# Patient Record
Sex: FEMALE | Race: WHITE | NOT HISPANIC OR LATINO | Employment: OTHER | URBAN - METROPOLITAN AREA
[De-identification: names, ages, dates, MRNs, and addresses within clinical notes are randomized per-mention and may not be internally consistent; named-entity substitution may affect disease eponyms.]

---

## 2019-03-15 ENCOUNTER — HISTORICAL (OUTPATIENT)
Dept: PHYSICAL THERAPY | Facility: HOSPITAL | Age: 74
End: 2019-03-15

## 2022-04-10 ENCOUNTER — HISTORICAL (OUTPATIENT)
Dept: ADMINISTRATIVE | Facility: HOSPITAL | Age: 77
End: 2022-04-10

## 2022-04-26 VITALS
SYSTOLIC BLOOD PRESSURE: 138 MMHG | WEIGHT: 231 LBS | DIASTOLIC BLOOD PRESSURE: 80 MMHG | OXYGEN SATURATION: 98 % | BODY MASS INDEX: 37.12 KG/M2 | HEIGHT: 66 IN

## 2022-11-09 ENCOUNTER — LAB VISIT (OUTPATIENT)
Dept: LAB | Facility: HOSPITAL | Age: 77
End: 2022-11-09
Attending: NURSE PRACTITIONER
Payer: MEDICARE

## 2022-11-09 DIAGNOSIS — E11.9 DM (DIABETES MELLITUS): ICD-10-CM

## 2022-11-09 DIAGNOSIS — I10 ESSENTIAL HYPERTENSION, MALIGNANT: Primary | ICD-10-CM

## 2022-11-09 LAB
CHOLEST SERPL-MCNC: 170 MG/DL
CHOLEST/HDLC SERPL: 3 {RATIO} (ref 0–5)
HDLC SERPL-MCNC: 52 MG/DL (ref 35–60)
LDLC SERPL CALC-MCNC: 92 MG/DL (ref 50–140)
TRIGL SERPL-MCNC: 130 MG/DL (ref 37–140)
VLDLC SERPL CALC-MCNC: 26 MG/DL

## 2022-11-09 PROCEDURE — 80061 LIPID PANEL: CPT

## 2022-11-09 PROCEDURE — 36415 COLL VENOUS BLD VENIPUNCTURE: CPT

## 2022-11-14 ENCOUNTER — LAB REQUISITION (OUTPATIENT)
Dept: LAB | Facility: HOSPITAL | Age: 77
End: 2022-11-14
Payer: MEDICARE

## 2022-11-14 DIAGNOSIS — R19.7 DIARRHEA, UNSPECIFIED: ICD-10-CM

## 2022-11-14 LAB
CRYPTOSP AG SPEC QL: NEGATIVE
G LAMBLIA AG STL QL IA: NEGATIVE
GIARDIA/CRYPTO NEG CONT (OHS): NEGATIVE
GIARDIA/CRYPTO POS CONT (OHS): POSITIVE

## 2022-11-14 PROCEDURE — 87329 GIARDIA AG IA: CPT | Performed by: INTERNAL MEDICINE

## 2022-11-14 PROCEDURE — 83993 ASSAY FOR CALPROTECTIN FECAL: CPT | Performed by: INTERNAL MEDICINE

## 2022-11-16 LAB — CALPROTECTIN STL-MCNT: 144 MCG/G

## 2022-12-21 ENCOUNTER — HOSPITAL ENCOUNTER (INPATIENT)
Facility: HOSPITAL | Age: 77
LOS: 2 days | Discharge: HOME | DRG: 313 | End: 2022-12-23
Attending: EMERGENCY MEDICINE | Admitting: INTERNAL MEDICINE
Payer: MEDICARE

## 2022-12-21 DIAGNOSIS — I25.10 CAD (CORONARY ARTERY DISEASE): ICD-10-CM

## 2022-12-21 DIAGNOSIS — R07.9 CHEST PAIN: Primary | ICD-10-CM

## 2022-12-21 DIAGNOSIS — R07.9 CHEST PAIN, UNSPECIFIED TYPE: ICD-10-CM

## 2022-12-21 LAB
ALBUMIN SERPL-MCNC: 4 G/DL (ref 3.4–4.8)
ALBUMIN/GLOB SERPL: 1.2 RATIO (ref 1.1–2)
ALP SERPL-CCNC: 42 UNIT/L (ref 40–150)
ALT SERPL-CCNC: 28 UNIT/L (ref 0–55)
ANION GAP SERPL CALC-SCNC: 17 MMOL/L (ref 8–16)
APTT PPP: 27.6 SECONDS (ref 23.2–33.7)
AST SERPL-CCNC: 30 UNIT/L (ref 5–34)
BASOPHILS # BLD AUTO: 0.01 X10(3)/MCL (ref 0–0.2)
BASOPHILS NFR BLD AUTO: 0.1 %
BILIRUBIN DIRECT+TOT PNL SERPL-MCNC: 0.7 MG/DL
BNP BLD-MCNC: 35 PG/ML
BUN SERPL-MCNC: 15 MG/DL (ref 9.8–20.1)
BUN SERPL-MCNC: 19 MG/DL (ref 6–30)
CALCIUM SERPL-MCNC: 9.6 MG/DL (ref 8.4–10.2)
CHLORIDE SERPL-SCNC: 103 MMOL/L (ref 95–110)
CHLORIDE SERPL-SCNC: 105 MMOL/L (ref 98–107)
CO2 SERPL-SCNC: 20 MMOL/L (ref 23–31)
CORRECTED TEMPERATURE (PCO2): 42 MMHG (ref 35–45)
CORRECTED TEMPERATURE (PH): 7.36 (ref 7.35–7.45)
CORRECTED TEMPERATURE (PO2): 104 MMHG (ref 80–100)
CREAT SERPL-MCNC: 0.4 MG/DL (ref 0.5–1.4)
CREAT SERPL-MCNC: 0.71 MG/DL (ref 0.55–1.02)
EOSINOPHIL # BLD AUTO: 0.04 X10(3)/MCL (ref 0–0.9)
EOSINOPHIL NFR BLD AUTO: 0.5 %
ERYTHROCYTE [DISTWIDTH] IN BLOOD BY AUTOMATED COUNT: 13.5 % (ref 11–14.5)
FLUAV AG UPPER RESP QL IA.RAPID: NOT DETECTED
FLUBV AG UPPER RESP QL IA.RAPID: NOT DETECTED
GFR SERPLBLD CREATININE-BSD FMLA CKD-EPI: >60 MLS/MIN/1.73/M2
GLOBULIN SER-MCNC: 3.3 GM/DL (ref 2.4–3.5)
GLUCOSE SERPL-MCNC: 219 MG/DL (ref 70–110)
GLUCOSE SERPL-MCNC: 223 MG/DL (ref 82–115)
HCO3 UR-SCNC: 23.7 MMOL/L (ref 22–26)
HCT VFR BLD AUTO: 48.8 % (ref 37–47)
HCT VFR BLD CALC: 50 %PCV (ref 36–54)
HGB BLD-MCNC: 15.5 G/DL (ref 12–16)
HGB BLD-MCNC: 16.1 GM/DL (ref 12–16)
HGB BLD-MCNC: 17 G/DL
IMM GRANULOCYTES # BLD AUTO: 0.01 X10(3)/MCL (ref 0–0.04)
IMM GRANULOCYTES NFR BLD AUTO: 0.1 %
INR BLD: 1.1 (ref 0–1.3)
LIPASE SERPL-CCNC: 14 U/L
LYMPHOCYTES # BLD AUTO: 2.12 X10(3)/MCL (ref 0.6–4.6)
LYMPHOCYTES NFR BLD AUTO: 27.8 %
MCH RBC QN AUTO: 32.5 PG
MCHC RBC AUTO-ENTMCNC: 33 MG/DL (ref 33–36)
MCV RBC AUTO: 98.4 FL (ref 80–94)
MONOCYTES # BLD AUTO: 0.63 X10(3)/MCL (ref 0.1–1.3)
MONOCYTES NFR BLD AUTO: 8.3 %
NEUTROPHILS # BLD AUTO: 4.82 X10(3)/MCL (ref 2.1–9.2)
NEUTROPHILS NFR BLD AUTO: 63.2 %
NRBC BLD AUTO-RTO: 0 % (ref 0–1)
PCO2 BLDA: 42 MMHG (ref 35–45)
PH SMN: 7.36 [PH] (ref 7.35–7.45)
PLATELET # BLD AUTO: 157 X10(3)/MCL (ref 140–371)
PMV BLD AUTO: 12.2 FL (ref 9.4–12.4)
PO2 BLDA: 104 MMHG (ref 80–100)
POC BASE DEFICIT: -1.8 MMOL/L (ref -2–2)
POC CARDIAC TROPONIN I: 0 NG/ML (ref 0–0.08)
POC COHB: 1.4 %
POC IONIZED CALCIUM: 1.11 MMOL/L (ref 1.12–1.23)
POC IONIZED CALCIUM: 1.17 MMOL/L (ref 1.06–1.42)
POC METHB: 0 % (ref 0.4–1.5)
POC O2HB: 96.5 % (ref 94–97)
POC SATURATED O2: 97.8 %
POC TCO2 (MEASURED): 25 MMOL/L (ref 23–29)
POC TEMPERATURE: 37 °C
POCT GLUCOSE: 179 MG/DL (ref 70–110)
POTASSIUM BLD-SCNC: 3.6 MMOL/L (ref 3.5–5)
POTASSIUM BLD-SCNC: 4.7 MMOL/L (ref 3.5–5.1)
POTASSIUM SERPL-SCNC: 4.6 MMOL/L (ref 3.5–5.1)
PROT SERPL-MCNC: 7.3 GM/DL (ref 5.8–7.6)
PROTHROMBIN TIME: 14.1 SECONDS (ref 12.5–14.5)
RBC # BLD AUTO: 4.96 X10(6)/MCL (ref 4.2–5.4)
SAMPLE: ABNORMAL
SAMPLE: NORMAL
SARS-COV-2 RNA RESP QL NAA+PROBE: NOT DETECTED
SODIUM BLD-SCNC: 135 MMOL/L (ref 137–145)
SODIUM BLD-SCNC: 140 MMOL/L (ref 136–145)
SODIUM SERPL-SCNC: 139 MMOL/L (ref 136–145)
SPECIMEN SOURCE: ABNORMAL
TROPONIN I SERPL-MCNC: <0.01 NG/ML (ref 0–0.04)
WBC # SPEC AUTO: 7.6 X10(3)/MCL (ref 4.5–11.5)

## 2022-12-21 PROCEDURE — 93010 ELECTROCARDIOGRAM REPORT: CPT | Mod: ,,, | Performed by: INTERNAL MEDICINE

## 2022-12-21 PROCEDURE — 36600 WITHDRAWAL OF ARTERIAL BLOOD: CPT

## 2022-12-21 PROCEDURE — 93010 EKG 12-LEAD: ICD-10-PCS | Mod: ,,, | Performed by: INTERNAL MEDICINE

## 2022-12-21 PROCEDURE — 99900035 HC TECH TIME PER 15 MIN (STAT)

## 2022-12-21 PROCEDURE — 82803 BLOOD GASES ANY COMBINATION: CPT

## 2022-12-21 PROCEDURE — 80047 BASIC METABLC PNL IONIZED CA: CPT

## 2022-12-21 PROCEDURE — 84484 ASSAY OF TROPONIN QUANT: CPT | Performed by: EMERGENCY MEDICINE

## 2022-12-21 PROCEDURE — 83690 ASSAY OF LIPASE: CPT | Performed by: EMERGENCY MEDICINE

## 2022-12-21 PROCEDURE — 85730 THROMBOPLASTIN TIME PARTIAL: CPT | Performed by: EMERGENCY MEDICINE

## 2022-12-21 PROCEDURE — 96375 TX/PRO/DX INJ NEW DRUG ADDON: CPT

## 2022-12-21 PROCEDURE — 80053 COMPREHEN METABOLIC PANEL: CPT | Performed by: EMERGENCY MEDICINE

## 2022-12-21 PROCEDURE — 25000242 PHARM REV CODE 250 ALT 637 W/ HCPCS

## 2022-12-21 PROCEDURE — 25000003 PHARM REV CODE 250: Performed by: EMERGENCY MEDICINE

## 2022-12-21 PROCEDURE — 84484 ASSAY OF TROPONIN QUANT: CPT

## 2022-12-21 PROCEDURE — 83880 ASSAY OF NATRIURETIC PEPTIDE: CPT | Performed by: NURSE PRACTITIONER

## 2022-12-21 PROCEDURE — 0240U COVID/FLU A&B PCR: CPT | Performed by: EMERGENCY MEDICINE

## 2022-12-21 PROCEDURE — 27000221 HC OXYGEN, UP TO 24 HOURS

## 2022-12-21 PROCEDURE — 25500020 PHARM REV CODE 255: Performed by: EMERGENCY MEDICINE

## 2022-12-21 PROCEDURE — 85025 COMPLETE CBC W/AUTO DIFF WBC: CPT | Performed by: EMERGENCY MEDICINE

## 2022-12-21 PROCEDURE — 63600175 PHARM REV CODE 636 W HCPCS: Performed by: EMERGENCY MEDICINE

## 2022-12-21 PROCEDURE — 11000001 HC ACUTE MED/SURG PRIVATE ROOM

## 2022-12-21 PROCEDURE — 99285 EMERGENCY DEPT VISIT HI MDM: CPT | Mod: 25

## 2022-12-21 PROCEDURE — 25000242 PHARM REV CODE 250 ALT 637 W/ HCPCS: Performed by: EMERGENCY MEDICINE

## 2022-12-21 PROCEDURE — 93005 ELECTROCARDIOGRAM TRACING: CPT

## 2022-12-21 PROCEDURE — 96374 THER/PROPH/DIAG INJ IV PUSH: CPT

## 2022-12-21 PROCEDURE — 85610 PROTHROMBIN TIME: CPT | Performed by: EMERGENCY MEDICINE

## 2022-12-21 RX ORDER — SIMETHICONE 80 MG
1 TABLET,CHEWABLE ORAL 4 TIMES DAILY PRN
Status: DISCONTINUED | OUTPATIENT
Start: 2022-12-21 | End: 2022-12-23 | Stop reason: HOSPADM

## 2022-12-21 RX ORDER — PANTOPRAZOLE SODIUM 40 MG/1
40 TABLET, DELAYED RELEASE ORAL DAILY
COMMUNITY
End: 2023-08-19

## 2022-12-21 RX ORDER — ONDANSETRON 2 MG/ML
4 INJECTION INTRAMUSCULAR; INTRAVENOUS EVERY 4 HOURS PRN
Status: DISCONTINUED | OUTPATIENT
Start: 2022-12-21 | End: 2022-12-23 | Stop reason: HOSPADM

## 2022-12-21 RX ORDER — TALC
6 POWDER (GRAM) TOPICAL NIGHTLY PRN
Status: DISCONTINUED | OUTPATIENT
Start: 2022-12-21 | End: 2022-12-23 | Stop reason: HOSPADM

## 2022-12-21 RX ORDER — AMLODIPINE AND BENAZEPRIL HYDROCHLORIDE 10; 20 MG/1; MG/1
1 CAPSULE ORAL DAILY
COMMUNITY
End: 2023-08-19

## 2022-12-21 RX ORDER — FAMOTIDINE 40 MG/1
40 TABLET, FILM COATED ORAL DAILY
Status: ON HOLD | COMMUNITY
End: 2022-12-23 | Stop reason: HOSPADM

## 2022-12-21 RX ORDER — PROCHLORPERAZINE EDISYLATE 5 MG/ML
5 INJECTION INTRAMUSCULAR; INTRAVENOUS EVERY 6 HOURS PRN
Status: DISCONTINUED | OUTPATIENT
Start: 2022-12-21 | End: 2022-12-23 | Stop reason: HOSPADM

## 2022-12-21 RX ORDER — NITROGLYCERIN 0.4 MG/1
0.4 TABLET SUBLINGUAL EVERY 5 MIN PRN
Status: DISCONTINUED | OUTPATIENT
Start: 2022-12-21 | End: 2022-12-23 | Stop reason: HOSPADM

## 2022-12-21 RX ORDER — MAG HYDROX/ALUMINUM HYD/SIMETH 200-200-20
30 SUSPENSION, ORAL (FINAL DOSE FORM) ORAL 4 TIMES DAILY PRN
Status: DISCONTINUED | OUTPATIENT
Start: 2022-12-21 | End: 2022-12-23 | Stop reason: HOSPADM

## 2022-12-21 RX ORDER — NITROGLYCERIN 0.4 MG/1
TABLET SUBLINGUAL
Status: COMPLETED
Start: 2022-12-21 | End: 2022-12-21

## 2022-12-21 RX ORDER — NALOXONE HCL 0.4 MG/ML
0.02 VIAL (ML) INJECTION
Status: DISCONTINUED | OUTPATIENT
Start: 2022-12-21 | End: 2022-12-23 | Stop reason: HOSPADM

## 2022-12-21 RX ORDER — LOSARTAN POTASSIUM 100 MG/1
100 TABLET ORAL DAILY
Status: ON HOLD | COMMUNITY
End: 2022-12-23 | Stop reason: HOSPADM

## 2022-12-21 RX ORDER — INSULIN ASPART 100 [IU]/ML
1-10 INJECTION, SOLUTION INTRAVENOUS; SUBCUTANEOUS
Status: DISCONTINUED | OUTPATIENT
Start: 2022-12-21 | End: 2022-12-23 | Stop reason: HOSPADM

## 2022-12-21 RX ORDER — DAPAGLIFLOZIN 10 MG/1
10 TABLET, FILM COATED ORAL DAILY
COMMUNITY

## 2022-12-21 RX ORDER — MORPHINE SULFATE 4 MG/ML
4 INJECTION, SOLUTION INTRAMUSCULAR; INTRAVENOUS
Status: COMPLETED | OUTPATIENT
Start: 2022-12-21 | End: 2022-12-21

## 2022-12-21 RX ORDER — FLUTICASONE FUROATE, UMECLIDINIUM BROMIDE AND VILANTEROL TRIFENATATE 200; 62.5; 25 UG/1; UG/1; UG/1
1 POWDER RESPIRATORY (INHALATION) DAILY
COMMUNITY

## 2022-12-21 RX ORDER — POLYETHYLENE GLYCOL 3350 17 G/17G
17 POWDER, FOR SOLUTION ORAL 2 TIMES DAILY PRN
Status: DISCONTINUED | OUTPATIENT
Start: 2022-12-21 | End: 2022-12-23 | Stop reason: HOSPADM

## 2022-12-21 RX ORDER — ONDANSETRON 2 MG/ML
4 INJECTION INTRAMUSCULAR; INTRAVENOUS
Status: COMPLETED | OUTPATIENT
Start: 2022-12-21 | End: 2022-12-21

## 2022-12-21 RX ORDER — GLIMEPIRIDE 2 MG/1
4 TABLET ORAL 2 TIMES DAILY
COMMUNITY

## 2022-12-21 RX ORDER — AZELASTINE 1 MG/ML
2 SPRAY, METERED NASAL 2 TIMES DAILY
COMMUNITY

## 2022-12-21 RX ORDER — ACETAMINOPHEN 325 MG/1
650 TABLET ORAL EVERY 6 HOURS PRN
Status: DISCONTINUED | OUTPATIENT
Start: 2022-12-21 | End: 2022-12-23 | Stop reason: HOSPADM

## 2022-12-21 RX ORDER — METFORMIN HYDROCHLORIDE 1000 MG/1
1000 TABLET ORAL 2 TIMES DAILY WITH MEALS
Status: ON HOLD | COMMUNITY
End: 2023-10-14 | Stop reason: HOSPADM

## 2022-12-21 RX ADMIN — NITROGLYCERIN 0.4 MG: 0.4 TABLET, ORALLY DISINTEGRATING SUBLINGUAL at 06:12

## 2022-12-21 RX ADMIN — IOPAMIDOL 100 ML: 755 INJECTION, SOLUTION INTRAVENOUS at 07:12

## 2022-12-21 RX ADMIN — ONDANSETRON 4 MG: 2 INJECTION INTRAMUSCULAR; INTRAVENOUS at 07:12

## 2022-12-21 RX ADMIN — MORPHINE SULFATE 4 MG: 4 INJECTION INTRAVENOUS at 07:12

## 2022-12-21 RX ADMIN — NITROGLYCERIN 0.4 MG: 0.4 TABLET, ORALLY DISINTEGRATING SUBLINGUAL at 07:12

## 2022-12-21 RX ADMIN — SODIUM CHLORIDE 1000 ML: 9 INJECTION, SOLUTION INTRAVENOUS at 07:12

## 2022-12-22 LAB
ALBUMIN SERPL-MCNC: 3.6 G/DL (ref 3.4–4.8)
ALBUMIN/GLOB SERPL: 1.2 RATIO (ref 1.1–2)
ALP SERPL-CCNC: 43 UNIT/L (ref 40–150)
ALT SERPL-CCNC: 21 UNIT/L (ref 0–55)
AST SERPL-CCNC: 22 UNIT/L (ref 5–34)
AV INDEX (PROSTH): 0.94
AV MEAN GRADIENT: 10 MMHG
AV PEAK GRADIENT: 19 MMHG
AV VALVE AREA: 2.66 CM2
AV VELOCITY RATIO: 0.7
BASOPHILS # BLD AUTO: 0.01 X10(3)/MCL (ref 0–0.2)
BASOPHILS NFR BLD AUTO: 0.1 %
BILIRUBIN DIRECT+TOT PNL SERPL-MCNC: 0.7 MG/DL
BSA FOR ECHO PROCEDURE: 2.04 M2
BUN SERPL-MCNC: 13.8 MG/DL (ref 9.8–20.1)
CALCIUM SERPL-MCNC: 8.7 MG/DL (ref 8.4–10.2)
CHLORIDE SERPL-SCNC: 108 MMOL/L (ref 98–107)
CO2 SERPL-SCNC: 19 MMOL/L (ref 23–31)
CREAT SERPL-MCNC: 0.68 MG/DL (ref 0.55–1.02)
CV ECHO LV RWT: 0.53 CM
DOP CALC AO PEAK VEL: 2.19 M/S
DOP CALC AO VTI: 33.5 CM
DOP CALC LVOT AREA: 2.8 CM2
DOP CALC LVOT DIAMETER: 1.9 CM
DOP CALC LVOT PEAK VEL: 1.54 M/S
DOP CALC LVOT STROKE VOLUME: 88.98 CM3
DOP CALC MV VTI: 42.1 CM
DOP CALCLVOT PEAK VEL VTI: 31.4 CM
E WAVE DECELERATION TIME: 193 MSEC
E/A RATIO: 0.67
E/E' RATIO: 18 M/S
ECHO LV POSTERIOR WALL: 1.12 CM (ref 0.6–1.1)
EJECTION FRACTION: 55 %
EOSINOPHIL # BLD AUTO: 0.02 X10(3)/MCL (ref 0–0.9)
EOSINOPHIL NFR BLD AUTO: 0.3 %
ERYTHROCYTE [DISTWIDTH] IN BLOOD BY AUTOMATED COUNT: 13.7 % (ref 11–14.5)
FRACTIONAL SHORTENING: 42 % (ref 28–44)
GFR SERPLBLD CREATININE-BSD FMLA CKD-EPI: >60 MLS/MIN/1.73/M2
GLOBULIN SER-MCNC: 3 GM/DL (ref 2.4–3.5)
GLUCOSE SERPL-MCNC: 171 MG/DL (ref 82–115)
HCT VFR BLD AUTO: 45.9 % (ref 37–47)
HGB BLD-MCNC: 15.3 GM/DL (ref 12–16)
IMM GRANULOCYTES # BLD AUTO: 0.02 X10(3)/MCL (ref 0–0.04)
IMM GRANULOCYTES NFR BLD AUTO: 0.3 %
INTERVENTRICULAR SEPTUM: 1.01 CM (ref 0.6–1.1)
LEFT ATRIUM SIZE: 3.9 CM
LEFT ATRIUM VOLUME INDEX MOD: 35.9 ML/M2
LEFT ATRIUM VOLUME MOD: 71.1 CM3
LEFT INTERNAL DIMENSION IN SYSTOLE: 2.48 CM (ref 2.1–4)
LEFT VENTRICLE DIASTOLIC VOLUME INDEX: 40.61 ML/M2
LEFT VENTRICLE DIASTOLIC VOLUME: 80.4 ML
LEFT VENTRICLE MASS INDEX: 77 G/M2
LEFT VENTRICLE SYSTOLIC VOLUME INDEX: 11.1 ML/M2
LEFT VENTRICLE SYSTOLIC VOLUME: 21.9 ML
LEFT VENTRICULAR INTERNAL DIMENSION IN DIASTOLE: 4.24 CM (ref 3.5–6)
LEFT VENTRICULAR MASS: 152.23 G
LV LATERAL E/E' RATIO: 15 M/S
LV SEPTAL E/E' RATIO: 22.5 M/S
LVOT MG: 6 MMHG
LVOT MV: 1.17 CM/S
LYMPHOCYTES # BLD AUTO: 1.51 X10(3)/MCL (ref 0.6–4.6)
LYMPHOCYTES NFR BLD AUTO: 19.2 %
MAGNESIUM SERPL-MCNC: 1.9 MG/DL (ref 1.6–2.6)
MCH RBC QN AUTO: 33.8 PG
MCHC RBC AUTO-ENTMCNC: 33.3 MG/DL (ref 33–36)
MCV RBC AUTO: 101.3 FL (ref 80–94)
MONOCYTES # BLD AUTO: 0.67 X10(3)/MCL (ref 0.1–1.3)
MONOCYTES NFR BLD AUTO: 8.5 %
MV MEAN GRADIENT: 8 MMHG
MV PEAK A VEL: 2.02 M/S
MV PEAK E VEL: 1.35 M/S
MV PEAK GRADIENT: 16 MMHG
MV VALVE AREA BY CONTINUITY EQUATION: 2.11 CM2
NEUTROPHILS # BLD AUTO: 5.63 X10(3)/MCL (ref 2.1–9.2)
NEUTROPHILS NFR BLD AUTO: 71.6 %
NRBC BLD AUTO-RTO: 0 % (ref 0–1)
PHOSPHATE SERPL-MCNC: 4.5 MG/DL (ref 2.3–4.7)
PLATELET # BLD AUTO: 215 X10(3)/MCL (ref 140–371)
PMV BLD AUTO: 11.3 FL (ref 9.4–12.4)
POCT GLUCOSE: 132 MG/DL (ref 70–110)
POTASSIUM SERPL-SCNC: 4.3 MMOL/L (ref 3.5–5.1)
PROT SERPL-MCNC: 6.6 GM/DL (ref 5.8–7.6)
RA PRESSURE: 3 MMHG
RA WIDTH: 3.74 CM
RBC # BLD AUTO: 4.53 X10(6)/MCL (ref 4.2–5.4)
RIGHT VENTRICULAR END-DIASTOLIC DIMENSION: 3.08 CM
SODIUM SERPL-SCNC: 139 MMOL/L (ref 136–145)
TDI LATERAL: 0.09 M/S
TDI SEPTAL: 0.06 M/S
TDI: 0.08 M/S
TRICUSPID ANNULAR PLANE SYSTOLIC EXCURSION: 2.04 CM
TROPONIN I SERPL-MCNC: 0.01 NG/ML (ref 0–0.04)
TROPONIN I SERPL-MCNC: 0.01 NG/ML (ref 0–0.04)
TROPONIN I SERPL-MCNC: <0.01 NG/ML (ref 0–0.04)
TROPONIN I SERPL-MCNC: <0.01 NG/ML (ref 0–0.04)
WBC # SPEC AUTO: 7.9 X10(3)/MCL (ref 4.5–11.5)

## 2022-12-22 PROCEDURE — 85025 COMPLETE CBC W/AUTO DIFF WBC: CPT | Performed by: NURSE PRACTITIONER

## 2022-12-22 PROCEDURE — 84100 ASSAY OF PHOSPHORUS: CPT | Performed by: NURSE PRACTITIONER

## 2022-12-22 PROCEDURE — 27000221 HC OXYGEN, UP TO 24 HOURS

## 2022-12-22 PROCEDURE — 25000003 PHARM REV CODE 250: Performed by: NURSE PRACTITIONER

## 2022-12-22 PROCEDURE — 36415 COLL VENOUS BLD VENIPUNCTURE: CPT | Performed by: NURSE PRACTITIONER

## 2022-12-22 PROCEDURE — 80053 COMPREHEN METABOLIC PANEL: CPT | Performed by: NURSE PRACTITIONER

## 2022-12-22 PROCEDURE — 83735 ASSAY OF MAGNESIUM: CPT | Performed by: NURSE PRACTITIONER

## 2022-12-22 PROCEDURE — 25000242 PHARM REV CODE 250 ALT 637 W/ HCPCS: Performed by: INTERNAL MEDICINE

## 2022-12-22 PROCEDURE — 25000003 PHARM REV CODE 250: Performed by: INTERNAL MEDICINE

## 2022-12-22 PROCEDURE — 21400001 HC TELEMETRY ROOM

## 2022-12-22 PROCEDURE — 84484 ASSAY OF TROPONIN QUANT: CPT | Performed by: NURSE PRACTITIONER

## 2022-12-22 RX ORDER — FLUTICASONE FUROATE AND VILANTEROL 200; 25 UG/1; UG/1
1 POWDER RESPIRATORY (INHALATION) DAILY
Status: DISCONTINUED | OUTPATIENT
Start: 2022-12-22 | End: 2022-12-23 | Stop reason: HOSPADM

## 2022-12-22 RX ORDER — LISINOPRIL 20 MG/1
20 TABLET ORAL DAILY
Status: DISCONTINUED | OUTPATIENT
Start: 2022-12-22 | End: 2022-12-23 | Stop reason: HOSPADM

## 2022-12-22 RX ORDER — PANTOPRAZOLE SODIUM 40 MG/1
40 TABLET, DELAYED RELEASE ORAL DAILY
Status: DISCONTINUED | OUTPATIENT
Start: 2022-12-22 | End: 2022-12-23 | Stop reason: HOSPADM

## 2022-12-22 RX ORDER — FAMOTIDINE 20 MG/1
40 TABLET, FILM COATED ORAL DAILY
Status: DISCONTINUED | OUTPATIENT
Start: 2022-12-22 | End: 2022-12-23 | Stop reason: HOSPADM

## 2022-12-22 RX ORDER — AMLODIPINE AND BENAZEPRIL HYDROCHLORIDE 10; 20 MG/1; MG/1
1 CAPSULE ORAL DAILY
Status: DISCONTINUED | OUTPATIENT
Start: 2022-12-22 | End: 2022-12-22

## 2022-12-22 RX ORDER — ASPIRIN 81 MG/1
81 TABLET ORAL DAILY
Status: DISCONTINUED | OUTPATIENT
Start: 2022-12-23 | End: 2022-12-23 | Stop reason: HOSPADM

## 2022-12-22 RX ORDER — AMLODIPINE BESYLATE 5 MG/1
10 TABLET ORAL DAILY
Status: DISCONTINUED | OUTPATIENT
Start: 2022-12-22 | End: 2022-12-23 | Stop reason: HOSPADM

## 2022-12-22 RX ORDER — LOSARTAN POTASSIUM 50 MG/1
100 TABLET ORAL DAILY
Status: DISCONTINUED | OUTPATIENT
Start: 2022-12-22 | End: 2022-12-23

## 2022-12-22 RX ORDER — IPRATROPIUM BROMIDE AND ALBUTEROL SULFATE 2.5; .5 MG/3ML; MG/3ML
3 SOLUTION RESPIRATORY (INHALATION) EVERY 4 HOURS PRN
Status: DISCONTINUED | OUTPATIENT
Start: 2022-12-22 | End: 2022-12-23 | Stop reason: HOSPADM

## 2022-12-22 RX ADMIN — PANTOPRAZOLE SODIUM 40 MG: 40 TABLET, DELAYED RELEASE ORAL at 09:12

## 2022-12-22 RX ADMIN — AMLODIPINE BESYLATE 10 MG: 5 TABLET ORAL at 09:12

## 2022-12-22 RX ADMIN — UMECLIDINIUM 62.5 MCG: 62.5 AEROSOL, POWDER ORAL at 09:12

## 2022-12-22 RX ADMIN — LOSARTAN POTASSIUM 100 MG: 50 TABLET, FILM COATED ORAL at 09:12

## 2022-12-22 RX ADMIN — FAMOTIDINE 40 MG: 20 TABLET, FILM COATED ORAL at 09:12

## 2022-12-22 RX ADMIN — ALUMINUM HYDROXIDE, MAGNESIUM HYDROXIDE, AND SIMETHICONE 30 ML: 200; 200; 20 SUSPENSION ORAL at 11:12

## 2022-12-22 RX ADMIN — LISINOPRIL 20 MG: 20 TABLET ORAL at 09:12

## 2022-12-22 RX ADMIN — RIVAROXABAN 20 MG: 10 TABLET, FILM COATED ORAL at 05:12

## 2022-12-22 RX ADMIN — FLUTICASONE FUROATE AND VILANTEROL TRIFENATATE 1 PUFF: 200; 25 POWDER RESPIRATORY (INHALATION) at 09:12

## 2022-12-22 NOTE — PROGRESS NOTES
[facility]  Hospital Medicine Progress Note        Chief Complaint: Inpatient Follow-up for CP    HPI:   Ms. Shore is a 77-year-old female with a history of HTN and multiple DVTs who presented to the ED with complaints of chest pain and shortness of breath starting a few hours prior to arrival.  She states it started suddenly and is new with associated nausea and mild cough.  She denies any recent long trips or leg swelling.  She states she is on Xarelto and takes it appropriately.  She states that she was given nitro by EMS and felt relief.  She also states that she vomited twice, and feels better now.     Today's ED lab work revealed glucose 223, otherwise unremarkable.  EKG showed no STEMI.  Sinus tachycardia at 108 without ectopy.  Globally flattened ST changes per ED MD read.  CTA chest was negative for pulmonary thromboembolic disease.  No acute findings in the chest.  Left thyroid nodule for which outpatient ultrasound is recommended. She was afebrile, mildly tachypneic, comfortable on 2L NC.  Cardiology was consulted in the ED. She was admitted to hospital medicine for management.    Interval Hx:   Patient today awake and comfortable. States she is still SOB and has mild chest discomfort. Has occ cough. No fever or chills,     Objective/physical exam:  General: In no acute distress, obese  Chest: Nelson diminished breath sounds   Heart: RRR, +S1, S2, no appreciable murmur  Abdomen: Soft, nontender, BS +  MSK: Warm, no lower extremity edema, no clubbing or cyanosis  Neurologic: Alert and oriented x4, Cranial nerve II-XII intact, Strength 5/5 in all 4 extremities    VITAL SIGNS: 24 HRS MIN & MAX LAST   Temp  Min: 97.5 °F (36.4 °C)  Max: 97.9 °F (36.6 °C) 97.9 °F (36.6 °C)   BP  Min: 100/70  Max: 180/83 124/69   Pulse  Min: 80  Max: 108  83   Resp  Min: 13  Max: 24 20   SpO2  Min: 89 %  Max: 97 % 96 %       Recent Labs   Lab 12/21/22  1836 12/21/22  1838 12/22/22  0447   WBC  --   7.6 7.9   RBC  --  4.96 4.53   HGB  --  16.1* 15.3   HCT 50 48.8* 45.9   MCV  --  98.4* 101.3*   MCH  --  32.5 33.8   MCHC  --  33.0 33.3   RDW  --  13.5 13.7   PLT  --  157 215   MPV  --  12.2 11.3       Recent Labs   Lab 12/21/22  1838 12/21/22  1947 12/22/22  0447     --  139   K 4.6  --  4.3   CO2 20*  --  19*   BUN 15.0  --  13.8   CREATININE 0.71  --  0.68   CALCIUM 9.6  --  8.7   PH  --  7.36  --    MG  --   --  1.90   ALBUMIN 4.0  --  3.6   ALKPHOS 42  --  43   ALT 28  --  21   AST 30  --  22   BILITOT 0.7  --  0.7          Microbiology Results (last 7 days)       ** No results found for the last 168 hours. **             See below for Radiology    Scheduled Med:   amLODIPine  10 mg Oral Daily    And    lisinopriL  20 mg Oral Daily    [START ON 12/23/2022] aspirin  81 mg Oral Daily    famotidine  40 mg Oral Daily    fluticasone furoate-vilanteroL  1 puff Inhalation Daily    losartan  100 mg Oral Daily    pantoprazole  40 mg Oral Daily    rivaroxaban  20 mg Oral Daily with dinner    umeclidinium  1 capsule Inhalation Daily        Continuous Infusions:       PRN Meds:  acetaminophen, albuterol-ipratropium, aluminum-magnesium hydroxide-simethicone, insulin aspart U-100, melatonin, naloxone, nitroGLYCERIN, ondansetron, polyethylene glycol, prochlorperazine, simethicone       Assessment/Plan:  1.  Chest pain, rule out ACS  2. Hypoxia, prob chronic, history of COPD  2.  Hyperglycemia  4.  Incidental 15 mm left thyroid nodule  5. Obesity     Plan:  Patient looks comfortable. BNP was <100 and troponin negative  Will cont bronchodilators   Added PO PPI   F/U by [facility] team   ECHO shows normal EF   Cont Xarelto -  H/O DVT  Use O2 supplementation   No signs of respiratory infection     PT in am     VTE prophylaxis: Xarelto     Patient condition:  Fair    Anticipated discharge and Disposition:   dc in 24 hrs if stable       All diagnosis and differential diagnosis have been reviewed; assessment and plan has been  documented; I have personally reviewed the labs and test results that are presently available; I have reviewed the patients medication list; I have reviewed the consulting providers response and recommendations. I have reviewed or attempted to review medical records based upon their availability    All of the patient's questions have been  addressed and answered. Patient's is agreeable to the above stated plan. I will continue to monitor closely and make adjustments to medical management as needed.  _____________________________________________________________________    Nutrition Status:    Radiology:  Echo  · The left ventricle is normal in size with concentric remodeling and   normal systolic function.  · The estimated ejection fraction is 55%.  · Indeterminate left ventricular diastolic function.  · Normal right ventricular size with normal right ventricular systolic   function.  · Mild aortic regurgitation.  · Mild mitral regurgitation.  · Mild tricuspid regurgitation.  · Normal central venous pressure (3 mmHg).  · The mean diastolic gradient across the mitral valve is 8 mmHg at a heart   rate of bpm.  · There is moderate mitral stenosis.         Geovanny Franklin MD   12/22/2022

## 2022-12-22 NOTE — ED NOTES
Pt arrives via [facility] c/o SOB and chest pain onset a few hours ago.  Pt states this pain is new, diffuse throughout chest, feels like pressure and is an 8 out of 10.  Pt also c/o nausea.  Pt arrives on 2L nc placed by EMS.  Pt in resp distress, cyanotic lips, GCS 15 and AAO x4.  MD at bedside.

## 2022-12-22 NOTE — CONSULTS
"Inpatient consult to Cardiology  Consult performed by: GOPI Zuleta  Consult ordered by: Sulma Rand AGACNP-BC [facility] - Emergency Dept  Cardiology  Consult Note    Patient Name: Kortney Leach  MRN: [id number]  Admission Date: 12/21/2022  Hospital Length of Stay: 1 days  Code Status: Full Code   Attending Provider: Kirk Petersen MD   Consulting Provider: GOPI Zultea  Primary Care Physician: Arnaldo Hernandez MD  Principal Problem:<principal problem not specified>    Patient information was obtained from patient, past medical records, and ER records.     Subjective:     Chief Complaint: Reason for Consult: CP    HPI: Ms. Leach is a 76 y/o female who is unknown to KAL, Dr. Valentino. The patient presented to [facility] on 12.21.22 with c/o CP. The patient reported that about 1 hour prior to arrival she developed some substernal CP that radiated to her Back between her Shoulder Blades. She described the pain as "heaviness" and rated it 7/10 on a verbal scale. The patient was given ASA 324mg PO x 1 and 2 SL Nitros and ultimately 1" Nitropaste with resolution of symptoms. She was evaluated in the ER and found to have an unremarkable initial workup including an EKG which Non-Ischemic Changes. She was admitted to Hospital Medicine for CP R/O ACS. [facility] has been consulted for CP R/O ACS.    PMH: HTN, Multiple DVTs   PSH: Denies Past Surgical History  Family History: Denies Family History of Heart Disease  Social History: Denies Illicit Drug, ETOH and Tobacco Use; Former Smoker, 1 ppd for 40+ Years; Quit Years Prior    Previous Cardiac Diagnostics:   ECHO 12.22.22:  The left ventricle is normal in size with concentric remodeling and normal systolic function,  The estimated ejection fraction is 55%.  Indeterminate left ventricular diastolic function.  Normal right ventricular size with normal right ventricular systolic function.  Mild aortic regurgitation.  Mild mitral " regurgitation.  Mild tricuspid regurgitation.  Normal central venous pressure (3 mmHg).  The mean diastolic gradient across the mitral valve is 8 mmHg at a heart rate of bpm.  There is moderate mitral stenosis.    Review of patient's allergies indicates:  No Known Allergies    No current facility-administered medications on file prior to encounter.     Current Outpatient Medications on File Prior to Encounter   Medication Sig    amlodipine-benazepril 10-20mg (LOTREL) 10-20 mg per capsule Take 1 capsule by mouth once daily.    azelastine (ASTELIN) 137 mcg (0.1 %) nasal spray 1 spray by Nasal route 2 (two) times daily.    dapagliflozin (FARXIGA) 10 mg tablet Take 10 mg by mouth once daily.    famotidine (PEPCID) 40 MG tablet Take 40 mg by mouth once daily.    fluticasone-umeclidin-vilanter (TRELEGY ELLIPTA) 200-62.5-25 mcg inhaler Inhale 1 puff into the lungs once daily.    glimepiride (AMARYL) 2 MG tablet Take 2 mg by mouth before breakfast.    immun glob G,IgG,/pro/IgA 0-50 (HIZENTRA SUBQ) Inject into the skin.    losartan (COZAAR) 100 MG tablet Take 100 mg by mouth once daily.    metFORMIN (GLUCOPHAGE) 1000 MG tablet Take 1,000 mg by mouth 2 (two) times daily with meals.    pantoprazole (PROTONIX) 40 MG tablet Take 40 mg by mouth once daily.    rivaroxaban (XARELTO) 20 mg Tab Take 20 mg by mouth daily with dinner or evening meal.     Review of Systems   Constitutional:  Positive for fatigue.   Respiratory:  Positive for chest tightness. Negative for shortness of breath.    Cardiovascular:  Positive for chest pain. Negative for palpitations and leg swelling.   All other systems reviewed and are negative.    Objective:     Vital Signs (Most Recent):  Temp: 97.5 °F (36.4 °C) (12/21/22 1832)  Pulse: 88 (12/22/22 1012)  Resp: 12 (12/22/22 1012)  BP: 128/68 (12/22/22 1012)  SpO2: 95 % (12/22/22 1012) Vital Signs (24h Range):  Temp:  [97.5 °F (36.4 °C)] 97.5 °F (36.4 °C)  Pulse:  [] 88  Resp:  [12-24] 12  SpO2:  [89  %-97 %] 95 %  BP: (126-180)/(59-99) 128/68     Weight: 91 kg (200 lb 9.9 oz)  Body mass index is 33.42 kg/m².    SpO2: 95 %       No intake or output data in the 24 hours ending 12/22/22 1322    Lines/Drains/Airways       Peripheral Intravenous Line  Duration                  Peripheral IV - Single Lumen 12/21/22 20 G Anterior;Left Forearm 1 day         Peripheral IV - Single Lumen 12/21/22 1845 18 G Right Antecubital <1 day                  Significant Labs:  Recent Results (from the past 72 hour(s))   [employer] CHEM8    Collection Time: 12/21/22  6:36 PM   Result Value Ref Range    POC Glucose 219 (H) 70 - 110 mg/dL    POC BUN 19 6 - 30 mg/dL    POC Creatinine 0.4 (L) 0.5 - 1.4 mg/dL    POC Sodium 140 136 - 145 mmol/L    POC Potassium 4.7 3.5 - 5.1 mmol/L    POC Chloride 103 95 - 110 mmol/L    POC TCO2 (MEASURED) 25 23 - 29 mmol/L    POC Anion Gap 17 (H) 8 - 16 mmol/L    POC Ionized Calcium 1.17 1.06 - 1.42 mmol/L    POC Hematocrit 50 36 - 54 %PCV    POC HEMOGLOBIN 17 g/dL    Sample VENOUS    Comprehensive metabolic panel    Collection Time: 12/21/22  6:38 PM   Result Value Ref Range    Sodium Level 139 136 - 145 mmol/L    Potassium Level 4.6 3.5 - 5.1 mmol/L    Chloride 105 98 - 107 mmol/L    Carbon Dioxide 20 (L) 23 - 31 mmol/L    Glucose Level 223 (H) 82 - 115 mg/dL    Blood Urea Nitrogen 15.0 9.8 - 20.1 mg/dL    Creatinine 0.71 0.55 - 1.02 mg/dL    Calcium Level Total 9.6 8.4 - 10.2 mg/dL    Protein Total 7.3 5.8 - 7.6 gm/dL    Albumin Level 4.0 3.4 - 4.8 g/dL    Globulin 3.3 2.4 - 3.5 gm/dL    Albumin/Globulin Ratio 1.2 1.1 - 2.0 ratio    Bilirubin Total 0.7 <=1.5 mg/dL    Alkaline Phosphatase 42 40 - 150 unit/L    Alanine Aminotransferase 28 0 - 55 unit/L    Aspartate Aminotransferase 30 5 - 34 unit/L    eGFR >60 mls/min/1.73/m2   Troponin I    Collection Time: 12/21/22  6:38 PM   Result Value Ref Range    Troponin-I <0.010 0.000 - 0.045 ng/mL   Protime-INR    Collection Time: 12/21/22  6:38 PM   Result Value  Ref Range    PT 14.1 12.5 - 14.5 seconds    INR 1.10 0.00 - 1.30   APTT    Collection Time: 12/21/22  6:38 PM   Result Value Ref Range    PTT 27.6 23.2 - 33.7 seconds   CBC with Differential    Collection Time: 12/21/22  6:38 PM   Result Value Ref Range    WBC 7.6 4.5 - 11.5 x10(3)/mcL    RBC 4.96 4.20 - 5.40 x10(6)/mcL    Hgb 16.1 (H) 12.0 - 16.0 gm/dL    Hct 48.8 (H) 37.0 - 47.0 %    MCV 98.4 (H) 80.0 - 94.0 fL    MCH 32.5 pg    MCHC 33.0 33.0 - 36.0 mg/dL    RDW 13.5 11.0 - 14.5 %    Platelet 157 140 - 371 x10(3)/mcL    MPV 12.2 9.4 - 12.4 fL    Neut % 63.2 %    Lymph % 27.8 %    Mono % 8.3 %    Eos % 0.5 %    Basophil % 0.1 %    Lymph # 2.12 0.6 - 4.6 x10(3)/mcL    Neut # 4.82 2.1 - 9.2 x10(3)/mcL    Mono # 0.63 0.1 - 1.3 x10(3)/mcL    Eos # 0.04 0 - 0.9 x10(3)/mcL    Baso # 0.01 0 - 0.2 x10(3)/mcL    IG# 0.01 0 - 0.04 x10(3)/mcL    IG% 0.1 %    NRBC% 0.0 0 - 1 %   Lipase    Collection Time: 12/21/22  6:38 PM   Result Value Ref Range    Lipase Level 14 <=60 U/L   BNP    Collection Time: 12/21/22  6:38 PM   Result Value Ref Range    Natriuretic Peptide 35.0 <=100.0 pg/mL   Troponin ISTAT    Collection Time: 12/21/22  7:36 PM   Result Value Ref Range    POC Cardiac Troponin I 0.00 0.00 - 0.08 ng/mL    Sample VENOUS    COVID/FLU A&B PCR    Collection Time: 12/21/22  7:40 PM   Result Value Ref Range    Influenza A PCR Not Detected Not Detected    Influenza B PCR Not Detected Not Detected    SARS-CoV-2 PCR Not Detected Not Detected   POCT ARTERIAL BLOOD GAS    Collection Time: 12/21/22  7:47 PM   Result Value Ref Range    POC PH 7.36 7.35 - 7.45    POC PCO2 42 35 - 45 mmHg    POC PO2 104 (A) 80.0 - 100 mmHg    POC HEMOGLOBIN 15.5 12.0 - 16.0 g/dL    POC SATURATED O2 97.8 %    POC O2Hb 96.5 94.0 - 97.0 %    POC COHb 1.4 %    POC MetHb 0.0 (A) 0.40 - 1.5 %    POC Potassium 3.6 3.5 - 5.0 mmol/l    POC Sodium 135 (A) 137 - 145 mmol/l    POC Ionized Calcium 1.11 (A) 1.12 - 1.23 mmol/l    Correct Temperature (PH) 7.36 7.35  - 7.45    Corrected Temperature (pCO2) 42 35 - 45 mmHg    Corrected Temperature (pO2) 104 (A) 80.0 - 100 mmHg    POC HCO3 23.7 22.0 - 26.0 mmol/l    Base Deficit -1.8 -2.0 - 2.0 mmol/l    POC Temp 37.0 °C    Specimen source Arterial sample    POCT glucose    Collection Time: 12/21/22 10:23 PM   Result Value Ref Range    POCT Glucose 179 (H) 70 - 110 mg/dL   Troponin I    Collection Time: 12/22/22 12:20 AM   Result Value Ref Range    Troponin-I <0.010 0.000 - 0.045 ng/mL   Comprehensive Metabolic Panel (CMP)    Collection Time: 12/22/22  4:47 AM   Result Value Ref Range    Sodium Level 139 136 - 145 mmol/L    Potassium Level 4.3 3.5 - 5.1 mmol/L    Chloride 108 (H) 98 - 107 mmol/L    Carbon Dioxide 19 (L) 23 - 31 mmol/L    Glucose Level 171 (H) 82 - 115 mg/dL    Blood Urea Nitrogen 13.8 9.8 - 20.1 mg/dL    Creatinine 0.68 0.55 - 1.02 mg/dL    Calcium Level Total 8.7 8.4 - 10.2 mg/dL    Protein Total 6.6 5.8 - 7.6 gm/dL    Albumin Level 3.6 3.4 - 4.8 g/dL    Globulin 3.0 2.4 - 3.5 gm/dL    Albumin/Globulin Ratio 1.2 1.1 - 2.0 ratio    Bilirubin Total 0.7 <=1.5 mg/dL    Alkaline Phosphatase 43 40 - 150 unit/L    Alanine Aminotransferase 21 0 - 55 unit/L    Aspartate Aminotransferase 22 5 - 34 unit/L    eGFR >60 mls/min/1.73/m2   Magnesium    Collection Time: 12/22/22  4:47 AM   Result Value Ref Range    Magnesium Level 1.90 1.60 - 2.60 mg/dL   Phosphorus    Collection Time: 12/22/22  4:47 AM   Result Value Ref Range    Phosphorus Level 4.5 2.3 - 4.7 mg/dL   Troponin I    Collection Time: 12/22/22  4:47 AM   Result Value Ref Range    Troponin-I 0.013 0.000 - 0.045 ng/mL   CBC with Differential    Collection Time: 12/22/22  4:47 AM   Result Value Ref Range    WBC 7.9 4.5 - 11.5 x10(3)/mcL    RBC 4.53 4.20 - 5.40 x10(6)/mcL    Hgb 15.3 12.0 - 16.0 gm/dL    Hct 45.9 37.0 - 47.0 %    .3 (H) 80.0 - 94.0 fL    MCH 33.8 pg    MCHC 33.3 33.0 - 36.0 mg/dL    RDW 13.7 11.0 - 14.5 %    Platelet 215 140 - 371 x10(3)/mcL     MPV 11.3 9.4 - 12.4 fL    Neut % 71.6 %    Lymph % 19.2 %    Mono % 8.5 %    Eos % 0.3 %    Basophil % 0.1 %    Lymph # 1.51 0.6 - 4.6 x10(3)/mcL    Neut # 5.63 2.1 - 9.2 x10(3)/mcL    Mono # 0.67 0.1 - 1.3 x10(3)/mcL    Eos # 0.02 0 - 0.9 x10(3)/mcL    Baso # 0.01 0 - 0.2 x10(3)/mcL    IG# 0.02 0 - 0.04 x10(3)/mcL    IG% 0.3 %    NRBC% 0.0 0 - 1 %   Echo    Collection Time: 12/22/22 10:11 AM   Result Value Ref Range    BSA 2.04 m2    TDI SEPTAL 0.06 m/s    LV LATERAL E/E' RATIO 15.00 m/s    LV SEPTAL E/E' RATIO 22.50 m/s    Right Atrial Pressure (from IVC) 3 mmHg    EF 55 %    Left Ventricular Outflow Tract Mean Velocity 1.17 cm/s    Left Ventricular Outflow Tract Mean Gradient 6.00 mmHg    TDI LATERAL 0.09 m/s    LVIDd 4.24 3.5 - 6.0 cm    IVS 1.01 0.6 - 1.1 cm    Posterior Wall 1.12 (A) 0.6 - 1.1 cm    LVIDs 2.48 2.1 - 4.0 cm    FS 42 28 - 44 %    LV mass 152.23 g    LA size 3.90 cm    RVDD 3.08 cm    TAPSE 2.04 cm    Left Ventricle Relative Wall Thickness 0.53 cm    AV mean gradient 10 mmHg    AV valve area 2.66 cm2    AV Velocity Ratio 0.70     AV index (prosthetic) 0.94     MV mean gradient 8 mmHg    MV valve area by continuity eq 2.11 cm2    E/A ratio 0.67     Mean e' 0.08 m/s    E wave deceleration time 193.00 msec    LVOT diameter 1.90 cm    LVOT area 2.8 cm2    LVOT peak denton 1.54 m/s    LVOT peak VTI 31.40 cm    Ao peak denton 2.19 m/s    Ao VTI 33.5 cm    LVOT stroke volume 88.98 cm3    AV peak gradient 19 mmHg    MV peak gradient 16 mmHg    E/E' ratio 18.00 m/s    MV Peak E [place] 1.35 m/s    MV VTI 42.1 cm    MV Peak A [place] 2.02 m/s    LV Systolic Volume 21.90 mL    LV Systolic Volume Index 11.1 mL/m2    LV Diastolic Volume 80.40 mL    LV Diastolic Volume Index 40.61 mL/m2    LV Mass Index 77 g/m2    LA Volume Index (Mod) 35.9 mL/m2    LA volume (mod) 71.10 cm3    RA Width 3.74 cm   POCT glucose    Collection Time: 12/22/22 10:59 AM   Result Value Ref Range    POCT Glucose 132 (H) 70 - 110 mg/dL   Troponin I     Collection Time: 12/22/22 12:34 PM   Result Value Ref Range    Troponin-I 0.012 0.000 - 0.045 ng/mL     Significant Imaging:  Imaging Results              CTA Chest Non-Coronary (PE Studies) (Final result)  Result time 12/21/22 19:14:01      Final result by Jason Andrews MD (12/21/22 19:14:01)                   Impression:      1. Negative for pulmonary thromboembolic disease.  No acute findings in the chest.  2. Left thyroid nodule for which outpatient ultrasound is recommended.      Electronically signed by: Jason Andrews  Date:    12/21/2022  Time:    19:14               Narrative:    EXAMINATION:  CTA CHEST NON CORONARY (PE STUDIES)    CLINICAL HISTORY:  Pulmonary embolism (PE) suspected, unknown D-dimer; chest pain.    TECHNIQUE:  Helical acquisition through the chest with IV contrast targeting the pulmonary arteries. Multiplanar and 3D MIP reconstructed images were provided for review.  mGycm. Automatic exposure control, adjustment of mA/kV or iterative reconstruction technique was used to reduce radiation.    COMPARISON:  None available.    FINDINGS:  There is good opacification of the pulmonary arterial tree. There is no evidence of pulmonary thromboembolism.  There is no aortic dissection.    Heterogeneous thyroid with a 15 mm left thyroid nodule.  No enlarged mediastinal, hilar or axillary lymph nodes.  Fluid-filled esophagus.    Heart is not significantly enlarged.  No pericardial effusion.  Mild coronary artery calcifications.    No pleural effusion.  Mild chronic changes of the lungs.  No opacities suspicious for pneumonia.    Liver is enlarged.  Small hiatal hernia.  Mild degenerative change of the spine.                                       X-Ray Chest 1 View (Final result)  Result time 12/21/22 18:44:42      Final result by Froy Chavarria MD (12/21/22 18:44:42)                   Impression:      No acute process      Electronically signed by: Froy  [facility]  Date:    12/21/2022  Time:    18:44               Narrative:    EXAMINATION:  XR CHEST 1 VIEW    CLINICAL HISTORY:  Chest pain, unspecified    TECHNIQUE:  Single frontal view of the chest was performed.    COMPARISON:  None    FINDINGS:  There are chronic appearing lung changes seen bilaterally. No evidence of acute infiltrate is seen. No mass is seen. No lesion is seen. No pleural effusion is seen. The heart appears normal. The aorta appears normal. The bones and joints show no acute abnormality.                                    EKG:    Results for orders placed or performed during the hospital encounter of 12/21/22   EKG 12-lead    Narrative    Test Reason : R07.9,    Vent. Rate : 100 BPM     Atrial Rate : 100 BPM     P-R Int : 180 ms          QRS Dur : 066 ms      QT Int : 362 ms       P-R-T Axes : 085 065 083 degrees     QTc Int : 466 ms    Sinus rhythm with frequent Premature ventricular complexes  RSR' pattern in V1  Prolonged QT interval  Abnormal ECG    No previous ECGs available  Confirmed by Theron Keller MD ([id number]) on 12/22/2022 12:30:03 PM    Referred By: [email or web address]   SELF           Confirmed By:Theron De Leon         Telemetry: SR 80s    Physical Exam  Constitutional:       Appearance: Normal appearance. She is obese.   HENT:      Head: Normocephalic.      Mouth/Throat:      Mouth: Mucous membranes are moist.   Eyes:      Extraocular Movements: Extraocular movements intact.   Cardiovascular:      Rate and Rhythm: Normal rate and regular rhythm.      Pulses: Normal pulses.   Pulmonary:      Effort: Pulmonary effort is normal.      Breath sounds: Normal breath sounds.   Abdominal:      Palpations: Abdomen is soft.   Musculoskeletal:         General: No swelling. Normal range of motion.   Skin:     General: Skin is warm and dry.   Neurological:      General: No focal deficit present.      Mental Status: She is alert and oriented to person, place, and time.   Psychiatric:          Mood and Affect: Mood normal.         Behavior: Behavior normal.     Home Medications:   No current facility-administered medications on file prior to encounter.     Current Outpatient Medications on File Prior to Encounter   Medication Sig Dispense Refill    amlodipine-benazepril 10-20mg (LOTREL) 10-20 mg per capsule Take 1 capsule by mouth once daily.      azelastine (ASTELIN) 137 mcg (0.1 %) nasal spray 1 spray by Nasal route 2 (two) times daily.      dapagliflozin (FARXIGA) 10 mg tablet Take 10 mg by mouth once daily.      famotidine (PEPCID) 40 MG tablet Take 40 mg by mouth once daily.      fluticasone-umeclidin-vilanter (TRELEGY ELLIPTA) 200-62.5-25 mcg inhaler Inhale 1 puff into the lungs once daily.      glimepiride (AMARYL) 2 MG tablet Take 2 mg by mouth before breakfast.      immun glob G,IgG,/pro/IgA 0-50 (HIZENTRA SUBQ) Inject into the skin.      losartan (COZAAR) 100 MG tablet Take 100 mg by mouth once daily.      metFORMIN (GLUCOPHAGE) 1000 MG tablet Take 1,000 mg by mouth 2 (two) times daily with meals.      pantoprazole (PROTONIX) 40 MG tablet Take 40 mg by mouth once daily.      rivaroxaban (XARELTO) 20 mg Tab Take 20 mg by mouth daily with dinner or evening meal.       Current Inpatient Medications:    Current Facility-Administered Medications:     acetaminophen tablet 650 mg, 650 mg, Oral, Q6H PRN, THONG Wesley    albuterol-ipratropium 2.5 mg-0.5 mg/3 mL nebulizer solution 3 mL, 3 mL, Nebulization, Q4H PRN, THONG Wesley    aluminum-magnesium hydroxide-simethicone 200-200-20 mg/5 mL suspension 30 mL, 30 mL, Oral, QID PRN, THONG Wesley, 30 mL at 12/22/22 1111    amLODIPine tablet 10 mg, 10 mg, Oral, Daily, 10 mg at 12/22/22 0900 **AND** lisinopriL tablet 20 mg, 20 mg, Oral, Daily, Kirk Petersen MD, 20 mg at 12/22/22 0900    famotidine tablet 40 mg, 40 mg, Oral, Daily, JONY Wesley-BC, 40 mg at 12/22/22 0900    [DISCONTINUED]  umeclidinium 62.5 mcg/actuation inhalation capsule 62.5 mcg, 1 capsule, Inhalation, Daily **AND** fluticasone furoate-vilanteroL 200-25 mcg/dose diskus inhaler 1 puff, 1 puff, Inhalation, Daily, Kirk Petersen MD, 1 puff at 12/22/22 0946    insulin aspart U-100 injection 1-10 Units, 1-10 Units, Subcutaneous, QID (AC + HS) PRN, Sulma Rand AGACNP-BC    losartan tablet 100 mg, 100 mg, Oral, Daily, Sulma BENEDICT Giorgi, AGACNP-BC, 100 mg at 12/22/22 0900    melatonin tablet 6 mg, 6 mg, Oral, Nightly PRN, Sulma Rand AGACNP-BC    naloxone 0.4 mg/mL injection 0.02 mg, 0.02 mg, Intravenous, PRN, Sulma Rand AGACNP-BC    nitroGLYCERIN SL tablet 0.4 mg, 0.4 mg, Sublingual, Q5 Min PRN, Gerardo Valdez III, MD, 0.4 mg at 12/21/22 1914    ondansetron injection 4 mg, 4 mg, Intravenous, Q4H PRN, Sulma Rand AGACNP-BC    pantoprazole EC tablet 40 mg, 40 mg, Oral, Daily, Sulma BENEDICT Giorgi, AGACNP-BC, 40 mg at 12/22/22 0900    polyethylene glycol packet 17 g, 17 g, Oral, BID PRN, Sulma Rand AGACNP-BC    prochlorperazine injection Soln 5 mg, 5 mg, Intravenous, Q6H PRN, Sulma Rand, AGACNP-BC    rivaroxaban tablet 20 mg, 20 mg, Oral, Daily with dinner, Sulma Rand AGACNP-BC    simethicone chewable tablet 80 mg, 1 tablet, Oral, QID PRN, Sulma Rand AGACNP-BC    umeclidinium 62.5 mcg/actuation inhalation capsule 62.5 mcg, 1 capsule, Inhalation, Daily, Kirk Petersen MD, 62.5 mcg at 12/22/22 0945    Current Outpatient Medications:     amlodipine-benazepril 10-20mg (LOTREL) 10-20 mg per capsule, Take 1 capsule by mouth once daily., Disp: , Rfl:     azelastine (ASTELIN) 137 mcg (0.1 %) nasal spray, 1 spray by Nasal route 2 (two) times daily., Disp: , Rfl:     dapagliflozin (FARXIGA) 10 mg tablet, Take 10 mg by mouth once daily., Disp: , Rfl:     famotidine (PEPCID) 40 MG tablet, Take 40 mg by mouth once daily., Disp: , Rfl:     fluticasone-umeclidin-vilanter (TRELEGY ELLIPTA) 200-62.5-25  mcg inhaler, Inhale 1 puff into the lungs once daily., Disp: , Rfl:     glimepiride (AMARYL) 2 MG tablet, Take 2 mg by mouth before breakfast., Disp: , Rfl:     immun glob G,IgG,/pro/IgA 0-50 (HIZENTRA SUBQ), Inject into the skin., Disp: , Rfl:     losartan (COZAAR) 100 MG tablet, Take 100 mg by mouth once daily., Disp: , Rfl:     metFORMIN (GLUCOPHAGE) 1000 MG tablet, Take 1,000 mg by mouth 2 (two) times daily with meals., Disp: , Rfl:     pantoprazole (PROTONIX) 40 MG tablet, Take 40 mg by mouth once daily., Disp: , Rfl:     rivaroxaban (XARELTO) 20 mg Tab, Take 20 mg by mouth daily with dinner or evening meal., Disp: , Rfl:     VTE Risk Mitigation (From admission, onward)           Ordered     rivaroxaban tablet 20 mg  With dinner         12/22/22 0313     Reason for No Pharmacological VTE Prophylaxis  Once        Question:  Reasons:  Answer:  Already adequately anticoagulated on oral Anticoagulants    12/21/22 2215     IP VTE HIGH RISK PATIENT  Once         12/21/22 2215     Place sequential compression device  Until discontinued         12/21/22 2215                  Assessment:   Chest Pain R/O ACS    - ECHO Reviewed with No RWMAs, EKG with Non-Specific ST-T Wave Changes  Acute Hypoxemic Respiratory Failure requiring O2 Supplementation  COPD  Hyperglycemia  Thyroid Nodule (Incidental Finding) - 15mm  No Hx of GIB    Plan:   Trend Cardiac Biomarkers  ECHO Reviewed   Start ASA 81mg PO Qday  Treatment of Hypoxemia/COPD per Primary Team  Labs and EKG in AM: CBC, CMP, Lipid Panel and Mg    Thank you for your consult.     Mitchell Petersen, GOPI  Cardiology  [facility] - Emergency Dept  12/22/2022 1:22 PM

## 2022-12-22 NOTE — H&P
[facility] Hospital Medicine History & Physical Examination       Patient Name: Kortney Leach  MRN: [id number]  Patient Class: IP- Inpatient   Admission Date: 12/21/2022  6:30 PM  Length of Stay: 0  Admitting Service: Hospital Medicine   Attending Physician: Kirk Petersen MD   Primary Care Provider: Arnaldo Hernandez MD  History source: EMR, patient and/or patient's family    CHIEF COMPLAINT   Chest Pain ([facility] reports pt. C/o Cp and SOB x 1 hour. Pt. given 324 ASA, 2SL Nitro, and 1 in Nitro paste in route. )      HISTORY OF PRESENT ILLNESS:   Ms. Shore is a 77-year-old female with a history of HTN and multiple DVTs who presented to the ED with complaints of chest pain and shortness of breath starting a few hours prior to arrival.  She states it started suddenly and is new with associated nausea and mild cough.  She denies any recent long trips or leg swelling.  She states she is on Xarelto and takes it appropriately.  She states that she was given nitro by EMS and felt relief.  She also states that she vomited twice, and feels better now.    Today's ED lab work revealed glucose 223, otherwise unremarkable.  EKG showed no STEMI.  Sinus tachycardia at 108 without ectopy.  Globally flattened ST changes per ED MD read.  CTA chest was negative for pulmonary thromboembolic disease.  No acute findings in the chest.  Left thyroid nodule for which outpatient ultrasound is recommended. She was afebrile, mildly tachypneic, comfortable on 2L NC.  Cardiology was consulted in the ED. She was admitted to hospital medicine for management.    PAST MEDICAL HISTORY:     Asthma   COPD   Deep venous thrombosis on Xarelto  Degeneration of lumbosacral intervertebral disc   Diabetes mellitus type 2   GERD   Hypertension   Obesity   Obstructive sleep apnea on CPAP   Pulmonary hypertension    PAST SURGICAL HISTORY:     Total knee replacement    ALLERGIES:   Patient has no allergy information on  record.    FAMILY HISTORY:   Reviewed and non-contributory     SOCIAL HISTORY:     Tobacco:  Former smoker  Etoh:  Denies  Drugs:  Denies       HOME MEDICATIONS:     Prior to Admission medications    Medication Sig Start Date End Date Taking? Authorizing Provider   amlodipine-benazepril 10-20mg (LOTREL) 10-20 mg per capsule Take 1 capsule by mouth once daily.   Yes Historical Provider   azelastine (ASTELIN) 137 mcg (0.1 %) nasal spray 1 spray by Nasal route 2 (two) times daily.   Yes Historical Provider   dapagliflozin (FARXIGA) 10 mg tablet Take 10 mg by mouth once daily.   Yes Historical Provider   famotidine (PEPCID) 40 MG tablet Take 40 mg by mouth once daily.   Yes Historical Provider   fluticasone-umeclidin-vilanter (TRELEGY ELLIPTA) 200-62.5-25 mcg inhaler Inhale 1 puff into the lungs once daily.   Yes Historical Provider   glimepiride (AMARYL) 2 MG tablet Take 2 mg by mouth before breakfast.   Yes Historical Provider   immun glob G,IgG,/pro/IgA 0-50 (HIZENTRA SUBQ) Inject into the skin.   Yes Historical Provider   losartan (COZAAR) 100 MG tablet Take 100 mg by mouth once daily.   Yes Historical Provider   metFORMIN (GLUCOPHAGE) 1000 MG tablet Take 1,000 mg by mouth 2 (two) times daily with meals.   Yes Historical Provider   pantoprazole (PROTONIX) 40 MG tablet Take 40 mg by mouth once daily.   Yes Historical Provider   rivaroxaban (XARELTO) 20 mg Tab Take 20 mg by mouth daily with dinner or evening meal.   Yes Historical Provider       REVIEW OF SYSTEMS:   Except as documented, all other systems reviewed and negative     PHYSICAL EXAM:   T 97.5 °F (36.4 °C)   BP (!) 149/81   P 91   RR 16   O2 (!) 92 %  GENERAL: awake, alert, oriented and in no acute distress, non-toxic appearing   HEENT: normocephalic atraumatic   NECK: supple   LUNGS: Bilateral wheezes present, no accessory muscle use. O2 Via NC in use  CVS: Regular rate and rhythm, normal peripheral perfusion  ABD: Soft, non-tender, non-distended, bowel  sounds present  EXTREMITIES: no clubbing or cyanosis  SKIN: Warm, dry.   NEURO: alert and oriented, grossly without focal deficits   PSYCHIATRIC: Cooperative    LABS AND IMAGING:     Recent Labs     12/21/22 1836 12/21/22 1838   WBC  --  7.6   RBC  --  4.96   HGB  --  16.1*   HCT 50 48.8*   MCV  --  98.4*   MCH  --  32.5   MCHC  --  33.0   RDW  --  13.5   PLT  --  157     No results for input(s): LACTIC in the last 72 hours.  Recent Labs     12/21/22 1838   INR 1.10     No results for input(s): HGBA1C, CHOL, TRIG, LDL, VLDL, HDL in the last 72 hours.   Recent Labs     12/21/22 1838      K 4.6   CHLORIDE 105   CO2 20*   BUN 15.0   CREATININE 0.71   GLUCOSE 223*   CALCIUM 9.6   ALBUMIN 4.0   GLOBULIN 3.3   ALKPHOS 42   ALT 28   AST 30   BILITOT 0.7   LIPASE 14     Recent Labs     12/21/22 1838   TROPONINI <0.010          CTA Chest Non-Coronary (PE Studies)  Narrative: EXAMINATION:  CTA CHEST NON CORONARY (PE STUDIES)    CLINICAL HISTORY:  Pulmonary embolism (PE) suspected, unknown D-dimer; chest pain.    TECHNIQUE:  Helical acquisition through the chest with IV contrast targeting the pulmonary arteries. Multiplanar and 3D MIP reconstructed images were provided for review.  mGycm. Automatic exposure control, adjustment of mA/kV or iterative reconstruction technique was used to reduce radiation.    COMPARISON:  None available.    FINDINGS:  There is good opacification of the pulmonary arterial tree. There is no evidence of pulmonary thromboembolism.  There is no aortic dissection.    Heterogeneous thyroid with a 15 mm left thyroid nodule.  No enlarged mediastinal, hilar or axillary lymph nodes.  Fluid-filled esophagus.    Heart is not significantly enlarged.  No pericardial effusion.  Mild coronary artery calcifications.    No pleural effusion.  Mild chronic changes of the lungs.  No opacities suspicious for pneumonia.    Liver is enlarged.  Small hiatal hernia.  Mild degenerative change of the  spine.  Impression: 1. Negative for pulmonary thromboembolic disease.  No acute findings in the chest.  2. Left thyroid nodule for which outpatient ultrasound is recommended.    Electronically signed by: Jason Andrews  Date:    12/21/2022  Time:    19:14  X-Ray Chest 1 View  Narrative: EXAMINATION:  XR CHEST 1 VIEW    CLINICAL HISTORY:  Chest pain, unspecified    TECHNIQUE:  Single frontal view of the chest was performed.    COMPARISON:  None    FINDINGS:  There are chronic appearing lung changes seen bilaterally. No evidence of acute infiltrate is seen. No mass is seen. No lesion is seen. No pleural effusion is seen. The heart appears normal. The aorta appears normal. The bones and joints show no acute abnormality.  Impression: No acute process    Electronically signed by: Froy Chavarria  Date:    12/21/2022  Time:    18:44      ASSESSMENT & PLAN:     1.  Chest pain, rule out ACS  2. Hypoxia, uncertain etiology likely baseline given history of COPD  2.  Hyperglycemia  4.  Incidental 15 mm left thyroid nodule      PLAN:  -O2 delivery as needed  -Duonebs PRN  -Troponin q6 hours  -Echo pending  -Cardiology consult  -Sliding scale insulin with accu-checks  -Antihypertensives as needed  -Resume home meds as appropriate  -Labs in AM    I, Sulma Rand NP have reviewed and discussed this case with Dr. Petersen.  Please see addendum for further assessment and plan from attending MD.    DVT prophylaxis: continue home Xarelto  Code status: Full    __________________________________________________________________________  I, Dr. Kirk Petersen assumed care of this patient.  For the patient encounter, I performed the substantive portion of the visit, I reviewed the NPPA documentation, treatment plan, and medical decision making.  I had face to face time with this patient.  I have personally reviewed the labs and test results that are presently available. I have reviewed or attempted to review medical records based upon  their availability. If patient was admitted under observational status it is with my approval.      77-year-old female presents to the ER with sudden onset significant chest pain and dyspnea 1 hour prior to arrival.  EMS was activated and she was given aspirin and 2 nitroglycerin and with improvement and her chest pain.  Immediately on arrival to the ER she became nauseated and had an episode of vomiting that was nonbloody.  Currently chest pain-free at bedside.  She is on exam cardiovascular regular rate and rhythm, lungs are clear bilaterally.  CTA chest unremarkable.  EKG reportedly nonischemic and cardiac enzymes are undetectable x2.  Telemetry monitoring, echo, trend cardiac enzymes, cardiology consulted.  She needs outpatient follow-up for her incidental 15 mm left thyroid nodule.    Patient is hypoxic in the upper 80s/low 90s but she has a history of COPD, obstructive sleep apnea, pulmonary hypertension and suspect she is chronically hypoxic.  Her CTA chest shows no parenchymal lung disease.  Her ABG showed evidence of more than adequate oxygenation so question the adequacy of her pulse ox as well.      Time seen: 11PM   Kirk Petersen MD

## 2022-12-22 NOTE — ED PROVIDER NOTES
Encounter Date: 12/21/2022    SCRIBE #1 NOTE: I, Mariela Douglas, am scribing for, and in the presence of,  Gerardo Valdez III, MD. I have scribed the following portions of the note - the EKG reading. Other sections scribed: HPI, ROS, PE.     History     Chief Complaint   Patient presents with    Chest Pain     [facility] reports pt. C/o Cp and SOB x 1 hour. Pt. given 324 ASA, 2SL Nitro, and 1 in Nitro paste in route.      77 year old female w/ hx of DVTs and HTN presents to ED via EMS for chest pain and SOB starting a few hours ago. Pt states the pain and SOB started suddenly and is new with associated nausea and mild cough, pain 8/10. She states she recently had her yearly heart check with her cardiologist and has not gotten the results back yet. EMS reports stable vitals en route. She is on Xarelto. She denies any recent long trips or leg swelling.     Her cardiologist is Dr. Valentino.     The history is provided by the patient and the EMS personnel. No  was used.   Chest Pain  The current episode started today. Chest pain occurs constantly. The chest pain is unchanged. At its most intense, the chest pain is at 8/10. Primary symptoms include syncope, shortness of breath, cough and nausea. Pertinent negatives for primary symptoms include no fever, no fatigue, no wheezing, no abdominal pain, no vomiting and no dizziness.   Before the onset of the syncopal episode there was nausea. There was no dizziness. The syncopal episode occurred with shortness of breath.   The shortness of breath began today. The shortness of breath developed suddenly.   Her past medical history is significant for DVT and hypertension.   Review of patient's allergies indicates:  Not on File  Past Medical History:   Diagnosis Date    Anticoagulant long-term use     Hypertension      No past surgical history on file.  No family history on file.  Social History     Substance Use Topics    Drug use: Not Currently     Review of  Systems   Constitutional:  Negative for fatigue, fever and unexpected weight change.   HENT:  Negative for congestion and rhinorrhea.    Eyes:  Negative for pain.   Respiratory:  Positive for cough and shortness of breath. Negative for chest tightness and wheezing.    Cardiovascular:  Positive for chest pain and syncope.   Gastrointestinal:  Positive for nausea. Negative for abdominal pain, constipation, diarrhea and vomiting.   Genitourinary:  Negative for dysuria.   Musculoskeletal:  Negative for back pain and neck pain.   Skin:  Negative for rash.   Allergic/Immunologic: Negative for environmental allergies, food allergies and immunocompromised state.   Neurological:  Negative for dizziness and speech difficulty.   Hematological:  Does not bruise/bleed easily.   Psychiatric/Behavioral:  Negative for behavioral problems, sleep disturbance and suicidal ideas.      Physical Exam     Initial Vitals   BP Pulse Resp Temp SpO2   12/21/22 1843 12/21/22 1832 12/21/22 1832 12/21/22 1832 12/21/22 1832   137/71 108 (!) 21 97.5 °F (36.4 °C) 95 %      MAP       --                Physical Exam    Nursing note and vitals reviewed.  Constitutional: She appears well-developed and well-nourished.   Obese.   HENT:   Head: Normocephalic and atraumatic.   Mouth/Throat: Oropharynx is clear and moist.   Eyes: Conjunctivae are normal. Pupils are equal, round, and reactive to light.   Neck: Neck supple.   Normal range of motion.  Cardiovascular:  Normal rate, regular rhythm and normal heart sounds.           Pulmonary/Chest: Breath sounds normal. She is in respiratory distress (moderate). She has no wheezes. She has no rhonchi. She has no rales.   Cyanotic.   Abdominal: Abdomen is soft. Bowel sounds are normal.   Musculoskeletal:         General: Normal range of motion.      Cervical back: Normal range of motion and neck supple.     Neurological: She is alert and oriented to person, place, and time. GCS score is 15. GCS eye subscore is 4.  GCS verbal subscore is 5. GCS motor subscore is 6.   Skin: Skin is warm and dry. Capillary refill takes less than 2 seconds.   Psychiatric: She has a normal mood and affect. Her behavior is normal. Judgment and thought content normal.       ED Course   Critical Care    Date/Time: 12/21/2022 8:39 PM  Performed by: Gerardo Valdez III, MD  Authorized by: Gerardo Valdez III, MD   Direct patient critical care time: 45 minutes  Documentation critical care time: 5 minutes  Consulting other physicians critical care time: 5 minutes  Total critical care time (exclusive of procedural time) : 55 minutes  Critical care was necessary to treat or prevent imminent or life-threatening deterioration of the following conditions: shock and metabolic crisis.  Critical care was time spent personally by me on the following activities: examination of patient, re-evaluation of patient's condition, review of old charts, ordering and review of laboratory studies and ordering and performing treatments and interventions.      Labs Reviewed   COMPREHENSIVE METABOLIC PANEL - Abnormal; Notable for the following components:       Result Value    Carbon Dioxide 20 (*)     Glucose Level 223 (*)     All other components within normal limits   CBC WITH DIFFERENTIAL - Abnormal; Notable for the following components:    Hgb 16.1 (*)     Hct 48.8 (*)     MCV 98.4 (*)     All other components within normal limits   ISTAT CHEM8 - Abnormal; Notable for the following components:    POC Glucose 219 (*)     POC Creatinine 0.4 (*)     POC Anion Gap 17 (*)     All other components within normal limits   COVID/FLU A&B PCR - Normal    Narrative:     The Xpert Xpress SARS-CoV-2/FLU/RSV plus is a rapid, multiplexed real-time PCR test intended for the simultaneous qualitative detection and differentiation of SARS-CoV-2, Influenza A, Influenza B, and respiratory syncytial virus (RSV) viral RNA in either nasopharyngeal swab or nasal swab specimens.         TROPONIN  I - Normal   PROTIME-INR - Normal   APTT - Normal   LIPASE - Normal   CBC W/ AUTO DIFFERENTIAL    Narrative:     The following orders were created for panel order CBC auto differential.  Procedure                               Abnormality         Status                     ---------                               -----------         ------                     CBC with Differential[[id number]]        Abnormal            Final result                 Please view results for these tests on the individual orders.   TROPONIN ISTAT   LIPASE   ISTAT CHEM8   POCT TROPONIN     EKG Readings: (Independently Interpreted)   Initial Reading: No STEMI. Rhythm: Sinus Tachycardia. Heart Rate: 108. Conduction: Normal. T Waves: Normal. Axis: Normal. Clinical Impression: Sinus Tachycardia   Globally flattened ST changes.     Imaging Results              CTA Chest Non-Coronary (PE Studies) (Final result)  Result time 12/21/22 19:14:01      Final result by Jason Andrews MD (12/21/22 19:14:01)                   Impression:      1. Negative for pulmonary thromboembolic disease.  No acute findings in the chest.  2. Left thyroid nodule for which outpatient ultrasound is recommended.      Electronically signed by: Jason Andrews  Date:    12/21/2022  Time:    19:14               Narrative:    EXAMINATION:  CTA CHEST NON CORONARY (PE STUDIES)    CLINICAL HISTORY:  Pulmonary embolism (PE) suspected, unknown D-dimer; chest pain.    TECHNIQUE:  Helical acquisition through the chest with IV contrast targeting the pulmonary arteries. Multiplanar and 3D MIP reconstructed images were provided for review.  mGycm. Automatic exposure control, adjustment of mA/kV or iterative reconstruction technique was used to reduce radiation.    COMPARISON:  None available.    FINDINGS:  There is good opacification of the pulmonary arterial tree. There is no evidence of pulmonary thromboembolism.  There is no aortic dissection.    Heterogeneous thyroid with a 15  mm left thyroid nodule.  No enlarged mediastinal, hilar or axillary lymph nodes.  Fluid-filled esophagus.    Heart is not significantly enlarged.  No pericardial effusion.  Mild coronary artery calcifications.    No pleural effusion.  Mild chronic changes of the lungs.  No opacities suspicious for pneumonia.    Liver is enlarged.  Small hiatal hernia.  Mild degenerative change of the spine.                                       X-Ray Chest 1 View (Final result)  Result time 12/21/22 18:44:42      Final result by Froy Chavarria MD (12/21/22 18:44:42)                   Impression:      No acute process      Electronically signed by: Froy Chavarria  Date:    12/21/2022  Time:    18:44               Narrative:    EXAMINATION:  XR CHEST 1 VIEW    CLINICAL HISTORY:  Chest pain, unspecified    TECHNIQUE:  Single frontal view of the chest was performed.    COMPARISON:  None    FINDINGS:  There are chronic appearing lung changes seen bilaterally. No evidence of acute infiltrate is seen. No mass is seen. No lesion is seen. No pleural effusion is seen. The heart appears normal. The aorta appears normal. The bones and joints show no acute abnormality.                                       Medications   nitroGLYCERIN SL tablet 0.4 mg (0.4 mg Sublingual Given 12/21/22 1914)   sodium chloride 0.9% bolus 1,000 mL 1,000 mL (0 mLs Intravenous Stopped 12/21/22 1914)   iopamidoL (ISOVUE-370) injection 100 mL (100 mLs Intravenous Given 12/21/22 1906)   morphine injection 4 mg (4 mg Intravenous Given 12/21/22 1924)   ondansetron injection 4 mg (4 mg Intravenous Given 12/21/22 1924)     Medical Decision Making:   Clinical Tests:   Lab Tests: Ordered and Reviewed  Radiological Study: Ordered and Reviewed  Medical Tests: Ordered and Reviewed  ED Management:  Patient presented has significant concern for patient's well-being appeared to be mildly cyanotic was in moderate to severe distress with chest pain minimal relief with  initial aspirin and nitro patient was given IV fluid bolus sat 93 some concern for pulmonary embolus versus dissection patient i-STAT chemistry in 2 CT CT does not show dissection or PE patient returned repeat EKG normal was given additional doses of nitro and then 1 single dose of morphine at the same time patient got that had a large volume nonbloody vomitus and then felt completely better and all symptoms relieved discussed case with Hospital Medicine and with Cardiology will admit        Scribe Attestation:   Scribe #1: I performed the above scribed service and the documentation accurately describes the services I performed. I attest to the accuracy of the note.    Attending Attestation:           Physician Attestation for Scribe:  Physician Attestation Statement for Scribe #1: I, Gerardo Valdez III, MD, reviewed documentation, as scribed by Mariela Douglas in my presence, and it is both accurate and complete.           ED Course as of 12/21/22 2041   Wed Dec 21, 2022   1901 Initial evaluation patient with pulse ox 90 but cyanotic extremity history of DVT left leg states she is currently on anticoagulants short of breath acute onset chest pain states her heart has been cleared EKG without abnormality significant concern for PE versus dissection versus acute coronary syndrome will CT chest [FK]      ED Course User Index  [FK] Gerardo Valdez III, MD                 Clinical Impression:   Final diagnoses:  [R07.9] Chest pain (Primary)  [R07.9] Chest pain, unspecified type        ED Disposition Condition    Admit Stable                Gerardo Valdez III, MD  12/21/22 2041

## 2022-12-23 VITALS
HEIGHT: 65 IN | HEART RATE: 91 BPM | BODY MASS INDEX: 33.43 KG/M2 | RESPIRATION RATE: 18 BRPM | WEIGHT: 200.63 LBS | TEMPERATURE: 98 F | SYSTOLIC BLOOD PRESSURE: 121 MMHG | DIASTOLIC BLOOD PRESSURE: 73 MMHG | OXYGEN SATURATION: 95 %

## 2022-12-23 PROBLEM — R07.9 CHEST PAIN: Status: ACTIVE | Noted: 2022-12-23

## 2022-12-23 LAB
ALBUMIN SERPL-MCNC: 3.4 G/DL (ref 3.4–4.8)
ALBUMIN/GLOB SERPL: 1.4 RATIO (ref 1.1–2)
ALP SERPL-CCNC: 37 UNIT/L (ref 40–150)
ALT SERPL-CCNC: 24 UNIT/L (ref 0–55)
AST SERPL-CCNC: 24 UNIT/L (ref 5–34)
BASOPHILS # BLD AUTO: 0.01 X10(3)/MCL (ref 0–0.2)
BASOPHILS NFR BLD AUTO: 0.2 %
BILIRUBIN DIRECT+TOT PNL SERPL-MCNC: 0.9 MG/DL
BUN SERPL-MCNC: 10.6 MG/DL (ref 9.8–20.1)
CALCIUM SERPL-MCNC: 8.6 MG/DL (ref 8.4–10.2)
CHLORIDE SERPL-SCNC: 107 MMOL/L (ref 98–107)
CO2 SERPL-SCNC: 23 MMOL/L (ref 23–31)
CREAT SERPL-MCNC: 0.66 MG/DL (ref 0.55–1.02)
EOSINOPHIL # BLD AUTO: 0.09 X10(3)/MCL (ref 0–0.9)
EOSINOPHIL NFR BLD AUTO: 2.1 %
ERYTHROCYTE [DISTWIDTH] IN BLOOD BY AUTOMATED COUNT: 13.6 % (ref 11–14.5)
GFR SERPLBLD CREATININE-BSD FMLA CKD-EPI: >60 MLS/MIN/1.73/M2
GLOBULIN SER-MCNC: 2.5 GM/DL (ref 2.4–3.5)
GLUCOSE SERPL-MCNC: 151 MG/DL (ref 70–110)
GLUCOSE SERPL-MCNC: 164 MG/DL (ref 82–115)
HCT VFR BLD AUTO: 44.3 % (ref 37–47)
HGB BLD-MCNC: 14.2 GM/DL (ref 12–16)
IMM GRANULOCYTES # BLD AUTO: 0.01 X10(3)/MCL (ref 0–0.04)
IMM GRANULOCYTES NFR BLD AUTO: 0.2 %
LYMPHOCYTES # BLD AUTO: 1.8 X10(3)/MCL (ref 0.6–4.6)
LYMPHOCYTES NFR BLD AUTO: 42.6 %
MAGNESIUM SERPL-MCNC: 2.1 MG/DL (ref 1.6–2.6)
MCH RBC QN AUTO: 32.3 PG
MCHC RBC AUTO-ENTMCNC: 32.1 MG/DL (ref 33–36)
MCV RBC AUTO: 100.9 FL (ref 80–94)
MONOCYTES # BLD AUTO: 0.44 X10(3)/MCL (ref 0.1–1.3)
MONOCYTES NFR BLD AUTO: 10.4 %
NEUTROPHILS # BLD AUTO: 1.88 X10(3)/MCL (ref 2.1–9.2)
NEUTROPHILS NFR BLD AUTO: 44.5 %
NRBC BLD AUTO-RTO: 0 % (ref 0–1)
PLATELET # BLD AUTO: 129 X10(3)/MCL (ref 140–371)
PMV BLD AUTO: 12 FL (ref 9.4–12.4)
POCT GLUCOSE: 126 MG/DL (ref 70–110)
POCT GLUCOSE: 151 MG/DL (ref 70–110)
POTASSIUM SERPL-SCNC: 4.1 MMOL/L (ref 3.5–5.1)
PROT SERPL-MCNC: 5.9 GM/DL (ref 5.8–7.6)
RBC # BLD AUTO: 4.39 X10(6)/MCL (ref 4.2–5.4)
SODIUM SERPL-SCNC: 140 MMOL/L (ref 136–145)
WBC # SPEC AUTO: 4.2 X10(3)/MCL (ref 4.5–11.5)

## 2022-12-23 PROCEDURE — 93010 EKG 12-LEAD: ICD-10-PCS | Mod: ,,, | Performed by: INTERNAL MEDICINE

## 2022-12-23 PROCEDURE — 83735 ASSAY OF MAGNESIUM: CPT | Performed by: NURSE PRACTITIONER

## 2022-12-23 PROCEDURE — 93010 ELECTROCARDIOGRAM REPORT: CPT | Mod: ,,, | Performed by: INTERNAL MEDICINE

## 2022-12-23 PROCEDURE — 27000221 HC OXYGEN, UP TO 24 HOURS

## 2022-12-23 PROCEDURE — 25000003 PHARM REV CODE 250: Performed by: NURSE PRACTITIONER

## 2022-12-23 PROCEDURE — 93005 ELECTROCARDIOGRAM TRACING: CPT

## 2022-12-23 PROCEDURE — 25000003 PHARM REV CODE 250: Performed by: INTERNAL MEDICINE

## 2022-12-23 PROCEDURE — 36415 COLL VENOUS BLD VENIPUNCTURE: CPT | Performed by: NURSE PRACTITIONER

## 2022-12-23 PROCEDURE — 63600175 PHARM REV CODE 636 W HCPCS: Performed by: NURSE PRACTITIONER

## 2022-12-23 PROCEDURE — 25000242 PHARM REV CODE 250 ALT 637 W/ HCPCS: Performed by: INTERNAL MEDICINE

## 2022-12-23 PROCEDURE — 80053 COMPREHEN METABOLIC PANEL: CPT | Performed by: NURSE PRACTITIONER

## 2022-12-23 PROCEDURE — 85025 COMPLETE CBC W/AUTO DIFF WBC: CPT | Performed by: NURSE PRACTITIONER

## 2022-12-23 RX ADMIN — PANTOPRAZOLE SODIUM 40 MG: 40 TABLET, DELAYED RELEASE ORAL at 09:12

## 2022-12-23 RX ADMIN — UMECLIDINIUM 62.5 MCG: 62.5 AEROSOL, POWDER ORAL at 09:12

## 2022-12-23 RX ADMIN — INSULIN ASPART 2 UNITS: 100 INJECTION, SOLUTION INTRAVENOUS; SUBCUTANEOUS at 05:12

## 2022-12-23 RX ADMIN — AMLODIPINE BESYLATE 10 MG: 5 TABLET ORAL at 09:12

## 2022-12-23 RX ADMIN — LISINOPRIL 20 MG: 20 TABLET ORAL at 09:12

## 2022-12-23 RX ADMIN — FAMOTIDINE 40 MG: 20 TABLET, FILM COATED ORAL at 09:12

## 2022-12-23 RX ADMIN — FLUTICASONE FUROATE AND VILANTEROL TRIFENATATE 1 PUFF: 200; 25 POWDER RESPIRATORY (INHALATION) at 09:12

## 2022-12-23 RX ADMIN — ASPIRIN 81 MG: 81 TABLET, COATED ORAL at 09:12

## 2022-12-23 NOTE — NURSING
Patient educated on all discharge information including follow up appointment with PCP and [facility] Dr. Uribe. Patient also stated she will be seeing Gi Dr. Vasques early next year. IV and tele discontinued. VSS. NAD. No complaints of pain. Patient instructed to stop taking losartan and pepcid. No new medications called in. Patient verbalized understanding of all discharge information and denies any further needs. Patient being transported home in private vehicle via wheelchair with sister.

## 2022-12-23 NOTE — DISCHARGE SUMMARY
[facility] Hospital Medicine Discharge Summary    Admit Date: 12/21/2022  Discharge Date and Time: 12/23/20229:35 AM  Admitting Physician:  Team  Discharging Physician: Geovanny Franklin MD.  Primary Care Physician: Arnaldo Hernandez MD  Consults: Cardiology    Discharge Diagnoses:  1.  Chest pain, non cardiac  2. Hypoxia, prob chronic, history of COPD  2.  DM2  4.  Incidental 15 mm left thyroid nodule  5. Obesity     Hospital Course:   Ms. Shore is a 77-year-old female with a history of HTN and multiple DVTs who presented to the ED with complaints of chest pain and shortness of breath starting a few hours prior to arrival.  She states it started suddenly and is new with associated nausea and mild cough.  She denies any recent long trips or leg swelling.  She states she is on Xarelto and takes it appropriately.  She states that she was given nitro by EMS and felt relief.  She also states that she vomited twice, and feels better now.     Today's ED lab work revealed glucose 223, otherwise unremarkable.  EKG showed no STEMI.  Sinus tachycardia at 108 without ectopy.  Globally flattened ST changes per ED MD read.  CTA chest was negative for pulmonary thromboembolic disease.  No acute findings in the chest.  Left thyroid nodule for which outpatient ultrasound is recommended. She was afebrile, mildly tachypneic, comfortable on 2L NC.  Cardiology was consulted in the ED. She was admitted to hospital medicine for management.  Patient was observed overnight. Troponin was trended and was unremarkable. She was started on PO PPI. She continued to be stable and asymptomatic. ECHO was also normal. She was ambulating well and had no new issues. She was discharged home and advised f/u with her PCP.   Pt was seen and examined on the day of discharge  Vitals:  VITAL SIGNS: 24 HRS MIN & MAX LAST   Temp  Min: 97.5 °F (36.4 °C)  Max: 98.9 °F (37.2 °C) 98.5 °F (36.9 °C)   BP  Min: 100/70  Max:  139/50 (!) 139/50     Pulse  Min: 73  Max: 90  87   Resp  Min: 15  Max: 22 18   SpO2  Min: 93 %  Max: 97 % (!) 93 %         Physical Exam:  Heart RRR  Lungs clear   Abdomen soft and non tender   Neuro: No FND      Procedures Performed: No admission procedures for hospital encounter.     Significant Diagnostic Studies: See Full reports for all details    Recent Labs   Lab 12/21/22 1838 12/22/22 0447 12/23/22  0431   WBC 7.6 7.9 4.2*   RBC 4.96 4.53 4.39   HGB 16.1* 15.3 14.2   HCT 48.8* 45.9 44.3   MCV 98.4* 101.3* 100.9*   MCH 32.5 33.8 32.3   MCHC 33.0 33.3 32.1*   RDW 13.5 13.7 13.6    215 129*   MPV 12.2 11.3 12.0       Recent Labs   Lab 12/21/22 1838 12/21/22 1947 12/22/22 0447 12/23/22  0431     --  139 140   K 4.6  --  4.3 4.1   CO2 20*  --  19* 23   BUN 15.0  --  13.8 10.6   CREATININE 0.71  --  0.68 0.66   CALCIUM 9.6  --  8.7 8.6   PH  --  7.36  --   --    MG  --   --  1.90 2.10   ALBUMIN 4.0  --  3.6 3.4   ALKPHOS 42  --  43 37*   ALT 28  --  21 24   AST 30  --  22 24   BILITOT 0.7  --  0.7 0.9        Microbiology Results (last 7 days)       ** No results found for the last 168 hours. **             Echo  · The left ventricle is normal in size with concentric remodeling and   normal systolic function.  · The estimated ejection fraction is 55%.  · Indeterminate left ventricular diastolic function.  · Normal right ventricular size with normal right ventricular systolic   function.  · Mild aortic regurgitation.  · Mild mitral regurgitation.  · Mild tricuspid regurgitation.  · Normal central venous pressure (3 mmHg).  · The mean diastolic gradient across the mitral valve is 8 mmHg at a heart   rate of bpm.  · There is moderate mitral stenosis.            Medication List        CONTINUE taking these medications      amlodipine-benazepril 10-20mg 10-20 mg per capsule  Commonly known as: LOTREL     azelastine 137 mcg (0.1 %) nasal spray  Commonly known as: ASTELIN     FARXIGA 10 mg  tablet  Generic drug: dapagliflozin     glimepiride 2 MG tablet  Commonly known as: AMARYL     HIZENTRA SUBQ     metFORMIN 1000 MG tablet  Commonly known as: GLUCOPHAGE     pantoprazole 40 MG tablet  Commonly known as: PROTONIX     TRELEGY ELLIPTA 200-62.5-25 mcg inhaler  Generic drug: fluticasone-umeclidin-vilanter     XARELTO 20 mg Tab  Generic drug: rivaroxaban            STOP taking these medications      famotidine 40 MG tablet  Commonly known as: PEPCID     losartan 100 MG tablet  Commonly known as: COZAAR               Explained in detail to the patient about the discharge plan, medications, and follow-up visits. Pt understands and agrees with the treatment plan  Discharge Disposition:     Discharged Condition: stable  Diet-   Dietary Orders (From admission, onward)       Start     Ordered    12/21/22 2211  Diet diabetic Cardiac  (Diet/Nutrition [facility])  Diet effective now        Question:  Diet Modifier:  Answer:  Cardiac    12/21/22 2215                   Medications Per DC med rec  Activities as tolerated   Follow-up Information       Arnaldo Heranndez MD Follow up in 2 week(s).    Specialty: Family Medicine  Why: Your appointment date and time will be Friday, January 6, 2023 @ 9 AM  Contact information:  [address]  [facility] [address]  [phone]               Vernon A Valentino, MD Follow up.    Specialty: Cardiology  Why: 1-2 weeks  Contact information:  [address]  [facility] [address]  [phone]                           For further questions contact hospitalist office    Discharge time 33 minutes    For worsening symptoms, chest pain, shortness of breath, increased abdominal pain, high grade fever, stroke or stroke like symptoms, immediately go to the nearest Emergency Room or call 911 as soon as possible.      Geovanny Song M.D on 12/23/2022. at 9:35 AM.

## 2022-12-23 NOTE — PT/OT/SLP PROGRESS
Physical Therapy      Patient Name:  Kortney Leach   MRN:  [id number]    Patient not seen today secondary to orders cancelled, pt pending discharge today. MD to re-consult if necessary

## 2022-12-23 NOTE — PLAN OF CARE
Problem: Adult Inpatient Plan of Care  Goal: Plan of Care Review  12/23/2022 0729 by Susana Kay RN  Outcome: Ongoing, Progressing  12/22/2022 1801 by Susana Kay RN  Outcome: Ongoing, Progressing  Goal: Patient-Specific Goal (Individualized)  12/23/2022 0729 by Susana Kay RN  Outcome: Ongoing, Progressing  12/22/2022 1801 by Susana Kay RN  Outcome: Ongoing, Progressing  Goal: Absence of Hospital-Acquired Illness or Injury  12/23/2022 0729 by Susana Kay RN  Outcome: Ongoing, Progressing  12/22/2022 1801 by Susana Kay RN  Outcome: Ongoing, Progressing  Goal: Optimal Comfort and Wellbeing  12/23/2022 0729 by Susana Kay RN  Outcome: Ongoing, Progressing  12/22/2022 1801 by Susana Kay RN  Outcome: Ongoing, Progressing  Goal: Readiness for Transition of Care  12/23/2022 0729 by Susana Kay RN  Outcome: Ongoing, Progressing  12/22/2022 1801 by Susana Kay RN  Outcome: Ongoing, Progressing

## 2022-12-23 NOTE — PROGRESS NOTES
"[facility] Dept  Cardiology  Progress Note    Patient Name: Kortney Leach  MRN: [id number]  Admission Date: 12/21/2022  Hospital Length of Stay: 2 days  Code Status: Full Code   Attending Provider: Geovanny Franklin MD   Consulting Provider: YANIRA Turner  Primary Care Physician: Arnaldo Hernandez MD  Principal Problem:<principal problem not specified>    Patient information was obtained from patient, past medical records, and ER records.     Subjective:     Chief Complaint: Reason for Consult: CP    HPI: Ms. Leach is a 76 y/o female who is unknown to [facility], Dr. Valentino. The patient presented to [facility] on 12.21.22 with c/o CP. The patient reported that about 1 hour prior to arrival she developed some substernal CP that radiated to her Back between her Shoulder Blades. She described the pain as "heaviness" and rated it 7/10 on a verbal scale. The patient was given ASA 324mg PO x 1 and 2 SL Nitros and ultimately 1" Nitropaste with resolution of symptoms. She was evaluated in the ER and found to have an unremarkable initial workup including an EKG which Non-Ischemic Changes. She was admitted to Hospital Medicine for CP R/O ACS. [facility] has been consulted for CP R/O ACS.    12.23.22: NAD. Denies current CP, SOB, or palps. She reports that her chest tightness seems to improve with vomiting. Requesting to f/u with [facility].     PMH: HTN, Multiple DVTs   PSH: Denies Past Surgical History  Family History: Denies Family History of Heart Disease  Social History: Denies Illicit Drug, ETOH and Tobacco Use; Former Smoker, 1 ppd for 40+ Years; Quit Years Prior    Previous Cardiac Diagnostics:   ECHO 12.22.22:  The left ventricle is normal in size with concentric remodeling and normal systolic function,  The estimated ejection fraction is 55%.  Indeterminate left ventricular diastolic function.  Normal right ventricular size with normal right ventricular systolic function.  Mild aortic " regurgitation.  Mild mitral regurgitation.  Mild tricuspid regurgitation.  Normal central venous pressure (3 mmHg).  The mean diastolic gradient across the mitral valve is 8 mmHg at a heart rate of bpm.  There is moderate mitral stenosis.    Review of patient's allergies indicates:  No Known Allergies    No current facility-administered medications on file prior to encounter.     Current Outpatient Medications on File Prior to Encounter   Medication Sig    amlodipine-benazepril 10-20mg (LOTREL) 10-20 mg per capsule Take 1 capsule by mouth once daily.    azelastine (ASTELIN) 137 mcg (0.1 %) nasal spray 1 spray by Nasal route 2 (two) times daily.    dapagliflozin (FARXIGA) 10 mg tablet Take 10 mg by mouth once daily.    famotidine (PEPCID) 40 MG tablet Take 40 mg by mouth once daily.    fluticasone-umeclidin-vilanter (TRELEGY ELLIPTA) 200-62.5-25 mcg inhaler Inhale 1 puff into the lungs once daily.    glimepiride (AMARYL) 2 MG tablet Take 2 mg by mouth before breakfast.    immun glob G,IgG,/pro/IgA 0-50 (HIZENTRA SUBQ) Inject into the skin.    losartan (COZAAR) 100 MG tablet Take 100 mg by mouth once daily.    metFORMIN (GLUCOPHAGE) 1000 MG tablet Take 1,000 mg by mouth 2 (two) times daily with meals.    pantoprazole (PROTONIX) 40 MG tablet Take 40 mg by mouth once daily.    rivaroxaban (XARELTO) 20 mg Tab Take 20 mg by mouth daily with dinner or evening meal.     Review of Systems   Constitutional:  Positive for fatigue.   Respiratory:  Positive for chest tightness. Negative for shortness of breath.    Cardiovascular:  Negative for chest pain, palpitations and leg swelling.   All other systems reviewed and are negative.    Objective:     Vital Signs (Most Recent):  Temp: 98.5 °F (36.9 °C) (12/23/22 0735)  Pulse: 87 (12/23/22 0735)  Resp: 18 (12/23/22 0735)  BP: (!) 139/50 (12/23/22 0735)  SpO2: (!) 93 % (12/23/22 0735) Vital Signs (24h Range):  Temp:  [97.5 °F (36.4 °C)-98.9 °F (37.2 °C)] 98.5 °F (36.9 °C)  Pulse:   [73-90] 87  Resp:  [15-22] 18  SpO2:  [93 %-97 %] 93 %  BP: (100-139)/(50-87) 139/50     Weight: 91 kg (200 lb 9.9 oz)  Body mass index is 33.38 kg/m².    SpO2: (!) 93 %       No intake or output data in the 24 hours ending 12/23/22 0826    Lines/Drains/Airways       Peripheral Intravenous Line  Duration                  Peripheral IV - Single Lumen 12/21/22 20 G Anterior;Left Forearm 2 days         Peripheral IV - Single Lumen 12/21/22 1845 18 G Right Antecubital 1 day                  Significant Labs:  Recent Results (from the past 72 hour(s))   Bravofly CHEM8    Collection Time: 12/21/22  6:36 PM   Result Value Ref Range    POC Glucose 219 (H) 70 - 110 mg/dL    POC BUN 19 6 - 30 mg/dL    POC Creatinine 0.4 (L) 0.5 - 1.4 mg/dL    POC Sodium 140 136 - 145 mmol/L    POC Potassium 4.7 3.5 - 5.1 mmol/L    POC Chloride 103 95 - 110 mmol/L    POC TCO2 (MEASURED) 25 23 - 29 mmol/L    POC Anion Gap 17 (H) 8 - 16 mmol/L    POC Ionized Calcium 1.17 1.06 - 1.42 mmol/L    POC Hematocrit 50 36 - 54 %PCV    POC HEMOGLOBIN 17 g/dL    Sample VENOUS    Comprehensive metabolic panel    Collection Time: 12/21/22  6:38 PM   Result Value Ref Range    Sodium Level 139 136 - 145 mmol/L    Potassium Level 4.6 3.5 - 5.1 mmol/L    Chloride 105 98 - 107 mmol/L    Carbon Dioxide 20 (L) 23 - 31 mmol/L    Glucose Level 223 (H) 82 - 115 mg/dL    Blood Urea Nitrogen 15.0 9.8 - 20.1 mg/dL    Creatinine 0.71 0.55 - 1.02 mg/dL    Calcium Level Total 9.6 8.4 - 10.2 mg/dL    Protein Total 7.3 5.8 - 7.6 gm/dL    Albumin Level 4.0 3.4 - 4.8 g/dL    Globulin 3.3 2.4 - 3.5 gm/dL    Albumin/Globulin Ratio 1.2 1.1 - 2.0 ratio    Bilirubin Total 0.7 <=1.5 mg/dL    Alkaline Phosphatase 42 40 - 150 unit/L    Alanine Aminotransferase 28 0 - 55 unit/L    Aspartate Aminotransferase 30 5 - 34 unit/L    eGFR >60 mls/min/1.73/m2   Troponin I    Collection Time: 12/21/22  6:38 PM   Result Value Ref Range    Troponin-I <0.010 0.000 - 0.045 ng/mL   Protime-INR     Collection Time: 12/21/22  6:38 PM   Result Value Ref Range    PT 14.1 12.5 - 14.5 seconds    INR 1.10 0.00 - 1.30   APTT    Collection Time: 12/21/22  6:38 PM   Result Value Ref Range    PTT 27.6 23.2 - 33.7 seconds   CBC with Differential    Collection Time: 12/21/22  6:38 PM   Result Value Ref Range    WBC 7.6 4.5 - 11.5 x10(3)/mcL    RBC 4.96 4.20 - 5.40 x10(6)/mcL    Hgb 16.1 (H) 12.0 - 16.0 gm/dL    Hct 48.8 (H) 37.0 - 47.0 %    MCV 98.4 (H) 80.0 - 94.0 fL    MCH 32.5 pg    MCHC 33.0 33.0 - 36.0 mg/dL    RDW 13.5 11.0 - 14.5 %    Platelet 157 140 - 371 x10(3)/mcL    MPV 12.2 9.4 - 12.4 fL    Neut % 63.2 %    Lymph % 27.8 %    Mono % 8.3 %    Eos % 0.5 %    Basophil % 0.1 %    Lymph # 2.12 0.6 - 4.6 x10(3)/mcL    Neut # 4.82 2.1 - 9.2 x10(3)/mcL    Mono # 0.63 0.1 - 1.3 x10(3)/mcL    Eos # 0.04 0 - 0.9 x10(3)/mcL    Baso # 0.01 0 - 0.2 x10(3)/mcL    IG# 0.01 0 - 0.04 x10(3)/mcL    IG% 0.1 %    NRBC% 0.0 0 - 1 %   Lipase    Collection Time: 12/21/22  6:38 PM   Result Value Ref Range    Lipase Level 14 <=60 U/L   BNP    Collection Time: 12/21/22  6:38 PM   Result Value Ref Range    Natriuretic Peptide 35.0 <=100.0 pg/mL   Troponin ISTAT    Collection Time: 12/21/22  7:36 PM   Result Value Ref Range    POC Cardiac Troponin I 0.00 0.00 - 0.08 ng/mL    Sample VENOUS    COVID/FLU A&B PCR    Collection Time: 12/21/22  7:40 PM   Result Value Ref Range    Influenza A PCR Not Detected Not Detected    Influenza B PCR Not Detected Not Detected    SARS-CoV-2 PCR Not Detected Not Detected   POCT ARTERIAL BLOOD GAS    Collection Time: 12/21/22  7:47 PM   Result Value Ref Range    POC PH 7.36 7.35 - 7.45    POC PCO2 42 35 - 45 mmHg    POC PO2 104 (A) 80.0 - 100 mmHg    POC HEMOGLOBIN 15.5 12.0 - 16.0 g/dL    POC SATURATED O2 97.8 %    POC O2Hb 96.5 94.0 - 97.0 %    POC COHb 1.4 %    POC MetHb 0.0 (A) 0.40 - 1.5 %    POC Potassium 3.6 3.5 - 5.0 mmol/l    POC Sodium 135 (A) 137 - 145 mmol/l    POC Ionized Calcium 1.11 (A) 1.12 -  1.23 mmol/l    Correct Temperature (PH) 7.36 7.35 - 7.45    Corrected Temperature (pCO2) 42 35 - 45 mmHg    Corrected Temperature (pO2) 104 (A) 80.0 - 100 mmHg    POC HCO3 23.7 22.0 - 26.0 mmol/l    Base Deficit -1.8 -2.0 - 2.0 mmol/l    POC Temp 37.0 °C    Specimen source Arterial sample    POCT glucose    Collection Time: 12/21/22 10:23 PM   Result Value Ref Range    POCT Glucose 179 (H) 70 - 110 mg/dL   Troponin I    Collection Time: 12/22/22 12:20 AM   Result Value Ref Range    Troponin-I <0.010 0.000 - 0.045 ng/mL   Comprehensive Metabolic Panel (CMP)    Collection Time: 12/22/22  4:47 AM   Result Value Ref Range    Sodium Level 139 136 - 145 mmol/L    Potassium Level 4.3 3.5 - 5.1 mmol/L    Chloride 108 (H) 98 - 107 mmol/L    Carbon Dioxide 19 (L) 23 - 31 mmol/L    Glucose Level 171 (H) 82 - 115 mg/dL    Blood Urea Nitrogen 13.8 9.8 - 20.1 mg/dL    Creatinine 0.68 0.55 - 1.02 mg/dL    Calcium Level Total 8.7 8.4 - 10.2 mg/dL    Protein Total 6.6 5.8 - 7.6 gm/dL    Albumin Level 3.6 3.4 - 4.8 g/dL    Globulin 3.0 2.4 - 3.5 gm/dL    Albumin/Globulin Ratio 1.2 1.1 - 2.0 ratio    Bilirubin Total 0.7 <=1.5 mg/dL    Alkaline Phosphatase 43 40 - 150 unit/L    Alanine Aminotransferase 21 0 - 55 unit/L    Aspartate Aminotransferase 22 5 - 34 unit/L    eGFR >60 mls/min/1.73/m2   Magnesium    Collection Time: 12/22/22  4:47 AM   Result Value Ref Range    Magnesium Level 1.90 1.60 - 2.60 mg/dL   Phosphorus    Collection Time: 12/22/22  4:47 AM   Result Value Ref Range    Phosphorus Level 4.5 2.3 - 4.7 mg/dL   Troponin I    Collection Time: 12/22/22  4:47 AM   Result Value Ref Range    Troponin-I 0.013 0.000 - 0.045 ng/mL   CBC with Differential    Collection Time: 12/22/22  4:47 AM   Result Value Ref Range    WBC 7.9 4.5 - 11.5 x10(3)/mcL    RBC 4.53 4.20 - 5.40 x10(6)/mcL    Hgb 15.3 12.0 - 16.0 gm/dL    Hct 45.9 37.0 - 47.0 %    .3 (H) 80.0 - 94.0 fL    MCH 33.8 pg    MCHC 33.3 33.0 - 36.0 mg/dL    RDW 13.7 11.0  - 14.5 %    Platelet 215 140 - 371 x10(3)/mcL    MPV 11.3 9.4 - 12.4 fL    Neut % 71.6 %    Lymph % 19.2 %    Mono % 8.5 %    Eos % 0.3 %    Basophil % 0.1 %    Lymph # 1.51 0.6 - 4.6 x10(3)/mcL    Neut # 5.63 2.1 - 9.2 x10(3)/mcL    Mono # 0.67 0.1 - 1.3 x10(3)/mcL    Eos # 0.02 0 - 0.9 x10(3)/mcL    Baso # 0.01 0 - 0.2 x10(3)/mcL    IG# 0.02 0 - 0.04 x10(3)/mcL    IG% 0.3 %    NRBC% 0.0 0 - 1 %   Echo    Collection Time: 12/22/22 10:11 AM   Result Value Ref Range    BSA 2.04 m2    TDI SEPTAL 0.06 m/s    LV LATERAL E/E' RATIO 15.00 m/s    LV SEPTAL E/E' RATIO 22.50 m/s    Right Atrial Pressure (from IVC) 3 mmHg    EF 55 %    Left Ventricular Outflow Tract Mean Velocity 1.17 cm/s    Left Ventricular Outflow Tract Mean Gradient 6.00 mmHg    TDI LATERAL 0.09 m/s    LVIDd 4.24 3.5 - 6.0 cm    IVS 1.01 0.6 - 1.1 cm    Posterior Wall 1.12 (A) 0.6 - 1.1 cm    LVIDs 2.48 2.1 - 4.0 cm    FS 42 28 - 44 %    LV mass 152.23 g    LA size 3.90 cm    RVDD 3.08 cm    TAPSE 2.04 cm    Left Ventricle Relative Wall Thickness 0.53 cm    AV mean gradient 10 mmHg    AV valve area 2.66 cm2    AV Velocity Ratio 0.70     AV index (prosthetic) 0.94     MV mean gradient 8 mmHg    MV valve area by continuity eq 2.11 cm2    E/A ratio 0.67     Mean e' 0.08 m/s    E wave deceleration time 193.00 msec    LVOT diameter 1.90 cm    LVOT area 2.8 cm2    LVOT peak denton 1.54 m/s    LVOT peak VTI 31.40 cm    Ao peak denton 2.19 m/s    Ao VTI 33.5 cm    LVOT stroke volume 88.98 cm3    AV peak gradient 19 mmHg    MV peak gradient 16 mmHg    E/E' ratio 18.00 m/s    MV Peak E [place] 1.35 m/s    MV VTI 42.1 cm    MV Peak A [place] 2.02 m/s    LV Systolic Volume 21.90 mL    LV Systolic Volume Index 11.1 mL/m2    LV Diastolic Volume 80.40 mL    LV Diastolic Volume Index 40.61 mL/m2    LV Mass Index 77 g/m2    LA Volume Index (Mod) 35.9 mL/m2    LA volume (mod) 71.10 cm3    RA Width 3.74 cm   POCT glucose    Collection Time: 12/22/22 10:59 AM   Result Value Ref Range     POCT Glucose 132 (H) 70 - 110 mg/dL   Troponin I    Collection Time: 12/22/22 12:34 PM   Result Value Ref Range    Troponin-I 0.012 0.000 - 0.045 ng/mL   Troponin I    Collection Time: 12/22/22  6:38 PM   Result Value Ref Range    Troponin-I <0.010 0.000 - 0.045 ng/mL   POCT glucose    Collection Time: 12/23/22  3:43 AM   Result Value Ref Range    POCT Glucose 151 (H) 70 - 110 mg/dL   POCT Glucose, Hand-Held Device    Collection Time: 12/23/22  3:46 AM   Result Value Ref Range    POC Glucose 151 (A) 70 - 110 MG/DL   Comprehensive Metabolic Panel    Collection Time: 12/23/22  4:31 AM   Result Value Ref Range    Sodium Level 140 136 - 145 mmol/L    Potassium Level 4.1 3.5 - 5.1 mmol/L    Chloride 107 98 - 107 mmol/L    Carbon Dioxide 23 23 - 31 mmol/L    Glucose Level 164 (H) 82 - 115 mg/dL    Blood Urea Nitrogen 10.6 9.8 - 20.1 mg/dL    Creatinine 0.66 0.55 - 1.02 mg/dL    Calcium Level Total 8.6 8.4 - 10.2 mg/dL    Protein Total 5.9 5.8 - 7.6 gm/dL    Albumin Level 3.4 3.4 - 4.8 g/dL    Globulin 2.5 2.4 - 3.5 gm/dL    Albumin/Globulin Ratio 1.4 1.1 - 2.0 ratio    Bilirubin Total 0.9 <=1.5 mg/dL    Alkaline Phosphatase 37 (L) 40 - 150 unit/L    Alanine Aminotransferase 24 0 - 55 unit/L    Aspartate Aminotransferase 24 5 - 34 unit/L    eGFR >60 mls/min/1.73/m2   Magnesium    Collection Time: 12/23/22  4:31 AM   Result Value Ref Range    Magnesium Level 2.10 1.60 - 2.60 mg/dL   CBC with Differential    Collection Time: 12/23/22  4:31 AM   Result Value Ref Range    WBC 4.2 (L) 4.5 - 11.5 x10(3)/mcL    RBC 4.39 4.20 - 5.40 x10(6)/mcL    Hgb 14.2 12.0 - 16.0 gm/dL    Hct 44.3 37.0 - 47.0 %    .9 (H) 80.0 - 94.0 fL    MCH 32.3 pg    MCHC 32.1 (L) 33.0 - 36.0 mg/dL    RDW 13.6 11.0 - 14.5 %    Platelet 129 (L) 140 - 371 x10(3)/mcL    MPV 12.0 9.4 - 12.4 fL    Neut % 44.5 %    Lymph % 42.6 %    Mono % 10.4 %    Eos % 2.1 %    Basophil % 0.2 %    Lymph # 1.80 0.6 - 4.6 x10(3)/mcL    Neut # 1.88 (L) 2.1 - 9.2 x10(3)/mcL     Mono # 0.44 0.1 - 1.3 x10(3)/mcL    Eos # 0.09 0 - 0.9 x10(3)/mcL    Baso # 0.01 0 - 0.2 x10(3)/mcL    IG# 0.01 0 - 0.04 x10(3)/mcL    IG% 0.2 %    NRBC% 0.0 0 - 1 %     Significant Imaging:  Imaging Results              CTA Chest Non-Coronary (PE Studies) (Final result)  Result time 12/21/22 19:14:01      Final result by Jason Andrews MD (12/21/22 19:14:01)                   Impression:      1. Negative for pulmonary thromboembolic disease.  No acute findings in the chest.  2. Left thyroid nodule for which outpatient ultrasound is recommended.      Electronically signed by: Jason Andrews  Date:    12/21/2022  Time:    19:14               Narrative:    EXAMINATION:  CTA CHEST NON CORONARY (PE STUDIES)    CLINICAL HISTORY:  Pulmonary embolism (PE) suspected, unknown D-dimer; chest pain.    TECHNIQUE:  Helical acquisition through the chest with IV contrast targeting the pulmonary arteries. Multiplanar and 3D MIP reconstructed images were provided for review.  mGycm. Automatic exposure control, adjustment of mA/kV or iterative reconstruction technique was used to reduce radiation.    COMPARISON:  None available.    FINDINGS:  There is good opacification of the pulmonary arterial tree. There is no evidence of pulmonary thromboembolism.  There is no aortic dissection.    Heterogeneous thyroid with a 15 mm left thyroid nodule.  No enlarged mediastinal, hilar or axillary lymph nodes.  Fluid-filled esophagus.    Heart is not significantly enlarged.  No pericardial effusion.  Mild coronary artery calcifications.    No pleural effusion.  Mild chronic changes of the lungs.  No opacities suspicious for pneumonia.    Liver is enlarged.  Small hiatal hernia.  Mild degenerative change of the spine.                                       X-Ray Chest 1 View (Final result)  Result time 12/21/22 18:44:42      Final result by Froy Chavarria MD (12/21/22 18:44:42)                   Impression:      No acute  process      Electronically signed by: Froy Chavarria  Date:    12/21/2022  Time:    18:44               Narrative:    EXAMINATION:  XR CHEST 1 VIEW    CLINICAL HISTORY:  Chest pain, unspecified    TECHNIQUE:  Single frontal view of the chest was performed.    COMPARISON:  None    FINDINGS:  There are chronic appearing lung changes seen bilaterally. No evidence of acute infiltrate is seen. No mass is seen. No lesion is seen. No pleural effusion is seen. The heart appears normal. The aorta appears normal. The bones and joints show no acute abnormality.                                    EKG:    Results for orders placed or performed during the hospital encounter of 12/21/22   EKG 12-lead    Narrative    Test Reason : R07.9,    Vent. Rate : 100 BPM     Atrial Rate : 100 BPM     P-R Int : 180 ms          QRS Dur : 066 ms      QT Int : 362 ms       P-R-T Axes : 085 065 083 degrees     QTc Int : 466 ms    Sinus rhythm with frequent Premature ventricular complexes  RSR' pattern in V1  Prolonged QT interval  Abnormal ECG    No previous ECGs available  Confirmed by Trino De Leon MD, Theron BALTAZAR ([id number]) on 12/22/2022 12:30:03 PM    Referred By: DEBBIE   SELF           Confirmed By:Theron De Leon         Telemetry: SR 80s    Physical Exam  Constitutional:       Appearance: Normal appearance. She is obese.   HENT:      Head: Normocephalic.      Mouth/Throat:      Mouth: Mucous membranes are moist.   Eyes:      Extraocular Movements: Extraocular movements intact.   Cardiovascular:      Rate and Rhythm: Normal rate and regular rhythm.      Pulses: Normal pulses.   Pulmonary:      Effort: Pulmonary effort is normal.      Breath sounds: Normal breath sounds.   Abdominal:      Palpations: Abdomen is soft.   Musculoskeletal:         General: No swelling. Normal range of motion.   Skin:     General: Skin is warm and dry.   Neurological:      General: No focal deficit present.      Mental Status: She is alert and  oriented to person, place, and time.   Psychiatric:         Mood and Affect: Mood normal.         Behavior: Behavior normal.     Home Medications:   No current facility-administered medications on file prior to encounter.     Current Outpatient Medications on File Prior to Encounter   Medication Sig Dispense Refill    amlodipine-benazepril 10-20mg (LOTREL) 10-20 mg per capsule Take 1 capsule by mouth once daily.      azelastine (ASTELIN) 137 mcg (0.1 %) nasal spray 1 spray by Nasal route 2 (two) times daily.      dapagliflozin (FARXIGA) 10 mg tablet Take 10 mg by mouth once daily.      famotidine (PEPCID) 40 MG tablet Take 40 mg by mouth once daily.      fluticasone-umeclidin-vilanter (TRELEGY ELLIPTA) 200-62.5-25 mcg inhaler Inhale 1 puff into the lungs once daily.      glimepiride (AMARYL) 2 MG tablet Take 2 mg by mouth before breakfast.      immun glob G,IgG,/pro/IgA 0-50 (HIZENTRA SUBQ) Inject into the skin.      losartan (COZAAR) 100 MG tablet Take 100 mg by mouth once daily.      metFORMIN (GLUCOPHAGE) 1000 MG tablet Take 1,000 mg by mouth 2 (two) times daily with meals.      pantoprazole (PROTONIX) 40 MG tablet Take 40 mg by mouth once daily.      rivaroxaban (XARELTO) 20 mg Tab Take 20 mg by mouth daily with dinner or evening meal.       Current Inpatient Medications:    Current Facility-Administered Medications:     acetaminophen tablet 650 mg, 650 mg, Oral, Q6H PRN, THONG Wesley    albuterol-ipratropium 2.5 mg-0.5 mg/3 mL nebulizer solution 3 mL, 3 mL, Nebulization, Q4H PRN, THONG Wesley    aluminum-magnesium hydroxide-simethicone 200-200-20 mg/5 mL suspension 30 mL, 30 mL, Oral, QID PRN, THONG Wesley, 30 mL at 12/22/22 1111    amLODIPine tablet 10 mg, 10 mg, Oral, Daily, 10 mg at 12/22/22 0900 **AND** lisinopriL tablet 20 mg, 20 mg, Oral, Daily, Kirk Petersen MD, 20 mg at 12/22/22 0900    aspirin EC tablet 81 mg, 81 mg, Oral, Daily, Mitchell Petersen, ANP     famotidine tablet 40 mg, 40 mg, Oral, Daily, Sulma MCMULLEN Giorgi, AGACNP-BC, 40 mg at 12/22/22 0900    [DISCONTINUED] umeclidinium 62.5 mcg/actuation inhalation capsule 62.5 mcg, 1 capsule, Inhalation, Daily **AND** fluticasone furoate-vilanteroL 200-25 mcg/dose diskus inhaler 1 puff, 1 puff, Inhalation, Daily, Kirk Petersen MD, 1 puff at 12/22/22 0946    insulin aspart U-100 injection 1-10 Units, 1-10 Units, Subcutaneous, QID (AC + HS) PRN, Sulma Rand, AGACNP-BC, 2 Units at 12/23/22 0547    losartan tablet 100 mg, 100 mg, Oral, Daily, Sulma BENEDICT Giorgi, AGACNP-BC, 100 mg at 12/22/22 0900    melatonin tablet 6 mg, 6 mg, Oral, Nightly PRN, Sulma Rand, AGACNP-BC    naloxone 0.4 mg/mL injection 0.02 mg, 0.02 mg, Intravenous, PRN, Sulma Rand, AGACNP-BC    nitroGLYCERIN SL tablet 0.4 mg, 0.4 mg, Sublingual, Q5 Min PRN, Gerardo Valdez III, MD, 0.4 mg at 12/21/22 1914    ondansetron injection 4 mg, 4 mg, Intravenous, Q4H PRN, Sulma Rand, AGACNP-BC    pantoprazole EC tablet 40 mg, 40 mg, Oral, Daily, Sulma BENEDICT Giorgi, AGACNP-BC, 40 mg at 12/22/22 0900    polyethylene glycol packet 17 g, 17 g, Oral, BID PRN, Sulma Wolfuis, AGACNP-BC    prochlorperazine injection Soln 5 mg, 5 mg, Intravenous, Q6H PRN, Sulma Rand, AGACNP-BC    rivaroxaban tablet 20 mg, 20 mg, Oral, Daily with dinner, Sulma Rand, AGACNP-BC, 20 mg at 12/22/22 1701    simethicone chewable tablet 80 mg, 1 tablet, Oral, QID PRN, Sulma Rand, AGACNP-BC    umeclidinium 62.5 mcg/actuation inhalation capsule 62.5 mcg, 1 capsule, Inhalation, Daily, Kirk Petersen MD, 62.5 mcg at 12/22/22 0948    VTE Risk Mitigation (From admission, onward)           Ordered     rivaroxaban tablet 20 mg  With dinner         12/22/22 0313     Reason for No Pharmacological VTE Prophylaxis  Once        Question:  Reasons:  Answer:  Already adequately anticoagulated on oral Anticoagulants    12/21/22 2215     IP VTE HIGH RISK PATIENT  Once          12/21/22 2215     Place sequential compression device  Until discontinued         12/21/22 2215                  Assessment:   Chest Pain R/O ACS    - ECHO Reviewed with No RWMAs, EKG with Non-Specific ST-T Wave Changes  Acute Hypoxemic Respiratory Failure requiring O2 Supplementation - now on RA  COPD  Hyperglycemia  Thyroid Nodule (Incidental Finding) - 15mm  No Hx of GIB    Plan:   Cardiac biomarkers negative. Echo reviewed with normal LVEF and no RWMAs.  Continue ASA 81 mg daily.   Treatment of Hypoxemia/COPD per Primary Team  Plans for discharge home today per primary team.   No further recommendations from cardiac standpoint.  Pt requesting to f/u with Dr. Uribe on discharge.     Rhoda Escoto, YANIRA  Cardiology  [facility] - Emergency Dept  12/23/2022 1:22 PM

## 2023-01-10 ENCOUNTER — LAB VISIT (OUTPATIENT)
Dept: LAB | Facility: HOSPITAL | Age: 78
End: 2023-01-10
Attending: ALLERGY & IMMUNOLOGY
Payer: MEDICARE

## 2023-01-10 DIAGNOSIS — D80.9 SELECTIVE IMMUNOGLOBULIN DEFICIENCY: Primary | ICD-10-CM

## 2023-01-10 LAB
BASOPHILS # BLD AUTO: 0.01 X10(3)/MCL (ref 0–0.2)
BASOPHILS NFR BLD AUTO: 0.2 %
EOSINOPHIL # BLD AUTO: 0.08 X10(3)/MCL (ref 0–0.9)
EOSINOPHIL NFR BLD AUTO: 1.6 %
ERYTHROCYTE [DISTWIDTH] IN BLOOD BY AUTOMATED COUNT: 13.7 % (ref 11.5–17)
HCT VFR BLD AUTO: 49.8 % (ref 37–47)
HGB BLD-MCNC: 15.9 GM/DL (ref 12–16)
IGG SERPL-MCNC: 1094 MG/DL (ref 522–1631)
IMM GRANULOCYTES # BLD AUTO: 0.01 X10(3)/MCL (ref 0–0.04)
IMM GRANULOCYTES NFR BLD AUTO: 0.2 %
LYMPHOCYTES # BLD AUTO: 2.15 X10(3)/MCL (ref 0.6–4.6)
LYMPHOCYTES NFR BLD AUTO: 41.9 %
MCH RBC QN AUTO: 32.5 PG
MCHC RBC AUTO-ENTMCNC: 31.9 MG/DL (ref 33–36)
MCV RBC AUTO: 101.8 FL (ref 80–94)
MONOCYTES # BLD AUTO: 0.43 X10(3)/MCL (ref 0.1–1.3)
MONOCYTES NFR BLD AUTO: 8.4 %
NEUTROPHILS # BLD AUTO: 2.45 X10(3)/MCL (ref 2.1–9.2)
NEUTROPHILS NFR BLD AUTO: 47.7 %
NRBC BLD AUTO-RTO: 0 %
PLATELET # BLD AUTO: 151 X10(3)/MCL (ref 130–400)
PMV BLD AUTO: 12 FL (ref 7.4–10.4)
RBC # BLD AUTO: 4.89 X10(6)/MCL (ref 4.2–5.4)
WBC # SPEC AUTO: 5.1 X10(3)/MCL (ref 4.5–11.5)

## 2023-01-10 PROCEDURE — 36415 COLL VENOUS BLD VENIPUNCTURE: CPT

## 2023-01-10 PROCEDURE — 82784 ASSAY IGA/IGD/IGG/IGM EACH: CPT

## 2023-01-10 PROCEDURE — 85025 COMPLETE CBC W/AUTO DIFF WBC: CPT

## 2023-01-11 NOTE — PHYSICIAN QUERY
PT Name: Kortney Leach  MR #: [id number]     DOCUMENTATION CLARIFICATION     CDS/: NICO Ocasio, RN, CCDS               Contact information: [email or web address]  This form is a permanent document in the medical record.     Query Date: January 11, 2023    By submitting this query, we are merely seeking further clarification of documentation.  Please utilize your independent clinical judgment when addressing the question(s) below.  The Medical Record contains the following   Indicators   Supporting Clinical Findings Location in Medical Record   X SOB, BACON, Wheezing, Productive Cough, Use of Accessory Muscles, etc. respiratory distress (moderate), Cyanotic  mildly cyanotic was in moderate to severe distress with chest pain     C/o Cp and SOB x 1 hour.    States she is still SOB and has mild chest discomfort. Has occ cough   ED note: Dr. Valdez 12/21      H & P: Dr. Petersen 12/21       PN: Dr. Franklin 12/22   X RR         ABGs         O2 sat RR: 21, 16, 18  SpO2: 95%, 92%, 93%    Initial evaluation patient with pulse ox 90 but cyanotic     Her ABG showed evidence of more than adequate oxygenation so question the adequacy of her pulse ox as well   Flowsheet      ED note: Dr. Valdez 12/21    H & P: Dr. Petersen 12/21     X Hypoxia/Hypercapnia Hypoxia, uncertain etiology likely baseline given history of COPD    hypoxic in the upper 80s/low 90s but she has a history of COPD, obstructive sleep apnea, pulmonary hypertension and suspect she is chronically hypoxic    Hypoxia, prob chronic, history of COPD    Hypoxia, prob chronic, history of COPD   H & P: Dr. Petersen 12/21             PN: Dr. Franklin 12/22    DCS: Dr. Franklin 12/23    BiPAP/Intubation/Mechanical Ventilation     X Supplemental O2 2L NC   flowsheet    Home O2, Oxygen Dependence     X Respiratory distress or failure Acute Hypoxemic Respiratory Failure requiring O2 Supplementation    Acute Hypoxemic Respiratory Failure requiring O2  Supplementation - now on RA   Cards Consult: Dr. Ocasio 12/22      Cards PN: KELSEA Escoto NP 12/23   X Radiology findings CTA chest shows no parenchymal lung disease   H & P: Dr. Petersen 12/21    Acute/Chronic Illness      Treatment      Other         The noted clinical guidelines are only system guidelines and do not replace the providers clinical judgment.    Provider, please clarify respiratory diagnosis associated with above clinical findings.     [  x  ] Acute Respiratory Failure with Hypoxia - ABG pO2 < 60 mmHg or O2 sat of <91% on room air and respiratory symptoms documented   [    ] Acute and (on) Chronic Respiratory Failure with Hypoxia - pO2 >10 mmHg below baseline or SpO2 < 91% on usual home O2 or O2 ? 2L/min over baseline home O2 and respiratory symptoms documented   [    ] Chronic Respiratory Failure with Hypoxia - Continuous home oxygen use   [    ] Hypoxia Only   [    ] Other Respiratory Diagnosis (please specify): _________________   [   ] Clinically Undetermined     Please document in your progress notes daily for the duration of treatment until resolved and include in your discharge summary.     Reference:    MAGO Torrez MD. (2020, March 13). Acute respiratory distress syndrome: Clinical features, diagnosis, and complications in adults ([id number] BRITTANY Fletcher MD & [id number] ELAINE Martins MD, Eds.). Retrieved November 13, 2020, from [email or web address]=ards&source=search_result&selectedTitle=1~150&usage_type=default&display_rank=1  Form No. [id number]

## 2023-02-07 ENCOUNTER — LAB VISIT (OUTPATIENT)
Dept: LAB | Facility: HOSPITAL | Age: 78
End: 2023-02-07
Attending: PHYSICIAN ASSISTANT
Payer: MEDICARE

## 2023-02-07 DIAGNOSIS — E04.1 NONTOXIC UNINODULAR GOITER: Primary | ICD-10-CM

## 2023-02-07 DIAGNOSIS — R10.13 ABDOMINAL PAIN, EPIGASTRIC: ICD-10-CM

## 2023-02-07 DIAGNOSIS — R07.9 CHEST PAIN, UNSPECIFIED TYPE: ICD-10-CM

## 2023-02-07 DIAGNOSIS — E11.9 DIABETES MELLITUS WITHOUT COMPLICATION: ICD-10-CM

## 2023-02-07 DIAGNOSIS — R16.0 HEPATOMEGALY: ICD-10-CM

## 2023-02-07 DIAGNOSIS — R11.0 NAUSEA: ICD-10-CM

## 2023-02-07 LAB
CRP SERPL-MCNC: 5.2 MG/L
EST. AVERAGE GLUCOSE BLD GHB EST-MCNC: 142.7 MG/DL
HBA1C MFR BLD: 6.6 %
T3FREE SERPL-MCNC: 2.75 PG/ML (ref 1.57–3.91)
T4 SERPL-MCNC: 7.75 UG/DL (ref 4.87–11.72)
TSH SERPL-ACNC: 1.67 UIU/ML (ref 0.35–4.94)

## 2023-02-07 PROCEDURE — 36415 COLL VENOUS BLD VENIPUNCTURE: CPT

## 2023-02-07 PROCEDURE — 84436 ASSAY OF TOTAL THYROXINE: CPT

## 2023-02-07 PROCEDURE — 86140 C-REACTIVE PROTEIN: CPT

## 2023-02-07 PROCEDURE — 83036 HEMOGLOBIN GLYCOSYLATED A1C: CPT

## 2023-02-07 PROCEDURE — 84443 ASSAY THYROID STIM HORMONE: CPT

## 2023-02-07 PROCEDURE — 84481 FREE ASSAY (FT-3): CPT

## 2023-02-08 ENCOUNTER — OFFICE VISIT (OUTPATIENT)
Dept: URGENT CARE | Facility: CLINIC | Age: 78
End: 2023-02-08
Payer: MEDICARE

## 2023-02-08 VITALS
DIASTOLIC BLOOD PRESSURE: 89 MMHG | OXYGEN SATURATION: 96 % | BODY MASS INDEX: 36.96 KG/M2 | SYSTOLIC BLOOD PRESSURE: 167 MMHG | HEART RATE: 81 BPM | WEIGHT: 230 LBS | RESPIRATION RATE: 18 BRPM | TEMPERATURE: 98 F | HEIGHT: 66 IN

## 2023-02-08 DIAGNOSIS — B96.89 ACUTE BACTERIAL SINUSITIS: Primary | ICD-10-CM

## 2023-02-08 DIAGNOSIS — J01.90 ACUTE BACTERIAL SINUSITIS: Primary | ICD-10-CM

## 2023-02-08 PROCEDURE — 99213 OFFICE O/P EST LOW 20 MIN: CPT | Mod: ,,, | Performed by: FAMILY MEDICINE

## 2023-02-08 PROCEDURE — 99213 PR OFFICE/OUTPT VISIT, EST, LEVL III, 20-29 MIN: ICD-10-PCS | Mod: ,,, | Performed by: FAMILY MEDICINE

## 2023-02-08 RX ORDER — AMOXICILLIN AND CLAVULANATE POTASSIUM 875; 125 MG/1; MG/1
1 TABLET, FILM COATED ORAL EVERY 12 HOURS
Qty: 14 TABLET | Refills: 0 | Status: SHIPPED | OUTPATIENT
Start: 2023-02-08 | End: 2023-02-15

## 2023-02-08 NOTE — PATIENT INSTRUCTIONS
Please take Augmentin twice daily for 7 days.  If this does not help your sinusitis and nasal congestion, the cause of it is likely viral or allergic.  If it is allergic, your nasal azelastine may help.  If it is viral, Flonase and Atrovent not recommended because you have glaucoma, so you may have to use nasal saline spray as your best choice.    Drink plenty of fluids.      Get plenty of rest.      Tylenol or Motrin as needed.      Go to the ER with any significant change or worsening of symptoms.     Follow up with your primary care doctor.

## 2023-02-08 NOTE — PROGRESS NOTES
Patient ID: [id number]     Chief Complaint: upper respiratory tract infection symptoms    History of Present Illness:     Kortney Leach is a 77 y.o. female  who presents today for symptoms of Nasal Congestion (Sinus pressure,nasal congestion, runny nose, mild cough x 3 weeks. Hx of chronic sinus Refused testing. 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  She reports that she has this year round but the symptoms have been worse for the past 3 weeks.  She requests an oral antibiotic and an injection to make her feel better    Pt denies experiencing any fevers, chills, nausea, vomiting, difficulty breathing, dysphagia, or neck stiffness.    Past Medical History:     ----------------------------  Anticoagulant long-term use  Chronic sinusitis, unspecified  CVID (common variable immunodeficiency)  Diabetes mellitus, type 2  Hypertension  Unspecified glaucoma     History reviewed. No pertinent surgical history.    Review of patient's allergies indicates:  No Known Allergies    Outpatient Medications Marked as Taking for the 2/8/23 encounter (Office Visit) with Stuart Gamboa MD   Medication Sig Dispense Refill    amlodipine-benazepril 10-20mg (LOTREL) 10-20 mg per capsule Take 1 capsule by mouth once daily.      azelastine (ASTELIN) 137 mcg (0.1 %) nasal spray 1 spray by Nasal route 2 (two) times daily.      dapagliflozin (FARXIGA) 10 mg tablet Take 10 mg by mouth once daily.      fluticasone-umeclidin-vilanter (TRELEGY ELLIPTA) 200-62.5-25 mcg inhaler Inhale 1 puff into the lungs once daily.      glimepiride (AMARYL) 2 MG tablet Take 2 mg by mouth before breakfast.      immun glob G,IgG,/pro/IgA 0-50 (HIZENTRA SUBQ) Inject into the skin.      metFORMIN (GLUCOPHAGE) 1000 MG tablet Take 1,000 mg by mouth 2 (two) times daily with meals.      pantoprazole  "(PROTONIX) 40 MG tablet Take 40 mg by mouth once daily.      rivaroxaban (XARELTO) 20 mg Tab Take 20 mg by mouth daily with dinner or evening meal.         Social History     Socioeconomic History    Marital status: Single   Tobacco Use    Smoking status: Never    Smokeless tobacco: Never   Substance and Sexual Activity    Drug use: Not Currently    Sexual activity: Not Currently        History reviewed. No pertinent family history.     Subjective:     Review of Systems   Constitutional:  Negative for chills, fever and malaise/fatigue.   HENT:  Positive for sinus pain. Negative for congestion, ear discharge, ear pain and sore throat.    Respiratory:  Negative for cough, sputum production, shortness of breath, wheezing and stridor.    Gastrointestinal:  Negative for abdominal pain, diarrhea, nausea and vomiting.   Genitourinary:  Negative for dysuria, frequency and urgency.   Musculoskeletal:  Negative for neck pain.   Skin:  Negative for rash.   Neurological:  Negative for headaches.     Objective:     BP (!) 167/89   Pulse 81   Temp 97.9 °F (36.6 °C)   Resp 18   Ht 5' 6" (1.676 m)   Wt 104.3 kg (230 lb)   SpO2 96%   BMI 37.12 kg/m²     Physical Exam  Constitutional:       General: She is not in acute distress.     Appearance: Normal appearance. She is not ill-appearing or toxic-appearing.   HENT:      Head: Normocephalic and atraumatic.      Right Ear: Tympanic membrane and ear canal normal.      Left Ear: Tympanic membrane and ear canal normal.      Nose: No congestion or rhinorrhea.      Mouth/Throat:      Pharynx: Oropharynx is clear. No oropharyngeal exudate or posterior oropharyngeal erythema.   Eyes:      General:         Right eye: No discharge.         Left eye: No discharge.      Extraocular Movements: Extraocular movements intact.      Conjunctiva/sclera: Conjunctivae normal.   Cardiovascular:      Rate and Rhythm: Normal rate and regular rhythm.      Heart sounds: Normal heart sounds. No murmur " heard.    No gallop.   Pulmonary:      Effort: Pulmonary effort is normal. No respiratory distress.      Breath sounds: Normal breath sounds. No stridor. No wheezing, rhonchi or rales.   Chest:      Chest wall: No tenderness.   Musculoskeletal:      Cervical back: No rigidity or tenderness.   Lymphadenopathy:      Cervical: No cervical adenopathy.   Neurological:      Mental Status: She is alert and oriented to person, place, and time. Mental status is at baseline.   Psychiatric:         Mood and Affect: Mood normal.         Behavior: Behavior normal.       Assessment & Plan:       ICD-10-CM ICD-9-CM   1. Acute bacterial sinusitis  J01.90 461.9    B96.89         1. Acute bacterial sinusitis  -     amoxicillin-clavulanate 875-125mg (AUGMENTIN) 875-125 mg per tablet; Take 1 tablet by mouth every 12 (twelve) hours. for 7 days  Dispense: 14 tablet; Refill: 0         She declined testing today.  She qualifies for antibiotics for sinusitis pursuant to Infectious Disease society of [place] guidelines.  Moreover her CVI also counseled towards antibiotic prescription.  Augmentin is first-line, and she reports no problem with this medication to the best of her knowledge.  Discussed that since she is not having fevers currently, IM injection of Rocephin is not indicated.  Because she has glaucoma, she can not receive oral or intramuscular steroids.  Atrovent is not an absolute contraindication, but caution is recommended with its use in people with glaucoma, so will not prescribe that today.

## 2023-03-28 ENCOUNTER — LAB VISIT (OUTPATIENT)
Dept: LAB | Facility: HOSPITAL | Age: 78
End: 2023-03-28
Attending: OTOLARYNGOLOGY
Payer: MEDICARE

## 2023-03-28 DIAGNOSIS — E04.1 NONTOXIC UNINODULAR GOITER: Primary | ICD-10-CM

## 2023-03-28 LAB
CALCIUM SERPL-MCNC: 9.9 MG/DL (ref 8.4–10.2)
PTH-INTACT SERPL-MCNC: 48.7 PG/ML (ref 8.7–77)

## 2023-03-28 PROCEDURE — 82310 ASSAY OF CALCIUM: CPT

## 2023-03-28 PROCEDURE — 83970 ASSAY OF PARATHORMONE: CPT

## 2023-03-28 PROCEDURE — 36415 COLL VENOUS BLD VENIPUNCTURE: CPT

## 2023-06-08 ENCOUNTER — LAB REQUISITION (OUTPATIENT)
Dept: LAB | Facility: HOSPITAL | Age: 78
End: 2023-06-08
Payer: MEDICARE

## 2023-06-08 DIAGNOSIS — R16.0 HEPATOMEGALY, NOT ELSEWHERE CLASSIFIED: ICD-10-CM

## 2023-06-08 DIAGNOSIS — E11.9 TYPE 2 DIABETES MELLITUS WITHOUT COMPLICATIONS: ICD-10-CM

## 2023-06-08 DIAGNOSIS — K21.9 GASTRO-ESOPHAGEAL REFLUX DISEASE WITHOUT ESOPHAGITIS: ICD-10-CM

## 2023-06-08 PROCEDURE — 87329 GIARDIA AG IA: CPT | Performed by: PHYSICIAN ASSISTANT

## 2023-06-08 PROCEDURE — 82653 EL-1 FECAL QUANTITATIVE: CPT | Performed by: PHYSICIAN ASSISTANT

## 2023-06-08 PROCEDURE — 83993 ASSAY FOR CALPROTECTIN FECAL: CPT | Performed by: PHYSICIAN ASSISTANT

## 2023-06-08 PROCEDURE — 87045 FECES CULTURE AEROBIC BACT: CPT | Performed by: PHYSICIAN ASSISTANT

## 2023-06-09 LAB
CRYPTOSP AG SPEC QL: NEGATIVE
G LAMBLIA AG STL QL IA: NEGATIVE

## 2023-06-11 LAB — BACTERIA STL CULT: NORMAL

## 2023-06-12 LAB
CALPROTECTIN STL-MCNT: <50 MCG/G
ELASTASE PANC STL-MCNT: 78 MCG/G

## 2023-08-10 ENCOUNTER — LAB VISIT (OUTPATIENT)
Dept: LAB | Facility: HOSPITAL | Age: 78
End: 2023-08-10
Attending: ALLERGY & IMMUNOLOGY
Payer: MEDICARE

## 2023-08-10 DIAGNOSIS — D80.9 SELECTIVE IMMUNOGLOBULIN DEFICIENCY: Primary | ICD-10-CM

## 2023-08-10 LAB
BASOPHILS # BLD AUTO: 0.01 X10(3)/MCL
BASOPHILS NFR BLD AUTO: 0.2 %
EOSINOPHIL # BLD AUTO: 0.06 X10(3)/MCL (ref 0–0.9)
EOSINOPHIL NFR BLD AUTO: 1 %
ERYTHROCYTE [DISTWIDTH] IN BLOOD BY AUTOMATED COUNT: 14.4 % (ref 11.5–17)
HCT VFR BLD AUTO: 48.4 % (ref 37–47)
HGB BLD-MCNC: 16 G/DL (ref 12–16)
IGG SERPL-MCNC: 1106 MG/DL (ref 522–1631)
IMM GRANULOCYTES # BLD AUTO: 0 X10(3)/MCL (ref 0–0.04)
IMM GRANULOCYTES NFR BLD AUTO: 0 %
LYMPHOCYTES # BLD AUTO: 2.15 X10(3)/MCL (ref 0.6–4.6)
LYMPHOCYTES NFR BLD AUTO: 37.6 %
MCH RBC QN AUTO: 32.4 PG (ref 27–31)
MCHC RBC AUTO-ENTMCNC: 33.1 G/DL (ref 33–36)
MCV RBC AUTO: 98 FL (ref 80–94)
MONOCYTES # BLD AUTO: 0.45 X10(3)/MCL (ref 0.1–1.3)
MONOCYTES NFR BLD AUTO: 7.9 %
NEUTROPHILS # BLD AUTO: 3.05 X10(3)/MCL (ref 2.1–9.2)
NEUTROPHILS NFR BLD AUTO: 53.3 %
NRBC BLD AUTO-RTO: 0 %
PLATELET # BLD AUTO: 150 X10(3)/MCL (ref 130–400)
PMV BLD AUTO: 11.8 FL (ref 7.4–10.4)
RBC # BLD AUTO: 4.94 X10(6)/MCL (ref 4.2–5.4)
WBC # SPEC AUTO: 5.72 X10(3)/MCL (ref 4.5–11.5)

## 2023-08-10 PROCEDURE — 36415 COLL VENOUS BLD VENIPUNCTURE: CPT

## 2023-08-10 PROCEDURE — 82784 ASSAY IGA/IGD/IGG/IGM EACH: CPT

## 2023-08-10 PROCEDURE — 85025 COMPLETE CBC W/AUTO DIFF WBC: CPT

## 2023-08-19 ENCOUNTER — OFFICE VISIT (OUTPATIENT)
Dept: URGENT CARE | Facility: CLINIC | Age: 78
End: 2023-08-19
Payer: MEDICARE

## 2023-08-19 VITALS
OXYGEN SATURATION: 97 % | RESPIRATION RATE: 20 BRPM | DIASTOLIC BLOOD PRESSURE: 57 MMHG | BODY MASS INDEX: 36.96 KG/M2 | TEMPERATURE: 98 F | SYSTOLIC BLOOD PRESSURE: 137 MMHG | WEIGHT: 230 LBS | HEIGHT: 66 IN | HEART RATE: 85 BPM

## 2023-08-19 DIAGNOSIS — N30.00 ACUTE CYSTITIS WITHOUT HEMATURIA: Primary | ICD-10-CM

## 2023-08-19 LAB
BILIRUB UR QL STRIP: NEGATIVE
GLUCOSE UR QL STRIP: POSITIVE
KETONES UR QL STRIP: NEGATIVE
LEUKOCYTE ESTERASE UR QL STRIP: POSITIVE
PH, POC UA: 6
POC BLOOD, URINE: NEGATIVE
POC NITRATES, URINE: POSITIVE
PROT UR QL STRIP: POSITIVE
SP GR UR STRIP: 1.01 (ref 1–1.03)
UROBILINOGEN UR STRIP-ACNC: ABNORMAL (ref 0.1–1.1)

## 2023-08-19 PROCEDURE — 87088 URINE BACTERIA CULTURE: CPT | Performed by: FAMILY MEDICINE

## 2023-08-19 PROCEDURE — 87186 SC STD MICRODIL/AGAR DIL: CPT | Performed by: FAMILY MEDICINE

## 2023-08-19 PROCEDURE — 99213 OFFICE O/P EST LOW 20 MIN: CPT | Mod: ,,, | Performed by: FAMILY MEDICINE

## 2023-08-19 PROCEDURE — 81003 POCT URINALYSIS, DIPSTICK, AUTOMATED, W/O SCOPE: ICD-10-PCS | Mod: QW,,, | Performed by: FAMILY MEDICINE

## 2023-08-19 PROCEDURE — 81003 URINALYSIS AUTO W/O SCOPE: CPT | Mod: QW,,, | Performed by: FAMILY MEDICINE

## 2023-08-19 PROCEDURE — 99213 PR OFFICE/OUTPT VISIT, EST, LEVL III, 20-29 MIN: ICD-10-PCS | Mod: ,,, | Performed by: FAMILY MEDICINE

## 2023-08-19 RX ORDER — NITROFURANTOIN 25; 75 MG/1; MG/1
100 CAPSULE ORAL 2 TIMES DAILY
Qty: 10 CAPSULE | Refills: 0 | Status: SHIPPED | OUTPATIENT
Start: 2023-08-19 | End: 2023-08-24

## 2023-08-19 RX ORDER — PHENAZOPYRIDINE HYDROCHLORIDE 200 MG/1
200 TABLET, FILM COATED ORAL 3 TIMES DAILY PRN
Qty: 6 TABLET | Refills: 0 | Status: SHIPPED | OUTPATIENT
Start: 2023-08-19 | End: 2023-08-21

## 2023-08-19 RX ORDER — DUPILUMAB 300 MG/2ML
300 INJECTION, SOLUTION SUBCUTANEOUS
COMMUNITY
Start: 2023-07-31

## 2023-08-19 RX ORDER — DEXLANSOPRAZOLE 60 MG/1
CAPSULE, DELAYED RELEASE ORAL DAILY
COMMUNITY
Start: 2023-08-01

## 2023-08-19 RX ORDER — BENAZEPRIL HYDROCHLORIDE 20 MG/1
20 TABLET ORAL
COMMUNITY
Start: 2023-08-10 | End: 2023-08-19

## 2023-08-19 RX ORDER — FAMOTIDINE 40 MG/1
40 TABLET, FILM COATED ORAL NIGHTLY
COMMUNITY
Start: 2023-06-30

## 2023-08-19 NOTE — PROGRESS NOTES
Patient ID: [id number]     Chief Complaint: Urinary Tract Infection (Pt presents to clinic with c/o frequent urination with pain starting last night. Pt has uti at the end of July. )      History of Present Illness:     Kortney Leach is a 77 y.o. female with a history of prior UTIs,  DVT (on Xarelto) COPD, diabetes, hypertension, obesity who presents today for symptoms of urinary frequency, and dysuria. She denies experiencing any fevers, chills, nausea, vomiting, back pain/ flank pain, or significant fatigue or malaise beyond baseline. Her oral intake has been normal.  Notably she went to the emergency room 1 month ago and was diagnosed with a UTI, given Omnicef.    Past Medical History:     ----------------------------  Anticoagulant long-term use  Chronic sinusitis, unspecified  CVID (common variable immunodeficiency)  Diabetes mellitus, type 2  Hypertension  Unspecified glaucoma     History reviewed. No pertinent surgical history.    Review of patient's allergies indicates:  No Known Allergies    Outpatient Medications Marked as Taking for the 8/19/23 encounter (Office Visit) with Stuart Gamboa MD   Medication Sig Dispense Refill    azelastine (ASTELIN) 137 mcg (0.1 %) nasal spray 1 spray by Nasal route 2 (two) times daily.      dapagliflozin (FARXIGA) 10 mg tablet Take 10 mg by mouth once daily.      dexlansoprazole (DEXILANT) 60 mg capsule Take 1 capsule by mouth.      DUPIXENT SYRINGE 300 mg/2 mL Syrg       famotidine (PEPCID) 40 MG tablet Take 40 mg by mouth every evening.      fluticasone-umeclidin-vilanter (TRELEGY ELLIPTA) 200-62.5-25 mcg inhaler Inhale 1 puff into the lungs once daily.      glimepiride (AMARYL) 2 MG tablet Take 2 mg by mouth before breakfast.      immun glob G,IgG,/pro/IgA 0-50 (HIZENTRA SUBQ) Inject into the skin.      metFORMIN (GLUCOPHAGE) 1000 MG tablet Take 1,000 mg by mouth 2 (two) times daily with meals.      rivaroxaban (XARELTO) 20 mg Tab Take 20 mg by mouth daily  "with dinner or evening meal.         Social History     Socioeconomic History    Marital status: Single   Tobacco Use    Smoking status: Never    Smokeless tobacco: Never   Substance and Sexual Activity    Alcohol use: Not Currently    Drug use: Not Currently    Sexual activity: Not Currently        History reviewed. No pertinent family history.     Subjective:     Review of Systems   Constitutional:  Negative for chills, fever and malaise/fatigue.   Gastrointestinal:  Negative for abdominal pain, diarrhea, nausea and vomiting.   Genitourinary:  Positive for dysuria and frequency. Negative for flank pain, hematuria and urgency.   Musculoskeletal:  Negative for back pain.       Objective:     BP (!) 137/57   Pulse 85   Temp 98 °F (36.7 °C)   Resp 20   Ht 5' 6" (1.676 m)   Wt 104.3 kg (230 lb)   SpO2 97%   BMI 37.12 kg/m²     Physical Exam  Constitutional:       General: She is not in acute distress.     Appearance: Normal appearance. She is normal weight. She is not ill-appearing or toxic-appearing.   Cardiovascular:      Rate and Rhythm: Normal rate.   Pulmonary:      Effort: Pulmonary effort is normal.   Abdominal:      Tenderness: There is no abdominal tenderness. There is no right CVA tenderness or left CVA tenderness.   Skin:     Coloration: Skin is not pale.      Findings: No rash.   Neurological:      Mental Status: She is alert.         Assessment & Plan:       ICD-10-CM ICD-9-CM   1. Acute cystitis without hematuria  N30.00 595.0        1. Acute cystitis without hematuria  -     POCT Urinalysis, Dipstick, Automated, W/O Scope  -     nitrofurantoin, macrocrystal-monohydrate, (MACROBID) 100 MG capsule; Take 1 capsule (100 mg total) by mouth 2 (two) times daily. for 5 days  Dispense: 10 capsule; Refill: 0  -     Urine culture  -     phenazopyridine (PYRIDIUM) 200 MG tablet; Take 1 tablet (200 mg total) by mouth 3 (three) times daily as needed for Pain.  Dispense: 6 tablet; Refill: 0         POC " urinalysis results were consistent with a UTI. Do not have access to any culture that may have been done a month ago it [facility].  We will start with Macrobid, sent for culture and sensitivity, and send in Pyridium for symptoms.  We will call her in 48 hours with the sensitivities and change antibiotics if we need to.  If she has a return of symptoms in the next 3 months, we discussed she may need a referral to Urology.

## 2023-08-21 ENCOUNTER — TELEPHONE (OUTPATIENT)
Dept: URGENT CARE | Facility: CLINIC | Age: 78
End: 2023-08-21
Payer: MEDICARE

## 2023-08-21 LAB — BACTERIA UR CULT: ABNORMAL

## 2023-08-21 RX ORDER — CIPROFLOXACIN 500 MG/1
500 TABLET ORAL EVERY 12 HOURS
Qty: 14 TABLET | Refills: 0 | Status: SHIPPED | OUTPATIENT
Start: 2023-08-21 | End: 2023-09-27

## 2023-08-21 NOTE — TELEPHONE ENCOUNTER
Spoke to patient via telephone in reference to urine culture susceptibility report results.  Resistant to nitrofurantoin, explained to patient will change to Cipro.  Verbalized understanding medication E prescribed

## 2023-08-24 DIAGNOSIS — I05.0 MITRAL VALVE STENOSIS, UNSPECIFIED ETIOLOGY: Primary | ICD-10-CM

## 2023-08-29 ENCOUNTER — OFFICE VISIT (OUTPATIENT)
Dept: CARDIAC SURGERY | Facility: CLINIC | Age: 78
End: 2023-08-29
Payer: MEDICARE

## 2023-08-29 VITALS
SYSTOLIC BLOOD PRESSURE: 191 MMHG | WEIGHT: 220.81 LBS | OXYGEN SATURATION: 96 % | HEART RATE: 72 BPM | BODY MASS INDEX: 35.49 KG/M2 | DIASTOLIC BLOOD PRESSURE: 75 MMHG | HEIGHT: 66 IN

## 2023-08-29 DIAGNOSIS — I05.0 MITRAL VALVE STENOSIS, UNSPECIFIED ETIOLOGY: ICD-10-CM

## 2023-08-29 DIAGNOSIS — E66.9 OBESITY, UNSPECIFIED CLASSIFICATION, UNSPECIFIED OBESITY TYPE, UNSPECIFIED WHETHER SERIOUS COMORBIDITY PRESENT: ICD-10-CM

## 2023-08-29 DIAGNOSIS — E11.9 TYPE 2 DIABETES MELLITUS WITHOUT COMPLICATION, WITHOUT LONG-TERM CURRENT USE OF INSULIN: ICD-10-CM

## 2023-08-29 DIAGNOSIS — I10 HYPERTENSION, UNSPECIFIED TYPE: Primary | ICD-10-CM

## 2023-08-29 PROCEDURE — 99204 PR OFFICE/OUTPT VISIT, NEW, LEVL IV, 45-59 MIN: ICD-10-PCS | Mod: ,,, | Performed by: SPECIALIST

## 2023-08-29 PROCEDURE — 99204 OFFICE O/P NEW MOD 45 MIN: CPT | Mod: ,,, | Performed by: SPECIALIST

## 2023-08-29 RX ORDER — PANCRELIPASE 24000; 76000; 120000 [USP'U]/1; [USP'U]/1; [USP'U]/1
1-2 CAPSULE, DELAYED RELEASE PELLETS ORAL
COMMUNITY

## 2023-08-29 RX ORDER — BENAZEPRIL HYDROCHLORIDE 20 MG/1
20 TABLET ORAL DAILY
COMMUNITY

## 2023-08-29 NOTE — PROGRESS NOTES
[facility]  History & Physical  Cardiothoracic Surgery    SUBJECTIVE:     Chief Complaint/Reason for Visit:   Chief Complaint   Patient presents with    Pre-op Exam      RE: Secondary to Mod Mitral stenosis with Inc SOB  C/o dizziness and weakness        History of Present Illness:  Patient is a 77 y.o. female presents referred by Dr. Uribe for evaluation for possible mitral valve replacement.  The patient is a 77-year-old obese female who over the last several months has had fairly profound fatigue and weakness.  He also gets short of breath with walking just across the house.  She has an echo that reveals good left ventricular function but moderate mitral stenosis with a about a 6 mm gradient and only mild mitral regurgitation.  She had a left heart catheterization 5 years ago that showed mild disease.  The patient has had a caval filter many years ago and as such is not a candidate for any minimally invasive procedures.  I have had a long discussion with the patient that it is difficult to say with 100% certainty that the mitral valve is this is a source of her problem but more likely than not it is was causing her issues.  At this point I would recommend a new left heart catheterization to see if she is had any progression of her coronary disease.  I will see her back after that and then decide on whether not we will do a mitral valve replacement.    Review of patient's allergies indicates:   Allergen Reactions    Adhesive tape-silicones        Past Medical History:   Diagnosis Date    Anticoagulant long-term use     Chronic sinusitis, unspecified     CVID (common variable immunodeficiency)     Diabetes mellitus, type 2     Hypertension     Unspecified glaucoma      History reviewed. No pertinent surgical history.  No family history on file.  Social History     Tobacco Use    Smoking status: Never    Smokeless tobacco: Never   Substance Use Topics    Alcohol use: Not Currently     Drug use: Not Currently        Review of Systems:  Review of Systems   Constitutional: Positive for malaise/fatigue.   HENT: Negative.     Eyes: Negative.    Cardiovascular:  Positive for dyspnea on exertion.   Respiratory:  Positive for shortness of breath.    Endocrine: Negative.    Hematologic/Lymphatic: Negative.    Skin: Negative.    Musculoskeletal: Negative.    Gastrointestinal: Negative.    Genitourinary: Negative.    Neurological: Negative.    Psychiatric/Behavioral: Negative.     Allergic/Immunologic: Negative.        OBJECTIVE:     Vital Signs (Most Recent):  Pulse: 72 (08/29/23 1325)  BP: (!) 191/75 (08/29/23 1325)  SpO2: 96 % (08/29/23 1325)    Admission: Weight: 100.2 kg (220 lb 12.8 oz) (08/29/23 1325)   Most Recent: Weight: 100.2 kg (220 lb 12.8 oz) (08/29/23 1325)    Physical Exam:  Physical Exam  Constitutional:       Appearance: Normal appearance. She is obese.   HENT:      Head: Normocephalic and atraumatic.   Eyes:      Pupils: Pupils are equal, round, and reactive to light.   Neck:      Vascular: No carotid bruit.   Cardiovascular:      Rate and Rhythm: Normal rate and regular rhythm.      Pulses: Normal pulses.   Pulmonary:      Breath sounds: Normal breath sounds.   Abdominal:      Palpations: Abdomen is soft.      Tenderness: There is no abdominal tenderness.   Musculoskeletal:         General: Normal range of motion.      Cervical back: Neck supple.   Neurological:      General: No focal deficit present.      Mental Status: She is alert.   Psychiatric:         Mood and Affect: Mood normal.         Diagnostic Results:  Echo: Reviewed      ASSESSMENT/PLAN:     1. Mitral valve stenosis, unspecified etiology    Moderate mitral valve stenosis   Good left ventricular function   Diabetes mellitus   Hypertension   Moderate pulmonary hypertension   Obesity   Enlarged liver  Previous caval filter secondary to DVTs  Obtain left heart catheterization  Return to clinic after above for surgical  planning for open mitral valve      LHC reveals normal coronaries

## 2023-08-29 NOTE — H&P (VIEW-ONLY)
Heart and Vascular Center [facility]  History & Physical  Cardiothoracic Surgery    SUBJECTIVE:     Chief Complaint/Reason for Visit:   Chief Complaint   Patient presents with    Pre-op Exam      RE: Secondary to Mod Mitral stenosis with Inc SOB  C/o dizziness and weakness        History of Present Illness:  Patient is a 77 y.o. female presents referred by Dr. Uribe for evaluation for possible mitral valve replacement.  The patient is a 77-year-old obese female who over the last several months has had fairly profound fatigue and weakness.  He also gets short of breath with walking just across the house.  She has an echo that reveals good left ventricular function but moderate mitral stenosis with a about a 6 mm gradient and only mild mitral regurgitation.  She had a left heart catheterization 5 years ago that showed mild disease.  The patient has had a caval filter many years ago and as such is not a candidate for any minimally invasive procedures.  I have had a long discussion with the patient that it is difficult to say with 100% certainty that the mitral valve is this is a source of her problem but more likely than not it is was causing her issues.  At this point I would recommend a new left heart catheterization to see if she is had any progression of her coronary disease.  I will see her back after that and then decide on whether not we will do a mitral valve replacement.    Review of patient's allergies indicates:   Allergen Reactions    Adhesive tape-silicones        Past Medical History:   Diagnosis Date    Anticoagulant long-term use     Chronic sinusitis, unspecified     CVID (common variable immunodeficiency)     Diabetes mellitus, type 2     Hypertension     Unspecified glaucoma      History reviewed. No pertinent surgical history.  No family history on file.  Social History     Tobacco Use    Smoking status: Never    Smokeless tobacco: Never   Substance Use Topics    Alcohol use: Not Currently     Drug use: Not Currently        Review of Systems:  Review of Systems   Constitutional: Positive for malaise/fatigue.   HENT: Negative.     Eyes: Negative.    Cardiovascular:  Positive for dyspnea on exertion.   Respiratory:  Positive for shortness of breath.    Endocrine: Negative.    Hematologic/Lymphatic: Negative.    Skin: Negative.    Musculoskeletal: Negative.    Gastrointestinal: Negative.    Genitourinary: Negative.    Neurological: Negative.    Psychiatric/Behavioral: Negative.     Allergic/Immunologic: Negative.        OBJECTIVE:     Vital Signs (Most Recent):  Pulse: 72 (08/29/23 1325)  BP: (!) 191/75 (08/29/23 1325)  SpO2: 96 % (08/29/23 1325)    Admission: Weight: 100.2 kg (220 lb 12.8 oz) (08/29/23 1325)   Most Recent: Weight: 100.2 kg (220 lb 12.8 oz) (08/29/23 1325)    Physical Exam:  Physical Exam  Constitutional:       Appearance: Normal appearance. She is obese.   HENT:      Head: Normocephalic and atraumatic.   Eyes:      Pupils: Pupils are equal, round, and reactive to light.   Neck:      Vascular: No carotid bruit.   Cardiovascular:      Rate and Rhythm: Normal rate and regular rhythm.      Pulses: Normal pulses.   Pulmonary:      Breath sounds: Normal breath sounds.   Abdominal:      Palpations: Abdomen is soft.      Tenderness: There is no abdominal tenderness.   Musculoskeletal:         General: Normal range of motion.      Cervical back: Neck supple.   Neurological:      General: No focal deficit present.      Mental Status: She is alert.   Psychiatric:         Mood and Affect: Mood normal.         Diagnostic Results:  Echo: Reviewed      ASSESSMENT/PLAN:     1. Mitral valve stenosis, unspecified etiology    Moderate mitral valve stenosis   Good left ventricular function   Diabetes mellitus   Hypertension   Moderate pulmonary hypertension   Obesity   Enlarged liver  Previous caval filter secondary to DVTs  Obtain left heart catheterization  Return to clinic after above for surgical  planning for open mitral valve      LHC reveals normal coronaries

## 2023-09-01 ENCOUNTER — LAB VISIT (OUTPATIENT)
Dept: LAB | Facility: HOSPITAL | Age: 78
End: 2023-09-01
Attending: NURSE PRACTITIONER
Payer: MEDICARE

## 2023-09-01 DIAGNOSIS — I10 HYPERTENSION, UNSPECIFIED TYPE: ICD-10-CM

## 2023-09-01 DIAGNOSIS — I05.0 MITRAL VALVE STENOSIS, UNSPECIFIED ETIOLOGY: ICD-10-CM

## 2023-09-01 DIAGNOSIS — Z86.718 PERSONAL HISTORY OF VENOUS THROMBOSIS AND EMBOLISM: ICD-10-CM

## 2023-09-01 DIAGNOSIS — E11.9 DIABETES: Primary | ICD-10-CM

## 2023-09-01 LAB
ANION GAP SERPL CALC-SCNC: 9 MEQ/L
BUN SERPL-MCNC: 11.7 MG/DL (ref 9.8–20.1)
CALCIUM SERPL-MCNC: 9.4 MG/DL (ref 8.4–10.2)
CHLORIDE SERPL-SCNC: 105 MMOL/L (ref 98–107)
CO2 SERPL-SCNC: 25 MMOL/L (ref 23–31)
CREAT SERPL-MCNC: 0.64 MG/DL (ref 0.55–1.02)
CREAT/UREA NIT SERPL: 18
ERYTHROCYTE [DISTWIDTH] IN BLOOD BY AUTOMATED COUNT: 14.6 % (ref 11.5–17)
GFR SERPLBLD CREATININE-BSD FMLA CKD-EPI: >60 MLS/MIN/1.73/M2
GLUCOSE SERPL-MCNC: 120 MG/DL (ref 82–115)
HCT VFR BLD AUTO: 47.3 % (ref 37–47)
HGB BLD-MCNC: 15.1 G/DL (ref 12–16)
MCH RBC QN AUTO: 32.2 PG (ref 27–31)
MCHC RBC AUTO-ENTMCNC: 31.9 G/DL (ref 33–36)
MCV RBC AUTO: 100.9 FL (ref 80–94)
NRBC BLD AUTO-RTO: 0 %
PLATELET # BLD AUTO: 146 X10(3)/MCL (ref 130–400)
PMV BLD AUTO: 11.9 FL (ref 7.4–10.4)
POTASSIUM SERPL-SCNC: 4.3 MMOL/L (ref 3.5–5.1)
RBC # BLD AUTO: 4.69 X10(6)/MCL (ref 4.2–5.4)
SODIUM SERPL-SCNC: 139 MMOL/L (ref 136–145)
WBC # SPEC AUTO: 5.86 X10(3)/MCL (ref 4.5–11.5)

## 2023-09-01 PROCEDURE — 36415 COLL VENOUS BLD VENIPUNCTURE: CPT

## 2023-09-01 PROCEDURE — 80048 BASIC METABOLIC PNL TOTAL CA: CPT

## 2023-09-01 PROCEDURE — 85027 COMPLETE CBC AUTOMATED: CPT

## 2023-09-06 ENCOUNTER — HOSPITAL ENCOUNTER (OUTPATIENT)
Facility: HOSPITAL | Age: 78
Discharge: HOME | End: 2023-09-06
Attending: INTERNAL MEDICINE | Admitting: INTERNAL MEDICINE
Payer: MEDICARE

## 2023-09-06 VITALS
HEART RATE: 65 BPM | WEIGHT: 222 LBS | SYSTOLIC BLOOD PRESSURE: 144 MMHG | OXYGEN SATURATION: 95 % | HEIGHT: 66 IN | RESPIRATION RATE: 22 BRPM | TEMPERATURE: 98 F | BODY MASS INDEX: 35.68 KG/M2 | DIASTOLIC BLOOD PRESSURE: 63 MMHG

## 2023-09-06 DIAGNOSIS — I05.0 MITRAL STENOSIS: ICD-10-CM

## 2023-09-06 DIAGNOSIS — Z01.818 PREOP TESTING: ICD-10-CM

## 2023-09-06 DIAGNOSIS — R06.02 SHORTNESS OF BREATH: ICD-10-CM

## 2023-09-06 LAB — POCT GLUCOSE: 161 MG/DL (ref 70–110)

## 2023-09-06 PROCEDURE — C1887 CATHETER, GUIDING: HCPCS | Performed by: INTERNAL MEDICINE

## 2023-09-06 PROCEDURE — 93010 ELECTROCARDIOGRAM REPORT: CPT | Mod: ,,, | Performed by: INTERNAL MEDICINE

## 2023-09-06 PROCEDURE — 25000003 PHARM REV CODE 250: Performed by: INTERNAL MEDICINE

## 2023-09-06 PROCEDURE — 63600175 PHARM REV CODE 636 W HCPCS: Performed by: INTERNAL MEDICINE

## 2023-09-06 PROCEDURE — 93005 ELECTROCARDIOGRAM TRACING: CPT

## 2023-09-06 PROCEDURE — 93010 EKG 12-LEAD: ICD-10-PCS | Mod: ,,, | Performed by: INTERNAL MEDICINE

## 2023-09-06 PROCEDURE — 25500020 PHARM REV CODE 255: Performed by: INTERNAL MEDICINE

## 2023-09-06 PROCEDURE — 27201423 OPTIME MED/SURG SUP & DEVICES STERILE SUPPLY: Performed by: INTERNAL MEDICINE

## 2023-09-06 PROCEDURE — C1894 INTRO/SHEATH, NON-LASER: HCPCS | Performed by: INTERNAL MEDICINE

## 2023-09-06 PROCEDURE — C1769 GUIDE WIRE: HCPCS | Performed by: INTERNAL MEDICINE

## 2023-09-06 PROCEDURE — 93454 CORONARY ARTERY ANGIO S&I: CPT | Performed by: INTERNAL MEDICINE

## 2023-09-06 RX ORDER — SODIUM CHLORIDE 9 MG/ML
INJECTION, SOLUTION INTRAVENOUS CONTINUOUS
Status: DISCONTINUED | OUTPATIENT
Start: 2023-09-06 | End: 2023-09-06 | Stop reason: HOSPADM

## 2023-09-06 RX ORDER — DIAZEPAM 5 MG/1
10 TABLET ORAL
Status: DISPENSED | OUTPATIENT
Start: 2023-09-06

## 2023-09-06 RX ORDER — ONDANSETRON 4 MG/1
8 TABLET, ORALLY DISINTEGRATING ORAL EVERY 8 HOURS PRN
Status: DISCONTINUED | OUTPATIENT
Start: 2023-09-06 | End: 2023-09-06 | Stop reason: HOSPADM

## 2023-09-06 RX ORDER — NITROGLYCERIN 20 MG/100ML
INJECTION INTRAVENOUS
Status: DISCONTINUED | OUTPATIENT
Start: 2023-09-06 | End: 2023-09-06 | Stop reason: HOSPADM

## 2023-09-06 RX ORDER — HEPARIN SODIUM 1000 [USP'U]/ML
INJECTION, SOLUTION INTRAVENOUS; SUBCUTANEOUS
Status: DISCONTINUED | OUTPATIENT
Start: 2023-09-06 | End: 2023-09-06 | Stop reason: HOSPADM

## 2023-09-06 RX ORDER — DIPHENHYDRAMINE HCL 25 MG
50 CAPSULE ORAL
Status: DISPENSED | OUTPATIENT
Start: 2023-09-06

## 2023-09-06 RX ORDER — FENTANYL CITRATE 50 UG/ML
INJECTION, SOLUTION INTRAMUSCULAR; INTRAVENOUS
Status: DISCONTINUED | OUTPATIENT
Start: 2023-09-06 | End: 2023-09-06 | Stop reason: HOSPADM

## 2023-09-06 RX ORDER — VERAPAMIL HYDROCHLORIDE 2.5 MG/ML
INJECTION, SOLUTION INTRAVENOUS
Status: DISCONTINUED | OUTPATIENT
Start: 2023-09-06 | End: 2023-09-06 | Stop reason: HOSPADM

## 2023-09-06 RX ORDER — MORPHINE SULFATE 4 MG/ML
2 INJECTION, SOLUTION INTRAMUSCULAR; INTRAVENOUS EVERY 4 HOURS PRN
Status: DISCONTINUED | OUTPATIENT
Start: 2023-09-06 | End: 2023-09-06 | Stop reason: HOSPADM

## 2023-09-06 RX ORDER — HYDROCODONE BITARTRATE AND ACETAMINOPHEN 5; 325 MG/1; MG/1
1 TABLET ORAL EVERY 4 HOURS PRN
Status: DISCONTINUED | OUTPATIENT
Start: 2023-09-06 | End: 2023-09-06 | Stop reason: HOSPADM

## 2023-09-06 RX ORDER — MIDAZOLAM HYDROCHLORIDE 1 MG/ML
INJECTION INTRAMUSCULAR; INTRAVENOUS
Status: DISCONTINUED | OUTPATIENT
Start: 2023-09-06 | End: 2023-09-06 | Stop reason: HOSPADM

## 2023-09-06 RX ORDER — LIDOCAINE HYDROCHLORIDE 10 MG/ML
INJECTION INFILTRATION; PERINEURAL
Status: DISCONTINUED | OUTPATIENT
Start: 2023-09-06 | End: 2023-09-06 | Stop reason: HOSPADM

## 2023-09-06 RX ORDER — HYDRALAZINE HYDROCHLORIDE 20 MG/ML
10 INJECTION INTRAMUSCULAR; INTRAVENOUS EVERY 4 HOURS PRN
Status: DISCONTINUED | OUTPATIENT
Start: 2023-09-06 | End: 2023-09-06 | Stop reason: HOSPADM

## 2023-09-06 RX ORDER — SODIUM CHLORIDE 9 MG/ML
INJECTION, SOLUTION INTRAVENOUS
Status: ACTIVE | OUTPATIENT
Start: 2023-09-06

## 2023-09-06 RX ORDER — ACETAMINOPHEN 325 MG/1
650 TABLET ORAL EVERY 4 HOURS PRN
Status: DISCONTINUED | OUTPATIENT
Start: 2023-09-06 | End: 2023-09-06 | Stop reason: HOSPADM

## 2023-09-06 RX ADMIN — DIPHENHYDRAMINE HYDROCHLORIDE 50 MG: 25 CAPSULE ORAL at 10:09

## 2023-09-06 RX ADMIN — DIAZEPAM 10 MG: 5 TABLET ORAL at 10:09

## 2023-09-06 NOTE — DISCHARGE INSTRUCTIONS
Remove dressing and armboard in 24hrs.  - Can shower in 24hrs, use soap and water only.   -No driving for two Days  -Do not lift anything heavier than a gallon of milk for 5 days.  -Do not submerge site under water for 5 days.   -No lotions, powders, creams around site for 5 days.  - Return to the nearest emergency room if you start running a fever; have any kind of discharge coming from the site, the site looks red or swollen.  - If site starts to bleed, lay flat on the ground, apply pressure to the site and call 911.     Resume Metformin in 2 days  Resume Xarelto tomorrow.

## 2023-09-06 NOTE — DISCHARGE SUMMARY
[facility] - Cath Lab Services  Discharge Note  Short Stay    Procedure(s) (LRB):  Left heart cath (Left)      OUTCOME: Patient tolerated treatment/procedure well without complication and is now ready for discharge.    DISPOSITION: Home or Self Care    FINAL DIAGNOSIS: CAD    FOLLOWUP: In clinic    DISCHARGE INSTRUCTIONS:  No discharge procedures on file.     TIME SPENT ON DISCHARGE: 60 minutes

## 2023-09-06 NOTE — DISCHARGE SUMMARY
[facility] - Cath Lab Services  Discharge Note  Short Stay    Procedure(s) (LRB):  Left heart cath (Left)      OUTCOME: Patient tolerated treatment/procedure well without complication and is now ready for discharge.    DISPOSITION: Home or Self Care    FINAL DIAGNOSIS:  Pre op for open heart surgery    FOLLOWUP: In clinic    DISCHARGE INSTRUCTIONS:  No discharge procedures on file.      Clinical Reference Documents Added to Patient Instructions         Document    CARDIAC CATHETERIZATION DISCHARGE INSTRUCTIONS (ENGLISH)            TIME SPENT ON DISCHARGE: 30 minutes

## 2023-09-06 NOTE — Clinical Note
The radial band was applied to the right radial artery. 10 cc's of air were inserted into the closure device. TR # 2

## 2023-09-13 DIAGNOSIS — I05.0 MITRAL VALVE STENOSIS, UNSPECIFIED ETIOLOGY: Primary | ICD-10-CM

## 2023-09-13 DIAGNOSIS — Z79.01 ANTICOAGULATED: ICD-10-CM

## 2023-09-13 RX ORDER — MUPIROCIN 20 MG/G
OINTMENT TOPICAL
Status: CANCELLED | OUTPATIENT
Start: 2023-09-13 | End: 2023-09-13

## 2023-09-13 RX ORDER — HYDROCODONE BITARTRATE AND ACETAMINOPHEN 500; 5 MG/1; MG/1
TABLET ORAL
Status: CANCELLED | OUTPATIENT
Start: 2023-09-13

## 2023-09-13 RX ORDER — METOPROLOL SUCCINATE 25 MG/1
25 TABLET, EXTENDED RELEASE ORAL DAILY
COMMUNITY

## 2023-09-15 ENCOUNTER — HOSPITAL ENCOUNTER (OUTPATIENT)
Dept: RADIOLOGY | Facility: HOSPITAL | Age: 78
Discharge: HOME | End: 2023-09-15
Attending: SPECIALIST
Payer: MEDICARE

## 2023-09-15 DIAGNOSIS — I05.0 MITRAL VALVE STENOSIS, UNSPECIFIED ETIOLOGY: ICD-10-CM

## 2023-09-15 PROCEDURE — 86923 COMPATIBILITY TEST ELECTRIC: CPT | Performed by: SPECIALIST

## 2023-09-15 PROCEDURE — 71046 X-RAY EXAM CHEST 2 VIEWS: CPT | Mod: TC

## 2023-09-18 NOTE — PROGRESS NOTES
Secure Message sent to Chen Villatoro RN at Dr Rosales's office in regards to recent testing: abnormal Pt/INR/PTT, Urine Culture, and liver testing ordered per GI.    0919 Chen Villatoro RN at Dr Rosales's office aware and will notify MD.

## 2023-09-19 ENCOUNTER — ANESTHESIA EVENT (OUTPATIENT)
Dept: SURGERY | Facility: HOSPITAL | Age: 78
DRG: 220 | End: 2023-09-19
Payer: MEDICARE

## 2023-09-19 DIAGNOSIS — I05.0 MITRAL VALVE STENOSIS, UNSPECIFIED ETIOLOGY: Primary | ICD-10-CM

## 2023-09-19 RX ORDER — CLINDAMYCIN HYDROCHLORIDE 300 MG/1
300 CAPSULE ORAL 3 TIMES DAILY
Qty: 3 CAPSULE | Refills: 0 | Status: ON HOLD | OUTPATIENT
Start: 2023-09-19 | End: 2023-09-22 | Stop reason: HOSPADM

## 2023-09-20 ENCOUNTER — ANESTHESIA (OUTPATIENT)
Dept: SURGERY | Facility: HOSPITAL | Age: 78
DRG: 220 | End: 2023-09-20
Payer: MEDICARE

## 2023-09-20 ENCOUNTER — HOSPITAL ENCOUNTER (INPATIENT)
Facility: HOSPITAL | Age: 78
LOS: 9 days | Discharge: NURSING, REHAB OR LONG-TERM CARE | DRG: 220 | End: 2023-09-29
Attending: SPECIALIST | Admitting: SPECIALIST
Payer: MEDICARE

## 2023-09-20 DIAGNOSIS — Z15.89: ICD-10-CM

## 2023-09-20 DIAGNOSIS — R00.0 TACHYCARDIA: ICD-10-CM

## 2023-09-20 DIAGNOSIS — I05.0 MITRAL VALVE STENOSIS, UNSPECIFIED ETIOLOGY: ICD-10-CM

## 2023-09-20 DIAGNOSIS — I34.0 SEVERE MITRAL VALVE REGURGITATION: ICD-10-CM

## 2023-09-20 DIAGNOSIS — I48.91 A-FIB: ICD-10-CM

## 2023-09-20 DIAGNOSIS — R00.1 BRADYCARDIA: ICD-10-CM

## 2023-09-20 DIAGNOSIS — I49.8 JUNCTIONAL RHYTHM: ICD-10-CM

## 2023-09-20 DIAGNOSIS — I49.9 ARRHYTHMIA: ICD-10-CM

## 2023-09-20 DIAGNOSIS — I25.10 CAD (CORONARY ARTERY DISEASE): ICD-10-CM

## 2023-09-20 DIAGNOSIS — Z79.01 ANTICOAGULATED: ICD-10-CM

## 2023-09-20 DIAGNOSIS — I49.5 SICK SINUS SYNDROME: Primary | ICD-10-CM

## 2023-09-20 LAB
ALBUMIN SERPL-MCNC: 3 G/DL (ref 3.4–4.8)
ALBUMIN/GLOB SERPL: 1.3 RATIO (ref 1.1–2)
ALLENS TEST BLOOD GAS (OHS): ABNORMAL
ALLENS TEST BLOOD GAS (OHS): ABNORMAL
ALLENS TEST BLOOD GAS (OHS): NORMAL
ALP SERPL-CCNC: 33 UNIT/L (ref 40–150)
ALT SERPL-CCNC: 21 UNIT/L (ref 0–55)
APTT PPP: 32.3 SECONDS (ref 23.2–33.7)
AST SERPL-CCNC: 60 UNIT/L (ref 5–34)
BASE EXCESS BLD CALC-SCNC: -2.4 MMOL/L
BASE EXCESS BLD CALC-SCNC: -2.9 MMOL/L
BASE EXCESS BLD CALC-SCNC: 2.4 MMOL/L (ref -2–2)
BASOPHILS # BLD AUTO: 0.03 X10(3)/MCL
BASOPHILS NFR BLD AUTO: 0.2 %
BILIRUB SERPL-MCNC: 1.1 MG/DL
BLOOD GAS SAMPLE TYPE (OHS): ABNORMAL
BLOOD GAS SAMPLE TYPE (OHS): ABNORMAL
BLOOD GAS SAMPLE TYPE (OHS): NORMAL
BUN SERPL-MCNC: 13.2 MG/DL (ref 9.8–20.1)
CA-I BLD-SCNC: 1.13 MMOL/L (ref 1.12–1.23)
CA-I BLD-SCNC: 1.18 MMOL/L (ref 1.12–1.23)
CA-I BLD-SCNC: 1.23 MMOL/L (ref 1.12–1.23)
CALCIUM SERPL-MCNC: 9.2 MG/DL (ref 8.4–10.2)
CHLORIDE SERPL-SCNC: 111 MMOL/L (ref 98–107)
CO2 BLDA-SCNC: 23.9 MMOL/L
CO2 BLDA-SCNC: 24.5 MMOL/L
CO2 BLDA-SCNC: 28.5 MMOL/L
CO2 SERPL-SCNC: 18 MMOL/L (ref 23–31)
COHGB MFR BLDA: 1.1 % (ref 0.5–1.5)
CREAT SERPL-MCNC: 0.69 MG/DL (ref 0.55–1.02)
DRAWN BY BLOOD GAS (OHS): ABNORMAL
DRAWN BY BLOOD GAS (OHS): ABNORMAL
DRAWN BY BLOOD GAS (OHS): NORMAL
EOSINOPHIL # BLD AUTO: 0.08 X10(3)/MCL (ref 0–0.9)
EOSINOPHIL NFR BLD AUTO: 0.5 %
ERYTHROCYTE [DISTWIDTH] IN BLOOD BY AUTOMATED COUNT: 14.2 % (ref 11.5–17)
FIO2 BLOOD GAS (OHS): 30 %
FIO2 BLOOD GAS (OHS): 60 %
FIO2: 95
GFR SERPLBLD CREATININE-BSD FMLA CKD-EPI: >60 MLS/MIN/1.73/M2
GLOBULIN SER-MCNC: 2.4 GM/DL (ref 2.4–3.5)
GLUCOSE SERPL-MCNC: 141 MG/DL (ref 70–110)
GLUCOSE SERPL-MCNC: 174 MG/DL (ref 70–110)
GLUCOSE SERPL-MCNC: 176 MG/DL (ref 70–110)
GLUCOSE SERPL-MCNC: 193 MG/DL (ref 70–110)
GLUCOSE SERPL-MCNC: 198 MG/DL (ref 70–110)
GLUCOSE SERPL-MCNC: 199 MG/DL (ref 82–115)
GLUCOSE SERPL-MCNC: 205 MG/DL (ref 70–110)
HCO3 BLDA-SCNC: 22.6 MMOL/L
HCO3 BLDA-SCNC: 23.2 MMOL/L
HCO3 BLDA-SCNC: 27.2 MMOL/L (ref 22–26)
HCO3 UR-SCNC: 21.7 MMOL/L
HCO3 UR-SCNC: 25.4 MMOL/L (ref 24–28)
HCO3 UR-SCNC: 25.7 MMOL/L (ref 24–28)
HCO3 UR-SCNC: 26 MMOL/L
HCO3 UR-SCNC: 27.4 MMOL/L (ref 24–28)
HCO3 UR-SCNC: 27.4 MMOL/L (ref 24–28)
HCT VFR BLD AUTO: 39.2 % (ref 37–47)
HCT VFR BLD AUTO: 40.5 % (ref 37–47)
HCT VFR BLD CALC: 32 %PCV (ref 36–54)
HCT VFR BLD CALC: 33 %PCV (ref 36–54)
HCT VFR BLD CALC: 40 %PCV (ref 36–54)
HCT VFR BLD CALC: 44 %PCV (ref 36–54)
HGB BLD-MCNC: 11 G/DL
HGB BLD-MCNC: 13.2 G/DL (ref 12–16)
HGB BLD-MCNC: 13.3 G/DL (ref 12–16)
HGB BLD-MCNC: 14 G/DL
HGB BLD-MCNC: 15 G/DL
IMM GRANULOCYTES # BLD AUTO: 0.09 X10(3)/MCL (ref 0–0.04)
IMM GRANULOCYTES NFR BLD AUTO: 0.6 %
INR PPP: 1.4
LPM (OHS): 2
LYMPHOCYTES # BLD AUTO: 4.41 X10(3)/MCL (ref 0.6–4.6)
LYMPHOCYTES NFR BLD AUTO: 28.5 %
MCH RBC QN AUTO: 32.5 PG (ref 27–31)
MCHC RBC AUTO-ENTMCNC: 32.8 G/DL (ref 33–36)
MCV RBC AUTO: 99 FL (ref 80–94)
MECH RR (OHS): 20 B/MIN
MECH VT (OHS): 500 ML
METHGB MFR BLDA: 0.6 % (ref 0.4–1.5)
MODE (OHS): ABNORMAL
MODE (OHS): NORMAL
MONOCYTES # BLD AUTO: 0.54 X10(3)/MCL (ref 0.1–1.3)
MONOCYTES NFR BLD AUTO: 3.5 %
NEUTROPHILS # BLD AUTO: 10.34 X10(3)/MCL (ref 2.1–9.2)
NEUTROPHILS NFR BLD AUTO: 66.7 %
NRBC BLD AUTO-RTO: 0 %
O2 HB BLOOD GAS (OHS): 95.2 % (ref 94–97)
OXYGEN DEVICE BLOOD GAS (OHS): ABNORMAL
OXYGEN DEVICE BLOOD GAS (OHS): ABNORMAL
OXYGEN DEVICE BLOOD GAS (OHS): NORMAL
OXYHGB MFR BLDA: 15.1 G/DL (ref 12–16)
PCO2 BLDA: 38.1 MMHG (ref 35–45)
PCO2 BLDA: 38.7 MMHG (ref 35–45)
PCO2 BLDA: 41 MMHG (ref 35–45)
PCO2 BLDA: 41.1 MMHG (ref 35–45)
PCO2 BLDA: 41.6 MMHG (ref 35–45)
PCO2 BLDA: 42 MMHG (ref 35–45)
PCO2 BLDA: 42 MMHG (ref 35–45)
PCO2 BLDA: 45.2 MMHG (ref 35–45)
PCO2 BLDA: 51.6 MMHG (ref 35–45)
PEEP (OHS): 5 CMH2O
PEEP (OHS): 5 CMH2O
PH BLDA: 7.35 [PH] (ref 7.35–7.45)
PH BLDA: 7.35 [PH] (ref 7.35–7.45)
PH BLDA: 7.42 [PH] (ref 7.35–7.45)
PH SMN: 7.33 [PH] (ref 7.35–7.45)
PH SMN: 7.36 [PH] (ref 7.35–7.45)
PH SMN: 7.36 [PH] (ref 7.35–7.45)
PH SMN: 7.4 [PH] (ref 7.35–7.45)
PH SMN: 7.41 [PH] (ref 7.35–7.45)
PH SMN: 7.46 [PH] (ref 7.35–7.45)
PLATELET # BLD AUTO: 126 X10(3)/MCL (ref 130–400)
PMV BLD AUTO: 11.9 FL (ref 7.4–10.4)
PO2 BLDA: 119 MMHG (ref 80–100)
PO2 BLDA: 248 MMHG (ref 80–100)
PO2 BLDA: 256 MMHG (ref 80–100)
PO2 BLDA: 266 MMHG (ref 80–100)
PO2 BLDA: 42 MMHG (ref 40–60)
PO2 BLDA: 52 MMHG (ref 40–60)
PO2 BLDA: 81 MMHG (ref 80–100)
PO2 BLDA: 82 MMHG (ref 80–100)
PO2 BLDA: 88 MMHG (ref 80–100)
POC BE: -4 MMOL/L
POC BE: 0 MMOL/L
POC BE: 1 MMOL/L
POC BE: 4 MMOL/L
POC IONIZED CALCIUM: 1 MMOL/L (ref 1.06–1.42)
POC IONIZED CALCIUM: 1.02 MMOL/L (ref 1.06–1.42)
POC IONIZED CALCIUM: 1.03 MMOL/L (ref 1.06–1.42)
POC IONIZED CALCIUM: 1.04 MMOL/L (ref 1.06–1.42)
POC IONIZED CALCIUM: 1.18 MMOL/L (ref 1.06–1.42)
POC IONIZED CALCIUM: 1.25 MMOL/L (ref 1.06–1.42)
POC PCO2 TEMP: 38.4 MMHG
POC PH TEMP: 7.36
POC PO2 TEMP: 118 MMHG
POC SATURATED O2: 100 % (ref 95–100)
POC SATURATED O2: 75 % (ref 95–100)
POC SATURATED O2: 83 % (ref 95–100)
POC SATURATED O2: 98 % (ref 95–100)
POC TCO2: 23 MMOL/L (ref 23–27)
POC TCO2: 27 MMOL/L (ref 23–27)
POC TCO2: 27 MMOL/L (ref 23–27)
POC TCO2: 27 MMOL/L (ref 24–29)
POC TCO2: 29 MMOL/L (ref 23–27)
POC TCO2: 29 MMOL/L (ref 24–29)
POC TEMPERATURE: ABNORMAL
POCT GLUCOSE: 116 MG/DL (ref 70–110)
POCT GLUCOSE: 128 MG/DL (ref 70–110)
POCT GLUCOSE: 134 MG/DL (ref 70–110)
POCT GLUCOSE: 136 MG/DL (ref 70–110)
POCT GLUCOSE: 139 MG/DL (ref 70–110)
POCT GLUCOSE: 147 MG/DL (ref 70–110)
POCT GLUCOSE: 148 MG/DL (ref 70–110)
POCT GLUCOSE: 151 MG/DL (ref 70–110)
POCT GLUCOSE: 153 MG/DL (ref 70–110)
POCT GLUCOSE: 171 MG/DL (ref 70–110)
POCT GLUCOSE: 176 MG/DL (ref 70–110)
POCT GLUCOSE: 179 MG/DL (ref 70–110)
POTASSIUM BLD-SCNC: 3.8 MMOL/L (ref 3.5–5.1)
POTASSIUM BLD-SCNC: 3.9 MMOL/L (ref 3.5–5.1)
POTASSIUM BLD-SCNC: 4.2 MMOL/L (ref 3.5–5.1)
POTASSIUM BLD-SCNC: 4.3 MMOL/L (ref 3.5–5.1)
POTASSIUM BLD-SCNC: 4.3 MMOL/L (ref 3.5–5.1)
POTASSIUM BLD-SCNC: 4.5 MMOL/L (ref 3.5–5.1)
POTASSIUM BLOOD GAS (OHS): 3.4 MMOL/L (ref 3.5–5)
POTASSIUM BLOOD GAS (OHS): 3.5 MMOL/L (ref 3.5–5)
POTASSIUM BLOOD GAS (OHS): 3.7 MMOL/L (ref 3.5–5)
POTASSIUM SERPL-SCNC: 3.6 MMOL/L (ref 3.5–5.1)
POTASSIUM SERPL-SCNC: 3.8 MMOL/L (ref 3.5–5.1)
POTASSIUM SERPL-SCNC: 4.1 MMOL/L (ref 3.5–5.1)
PROT SERPL-MCNC: 5.4 GM/DL (ref 5.8–7.6)
PROTHROMBIN TIME: 17 SECONDS (ref 12.5–14.5)
PS (OHS): 10 CMH2O
PS (OHS): 10 CMH2O
RBC # BLD AUTO: 4.09 X10(6)/MCL (ref 4.2–5.4)
SAMPLE SITE BLOOD GAS (OHS): ABNORMAL
SAMPLE SITE BLOOD GAS (OHS): ABNORMAL
SAMPLE SITE BLOOD GAS (OHS): NORMAL
SAMPLE: ABNORMAL
SAO2 % BLDA: 95 %
SAO2 % BLDA: 96 %
SAO2 % BLDA: 96.1 %
SODIUM BLD-SCNC: 139 MMOL/L (ref 136–145)
SODIUM BLD-SCNC: 140 MMOL/L (ref 136–145)
SODIUM BLD-SCNC: 140 MMOL/L (ref 136–145)
SODIUM BLD-SCNC: 141 MMOL/L (ref 136–145)
SODIUM BLOOD GAS (OHS): 136 MMOL/L (ref 137–145)
SODIUM BLOOD GAS (OHS): 137 MMOL/L (ref 137–145)
SODIUM BLOOD GAS (OHS): 138 MMOL/L (ref 137–145)
SODIUM SERPL-SCNC: 141 MMOL/L (ref 136–145)
WBC # SPEC AUTO: 15.49 X10(3)/MCL (ref 4.5–11.5)

## 2023-09-20 PROCEDURE — 36620 INSERTION CATHETER ARTERY: CPT | Performed by: NURSE ANESTHETIST, CERTIFIED REGISTERED

## 2023-09-20 PROCEDURE — C1751 CATH, INF, PER/CENT/MIDLINE: HCPCS

## 2023-09-20 PROCEDURE — 94002 VENT MGMT INPAT INIT DAY: CPT

## 2023-09-20 PROCEDURE — 85025 COMPLETE CBC W/AUTO DIFF WBC: CPT | Performed by: SPECIALIST

## 2023-09-20 PROCEDURE — D9220A PRA ANESTHESIA: ICD-10-PCS | Mod: CRNA,,, | Performed by: NURSE ANESTHETIST, CERTIFIED REGISTERED

## 2023-09-20 PROCEDURE — 93503 SWAN GANZ LINE: ICD-10-PCS | Mod: 59,,, | Performed by: ANESTHESIOLOGY

## 2023-09-20 PROCEDURE — 27000513 HC SENSOR FLOTRAC

## 2023-09-20 PROCEDURE — 27201423 OPTIME MED/SURG SUP & DEVICES STERILE SUPPLY: Performed by: SPECIALIST

## 2023-09-20 PROCEDURE — 85014 HEMATOCRIT: CPT | Performed by: PHYSICIAN ASSISTANT

## 2023-09-20 PROCEDURE — 25000003 PHARM REV CODE 250: Performed by: PHYSICIAN ASSISTANT

## 2023-09-20 PROCEDURE — 84132 ASSAY OF SERUM POTASSIUM: CPT | Performed by: PHYSICIAN ASSISTANT

## 2023-09-20 PROCEDURE — 99900026 HC AIRWAY MAINTENANCE (STAT)

## 2023-09-20 PROCEDURE — 93312 ECHO TRANSESOPHAGEAL: CPT | Mod: 26,59,, | Performed by: ANESTHESIOLOGY

## 2023-09-20 PROCEDURE — 63600175 PHARM REV CODE 636 W HCPCS: Performed by: PHYSICIAN ASSISTANT

## 2023-09-20 PROCEDURE — 93312 TEE: ICD-10-PCS | Mod: 26,59,, | Performed by: ANESTHESIOLOGY

## 2023-09-20 PROCEDURE — D9220A PRA ANESTHESIA: ICD-10-PCS | Mod: ANES,,, | Performed by: ANESTHESIOLOGY

## 2023-09-20 PROCEDURE — 88305 TISSUE EXAM BY PATHOLOGIST: CPT | Performed by: SPECIALIST

## 2023-09-20 PROCEDURE — 63600175 PHARM REV CODE 636 W HCPCS: Performed by: NURSE ANESTHETIST, CERTIFIED REGISTERED

## 2023-09-20 PROCEDURE — 20000000 HC ICU ROOM

## 2023-09-20 PROCEDURE — 33430 REPLACEMENT OF MITRAL VALVE: CPT | Mod: ,,, | Performed by: SPECIALIST

## 2023-09-20 PROCEDURE — 25000003 PHARM REV CODE 250: Performed by: SPECIALIST

## 2023-09-20 PROCEDURE — 94761 N-INVAS EAR/PLS OXIMETRY MLT: CPT

## 2023-09-20 PROCEDURE — C1768 GRAFT, VASCULAR: HCPCS | Performed by: SPECIALIST

## 2023-09-20 PROCEDURE — 27100171 HC OXYGEN HIGH FLOW UP TO 24 HOURS

## 2023-09-20 PROCEDURE — 84132 ASSAY OF SERUM POTASSIUM: CPT | Performed by: SPECIALIST

## 2023-09-20 PROCEDURE — C1894 INTRO/SHEATH, NON-LASER: HCPCS | Performed by: SPECIALIST

## 2023-09-20 PROCEDURE — C1751 CATH, INF, PER/CENT/MIDLINE: HCPCS | Performed by: SPECIALIST

## 2023-09-20 PROCEDURE — 99900035 HC TECH TIME PER 15 MIN (STAT)

## 2023-09-20 PROCEDURE — 36000713 HC OR TIME LEV V EA ADD 15 MIN: Performed by: SPECIALIST

## 2023-09-20 PROCEDURE — 82962 GLUCOSE BLOOD TEST: CPT | Performed by: SPECIALIST

## 2023-09-20 PROCEDURE — D9220A PRA ANESTHESIA: Mod: ANES,,, | Performed by: ANESTHESIOLOGY

## 2023-09-20 PROCEDURE — 33430 PR REPLACEMENT OF MITRAL VALVE: ICD-10-PCS | Mod: ,,, | Performed by: SPECIALIST

## 2023-09-20 PROCEDURE — P9045 ALBUMIN (HUMAN), 5%, 250 ML: HCPCS | Mod: JZ,JG | Performed by: PHYSICIAN ASSISTANT

## 2023-09-20 PROCEDURE — 33430 PR REPLACEMENT OF MITRAL VALVE: ICD-10-PCS | Mod: AS,,, | Performed by: PHYSICIAN ASSISTANT

## 2023-09-20 PROCEDURE — 37000008 HC ANESTHESIA 1ST 15 MINUTES: Performed by: SPECIALIST

## 2023-09-20 PROCEDURE — 93325: ICD-10-PCS | Mod: 26,,, | Performed by: ANESTHESIOLOGY

## 2023-09-20 PROCEDURE — 27200966 HC CLOSED SUCTION SYSTEM

## 2023-09-20 PROCEDURE — 63600175 PHARM REV CODE 636 W HCPCS: Performed by: SPECIALIST

## 2023-09-20 PROCEDURE — 85610 PROTHROMBIN TIME: CPT | Performed by: SPECIALIST

## 2023-09-20 PROCEDURE — 27800903 OPTIME MED/SURG SUP & DEVICES OTHER IMPLANTS: Performed by: SPECIALIST

## 2023-09-20 PROCEDURE — 82803 BLOOD GASES ANY COMBINATION: CPT

## 2023-09-20 PROCEDURE — 85730 THROMBOPLASTIN TIME PARTIAL: CPT | Performed by: SPECIALIST

## 2023-09-20 PROCEDURE — 93503 INSERT/PLACE HEART CATHETER: CPT | Mod: 59,,, | Performed by: ANESTHESIOLOGY

## 2023-09-20 PROCEDURE — S5010 5% DEXTROSE AND 0.45% SALINE: HCPCS | Performed by: PHYSICIAN ASSISTANT

## 2023-09-20 PROCEDURE — C1887 CATHETER, GUIDING: HCPCS | Performed by: SPECIALIST

## 2023-09-20 PROCEDURE — D9220A PRA ANESTHESIA: Mod: CRNA,,, | Performed by: NURSE ANESTHETIST, CERTIFIED REGISTERED

## 2023-09-20 PROCEDURE — 25000003 PHARM REV CODE 250: Performed by: NURSE ANESTHETIST, CERTIFIED REGISTERED

## 2023-09-20 PROCEDURE — A4216 STERILE WATER/SALINE, 10 ML: HCPCS | Performed by: NURSE ANESTHETIST, CERTIFIED REGISTERED

## 2023-09-20 PROCEDURE — 37799 UNLISTED PX VASCULAR SURGERY: CPT

## 2023-09-20 PROCEDURE — 36000712 HC OR TIME LEV V 1ST 15 MIN: Performed by: SPECIALIST

## 2023-09-20 PROCEDURE — 80053 COMPREHEN METABOLIC PANEL: CPT | Performed by: SPECIALIST

## 2023-09-20 PROCEDURE — 93325 DOPPLER ECHO COLOR FLOW MAPG: CPT | Mod: 26,,, | Performed by: ANESTHESIOLOGY

## 2023-09-20 PROCEDURE — 33430 REPLACEMENT OF MITRAL VALVE: CPT | Mod: AS,,, | Performed by: PHYSICIAN ASSISTANT

## 2023-09-20 PROCEDURE — 25000003 PHARM REV CODE 250

## 2023-09-20 PROCEDURE — 37000009 HC ANESTHESIA EA ADD 15 MINS: Performed by: SPECIALIST

## 2023-09-20 DEVICE — IMPLANTABLE DEVICE: Type: IMPLANTABLE DEVICE | Site: CHEST | Status: FUNCTIONAL

## 2023-09-20 RX ORDER — TRANEXAMIC ACID 100 MG/ML
INJECTION, SOLUTION INTRAVENOUS
Status: DISCONTINUED | OUTPATIENT
Start: 2023-09-20 | End: 2023-09-20

## 2023-09-20 RX ORDER — PANTOPRAZOLE SODIUM 40 MG/1
40 TABLET, DELAYED RELEASE ORAL DAILY
Status: DISCONTINUED | OUTPATIENT
Start: 2023-09-20 | End: 2023-09-23

## 2023-09-20 RX ORDER — PROPOFOL 10 MG/ML
VIAL (ML) INTRAVENOUS
Status: DISCONTINUED | OUTPATIENT
Start: 2023-09-20 | End: 2023-09-20

## 2023-09-20 RX ORDER — FAMOTIDINE 10 MG/ML
20 INJECTION INTRAVENOUS DAILY
Status: DISCONTINUED | OUTPATIENT
Start: 2023-09-20 | End: 2023-09-20 | Stop reason: SDUPTHER

## 2023-09-20 RX ORDER — POTASSIUM CHLORIDE 14.9 MG/ML
60 INJECTION INTRAVENOUS
Status: DISCONTINUED | OUTPATIENT
Start: 2023-09-20 | End: 2023-09-29 | Stop reason: HOSPADM

## 2023-09-20 RX ORDER — MIDAZOLAM HYDROCHLORIDE 1 MG/ML
INJECTION INTRAMUSCULAR; INTRAVENOUS
Status: DISCONTINUED | OUTPATIENT
Start: 2023-09-20 | End: 2023-09-20

## 2023-09-20 RX ORDER — MUPIROCIN 20 MG/G
OINTMENT TOPICAL
Status: COMPLETED
Start: 2023-09-20 | End: 2023-09-20

## 2023-09-20 RX ORDER — 3% SODIUM CHLORIDE 3 G/100ML
INJECTION, SOLUTION INTRAVENOUS
Status: COMPLETED | OUTPATIENT
Start: 2023-09-20 | End: 2023-09-20

## 2023-09-20 RX ORDER — CALCIUM GLUCONATE 20 MG/ML
2 INJECTION, SOLUTION INTRAVENOUS
Status: DISCONTINUED | OUTPATIENT
Start: 2023-09-20 | End: 2023-09-29 | Stop reason: HOSPADM

## 2023-09-20 RX ORDER — POTASSIUM CHLORIDE 14.9 MG/ML
40 INJECTION INTRAVENOUS
Status: DISCONTINUED | OUTPATIENT
Start: 2023-09-20 | End: 2023-09-29 | Stop reason: HOSPADM

## 2023-09-20 RX ORDER — MUPIROCIN 20 MG/G
OINTMENT TOPICAL 2 TIMES DAILY
Status: DISPENSED | OUTPATIENT
Start: 2023-09-20 | End: 2023-09-25

## 2023-09-20 RX ORDER — HYDROCODONE BITARTRATE AND ACETAMINOPHEN 5; 325 MG/1; MG/1
1 TABLET ORAL EVERY 4 HOURS PRN
Status: DISCONTINUED | OUTPATIENT
Start: 2023-09-20 | End: 2023-09-29 | Stop reason: HOSPADM

## 2023-09-20 RX ORDER — HEPARIN SODIUM 1000 [USP'U]/ML
INJECTION, SOLUTION INTRAVENOUS; SUBCUTANEOUS
Status: DISCONTINUED | OUTPATIENT
Start: 2023-09-20 | End: 2023-09-20

## 2023-09-20 RX ORDER — PROTAMINE SULFATE 10 MG/ML
INJECTION, SOLUTION INTRAVENOUS
Status: DISCONTINUED | OUTPATIENT
Start: 2023-09-20 | End: 2023-09-20

## 2023-09-20 RX ORDER — CEFAZOLIN SODIUM 2 G/50ML
2 SOLUTION INTRAVENOUS
Status: COMPLETED | OUTPATIENT
Start: 2023-09-20 | End: 2023-09-21

## 2023-09-20 RX ORDER — ONDANSETRON 2 MG/ML
4 INJECTION INTRAMUSCULAR; INTRAVENOUS EVERY 4 HOURS PRN
Status: DISCONTINUED | OUTPATIENT
Start: 2023-09-20 | End: 2023-09-29 | Stop reason: HOSPADM

## 2023-09-20 RX ORDER — ACETAMINOPHEN 650 MG/20.3ML
650 LIQUID ORAL EVERY 6 HOURS PRN
Status: DISCONTINUED | OUTPATIENT
Start: 2023-09-20 | End: 2023-09-29 | Stop reason: HOSPADM

## 2023-09-20 RX ORDER — DEXMEDETOMIDINE HYDROCHLORIDE 4 UG/ML
0-1.4 INJECTION, SOLUTION INTRAVENOUS CONTINUOUS
Status: DISCONTINUED | OUTPATIENT
Start: 2023-09-20 | End: 2023-09-22

## 2023-09-20 RX ORDER — MUPIROCIN 20 MG/G
OINTMENT TOPICAL
Status: COMPLETED | OUTPATIENT
Start: 2023-09-20 | End: 2023-09-20

## 2023-09-20 RX ORDER — ACETAMINOPHEN 10 MG/ML
1000 INJECTION, SOLUTION INTRAVENOUS EVERY 8 HOURS
Status: COMPLETED | OUTPATIENT
Start: 2023-09-20 | End: 2023-09-21

## 2023-09-20 RX ORDER — GLIMEPIRIDE 4 MG/1
4 TABLET ORAL 2 TIMES DAILY
Status: DISCONTINUED | OUTPATIENT
Start: 2023-09-20 | End: 2023-09-29 | Stop reason: HOSPADM

## 2023-09-20 RX ORDER — ENOXAPARIN SODIUM 100 MG/ML
40 INJECTION SUBCUTANEOUS EVERY 24 HOURS
Status: DISCONTINUED | OUTPATIENT
Start: 2023-09-21 | End: 2023-09-26

## 2023-09-20 RX ORDER — METOCLOPRAMIDE HYDROCHLORIDE 5 MG/ML
5 INJECTION INTRAMUSCULAR; INTRAVENOUS EVERY 6 HOURS PRN
Status: DISCONTINUED | OUTPATIENT
Start: 2023-09-20 | End: 2023-09-29 | Stop reason: HOSPADM

## 2023-09-20 RX ORDER — LACTULOSE 10 G/15ML
20 SOLUTION ORAL EVERY 6 HOURS PRN
Status: DISCONTINUED | OUTPATIENT
Start: 2023-09-20 | End: 2023-09-29 | Stop reason: HOSPADM

## 2023-09-20 RX ORDER — ROCURONIUM BROMIDE 10 MG/ML
INJECTION, SOLUTION INTRAVENOUS
Status: DISCONTINUED | OUTPATIENT
Start: 2023-09-20 | End: 2023-09-20

## 2023-09-20 RX ORDER — MAGNESIUM SULFATE HEPTAHYDRATE 40 MG/ML
2 INJECTION, SOLUTION INTRAVENOUS
Status: DISCONTINUED | OUTPATIENT
Start: 2023-09-20 | End: 2023-09-29 | Stop reason: HOSPADM

## 2023-09-20 RX ORDER — FAMOTIDINE 20 MG/1
40 TABLET, FILM COATED ORAL NIGHTLY
Status: DISCONTINUED | OUTPATIENT
Start: 2023-09-20 | End: 2023-09-23

## 2023-09-20 RX ORDER — DOCUSATE SODIUM 100 MG/1
100 CAPSULE, LIQUID FILLED ORAL 2 TIMES DAILY
Status: DISCONTINUED | OUTPATIENT
Start: 2023-09-21 | End: 2023-09-29 | Stop reason: HOSPADM

## 2023-09-20 RX ORDER — CALCIUM GLUCONATE 20 MG/ML
3 INJECTION, SOLUTION INTRAVENOUS
Status: DISCONTINUED | OUTPATIENT
Start: 2023-09-20 | End: 2023-09-29 | Stop reason: HOSPADM

## 2023-09-20 RX ORDER — DEXTROSE MONOHYDRATE AND SODIUM CHLORIDE 5; .45 G/100ML; G/100ML
INJECTION, SOLUTION INTRAVENOUS CONTINUOUS
Status: DISCONTINUED | OUTPATIENT
Start: 2023-09-20 | End: 2023-09-29 | Stop reason: HOSPADM

## 2023-09-20 RX ORDER — SUCRALFATE 1 G/1
1 TABLET ORAL
Status: DISCONTINUED | OUTPATIENT
Start: 2023-09-21 | End: 2023-09-29 | Stop reason: HOSPADM

## 2023-09-20 RX ORDER — PHENYLEPHRINE HYDROCHLORIDE 10 MG/ML
INJECTION INTRAVENOUS
Status: DISCONTINUED | OUTPATIENT
Start: 2023-09-20 | End: 2023-09-20

## 2023-09-20 RX ORDER — MAGNESIUM SULFATE HEPTAHYDRATE 40 MG/ML
4 INJECTION, SOLUTION INTRAVENOUS
Status: DISCONTINUED | OUTPATIENT
Start: 2023-09-20 | End: 2023-09-29 | Stop reason: HOSPADM

## 2023-09-20 RX ORDER — VASOPRESSIN 20 [USP'U]/ML
INJECTION, SOLUTION INTRAMUSCULAR; SUBCUTANEOUS
Status: DISCONTINUED | OUTPATIENT
Start: 2023-09-20 | End: 2023-09-20

## 2023-09-20 RX ORDER — LOPERAMIDE HYDROCHLORIDE 2 MG/1
2 CAPSULE ORAL CONTINUOUS PRN
Status: DISCONTINUED | OUTPATIENT
Start: 2023-09-20 | End: 2023-09-29 | Stop reason: HOSPADM

## 2023-09-20 RX ORDER — MORPHINE SULFATE 4 MG/ML
4 INJECTION, SOLUTION INTRAMUSCULAR; INTRAVENOUS EVERY 4 HOURS PRN
Status: DISCONTINUED | OUTPATIENT
Start: 2023-09-20 | End: 2023-09-29 | Stop reason: HOSPADM

## 2023-09-20 RX ORDER — ONDANSETRON 2 MG/ML
INJECTION INTRAMUSCULAR; INTRAVENOUS
Status: DISCONTINUED | OUTPATIENT
Start: 2023-09-20 | End: 2023-09-20

## 2023-09-20 RX ORDER — METOPROLOL SUCCINATE 25 MG/1
25 TABLET, EXTENDED RELEASE ORAL DAILY
Status: DISCONTINUED | OUTPATIENT
Start: 2023-09-20 | End: 2023-09-20 | Stop reason: SDUPTHER

## 2023-09-20 RX ORDER — FOLIC ACID 1 MG/1
1 TABLET ORAL DAILY
Status: DISCONTINUED | OUTPATIENT
Start: 2023-09-21 | End: 2023-09-29 | Stop reason: HOSPADM

## 2023-09-20 RX ORDER — OXYCODONE HYDROCHLORIDE 10 MG/1
10 TABLET ORAL EVERY 4 HOURS PRN
Status: DISCONTINUED | OUTPATIENT
Start: 2023-09-20 | End: 2023-09-29 | Stop reason: HOSPADM

## 2023-09-20 RX ORDER — LISINOPRIL 10 MG/1
20 TABLET ORAL DAILY
Status: DISCONTINUED | OUTPATIENT
Start: 2023-09-20 | End: 2023-09-29 | Stop reason: HOSPADM

## 2023-09-20 RX ORDER — AZELASTINE 1 MG/ML
2 SPRAY, METERED NASAL 2 TIMES DAILY
Status: DISCONTINUED | OUTPATIENT
Start: 2023-09-20 | End: 2023-09-29 | Stop reason: HOSPADM

## 2023-09-20 RX ORDER — METOPROLOL TARTRATE 25 MG/1
12.5 TABLET ORAL 2 TIMES DAILY
Status: DISCONTINUED | OUTPATIENT
Start: 2023-09-21 | End: 2023-09-23

## 2023-09-20 RX ORDER — VANCOMYCIN HYDROCHLORIDE 1 G/20ML
INJECTION, POWDER, LYOPHILIZED, FOR SOLUTION INTRAVENOUS
Status: DISCONTINUED | OUTPATIENT
Start: 2023-09-20 | End: 2023-09-20 | Stop reason: HOSPADM

## 2023-09-20 RX ORDER — FENTANYL CITRATE 50 UG/ML
INJECTION, SOLUTION INTRAMUSCULAR; INTRAVENOUS
Status: DISCONTINUED | OUTPATIENT
Start: 2023-09-20 | End: 2023-09-20

## 2023-09-20 RX ORDER — HYDROCODONE BITARTRATE AND ACETAMINOPHEN 500; 5 MG/1; MG/1
TABLET ORAL
Status: DISCONTINUED | OUTPATIENT
Start: 2023-09-20 | End: 2023-09-20 | Stop reason: HOSPADM

## 2023-09-20 RX ORDER — METFORMIN HYDROCHLORIDE 500 MG/1
1000 TABLET ORAL 2 TIMES DAILY WITH MEALS
Status: DISCONTINUED | OUTPATIENT
Start: 2023-09-20 | End: 2023-09-29 | Stop reason: HOSPADM

## 2023-09-20 RX ORDER — LIDOCAINE HYDROCHLORIDE 20 MG/ML
INJECTION, SOLUTION EPIDURAL; INFILTRATION; INTRACAUDAL; PERINEURAL
Status: DISCONTINUED | OUTPATIENT
Start: 2023-09-20 | End: 2023-09-20

## 2023-09-20 RX ORDER — ALBUMIN HUMAN 50 G/1000ML
12.5 SOLUTION INTRAVENOUS
Status: DISCONTINUED | OUTPATIENT
Start: 2023-09-20 | End: 2023-09-29 | Stop reason: HOSPADM

## 2023-09-20 RX ORDER — ASPIRIN 81 MG/1
81 TABLET ORAL DAILY
Status: DISCONTINUED | OUTPATIENT
Start: 2023-09-21 | End: 2023-09-29 | Stop reason: HOSPADM

## 2023-09-20 RX ORDER — ETOMIDATE 2 MG/ML
INJECTION INTRAVENOUS
Status: DISCONTINUED | OUTPATIENT
Start: 2023-09-20 | End: 2023-09-20

## 2023-09-20 RX ORDER — CALCIUM GLUCONATE 20 MG/ML
1 INJECTION, SOLUTION INTRAVENOUS
Status: DISCONTINUED | OUTPATIENT
Start: 2023-09-20 | End: 2023-09-29 | Stop reason: HOSPADM

## 2023-09-20 RX ORDER — POTASSIUM CHLORIDE 14.9 MG/ML
20 INJECTION INTRAVENOUS
Status: DISCONTINUED | OUTPATIENT
Start: 2023-09-20 | End: 2023-09-29 | Stop reason: HOSPADM

## 2023-09-20 RX ADMIN — FENTANYL CITRATE 100 MCG: 50 INJECTION, SOLUTION INTRAMUSCULAR; INTRAVENOUS at 07:09

## 2023-09-20 RX ADMIN — SODIUM CHLORIDE: 9 INJECTION, SOLUTION INTRAVENOUS at 06:09

## 2023-09-20 RX ADMIN — VASOPRESSIN 1 UNITS: 20 INJECTION INTRAVENOUS at 07:09

## 2023-09-20 RX ADMIN — ACETAMINOPHEN 1000 MG: 10 INJECTION, SOLUTION INTRAVENOUS at 09:09

## 2023-09-20 RX ADMIN — CEFAZOLIN SODIUM 2 G: 2 SOLUTION INTRAVENOUS at 05:09

## 2023-09-20 RX ADMIN — SODIUM CHLORIDE 2 UNITS/HR: 9 INJECTION, SOLUTION INTRAVENOUS at 09:09

## 2023-09-20 RX ADMIN — ROCURONIUM BROMIDE 70 MG: 10 SOLUTION INTRAVENOUS at 07:09

## 2023-09-20 RX ADMIN — ETOMIDATE 20 MG: 2 INJECTION INTRAVENOUS at 07:09

## 2023-09-20 RX ADMIN — PHENYLEPHRINE HYDROCHLORIDE 50 MCG: 10 INJECTION INTRAVENOUS at 07:09

## 2023-09-20 RX ADMIN — DEXTROSE MONOHYDRATE 1.5 G: 5 INJECTION INTRAVENOUS at 07:09

## 2023-09-20 RX ADMIN — FENTANYL CITRATE 150 MCG: 50 INJECTION, SOLUTION INTRAMUSCULAR; INTRAVENOUS at 07:09

## 2023-09-20 RX ADMIN — LIDOCAINE HYDROCHLORIDE 80 MG: 20 INJECTION, SOLUTION EPIDURAL; INFILTRATION; INTRACAUDAL; PERINEURAL at 07:09

## 2023-09-20 RX ADMIN — ROCURONIUM BROMIDE 30 MG: 10 SOLUTION INTRAVENOUS at 07:09

## 2023-09-20 RX ADMIN — VASOPRESSIN 1 UNITS: 20 INJECTION INTRAVENOUS at 10:09

## 2023-09-20 RX ADMIN — OXYCODONE HYDROCHLORIDE 10 MG: 10 TABLET ORAL at 08:09

## 2023-09-20 RX ADMIN — ONDANSETRON 500 MG: 2 INJECTION INTRAMUSCULAR; INTRAVENOUS at 10:09

## 2023-09-20 RX ADMIN — ALBUMIN (HUMAN) 12.5 G: 12.5 SOLUTION INTRAVENOUS at 03:09

## 2023-09-20 RX ADMIN — ALBUMIN (HUMAN) 12.5 G: 12.5 SOLUTION INTRAVENOUS at 10:09

## 2023-09-20 RX ADMIN — MUPIROCIN: 20 OINTMENT TOPICAL at 05:09

## 2023-09-20 RX ADMIN — MUPIROCIN: 20 OINTMENT TOPICAL at 08:09

## 2023-09-20 RX ADMIN — Medication 50 MG: at 07:09

## 2023-09-20 RX ADMIN — DEXTROSE AND SODIUM CHLORIDE: 5; 450 INJECTION, SOLUTION INTRAVENOUS at 11:09

## 2023-09-20 RX ADMIN — FENTANYL CITRATE 100 MCG: 50 INJECTION, SOLUTION INTRAMUSCULAR; INTRAVENOUS at 08:09

## 2023-09-20 RX ADMIN — MORPHINE SULFATE 4 MG: 4 INJECTION INTRAVENOUS at 03:09

## 2023-09-20 RX ADMIN — MORPHINE SULFATE 4 MG: 4 INJECTION INTRAVENOUS at 12:09

## 2023-09-20 RX ADMIN — MIDAZOLAM HYDROCHLORIDE 5 MG: 1 INJECTION, SOLUTION INTRAMUSCULAR; INTRAVENOUS at 06:09

## 2023-09-20 RX ADMIN — TRANEXAMIC ACID 1000 MG: 100 INJECTION, SOLUTION INTRAVENOUS at 07:09

## 2023-09-20 RX ADMIN — PROTAMINE SULFATE 400 MG: 10 INJECTION, SOLUTION INTRAVENOUS at 10:09

## 2023-09-20 RX ADMIN — ACETAMINOPHEN 1000 MG: 10 INJECTION, SOLUTION INTRAVENOUS at 01:09

## 2023-09-20 RX ADMIN — Medication 30 MG: at 07:09

## 2023-09-20 RX ADMIN — FENTANYL CITRATE 50 MCG: 50 INJECTION, SOLUTION INTRAMUSCULAR; INTRAVENOUS at 08:09

## 2023-09-20 RX ADMIN — TRANEXAMIC ACID 1000 MG: 100 INJECTION, SOLUTION INTRAVENOUS at 10:09

## 2023-09-20 RX ADMIN — HEPARIN SODIUM 30000 UNITS: 1000 INJECTION, SOLUTION INTRAVENOUS; SUBCUTANEOUS at 08:09

## 2023-09-20 RX ADMIN — FAMOTIDINE 40 MG: 20 TABLET, FILM COATED ORAL at 08:09

## 2023-09-20 RX ADMIN — SUGAMMADEX 200 MG: 100 INJECTION, SOLUTION INTRAVENOUS at 10:09

## 2023-09-20 RX ADMIN — CEFAZOLIN SODIUM 2 G: 2 SOLUTION INTRAVENOUS at 11:09

## 2023-09-20 RX ADMIN — ONDANSETRON 4 MG: 2 INJECTION INTRAMUSCULAR; INTRAVENOUS at 03:09

## 2023-09-20 RX ADMIN — DEXMEDETOMIDINE 0.8 MCG/KG/HR: 200 INJECTION, SOLUTION INTRAVENOUS at 09:09

## 2023-09-20 RX ADMIN — EPINEPHRINE 0.02 MCG/KG/MIN: 1 INJECTION INTRAMUSCULAR; INTRAVENOUS; SUBCUTANEOUS at 09:09

## 2023-09-20 NOTE — CONSULTS
[facility] - Periop Services  Pulmonary Critical Care Note    Patient Name: Kortney Leach  MRN: [id number]  Admission Date: 9/20/2023  Hospital Length of Stay: 0 days  Code Status: Full Code  Attending Provider: Steven Rosales IV, MD  Primary Care Provider: Arnaldo Hernandez MD     Subjective:     HPI:   This is a 77-year-old female who is a patient of [facility] referred to CV surgery after she had complaints initially of profound fatigue and weakness with shortness of breath; outpatient workup revealed moderate mitral stenosis with a 6 mm gradient and mild MR per echo. She then underwent left heart catheterization which revealed normal coronary arteries.  Admitted to [facility] on 09/20/2023 underwent Mitral valve replacement with a 25 mm mosaic porcine valve; ligation of left atrial appendage with Endo stapler.  Postprocedure patient returned to the ICU with right-sided [place].  She was transfused with Cell Saver proximally 1 L. Currently on epi and insulin at 6 units/hour    Hospital Course/Significant events:  As above     24 Hour Interval History:  As above     Past Medical History:   Diagnosis Date    Anticoagulant long-term use     Asthma     Chronic sinusitis, unspecified     COPD (chronic obstructive pulmonary disease)     CVID (common variable immunodeficiency)     Diabetes mellitus, type 2     GERD (gastroesophageal reflux disease)     Hypertension     Severe mitral valve regurgitation     Sleep apnea     uses CPAP    SOB (shortness of breath)     Unspecified glaucoma     Weakness        Past Surgical History:   Procedure Laterality Date    APPENDECTOMY      CATARACT EXTRACTION Bilateral     COLONOSCOPY      EGD, WITH BALLOON DILATION      ESOPHAGOGASTRODUODENOSCOPY      LEFT HEART CATHETERIZATION Left 09/06/2023    Procedure: Left heart cath;  Surgeon: Vijay Uribe MD;  Location: [facility] CATH LAB;  Service: Cardiology;  Laterality: Left;  C    TONSILLECTOMY      TOTAL KNEE  ARTHROPLASTY Bilateral        Social History     Socioeconomic History    Marital status: Single   Tobacco Use    Smoking status: Former     Current packs/day: 0.00     Types: Cigarettes     Quit date:      Years since quittin.7    Smokeless tobacco: Never   Substance and Sexual Activity    Alcohol use: Not Currently    Drug use: Not Currently    Sexual activity: Not Currently       Current Outpatient Medications   Medication Instructions    azelastine (ASTELIN) 137 mcg (0.1 %) nasal spray 2 sprays, Nasal, 2 times daily    benazepriL (LOTENSIN) 20 mg, Oral, Daily    clindamycin (CLEOCIN) 300 mg, Oral, 3 times daily    dexlansoprazole (DEXILANT) 60 mg capsule Oral, Daily    DUPIXENT SYRINGE 300 mg, Subcutaneous, Every 14 days    famotidine (PEPCID) 40 mg, Oral, Nightly    FARXIGA 10 mg, Oral, Daily    fluticasone-umeclidin-vilanter (TRELEGY ELLIPTA) 200-62.5-25 mcg inhaler 1 puff, Inhalation, Daily    glimepiride (AMARYL) 4 mg, Oral, 2 times daily    immun glob G,IgG,/pro/IgA 0-50 (HIZENTRA SUBQ) Subcutaneous, Every 14 days    lipase-protease-amylase 24,000-76,000-120,000 units (CREON) 24,000-76,000 -120,000 unit capsule 1-2 capsules, Oral, 3 times daily with meals, 2 caps with meals and 1 cap c snacks    metFORMIN (GLUCOPHAGE) 1,000 mg, Oral, 2 times daily with meals    metoprolol succinate (TOPROL-XL) 25 mg, Oral, Daily    rivaroxaban (XARELTO) 20 mg, Oral, With dinner    timoloL (BETIMOL) 0.5 % ophthalmic solution 1 drop, Both Eyes, 2 times daily       Current Inpatient Medications   [START ON 2023] aspirin  81 mg Oral Daily    azelastine  2 spray Nasal BID    ceFAZolin (ANCEF) IVPB  2 g Intravenous Q8H    [START ON 2023] docusate sodium  100 mg Oral BID    [START ON 2023] enoxparin  40 mg Subcutaneous Daily    famotidine (PF)  20 mg Intravenous Daily    famotidine  40 mg Oral QHS    [START ON 2023] folic acid  1 mg Oral Daily    glimepiride  4 mg Oral BID    lipase-protease-amylase  12,000-38,000-60,000 units  4 capsule Oral TID WM    lisinopriL  20 mg Oral Daily    metFORMIN  1,000 mg Oral BID WM    metoprolol succinate  25 mg Oral Daily    [START ON 9/21/2023] metoprolol tartrate  12.5 mg Oral BID    mupirocin        mupirocin   Nasal BID    pantoprazole  40 mg Oral Daily    [START ON 9/21/2023] sucralfate  1 g Oral QID (AC & HS)    timoloL  1 drop Both Eyes BID       Current Intravenous Infusions   dexmedeTOMIDine (Precedex) infusion (titrating)      dextrose 5 % and 0.45 % NaCl      EPINEPHrine      insulin regular 1 units/mL infusion orderable (CTS POST-OP)      loperamide       Review of Systems   Unable to perform ROS: Intubated      Objective:       Intake/Output Summary (Last 24 hours) at 9/20/2023 1136  Last data filed at 9/20/2023 1021  Gross per 24 hour   Intake 1098 ml   Output 575 ml   Net 523 ml       Vital Signs (Most Recent):  Temp: 97.9 °F (36.6 °C) (09/20/23 0611)  Pulse: 71 (09/20/23 0611)  Resp: 18 (09/20/23 0611)  BP: (!) 168/82 (09/20/23 0611)  SpO2: (!) 94 % (09/20/23 0611)  Body mass index is 34.59 kg/m².  Weight: 97.2 kg (214 lb 4.6 oz) Vital Signs (24h Range):  Temp:  [97.9 °F (36.6 °C)] 97.9 °F (36.6 °C)  Pulse:  [71] 71  Resp:  [18] 18  SpO2:  [94 %] 94 %  BP: (168)/(82) 168/82     Physical Exam  Constitutional:       Comments: Intubated and sedated on the vent    HENT:      Head: Normocephalic and atraumatic.   Cardiovascular:      Rate and Rhythm: Regular rhythm.      Heart sounds: Normal heart sounds.      Comments: Chest noted to suction; Paced; midline dressing c/d/i  Pulmonary:      Effort: Pulmonary effort is normal.      Breath sounds: Normal breath sounds.   Abdominal:      General: Bowel sounds are normal.      Palpations: Abdomen is soft.   Skin:     General: Skin is warm and dry.           Lines/Drains/Airways       Central Venous Catheter Line  Duration             Pulmonary Artery Catheter Assessment  09/20/23 0717 <1 day              Drain  Duration                   Chest Tube 09/20/23 1006 Tube - 1 Other (Comment) Mediastinal <1 day         Urethral Catheter 09/20/23 0725 Silicone <1 day              Airway  Duration                  Airway - Non-Surgical 09/20/23 0715 <1 day              Arterial Line  Duration             Arterial Line 09/20/23 0645 Left Radial <1 day              Peripheral Intravenous Line  Duration                  Peripheral IV - Single Lumen 09/20/23 0554 18 G Left;Posterior Wrist <1 day                    Significant Labs:    Lab Results   Component Value Date    WBC 15.49 (H) 09/20/2023    HGB 13.3 09/20/2023    HCT 40 09/20/2023    MCV 99.0 (H) 09/20/2023     (L) 09/20/2023       BMP  Lab Results   Component Value Date     09/15/2023    K 4.2 09/15/2023    CHLORIDE 108 (H) 09/15/2023    CO2 25 09/15/2023    BUN 11.1 09/15/2023    CREATININE 0.68 09/15/2023    CALCIUM 9.4 09/15/2023    AGAP 9.0 09/01/2023       ABG  Recent Labs   Lab 09/20/23  0655 09/20/23  0733 09/20/23  1041   PH 7.420   < > 7.356   PO2 81.0   < > 119*   PCO2 42.0   < > 38.7   HCO3 27.2*   < > 21.7   POCBASEDEF 2.40*  --   --     < > = values in this interval not displayed.       Mechanical Ventilation Support:   SIMV 20/tv 500/peep5/ps10/60% fi02    Significant Imaging:  X-Ray Chest PA And Lateral  Narrative: EXAMINATION:  XR CHEST PA AND LATERAL    CLINICAL HISTORY:  , Rheumatic mitral stenosis.    COMPARISON:  December 21, 2022    FINDINGS:  No alveolar consolidation, effusion, or pneumothorax is seen.   The thoracic aorta is normal  cardiac silhouette, central pulmonary vessels and mediastinum are normal in size and are grossly unremarkable.   visualized osseous structures are grossly unremarkable.  Impression: No acute chest disease is identified.    Electronically signed by: Reynaldo Garber  Date:    09/15/2023  Time:    12:05     Post operative CXR pending      Assessment/Plan:     Assessment   moderate mitral stenosis with a 6 mm gradient and  mild MR per echo s/p Mitral valve replacement with a 25 mm mosaic porcine valve; ligation of left atrial appendage with Endo stapler.   History of HTN, GERD, COPD?, obesity    Plan  Continue CABG protocol   Hopeful to wean from mechanical ventialtion with goal of extubation once recovered from anesthesia and with acceptable ABGs  Labs in the AM with CXR and ABGs.  Insulin gtts per protocol   Wean epi as tolerated.        Zoya Bowman, ANP  Pulmonary Critical Care Medicine  [facility] - Periop Services

## 2023-09-20 NOTE — ANESTHESIA PROCEDURE NOTES
MARYBETH    Diagnosis: Mitral stenosis  Patient location during procedure: OR  Exam type: Baseline    Staffing  Performed: anesthesiologist     Anesthesiologist: Elle Romero MD        Anesthesiologist Present  Yes      Setup & Induction  Patient preparation: bite block inserted  Probe Insertion: easy  Exam: completeDoppler Echo: 2D, 3D and color flow mapping.          After induction of general endotracheal anesthesia in the operating room, transesophageal echo probe was placed atraumatically the esophagus for evaluation of cardiovascular structures.    1. Ascending aorta, transverse arch and descending thoracic aorta to the level of diaphragm are well-visualized.  There is grade 2 atherosclerotic disease in the distal arch and descending thoracic aorta.    2. Right atrium, left atrium, left atrial appendage are well-visualized.  There was no thrombus, tumor evidence of an interatrial septal defect.  Left atrium measures 5.4 cm in diameter.    3. The tricuspid valve has trivial regurgitation only    4. Pulmonic valve is unremarkable trivial regurgitation is noted with a pulmonary artery catheter visualized exiting through the right ventricular outflow tract into the main pulmonary artery.      5. Right ventricular size and function appears normal.  RV systolic pressure is 38 mmHg.      6. Mitral valve annulus is heavily calcified the annulus is very small relatively the bike commissure of measurement measures 26 mm in the AP measurement measures 28 mm.  The posterior leaflet is completely calcified and immobile.  The anterior leaflet in the A2 and 3 segments are largely calcified and there is only a small area of the A1 segment that is mobile.  The maximum mean gradient are 18 and 7 mmHg respectively.  There is also mild to borderline moderate regurgitant jet at the A1 A2 area.    7. The aortic valve is a normal trileaflet valve without stenosis or insufficiency.    8. Left ventricular hypertrophy is seen, no focal  segmental wall motion abnormalities are identified, and the ejection fraction 60%.    9. No pericardial or pleural effusions are identified.    The findings and images were reviewed with the surgeon prior to surgical incision.

## 2023-09-20 NOTE — Clinical Note
A percutaneous stick to the left subclavian vein was performed. Ultrasound guidance was used to obtain access.

## 2023-09-20 NOTE — Clinical Note
The lead was inserted under fluorscopic guidance and thresholds were tested. A new lead was attached to the right ventricle.      The chronic RV lead           The lead locking device was attached to the existing lead.

## 2023-09-20 NOTE — OP NOTE
Date of service 09/20/2023   Preop diagnosis:  Mitral stenosis  Postop diagnosis:  Same  Procedure:  Mitral valve replacement with a 25 mm mosaic porcine valve; ligation of left atrial appendage with Endo stapler  Surgeon:  Connie  Assistant:  Lenin  Anesthesia:  General endotracheal with cardiopulmonary bypass   Drains: Chest tube times 1 and 1 right ventricular pacing wire     Procedure in detail:  The patient was taken to the operating room under informed consent placed on table in supine position.  General endotracheal anesthesia was induced by the anesthesia department.  She was prepped and draped in usual sterile fashion from the chin to the toes.  Incision made in the sternum from the sternal notch to the xiphoid and carried down sharply to the sternum.  The sternum transected and the sternal retractor was placed.  The pericardium incised and the patient fully heparinized.  Aortic and venous cannulae were placed and the patient placed on bypass and cooled to 32° systemic.  The aorta was crossclamped and cold blood [place] cardioplegia given in an antegrade fashion through the aortic root.  CO2 infused into the pericardial well.  The left atrial appendage ligated and excised with an Endo stapler.  The left atrium opened at the junction with the right superior pulmonary vein.  The mitral valve was examined.  It was heavily calcified circumferentially around the annulus.  2-0 pledgeted Ethibond sutures were placed circumferentially around the annulus in the anterior leaflet excised and the posterior leaflet preserved.  At this point it was still not able to pass a 25 mm Sizer.  Several sutures were removed and on the lateral portion of the annulus some calcium was debrided and then we are able to get the 25 mm Sizer and.  The remaining sutures were replaced and then the valve selected and washed.  The sutures attached to the sewing ring on the valve and the valve parachuted into position.  This sutures  tied with the cor knot system and was noted to seat nicely.  The atrium then closed with a running 4-0 Prolene suture in multiple layers with de-airing carried out prior to completion of the suture line.  Crossclamp removed and the patient rewarmed.  The patient was cardioverted back to sinus rhythm.  Right ventricular pacing wire placed brought out through separate stab wound secured to the skin.  The patient was then easily weaned from cardiopulmonary bypass.  Protamine was given to reverse the heparin and the arterial and venous cannulae removed and their pursestring sutures secured.  The wound copiously irrigated with a 3% saline antibiotic solution.  Platelet concentrated aggregated growth factor was infused throughout the wound and sternum.  The sternum closed with sternal wires.  The linea alba and sternal fascia with a running 1. Vicryl.  The subcutaneous tissue with 2-0 Vicryl the skin with a 3-0 subcuticular stitch.  The patient tolerated this procedure well.  Transesophageal ECHO during the case revealed excellent left ventricular function.  There was a very small paravalvular leak that appeared to be a trace tight jet.  We elected to follow this conservatively.  The patient left the operating room in stable condition.

## 2023-09-20 NOTE — Clinical Note
The site was marked. The left chest was prepped. The site was prepped with ChloraPrep. The site was clipped. The patient was draped.

## 2023-09-20 NOTE — Clinical Note
The lead was connected to generator, was sutured in place and was secured in place. A new lead was attached to the right atrium.      The chronic RA lead Olanzapine Pregnancy And Lactation Text: This medication is pregnancy category C.   There are no adequate and well controlled trials with olanzapine in pregnant females.  Olanzapine should be used during pregnancy only if the potential benefit justifies the potential risk to the fetus.   In a study in lactating healthy women, olanzapine was excreted in breast milk.  It is recommended that women taking olanzapine should not breast feed.

## 2023-09-20 NOTE — ANESTHESIA PROCEDURE NOTES
Zeeshan-post MVR    Diagnosis: Mitral stenosis  Patient location during procedure: OR  Surgery related to: Post-Operative Surgical Evaluation  Exam type: Final    Staffing  Performed: anesthesiologist     Anesthesiologist: Elle Romero MD        Anesthesiologist Present  Yes      Setup & InductionDoppler Echo: color flow mapping, 2D and 3D.        After a rewarming and adequate de-airing of the left ventricle, patient was weaned from cardiopulmonary bypass.  No inotropic support was required for separation.    1. A bioprosthetic valve is noted in the mitral position.  Prior to protamine, there appeared to be a very small paravalvular jet at the anteromedial aspect of the annulus.  The 10 o'clock position in the surgeon's view on three-dimensional echo.  The valve appears to be functioning normally and in good position.  No gradient in the left ventricular outflow tract is noted.      2. Left ventricular function remains normal EF of 60% without focal segmental wall motion abnormalities.      3. Right ventricular function is normal.    4. Left atrial appendage has been successfully surgically excluded.    No other changes from the preoperative evaluation are noted.  Heparin was reversed with protamine and a very very small \paravalvular jet is still visible as noted above.  The chest was then closed with sternal wires.  The exam was once again repeated with no other changes noted and at the conclusion of the operation the transesophageal echo probe was removed atraumatically.

## 2023-09-20 NOTE — ANESTHESIA PROCEDURE NOTES
Intubation    Date/Time: 9/20/2023 7:15 AM    Performed by: Lv Mcqueen  Authorized by: Elle Romero MD    Intubation:     Induction:  Intravenous    Intubated:  Postinduction    Mask Ventilation:  Easy mask    Attempts:  2    Attempted By:  Student    Method of Intubation:  Direct    Blade:  Chung 2    Laryngeal View Grade: Grade IIA - cords partially seen      Attempted By (2nd Attempt):  Student    Method of Intubation (2nd Attempt):  Video laryngoscopy    Blade (2nd Attempt):  Askew 3    Laryngeal View Grade (2nd Attempt): Grade I - full view of cords      Difficult Airway Encountered?: No      Complications:  None    Airway Device:  Oral endotracheal tube    Airway Device Size:  7.0    Style/Cuff Inflation:  Cuffed (inflated to minimal occlusive pressure)    Tube secured:  22    Secured at:  The lips    Placement Verified By:  Capnometry    Complicating Factors:  None    Findings Post-Intubation:  BS equal bilateral and atraumatic/condition of teeth unchanged

## 2023-09-20 NOTE — ANESTHESIA PROCEDURE NOTES
[place] Darrius Line    Diagnosis: Mitral Stenosis  Patient location during procedure: done in OR  Procedure start time: 9/20/2023 7:17 AM  Timeout: 9/20/2023 7:17 AM  Procedure end time: 9/20/2023 7:24 AM    Staffing  Authorizing Provider: Elle Romero MD  Performing Provider: Elle Romero MD    Staffing  Performed by: Elle Romero MD  Authorized by: Elle Romero MD    Anesthesiologist was present at the time of the procedure.  Preanesthetic Checklist  Completed: patient identified, IV checked, site marked, risks and benefits discussed, surgical consent, monitors and equipment checked, pre-op evaluation, timeout performed and anesthesia consent given  [place] Dar[place]us Line  Skin Prep: povidone-iodine 7.5% surgical scrub  Location: right,  internal jugular vein  Vessel Caliber: medium, patent, compressibility normal  Introducer: 9.5 Fr, manometry used.  Device: CCO/Oximetric Catheter   Catheter Size: 8 Fr  Catheter placement by yes. Heme positive aspiration all ports. PAC floated with balloon up not wedgedSterile sheath usedInsertion Attempts: 1  Indication: intravenous therapy, hemodynamic monitoring  Ultrasound Guidance  Needle advanced into vessel with real time Ultrasound guidance.  Image recorded and saved.  Guidewire confirmed in vessel.  Sterile sheath used.  Assessment  Central Line Bundle Protocol followed. Hand hygiene before procedure, surgical cap worn, surgical mask worn, sterile surgical gloves worn, large sterile drape used.  Verification: chest x-ray  Dressing: secured with tape and tegaderm  Patient: Tolerated Well

## 2023-09-20 NOTE — ANESTHESIA PROCEDURE NOTES
Arterial Line Insertion    Diagnosis: Mitral Stenosis    Patient location during procedure: holding area  Procedure start time: 9/20/2023 6:45 AM  Timeout: 9/20/2023 6:45 AM  Procedure end time: 9/20/2023 6:50 AM    Staffing  Authorizing Provider: Elle Romero MD  Performing Provider: Reena Newell CRNA    Staffing  Performed by: Reena Newell CRNA  Authorized by: Elle Romero MD      Preanesthetic Checklist  Completed: patient identified, IV checked, site marked, risks and benefits discussed, surgical consent, monitors and equipment checked, pre-op evaluation, timeout performed and anesthesia consent givenArterial Line Insertion  Skin Prep: chlorhexidine gluconate  Local Infiltration: lidocaine  Orientation: left  Location: radial    Catheter Size: 20 G  Catheter placement by Anatomical landmarks. Heme positive aspiration all ports. Insertion Attempts: 1  Assessment  Dressing: secured with tape and tegaderm  Patient: Tolerated well

## 2023-09-20 NOTE — Clinical Note
A venogram was performed in the left subclavian vein. The vessel was injected via power injection at 10 mL/sec  with 15 mL of contrast.

## 2023-09-20 NOTE — ANESTHESIA PREPROCEDURE EVALUATION
09/20/2023  Kortney Leach is a 77 y.o., female with obesity, h/o DVT, who has had fairly profound fatigue and weakness.  He also gets short of breath with walking just across the house.  She has an echo that reveals good left ventricular function but moderate mitral stenosis with a about a 6 mm gradient and only mild mitral regurgitation.  She had a left heart catheterization 5 years ago that showed mild disease.  The patient has had a caval filter many years ago and as such is not a candidate for any minimally invasive procedures.  HGB 15.9  [id number]  Cr 0.68   Echo: The left ventricle is normal in size with concentric remodeling and normal systolic function.   The estimated ejection fraction is 55%.   Indeterminate left ventricular diastolic function.   Normal right ventricular size with normal right ventricular systolic function.   Mild aortic regurgitation.   Mild mitral regurgitation.   Mild tricuspid regurgitation.   Normal central venous pressure (3 mmHg).   The mean diastolic gradient across the mitral valve is 8 mmHg at a heart rate of bpm.   There is moderate mitral stenosis.    Pre-op Assessment    I have reviewed the Patient Summary Reports.     I have reviewed the Nursing Notes. I have reviewed the NPO Status.   I have reviewed the Medications.     Review of Systems         Anesthesia Plan  Type of Anesthesia, risks & benefits discussed:    Anesthesia Type: Gen ETT  Intra-op Monitoring Plan: Standard ASA Monitors, Art Line, Central Line, MARYBETH, CO and PA  Post Op Pain Control Plan: multimodal analgesia and IV/PO Opioids PRN  Induction:  IV  Airway Plan: Direct, Post-Induction  Informed Consent: Informed consent signed with the Patient and all parties understand the risks and agree with anesthesia plan.  All questions answered. Patient consented to blood products? Yes  ASA Score:  3    Ready For Surgery From Anesthesia Perspective.     .

## 2023-09-20 NOTE — TRANSFER OF CARE
Anesthesia Transfer of Care Note    Patient: Kortney Leach    Procedure(s) Performed: Procedure(s) (LRB):  REPLACEMENT, MITRAL VALVE (N/A)    Patient location: ICU    Anesthesia Type: general    Transport from OR: Upon arrival to PACU/ICU, patient attached to ventilator and auscultated to confirm bilateral breath sounds and adequate TV. Transported from OR intubated on 100% O2 by AMBU with adequate controlled ventilation. Continuous ECG monitoring in transport. Continuous SpO2 monitoring in transport. Continuos invasive BP monitoring in transport    Post pain: adequate analgesia    Post assessment: no apparent anesthetic complications and tolerated procedure well    Post vital signs: stable    Level of consciousness: sedated    Nausea/Vomiting: no nausea/vomiting    Complications: none    Transfer of care protocol was followed

## 2023-09-20 NOTE — Clinical Note
The lead was inserted under fluorscopic guidance, thresholds were tested and was secured in place. A new lead was attached to the right atrium.      The chronic RA lead           The lead locking device was attached to the existing lead.

## 2023-09-20 NOTE — Clinical Note
The lead was connected to generator, was sutured in place and was secured in place. A new lead was attached to the right ventricle.      The chronic RV lead

## 2023-09-20 NOTE — Clinical Note
The generator was inserted into pocket in, was inserted into antibiotic pouch in the pocket and DFTs were performed to the left upper chest.

## 2023-09-21 LAB
ALBUMIN SERPL-MCNC: 3.6 G/DL (ref 3.4–4.8)
ALBUMIN/GLOB SERPL: 1.6 RATIO (ref 1.1–2)
ALP SERPL-CCNC: 34 UNIT/L (ref 40–150)
ALT SERPL-CCNC: 24 UNIT/L (ref 0–55)
AST SERPL-CCNC: 78 UNIT/L (ref 5–34)
BILIRUB SERPL-MCNC: 0.9 MG/DL
BUN SERPL-MCNC: 11.3 MG/DL (ref 9.8–20.1)
CALCIUM SERPL-MCNC: 8.1 MG/DL (ref 8.4–10.2)
CHLORIDE SERPL-SCNC: 109 MMOL/L (ref 98–107)
CO2 SERPL-SCNC: 19 MMOL/L (ref 23–31)
CREAT SERPL-MCNC: 0.75 MG/DL (ref 0.55–1.02)
ERYTHROCYTE [DISTWIDTH] IN BLOOD BY AUTOMATED COUNT: 14.6 % (ref 11.5–17)
GFR SERPLBLD CREATININE-BSD FMLA CKD-EPI: >60 MLS/MIN/1.73/M2
GLOBULIN SER-MCNC: 2.2 GM/DL (ref 2.4–3.5)
GLUCOSE SERPL-MCNC: 171 MG/DL (ref 82–115)
HCT VFR BLD AUTO: 40.1 % (ref 37–47)
HGB BLD-MCNC: 13 G/DL (ref 12–16)
MCH RBC QN AUTO: 32.6 PG (ref 27–31)
MCHC RBC AUTO-ENTMCNC: 32.4 G/DL (ref 33–36)
MCV RBC AUTO: 100.5 FL (ref 80–94)
NRBC BLD AUTO-RTO: 0 %
PLATELET # BLD AUTO: 115 X10(3)/MCL (ref 130–400)
PMV BLD AUTO: 12.4 FL (ref 7.4–10.4)
POCT GLUCOSE: 126 MG/DL (ref 70–110)
POCT GLUCOSE: 135 MG/DL (ref 70–110)
POCT GLUCOSE: 143 MG/DL (ref 70–110)
POCT GLUCOSE: 143 MG/DL (ref 70–110)
POCT GLUCOSE: 144 MG/DL (ref 70–110)
POCT GLUCOSE: 148 MG/DL (ref 70–110)
POCT GLUCOSE: 151 MG/DL (ref 70–110)
POCT GLUCOSE: 152 MG/DL (ref 70–110)
POCT GLUCOSE: 154 MG/DL (ref 70–110)
POCT GLUCOSE: 155 MG/DL (ref 70–110)
POCT GLUCOSE: 156 MG/DL (ref 70–110)
POCT GLUCOSE: 177 MG/DL (ref 70–110)
POCT GLUCOSE: 95 MG/DL (ref 70–110)
POTASSIUM SERPL-SCNC: 4.1 MMOL/L (ref 3.5–5.1)
PROT SERPL-MCNC: 5.8 GM/DL (ref 5.8–7.6)
PSYCHE PATHOLOGY RESULT: NORMAL
RBC # BLD AUTO: 3.99 X10(6)/MCL (ref 4.2–5.4)
SODIUM SERPL-SCNC: 139 MMOL/L (ref 136–145)
WBC # SPEC AUTO: 11.26 X10(3)/MCL (ref 4.5–11.5)

## 2023-09-21 PROCEDURE — 25000003 PHARM REV CODE 250: Performed by: PHYSICIAN ASSISTANT

## 2023-09-21 PROCEDURE — 63600175 PHARM REV CODE 636 W HCPCS: Performed by: PHYSICIAN ASSISTANT

## 2023-09-21 PROCEDURE — 94761 N-INVAS EAR/PLS OXIMETRY MLT: CPT

## 2023-09-21 PROCEDURE — 99024 POSTOP FOLLOW-UP VISIT: CPT | Mod: POP,,, | Performed by: PHYSICIAN ASSISTANT

## 2023-09-21 PROCEDURE — 20000000 HC ICU ROOM

## 2023-09-21 PROCEDURE — 94799 UNLISTED PULMONARY SVC/PX: CPT

## 2023-09-21 PROCEDURE — 85027 COMPLETE CBC AUTOMATED: CPT | Performed by: PHYSICIAN ASSISTANT

## 2023-09-21 PROCEDURE — 27000221 HC OXYGEN, UP TO 24 HOURS

## 2023-09-21 PROCEDURE — 99900035 HC TECH TIME PER 15 MIN (STAT)

## 2023-09-21 PROCEDURE — S5010 5% DEXTROSE AND 0.45% SALINE: HCPCS | Performed by: PHYSICIAN ASSISTANT

## 2023-09-21 PROCEDURE — 99024 PR POST-OP FOLLOW-UP VISIT: ICD-10-PCS | Mod: POP,,, | Performed by: PHYSICIAN ASSISTANT

## 2023-09-21 PROCEDURE — 80053 COMPREHEN METABOLIC PANEL: CPT | Performed by: PHYSICIAN ASSISTANT

## 2023-09-21 PROCEDURE — 63600175 PHARM REV CODE 636 W HCPCS: Performed by: SPECIALIST

## 2023-09-21 RX ORDER — IBUPROFEN 200 MG
16 TABLET ORAL
Status: DISCONTINUED | OUTPATIENT
Start: 2023-09-21 | End: 2023-09-29 | Stop reason: HOSPADM

## 2023-09-21 RX ORDER — INSULIN ASPART 100 [IU]/ML
0-10 INJECTION, SOLUTION INTRAVENOUS; SUBCUTANEOUS
Status: DISCONTINUED | OUTPATIENT
Start: 2023-09-21 | End: 2023-09-29 | Stop reason: HOSPADM

## 2023-09-21 RX ORDER — GLUCAGON 1 MG
1 KIT INJECTION
Status: DISCONTINUED | OUTPATIENT
Start: 2023-09-21 | End: 2023-09-29 | Stop reason: HOSPADM

## 2023-09-21 RX ORDER — KETOROLAC TROMETHAMINE 30 MG/ML
15 INJECTION, SOLUTION INTRAMUSCULAR; INTRAVENOUS EVERY 6 HOURS PRN
Status: DISPENSED | OUTPATIENT
Start: 2023-09-21 | End: 2023-09-24

## 2023-09-21 RX ORDER — IBUPROFEN 200 MG
24 TABLET ORAL
Status: DISCONTINUED | OUTPATIENT
Start: 2023-09-21 | End: 2023-09-29 | Stop reason: HOSPADM

## 2023-09-21 RX ADMIN — ENOXAPARIN SODIUM 40 MG: 40 INJECTION SUBCUTANEOUS at 05:09

## 2023-09-21 RX ADMIN — OXYCODONE HYDROCHLORIDE 10 MG: 10 TABLET ORAL at 05:09

## 2023-09-21 RX ADMIN — METOPROLOL TARTRATE 12.5 MG: 25 TABLET, FILM COATED ORAL at 08:09

## 2023-09-21 RX ADMIN — ACETAMINOPHEN 1000 MG: 10 INJECTION, SOLUTION INTRAVENOUS at 05:09

## 2023-09-21 RX ADMIN — HYDROCODONE BITARTRATE AND ACETAMINOPHEN 1 TABLET: 5; 325 TABLET ORAL at 03:09

## 2023-09-21 RX ADMIN — FOLIC ACID 1 MG: 1 TABLET ORAL at 08:09

## 2023-09-21 RX ADMIN — SUCRALFATE 1 G: 1 TABLET ORAL at 03:09

## 2023-09-21 RX ADMIN — MUPIROCIN: 20 OINTMENT TOPICAL at 09:09

## 2023-09-21 RX ADMIN — KETOROLAC TROMETHAMINE 15 MG: 30 INJECTION, SOLUTION INTRAMUSCULAR at 11:09

## 2023-09-21 RX ADMIN — MUPIROCIN: 20 OINTMENT TOPICAL at 07:09

## 2023-09-21 RX ADMIN — PANTOPRAZOLE SODIUM 40 MG: 40 TABLET, DELAYED RELEASE ORAL at 07:09

## 2023-09-21 RX ADMIN — GLIMEPIRIDE 4 MG: 1 TABLET ORAL at 10:09

## 2023-09-21 RX ADMIN — FAMOTIDINE 40 MG: 20 TABLET, FILM COATED ORAL at 09:09

## 2023-09-21 RX ADMIN — HYDROCODONE BITARTRATE AND ACETAMINOPHEN 1 TABLET: 5; 325 TABLET ORAL at 11:09

## 2023-09-21 RX ADMIN — INSULIN HUMAN 4.5 UNITS/HR: 1 INJECTION, SOLUTION INTRAVENOUS at 02:09

## 2023-09-21 RX ADMIN — GLIMEPIRIDE 4 MG: 1 TABLET ORAL at 09:09

## 2023-09-21 RX ADMIN — PANCRELIPASE 4 CAPSULE: 60000; 12000; 38000 CAPSULE, DELAYED RELEASE PELLETS ORAL at 07:09

## 2023-09-21 RX ADMIN — SUCRALFATE 1 G: 1 TABLET ORAL at 09:09

## 2023-09-21 RX ADMIN — OXYCODONE HYDROCHLORIDE 10 MG: 10 TABLET ORAL at 01:09

## 2023-09-21 RX ADMIN — METFORMIN HYDROCHLORIDE 1000 MG: 500 TABLET, FILM COATED ORAL at 04:09

## 2023-09-21 RX ADMIN — DEXTROSE AND SODIUM CHLORIDE: 5; 450 INJECTION, SOLUTION INTRAVENOUS at 01:09

## 2023-09-21 RX ADMIN — DOCUSATE SODIUM 100 MG: 100 CAPSULE, LIQUID FILLED ORAL at 08:09

## 2023-09-21 RX ADMIN — PANCRELIPASE 4 CAPSULE: 60000; 12000; 38000 CAPSULE, DELAYED RELEASE PELLETS ORAL at 04:09

## 2023-09-21 RX ADMIN — SUCRALFATE 1 G: 1 TABLET ORAL at 06:09

## 2023-09-21 RX ADMIN — ASPIRIN 81 MG: 81 TABLET, COATED ORAL at 08:09

## 2023-09-21 RX ADMIN — DOCUSATE SODIUM 100 MG: 100 CAPSULE, LIQUID FILLED ORAL at 09:09

## 2023-09-21 RX ADMIN — LISINOPRIL 20 MG: 20 TABLET ORAL at 07:09

## 2023-09-21 RX ADMIN — METFORMIN HYDROCHLORIDE 1000 MG: 500 TABLET, FILM COATED ORAL at 10:09

## 2023-09-21 NOTE — PROGRESS NOTES
[facility] - Periop Services  Pulmonary Critical Care Note    Patient Name: Kortney Leach  MRN: [id number]  Admission Date: 9/20/2023  Hospital Length of Stay: 1 days  Code Status: Full Code  Attending Provider: Steven Rosales IV, MD  Primary Care Provider: Arnaldo Hernandez MD     Subjective:     HPI:   This is a 77-year-old female who is a patient of [facility] referred to CV surgery after she had complaints initially of profound fatigue and weakness with shortness of breath; outpatient workup revealed moderate mitral stenosis with a 6 mm gradient and mild MR per echo. She then underwent left heart catheterization which revealed normal coronary arteries.  Admitted to [facility] on 09/20/2023 underwent Mitral valve replacement with a 25 mm mosaic porcine valve; ligation of left atrial appendage with Endo stapler.  Postprocedure patient returned to the ICU with right-sided [place].  She was transfused with Cell Saver proximally 1 L.     Hospital Course/Significant events:  Tolerated extubation still on insulin gtts per protocol     24 Hour Interval History:  Sitting up in the chair NAD noted. On 3 L NC     Past Medical History:   Diagnosis Date    Anticoagulant long-term use     Asthma     Chronic sinusitis, unspecified     COPD (chronic obstructive pulmonary disease)     CVID (common variable immunodeficiency)     Diabetes mellitus, type 2     GERD (gastroesophageal reflux disease)     Hypertension     Severe mitral valve regurgitation     Sleep apnea     uses CPAP    SOB (shortness of breath)     Unspecified glaucoma     Weakness        Past Surgical History:   Procedure Laterality Date    APPENDECTOMY      CATARACT EXTRACTION Bilateral     COLONOSCOPY      EGD, WITH BALLOON DILATION      ESOPHAGOGASTRODUODENOSCOPY      LEFT HEART CATHETERIZATION Left 09/06/2023    Procedure: Left heart cath;  Surgeon: Vijay Uribe MD;  Location: [facility] CATH LAB;  Service: Cardiology;  Laterality: Left;  [facility]     TONSILLECTOMY      TOTAL KNEE ARTHROPLASTY Bilateral        Social History     Socioeconomic History    Marital status: Single   Tobacco Use    Smoking status: Former     Current packs/day: 0.00     Types: Cigarettes     Quit date:      Years since quittin.7    Smokeless tobacco: Never   Substance and Sexual Activity    Alcohol use: Not Currently    Drug use: Not Currently    Sexual activity: Not Currently       Current Outpatient Medications   Medication Instructions    azelastine (ASTELIN) 137 mcg (0.1 %) nasal spray 2 sprays, Nasal, 2 times daily    benazepriL (LOTENSIN) 20 mg, Oral, Daily    dexlansoprazole (DEXILANT) 60 mg capsule Oral, Daily    DUPIXENT SYRINGE 300 mg, Subcutaneous, Every 14 days    famotidine (PEPCID) 40 mg, Oral, Nightly    FARXIGA 10 mg, Oral, Daily    fluticasone-umeclidin-vilanter (TRELEGY ELLIPTA) 200-62.5-25 mcg inhaler 1 puff, Inhalation, Daily    glimepiride (AMARYL) 4 mg, Oral, 2 times daily    immun glob G,IgG,/pro/IgA 0-50 (HIZENTRA SUBQ) Subcutaneous, Every 14 days    lipase-protease-amylase 24,000-76,000-120,000 units (CREON) 24,000-76,000 -120,000 unit capsule 1-2 capsules, Oral, 3 times daily with meals, 2 caps with meals and 1 cap c snacks    metFORMIN (GLUCOPHAGE) 1,000 mg, Oral, 2 times daily with meals    metoprolol succinate (TOPROL-XL) 25 mg, Oral, Daily    rivaroxaban (XARELTO) 20 mg, Oral, With dinner    timoloL (BETIMOL) 0.5 % ophthalmic solution 1 drop, Both Eyes, 2 times daily       Current Inpatient Medications   aspirin  81 mg Oral Daily    azelastine  2 spray Nasal BID    docusate sodium  100 mg Oral BID    enoxparin  40 mg Subcutaneous Daily    famotidine  40 mg Oral QHS    folic acid  1 mg Oral Daily    glimepiride  4 mg Oral BID    lipase-protease-amylase 12,000-38,000-60,000 units  4 capsule Oral TID WM    lisinopriL  20 mg Oral Daily    metFORMIN  1,000 mg Oral BID WM    metoprolol tartrate  12.5 mg Oral BID    mupirocin   Nasal BID    pantoprazole   40 mg Oral Daily    sucralfate  1 g Oral QID (AC & HS)    timoloL  1 drop Both Eyes BID       Current Intravenous Infusions   dexmedeTOMIDine (Precedex) infusion (titrating)      dextrose 5 % and 0.45 % NaCl 100 mL/hr at 09/21/23 0655    EPINEPHrine Stopped (09/20/23 2104)    insulin regular 1 units/mL infusion orderable (CTS POST-OP) 4.5 Units/hr (09/21/23 0655)    loperamide       Review of Systems   Unable to perform ROS: Intubated      Objective:       Intake/Output Summary (Last 24 hours) at 9/21/2023 0849  Last data filed at 9/21/2023 0655  Gross per 24 hour   Intake 5114.31 ml   Output 1715 ml   Net 3399.31 ml         Vital Signs (Most Recent):  Temp: 98.4 °F (36.9 °C) (09/21/23 0400)  Pulse: 82 (09/21/23 0700)  Resp: 15 (09/21/23 0700)  BP: (!) 118/57 (09/21/23 0700)  SpO2: (!) 92 % (09/21/23 0700)  Body mass index is 36.51 kg/m².  Weight: 102.6 kg (226 lb 3.1 oz) Vital Signs (24h Range):  Temp:  [98.2 °F (36.8 °C)-98.7 °F (37.1 °C)] 98.4 °F (36.9 °C)  Pulse:  [54-87] 82  Resp:  [12-31] 15  SpO2:  [88 %-100 %] 92 %  BP: ()/(49-89) 118/57  Arterial Line BP: ()/(38-71) 111/46     Physical Exam  Constitutional:       Appearance: She is obese.   HENT:      Head: Normocephalic and atraumatic.   Cardiovascular:      Rate and Rhythm: Regular rhythm.      Heart sounds: Normal heart sounds.      Comments: Chest noted to suction; midline dressing/wound vac c/d/i  Pulmonary:      Effort: Pulmonary effort is normal.      Breath sounds: Normal breath sounds.   Abdominal:      General: Bowel sounds are normal.      Palpations: Abdomen is soft.   Skin:     General: Skin is warm and dry.   Neurological:      General: No focal deficit present.      Mental Status: She is alert and oriented to person, place, and time.           Lines/Drains/Airways       Drain  Duration                  Urethral Catheter 09/20/23 0725 Silicone 1 day         Chest Tube 09/20/23 1006 Tube - 1 Other (Comment) Mediastinal <1 day               Line  Duration                  Pacer Wires 09/20/23 1930 <1 day              Peripheral Intravenous Line  Duration                  Peripheral IV - Single Lumen 09/20/23 0554 18 G Left;Posterior Wrist 1 day                    Significant Labs:    Lab Results   Component Value Date    WBC 11.26 09/21/2023    HGB 13.0 09/21/2023    HCT 40.1 09/21/2023    .5 (H) 09/21/2023     (L) 09/21/2023       BMP  Lab Results   Component Value Date     09/21/2023    K 4.1 09/21/2023    CHLORIDE 109 (H) 09/21/2023    CO2 19 (L) 09/21/2023    BUN 11.3 09/21/2023    CREATININE 0.75 09/21/2023    CALCIUM 8.1 (L) 09/21/2023    AGAP 9.0 09/01/2023       ABG  Recent Labs   Lab 09/20/23  1342   PH 7.350   PO2 88.0   PCO2 42.0   HCO3 23.2   POCBASEDEF -2.40         Significant Imaging:  X-Ray Chest AP Portable  Narrative: EXAMINATION:  XR CHEST AP PORTABLE    CLINICAL HISTORY:  Post-op;    COMPARISON:  Yesterday    FINDINGS:  Portable frontal view of the chest was obtained. Interval extubation.  Pulmonary artery catheter and mediastinal drain remain.  The heart is not significantly enlarged.  There is aortic atherosclerosis.  Improved aeration of the lungs with some mild bibasilar atelectasis.  No pneumothorax.  Impression: Interval extubation.  Mild bibasilar atelectasis.    Electronically signed by: Jasno Andrews  Date:    09/21/2023  Time:    08:06      Assessment/Plan:     Assessment   moderate mitral stenosis with a 6 mm gradient and mild MR per echo s/p Mitral valve replacement with a 25 mm mosaic porcine valve; ligation of left atrial appendage with Endo stapler.   History of HTN, GERD, COPD?, obesity    Plan  Continue CABG protocol   Insulin gtts per protocol.   Can likely downgrade to a floor bed, if CV clears.      Zoya Bowman, ANP  Pulmonary Critical Care Medicine  [facility] - Periop Services

## 2023-09-22 LAB
ALBUMIN SERPL-MCNC: 3.2 G/DL (ref 3.4–4.8)
ALBUMIN/GLOB SERPL: 1.5 RATIO (ref 1.1–2)
ALP SERPL-CCNC: 40 UNIT/L (ref 40–150)
ALT SERPL-CCNC: 23 UNIT/L (ref 0–55)
AST SERPL-CCNC: 52 UNIT/L (ref 5–34)
BILIRUB SERPL-MCNC: 1.3 MG/DL
BUN SERPL-MCNC: 16.3 MG/DL (ref 9.8–20.1)
CALCIUM SERPL-MCNC: 8 MG/DL (ref 8.4–10.2)
CHLORIDE SERPL-SCNC: 104 MMOL/L (ref 98–107)
CO2 SERPL-SCNC: 18 MMOL/L (ref 23–31)
CREAT SERPL-MCNC: 1.04 MG/DL (ref 0.55–1.02)
ERYTHROCYTE [DISTWIDTH] IN BLOOD BY AUTOMATED COUNT: 14.7 % (ref 11.5–17)
GFR SERPLBLD CREATININE-BSD FMLA CKD-EPI: 55 MLS/MIN/1.73/M2
GLOBULIN SER-MCNC: 2.1 GM/DL (ref 2.4–3.5)
GLUCOSE SERPL-MCNC: 175 MG/DL (ref 82–115)
HCT VFR BLD AUTO: 38.8 % (ref 37–47)
HEMATOLOGIST REVIEW: NORMAL
HGB BLD-MCNC: 12.5 G/DL (ref 12–16)
MCH RBC QN AUTO: 32.2 PG (ref 27–31)
MCHC RBC AUTO-ENTMCNC: 32.2 G/DL (ref 33–36)
MCV RBC AUTO: 100 FL (ref 80–94)
NRBC BLD AUTO-RTO: 0 %
PLATELET # BLD AUTO: 90 X10(3)/MCL (ref 130–400)
PMV BLD AUTO: 12.3 FL (ref 7.4–10.4)
POCT GLUCOSE: 147 MG/DL (ref 70–110)
POCT GLUCOSE: 149 MG/DL (ref 70–110)
POCT GLUCOSE: 160 MG/DL (ref 70–110)
POCT GLUCOSE: 161 MG/DL (ref 70–110)
POTASSIUM SERPL-SCNC: 4.4 MMOL/L (ref 3.5–5.1)
PROT SERPL-MCNC: 5.3 GM/DL (ref 5.8–7.6)
RBC # BLD AUTO: 3.88 X10(6)/MCL (ref 4.2–5.4)
SODIUM SERPL-SCNC: 134 MMOL/L (ref 136–145)
WBC # SPEC AUTO: 10.48 X10(3)/MCL (ref 4.5–11.5)

## 2023-09-22 PROCEDURE — 94761 N-INVAS EAR/PLS OXIMETRY MLT: CPT

## 2023-09-22 PROCEDURE — 63600175 PHARM REV CODE 636 W HCPCS: Performed by: PHYSICIAN ASSISTANT

## 2023-09-22 PROCEDURE — 85027 COMPLETE CBC AUTOMATED: CPT | Performed by: PHYSICIAN ASSISTANT

## 2023-09-22 PROCEDURE — 80053 COMPREHEN METABOLIC PANEL: CPT | Performed by: PHYSICIAN ASSISTANT

## 2023-09-22 PROCEDURE — 27000221 HC OXYGEN, UP TO 24 HOURS

## 2023-09-22 PROCEDURE — 99024 POSTOP FOLLOW-UP VISIT: CPT | Mod: POP,,, | Performed by: PHYSICIAN ASSISTANT

## 2023-09-22 PROCEDURE — 99024 PR POST-OP FOLLOW-UP VISIT: ICD-10-PCS | Mod: POP,,, | Performed by: PHYSICIAN ASSISTANT

## 2023-09-22 PROCEDURE — 63600175 PHARM REV CODE 636 W HCPCS: Performed by: SPECIALIST

## 2023-09-22 PROCEDURE — 21400001 HC TELEMETRY ROOM

## 2023-09-22 PROCEDURE — 25000003 PHARM REV CODE 250: Performed by: PHYSICIAN ASSISTANT

## 2023-09-22 PROCEDURE — 97166 OT EVAL MOD COMPLEX 45 MIN: CPT

## 2023-09-22 RX ORDER — ASPIRIN 81 MG/1
81 TABLET ORAL DAILY
Status: CANCELLED | OUTPATIENT
Start: 2023-09-22

## 2023-09-22 RX ORDER — POLYETHYLENE GLYCOL 3350 17 G/17G
17 POWDER, FOR SOLUTION ORAL DAILY PRN
Status: CANCELLED | OUTPATIENT
Start: 2023-09-22

## 2023-09-22 RX ORDER — ASPIRIN 81 MG/1
81 TABLET ORAL DAILY
Qty: 30 TABLET | Refills: 0 | Status: SHIPPED | OUTPATIENT
Start: 2023-09-23 | End: 2024-09-22

## 2023-09-22 RX ORDER — TIMOLOL MALEATE 5 MG/ML
1 SOLUTION/ DROPS OPHTHALMIC 2 TIMES DAILY
Status: DISCONTINUED | OUTPATIENT
Start: 2023-09-22 | End: 2023-09-29 | Stop reason: HOSPADM

## 2023-09-22 RX ORDER — METOPROLOL TARTRATE 25 MG/1
12.5 TABLET, FILM COATED ORAL 2 TIMES DAILY
Qty: 30 TABLET | Refills: 0 | Status: SHIPPED | OUTPATIENT
Start: 2023-09-22 | End: 2023-09-29 | Stop reason: HOSPADM

## 2023-09-22 RX ORDER — DOCUSATE SODIUM 100 MG/1
200 CAPSULE, LIQUID FILLED ORAL 2 TIMES DAILY
Status: CANCELLED | OUTPATIENT
Start: 2023-09-22

## 2023-09-22 RX ORDER — HYDROCODONE BITARTRATE AND ACETAMINOPHEN 5; 325 MG/1; MG/1
1 TABLET ORAL EVERY 6 HOURS PRN
Qty: 28 TABLET | Refills: 0 | Status: SHIPPED | OUTPATIENT
Start: 2023-09-22

## 2023-09-22 RX ORDER — MUPIROCIN 20 MG/G
OINTMENT TOPICAL 2 TIMES DAILY
Status: CANCELLED | OUTPATIENT
Start: 2023-09-22 | End: 2023-09-27

## 2023-09-22 RX ORDER — BISACODYL 10 MG
10 SUPPOSITORY, RECTAL RECTAL DAILY PRN
Status: CANCELLED | OUTPATIENT
Start: 2023-09-22

## 2023-09-22 RX ADMIN — SUCRALFATE 1 G: 1 TABLET ORAL at 06:09

## 2023-09-22 RX ADMIN — MUPIROCIN: 20 OINTMENT TOPICAL at 08:09

## 2023-09-22 RX ADMIN — METFORMIN HYDROCHLORIDE 1000 MG: 500 TABLET, FILM COATED ORAL at 08:09

## 2023-09-22 RX ADMIN — METOPROLOL TARTRATE 12.5 MG: 25 TABLET, FILM COATED ORAL at 08:09

## 2023-09-22 RX ADMIN — SUCRALFATE 1 G: 1 TABLET ORAL at 09:09

## 2023-09-22 RX ADMIN — LISINOPRIL 20 MG: 20 TABLET ORAL at 08:09

## 2023-09-22 RX ADMIN — MUPIROCIN: 20 OINTMENT TOPICAL at 09:09

## 2023-09-22 RX ADMIN — PANCRELIPASE 4 CAPSULE: 60000; 12000; 38000 CAPSULE, DELAYED RELEASE PELLETS ORAL at 05:09

## 2023-09-22 RX ADMIN — FOLIC ACID 1 MG: 1 TABLET ORAL at 08:09

## 2023-09-22 RX ADMIN — PANCRELIPASE 4 CAPSULE: 60000; 12000; 38000 CAPSULE, DELAYED RELEASE PELLETS ORAL at 08:09

## 2023-09-22 RX ADMIN — METFORMIN HYDROCHLORIDE 1000 MG: 500 TABLET, FILM COATED ORAL at 05:09

## 2023-09-22 RX ADMIN — INSULIN ASPART 2 UNITS: 100 INJECTION, SOLUTION INTRAVENOUS; SUBCUTANEOUS at 11:09

## 2023-09-22 RX ADMIN — METOPROLOL TARTRATE 12.5 MG: 25 TABLET, FILM COATED ORAL at 09:09

## 2023-09-22 RX ADMIN — MORPHINE SULFATE 4 MG: 4 INJECTION INTRAVENOUS at 12:09

## 2023-09-22 RX ADMIN — DOCUSATE SODIUM 100 MG: 100 CAPSULE, LIQUID FILLED ORAL at 08:09

## 2023-09-22 RX ADMIN — GLIMEPIRIDE 4 MG: 1 TABLET ORAL at 08:09

## 2023-09-22 RX ADMIN — ONDANSETRON 4 MG: 2 INJECTION INTRAMUSCULAR; INTRAVENOUS at 12:09

## 2023-09-22 RX ADMIN — GLIMEPIRIDE 4 MG: 1 TABLET ORAL at 09:09

## 2023-09-22 RX ADMIN — ONDANSETRON 4 MG: 2 INJECTION INTRAMUSCULAR; INTRAVENOUS at 06:09

## 2023-09-22 RX ADMIN — ASPIRIN 81 MG: 81 TABLET, COATED ORAL at 08:09

## 2023-09-22 RX ADMIN — SUCRALFATE 1 G: 1 TABLET ORAL at 05:09

## 2023-09-22 RX ADMIN — PANTOPRAZOLE SODIUM 40 MG: 40 TABLET, DELAYED RELEASE ORAL at 08:09

## 2023-09-22 RX ADMIN — PANCRELIPASE 4 CAPSULE: 60000; 12000; 38000 CAPSULE, DELAYED RELEASE PELLETS ORAL at 11:09

## 2023-09-22 RX ADMIN — FAMOTIDINE 40 MG: 20 TABLET, FILM COATED ORAL at 09:09

## 2023-09-22 RX ADMIN — SUCRALFATE 1 G: 1 TABLET ORAL at 11:09

## 2023-09-22 RX ADMIN — ENOXAPARIN SODIUM 40 MG: 40 INJECTION SUBCUTANEOUS at 05:09

## 2023-09-22 RX ADMIN — DOCUSATE SODIUM 100 MG: 100 CAPSULE, LIQUID FILLED ORAL at 09:09

## 2023-09-22 NOTE — PT/OT/SLP PROGRESS
Attempted to see her for eval but she was in too much sternal pain and appeared pretty anxious. Will try back tomorrow in am

## 2023-09-22 NOTE — PROGRESS NOTES
[facility] - Periop Services  Pulmonary Critical Care Note    Patient Name: Kortney Leach  MRN: [id number]  Admission Date: 9/20/2023  Hospital Length of Stay: 2 days  Code Status: Full Code  Attending Provider: Steven Rosales IV, MD  Primary Care Provider: Arnaldo Hernandez MD     Subjective:     HPI:   This is a 77-year-old female who is a patient of [facility] referred to CV surgery after she had complaints initially of profound fatigue and weakness with shortness of breath; outpatient workup revealed moderate mitral stenosis with a 6 mm gradient and mild MR per echo. She then underwent left heart catheterization which revealed normal coronary arteries.  Admitted to [facility] on 09/20/2023 underwent Mitral valve replacement with a 25 mm mosaic porcine valve; ligation of left atrial appendage with Endo stapler.  Postprocedure patient returned to the ICU with right-sided [place].  She was transfused with Cell Saver proximally 1 L.     Hospital Course/Significant events:      24 Hour Interval History:  POD 2 of MVR. No acute events overnight. Vitals stable, satting 93-95% on 3L NC. Paced to HR 60. Resting comfortably in bed. No complaints.     Past Medical History:   Diagnosis Date    Anticoagulant long-term use     Asthma     Chronic sinusitis, unspecified     COPD (chronic obstructive pulmonary disease)     CVID (common variable immunodeficiency)     Diabetes mellitus, type 2     GERD (gastroesophageal reflux disease)     Hypertension     Severe mitral valve regurgitation     Sleep apnea     uses CPAP    SOB (shortness of breath)     Unspecified glaucoma     Weakness        Past Surgical History:   Procedure Laterality Date    APPENDECTOMY      CATARACT EXTRACTION Bilateral     COLONOSCOPY      EGD, WITH BALLOON DILATION      ESOPHAGOGASTRODUODENOSCOPY      LEFT HEART CATHETERIZATION Left 09/06/2023    Procedure: Left heart cath;  Surgeon: Vijay Uribe MD;  Location: [facility] CATH LAB;  Service:  Cardiology;  Laterality: Left;  [facility]    MITRAL VALVE REPLACEMENT N/A 2023    Procedure: REPLACEMENT, MITRAL VALVE;  Surgeon: Steven Rosales IV, MD;  Location: [facility];  Service: Cardiovascular;  Laterality: N/A;    TONSILLECTOMY      TOTAL KNEE ARTHROPLASTY Bilateral        Social History     Socioeconomic History    Marital status: Single   Tobacco Use    Smoking status: Former     Current packs/day: 0.00     Types: Cigarettes     Quit date:      Years since quittin.7    Smokeless tobacco: Never   Substance and Sexual Activity    Alcohol use: Not Currently    Drug use: Not Currently    Sexual activity: Not Currently       Current Outpatient Medications   Medication Instructions    azelastine (ASTELIN) 137 mcg (0.1 %) nasal spray 2 sprays, Nasal, 2 times daily    benazepriL (LOTENSIN) 20 mg, Oral, Daily    dexlansoprazole (DEXILANT) 60 mg capsule Oral, Daily    DUPIXENT SYRINGE 300 mg, Subcutaneous, Every 14 days    famotidine (PEPCID) 40 mg, Oral, Nightly    FARXIGA 10 mg, Oral, Daily    fluticasone-umeclidin-vilanter (TRELEGY ELLIPTA) 200-62.5-25 mcg inhaler 1 puff, Inhalation, Daily    glimepiride (AMARYL) 4 mg, Oral, 2 times daily    immun glob G,IgG,/pro/IgA 0-50 (HIZENTRA SUBQ) Subcutaneous, Every 14 days    lipase-protease-amylase 24,000-76,000-120,000 units (CREON) 24,000-76,000 -120,000 unit capsule 1-2 capsules, Oral, 3 times daily with meals, 2 caps with meals and 1 cap c snacks    metFORMIN (GLUCOPHAGE) 1,000 mg, Oral, 2 times daily with meals    metoprolol succinate (TOPROL-XL) 25 mg, Oral, Daily    rivaroxaban (XARELTO) 20 mg, Oral, With dinner    timoloL (BETIMOL) 0.5 % ophthalmic solution 1 drop, Both Eyes, 2 times daily       Current Inpatient Medications   aspirin  81 mg Oral Daily    azelastine  2 spray Nasal BID    docusate sodium  100 mg Oral BID    enoxparin  40 mg Subcutaneous Daily    famotidine  40 mg Oral QHS    fluticasone-umeclidin-vilanter  1 puff Inhalation Daily     folic acid  1 mg Oral Daily    glimepiride  4 mg Oral BID    lipase-protease-amylase 12,000-38,000-60,000 units  4 capsule Oral TID WM    lisinopriL  20 mg Oral Daily    metFORMIN  1,000 mg Oral BID WM    metoprolol tartrate  12.5 mg Oral BID    mupirocin   Nasal BID    pantoprazole  40 mg Oral Daily    sucralfate  1 g Oral QID (AC & HS)    timoloL  1 drop Both Eyes BID       Current Intravenous Infusions   dexmedeTOMIDine (Precedex) infusion (titrating)      dextrose 5 % and 0.45 % NaCl Stopped (09/21/23 1029)    EPINEPHrine Stopped (09/20/23 2104)    insulin regular 1 units/mL infusion orderable (CTS POST-OP) Stopped (09/21/23 1028)    loperamide       Review of Systems   Constitutional:  Negative for chills and fever.   Eyes:  Negative for blurred vision.   Respiratory:  Negative for shortness of breath.    Cardiovascular:  Negative for chest pain, palpitations and leg swelling.   Gastrointestinal:  Negative for nausea and vomiting.   Neurological:  Negative for tingling, weakness and headaches.      Objective:       Intake/Output Summary (Last 24 hours) at 9/22/2023 0420  Last data filed at 9/22/2023 0000  Gross per 24 hour   Intake 746.1 ml   Output 1030 ml   Net -283.9 ml         Vital Signs (Most Recent):  Temp: 98.7 °F (37.1 °C) (09/22/23 0000)  Pulse: 60 (09/22/23 0100)  Resp: 16 (09/22/23 0100)  BP: (!) 116/53 (09/22/23 0100)  SpO2: (!) 93 % (09/22/23 0100)  Body mass index is 36.51 kg/m².  Weight: 102.6 kg (226 lb 3.1 oz) Vital Signs (24h Range):  Temp:  [98.5 °F (36.9 °C)-98.7 °F (37.1 °C)] 98.7 °F (37.1 °C)  Pulse:  [60-83] 60  Resp:  [12-28] 16  SpO2:  [92 %-96 %] 93 %  BP: ()/(47-74) 116/53  Arterial Line BP: (104-114)/(46-49) 111/46     Physical Exam  Constitutional:       Appearance: She is obese.   HENT:      Head: Normocephalic and atraumatic.   Cardiovascular:      Rate and Rhythm: Regular rhythm.      Heart sounds: Normal heart sounds.      Comments: Chest noted to suction; midline  dressing/wound vac c/d/i  Pulmonary:      Effort: Pulmonary effort is normal.      Breath sounds: Normal breath sounds.   Abdominal:      General: Bowel sounds are normal.      Palpations: Abdomen is soft.   Skin:     General: Skin is warm and dry.   Neurological:      General: No focal deficit present.      Mental Status: She is alert and oriented to person, place, and time.           Lines/Drains/Airways       Drain  Duration                  Chest Tube 09/20/23 1006 Tube - 1 Other (Comment) Mediastinal 1 day         Urethral Catheter 09/20/23 0725 Silicone 1 day              Line  Duration                  Pacer Wires 09/20/23 1930 1 day              Peripheral Intravenous Line  Duration                  Peripheral IV - Single Lumen 09/21/23 1930 20 G Posterior;Right Forearm <1 day                    Significant Labs:    Lab Results   Component Value Date    WBC 10.48 09/22/2023    HGB 12.5 09/22/2023    HCT 38.8 09/22/2023    .0 (H) 09/22/2023    PLT 90 (L) 09/22/2023       BMP  Lab Results   Component Value Date     (L) 09/22/2023    K 4.4 09/22/2023    CHLORIDE 104 09/22/2023    CO2 18 (L) 09/22/2023    BUN 16.3 09/22/2023    CREATININE 1.04 (H) 09/22/2023    CALCIUM 8.0 (L) 09/22/2023    AGAP 9.0 09/01/2023       ABG  Recent Labs   Lab 09/20/23  1342   PH 7.350   PO2 88.0   PCO2 42.0   HCO3 23.2   POCBASEDEF -2.40         Significant Imaging:  X-Ray Chest AP Portable  Narrative: EXAMINATION:  XR CHEST AP PORTABLE    CLINICAL HISTORY:  Post-op;    COMPARISON:  Yesterday    FINDINGS:  Portable frontal view of the chest was obtained. Interval extubation.  Pulmonary artery catheter and mediastinal drain remain.  The heart is not significantly enlarged.  There is aortic atherosclerosis.  Improved aeration of the lungs with some mild bibasilar atelectasis.  No pneumothorax.  Impression: Interval extubation.  Mild bibasilar atelectasis.    Electronically signed by: Jason  Darryl  Date:    09/21/2023  Time:    08:06      Assessment/Plan:     Assessment   moderate mitral stenosis with a 6 mm gradient and mild MR per echo s/p Mitral valve replacement with a 25 mm mosaic porcine valve; ligation of left atrial appendage with Endo stapler.   Mild thrombocytopenia   History of HTN, GERD, COPD?, obesity    Plan  Continue CABG protocol   Insulin gtts per protocol   Can likely downgrade to a floor bed, if CV clears.      Anya Fleming MD  Pulmonary Critical Care Medicine  [facility]

## 2023-09-22 NOTE — ANESTHESIA POSTPROCEDURE EVALUATION
Anesthesia Post Evaluation    Patient: Kortney Leach    Procedure(s) Performed: Procedure(s) (LRB):  REPLACEMENT, MITRAL VALVE (N/A)    Final Anesthesia Type: general      Patient location during evaluation: PACU  Patient participation: Yes- Able to Participate  Level of consciousness: awake and alert  Post-procedure vital signs: reviewed and stable  Pain management: adequate  Airway patency: patent      Anesthetic complications: no      Cardiovascular status: hemodynamically stable  Respiratory status: unassisted  Hydration status: euvolemic  Follow-up not needed.          Vitals Value Taken Time   /55 09/22/23 0715   Temp 36.7 °C (98.1 °F) 09/22/23 0730   Pulse 62 09/22/23 0756   Resp 20 09/22/23 0756   SpO2 97 % 09/22/23 0756   Vitals shown include unvalidated device data.      No case tracking events are documented in the log.      Pain/Gaudencio Score: Pain Rating Prior to Med Admin: 7 (9/21/2023 11:45 PM)  Pain Rating Post Med Admin: 0 (9/22/2023 12:15 AM)

## 2023-09-22 NOTE — PROGRESS NOTES
CT SURGERY PROGRESS NOTE  Kortney Leach  77 y.o.  1945    Patients Procedure: Procedure(s) (LRB):  REPLACEMENT, MITRAL VALVE (N/A)    Subjective  Interval History: POD 2    ROS    Medication List  Infusions   dexmedeTOMIDine (Precedex) infusion (titrating)      dextrose 5 % and 0.45 % NaCl Stopped (09/21/23 1029)    EPINEPHrine Stopped (09/20/23 2104)    insulin regular 1 units/mL infusion orderable (CTS POST-OP) Stopped (09/21/23 1028)    loperamide       Scheduled   aspirin  81 mg Oral Daily    azelastine  2 spray Nasal BID    docusate sodium  100 mg Oral BID    enoxparin  40 mg Subcutaneous Daily    famotidine  40 mg Oral QHS    fluticasone-umeclidin-vilanter  1 puff Inhalation Daily    folic acid  1 mg Oral Daily    glimepiride  4 mg Oral BID    lipase-protease-amylase 12,000-38,000-60,000 units  4 capsule Oral TID WM    lisinopriL  20 mg Oral Daily    metFORMIN  1,000 mg Oral BID WM    metoprolol tartrate  12.5 mg Oral BID    mupirocin   Nasal BID    pantoprazole  40 mg Oral Daily    sucralfate  1 g Oral QID (AC & HS)    timoloL  1 drop Both Eyes BID       Objective:  Recent Vitals:  Temp:  [97.8 °F (36.6 °C)-98.7 °F (37.1 °C)] 98.1 °F (36.7 °C)  Pulse:  [60-80] 61  Resp:  [12-29] 29  SpO2:  [93 %-98 %] 96 %  BP: ()/(47-74) 146/55    Physical Exam     I/O last 24 hrs:  Intake/Output - Last 3 Shifts         09/20 0700  09/21 0659 09/21 0700  09/22 0659 09/22 0700  09/23 0659    P.O.  420     I.V. (mL/kg) 3723.4 (36.3) 372.7 (3.6)     Blood 998      IV Piggyback 492.9      Total Intake(mL/kg) 5214.3 (50.8) 792.7 (7.7)     Urine (mL/kg/hr) 1840 (0.7) 1075 (0.4)     Chest Tube 350 120     Total Output 2190 1195     Net +3024.3 -402.4                    Labs  ABGs:   Recent Labs   Lab 09/20/23  1041 09/20/23  1153 09/20/23  1342   PH 7.356   < > 7.350   PCO2 38.7   < > 42.0   PO2 119*   < > 88.0   HCO3 21.7   < > 23.2   POCSATURATED 98  --   --    BE -4  --   --     < > = values in this interval  not displayed.     BMP:   Recent Labs   Lab 09/22/23 0153   *   K 4.4   CO2 18*   BUN 16.3   CREATININE 1.04*   CALCIUM 8.0*     CBC:   Recent Labs   Lab 09/22/23 0153   WBC 10.48   RBC 3.88*   HGB 12.5   HCT 38.8   PLT 90*   .0*   MCH 32.2*   MCHC 32.2*     CMP:   Recent Labs   Lab 09/22/23 0153   CALCIUM 8.0*   ALBUMIN 3.2*   *   K 4.4   CO2 18*   BUN 16.3   CREATININE 1.04*   ALKPHOS 40   ALT 23   AST 52*   BILITOT 1.3     Coagulation:   Recent Labs   Lab 09/20/23  1030   INR 1.4*         Imaging:   CXR: X-Ray Chest AP Portable    Result Date: 9/22/2023  No acute pulmonary process identified. Electronically signed by: Albino Cadet Date:    09/22/2023 Time:    06:16         ASSESSMENT/PLAN:    Transfer to telem   Mobilize   DC drains   DC planning     Case and plan of care discussed with MD Sebastian Jones PA-C

## 2023-09-22 NOTE — PT/OT/SLP EVAL
"Occupational Therapy  Evaluation    Name: Kortney Leach  MRN: [id number]    Recommendations:     Discharge Recommendations: rehabilitation facility  Discharge Equipment Recommendations:  to be determined by next level of care  Barriers to discharge:   (severity of deficits; medical complexity)    Assessment:     Kortney Leach is a 77 y.o. female with a medical diagnosis of mitral stenosis s/p mitral valve replacement, mild thrombocytopenia.  She presents with the following performance deficits affecting function: weakness, impaired endurance, impaired balance, gait instability, sternal precautions, impaired self care skills. Pt currently requires Min-Mod A for functional mobility, with Max A for LB self care tasks. Upon discharge, this patient would benefit from rehab to increase her IND and decrease caregiver burden.    Rehab Prognosis: Good; patient would benefit from acute skilled OT services to address these deficits and reach maximum level of function.       Plan:     Patient to be seen 5 x/week to address the above listed problems via self-care/home management, therapeutic activities, therapeutic exercises  Plan of Care Expires: 10/22/23  Plan of Care Reviewed with: patient    Subjective     Chief Complaint: "I'm pretty tired"  Patient/Family Comments/goals: to go to rehab    Occupational Profile:  Living Environment: lives with dtr in [facility], no LIU; tub/shower combo  Previous level of function: IND in all ADLs/IADLs  Roles and Routines: mother  Equipment Used at Home: cane, straight, bath bench, walker, rolling, bedside commode (has all but does not use)  Assistance upon Discharge: daughter    Pain/Comfort:  Pain Rating 1: 0/10    Patients cultural, spiritual, Rastafarian conflicts given the current situation: no    Objective:     OT communicated with NSG prior to session.      Patient was found up in chair with blood pressure cuff, oxygen, peripheral IV, pulse ox (continuous), telemetry, wound vac " upon OT entry to room.    General Precautions: Standard, sternal, fall  Orthopedic Precautions:    Braces:    Nasal Cannula 2L O2  Vital Signs:     Prior to ax: 90/73 58 bpm 97%  Endin/59 59 bpm 97%    Functional Mobility/Transfers:  Patient completed Sit <> Stand Transfer with moderate assistance  with  hand-held assist   Functional Mobility: Pt taking 4 FWD, BWD, lateral steps with Min-Mod A for balance using HHA    Activities of Daily Living:  Lower Body Dressing: total assistance to doff/don socks    Functional Cognition:  Intact  Affect: Lethargic    Visual Perceptual Skills:  Intact    Upper Extremity Function:  Bilateral Upper Extremities:   WFL; within sternal precautions    Therapeutic Positioning  Risk for acquired pressure injuries is increased due to relative decrease in mobility d/t hospitalization , impaired mobility, and h/o diabetes.    OT interventions performed during the course of today's session:   Education was provided on benefits of and recommendations for therapeutic positioning  Therapeutic positioning was provided at the conclusion of session to offload all bony prominences for the prevention and/or reduction of pressure injuries    Skin assessment: full body skin assessment was performed    Findings: known area of altered skin integrity at surgical site    OT recommendations for therapeutic positioning throughout hospitalization:   Follow [facility] Pressure Injury Prevention Protocol  Geomat recommended for sacral protection while [facility]      Patient Education:  Patient provided with verbal education education regarding OT role/goals/POC, post op precautions, fall prevention, safety awareness, Discharge/DME recommendations, and pressure ulcer prevention.  Understanding was verbalized.     Patient left up in chair with all lines intact, call button in reach, and NSG notified    GOALS:   Multidisciplinary Problems       Occupational Therapy Goals          Problem: Occupational Therapy    Goal  Priority Disciplines Outcome Interventions   Occupational Therapy Goal     OT, PT/OT Ongoing, Progressing    Description: LTG: Pt will perform basic ADLs and ADL transfers with Modified independence and good compliance to sternal precautions using LRAD by discharge.    STG: to be met in 2 weeks    Pt will complete grooming standing at sink with LRAD with SBA.  Pt will complete UB dressing with SBA.  Pt will complete LB dressing with SBA using LRAD and AE prn.  Pt will complete toileting with SBA using LRAD.  Pt will complete functional mobility to/from toilet and toilet transfer with SBA using LRAD.   Pt will independently verbalize sternal precautions with >90% accuracy.                         History:     Past Medical History:   Diagnosis Date    Anticoagulant long-term use     Asthma     Chronic sinusitis, unspecified     COPD (chronic obstructive pulmonary disease)     CVID (common variable immunodeficiency)     Diabetes mellitus, type 2     GERD (gastroesophageal reflux disease)     Hypertension     Severe mitral valve regurgitation     Sleep apnea     uses CPAP    SOB (shortness of breath)     Unspecified glaucoma     Weakness          Past Surgical History:   Procedure Laterality Date    APPENDECTOMY      CATARACT EXTRACTION Bilateral     COLONOSCOPY      EGD, WITH BALLOON DILATION      ESOPHAGOGASTRODUODENOSCOPY      LEFT HEART CATHETERIZATION Left 09/06/2023    Procedure: Left heart cath;  Surgeon: Vijay Uribe MD;  Location: [facility] CATH LAB;  Service: Cardiology;  Laterality: Left;  [facility]    MITRAL VALVE REPLACEMENT N/A 9/20/2023    Procedure: REPLACEMENT, MITRAL VALVE;  Surgeon: Steven Rosales IV, MD;  Location: [facility] OR;  Service: Cardiovascular;  Laterality: N/A;    TONSILLECTOMY      TOTAL KNEE ARTHROPLASTY Bilateral        Time Tracking:     OT Date of Treatment:    OT Start Time: 0952  OT Stop Time: 1009  OT Total Time (min): 17 min    Billable Minutes:Evaluation mod    9/22/2023

## 2023-09-22 NOTE — NURSING TRANSFER
Nursing Transfer Note      9/22/2023   10:49 AM    Nurse giving handoff:Quinten Moreau RN    Nurse receiving handoff:    Reason patient is being transferred: stepdown    Transfer To: room 636    Transfer via wheelchair    Transfer with 2L to O2, cardiac monitoring    Transported by RN x2    Transfer Vital Signs:  Blood Pressure:125/69  Heart Rate:59  O2:97  Temperature:98.1  Respirations:20    Telemetry: Rate 59 and Rhythm SB  Order for Tele Monitor? Yes    Additional Lines: Oxygen    4eyes on Skin: yes    Medicines sent: yes    Any special needs or follow-up needed: no    Patient belongings transferred with patient: Yes    Chart send with patient: Yes    Notified: daughter    Patient reassessed at: 09/22 @1042 (date, time)    Upon arrival to floor: cardiac monitor applied, patient oriented to room, call bell in reach, and bed in lowest position. NAD noted. Receiving nurse at bedside to resume care.

## 2023-09-22 NOTE — PLAN OF CARE
Problem: Occupational Therapy  Goal: Occupational Therapy Goal  Description: LTG: Pt will perform basic ADLs and ADL transfers with Modified independence and good compliance to sternal precautions using LRAD by discharge.    STG: to be met in 2 weeks    Pt will complete grooming standing at sink with LRAD with SBA.  Pt will complete UB dressing with SBA.  Pt will complete LB dressing with SBA using LRAD and AE prn.  Pt will complete toileting with SBA using LRAD.  Pt will complete functional mobility to/from toilet and toilet transfer with SBA using LRAD.   Pt will independently verbalize sternal precautions with >90% accuracy.    Outcome: Ongoing, Progressing

## 2023-09-22 NOTE — PLAN OF CARE
Patient will be set up with [employer].     09/22/23 1002   Discharge Assessment   Assessment Type Discharge Planning Assessment   Confirmed/corrected address, phone number and insurance Yes   Confirmed Demographics Correct on Facesheet   Source of Information patient   Does patient/caregiver understand observation status Yes   Communicated SD with patient/caregiver Yes;Date not available/Unable to determine   People in Home child(min), adult   Do you expect to return to your current living situation? Yes   Prior to hospitilization cognitive status: Alert/Oriented   Current cognitive status: Alert/Oriented   Equipment Currently Used at Home CPAP   Readmission within 30 days? No   Do you currently have service(s) that help you manage your care at home? No   Do you take prescription medications? Yes   Do you have prescription coverage? Yes   Coverage Medicare Part A & B and [facility]   Do you have any problems affording any of your prescribed medications? No   Who is going to help you get home at discharge? Family can take home.   How do you get to doctors appointments? car, drives self;family or friend will provide   Are you on dialysis? No   Discharge Plan discussed with: Patient   Discharge Plan A Home Health   Discharge Plan B   (To be determined)        Pt called to follow up about her veinous doppler @ [facility] appointment tomorrow 08/18/21 at 1 to verify the appointment tile and was told they don't have that order. I see the order in there and need to find a away to get it to them if they can't see it in epic. Please advise pt that they have it before her appointment.   Jan- [phone]

## 2023-09-23 LAB
ABO + RH BLD: NORMAL
ALBUMIN SERPL-MCNC: 3.2 G/DL (ref 3.4–4.8)
ALBUMIN/GLOB SERPL: 1.1 RATIO (ref 1.1–2)
ALP SERPL-CCNC: 48 UNIT/L (ref 40–150)
ALT SERPL-CCNC: 21 UNIT/L (ref 0–55)
AST SERPL-CCNC: 36 UNIT/L (ref 5–34)
BILIRUB SERPL-MCNC: 0.9 MG/DL
BLD PROD TYP BPU: NORMAL
BLOOD UNIT EXPIRATION DATE: NORMAL
BLOOD UNIT TYPE CODE: 600
BSA FOR ECHO PROCEDURE: 2.2 M2
BUN SERPL-MCNC: 33.4 MG/DL (ref 9.8–20.1)
CALCIUM SERPL-MCNC: 7.9 MG/DL (ref 8.4–10.2)
CHLORIDE SERPL-SCNC: 103 MMOL/L (ref 98–107)
CO2 SERPL-SCNC: 15 MMOL/L (ref 23–31)
CREAT SERPL-MCNC: 1.45 MG/DL (ref 0.55–1.02)
CROSSMATCH INTERPRETATION: NORMAL
CV ECHO LV RWT: 0.69 CM
DISPENSE STATUS: NORMAL
DOP CALC MV VTI: 90.1 CM
ECHO LV POSTERIOR WALL: 1.43 CM (ref 0.6–1.1)
ERYTHROCYTE [DISTWIDTH] IN BLOOD BY AUTOMATED COUNT: 14.7 % (ref 11.5–17)
FRACTIONAL SHORTENING: 44 % (ref 28–44)
GFR SERPLBLD CREATININE-BSD FMLA CKD-EPI: 37 MLS/MIN/1.73/M2
GLOBULIN SER-MCNC: 3 GM/DL (ref 2.4–3.5)
GLUCOSE SERPL-MCNC: 133 MG/DL (ref 82–115)
HCT VFR BLD AUTO: 39.1 % (ref 37–47)
HGB BLD-MCNC: 13 G/DL (ref 12–16)
INTERVENTRICULAR SEPTUM: 1.42 CM (ref 0.6–1.1)
LEFT INTERNAL DIMENSION IN SYSTOLE: 2.3 CM (ref 2.1–4)
LEFT VENTRICLE DIASTOLIC VOLUME INDEX: 35.42 ML/M2
LEFT VENTRICLE DIASTOLIC VOLUME: 75.1 ML
LEFT VENTRICLE MASS INDEX: 106 G/M2
LEFT VENTRICLE SYSTOLIC VOLUME INDEX: 8.5 ML/M2
LEFT VENTRICLE SYSTOLIC VOLUME: 18.1 ML
LEFT VENTRICULAR INTERNAL DIMENSION IN DIASTOLE: 4.12 CM (ref 3.5–6)
LEFT VENTRICULAR MASS: 224.14 G
MCH RBC QN AUTO: 32.5 PG (ref 27–31)
MCHC RBC AUTO-ENTMCNC: 33.2 G/DL (ref 33–36)
MCV RBC AUTO: 97.8 FL (ref 80–94)
MV MEAN GRADIENT: 6 MMHG
MV PEAK GRADIENT: 20 MMHG
NRBC BLD AUTO-RTO: 0 %
OHS LV EJECTION FRACTION SIMPSONS BIPLANE MOD: 56 %
PLATELET # BLD AUTO: 132 X10(3)/MCL (ref 130–400)
PMV BLD AUTO: 11.6 FL (ref 7.4–10.4)
POTASSIUM SERPL-SCNC: 5.6 MMOL/L (ref 3.5–5.1)
PROT SERPL-MCNC: 6.2 GM/DL (ref 5.8–7.6)
RBC # BLD AUTO: 4 X10(6)/MCL (ref 4.2–5.4)
SODIUM SERPL-SCNC: 131 MMOL/L (ref 136–145)
UNIT NUMBER: NORMAL
WBC # SPEC AUTO: 10.27 X10(3)/MCL (ref 4.5–11.5)
Z-SCORE OF LEFT VENTRICULAR DIMENSION IN END DIASTOLE: -4.86
Z-SCORE OF LEFT VENTRICULAR DIMENSION IN END SYSTOLE: -4.55

## 2023-09-23 PROCEDURE — 99024 POSTOP FOLLOW-UP VISIT: CPT | Mod: POP,,, | Performed by: PHYSICIAN ASSISTANT

## 2023-09-23 PROCEDURE — 93010 EKG 12-LEAD: ICD-10-PCS | Mod: 76,,, | Performed by: INTERNAL MEDICINE

## 2023-09-23 PROCEDURE — 21400001 HC TELEMETRY ROOM

## 2023-09-23 PROCEDURE — 99024 PR POST-OP FOLLOW-UP VISIT: ICD-10-PCS | Mod: POP,,, | Performed by: PHYSICIAN ASSISTANT

## 2023-09-23 PROCEDURE — 25000003 PHARM REV CODE 250: Performed by: SPECIALIST

## 2023-09-23 PROCEDURE — 63600150 PHARM REV CODE 636: Performed by: NURSE PRACTITIONER

## 2023-09-23 PROCEDURE — 25000003 PHARM REV CODE 250: Performed by: PHYSICIAN ASSISTANT

## 2023-09-23 PROCEDURE — 85027 COMPLETE CBC AUTOMATED: CPT | Performed by: PHYSICIAN ASSISTANT

## 2023-09-23 PROCEDURE — 63600175 PHARM REV CODE 636 W HCPCS: Performed by: PHYSICIAN ASSISTANT

## 2023-09-23 PROCEDURE — 93010 ELECTROCARDIOGRAM REPORT: CPT | Mod: 76,,, | Performed by: INTERNAL MEDICINE

## 2023-09-23 PROCEDURE — 93005 ELECTROCARDIOGRAM TRACING: CPT

## 2023-09-23 PROCEDURE — 25000242 PHARM REV CODE 250 ALT 637 W/ HCPCS: Performed by: NURSE PRACTITIONER

## 2023-09-23 PROCEDURE — 80053 COMPREHEN METABOLIC PANEL: CPT | Performed by: PHYSICIAN ASSISTANT

## 2023-09-23 RX ORDER — SODIUM CHLORIDE 9 MG/ML
INJECTION, SOLUTION INTRAVENOUS ONCE
Status: COMPLETED | OUTPATIENT
Start: 2023-09-23 | End: 2023-09-23

## 2023-09-23 RX ORDER — FLUTICASONE FUROATE AND VILANTEROL 200; 25 UG/1; UG/1
1 POWDER RESPIRATORY (INHALATION) DAILY
Status: DISCONTINUED | OUTPATIENT
Start: 2023-09-23 | End: 2023-09-29 | Stop reason: HOSPADM

## 2023-09-23 RX ORDER — SULFAMETHOXAZOLE AND TRIMETHOPRIM 800; 160 MG/1; MG/1
1 TABLET ORAL 2 TIMES DAILY
Status: DISCONTINUED | OUTPATIENT
Start: 2023-09-23 | End: 2023-09-28

## 2023-09-23 RX ORDER — DOPAMINE HYDROCHLORIDE 320 MG/100ML
5 INJECTION, SOLUTION INTRAVENOUS CONTINUOUS
Status: DISCONTINUED | OUTPATIENT
Start: 2023-09-23 | End: 2023-09-29 | Stop reason: HOSPADM

## 2023-09-23 RX ORDER — ATORVASTATIN CALCIUM 40 MG/1
40 TABLET, FILM COATED ORAL DAILY
Status: DISCONTINUED | OUTPATIENT
Start: 2023-09-23 | End: 2023-09-29 | Stop reason: HOSPADM

## 2023-09-23 RX ORDER — FAMOTIDINE 20 MG/1
20 TABLET, FILM COATED ORAL NIGHTLY
Status: DISCONTINUED | OUTPATIENT
Start: 2023-09-23 | End: 2023-09-29 | Stop reason: HOSPADM

## 2023-09-23 RX ADMIN — DOPAMINE HYDROCHLORIDE IN DEXTROSE 5 MCG/KG/MIN: 3.2 INJECTION, SOLUTION INTRAVENOUS at 08:09

## 2023-09-23 RX ADMIN — FAMOTIDINE 20 MG: 20 TABLET, FILM COATED ORAL at 08:09

## 2023-09-23 RX ADMIN — FLUTICASONE FUROATE AND VILANTEROL TRIFENATATE 1 PUFF: 200; 25 POWDER RESPIRATORY (INHALATION) at 06:09

## 2023-09-23 RX ADMIN — SODIUM CHLORIDE: 9 INJECTION, SOLUTION INTRAVENOUS at 05:09

## 2023-09-23 RX ADMIN — TIMOLOL MALEATE 1 DROP: 5 SOLUTION/ DROPS OPHTHALMIC at 08:09

## 2023-09-23 RX ADMIN — UMECLIDINIUM 62.5 MCG: 62.5 AEROSOL, POWDER ORAL at 06:09

## 2023-09-23 RX ADMIN — SULFAMETHOXAZOLE AND TRIMETHOPRIM 1 TABLET: 800; 160 TABLET ORAL at 08:09

## 2023-09-23 RX ADMIN — SUCRALFATE 1 G: 1 TABLET ORAL at 08:09

## 2023-09-23 RX ADMIN — DOCUSATE SODIUM 100 MG: 100 CAPSULE, LIQUID FILLED ORAL at 08:09

## 2023-09-23 RX ADMIN — MUPIROCIN: 20 OINTMENT TOPICAL at 08:09

## 2023-09-23 RX ADMIN — ENOXAPARIN SODIUM 40 MG: 40 INJECTION SUBCUTANEOUS at 05:09

## 2023-09-23 RX ADMIN — ONDANSETRON 4 MG: 2 INJECTION INTRAMUSCULAR; INTRAVENOUS at 08:09

## 2023-09-23 NOTE — PT/OT/SLP PROGRESS
Talked to pt's nurse and she is currently too bradycardic to work with PT. Will check back later or will attempt to see her again in the am tomorrow

## 2023-09-23 NOTE — PROGRESS NOTES
Pharmacist Renal Dose Adjustment Note    Kortney Leach is a 77 y.o. female being treated with the medication famotidine.    Patient Data:    Vital Signs (Most Recent):  Temp: 98.1 °F (36.7 °C) (09/23/23 1149)  Pulse: (!) 48 (09/23/23 1149)  Resp: (!) 22 (09/23/23 1149)  BP: 123/67 (09/23/23 1149)  SpO2: (!) 92 % (09/23/23 1149) Vital Signs (72h Range):  Temp:  [97.5 °F (36.4 °C)-98.7 °F (37.1 °C)]   Pulse:  [45-87]   Resp:  [12-31]   BP: ()/(47-82)   SpO2:  [88 %-100 %]   Arterial Line BP: ()/(38-52)      Recent Labs   Lab 09/21/23  0119 09/22/23  0153 09/23/23  1046   CREATININE 0.75 1.04* 1.45*     Serum creatinine: 1.45 mg/dL (H) 09/23/23 1046  Estimated creatinine clearance: 38.9 mL/min (A)    Medication:Famotidine 40 mg QD will be changed to famotidine 20 mg per [facility] Renal Dosing protocol.     Pharmacist's Name: Darren Barrios, PharmD

## 2023-09-23 NOTE — PROGRESS NOTES
CT SURGERY PROGRESS NOTE  Kortney Leach  77 y.o.  1945    Patients Procedure: Procedure(s) (LRB):  REPLACEMENT, MITRAL VALVE (N/A)    Subjective  Interval History: POD 3    ROS    Medication List  Infusions   dextrose 5 % and 0.45 % NaCl Stopped (09/21/23 1029)    DOPamine 5 mcg/kg/min (09/23/23 0854)    insulin regular 1 units/mL infusion orderable (CTS POST-OP) Stopped (09/21/23 1028)    loperamide       Scheduled   aspirin  81 mg Oral Daily    azelastine  2 spray Nasal BID    docusate sodium  100 mg Oral BID    enoxparin  40 mg Subcutaneous Daily    famotidine  40 mg Oral QHS    fluticasone-umeclidin-vilanter  1 puff Inhalation Daily    folic acid  1 mg Oral Daily    glimepiride  4 mg Oral BID    lipase-protease-amylase 12,000-38,000-60,000 units  4 capsule Oral TID WM    lisinopriL  20 mg Oral Daily    metFORMIN  1,000 mg Oral BID WM    metoprolol tartrate  12.5 mg Oral BID    mupirocin   Nasal BID    pantoprazole  40 mg Oral Daily    sucralfate  1 g Oral QID (AC & HS)    timolol maleate 0.5%  1 drop Both Eyes BID       Objective:  Recent Vitals:  Temp:  [97.5 °F (36.4 °C)-98.3 °F (36.8 °C)] 97.5 °F (36.4 °C)  Pulse:  [45-71] 68  Resp:  [20-25] 22  SpO2:  [94 %-98 %] 97 %  BP: ()/(47-82) 124/49    Physical Exam     I/O last 24 hrs:  Intake/Output - Last 3 Shifts         09/21 0700  09/22 0659 09/22 0700 09/23 0659 09/23 0700  09/24 0659    P.O. 420 360     I.V. (mL/kg) 372.7 (3.6)      Blood       IV Piggyback       Total Intake(mL/kg) 792.7 (7.7) 360 (3.5)     Urine (mL/kg/hr) 1075 (0.4) 300 (0.1)     Chest Tube 120      Total Output 1195 300     Net -402.4 +60                    Labs  BMP:   Recent Labs   Lab 09/22/23 0153   *   K 4.4   CO2 18*   BUN 16.3   CREATININE 1.04*   CALCIUM 8.0*     CBC:   Recent Labs   Lab 09/22/23 0153   WBC 10.48   RBC 3.88*   HGB 12.5   HCT 38.8   PLT 90*   .0*   MCH 32.2*   MCHC 32.2*     CMP:   Recent Labs   Lab 09/22/23 0153   CALCIUM 8.0*    ALBUMIN 3.2*   *   K 4.4   CO2 18*   BUN 16.3   CREATININE 1.04*   ALKPHOS 40   ALT 23   AST 52*   BILITOT 1.3         Imaging:   CXR: No results found in the last 24 hours.        ASSESSMENT/PLAN:    Paige yesterday - [facility] consulted- ordered Dopamine but stopped 2nd to HTN   Today she's off the Dopamine, was still paige earlier this am but 120 [place]row Afib - ordering EKG now   BP is stable, labs OK   C/o nausea, decreased exercised tolerance   [facility] is manging her meds - may ultimately require PPM     Case and plan of care discussed with MD Sebastian Jones PA-C

## 2023-09-23 NOTE — CONSULTS
Inpatient consult to Cardiology  Consult performed by: Mitchell Petersen ANP  Consult ordered by: Steven Rosales IV, MD  Reason for consult: Junctional Bradycardia        [facility]     Cardiology  Consult Note    Patient Name: Kortney Leach  MRN: [id number]  Admission Date: 9/20/2023  Hospital Length of Stay: 3 days  Code Status: Full Code   Attending Provider: Steven Rosales IV, MD   Consulting Provider: GOPI Zuleta  Primary Care Physician: Arnaldo Hernandez MD  Principal Problem:<principal problem not specified>    Patient information was obtained from patient, past medical records, and ER records.     Subjective:     Chief Complaint: Reason for Consult: Symptomatic Junctional Bradycardia    HPI: Ms. Leach is a 76 y/o female who is known to [facility], Dr. Uribe. The patient was recently worked up for CP/SOB and a Murmur (MS - Mild to Moderate). She was found to have Normal Coronaries and was referred to Dr. Rosales for Surgical Evaluation of Mitral Stenosis. She was deemed a candidate and on 9.20.23 she underwent a Sternotomy with a 25mm Mosaic Porcine Valve Placement as well as a Ligation of her SUKHI. She tolerated the procedure well and was monitored in ICU post operatively. She did have some Junctional Bradycardia and her Epicardial Wires were utilized for Pacing. The Bradycardia resolved and the Epicardial Wires were pulled. She was transferred to the Telemetry Unit for further monitoring. She developed a Bradycardic Rhythm on Telemetry and an EKG confirmed a Junctional Bradycardic Rhythm with Borderline BP. She was symptomatic with Nausea and Dizziness. [facility] was consulted for Bradycardia. She was started on IV Dopamine for HR/BP Support, however, shortly after she was placed on this Non-Titrating Dose she became more Nauseated and Hypertensive (SBP 190s). The Dopamine Infusion was Discontinued and her SBP Improved. She became Tachycardic and an EKG revealed PAF/RVR.     PMH: VALERIO  (MS) s/p Bio MVR, Hx of DVT x2 (Xarelto), HTN, DM II, Obesity, Common Variable Immunodeficiency, GERD, COPD, ABEL/CPAP, Glaucoma  PSH: Appendectomy, Cataract Extractions, Colonoscopy, EGD, Angiogram, Tonsillectomy, TKR/Bilaterally, Sternotomy/Bio MVR (9.20.23) 25mm Mosaic Porcine Valve and Ligation of SUKHI  Family History: Father, D, Lung Cancer; Mother, D, CVA, Alzheimer's Disease; Sister, L, HTN, Multiple Sclerosis, DM II  Social History: Denies Illicit Drug, ETOH and Tobacco Use; Former Smoker, Quit Years Prior     Previous Cardiac Diagnostics:   LHC 9.6.23:  Normal Coronaries     ECHO 8.2.23:  The study quality is average.   The left ventricle is decreased in size. Global left ventricular systolic function is normal. The left ventricular ejection fraction is 60%. Mild to moderate concentric left ventricular hypertrophy is present.   Mitral stenosis is mild to moderate . The area by pressure half time is 2.0 cm². The mean trans mitral gradient is 6.1 mmHg. Mild (1+) mitral regurgitation.  Mild calcification of the aortic valve is noted with adequate cuspal excursion.   Mild (1+) tricuspid regurgitation.   The pulmonary artery systolic pressure is 23 mmHg. The pulmonary artery appears to be normal.    MPI 1.24.23:  This is a normal perfusion study, no perfusion defects noted. There is no evidence of ischemia.   This scan is suggestive of low risk for future cardiovascular events.   The left ventricular cavity is noted to be normal on the stress studies. The stress left ventricular ejection fraction was calculated to be 68% and left ventricular global function is normal. The rest left ventricular cavity is noted to be normal. The rest left ventricular ejection fraction was calculated to be 63% and rest left ventricular global function is normal.   When compared to the resting ejection fraction (63%), the stress ejection fraction (68%) has increased.   Transient ischemic dilatation is present and has been described as  a marker for high risk coronary artery disease. It has also been described in microvascular disease, hypertensive heart disease as well as cardiac deconditioning.   The study quality is good.   There was a rise in myocardial blood flow between rest and stress.  Global myocardial blood flow reserve was 2.72.  Normal global coronary flow reserve is suggestive of low coronary event risk.    Carotid US 1.24.23:  The study quality is good.   There is no plaque noted in the proximal right internal carotid artery.   1-39% stenosis in the proximal left internal carotid artery based on Bluth Criteria.   Antegrade right vertebral artery flow.   Antegrade left vertebral artery flow.     Review of patient's allergies indicates:   Allergen Reactions    Adhesive tape-silicones      Current Facility-Administered Medications on File Prior to Encounter   Medication    0.9%  NaCl infusion    diazePAM tablet 10 mg    diphenhydrAMINE capsule 50 mg     Current Outpatient Medications on File Prior to Encounter   Medication Sig    benazepriL (LOTENSIN) 20 MG tablet Take 20 mg by mouth once daily.    dapagliflozin (FARXIGA) 10 mg tablet Take 10 mg by mouth once daily.    dexlansoprazole (DEXILANT) 60 mg capsule Take by mouth once daily.    DUPIXENT SYRINGE 300 mg/2 mL Syrg Inject 300 mg into the skin every 14 (fourteen) days.    famotidine (PEPCID) 40 MG tablet Take 40 mg by mouth every evening.    fluticasone-umeclidin-vilanter (TRELEGY ELLIPTA) 200-62.5-25 mcg inhaler Inhale 1 puff into the lungs once daily.    glimepiride (AMARYL) 2 MG tablet Take 4 mg by mouth 2 (two) times a day.    immun glob G,IgG,/pro/IgA 0-50 (HIZENTRA SUBQ) Inject into the skin every 14 (fourteen) days.    lipase-protease-amylase 24,000-76,000-120,000 units (CREON) 24,000-76,000 -120,000 unit capsule Take 1-2 capsules by mouth 3 (three) times daily with meals. 2 caps with meals and 1 cap c snacks    metFORMIN (GLUCOPHAGE) 1000 MG tablet Take 1,000 mg by mouth 2  (two) times daily with meals.    metoprolol succinate (TOPROL-XL) 25 MG 24 hr tablet Take 25 mg by mouth once daily.    rivaroxaban (XARELTO) 20 mg Tab Take 20 mg by mouth daily with dinner or evening meal.    timoloL (BETIMOL) 0.5 % ophthalmic solution Place 1 drop into both eyes 2 (two) times daily.    azelastine (ASTELIN) 137 mcg (0.1 %) nasal spray 2 sprays by Nasal route 2 (two) times daily.     Review of Systems   Constitutional:  Positive for fatigue. Negative for fever.   Respiratory:  Negative for shortness of breath.    Cardiovascular:  Positive for chest pain. Negative for palpitations and leg swelling.        + Incisional CP; Bradycardia/Tachycardia   All other systems reviewed and are negative.    Objective:     Vital Signs (Most Recent):  Temp: 97.5 °F (36.4 °C) (09/23/23 0749)  Pulse: 68 (09/23/23 0749)  Resp: (!) 22 (09/23/23 0749)  BP: (!) 124/49 (09/23/23 0749)  SpO2: 97 % (09/23/23 0749) Vital Signs (24h Range):  Temp:  [97.5 °F (36.4 °C)-98.3 °F (36.8 °C)] 97.5 °F (36.4 °C)  Pulse:  [45-71] 68  Resp:  [18-25] 22  SpO2:  [94 %-98 %] 97 %  BP: ()/(47-82) 124/49     Weight: 103.6 kg (228 lb 6.3 oz)  Body mass index is 36.88 kg/m².    SpO2: 97 %         Intake/Output Summary (Last 24 hours) at 9/23/2023 0838  Last data filed at 9/22/2023 1905  Gross per 24 hour   Intake 360 ml   Output 300 ml   Net 60 ml     Lines/Drains/Airways       Peripheral Intravenous Line  Duration                  Peripheral IV - Single Lumen 09/21/23 1930 20 G Posterior;Right Forearm 1 day                  Significant Labs:  Recent Results (from the past 72 hour(s))   ISTAT PROCEDURE    Collection Time: 09/20/23  9:02 AM   Result Value Ref Range    POC PH 7.465 (H) 7.35 - 7.45    POC PCO2 38.1 35 - 45 mmHg    POC PO2 256 (H) 80 - 100 mmHg    POC HCO3 27.4 24 - 28 mmol/L    POC BE 4 -2 to 2 mmol/L    POC SATURATED O2 100 95 - 100 %    POC Glucose 193 (H) 70 - 110 mg/dL    POC Sodium 139 136 - 145 mmol/L    POC Potassium  4.5 3.5 - 5.1 mmol/L    POC TCO2 29 (H) 23 - 27 mmol/L    POC Ionized Calcium 1.03 (L) 1.06 - 1.42 mmol/L    POC Hematocrit 33 (L) 36 - 54 %PCV    POC HEMOGLOBIN 11 g/dL    Sample ARTERIAL    ISTAT PROCEDURE    Collection Time: 09/20/23  9:30 AM   Result Value Ref Range    POC PH 7.405 7.35 - 7.45    POC PCO2 41.1 35 - 45 mmHg    POC PO2 248 (H) 80 - 100 mmHg    POC HCO3 25.7 24 - 28 mmol/L    POC BE 1 -2 to 2 mmol/L    POC SATURATED O2 100 95 - 100 %    POC Glucose 205 (H) 70 - 110 mg/dL    POC Sodium 139 136 - 145 mmol/L    POC Potassium 4.3 3.5 - 5.1 mmol/L    POC TCO2 27 23 - 27 mmol/L    POC Ionized Calcium 1.04 (L) 1.06 - 1.42 mmol/L    POC Hematocrit 33 (L) 36 - 54 %PCV    POC HEMOGLOBIN 11 g/dL    Sample ARTERIAL    Comprehensive Metabolic Panel    Collection Time: 09/20/23 10:30 AM   Result Value Ref Range    Sodium Level 141 136 - 145 mmol/L    Potassium Level 3.8 3.5 - 5.1 mmol/L    Chloride 111 (H) 98 - 107 mmol/L    Carbon Dioxide 18 (L) 23 - 31 mmol/L    Glucose Level 199 (H) 82 - 115 mg/dL    Blood Urea Nitrogen 13.2 9.8 - 20.1 mg/dL    Creatinine 0.69 0.55 - 1.02 mg/dL    Calcium Level Total 9.2 8.4 - 10.2 mg/dL    Protein Total 5.4 (L) 5.8 - 7.6 gm/dL    Albumin Level 3.0 (L) 3.4 - 4.8 g/dL    Globulin 2.4 2.4 - 3.5 gm/dL    Albumin/Globulin Ratio 1.3 1.1 - 2.0 ratio    Bilirubin Total 1.1 <=1.5 mg/dL    Alkaline Phosphatase 33 (L) 40 - 150 unit/L    Alanine Aminotransferase 21 0 - 55 unit/L    Aspartate Aminotransferase 60 (H) 5 - 34 unit/L    eGFR >60 mls/min/1.73/m2   Protime-INR    Collection Time: 09/20/23 10:30 AM   Result Value Ref Range    PT 17.0 (H) 12.5 - 14.5 seconds    INR 1.4 (H) <=1.3   APTT    Collection Time: 09/20/23 10:30 AM   Result Value Ref Range    PTT 32.3 23.2 - 33.7 seconds   CBC with Differential    Collection Time: 09/20/23 10:30 AM   Result Value Ref Range    WBC 15.49 (H) 4.50 - 11.50 x10(3)/mcL    RBC 4.09 (L) 4.20 - 5.40 x10(6)/mcL    Hgb 13.3 12.0 - 16.0 g/dL    Hct  40.5 37.0 - 47.0 %    MCV 99.0 (H) 80.0 - 94.0 fL    MCH 32.5 (H) 27.0 - 31.0 pg    MCHC 32.8 (L) 33.0 - 36.0 g/dL    RDW 14.2 11.5 - 17.0 %    Platelet 126 (L) 130 - 400 x10(3)/mcL    MPV 11.9 (H) 7.4 - 10.4 fL    Neut % 66.7 %    Lymph % 28.5 %    Mono % 3.5 %    Eos % 0.5 %    Basophil % 0.2 %    Lymph # 4.41 0.6 - 4.6 x10(3)/mcL    Neut # 10.34 (H) 2.1 - 9.2 x10(3)/mcL    Mono # 0.54 0.1 - 1.3 x10(3)/mcL    Eos # 0.08 0 - 0.9 x10(3)/mcL    Baso # 0.03 <=0.2 x10(3)/mcL    IG# 0.09 (H) 0 - 0.04 x10(3)/mcL    IG% 0.6 %    NRBC% 0.0 %   ISTAT PROCEDURE    Collection Time: 09/20/23 10:41 AM   Result Value Ref Range    POC PH 7.356 7.35 - 7.45    POC PCO2 38.7 35 - 45 mmHg    POC PO2 119 (H) 80 - 100 mmHg    POC HCO3 21.7 mmol/L    POC BE -4 mmol/L    POC SATURATED O2 98 95 - 100 %    POC pH Temp 7.359     POC pCO2 Temp 38.4 mmHg    POC pO2 Temp 118 mmHg    POC Glucose 198 (H) 70 - 110 mg/dL    POC Sodium 140 136 - 145 mmol/L    POC Potassium 3.9 3.5 - 5.1 mmol/L    POC TCO2 23 23 - 27 mmol/L    POC Ionized Calcium 1.25 1.06 - 1.42 mmol/L    POC Hematocrit 40 36 - 54 %PCV    POC HEMOGLOBIN 14 g/dL    Sample ARTERIAL     FiO2 95     POC Temp 36.8 C    POCT glucose    Collection Time: 09/20/23 11:25 AM   Result Value Ref Range    POCT Glucose 151 (H) 70 - 110 mg/dL   RT Blood Gas    Collection Time: 09/20/23 11:53 AM   Result Value Ref Range    Sample Type Arterial Blood     Sample site Arterial Line     Drawn by SD RRT     pH, Blood gas 7.350 7.350 - 7.450    pCO2, Blood gas 41.0 35.0 - 45.0 mmHg    pO2, Blood gas 82.0 80.0 - 100.0 mmHg    Sodium, Blood Gas 138 137 - 145 mmol/L    Potassium, Blood Gas 3.4 (L) 3.5 - 5.0 mmol/L    Calcium Level Ionized 1.23 1.12 - 1.23 mmol/L    TOC2, Blood gas 23.9 mmol/L    Base Excess, Blood gas -2.90 mmol/L    sO2, Blood gas 95.0 %    HCO3, Blood gas 22.6 >=15.0 mmol/L    Allens Test N/A     MODE SIMV     Oxygen Device, Blood gas Ventilator     FIO2, Blood gas 60 %    Mech Vt 500 ml     Mech RR 20 b/min    PEEP 5.0 cmH2O    PS 10.0 cmH2O   POCT glucose    Collection Time: 09/20/23 12:11 PM   Result Value Ref Range    POCT Glucose 147 (H) 70 - 110 mg/dL   RT Blood Gas    Collection Time: 09/20/23  1:42 PM   Result Value Ref Range    Sample Type Arterial Blood     Sample site Arterial Line     Drawn by KRISHAN RRT     pH, Blood gas 7.350 7.350 - 7.450    pCO2, Blood gas 42.0 35.0 - 45.0 mmHg    pO2, Blood gas 88.0 80.0 - 100.0 mmHg    Sodium, Blood Gas 137 137 - 145 mmol/L    Potassium, Blood Gas 3.5 3.5 - 5.0 mmol/L    Calcium Level Ionized 1.18 1.12 - 1.23 mmol/L    TOC2, Blood gas 24.5 mmol/L    Base Excess, Blood gas -2.40 mmol/L    sO2, Blood gas 96.0 %    HCO3, Blood gas 23.2 >=15.0 mmol/L    Allens Test N/A     MODE CPAP     Oxygen Device, Blood gas Ventilator     FIO2, Blood gas 30 %    PEEP 5.0 cmH2O    PS 10.0 cmH2O   POCT glucose    Collection Time: 09/20/23  2:04 PM   Result Value Ref Range    POCT Glucose 116 (H) 70 - 110 mg/dL   Potassium    Collection Time: 09/20/23  2:06 PM   Result Value Ref Range    Potassium Level 3.6 3.5 - 5.1 mmol/L   Hemoglobin and Hematocrit    Collection Time: 09/20/23  2:06 PM   Result Value Ref Range    Hgb 13.2 12.0 - 16.0 g/dL    Hct 39.2 37.0 - 47.0 %   POCT glucose    Collection Time: 09/20/23  3:45 PM   Result Value Ref Range    POCT Glucose 136 (H) 70 - 110 mg/dL   POCT glucose    Collection Time: 09/20/23  5:06 PM   Result Value Ref Range    POCT Glucose 128 (H) 70 - 110 mg/dL   POCT glucose    Collection Time: 09/20/23  6:06 PM   Result Value Ref Range    POCT Glucose 139 (H) 70 - 110 mg/dL   POCT glucose    Collection Time: 09/20/23  6:55 PM   Result Value Ref Range    POCT Glucose 153 (H) 70 - 110 mg/dL   POCT glucose    Collection Time: 09/20/23  7:41 PM   Result Value Ref Range    POCT Glucose 171 (H) 70 - 110 mg/dL   POCT glucose    Collection Time: 09/20/23  9:05 PM   Result Value Ref Range    POCT Glucose 176 (H) 70 - 110 mg/dL   Potassium     Collection Time: 09/20/23  9:28 PM   Result Value Ref Range    Potassium Level 4.1 3.5 - 5.1 mmol/L   POCT glucose    Collection Time: 09/20/23  9:56 PM   Result Value Ref Range    POCT Glucose 179 (H) 70 - 110 mg/dL   POCT glucose    Collection Time: 09/20/23 11:04 PM   Result Value Ref Range    POCT Glucose 148 (H) 70 - 110 mg/dL   POCT glucose    Collection Time: 09/20/23 11:53 PM   Result Value Ref Range    POCT Glucose 144 (H) 70 - 110 mg/dL   POCT glucose    Collection Time: 09/21/23  1:04 AM   Result Value Ref Range    POCT Glucose 155 (H) 70 - 110 mg/dL   Comprehensive Metabolic Panel    Collection Time: 09/21/23  1:19 AM   Result Value Ref Range    Sodium Level 139 136 - 145 mmol/L    Potassium Level 4.1 3.5 - 5.1 mmol/L    Chloride 109 (H) 98 - 107 mmol/L    Carbon Dioxide 19 (L) 23 - 31 mmol/L    Glucose Level 171 (H) 82 - 115 mg/dL    Blood Urea Nitrogen 11.3 9.8 - 20.1 mg/dL    Creatinine 0.75 0.55 - 1.02 mg/dL    Calcium Level Total 8.1 (L) 8.4 - 10.2 mg/dL    Protein Total 5.8 5.8 - 7.6 gm/dL    Albumin Level 3.6 3.4 - 4.8 g/dL    Globulin 2.2 (L) 2.4 - 3.5 gm/dL    Albumin/Globulin Ratio 1.6 1.1 - 2.0 ratio    Bilirubin Total 0.9 <=1.5 mg/dL    Alkaline Phosphatase 34 (L) 40 - 150 unit/L    Alanine Aminotransferase 24 0 - 55 unit/L    Aspartate Aminotransferase 78 (H) 5 - 34 unit/L    eGFR >60 mls/min/1.73/m2   CBC Without Differential    Collection Time: 09/21/23  1:19 AM   Result Value Ref Range    WBC 11.26 4.50 - 11.50 x10(3)/mcL    RBC 3.99 (L) 4.20 - 5.40 x10(6)/mcL    Hgb 13.0 12.0 - 16.0 g/dL    Hct 40.1 37.0 - 47.0 %    .5 (H) 80.0 - 94.0 fL    MCH 32.6 (H) 27.0 - 31.0 pg    MCHC 32.4 (L) 33.0 - 36.0 g/dL    RDW 14.6 11.5 - 17.0 %    Platelet 115 (L) 130 - 400 x10(3)/mcL    MPV 12.4 (H) 7.4 - 10.4 fL    NRBC% 0.0 %   POCT glucose    Collection Time: 09/21/23  2:06 AM   Result Value Ref Range    POCT Glucose 148 (H) 70 - 110 mg/dL   POCT glucose    Collection Time: 09/21/23  3:14 AM    Result Value Ref Range    POCT Glucose 154 (H) 70 - 110 mg/dL   POCT glucose    Collection Time: 09/21/23  4:05 AM   Result Value Ref Range    POCT Glucose 152 (H) 70 - 110 mg/dL   POCT glucose    Collection Time: 09/21/23  5:03 AM   Result Value Ref Range    POCT Glucose 143 (H) 70 - 110 mg/dL   POCT glucose    Collection Time: 09/21/23  5:51 AM   Result Value Ref Range    POCT Glucose 156 (H) 70 - 110 mg/dL   POCT glucose    Collection Time: 09/21/23  6:42 AM   Result Value Ref Range    POCT Glucose 177 (H) 70 - 110 mg/dL   POCT glucose    Collection Time: 09/21/23  8:07 AM   Result Value Ref Range    POCT Glucose 135 (H) 70 - 110 mg/dL   POCT glucose    Collection Time: 09/21/23  9:12 AM   Result Value Ref Range    POCT Glucose 126 (H) 70 - 110 mg/dL   POCT glucose    Collection Time: 09/21/23 10:19 AM   Result Value Ref Range    POCT Glucose 143 (H) 70 - 110 mg/dL   POCT glucose    Collection Time: 09/21/23  4:05 PM   Result Value Ref Range    POCT Glucose 95 70 - 110 mg/dL   POCT glucose    Collection Time: 09/21/23  9:24 PM   Result Value Ref Range    POCT Glucose 151 (H) 70 - 110 mg/dL   CBC Without Differential    Collection Time: 09/22/23  1:53 AM   Result Value Ref Range    WBC 10.48 4.50 - 11.50 x10(3)/mcL    RBC 3.88 (L) 4.20 - 5.40 x10(6)/mcL    Hgb 12.5 12.0 - 16.0 g/dL    Hct 38.8 37.0 - 47.0 %    .0 (H) 80.0 - 94.0 fL    MCH 32.2 (H) 27.0 - 31.0 pg    MCHC 32.2 (L) 33.0 - 36.0 g/dL    RDW 14.7 11.5 - 17.0 %    Platelet 90 (L) 130 - 400 x10(3)/mcL    MPV 12.3 (H) 7.4 - 10.4 fL    NRBC% 0.0 %   Comprehensive Metabolic Panel    Collection Time: 09/22/23  1:53 AM   Result Value Ref Range    Sodium Level 134 (L) 136 - 145 mmol/L    Potassium Level 4.4 3.5 - 5.1 mmol/L    Chloride 104 98 - 107 mmol/L    Carbon Dioxide 18 (L) 23 - 31 mmol/L    Glucose Level 175 (H) 82 - 115 mg/dL    Blood Urea Nitrogen 16.3 9.8 - 20.1 mg/dL    Creatinine 1.04 (H) 0.55 - 1.02 mg/dL    Calcium Level Total 8.0 (L) 8.4  - 10.2 mg/dL    Protein Total 5.3 (L) 5.8 - 7.6 gm/dL    Albumin Level 3.2 (L) 3.4 - 4.8 g/dL    Globulin 2.1 (L) 2.4 - 3.5 gm/dL    Albumin/Globulin Ratio 1.5 1.1 - 2.0 ratio    Bilirubin Total 1.3 <=1.5 mg/dL    Alkaline Phosphatase 40 40 - 150 unit/L    Alanine Aminotransferase 23 0 - 55 unit/L    Aspartate Aminotransferase 52 (H) 5 - 34 unit/L    eGFR 55 mls/min/1.73/m2   Path Review, Peripheral Smear    Collection Time: 09/22/23  1:53 AM   Result Value Ref Range    Peripheral Smear Evaluation       - Thrombocytopenia.    Impression: Thrombocytopenia can be due to decreased production, increased destruction (immune and non-immune mediated) or due to splenic sequestration.    Yuriy Lucia M.D.    POCT glucose    Collection Time: 09/22/23  5:58 AM   Result Value Ref Range    POCT Glucose 147 (H) 70 - 110 mg/dL   POCT glucose    Collection Time: 09/22/23  7:31 AM   Result Value Ref Range    POCT Glucose 149 (H) 70 - 110 mg/dL   POCT glucose    Collection Time: 09/22/23 11:25 AM   Result Value Ref Range    POCT Glucose 161 (H) 70 - 110 mg/dL   POCT glucose    Collection Time: 09/22/23  5:21 PM   Result Value Ref Range    POCT Glucose 160 (H) 70 - 110 mg/dL     EKG:           Telemetry: PAF/RVR    Physical Exam  Constitutional:       Appearance: Normal appearance.   HENT:      Head: Normocephalic.      Mouth/Throat:      Mouth: Mucous membranes are moist.   Eyes:      Extraocular Movements: Extraocular movements intact.      Pupils: Pupils are equal, round, and reactive to light.   Cardiovascular:      Rate and Rhythm: Normal rate and regular rhythm.      Pulses: Normal pulses.   Pulmonary:      Effort: Pulmonary effort is normal.      Breath sounds: Normal breath sounds.   Abdominal:      Palpations: Abdomen is soft.   Musculoskeletal:         General: No swelling. Normal range of motion.   Skin:     General: Skin is warm and dry.      Comments: Midline Sternotomy JACOB with No Signs of Bleed/Infection     Neurological:      General: No focal deficit present.      Mental Status: She is alert and oriented to person, place, and time.   Psychiatric:         Mood and Affect: Mood normal.         Behavior: Behavior normal.       Home Medications:   Current Facility-Administered Medications on File Prior to Encounter   Medication Dose Route Frequency Provider Last Rate Last Admin    0.9%  NaCl infusion   Intravenous On Call Procedure Vijay Uribe MD        diazePAM tablet 10 mg  10 mg Oral On Call Procedure Vijay Uribe MD   10 mg at 09/06/23 1021    diphenhydrAMINE capsule 50 mg  50 mg Oral On Call Procedure Vijay Uribe MD   50 mg at 09/06/23 1021     Current Outpatient Medications on File Prior to Encounter   Medication Sig Dispense Refill    benazepriL (LOTENSIN) 20 MG tablet Take 20 mg by mouth once daily.      dapagliflozin (FARXIGA) 10 mg tablet Take 10 mg by mouth once daily.      dexlansoprazole (DEXILANT) 60 mg capsule Take by mouth once daily.      DUPIXENT SYRINGE 300 mg/2 mL Syrg Inject 300 mg into the skin every 14 (fourteen) days.      famotidine (PEPCID) 40 MG tablet Take 40 mg by mouth every evening.      fluticasone-umeclidin-vilanter (TRELEGY ELLIPTA) 200-62.5-25 mcg inhaler Inhale 1 puff into the lungs once daily.      glimepiride (AMARYL) 2 MG tablet Take 4 mg by mouth 2 (two) times a day.      immun glob G,IgG,/pro/IgA 0-50 (HIZENTRA SUBQ) Inject into the skin every 14 (fourteen) days.      lipase-protease-amylase 24,000-76,000-120,000 units (CREON) 24,000-76,000 -120,000 unit capsule Take 1-2 capsules by mouth 3 (three) times daily with meals. 2 caps with meals and 1 cap c snacks      metFORMIN (GLUCOPHAGE) 1000 MG tablet Take 1,000 mg by mouth 2 (two) times daily with meals.      metoprolol succinate (TOPROL-XL) 25 MG 24 hr tablet Take 25 mg by mouth once daily.      rivaroxaban (XARELTO) 20 mg Tab Take 20 mg by mouth daily with dinner or evening meal.      timoloL (BETIMOL) 0.5 % ophthalmic  solution Place 1 drop into both eyes 2 (two) times daily.      azelastine (ASTELIN) 137 mcg (0.1 %) nasal spray 2 sprays by Nasal route 2 (two) times daily.       Current Inpatient Medications:    Current Facility-Administered Medications:     acetaminophen oral solution 650 mg, 650 mg, Per OG tube, Q6H PRN, Sebastian Jones PA-C    albumin human 5% bottle 12.5 g, 12.5 g, Intravenous, PRN, Sebastian Jones PA-C, Stopped at 09/20/23 2347    aspirin EC tablet 81 mg, 81 mg, Oral, Daily, Sebastian Jones PA-MADELYN, 81 mg at 09/22/23 0849    azelastine 137 mcg (0.1 %) nasal spray 274 mcg, 2 spray, Nasal, BID, Sebastian Jones PA-C    calcium gluconate 1 g in NS IVPB (premixed), 1 g, Intravenous, PRN, Sebastian Jones PA-MADELYN    calcium gluconate 1 g in NS IVPB (premixed), 2 g, Intravenous, PRN, Sebastian Jones PA-MADELYN    calcium gluconate 1 g in NS IVPB (premixed), 3 g, Intravenous, PRN, Sebastian Jones PA-C    dextrose 10% bolus 125 mL 125 mL, 12.5 g, Intravenous, PRN, Steven Rosales IV, MD    dextrose 10% bolus 250 mL 250 mL, 25 g, Intravenous, PRNConnie Victor E IV, MD    dextrose 5 % and 0.45 % NaCl infusion, , Intravenous, Continuous, Sebastian Jones PA-C, Stopped at 09/21/23 1029    docusate sodium capsule 100 mg, 100 mg, Oral, BID, Sebastian Jones PA-MADELYN, 100 mg at 09/22/23 2123    DOPamine 800 mg in dextrose 5 % 250 mL infusion (premix) (NON-TITRATING), 5 mcg/kg/min, Intravenous, Continuous, Mitchell Petersen, GOPI    enoxaparin injection 40 mg, 40 mg, Subcutaneous, Daily, Sebastian Jones PA-MADELYN, 40 mg at 09/22/23 1719    famotidine tablet 40 mg, 40 mg, Oral, QHS, Sebastian Jones PA-C, 40 mg at 09/22/23 2123    fluticasone-umeclidin-vilanter 200-62.5-25 mcg inhaler 1 puff, 1 puff, Inhalation, Daily, Zoya Bowman, ANP    folic acid tablet 1 mg, 1 mg, Oral, Daily, Sebastian Jones PA-C, 1 mg at 09/22/23 0849    glimepiride tablet 4 mg, 4 mg, Oral, BID, Sebastian Jones PA-C, 4 mg at 09/22/23 2123     glucagon (human recombinant) injection 1 mg, 1 mg, Intramuscular, PRN, Steven Rosales IV, MD    glucose chewable tablet 16 g, 16 g, Oral, PRN, Steven Rosales IV, MD    glucose chewable tablet 24 g, 24 g, Oral, PRN, Steven Rosales IV, MD    HYDROcodone-acetaminophen 5-325 mg per tablet 1 tablet, 1 tablet, Oral, Q4H PRN, Sebastian Jones PA-C, 1 tablet at 09/21/23 1542    insulin aspart U-100 injection 0-10 Units, 0-10 Units, Subcutaneous, QID (AC + HS) PRN, Steven Rosales IV, MD, 2 Units at 09/22/23 1146    insulin regular in 0.9 % NaCl 100 unit/100 mL (1 unit/mL) infusion, 0-52 Units/hr, Intravenous, Continuous, Sebastian Jones PA-C, Stopped at 09/21/23 1028    ketorolac injection 15 mg, 15 mg, Intravenous, Q6H PRN, Petey Phelps PA, 15 mg at 09/21/23 2345    lactulose 10 gram/15 ml solution 20 g, 20 g, Oral, Q6H PRN, Sebastian Jones PA-C    lipase-protease-amylase 12,000-38,000-60,000 units per capsule 4 capsule, 4 capsule, Oral, TID WM, Sebastian Jones PA-C, 4 capsule at 09/22/23 1715    lisinopriL tablet 20 mg, 20 mg, Oral, Daily, Sebastian Jones PA-C, 20 mg at 09/22/23 0850    loperamide capsule 2 mg, 2 mg, Oral, Continuous PRN, Sebastian Jones PA-C    magnesium sulfate 2g in water 50mL IVPB (premix), 2 g, Intravenous, PRN, Sebastian Jones PA-C    magnesium sulfate 2g in water 50mL IVPB (premix), 4 g, Intravenous, PRN, Sebastian Jones PA-C    metFORMIN tablet 1,000 mg, 1,000 mg, Oral, BID WM, Sebastian Jones PA-C, 1,000 mg at 09/22/23 1719    metoclopramide HCl injection 5 mg, 5 mg, Intravenous, Q6H PRN, Sebastian Jones PA-C    metoprolol tartrate (LOPRESSOR) split tablet 12.5 mg, 12.5 mg, Oral, BID, Sebastian Jones PA-C, 12.5 mg at 09/22/23 2123    morphine injection 4 mg, 4 mg, Intravenous, Q4H PRN, Sebastian Jones PA-C, 4 mg at 09/22/23 1253    mupirocin 2 % ointment, , Nasal, BID, Sebastian Jones PA-C, Given at 09/22/23 2100    ondansetron injection 4 mg, 4 mg,  Intravenous, Q4H PRN, Sebastian Jones PA-C, 4 mg at 09/23/23 0827    oxyCODONE immediate release tablet Tab 10 mg, 10 mg, Oral, Q4H PRN, Sebastian Jones PA-C, 10 mg at 09/21/23 0507    pantoprazole EC tablet 40 mg, 40 mg, Oral, Daily, Sebastian Jones, PA-C, 40 mg at 09/22/23 0850    potassium chloride 20 mEq in 100 mL IVPB (FOR CENTRAL LINE ADMINISTRATION ONLY), 20 mEq, Intravenous, PRN, Sebastian Jones, PA-C    potassium chloride 20 mEq in 100 mL IVPB (FOR CENTRAL LINE ADMINISTRATION ONLY), 40 mEq, Intravenous, PRN, Sebastian Jones, PA-C    potassium chloride 20 mEq in 100 mL IVPB (FOR CENTRAL LINE ADMINISTRATION ONLY), 60 mEq, Intravenous, PRN, Sebastian Jones, PA-C    sodium phosphate 15 mmol in dextrose 5 % (D5W) 250 mL IVPB, 15 mmol, Intravenous, PRN, Sebastian Jones, PA-C    sodium phosphate 20.01 mmol in dextrose 5 % (D5W) 250 mL IVPB, 20.01 mmol, Intravenous, PRN, Sebastian Jones, PA-C    sodium phosphate 30 mmol in dextrose 5 % (D5W) 250 mL IVPB, 30 mmol, Intravenous, PRN, Sebastian Jones, PA-C    sucralfate tablet 1 g, 1 g, Oral, QID (AC & HS), Sebastian Jones, PA-C, 1 g at 09/22/23 2123    timolol maleate 0.5% ophthalmic solution 1 drop, 1 drop, Both Eyes, BID, Steven Rosales IV, MD    Facility-Administered Medications Ordered in Other Encounters:     0.9%  NaCl infusion, , Intravenous, On Call Procedure, Vijay Uribe MD    diazePAM tablet 10 mg, 10 mg, Oral, On Call Procedure, Vijay Uribe MD, 10 mg at 09/06/23 1021    diphenhydrAMINE capsule 50 mg, 50 mg, Oral, On Call Procedure, Vijay Uribe MD, 50 mg at 09/06/23 1021    VTE Risk Mitigation (From admission, onward)           Ordered     enoxaparin injection 40 mg  Daily         09/20/23 1058     Place sequential compression device  Until discontinued         09/20/23 1058     IP VTE HIGH RISK PATIENT  Once         09/20/23 1034                  Assessment:   SSS    - Initial Rhythm: Junctional Bradycardia (30s-40s) - Symptomatic:  Dizziness/Nausea; Dopamine with Rapid Increase in SBP (190s); Dopamine Discontinued    - After Bradycardic Rhythm PT because Tachycardiac - PAF/RVR    - Short Time Period of PAF/RVR with Spontaneous Conversion to Junctional Bradycardia  Post Operative PAF with RVR - Spontaneous Conversion to Junctional Bradycardia      - s/p Ligation of SUKHI (9.20.23) - Endostapler      - CHADsVASc - 5 Points - 7.2% Stroke Risk per Year   VHD - MS    - s/p (9.20.23) - Bio Mosaic 25mm MVR  Hx of DVT x 2 (OP Xarelto)  DM II  Obesity  Common Variable Immunodeficiency  GERD  COPD without Exacerbation  ABEL/CPAP  Glaucoma  No Hx of GIB     Plan:   Dopamine/Discontinued secondary to Rapid SBP Increase (190s)  Hold BB Secondary to Junctional Bradycardia   Start Amiodarone Bolus and Drip per Protocol (This was Not Started as the PT Spontaneously Converted to Junctional Bradycardia)  Limited ECHO to Evaluate EF when HR is Controlled  Continue ASA and ACEi  Start Atorvastatin 40mg PO Qday  Encourage Q1HR IS; Limit Mobilization at this Time secondary to SSS  PT may Need Device Implant given SSS-Tachy/Mani (PT does not need BB as she has Normal Coronaries)  Have Family bring Home CPAP to Wear QHS/Sleep Time  Labs and EKG in AM: CBC, CMP and Mg    Thank you for your consult.     Mitchell Petersen, GOPI  Cardiology  [facility]  09/23/2023    Physician addendum:  I have seen and examined this patient as a split-shared visit with the JACQUELYN d/t complicated medical management of above problems written in assessment and high acuity requiring physician expertise in medical decision-making. I performed the substantive portion of the history and exam. Above medical decision-making is also formulated by me.    Cardiovascular exam:  S1, S2  Lungs:  Fine crackles at bases.  Extremities:  1+ Edema bilaterally    Plan:  Patient was seen in the setting of nausea and junctional bradycardia.  Dopamine given which led to tachycardia as well increased blood  pressure.  We stopped dopamine.  Patient appears to have sick sinus syndrome.  We will follow up on telemetry.  May need device placement.  If we continue to see these episodes we will do EP consult on Monday.      Calvin Ocasio MD  Cardiologist

## 2023-09-24 LAB
ALBUMIN SERPL-MCNC: 2.9 G/DL (ref 3.4–4.8)
ALBUMIN/GLOB SERPL: 1.1 RATIO (ref 1.1–2)
ALP SERPL-CCNC: 48 UNIT/L (ref 40–150)
ALT SERPL-CCNC: 17 UNIT/L (ref 0–55)
AST SERPL-CCNC: 19 UNIT/L (ref 5–34)
BASOPHILS # BLD AUTO: 0.01 X10(3)/MCL
BASOPHILS NFR BLD AUTO: 0.1 %
BILIRUB SERPL-MCNC: 1 MG/DL
BUN SERPL-MCNC: 38.6 MG/DL (ref 9.8–20.1)
CALCIUM SERPL-MCNC: 8.2 MG/DL (ref 8.4–10.2)
CHLORIDE SERPL-SCNC: 108 MMOL/L (ref 98–107)
CO2 SERPL-SCNC: 16 MMOL/L (ref 23–31)
CREAT SERPL-MCNC: 1.15 MG/DL (ref 0.55–1.02)
EOSINOPHIL # BLD AUTO: 0.17 X10(3)/MCL (ref 0–0.9)
EOSINOPHIL NFR BLD AUTO: 2.4 %
ERYTHROCYTE [DISTWIDTH] IN BLOOD BY AUTOMATED COUNT: 15.1 % (ref 11.5–17)
GFR SERPLBLD CREATININE-BSD FMLA CKD-EPI: 49 MLS/MIN/1.73/M2
GLOBULIN SER-MCNC: 2.7 GM/DL (ref 2.4–3.5)
GLUCOSE SERPL-MCNC: 129 MG/DL (ref 82–115)
HCT VFR BLD AUTO: 36.1 % (ref 37–47)
HGB BLD-MCNC: 12.1 G/DL (ref 12–16)
IMM GRANULOCYTES # BLD AUTO: 0.03 X10(3)/MCL (ref 0–0.04)
IMM GRANULOCYTES NFR BLD AUTO: 0.4 %
LYMPHOCYTES # BLD AUTO: 1.69 X10(3)/MCL (ref 0.6–4.6)
LYMPHOCYTES NFR BLD AUTO: 24 %
MAGNESIUM SERPL-MCNC: 2.5 MG/DL (ref 1.6–2.6)
MCH RBC QN AUTO: 33 PG (ref 27–31)
MCHC RBC AUTO-ENTMCNC: 33.5 G/DL (ref 33–36)
MCV RBC AUTO: 98.4 FL (ref 80–94)
MONOCYTES # BLD AUTO: 0.88 X10(3)/MCL (ref 0.1–1.3)
MONOCYTES NFR BLD AUTO: 12.5 %
NEUTROPHILS # BLD AUTO: 4.27 X10(3)/MCL (ref 2.1–9.2)
NEUTROPHILS NFR BLD AUTO: 60.6 %
NRBC BLD AUTO-RTO: 0 %
PLATELET # BLD AUTO: 179 X10(3)/MCL (ref 130–400)
PMV BLD AUTO: 12.3 FL (ref 7.4–10.4)
POCT GLUCOSE: 136 MG/DL (ref 70–110)
POCT GLUCOSE: 137 MG/DL (ref 70–110)
POCT GLUCOSE: 179 MG/DL (ref 70–110)
POTASSIUM SERPL-SCNC: 4.4 MMOL/L (ref 3.5–5.1)
PROT SERPL-MCNC: 5.6 GM/DL (ref 5.8–7.6)
RBC # BLD AUTO: 3.67 X10(6)/MCL (ref 4.2–5.4)
SODIUM SERPL-SCNC: 138 MMOL/L (ref 136–145)
WBC # SPEC AUTO: 7.05 X10(3)/MCL (ref 4.5–11.5)

## 2023-09-24 PROCEDURE — 25000003 PHARM REV CODE 250: Performed by: PHYSICIAN ASSISTANT

## 2023-09-24 PROCEDURE — 93010 ELECTROCARDIOGRAM REPORT: CPT | Mod: ,,, | Performed by: INTERNAL MEDICINE

## 2023-09-24 PROCEDURE — 83735 ASSAY OF MAGNESIUM: CPT | Performed by: NURSE PRACTITIONER

## 2023-09-24 PROCEDURE — 25000242 PHARM REV CODE 250 ALT 637 W/ HCPCS: Performed by: NURSE PRACTITIONER

## 2023-09-24 PROCEDURE — 93005 ELECTROCARDIOGRAM TRACING: CPT

## 2023-09-24 PROCEDURE — 63600175 PHARM REV CODE 636 W HCPCS: Performed by: SPECIALIST

## 2023-09-24 PROCEDURE — 25000003 PHARM REV CODE 250: Performed by: SPECIALIST

## 2023-09-24 PROCEDURE — 97162 PT EVAL MOD COMPLEX 30 MIN: CPT

## 2023-09-24 PROCEDURE — 93010 EKG 12-LEAD: ICD-10-PCS | Mod: ,,, | Performed by: INTERNAL MEDICINE

## 2023-09-24 PROCEDURE — 25000003 PHARM REV CODE 250: Performed by: NURSE PRACTITIONER

## 2023-09-24 PROCEDURE — 80053 COMPREHEN METABOLIC PANEL: CPT | Performed by: NURSE PRACTITIONER

## 2023-09-24 PROCEDURE — 21400001 HC TELEMETRY ROOM

## 2023-09-24 PROCEDURE — 85025 COMPLETE CBC W/AUTO DIFF WBC: CPT | Performed by: NURSE PRACTITIONER

## 2023-09-24 PROCEDURE — 63600175 PHARM REV CODE 636 W HCPCS: Performed by: PHYSICIAN ASSISTANT

## 2023-09-24 RX ORDER — DEXTROSE MONOHYDRATE, SODIUM CHLORIDE, AND POTASSIUM CHLORIDE 50; 1.49; 4.5 G/1000ML; G/1000ML; G/1000ML
INJECTION, SOLUTION INTRAVENOUS CONTINUOUS
Status: DISCONTINUED | OUTPATIENT
Start: 2023-09-24 | End: 2023-09-29 | Stop reason: HOSPADM

## 2023-09-24 RX ORDER — DEXTROSE MONOHYDRATE, SODIUM CHLORIDE, AND POTASSIUM CHLORIDE 50; 1.49; 4.5 G/1000ML; G/1000ML; G/1000ML
INJECTION, SOLUTION INTRAVENOUS CONTINUOUS
Status: DISCONTINUED | OUTPATIENT
Start: 2023-09-24 | End: 2023-09-24

## 2023-09-24 RX ADMIN — ATORVASTATIN CALCIUM 40 MG: 40 TABLET, FILM COATED ORAL at 09:09

## 2023-09-24 RX ADMIN — POTASSIUM CHLORIDE, DEXTROSE MONOHYDRATE AND SODIUM CHLORIDE: 150; 5; 450 INJECTION, SOLUTION INTRAVENOUS at 12:09

## 2023-09-24 RX ADMIN — LISINOPRIL 20 MG: 20 TABLET ORAL at 09:09

## 2023-09-24 RX ADMIN — UMECLIDINIUM 62.5 MCG: 62.5 AEROSOL, POWDER ORAL at 09:09

## 2023-09-24 RX ADMIN — GLIMEPIRIDE 4 MG: 1 TABLET ORAL at 08:09

## 2023-09-24 RX ADMIN — DOCUSATE SODIUM 100 MG: 100 CAPSULE, LIQUID FILLED ORAL at 08:09

## 2023-09-24 RX ADMIN — METFORMIN HYDROCHLORIDE 1000 MG: 500 TABLET, FILM COATED ORAL at 05:09

## 2023-09-24 RX ADMIN — MUPIROCIN: 20 OINTMENT TOPICAL at 09:09

## 2023-09-24 RX ADMIN — FOLIC ACID 1 MG: 1 TABLET ORAL at 09:09

## 2023-09-24 RX ADMIN — FAMOTIDINE 20 MG: 20 TABLET, FILM COATED ORAL at 08:09

## 2023-09-24 RX ADMIN — SULFAMETHOXAZOLE AND TRIMETHOPRIM 1 TABLET: 800; 160 TABLET ORAL at 09:09

## 2023-09-24 RX ADMIN — SULFAMETHOXAZOLE AND TRIMETHOPRIM 1 TABLET: 800; 160 TABLET ORAL at 08:09

## 2023-09-24 RX ADMIN — ENOXAPARIN SODIUM 40 MG: 40 INJECTION SUBCUTANEOUS at 05:09

## 2023-09-24 RX ADMIN — FLUTICASONE FUROATE AND VILANTEROL TRIFENATATE 1 PUFF: 200; 25 POWDER RESPIRATORY (INHALATION) at 09:09

## 2023-09-24 RX ADMIN — SUCRALFATE 1 G: 1 TABLET ORAL at 08:09

## 2023-09-24 RX ADMIN — TIMOLOL MALEATE 1 DROP: 5 SOLUTION/ DROPS OPHTHALMIC at 12:09

## 2023-09-24 RX ADMIN — HYDROCODONE BITARTRATE AND ACETAMINOPHEN 1 TABLET: 5; 325 TABLET ORAL at 09:09

## 2023-09-24 RX ADMIN — SUCRALFATE 1 G: 1 TABLET ORAL at 05:09

## 2023-09-24 RX ADMIN — ASPIRIN 81 MG: 81 TABLET, COATED ORAL at 09:09

## 2023-09-24 RX ADMIN — PANCRELIPASE 4 CAPSULE: 60000; 12000; 38000 CAPSULE, DELAYED RELEASE PELLETS ORAL at 05:09

## 2023-09-24 RX ADMIN — TIMOLOL MALEATE 1 DROP: 5 SOLUTION/ DROPS OPHTHALMIC at 08:09

## 2023-09-24 NOTE — PT/OT/SLP PROGRESS
Talked with her nurse and she is still bradycardic and may go for a pacemaker today. Therefore, we will hold therapy today, and check on her tomorrow

## 2023-09-24 NOTE — PLAN OF CARE
Problem: Physical Therapy  Goal: Physical Therapy Goal  Description: Pt will be seen for the following goals  1. Pt will be sba with a ll bed mobility  2. Pt will be sba with transfers with a rw  3. Pt will ambulate with a rw  100ft sba  Outcome: Ongoing, Progressing

## 2023-09-24 NOTE — PT/OT/SLP EVAL
Physical Therapy Evaluation    Patient Name:  Kortney Leach   MRN:  [id number]    Recommendations:     Discharge Recommendations: home health PT   Discharge Equipment Recommendations: none   Barriers to discharge: Ongoing medical needs    Assessment:     Kortney Leach is a 77 y.o. female admitted with a medical diagnosis of <principal problem not specified>.  She presents with the following impairments/functional limitations: weakness, impaired endurance, impaired functional mobility, gait instability, decreased upper extremity function, decreased lower extremity function, impaired self care skills . Pt did not do much activity today because of her low heart rate, but she was able to get OOB and into a chair.    Rehab Prognosis: Good; patient would benefit from acute skilled PT services to address these deficits and reach maximum level of function.    Recent Surgery: Procedure(s) (LRB):  REPLACEMENT, MITRAL VALVE (N/A) 4 Days Post-Op    Plan:     During this hospitalization, patient to be seen 5 x/week to address the identified rehab impairments via gait training, therapeutic activities, therapeutic exercises and progress toward the following goals:    Plan of Care Expires:       Subjective     Chief Complaint:   Patient/Family Comments/goals:   Pain/Comfort:       Patients cultural, spiritual, Muslim conflicts given the current situation:      Living Environment:  Pt lives with adult daughter  Prior to admission, patients level of function was ind.  Equipment used at home: cane, straight, bath bench, bedside commode, walker, rolling.  DME owned (not currently used): Upon discharge, patient will have assistance from family.    Objective:     Communicated with nurse prior to session.  Patient found supine with blood pressure cuff, PureWick, oxygen, pulse ox (continuous), telemetry  upon PT entry to room.    General Precautions: Standard, fall, sternal  Orthopedic Precautions:    Braces:     Respiratory Status: Nasal cannula, flow 2 L/min  Blood Pressure:      Exams:  RLE ROM: WFL  RLE Strength: WFL  LLE ROM: WFL  LLE Strength: WFL  Skin integrity: Visible skin intact      Functional Mobility:  Bed Mobility:     Supine to Sit: moderate assistance  Sit to Supine: moderate assistance  Transfers:     Sit to Stand:  minimum assistance with no AD  Bed to Chair: minimum assistance with  no AD  using  Step Transfer      AM-PAC 6 CLICK MOBILITY  Total Score:        Treatment & Education:      Patient and daughter/s were provided with verbal education education regarding POC.  Understanding was verbalized.     Patient left up in chair with all lines intact and call button in reach.    GOALS:   Multidisciplinary Problems       Physical Therapy Goals          Problem: Physical Therapy    Goal Priority Disciplines Outcome Goal Variances Interventions   Physical Therapy Goal     PT, PT/OT Ongoing, Progressing     Description: Pt will be seen for the following goals  1. Pt will be sba with a ll bed mobility  2. Pt will be sba with transfers with a rw  3. Pt will ambulate with a rw  100ft sba                       History:     Past Medical History:   Diagnosis Date    Anticoagulant long-term use     Asthma     Chronic sinusitis, unspecified     COPD (chronic obstructive pulmonary disease)     CVID (common variable immunodeficiency)     Diabetes mellitus, type 2     GERD (gastroesophageal reflux disease)     Hypertension     Severe mitral valve regurgitation     Sleep apnea     uses CPAP    SOB (shortness of breath)     Unspecified glaucoma     Weakness        Past Surgical History:   Procedure Laterality Date    APPENDECTOMY      CATARACT EXTRACTION Bilateral     COLONOSCOPY      EGD, WITH BALLOON DILATION      ESOPHAGOGASTRODUODENOSCOPY      LEFT HEART CATHETERIZATION Left 09/06/2023    Procedure: Left heart cath;  Surgeon: Vijay Uribe MD;  Location: [facility] CATH LAB;  Service: Cardiology;  Laterality: Left;  [facility]     MITRAL VALVE REPLACEMENT N/A 9/20/2023    Procedure: REPLACEMENT, MITRAL VALVE;  Surgeon: Steven Rosales IV, MD;  Location: [facility];  Service: Cardiovascular;  Laterality: N/A;    TONSILLECTOMY      TOTAL KNEE ARTHROPLASTY Bilateral        Time Tracking:     PT Received On:    PT Start Time: 1020     PT Stop Time: 1044  PT Total Time (min): 24 min     Billable Minutes: Evaluation 24 09/24/2023

## 2023-09-24 NOTE — PROGRESS NOTES
The patient remains stable with heart rate in the 50s.  Evaluation for potential pacemaker implant is in progress.  Laboratory data shows hematocrit is stable.  Renal indices are 38/1.1    Heart-regular rate and rhythm without murmur rub or gallop  Lungs-clear with fair effort  Sternum-stable  Extremities-no edema    Plan-ongoing evaluation of rhythm.  IV fluid for hydration while NPO.  Increase mobility.

## 2023-09-24 NOTE — NURSING
Artificial Intelligence Notification  [facility]  [address] [place] [address]  Phone: [phone]     This documentation was triggered by an Artificial Intelligence Notification.     Admit Date: 2023   LOS: 4  Code Status: Full Code  : 1945  Age: 77 y.o.  Weight:   Wt Readings from Last 1 Encounters:   23 103.6 kg (228 lb 6.3 oz)        Sex: female  Bed: [address] [place] [address] A  MRN: [id number]  Attending Physician: Steven Rosales IV, MD     Date of Alert: 2023  Time AI Alert Received:              Vitals:    23   BP: (!) 132/46   Pulse: (!) 43   Resp: 20   Temp: 98.4 °F (36.9 °C)     Patient Condition: Pt bradycardic, junctional rhythm. Was previously on dopamine but caused hypertension and episode of Afib RVR. Morning labs showed K 5.6, Bun/Cr 33/1.45. Continuous IV fluids started today. Repeat labs ordered for morning. Discussed with nurse Leroy. Please call with any deterioration or questions.

## 2023-09-24 NOTE — PROGRESS NOTES
"  [facility]     Cardiology  Progress Note    Patient Name: Kortney Leach  MRN: [id number]  Admission Date: 9/20/2023  Hospital Length of Stay: 4 days  Code Status: Full Code   Attending Provider: Steven Rosales IV, MD   Consulting Provider: GOPI Zuleta  Primary Care Physician: Arnaldo Hernandez MD  Principal Problem:<principal problem not specified>    Patient information was obtained from patient, past medical records, and ER records.     Subjective:     Chief Complaint: Reason for Consult: Symptomatic Junctional Bradycardia    HPI: Ms. Leach is a 76 y/o female who is known to [facility], Dr. Uribe. The patient was recently worked up for CP/SOB and a Murmur (MS - Mild to Moderate). She was found to have Normal Coronaries and was referred to Dr. Rosales for Surgical Evaluation of Mitral Stenosis. She was deemed a candidate and on 9.20.23 she underwent a Sternotomy with a 25mm Mosaic Porcine Valve Placement as well as a Ligation of her SUKHI. She tolerated the procedure well and was monitored in ICU post operatively. She did have some Junctional Bradycardia and her Epicardial Wires were utilized for Pacing. The Bradycardia resolved and the Epicardial Wires were pulled. She was transferred to the Telemetry Unit for further monitoring. She developed a Bradycardic Rhythm on Telemetry and an EKG confirmed a Junctional Bradycardic Rhythm with Borderline BP. She was symptomatic with Nausea and Dizziness. [facility] was consulted for Bradycardia. She was started on IV Dopamine for HR/BP Support, however, shortly after she was placed on this Non-Titrating Dose she became more Nauseated and Hypertensive (SBP 190s). The Dopamine Infusion was Discontinued and her SBP Improved. She became Tachycardic and an EKG revealed PAF/RVR.     9.24.23: NAD. "I am feeling so much better." No Further Tachy/Mani Events. Now in SB/Clear P Waves. Denies SOB and Palps. + CP/Incisional.     PMH: VHD (MS) s/p Bio MVR, Hx of " DVT x2 (Xarelto), HTN, DM II, Obesity, Common Variable Immunodeficiency, GERD, COPD, ABEL/CPAP, Glaucoma  PSH: Appendectomy, Cataract Extractions, Colonoscopy, EGD, Angiogram, Tonsillectomy, TKR/Bilaterally, Sternotomy/Bio MVR (9.20.23) 25mm Mosaic Porcine Valve and Ligation of SUKHI  Family History: Father, D, Lung Cancer; Mother, D, CVA, Alzheimer's Disease; Sister, L, HTN, Multiple Sclerosis, DM II  Social History: Denies Illicit Drug, ETOH and Tobacco Use; Former Smoker, Quit Years Prior     Previous Cardiac Diagnostics:   Limited ECHO 9.23.23:   LV Function is Approximately 55%    LHC 9.6.23:  Normal Coronaries     ECHO 8.2.23:  The study quality is average.   The left ventricle is decreased in size. Global left ventricular systolic function is normal. The left ventricular ejection fraction is 60%. Mild to moderate concentric left ventricular hypertrophy is present.   Mitral stenosis is mild to moderate . The area by pressure half time is 2.0 cm². The mean trans mitral gradient is 6.1 mmHg. Mild (1+) mitral regurgitation.  Mild calcification of the aortic valve is noted with adequate cuspal excursion.   Mild (1+) tricuspid regurgitation.   The pulmonary artery systolic pressure is 23 mmHg. The pulmonary artery appears to be normal.    MPI 1.24.23:  This is a normal perfusion study, no perfusion defects noted. There is no evidence of ischemia.   This scan is suggestive of low risk for future cardiovascular events.   The left ventricular cavity is noted to be normal on the stress studies. The stress left ventricular ejection fraction was calculated to be 68% and left ventricular global function is normal. The rest left ventricular cavity is noted to be normal. The rest left ventricular ejection fraction was calculated to be 63% and rest left ventricular global function is normal.   When compared to the resting ejection fraction (63%), the stress ejection fraction (68%) has increased.   Transient ischemic dilatation  is present and has been described as a marker for high risk coronary artery disease. It has also been described in microvascular disease, hypertensive heart disease as well as cardiac deconditioning.   The study quality is good.   There was a rise in myocardial blood flow between rest and stress.  Global myocardial blood flow reserve was 2.72.  Normal global coronary flow reserve is suggestive of low coronary event risk.    Carotid US 1.24.23:  The study quality is good.   There is no plaque noted in the proximal right internal carotid artery.   1-39% stenosis in the proximal left internal carotid artery based on Bluth Criteria.   Antegrade right vertebral artery flow.   Antegrade left vertebral artery flow.     Review of patient's allergies indicates:   Allergen Reactions    Adhesive tape-silicones      Current Facility-Administered Medications on File Prior to Encounter   Medication    0.9%  NaCl infusion    diazePAM tablet 10 mg    diphenhydrAMINE capsule 50 mg     Current Outpatient Medications on File Prior to Encounter   Medication Sig    benazepriL (LOTENSIN) 20 MG tablet Take 20 mg by mouth once daily.    dapagliflozin (FARXIGA) 10 mg tablet Take 10 mg by mouth once daily.    dexlansoprazole (DEXILANT) 60 mg capsule Take by mouth once daily.    DUPIXENT SYRINGE 300 mg/2 mL Syrg Inject 300 mg into the skin every 14 (fourteen) days.    famotidine (PEPCID) 40 MG tablet Take 40 mg by mouth every evening.    fluticasone-umeclidin-vilanter (TRELEGY ELLIPTA) 200-62.5-25 mcg inhaler Inhale 1 puff into the lungs once daily.    glimepiride (AMARYL) 2 MG tablet Take 4 mg by mouth 2 (two) times a day.    immun glob G,IgG,/pro/IgA 0-50 (HIZENTRA SUBQ) Inject into the skin every 14 (fourteen) days.    lipase-protease-amylase 24,000-76,000-120,000 units (CREON) 24,000-76,000 -120,000 unit capsule Take 1-2 capsules by mouth 3 (three) times daily with meals. 2 caps with meals and 1 cap c snacks    metFORMIN (GLUCOPHAGE) 1000  MG tablet Take 1,000 mg by mouth 2 (two) times daily with meals.    metoprolol succinate (TOPROL-XL) 25 MG 24 hr tablet Take 25 mg by mouth once daily.    rivaroxaban (XARELTO) 20 mg Tab Take 20 mg by mouth daily with dinner or evening meal.    timoloL (BETIMOL) 0.5 % ophthalmic solution Place 1 drop into both eyes 2 (two) times daily.    azelastine (ASTELIN) 137 mcg (0.1 %) nasal spray 2 sprays by Nasal route 2 (two) times daily.     Review of Systems   Constitutional:  Positive for fatigue. Negative for fever.   Respiratory:  Negative for chest tightness and shortness of breath.    Cardiovascular:  Positive for chest pain. Negative for palpitations and leg swelling.        + Incisional CP   All other systems reviewed and are negative.    Objective:     Vital Signs (Most Recent):  Temp: 97.6 °F (36.4 °C) (09/24/23 0809)  Pulse: (!) 50 (09/24/23 0809)  Resp: 18 (09/24/23 0405)  BP: (!) 129/48 (09/24/23 0809)  SpO2: 100 % (09/24/23 0809) Vital Signs (24h Range):  Temp:  [97.5 °F (36.4 °C)-98.4 °F (36.9 °C)] 97.6 °F (36.4 °C)  Pulse:  [43-81] 50  Resp:  [18-22] 18  SpO2:  [92 %-100 %] 100 %  BP: (118-137)/(46-73) 129/48     Weight: 103.6 kg (228 lb 6.3 oz)  Body mass index is 36.88 kg/m².    SpO2: 100 %         Intake/Output Summary (Last 24 hours) at 9/24/2023 0901  Last data filed at 9/24/2023 0500  Gross per 24 hour   Intake 0 ml   Output 1000 ml   Net -1000 ml       Lines/Drains/Airways       Peripheral Intravenous Line  Duration                  Peripheral IV - Single Lumen 09/21/23 1930 20 G Posterior;Right Forearm 2 days                  Significant Labs:  Recent Results (from the past 72 hour(s))   POCT glucose    Collection Time: 09/21/23  9:12 AM   Result Value Ref Range    POCT Glucose 126 (H) 70 - 110 mg/dL   POCT glucose    Collection Time: 09/21/23 10:19 AM   Result Value Ref Range    POCT Glucose 143 (H) 70 - 110 mg/dL   POCT glucose    Collection Time: 09/21/23  4:05 PM   Result Value Ref Range    POCT  Glucose 95 70 - 110 mg/dL   POCT glucose    Collection Time: 09/21/23  9:24 PM   Result Value Ref Range    POCT Glucose 151 (H) 70 - 110 mg/dL   CBC Without Differential    Collection Time: 09/22/23  1:53 AM   Result Value Ref Range    WBC 10.48 4.50 - 11.50 x10(3)/mcL    RBC 3.88 (L) 4.20 - 5.40 x10(6)/mcL    Hgb 12.5 12.0 - 16.0 g/dL    Hct 38.8 37.0 - 47.0 %    .0 (H) 80.0 - 94.0 fL    MCH 32.2 (H) 27.0 - 31.0 pg    MCHC 32.2 (L) 33.0 - 36.0 g/dL    RDW 14.7 11.5 - 17.0 %    Platelet 90 (L) 130 - 400 x10(3)/mcL    MPV 12.3 (H) 7.4 - 10.4 fL    NRBC% 0.0 %   Comprehensive Metabolic Panel    Collection Time: 09/22/23  1:53 AM   Result Value Ref Range    Sodium Level 134 (L) 136 - 145 mmol/L    Potassium Level 4.4 3.5 - 5.1 mmol/L    Chloride 104 98 - 107 mmol/L    Carbon Dioxide 18 (L) 23 - 31 mmol/L    Glucose Level 175 (H) 82 - 115 mg/dL    Blood Urea Nitrogen 16.3 9.8 - 20.1 mg/dL    Creatinine 1.04 (H) 0.55 - 1.02 mg/dL    Calcium Level Total 8.0 (L) 8.4 - 10.2 mg/dL    Protein Total 5.3 (L) 5.8 - 7.6 gm/dL    Albumin Level 3.2 (L) 3.4 - 4.8 g/dL    Globulin 2.1 (L) 2.4 - 3.5 gm/dL    Albumin/Globulin Ratio 1.5 1.1 - 2.0 ratio    Bilirubin Total 1.3 <=1.5 mg/dL    Alkaline Phosphatase 40 40 - 150 unit/L    Alanine Aminotransferase 23 0 - 55 unit/L    Aspartate Aminotransferase 52 (H) 5 - 34 unit/L    eGFR 55 mls/min/1.73/m2   Path Review, Peripheral Smear    Collection Time: 09/22/23  1:53 AM   Result Value Ref Range    Peripheral Smear Evaluation       - Thrombocytopenia.    Impression: Thrombocytopenia can be due to decreased production, increased destruction (immune and non-immune mediated) or due to splenic sequestration.    Yuriy Lucia M.D.    POCT glucose    Collection Time: 09/22/23  5:58 AM   Result Value Ref Range    POCT Glucose 147 (H) 70 - 110 mg/dL   POCT glucose    Collection Time: 09/22/23  7:31 AM   Result Value Ref Range    POCT Glucose 149 (H) 70 - 110 mg/dL   POCT glucose     Collection Time: 09/22/23 11:25 AM   Result Value Ref Range    POCT Glucose 161 (H) 70 - 110 mg/dL   POCT glucose    Collection Time: 09/22/23  5:21 PM   Result Value Ref Range    POCT Glucose 160 (H) 70 - 110 mg/dL   CBC Without Differential    Collection Time: 09/23/23  9:26 AM   Result Value Ref Range    WBC 10.27 4.50 - 11.50 x10(3)/mcL    RBC 4.00 (L) 4.20 - 5.40 x10(6)/mcL    Hgb 13.0 12.0 - 16.0 g/dL    Hct 39.1 37.0 - 47.0 %    MCV 97.8 (H) 80.0 - 94.0 fL    MCH 32.5 (H) 27.0 - 31.0 pg    MCHC 33.2 33.0 - 36.0 g/dL    RDW 14.7 11.5 - 17.0 %    Platelet 132 130 - 400 x10(3)/mcL    MPV 11.6 (H) 7.4 - 10.4 fL    NRBC% 0.0 %   Echo Saline Bubble? No    Collection Time: 09/23/23 10:36 AM   Result Value Ref Range    BSA 2.2 m2    Quevedo's Biplane MOD Ejection Fraction 56 %    LVIDd 4.12 3.5 - 6.0 cm    LV Systolic Volume 18.10 mL    LV Systolic Volume Index 8.5 mL/m2    LVIDs 2.30 2.1 - 4.0 cm    LV Diastolic Volume 75.10 mL    LV Diastolic Volume Index 35.42 mL/m2    IVS 1.42 (A) 0.6 - 1.1 cm    FS 44 28 - 44 %    Left Ventricle Relative Wall Thickness 0.69 cm    Posterior Wall 1.43 (A) 0.6 - 1.1 cm    LV mass 224.14 g    LV Mass Index 106 g/m2    MV mean gradient 6 mmHg    MV peak gradient 20 mmHg    MV VTI 90.1 cm    ZLVIDS -4.55     ZLVIDD -4.86    Comprehensive Metabolic Panel    Collection Time: 09/23/23 10:46 AM   Result Value Ref Range    Sodium Level 131 (L) 136 - 145 mmol/L    Potassium Level 5.6 (H) 3.5 - 5.1 mmol/L    Chloride 103 98 - 107 mmol/L    Carbon Dioxide 15 (L) 23 - 31 mmol/L    Glucose Level 133 (H) 82 - 115 mg/dL    Blood Urea Nitrogen 33.4 (H) 9.8 - 20.1 mg/dL    Creatinine 1.45 (H) 0.55 - 1.02 mg/dL    Calcium Level Total 7.9 (L) 8.4 - 10.2 mg/dL    Protein Total 6.2 5.8 - 7.6 gm/dL    Albumin Level 3.2 (L) 3.4 - 4.8 g/dL    Globulin 3.0 2.4 - 3.5 gm/dL    Albumin/Globulin Ratio 1.1 1.1 - 2.0 ratio    Bilirubin Total 0.9 <=1.5 mg/dL    Alkaline Phosphatase 48 40 - 150 unit/L    Alanine  Aminotransferase 21 0 - 55 unit/L    Aspartate Aminotransferase 36 (H) 5 - 34 unit/L    eGFR 37 mls/min/1.73/m2   Comprehensive Metabolic Panel    Collection Time: 09/24/23  4:32 AM   Result Value Ref Range    Sodium Level 138 136 - 145 mmol/L    Potassium Level 4.4 3.5 - 5.1 mmol/L    Chloride 108 (H) 98 - 107 mmol/L    Carbon Dioxide 16 (L) 23 - 31 mmol/L    Glucose Level 129 (H) 82 - 115 mg/dL    Blood Urea Nitrogen 38.6 (H) 9.8 - 20.1 mg/dL    Creatinine 1.15 (H) 0.55 - 1.02 mg/dL    Calcium Level Total 8.2 (L) 8.4 - 10.2 mg/dL    Protein Total 5.6 (L) 5.8 - 7.6 gm/dL    Albumin Level 2.9 (L) 3.4 - 4.8 g/dL    Globulin 2.7 2.4 - 3.5 gm/dL    Albumin/Globulin Ratio 1.1 1.1 - 2.0 ratio    Bilirubin Total 1.0 <=1.5 mg/dL    Alkaline Phosphatase 48 40 - 150 unit/L    Alanine Aminotransferase 17 0 - 55 unit/L    Aspartate Aminotransferase 19 5 - 34 unit/L    eGFR 49 mls/min/1.73/m2   Magnesium    Collection Time: 09/24/23  4:32 AM   Result Value Ref Range    Magnesium Level 2.50 1.60 - 2.60 mg/dL   CBC with Differential    Collection Time: 09/24/23  4:32 AM   Result Value Ref Range    WBC 7.05 4.50 - 11.50 x10(3)/mcL    RBC 3.67 (L) 4.20 - 5.40 x10(6)/mcL    Hgb 12.1 12.0 - 16.0 g/dL    Hct 36.1 (L) 37.0 - 47.0 %    MCV 98.4 (H) 80.0 - 94.0 fL    MCH 33.0 (H) 27.0 - 31.0 pg    MCHC 33.5 33.0 - 36.0 g/dL    RDW 15.1 11.5 - 17.0 %    Platelet 179 130 - 400 x10(3)/mcL    MPV 12.3 (H) 7.4 - 10.4 fL    Neut % 60.6 %    Lymph % 24.0 %    Mono % 12.5 %    Eos % 2.4 %    Basophil % 0.1 %    Lymph # 1.69 0.6 - 4.6 x10(3)/mcL    Neut # 4.27 2.1 - 9.2 x10(3)/mcL    Mono # 0.88 0.1 - 1.3 x10(3)/mcL    Eos # 0.17 0 - 0.9 x10(3)/mcL    Baso # 0.01 <=0.2 x10(3)/mcL    IG# 0.03 0 - 0.04 x10(3)/mcL    IG% 0.4 %    NRBC% 0.0 %     EKG:       Telemetry: PAF/RVR    Physical Exam  Constitutional:       Appearance: Normal appearance.   HENT:      Head: Normocephalic.      Mouth/Throat:      Mouth: Mucous membranes are moist.   Eyes:       Extraocular Movements: Extraocular movements intact.      Pupils: Pupils are equal, round, and reactive to light.   Cardiovascular:      Rate and Rhythm: Regular rhythm. Bradycardia present.      Pulses: Normal pulses.   Pulmonary:      Effort: Pulmonary effort is normal.      Breath sounds: Normal breath sounds.   Abdominal:      Palpations: Abdomen is soft.   Musculoskeletal:         General: No swelling. Normal range of motion.   Skin:     General: Skin is warm and dry.      Comments: Midline Sternotomy JACOB with No Signs of Bleed/Infection    Neurological:      General: No focal deficit present.      Mental Status: She is alert and oriented to person, place, and time.   Psychiatric:         Mood and Affect: Mood normal.         Behavior: Behavior normal.       Home Medications:   Current Facility-Administered Medications on File Prior to Encounter   Medication Dose Route Frequency Provider Last Rate Last Admin    0.9%  NaCl infusion   Intravenous On Call Procedure Vijay Uribe MD        diazePAM tablet 10 mg  10 mg Oral On Call Procedure Vijay Uribe MD   10 mg at 09/06/23 1021    diphenhydrAMINE capsule 50 mg  50 mg Oral On Call Procedure Vijay Uribe MD   50 mg at 09/06/23 1021     Current Outpatient Medications on File Prior to Encounter   Medication Sig Dispense Refill    benazepriL (LOTENSIN) 20 MG tablet Take 20 mg by mouth once daily.      dapagliflozin (FARXIGA) 10 mg tablet Take 10 mg by mouth once daily.      dexlansoprazole (DEXILANT) 60 mg capsule Take by mouth once daily.      DUPIXENT SYRINGE 300 mg/2 mL Syrg Inject 300 mg into the skin every 14 (fourteen) days.      famotidine (PEPCID) 40 MG tablet Take 40 mg by mouth every evening.      fluticasone-umeclidin-vilanter (TRELEGY ELLIPTA) 200-62.5-25 mcg inhaler Inhale 1 puff into the lungs once daily.      glimepiride (AMARYL) 2 MG tablet Take 4 mg by mouth 2 (two) times a day.      immun glob G,IgG,/pro/IgA 0-50 (HIZENTRA SUBQ) Inject into the  skin every 14 (fourteen) days.      lipase-protease-amylase 24,000-76,000-120,000 units (CREON) 24,000-76,000 -120,000 unit capsule Take 1-2 capsules by mouth 3 (three) times daily with meals. 2 caps with meals and 1 cap c snacks      metFORMIN (GLUCOPHAGE) 1000 MG tablet Take 1,000 mg by mouth 2 (two) times daily with meals.      metoprolol succinate (TOPROL-XL) 25 MG 24 hr tablet Take 25 mg by mouth once daily.      rivaroxaban (XARELTO) 20 mg Tab Take 20 mg by mouth daily with dinner or evening meal.      timoloL (BETIMOL) 0.5 % ophthalmic solution Place 1 drop into both eyes 2 (two) times daily.      azelastine (ASTELIN) 137 mcg (0.1 %) nasal spray 2 sprays by Nasal route 2 (two) times daily.       Current Inpatient Medications:    Current Facility-Administered Medications:     acetaminophen oral solution 650 mg, 650 mg, Per OG tube, Q6H PRN, Sebastian Jones PA-C    albumin human 5% bottle 12.5 g, 12.5 g, Intravenous, PRN, Sebastian Jones PA-C, Stopped at 09/20/23 2347    amiodarone 360 mg/200 mL (1.8 mg/mL) infusion, 0.5 mg/min, Intravenous, Continuous, Mitchell Petersen ANP    amiodarone in dextrose 150 mg/100 mL (1.5 mg/mL) loading dose 150 mg, 150 mg, Intravenous, Once, Mitchell Petersen ANP    aspirin EC tablet 81 mg, 81 mg, Oral, Daily, Sebastian Jones PA-C, 81 mg at 09/22/23 0849    atorvastatin tablet 40 mg, 40 mg, Oral, Daily, Mitchell Petersen ANP    azelastine 137 mcg (0.1 %) nasal spray 274 mcg, 2 spray, Nasal, BID, Sebastian Jones PA-C    calcium gluconate 1 g in NS IVPB (premixed), 1 g, Intravenous, PRN, Sebastian Jones PA-C    calcium gluconate 1 g in NS IVPB (premixed), 2 g, Intravenous, PRN, Sebastian Jonse PA-C    calcium gluconate 1 g in NS IVPB (premixed), 3 g, Intravenous, PRN, Sebastian Jones PA-C    dextrose 10% bolus 125 mL 125 mL, 12.5 g, Intravenous, PRN, Steven Rosales IV, MD    dextrose 10% bolus 250 mL 250 mL, 25 g, Intravenous, PRN, Steven Rosales IV, MD    dextrose 5  % and 0.45 % NaCl infusion, , Intravenous, Continuous, Sebastian Jones PA-C, Stopped at 09/21/23 1029    docusate sodium capsule 100 mg, 100 mg, Oral, BID, Sebastian Jones PA-C, 100 mg at 09/23/23 2022    DOPamine 800 mg in dextrose 5 % 250 mL infusion (premix) (NON-TITRATING), 5 mcg/kg/min, Intravenous, Continuous, Mitchell Petersen, ANP, Last Rate: 9.7 mL/hr at 09/23/23 0854, 5 mcg/kg/min at 09/23/23 0854    enoxaparin injection 40 mg, 40 mg, Subcutaneous, Daily, Sebastian Jones PA-C, 40 mg at 09/23/23 1700    famotidine tablet 20 mg, 20 mg, Oral, QHS, Steven Rosales IV, MD, 20 mg at 09/23/23 2022    fluticasone furoate-vilanteroL 200-25 mcg/dose diskus inhaler 1 puff, 1 puff, Inhalation, Daily, 1 puff at 09/23/23 1818 **AND** umeclidinium 62.5 mcg/actuation inhalation capsule 62.5 mcg, 1 capsule, Inhalation, Daily, Zoya Bowman, GOPI, 62.5 mcg at 09/23/23 1818    folic acid tablet 1 mg, 1 mg, Oral, Daily, Sebastian Jones PA-C, 1 mg at 09/22/23 0849    glimepiride tablet 4 mg, 4 mg, Oral, BID, Sebastian Jones PA-C, 4 mg at 09/22/23 2123    glucagon (human recombinant) injection 1 mg, 1 mg, Intramuscular, PRN, Steven Rosales IV, MD    glucose chewable tablet 16 g, 16 g, Oral, PRN, Steven Rosales IV, MD    glucose chewable tablet 24 g, 24 g, Oral, PRN, Steven Rosales IV, MD    HYDROcodone-acetaminophen 5-325 mg per tablet 1 tablet, 1 tablet, Oral, Q4H PRN, Sebastian Jones PA-C, 1 tablet at 09/21/23 1542    insulin aspart U-100 injection 0-10 Units, 0-10 Units, Subcutaneous, QID (AC + HS) PRN, Steven Rosales IV, MD, 2 Units at 09/22/23 1146    insulin regular in 0.9 % NaCl 100 unit/100 mL (1 unit/mL) infusion, 0-52 Units/hr, Intravenous, Continuous, Sebastian Jones PA-C, Stopped at 09/21/23 1028    ketorolac injection 15 mg, 15 mg, Intravenous, Q6H PRN, Petey Phelps PA, 15 mg at 09/21/23 2345    lactulose 10 gram/15 ml solution 20 g, 20 g, Oral, Q6H PRN, Sebastian Jones PA-C     lipase-protease-amylase 12,000-38,000-60,000 units per capsule 4 capsule, 4 capsule, Oral, TID WM, Sebastian Jones PA-C, 4 capsule at 09/22/23 1715    lisinopriL tablet 20 mg, 20 mg, Oral, Daily, Sebastian Jones PA-MADELYN, 20 mg at 09/22/23 0850    loperamide capsule 2 mg, 2 mg, Oral, Continuous PRN, Sebastian Jones PA-C    magnesium sulfate 2g in water 50mL IVPB (premix), 2 g, Intravenous, PRN, Sebastian Jones PA-C    magnesium sulfate 2g in water 50mL IVPB (premix), 4 g, Intravenous, PRN, Sebastian Jones PA-C    metFORMIN tablet 1,000 mg, 1,000 mg, Oral, BID WM, Sebastian Jones PA-C, 1,000 mg at 09/22/23 1719    metoclopramide HCl injection 5 mg, 5 mg, Intravenous, Q6H PRN, Sebastian Jones PA-C    morphine injection 4 mg, 4 mg, Intravenous, Q4H PRN, Sebastian Jones PA-C, 4 mg at 09/22/23 1253    mupirocin 2 % ointment, , Nasal, BID, Sebastian Jones PA-C, Given at 09/23/23 2032    ondansetron injection 4 mg, 4 mg, Intravenous, Q4H PRN, Sebastian Jones PA-C, 4 mg at 09/23/23 0827    oxyCODONE immediate release tablet Tab 10 mg, 10 mg, Oral, Q4H PRN, Sebastian Jones PA-C, 10 mg at 09/21/23 0507    potassium chloride 20 mEq in 100 mL IVPB (FOR CENTRAL LINE ADMINISTRATION ONLY), 20 mEq, Intravenous, PRN, Sebastian Jones PA-C    potassium chloride 20 mEq in 100 mL IVPB (FOR CENTRAL LINE ADMINISTRATION ONLY), 40 mEq, Intravenous, PRN, Sebastian Jones PA-C    potassium chloride 20 mEq in 100 mL IVPB (FOR CENTRAL LINE ADMINISTRATION ONLY), 60 mEq, Intravenous, PRN, Sebastian Jones PA-C    sodium phosphate 15 mmol in dextrose 5 % (D5W) 250 mL IVPB, 15 mmol, Intravenous, PRN, Sebastian Jones PA-C    sodium phosphate 20.01 mmol in dextrose 5 % (D5W) 250 mL IVPB, 20.01 mmol, Intravenous, PRN, Sebastian Jones PA-C    sodium phosphate 30 mmol in dextrose 5 % (D5W) 250 mL IVPB, 30 mmol, Intravenous, PRN, Sebastian Jones PA-C    sucralfate tablet 1 g, 1 g, Oral, QID (AC & HS), Sebastian Jones PA-C, 1  g at 09/23/23 2022    sulfamethoxazole-trimethoprim 800-160mg per tablet 1 tablet, 1 tablet, Oral, BID, Sebastian Jones PA-C, 1 tablet at 09/23/23 2022    timolol maleate 0.5% ophthalmic solution 1 drop, 1 drop, Both Eyes, BID, Steven Rosales IV, MD, 1 drop at 09/23/23 2022    Facility-Administered Medications Ordered in Other Encounters:     0.9%  NaCl infusion, , Intravenous, On Call Procedure, Vijay Uribe MD    diazePAM tablet 10 mg, 10 mg, Oral, On Call Procedure, Vijay Uribe MD, 10 mg at 09/06/23 1021    diphenhydrAMINE capsule 50 mg, 50 mg, Oral, On Call Procedure, Vijay Uribe MD, 50 mg at 09/06/23 1021    VTE Risk Mitigation (From admission, onward)           Ordered     enoxaparin injection 40 mg  Daily         09/20/23 1058     Place sequential compression device  Until discontinued         09/20/23 1058     IP VTE HIGH RISK PATIENT  Once         09/20/23 1034                  Assessment:   SSS - Now SR    - Initial Rhythm: Junctional Bradycardia (30s-40s) - Symptomatic: Dizziness/Nausea; Dopamine with Rapid Increase in SBP (190s); Dopamine Discontinued    - After Bradycardic Rhythm PT because Tachycardiac - PAF/RVR    - Short Time Period of PAF/RVR with Spontaneous Conversion to Junctional Bradycardia  Post Operative PAF with RVR - Spontaneous Conversion to Junctional Bradycardia      - s/p Ligation of SUKHI (9.20.23) - Endostapler      - CHADsVASc - 5 Points - 7.2% Stroke Risk per Year       - [facility] (9.6.23) - Normal Coronaries   VHD - MS    - s/p (9.20.23) - Bio Mosaic 25mm MVR  Hx of DVT x 2 (OP Xarelto)  DM II  Obesity  Common Variable Immunodeficiency  GERD  COPD without Exacerbation  ABEL/CPAP  Glaucoma  No Hx of GIB     Plan:   No BBs Secondary to Bradycardia   Continue ASA, Statin and ACEi  Encourage Q1HR IS and Aggressive Mobilization (Consult PT/OT)  Observe for Chronotropic Response when Ambulating  Hold off on PPM Implant at this time (PT does not need BB as she has Normal  Coronaries)  CPAP QHS/Nap Time  Will Continue to Follow  EP Consult in AM for PPM Implant Recommendations   Labs and EKG in AM: CBC, CMP and Mg    GOPI Zuleta  Cardiology  [facility]  09/24/2023    Physician addendum:  I have seen and examined this patient as a split-shared visit with the JACQUELYN d/t complicated medical management of above problems written in assessment and high acuity requiring physician expertise in medical decision-making. I performed the substantive portion of the history and exam. Above medical decision-making is also formulated by me.    Cardiovascular exam:  S1, S2  Lungs:  Fine crackles at bases.  Extremities:  1+ Edema bilaterally    Plan:  Patient has tachy-paige/sick sinus syndrome.  EP consult in a.m. for pacemaker implantation      Calvin Ocasio MD  Cardiologist

## 2023-09-25 PROBLEM — I49.5 SSS (SICK SINUS SYNDROME): Status: ACTIVE | Noted: 2023-09-25

## 2023-09-25 LAB
ALBUMIN SERPL-MCNC: 2.9 G/DL (ref 3.4–4.8)
ALBUMIN/GLOB SERPL: 1 RATIO (ref 1.1–2)
ALP SERPL-CCNC: 49 UNIT/L (ref 40–150)
ALT SERPL-CCNC: 16 UNIT/L (ref 0–55)
AST SERPL-CCNC: 19 UNIT/L (ref 5–34)
BASOPHILS # BLD AUTO: 0.03 X10(3)/MCL
BASOPHILS NFR BLD AUTO: 0.5 %
BILIRUB SERPL-MCNC: 0.8 MG/DL
BUN SERPL-MCNC: 29.1 MG/DL (ref 9.8–20.1)
CALCIUM SERPL-MCNC: 8.4 MG/DL (ref 8.4–10.2)
CHLORIDE SERPL-SCNC: 111 MMOL/L (ref 98–107)
CO2 SERPL-SCNC: 18 MMOL/L (ref 23–31)
CREAT SERPL-MCNC: 0.81 MG/DL (ref 0.55–1.02)
EOSINOPHIL # BLD AUTO: 0.2 X10(3)/MCL (ref 0–0.9)
EOSINOPHIL NFR BLD AUTO: 3.1 %
ERYTHROCYTE [DISTWIDTH] IN BLOOD BY AUTOMATED COUNT: 15.1 % (ref 11.5–17)
GFR SERPLBLD CREATININE-BSD FMLA CKD-EPI: >60 MLS/MIN/1.73/M2
GLOBULIN SER-MCNC: 2.8 GM/DL (ref 2.4–3.5)
GLUCOSE SERPL-MCNC: 179 MG/DL (ref 82–115)
HCT VFR BLD AUTO: 36.8 % (ref 37–47)
HGB BLD-MCNC: 12 G/DL (ref 12–16)
IMM GRANULOCYTES # BLD AUTO: 0.01 X10(3)/MCL (ref 0–0.04)
IMM GRANULOCYTES NFR BLD AUTO: 0.2 %
LYMPHOCYTES # BLD AUTO: 1.56 X10(3)/MCL (ref 0.6–4.6)
LYMPHOCYTES NFR BLD AUTO: 24.3 %
MAGNESIUM SERPL-MCNC: 2.4 MG/DL (ref 1.6–2.6)
MCH RBC QN AUTO: 32.6 PG (ref 27–31)
MCHC RBC AUTO-ENTMCNC: 32.6 G/DL (ref 33–36)
MCV RBC AUTO: 100 FL (ref 80–94)
MONOCYTES # BLD AUTO: 0.85 X10(3)/MCL (ref 0.1–1.3)
MONOCYTES NFR BLD AUTO: 13.3 %
NEUTROPHILS # BLD AUTO: 3.76 X10(3)/MCL (ref 2.1–9.2)
NEUTROPHILS NFR BLD AUTO: 58.6 %
NRBC BLD AUTO-RTO: 0 %
PLATELET # BLD AUTO: 172 X10(3)/MCL (ref 130–400)
PMV BLD AUTO: 11.1 FL (ref 7.4–10.4)
POCT GLUCOSE: 150 MG/DL (ref 70–110)
POTASSIUM SERPL-SCNC: 5 MMOL/L (ref 3.5–5.1)
PROT SERPL-MCNC: 5.7 GM/DL (ref 5.8–7.6)
RBC # BLD AUTO: 3.68 X10(6)/MCL (ref 4.2–5.4)
SODIUM SERPL-SCNC: 138 MMOL/L (ref 136–145)
WBC # SPEC AUTO: 6.41 X10(3)/MCL (ref 4.5–11.5)

## 2023-09-25 PROCEDURE — C1785 PMKR, DUAL, RATE-RESP: HCPCS | Performed by: INTERNAL MEDICINE

## 2023-09-25 PROCEDURE — 33208 INSRT HEART PM ATRIAL & VENT: CPT | Mod: KX | Performed by: INTERNAL MEDICINE

## 2023-09-25 PROCEDURE — 25500020 PHARM REV CODE 255: Performed by: INTERNAL MEDICINE

## 2023-09-25 PROCEDURE — 63600175 PHARM REV CODE 636 W HCPCS: Performed by: NURSE PRACTITIONER

## 2023-09-25 PROCEDURE — 93010 EKG 12-LEAD: ICD-10-PCS | Mod: ,,, | Performed by: STUDENT IN AN ORGANIZED HEALTH CARE EDUCATION/TRAINING PROGRAM

## 2023-09-25 PROCEDURE — 63600175 PHARM REV CODE 636 W HCPCS: Performed by: INTERNAL MEDICINE

## 2023-09-25 PROCEDURE — 93010 EKG 12-LEAD: ICD-10-PCS | Mod: ,,, | Performed by: INTERNAL MEDICINE

## 2023-09-25 PROCEDURE — 25000003 PHARM REV CODE 250: Performed by: PHYSICIAN ASSISTANT

## 2023-09-25 PROCEDURE — 83735 ASSAY OF MAGNESIUM: CPT | Performed by: NURSE PRACTITIONER

## 2023-09-25 PROCEDURE — 25000242 PHARM REV CODE 250 ALT 637 W/ HCPCS: Performed by: NURSE PRACTITIONER

## 2023-09-25 PROCEDURE — 27201423 OPTIME MED/SURG SUP & DEVICES STERILE SUPPLY: Performed by: INTERNAL MEDICINE

## 2023-09-25 PROCEDURE — 63600175 PHARM REV CODE 636 W HCPCS: Performed by: SPECIALIST

## 2023-09-25 PROCEDURE — 93005 ELECTROCARDIOGRAM TRACING: CPT

## 2023-09-25 PROCEDURE — 85025 COMPLETE CBC W/AUTO DIFF WBC: CPT | Performed by: NURSE PRACTITIONER

## 2023-09-25 PROCEDURE — 25000003 PHARM REV CODE 250: Performed by: INTERNAL MEDICINE

## 2023-09-25 PROCEDURE — 25000003 PHARM REV CODE 250: Performed by: NURSE PRACTITIONER

## 2023-09-25 PROCEDURE — 80053 COMPREHEN METABOLIC PANEL: CPT | Performed by: NURSE PRACTITIONER

## 2023-09-25 PROCEDURE — 25000003 PHARM REV CODE 250: Performed by: SPECIALIST

## 2023-09-25 PROCEDURE — 99153 MOD SED SAME PHYS/QHP EA: CPT | Performed by: INTERNAL MEDICINE

## 2023-09-25 PROCEDURE — 21400001 HC TELEMETRY ROOM

## 2023-09-25 PROCEDURE — C1898 LEAD, PMKR, OTHER THAN TRANS: HCPCS | Performed by: INTERNAL MEDICINE

## 2023-09-25 PROCEDURE — 99152 MOD SED SAME PHYS/QHP 5/>YRS: CPT | Performed by: INTERNAL MEDICINE

## 2023-09-25 PROCEDURE — 93010 ELECTROCARDIOGRAM REPORT: CPT | Mod: ,,, | Performed by: STUDENT IN AN ORGANIZED HEALTH CARE EDUCATION/TRAINING PROGRAM

## 2023-09-25 PROCEDURE — 93010 ELECTROCARDIOGRAM REPORT: CPT | Mod: ,,, | Performed by: INTERNAL MEDICINE

## 2023-09-25 DEVICE — PACING LEAD
Type: IMPLANTABLE DEVICE | Site: HEART | Status: FUNCTIONAL
Brand: TENDRIL™

## 2023-09-25 DEVICE — PULSE GENERATOR
Type: IMPLANTABLE DEVICE | Site: HEART | Status: FUNCTIONAL
Brand: ASSURITY MRI™

## 2023-09-25 RX ORDER — VANCOMYCIN HYDROCHLORIDE 1 G/20ML
INJECTION, POWDER, LYOPHILIZED, FOR SOLUTION INTRAVENOUS
Status: DISCONTINUED | OUTPATIENT
Start: 2023-09-25 | End: 2023-09-25 | Stop reason: HOSPADM

## 2023-09-25 RX ORDER — FENTANYL CITRATE 50 UG/ML
INJECTION, SOLUTION INTRAMUSCULAR; INTRAVENOUS
Status: DISCONTINUED | OUTPATIENT
Start: 2023-09-25 | End: 2023-09-25 | Stop reason: HOSPADM

## 2023-09-25 RX ORDER — LIDOCAINE HYDROCHLORIDE 10 MG/ML
INJECTION INFILTRATION; PERINEURAL
Status: DISCONTINUED | OUTPATIENT
Start: 2023-09-25 | End: 2023-09-25 | Stop reason: HOSPADM

## 2023-09-25 RX ORDER — MIDAZOLAM HYDROCHLORIDE 1 MG/ML
INJECTION INTRAMUSCULAR; INTRAVENOUS
Status: DISCONTINUED | OUTPATIENT
Start: 2023-09-25 | End: 2023-09-25 | Stop reason: HOSPADM

## 2023-09-25 RX ADMIN — FLUTICASONE FUROATE AND VILANTEROL TRIFENATATE 1 PUFF: 200; 25 POWDER RESPIRATORY (INHALATION) at 08:09

## 2023-09-25 RX ADMIN — TIMOLOL MALEATE 1 DROP: 5 SOLUTION/ DROPS OPHTHALMIC at 07:09

## 2023-09-25 RX ADMIN — GLIMEPIRIDE 4 MG: 1 TABLET ORAL at 08:09

## 2023-09-25 RX ADMIN — ATORVASTATIN CALCIUM 40 MG: 40 TABLET, FILM COATED ORAL at 08:09

## 2023-09-25 RX ADMIN — PANCRELIPASE 4 CAPSULE: 60000; 12000; 38000 CAPSULE, DELAYED RELEASE PELLETS ORAL at 08:09

## 2023-09-25 RX ADMIN — GLIMEPIRIDE 4 MG: 1 TABLET ORAL at 07:09

## 2023-09-25 RX ADMIN — FAMOTIDINE 20 MG: 20 TABLET, FILM COATED ORAL at 07:09

## 2023-09-25 RX ADMIN — FOLIC ACID 1 MG: 1 TABLET ORAL at 08:09

## 2023-09-25 RX ADMIN — SUCRALFATE 1 G: 1 TABLET ORAL at 07:09

## 2023-09-25 RX ADMIN — HYDROCODONE BITARTRATE AND ACETAMINOPHEN 1 TABLET: 5; 325 TABLET ORAL at 08:09

## 2023-09-25 RX ADMIN — DOCUSATE SODIUM 100 MG: 100 CAPSULE, LIQUID FILLED ORAL at 08:09

## 2023-09-25 RX ADMIN — DOCUSATE SODIUM 100 MG: 100 CAPSULE, LIQUID FILLED ORAL at 07:09

## 2023-09-25 RX ADMIN — AMIODARONE HYDROCHLORIDE 150 MG: 1.5 INJECTION, SOLUTION INTRAVENOUS at 11:09

## 2023-09-25 RX ADMIN — ASPIRIN 81 MG: 81 TABLET, COATED ORAL at 08:09

## 2023-09-25 RX ADMIN — LISINOPRIL 20 MG: 20 TABLET ORAL at 08:09

## 2023-09-25 RX ADMIN — SULFAMETHOXAZOLE AND TRIMETHOPRIM 1 TABLET: 800; 160 TABLET ORAL at 08:09

## 2023-09-25 RX ADMIN — AMIODARONE HYDROCHLORIDE 1 MG/MIN: 1.8 INJECTION, SOLUTION INTRAVENOUS at 11:09

## 2023-09-25 RX ADMIN — TIMOLOL MALEATE 1 DROP: 5 SOLUTION/ DROPS OPHTHALMIC at 08:09

## 2023-09-25 RX ADMIN — SULFAMETHOXAZOLE AND TRIMETHOPRIM 1 TABLET: 800; 160 TABLET ORAL at 07:09

## 2023-09-25 RX ADMIN — POTASSIUM CHLORIDE, DEXTROSE MONOHYDRATE AND SODIUM CHLORIDE: 150; 5; 450 INJECTION, SOLUTION INTRAVENOUS at 01:09

## 2023-09-25 RX ADMIN — AMIODARONE HYDROCHLORIDE 0.5 MG/MIN: 1.8 INJECTION, SOLUTION INTRAVENOUS at 08:09

## 2023-09-25 RX ADMIN — UMECLIDINIUM 62.5 MCG: 62.5 AEROSOL, POWDER ORAL at 08:09

## 2023-09-25 NOTE — PROGRESS NOTES
Kortney Leach is a 77 y.o. female patient.   1. Severe mitral valve regurgitation    2. Mitral valve stenosis, unspecified etiology    3. Anticoagulated    4. Monoallelic mutation of OR2J3 gene    5. CAD (coronary artery disease)    6. Junctional rhythm    7. A-fib    8. Arrhythmia    9. Tachycardia      Past Medical History:   Diagnosis Date    Anticoagulant long-term use     Asthma     Chronic sinusitis, unspecified     COPD (chronic obstructive pulmonary disease)     CVID (common variable immunodeficiency)     Diabetes mellitus, type 2     GERD (gastroesophageal reflux disease)     Hypertension     Severe mitral valve regurgitation     Sleep apnea     uses CPAP    SOB (shortness of breath)     Unspecified glaucoma     Weakness      Past Surgical History Pertinent Negatives:   Procedure Date Noted    CARDIAC SURGERY 12/21/2022     Scheduled Meds:   aspirin  81 mg Oral Daily    atorvastatin  40 mg Oral Daily    azelastine  2 spray Nasal BID    docusate sodium  100 mg Oral BID    enoxparin  40 mg Subcutaneous Daily    famotidine  20 mg Oral QHS    fluticasone furoate-vilanteroL  1 puff Inhalation Daily    And    umeclidinium  1 capsule Inhalation Daily    folic acid  1 mg Oral Daily    glimepiride  4 mg Oral BID    lipase-protease-amylase 12,000-38,000-60,000 units  4 capsule Oral TID WM    lisinopriL  20 mg Oral Daily    metFORMIN  1,000 mg Oral BID WM    sucralfate  1 g Oral QID (AC & HS)    sulfamethoxazole-trimethoprim 800-160mg  1 tablet Oral BID    timolol maleate 0.5%  1 drop Both Eyes BID     Continuous Infusions:   dextrose 5 % and 0.45 % NaCl Stopped (09/21/23 1029)    dextrose 5 % and 0.45 % NaCl with KCl 20 mEq 75 mL/hr at 09/25/23 0144    DOPamine 5 mcg/kg/min (09/23/23 0854)    loperamide       PRN Meds:acetaminophen, albumin human 5%, calcium gluconate IVPB, calcium gluconate IVPB, calcium gluconate IVPB, dextrose 10%, dextrose 10%, glucagon (human recombinant), glucose, glucose,  "HYDROcodone-acetaminophen, insulin aspart U-100, lactulose 10 gram/15 ml, loperamide, magnesium sulfate IVPB, magnesium sulfate IVPB, metoclopramide HCl, morphine, ondansetron, oxyCODONE, potassium chloride in water, potassium chloride in water, potassium chloride in water, sodium phosphate 15 mmol in dextrose 5 % (D5W) 250 mL IVPB, sodium phosphate 20.01 mmol in dextrose 5 % (D5W) 250 mL IVPB, sodium phosphate 30 mmol in dextrose 5 % (D5W) 250 mL IVPB    Review of patient's allergies indicates:   Allergen Reactions    Adhesive tape-silicones      There are no hospital problems to display for this patient.    Blood pressure (!) 154/77, pulse 61, temperature 98.4 °F (36.9 °C), temperature source Axillary, resp. rate 16, height 5' 5.98" (1.676 m), weight 103.6 kg (228 lb 6.3 oz), SpO2 98 %, not currently breastfeeding.    Subjective:    POD #5  NPO for possible PPM  Patient recently OOB, HR 120s/130s    Objective:   HR: 128; BP: 154/77; RR: 16; 98% on 3L NC  Heart: sinus tach  Lungs: respirations nonlabored, clear  Incision: c/d/i  H/h: 12/36  Cr: 0.81    Assesment/Plan:    S/p MVR 9/20  Sick Sinus Syndrome per cardiology  D/w RN, cardiology aware  Possible PPM  Continue care            MARTHA Fernandez  9/25/2023    "

## 2023-09-25 NOTE — PROGRESS NOTES
"Inpatient Nutrition Evaluation    Admit Date: 9/20/2023   Total duration of encounter: 5 days    Nutrition Recommendation/Prescription     Advance diet as tolerated to diabetic, heart healthy diet  If appetite poor, consider appetite stimulant  RD to monitor po intake and weight    Nutrition Assessment     Chart Review    Reason Seen: length of stay    Malnutrition Screening Tool Results   Have you recently lost weight without trying?: No  Have you been eating poorly because of a decreased appetite?: No   MST Score: 0     Diagnosis:  SSS - Now A.Fib with RVR   Post Operative PAF with RVR   s/p Ligation of SUKHI and MVR (9.20.23)    Relevant Medical History: VHD s/p Bio MVR, DVT x2 (Xarelto), HTN, DM II, Obesity, Common Variable Immunodeficiency, GERD, COPD, ABEL/CPAP, Glaucoma    Nutrition-Related Medications: D5W and NaCl with KCl, colace, Pepcid, folic acid, lipase-protease-amylase, Carafate, insulin aspart prn, zofran prn     Nutrition-Related Labs: 9/25: RBC-3.68, Hct-36.8, Cl-111, BUN-29.1,glu-179      Diet Order: Diet NPO  Oral Supplement Order: none  Appetite/Oral Intake: poor/0-25% of meals  Factors Affecting Nutritional Intake: decreased appetite and NPO  Food/Mandaen/Cultural Preferences: none reported  Food Allergies: none reported    Skin Integrity: incision  Wound(s):       Comments    9/25: pt reports poor appetite since admission, good prior. Pt NPO today for possible PPM. Pt denied ONS upon diet advancement, stating thinks appetite is improving. Informed pt ONS is available upon request if needed. No GI issues reported, stating nausea improved. UBW reported 230 lb with few lb weight loss prior admission. Bed weight of 2258 lb taken today. Weight fluctuations noted per EMR weights, however weight stable.    Anthropometrics    Height: 5' 5.98" (167.6 cm) Height Method: Stated  Last Weight: 102.5 kg (225 lb 15.5 oz) (09/25/23 1204) Weight Method: Bed Scale  BMI (Calculated): 36.5  BMI Classification: " [] : No obese grade II (BMI 35-39.9)     Ideal Body Weight (IBW), Female: 129.9 lb     % Ideal Body Weight, Female (lb): 173.96 %                    Usual Body Weight (UBW), k.5 kg  % Usual Body Weight: 98.29  % Weight Change From Usual Weight: -1.91 %  Usual Weight Provided By: patient    Wt Readings from Last 5 Encounters:   23 102.5 kg (225 lb 15.5 oz)   23 100.7 kg (222 lb 0.1 oz)   23 100.2 kg (220 lb 12.8 oz)   23 104.3 kg (230 lb)   23 104.3 kg (230 lb)     Weight Change(s) Since Admission:  Admit Weight: 102.1 kg (225 lb) (23 1423)      Patient Education    Not applicable.    Monitoring & Evaluation     Dietitian will monitor food and beverage intake and weight.  Nutrition Risk/Follow-Up: low (follow-up in 5-7 days)  Patients assigned 'low nutrition risk' status do not qualify for a full nutritional assessment but will be monitored and re-evaluated in a 5-7 day time period. Please consult if re-evaluation needed sooner.

## 2023-09-25 NOTE — NURSING
Upon entering room, Pt HR in 50's. Instructed patient we needed to get up and move around. Upon getting patient out of bed to ambulate, Hr went into the 130's and 140's. Patient became dizzy, weak, and SOB with an O2 sustaining @ 98% on 2L NC. Patient was laid back down in bed with call bell in reach and felt better. HR sustained in the 130's and MD was notified.

## 2023-09-25 NOTE — CONSULTS
Inpatient consult to Cardiology  Consult performed by: Mitchell Petersen ANP  Consult ordered by: Mitchell Petersen ANP  Reason for consult: SSS/Recommendations for PPM Implant        [facility]    Cardiology  EP Consult Note    Patient Name: Kortney Leach  MRN: [id number]  Admission Date: 9/20/2023  Hospital Length of Stay: 5 days  Code Status: Full Code   Attending Provider: Steven Rosales IV, MD   Consulting Provider: GOPI Zuleta  Primary Care Physician: Arnaldo Hernandez MD  Principal Problem:<principal problem not specified>    Patient information was obtained from patient, past medical records, and ER records.     Subjective:     Chief Complaint: Reason for Consult: SSS/Recommendations for PPM Implant    Ms. Leach is a 78 y/o female who is known to [facility], Dr. Uribe. The patient was recently worked up for CP/SOB and a Murmur (MS - Mild to Moderate). She was found to have Normal Coronaries and was referred to Dr. Rosales for Surgical Evaluation of Mitral Stenosis. She was deemed a candidate and on 9.20.23 she underwent a Sternotomy with a 25mm Mosaic Porcine Valve Placement as well as a Ligation of her SUKHI. She tolerated the procedure well and was monitored in ICU post operatively. She did have some Junctional Bradycardia and her Epicardial Wires were utilized for Pacing. The Bradycardia resolved and the Epicardial Wires were pulled. She was transferred to the Telemetry Unit for further monitoring. She developed a Bradycardic Rhythm on Telemetry and an EKG confirmed a Junctional Bradycardic Rhythm with Borderline BP. She was symptomatic with Nausea and Dizziness. [facility] was consulted for Bradycardia. She was started on IV Dopamine for HR/BP Support, however, shortly after she was placed on this Non-Titrating Dose she became more Nauseated and Hypertensive (SBP 190s). The Dopamine Infusion was Discontinued and her SBP Improved. She became Tachycardic and an EKG revealed PAF/RVR. She  spontaneously converted to Junctional Bradycardia without intervention. She has has several episodes of PAF/RVR with Spontaneous Conversion to Junctional Bradycardia. EP has been consulted for Recommendations on PPM Implant secondary to SSS. At the time of the Consultation she is noted to be in A.Fib with RVR.      PMH: VHD (MS) s/p Bio MVR, Hx of DVT x2 (Xarelto), HTN, DM II, Obesity, Common Variable Immunodeficiency, GERD, COPD, ABEL/CPAP, Glaucoma  PSH: Appendectomy, Cataract Extractions, Colonoscopy, EGD, Angiogram, Tonsillectomy, TKR/Bilaterally, Sternotomy/Bio MVR (9.20.23) 25mm Mosaic Porcine Valve and Ligation of SUKHI  Family History: Father, D, Lung Cancer; Mother, D, CVA, Alzheimer's Disease; Sister, L, HTN, Multiple Sclerosis, DM II  Social History: Denies Illicit Drug, ETOH and Tobacco Use; Former Smoker, Quit Years Prior      Previous Cardiac Diagnostics:   Limited ECHO 9.23.23:   LV Function is Approximately 55%     [facility] 9.6.23:  Normal Coronaries      ECHO 8.2.23:  The study quality is average.   The left ventricle is decreased in size. Global left ventricular systolic function is normal. The left ventricular ejection fraction is 60%. Mild to moderate concentric left ventricular hypertrophy is present.   Mitral stenosis is mild to moderate . The area by pressure half time is 2.0 cm². The mean trans mitral gradient is 6.1 mmHg. Mild (1+) mitral regurgitation.  Mild calcification of the aortic valve is noted with adequate cuspal excursion.   Mild (1+) tricuspid regurgitation.   The pulmonary artery systolic pressure is 23 mmHg. The pulmonary artery appears to be normal.     MPI 1.24.23:  This is a normal perfusion study, no perfusion defects noted. There is no evidence of ischemia.   This scan is suggestive of low risk for future cardiovascular events.   The left ventricular cavity is noted to be normal on the stress studies. The stress left ventricular ejection fraction was calculated to be 68% and left  ventricular global function is normal. The rest left ventricular cavity is noted to be normal. The rest left ventricular ejection fraction was calculated to be 63% and rest left ventricular global function is normal.   When compared to the resting ejection fraction (63%), the stress ejection fraction (68%) has increased.   Transient ischemic dilatation is present and has been described as a marker for high risk coronary artery disease. It has also been described in microvascular disease, hypertensive heart disease as well as cardiac deconditioning.   The study quality is good.   There was a rise in myocardial blood flow between rest and stress.  Global myocardial blood flow reserve was 2.72.  Normal global coronary flow reserve is suggestive of low coronary event risk.     Carotid US 1.24.23:  The study quality is good.   There is no plaque noted in the proximal right internal carotid artery.   1-39% stenosis in the proximal left internal carotid artery based on Bluth Criteria.   Antegrade right vertebral artery flow.   Antegrade left vertebral artery flow.    Review of patient's allergies indicates:   Allergen Reactions    Adhesive tape-silicones      Current Facility-Administered Medications on File Prior to Encounter   Medication    0.9%  NaCl infusion    diazePAM tablet 10 mg    diphenhydrAMINE capsule 50 mg     Current Outpatient Medications on File Prior to Encounter   Medication Sig    benazepriL (LOTENSIN) 20 MG tablet Take 20 mg by mouth once daily.    dapagliflozin (FARXIGA) 10 mg tablet Take 10 mg by mouth once daily.    dexlansoprazole (DEXILANT) 60 mg capsule Take by mouth once daily.    DUPIXENT SYRINGE 300 mg/2 mL Syrg Inject 300 mg into the skin every 14 (fourteen) days.    famotidine (PEPCID) 40 MG tablet Take 40 mg by mouth every evening.    fluticasone-umeclidin-vilanter (TRELEGY ELLIPTA) 200-62.5-25 mcg inhaler Inhale 1 puff into the lungs once daily.    glimepiride (AMARYL) 2 MG tablet Take 4 mg  by mouth 2 (two) times a day.    immun glob G,IgG,/pro/IgA 0-50 (HIZENTRA SUBQ) Inject into the skin every 14 (fourteen) days.    lipase-protease-amylase 24,000-76,000-120,000 units (CREON) 24,000-76,000 -120,000 unit capsule Take 1-2 capsules by mouth 3 (three) times daily with meals. 2 caps with meals and 1 cap c snacks    metFORMIN (GLUCOPHAGE) 1000 MG tablet Take 1,000 mg by mouth 2 (two) times daily with meals.    metoprolol succinate (TOPROL-XL) 25 MG 24 hr tablet Take 25 mg by mouth once daily.    rivaroxaban (XARELTO) 20 mg Tab Take 20 mg by mouth daily with dinner or evening meal.    timoloL (BETIMOL) 0.5 % ophthalmic solution Place 1 drop into both eyes 2 (two) times daily.    azelastine (ASTELIN) 137 mcg (0.1 %) nasal spray 2 sprays by Nasal route 2 (two) times daily.     Review of Systems   Constitutional:  Positive for fatigue. Negative for fever.   Respiratory:  Negative for shortness of breath.    Cardiovascular:  Positive for chest pain and palpitations. Negative for leg swelling.        Incisional CP   All other systems reviewed and are negative.    Objective:     Vital Signs (Most Recent):  Temp: 98.4 °F (36.9 °C) (09/25/23 0729)  Pulse: 61 (09/25/23 0859)  Resp: 16 (09/25/23 0859)  BP: (!) 154/77 (09/25/23 0729)  SpO2: 98 % (09/25/23 0729) Vital Signs (24h Range):  Temp:  [97.7 °F (36.5 °C)-98.5 °F (36.9 °C)] 98.4 °F (36.9 °C)  Pulse:  [] 61  Resp:  [16-18] 16  SpO2:  [92 %-99 %] 98 %  BP: (106-154)/(51-96) 154/77     Weight: 103.6 kg (228 lb 6.3 oz)  Body mass index is 36.88 kg/m².    SpO2: 98 %         Intake/Output Summary (Last 24 hours) at 9/25/2023 1050  Last data filed at 9/25/2023 0500  Gross per 24 hour   Intake 480 ml   Output 1250 ml   Net -770 ml     Lines/Drains/Airways       Peripheral Intravenous Line  Duration                  Peripheral IV - Single Lumen 09/24/23 1300 18 G Anterior;Distal;Left Upper Arm <1 day                  Significant Labs:  Recent Results (from the past  72 hour(s))   POCT glucose    Collection Time: 09/22/23 11:25 AM   Result Value Ref Range    POCT Glucose 161 (H) 70 - 110 mg/dL   POCT glucose    Collection Time: 09/22/23  5:21 PM   Result Value Ref Range    POCT Glucose 160 (H) 70 - 110 mg/dL   CBC Without Differential    Collection Time: 09/23/23  9:26 AM   Result Value Ref Range    WBC 10.27 4.50 - 11.50 x10(3)/mcL    RBC 4.00 (L) 4.20 - 5.40 x10(6)/mcL    Hgb 13.0 12.0 - 16.0 g/dL    Hct 39.1 37.0 - 47.0 %    MCV 97.8 (H) 80.0 - 94.0 fL    MCH 32.5 (H) 27.0 - 31.0 pg    MCHC 33.2 33.0 - 36.0 g/dL    RDW 14.7 11.5 - 17.0 %    Platelet 132 130 - 400 x10(3)/mcL    MPV 11.6 (H) 7.4 - 10.4 fL    NRBC% 0.0 %   Echo Saline Bubble? No    Collection Time: 09/23/23 10:36 AM   Result Value Ref Range    BSA 2.2 m2    Quevedo's Biplane MOD Ejection Fraction 56 %    LVIDd 4.12 3.5 - 6.0 cm    LV Systolic Volume 18.10 mL    LV Systolic Volume Index 8.5 mL/m2    LVIDs 2.30 2.1 - 4.0 cm    LV Diastolic Volume 75.10 mL    LV Diastolic Volume Index 35.42 mL/m2    IVS 1.42 (A) 0.6 - 1.1 cm    FS 44 28 - 44 %    Left Ventricle Relative Wall Thickness 0.69 cm    Posterior Wall 1.43 (A) 0.6 - 1.1 cm    LV mass 224.14 g    LV Mass Index 106 g/m2    MV mean gradient 6 mmHg    MV peak gradient 20 mmHg    MV VTI 90.1 cm    ZLVIDS -4.55     ZLVIDD -4.86    Comprehensive Metabolic Panel    Collection Time: 09/23/23 10:46 AM   Result Value Ref Range    Sodium Level 131 (L) 136 - 145 mmol/L    Potassium Level 5.6 (H) 3.5 - 5.1 mmol/L    Chloride 103 98 - 107 mmol/L    Carbon Dioxide 15 (L) 23 - 31 mmol/L    Glucose Level 133 (H) 82 - 115 mg/dL    Blood Urea Nitrogen 33.4 (H) 9.8 - 20.1 mg/dL    Creatinine 1.45 (H) 0.55 - 1.02 mg/dL    Calcium Level Total 7.9 (L) 8.4 - 10.2 mg/dL    Protein Total 6.2 5.8 - 7.6 gm/dL    Albumin Level 3.2 (L) 3.4 - 4.8 g/dL    Globulin 3.0 2.4 - 3.5 gm/dL    Albumin/Globulin Ratio 1.1 1.1 - 2.0 ratio    Bilirubin Total 0.9 <=1.5 mg/dL    Alkaline Phosphatase  48 40 - 150 unit/L    Alanine Aminotransferase 21 0 - 55 unit/L    Aspartate Aminotransferase 36 (H) 5 - 34 unit/L    eGFR 37 mls/min/1.73/m2   Comprehensive Metabolic Panel    Collection Time: 09/24/23  4:32 AM   Result Value Ref Range    Sodium Level 138 136 - 145 mmol/L    Potassium Level 4.4 3.5 - 5.1 mmol/L    Chloride 108 (H) 98 - 107 mmol/L    Carbon Dioxide 16 (L) 23 - 31 mmol/L    Glucose Level 129 (H) 82 - 115 mg/dL    Blood Urea Nitrogen 38.6 (H) 9.8 - 20.1 mg/dL    Creatinine 1.15 (H) 0.55 - 1.02 mg/dL    Calcium Level Total 8.2 (L) 8.4 - 10.2 mg/dL    Protein Total 5.6 (L) 5.8 - 7.6 gm/dL    Albumin Level 2.9 (L) 3.4 - 4.8 g/dL    Globulin 2.7 2.4 - 3.5 gm/dL    Albumin/Globulin Ratio 1.1 1.1 - 2.0 ratio    Bilirubin Total 1.0 <=1.5 mg/dL    Alkaline Phosphatase 48 40 - 150 unit/L    Alanine Aminotransferase 17 0 - 55 unit/L    Aspartate Aminotransferase 19 5 - 34 unit/L    eGFR 49 mls/min/1.73/m2   Magnesium    Collection Time: 09/24/23  4:32 AM   Result Value Ref Range    Magnesium Level 2.50 1.60 - 2.60 mg/dL   CBC with Differential    Collection Time: 09/24/23  4:32 AM   Result Value Ref Range    WBC 7.05 4.50 - 11.50 x10(3)/mcL    RBC 3.67 (L) 4.20 - 5.40 x10(6)/mcL    Hgb 12.1 12.0 - 16.0 g/dL    Hct 36.1 (L) 37.0 - 47.0 %    MCV 98.4 (H) 80.0 - 94.0 fL    MCH 33.0 (H) 27.0 - 31.0 pg    MCHC 33.5 33.0 - 36.0 g/dL    RDW 15.1 11.5 - 17.0 %    Platelet 179 130 - 400 x10(3)/mcL    MPV 12.3 (H) 7.4 - 10.4 fL    Neut % 60.6 %    Lymph % 24.0 %    Mono % 12.5 %    Eos % 2.4 %    Basophil % 0.1 %    Lymph # 1.69 0.6 - 4.6 x10(3)/mcL    Neut # 4.27 2.1 - 9.2 x10(3)/mcL    Mono # 0.88 0.1 - 1.3 x10(3)/mcL    Eos # 0.17 0 - 0.9 x10(3)/mcL    Baso # 0.01 <=0.2 x10(3)/mcL    IG# 0.03 0 - 0.04 x10(3)/mcL    IG% 0.4 %    NRBC% 0.0 %   POCT glucose    Collection Time: 09/24/23 11:24 AM   Result Value Ref Range    POCT Glucose 136 (H) 70 - 110 mg/dL   POCT glucose    Collection Time: 09/24/23  5:19 PM   Result  Value Ref Range    POCT Glucose 179 (H) 70 - 110 mg/dL   POCT glucose    Collection Time: 09/24/23  7:58 PM   Result Value Ref Range    POCT Glucose 137 (H) 70 - 110 mg/dL   Comprehensive Metabolic Panel    Collection Time: 09/25/23  5:07 AM   Result Value Ref Range    Sodium Level 138 136 - 145 mmol/L    Potassium Level 5.0 3.5 - 5.1 mmol/L    Chloride 111 (H) 98 - 107 mmol/L    Carbon Dioxide 18 (L) 23 - 31 mmol/L    Glucose Level 179 (H) 82 - 115 mg/dL    Blood Urea Nitrogen 29.1 (H) 9.8 - 20.1 mg/dL    Creatinine 0.81 0.55 - 1.02 mg/dL    Calcium Level Total 8.4 8.4 - 10.2 mg/dL    Protein Total 5.7 (L) 5.8 - 7.6 gm/dL    Albumin Level 2.9 (L) 3.4 - 4.8 g/dL    Globulin 2.8 2.4 - 3.5 gm/dL    Albumin/Globulin Ratio 1.0 (L) 1.1 - 2.0 ratio    Bilirubin Total 0.8 <=1.5 mg/dL    Alkaline Phosphatase 49 40 - 150 unit/L    Alanine Aminotransferase 16 0 - 55 unit/L    Aspartate Aminotransferase 19 5 - 34 unit/L    eGFR >60 mls/min/1.73/m2   Magnesium    Collection Time: 09/25/23  5:07 AM   Result Value Ref Range    Magnesium Level 2.40 1.60 - 2.60 mg/dL   CBC with Differential    Collection Time: 09/25/23  5:07 AM   Result Value Ref Range    WBC 6.41 4.50 - 11.50 x10(3)/mcL    RBC 3.68 (L) 4.20 - 5.40 x10(6)/mcL    Hgb 12.0 12.0 - 16.0 g/dL    Hct 36.8 (L) 37.0 - 47.0 %    .0 (H) 80.0 - 94.0 fL    MCH 32.6 (H) 27.0 - 31.0 pg    MCHC 32.6 (L) 33.0 - 36.0 g/dL    RDW 15.1 11.5 - 17.0 %    Platelet 172 130 - 400 x10(3)/mcL    MPV 11.1 (H) 7.4 - 10.4 fL    Neut % 58.6 %    Lymph % 24.3 %    Mono % 13.3 %    Eos % 3.1 %    Basophil % 0.5 %    Lymph # 1.56 0.6 - 4.6 x10(3)/mcL    Neut # 3.76 2.1 - 9.2 x10(3)/mcL    Mono # 0.85 0.1 - 1.3 x10(3)/mcL    Eos # 0.20 0 - 0.9 x10(3)/mcL    Baso # 0.03 <=0.2 x10(3)/mcL    IG# 0.01 0 - 0.04 x10(3)/mcL    IG% 0.2 %    NRBC% 0.0 %     EKG:    Results for orders placed or performed during the hospital encounter of 09/20/23   EKG 12-lead    Narrative    Test Reason :  I25.10,    Vent. Rate : 054 BPM     Atrial Rate : 054 BPM     P-R Int : 132 ms          QRS Dur : 078 ms      QT Int : 450 ms       P-R-T Axes : 048 040 037 degrees     QTc Int : 426 ms    Sinus bradycardia  Otherwise normal ECG  Confirmed by Sebastian Watters MD ([id number]) on 9/25/2023 9:05:17 AM    Referred By: EDVIN SALTER           Confirmed By:Sebastian Watters MD         Telemetry: Now PAF/RVR    Physical Exam  Constitutional:       Appearance: Normal appearance.   HENT:      Head: Normocephalic.      Mouth/Throat:      Mouth: Mucous membranes are moist.   Eyes:      Extraocular Movements: Extraocular movements intact.      Pupils: Pupils are equal, round, and reactive to light.   Cardiovascular:      Rate and Rhythm: Tachycardia present. Rhythm irregular.      Pulses: Normal pulses.   Pulmonary:      Effort: Pulmonary effort is normal.      Breath sounds: Normal breath sounds.   Abdominal:      Palpations: Abdomen is soft.   Musculoskeletal:         General: No swelling. Normal range of motion.   Skin:     General: Skin is warm and dry.      Comments: Midline Sternotomy JACOB with No Signs of Bleed/Infection    Neurological:      General: No focal deficit present.      Mental Status: She is alert and oriented to person, place, and time.   Psychiatric:         Mood and Affect: Mood normal.         Behavior: Behavior normal.       Home Medications:   Current Facility-Administered Medications on File Prior to Encounter   Medication Dose Route Frequency Provider Last Rate Last Admin    0.9%  NaCl infusion   Intravenous On Call Procedure Vijay Uribe MD        diazePAM tablet 10 mg  10 mg Oral On Call Procedure Vijay Uribe MD   10 mg at 09/06/23 1021    diphenhydrAMINE capsule 50 mg  50 mg Oral On Call Procedure Vijay Uribe MD   50 mg at 09/06/23 1021     Current Outpatient Medications on File Prior to Encounter   Medication Sig Dispense Refill    benazepriL (LOTENSIN) 20 MG tablet Take 20 mg by mouth once daily.       dapagliflozin (FARXIGA) 10 mg tablet Take 10 mg by mouth once daily.      dexlansoprazole (DEXILANT) 60 mg capsule Take by mouth once daily.      DUPIXENT SYRINGE 300 mg/2 mL Syrg Inject 300 mg into the skin every 14 (fourteen) days.      famotidine (PEPCID) 40 MG tablet Take 40 mg by mouth every evening.      fluticasone-umeclidin-vilanter (TRELEGY ELLIPTA) 200-62.5-25 mcg inhaler Inhale 1 puff into the lungs once daily.      glimepiride (AMARYL) 2 MG tablet Take 4 mg by mouth 2 (two) times a day.      immun glob G,IgG,/pro/IgA 0-50 (HIZENTRA SUBQ) Inject into the skin every 14 (fourteen) days.      lipase-protease-amylase 24,000-76,000-120,000 units (CREON) 24,000-76,000 -120,000 unit capsule Take 1-2 capsules by mouth 3 (three) times daily with meals. 2 caps with meals and 1 cap c snacks      metFORMIN (GLUCOPHAGE) 1000 MG tablet Take 1,000 mg by mouth 2 (two) times daily with meals.      metoprolol succinate (TOPROL-XL) 25 MG 24 hr tablet Take 25 mg by mouth once daily.      rivaroxaban (XARELTO) 20 mg Tab Take 20 mg by mouth daily with dinner or evening meal.      timoloL (BETIMOL) 0.5 % ophthalmic solution Place 1 drop into both eyes 2 (two) times daily.      azelastine (ASTELIN) 137 mcg (0.1 %) nasal spray 2 sprays by Nasal route 2 (two) times daily.       Current Inpatient Medications:    Current Facility-Administered Medications:     acetaminophen oral solution 650 mg, 650 mg, Per OG tube, Q6H PRN, Sebastian Jones PA-C    albumin human 5% bottle 12.5 g, 12.5 g, Intravenous, PRN, Sebastian Jones PA-C, Stopped at 09/20/23 3038    aspirin EC tablet 81 mg, 81 mg, Oral, Daily, Sebastian Jones PA-C, 81 mg at 09/25/23 0857    atorvastatin tablet 40 mg, 40 mg, Oral, Daily, Mitchell Petersen, ANP, 40 mg at 09/25/23 0857    azelastine 137 mcg (0.1 %) nasal spray 274 mcg, 2 spray, Nasal, BID, Sebastian Jones PA-C    calcium gluconate 1 g in NS IVPB (premixed), 1 g, Intravenous, PRN, Sebastian Jones, BEATRICE     calcium gluconate 1 g in NS IVPB (premixed), 2 g, Intravenous, PRN, Sebastian Jones PA-C    calcium gluconate 1 g in NS IVPB (premixed), 3 g, Intravenous, PRN, Sebastian Jones PA-C    dextrose 10% bolus 125 mL 125 mL, 12.5 g, Intravenous, PRN, Steven Rosales IV, MD    dextrose 10% bolus 250 mL 250 mL, 25 g, Intravenous, Connie PRYOR Victor E IV, MD    dextrose 5 % and 0.45 % NaCl infusion, , Intravenous, Continuous, Sebastian Jones PA-C, Stopped at 09/21/23 1029    dextrose 5 % and 0.45 % NaCl with KCl 20 mEq infusion, , Intravenous, Continuous, Steven Rosales IV, MD, Last Rate: 75 mL/hr at 09/25/23 0144, New Bag at 09/25/23 0144    docusate sodium capsule 100 mg, 100 mg, Oral, BID, Sebastian Jones PA-C, 100 mg at 09/25/23 0857    DOPamine 800 mg in dextrose 5 % 250 mL infusion (premix) (NON-TITRATING), 5 mcg/kg/min, Intravenous, Continuous, Mitchell Petersen ANP, Last Rate: 9.7 mL/hr at 09/23/23 0854, 5 mcg/kg/min at 09/23/23 0854    enoxaparin injection 40 mg, 40 mg, Subcutaneous, Daily, Sebastian Joens PA-C, 40 mg at 09/24/23 1720    famotidine tablet 20 mg, 20 mg, Oral, QHS, Steven Rosales IV, MD, 20 mg at 09/24/23 2000    fluticasone furoate-vilanteroL 200-25 mcg/dose diskus inhaler 1 puff, 1 puff, Inhalation, Daily, 1 puff at 09/25/23 0858 **AND** umeclidinium 62.5 mcg/actuation inhalation capsule 62.5 mcg, 1 capsule, Inhalation, Daily, Zoya Bowman ANP, 62.5 mcg at 09/25/23 0859    folic acid tablet 1 mg, 1 mg, Oral, Daily, Sebastian Jones PA-C, 1 mg at 09/25/23 0857    glimepiride tablet 4 mg, 4 mg, Oral, BID, Sebastian Jones PA-C, 4 mg at 09/25/23 0857    glucagon (human recombinant) injection 1 mg, 1 mg, Intramuscular, PRN, Steven Rosales IV, MD    glucose chewable tablet 16 g, 16 g, Oral, PRN, Steven Rosales IV, MD    glucose chewable tablet 24 g, 24 g, Oral, PRN, Steven Rosales IV, MD    HYDROcodone-acetaminophen 5-325 mg per tablet 1 tablet, 1 tablet, Oral, Q4H PRN,  Sebastian Jones PA-C, 1 tablet at 09/24/23 0922    insulin aspart U-100 injection 0-10 Units, 0-10 Units, Subcutaneous, QID (AC + HS) PRN, Steven Rosales IV, MD, 2 Units at 09/22/23 1146    lactulose 10 gram/15 ml solution 20 g, 20 g, Oral, Q6H PRN, Sebastian Jones PA-C    lipase-protease-amylase 12,000-38,000-60,000 units per capsule 4 capsule, 4 capsule, Oral, TID WM, Sebastian Jones PA-C, 4 capsule at 09/25/23 0857    lisinopriL tablet 20 mg, 20 mg, Oral, Daily, Sebastian Jones PA-C, 20 mg at 09/25/23 0857    loperamide capsule 2 mg, 2 mg, Oral, Continuous PRN, Sebastian Jones PA-C    magnesium sulfate 2g in water 50mL IVPB (premix), 2 g, Intravenous, PRN, Sebastian Jones PA-C    magnesium sulfate 2g in water 50mL IVPB (premix), 4 g, Intravenous, PRN, Sebastian Jones PA-C    metFORMIN tablet 1,000 mg, 1,000 mg, Oral, BID WM, Sebastian oJnes PA-C, 1,000 mg at 09/24/23 1720    metoclopramide HCl injection 5 mg, 5 mg, Intravenous, Q6H PRN, Sebastian Jones PA-C    morphine injection 4 mg, 4 mg, Intravenous, Q4H PRN, Sebastian Jones PA-C, 4 mg at 09/22/23 1253    ondansetron injection 4 mg, 4 mg, Intravenous, Q4H PRN, Sebastian Jones PA-C, 4 mg at 09/23/23 0827    oxyCODONE immediate release tablet Tab 10 mg, 10 mg, Oral, Q4H PRN, Sebastian Jones PA-C, 10 mg at 09/21/23 0507    potassium chloride 20 mEq in 100 mL IVPB (FOR CENTRAL LINE ADMINISTRATION ONLY), 20 mEq, Intravenous, PRN, Sebastian Jones PA-C    potassium chloride 20 mEq in 100 mL IVPB (FOR CENTRAL LINE ADMINISTRATION ONLY), 40 mEq, Intravenous, PRN, Sebastian Jones PA-C    potassium chloride 20 mEq in 100 mL IVPB (FOR CENTRAL LINE ADMINISTRATION ONLY), 60 mEq, Intravenous, PRN, Sebastian Jones PA-C    sodium phosphate 15 mmol in dextrose 5 % (D5W) 250 mL IVPB, 15 mmol, Intravenous, Robert PRYOR John C, PA-C    sodium phosphate 20.01 mmol in dextrose 5 % (D5W) 250 mL IVPB, 20.01 mmol, Intravenous, PRN, Robert, Sebastian C,  BEATRICE    sodium phosphate 30 mmol in dextrose 5 % (D5W) 250 mL IVPB, 30 mmol, Intravenous, PRN, Sebastian Jones PA-C    sucralfate tablet 1 g, 1 g, Oral, QID (AC & HS), Sebastian Jones PA-C, 1 g at 09/24/23 2000    sulfamethoxazole-trimethoprim 800-160mg per tablet 1 tablet, 1 tablet, Oral, BID, Sebastian Jones PA-C, 1 tablet at 09/25/23 0857    timolol maleate 0.5% ophthalmic solution 1 drop, 1 drop, Both Eyes, BID, Steven Rosales IV, MD, 1 drop at 09/25/23 0858    Facility-Administered Medications Ordered in Other Encounters:     0.9%  NaCl infusion, , Intravenous, On Call Procedure, Vijay Uribe MD    diazePAM tablet 10 mg, 10 mg, Oral, On Call Procedure, Vijay Uribe MD, 10 mg at 09/06/23 1021    diphenhydrAMINE capsule 50 mg, 50 mg, Oral, On Call Procedure, Vijay Uribe MD, 50 mg at 09/06/23 1021    VTE Risk Mitigation (From admission, onward)           Ordered     enoxaparin injection 40 mg  Daily         09/20/23 1058     Place sequential compression device  Until discontinued         09/20/23 1058     IP VTE HIGH RISK PATIENT  Once         09/20/23 1034                  I,Arnaud Moon MD,performed the substantive portion of this visit. I had a face-to-face time with the patient on 9/25/23. I reviewed and agree with the nurse practitioner's history, physical exam.     Medical decision making:       Assessment/Plan:   SSS - Now A.Fib with RVR (Back and Forth between PAF with RVR/SB)    - ECHO (Limited) 9.23.23 - LVEF 55%    - Initial Rhythm: Junctional Bradycardia (30s-40s) - Symptomatic: Dizziness/Nausea; Dopamine with Rapid Increase in SBP (190s); Dopamine Discontinued    - After Bradycardic Rhythm PT because Tachycardiac - PAF/RVR    - Short Time Period of PAF/RVR with Spontaneous Conversion to Junctional Bradycardia  Post Operative PAF with RVR (Back and Forth between Junctional Bradycardia/PAF RVR)      - s/p Ligation of SUKHI (9.20.23) - Endostapler      - CHADsVASc - 5 Points - 7.2% Stroke Risk  per Year       - [facility] (9.6.23) - Normal Coronaries   VHD - MS    - s/p (9.20.23) - Bio Mosaic 25mm MVR  Hx of DVT x 2 (OP Xarelto)  DM II  Obesity  Common Variable Immunodeficiency  GERD  COPD without Exacerbation  ABEL/CPAP  Glaucoma  No Hx of GIB      Continue ASA and Statin  Start Amiodarone Bolus and Drip per Protocol (Monitor Rate/Rhythm as PT is High Risk for Bradyarrhythmias)  Keep NPO  Schedule/Consent for Dual Chamber PPM Implant ([facility] [facility])  Risk, Benefits and Alternatives Reviewed and Discussed with the PT and their Family and they wish to proceed with above Procedure.   Labs and EKG in AM: CBC, CMP and Mg    Thank you for your consult.     Mitchell Petersen, GOPI  Cardiology  [facility]  09/25/2023

## 2023-09-26 LAB
ALBUMIN SERPL-MCNC: 2.8 G/DL (ref 3.4–4.8)
ALBUMIN/GLOB SERPL: 1 RATIO (ref 1.1–2)
ALP SERPL-CCNC: 54 UNIT/L (ref 40–150)
ALT SERPL-CCNC: 13 UNIT/L (ref 0–55)
AST SERPL-CCNC: 16 UNIT/L (ref 5–34)
BASOPHILS # BLD AUTO: 0.02 X10(3)/MCL
BASOPHILS NFR BLD AUTO: 0.2 %
BILIRUB SERPL-MCNC: 0.7 MG/DL
BUN SERPL-MCNC: 15.5 MG/DL (ref 9.8–20.1)
CALCIUM SERPL-MCNC: 8.5 MG/DL (ref 8.4–10.2)
CHLORIDE SERPL-SCNC: 111 MMOL/L (ref 98–107)
CO2 SERPL-SCNC: 18 MMOL/L (ref 23–31)
CREAT SERPL-MCNC: 0.74 MG/DL (ref 0.55–1.02)
EOSINOPHIL # BLD AUTO: 0.2 X10(3)/MCL (ref 0–0.9)
EOSINOPHIL NFR BLD AUTO: 2.4 %
ERYTHROCYTE [DISTWIDTH] IN BLOOD BY AUTOMATED COUNT: 15.4 % (ref 11.5–17)
GFR SERPLBLD CREATININE-BSD FMLA CKD-EPI: >60 MLS/MIN/1.73/M2
GLOBULIN SER-MCNC: 2.7 GM/DL (ref 2.4–3.5)
GLUCOSE SERPL-MCNC: 164 MG/DL (ref 82–115)
HCT VFR BLD AUTO: 37.7 % (ref 37–47)
HGB BLD-MCNC: 12.1 G/DL (ref 12–16)
IMM GRANULOCYTES # BLD AUTO: 0.04 X10(3)/MCL (ref 0–0.04)
IMM GRANULOCYTES NFR BLD AUTO: 0.5 %
LYMPHOCYTES # BLD AUTO: 1.87 X10(3)/MCL (ref 0.6–4.6)
LYMPHOCYTES NFR BLD AUTO: 22.9 %
MAGNESIUM SERPL-MCNC: 2 MG/DL (ref 1.6–2.6)
MCH RBC QN AUTO: 32.2 PG (ref 27–31)
MCHC RBC AUTO-ENTMCNC: 32.1 G/DL (ref 33–36)
MCV RBC AUTO: 100.3 FL (ref 80–94)
MONOCYTES # BLD AUTO: 1.47 X10(3)/MCL (ref 0.1–1.3)
MONOCYTES NFR BLD AUTO: 18 %
NEUTROPHILS # BLD AUTO: 4.58 X10(3)/MCL (ref 2.1–9.2)
NEUTROPHILS NFR BLD AUTO: 56 %
NRBC BLD AUTO-RTO: 0 %
PLATELET # BLD AUTO: 204 X10(3)/MCL (ref 130–400)
PMV BLD AUTO: 11.1 FL (ref 7.4–10.4)
POCT GLUCOSE: 154 MG/DL (ref 70–110)
POCT GLUCOSE: 156 MG/DL (ref 70–110)
POCT GLUCOSE: 178 MG/DL (ref 70–110)
POTASSIUM SERPL-SCNC: 4.8 MMOL/L (ref 3.5–5.1)
PROT SERPL-MCNC: 5.5 GM/DL (ref 5.8–7.6)
RBC # BLD AUTO: 3.76 X10(6)/MCL (ref 4.2–5.4)
SODIUM SERPL-SCNC: 142 MMOL/L (ref 136–145)
WBC # SPEC AUTO: 8.18 X10(3)/MCL (ref 4.5–11.5)

## 2023-09-26 PROCEDURE — 25000003 PHARM REV CODE 250: Performed by: NURSE PRACTITIONER

## 2023-09-26 PROCEDURE — 21400001 HC TELEMETRY ROOM

## 2023-09-26 PROCEDURE — 99024 PR POST-OP FOLLOW-UP VISIT: ICD-10-PCS | Mod: POP,,,

## 2023-09-26 PROCEDURE — 93005 ELECTROCARDIOGRAM TRACING: CPT

## 2023-09-26 PROCEDURE — 80053 COMPREHEN METABOLIC PANEL: CPT | Performed by: NURSE PRACTITIONER

## 2023-09-26 PROCEDURE — 25000242 PHARM REV CODE 250 ALT 637 W/ HCPCS: Performed by: NURSE PRACTITIONER

## 2023-09-26 PROCEDURE — 63600175 PHARM REV CODE 636 W HCPCS: Performed by: NURSE PRACTITIONER

## 2023-09-26 PROCEDURE — 25000003 PHARM REV CODE 250: Performed by: SPECIALIST

## 2023-09-26 PROCEDURE — 99024 POSTOP FOLLOW-UP VISIT: CPT | Mod: POP,,,

## 2023-09-26 PROCEDURE — 83735 ASSAY OF MAGNESIUM: CPT | Performed by: NURSE PRACTITIONER

## 2023-09-26 PROCEDURE — 25000003 PHARM REV CODE 250: Performed by: PHYSICIAN ASSISTANT

## 2023-09-26 PROCEDURE — 97530 THERAPEUTIC ACTIVITIES: CPT | Mod: CQ

## 2023-09-26 PROCEDURE — 27000221 HC OXYGEN, UP TO 24 HOURS

## 2023-09-26 PROCEDURE — 93010 EKG 12-LEAD: ICD-10-PCS | Mod: ,,, | Performed by: INTERNAL MEDICINE

## 2023-09-26 PROCEDURE — 85025 COMPLETE CBC W/AUTO DIFF WBC: CPT | Performed by: NURSE PRACTITIONER

## 2023-09-26 PROCEDURE — 97116 GAIT TRAINING THERAPY: CPT | Mod: CQ

## 2023-09-26 PROCEDURE — 93010 ELECTROCARDIOGRAM REPORT: CPT | Mod: ,,, | Performed by: INTERNAL MEDICINE

## 2023-09-26 RX ORDER — ENOXAPARIN SODIUM 100 MG/ML
40 INJECTION SUBCUTANEOUS EVERY 24 HOURS
Status: COMPLETED | OUTPATIENT
Start: 2023-09-26 | End: 2023-09-27

## 2023-09-26 RX ORDER — AMIODARONE HYDROCHLORIDE 200 MG/1
200 TABLET ORAL DAILY
Status: DISCONTINUED | OUTPATIENT
Start: 2023-10-02 | End: 2023-09-29 | Stop reason: HOSPADM

## 2023-09-26 RX ORDER — AMIODARONE HYDROCHLORIDE 200 MG/1
TABLET ORAL
Qty: 48 TABLET | Refills: 11 | Status: SHIPPED | OUTPATIENT
Start: 2023-09-26 | End: 2023-11-01

## 2023-09-26 RX ORDER — AMIODARONE HYDROCHLORIDE 200 MG/1
200 TABLET ORAL 2 TIMES DAILY
Status: DISCONTINUED | OUTPATIENT
Start: 2023-09-29 | End: 2023-09-29 | Stop reason: HOSPADM

## 2023-09-26 RX ORDER — AMIODARONE HYDROCHLORIDE 200 MG/1
400 TABLET ORAL 2 TIMES DAILY
Status: COMPLETED | OUTPATIENT
Start: 2023-09-26 | End: 2023-09-28

## 2023-09-26 RX ADMIN — TIMOLOL MALEATE 1 DROP: 5 SOLUTION/ DROPS OPHTHALMIC at 08:09

## 2023-09-26 RX ADMIN — SULFAMETHOXAZOLE AND TRIMETHOPRIM 1 TABLET: 800; 160 TABLET ORAL at 08:09

## 2023-09-26 RX ADMIN — SUCRALFATE 1 G: 1 TABLET ORAL at 08:09

## 2023-09-26 RX ADMIN — PANCRELIPASE 4 CAPSULE: 60000; 12000; 38000 CAPSULE, DELAYED RELEASE PELLETS ORAL at 12:09

## 2023-09-26 RX ADMIN — DOCUSATE SODIUM 100 MG: 100 CAPSULE, LIQUID FILLED ORAL at 08:09

## 2023-09-26 RX ADMIN — SUCRALFATE 1 G: 1 TABLET ORAL at 05:09

## 2023-09-26 RX ADMIN — ASPIRIN 81 MG: 81 TABLET, COATED ORAL at 08:09

## 2023-09-26 RX ADMIN — FOLIC ACID 1 MG: 1 TABLET ORAL at 08:09

## 2023-09-26 RX ADMIN — GLIMEPIRIDE 4 MG: 1 TABLET ORAL at 08:09

## 2023-09-26 RX ADMIN — ENOXAPARIN SODIUM 40 MG: 40 INJECTION SUBCUTANEOUS at 05:09

## 2023-09-26 RX ADMIN — FAMOTIDINE 20 MG: 20 TABLET, FILM COATED ORAL at 08:09

## 2023-09-26 RX ADMIN — FLUTICASONE FUROATE AND VILANTEROL TRIFENATATE 1 PUFF: 200; 25 POWDER RESPIRATORY (INHALATION) at 09:09

## 2023-09-26 RX ADMIN — HYDROCODONE BITARTRATE AND ACETAMINOPHEN 1 TABLET: 5; 325 TABLET ORAL at 06:09

## 2023-09-26 RX ADMIN — AMIODARONE HYDROCHLORIDE 400 MG: 200 TABLET ORAL at 08:09

## 2023-09-26 RX ADMIN — LISINOPRIL 20 MG: 20 TABLET ORAL at 08:09

## 2023-09-26 RX ADMIN — METFORMIN HYDROCHLORIDE 1000 MG: 500 TABLET, FILM COATED ORAL at 08:09

## 2023-09-26 RX ADMIN — AMIODARONE HYDROCHLORIDE 400 MG: 200 TABLET ORAL at 10:09

## 2023-09-26 RX ADMIN — HYDROCODONE BITARTRATE AND ACETAMINOPHEN 1 TABLET: 5; 325 TABLET ORAL at 08:09

## 2023-09-26 RX ADMIN — ATORVASTATIN CALCIUM 40 MG: 40 TABLET, FILM COATED ORAL at 08:09

## 2023-09-26 RX ADMIN — PANCRELIPASE 4 CAPSULE: 60000; 12000; 38000 CAPSULE, DELAYED RELEASE PELLETS ORAL at 08:09

## 2023-09-26 RX ADMIN — ACETAMINOPHEN 650 MG: 650 SOLUTION ORAL at 05:09

## 2023-09-26 RX ADMIN — SUCRALFATE 1 G: 1 TABLET ORAL at 10:09

## 2023-09-26 RX ADMIN — UMECLIDINIUM 62.5 MCG: 62.5 AEROSOL, POWDER ORAL at 08:09

## 2023-09-26 RX ADMIN — AMIODARONE HYDROCHLORIDE 0.5 MG/MIN: 1.8 INJECTION, SOLUTION INTRAVENOUS at 06:09

## 2023-09-26 NOTE — PT/OT/SLP PROGRESS
Physical Therapy Treatment    Patient Name:  Kortney Leach   MRN:  [id number]    Recommendations:     Discharge Recommendations: SNF versus Swing Bed  Discharge Equipment Recommendations: none  Barriers to discharge:    Assessment:     Kortney Leach is a 77 y.o. female admitted with a medical diagnosis of SSS (sick sinus syndrome), s/p MVR.  She presents with the following impairments/functional limitations: weakness, gait instability, impaired endurance, impaired balance, impaired self care skills, impaired functional mobility, decreased safety awareness .    Rehab Prognosis: Good; patient would benefit from acute skilled PT services to address these deficits and reach maximum level of function.    Recent Surgery: Procedure(s) (LRB):  INSERTION, CARDIAC PACEMAKER, DUAL CHAMBER (N/A) 1 Day Post-Op    Plan:     During this hospitalization, patient to be seen 5 x/week to address the identified rehab impairments via gait training, therapeutic activities, therapeutic exercises and progress toward the following goals:    Plan of Care Expires:       Subjective     Chief Complaint: Pt states that she needs to try to have a BM. Pt spent some time on the toilet during session, but was unable to have a BM.   Patient/Family Comments/goals: Pt friend present.   Pain/Comfort:   Pt reported some discomfort in chest when transferring, but was able to maintain sternal and pacemaker pxn.      Objective:     Communicated with NSG prior to session.  Patient found HOB elevated with telemetry, peripheral IV, PureWick, pulse ox (continuous) upon PTA entry to room.     General Precautions: Standard, fall, sternal  Orthopedic Precautions: N/A  Braces: N/A  Respiratory Status: Room air, SPO2 94% - 91%       Functional Mobility:  Bed: ModA log roll from supine to sit EOB  T/F with RW: ModA sit <-> stand  VC for technique to maintain pxns.  MaxA for toilet transfer.  Gait with RW: ModA for balance with chair in tow as pt  ambulated for 30 ft. Pt easily fatigued. Pt required encouragement to ambulate further. HR between 68 - 72 bpm with activity.     Education:  Patient provided with verbal education education regarding safety, fall prevention, sternal and pacemaker pxn, PT/PTA's role in care.  Understanding was verbalized, however additional teaching warranted.     Patient left up in chair with all lines intact, call button in reach, and NSG notified of pt performance ..    GOALS:   Multidisciplinary Problems       Physical Therapy Goals          Problem: Physical Therapy    Goal Priority Disciplines Outcome Goal Variances Interventions   Physical Therapy Goal     PT, PT/OT Ongoing, Progressing     Description: Pt will be seen for the following goals  1. Pt will be sba with a ll bed mobility  2. Pt will be sba with transfers with a rw  3. Pt will ambulate with a rw  100ft sba                       Time Tracking:     PT Received On:    PT Start Time: 0930     PT Stop Time: 1015  PT Total Time (min): 45 min     Billable Minutes: Gait Training 15 and Therapeutic Activity 40    Treatment Type: Treatment  PT/PTA: PTA     Number of PTA visits since last PT visit: 1 09/26/2023

## 2023-09-26 NOTE — PROGRESS NOTES
"  [facility]     Cardiology  Progress Note    Patient Name: Kortney Leach  MRN: [id number]  Admission Date: 9/20/2023  Hospital Length of Stay: 6 days  Code Status: Full Code   Attending Provider: Steven Rosales IV, MD   Consulting Provider: YANIRA Kumari  Primary Care Physician: Arnaldo Hrenandez MD  Principal Problem:SSS (sick sinus syndrome)    Patient information was obtained from patient, past medical records, and ER records.     Subjective:     Chief Complaint: Reason for Consult: Symptomatic Junctional Bradycardia    HPI: Ms. Leach is a 76 y/o female who is known to [facility], Dr. Uribe. The patient was recently worked up for CP/SOB and a Murmur (MS - Mild to Moderate). She was found to have Normal Coronaries and was referred to Dr. Rosales for Surgical Evaluation of Mitral Stenosis. She was deemed a candidate and on 9.20.23 she underwent a Sternotomy with a 25mm Mosaic Porcine Valve Placement as well as a Ligation of her SUKHI. She tolerated the procedure well and was monitored in ICU post operatively. She did have some Junctional Bradycardia and her Epicardial Wires were utilized for Pacing. The Bradycardia resolved and the Epicardial Wires were pulled. She was transferred to the Telemetry Unit for further monitoring. She developed a Bradycardic Rhythm on Telemetry and an EKG confirmed a Junctional Bradycardic Rhythm with Borderline BP. She was symptomatic with Nausea and Dizziness. [facility] was consulted for Bradycardia. She was started on IV Dopamine for HR/BP Support, however, shortly after she was placed on this Non-Titrating Dose she became more Nauseated and Hypertensive (SBP 190s). The Dopamine Infusion was Discontinued and her SBP Improved. She became Tachycardic and an EKG revealed PAF/RVR.     9.24.23: NAD. "I am feeling so much better." No Further Tachy/Mani Events. Now in SB/Clear P Waves. Denies SOB and Palps. + CP/Incisional.   9.26.23: Patient awake in bed. S/p PPM " placement yesterday. Left CW incision JACOB with steristrips. Site benign. She has converted to SR with amiodarone drip.       PMH: VHD (MS) s/p Bio MVR, Hx of DVT x2 (Xarelto), HTN, DM II, Obesity, Common Variable Immunodeficiency, GERD, COPD, ABEL/CPAP, Glaucoma  PSH: Appendectomy, Cataract Extractions, Colonoscopy, EGD, Angiogram, Tonsillectomy, TKR/Bilaterally, Sternotomy/Bio MVR (9.20.23) 25mm Mosaic Porcine Valve and Ligation of SUKHI  Family History: Father, D, Lung Cancer; Mother, D, CVA, Alzheimer's Disease; Sister, L, HTN, Multiple Sclerosis, DM II  Social History: Denies Illicit Drug, ETOH and Tobacco Use; Former Smoker, Quit Years Prior     Previous Cardiac Diagnostics:   Limited ECHO 9.23.23:   LV Function is Approximately 55%    [facility] 9.6.23:  Normal Coronaries     ECHO 8.2.23:  The study quality is average.   The left ventricle is decreased in size. Global left ventricular systolic function is normal. The left ventricular ejection fraction is 60%. Mild to moderate concentric left ventricular hypertrophy is present.   Mitral stenosis is mild to moderate . The area by pressure half time is 2.0 cm². The mean trans mitral gradient is 6.1 mmHg. Mild (1+) mitral regurgitation.  Mild calcification of the aortic valve is noted with adequate cuspal excursion.   Mild (1+) tricuspid regurgitation.   The pulmonary artery systolic pressure is 23 mmHg. The pulmonary artery appears to be normal.    MPI 1.24.23:  This is a normal perfusion study, no perfusion defects noted. There is no evidence of ischemia.   This scan is suggestive of low risk for future cardiovascular events.   The left ventricular cavity is noted to be normal on the stress studies. The stress left ventricular ejection fraction was calculated to be 68% and left ventricular global function is normal. The rest left ventricular cavity is noted to be normal. The rest left ventricular ejection fraction was calculated to be 63% and rest left ventricular global  function is normal.   When compared to the resting ejection fraction (63%), the stress ejection fraction (68%) has increased.   Transient ischemic dilatation is present and has been described as a marker for high risk coronary artery disease. It has also been described in microvascular disease, hypertensive heart disease as well as cardiac deconditioning.   The study quality is good.   There was a rise in myocardial blood flow between rest and stress.  Global myocardial blood flow reserve was 2.72.  Normal global coronary flow reserve is suggestive of low coronary event risk.    Carotid US 1.24.23:  The study quality is good.   There is no plaque noted in the proximal right internal carotid artery.   1-39% stenosis in the proximal left internal carotid artery based on Bluth Criteria.   Antegrade right vertebral artery flow.   Antegrade left vertebral artery flow.     Review of patient's allergies indicates:   Allergen Reactions    Adhesive tape-silicones      Current Facility-Administered Medications on File Prior to Encounter   Medication    0.9%  NaCl infusion    diazePAM tablet 10 mg    diphenhydrAMINE capsule 50 mg     Current Outpatient Medications on File Prior to Encounter   Medication Sig    benazepriL (LOTENSIN) 20 MG tablet Take 20 mg by mouth once daily.    dapagliflozin (FARXIGA) 10 mg tablet Take 10 mg by mouth once daily.    dexlansoprazole (DEXILANT) 60 mg capsule Take by mouth once daily.    DUPIXENT SYRINGE 300 mg/2 mL Syrg Inject 300 mg into the skin every 14 (fourteen) days.    famotidine (PEPCID) 40 MG tablet Take 40 mg by mouth every evening.    fluticasone-umeclidin-vilanter (TRELEGY ELLIPTA) 200-62.5-25 mcg inhaler Inhale 1 puff into the lungs once daily.    glimepiride (AMARYL) 2 MG tablet Take 4 mg by mouth 2 (two) times a day.    immun glob G,IgG,/pro/IgA 0-50 (HIZENTRA SUBQ) Inject into the skin every 14 (fourteen) days.    lipase-protease-amylase 24,000-76,000-120,000 units (CREON)  24,000-76,000 -120,000 unit capsule Take 1-2 capsules by mouth 3 (three) times daily with meals. 2 caps with meals and 1 cap c snacks    metFORMIN (GLUCOPHAGE) 1000 MG tablet Take 1,000 mg by mouth 2 (two) times daily with meals.    metoprolol succinate (TOPROL-XL) 25 MG 24 hr tablet Take 25 mg by mouth once daily.    rivaroxaban (XARELTO) 20 mg Tab Take 20 mg by mouth daily with dinner or evening meal.    timoloL (BETIMOL) 0.5 % ophthalmic solution Place 1 drop into both eyes 2 (two) times daily.    azelastine (ASTELIN) 137 mcg (0.1 %) nasal spray 2 sprays by Nasal route 2 (two) times daily.     Review of Systems   Constitutional:  Positive for fatigue. Negative for fever.   Respiratory:  Negative for chest tightness and shortness of breath.    Cardiovascular:  Positive for chest pain. Negative for palpitations and leg swelling.        + Incisional CP   All other systems reviewed and are negative.    Objective:     Vital Signs (Most Recent):  Temp: 98.2 °F (36.8 °C) (09/26/23 0724)  Pulse: 65 (09/26/23 0821)  Resp: 16 (09/26/23 0821)  BP: 134/71 (09/26/23 0724)  SpO2: (!) 93 % (09/26/23 0724) Vital Signs (24h Range):  Temp:  [97.4 °F (36.3 °C)-98.2 °F (36.8 °C)] 98.2 °F (36.8 °C)  Pulse:  [] 65  Resp:  [16-20] 16  SpO2:  [93 %-97 %] 93 %  BP: (126-165)/(62-85) 134/71     Weight: 102.5 kg (225 lb 15.5 oz)  Body mass index is 36.49 kg/m².    SpO2: (!) 93 %         Intake/Output Summary (Last 24 hours) at 9/26/2023 1003  Last data filed at 9/26/2023 0610  Gross per 24 hour   Intake 2193.16 ml   Output 2050 ml   Net 143.16 ml       Lines/Drains/Airways       Peripheral Intravenous Line  Duration                  Peripheral IV - Single Lumen 09/24/23 1300 18 G Anterior;Distal;Left Upper Arm 1 day                  Significant Labs:  Recent Results (from the past 72 hour(s))   Echo Saline Bubble? No    Collection Time: 09/23/23 10:36 AM   Result Value Ref Range    BSA 2.2 m2    Quevedo's Biplane MOD Ejection  Fraction 56 %    LVIDd 4.12 3.5 - 6.0 cm    LV Systolic Volume 18.10 mL    LV Systolic Volume Index 8.5 mL/m2    LVIDs 2.30 2.1 - 4.0 cm    LV Diastolic Volume 75.10 mL    LV Diastolic Volume Index 35.42 mL/m2    IVS 1.42 (A) 0.6 - 1.1 cm    FS 44 28 - 44 %    Left Ventricle Relative Wall Thickness 0.69 cm    Posterior Wall 1.43 (A) 0.6 - 1.1 cm    LV mass 224.14 g    LV Mass Index 106 g/m2    MV mean gradient 6 mmHg    MV peak gradient 20 mmHg    MV VTI 90.1 cm    ZLVIDS -4.55     ZLVIDD -4.86    Comprehensive Metabolic Panel    Collection Time: 09/23/23 10:46 AM   Result Value Ref Range    Sodium Level 131 (L) 136 - 145 mmol/L    Potassium Level 5.6 (H) 3.5 - 5.1 mmol/L    Chloride 103 98 - 107 mmol/L    Carbon Dioxide 15 (L) 23 - 31 mmol/L    Glucose Level 133 (H) 82 - 115 mg/dL    Blood Urea Nitrogen 33.4 (H) 9.8 - 20.1 mg/dL    Creatinine 1.45 (H) 0.55 - 1.02 mg/dL    Calcium Level Total 7.9 (L) 8.4 - 10.2 mg/dL    Protein Total 6.2 5.8 - 7.6 gm/dL    Albumin Level 3.2 (L) 3.4 - 4.8 g/dL    Globulin 3.0 2.4 - 3.5 gm/dL    Albumin/Globulin Ratio 1.1 1.1 - 2.0 ratio    Bilirubin Total 0.9 <=1.5 mg/dL    Alkaline Phosphatase 48 40 - 150 unit/L    Alanine Aminotransferase 21 0 - 55 unit/L    Aspartate Aminotransferase 36 (H) 5 - 34 unit/L    eGFR 37 mls/min/1.73/m2   Comprehensive Metabolic Panel    Collection Time: 09/24/23  4:32 AM   Result Value Ref Range    Sodium Level 138 136 - 145 mmol/L    Potassium Level 4.4 3.5 - 5.1 mmol/L    Chloride 108 (H) 98 - 107 mmol/L    Carbon Dioxide 16 (L) 23 - 31 mmol/L    Glucose Level 129 (H) 82 - 115 mg/dL    Blood Urea Nitrogen 38.6 (H) 9.8 - 20.1 mg/dL    Creatinine 1.15 (H) 0.55 - 1.02 mg/dL    Calcium Level Total 8.2 (L) 8.4 - 10.2 mg/dL    Protein Total 5.6 (L) 5.8 - 7.6 gm/dL    Albumin Level 2.9 (L) 3.4 - 4.8 g/dL    Globulin 2.7 2.4 - 3.5 gm/dL    Albumin/Globulin Ratio 1.1 1.1 - 2.0 ratio    Bilirubin Total 1.0 <=1.5 mg/dL    Alkaline Phosphatase 48 40 - 150 unit/L     Alanine Aminotransferase 17 0 - 55 unit/L    Aspartate Aminotransferase 19 5 - 34 unit/L    eGFR 49 mls/min/1.73/m2   Magnesium    Collection Time: 09/24/23  4:32 AM   Result Value Ref Range    Magnesium Level 2.50 1.60 - 2.60 mg/dL   CBC with Differential    Collection Time: 09/24/23  4:32 AM   Result Value Ref Range    WBC 7.05 4.50 - 11.50 x10(3)/mcL    RBC 3.67 (L) 4.20 - 5.40 x10(6)/mcL    Hgb 12.1 12.0 - 16.0 g/dL    Hct 36.1 (L) 37.0 - 47.0 %    MCV 98.4 (H) 80.0 - 94.0 fL    MCH 33.0 (H) 27.0 - 31.0 pg    MCHC 33.5 33.0 - 36.0 g/dL    RDW 15.1 11.5 - 17.0 %    Platelet 179 130 - 400 x10(3)/mcL    MPV 12.3 (H) 7.4 - 10.4 fL    Neut % 60.6 %    Lymph % 24.0 %    Mono % 12.5 %    Eos % 2.4 %    Basophil % 0.1 %    Lymph # 1.69 0.6 - 4.6 x10(3)/mcL    Neut # 4.27 2.1 - 9.2 x10(3)/mcL    Mono # 0.88 0.1 - 1.3 x10(3)/mcL    Eos # 0.17 0 - 0.9 x10(3)/mcL    Baso # 0.01 <=0.2 x10(3)/mcL    IG# 0.03 0 - 0.04 x10(3)/mcL    IG% 0.4 %    NRBC% 0.0 %   POCT glucose    Collection Time: 09/24/23 11:24 AM   Result Value Ref Range    POCT Glucose 136 (H) 70 - 110 mg/dL   POCT glucose    Collection Time: 09/24/23  5:19 PM   Result Value Ref Range    POCT Glucose 179 (H) 70 - 110 mg/dL   POCT glucose    Collection Time: 09/24/23  7:58 PM   Result Value Ref Range    POCT Glucose 137 (H) 70 - 110 mg/dL   Comprehensive Metabolic Panel    Collection Time: 09/25/23  5:07 AM   Result Value Ref Range    Sodium Level 138 136 - 145 mmol/L    Potassium Level 5.0 3.5 - 5.1 mmol/L    Chloride 111 (H) 98 - 107 mmol/L    Carbon Dioxide 18 (L) 23 - 31 mmol/L    Glucose Level 179 (H) 82 - 115 mg/dL    Blood Urea Nitrogen 29.1 (H) 9.8 - 20.1 mg/dL    Creatinine 0.81 0.55 - 1.02 mg/dL    Calcium Level Total 8.4 8.4 - 10.2 mg/dL    Protein Total 5.7 (L) 5.8 - 7.6 gm/dL    Albumin Level 2.9 (L) 3.4 - 4.8 g/dL    Globulin 2.8 2.4 - 3.5 gm/dL    Albumin/Globulin Ratio 1.0 (L) 1.1 - 2.0 ratio    Bilirubin Total 0.8 <=1.5 mg/dL    Alkaline  Phosphatase 49 40 - 150 unit/L    Alanine Aminotransferase 16 0 - 55 unit/L    Aspartate Aminotransferase 19 5 - 34 unit/L    eGFR >60 mls/min/1.73/m2   Magnesium    Collection Time: 09/25/23  5:07 AM   Result Value Ref Range    Magnesium Level 2.40 1.60 - 2.60 mg/dL   CBC with Differential    Collection Time: 09/25/23  5:07 AM   Result Value Ref Range    WBC 6.41 4.50 - 11.50 x10(3)/mcL    RBC 3.68 (L) 4.20 - 5.40 x10(6)/mcL    Hgb 12.0 12.0 - 16.0 g/dL    Hct 36.8 (L) 37.0 - 47.0 %    .0 (H) 80.0 - 94.0 fL    MCH 32.6 (H) 27.0 - 31.0 pg    MCHC 32.6 (L) 33.0 - 36.0 g/dL    RDW 15.1 11.5 - 17.0 %    Platelet 172 130 - 400 x10(3)/mcL    MPV 11.1 (H) 7.4 - 10.4 fL    Neut % 58.6 %    Lymph % 24.3 %    Mono % 13.3 %    Eos % 3.1 %    Basophil % 0.5 %    Lymph # 1.56 0.6 - 4.6 x10(3)/mcL    Neut # 3.76 2.1 - 9.2 x10(3)/mcL    Mono # 0.85 0.1 - 1.3 x10(3)/mcL    Eos # 0.20 0 - 0.9 x10(3)/mcL    Baso # 0.03 <=0.2 x10(3)/mcL    IG# 0.01 0 - 0.04 x10(3)/mcL    IG% 0.2 %    NRBC% 0.0 %   POCT glucose    Collection Time: 09/25/23  7:55 PM   Result Value Ref Range    POCT Glucose 150 (H) 70 - 110 mg/dL   Comprehensive Metabolic Panel    Collection Time: 09/26/23  3:53 AM   Result Value Ref Range    Sodium Level 142 136 - 145 mmol/L    Potassium Level 4.8 3.5 - 5.1 mmol/L    Chloride 111 (H) 98 - 107 mmol/L    Carbon Dioxide 18 (L) 23 - 31 mmol/L    Glucose Level 164 (H) 82 - 115 mg/dL    Blood Urea Nitrogen 15.5 9.8 - 20.1 mg/dL    Creatinine 0.74 0.55 - 1.02 mg/dL    Calcium Level Total 8.5 8.4 - 10.2 mg/dL    Protein Total 5.5 (L) 5.8 - 7.6 gm/dL    Albumin Level 2.8 (L) 3.4 - 4.8 g/dL    Globulin 2.7 2.4 - 3.5 gm/dL    Albumin/Globulin Ratio 1.0 (L) 1.1 - 2.0 ratio    Bilirubin Total 0.7 <=1.5 mg/dL    Alkaline Phosphatase 54 40 - 150 unit/L    Alanine Aminotransferase 13 0 - 55 unit/L    Aspartate Aminotransferase 16 5 - 34 unit/L    eGFR >60 mls/min/1.73/m2   Magnesium    Collection Time: 09/26/23  3:53 AM    Result Value Ref Range    Magnesium Level 2.00 1.60 - 2.60 mg/dL   CBC with Differential    Collection Time: 09/26/23  3:53 AM   Result Value Ref Range    WBC 8.18 4.50 - 11.50 x10(3)/mcL    RBC 3.76 (L) 4.20 - 5.40 x10(6)/mcL    Hgb 12.1 12.0 - 16.0 g/dL    Hct 37.7 37.0 - 47.0 %    .3 (H) 80.0 - 94.0 fL    MCH 32.2 (H) 27.0 - 31.0 pg    MCHC 32.1 (L) 33.0 - 36.0 g/dL    RDW 15.4 11.5 - 17.0 %    Platelet 204 130 - 400 x10(3)/mcL    MPV 11.1 (H) 7.4 - 10.4 fL    Neut % 56.0 %    Lymph % 22.9 %    Mono % 18.0 %    Eos % 2.4 %    Basophil % 0.2 %    Lymph # 1.87 0.6 - 4.6 x10(3)/mcL    Neut # 4.58 2.1 - 9.2 x10(3)/mcL    Mono # 1.47 (H) 0.1 - 1.3 x10(3)/mcL    Eos # 0.20 0 - 0.9 x10(3)/mcL    Baso # 0.02 <=0.2 x10(3)/mcL    IG# 0.04 0 - 0.04 x10(3)/mcL    IG% 0.5 %    NRBC% 0.0 %   POCT glucose    Collection Time: 09/26/23  8:50 AM   Result Value Ref Range    POCT Glucose 178 (H) 70 - 110 mg/dL     Telemetry:   SR    Physical Exam  Constitutional:       Appearance: Normal appearance.   HENT:      Head: Normocephalic.      Mouth/Throat:      Mouth: Mucous membranes are moist.   Eyes:      Extraocular Movements: Extraocular movements intact.      Pupils: Pupils are equal, round, and reactive to light.   Cardiovascular:      Rate and Rhythm: Normal rate and regular rhythm.      Pulses: Normal pulses.   Pulmonary:      Effort: Pulmonary effort is normal.      Breath sounds: Normal breath sounds.   Abdominal:      Palpations: Abdomen is soft.   Musculoskeletal:         General: No swelling. Normal range of motion.   Skin:     General: Skin is warm and dry.      Comments: Midline Sternotomy JACOB with No Signs of Bleed/Infection   Left CW pacer site with steristrips JACOB   Neurological:      General: No focal deficit present.      Mental Status: She is alert and oriented to person, place, and time.   Psychiatric:         Mood and Affect: Mood normal.         Behavior: Behavior normal.       Home Medications:    Current Facility-Administered Medications on File Prior to Encounter   Medication Dose Route Frequency Provider Last Rate Last Admin    0.9%  NaCl infusion   Intravenous On Call Procedure Vijay Uribe MD        diazePAM tablet 10 mg  10 mg Oral On Call Procedure Vijay Uribe MD   10 mg at 09/06/23 1021    diphenhydrAMINE capsule 50 mg  50 mg Oral On Call Procedure Vijay Uribe MD   50 mg at 09/06/23 1021     Current Outpatient Medications on File Prior to Encounter   Medication Sig Dispense Refill    benazepriL (LOTENSIN) 20 MG tablet Take 20 mg by mouth once daily.      dapagliflozin (FARXIGA) 10 mg tablet Take 10 mg by mouth once daily.      dexlansoprazole (DEXILANT) 60 mg capsule Take by mouth once daily.      DUPIXENT SYRINGE 300 mg/2 mL Syrg Inject 300 mg into the skin every 14 (fourteen) days.      famotidine (PEPCID) 40 MG tablet Take 40 mg by mouth every evening.      fluticasone-umeclidin-vilanter (TRELEGY ELLIPTA) 200-62.5-25 mcg inhaler Inhale 1 puff into the lungs once daily.      glimepiride (AMARYL) 2 MG tablet Take 4 mg by mouth 2 (two) times a day.      immun glob G,IgG,/pro/IgA 0-50 (HIZENTRA SUBQ) Inject into the skin every 14 (fourteen) days.      lipase-protease-amylase 24,000-76,000-120,000 units (CREON) 24,000-76,000 -120,000 unit capsule Take 1-2 capsules by mouth 3 (three) times daily with meals. 2 caps with meals and 1 cap c snacks      metFORMIN (GLUCOPHAGE) 1000 MG tablet Take 1,000 mg by mouth 2 (two) times daily with meals.      metoprolol succinate (TOPROL-XL) 25 MG 24 hr tablet Take 25 mg by mouth once daily.      rivaroxaban (XARELTO) 20 mg Tab Take 20 mg by mouth daily with dinner or evening meal.      timoloL (BETIMOL) 0.5 % ophthalmic solution Place 1 drop into both eyes 2 (two) times daily.      azelastine (ASTELIN) 137 mcg (0.1 %) nasal spray 2 sprays by Nasal route 2 (two) times daily.       Current Inpatient Medications:    Current Facility-Administered Medications:      acetaminophen oral solution 650 mg, 650 mg, Per OG tube, Q6H PRN, Sebastian Jones PA-C    albumin human 5% bottle 12.5 g, 12.5 g, Intravenous, PRN, Sebastian Jones PA-C, Stopped at 09/20/23 2347    amiodarone 360 mg/200 mL (1.8 mg/mL) infusion, 0.5 mg/min, Intravenous, Continuous, Mitchell Petersen ANP, Last Rate: 16.7 mL/hr at 09/26/23 0631, 0.5 mg/min at 09/26/23 0631    aspirin EC tablet 81 mg, 81 mg, Oral, Daily, Sebastian Jones PA-C, 81 mg at 09/26/23 0809    atorvastatin tablet 40 mg, 40 mg, Oral, Daily, Mitchell Petersen ANP, 40 mg at 09/26/23 0809    azelastine 137 mcg (0.1 %) nasal spray 274 mcg, 2 spray, Nasal, BID, Sebastian Jones PA-C    calcium gluconate 1 g in NS IVPB (premixed), 1 g, Intravenous, PRN, Sebastian Jones PA-C    calcium gluconate 1 g in NS IVPB (premixed), 2 g, Intravenous, PRN, Sebastian Jones PA-C    calcium gluconate 1 g in NS IVPB (premixed), 3 g, Intravenous, PRN, Sebastian Jones PA-C    dextrose 10% bolus 125 mL 125 mL, 12.5 g, Intravenous, PRConnie CALL Victor E IV, MD    dextrose 10% bolus 250 mL 250 mL, 25 g, Intravenous, PRNConnie Victor E IV, MD    dextrose 5 % and 0.45 % NaCl infusion, , Intravenous, Continuous, Sebastian Jones PA-C, Stopped at 09/21/23 1029    dextrose 5 % and 0.45 % NaCl with KCl 20 mEq infusion, , Intravenous, Continuous, Steven Rosales IV, MD, Stopped at 09/25/23 1121    docusate sodium capsule 100 mg, 100 mg, Oral, BID, Sebastian Jones PA-C, 100 mg at 09/26/23 0809    DOPamine 800 mg in dextrose 5 % 250 mL infusion (premix) (NON-TITRATING), 5 mcg/kg/min, Intravenous, Continuous, Mitchell Petersen, ANP, Last Rate: 9.7 mL/hr at 09/23/23 0854, 5 mcg/kg/min at 09/23/23 0854    enoxaparin injection 40 mg, 40 mg, Subcutaneous, Daily, Sebastian Jones PA-C, 40 mg at 09/24/23 1720    famotidine tablet 20 mg, 20 mg, Oral, QHS, Steven Rosales IV, MD, 20 mg at 09/25/23 1956    fluticasone furoate-vilanteroL 200-25 mcg/dose diskus inhaler 1  puff, 1 puff, Inhalation, Daily, 1 puff at 09/26/23 0900 **AND** umeclidinium 62.5 mcg/actuation inhalation capsule 62.5 mcg, 1 capsule, Inhalation, Daily, Zoya Bowman ANP, 62.5 mcg at 09/26/23 0820    folic acid tablet 1 mg, 1 mg, Oral, Daily, Sebastian Jones PA-C, 1 mg at 09/26/23 0809    glimepiride tablet 4 mg, 4 mg, Oral, BID, Sebastian Jones PA-C, 4 mg at 09/26/23 0809    glucagon (human recombinant) injection 1 mg, 1 mg, Intramuscular, PRN, Steven Rosales IV, MD    glucose chewable tablet 16 g, 16 g, Oral, PRN, Steven Rosales IV, MD    glucose chewable tablet 24 g, 24 g, Oral, PRN, Steven Rosales IV, MD    HYDROcodone-acetaminophen 5-325 mg per tablet 1 tablet, 1 tablet, Oral, Q4H PRN, Sebastian Jones PA-C, 1 tablet at 09/26/23 0626    insulin aspart U-100 injection 0-10 Units, 0-10 Units, Subcutaneous, QID (AC + HS) PRN, Steven Rosales IV, MD, 2 Units at 09/22/23 1146    lactulose 10 gram/15 ml solution 20 g, 20 g, Oral, Q6H PRN, Sebastian Jones PA-C    lipase-protease-amylase 12,000-38,000-60,000 units per capsule 4 capsule, 4 capsule, Oral, TID WM, Sebastian Jones PA-C, 4 capsule at 09/26/23 0809    lisinopriL tablet 20 mg, 20 mg, Oral, Daily, Sebastian Joens PA-C, 20 mg at 09/26/23 0809    loperamide capsule 2 mg, 2 mg, Oral, Continuous PRN, Sebastian Jones PA-C    magnesium sulfate 2g in water 50mL IVPB (premix), 2 g, Intravenous, PRN, Sebastian Jones PA-C    magnesium sulfate 2g in water 50mL IVPB (premix), 4 g, Intravenous, PRN, Sebastian Jones PA-C    metFORMIN tablet 1,000 mg, 1,000 mg, Oral, BID WM, Sebastian Jones PA-C, 1,000 mg at 09/26/23 0809    metoclopramide HCl injection 5 mg, 5 mg, Intravenous, Q6H PRN, Sebastian Jones PA-C    morphine injection 4 mg, 4 mg, Intravenous, Q4H PRN, Sebastian Jones PA-C, 4 mg at 09/22/23 1253    ondansetron injection 4 mg, 4 mg, Intravenous, Q4H PRN, Sebastian Jones PA-C, 4 mg at 09/23/23 0827    oxyCODONE immediate  release tablet Tab 10 mg, 10 mg, Oral, Q4H PRN, Sebastian Jones, PA-C, 10 mg at 09/21/23 0507    potassium chloride 20 mEq in 100 mL IVPB (FOR CENTRAL LINE ADMINISTRATION ONLY), 20 mEq, Intravenous, PRN, Sebastian Jones, PA-C    potassium chloride 20 mEq in 100 mL IVPB (FOR CENTRAL LINE ADMINISTRATION ONLY), 40 mEq, Intravenous, PRN, Sebastian Jones, PA-C    potassium chloride 20 mEq in 100 mL IVPB (FOR CENTRAL LINE ADMINISTRATION ONLY), 60 mEq, Intravenous, PRN, Sebastian Jones, PA-C    sodium phosphate 15 mmol in dextrose 5 % (D5W) 250 mL IVPB, 15 mmol, Intravenous, PRN, Sebastian Jones, PA-C    sodium phosphate 20.01 mmol in dextrose 5 % (D5W) 250 mL IVPB, 20.01 mmol, Intravenous, PRN, Sebastian Jones, PA-C    sodium phosphate 30 mmol in dextrose 5 % (D5W) 250 mL IVPB, 30 mmol, Intravenous, PRN, Sebastian Jones, PA-C    sucralfate tablet 1 g, 1 g, Oral, QID (AC & HS), Sebastian Jones, PA-C, 1 g at 09/25/23 1956    sulfamethoxazole-trimethoprim 800-160mg per tablet 1 tablet, 1 tablet, Oral, BID, Sebastian Jones, PA-C, 1 tablet at 09/26/23 0809    timolol maleate 0.5% ophthalmic solution 1 drop, 1 drop, Both Eyes, BID, Steven Rosales IV, MD, 1 drop at 09/26/23 0817    Facility-Administered Medications Ordered in Other Encounters:     0.9%  NaCl infusion, , Intravenous, On Call Procedure, Vijay Uribe MD    diazePAM tablet 10 mg, 10 mg, Oral, On Call Procedure, Vijay Uribe MD, 10 mg at 09/06/23 1021    diphenhydrAMINE capsule 50 mg, 50 mg, Oral, On Call Procedure, Vijay Uribe MD, 50 mg at 09/06/23 1021    VTE Risk Mitigation (From admission, onward)           Ordered     enoxaparin injection 40 mg  Daily         09/20/23 1058     Place sequential compression device  Until discontinued         09/20/23 1058     IP VTE HIGH RISK PATIENT  Once         09/20/23 1034                  Assessment:   SSS - Now SR    - Initial Rhythm: Junctional Bradycardia (30s-40s) - Symptomatic: Dizziness/Nausea; Dopamine  with Rapid Increase in SBP (190s); Dopamine Discontinued    - After Bradycardic Rhythm PT because Tachycardiac - PAF/RVR    - Short Time Period of PAF/RVR with Spontaneous Conversion to Junctional Bradycardia  Post Operative PAF with RVR - Spontaneous Conversion to Junctional Bradycardia      - s/p Ligation of SUKHI (9.20.23) - Endostapler      - CHADsVASc - 5 Points - 7.2% Stroke Risk per Year       - LHC (9.6.23) - Normal Coronaries   VHD - MS    - s/p (9.20.23) - Bio Mosaic 25mm MVR  Hx of LLE DVT x 2 (OP Xarelto)  DM II  Obesity  Common Variable Immunodeficiency  GERD  COPD without Exacerbation  ABEL/CPAP  Glaucoma  No Hx of GIB     Plan:   DC amiodarone drip. Start Amiodarone 400 mg bid x 3 days then 200 mg bid x 3 days then 200 mg daily thereafter  Will resume xarelto 20 mg daily on 9/28/23 due to hx of multiple DVTs  Continue ASA, Statin and ACEi  Encourage Q1HR IS and Aggressive Mobilization (Consult PT/OT)  CPAP QHS/Nap Time  Keep incision site clean and dry until clinic follow up then may wash site daily with soap and water  Do not attempt to remove steri-strips or replace them if they fall off. Steri-strips will be removed at follow-up.   Sling placed to Left arm at bedtime and for naps x 1 week after implant of a new lead.   Do not raise elbow above the shoulder height x 4 weeks post implant.   Do not lift > 10-15# for 4 weeks.   No driving x 1 week post new lead placement.   Doxycycline 100mg bid x 5 days.    Waiting on placement       CIS signing off. Reconsult if needed.     Madeline Sher, P  Cardiology  [facility]  09/26/2023

## 2023-09-26 NOTE — PLAN OF CARE
Spoke to Shari from [facility]. Patient accepted, a bed should be available tomorrow.

## 2023-09-26 NOTE — PLAN OF CARE
09/26/23 1052   Discharge Reassessment   Assessment Type Discharge Planning Reassessment   Discharge Plan discussed with: Patient   Discharge Plan A Skilled Nursing Facility   Discharge Plan B Skilled Nursing Facility   Post-Acute Status   Post-Acute Authorization Placement     Discussed rehab/swingbed/SNF with patient. List of providers given. FOC obtained for [facility]. Referral sent via [facility].

## 2023-09-26 NOTE — PROGRESS NOTES
CT SURGERY PROGRESS NOTE  Kortney Leach  77 y.o.  1945    Patients Procedure: Procedure(s) (LRB):  INSERTION, CARDIAC PACEMAKER, DUAL CHAMBER (N/A)    Subjective  Interval History: Patient is postoperative day 6 from Mitral valve replacement and postoperative day 1 from PPM insertion.  Currently in bed, working with PT.  No acute events overnight.     Review of Systems   Constitutional:  Positive for malaise/fatigue. Negative for chills and fever.   Respiratory:  Negative for cough, shortness of breath and wheezing.    Cardiovascular:  Negative for chest pain and palpitations.   Neurological:  Positive for weakness.       Medication List  Infusions   dextrose 5 % and 0.45 % NaCl Stopped (09/21/23 1029)    dextrose 5 % and 0.45 % NaCl with KCl 20 mEq Stopped (09/25/23 1121)    DOPamine 5 mcg/kg/min (09/23/23 5030)    loperamide       Scheduled   [START ON 10/2/2023] amiodarone  200 mg Oral Daily    [START ON 9/29/2023] amiodarone  200 mg Oral BID    amiodarone  400 mg Oral BID    aspirin  81 mg Oral Daily    atorvastatin  40 mg Oral Daily    azelastine  2 spray Nasal BID    docusate sodium  100 mg Oral BID    enoxparin  40 mg Subcutaneous Daily    famotidine  20 mg Oral QHS    fluticasone furoate-vilanteroL  1 puff Inhalation Daily    And    umeclidinium  1 capsule Inhalation Daily    folic acid  1 mg Oral Daily    glimepiride  4 mg Oral BID    lipase-protease-amylase 12,000-38,000-60,000 units  4 capsule Oral TID WM    lisinopriL  20 mg Oral Daily    metFORMIN  1,000 mg Oral BID WM    sucralfate  1 g Oral QID (AC & HS)    sulfamethoxazole-trimethoprim 800-160mg  1 tablet Oral BID    timolol maleate 0.5%  1 drop Both Eyes BID       Objective:  Recent Vitals:  Temp:  [97.4 °F (36.3 °C)-98.2 °F (36.8 °C)] 97.8 °F (36.6 °C)  Pulse:  [] 60  Resp:  [15-20] 15  SpO2:  [93 %-97 %] 93 %  BP: (126-165)/(62-85) 145/78    Physical Exam  Constitutional:       General: She is not in acute distress.      Appearance: She is obese.   Cardiovascular:      Rate and Rhythm: Normal rate and regular rhythm.      Pulses: Normal pulses.      Heart sounds: Normal heart sounds. No murmur heard.     No friction rub. No gallop.      Comments: paced  Pulmonary:      Effort: Pulmonary effort is normal. No respiratory distress.      Breath sounds: Normal breath sounds. No wheezing, rhonchi or rales.   Abdominal:      General: Bowel sounds are normal. There is no distension.      Palpations: Abdomen is soft. There is no mass.      Tenderness: There is no abdominal tenderness.   Musculoskeletal:         General: No swelling.      Right lower leg: No edema.      Left lower leg: No edema.   Skin:     General: Skin is warm and dry.      Capillary Refill: Capillary refill takes less than 2 seconds.      Comments: Median sternotomy incision c/d/i   Neurological:      General: No focal deficit present.      Mental Status: She is alert and oriented to person, place, and time. Mental status is at baseline.   Psychiatric:         Mood and Affect: Mood normal.         Behavior: Behavior normal.          I/O last 24 hrs:  Intake/Output - Last 3 Shifts         09/24 0700  09/25 0659 09/25 0700 09/26 0659 09/26 0700  09/27 0659    P.O. 480 240     I.V. (mL/kg)  1953.2 (19.1)     Total Intake(mL/kg) 480 (4.6) 2193.2 (21.4)     Urine (mL/kg/hr) 1250 (0.5) 2050 (0.8)     Stool 0      Total Output 1250 2050     Net -770 +143.2            Urine Occurrence  2 x     Stool Occurrence 0 x              Labs  CBC:   Recent Labs   Lab 09/26/23  0353   WBC 8.18   RBC 3.76*   HGB 12.1   HCT 37.7      .3*   MCH 32.2*   MCHC 32.1*     CMP:   Recent Labs   Lab 09/26/23  0353   CALCIUM 8.5   ALBUMIN 2.8*      K 4.8   CO2 18*   BUN 15.5   CREATININE 0.74   ALKPHOS 54   ALT 13   AST 16   BILITOT 0.7     All pertinent labs from the last 24 hours have been reviewed.      Imaging:   CXR: X-Ray Chest AP Portable    Result Date: 9/25/2023  No acute  abnormality of the chest. Electronically signed by: Jaylene Mirza Date:    09/25/2023 Time:    19:08   I have reviewed all pertinent imaging results/findings within the past 24 hours.        ASSESSMENT/PLAN:    -AFVSS  -Continue care  -Progressive ambulation and regular IS use  -Will likely need rehab placement. Discharge planning per case management.        Case and plan of care discussed with YANIRA Ferreira

## 2023-09-26 NOTE — PLAN OF CARE
09/26/23 1054   Medicare Message   Important Message from Medicare regarding Discharge Appeal Rights Given to patient/caregiver;Explained to patient/caregiver

## 2023-09-27 LAB
POCT GLUCOSE: 105 MG/DL (ref 70–110)
POCT GLUCOSE: 138 MG/DL (ref 70–110)
POCT GLUCOSE: 140 MG/DL (ref 70–110)
POCT GLUCOSE: 174 MG/DL (ref 70–110)

## 2023-09-27 PROCEDURE — 27000221 HC OXYGEN, UP TO 24 HOURS

## 2023-09-27 PROCEDURE — 25000003 PHARM REV CODE 250: Performed by: NURSE PRACTITIONER

## 2023-09-27 PROCEDURE — 25000003 PHARM REV CODE 250: Performed by: PHYSICIAN ASSISTANT

## 2023-09-27 PROCEDURE — 97535 SELF CARE MNGMENT TRAINING: CPT | Mod: CO

## 2023-09-27 PROCEDURE — 63600175 PHARM REV CODE 636 W HCPCS: Performed by: NURSE PRACTITIONER

## 2023-09-27 PROCEDURE — 25000242 PHARM REV CODE 250 ALT 637 W/ HCPCS: Performed by: NURSE PRACTITIONER

## 2023-09-27 PROCEDURE — 99024 PR POST-OP FOLLOW-UP VISIT: ICD-10-PCS | Mod: POP,,, | Performed by: PHYSICIAN ASSISTANT

## 2023-09-27 PROCEDURE — 21400001 HC TELEMETRY ROOM

## 2023-09-27 PROCEDURE — 99024 POSTOP FOLLOW-UP VISIT: CPT | Mod: POP,,, | Performed by: PHYSICIAN ASSISTANT

## 2023-09-27 PROCEDURE — 97116 GAIT TRAINING THERAPY: CPT | Mod: CQ

## 2023-09-27 PROCEDURE — 97530 THERAPEUTIC ACTIVITIES: CPT | Mod: CQ

## 2023-09-27 PROCEDURE — 25000003 PHARM REV CODE 250: Performed by: SPECIALIST

## 2023-09-27 RX ADMIN — SUCRALFATE 1 G: 1 TABLET ORAL at 08:09

## 2023-09-27 RX ADMIN — HYDROCODONE BITARTRATE AND ACETAMINOPHEN 1 TABLET: 5; 325 TABLET ORAL at 01:09

## 2023-09-27 RX ADMIN — UMECLIDINIUM 62.5 MCG: 62.5 AEROSOL, POWDER ORAL at 08:09

## 2023-09-27 RX ADMIN — AMIODARONE HYDROCHLORIDE 400 MG: 200 TABLET ORAL at 08:09

## 2023-09-27 RX ADMIN — PANCRELIPASE 4 CAPSULE: 60000; 12000; 38000 CAPSULE, DELAYED RELEASE PELLETS ORAL at 11:09

## 2023-09-27 RX ADMIN — FLUTICASONE FUROATE AND VILANTEROL TRIFENATATE 1 PUFF: 200; 25 POWDER RESPIRATORY (INHALATION) at 08:09

## 2023-09-27 RX ADMIN — FOLIC ACID 1 MG: 1 TABLET ORAL at 08:09

## 2023-09-27 RX ADMIN — ATORVASTATIN CALCIUM 40 MG: 40 TABLET, FILM COATED ORAL at 08:09

## 2023-09-27 RX ADMIN — METFORMIN HYDROCHLORIDE 1000 MG: 500 TABLET, FILM COATED ORAL at 05:09

## 2023-09-27 RX ADMIN — TIMOLOL MALEATE 1 DROP: 5 SOLUTION/ DROPS OPHTHALMIC at 08:09

## 2023-09-27 RX ADMIN — PANCRELIPASE 4 CAPSULE: 60000; 12000; 38000 CAPSULE, DELAYED RELEASE PELLETS ORAL at 05:09

## 2023-09-27 RX ADMIN — GLIMEPIRIDE 4 MG: 1 TABLET ORAL at 08:09

## 2023-09-27 RX ADMIN — HYDROCODONE BITARTRATE AND ACETAMINOPHEN 1 TABLET: 5; 325 TABLET ORAL at 08:09

## 2023-09-27 RX ADMIN — ASPIRIN 81 MG: 81 TABLET, COATED ORAL at 08:09

## 2023-09-27 RX ADMIN — LISINOPRIL 20 MG: 20 TABLET ORAL at 08:09

## 2023-09-27 RX ADMIN — PANCRELIPASE 4 CAPSULE: 60000; 12000; 38000 CAPSULE, DELAYED RELEASE PELLETS ORAL at 08:09

## 2023-09-27 RX ADMIN — FAMOTIDINE 20 MG: 20 TABLET, FILM COATED ORAL at 08:09

## 2023-09-27 RX ADMIN — SUCRALFATE 1 G: 1 TABLET ORAL at 10:09

## 2023-09-27 RX ADMIN — DOCUSATE SODIUM 100 MG: 100 CAPSULE, LIQUID FILLED ORAL at 08:09

## 2023-09-27 RX ADMIN — SULFAMETHOXAZOLE AND TRIMETHOPRIM 1 TABLET: 800; 160 TABLET ORAL at 08:09

## 2023-09-27 RX ADMIN — METFORMIN HYDROCHLORIDE 1000 MG: 500 TABLET, FILM COATED ORAL at 08:09

## 2023-09-27 RX ADMIN — SUCRALFATE 1 G: 1 TABLET ORAL at 05:09

## 2023-09-27 RX ADMIN — ENOXAPARIN SODIUM 40 MG: 40 INJECTION SUBCUTANEOUS at 05:09

## 2023-09-27 NOTE — PT/OT/SLP PROGRESS
Occupational Therapy   Treatment    Name: Kortney Leach  MRN: [id number]  Admitting Diagnosis:  SSS (sick sinus syndrome)  2 Days Post-Op    Recommendations:     Discharge Recommendations: nursing facility, skilled  Discharge Equipment Recommendations:  to be determined by next level of care  Barriers to discharge:       Assessment:     Kortney Leach is a 77 y.o. female with a medical diagnosis of SSS (sick sinus syndrome).      Rehab Prognosis:  Fair; patient would benefit from acute skilled OT services to address these deficits and reach maximum level of function.       Plan:     Patient to be seen 5 x/week to address the above listed problems via self-care/home management, therapeutic activities, therapeutic exercises  Plan of Care Expires: 10/22/23  Plan of Care Reviewed with: patient    Subjective     Pain/Comfort:       Objective:     Communicated with: RN prior to session.  Patient found HOB elevated with   upon OT entry to room.    General Precautions: Standard, sternal, fall    Orthopedic Precautions:   Braces:       Occupational Performance:     Bed Mobility:    Patient completed Supine to Sit with moderate assistance     Functional Mobility/Transfers:  Patient completed Sit <> Stand Transfer with minimum assistance  with  hand-held assist   Patient completed Toilet Transfer Step Transfer technique with minimum assistance with  rolling walker  Functional Mobility: patient mobilizing from EOB to BR commode using RW no LOB noted    Activities of Daily Living:  Grooming: stand by assistance standing at sink washing hands  Toileting: independence sitting on commode performing pericare following urination.    Patient Education:  Patient provided with verbal education education regarding OT role/goals/POC.  Understanding was verbalized.      Patient left HOB elevated with all lines intact and call button in reach    GOALS:   Multidisciplinary Problems       Occupational Therapy Goals           Problem: Occupational Therapy    Goal Priority Disciplines Outcome Interventions   Occupational Therapy Goal     OT, PT/OT Ongoing, Progressing    Description: LTG: Pt will perform basic ADLs and ADL transfers with Modified independence and good compliance to sternal precautions using LRAD by discharge.    STG: to be met in 2 weeks    Pt will complete grooming standing at sink with LRAD with SBA.  Pt will complete UB dressing with SBA.  Pt will complete LB dressing with SBA using LRAD and AE prn.  Pt will complete toileting with SBA using LRAD.  Pt will complete functional mobility to/from toilet and toilet transfer with SBA using LRAD.   Pt will independently verbalize sternal precautions with >90% accuracy.                         Time Tracking:     OT Date of Treatment: 09/27/23  OT Start Time: 1331  OT Stop Time: 1407  OT Total Time (min): 36 min    Billable Minutes:Self Care/Home Management 2    OT/JACOB: JACOB     Number of JACOB visits since last OT visit: 2    9/27/2023

## 2023-09-27 NOTE — DISCHARGE INSTRUCTIONS
Keep incision site clean and dry until clinic follow up then may wash site daily with soap and water  Do noKeep incision site clean and dry until clinic follow up then may wash site daily with soap and water  Do not attempt to remove steri-strips or replace them if they fall off. Steri-strips will be removed at follow-up.   Sling placed to Left arm at bedtime and for naps x 1 week after implant of a new lead.   Do not raise elbow above the shoulder height x 4 weeks post implant.   Do not lift > 10-15# for 4 weeks.   No driving x 1 week post new lead placement.

## 2023-09-27 NOTE — PT/OT/SLP PROGRESS
Physical Therapy Treatment    Patient Name:  Kortney Leach   MRN:  [id number]    Recommendations:     Discharge Recommendations: SNF versus Swing Bed  Discharge Equipment Recommendations: none  Barriers to discharge:     Assessment:     Kortney Leach is a 77 y.o. female admitted with a medical diagnosis of SSS (sick sinus syndrome), s/p MVR.  She presents with the following impairments/functional limitations: weakness, gait instability, impaired endurance, impaired balance, impaired self care skills, impaired functional mobility .    Rehab Prognosis: Good; patient would benefit from acute skilled PT services to address these deficits and reach maximum level of function.    Recent Surgery: Procedure(s) (LRB):  INSERTION, CARDIAC PACEMAKER, DUAL CHAMBER (N/A) 2 Days Post-Op    Plan:     During this hospitalization, patient to be seen 5 x/week to address the identified rehab impairments via gait training, therapeutic activities, therapeutic exercises and progress toward the following goals:    Plan of Care Expires:       Subjective     Chief Complaint: Pt states she is having some pain  Patient/Family Comments/goals: Pt friend present.   Pain/Comfort:  Pain Rating 1: other (see comments) (shoulder and sternal pain; did not rate)Pt reported some discomfort in chest when transferring, but was able to maintain sternal and pacemaker pxn.      Objective:     Communicated with NSG prior to session.  Patient found HOB elevated with pulse ox (continuous), telemetry, PureWick upon PTA entry to room.     General Precautions: Standard, fall, sternal  Orthopedic Precautions: N/A  Braces: N/A  Respiratory Status: Room air, SPO2 94% - 91%   Heart rate: 80 with activity  Skin integrity: Pressure wound noted to sacrum; RN aware      Functional Mobility:  Bed: ModA log roll from supine to sit EOB with increased time to complete  Transfers: Sit<->stand x 3 trials with cues for technique to maintain sternal pxns. Progressed  from max to mod assist  Gait with RW: Min assist x 120' with slow es, no major LOB, shuffling steps. Chair following.     Education:  Patient provided with verbal education education regarding POC and sternal pxns. Understanding was verbalized, however additional teaching warranted.     Patient left up in chair with all lines intact, call button in reach, and NSG notified of pt performance .    GOALS:   Multidisciplinary Problems       Physical Therapy Goals          Problem: Physical Therapy    Goal Priority Disciplines Outcome Goal Variances Interventions   Physical Therapy Goal     PT, PT/OT Ongoing, Progressing     Description: Pt will be seen for the following goals  1. Pt will be sba with a ll bed mobility  2. Pt will be sba with transfers with a rw  3. Pt will ambulate with a rw  100ft sba                       Time Tracking:     PT Received On: 09/27/23  PT Start Time: 1331     PT Stop Time: 1407  PT Total Time (min): 36 min     Billable Minutes: Gait Training 1 unit and Therapeutic Activity 1 unit    Treatment Type: Treatment  PT/PTA: PTA     Number of PTA visits since last PT visit: 2     09/27/2023

## 2023-09-28 LAB — POCT GLUCOSE: 88 MG/DL (ref 70–110)

## 2023-09-28 PROCEDURE — 25000003 PHARM REV CODE 250: Performed by: NURSE PRACTITIONER

## 2023-09-28 PROCEDURE — 97110 THERAPEUTIC EXERCISES: CPT

## 2023-09-28 PROCEDURE — 94760 N-INVAS EAR/PLS OXIMETRY 1: CPT

## 2023-09-28 PROCEDURE — 25000003 PHARM REV CODE 250: Performed by: SPECIALIST

## 2023-09-28 PROCEDURE — 25000003 PHARM REV CODE 250: Performed by: PHYSICIAN ASSISTANT

## 2023-09-28 PROCEDURE — 21400001 HC TELEMETRY ROOM

## 2023-09-28 PROCEDURE — 25000242 PHARM REV CODE 250 ALT 637 W/ HCPCS: Performed by: NURSE PRACTITIONER

## 2023-09-28 RX ORDER — CIPROFLOXACIN 500 MG/1
500 TABLET ORAL EVERY 12 HOURS
Status: DISCONTINUED | OUTPATIENT
Start: 2023-09-28 | End: 2023-09-29 | Stop reason: HOSPADM

## 2023-09-28 RX ADMIN — SULFAMETHOXAZOLE AND TRIMETHOPRIM 1 TABLET: 800; 160 TABLET ORAL at 09:09

## 2023-09-28 RX ADMIN — GLIMEPIRIDE 4 MG: 1 TABLET ORAL at 09:09

## 2023-09-28 RX ADMIN — PANCRELIPASE 4 CAPSULE: 60000; 12000; 38000 CAPSULE, DELAYED RELEASE PELLETS ORAL at 12:09

## 2023-09-28 RX ADMIN — SUCRALFATE 1 G: 1 TABLET ORAL at 08:09

## 2023-09-28 RX ADMIN — GLIMEPIRIDE 4 MG: 1 TABLET ORAL at 08:09

## 2023-09-28 RX ADMIN — FLUTICASONE FUROATE AND VILANTEROL TRIFENATATE 1 PUFF: 200; 25 POWDER RESPIRATORY (INHALATION) at 09:09

## 2023-09-28 RX ADMIN — CIPROFLOXACIN 500 MG: 500 TABLET, FILM COATED ORAL at 08:09

## 2023-09-28 RX ADMIN — RIVAROXABAN 20 MG: 10 TABLET, FILM COATED ORAL at 05:09

## 2023-09-28 RX ADMIN — DOCUSATE SODIUM 100 MG: 100 CAPSULE, LIQUID FILLED ORAL at 09:09

## 2023-09-28 RX ADMIN — METFORMIN HYDROCHLORIDE 1000 MG: 500 TABLET, FILM COATED ORAL at 09:09

## 2023-09-28 RX ADMIN — AMIODARONE HYDROCHLORIDE 400 MG: 200 TABLET ORAL at 09:09

## 2023-09-28 RX ADMIN — DOCUSATE SODIUM 100 MG: 100 CAPSULE, LIQUID FILLED ORAL at 08:09

## 2023-09-28 RX ADMIN — FOLIC ACID 1 MG: 1 TABLET ORAL at 09:09

## 2023-09-28 RX ADMIN — FAMOTIDINE 20 MG: 20 TABLET, FILM COATED ORAL at 08:09

## 2023-09-28 RX ADMIN — HYDROCODONE BITARTRATE AND ACETAMINOPHEN 1 TABLET: 5; 325 TABLET ORAL at 06:09

## 2023-09-28 RX ADMIN — ASPIRIN 81 MG: 81 TABLET, COATED ORAL at 09:09

## 2023-09-28 RX ADMIN — ATORVASTATIN CALCIUM 40 MG: 40 TABLET, FILM COATED ORAL at 09:09

## 2023-09-28 RX ADMIN — PANCRELIPASE 4 CAPSULE: 60000; 12000; 38000 CAPSULE, DELAYED RELEASE PELLETS ORAL at 05:09

## 2023-09-28 RX ADMIN — SUCRALFATE 1 G: 1 TABLET ORAL at 11:09

## 2023-09-28 RX ADMIN — HYDROCODONE BITARTRATE AND ACETAMINOPHEN 1 TABLET: 5; 325 TABLET ORAL at 11:09

## 2023-09-28 RX ADMIN — HYDROCODONE BITARTRATE AND ACETAMINOPHEN 1 TABLET: 5; 325 TABLET ORAL at 07:09

## 2023-09-28 RX ADMIN — UMECLIDINIUM 62.5 MCG: 62.5 AEROSOL, POWDER ORAL at 09:09

## 2023-09-28 RX ADMIN — LISINOPRIL 20 MG: 20 TABLET ORAL at 09:09

## 2023-09-28 RX ADMIN — TIMOLOL MALEATE 1 DROP: 5 SOLUTION/ DROPS OPHTHALMIC at 08:09

## 2023-09-28 RX ADMIN — AMIODARONE HYDROCHLORIDE 400 MG: 200 TABLET ORAL at 08:09

## 2023-09-28 RX ADMIN — TIMOLOL MALEATE 1 DROP: 5 SOLUTION/ DROPS OPHTHALMIC at 09:09

## 2023-09-28 RX ADMIN — PANCRELIPASE 4 CAPSULE: 60000; 12000; 38000 CAPSULE, DELAYED RELEASE PELLETS ORAL at 09:09

## 2023-09-28 NOTE — PROGRESS NOTES
09/28/23 0930   Pre Exercise Vitals   /84   Pulse 79  (NSR)   Supplemental O2? No   SpO2 95 %   During Exercise Vitals   Pulse 95  (NSR)   Supplemental O2? No   SpO2 94 %   Distance Walked 100 ft   Post Exercise Vitals   /78   Pulse 78  (NSR)   Supplemental O2? No   SpO2 96 %   Modality   Modality   (rollator)     Communicated with nurse prior and post activity. Min assist sit to stand, pt reluctant at first to ambulate, needed encouragement, maintains sternal precautions. Gait slow and steady. Encouraged increased activity and use of Incentive spirometry. Sitting up in chair after walk.

## 2023-09-28 NOTE — PT/OT/SLP PROGRESS
Physical Therapy      Patient Name:  Kortney Leach   MRN:  [id number]    Patient ambulating in hallway with CR upon arrival. Will follow up later if schedule permits.

## 2023-09-29 ENCOUNTER — HOSPITAL ENCOUNTER (INPATIENT)
Facility: HOSPITAL | Age: 78
LOS: 15 days | Discharge: HOME HEALTH | DRG: 949 | End: 2023-10-14
Attending: STUDENT IN AN ORGANIZED HEALTH CARE EDUCATION/TRAINING PROGRAM | Admitting: STUDENT IN AN ORGANIZED HEALTH CARE EDUCATION/TRAINING PROGRAM
Payer: MEDICARE

## 2023-09-29 VITALS
DIASTOLIC BLOOD PRESSURE: 64 MMHG | HEIGHT: 66 IN | HEART RATE: 80 BPM | SYSTOLIC BLOOD PRESSURE: 149 MMHG | BODY MASS INDEX: 36.32 KG/M2 | WEIGHT: 226 LBS | OXYGEN SATURATION: 95 % | TEMPERATURE: 98 F | RESPIRATION RATE: 18 BRPM

## 2023-09-29 DIAGNOSIS — I25.10 CAD (CORONARY ARTERY DISEASE): ICD-10-CM

## 2023-09-29 DIAGNOSIS — I49.5 SSS (SICK SINUS SYNDROME): ICD-10-CM

## 2023-09-29 DIAGNOSIS — R53.81 PHYSICAL DECONDITIONING: ICD-10-CM

## 2023-09-29 DIAGNOSIS — R07.9 CHEST PAIN: ICD-10-CM

## 2023-09-29 DIAGNOSIS — D83.9 COMMON VARIABLE IMMUNODEFICIENCY: Primary | ICD-10-CM

## 2023-09-29 PROBLEM — I34.0 SEVERE MITRAL VALVE REGURGITATION: Status: ACTIVE | Noted: 2023-09-29

## 2023-09-29 LAB
APPEARANCE UR: CLEAR
BACTERIA #/AREA URNS AUTO: ABNORMAL /HPF
BILIRUB UR QL STRIP.AUTO: NEGATIVE
COLOR UR AUTO: YELLOW
GLUCOSE UR QL STRIP.AUTO: NEGATIVE
KETONES UR QL STRIP.AUTO: NEGATIVE
LEUKOCYTE ESTERASE UR QL STRIP.AUTO: NEGATIVE
NITRITE UR QL STRIP.AUTO: NEGATIVE
PH UR STRIP.AUTO: 6 [PH]
POCT GLUCOSE: 101 MG/DL (ref 70–110)
POCT GLUCOSE: 102 MG/DL (ref 70–110)
POCT GLUCOSE: 117 MG/DL (ref 70–110)
POCT GLUCOSE: 52 MG/DL (ref 70–110)
POCT GLUCOSE: 58 MG/DL (ref 70–110)
POCT GLUCOSE: 61 MG/DL (ref 70–110)
POCT GLUCOSE: 65 MG/DL (ref 70–110)
PROT UR QL STRIP.AUTO: NEGATIVE
RBC #/AREA URNS AUTO: ABNORMAL /HPF
RBC UR QL AUTO: ABNORMAL
SP GR UR STRIP.AUTO: 1.02 (ref 1–1.03)
SQUAMOUS #/AREA URNS AUTO: ABNORMAL /HPF
UROBILINOGEN UR STRIP-ACNC: 0.2
WBC #/AREA URNS AUTO: ABNORMAL /HPF

## 2023-09-29 PROCEDURE — 97161 PT EVAL LOW COMPLEX 20 MIN: CPT

## 2023-09-29 PROCEDURE — 11000004 HC SNF PRIVATE

## 2023-09-29 PROCEDURE — 25000242 PHARM REV CODE 250 ALT 637 W/ HCPCS: Performed by: NURSE PRACTITIONER

## 2023-09-29 PROCEDURE — 94760 N-INVAS EAR/PLS OXIMETRY 1: CPT

## 2023-09-29 PROCEDURE — 25000003 PHARM REV CODE 250: Performed by: NURSE PRACTITIONER

## 2023-09-29 PROCEDURE — 81001 URINALYSIS AUTO W/SCOPE: CPT | Performed by: STUDENT IN AN ORGANIZED HEALTH CARE EDUCATION/TRAINING PROGRAM

## 2023-09-29 PROCEDURE — 25000003 PHARM REV CODE 250: Performed by: STUDENT IN AN ORGANIZED HEALTH CARE EDUCATION/TRAINING PROGRAM

## 2023-09-29 PROCEDURE — 25000003 PHARM REV CODE 250: Performed by: SPECIALIST

## 2023-09-29 PROCEDURE — 97165 OT EVAL LOW COMPLEX 30 MIN: CPT

## 2023-09-29 PROCEDURE — 25000003 PHARM REV CODE 250: Performed by: PHYSICIAN ASSISTANT

## 2023-09-29 PROCEDURE — 94799 UNLISTED PULMONARY SVC/PX: CPT

## 2023-09-29 RX ORDER — TALC
9 POWDER (GRAM) TOPICAL NIGHTLY PRN
Status: DISCONTINUED | OUTPATIENT
Start: 2023-09-29 | End: 2023-10-14 | Stop reason: HOSPADM

## 2023-09-29 RX ORDER — MUPIROCIN 20 MG/G
OINTMENT TOPICAL 2 TIMES DAILY
Status: DISCONTINUED | OUTPATIENT
Start: 2023-09-29 | End: 2023-09-29

## 2023-09-29 RX ORDER — ASPIRIN 81 MG/1
81 TABLET ORAL DAILY
Status: DISCONTINUED | OUTPATIENT
Start: 2023-09-30 | End: 2023-10-14 | Stop reason: HOSPADM

## 2023-09-29 RX ORDER — PANTOPRAZOLE SODIUM 40 MG/1
40 TABLET, DELAYED RELEASE ORAL DAILY
Status: DISCONTINUED | OUTPATIENT
Start: 2023-09-29 | End: 2023-10-14 | Stop reason: HOSPADM

## 2023-09-29 RX ORDER — SUCRALFATE 1 G/1
1 TABLET ORAL
Status: DISCONTINUED | OUTPATIENT
Start: 2023-09-29 | End: 2023-09-29

## 2023-09-29 RX ORDER — LOPERAMIDE HYDROCHLORIDE 2 MG/1
2 CAPSULE ORAL 3 TIMES DAILY PRN
Status: DISCONTINUED | OUTPATIENT
Start: 2023-09-29 | End: 2023-10-14 | Stop reason: HOSPADM

## 2023-09-29 RX ORDER — IPRATROPIUM BROMIDE AND ALBUTEROL SULFATE 2.5; .5 MG/3ML; MG/3ML
3 SOLUTION RESPIRATORY (INHALATION) EVERY 6 HOURS PRN
Status: DISCONTINUED | OUTPATIENT
Start: 2023-09-29 | End: 2023-10-14 | Stop reason: HOSPADM

## 2023-09-29 RX ORDER — DOXYCYCLINE HYCLATE 100 MG
100 TABLET ORAL EVERY 12 HOURS
Status: COMPLETED | OUTPATIENT
Start: 2023-09-29 | End: 2023-10-01

## 2023-09-29 RX ORDER — MAG HYDROX/ALUMINUM HYD/SIMETH 200-200-20
30 SUSPENSION, ORAL (FINAL DOSE FORM) ORAL 4 TIMES DAILY PRN
Status: DISCONTINUED | OUTPATIENT
Start: 2023-09-29 | End: 2023-10-14 | Stop reason: HOSPADM

## 2023-09-29 RX ORDER — DAPAGLIFLOZIN 10 MG/1
10 TABLET, FILM COATED ORAL DAILY
Status: DISCONTINUED | OUTPATIENT
Start: 2023-09-29 | End: 2023-10-01

## 2023-09-29 RX ORDER — TIMOLOL MALEATE 5 MG/ML
1 SOLUTION/ DROPS OPHTHALMIC 2 TIMES DAILY
Status: DISCONTINUED | OUTPATIENT
Start: 2023-09-29 | End: 2023-10-14 | Stop reason: HOSPADM

## 2023-09-29 RX ORDER — POLYETHYLENE GLYCOL 3350 17 G/17G
17 POWDER, FOR SOLUTION ORAL 3 TIMES DAILY PRN
Status: DISCONTINUED | OUTPATIENT
Start: 2023-09-29 | End: 2023-10-14 | Stop reason: HOSPADM

## 2023-09-29 RX ORDER — CIPROFLOXACIN 500 MG/1
500 TABLET ORAL EVERY 12 HOURS
Status: DISCONTINUED | OUTPATIENT
Start: 2023-09-29 | End: 2023-09-29

## 2023-09-29 RX ORDER — BISACODYL 10 MG
10 SUPPOSITORY, RECTAL RECTAL DAILY PRN
Status: DISCONTINUED | OUTPATIENT
Start: 2023-09-29 | End: 2023-10-14 | Stop reason: HOSPADM

## 2023-09-29 RX ORDER — DOCUSATE SODIUM 100 MG/1
100 CAPSULE, LIQUID FILLED ORAL 2 TIMES DAILY
Status: DISCONTINUED | OUTPATIENT
Start: 2023-09-29 | End: 2023-10-14 | Stop reason: HOSPADM

## 2023-09-29 RX ORDER — ACETAMINOPHEN 325 MG/1
650 TABLET ORAL EVERY 4 HOURS PRN
Status: DISCONTINUED | OUTPATIENT
Start: 2023-09-29 | End: 2023-10-14 | Stop reason: HOSPADM

## 2023-09-29 RX ORDER — DOXYCYCLINE HYCLATE 100 MG
100 TABLET ORAL EVERY 12 HOURS
Qty: 6 TABLET | Refills: 0 | Status: ON HOLD | OUTPATIENT
Start: 2023-09-29 | End: 2023-10-14 | Stop reason: HOSPADM

## 2023-09-29 RX ORDER — GLIMEPIRIDE 2 MG/1
4 TABLET ORAL 2 TIMES DAILY
Status: DISCONTINUED | OUTPATIENT
Start: 2023-09-29 | End: 2023-09-30

## 2023-09-29 RX ORDER — LISINOPRIL 10 MG/1
20 TABLET ORAL DAILY
Status: DISCONTINUED | OUTPATIENT
Start: 2023-09-30 | End: 2023-10-14 | Stop reason: HOSPADM

## 2023-09-29 RX ORDER — SIMETHICONE 80 MG
1 TABLET,CHEWABLE ORAL 4 TIMES DAILY PRN
Status: DISCONTINUED | OUTPATIENT
Start: 2023-09-29 | End: 2023-10-14 | Stop reason: HOSPADM

## 2023-09-29 RX ORDER — NALOXONE HCL 0.4 MG/ML
0.02 VIAL (ML) INJECTION
Status: DISCONTINUED | OUTPATIENT
Start: 2023-09-29 | End: 2023-10-14 | Stop reason: HOSPADM

## 2023-09-29 RX ORDER — FLUTICASONE FUROATE AND VILANTEROL 200; 25 UG/1; UG/1
1 POWDER RESPIRATORY (INHALATION) DAILY
Status: DISCONTINUED | OUTPATIENT
Start: 2023-09-30 | End: 2023-10-03

## 2023-09-29 RX ORDER — METFORMIN HYDROCHLORIDE 500 MG/1
1000 TABLET ORAL 2 TIMES DAILY WITH MEALS
Status: DISCONTINUED | OUTPATIENT
Start: 2023-09-29 | End: 2023-09-30

## 2023-09-29 RX ORDER — GLUCAGON 1 MG
1 KIT INJECTION
Status: DISCONTINUED | OUTPATIENT
Start: 2023-09-29 | End: 2023-10-01

## 2023-09-29 RX ORDER — DOXYCYCLINE HYCLATE 100 MG
100 TABLET ORAL EVERY 12 HOURS
Status: DISCONTINUED | OUTPATIENT
Start: 2023-09-29 | End: 2023-09-29 | Stop reason: HOSPADM

## 2023-09-29 RX ORDER — ONDANSETRON 2 MG/ML
4 INJECTION INTRAMUSCULAR; INTRAVENOUS EVERY 8 HOURS PRN
Status: DISCONTINUED | OUTPATIENT
Start: 2023-09-29 | End: 2023-10-14 | Stop reason: HOSPADM

## 2023-09-29 RX ORDER — IBUPROFEN 200 MG
24 TABLET ORAL
Status: DISCONTINUED | OUTPATIENT
Start: 2023-09-29 | End: 2023-10-01

## 2023-09-29 RX ORDER — HYDROCODONE BITARTRATE AND ACETAMINOPHEN 5; 325 MG/1; MG/1
1 TABLET ORAL EVERY 6 HOURS PRN
Status: DISCONTINUED | OUTPATIENT
Start: 2023-09-29 | End: 2023-09-30

## 2023-09-29 RX ORDER — INSULIN ASPART 100 [IU]/ML
0-10 INJECTION, SOLUTION INTRAVENOUS; SUBCUTANEOUS
Status: DISCONTINUED | OUTPATIENT
Start: 2023-09-29 | End: 2023-10-01

## 2023-09-29 RX ORDER — MORPHINE SULFATE 4 MG/ML
2 INJECTION, SOLUTION INTRAMUSCULAR; INTRAVENOUS EVERY 6 HOURS PRN
Status: DISCONTINUED | OUTPATIENT
Start: 2023-09-29 | End: 2023-10-14 | Stop reason: HOSPADM

## 2023-09-29 RX ORDER — FOLIC ACID 1 MG/1
1 TABLET ORAL DAILY
Status: DISCONTINUED | OUTPATIENT
Start: 2023-09-30 | End: 2023-10-14 | Stop reason: HOSPADM

## 2023-09-29 RX ORDER — ATORVASTATIN CALCIUM 40 MG/1
40 TABLET, FILM COATED ORAL DAILY
Status: DISCONTINUED | OUTPATIENT
Start: 2023-09-30 | End: 2023-10-14 | Stop reason: HOSPADM

## 2023-09-29 RX ORDER — AZELASTINE 1 MG/ML
2 SPRAY, METERED NASAL 2 TIMES DAILY
Status: DISCONTINUED | OUTPATIENT
Start: 2023-09-29 | End: 2023-10-14 | Stop reason: HOSPADM

## 2023-09-29 RX ORDER — ACETAMINOPHEN 650 MG/20.3ML
650 LIQUID ORAL EVERY 6 HOURS PRN
Status: DISCONTINUED | OUTPATIENT
Start: 2023-09-29 | End: 2023-09-29

## 2023-09-29 RX ORDER — ONDANSETRON 4 MG/1
8 TABLET, ORALLY DISINTEGRATING ORAL EVERY 8 HOURS PRN
Status: DISCONTINUED | OUTPATIENT
Start: 2023-09-29 | End: 2023-10-14 | Stop reason: HOSPADM

## 2023-09-29 RX ORDER — FAMOTIDINE 20 MG/1
20 TABLET, FILM COATED ORAL NIGHTLY
Status: DISCONTINUED | OUTPATIENT
Start: 2023-09-29 | End: 2023-10-14 | Stop reason: HOSPADM

## 2023-09-29 RX ORDER — AMIODARONE HYDROCHLORIDE 200 MG/1
200 TABLET ORAL 2 TIMES DAILY
Status: COMPLETED | OUTPATIENT
Start: 2023-09-29 | End: 2023-10-01

## 2023-09-29 RX ORDER — HYDROCODONE BITARTRATE AND ACETAMINOPHEN 5; 325 MG/1; MG/1
1 TABLET ORAL EVERY 4 HOURS PRN
Status: DISCONTINUED | OUTPATIENT
Start: 2023-09-29 | End: 2023-09-29

## 2023-09-29 RX ORDER — SODIUM CHLORIDE 0.9 % (FLUSH) 0.9 %
10 SYRINGE (ML) INJECTION
Status: DISCONTINUED | OUTPATIENT
Start: 2023-09-29 | End: 2023-10-14 | Stop reason: HOSPADM

## 2023-09-29 RX ORDER — AMIODARONE HYDROCHLORIDE 200 MG/1
200 TABLET ORAL DAILY
Status: DISCONTINUED | OUTPATIENT
Start: 2023-10-02 | End: 2023-10-14 | Stop reason: HOSPADM

## 2023-09-29 RX ORDER — IBUPROFEN 200 MG
16 TABLET ORAL
Status: DISCONTINUED | OUTPATIENT
Start: 2023-09-29 | End: 2023-10-01

## 2023-09-29 RX ADMIN — DOXYCYCLINE HYCLATE 100 MG: 100 TABLET, COATED ORAL at 12:09

## 2023-09-29 RX ADMIN — UMECLIDINIUM 62.5 MCG: 62.5 AEROSOL, POWDER ORAL at 09:09

## 2023-09-29 RX ADMIN — HYDROCODONE BITARTRATE AND ACETAMINOPHEN 1 TABLET: 5; 325 TABLET ORAL at 11:09

## 2023-09-29 RX ADMIN — DAPAGLIFLOZIN 10 MG: 10 TABLET, FILM COATED ORAL at 01:09

## 2023-09-29 RX ADMIN — AMIODARONE HYDROCHLORIDE 200 MG: 200 TABLET ORAL at 08:09

## 2023-09-29 RX ADMIN — ASPIRIN 81 MG: 81 TABLET, COATED ORAL at 09:09

## 2023-09-29 RX ADMIN — AMIODARONE HYDROCHLORIDE 200 MG: 200 TABLET ORAL at 09:09

## 2023-09-29 RX ADMIN — TIMOLOL MALEATE 1 DROP: 5 SOLUTION OPHTHALMIC at 08:09

## 2023-09-29 RX ADMIN — PANCRELIPASE 4 CAPSULE: 60000; 12000; 38000 CAPSULE, DELAYED RELEASE PELLETS ORAL at 04:09

## 2023-09-29 RX ADMIN — TIMOLOL MALEATE 1 DROP: 5 SOLUTION/ DROPS OPHTHALMIC at 09:09

## 2023-09-29 RX ADMIN — PANTOPRAZOLE SODIUM 40 MG: 40 TABLET, DELAYED RELEASE ORAL at 12:09

## 2023-09-29 RX ADMIN — HYDROCODONE BITARTRATE AND ACETAMINOPHEN 1 TABLET: 5; 325 TABLET ORAL at 02:09

## 2023-09-29 RX ADMIN — LISINOPRIL 20 MG: 20 TABLET ORAL at 09:09

## 2023-09-29 RX ADMIN — DOCUSATE SODIUM 100 MG: 100 CAPSULE, LIQUID FILLED ORAL at 09:09

## 2023-09-29 RX ADMIN — SUCRALFATE 1 G: 1 TABLET ORAL at 06:09

## 2023-09-29 RX ADMIN — FAMOTIDINE 20 MG: 20 TABLET ORAL at 08:09

## 2023-09-29 RX ADMIN — METFORMIN HYDROCHLORIDE 1000 MG: 500 TABLET, FILM COATED ORAL at 09:09

## 2023-09-29 RX ADMIN — CIPROFLOXACIN 500 MG: 500 TABLET, FILM COATED ORAL at 09:09

## 2023-09-29 RX ADMIN — HYDROCODONE BITARTRATE AND ACETAMINOPHEN 1 TABLET: 5; 325 TABLET ORAL at 05:09

## 2023-09-29 RX ADMIN — ONDANSETRON 8 MG: 4 TABLET, ORALLY DISINTEGRATING ORAL at 01:09

## 2023-09-29 RX ADMIN — ATORVASTATIN CALCIUM 40 MG: 40 TABLET, FILM COATED ORAL at 09:09

## 2023-09-29 RX ADMIN — HYDROCODONE BITARTRATE AND ACETAMINOPHEN 1 TABLET: 5; 325 TABLET ORAL at 07:09

## 2023-09-29 RX ADMIN — GLIMEPIRIDE 4 MG: 1 TABLET ORAL at 09:09

## 2023-09-29 RX ADMIN — RIVAROXABAN 20 MG: 20 TABLET, FILM COATED ORAL at 04:09

## 2023-09-29 RX ADMIN — FOLIC ACID 1 MG: 1 TABLET ORAL at 09:09

## 2023-09-29 RX ADMIN — PANCRELIPASE 4 CAPSULE: 60000; 12000; 38000 CAPSULE, DELAYED RELEASE PELLETS ORAL at 09:09

## 2023-09-29 RX ADMIN — FLUTICASONE FUROATE AND VILANTEROL TRIFENATATE 1 PUFF: 200; 25 POWDER RESPIRATORY (INHALATION) at 09:09

## 2023-09-29 RX ADMIN — SUCRALFATE 1 G: 1 TABLET ORAL at 04:09

## 2023-09-29 RX ADMIN — SUCRALFATE 1 G: 1 TABLET ORAL at 12:09

## 2023-09-29 RX ADMIN — METFORMIN HYDROCHLORIDE 1000 MG: 500 TABLET ORAL at 04:09

## 2023-09-29 RX ADMIN — DOXYCYCLINE HYCLATE 100 MG: 100 TABLET, COATED ORAL at 08:09

## 2023-09-29 RX ADMIN — LOPERAMIDE HYDROCHLORIDE 2 MG: 2 CAPSULE ORAL at 06:09

## 2023-09-29 NOTE — PT/OT/SLP EVAL
Physical Therapy Swing-bed Evaluation       Patient Name: Kortney Leach   MRN: [id number]  Recent Surgery: * No surgery found *      Recommendations:     Discharge Recommendations:     Discharge Equipment Recommendations: none   Barriers to discharge: None    Assessment:     Kortney Leach is a 77 y.o. female admitted with a medical diagnosis of Severe mitral valve regurgitation. Patient is s/p sternotomy for mitral valve replacement which was complicated by by paroxysmal A-fib/rapid ventricular response/SSS and thus underwent Dual Chamber PPM implant. She has sternal and pacemaker precautions. She presents with the following impairments/functional limitations: weakness, gait instability, impaired cardiopulmonary response to activity, impaired endurance, impaired balance, decreased lower extremity function, decreased safety awareness, pain, impaired functional mobility.    Rehab Prognosis: Good; patient would benefit from acute PT services to address these deficits and reach maximum level of function.    Plan:     During this hospitalization, patient to be seen 5 x/week (5-6x/week) to address the above listed problems via gait training, therapeutic activities, therapeutic exercises    Plan of Care Expires: 10/13/23      Subjective     Chief Complaint: Nausea - notified Nurse Guerda  Patient Comments/Goals: To regain functional independence to safely return home   Pain/Comfort:  Pain Rating 1: 0/10    Social History:  Living Environment: Patient lives with their daughter in a single story home with tub-shower combo  Prior Level of Function: Prior to admission, patient was independent and ambulated household and community distances using no AD; patient reports functional decline and having to use walker recently 2/2 cardiac issues  Equipment Used at Home: bedside commode, walker, rolling, shower chair   Assistance Upon Discharge:  her daughter; but her daughter works during the day, thus patient is alone  at times    Objective:     Communicated with nursing prior to session. Patient found supine with bed alarm upon PT entry to room.    General Precautions: Standard, fall, sternal, pacemaker   Orthopedic Precautions: N/A   Braces: UE Sling (UE sling in bed and for napping x1 week post-op)    Respiratory Status: Room air    Exams:  Cognition: Patient is oriented to Person, Place, Time, Situation  Patient able to recall 2/3 sternal precautions  RLE ROM: WFL  RLE Strength: WFL  LLE ROM: WFL  LLE Strength: WFL      Functional Mobility:  Gait belt applied - Yes  Bed Mobility  Supine to Sit: moderate assistance for LE management, trunk management, log rolling technique, and maintenance of precautions  Transfers  Sit to Stand: minimum assistance with no AD and with cues for hand placement  Gait  Patient ambulated 175ft with rolling walker and contact guard assistance. Patient demonstrates steady gait. chair follow for patient safety.  Balance  Sitting: stand by assistance  Standing: contact guard assistance    Therapeutic Activities and Exercises:   Patient educated on role of acute care PT and PT POC, safety while in hospital including calling nurse for mobility, and call light usage  Patient educated on post-op sternal precautions, including no lifting > 5 lbs, pulling or pushing with BUEs  Patient educated about importance of OOB mobility and remaining up in chair most of day    AM-PAC 6 CLICK MOBILITY  Total Score:     Patient left up in chair with all lines intact, call button in reach, RN notified, and chair alarm on.    GOALS:   Multidisciplinary Problems       Physical Therapy Goals          Problem: Physical Therapy    Goal Priority Disciplines Outcome Goal Variances Interventions   Physical Therapy Goal     PT, PT/OT Ongoing, Progressing     Description: Goals to be met by: Discharge  POC: Patient to be seen 5-6x/week QD/BID As tolerable     Patient will increase functional independence with mobility by  performin. Supine to sit with Modified [place]  2. Sit to supine with Modified [place]  3. Sit to stand transfer with Modified [place]  4. Gait  x 350 feet with Modified [place] using Rolling Walker vs No AD vs LRAD.   5. Low Fall Risk on 5x sit to stand test                         History:     Past Medical History:   Diagnosis Date    Anticoagulant long-term use     Asthma     Chronic sinusitis, unspecified     COPD (chronic obstructive pulmonary disease)     CVID (common variable immunodeficiency)     Diabetes mellitus, type 2     GERD (gastroesophageal reflux disease)     Hypertension     Severe mitral valve regurgitation     Sleep apnea     uses CPAP    SOB (shortness of breath)     Unspecified glaucoma     Weakness        Past Surgical History:   Procedure Laterality Date    A-V CARDIAC PACEMAKER INSERTION N/A 2023    Procedure: INSERTION, CARDIAC PACEMAKER, DUAL CHAMBER;  Surgeon: Arnaud Moon MD;  Location: [facility] CATH LAB;  Service: Cardiology;  Laterality: N/A;  S[facility]    APPENDECTOMY      CATARACT EXTRACTION Bilateral     COLONOSCOPY      EGD, WITH BALLOON DILATION      ESOPHAGOGASTRODUODENOSCOPY      LEFT HEART CATHETERIZATION Left 2023    Procedure: Left heart cath;  Surgeon: Vijay Uribe MD;  Location: [facility] CATH LAB;  Service: Cardiology;  Laterality: Left;  [facility]    MITRAL VALVE REPLACEMENT N/A 2023    Procedure: REPLACEMENT, MITRAL VALVE;  Surgeon: Steven Rosales IV, MD;  Location: [facility] OR;  Service: Cardiovascular;  Laterality: N/A;    TONSILLECTOMY      TOTAL KNEE ARTHROPLASTY Bilateral        Time Tracking:     PT Received On:    PT Start Time: 1300  PT Stop Time: 1330  PT Total Time (min): 30 min     Billable Minutes: Evaluation MIN Complexity    2023

## 2023-09-29 NOTE — NURSING
Nurses Note -- 4 Eyes      9/29/2023   1:19 PM      Skin assessed during: Transfer      [] No Altered Skin Integrity Present    []Prevention Measures Documented      [x] Yes- Altered Skin Integrity Present or Discovered   [x] LDA Added if Not in Epic (Describe Wound)   [x] New Altered Skin Integrity was Present on Admit and Documented in LDA   [] Wound Image Taken    Wound Care Consulted? No    Attending Nurse:  Guerda Mo LPN     Second RN/Staff Member:   Lucy Cadet RN

## 2023-09-29 NOTE — PLAN OF CARE
Problem: Adult Inpatient Plan of Care  Goal: Plan of Care Review  9/29/2023 1700 by Lucy Cadet RN  Outcome: Ongoing, Progressing  Flowsheets (Taken 9/29/2023 1700)  Plan of Care Reviewed With: patient  9/29/2023 1659 by Lucy Cadet RN  Outcome: Ongoing, Progressing  Flowsheets (Taken 9/29/2023 1659)  Plan of Care Reviewed With: patient  Goal: Patient-Specific Goal (Individualized)  9/29/2023 1700 by Lucy Cadet RN  Outcome: Ongoing, Progressing  9/29/2023 1659 by Lucy Cadet RN  Outcome: Ongoing, Progressing  Flowsheets (Taken 9/29/2023 1659)  Anxieties, Fears or Concerns: LIMITED LEFT ARM MOVEMENT  Individualized Care Needs: CONTINUE PT/OT, PAIN MANAGEMENT  Goal: Absence of Hospital-Acquired Illness or Injury  9/29/2023 1700 by Lucy Cadet RN  Outcome: Ongoing, Progressing  9/29/2023 1659 by Lucy Cadet RN  Outcome: Ongoing, Progressing  Intervention: Identify and Manage Fall Risk  Flowsheets (Taken 9/29/2023 1700)  Safety Promotion/Fall Prevention:   assistive device/personal item within reach   nonskid shoes/socks when out of bed   instructed to call staff for mobility  Intervention: Prevent Skin Injury  Flowsheets (Taken 9/29/2023 1700)  Body Position: position changed independently  Skin Protection: adhesive use limited  Intervention: Prevent and Manage VTE (Venous Thromboembolism) Risk  Flowsheets (Taken 9/29/2023 1700)  Activity Management: Sitting at edge of bed - L2  VTE Prevention/Management:   dorsiflexion/plantar flexion performed   ambulation promoted  Range of Motion: active ROM (range of motion) encouraged  Intervention: Prevent Infection  Flowsheets (Taken 9/29/2023 1700)  Infection Prevention:   cohorting utilized   personal protective equipment utilized   environmental surveillance performed   rest/sleep promoted   equipment surfaces disinfected   single patient room provided   hand hygiene promoted  Goal: Optimal Comfort and Wellbeing  9/29/2023 1700 by Helio  GABRIEL Ayala  Outcome: Ongoing, Progressing  9/29/2023 1659 by Lucy Cadet RN  Outcome: Ongoing, Progressing  Intervention: Monitor Pain and Promote Comfort  Flowsheets (Taken 9/29/2023 1700)  Pain Management Interventions: care clustered  Intervention: Provide Person-Centered Care  Flowsheets (Taken 9/29/2023 1700)  Trust Relationship/Rapport:   care explained   questions encouraged   choices provided   reassurance provided   emotional support provided   thoughts/feelings acknowledged   empathic listening provided   questions answered  Goal: Readiness for Transition of Care  9/29/2023 1700 by Lucy Cadet RN  Outcome: Ongoing, Progressing  9/29/2023 1659 by Lucy Cadet RN  Outcome: Ongoing, Progressing     Problem: Fall Injury Risk  Goal: Absence of Fall and Fall-Related Injury  9/29/2023 1700 by Lucy Cadet RN  Outcome: Ongoing, Progressing  9/29/2023 1659 by Lucy Cadet RN  Outcome: Ongoing, Progressing  Intervention: Identify and Manage Contributors  Flowsheets (Taken 9/29/2023 1700)  Self-Care Promotion: independence encouraged  Medication Review/Management:   medications reviewed   provider consulted  Intervention: Promote Injury-Free Environment  Flowsheets (Taken 9/29/2023 1700)  Safety Promotion/Fall Prevention:   assistive device/personal item within reach   nonskid shoes/socks when out of bed   instructed to call staff for mobility     Problem: Infection  Goal: Absence of Infection Signs and Symptoms  9/29/2023 1700 by Lucy Cadet RN  Outcome: Ongoing, Progressing  9/29/2023 1659 by Lucy Cadet RN  Outcome: Ongoing, Progressing  Intervention: Prevent or Manage Infection  Flowsheets (Taken 9/29/2023 1700)  Fever Reduction/Comfort Measures: fluid intake increased  Infection Management: aseptic technique maintained  Isolation Precautions:   protective   precautions maintained     Problem: Skin Injury Risk Increased  Goal: Skin Health and Integrity  9/29/2023 1700 by Helio  Lucy RN  Outcome: Ongoing, Progressing  9/29/2023 1659 by Lucy Cadet, RN  Outcome: Ongoing, Progressing  Intervention: Optimize Skin Protection  Flowsheets (Taken 9/29/2023 1700)  Pressure Reduction Techniques: frequent weight shift encouraged  Pressure Reduction Devices: positioning supports utilized  Skin Protection: adhesive use limited  Head of Bed (HOB) Positioning: HOB elevated  Intervention: Promote and Optimize Oral Intake  Flowsheets (Taken 9/29/2023 1700)  Oral Nutrition Promotion: physical activity promoted

## 2023-09-29 NOTE — H&P
[facility]  [facility] MEDICINE - H&P ADMISSION NOTE    Patient Name: Kortney Leach  MRN: [id number]  Patient Class: IP- Swing   Admission Date: 9/29/2023   Admitting Physician: SUZANNE Service   Attending Physician: Thairy G Reyes, DO  Primary Care Provider: Arnaldo Hernandez MD  Face-to-Face encounter date: 09/29/2023      CHIEF COMPLAINT   No chief complaint on file.    HISTORY OF PRESENTING ILLNESS   77-year-old female with a past medical history of hypertension, diabetes mellitus, COPD, ABEL (has home CPAP) who presents from [facility] after undergoing  sternotomy with a 25mm Mosaic Porcine Mitral Valve Placement as well as a Ligation of her SUKHI 9/20 with Dr. Rosales.  Postoperative clinical course was complicated by paroxysmal atrial fibrillation/rapid ventricular response/sick sinus syndrome and she underwent placement of for Dual Chamber PPM Implant ([employer] Pet[employer]/Servin) 9/25 with Dr. Moon.  She was started on amiodarone as well as Xarelto.  Patient is to remain taking aspirin.  She presents to our facility for rehabilitation after this critical clinical course.  PAST MEDICAL HISTORY     Past Medical History:   Diagnosis Date    Anticoagulant long-term use     Asthma     Chronic sinusitis, unspecified     COPD (chronic obstructive pulmonary disease)     CVID (common variable immunodeficiency)     Diabetes mellitus, type 2     GERD (gastroesophageal reflux disease)     Hypertension     Severe mitral valve regurgitation     Sleep apnea     uses CPAP    SOB (shortness of breath)     Unspecified glaucoma     Weakness        PAST SURGICAL HISTORY     Past Surgical History:   Procedure Laterality Date    A-V CARDIAC PACEMAKER INSERTION N/A 9/25/2023    Procedure: INSERTION, CARDIAC PACEMAKER, DUAL CHAMBER;  Surgeon: Arnaud Moon MD;  Location: [facility] CATH LAB;  Service: Cardiology;  Laterality: N/A;  [facility]    APPENDECTOMY      CATARACT EXTRACTION Bilateral     COLONOSCOPY      EGD,  WITH BALLOON DILATION      ESOPHAGOGASTRODUODENOSCOPY      LEFT HEART CATHETERIZATION Left 2023    Procedure: Left heart cath;  Surgeon: Vijay Uribe MD;  Location: [facility] CATH LAB;  Service: Cardiology;  Laterality: Left;  [facility]    MITRAL VALVE REPLACEMENT N/A 2023    Procedure: REPLACEMENT, MITRAL VALVE;  Surgeon: Steven Rosales IV, MD;  Location: [facility] OR;  Service: Cardiovascular;  Laterality: N/A;    TONSILLECTOMY      TOTAL KNEE ARTHROPLASTY Bilateral        FAMILY HISTORY   Reviewed and noncontributory to this case    SOCIAL HISTORY     Social History     Socioeconomic History    Marital status: Single   Tobacco Use    Smoking status: Former     Current packs/day: 0.00     Types: Cigarettes     Quit date:      Years since quittin.7    Smokeless tobacco: Never   Substance and Sexual Activity    Alcohol use: Not Currently    Drug use: Not Currently    Sexual activity: Not Currently       HOME MEDICATIONS     Prior to Admission medications    Medication Sig Start Date End Date Taking? Authorizing Provider   amiodarone (PACERONE) 200 MG Tab Take 2 tablets (400 mg total) by mouth 2 (two) times daily for 3 days, THEN 1 tablet (200 mg total) 2 (two) times daily for 3 days, THEN 1 tablet (200 mg total) 2 (two) times daily. 23  Madeline Sher, ELIZABETHP   aspirin (ECOTRIN) 81 MG EC tablet Take 1 tablet (81 mg total) by mouth once daily. 23  Sebastian Jones, PADonnaC   azelastine (ASTELIN) 137 mcg (0.1 %) nasal spray 2 sprays by Nasal route 2 (two) times daily.    Provider, Historical   benazepriL (LOTENSIN) 20 MG tablet Take 20 mg by mouth once daily.    Provider, Historical   dapagliflozin (FARXIGA) 10 mg tablet Take 10 mg by mouth once daily.    Provider, Historical   dexlansoprazole (DEXILANT) 60 mg capsule Take by mouth once daily. 23   Provider, Historical   doxycycline (VIBRA-TABS) 100 MG tablet Take 1 tablet (100 mg total) by mouth every 12 (twelve) hours. for 3 days  9/29/23 10/2/23  Madeline Sher FNP   DUPIXENT SYRINGE 300 mg/2 mL Syrg Inject 300 mg into the skin every 14 (fourteen) days. 7/31/23   Provider, Historical   famotidine (PEPCID) 40 MG tablet Take 40 mg by mouth every evening. 6/30/23   Provider, Historical   fluticasone-umeclidin-vilanter (TRELEGY ELLIPTA) 200-62.5-25 mcg inhaler Inhale 1 puff into the lungs once daily.    Provider, Historical   glimepiride (AMARYL) 2 MG tablet Take 4 mg by mouth 2 (two) times a day.    Provider, Historical   HYDROcodone-acetaminophen (NORCO) 5-325 mg per tablet Take 1 tablet by mouth every 6 (six) hours as needed for Pain. 9/22/23   Sebastian Jones PA-C   immun glob G,IgG,/pro/IgA 0-50 (HIZENTRA SUBQ) Inject into the skin every 14 (fourteen) days.    Provider, Historical   lipase-protease-amylase 24,000-76,000-120,000 units (CREON) 24,000-76,000 -120,000 unit capsule Take 1-2 capsules by mouth 3 (three) times daily with meals. 2 caps with meals and 1 cap c snacks    Provider, Historical   metFORMIN (GLUCOPHAGE) 1000 MG tablet Take 1,000 mg by mouth 2 (two) times daily with meals.    Provider, Historical   metoprolol succinate (TOPROL-XL) 25 MG 24 hr tablet Take 25 mg by mouth once daily.    Provider, Historical   rivaroxaban (XARELTO) 20 mg Tab Take 1 tablet (20 mg total) by mouth daily with dinner or evening meal. 9/26/23   Madeline Sher FNP   timoloL (BETIMOL) 0.5 % ophthalmic solution Place 1 drop into both eyes 2 (two) times daily.    Provider, Historical   ciprofloxacin HCl (CIPRO) 500 MG tablet TAKE ONE TABLET BY MOUTH EVERY TWELVE HOURS FOR SEVEN DAYS 9/27/23 9/29/23  Enzo Cadet, UMM   metoprolol tartrate (LOPRESSOR) 25 MG tablet Take 0.5 tablets (12.5 mg total) by mouth 2 (two) times daily. 9/22/23 9/29/23  Robert, Sebastian C, PA-C       ALLERGIES   Adhesive tape-silicones    REVIEW OF SYSTEMS   Except as documented above, all other systems reviewed and negative    PHYSICAL EXAM     Vitals:    09/29/23 1132  "  BP: (!) 150/80   Pulse: 82   Resp: 18   Temp: 98.5 °F (36.9 °C)      General:  In no acute distress, resting comfortably, obese  Head and neck:  Atraumatic, normocephalic, moist mucous membranes, supple neck  Chest:  Clear to auscultation bilaterally  Heart:  S1, S2, no appreciable murmur  Abdomen:  Soft, nontender, BS +  MSK:  Warm, no lower extremity edema, no clubbing or cyanosis  Neuro:  Alert and oriented x4, moving all extremities with good strength  Integumentary:  Left thorax pacemaker surgical incision well healing, Sternotomy scar also well   Psychiatry:  Appropriate mood and affect  ASSESSMENT AND PLAN   Mitral regurgitation and stenosis  -status post sternotomy with a 25mm Mosaic Porcine Mitral Valve Placement as well as a Ligation of her SUKHI 9/20 with Dr. Rosales  -has follow-up with Dr. Rosales 3 weeks from discharge date  -she was discharged with 3 days of doxycycline  -asa    SSS  PAF/RVR  Junctional Rhythm  -s/p Dual Chamber PPM Implant ([employer] Petey/Servin) 9/25 with Dr. Moon  -loading with amiodarone, then continue daily dose   -Xarelto  -TSH 1.67 seven months  -has follow-up with Dr. Moon on 10/02/2023 at 11:00 a.m. for wound recheck  -has follow-up with Dr. Uribe on 11/16 at 11:00 a.m.    COPD   ABEL   -continue to utilize home CPAP   No evidence of COPD exacerbation at this time  -continue with umeclidinium and fluticasone    Diabetes mellitus   -continue with metformin, Farxiga, glimepiride  -pending A1c    Essential hypertension   -continue with lisinopril    DVT prophylaxis:  Xarelto for atrial fibrillation  __________________________________________________________________  LABS/MICRO/MEDS/DIAGNOSTICS       LABS  No results for input(s): "NA", "K", "CHLORIDE", "CO2", "BUN", "CREATININE", "GLUCOSE", "CALCIUM", "ALKPHOS", "AST", "ALT", "ALBUMIN" in the last 72 hours.  No results for input(s): "WBC", "RBC", "HCT", "MCV", "PLT" in the last 72 hours.    Invalid input(s): " ""HG"    MICROBIOLOGY  Microbiology Results (last 7 days)       ** No results found for the last 168 hours. **            MEDICATIONS   [START ON 10/2/2023] amiodarone  200 mg Oral Daily    amiodarone  200 mg Oral BID    [START ON 9/30/2023] aspirin  81 mg Oral Daily    [START ON 9/30/2023] atorvastatin  40 mg Oral Daily    azelastine  2 spray Nasal BID    dapagliflozin propanediol  10 mg Oral Daily    docusate sodium  100 mg Oral BID    doxycycline  100 mg Oral Q12H    famotidine  20 mg Oral QHS    [START ON 9/30/2023] fluticasone furoate-vilanteroL  1 puff Inhalation Daily    And    [START ON 9/30/2023] umeclidinium  1 capsule Inhalation Daily    [START ON 9/30/2023] folic acid  1 mg Oral Daily    glimepiride  4 mg Oral BID    lipase-protease-amylase 12,000-38,000-60,000 units  4 capsule Oral TID WM    [START ON 9/30/2023] lisinopriL  20 mg Oral Daily    metFORMIN  1,000 mg Oral BID WM    pantoprazole  40 mg Oral Daily    rivaroxaban  20 mg Oral Daily with dinner    sucralfate  1 g Oral QID (AC & HS)    timolol maleate 0.5%  1 drop Both Eyes BID      INFUSIONS      DIAGNOSTIC TESTS  No orders to display          Patient information was obtained from patient, patient's family, past medical records and ER records.   All diagnosis and differential diagnosis have been reviewed; assessment and plan has been documented. I have personally reviewed the labs and test results that are presently available; I have reviewed the patients medication list. I have reviewed the consulting providers response and recommendations. I have reviewed or attempted to review medical records based upon their availability.  All of the patient's questions have been addressed and answered. Patient's is agreeable to the above stated plan. I will continue to monitor closely and make adjustments to medical management as needed.  This note was created using XMOS voice recognition software that occasionally misinterpreted phrases or words.  Please " contact me if any questions may rise regarding documentation to clarify verbiage.        Thairy G Reyes, DO   Internal Medicine  Department of Hospital Medicine [facility]

## 2023-09-29 NOTE — PLAN OF CARE
09/29/23 1016   Final Note   Assessment Type Final Discharge Note   Anticipated Discharge Disposition Swing Bed   Post-Acute Status   Post-Acute Authorization Placement     Transfer to [facility] today. Nurse to call report to Cecily [phone]. [facility] will transport.

## 2023-09-29 NOTE — PT/OT/SLP EVAL
Occupational Therapy Evaluation  (Swing)    Patient Name: Kortney Leach   MRN: [id number]  Admitting Diagnosis: Severe mitral valve regurgitation   Recent Surgery: * No surgery found *      Recommendations:     Discharge Recommendations: home with home health, home health PT, home health OT  Level of Assistance Recommended:  TBD based on progress  Discharge Equipment Recommendations: none  Barriers to discharge: Decreased caregiver support, Other (Comment) (Daughter works during the day)    Assessment:     Kortney Leach is a 77 y.o. female with a medical diagnosis of Severe mitral valve regurgitation. She presents with performance deficits affecting function including weakness, impaired endurance, impaired self care skills, impaired functional mobility, impaired cardiopulmonary response to activity, orthopedic precautions.     Rehab Prognosis: Good; patient would benefit from acute skilled OT services to address these deficits and reach maximum level of function.    Plan:     Patient to be seen 6 x/week to address the above listed problems via self-care/home management, therapeutic activities, therapeutic exercises  Plan of Care Expires: 10/20/23  Plan of Care Reviewed with: patient    Subjective     Chief Complaint: No chief complaint on file.    Patient Comments/Goals: Return home at [facility]  Pain/Comfort:  Pain Rating 1: 0/10    Patients cultural, spiritual, Caodaism conflicts given the current situation: no    Social History:  Living Environment: Patient lives with their daughter in a single story home, number of outside stairs: 0, tub-shower combo.  Prior Level of Function: Prior to admission, patient  was modified-independent with self-care and using a RW at times for functional mobility around the house. Pt cooks meals and her and her daughter both help with cleaning .  Roles and Routines: Cooks, cleans, mother, retired  Equipment Used at Home: walker, rolling, shower chair, bedside commode  DME  owned (not currently used): rolling walker, bedside commode, and shower chair  Upon discharge, patient will have assistance from  Daughter, but daughter works during the day .    Objective:     Communicated with PT prior to session. Patient found HOB elevated with bed alarm, blood pressure cuff upon OT entry to room.    General Precautions: Standard, fall, sternal   Orthopedic Precautions:    Braces: UE Sling   Respiratory Status: Room air    Cognitive and Psychosocial Function:   Oriented to: Person, Place, Time, and Situation   Follows Commands/Attention: follows multi-step commands  Communication: clear/fluent  Memory: No Deficits noted  Safety Awareness/Insight to Disability: intact   Mood/Affect/Coping skills/Emotional Control: Appropriate to situation    Hearing: Intact    Vision: Intact visual fields    Physical Exam:  Upper Extremity Range of Motion:     -       Right Upper Extremity: WNL within pxns  -       Left Upper Extremity: WNL within pxns  Upper Extremity Strength:    -       Right Upper Extremity: WNL  -       Left Upper Extremity: Not tested d/t pain in UE   Strength:    -       Right Upper Extremity: WNL  -       Left Upper Extremity: WNL  Fine Motor Coordination:    Gross motor coordination:   WFL    Occupational Performance    Bed Mobility:   Rolling/Turning to Right with stand by assistance  Scooting anteriorly to EOB to have both feet planted on floor: stand by assistance  Supine to sit from right side of bed with moderate assistance    Functional Mobility/Transfers:  Sit <> Stand Transfer with minimum assistance with rolling walker  Bed <> Chair Transfer using Step Transfer technique with contact guard assistance with rolling walker  Functional Mobility: 175ft using a RW and gait belt; following with the recliner     Activities of Daily Living:  Upper Body Dressing: stand by assistance    Lower Body Dressing: total assistance don socks sitting EOB d/t difficulty bending over to reach her  feet     AMPAC 6 Click ADL:  AMPAC Total Score: 17    Treatment & Education:  Sternal precautions taught & reviewed via teach back method - Patient able to recall 2/3 correctly      Patient clear to ambulate to/from bathroom with RN/PCT, assist xmin A using a gait belt and RW .    Patient left up in chair with all lines intact, call button in reach, RN notified, and chair alarm on.    GOALS:   Multidisciplinary Problems       Occupational Therapy Goals          Problem: Occupational Therapy    Goal Priority Disciplines Outcome Interventions   Occupational Therapy Goal     OT, PT/OT Ongoing, Progressing    Description: Goals to be met by: Discharge     Patient will increase functional independence with ADLs by performing:    UE Dressing with Set-up Assistance.  LE Dressing with Moderate Assistance.  Grooming while standing at sink with Stand-by Assistance.  Toileting from bedside commode with Stand-by Assistance for hygiene and clothing management.   Bathing from  shower chair/bench with Stand-by Assistance.  Toilet transfer to bedside commode with Contact Guard Assistance.                         History:     Past Medical History:   Diagnosis Date    Anticoagulant long-term use     Asthma     Chronic sinusitis, unspecified     COPD (chronic obstructive pulmonary disease)     CVID (common variable immunodeficiency)     Diabetes mellitus, type 2     GERD (gastroesophageal reflux disease)     Hypertension     Severe mitral valve regurgitation     Sleep apnea     uses CPAP    SOB (shortness of breath)     Unspecified glaucoma     Weakness          Past Surgical History:   Procedure Laterality Date    A-V CARDIAC PACEMAKER INSERTION N/A 9/25/2023    Procedure: INSERTION, CARDIAC PACEMAKER, DUAL CHAMBER;  Surgeon: Arnaud Moon MD;  Location: [facility] CATH LAB;  Service: Cardiology;  Laterality: N/A;  [facility]    APPENDECTOMY      CATARACT EXTRACTION Bilateral     COLONOSCOPY      EGD, WITH BALLOON DILATION       ESOPHAGOGASTRODUODENOSCOPY      LEFT HEART CATHETERIZATION Left 09/06/2023    Procedure: Left heart cath;  Surgeon: Vijay Uribe MD;  Location: [facility] CATH LAB;  Service: Cardiology;  Laterality: Left;  [facility]    MITRAL VALVE REPLACEMENT N/A 9/20/2023    Procedure: REPLACEMENT, MITRAL VALVE;  Surgeon: Steven Rosales IV, MD;  Location: [facility] OR;  Service: Cardiovascular;  Laterality: N/A;    TONSILLECTOMY      TOTAL KNEE ARTHROPLASTY Bilateral        Time Tracking:     OT Date of Treatment: 09/29/23  OT Start Time: 1300  OT Stop Time: 1330  OT Total Time (min): 30 min    Billable Minutes: Evaluation 30    9/29/2023

## 2023-09-29 NOTE — PLAN OF CARE
Problem: Occupational Therapy  Goal: Occupational Therapy Goal  Description: Goals to be met by: Discharge     Patient will increase functional independence with ADLs by performing:    UE Dressing with Set-up Assistance.  LE Dressing with Moderate Assistance.  Grooming while standing at sink with Stand-by Assistance.  Toileting from bedside commode with Stand-by Assistance for hygiene and clothing management.   Bathing from  shower chair/bench with Stand-by Assistance.  Toilet transfer to bedside commode with Contact Guard Assistance.    Outcome: Ongoing, Progressing

## 2023-09-29 NOTE — DISCHARGE SUMMARY
[facility] 6th Floor Medical Telemetry  Cardiothoracic Surgery  Discharge Summary      Patient Name: Kortney Leach  MRN: [id number]  Admission Date: 9/20/2023  Hospital Length of Stay: 9 days  Discharge Date and Time: No discharge date for patient encounter.  Attending Physician: Steven Carrera IV, MD   Discharging Provider: MARTHA Fernandez  Primary Care Provider: Arnaldo Hernandez MD    HPI:   No notes on file    Procedure(s) (LRB):  INSERTION, CARDIAC PACEMAKER, DUAL CHAMBER (N/A)      Indwelling Lines/Drains at time of discharge:   Lines/Drains/Airways     None               Hospital Course: No notes on file    Goals of Care Treatment Preferences:  Code Status: Full Code      Consults (From admission, onward)        Status Ordering Provider     Inpatient consult to Social Work/Case Management  Once        Provider:  (Not yet assigned)    NAY Means     Inpatient consult to Cardiology  Once        Provider:  Arnaud Moon MD    Completed NAWAF DELGADILLO     Inpatient consult to Cardiology  Once        Provider:  Stuart Reilly MD    Completed STEVEN CARRERA IV          Significant Diagnostic Studies: Labs: All labs within the past 24 hours have been reviewed    Pending Diagnostic Studies:     None          No new Assessment & Plan notes have been filed under this hospital service since the last note was generated.  Service: Cardiothoracic Surgery    Final Active Diagnoses:    Diagnosis Date Noted POA    PRINCIPAL PROBLEM:  Severe mitral valve regurgitation [I34.0] 09/29/2023 Yes    SSS (sick sinus syndrome) [I49.5] 09/25/2023 Yes      Problems Resolved During this Admission:      Discharged Condition: good    Disposition: Rehab Facility    Follow Up:   Follow-up [facility] Follow up.    Specialty: Home Health Services  Contact information:  [address] [facility] [address]  [phone]             Steven Carrera IV, MD  Follow up.    Specialties: Cardiothoracic Surgery, Cardiology  Why: schedule follow-up appointment when dc'ed from swing bed  Contact information:  [address]  [address]  [place]  [phone]             Vijay Uribe MD Follow up on 11/16/2023.    Specialty: Cardiology  Why: 11:00  Contact information:  [place] [address]  [phone]             Arnaud Moon MD Follow up on 10/2/2023.    Specialties: Cardiology, Pathology  Why: @ 11:00AM, For wound re-check  Contact information:  [place]  [place]  [phone]                       Patient Instructions:      SUBSEQUENT HOME HEALTH ORDERS   Order Comments: Skilled Nursing     Order Specific Question Answer Comments   What Home Health Agency is the patient currently using? Other/External      Medications:  Reconciled Home Medications:      Medication List      START taking these medications    amiodarone 200 MG Tab  Commonly known as: PACERONE  Take 2 tablets (400 mg total) by mouth 2 (two) times daily for 3 days, THEN 1 tablet (200 mg total) 2 (two) times daily for 3 days, THEN 1 tablet (200 mg total) 2 (two) times daily.  Start taking on: September 26, 2023     aspirin 81 MG EC tablet  Commonly known as: ECOTRIN  Take 1 tablet (81 mg total) by mouth once daily.     HYDROcodone-acetaminophen 5-325 mg per tablet  Commonly known as: NORCO  Take 1 tablet by mouth every 6 (six) hours as needed for Pain.     metoprolol tartrate 25 MG tablet  Commonly known as: LOPRESSOR  Take 0.5 tablets (12.5 mg total) by mouth 2 (two) times daily.        CONTINUE taking these medications    azelastine 137 mcg (0.1 %) nasal spray  Commonly known as: ASTELIN  2 sprays by Nasal route 2 (two) times daily.     benazepriL 20 MG tablet  Commonly known as: LOTENSIN  Take 20 mg by mouth once daily.     CREON 24,000-76,000 -120,000 unit capsule  Generic drug: lipase-protease-amylase 24,000-76,000-120,000 units  Take 1-2 capsules by  mouth 3 (three) times daily with meals. 2 caps with meals and 1 cap c snacks     dexlansoprazole 60 mg capsule  Commonly known as: DEXILANT  Take by mouth once daily.     DUPIXENT SYRINGE 300 mg/2 mL Syrg  Generic drug: dupilumab  Inject 300 mg into the skin every 14 (fourteen) days.     famotidine 40 MG tablet  Commonly known as: PEPCID  Take 40 mg by mouth every evening.     FARXIGA 10 mg tablet  Generic drug: dapagliflozin propanediol  Take 10 mg by mouth once daily.     glimepiride 2 MG tablet  Commonly known as: AMARYL  Take 4 mg by mouth 2 (two) times a day.     HIZENTRA SUBQ  Inject into the skin every 14 (fourteen) days.     metFORMIN 1000 MG tablet  Commonly known as: GLUCOPHAGE  Take 1,000 mg by mouth 2 (two) times daily with meals.     metoprolol succinate 25 MG 24 hr tablet  Commonly known as: TOPROL-XL  Take 25 mg by mouth once daily.     rivaroxaban 20 mg Tab  Commonly known as: XARELTO  Take 1 tablet (20 mg total) by mouth daily with dinner or evening meal.     timoloL 0.5 % ophthalmic solution  Commonly known as: BETIMOL  Place 1 drop into both eyes 2 (two) times daily.     TRELEGY ELLIPTA 200-62.5-25 mcg inhaler  Generic drug: fluticasone-umeclidin-vilanter  Inhale 1 puff into the lungs once daily.        STOP taking these medications    clindamycin 300 MG capsule  Commonly known as: CLEOCIN        POD #8  Awake. Alert.   Working with PT/OT  AFVSS. 95% on RA  Heart: RRR  Lungs: respirations nonlabored, clear  Incision: c/d/i    S/p MVR, porcine valve  D/c to rehab facility  F/u with Dr. Rosales in 3 weeks  F/u with cardiologist as scheduled      Time spent on the discharge of patient: 20 minutes    MARTHA Fernandez  Cardiothoracic Surgery  [facility] - 6th Floor Medical Telemetry

## 2023-09-29 NOTE — PLAN OF CARE
Problem: Physical Therapy  Goal: Physical Therapy Goal  Description: Goals to be met by: Discharge  POC: Patient to be seen 5-6x/week QD/BID As tolerable     Patient will increase functional independence with mobility by performin. Supine to sit with Modified [place]  2. Sit to supine with Modified [place]  3. Sit to stand transfer with Modified [place]  4. Gait  x 350 feet with Modified [place] using Rolling Walker vs No AD vs LRAD.   5. Low Fall Risk on 5x sit to stand test    Outcome: Ongoing, Progressing

## 2023-09-30 LAB
ANION GAP SERPL CALC-SCNC: 10 MEQ/L
BASOPHILS # BLD AUTO: 0.03 X10(3)/MCL
BASOPHILS NFR BLD AUTO: 0.4 %
BUN SERPL-MCNC: 8.6 MG/DL (ref 9.8–20.1)
CALCIUM SERPL-MCNC: 8.5 MG/DL (ref 8.4–10.2)
CHLORIDE SERPL-SCNC: 104 MMOL/L (ref 98–107)
CO2 SERPL-SCNC: 26 MMOL/L (ref 23–31)
CREAT SERPL-MCNC: 0.61 MG/DL (ref 0.55–1.02)
CREAT/UREA NIT SERPL: 14
EOSINOPHIL # BLD AUTO: 0.26 X10(3)/MCL (ref 0–0.9)
EOSINOPHIL NFR BLD AUTO: 3.7 %
ERYTHROCYTE [DISTWIDTH] IN BLOOD BY AUTOMATED COUNT: 14.7 % (ref 11.5–17)
EST. AVERAGE GLUCOSE BLD GHB EST-MCNC: 125.5 MG/DL
GFR SERPLBLD CREATININE-BSD FMLA CKD-EPI: >60 MLS/MIN/1.73/M2
GLUCOSE SERPL-MCNC: 103 MG/DL (ref 82–115)
HBA1C MFR BLD: 6 %
HCT VFR BLD AUTO: 38.1 % (ref 37–47)
HGB BLD-MCNC: 11.8 G/DL (ref 12–16)
IMM GRANULOCYTES # BLD AUTO: 0.01 X10(3)/MCL (ref 0–0.04)
IMM GRANULOCYTES NFR BLD AUTO: 0.1 %
LYMPHOCYTES # BLD AUTO: 2.23 X10(3)/MCL (ref 0.6–4.6)
LYMPHOCYTES NFR BLD AUTO: 31.8 %
MAGNESIUM SERPL-MCNC: 1.4 MG/DL (ref 1.6–2.6)
MCH RBC QN AUTO: 31.3 PG (ref 27–31)
MCHC RBC AUTO-ENTMCNC: 31 G/DL (ref 33–36)
MCV RBC AUTO: 101.1 FL (ref 80–94)
MONOCYTES # BLD AUTO: 0.72 X10(3)/MCL (ref 0.1–1.3)
MONOCYTES NFR BLD AUTO: 10.3 %
NEUTROPHILS # BLD AUTO: 3.76 X10(3)/MCL (ref 2.1–9.2)
NEUTROPHILS NFR BLD AUTO: 53.7 %
PLATELET # BLD AUTO: 244 X10(3)/MCL (ref 130–400)
PMV BLD AUTO: 10 FL (ref 7.4–10.4)
POCT GLUCOSE: 101 MG/DL (ref 70–110)
POCT GLUCOSE: 102 MG/DL (ref 70–110)
POCT GLUCOSE: 104 MG/DL (ref 70–110)
POCT GLUCOSE: 72 MG/DL (ref 70–110)
POCT GLUCOSE: 82 MG/DL (ref 70–110)
POCT GLUCOSE: 89 MG/DL (ref 70–110)
POCT GLUCOSE: 90 MG/DL (ref 70–110)
POTASSIUM SERPL-SCNC: 4.7 MMOL/L (ref 3.5–5.1)
RBC # BLD AUTO: 3.77 X10(6)/MCL (ref 4.2–5.4)
SODIUM SERPL-SCNC: 140 MMOL/L (ref 136–145)
WBC # SPEC AUTO: 7.01 X10(3)/MCL (ref 4.5–11.5)

## 2023-09-30 PROCEDURE — 99900035 HC TECH TIME PER 15 MIN (STAT)

## 2023-09-30 PROCEDURE — 63600175 PHARM REV CODE 636 W HCPCS: Performed by: STUDENT IN AN ORGANIZED HEALTH CARE EDUCATION/TRAINING PROGRAM

## 2023-09-30 PROCEDURE — 85025 COMPLETE CBC W/AUTO DIFF WBC: CPT | Performed by: STUDENT IN AN ORGANIZED HEALTH CARE EDUCATION/TRAINING PROGRAM

## 2023-09-30 PROCEDURE — 83735 ASSAY OF MAGNESIUM: CPT | Performed by: STUDENT IN AN ORGANIZED HEALTH CARE EDUCATION/TRAINING PROGRAM

## 2023-09-30 PROCEDURE — 25000242 PHARM REV CODE 250 ALT 637 W/ HCPCS: Performed by: STUDENT IN AN ORGANIZED HEALTH CARE EDUCATION/TRAINING PROGRAM

## 2023-09-30 PROCEDURE — 97110 THERAPEUTIC EXERCISES: CPT

## 2023-09-30 PROCEDURE — 25000003 PHARM REV CODE 250: Performed by: STUDENT IN AN ORGANIZED HEALTH CARE EDUCATION/TRAINING PROGRAM

## 2023-09-30 PROCEDURE — 97116 GAIT TRAINING THERAPY: CPT

## 2023-09-30 PROCEDURE — 11000004 HC SNF PRIVATE

## 2023-09-30 PROCEDURE — 94760 N-INVAS EAR/PLS OXIMETRY 1: CPT

## 2023-09-30 PROCEDURE — 83036 HEMOGLOBIN GLYCOSYLATED A1C: CPT | Performed by: STUDENT IN AN ORGANIZED HEALTH CARE EDUCATION/TRAINING PROGRAM

## 2023-09-30 PROCEDURE — 80048 BASIC METABOLIC PNL TOTAL CA: CPT | Performed by: STUDENT IN AN ORGANIZED HEALTH CARE EDUCATION/TRAINING PROGRAM

## 2023-09-30 RX ORDER — HYDROCODONE BITARTRATE AND ACETAMINOPHEN 7.5; 325 MG/1; MG/1
1 TABLET ORAL EVERY 6 HOURS PRN
Status: DISCONTINUED | OUTPATIENT
Start: 2023-09-30 | End: 2023-10-14 | Stop reason: HOSPADM

## 2023-09-30 RX ORDER — METFORMIN HYDROCHLORIDE 500 MG/1
500 TABLET ORAL 2 TIMES DAILY WITH MEALS
Status: DISCONTINUED | OUTPATIENT
Start: 2023-09-30 | End: 2023-10-14 | Stop reason: HOSPADM

## 2023-09-30 RX ORDER — MAGNESIUM SULFATE HEPTAHYDRATE 40 MG/ML
2 INJECTION, SOLUTION INTRAVENOUS
Status: COMPLETED | OUTPATIENT
Start: 2023-09-30 | End: 2023-09-30

## 2023-09-30 RX ORDER — LANOLIN ALCOHOL/MO/W.PET/CERES
400 CREAM (GRAM) TOPICAL DAILY
Status: DISCONTINUED | OUTPATIENT
Start: 2023-09-30 | End: 2023-10-14 | Stop reason: HOSPADM

## 2023-09-30 RX ADMIN — PANTOPRAZOLE SODIUM 40 MG: 40 TABLET, DELAYED RELEASE ORAL at 09:09

## 2023-09-30 RX ADMIN — PANCRELIPASE 4 CAPSULE: 60000; 12000; 38000 CAPSULE, DELAYED RELEASE PELLETS ORAL at 11:09

## 2023-09-30 RX ADMIN — HYDROCODONE BITARTRATE AND ACETAMINOPHEN 1 TABLET: 5; 325 TABLET ORAL at 06:09

## 2023-09-30 RX ADMIN — TIMOLOL MALEATE 1 DROP: 5 SOLUTION OPHTHALMIC at 09:09

## 2023-09-30 RX ADMIN — METFORMIN HYDROCHLORIDE 500 MG: 500 TABLET ORAL at 04:09

## 2023-09-30 RX ADMIN — PANCRELIPASE 4 CAPSULE: 60000; 12000; 38000 CAPSULE, DELAYED RELEASE PELLETS ORAL at 04:09

## 2023-09-30 RX ADMIN — GLIMEPIRIDE 4 MG: 2 TABLET ORAL at 09:09

## 2023-09-30 RX ADMIN — RIVAROXABAN 20 MG: 20 TABLET, FILM COATED ORAL at 04:09

## 2023-09-30 RX ADMIN — FOLIC ACID 1 MG: 1 TABLET ORAL at 09:09

## 2023-09-30 RX ADMIN — METFORMIN HYDROCHLORIDE 1000 MG: 500 TABLET ORAL at 09:09

## 2023-09-30 RX ADMIN — DAPAGLIFLOZIN 10 MG: 10 TABLET, FILM COATED ORAL at 09:09

## 2023-09-30 RX ADMIN — AMIODARONE HYDROCHLORIDE 200 MG: 200 TABLET ORAL at 08:09

## 2023-09-30 RX ADMIN — DOXYCYCLINE HYCLATE 100 MG: 100 TABLET, COATED ORAL at 09:09

## 2023-09-30 RX ADMIN — FAMOTIDINE 20 MG: 20 TABLET ORAL at 08:09

## 2023-09-30 RX ADMIN — ATORVASTATIN CALCIUM 40 MG: 40 TABLET, FILM COATED ORAL at 09:09

## 2023-09-30 RX ADMIN — Medication 400 MG: at 09:09

## 2023-09-30 RX ADMIN — LISINOPRIL 20 MG: 10 TABLET ORAL at 09:09

## 2023-09-30 RX ADMIN — DOXYCYCLINE HYCLATE 100 MG: 100 TABLET, COATED ORAL at 08:09

## 2023-09-30 RX ADMIN — FLUTICASONE FUROATE AND VILANTEROL TRIFENATATE 1 PUFF: 200; 25 POWDER RESPIRATORY (INHALATION) at 09:09

## 2023-09-30 RX ADMIN — HYDROCODONE BITARTRATE AND ACETAMINOPHEN 1 TABLET: 7.5; 325 TABLET ORAL at 02:09

## 2023-09-30 RX ADMIN — PANCRELIPASE 4 CAPSULE: 60000; 12000; 38000 CAPSULE, DELAYED RELEASE PELLETS ORAL at 09:09

## 2023-09-30 RX ADMIN — ASPIRIN 81 MG: 81 TABLET, COATED ORAL at 09:09

## 2023-09-30 RX ADMIN — MORPHINE SULFATE 2 MG: 4 INJECTION, SOLUTION INTRAMUSCULAR; INTRAVENOUS at 10:09

## 2023-09-30 RX ADMIN — AMIODARONE HYDROCHLORIDE 200 MG: 200 TABLET ORAL at 09:09

## 2023-09-30 RX ADMIN — MAGNESIUM SULFATE IN WATER 2 G: 40 INJECTION, SOLUTION INTRAVENOUS at 11:09

## 2023-09-30 RX ADMIN — MAGNESIUM SULFATE IN WATER 2 G: 40 INJECTION, SOLUTION INTRAVENOUS at 09:09

## 2023-09-30 RX ADMIN — TIMOLOL MALEATE 1 DROP: 5 SOLUTION OPHTHALMIC at 08:09

## 2023-09-30 NOTE — PLAN OF CARE
Problem: Adult Inpatient Plan of Care  Goal: Plan of Care Review  Outcome: Ongoing, Progressing  Flowsheets (Taken 9/30/2023 1224)  Plan of Care Reviewed With: patient  Goal: Patient-Specific Goal (Individualized)  Outcome: Ongoing, Progressing  Flowsheets (Taken 9/30/2023 1224)  Anxieties, Fears or Concerns: pain management,  Individualized Care Needs: monitor incision sites for s/s of infection, pain management, encourage PT/OT  Goal: Absence of Hospital-Acquired Illness or Injury  Outcome: Ongoing, Progressing  Intervention: Identify and Manage Fall Risk  Flowsheets (Taken 9/30/2023 1224)  Safety Promotion/Fall Prevention:   assistive device/personal item within reach   bed alarm set   family to remain at bedside   room near unit station   side rails raised x 3   instructed to call staff for mobility  Intervention: Prevent Skin Injury  Flowsheets (Taken 9/30/2023 1224)  Body Position: sitting up in bed  Skin Protection:   adhesive use limited   incontinence pads utilized   tubing/devices free from skin contact   transparent dressing maintained  Intervention: Prevent and Manage VTE (Venous Thromboembolism) Risk  Flowsheets (Taken 9/30/2023 1224)  Activity Management: Walk with assistive devise and /or staff member - L3  VTE Prevention/Management:   ambulation promoted   bleeding precations maintained   fluids promoted  Range of Motion: active ROM (range of motion) encouraged  Intervention: Prevent Infection  Flowsheets (Taken 9/30/2023 1224)  Infection Prevention:   equipment surfaces disinfected   hand hygiene promoted  Goal: Optimal Comfort and Wellbeing  Outcome: Ongoing, Progressing  Intervention: Monitor Pain and Promote Comfort  Flowsheets (Taken 9/30/2023 1224)  Pain Management Interventions:   care clustered   pain management plan reviewed with patient/caregiver   quiet environment facilitated   relaxation techniques promoted   position adjusted   cold applied  Intervention: Provide Person-Centered  Care  Flowsheets (Taken 9/30/2023 1224)  Trust Relationship/Rapport:   care explained   choices provided   questions encouraged   questions answered  Goal: Readiness for Transition of Care  Outcome: Ongoing, Progressing  Intervention: Mutually Develop Transition Plan  Flowsheets (Taken 9/30/2023 1224)  Communicated SD with patient/caregiver: Date not available/Unable to determine     Problem: Fall Injury Risk  Goal: Absence of Fall and Fall-Related Injury  Outcome: Ongoing, Progressing  Intervention: Identify and Manage Contributors  Flowsheets (Taken 9/30/2023 1224)  Self-Care Promotion:   BADL personal objects within reach   independence encouraged   safe use of adaptive equipment encouraged  Medication Review/Management: medications reviewed  Intervention: Promote Injury-Free Environment  Flowsheets (Taken 9/30/2023 1224)  Safety Promotion/Fall Prevention:   assistive device/personal item within reach   bed alarm set   family to remain at bedside   room near unit station   side rails raised x 3   instructed to call staff for mobility     Problem: Infection  Goal: Absence of Infection Signs and Symptoms  Outcome: Ongoing, Progressing  Intervention: Prevent or Manage Infection  Flowsheets (Taken 9/30/2023 1224)  Fever Reduction/Comfort Measures:   lightweight bedding   lightweight clothing  Infection Management: aseptic technique maintained     Problem: Skin Injury Risk Increased  Goal: Skin Health and Integrity  Outcome: Ongoing, Progressing  Intervention: Optimize Skin Protection  Flowsheets (Taken 9/30/2023 1224)  Pressure Reduction Techniques:   frequent weight shift encouraged   pressure points protected  Pressure Reduction Devices: positioning supports utilized  Skin Protection:   adhesive use limited   incontinence pads utilized   tubing/devices free from skin contact   transparent dressing maintained  Head of Bed (HOB) Positioning: HOB at 20-30 degrees  Intervention: Promote and Optimize Oral  Intake  Flowsheets (Taken 9/30/2023 1224)  Oral Nutrition Promotion:   calorie-dense foods provided   calorie-dense liquids provided   physical activity promoted   rest periods promoted   safe use of adaptive equipment encouraged

## 2023-09-30 NOTE — PLAN OF CARE
Problem: Adult Inpatient Plan of Care  Goal: Patient-Specific Goal (Individualized)  9/29/2023 2222 by Berna Chávez, RN  Outcome: Ongoing, Progressing  Flowsheets (Taken 9/29/2023 2222)  Anxieties, Fears or Concerns: None expressed  Individualized Care Needs: Monitor incision sites, Pain management, PT  9/29/2023 1956 by Berna Chávez, RN  Outcome: Ongoing, Progressing  Goal: Optimal Comfort and Wellbeing  Outcome: Ongoing, Progressing  Intervention: Monitor Pain and Promote Comfort  Flowsheets (Taken 9/29/2023 2222)  Pain Management Interventions:   care clustered   pain management plan reviewed with patient/caregiver   pillow support provided   quiet environment facilitated  Intervention: Provide Person-Centered Care  Flowsheets (Taken 9/29/2023 2222)  Trust Relationship/Rapport:   care explained   choices provided     Problem: Fall Injury Risk  Goal: Absence of Fall and Fall-Related Injury  Outcome: Ongoing, Progressing  Intervention: Identify and Manage Contributors  Flowsheets (Taken 9/29/2023 2222)  Self-Care Promotion:   BADL personal objects within reach   safe use of adaptive equipment encouraged  Medication Review/Management:   medications reviewed   high-risk medications identified  Intervention: Promote Injury-Free Environment  Flowsheets (Taken 9/29/2023 2222)  Safety Promotion/Fall Prevention:   assistive device/personal item within reach   bed alarm set   high risk medications identified   nonskid shoes/socks when out of bed   side rails raised x 2   instructed to call staff for mobility     Problem: Infection  Goal: Absence of Infection Signs and Symptoms  Outcome: Ongoing, Progressing  Intervention: Prevent or Manage Infection  Flowsheets (Taken 9/29/2023 2222)  Fever Reduction/Comfort Measures:   lightweight bedding   lightweight clothing  Infection Management: aseptic technique maintained

## 2023-09-30 NOTE — PROGRESS NOTES
[facility]  [facility] MEDICINE ~ PROGRESS NOTE    CHIEF COMPLAINT   Hospital follow up    HOSPITAL COURSE   77-year-old female with a past medical history of hypertension, diabetes mellitus, COPD, ABEL (has home CPAP) who presents from [facility] after undergoing  sternotomy with a 25mm Mosaic Porcine Mitral Valve Placement as well as a Ligation of her SUKHI 9/20 with Dr. Rosales.  Postoperative clinical course was complicated by paroxysmal atrial fibrillation/rapid ventricular response/sick sinus syndrome and she underwent placement of for Dual Chamber PPM Implant ([employer] Petey/Servin) 9/25 with Dr. Moon.  She was started on amiodarone as well as Xarelto.  Patient is to remain taking aspirin.  She presents to our facility for rehabilitation after this critical clinical course.    Today  Patient complaining of increased pain at sternotomy scar, pending chest x-ray.  She is ambulating 175 ft contact guard assistance with a rolling walker with physical therapy.  Hyperglycemic yesterday after home medications were resumed, discontinue glimepiride today  OBJECTIVE/PHYSICAL EXAM     VITAL SIGNS (MOST RECENT):  Temp: 98.4 °F (36.9 °C) (09/30/23 0718)  Pulse: 79 (09/30/23 0911)  Resp: 20 (09/30/23 0911)  BP: (!) 143/81 (09/30/23 0718)  SpO2: (!) 93 % (09/30/23 0718) VITAL SIGNS (24 HOUR RANGE):  Temp:  [97.4 °F (36.3 °C)-99 °F (37.2 °C)] 98.4 °F (36.9 °C)  Pulse:  [71-87] 79  Resp:  [18-20] 20  SpO2:  [93 %] 93 %  BP: (101-150)/(65-81) 143/81   GENERAL: In no acute distress, afebrile  HEENT:  PERRLA  CHEST: Clear to auscultation bilaterally  HEART: S1, S2, no appreciable murmur  ABDOMEN: Soft, nontender, BS +  MSK: Warm, no lower extremity edema, no clubbing or cyanosis  NEUROLOGIC: Alert and oriented x4, moving all extremities with good strength   INTEGUMENTARY:  Well-healing sternotomy scar and pacemaker scar  PSYCHIATRY:  Appropriate affect  ASSESSMENT/PLAN   Mitral regurgitation and  stenosis  -status post sternotomy with a 25mm Mosaic Porcine Mitral Valve Placement as well as a Ligation of her SUKHI 9/20 with Dr. Rosales  -has follow-up with Dr. Rosales 3 weeks from discharge date  -she was discharged with 3 days of doxycycline  -asa  -pending cxr, pt complaining increased pain   -increase Gould to 7.5     SSS  PAF/RVR  Junctional Rhythm  -s/p Dual Chamber PPM Implant (St. Petey/Servin) 9/25 with Dr. Moon  -loading with amiodarone, then continue daily dose   -Xarelto  -TSH 1.67 seven months  -has follow-up with Dr. Moon on 10/02/2023 at 11:00 a.m. for wound recheck  -has follow-up with Dr. Uribe on 11/16 at 11:00 a.m.     COPD   ABEL   -continue to utilize home CPAP   No evidence of COPD exacerbation at this time  -continue with umeclidinium and fluticasone     Diabetes mellitus   -continue with metformin, Farxiga, glimepiride  -hypoglycemic on 09/29, discontinue glimepiride and decrease metformin to 500 b.i.d.  -A1c 6     Essential hypertension   -continue with lisinopril     DVT prophylaxis:  Xarelto for atrial fibrillation  Anticipated discharge and disposition:  Home with home health care  __________________________________________________________________________    LABS/MICRO/MEDS/DIAGNOSTICS       LABS  Recent Labs     09/30/23  0402      K 4.7   CHLORIDE 104   CO2 26   BUN 8.6*   CREATININE 0.61   GLUCOSE 103   CALCIUM 8.5     Recent Labs     09/30/23  0402   WBC 7.01   RBC 3.77*   HCT 38.1   .1*          MICROBIOLOGY  Microbiology Results (last 7 days)       ** No results found for the last 168 hours. **               MEDICATIONS   [START ON 10/2/2023] amiodarone  200 mg Oral Daily    amiodarone  200 mg Oral BID    aspirin  81 mg Oral Daily    atorvastatin  40 mg Oral Daily    azelastine  2 spray Nasal BID    dapagliflozin propanediol  10 mg Oral Daily    docusate sodium  100 mg Oral BID    doxycycline  100 mg Oral Q12H    famotidine  20 mg Oral QHS    fluticasone  furoate-vilanteroL  1 puff Inhalation Daily    And    umeclidinium  1 capsule Inhalation Daily    folic acid  1 mg Oral Daily    lipase-protease-amylase 12,000-38,000-60,000 units  4 capsule Oral TID WM    lisinopriL  20 mg Oral Daily    magnesium oxide  400 mg Oral Daily    magnesium sulfate IVPB  2 g Intravenous Q2H    metFORMIN  500 mg Oral BID WM    pantoprazole  40 mg Oral Daily    rivaroxaban  20 mg Oral Daily with dinner    timolol maleate 0.5%  1 drop Both Eyes BID         INFUSIONS         DIAGNOSTIC TESTS  X-Ray Chest 1 View    (Results Pending)        EF   Date Value Ref Range Status   12/22/2022 55 % Final          NUTRITION STATUS  Patient meets ASPEN criteria for   malnutrition of   per RD assessment as evidenced by:                       A minimum of two characteristics is recommended for diagnosis of either severe or non-severe malnutrition.       Case related differential diagnoses have been reviewed; assessment and plan has been documented. I have personally reviewed the labs and test results that are currently available; I have reviewed the patients medication list. I have reviewed the consulting providers recommendations. I have reviewed or attempted to review medical records based upon their availability.  All of the patient's and/or family's questions have been addressed and answered to the best of my ability.  I will continue to monitor closely and make adjustments to medical management as needed.  This document was created using M*Modal Fluency Direct.  Transcription errors may have been made.  Please contact me if any questions may rise regarding documentation to clarify transcription.        Thairy G Reyes,    Internal Medicine  Department of Hospital Medicine [facility] - Medical Surgical Unit

## 2023-09-30 NOTE — PT/OT/SLP PROGRESS
Physical Therapy Treatment Note           Name: Kortney Leach    : 1945 (77 y.o.)  MRN: [id number]           TREATMENT SUMMARY AND RECOMMENDATIONS:    PT Received On: 23  PT Start Time: 931     PT Stop Time: 958  PT Total Time (min): 27 min     Subjective Assessment:   No complaints  Lethargic   x Awake, alert, cooperative  Uncooperative    Agitated x c/o pain : 4/10     Appropriate X  c/o fatigue    Confused X  Treated at bedside     Emotionally labile  Treated in gym/dept.    Impulsive  Other:    Flat affect       Therapy Precautions:    Cognitive deficits  Spinal precautions    Collar - hard X  Sternal precautions    Collar - soft   TLSO    Fall risk  LSO    Hip precautions - posterior  Knee immobilizer    Hip precautions - anterior  WBAT    Impaired communication  Partial weightbearing    Oxygen  TTWB    PEG tube  NWB    Visual deficits  Other:    Hearing deficits          Treatment Objectives:     Mobility Training:   Assist level Comments    Bed mobility Minimal A  Supine <> sit    Transfer     Gait SBA RW x 250 ft on level surface. No LOB. Moderate fatigue at end of walk   Sit to stand transitions CGA From bedside  with RW. Worked on positioning , trunk lean , and weight shift. Performed 5 reps    Sitting balance     Standing balance      Wheelchair mobility     Car transfer     Other:          Therapeutic Exercise:   Exercise Sets Reps Comments   Bilateral lower extremity seated EX 1 10 DF/PF/LAQ/Marching                          Additional Comments:    - patient compliant with sternal precautions. Required minimal VC .     Assessment: Sera tolerated treatment well today completing all assigned exercises with good effort and reporting no additional discomfort during treatment session. Patient required minimal  verbal cues and minimal  tactile cues for proper exercise execution. Mild-moderate fatigue at end of walk. Vitals WNL      PT Plan: Progress current treatment plan to  patients tolerance.  Revisions made to plan of care: No    GOALS:   Multidisciplinary Problems       Physical Therapy Goals          Problem: Physical Therapy    Goal Priority Disciplines Outcome Goal Variances Interventions   Physical Therapy Goal     PT, PT/OT Ongoing, Progressing     Description: Goals to be met by: Discharge  POC: Patient to be seen 5-6x/week QD/BID As tolerable     Patient will increase functional independence with mobility by performin. Supine to sit with Modified [place]  2. Sit to supine with Modified [place]  3. Sit to stand transfer with Modified [place]  4. Gait  x 350 feet with Modified [place] using Rolling Walker vs No AD vs LRAD.   5. Low Fall Risk on 5x sit to stand test                         Skilled PT Minutes Provided: 27   Communication with Treatment Team: nursing    Equipment recommendations:       At end of treatment, patient remained:   Up in chair     Up in wheelchair in room   x In bed   x With alarm activated   x Bed rails up   x Call bell in reach    x Family/friends present    Restraints secured properly    In bathroom with CNA/RN notified    Nurse aware    In gym with therapist/tech    Other:

## 2023-10-01 LAB
MAGNESIUM SERPL-MCNC: 2.1 MG/DL (ref 1.6–2.6)
POCT GLUCOSE: 106 MG/DL (ref 70–110)
POCT GLUCOSE: 116 MG/DL (ref 70–110)
POCT GLUCOSE: 139 MG/DL (ref 70–110)
POCT GLUCOSE: 69 MG/DL (ref 70–110)
POCT GLUCOSE: 78 MG/DL (ref 70–110)

## 2023-10-01 PROCEDURE — 83735 ASSAY OF MAGNESIUM: CPT | Performed by: STUDENT IN AN ORGANIZED HEALTH CARE EDUCATION/TRAINING PROGRAM

## 2023-10-01 PROCEDURE — 25000003 PHARM REV CODE 250: Performed by: STUDENT IN AN ORGANIZED HEALTH CARE EDUCATION/TRAINING PROGRAM

## 2023-10-01 PROCEDURE — 94760 N-INVAS EAR/PLS OXIMETRY 1: CPT

## 2023-10-01 PROCEDURE — 27000221 HC OXYGEN, UP TO 24 HOURS

## 2023-10-01 PROCEDURE — 99900035 HC TECH TIME PER 15 MIN (STAT)

## 2023-10-01 PROCEDURE — A4216 STERILE WATER/SALINE, 10 ML: HCPCS | Performed by: STUDENT IN AN ORGANIZED HEALTH CARE EDUCATION/TRAINING PROGRAM

## 2023-10-01 PROCEDURE — 11000004 HC SNF PRIVATE

## 2023-10-01 PROCEDURE — 63600175 PHARM REV CODE 636 W HCPCS: Performed by: STUDENT IN AN ORGANIZED HEALTH CARE EDUCATION/TRAINING PROGRAM

## 2023-10-01 PROCEDURE — 25000242 PHARM REV CODE 250 ALT 637 W/ HCPCS: Performed by: STUDENT IN AN ORGANIZED HEALTH CARE EDUCATION/TRAINING PROGRAM

## 2023-10-01 RX ORDER — IBUPROFEN 200 MG
24 TABLET ORAL
Status: DISCONTINUED | OUTPATIENT
Start: 2023-10-01 | End: 2023-10-14 | Stop reason: HOSPADM

## 2023-10-01 RX ORDER — GLUCAGON 1 MG
1 KIT INJECTION
Status: DISCONTINUED | OUTPATIENT
Start: 2023-10-01 | End: 2023-10-14 | Stop reason: HOSPADM

## 2023-10-01 RX ORDER — IBUPROFEN 200 MG
16 TABLET ORAL
Status: DISCONTINUED | OUTPATIENT
Start: 2023-10-01 | End: 2023-10-14 | Stop reason: HOSPADM

## 2023-10-01 RX ADMIN — METFORMIN HYDROCHLORIDE 500 MG: 500 TABLET ORAL at 08:10

## 2023-10-01 RX ADMIN — DAPAGLIFLOZIN 10 MG: 10 TABLET, FILM COATED ORAL at 09:10

## 2023-10-01 RX ADMIN — FLUTICASONE FUROATE AND VILANTEROL TRIFENATATE 1 PUFF: 200; 25 POWDER RESPIRATORY (INHALATION) at 09:10

## 2023-10-01 RX ADMIN — PANTOPRAZOLE SODIUM 40 MG: 40 TABLET, DELAYED RELEASE ORAL at 09:10

## 2023-10-01 RX ADMIN — PANCRELIPASE 4 CAPSULE: 60000; 12000; 38000 CAPSULE, DELAYED RELEASE PELLETS ORAL at 12:10

## 2023-10-01 RX ADMIN — RIVAROXABAN 20 MG: 20 TABLET, FILM COATED ORAL at 05:10

## 2023-10-01 RX ADMIN — LISINOPRIL 20 MG: 10 TABLET ORAL at 09:10

## 2023-10-01 RX ADMIN — DOCUSATE SODIUM 100 MG: 100 CAPSULE, LIQUID FILLED ORAL at 08:10

## 2023-10-01 RX ADMIN — FOLIC ACID 1 MG: 1 TABLET ORAL at 09:10

## 2023-10-01 RX ADMIN — FAMOTIDINE 20 MG: 20 TABLET ORAL at 08:10

## 2023-10-01 RX ADMIN — AMIODARONE HYDROCHLORIDE 200 MG: 200 TABLET ORAL at 08:10

## 2023-10-01 RX ADMIN — DOXYCYCLINE HYCLATE 100 MG: 100 TABLET, COATED ORAL at 08:10

## 2023-10-01 RX ADMIN — MORPHINE SULFATE 2 MG: 4 INJECTION, SOLUTION INTRAMUSCULAR; INTRAVENOUS at 11:10

## 2023-10-01 RX ADMIN — HYDROCODONE BITARTRATE AND ACETAMINOPHEN 1 TABLET: 7.5; 325 TABLET ORAL at 02:10

## 2023-10-01 RX ADMIN — PANCRELIPASE 4 CAPSULE: 60000; 12000; 38000 CAPSULE, DELAYED RELEASE PELLETS ORAL at 08:10

## 2023-10-01 RX ADMIN — AMIODARONE HYDROCHLORIDE 200 MG: 200 TABLET ORAL at 09:10

## 2023-10-01 RX ADMIN — UMECLIDINIUM 62.5 MCG: 62.5 AEROSOL, POWDER ORAL at 09:10

## 2023-10-01 RX ADMIN — Medication 10 ML: at 08:10

## 2023-10-01 RX ADMIN — TIMOLOL MALEATE 1 DROP: 5 SOLUTION OPHTHALMIC at 09:10

## 2023-10-01 RX ADMIN — MORPHINE SULFATE 2 MG: 4 INJECTION, SOLUTION INTRAMUSCULAR; INTRAVENOUS at 06:10

## 2023-10-01 RX ADMIN — HYDROCODONE BITARTRATE AND ACETAMINOPHEN 1 TABLET: 7.5; 325 TABLET ORAL at 08:10

## 2023-10-01 RX ADMIN — ATORVASTATIN CALCIUM 40 MG: 40 TABLET, FILM COATED ORAL at 09:10

## 2023-10-01 RX ADMIN — DOXYCYCLINE HYCLATE 100 MG: 100 TABLET, COATED ORAL at 09:10

## 2023-10-01 RX ADMIN — TIMOLOL MALEATE 1 DROP: 5 SOLUTION OPHTHALMIC at 08:10

## 2023-10-01 RX ADMIN — METFORMIN HYDROCHLORIDE 500 MG: 500 TABLET ORAL at 05:10

## 2023-10-01 RX ADMIN — Medication 400 MG: at 09:10

## 2023-10-01 RX ADMIN — PANCRELIPASE 4 CAPSULE: 60000; 12000; 38000 CAPSULE, DELAYED RELEASE PELLETS ORAL at 05:10

## 2023-10-01 RX ADMIN — ASPIRIN 81 MG: 81 TABLET, COATED ORAL at 09:10

## 2023-10-01 NOTE — PLAN OF CARE
[facility] - Medical Surgical Unit  Discharge Reassessment    Primary Care Provider: Arnaldo Hernandez MD    Expected Discharge Date:     Reassessment (most recent)       Discharge Reassessment - 10/01/23 1700          Discharge Reassessment    Assessment Type Discharge Planning Reassessment     Did the patient's condition or plan change since previous assessment? No     Discharge Plan discussed with: Adult children     Communicated SD with patient/caregiver Date not available/Unable to determine     Discharge Plan A Home with family;Home Health     DME Needed Upon Discharge  none     Transition of Care Barriers None     Why the patient remains in the hospital Requires continued medical care        Post-Acute Status    Post-Acute Authorization Home Health     Home Health Status Pending medical clearance/testing     Hospital Resources/Appts/Education Provided Provided patient/caregiver with written discharge plan information     Discharge Delays None known at this time

## 2023-10-01 NOTE — NURSING
Patient CBG 69, patient asymptomatic and alert/awake, denies dizziness or lightheadedness. Ate 95% of her lunch. Patient drank 120 ml apple juice with one packet sugar, will re-check patient CBG.    Notified patient that per pharmacist at [facility],  nasal spray BID for allergies is non-formulary; pt states her daughter can bring for medication from home to be labeled for administration.

## 2023-10-01 NOTE — PLAN OF CARE
Problem: Adult Inpatient Plan of Care  Goal: Plan of Care Review  Outcome: Ongoing, Progressing  Flowsheets (Taken 10/1/2023 1402)  Plan of Care Reviewed With: patient  Goal: Patient-Specific Goal (Individualized)  Outcome: Ongoing, Progressing  Flowsheets (Taken 10/1/2023 1402)  Anxieties, Fears or Concerns: pain control  Individualized Care Needs: monitor incision sites, pain management, promote ambulation, monitor CBG  Goal: Absence of Hospital-Acquired Illness or Injury  Outcome: Ongoing, Progressing  Intervention: Identify and Manage Fall Risk  Flowsheets (Taken 10/1/2023 1402)  Safety Promotion/Fall Prevention:   assistive device/personal item within reach   bed alarm set   Fall Risk reviewed with patient/family   Fall Risk signage in place   high risk medications identified   gait belt with ambulation   medications reviewed   side rails raised x 3   supervised activity   Supervised toileting - stay within arms reach   toileting scheduled   instructed to call staff for mobility  Intervention: Prevent Skin Injury  Flowsheets (Taken 10/1/2023 1402)  Body Position:   turned   right   30 degrees  Skin Protection:   adhesive use limited   incontinence pads utilized  Intervention: Prevent and Manage VTE (Venous Thromboembolism) Risk  Flowsheets (Taken 10/1/2023 1402)  Activity Management: Ambulated to bathroom - L4  VTE Prevention/Management: remove, assess skin, and reapply compression stockings  Range of Motion:   active ROM (range of motion) encouraged   ROM (range of motion) performed  Intervention: Prevent Infection  Flowsheets (Taken 10/1/2023 1402)  Infection Prevention:   environmental surveillance performed   hand hygiene promoted   single patient room provided   equipment surfaces disinfected   rest/sleep promoted  Goal: Optimal Comfort and Wellbeing  Outcome: Ongoing, Progressing  Intervention: Monitor Pain and Promote Comfort  Flowsheets (Taken 10/1/2023 1402)  Pain Management Interventions:  (encouraged splinting with coughing/sternal precautions)   medication offered   pillow support provided   position adjusted  Intervention: Provide Person-Centered Care  Flowsheets (Taken 10/1/2023 1402)  Trust Relationship/Rapport:   care explained   choices provided   emotional support provided   empathic listening provided   questions answered   questions encouraged   reassurance provided   thoughts/feelings acknowledged  Goal: Readiness for Transition of Care  Outcome: Ongoing, Progressing  Intervention: Mutually Develop Transition Plan  Flowsheets (Taken 10/1/2023 1402)  Communicated SD with patient/caregiver: Yes  Do you expect to return to your current living situation?: Yes  Do you have help at home or someone to help you manage your care at home?: Yes  Readmission within 30 days?: No  Do you currently have service(s) that help you manage your care at home?: No     Problem: Fall Injury Risk  Goal: Absence of Fall and Fall-Related Injury  Outcome: Ongoing, Progressing  Intervention: Identify and Manage Contributors  Flowsheets (Taken 10/1/2023 1402)  Self-Care Promotion:   independence encouraged   BADL personal objects within reach   BADL personal routines maintained   meal set-up provided   safe use of adaptive equipment encouraged  Medication Review/Management:   medications reviewed   high-risk medications identified  Intervention: Promote Injury-Free Environment  Flowsheets (Taken 10/1/2023 1402)  Safety Promotion/Fall Prevention:   assistive device/personal item within reach   bed alarm set   Fall Risk reviewed with patient/family   Fall Risk signage in place   high risk medications identified   gait belt with ambulation   medications reviewed   side rails raised x 3   supervised activity   Supervised toileting - stay within arms reach   toileting scheduled   instructed to call staff for mobility     Problem: Infection  Goal: Absence of Infection Signs and Symptoms  Outcome: Ongoing,  Progressing  Intervention: Prevent or Manage Infection  Flowsheets (Taken 10/1/2023 1402)  Infection Management: aseptic technique maintained  Isolation Precautions: precautions maintained     Problem: Skin Injury Risk Increased  Goal: Skin Health and Integrity  Outcome: Ongoing, Progressing  Intervention: Optimize Skin Protection  Flowsheets (Taken 10/1/2023 1402)  Pressure Reduction Techniques:   rest period provided between sit times   weight shift assistance provided  Skin Protection:   adhesive use limited   incontinence pads utilized  Head of Bed (HOB) Positioning: HOB at 20-30 degrees  Intervention: Promote and Optimize Oral Intake  Flowsheets (Taken 10/1/2023 1402)  Oral Nutrition Promotion: calorie-dense foods provided

## 2023-10-01 NOTE — NURSING
Contacted Radiology dept d/t chest xray from 9/30/23 at 10:16 am not resulted. She states she will check with radiologist to see if it can be read.

## 2023-10-01 NOTE — PLAN OF CARE
Problem: Adult Inpatient Plan of Care  Goal: Patient-Specific Goal (Individualized)  Outcome: Ongoing, Progressing  Flowsheets (Taken 9/30/2023 2030)  Anxieties, Fears or Concerns: Pain control  Individualized Care Needs:   monitor incision sites   pain management   monitor blood glucose  Goal: Optimal Comfort and Wellbeing  Outcome: Ongoing, Progressing  Intervention: Monitor Pain and Promote Comfort  Flowsheets (Taken 9/30/2023 2030)  Pain Management Interventions:   care clustered   pain management plan reviewed with patient/caregiver   pillow support provided   quiet environment facilitated  Intervention: Provide Person-Centered Care  Flowsheets (Taken 9/30/2023 2030)  Trust Relationship/Rapport:   choices provided   care explained     Problem: Fall Injury Risk  Goal: Absence of Fall and Fall-Related Injury  Outcome: Ongoing, Progressing  Intervention: Identify and Manage Contributors  Flowsheets (Taken 9/30/2023 2030)  Self-Care Promotion:   BADL personal objects within reach   safe use of adaptive equipment encouraged  Medication Review/Management:   medications reviewed   high-risk medications identified  Intervention: Promote Injury-Free Environment  Flowsheets (Taken 9/30/2023 2030)  Safety Promotion/Fall Prevention:   assistive device/personal item within reach   bed alarm set   high risk medications identified   medications reviewed   side rails raised x 2   instructed to call staff for mobility   nonskid shoes/socks when out of bed

## 2023-10-01 NOTE — PROGRESS NOTES
[facility]  [facility] MEDICINE ~ PROGRESS NOTE    CHIEF COMPLAINT   Hospital follow up    HOSPITAL COURSE   77-year-old female with a past medical history of hypertension, diabetes mellitus, COPD, ABEL (has home CPAP) who presents from [facility] after undergoing  sternotomy with a 25mm Mosaic Porcine Mitral Valve Placement as well as a Ligation of her SUKHI 9/20 with Dr. Rosales.  Postoperative clinical course was complicated by paroxysmal atrial fibrillation/rapid ventricular response/sick sinus syndrome and she underwent placement of for Dual Chamber PPM Implant ([employer] Petey/Servin) 9/25 with Dr. Moon.  She was started on amiodarone as well as Xarelto.  Patient is to remain taking aspirin.  She presents to our facility for rehabilitation after this critical clinical course.    Today  Reports increased relief of pain with Norco 7.5.  Chest x-ray results remain pending.  Blood glucose has improved  OBJECTIVE/PHYSICAL EXAM     VITAL SIGNS (MOST RECENT):  Temp: 97.6 °F (36.4 °C) (10/01/23 0731)  Pulse: 78 (10/01/23 0949)  Resp: 20 (10/01/23 0949)  BP: 136/79 (10/01/23 0731)  SpO2: 97 % (10/01/23 0731) VITAL SIGNS (24 HOUR RANGE):  Temp:  [97.6 °F (36.4 °C)-98.9 °F (37.2 °C)] 97.6 °F (36.4 °C)  Pulse:  [76-86] 78  Resp:  [18-20] 20  SpO2:  [93 %-97 %] 97 %  BP: (112-138)/(63-79) 136/79   GENERAL: In no acute distress, afebrile  HEENT:  PERRLA  CHEST: Clear to auscultation bilaterally  HEART: S1, S2, no appreciable murmur  ABDOMEN: Soft, nontender, BS +  MSK: Warm, no lower extremity edema, no clubbing or cyanosis  NEUROLOGIC: Alert and oriented x4, moving all extremities with good strength   INTEGUMENTARY:  Well-healing sternotomy scar and pacemaker scar  PSYCHIATRY:  Appropriate affect  ASSESSMENT/PLAN   Mitral regurgitation and stenosis  -status post sternotomy with a 25mm Mosaic Porcine Mitral Valve Placement as well as a Ligation of her SUKHI 9/20 with Dr. Rosales  -has follow-up with  Dr. Rosales 3 weeks from discharge date  -she was discharged with 3 days of doxycycline  -asa  -pending cxr, pt complaining increased pain   -increase [place] to 7.5     SSS  PAF/RVR  Junctional Rhythm  -s/p Dual Chamber PPM Implant ([employer] Petey/Servin) 9/25 with Dr. Moon  -loading with amiodarone, then continue daily dose   -Xarelto  -TSH 1.67 seven months  -has follow-up with Dr. Moon on 10/02/2023 at 11:00 a.m. for wound recheck  -has follow-up with Dr. Uribe on 11/16 at 11:00 a.m.     COPD   ABEL   -continue to utilize home CPAP   No evidence of COPD exacerbation at this time  -continue with umeclidinium and fluticasone     Diabetes mellitus   -continue with metformin, Farxiga, glimepiride  -hypoglycemic on 09/29, discontinue glimepiride and decrease metformin to 500 b.i.d.  -A1c 6     Essential hypertension   -continue with lisinopril     DVT prophylaxis:  Xarelto for atrial fibrillation  Anticipated discharge and disposition:  Home with home health care  __________________________________________________________________________    LABS/MICRO/MEDS/DIAGNOSTICS       LABS  Recent Labs     09/30/23  0402      K 4.7   CHLORIDE 104   CO2 26   BUN 8.6*   CREATININE 0.61   GLUCOSE 103   CALCIUM 8.5     Recent Labs     09/30/23  0402   WBC 7.01   RBC 3.77*   HCT 38.1   .1*          MICROBIOLOGY  Microbiology Results (last 7 days)       ** No results found for the last 168 hours. **               MEDICATIONS   [START ON 10/2/2023] amiodarone  200 mg Oral Daily    amiodarone  200 mg Oral BID    aspirin  81 mg Oral Daily    atorvastatin  40 mg Oral Daily    azelastine  2 spray Nasal BID    dapagliflozin propanediol  10 mg Oral Daily    docusate sodium  100 mg Oral BID    doxycycline  100 mg Oral Q12H    famotidine  20 mg Oral QHS    fluticasone furoate-vilanteroL  1 puff Inhalation Daily    And    umeclidinium  1 capsule Inhalation Daily    folic acid  1 mg Oral Daily    lipase-protease-amylase  12,000-38,000-60,000 units  4 capsule Oral TID WM    lisinopriL  20 mg Oral Daily    magnesium oxide  400 mg Oral Daily    metFORMIN  500 mg Oral BID WM    pantoprazole  40 mg Oral Daily    rivaroxaban  20 mg Oral Daily with dinner    timolol maleate 0.5%  1 drop Both Eyes BID         INFUSIONS         DIAGNOSTIC TESTS  X-Ray Chest 1 View    (Results Pending)        EF   Date Value Ref Range Status   12/22/2022 55 % Final          NUTRITION STATUS  Patient meets ASPEN criteria for   malnutrition of   per RD assessment as evidenced by:                       A minimum of two characteristics is recommended for diagnosis of either severe or non-severe malnutrition.       Case related differential diagnoses have been reviewed; assessment and plan has been documented. I have personally reviewed the labs and test results that are currently available; I have reviewed the patients medication list. I have reviewed the consulting providers recommendations. I have reviewed or attempted to review medical records based upon their availability.  All of the patient's and/or family's questions have been addressed and answered to the best of my ability.  I will continue to monitor closely and make adjustments to medical management as needed.  This document was created using M*Modal Fluency Direct.  Transcription errors may have been made.  Please contact me if any questions may rise regarding documentation to clarify transcription.        Thairy G Reyes, DO   Internal Medicine  Department of Hospital Medicine [facility] - [facility]

## 2023-10-02 LAB
POCT GLUCOSE: 107 MG/DL (ref 70–110)
POCT GLUCOSE: 108 MG/DL (ref 70–110)
POCT GLUCOSE: 114 MG/DL (ref 70–110)
POCT GLUCOSE: 122 MG/DL (ref 70–110)

## 2023-10-02 PROCEDURE — 11000004 HC SNF PRIVATE

## 2023-10-02 PROCEDURE — 97530 THERAPEUTIC ACTIVITIES: CPT

## 2023-10-02 PROCEDURE — 97110 THERAPEUTIC EXERCISES: CPT

## 2023-10-02 PROCEDURE — 97535 SELF CARE MNGMENT TRAINING: CPT

## 2023-10-02 PROCEDURE — 99900035 HC TECH TIME PER 15 MIN (STAT)

## 2023-10-02 PROCEDURE — 94760 N-INVAS EAR/PLS OXIMETRY 1: CPT

## 2023-10-02 PROCEDURE — 97116 GAIT TRAINING THERAPY: CPT

## 2023-10-02 PROCEDURE — 25000003 PHARM REV CODE 250: Performed by: STUDENT IN AN ORGANIZED HEALTH CARE EDUCATION/TRAINING PROGRAM

## 2023-10-02 RX ADMIN — AZELASTINE HYDROCHLORIDE 274 MCG: 137 SPRAY, METERED NASAL at 08:10

## 2023-10-02 RX ADMIN — TIMOLOL MALEATE 1 DROP: 5 SOLUTION OPHTHALMIC at 08:10

## 2023-10-02 RX ADMIN — RIVAROXABAN 20 MG: 20 TABLET, FILM COATED ORAL at 04:10

## 2023-10-02 RX ADMIN — HYDROCODONE BITARTRATE AND ACETAMINOPHEN 1 TABLET: 7.5; 325 TABLET ORAL at 04:10

## 2023-10-02 RX ADMIN — FLUTICASONE FUROATE AND VILANTEROL TRIFENATATE 1 PUFF: 200; 25 POWDER RESPIRATORY (INHALATION) at 08:10

## 2023-10-02 RX ADMIN — LISINOPRIL 20 MG: 10 TABLET ORAL at 08:10

## 2023-10-02 RX ADMIN — DOCUSATE SODIUM 100 MG: 100 CAPSULE, LIQUID FILLED ORAL at 08:10

## 2023-10-02 RX ADMIN — Medication 400 MG: at 08:10

## 2023-10-02 RX ADMIN — ASPIRIN 81 MG: 81 TABLET, COATED ORAL at 08:10

## 2023-10-02 RX ADMIN — ATORVASTATIN CALCIUM 40 MG: 40 TABLET, FILM COATED ORAL at 08:10

## 2023-10-02 RX ADMIN — METFORMIN HYDROCHLORIDE 500 MG: 500 TABLET ORAL at 08:10

## 2023-10-02 RX ADMIN — ACETAMINOPHEN 650 MG: 325 TABLET ORAL at 03:10

## 2023-10-02 RX ADMIN — PANCRELIPASE 4 CAPSULE: 60000; 12000; 38000 CAPSULE, DELAYED RELEASE PELLETS ORAL at 04:10

## 2023-10-02 RX ADMIN — FOLIC ACID 1 MG: 1 TABLET ORAL at 08:10

## 2023-10-02 RX ADMIN — FAMOTIDINE 20 MG: 20 TABLET ORAL at 08:10

## 2023-10-02 RX ADMIN — PANTOPRAZOLE SODIUM 40 MG: 40 TABLET, DELAYED RELEASE ORAL at 08:10

## 2023-10-02 RX ADMIN — HYDROCODONE BITARTRATE AND ACETAMINOPHEN 1 TABLET: 7.5; 325 TABLET ORAL at 10:10

## 2023-10-02 RX ADMIN — AMIODARONE HYDROCHLORIDE 200 MG: 200 TABLET ORAL at 08:10

## 2023-10-02 RX ADMIN — METFORMIN HYDROCHLORIDE 500 MG: 500 TABLET ORAL at 04:10

## 2023-10-02 RX ADMIN — PANCRELIPASE 4 CAPSULE: 60000; 12000; 38000 CAPSULE, DELAYED RELEASE PELLETS ORAL at 11:10

## 2023-10-02 RX ADMIN — PANCRELIPASE 4 CAPSULE: 60000; 12000; 38000 CAPSULE, DELAYED RELEASE PELLETS ORAL at 08:10

## 2023-10-02 NOTE — PT/OT/SLP PROGRESS
Occupational Therapy  Treatment    Name: Kortney Leach    : 1945 (77 y.o.)  MRN: [id number]           TREATMENT SUMMARY AND RECOMMENDATIONS:      OT Date of Treatment: 10/02/23  OT Start Time: 1300  OT Stop Time: 1330  OT Total Time (min): 30 min      Subjective Assessment:   No complaints  Lethargic   x Awake, alert, cooperative  Impulsive    Uncooperative   Flat affect    Agitated  c/o pain   x Appropriate  c/o fatigue    Confused x Treated at bedside     Emotionally labile x Treated in gym/dept.      Other:        Therapy Precautions:    Cognitive deficits  Spinal precautions    Collar - hard x Sternal precautions    Collar - soft   TLSO   x Fall risk  LSO    Hip precautions - posterior  Knee immobilizer    Hip precautions - anterior  WBAT    Impaired communication  Partial weightbearing    Oxygen  TTWB    PEG tube  NWB    Visual deficits      Hearing deficits   Other:        Treatment Objectives:     Mobility Training:    Mobility task Assist level Comments    Bed mobility Min A Supine<sitting EOB; assist to lift trunk into upright position    Transfer     Sit to stands transitions Min A     Min-mod A EOB<standing using a RW, gait belt, and bear to maintain sternal pxns   X3reps from WC<>standing; increased assist d/t fatigue   Functional mobility CGA FM in room using a RW and gait belt   Sitting balance     Standing balance      Other:        ADL Training:    ADL Assist level Comments    Feeding     Grooming/hygiene SBA Hand hygiene standing at the sink    Bathing     Upper body dressing     Lower body dressing     Toileting SBA +void; able to manage clothing and perform hygiene   Toilet transfer SBA BSC<standing using RW and gait belt   Adaptive equipment training     Other:           Therapeutic Exercise:   Exercise Sets Reps Comments                               Additional Comments:      Assessment: Patient tolerated session well. Pt continues to demonstrate a decrease in activity tolerance.  Pt shows an increase in independence with toileting as evident of going from min A to SBA. Pt would benefit from continued OT services to increase independence with occupational performance, decrease risk for falls, and decrease caregiver burden.     GOALS:   Multidisciplinary Problems       Occupational Therapy Goals          Problem: Occupational Therapy    Goal Priority Disciplines Outcome Interventions   Occupational Therapy Goal     OT, PT/OT Ongoing, Progressing    Description: Goals to be met by: Discharge     Patient will increase functional independence with ADLs by performing:    UE Dressing with Set-up Assistance.  LE Dressing with Moderate Assistance.  Grooming while standing at sink with Stand-by Assistance.  Toileting from bedside commode with Stand-by Assistance for hygiene and clothing management.   Bathing from  shower chair/bench with Stand-by Assistance.  Toilet transfer to bedside commode with Contact Guard Assistance.                         Recommendations:     Discharge Recommendations: home with home health, home health PT, home health OT  Discharge Equipment Recommendations:  none  Barriers to discharge:  Decreased caregiver support, Other (Comment) (Daughter works during the day)     Plan:     Patient to be seen 6 x/week to address the above listed problems via self-care/home management, therapeutic activities, therapeutic exercises  Plan of Care Expires: 10/20/23  Plan of Care Reviewed with: patient  Revisions made to plan of care: No      Skilled OT Minutes Provided: 30  Communication with Treatment Team:     Equipment recommendations:       At end of treatment, patient remained:   Up in chair     Up in wheelchair in room    In bed    With alarm activated    Bed rails up    Call bell in reach     Family/friends present    Restraints secured properly    In bathroom with CNA/RN notified   x In gym with PT/PTA/tech    Nurse aware    Other:

## 2023-10-02 NOTE — PT/OT/SLP PROGRESS
Physical Therapy Treatment Note           Name: Kortney Leach    : 1945 (77 y.o.)  MRN: [id number]           TREATMENT SUMMARY AND RECOMMENDATIONS:    PT Received On: 10/02/23  PT Start Time: 1330     PT Stop Time: 1355  PT Total Time (min): 25 min     Subjective Assessment:   No complaints  Lethargic   x Awake, alert, cooperative  Uncooperative    Agitated  c/o pain    Appropriate x c/o fatigue    Confused  Treated at bedside     Emotionally labile x Treated in gym/dept.    Impulsive  Other:    Flat affect       Therapy Precautions:    Cognitive deficits  Spinal precautions    Collar - hard x Sternal precautions    Collar - soft   TLSO    Fall risk  LSO    Hip precautions - posterior  Knee immobilizer    Hip precautions - anterior  WBAT    Impaired communication  Partial weightbearing    Oxygen  TTWB    PEG tube  NWB    Visual deficits  Other:    Hearing deficits          Treatment Objectives:     Mobility Training:   Assist level Comments    Bed mobility SBA Sit > supine  Log roll technique to maintain sternal pxns   Transfer     Gait SBA X350ft using RW   Sit to stand transitions MIN A X3 sit to stands from wc level; vc to maintain sternal pxns and emphasis on increasing anterior weight shift    Sitting balance     Standing balance      Wheelchair mobility     Car transfer     Other:          Therapeutic Exercise:   Exercise Sets Reps Comments   Hip flexion, hip ABD, LAQ, hamstring curls, ankle PF/DF 1 20 Performed short sitting  Red TB                         Additional Comments:      Assessment: Patient tolerated session well. Patient reporting fatigue from a full day of therapy, but otherwise did fine.     Revisions made to plan of care: No    GOALS:   Multidisciplinary Problems       Physical Therapy Goals          Problem: Physical Therapy    Goal Priority Disciplines Outcome Goal Variances Interventions   Physical Therapy Goal     PT, PT/OT Ongoing, Progressing     Description: Goals  to be met by: Discharge  POC: Patient to be seen 5-6x/week QD/BID As tolerable     Patient will increase functional independence with mobility by performin. Supine to sit with Modified [place]  2. Sit to supine with Modified [place]  3. Sit to stand transfer with Modified [place]  4. Gait  x 350 feet with Modified [place] using Rolling Walker vs No AD vs LRAD.   5. Low Fall Risk on 5x sit to stand test                         Skilled PT Minutes Provided: 25 minutes   Communication with Treatment Team:     Equipment recommendations:       At end of treatment, patient remained:   Up in chair     Up in wheelchair in room   x In bed   x With alarm activated   x Bed rails up   x Call bell in reach     Family/friends present    Restraints secured properly    In bathroom with CNA/RN notified    Nurse aware    In gym with therapist/tech    Other:

## 2023-10-02 NOTE — PLAN OF CARE
Problem: Adult Inpatient Plan of Care  Goal: Plan of Care Review  10/1/2023 2259 by Amy Martinez RN  Outcome: Ongoing, Progressing  10/1/2023 2228 by Amy Martinez RN  Outcome: Ongoing, Progressing  Flowsheets (Taken 10/1/2023 1915)  Plan of Care Reviewed With:   patient   daughter  Goal: Patient-Specific Goal (Individualized)  10/1/2023 2259 by Amy Martinez RN  Outcome: Ongoing, Progressing  Flowsheets (Taken 10/1/2023 2015)  Anxieties, Fears or Concerns: Taking stool softner to prevent constipation  Individualized Care Needs:   Safety with mobility to prevent injury   Pacemaker precautions,limiled limb activity   Sternal precautions,splinting   Resp exercises to prevent atelectasis   Monitor for s/s of infection   Monitor blood glucose levels.  10/1/2023 2228 by Amy Martinez RN  Outcome: Ongoing, Progressing  Goal: Absence of Hospital-Acquired Illness or Injury  10/1/2023 2259 by Amy Martinez RN  Outcome: Ongoing, Progressing  10/1/2023 2228 by Amy Martinez RN  Outcome: Ongoing, Progressing  Intervention: Identify and Manage Fall Risk  Flowsheets (Taken 10/1/2023 2015)  Safety Promotion/Fall Prevention:   bed alarm set   assistive device/personal item within reach   Fall Risk reviewed with patient/family   Fall Risk signage in place   gait belt with ambulation   high risk medications identified   medications reviewed   lighting adjusted   nonskid shoes/socks when out of bed   room near unit station   instructed to call staff for mobility  Intervention: Prevent Skin Injury  Flowsheets (Taken 10/1/2023 2015)  Body Position:   weight shifting   position changed independently   neutral body alignment   position maintained  Skin Protection:   incontinence pads utilized   tubing/devices free from skin contact  Intervention: Prevent and Manage VTE (Venous Thromboembolism) Risk  Flowsheets (Taken 10/1/2023 2015)  Activity Management: Walk with assistive devise and /or staff member - L3  VTE  Prevention/Management:   bleeding risk assessed   bleeding precations maintained   ambulation promoted   dorsiflexion/plantar flexion performed   remove, assess skin, and reapply compression stockings   fluids promoted   ROM (active) performed  Range of Motion:   active ROM (range of motion) encouraged   ROM (range of motion) performed  Intervention: Prevent Infection  Flowsheets (Taken 10/1/2023 2015)  Infection Prevention:   environmental surveillance performed   equipment surfaces disinfected   hand hygiene promoted   personal protective equipment utilized   rest/sleep promoted  Goal: Optimal Comfort and Wellbeing  10/1/2023 2259 by Amy Martinez RN  Outcome: Ongoing, Progressing  10/1/2023 2228 by Amy Martinez RN  Outcome: Ongoing, Progressing  Intervention: Monitor Pain and Promote Comfort  Flowsheets (Taken 10/1/2023 2015)  Pain Management Interventions:   care clustered   pain management plan reviewed with patient/caregiver   medication offered but refused   quiet environment facilitated   relaxation techniques promoted   cold applied  Intervention: Provide Person-Centered Care  Flowsheets (Taken 10/1/2023 2015)  Trust Relationship/Rapport:   care explained   choices provided   emotional support provided   empathic listening provided   questions answered   questions encouraged   reassurance provided   thoughts/feelings acknowledged  Goal: Readiness for Transition of Care  Outcome: Ongoing, Progressing     Problem: Fall Injury Risk  Goal: Absence of Fall and Fall-Related Injury  10/1/2023 2259 by Amy Martinez RN  Outcome: Ongoing, Progressing  10/1/2023 2228 by Amy Martinez RN  Outcome: Ongoing, Progressing  Intervention: Identify and Manage Contributors  Flowsheets (Taken 10/1/2023 2015)  Self-Care Promotion:   independence encouraged   BADL personal objects within reach   BADL personal routines maintained  Medication Review/Management:   medications reviewed   high-risk medications  identified  Intervention: Promote Injury-Free Environment  Flowsheets (Taken 10/1/2023 2015)  Safety Promotion/Fall Prevention:   bed alarm set   assistive device/personal item within reach   Fall Risk reviewed with patient/family   Fall Risk signage in place   gait belt with ambulation   high risk medications identified   medications reviewed   lighting adjusted   nonskid shoes/socks when out of bed   room near unit station   instructed to call staff for mobility     Problem: Infection  Goal: Absence of Infection Signs and Symptoms  10/1/2023 2259 by Amy Martinez RN  Outcome: Ongoing, Progressing  10/1/2023 2228 by Amy Martinez RN  Outcome: Ongoing, Progressing  Intervention: Prevent or Manage Infection  Flowsheets (Taken 10/1/2023 2015)  Fever Reduction/Comfort Measures:   lightweight clothing   lightweight bedding  Infection Management: aseptic technique maintained     Problem: Skin Injury Risk Increased  Goal: Skin Health and Integrity  10/1/2023 2259 by Amy Martinez RN  Outcome: Ongoing, Progressing  10/1/2023 2228 by Amy Martinez RN  Outcome: Ongoing, Progressing  Intervention: Optimize Skin Protection  Flowsheets (Taken 10/1/2023 2015)  Pressure Reduction Techniques: frequent weight shift encouraged  Skin Protection:   incontinence pads utilized   tubing/devices free from skin contact  Head of Bed (HOB) Positioning: HOB at 20-30 degrees  Intervention: Promote and Optimize Oral Intake  Flowsheets (Taken 10/1/2023 2015)  Oral Nutrition Promotion: calorie-dense foods provided

## 2023-10-02 NOTE — PROGRESS NOTES
[facility]  [facility] MEDICINE ~ PROGRESS NOTE    CHIEF COMPLAINT   Hospital follow up    HOSPITAL COURSE   77-year-old female with a past medical history of hypertension, diabetes mellitus, COPD, ABEL (has home CPAP) who presents from [facility] after undergoing  sternotomy with a 25mm Mosaic Porcine Mitral Valve Placement as well as a Ligation of her SUKHI 9/20 with Dr. Rosales.  Postoperative clinical course was complicated by paroxysmal atrial fibrillation/rapid ventricular response/sick sinus syndrome and she underwent placement of for Dual Chamber PPM Implant ([employer] Petey/Servin) 9/25 with Dr. Moon.  She was started on amiodarone as well as Xarelto.  Patient is to remain taking aspirin.  She presents to our facility for rehabilitation after this critical clinical course.    Today  Doing well, no complaints.  Contact guard assistance with a rolling walker 200 ft  OBJECTIVE/PHYSICAL EXAM     VITAL SIGNS (MOST RECENT):  Temp: 98.1 °F (36.7 °C) (10/02/23 0740)  Pulse: 80 (10/02/23 0855)  Resp: 18 (10/02/23 0855)  BP: (!) 147/79 (10/02/23 0740)  SpO2: 97 % (10/02/23 0740) VITAL SIGNS (24 HOUR RANGE):  Temp:  [98.1 °F (36.7 °C)-98.5 °F (36.9 °C)] 98.1 °F (36.7 °C)  Pulse:  [74-83] 80  Resp:  [16-22] 18  SpO2:  [95 %-97 %] 97 %  BP: (107-147)/(65-82) 147/79   GENERAL: In no acute distress, afebrile  HEENT:  PERRLA  CHEST: Clear to auscultation bilaterally  HEART: S1, S2, no appreciable murmur  ABDOMEN: Soft, nontender, BS +  MSK: Warm, no lower extremity edema, no clubbing or cyanosis  NEUROLOGIC: Alert and oriented x4, moving all extremities with good strength   INTEGUMENTARY:  Well-healing sternotomy scar and pacemaker scar  PSYCHIATRY:  Appropriate affect  ASSESSMENT/PLAN   Mitral regurgitation and stenosis  -status post sternotomy with a 25mm Mosaic Porcine Mitral Valve Placement as well as a Ligation of her SUKHI 9/20 with Dr. Rosales  -has follow-up with Dr. Rosales 3 weeks from  discharge date  -she was discharged with 3 days of doxycycline, completed  -asa  -chest x-ray from 09/30 with no acute findings, pulmonary edema has resolved  -increase [place] to 7.5     SSS  PAF/RVR  Junctional Rhythm  -s/p Dual Chamber PPM Implant ([employer] Petey/Servin) 9/25 with Dr. Moon  -loading with amiodarone, then continue daily dose   -Xarelto  -TSH 1.67 seven months  -has follow-up with Dr. Moon on 10/02/2023 at 11:00 a.m. for wound recheck, we will try to reschedule for a later date  -has follow-up with Dr. Uribe on 11/16 at 11:00 a.m.     COPD   ABEL   -continue to utilize home CPAP   No evidence of COPD exacerbation at this time  -continue with umeclidinium and fluticasone     Diabetes mellitus   -at home patient takes metformin, Farxiga, glimepiride  -she has been hypoglycemic at our facility therefore discontinued glimepiride and Farxiga and reduced her dose of metformin to 500 b.i.d.  -A1c 6     Essential hypertension   -continue with lisinopril     DVT prophylaxis:  Xarelto for atrial fibrillation  Anticipated discharge and disposition:  Home with home health care  __________________________________________________________________________    LABS/MICRO/MEDS/DIAGNOSTICS       LABS  Recent Labs     09/30/23  0402      K 4.7   CHLORIDE 104   CO2 26   BUN 8.6*   CREATININE 0.61   GLUCOSE 103   CALCIUM 8.5     Recent Labs     09/30/23  0402   WBC 7.01   RBC 3.77*   HCT 38.1   .1*          MICROBIOLOGY  Microbiology Results (last 7 days)       ** No results found for the last 168 hours. **               MEDICATIONS   amiodarone  200 mg Oral Daily    aspirin  81 mg Oral Daily    atorvastatin  40 mg Oral Daily    azelastine  2 spray Nasal BID    docusate sodium  100 mg Oral BID    famotidine  20 mg Oral QHS    fluticasone furoate-vilanteroL  1 puff Inhalation Daily    And    umeclidinium  1 capsule Inhalation Daily    folic acid  1 mg Oral Daily    lipase-protease-amylase 12,000-38,000-60,000  units  4 capsule Oral TID WM    lisinopriL  20 mg Oral Daily    magnesium oxide  400 mg Oral Daily    metFORMIN  500 mg Oral BID WM    pantoprazole  40 mg Oral Daily    rivaroxaban  20 mg Oral Daily with dinner    timolol maleate 0.5%  1 drop Both Eyes BID         INFUSIONS         DIAGNOSTIC TESTS  X-Ray Chest 1 View   Final Result      Resolved pulmonary edema seen on the prior exam with minimal residual bibasilar subsegmental atelectasis.         Electronically signed by: Sebastian Simons MD   Date:    10/01/2023   Time:    14:09           EF   Date Value Ref Range Status   12/22/2022 55 % Final          NUTRITION STATUS  Patient meets ASPEN criteria for   malnutrition of   per RD assessment as evidenced by:                       A minimum of two characteristics is recommended for diagnosis of either severe or non-severe malnutrition.       Case related differential diagnoses have been reviewed; assessment and plan has been documented. I have personally reviewed the labs and test results that are currently available; I have reviewed the patients medication list. I have reviewed the consulting providers recommendations. I have reviewed or attempted to review medical records based upon their availability.  All of the patient's and/or family's questions have been addressed and answered to the best of my ability.  I will continue to monitor closely and make adjustments to medical management as needed.  This document was created using M*Modal Fluency Direct.  Transcription errors may have been made.  Please contact me if any questions may rise regarding documentation to clarify transcription.        Thairy G Reyes,    Internal Medicine  Department of Hospital Medicine [facility]

## 2023-10-02 NOTE — PLAN OF CARE
Problem: Adult Inpatient Plan of Care  Goal: Plan of Care Review  Outcome: Ongoing, Progressing  Flowsheets (Taken 10/2/2023 1047)  Plan of Care Reviewed With: patient  Goal: Patient-Specific Goal (Individualized)  Outcome: Ongoing, Progressing  Flowsheets (Taken 10/2/2023 1047)  Anxieties, Fears or Concerns: maintain sternal precautions when moving, BM  Individualized Care Needs: safety with mobility, sternal precautions, monitor for s/s of infection, monitor CBG levels  Goal: Absence of Hospital-Acquired Illness or Injury  Outcome: Ongoing, Progressing  Intervention: Identify and Manage Fall Risk  Flowsheets (Taken 10/2/2023 1047)  Safety Promotion/Fall Prevention:   assistive device/personal item within reach   bed alarm set   family to remain at bedside   nonskid shoes/socks when out of bed   instructed to call staff for mobility   side rails raised x 3  Intervention: Prevent Skin Injury  Flowsheets (Taken 10/2/2023 1047)  Body Position: sitting up in bed  Skin Protection:   adhesive use limited   incontinence pads utilized   transparent dressing maintained  Intervention: Prevent and Manage VTE (Venous Thromboembolism) Risk  Flowsheets (Taken 10/2/2023 1047)  Activity Management: Walk with assistive devise and /or staff member - L3  VTE Prevention/Management:   ambulation promoted   bleeding precations maintained   fluids promoted  Range of Motion: active ROM (range of motion) encouraged  Intervention: Prevent Infection  Flowsheets (Taken 10/2/2023 1047)  Infection Prevention:   equipment surfaces disinfected   hand hygiene promoted   cohorting utilized  Goal: Optimal Comfort and Wellbeing  Outcome: Ongoing, Progressing  Intervention: Monitor Pain and Promote Comfort  Flowsheets (Taken 10/2/2023 1047)  Pain Management Interventions:   care clustered   pain management plan reviewed with patient/caregiver   relaxation techniques promoted   quiet environment facilitated   position adjusted   cold  applied  Intervention: Provide Person-Centered Care  Flowsheets (Taken 10/2/2023 1047)  Trust Relationship/Rapport:   care explained   choices provided   questions encouraged  Goal: Readiness for Transition of Care  Outcome: Ongoing, Progressing  Intervention: Mutually Develop Transition Plan  Flowsheets (Taken 10/2/2023 1047)  Communicated SD with patient/caregiver: Date not available/Unable to determine     Problem: Fall Injury Risk  Goal: Absence of Fall and Fall-Related Injury  Outcome: Ongoing, Progressing  Intervention: Identify and Manage Contributors  Flowsheets (Taken 10/2/2023 1047)  Self-Care Promotion:   independence encouraged   BADL personal objects within reach   safe use of adaptive equipment encouraged  Medication Review/Management: medications reviewed  Intervention: Promote Injury-Free Environment  Flowsheets (Taken 10/2/2023 1047)  Safety Promotion/Fall Prevention:   assistive device/personal item within reach   bed alarm set   family to remain at bedside   nonskid shoes/socks when out of bed   instructed to call staff for mobility   side rails raised x 3     Problem: Infection  Goal: Absence of Infection Signs and Symptoms  Outcome: Ongoing, Progressing  Intervention: Prevent or Manage Infection  Flowsheets (Taken 10/2/2023 1047)  Fever Reduction/Comfort Measures:   lightweight bedding   lightweight clothing  Infection Management: aseptic technique maintained     Problem: Skin Injury Risk Increased  Goal: Skin Health and Integrity  Outcome: Ongoing, Progressing  Intervention: Optimize Skin Protection  Flowsheets (Taken 10/2/2023 1047)  Pressure Reduction Techniques:   frequent weight shift encouraged   pressure points protected  Pressure Reduction Devices: positioning supports utilized  Skin Protection:   adhesive use limited   incontinence pads utilized   transparent dressing maintained  Head of Bed (HOB) Positioning: HOB at 20-30 degrees  Intervention: Promote and Optimize Oral  Intake  Flowsheets (Taken 10/2/2023 1047)  Oral Nutrition Promotion:   calorie-dense foods provided   calorie-dense liquids provided   physical activity promoted

## 2023-10-02 NOTE — PLAN OF CARE
Problem: Occupational Therapy  Goal: Occupational Therapy Goal  Description: Goals to be met by: Discharge     Patient will increase functional independence with ADLs by performing:    UE Dressing with Set-up Assistance.  LE Dressing with Moderate Assistance.  Grooming while standing at sink with Stand-by Assistance. Goal met 10/2/23  Toileting from bedside commode with Stand-by Assistance for hygiene and clothing management. Goal met 10/2/23  Bathing from  shower chair/bench with Stand-by Assistance.  Toilet transfer to bedside commode with Contact Guard Assistance.    Outcome: Ongoing, Progressing

## 2023-10-02 NOTE — PLAN OF CARE
Problem: Physical Therapy  Goal: Physical Therapy Goal  Description: Goals to be met by: Discharge  POC: Patient to be seen 5-6x/week QD/BID As tolerable     Patient will increase functional independence with mobility by performin. Supine to sit with Modified [place]  2. Sit to supine with Modified [place]  3. Sit to stand transfer with Modified [place]  4. Gait  x 350 feet with Modified [place] using Rolling Walker vs No AD vs LRAD.   5. Low Fall Risk on 5x sit to stand test    Outcome: Ongoing, Progressing

## 2023-10-02 NOTE — PT/OT/SLP PROGRESS
Occupational Therapy  Treatment    Name: Kortney Leach    : 1945 (77 y.o.)  MRN: [id number]           TREATMENT SUMMARY AND RECOMMENDATIONS:      OT Date of Treatment: 10/02/23  OT Start Time: 845  OT Stop Time: 915  OT Total Time (min): 30 min      Subjective Assessment:   No complaints  Lethargic   x Awake, alert, cooperative  Impulsive    Uncooperative   Flat affect    Agitated  c/o pain   x Appropriate  c/o fatigue    Confused x Treated at bedside     Emotionally labile x Treated in gym/dept.      Other:        Therapy Precautions:    Cognitive deficits  Spinal precautions    Collar - hard x Sternal precautions    Collar - soft   TLSO   x Fall risk  LSO    Hip precautions - posterior  Knee immobilizer    Hip precautions - anterior  WBAT    Impaired communication  Partial weightbearing    Oxygen  TTWB    PEG tube  NWB    Visual deficits      Hearing deficits   Other:        Treatment Objectives:     Mobility Training:    Mobility task Assist level Comments    Bed mobility SBA Supine<sitting EOB using bed rail    Transfer     Sit to stands transitions Mod A EOB<standing using a RW and gait belt; using bear against chest   Functional mobility CGA 350ft using a RW and gait belt    Sitting balance     Standing balance      Other:        ADL Training:    ADL Assist level Comments    Feeding     Grooming/hygiene     Bathing     Upper body dressing     Lower body dressing Mod A Don slip on shoes sitting EOB using a short shoe horn; noted SOB when bending over; assist to place toes into shoes   Toileting     Toilet transfer     Adaptive equipment training     Other:           Therapeutic Exercise:   Exercise Sets Reps Comments   UBE strengthening exercises 2 10 1# weighted dowel completing bicep curls and chest press                         Additional Comments:  Pt noted with dizziness with transitioning from supine to sitting EOB, but dizziness dissipated and BP within normal range, 147/80.       Assessment: Patient tolerated session well. Pt demonstrates an increase in endurance as evident of increasing functional mobility from 175 to 350ft. Pt continues to have increase difficulty with LB dressing d/t difficulty bending over. Pt would benefit from continued OT services to address performance deficits and increase independence with self-care, decrease risk for falls, and decrease caregiver burden.     GOALS:   Multidisciplinary Problems       Occupational Therapy Goals          Problem: Occupational Therapy    Goal Priority Disciplines Outcome Interventions   Occupational Therapy Goal     OT, PT/OT Ongoing, Progressing    Description: Goals to be met by: Discharge     Patient will increase functional independence with ADLs by performing:    UE Dressing with Set-up Assistance.  LE Dressing with Moderate Assistance.  Grooming while standing at sink with Stand-by Assistance.  Toileting from bedside commode with Stand-by Assistance for hygiene and clothing management.   Bathing from  shower chair/bench with Stand-by Assistance.  Toilet transfer to bedside commode with Contact Guard Assistance.                         Recommendations:     Discharge Recommendations: home with home health, home health PT, home health OT  Discharge Equipment Recommendations:  none  Barriers to discharge:  Decreased caregiver support, Other (Comment) (Daughter works during the day)     Plan:     Patient to be seen 6 x/week to address the above listed problems via self-care/home management, therapeutic activities, therapeutic exercises  Plan of Care Expires: 10/20/23  Plan of Care Reviewed with: patient  Revisions made to plan of care: No      Skilled OT Minutes Provided: 30  Communication with Treatment Team:     Equipment recommendations:       At end of treatment, patient remained:   Up in chair     Up in wheelchair in room    In bed    With alarm activated    Bed rails up    Call bell in reach     Family/friends present     Restraints secured properly    In bathroom with CNA/RN notified   x In gym with PT/PTA/tech    Nurse aware    Other:

## 2023-10-02 NOTE — PT/OT/SLP PROGRESS
"         Physical Therapy Treatment Note           Name: Kortney Leach    : 1945 (77 y.o.)  MRN: [id number]           TREATMENT SUMMARY AND RECOMMENDATIONS:    PT Received On: 10/02/23  PT Start Time: 915     PT Stop Time: 944  PT Total Time (min): 29 min     Subjective Assessment:   No complaints  Lethargic   x Awake, alert, cooperative  Uncooperative    Agitated  c/o pain    Appropriate x c/o fatigue    Confused  Treated at bedside     Emotionally labile x Treated in gym/dept.    Impulsive  Other:    Flat affect       Therapy Precautions:    Cognitive deficits  Spinal precautions    Collar - hard x Sternal precautions    Collar - soft   TLSO    Fall risk  LSO    Hip precautions - posterior  Knee immobilizer    Hip precautions - anterior  WBAT    Impaired communication  Partial weightbearing    Oxygen  TTWB    PEG tube  NWB    Visual deficits  Other:    Hearing deficits          Treatment Objectives:     Mobility Training:   Assist level Comments    Bed mobility SBA Sit > supine  Log roll technique   Transfer SBA Assisted pt to bathroom using RW; pt able to manage clothing and hygiene without assist   Gait SBA X350ft using RW including ascending ramp; normal es, stride length and step-through gait   Sit to stand transitions CGA From bedside chair level; using bear to maintain sternal pxns   Sitting balance     Standing balance      Wheelchair mobility     Car transfer     Other:          Therapeutic Exercise:   Exercise Sets Reps Comments   LE cycling   5'F/ 5'B                         Additional Comments:  Good maintenance of sternal precautions    Assessment: Patient tolerated session well. Patient feeling fatigued following PT session reporting "My insides feel shaky". Pre PT session, BP WNL per OT. Following PT session BP = 137/81mmHG HR = 85bpm.     PT Plan: continue POC  Revisions made to plan of care: No    GOALS:   Multidisciplinary Problems       Physical Therapy Goals          " Problem: Physical Therapy    Goal Priority Disciplines Outcome Goal Variances Interventions   Physical Therapy Goal     PT, PT/OT Ongoing, Progressing     Description: Goals to be met by: Discharge  POC: Patient to be seen 5-6x/week QD/BID As tolerable     Patient will increase functional independence with mobility by performin. Supine to sit with Modified [place]  2. Sit to supine with Modified [place]  3. Sit to stand transfer with Modified [place]  4. Gait  x 350 feet with Modified [place] using Rolling Walker vs No AD vs LRAD.   5. Low Fall Risk on 5x sit to stand test                         Skilled PT Minutes Provided: 29 minutes   Communication with Treatment Team:     Equipment recommendations:       At end of treatment, patient remained:   Up in chair     Up in wheelchair in room   x In bed   x With alarm activated   x Bed rails up   x Call bell in reach     Family/friends present    Restraints secured properly    In bathroom with CNA/RN notified    Nurse aware    In gym with therapist/tech    Other:

## 2023-10-03 LAB
POCT GLUCOSE: 112 MG/DL (ref 70–110)
POCT GLUCOSE: 113 MG/DL (ref 70–110)
POCT GLUCOSE: 122 MG/DL (ref 70–110)
POCT GLUCOSE: 95 MG/DL (ref 70–110)

## 2023-10-03 PROCEDURE — 97535 SELF CARE MNGMENT TRAINING: CPT

## 2023-10-03 PROCEDURE — 97110 THERAPEUTIC EXERCISES: CPT

## 2023-10-03 PROCEDURE — 97116 GAIT TRAINING THERAPY: CPT

## 2023-10-03 PROCEDURE — 99900035 HC TECH TIME PER 15 MIN (STAT)

## 2023-10-03 PROCEDURE — 11000004 HC SNF PRIVATE

## 2023-10-03 PROCEDURE — 51798 US URINE CAPACITY MEASURE: CPT

## 2023-10-03 PROCEDURE — 25000003 PHARM REV CODE 250: Performed by: STUDENT IN AN ORGANIZED HEALTH CARE EDUCATION/TRAINING PROGRAM

## 2023-10-03 PROCEDURE — 94760 N-INVAS EAR/PLS OXIMETRY 1: CPT

## 2023-10-03 RX ORDER — FLUTICASONE FUROATE AND VILANTEROL 200; 25 UG/1; UG/1
1 POWDER RESPIRATORY (INHALATION) DAILY
Status: DISCONTINUED | OUTPATIENT
Start: 2023-10-04 | End: 2023-10-14 | Stop reason: HOSPADM

## 2023-10-03 RX ADMIN — AMIODARONE HYDROCHLORIDE 200 MG: 200 TABLET ORAL at 08:10

## 2023-10-03 RX ADMIN — DOCUSATE SODIUM 100 MG: 100 CAPSULE, LIQUID FILLED ORAL at 09:10

## 2023-10-03 RX ADMIN — AZELASTINE HYDROCHLORIDE 274 MCG: 137 SPRAY, METERED NASAL at 08:10

## 2023-10-03 RX ADMIN — TIMOLOL MALEATE 1 DROP: 5 SOLUTION OPHTHALMIC at 09:10

## 2023-10-03 RX ADMIN — UMECLIDINIUM 62.5 MCG: 62.5 AEROSOL, POWDER ORAL at 09:10

## 2023-10-03 RX ADMIN — DOCUSATE SODIUM 100 MG: 100 CAPSULE, LIQUID FILLED ORAL at 08:10

## 2023-10-03 RX ADMIN — UMECLIDINIUM 62.5 MCG: 62.5 AEROSOL, POWDER ORAL at 11:10

## 2023-10-03 RX ADMIN — FAMOTIDINE 20 MG: 20 TABLET ORAL at 09:10

## 2023-10-03 RX ADMIN — PANCRELIPASE 4 CAPSULE: 60000; 12000; 38000 CAPSULE, DELAYED RELEASE PELLETS ORAL at 11:10

## 2023-10-03 RX ADMIN — PANCRELIPASE 2 CAPSULE: 24000; 76000; 120000 CAPSULE, DELAYED RELEASE PELLETS ORAL at 05:10

## 2023-10-03 RX ADMIN — PANTOPRAZOLE SODIUM 40 MG: 40 TABLET, DELAYED RELEASE ORAL at 08:10

## 2023-10-03 RX ADMIN — POLYETHYLENE GLYCOL 3350 17 G: 17 POWDER, FOR SOLUTION ORAL at 06:10

## 2023-10-03 RX ADMIN — FLUTICASONE FUROATE AND VILANTEROL TRIFENATATE 1 PUFF: 200; 25 POWDER RESPIRATORY (INHALATION) at 09:10

## 2023-10-03 RX ADMIN — LISINOPRIL 20 MG: 10 TABLET ORAL at 08:10

## 2023-10-03 RX ADMIN — METFORMIN HYDROCHLORIDE 500 MG: 500 TABLET ORAL at 08:10

## 2023-10-03 RX ADMIN — ATORVASTATIN CALCIUM 40 MG: 40 TABLET, FILM COATED ORAL at 08:10

## 2023-10-03 RX ADMIN — RIVAROXABAN 20 MG: 20 TABLET, FILM COATED ORAL at 04:10

## 2023-10-03 RX ADMIN — PANCRELIPASE 4 CAPSULE: 60000; 12000; 38000 CAPSULE, DELAYED RELEASE PELLETS ORAL at 08:10

## 2023-10-03 RX ADMIN — Medication 400 MG: at 08:10

## 2023-10-03 RX ADMIN — ASPIRIN 81 MG: 81 TABLET, COATED ORAL at 08:10

## 2023-10-03 RX ADMIN — METFORMIN HYDROCHLORIDE 500 MG: 500 TABLET ORAL at 05:10

## 2023-10-03 RX ADMIN — HYDROCODONE BITARTRATE AND ACETAMINOPHEN 1 TABLET: 7.5; 325 TABLET ORAL at 08:10

## 2023-10-03 RX ADMIN — FOLIC ACID 1 MG: 1 TABLET ORAL at 08:10

## 2023-10-03 NOTE — PROGRESS NOTES
[facility]  hospitals MEDICINE ~ PROGRESS NOTE    CHIEF COMPLAINT   Hospital follow up    HOSPITAL COURSE   77-year-old female with a past medical history of hypertension, diabetes mellitus, COPD, ABEL (has home CPAP) who presents from [facility] after undergoing  sternotomy with a 25mm Mosaic Porcine Mitral Valve Placement as well as a Ligation of her SUKHI 9/20 with Dr. Rosales.  Postoperative clinical course was complicated by paroxysmal atrial fibrillation/rapid ventricular response/sick sinus syndrome and she underwent placement of for Dual Chamber PPM Implant ([employer] Petey/Servin) 9/25 with Dr. Moon.  She was started on amiodarone as well as Xarelto.  Patient is to remain taking aspirin.  She presents to our facility for rehabilitation after this critical clinical course.    Today  Overall chest wall pain at sternotomy scar is improving.  OBJECTIVE/PHYSICAL EXAM     VITAL SIGNS (MOST RECENT):  Temp: 98.6 °F (37 °C) (10/03/23 0700)  Pulse: 85 (10/03/23 0901)  Resp: 18 (10/03/23 0901)  BP: 111/78 (10/03/23 0700)  SpO2: 98 % (10/03/23 0901) VITAL SIGNS (24 HOUR RANGE):  Temp:  [98.2 °F (36.8 °C)-99 °F (37.2 °C)] 98.6 °F (37 °C)  Pulse:  [73-86] 85  Resp:  [16-18] 18  SpO2:  [93 %-98 %] 98 %  BP: (111-137)/(57-78) 111/78   GENERAL: In no acute distress, afebrile  HEENT:  PERRLA  CHEST: Clear to auscultation bilaterally  HEART: S1, S2, no appreciable murmur  ABDOMEN: Soft, nontender, BS +  MSK: Warm, no lower extremity edema, no clubbing or cyanosis  NEUROLOGIC: Alert and oriented x4, moving all extremities with good strength   INTEGUMENTARY:  Well-healing sternotomy scar and pacemaker scar  PSYCHIATRY:  Appropriate affect  ASSESSMENT/PLAN   Mitral regurgitation and stenosis  -status post sternotomy with a 25mm Mosaic Porcine Mitral Valve Placement as well as a Ligation of her SUKHI 9/20 with Dr. Rosales  -has follow-up with Dr. Rosales 3 weeks from discharge date  -she was discharged  "with 3 days of doxycycline, completed  -asa  -chest x-ray from 09/30 with no acute findings, pulmonary edema has resolved  -increase Norco to 7.5, provided relief      SSS  PAF/RVR  Junctional Rhythm  -s/p Dual Chamber PPM Implant ([employer] Petey[employer]Servin) 9/25 with Dr. Moon  -loading with amiodarone, then continue daily dose   -Xarelto  -TSH 1.67 seven months  -has follow-up with Dr. Moon on 10/02/2023 at 11:00 a.m. for wound recheck, we will try to reschedule for a later date  -has follow-up with Dr. Uribe on 11/16 at 11:00 a.m.     COPD   ABEL   -continue to utilize home CPAP   No evidence of COPD exacerbation at this time  -continue with umeclidinium and fluticasone     Diabetes mellitus   -at home patient takes metformin, Farxiga, glimepiride  -she has been hypoglycemic at our facility therefore discontinued glimepiride and Farxiga and reduced her dose of metformin to 500 b.i.d.  -controlled with just metformin thus far  -A1c 6     Essential hypertension   -continue with lisinopril     DVT prophylaxis:  Xarelto for atrial fibrillation  Anticipated discharge and disposition:  Home with home health care  __________________________________________________________________________    LABS/MICRO/MEDS/DIAGNOSTICS       LABS  No results for input(s): "NA", "K", "CHLORIDE", "CO2", "BUN", "CREATININE", "GLUCOSE", "CALCIUM", "ALKPHOS", "AST", "ALT", "ALBUMIN" in the last 72 hours.    No results for input(s): "WBC", "RBC", "HCT", "MCV", "PLT" in the last 72 hours.    Invalid input(s): "HG"      MICROBIOLOGY  Microbiology Results (last 7 days)       ** No results found for the last 168 hours. **               MEDICATIONS   amiodarone  200 mg Oral Daily    aspirin  81 mg Oral Daily    atorvastatin  40 mg Oral Daily    azelastine  2 spray Nasal BID    docusate sodium  100 mg Oral BID    famotidine  20 mg Oral QHS    [START ON 10/4/2023] fluticasone furoate-vilanteroL  1 puff Inhalation Daily    And    [START ON 10/4/2023] umeclidinium "  1 capsule Inhalation Daily    folic acid  1 mg Oral Daily    lipase-protease-amylase 12,000-38,000-60,000 units  4 capsule Oral TID WM    lisinopriL  20 mg Oral Daily    magnesium oxide  400 mg Oral Daily    metFORMIN  500 mg Oral BID WM    pantoprazole  40 mg Oral Daily    rivaroxaban  20 mg Oral Daily with dinner    timolol maleate 0.5%  1 drop Both Eyes BID         INFUSIONS         DIAGNOSTIC TESTS  X-Ray Chest 1 View   Final Result      Resolved pulmonary edema seen on the prior exam with minimal residual bibasilar subsegmental atelectasis.         Electronically signed by: Sebastian Simons MD   Date:    10/01/2023   Time:    14:09           EF   Date Value Ref Range Status   12/22/2022 55 % Final          NUTRITION STATUS  Patient meets ASPEN criteria for   malnutrition of   per RD assessment as evidenced by:                       A minimum of two characteristics is recommended for diagnosis of either severe or non-severe malnutrition.       Case related differential diagnoses have been reviewed; assessment and plan has been documented. I have personally reviewed the labs and test results that are currently available; I have reviewed the patients medication list. I have reviewed the consulting providers recommendations. I have reviewed or attempted to review medical records based upon their availability.  All of the patient's and/or family's questions have been addressed and answered to the best of my ability.  I will continue to monitor closely and make adjustments to medical management as needed.  This document was created using M*Modal Fluency Direct.  Transcription errors may have been made.  Please contact me if any questions may rise regarding documentation to clarify transcription.        Thairy G Reyes,    Internal Medicine  Department of Hospital Medicine [facility]

## 2023-10-03 NOTE — PLAN OF CARE
Problem: Occupational Therapy  Goal: Occupational Therapy Goal  Description: Goals to be met by: Discharge     Patient will increase functional independence with ADLs by performing:    UE Dressing with Set-up Assistance. GOAL MET 10/3/23  LE Dressing with Moderate Assistance. GOAL MET 10/3/23  Grooming while standing at sink with Stand-by Assistance. Goal met 10/2/23  Toileting from bedside commode with Stand-by Assistance for hygiene and clothing management. Goal met 10/2/23  Bathing from  shower chair/bench with Stand-by Assistance.  Toilet transfer to bedside commode with Contact Guard Assistance.     New Goals:     Pt will perform UB dressing requiring MI.   Pt will perform LB dressing requiring MI.   Pt will perform toileting requiring MI.   Pt will perform grooming while standing at the sink requiring MI.     Outcome: Ongoing, Progressing

## 2023-10-03 NOTE — PT/OT/SLP PROGRESS
Occupational Therapy  Treatment    Name: Kortney Leach    : 1945 (77 y.o.)  MRN: [id number]           TREATMENT SUMMARY AND RECOMMENDATIONS:      OT Date of Treatment: 10/03/23  OT Start Time: 1005  OT Stop Time: 1035  OT Total Time (min): 30 min      Subjective Assessment:   No complaints  Lethargic   x Awake, alert, cooperative  Impulsive    Uncooperative   Flat affect    Agitated  c/o pain   x Appropriate  c/o fatigue    Confused x Treated at bedside     Emotionally labile x Treated in gym/dept.      Other:        Therapy Precautions:    Cognitive deficits  Spinal precautions    Collar - hard x Sternal precautions    Collar - soft   TLSO   x Fall risk  LSO    Hip precautions - posterior  Knee immobilizer    Hip precautions - anterior  WBAT    Impaired communication  Partial weightbearing    Oxygen  TTWB    PEG tube  NWB    Visual deficits      Hearing deficits   Other:        Treatment Objectives:     Mobility Training:    Mobility task Assist level Comments    Bed mobility     Transfer     Sit to stands transitions Min-Mod A Recliner<standing; W/C<standing using a RW and gait belt; impacted by fatigue    Functional mobility     Sitting balance     Standing balance      Other:        ADL Training:    ADL Assist level Comments    Feeding     Grooming/hygiene SBA Hand hygiene standing at the sink    Bathing     Upper body dressing Set-up [facility] gown and don pull-over shirt    Lower body dressing CGA/SBA Don pants while sitting in recliner   Toileting SBA +void; able to manage clothing and perform anterior hygiene    Toilet transfer     Adaptive equipment training     Other:           Therapeutic Exercise:   Exercise Sets Reps Comments   UBE strengthening exercises 2 10 1# weighted dowel completing bicep curls, straight arm raises to shoulder height, and chest press                         Additional Comments:      Assessment: Patient tolerated session well. Pt demonstrates an increase in  independence with dressing and has met her goals. Pt is progressing, but would benefit from continued OT services to reach her full rehab potential.     GOALS:   Multidisciplinary Problems       Occupational Therapy Goals          Problem: Occupational Therapy    Goal Priority Disciplines Outcome Interventions   Occupational Therapy Goal     OT, PT/OT Ongoing, Progressing    Description: Goals to be met by: Discharge     Patient will increase functional independence with ADLs by performing:    UE Dressing with Set-up Assistance.  LE Dressing with Moderate Assistance.  Grooming while standing at sink with Stand-by Assistance. Goal met 10/2/23  Toileting from bedside commode with Stand-by Assistance for hygiene and clothing management. Goal met 10/2/23  Bathing from  shower chair/bench with Stand-by Assistance.  Toilet transfer to bedside commode with Contact Guard Assistance.                         Recommendations:     Discharge Recommendations: home with home health, home health PT, home health OT  Discharge Equipment Recommendations:  none  Barriers to discharge:  Decreased caregiver support, Other (Comment) (Daughter works during the day)     Plan:     Patient to be seen 6 x/week to address the above listed problems via self-care/home management, therapeutic activities, therapeutic exercises  Plan of Care Expires: 10/20/23  Plan of Care Reviewed with: patient  Revisions made to plan of care: Yes      Skilled OT Minutes Provided: 35  Communication with Treatment Team:     Equipment recommendations:       At end of treatment, patient remained:   Up in chair     Up in wheelchair in room    In bed    With alarm activated    Bed rails up    Call bell in reach     Family/friends present    Restraints secured properly    In bathroom with CNA/RN notified   x In gym with PT/PTA/tech    Nurse aware    Other:

## 2023-10-03 NOTE — PT/OT/SLP PROGRESS
Physical Therapy Treatment Note           Name: Kortney Leach    : 1945 (77 y.o.)  MRN: [id number]           TREATMENT SUMMARY AND RECOMMENDATIONS:    PT Received On: 10/03/23  PT Start Time: 1030     PT Stop Time: 1045  PT Total Time (min): 15 min     Subjective Assessment:   No complaints  Lethargic   x Awake, alert, cooperative  Uncooperative    Agitated  c/o pain    Appropriate  c/o fatigue    Confused  Treated at bedside     Emotionally labile x Treated in gym/dept.    Impulsive x Other: c/o nausea    Flat affect       Therapy Precautions:    Cognitive deficits  Spinal precautions    Collar - hard x Sternal precautions    Collar - soft   TLSO    Fall risk  LSO    Hip precautions - posterior  Knee immobilizer    Hip precautions - anterior  WBAT    Impaired communication  Partial weightbearing    Oxygen  TTWB    PEG tube  NWB    Visual deficits  Other:    Hearing deficits          Treatment Objectives:     Mobility Training:   Assist level Comments    Bed mobility     Transfer     Gait     Sit to stand transitions MIN A X5 sit to stands from standard chair level; vc to increase anterior weight shfit   Sitting balance     Standing balance      Wheelchair mobility     Car transfer     Other:          Therapeutic Exercise:   Exercise Sets Reps Comments   Hip flexion,hip ABD, LAQ, ankle PF/DF 1 20 Performed short sitting                          Additional Comments:      Assessment: Patient tolerated session fair. Patient reporting nausea and requesting to return to room via wc and hold off on walking right now.     PT Plan: continue POC  Revisions made to plan of care: No    GOALS:   Multidisciplinary Problems       Physical Therapy Goals          Problem: Physical Therapy    Goal Priority Disciplines Outcome Goal Variances Interventions   Physical Therapy Goal     PT, PT/OT Ongoing, Progressing     Description: Goals to be met by: Discharge  POC: Patient to be seen 5-6x/week QD/BID As  tolerable     Patient will increase functional independence with mobility by performin. Supine to sit with Modified [place]  2. Sit to supine with Modified [place]  3. Sit to stand transfer with Modified [place]  4. Gait  x 350 feet with Modified [place] using Rolling Walker vs No AD vs LRAD.   5. Low Fall Risk on 5x sit to stand test                         Skilled PT Minutes Provided: 15 minutes   Communication with Treatment Team:     Equipment recommendations:       At end of treatment, patient remained:  x Up in chair     Up in wheelchair in room    In bed   x With alarm activated    Bed rails up   x Call bell in reach     Family/friends present    Restraints secured properly    In bathroom with CNA/RN notified    Nurse aware    In gym with therapist/tech    Other:

## 2023-10-03 NOTE — PLAN OF CARE
Problem: Fall Injury Risk  Goal: Absence of Fall and Fall-Related Injury  Outcome: Ongoing, Progressing  Intervention: Identify and Manage Contributors  Flowsheets (Taken 10/2/2023 2010)  Self-Care Promotion:   BADL personal objects within reach   BADL personal routines maintained   independence encouraged  Medication Review/Management: medications reviewed  Intervention: Promote Injury-Free Environment  Flowsheets (Taken 10/2/2023 2010)  Safety Promotion/Fall Prevention:   assistive device/personal item within reach   bed alarm set   lighting adjusted   nonskid shoes/socks when out of bed     Problem: Skin Injury Risk Increased  Goal: Skin Health and Integrity  Outcome: Ongoing, Progressing  Intervention: Optimize Skin Protection  Flowsheets (Taken 10/2/2023 2010)  Pressure Reduction Techniques:   frequent weight shift encouraged   heels elevated off bed  Skin Protection: incontinence pads utilized  Head of Bed (HOB) Positioning: HOB at 30-45 degrees  Intervention: Promote and Optimize Oral Intake  Flowsheets (Taken 10/2/2023 2010)  Oral Nutrition Promotion: rest periods promoted

## 2023-10-03 NOTE — PROGRESS NOTES
Unable to treat pt at this time d/t pt stating she is tired from waking up at 3AM. Pt requests to rest. Will f/u later in PM if schedule allows.

## 2023-10-03 NOTE — PT/OT/SLP PROGRESS
Occupational Therapy  Treatment    Name: Kortney Leach    : 1945 (77 y.o.)  MRN: [id number]           TREATMENT SUMMARY AND RECOMMENDATIONS:      OT Date of Treatment: 10/03/23  OT Start Time:   OT Stop Time:   OT Total Time (min): 30 min      Subjective Assessment:   No complaints  Lethargic   x Awake, alert, cooperative  Impulsive    Uncooperative   Flat affect    Agitated  c/o pain   x Appropriate  c/o fatigue    Confused  Treated at bedside     Emotionally labile x Treated in gym/dept.      Other:        Therapy Precautions:    Cognitive deficits  Spinal precautions    Collar - hard x Sternal precautions    Collar - soft   TLSO   x Fall risk  LSO    Hip precautions - posterior  Knee immobilizer    Hip precautions - anterior  WBAT    Impaired communication  Partial weightbearing    Oxygen  TTWB    PEG tube  NWB    Visual deficits      Hearing deficits   Other:        Treatment Objectives:     Mobility Training:    Mobility task Assist level Comments    Bed mobility SVP EOB<supine   Transfer CGA Stand pivot t/f from WC to EOB using a gait belt and RW   Sit to stands transitions CGA WC<standing using a RW and gait belt   Functional mobility     Sitting balance     Standing balance      Other:        ADL Training:    ADL Assist level Comments    Feeding     Grooming/hygiene     Bathing     Upper body dressing     Lower body dressing     Toileting     Toilet transfer     Adaptive equipment training     Other:           Therapeutic Exercise:   Exercise Sets Reps Comments   UBE strengthening exercises 3 10 2# weighted dowel performing bicep curls, chest press, and straight arm raises to shoulder height   UBE endurance training  2 5 minutes Ergometer level 1; forwards and backwards                   Additional Comments:      Assessment: Patient tolerated session well. OTR upgraded pt to 1# heavier dowel and increased repetitions to [id number]. Pt performed her UB exercises well and demonstrated an  increase in strength and endurance. Pt would benefit from continued OT services to increase her independence with occupational performance, improve efficiency when performing tasks, decrease her fall risk, and decrease caregiver burden.     GOALS:   Multidisciplinary Problems       Occupational Therapy Goals          Problem: Occupational Therapy    Goal Priority Disciplines Outcome Interventions   Occupational Therapy Goal     OT, PT/OT Ongoing, Progressing    Description: Goals to be met by: Discharge     Patient will increase functional independence with ADLs by performing:    UE Dressing with Set-up Assistance. GOAL MET 10/3/23  LE Dressing with Moderate Assistance. GOAL MET 10/3/23  Grooming while standing at sink with Stand-by Assistance. Goal met 10/2/23  Toileting from bedside commode with Stand-by Assistance for hygiene and clothing management. Goal met 10/2/23  Bathing from  shower chair/bench with Stand-by Assistance.  Toilet transfer to bedside commode with Contact Guard Assistance.     New Goals:     Pt will perform UB dressing requiring MI.   Pt will perform LB dressing requiring MI.   Pt will perform toileting requiring MI.   Pt will perform grooming while standing at the sink requiring MI.                          Recommendations:     Discharge Recommendations: home with home health, home health PT, home health OT  Discharge Equipment Recommendations:  none  Barriers to discharge:  Decreased caregiver support, Other (Comment) (Daughter works during the day)     Plan:     Patient to be seen 6 x/week to address the above listed problems via self-care/home management, therapeutic activities, therapeutic exercises  Plan of Care Expires: 10/20/23  Plan of Care Reviewed with: patient  Revisions made to plan of care: No      Skilled OT Minutes Provided: 30  Communication with Treatment Team:     Equipment recommendations:       At end of treatment, patient remained:   Up in chair     Up in wheelchair in  room   x In bed   x With alarm activated   x Bed rails up   x Call bell in reach     Family/friends present    Restraints secured properly    In bathroom with CNA/RN notified    In gym with PT/PTA/tech    Nurse aware    Other:

## 2023-10-03 NOTE — PLAN OF CARE
Problem: Fall Injury Risk  Goal: Absence of Fall and Fall-Related Injury  Outcome: Ongoing, Progressing  Intervention: Identify and Manage Contributors  Flowsheets (Taken 10/3/2023 1104)  Self-Care Promotion: independence encouraged  Medication Review/Management: medications reviewed  Intervention: Promote Injury-Free Environment  Flowsheets (Taken 10/3/2023 1104)  Safety Promotion/Fall Prevention:   assistive device/personal item within reach   bed alarm set     Problem: Infection  Goal: Absence of Infection Signs and Symptoms  Outcome: Ongoing, Progressing  Intervention: Prevent or Manage Infection  Flowsheets (Taken 10/3/2023 1104)  Fever Reduction/Comfort Measures: lightweight bedding  Infection Management: aseptic technique maintained  Isolation Precautions:   protective   precautions maintained     Problem: Skin Injury Risk Increased  Goal: Skin Health and Integrity  Outcome: Ongoing, Progressing  Intervention: Optimize Skin Protection  Flowsheets (Taken 10/3/2023 1104)  Pressure Reduction Techniques: frequent weight shift encouraged  Pressure Reduction Devices: positioning supports utilized  Skin Protection: adhesive use limited  Head of Bed (HOB) Positioning: HOB at 30-45 degrees  Intervention: Promote and Optimize Oral Intake  Flowsheets (Taken 10/3/2023 1104)  Oral Nutrition Promotion: physical activity promoted

## 2023-10-03 NOTE — PT/OT/SLP PROGRESS
Physical Therapy Treatment Note           Name: Kortney Leach    : 1945 (77 y.o.)  MRN: [id number]           TREATMENT SUMMARY AND RECOMMENDATIONS:    PT Received On: 10/03/23  PT Start Time: 1445     PT Stop Time: 1508  PT Total Time (min): 23 min     Subjective Assessment:   No complaints  Lethargic   x Awake, alert, cooperative  Uncooperative    Agitated  c/o pain    Appropriate  c/o fatigue    Confused  Treated at bedside     Emotionally labile x Treated in gym/dept.    Impulsive  Other:    Flat affect       Therapy Precautions:    Cognitive deficits  Spinal precautions    Collar - hard x Sternal precautions    Collar - soft   TLSO    Fall risk  LSO    Hip precautions - posterior  Knee immobilizer    Hip precautions - anterior  WBAT    Impaired communication  Partial weightbearing    Oxygen  TTWB    PEG tube  NWB    Visual deficits  Other:    Hearing deficits          Treatment Objectives:     Mobility Training:   Assist level Comments    Bed mobility SBA Supine > sit   Transfer     Gait SBA X350ft using RW; pt reports feeling more stable with walker than without and prefers to continue to use RW at this time   Sit to stand transitions MIN A From wc level; pr required vc to maintain sternal precautions    Sitting balance     Standing balance      Wheelchair mobility     Car transfer     Other:          Therapeutic Exercise:   Exercise Sets Reps Comments   LE cycling   5'F/ 5'B                         Additional Comments:      Assessment: Patient tolerated session well.    PT Plan: continue POC  Revisions made to plan of care: No    GOALS:   Multidisciplinary Problems       Physical Therapy Goals          Problem: Physical Therapy    Goal Priority Disciplines Outcome Goal Variances Interventions   Physical Therapy Goal     PT, PT/OT Ongoing, Progressing     Description: Goals to be met by: Discharge  POC: Patient to be seen 5-6x/week QD/BID As tolerable     Patient will increase functional  independence with mobility by performin. Supine to sit with Modified [place]  2. Sit to supine with Modified [place]  3. Sit to stand transfer with Modified [place]  4. Gait  x 350 feet with Modified [place] using Rolling Walker vs No AD vs LRAD.   5. Low Fall Risk on 5x sit to stand test                         Skilled PT Minutes Provided: 23 minutes   Communication with Treatment Team:     Equipment recommendations:       At end of treatment, patient remained:   Up in chair     Up in wheelchair in room    In bed    With alarm activated    Bed rails up    Call bell in reach     Family/friends present    Restraints secured properly    In bathroom with CNA/RN notified    Nurse aware   x In gym with therapist/tech    Other:

## 2023-10-04 LAB
POCT GLUCOSE: 112 MG/DL (ref 70–110)
POCT GLUCOSE: 117 MG/DL (ref 70–110)
POCT GLUCOSE: 141 MG/DL (ref 70–110)
POCT GLUCOSE: 99 MG/DL (ref 70–110)

## 2023-10-04 PROCEDURE — 63600175 PHARM REV CODE 636 W HCPCS: Performed by: STUDENT IN AN ORGANIZED HEALTH CARE EDUCATION/TRAINING PROGRAM

## 2023-10-04 PROCEDURE — 25000003 PHARM REV CODE 250: Performed by: STUDENT IN AN ORGANIZED HEALTH CARE EDUCATION/TRAINING PROGRAM

## 2023-10-04 PROCEDURE — 99900035 HC TECH TIME PER 15 MIN (STAT)

## 2023-10-04 PROCEDURE — 97535 SELF CARE MNGMENT TRAINING: CPT

## 2023-10-04 PROCEDURE — 97530 THERAPEUTIC ACTIVITIES: CPT

## 2023-10-04 PROCEDURE — 97535 SELF CARE MNGMENT TRAINING: CPT | Mod: CQ

## 2023-10-04 PROCEDURE — 97116 GAIT TRAINING THERAPY: CPT

## 2023-10-04 PROCEDURE — 97110 THERAPEUTIC EXERCISES: CPT

## 2023-10-04 PROCEDURE — 11000004 HC SNF PRIVATE

## 2023-10-04 PROCEDURE — 94760 N-INVAS EAR/PLS OXIMETRY 1: CPT

## 2023-10-04 PROCEDURE — 25000242 PHARM REV CODE 250 ALT 637 W/ HCPCS: Performed by: STUDENT IN AN ORGANIZED HEALTH CARE EDUCATION/TRAINING PROGRAM

## 2023-10-04 RX ADMIN — DOCUSATE SODIUM 100 MG: 100 CAPSULE, LIQUID FILLED ORAL at 08:10

## 2023-10-04 RX ADMIN — AZELASTINE HYDROCHLORIDE 274 MCG: 137 SPRAY, METERED NASAL at 08:10

## 2023-10-04 RX ADMIN — HYDROCODONE BITARTRATE AND ACETAMINOPHEN 1 TABLET: 7.5; 325 TABLET ORAL at 02:10

## 2023-10-04 RX ADMIN — FOLIC ACID 1 MG: 1 TABLET ORAL at 08:10

## 2023-10-04 RX ADMIN — RIVAROXABAN 20 MG: 20 TABLET, FILM COATED ORAL at 04:10

## 2023-10-04 RX ADMIN — ATORVASTATIN CALCIUM 40 MG: 40 TABLET, FILM COATED ORAL at 08:10

## 2023-10-04 RX ADMIN — METFORMIN HYDROCHLORIDE 500 MG: 500 TABLET ORAL at 08:10

## 2023-10-04 RX ADMIN — TIMOLOL MALEATE 1 DROP: 5 SOLUTION OPHTHALMIC at 08:10

## 2023-10-04 RX ADMIN — FLUTICASONE FUROATE AND VILANTEROL TRIFENATATE 1 PUFF: 200; 25 POWDER RESPIRATORY (INHALATION) at 08:10

## 2023-10-04 RX ADMIN — PANCRELIPASE 2 CAPSULE: 24000; 76000; 120000 CAPSULE, DELAYED RELEASE PELLETS ORAL at 08:10

## 2023-10-04 RX ADMIN — UMECLIDINIUM 62.5 MCG: 62.5 AEROSOL, POWDER ORAL at 08:10

## 2023-10-04 RX ADMIN — ACETAMINOPHEN 650 MG: 325 TABLET ORAL at 02:10

## 2023-10-04 RX ADMIN — HYDROCODONE BITARTRATE AND ACETAMINOPHEN 1 TABLET: 7.5; 325 TABLET ORAL at 12:10

## 2023-10-04 RX ADMIN — PANTOPRAZOLE SODIUM 40 MG: 40 TABLET, DELAYED RELEASE ORAL at 08:10

## 2023-10-04 RX ADMIN — FAMOTIDINE 20 MG: 20 TABLET ORAL at 08:10

## 2023-10-04 RX ADMIN — PANCRELIPASE 2 CAPSULE: 24000; 76000; 120000 CAPSULE, DELAYED RELEASE PELLETS ORAL at 11:10

## 2023-10-04 RX ADMIN — ONDANSETRON HYDROCHLORIDE 4 MG: 2 SOLUTION INTRAMUSCULAR; INTRAVENOUS at 02:10

## 2023-10-04 RX ADMIN — PANCRELIPASE 2 CAPSULE: 24000; 76000; 120000 CAPSULE, DELAYED RELEASE PELLETS ORAL at 04:10

## 2023-10-04 RX ADMIN — Medication 400 MG: at 08:10

## 2023-10-04 RX ADMIN — ASPIRIN 81 MG: 81 TABLET, COATED ORAL at 08:10

## 2023-10-04 RX ADMIN — AMIODARONE HYDROCHLORIDE 200 MG: 200 TABLET ORAL at 08:10

## 2023-10-04 RX ADMIN — LISINOPRIL 20 MG: 10 TABLET ORAL at 08:10

## 2023-10-04 RX ADMIN — METFORMIN HYDROCHLORIDE 500 MG: 500 TABLET ORAL at 04:10

## 2023-10-04 NOTE — PT/OT/SLP PROGRESS
Physical Therapy Treatment Note           Name: Kortney Leach    : 1945 (77 y.o.)  MRN: [id number]           TREATMENT SUMMARY AND RECOMMENDATIONS:    PT Received On: 10/04/23  PT Start Time: 1430     PT Stop Time: 1500  PT Total Time (min): 30 min     Subjective Assessment:   No complaints  Lethargic   x Awake, alert, cooperative  Uncooperative    Agitated  c/o pain    Appropriate  c/o fatigue    Confused  Treated at bedside     Emotionally labile x Treated in gym/dept.    Impulsive x Other: nausea    Flat affect       Therapy Precautions:    Cognitive deficits  Spinal precautions    Collar - hard x Sternal precautions    Collar - soft   TLSO    Fall risk  LSO    Hip precautions - posterior  Knee immobilizer    Hip precautions - anterior  WBAT    Impaired communication  Partial weightbearing    Oxygen  TTWB    PEG tube  NWB    Visual deficits  Other:    Hearing deficits          Treatment Objectives:     Mobility Training:   Assist level Comments    Bed mobility     Transfer     Gait SBA X350ft using RW   Sit to stand transitions MIN A From wc level    Sitting balance     Standing balance      Wheelchair mobility     Car transfer     Other:          Therapeutic Exercise:   Exercise Sets Reps Comments   LE cycling   5'F/ 5'B   Hip flexion, hip ABD, LAQ, ankle PF/DF 1 20 Performed short sitting                    Additional Comments:      Assessment: Patient tolerated session well. Patient with continued complaints of nausea and new complaints of pressure in her chest and burping. Her nurse is aware. Educated pt about sitting up minimum 30 minutes following a meal vs laying down.    PT Plan: continue POC  Revisions made to plan of care: No    GOALS:   Multidisciplinary Problems       Physical Therapy Goals          Problem: Physical Therapy    Goal Priority Disciplines Outcome Goal Variances Interventions   Physical Therapy Goal     PT, PT/OT Ongoing, Progressing     Description: Goals to be  met by: Discharge  POC: Patient to be seen 5-6x/week QD/BID As tolerable     Patient will increase functional independence with mobility by performin. Supine to sit with Modified [place]  2. Sit to supine with Modified [place]  3. Sit to stand transfer with Modified [place]  4. Gait  x 350 feet with Modified [place] using Rolling Walker vs No AD vs LRAD.   5. Low Fall Risk on 5x sit to stand test                         Skilled PT Minutes Provided: 30 minutes   Communication with Treatment Team:     Equipment recommendations:       At end of treatment, patient remained:  x Up in chair     Up in wheelchair in room    In bed   x With alarm activated    Bed rails up   x Call bell in reach     Family/friends present    Restraints secured properly    In bathroom with CNA/RN notified    Nurse aware    In gym with therapist/tech    Other:

## 2023-10-04 NOTE — PT/OT/SLP PROGRESS
Occupational Therapy  Treatment    Name: Kortney Leach    : 1945 (77 y.o.)  MRN: [id number]           TREATMENT SUMMARY AND RECOMMENDATIONS:      OT Date of Treatment: 10/04/23  OT Start Time: 956  OT Stop Time:   OT Total Time (min): 29 min      Subjective Assessment:   No complaints  Lethargic   x Awake, alert, cooperative  Impulsive    Uncooperative   Flat affect    Agitated  c/o pain   x Appropriate  c/o fatigue    Confused  Treated at bedside     Emotionally labile x Treated in gym/dept.      Other:        Therapy Precautions:    Cognitive deficits  Spinal precautions    Collar - hard x Sternal precautions    Collar - soft   TLSO   x Fall risk  LSO    Hip precautions - posterior  Knee immobilizer    Hip precautions - anterior  WBAT    Impaired communication  Partial weightbearing    Oxygen  TTWB    PEG tube  NWB    Visual deficits      Hearing deficits   Other:        Treatment Objectives:     Mobility Training:    Mobility task Assist level Comments    Bed mobility     Transfer     Sit to stands transitions SBA WC<>standing using a RW and gait belt; min vc required to facilitate maintaining sternal pxns   Functional mobility SBA/CGA 200ft using a RW and gait belt; following with a WC   Sitting balance     Standing balance  SBA Static standing balance activity designed to address standing tolerance and activity tolerance to increase independence with self-care. Pt engaged in locating small beads placed in theraputty while standing using RW as needed. Pt is able to stand for 5 minutes.    Dynamic standing balance activity focusing on reaching outside of KEM to challenge her balance to increase independence with dressing and bathing. Pt noted with no LOB.    Other:        ADL Training:    ADL Assist level Comments    Feeding     Grooming/hygiene     Bathing     Upper body dressing     Lower body dressing     Toileting     Toilet transfer     Adaptive equipment training     Other:            Therapeutic Exercise:   Exercise Sets Reps Comments   UBE strengthening exercises 3 10 2# weighted dowel completing bicep curls, chest press, and straight arm raises                         Additional Comments:      Assessment: Patient tolerated session well. Pt is progressing well towards her goals. Pt continues to demonstrate an increase in her dynamic standing balance positively impacting her occupational performance. Pt would benefit from continued OT services to return to her baseline function, decrease risk for falls, and decrease caregiver burden.     GOALS:   Multidisciplinary Problems       Occupational Therapy Goals          Problem: Occupational Therapy    Goal Priority Disciplines Outcome Interventions   Occupational Therapy Goal     OT, PT/OT Ongoing, Progressing    Description: Goals to be met by: Discharge     Patient will increase functional independence with ADLs by performing:    UE Dressing with Set-up Assistance. GOAL MET 10/3/23  LE Dressing with Moderate Assistance. GOAL MET 10/3/23  Grooming while standing at sink with Stand-by Assistance. Goal met 10/2/23  Toileting from bedside commode with Stand-by Assistance for hygiene and clothing management. Goal met 10/2/23  Bathing from  shower chair/bench with Stand-by Assistance.  Toilet transfer to bedside commode with Contact Guard Assistance.     New Goals:     Pt will perform UB dressing requiring MI.   Pt will perform LB dressing requiring MI.   Pt will perform toileting requiring MI.   Pt will perform grooming while standing at the sink requiring MI.                          Recommendations:     Discharge Recommendations: home with home health, home health PT, home health OT  Discharge Equipment Recommendations:  none  Barriers to discharge:  Decreased caregiver support, Other (Comment) (Daughter works during the day)     Plan:     Patient to be seen 6 x/week to address the above listed problems via self-care/home management,  therapeutic activities, therapeutic exercises  Plan of Care Expires: 10/20/23  Plan of Care Reviewed with: patient  Revisions made to plan of care: No      Skilled OT Minutes Provided: 30  Communication with Treatment Team:     Equipment recommendations:       At end of treatment, patient remained:  x Up in chair     Up in wheelchair in room    In bed   x With alarm activated    Bed rails up   x Call bell in reach     Family/friends present    Restraints secured properly    In bathroom with CNA/RN notified    In gym with PT/PTA/tech    Nurse aware    Other:

## 2023-10-04 NOTE — PT/OT/SLP PROGRESS
Occupational Therapy  Treatment    Name: Kortney Leach    : 1945 (77 y.o.)  MRN: [id number]           TREATMENT SUMMARY AND RECOMMENDATIONS:      OT Date of Treatment: 10/04/23  OT Start Time: 1400  OT Stop Time: 1430  OT Total Time (min): 30 min      Subjective Assessment:   No complaints  Lethargic   x Awake, alert, cooperative  Impulsive    Uncooperative   Flat affect    Agitated  c/o pain   x Appropriate  c/o fatigue    Confused x Treated at bedside     Emotionally labile x Treated in gym/dept.     x Other: c/o of nausea and pressure at incision site       Therapy Precautions:    Cognitive deficits  Spinal precautions    Collar - hard x Sternal precautions    Collar - soft   TLSO   x Fall risk  LSO    Hip precautions - posterior  Knee immobilizer    Hip precautions - anterior  WBAT    Impaired communication  Partial weightbearing    Oxygen  TTWB    PEG tube  NWB    Visual deficits      Hearing deficits   Other:        Treatment Objectives:     Mobility Training:    Mobility task Assist level Comments    Bed mobility Min A Supine<sitting EOB; assist to lift trunk into upright position to maintain sternal pxns    Transfer SBA Stand pivot t/f from EOB to WC using a RW and gait belt    Sit to stands transitions Mod A EOB<standing using a RW and gait belt x2 attempts with min vc to rock and lean forward more to increase momentum    Functional mobility     Sitting balance     Standing balance      Other:        ADL Training:    ADL Assist level Comments    Feeding     Grooming/hygiene     Bathing     Upper body dressing     Lower body dressing     Toileting     Toilet transfer     Adaptive equipment training     Other:           Therapeutic Exercise:   Exercise Sets Reps Comments   UBE endurance training  2 5 minutes Ergometer level 1; forwards and backwards                         Additional Comments:  Pt c/o of nausea and pressure at incision site once sitting EOB. Nursing notified and administered  nausea medicine.     Assessment: Patient tolerated session fair. Pt's session limited d/t pt not feeling well and waiting on nursing to administer nausea medicine. Pt would benefit from continued OT services to regain PLOF, decrease risk for falls, and decrease caregiver burden.      GOALS:   Multidisciplinary Problems       Occupational Therapy Goals          Problem: Occupational Therapy    Goal Priority Disciplines Outcome Interventions   Occupational Therapy Goal     OT, PT/OT Ongoing, Progressing    Description: Goals to be met by: Discharge     Patient will increase functional independence with ADLs by performing:    UE Dressing with Set-up Assistance. GOAL MET 10/3/23  LE Dressing with Moderate Assistance. GOAL MET 10/3/23  Grooming while standing at sink with Stand-by Assistance. Goal met 10/2/23  Toileting from bedside commode with Stand-by Assistance for hygiene and clothing management. Goal met 10/2/23  Bathing from  shower chair/bench with Stand-by Assistance.  Toilet transfer to bedside commode with Contact Guard Assistance.     New Goals:     Pt will perform UB dressing requiring MI.   Pt will perform LB dressing requiring MI.   Pt will perform toileting requiring MI.   Pt will perform grooming while standing at the sink requiring MI.                          Recommendations:     Discharge Recommendations: home with home health, home health PT, home health OT  Discharge Equipment Recommendations:  none  Barriers to discharge:  Decreased caregiver support, Other (Comment) (Daughter works during the day)     Plan:     Patient to be seen 6 x/week to address the above listed problems via self-care/home management, therapeutic activities, therapeutic exercises  Plan of Care Expires: 10/20/23  Plan of Care Reviewed with: patient  Revisions made to plan of care: No      Skilled OT Minutes Provided: 30  Communication with Treatment Team:     Equipment recommendations:       At end of treatment, patient  remained:   Up in chair     Up in wheelchair in room    In bed    With alarm activated    Bed rails up    Call bell in reach     Family/friends present    Restraints secured properly    In bathroom with CNA/RN notified   x In gym with PT/PTA/tech    Nurse aware    Other:

## 2023-10-04 NOTE — PT/OT/SLP PROGRESS
Name: Kortney Leach    : 1945 (77 y.o.)  MRN: [id number]            Interdisciplinary Team Conference     Case conference held with patient/family and care team to discuss progress, plan of care, barriers to be addressed for safe return home, equipment recommendations, and discharge planning. Communicated therapy progress with MD, RN, therapy clinicians and case management. All questions/concerns answered.

## 2023-10-04 NOTE — PROGRESS NOTES
[facility]  [facility] MEDICINE ~ PROGRESS NOTE    CHIEF COMPLAINT   Hospital follow up    HOSPITAL COURSE   77-year-old female with a past medical history of hypertension, diabetes mellitus, COPD, ABEL (has home CPAP) who presents from [facility] after undergoing  sternotomy with a 25mm Mosaic Porcine Mitral Valve Placement as well as a Ligation of her SUKHI 9/20 with Dr. Rosales.  Postoperative clinical course was complicated by paroxysmal atrial fibrillation/rapid ventricular response/sick sinus syndrome and she underwent placement of for Dual Chamber PPM Implant ([employer]ey[employer]Servin) 9/25 with Dr. Moon.  She was started on amiodarone as well as Xarelto.  Patient is to remain taking aspirin.  She presents to our facility for rehabilitation after this critical clinical course.    Today  Therapy reports patient is ambulating standby assistance with a rolling.  She is contact guard/Min assistance for ADLs.  Patient lives at home alone as her daughter works during the daytime before she needs to reach modified independence level.  OBJECTIVE/PHYSICAL EXAM     VITAL SIGNS (MOST RECENT):  Temp: 98.5 °F (36.9 °C) (10/04/23 0719)  Pulse: 78 (10/04/23 0809)  Resp: 18 (10/04/23 0809)  BP: 119/71 (10/04/23 0719)  SpO2: 96 % (10/04/23 0809) VITAL SIGNS (24 HOUR RANGE):  Temp:  [98.4 °F (36.9 °C)-98.8 °F (37.1 °C)] 98.5 °F (36.9 °C)  Pulse:  [78-86] 78  Resp:  [18-20] 18  SpO2:  [96 %-98 %] 96 %  BP: (110-136)/(63-79) 119/71   GENERAL: In no acute distress, afebrile  HEENT:  PERRLA  CHEST: Clear to auscultation bilaterally  HEART: S1, S2, no appreciable murmur  ABDOMEN: Soft, nontender, BS +  MSK: Warm, no lower extremity edema, no clubbing or cyanosis  NEUROLOGIC: Alert and oriented x4, moving all extremities with good strength   INTEGUMENTARY:  Well-healing sternotomy scar and pacemaker scar  PSYCHIATRY:  Appropriate affect  ASSESSMENT/PLAN   Mitral regurgitation and stenosis  -status post  "sternotomy with a 25mm Mosaic Porcine Mitral Valve Placement as well as a Ligation of her SUKHI 9/20 with Dr. Rosales  -has follow-up with Dr. Rosales 3 weeks from discharge date  -she was discharged with 3 days of doxycycline, completed  -asa  -chest x-ray from 09/30 with no acute findings, pulmonary edema has resolved  -increase Norco to 7.5, provided relief      SSS  PAF/RVR  Junctional Rhythm  -s/p Dual Chamber PPM Implant ([employer] Petey/Servin) 9/25 with Dr. Moon  -loading with amiodarone, then continue daily dose   -Xarelto  -TSH 1.67  -we will need outpatient follow-up with Dr. Moon   -has follow-up with Dr. Uribe on 11/16 at 11:00 a.m.     COPD   ABEL   -continue to utilize home CPAP   No evidence of COPD exacerbation at this time  -continue with umeclidinium and fluticasone     Diabetes mellitus   -at home patient takes metformin, Farxiga, glimepiride  -she has been hypoglycemic at our facility therefore discontinued glimepiride and Farxiga and reduced her dose of metformin to 500 b.i.d.  -controlled with just metformin thus far  -A1c 6     Essential hypertension   -continue with lisinopril     DVT prophylaxis:  Xarelto for atrial fibrillation  Anticipated discharge and disposition:  Home with home health care  __________________________________________________________________________    LABS/MICRO/MEDS/DIAGNOSTICS       LABS  No results for input(s): "NA", "K", "CHLORIDE", "CO2", "BUN", "CREATININE", "GLUCOSE", "CALCIUM", "ALKPHOS", "AST", "ALT", "ALBUMIN" in the last 72 hours.    No results for input(s): "WBC", "RBC", "HCT", "MCV", "PLT" in the last 72 hours.    Invalid input(s): "HG"      MICROBIOLOGY  Microbiology Results (last 7 days)       ** No results found for the last 168 hours. **               MEDICATIONS   amiodarone  200 mg Oral Daily    aspirin  81 mg Oral Daily    atorvastatin  40 mg Oral Daily    azelastine  2 spray Nasal BID    docusate sodium  100 mg Oral BID    famotidine  20 mg Oral QHS    " fluticasone furoate-vilanteroL  1 puff Inhalation Daily    And    umeclidinium  1 capsule Inhalation Daily    folic acid  1 mg Oral Daily    lipase-protease-amylase 24,000-76,000-120,000 units  2 capsule Oral TID WM    lisinopriL  20 mg Oral Daily    magnesium oxide  400 mg Oral Daily    metFORMIN  500 mg Oral BID WM    pantoprazole  40 mg Oral Daily    rivaroxaban  20 mg Oral Daily with dinner    timolol maleate 0.5%  1 drop Both Eyes BID         INFUSIONS         DIAGNOSTIC TESTS  X-Ray Chest 1 View   Final Result      Resolved pulmonary edema seen on the prior exam with minimal residual bibasilar subsegmental atelectasis.         Electronically signed by: Sebastian Simons MD   Date:    10/01/2023   Time:    14:09           EF   Date Value Ref Range Status   12/22/2022 55 % Final          NUTRITION STATUS  Patient meets ASPEN criteria for   malnutrition of   per RD assessment as evidenced by:                       A minimum of two characteristics is recommended for diagnosis of either severe or non-severe malnutrition.       Case related differential diagnoses have been reviewed; assessment and plan has been documented. I have personally reviewed the labs and test results that are currently available; I have reviewed the patients medication list. I have reviewed the consulting providers recommendations. I have reviewed or attempted to review medical records based upon their availability.  All of the patient's and/or family's questions have been addressed and answered to the best of my ability.  I will continue to monitor closely and make adjustments to medical management as needed.  This document was created using M*Modal Fluency Direct.  Transcription errors may have been made.  Please contact me if any questions may rise regarding documentation to clarify transcription.        Thairy G Reyes, DO   Internal Medicine  Department of Hospital Medicine  [facility]

## 2023-10-04 NOTE — PT/OT/SLP PROGRESS
Name: Kortney Leach    : 1945 (77 y.o.)  MRN: [id number]            Interdisciplinary Team Conference     Case conference held with patient/family and care team to discuss progress, plan of care, barriers to be addressed for safe return home, equipment recommendations, and discharge planning. Communicated therapy progress with MD, RN, therapy clinicians and case management. All questions/concerns answered.

## 2023-10-04 NOTE — PT/OT/SLP PROGRESS
Physical Therapy Treatment Note           Name: Kortney Leach    : 1945 (77 y.o.)  MRN: [id number]           TREATMENT SUMMARY AND RECOMMENDATIONS:    PT Received On: 10/04/23  PT Start Time: 931     PT Stop Time: 955  PT Total Time (min): 24 min     Subjective Assessment:   No complaints  Lethargic   x Awake, alert, cooperative  Uncooperative    Agitated  c/o pain    Appropriate  c/o fatigue    Confused  Treated at bedside     Emotionally labile x Treated in gym/dept.    Impulsive  Other:    Flat affect       Therapy Precautions:    Cognitive deficits  Spinal precautions    Collar - hard x Sternal precautions    Collar - soft   TLSO    Fall risk  LSO    Hip precautions - posterior  Knee immobilizer    Hip precautions - anterior  WBAT    Impaired communication  Partial weightbearing    Oxygen  TTWB    PEG tube  NWB    Visual deficits  Other:    Hearing deficits          Treatment Objectives:     Mobility Training:   Assist level Comments    Bed mobility     Transfer     Gait SBA X350ft with RW   Sit to stand transitions SBA 2x 5 sit to stands while maintaining sternal pxns; emphasis on weight shifting anterior    Sitting balance     Standing balance      Wheelchair mobility     Car transfer     Other:          Therapeutic Exercise:   Exercise Sets Reps Comments   Hip flexion, hip ABD, LAQ, ankle PF/DF 1 20 Performed short sitting   2# ankle weights                         Additional Comments:      Assessment: Patient tolerated session well.    PT Plan: continue POC  Revisions made to plan of care: No    GOALS:   Multidisciplinary Problems       Physical Therapy Goals          Problem: Physical Therapy    Goal Priority Disciplines Outcome Goal Variances Interventions   Physical Therapy Goal     PT, PT/OT Ongoing, Progressing     Description: Goals to be met by: Discharge  POC: Patient to be seen 5-6x/week QD/BID As tolerable     Patient will increase functional independence with mobility by  performin. Supine to sit with Modified Charles  2. Sit to supine with Modified Charles  3. Sit to stand transfer with Modified Charles  4. Gait  x 350 feet with Modified Charles using Rolling Walker vs No AD vs LRAD.   5. Low Fall Risk on 5x sit to stand test                         Skilled PT Minutes Provided: 24 minutes   Communication with Treatment Team:     Equipment recommendations:       At end of treatment, patient remained:   Up in chair     Up in wheelchair in room    In bed    With alarm activated    Bed rails up    Call bell in reach     Family/friends present    Restraints secured properly    In bathroom with CNA/RN notified    Nurse aware   x In gym with therapist/tech    Other:

## 2023-10-04 NOTE — PLAN OF CARE
Weekly Staffing Report      Date Admitted: 9/29/2023 :   Staffing Date: 10/4/2023     Patient Active Problem List   Diagnosis    Chest pain    SSS (sick sinus syndrome)    Severe mitral valve regurgitation          Team Members Present:       Nursing Present     Yes [x]    No []    Physical Therapy Present    Yes [x]    No []    Occupational Therapy Present     Yes [x]    No []    Speech Therapy Present    Yes []    No []    NA [x]    Dietary Present    Yes [x]    No []        Physician Present   [] Leroy Miller    [x] Thairy Reyes    [] JAUNITA Petit    [] KRISTYN Salazar     [] MAGO Monroe      Family Present    [x] Adult Children    [] Spouse    [] POA    [] Friend/ Caregiver    [] Other       Interdisciplinary Meeting Notes:  PT- sternal precautions walking 350ft with RW SBA. Bed mobility/ standing Min A. Ot- CGA to min A for all self care tasks. Will work on strengthening. Appetite good. Medically- discussed that she does need assistance at this time and needs continued therapy until reaches Mod I goal as she is home alone during the day. All questions answered at this time                Please see Documented PT/OT/ST, Dietary, Nursing, and Physician notes for detailed treatment information.

## 2023-10-04 NOTE — PLAN OF CARE
Problem: Adult Inpatient Plan of Care  Goal: Patient-Specific Goal (Individualized)  Flowsheets (Taken 10/4/2023 1202)  Anxieties, Fears or Concerns: Maintain sternal precautions  Individualized Care Needs: Maintain sternal precautions, safe mobility, monitor blood glucose, monitor for s/s of infection     Problem: Fall Injury Risk  Goal: Absence of Fall and Fall-Related Injury  Outcome: Ongoing, Progressing  Intervention: Identify and Manage Contributors  Flowsheets (Taken 10/4/2023 1202)  Self-Care Promotion: independence encouraged  Medication Review/Management: medications reviewed  Intervention: Promote Injury-Free Environment  Flowsheets (Taken 10/4/2023 1202)  Safety Promotion/Fall Prevention:   bed alarm set   medications reviewed   nonskid shoes/socks when out of bed     Problem: Infection  Goal: Absence of Infection Signs and Symptoms  Intervention: Prevent or Manage Infection  Flowsheets (Taken 10/4/2023 1202)  Fever Reduction/Comfort Measures: lightweight bedding  Infection Management: aseptic technique maintained

## 2023-10-05 LAB
ANION GAP SERPL CALC-SCNC: 11 MEQ/L
BASOPHILS # BLD AUTO: 0.02 X10(3)/MCL
BASOPHILS NFR BLD AUTO: 0.3 %
BUN SERPL-MCNC: 14.6 MG/DL (ref 9.8–20.1)
CALCIUM SERPL-MCNC: 8.4 MG/DL (ref 8.4–10.2)
CHLORIDE SERPL-SCNC: 104 MMOL/L (ref 98–107)
CO2 SERPL-SCNC: 25 MMOL/L (ref 23–31)
CREAT SERPL-MCNC: 0.73 MG/DL (ref 0.55–1.02)
CREAT/UREA NIT SERPL: 20
EOSINOPHIL # BLD AUTO: 0.18 X10(3)/MCL (ref 0–0.9)
EOSINOPHIL NFR BLD AUTO: 2.5 %
ERYTHROCYTE [DISTWIDTH] IN BLOOD BY AUTOMATED COUNT: 14.7 % (ref 11.5–17)
GFR SERPLBLD CREATININE-BSD FMLA CKD-EPI: >60 MLS/MIN/1.73/M2
GLUCOSE SERPL-MCNC: 130 MG/DL (ref 82–115)
HCT VFR BLD AUTO: 37.2 % (ref 37–47)
HGB BLD-MCNC: 11.1 G/DL (ref 12–16)
IMM GRANULOCYTES # BLD AUTO: 0.02 X10(3)/MCL (ref 0–0.04)
IMM GRANULOCYTES NFR BLD AUTO: 0.3 %
LYMPHOCYTES # BLD AUTO: 2.03 X10(3)/MCL (ref 0.6–4.6)
LYMPHOCYTES NFR BLD AUTO: 28.5 %
MAGNESIUM SERPL-MCNC: 1.8 MG/DL (ref 1.6–2.6)
MCH RBC QN AUTO: 31.1 PG (ref 27–31)
MCHC RBC AUTO-ENTMCNC: 29.8 G/DL (ref 33–36)
MCV RBC AUTO: 104.2 FL (ref 80–94)
MONOCYTES # BLD AUTO: 0.91 X10(3)/MCL (ref 0.1–1.3)
MONOCYTES NFR BLD AUTO: 12.8 %
NEUTROPHILS # BLD AUTO: 3.96 X10(3)/MCL (ref 2.1–9.2)
NEUTROPHILS NFR BLD AUTO: 55.6 %
PLATELET # BLD AUTO: 246 X10(3)/MCL (ref 130–400)
PMV BLD AUTO: 10 FL (ref 7.4–10.4)
POTASSIUM SERPL-SCNC: 4.1 MMOL/L (ref 3.5–5.1)
RBC # BLD AUTO: 3.57 X10(6)/MCL (ref 4.2–5.4)
SODIUM SERPL-SCNC: 140 MMOL/L (ref 136–145)
WBC # SPEC AUTO: 7.12 X10(3)/MCL (ref 4.5–11.5)

## 2023-10-05 PROCEDURE — 63600175 PHARM REV CODE 636 W HCPCS: Performed by: STUDENT IN AN ORGANIZED HEALTH CARE EDUCATION/TRAINING PROGRAM

## 2023-10-05 PROCEDURE — 85025 COMPLETE CBC W/AUTO DIFF WBC: CPT | Performed by: STUDENT IN AN ORGANIZED HEALTH CARE EDUCATION/TRAINING PROGRAM

## 2023-10-05 PROCEDURE — 97530 THERAPEUTIC ACTIVITIES: CPT

## 2023-10-05 PROCEDURE — 97110 THERAPEUTIC EXERCISES: CPT

## 2023-10-05 PROCEDURE — 83735 ASSAY OF MAGNESIUM: CPT | Performed by: STUDENT IN AN ORGANIZED HEALTH CARE EDUCATION/TRAINING PROGRAM

## 2023-10-05 PROCEDURE — 25000003 PHARM REV CODE 250: Performed by: STUDENT IN AN ORGANIZED HEALTH CARE EDUCATION/TRAINING PROGRAM

## 2023-10-05 PROCEDURE — 80048 BASIC METABOLIC PNL TOTAL CA: CPT | Performed by: STUDENT IN AN ORGANIZED HEALTH CARE EDUCATION/TRAINING PROGRAM

## 2023-10-05 PROCEDURE — 99900035 HC TECH TIME PER 15 MIN (STAT)

## 2023-10-05 PROCEDURE — 94760 N-INVAS EAR/PLS OXIMETRY 1: CPT

## 2023-10-05 PROCEDURE — 11000004 HC SNF PRIVATE

## 2023-10-05 PROCEDURE — 97535 SELF CARE MNGMENT TRAINING: CPT

## 2023-10-05 PROCEDURE — 97116 GAIT TRAINING THERAPY: CPT

## 2023-10-05 RX ORDER — MAGNESIUM SULFATE HEPTAHYDRATE 40 MG/ML
2 INJECTION, SOLUTION INTRAVENOUS ONCE
Status: COMPLETED | OUTPATIENT
Start: 2023-10-05 | End: 2023-10-05

## 2023-10-05 RX ADMIN — AMIODARONE HYDROCHLORIDE 200 MG: 200 TABLET ORAL at 08:10

## 2023-10-05 RX ADMIN — PANCRELIPASE 2 CAPSULE: 24000; 76000; 120000 CAPSULE, DELAYED RELEASE PELLETS ORAL at 11:10

## 2023-10-05 RX ADMIN — AZELASTINE HYDROCHLORIDE 274 MCG: 137 SPRAY, METERED NASAL at 08:10

## 2023-10-05 RX ADMIN — FLUTICASONE FUROATE AND VILANTEROL TRIFENATATE 1 PUFF: 200; 25 POWDER RESPIRATORY (INHALATION) at 08:10

## 2023-10-05 RX ADMIN — UMECLIDINIUM 62.5 MCG: 62.5 AEROSOL, POWDER ORAL at 08:10

## 2023-10-05 RX ADMIN — LISINOPRIL 20 MG: 10 TABLET ORAL at 08:10

## 2023-10-05 RX ADMIN — PANCRELIPASE 2 CAPSULE: 24000; 76000; 120000 CAPSULE, DELAYED RELEASE PELLETS ORAL at 07:10

## 2023-10-05 RX ADMIN — ACETAMINOPHEN 650 MG: 325 TABLET ORAL at 08:10

## 2023-10-05 RX ADMIN — RIVAROXABAN 20 MG: 20 TABLET, FILM COATED ORAL at 04:10

## 2023-10-05 RX ADMIN — TIMOLOL MALEATE 1 DROP: 5 SOLUTION OPHTHALMIC at 08:10

## 2023-10-05 RX ADMIN — Medication 400 MG: at 08:10

## 2023-10-05 RX ADMIN — ACETAMINOPHEN 650 MG: 325 TABLET ORAL at 03:10

## 2023-10-05 RX ADMIN — PANCRELIPASE 2 CAPSULE: 24000; 76000; 120000 CAPSULE, DELAYED RELEASE PELLETS ORAL at 04:10

## 2023-10-05 RX ADMIN — METFORMIN HYDROCHLORIDE 500 MG: 500 TABLET ORAL at 07:10

## 2023-10-05 RX ADMIN — MAGNESIUM SULFATE IN WATER 2 G: 40 INJECTION, SOLUTION INTRAVENOUS at 08:10

## 2023-10-05 RX ADMIN — ATORVASTATIN CALCIUM 40 MG: 40 TABLET, FILM COATED ORAL at 08:10

## 2023-10-05 RX ADMIN — FAMOTIDINE 20 MG: 20 TABLET ORAL at 08:10

## 2023-10-05 RX ADMIN — DOCUSATE SODIUM 100 MG: 100 CAPSULE, LIQUID FILLED ORAL at 08:10

## 2023-10-05 RX ADMIN — FOLIC ACID 1 MG: 1 TABLET ORAL at 08:10

## 2023-10-05 RX ADMIN — ASPIRIN 81 MG: 81 TABLET, COATED ORAL at 08:10

## 2023-10-05 RX ADMIN — PANTOPRAZOLE SODIUM 40 MG: 40 TABLET, DELAYED RELEASE ORAL at 08:10

## 2023-10-05 RX ADMIN — HYDROCODONE BITARTRATE AND ACETAMINOPHEN 1 TABLET: 7.5; 325 TABLET ORAL at 08:10

## 2023-10-05 RX ADMIN — METFORMIN HYDROCHLORIDE 500 MG: 500 TABLET ORAL at 04:10

## 2023-10-05 NOTE — PLAN OF CARE
[facility]  Discharge Reassessment    Primary Care Provider: Arnaldo Hernandez MD    Expected Discharge Date:     Reassessment (most recent)       Discharge Reassessment - 10/05/23 0743          Discharge Reassessment    Assessment Type Discharge Planning Reassessment     Did the patient's condition or plan change since previous assessment? No     Discharge Plan discussed with: Adult children     Communicated SD with patient/caregiver Date not available/Unable to determine     Discharge Plan A Home with family;Home Health     DME Needed Upon Discharge  none     Transition of Care Barriers None     Why the patient remains in the hospital Requires continued medical care        Post-Acute Status    Post-Acute Authorization Home Health     Home Health Status Pending medical clearance/testing     Hospital Resources/Appts/Education Provided Provided patient/caregiver with written discharge plan information     Discharge Delays None known at this time

## 2023-10-05 NOTE — PROGRESS NOTES
Sternal Precautions    About this topic   Sternal precautions are steps to take to help you heal after open heart surgery. During this surgery, your breastbone is cut in half. Once surgery is over, the breastbone is wired back in place. As you heal, you must protect the wound and breastbone. This will help to avoid movement that could cause tension on the wires or let the breastbone reopen. You should follow this care for 8 to 12 weeks after your surgery.    What care is needed at home?   Ask your doctor what you need to do when you go home. Make sure you ask questions if you do not understand what the doctor says. This way you will know what you need to do.  Take care of the wound.  Be sure to wash your hands before touching your wound or dressing.  Avoid using lotions or creams on your cut site for a couple of weeks after surgery.  Hug a pillow to your chest when you cough or sneeze to help with pain.  Stop what you are doing if you feel any pulling on your breastbone.  Move with care.  To stand from a sitting position, scoot forward using your shoulders to push against the back of the chair. Use your leg muscles to stand. Only use your arms for balance.  To move from a lying down position, first roll onto your side. Then, let your legs hang over the side of the bed. Use your elbows and stomach muscles to push yourself up.  Avoid actions that make you do any deep bending.  Avoid actions that put pressure on your breastbone.  Be careful using your arms.  Limit your pushing and pulling. Do not let anyone pull on your arms when they are helping you.  Avoid raising your arms or reaching for items above your head.  Try not to twist your body or reach your arms around your back.  Limit your activity.  Talk to your doctor about when you may go back to normal activity, sports, driving, and sex.  Avoid lifting items that weigh more than 10 pounds (4.5 kg). A gallon of milk weighs a little over 8 pounds (4 kg).  Limit  your movement during the day.  Do not bowl, swim, golf, or play tennis for at least 12 weeks after your surgery.  Do not use workout machines that need arm use unless your doctor has said it is OK.  Avoid sitting in a seat that is behind an airbag for at least 4 weeks after your surgery.  Avoid holding your breath during any activity.  You may have been enrolled in a postoperative physical therapy class or session. Your doctor and physical therapist will tell you what you are allowed to do while you are healing.  Ask for help when you need it!    What follow-up care is needed?   Your doctor will ask you to make visits to the office to check on your progress. Be sure to keep these visits.    When do I need to call the doctor?   Signs of infection. These include a fever of 100.4°F (38°C) or higher, chills, wound that will not heal, pain.  Signs of wound infection. These include swelling, redness, warmth around the wound; too much pain when touched; yellowish, greenish, or bloody discharge; foul smell coming from the cut site; cut site opens up.  You hear a clicking or cracking noise along your breastbone  Any fall or chest injury shortly after surgery  Pain in your legs or chest with trouble breathing  Feelings of irregular heartbeat, or if your heartbeat is very fast or very slow  Cough that gets worse          Copyright   Copyright © 2021 [employer] and its affiliates and/or licensors. All rights reserved.

## 2023-10-05 NOTE — PROGRESS NOTES
[facility]  [facility] MEDICINE ~ PROGRESS NOTE    CHIEF COMPLAINT   Hospital follow up    HOSPITAL COURSE   77-year-old female with a past medical history of hypertension, diabetes mellitus, COPD, ABEL (has home CPAP) who presents from [facility] after undergoing  sternotomy with a 25mm Mosaic Porcine Mitral Valve Placement as well as a Ligation of her SUKHI 9/20 with Dr. Rosales.  Postoperative clinical course was complicated by paroxysmal atrial fibrillation/rapid ventricular response/sick sinus syndrome and she underwent placement of for Dual Chamber PPM Implant ([employer]Servin) 9/25 with Dr. Moon.  She was started on amiodarone as well as Xarelto.  Patient is to remain taking aspirin.  She presents to our facility for rehabilitation after this critical clinical course.    Today  Ambulating 300 ft with a rolling walker and standby assistance.  DC planning for next week.  OBJECTIVE/PHYSICAL EXAM     VITAL SIGNS (MOST RECENT):  Temp: 97.9 °F (36.6 °C) (10/05/23 0712)  Pulse: 75 (10/05/23 0845)  Resp: 18 (10/05/23 0845)  BP: 134/75 (10/05/23 0712)  SpO2: 96 % (10/05/23 0712) VITAL SIGNS (24 HOUR RANGE):  Temp:  [97.9 °F (36.6 °C)-98.5 °F (36.9 °C)] 97.9 °F (36.6 °C)  Pulse:  [75-77] 75  Resp:  [18] 18  SpO2:  [94 %-96 %] 96 %  BP: (134)/(75-76) 134/75   GENERAL: In no acute distress, afebrile  HEENT:  PERRLA  CHEST: Clear to auscultation bilaterally  HEART: S1, S2, no appreciable murmur  ABDOMEN: Soft, nontender, BS +  MSK: Warm, no lower extremity edema, no clubbing or cyanosis  NEUROLOGIC: Alert and oriented x4, moving all extremities with good strength   INTEGUMENTARY:  Well-healing sternotomy scar and pacemaker scar  PSYCHIATRY:  Appropriate affect  ASSESSMENT/PLAN   Mitral regurgitation and stenosis  -status post sternotomy with a 25mm Mosaic Porcine Mitral Valve Placement as well as a Ligation of her SUKHI 9/20 with Dr. Rosales  -has follow-up with Dr. Rosales 3 weeks from  discharge date  -she was discharged with 3 days of doxycycline, completed  -asa  -chest x-ray from 09/30 with no acute findings, pulmonary edema has resolved  -increase Norco to 7.5, provided relief      SSS  PAF/RVR  Junctional Rhythm  -s/p Dual Chamber PPM Implant ([employer] Petey/Servin) 9/25 with Dr. Moon  -loading with amiodarone, then continue daily dose   -Xarelto  -TSH 1.67  -we will need outpatient follow-up with Dr. Moon   -has follow-up with Dr. Uribe on 11/16 at 11:00 a.m.     COPD   ABEL   -continue to utilize home CPAP   No evidence of COPD exacerbation at this time  -continue with umeclidinium and fluticasone     Diabetes mellitus   -at home patient takes metformin, Farxiga, glimepiride  -she has been hypoglycemic at our facility therefore discontinued glimepiride and Farxiga and reduced her dose of metformin to 500 b.i.d.  -controlled with just metformin thus far  -A1c 6     Essential hypertension   -continue with lisinopril     DVT prophylaxis:  Xarelto for atrial fibrillation  Anticipated discharge and disposition:  Home with home health care  __________________________________________________________________________    LABS/MICRO/MEDS/DIAGNOSTICS       LABS  Recent Labs     10/05/23  0326      K 4.1   CHLORIDE 104   CO2 25   BUN 14.6   CREATININE 0.73   GLUCOSE 130*   CALCIUM 8.4       Recent Labs     10/05/23  0326   WBC 7.12   RBC 3.57*   HCT 37.2   .2*            MICROBIOLOGY  Microbiology Results (last 7 days)       ** No results found for the last 168 hours. **               MEDICATIONS   amiodarone  200 mg Oral Daily    aspirin  81 mg Oral Daily    atorvastatin  40 mg Oral Daily    azelastine  2 spray Nasal BID    docusate sodium  100 mg Oral BID    famotidine  20 mg Oral QHS    fluticasone furoate-vilanteroL  1 puff Inhalation Daily    And    umeclidinium  1 capsule Inhalation Daily    folic acid  1 mg Oral Daily    lipase-protease-amylase 24,000-76,000-120,000 units  2 capsule  Oral TID WM    lisinopriL  20 mg Oral Daily    magnesium oxide  400 mg Oral Daily    magnesium sulfate IVPB  2 g Intravenous Once    metFORMIN  500 mg Oral BID WM    pantoprazole  40 mg Oral Daily    rivaroxaban  20 mg Oral Daily with dinner    timolol maleate 0.5%  1 drop Both Eyes BID         INFUSIONS         DIAGNOSTIC TESTS  X-Ray Chest 1 View   Final Result      Resolved pulmonary edema seen on the prior exam with minimal residual bibasilar subsegmental atelectasis.         Electronically signed by: Sebastian Simons MD   Date:    10/01/2023   Time:    14:09           EF   Date Value Ref Range Status   12/22/2022 55 % Final          NUTRITION STATUS  Patient meets ASPEN criteria for   malnutrition of   per RD assessment as evidenced by:                       A minimum of two characteristics is recommended for diagnosis of either severe or non-severe malnutrition.       Case related differential diagnoses have been reviewed; assessment and plan has been documented. I have personally reviewed the labs and test results that are currently available; I have reviewed the patients medication list. I have reviewed the consulting providers recommendations. I have reviewed or attempted to review medical records based upon their availability.  All of the patient's and/or family's questions have been addressed and answered to the best of my ability.  I will continue to monitor closely and make adjustments to medical management as needed.  This document was created using M*Modal Fluency Direct.  Transcription errors may have been made.  Please contact me if any questions may rise regarding documentation to clarify transcription.        Thairy G Reyes,    Internal Medicine  Department of Hospital Medicine  [facility] - [facility]

## 2023-10-05 NOTE — PT/OT/SLP PROGRESS
Occupational Therapy  Treatment    Name: Kortney Leach    : 1945 (77 y.o.)  MRN: [id number]           TREATMENT SUMMARY AND RECOMMENDATIONS:      OT Date of Treatment: 10/05/23  OT Start Time: 1335  OT Stop Time: 1400  OT Total Time (min): 25 min      Subjective Assessment:   No complaints  Lethargic   x Awake, alert, cooperative  Impulsive    Uncooperative   Flat affect    Agitated  c/o pain   x Appropriate  c/o fatigue    Confused x Treated at bedside     Emotionally labile x Treated in gym/dept.      Other:        Therapy Precautions:    Cognitive deficits  Spinal precautions    Collar - hard x Sternal precautions    Collar - soft   TLSO   x Fall risk  LSO    Hip precautions - posterior  Knee immobilizer    Hip precautions - anterior  WBAT    Impaired communication  Partial weightbearing    Oxygen  TTWB    PEG tube  NWB    Visual deficits      Hearing deficits   Other:        Treatment Objectives:     Mobility Training:    Mobility task Assist level Comments    Bed mobility CGA-MIN A Supine<sitting EOB; min vc to not push her trunk up d/t to precautions   Transfer     Sit to stands transitions CGA EOB<standing using a RW and gait belt   Functional mobility CGA 400ft using a RW and gait belt; following with a WC   Sitting balance     Standing balance      Other:        ADL Training:    ADL Assist level Comments    Feeding     Grooming/hygiene     Bathing     Upper body dressing Set-up Don robe sitting EOB   Lower body dressing     Toileting     Toilet transfer     Adaptive equipment training     Other:           Therapeutic Exercise:   Exercise Sets Reps Comments   UBE endurance training 2 5 minutes Ergometer level 1; forwards and backwards                         Additional Comments:      Assessment: Patient tolerated session well.    GOALS:   Multidisciplinary Problems       Occupational Therapy Goals          Problem: Occupational Therapy    Goal Priority Disciplines Outcome Interventions    Occupational Therapy Goal     OT, PT/OT Ongoing, Progressing    Description: Goals to be met by: Discharge     Patient will increase functional independence with ADLs by performing:    UE Dressing with Set-up Assistance. GOAL MET 10/3/23  LE Dressing with Moderate Assistance. GOAL MET 10/3/23  Grooming while standing at sink with Stand-by Assistance. Goal met 10/2/23  Toileting from bedside commode with Stand-by Assistance for hygiene and clothing management. Goal met 10/2/23  Bathing from  shower chair/bench with Stand-by Assistance.  Toilet transfer to bedside commode with Contact Guard Assistance.     New Goals:     Pt will perform UB dressing requiring MI.   Pt will perform LB dressing requiring MI.   Pt will perform toileting requiring MI.   Pt will perform grooming while standing at the sink requiring MI.                          Recommendations:     Discharge Recommendations: home with home health, home health PT, home health OT  Discharge Equipment Recommendations:  none  Barriers to discharge:  Decreased caregiver support, Other (Comment) (Daughter works during the day)     Plan:     Patient to be seen 6 x/week to address the above listed problems via self-care/home management, therapeutic activities, therapeutic exercises  Plan of Care Expires: 10/20/23  Plan of Care Reviewed with: patient  Revisions made to plan of care: No      Skilled OT Minutes Provided: 30  Communication with Treatment Team:     Equipment recommendations:       At end of treatment, patient remained:   Up in chair     Up in wheelchair in room    In bed    With alarm activated    Bed rails up    Call bell in reach     Family/friends present    Restraints secured properly    In bathroom with CNA/RN notified   x In gym with PT/PTA/tech    Nurse aware    Other:

## 2023-10-05 NOTE — PT/OT/SLP PROGRESS
Occupational Therapy  Treatment    Name: Kortney Leach    : 1945 (77 y.o.)  MRN: [id number]           TREATMENT SUMMARY AND RECOMMENDATIONS:      OT Date of Treatment: 10/05/23  OT Start Time: 1000  OT Stop Time: 1030  OT Total Time (min): 30 min      Subjective Assessment:   No complaints  Lethargic   x Awake, alert, cooperative  Impulsive    Uncooperative   Flat affect    Agitated  c/o pain   x Appropriate  c/o fatigue    Confused  Treated at bedside     Emotionally labile x Treated in gym/dept.      Other:        Therapy Precautions:    Cognitive deficits  Spinal precautions    Collar - hard x Sternal precautions    Collar - soft   TLSO   x Fall risk  LSO    Hip precautions - posterior  Knee immobilizer    Hip precautions - anterior  WBAT    Impaired communication  Partial weightbearing    Oxygen  TTWB    PEG tube  NWB    Visual deficits      Hearing deficits   Other:        Treatment Objectives:     Mobility Training:    Mobility task Assist level Comments    Bed mobility     Transfer     Sit to stands transitions Min A Recliner<standing using a RW and gait belt   [id number] from WC<>standing; min vc to maintain sternal pxns    Functional mobility     Sitting balance     Standing balance      Other:        ADL Training:    ADL Assist level Comments    Feeding     Grooming/hygiene     Bathing     Upper body dressing     Lower body dressing     Toileting     Toilet transfer     Adaptive equipment training     Other:           Therapeutic Exercise:   Exercise Sets Reps Comments   UBE strengthening exercises 3 10 2# strengthening exercises performing bicep curls, chest press, and straight arm raises                         Additional Comments:      Assessment: Patient tolerated session well. Pt demonstrate an increase in activity tolerance as compared to evaluation. Pt would benefit from continued OT services to increase independence with occupational performance, decrease risk for falls, and decrease  caregiver burden.     GOALS:   Multidisciplinary Problems       Occupational Therapy Goals          Problem: Occupational Therapy    Goal Priority Disciplines Outcome Interventions   Occupational Therapy Goal     OT, PT/OT Ongoing, Progressing    Description: Goals to be met by: Discharge     Patient will increase functional independence with ADLs by performing:    UE Dressing with Set-up Assistance. GOAL MET 10/3/23  LE Dressing with Moderate Assistance. GOAL MET 10/3/23  Grooming while standing at sink with Stand-by Assistance. Goal met 10/2/23  Toileting from bedside commode with Stand-by Assistance for hygiene and clothing management. Goal met 10/2/23  Bathing from  shower chair/bench with Stand-by Assistance.  Toilet transfer to bedside commode with Contact Guard Assistance.     New Goals:     Pt will perform UB dressing requiring MI.   Pt will perform LB dressing requiring MI.   Pt will perform toileting requiring MI.   Pt will perform grooming while standing at the sink requiring MI.                          Recommendations:     Discharge Recommendations: home with home health, home health PT, home health OT  Discharge Equipment Recommendations:  none  Barriers to discharge:  Decreased caregiver support, Other (Comment) (Daughter works during the day)     Plan:     Patient to be seen 6 x/week to address the above listed problems via self-care/home management, therapeutic activities, therapeutic exercises  Plan of Care Expires: 10/20/23  Plan of Care Reviewed with: patient  Revisions made to plan of care: No      Skilled OT Minutes Provided: 30  Communication with Treatment Team:     Equipment recommendations:       At end of treatment, patient remained:   Up in chair     Up in wheelchair in room    In bed    With alarm activated    Bed rails up    Call bell in reach     Family/friends present    Restraints secured properly    In bathroom with CNA/RN notified   x In gym with PT/PTA/tech    Nurse aware     Other:

## 2023-10-05 NOTE — PLAN OF CARE
Problem: Adult Inpatient Plan of Care  Goal: Plan of Care Review  Outcome: Ongoing, Progressing  Flowsheets (Taken 10/4/2023 1919)  Plan of Care Reviewed With: patient  Goal: Patient-Specific Goal (Individualized)  Outcome: Ongoing, Progressing  Flowsheets (Taken 10/4/2023 1919)  Anxieties, Fears or Concerns: None currently verbalized. Pt denies pain at this time. Has CPAP on  Individualized Care Needs: Maintain sternal precuations, ACHS CBG, Monitor pacemaker site for redness and inflammation  Goal: Absence of Hospital-Acquired Illness or Injury  Outcome: Ongoing, Progressing  Intervention: Identify and Manage Fall Risk  Flowsheets (Taken 10/4/2023 1919)  Safety Promotion/Fall Prevention:   assistive device/personal item within reach   bed alarm set   lighting adjusted   medications reviewed   muscle strengthening facilitated   nonskid shoes/socks when out of bed   room near unit station   instructed to call staff for mobility  Intervention: Prevent Skin Injury  Flowsheets (Taken 10/4/2023 1919)  Body Position: sitting up in bed  Skin Protection:   adhesive use limited   incontinence pads utilized   skin-to-device areas padded   tubing/devices free from skin contact   transparent dressing maintained  Intervention: Prevent and Manage VTE (Venous Thromboembolism) Risk  Flowsheets (Taken 10/4/2023 1919)  Activity Management:   Arm raise - L1   Rolling - L1  VTE Prevention/Management:   bleeding risk assessed   bleeding precations maintained   ambulation promoted   ROM (active) performed   fluids promoted  Range of Motion: ROM (range of motion) performed  Intervention: Prevent Infection  Flowsheets (Taken 10/4/2023 1919)  Infection Prevention:   cohorting utilized   hand hygiene promoted   single patient room provided  Goal: Optimal Comfort and Wellbeing  Outcome: Ongoing, Progressing  Intervention: Monitor Pain and Promote Comfort  Flowsheets (Taken 10/4/2023 1919)  Pain Management Interventions:   diversional  activity provided   pillow support provided   position adjusted   relaxation techniques promoted   prescribed exercises encouraged  Intervention: Provide Person-Centered Care  Flowsheets (Taken 10/4/2023 1919)  Trust Relationship/Rapport:   care explained   choices provided   empathic listening provided   thoughts/feelings acknowledged     Problem: Fall Injury Risk  Goal: Absence of Fall and Fall-Related Injury  Outcome: Ongoing, Progressing  Intervention: Identify and Manage Contributors  Flowsheets (Taken 10/4/2023 1919)  Self-Care Promotion:   independence encouraged   BADL personal objects within reach   safe use of adaptive equipment encouraged  Medication Review/Management: medications reviewed  Intervention: Promote Injury-Free Environment  Flowsheets (Taken 10/4/2023 1919)  Safety Promotion/Fall Prevention:   assistive device/personal item within reach   bed alarm set   lighting adjusted   medications reviewed   muscle strengthening facilitated   nonskid shoes/socks when out of bed   room near unit station   instructed to call staff for mobility     Problem: Infection  Goal: Absence of Infection Signs and Symptoms  Outcome: Ongoing, Progressing  Intervention: Prevent or Manage Infection  Flowsheets (Taken 10/4/2023 1919)  Fever Reduction/Comfort Measures:   lightweight bedding   lightweight clothing  Infection Management: aseptic technique maintained  Isolation Precautions:   protective   precautions maintained     Problem: Skin Injury Risk Increased  Goal: Skin Health and Integrity  Outcome: Ongoing, Progressing  Intervention: Optimize Skin Protection  Flowsheets (Taken 10/4/2023 1919)  Pressure Reduction Techniques:   frequent weight shift encouraged   pressure points protected   weight shift assistance provided  Pressure Reduction Devices: positioning supports utilized  Skin Protection:   adhesive use limited   incontinence pads utilized   skin-to-device areas padded   tubing/devices free from skin  contact   transparent dressing maintained  Head of Bed (HOB) Positioning: HOB at 30 degrees  Intervention: Promote and Optimize Oral Intake  Flowsheets (Taken 10/4/2023 1919)  Oral Nutrition Promotion:   calorie-dense liquids provided   physical activity promoted   calorie-dense foods provided   social interaction promoted     Problem: Gas Exchange Impaired  Goal: Optimal Gas Exchange  Outcome: Ongoing, Progressing  Intervention: Optimize Oxygenation and Ventilation  Flowsheets (Taken 10/4/2023 1919)  Airway/Ventilation Management:   airway patency maintained   pulmonary hygiene promoted  Head of Bed (HOB) Positioning: HOB at 30 degrees     Problem: Pain Acute  Goal: Acceptable Pain Control and Functional Ability  Outcome: Ongoing, Progressing  Intervention: Develop Pain Management Plan  Flowsheets (Taken 10/4/2023 1919)  Pain Management Interventions:   diversional activity provided   pillow support provided   position adjusted   relaxation techniques promoted   prescribed exercises encouraged  Intervention: Prevent or Manage Pain  Flowsheets (Taken 10/4/2023 1919)  Sleep/Rest Enhancement:   consistent schedule promoted   relaxation techniques promoted  Sensory Stimulation Regulation: quiet environment promoted  Bowel Elimination Promotion: adequate fluid intake promoted  Medication Review/Management: medications reviewed  Intervention: Optimize Psychosocial Wellbeing  Flowsheets (Taken 10/4/2023 1919)  Spiritual Activities Assistance: affirmation provided  Supportive Measures:   active listening utilized   goal-setting facilitated   verbalization of feelings encouraged  Diversional Activities: television     Problem: Device-Related Complication Risk (Cardiac Rhythm Management Device)  Goal: Effective Device Function  Outcome: Ongoing, Progressing     Problem: Infection (Cardiac Rhythm Management Device)  Goal: Absence of Infection Signs and Symptoms  Outcome: Ongoing, Progressing  Intervention: Prevent or Manage  Infection  Flowsheets (Taken 10/4/2023 1919)  Fever Reduction/Comfort Measures:   lightweight bedding   lightweight clothing  Infection Management: aseptic technique maintained     Problem: Pain (Cardiac Rhythm Management Device)  Goal: Acceptable Pain Level  Outcome: Ongoing, Progressing  Intervention: Prevent or Manage Pain  Flowsheets (Taken 10/4/2023 1919)  Pain Management Interventions:   diversional activity provided   pillow support provided   position adjusted   relaxation techniques promoted   prescribed exercises encouraged

## 2023-10-05 NOTE — PT/OT/SLP PROGRESS
Physical Therapy Treatment Note           Name: Kortney Leach    : 1945 (77 y.o.)  MRN: [id number]           TREATMENT SUMMARY AND RECOMMENDATIONS:    PT Received On: 10/05/23  PT Start Time: 1033     PT Stop Time: 1108  PT Total Time (min): 35 min     Subjective Assessment:   No complaints  Lethargic   x Awake, alert, cooperative  Uncooperative    Agitated  c/o pain    Appropriate  c/o fatigue    Confused  Treated at bedside     Emotionally labile x Treated in gym/dept.    Impulsive  Other:    Flat affect       Therapy Precautions:    Cognitive deficits  Spinal precautions    Collar - hard x Sternal precautions    Collar - soft   TLSO    Fall risk  LSO    Hip precautions - posterior  Knee immobilizer    Hip precautions - anterior  WBAT    Impaired communication  Partial weightbearing    Oxygen  TTWB    PEG tube  NWB    Visual deficits  Other:    Hearing deficits          Treatment Objectives:     Mobility Training:   Assist level Comments    Bed mobility     Transfer     Gait SBA X375ft using RW including ascending ramp   Sit to stand transitions CGA From wc level x2 attempts to stand   Sitting balance     Standing balance      Wheelchair mobility     Car transfer     Other:          Therapeutic Exercise:   Exercise Sets Reps Comments   Hip flexion, hip ABD, LAQ, ankle PF/DF 1 20 Performed short sitting  2# ankle weights   LE cycling   5'F/ 5'B                   Additional Comments:      Assessment: Patient tolerated session well. Printed out sternal precautions education for patient to look over. There are times when patient demonstrates poor compliance to her sternal precautions and requires verbal cueing. She will benefit from continued education on sternal precautions and skilled PT services to increase functional strength and independence.    PT Plan: continue POC  Revisions made to plan of care: No    GOALS:   Multidisciplinary Problems       Physical Therapy Goals          Problem:  Physical Therapy    Goal Priority Disciplines Outcome Goal Variances Interventions   Physical Therapy Goal     PT, PT/OT Ongoing, Progressing     Description: Goals to be met by: Discharge  POC: Patient to be seen 5-6x/week QD/BID As tolerable     Patient will increase functional independence with mobility by performin. Supine to sit with Modified [place]  2. Sit to supine with Modified [place]  3. Sit to stand transfer with Modified [place]  4. Gait  x 350 feet with Modified [place] using Rolling Walker vs No AD vs LRAD.   5. Low Fall Risk on 5x sit to stand test                         Skilled PT Minutes Provided: 35 minutes   Communication with Treatment Team:     Equipment recommendations:       At end of treatment, patient remained:  x Up in chair     Up in wheelchair in room    In bed   x With alarm activated    Bed rails up   x Call bell in reach     Family/friends present    Restraints secured properly    In bathroom with CNA/RN notified   x Nurse aware    In gym with therapist/tech    Other:

## 2023-10-05 NOTE — PT/OT/SLP PROGRESS
Physical Therapy Treatment Note           Name: Kortney Leach    : 1945 (77 y.o.)  MRN: [id number]           TREATMENT SUMMARY AND RECOMMENDATIONS:    PT Received On: 10/05/23  PT Start Time: 1400     PT Stop Time: 1440  PT Total Time (min): 40 min     Subjective Assessment:   No complaints  Lethargic   x Awake, alert, cooperative  Uncooperative    Agitated  c/o pain    Appropriate  c/o fatigue    Confused  Treated at bedside     Emotionally labile x Treated in gym/dept.    Impulsive  Other:    Flat affect       Therapy Precautions:    Cognitive deficits  Spinal precautions    Collar - hard x Sternal precautions    Collar - soft   TLSO    Fall risk  LSO    Hip precautions - posterior  Knee immobilizer    Hip precautions - anterior  WBAT    Impaired communication  Partial weightbearing    Oxygen  TTWB    PEG tube  NWB    Visual deficits  Other:    Hearing deficits          Treatment Objectives:     Mobility Training:   Assist level Comments    Bed mobility     Transfer     Gait MOD I X375ft using RW   Sit to stand transitions SBA From standard chair level    Sitting balance     Standing balance      Wheelchair mobility     Car transfer     Other:          Therapeutic Exercise:   Exercise Sets Reps Comments   LE cycling   6'F/ 6'B   Hip flexion, hip ABD, LAQ, ankle PF/DF 1 30 Performed short sitting                    Additional Comments:      Assessment: Patient tolerated session well. Patient reports feeling more confident with mobility, however is not ready to transition to ambulating with a cane or without AD.     PT Plan: continue POC  Revisions made to plan of care: No    GOALS:   Multidisciplinary Problems       Physical Therapy Goals          Problem: Physical Therapy    Goal Priority Disciplines Outcome Goal Variances Interventions   Physical Therapy Goal     PT, PT/OT Ongoing, Progressing     Description: Goals to be met by: Discharge  POC: Patient to be seen 5-6x/week QD/BID As  tolerable     Patient will increase functional independence with mobility by performin. Supine to sit with Modified [place]  2. Sit to supine with Modified [place]  3. Sit to stand transfer with Modified [place]  4. Gait  x 350 feet with Modified [place] using Rolling Walker vs No AD vs LRAD.   5. Low Fall Risk on 5x sit to stand test                         Skilled PT Minutes Provided: 40 minutes   Communication with Treatment Team:     Equipment recommendations:       At end of treatment, patient remained:  x Up in chair     Up in wheelchair in room    In bed   x With alarm activated    Bed rails up   x Call bell in reach     Family/friends present    Restraints secured properly    In bathroom with CNA/RN notified    Nurse aware    In gym with therapist/tech    Other:

## 2023-10-06 PROBLEM — J44.9 COPD (CHRONIC OBSTRUCTIVE PULMONARY DISEASE): Status: ACTIVE | Noted: 2023-10-06

## 2023-10-06 PROBLEM — Z02.9 DISCHARGE PLANNING ISSUES: Status: ACTIVE | Noted: 2023-10-06

## 2023-10-06 PROBLEM — E83.42 HYPOMAGNESEMIA: Status: ACTIVE | Noted: 2023-10-06

## 2023-10-06 PROBLEM — G47.33 OBSTRUCTIVE SLEEP APNEA: Status: ACTIVE | Noted: 2023-10-06

## 2023-10-06 PROBLEM — Z95.2 STATUS POST MITRAL VALVE REPLACEMENT: Status: ACTIVE | Noted: 2023-10-06

## 2023-10-06 PROBLEM — R53.81 PHYSICAL DECONDITIONING: Status: ACTIVE | Noted: 2023-10-06

## 2023-10-06 PROBLEM — I48.91 ATRIAL FIBRILLATION WITH RVR: Status: ACTIVE | Noted: 2023-10-06

## 2023-10-06 PROBLEM — I10 HYPERTENSION: Status: ACTIVE | Noted: 2023-10-06

## 2023-10-06 PROBLEM — E11.9 TYPE II DIABETES MELLITUS: Status: ACTIVE | Noted: 2023-10-06

## 2023-10-06 PROBLEM — Z86.718 HISTORY OF DVT (DEEP VEIN THROMBOSIS): Status: ACTIVE | Noted: 2023-10-06

## 2023-10-06 PROBLEM — H40.9 GLAUCOMA: Status: ACTIVE | Noted: 2023-10-06

## 2023-10-06 PROBLEM — J45.909 ASTHMA: Status: ACTIVE | Noted: 2023-10-06

## 2023-10-06 PROBLEM — E78.5 HYPERLIPIDEMIA: Status: ACTIVE | Noted: 2023-10-06

## 2023-10-06 PROBLEM — K21.9 GERD (GASTROESOPHAGEAL REFLUX DISEASE): Status: ACTIVE | Noted: 2023-10-06

## 2023-10-06 PROBLEM — K59.09 CHRONIC CONSTIPATION: Status: ACTIVE | Noted: 2023-10-06

## 2023-10-06 PROBLEM — Z75.8 DISCHARGE PLANNING ISSUES: Status: ACTIVE | Noted: 2023-10-06

## 2023-10-06 LAB
POCT GLUCOSE: 106 MG/DL (ref 70–110)
POCT GLUCOSE: 115 MG/DL (ref 70–110)

## 2023-10-06 PROCEDURE — 25000003 PHARM REV CODE 250: Performed by: STUDENT IN AN ORGANIZED HEALTH CARE EDUCATION/TRAINING PROGRAM

## 2023-10-06 PROCEDURE — 99900035 HC TECH TIME PER 15 MIN (STAT)

## 2023-10-06 PROCEDURE — 97110 THERAPEUTIC EXERCISES: CPT

## 2023-10-06 PROCEDURE — 97535 SELF CARE MNGMENT TRAINING: CPT

## 2023-10-06 PROCEDURE — 94760 N-INVAS EAR/PLS OXIMETRY 1: CPT

## 2023-10-06 PROCEDURE — 97116 GAIT TRAINING THERAPY: CPT

## 2023-10-06 PROCEDURE — 97530 THERAPEUTIC ACTIVITIES: CPT

## 2023-10-06 PROCEDURE — 11000004 HC SNF PRIVATE

## 2023-10-06 RX ORDER — CETIRIZINE HYDROCHLORIDE 10 MG/1
10 TABLET ORAL DAILY PRN
Status: DISCONTINUED | OUTPATIENT
Start: 2023-10-06 | End: 2023-10-08

## 2023-10-06 RX ADMIN — AZELASTINE HYDROCHLORIDE 274 MCG: 137 SPRAY, METERED NASAL at 08:10

## 2023-10-06 RX ADMIN — PANCRELIPASE 2 CAPSULE: 24000; 76000; 120000 CAPSULE, DELAYED RELEASE PELLETS ORAL at 04:10

## 2023-10-06 RX ADMIN — Medication 400 MG: at 09:10

## 2023-10-06 RX ADMIN — PANCRELIPASE 2 CAPSULE: 24000; 76000; 120000 CAPSULE, DELAYED RELEASE PELLETS ORAL at 12:10

## 2023-10-06 RX ADMIN — METFORMIN HYDROCHLORIDE 500 MG: 500 TABLET ORAL at 09:10

## 2023-10-06 RX ADMIN — FAMOTIDINE 20 MG: 20 TABLET ORAL at 08:10

## 2023-10-06 RX ADMIN — PANTOPRAZOLE SODIUM 40 MG: 40 TABLET, DELAYED RELEASE ORAL at 09:10

## 2023-10-06 RX ADMIN — FOLIC ACID 1 MG: 1 TABLET ORAL at 09:10

## 2023-10-06 RX ADMIN — DOCUSATE SODIUM 100 MG: 100 CAPSULE, LIQUID FILLED ORAL at 09:10

## 2023-10-06 RX ADMIN — ATORVASTATIN CALCIUM 40 MG: 40 TABLET, FILM COATED ORAL at 09:10

## 2023-10-06 RX ADMIN — RIVAROXABAN 20 MG: 20 TABLET, FILM COATED ORAL at 04:10

## 2023-10-06 RX ADMIN — HYDROCODONE BITARTRATE AND ACETAMINOPHEN 1 TABLET: 7.5; 325 TABLET ORAL at 02:10

## 2023-10-06 RX ADMIN — AMIODARONE HYDROCHLORIDE 200 MG: 200 TABLET ORAL at 09:10

## 2023-10-06 RX ADMIN — CETIRIZINE HYDROCHLORIDE 10 MG: 10 TABLET, FILM COATED ORAL at 01:10

## 2023-10-06 RX ADMIN — LISINOPRIL 20 MG: 10 TABLET ORAL at 09:10

## 2023-10-06 RX ADMIN — FLUTICASONE FUROATE AND VILANTEROL TRIFENATATE 1 PUFF: 200; 25 POWDER RESPIRATORY (INHALATION) at 09:10

## 2023-10-06 RX ADMIN — ASPIRIN 81 MG: 81 TABLET, COATED ORAL at 09:10

## 2023-10-06 RX ADMIN — PANCRELIPASE 2 CAPSULE: 24000; 76000; 120000 CAPSULE, DELAYED RELEASE PELLETS ORAL at 09:10

## 2023-10-06 RX ADMIN — TIMOLOL MALEATE 1 DROP: 5 SOLUTION OPHTHALMIC at 09:10

## 2023-10-06 RX ADMIN — DOCUSATE SODIUM 100 MG: 100 CAPSULE, LIQUID FILLED ORAL at 08:10

## 2023-10-06 RX ADMIN — HYDROCODONE BITARTRATE AND ACETAMINOPHEN 1 TABLET: 7.5; 325 TABLET ORAL at 04:10

## 2023-10-06 RX ADMIN — METFORMIN HYDROCHLORIDE 500 MG: 500 TABLET ORAL at 04:10

## 2023-10-06 RX ADMIN — AZELASTINE HYDROCHLORIDE 274 MCG: 137 SPRAY, METERED NASAL at 09:10

## 2023-10-06 RX ADMIN — TIMOLOL MALEATE 1 DROP: 5 SOLUTION OPHTHALMIC at 08:10

## 2023-10-06 RX ADMIN — UMECLIDINIUM 62.5 MCG: 62.5 AEROSOL, POWDER ORAL at 09:10

## 2023-10-06 NOTE — ASSESSMENT & PLAN NOTE
Continue metformin which was decreased on 9/30/2023 due to hypoglycemia.  She was previously on glimepiride and farxiga as an outpatient which were stopped due to hypoglycemia.  Most recent hemoglobin A1c from 9/30/2023 was 6.0.

## 2023-10-06 NOTE — HPI
Ms. Leach is a 77 year old  female with a history of type II diabetes mellitus, ABEL, hypertension, hyperlipidemia, and GERD who was originally admitted to [facility] on 9/20/2023 for elective mitral valve replacement as well as ligation of left atrial appendage by Dr. Steven Rosales due to mitral stenosis.  Postoperatively she developed junctional bradycardia with several episodes of atrial fibrillation with RVR and she underwent dual-chamber permanent pacemaker implantation on 9/25/2023 by Dr. Arnaud Moon secondary to sick sinus syndrome.  She was started on a baby aspirin, beta-blocker, and amiodarone taper with 400 mg by mouth twice daily for 3 days, then 200 mg by mouth daily for 3 days, then 200 mg by mouth twice daily, then 200 mg by mouth daily.  She had episodes of hypoglycemia and her farxiga and glimepiride were held.  She had no other complications throughout her hospital course and she was transferred to [facility] swing bed on 9/29/2023 for PT/OT and management of her multiple medical comorbidities.

## 2023-10-06 NOTE — SUBJECTIVE & OBJECTIVE
Interval History: She reports rare chest pain s/p MVR on 9/20/2023 but she had a sneezing episode this morning. She ambulated 375 feet with her rolling walker and standby assistance yesterday and she required contact guard assistance with sit to stand transitions. She remains on docusate for chronic constipation and she had a bowel movement yesterday.      Review of Systems   All other systems reviewed and are negative.    Objective:     Vital Signs (Most Recent):  Temp: 97.9 °F (36.6 °C) (10/06/23 0601)  Pulse: 74 (10/06/23 0601)  Resp: 20 (10/06/23 0448)  BP: (!) 118/56 (10/06/23 0601)  SpO2: (!) 94 % (10/06/23 0800) Vital Signs (24h Range):  Temp:  [97.9 °F (36.6 °C)-98.9 °F (37.2 °C)] 97.9 °F (36.6 °C)  Pulse:  [74-80] 74  Resp:  [20] 20  SpO2:  [92 %-94 %] 94 %  BP: (118-128)/(56-67) 118/56     Weight: 103.2 kg (227 lb 8.2 oz)  Body mass index is 37.86 kg/m².    Intake/Output Summary (Last 24 hours) at 10/6/2023 0901  Last data filed at 10/5/2023 1848  Gross per 24 hour   Intake 508.39 ml   Output 700 ml   Net -191.61 ml         Physical Exam  General - Obese elderly  female in no acute distress.  HEENT - NCAT. No scleral icterus. Oropharynx clear. Mucous membranes moist.  Neck - No lymphadenopathy, thyromegaly, or JVD.  CVS - Regular rate and rhythm. No murmurs, rubs, or gallops.  Resp - Lungs are clear to auscultation bilaterally. No rales, wheeze, or rhonchi.   GI - Soft, nontender, nondistended, normoactive bowel sounds present.   Extremities - Trace edema in the left lower extremity which is chronic.   Neuro - CN II through XII are grossly intact. No focal neurological deficits. Alert and oriented times four.   Psych - Normal affect.  Skin - Anterior chest surgical site c/d/i.        Significant Labs: All pertinent labs within the past 24 hours have been reviewed.  CBC:   Recent Labs   Lab 10/05/23  0326   WBC 7.12   HGB 11.1*   HCT 37.2        CMP:   Recent Labs   Lab 10/05/23  0326   NA  140   K 4.1   CO2 25   BUN 14.6   CREATININE 0.73   CALCIUM 8.4       Significant Imaging: I have reviewed all pertinent imaging results/findings within the past 24 hours.    CXR 9/30/2023: Resolved pulmonary edema seen on the prior exam with minimal residual bibasilar subsegmental atelectasis.    CXR 9/25/2023: No acute abnormality of the chest.    CXR 9/23/2023: Bilateral small pleural effusions.    CXR 9/22/2023: No acute pulmonary process identified.    CXR 9/21/2023: Interval extubation.  Mild bibasilar atelectasis.    CXR 9/20/2023: No acute pulmonary process appreciated.    CXR 9/15/2023: No acute chest disease is identified.

## 2023-10-06 NOTE — PT/OT/SLP PROGRESS
Physical Therapy Treatment Note           Name: Kortney Leach    : 1945 (77 y.o.)  MRN: [id number]           TREATMENT SUMMARY AND RECOMMENDATIONS:    PT Received On: 10/06/23  PT Start Time: 937     PT Stop Time: 1005  PT Total Time (min): 28 min     Subjective Assessment:   No complaints  Lethargic   x Awake, alert, cooperative  Uncooperative    Agitated  c/o pain    Appropriate  c/o fatigue    Confused  Treated at bedside     Emotionally labile x Treated in gym/dept.    Impulsive  Other:    Flat affect       Therapy Precautions:    Cognitive deficits  Spinal precautions    Collar - hard x Sternal precautions    Collar - soft   TLSO    Fall risk  LSO    Hip precautions - posterior  Knee immobilizer    Hip precautions - anterior  WBAT    Impaired communication  Partial weightbearing    Oxygen  TTWB    PEG tube  NWB    Visual deficits  Other:    Hearing deficits          Treatment Objectives:     Mobility Training:   Assist level Comments    Bed mobility     Transfer     Gait MOD I X350ft using RW   Sit to stand transitions SBA 2x 5 sit to stands from standard chair level; good maintenance of sternal precautions   Sitting balance     Standing balance      Wheelchair mobility     Car transfer     Other:          Therapeutic Exercise:   Exercise Sets Reps Comments   Hip flexion, hip ABD, LAQ, ankle PF/DF 1 20 Performed short sitting  2# ankle weights                         Additional Comments:      Assessment: Patient tolerated session well. Patient reports that she can tell she is getting stronger and more confident with mobility.    PT Plan: continue POC  Revisions made to plan of care: No    GOALS:   Multidisciplinary Problems       Physical Therapy Goals          Problem: Physical Therapy    Goal Priority Disciplines Outcome Goal Variances Interventions   Physical Therapy Goal     PT, PT/OT Ongoing, Progressing     Description: Goals to be met by: Discharge  POC: Patient to be seen  5-6x/week QD/BID As tolerable     Patient will increase functional independence with mobility by performin. Supine to sit with Modified [place]  2. Sit to supine with Modified [place]  3. Sit to stand transfer with Modified [place]  4. Gait  x 350 feet with Modified [place] using Rolling Walker vs No AD vs LRAD.   5. Low Fall Risk on 5x sit to stand test                         Skilled PT Minutes Provided: 28 minutes   Communication with Treatment Team:     Equipment recommendations:       At end of treatment, patient remained:  x Up in chair     Up in wheelchair in room    In bed   x With alarm activated    Bed rails up   x Call bell in reach     Family/friends present    Restraints secured properly    In bathroom with CNA/RN notified    Nurse aware    In gym with therapist/tech    Other:

## 2023-10-06 NOTE — PLAN OF CARE
Problem: Adult Inpatient Plan of Care  Goal: Plan of Care Review  Outcome: Ongoing, Progressing  Flowsheets (Taken 10/5/2023 1918)  Plan of Care Reviewed With:   patient   daughter  Goal: Patient-Specific Goal (Individualized)  Outcome: Ongoing, Progressing  Flowsheets (Taken 10/5/2023 1918)  Anxieties, Fears or Concerns: Pt would like tylenol with night meds. Daughter asked about wound on bottom. I took pic and changed dressing last night  Individualized Care Needs: Maintain sternal precautions, CPAP 2-3L NC PRN, Wound care, PT/OT, Monitor sternum and PM site for s/s of infection  Goal: Absence of Hospital-Acquired Illness or Injury  Outcome: Ongoing, Progressing  Intervention: Identify and Manage Fall Risk  Flowsheets (Taken 10/5/2023 1918)  Safety Promotion/Fall Prevention:   assistive device/personal item within reach   bed alarm set   lighting adjusted   medications reviewed   muscle strengthening facilitated   gait belt with ambulation   nonskid shoes/socks when out of bed   room near unit station   instructed to call staff for mobility  Intervention: Prevent Skin Injury  Flowsheets (Taken 10/5/2023 1918)  Body Position: position changed independently  Skin Protection:   adhesive use limited   incontinence pads utilized   skin-to-device areas padded   skin-to-skin areas padded   tubing/devices free from skin contact   transparent dressing maintained  Intervention: Prevent and Manage VTE (Venous Thromboembolism) Risk  Flowsheets (Taken 10/5/2023 1918)  Activity Management: Up in chair - L3  VTE Prevention/Management:   bleeding risk assessed   bleeding precations maintained   ambulation promoted   fluids promoted   ROM (active) performed  Range of Motion: ROM (range of motion) performed  Intervention: Prevent Infection  Flowsheets (Taken 10/5/2023 1918)  Infection Prevention:   cohorting utilized   hand hygiene promoted   single patient room provided   equipment surfaces disinfected  Goal: Optimal Comfort and  Wellbeing  Outcome: Ongoing, Progressing  Intervention: Monitor Pain and Promote Comfort  Flowsheets (Taken 10/5/2023 1918)  Pain Management Interventions:   care clustered   diversional activity provided   pillow support provided   position adjusted   relaxation techniques promoted   prescribed exercises encouraged  Intervention: Provide Person-Centered Care  Flowsheets (Taken 10/5/2023 1918)  Trust Relationship/Rapport:   care explained   choices provided   empathic listening provided   thoughts/feelings acknowledged     Problem: Fall Injury Risk  Goal: Absence of Fall and Fall-Related Injury  Outcome: Ongoing, Progressing  Intervention: Identify and Manage Contributors  Flowsheets (Taken 10/5/2023 1918)  Self-Care Promotion:   BADL personal objects within reach   BADL personal routines maintained   safe use of adaptive equipment encouraged  Medication Review/Management: medications reviewed  Intervention: Promote Injury-Free Environment  Flowsheets (Taken 10/5/2023 1918)  Safety Promotion/Fall Prevention:   assistive device/personal item within reach   bed alarm set   lighting adjusted   medications reviewed   muscle strengthening facilitated   gait belt with ambulation   nonskid shoes/socks when out of bed   room near unit station   instructed to call staff for mobility     Problem: Infection  Goal: Absence of Infection Signs and Symptoms  Outcome: Ongoing, Progressing  Intervention: Prevent or Manage Infection  Flowsheets (Taken 10/5/2023 1918)  Fever Reduction/Comfort Measures:   lightweight bedding   lightweight clothing  Infection Management: aseptic technique maintained  Isolation Precautions:   protective   precautions maintained     Problem: Skin Injury Risk Increased  Goal: Skin Health and Integrity  Outcome: Ongoing, Progressing  Intervention: Optimize Skin Protection  Flowsheets (Taken 10/5/2023 1918)  Pressure Reduction Techniques:   frequent weight shift encouraged   pressure points protected    weight shift assistance provided  Pressure Reduction Devices: positioning supports utilized  Skin Protection:   adhesive use limited   incontinence pads utilized   skin-to-device areas padded   skin-to-skin areas padded   tubing/devices free from skin contact   transparent dressing maintained  Head of Bed (HOB) Positioning: HOB at 30-45 degrees  Intervention: Promote and Optimize Oral Intake  Flowsheets (Taken 10/5/2023 1918)  Oral Nutrition Promotion:   calorie-dense foods provided   calorie-dense liquids provided   physical activity promoted   social interaction promoted     Problem: Gas Exchange Impaired  Goal: Optimal Gas Exchange  Outcome: Ongoing, Progressing  Intervention: Optimize Oxygenation and Ventilation  Flowsheets (Taken 10/5/2023 1918)  Airway/Ventilation Management: pulmonary hygiene promoted  Head of Bed (HOB) Positioning: HOB at 30-45 degrees     Problem: Pain Acute  Goal: Acceptable Pain Control and Functional Ability  Outcome: Ongoing, Progressing  Intervention: Develop Pain Management Plan  Flowsheets (Taken 10/5/2023 1918)  Pain Management Interventions:   care clustered   diversional activity provided   pillow support provided   position adjusted   relaxation techniques promoted   prescribed exercises encouraged  Intervention: Prevent or Manage Pain  Flowsheets (Taken 10/5/2023 1918)  Sleep/Rest Enhancement:   consistent schedule promoted   relaxation techniques promoted  Sensory Stimulation Regulation: quiet environment promoted  Bowel Elimination Promotion: adequate fluid intake promoted  Medication Review/Management: medications reviewed  Intervention: Optimize Psychosocial Wellbeing  Flowsheets (Taken 10/5/2023 1918)  Supportive Measures:   active listening utilized   goal-setting facilitated   verbalization of feelings encouraged  Diversional Activities: television     Problem: Device-Related Complication Risk (Cardiac Rhythm Management Device)  Goal: Effective Device Function  Outcome:  Ongoing, Progressing     Problem: Infection (Cardiac Rhythm Management Device)  Goal: Absence of Infection Signs and Symptoms  Outcome: Ongoing, Progressing  Intervention: Prevent or Manage Infection  Flowsheets (Taken 10/5/2023 1918)  Fever Reduction/Comfort Measures:   lightweight bedding   lightweight clothing  Infection Management: aseptic technique maintained     Problem: Pain (Cardiac Rhythm Management Device)  Goal: Acceptable Pain Level  Outcome: Ongoing, Progressing  Intervention: Prevent or Manage Pain  Flowsheets (Taken 10/5/2023 1918)  Pain Management Interventions:   care clustered   diversional activity provided   pillow support provided   position adjusted   relaxation techniques promoted   prescribed exercises encouraged     Problem: Impaired Wound Healing  Goal: Optimal Wound Healing  Outcome: Ongoing, Progressing  Intervention: Promote Wound Healing  Flowsheets (Taken 10/5/2023 1918)  Oral Nutrition Promotion:   calorie-dense foods provided   calorie-dense liquids provided   physical activity promoted   social interaction promoted  Sleep/Rest Enhancement:   consistent schedule promoted   relaxation techniques promoted  Activity Management: Up in chair - L3  Pain Management Interventions:   care clustered   diversional activity provided   pillow support provided   position adjusted   relaxation techniques promoted   prescribed exercises encouraged

## 2023-10-06 NOTE — ASSESSMENT & PLAN NOTE
Continue routine postoperative care with PT/OT, sternal precautions, and pain control with norco as needed.  She is s/p mitral valve replacement as well as ligation of left atrial appendage on 9/20/2023 by Dr. Steven Rosales due to mitral stenosis.  Limited echocardiogram from 9/23/2023 showed normal left ventricular systolic function with an EF of 55%.

## 2023-10-06 NOTE — ASSESSMENT & PLAN NOTE
Continue baby aspirin. She is s/p dual-chamber permanent pacemaker implantation on 9/25/2023 by Dr. Arnaud Moon.

## 2023-10-06 NOTE — PROGRESS NOTES
[facility]  [facility] Medicine  Telehospitalist Progress Note    Patient Name: Kortney Leach  MRN: [id number]  Patient Class: - Swing   Admission Date: 9/29/2023  Length of Stay: 7 days  Attending Physician: Prosper Salazar MD  Primary Care Provider: Arnaldo Hernandez MD      Subjective:     Principal Problem:Status post mitral valve replacement      HPI:  Ms. Leach is a 77 year old  female with a history of type II diabetes mellitus, ABEL, hypertension, hyperlipidemia, and GERD who was originally admitted to [facility] on 9/20/2023 for elective mitral valve replacement as well as ligation of left atrial appendage by Dr. Steven Rosales due to mitral stenosis.  Postoperatively she developed junctional bradycardia with several episodes of atrial fibrillation with RVR and she underwent dual-chamber permanent pacemaker implantation on 9/25/2023 by Dr. Arnaud Moon secondary to sick sinus syndrome.  She was started on a baby aspirin, beta-blocker, and amiodarone taper with 400 mg by mouth twice daily for 3 days, then 200 mg by mouth daily for 3 days, then 200 mg by mouth twice daily, then 200 mg by mouth daily.  She had episodes of hypoglycemia and her farxiga and glimepiride were held.  She had no other complications throughout her hospital course and she was transferred to [facility] bed on 9/29/2023 for PT/OT and management of her multiple medical comorbidities.      Overview/Hospital Course:  She was admitted to [facility] bed on 9/29/2023 for rehab following hospitalization at [facility] s/p mitral valve replacement due to mitral stenosis and sick sinus syndrome s/p dual-chamber permanent pacemaker implantation.  Her metformin was decreased from 1000 mg to 500 mg by mouth twice daily on 9/30/2023 due to hypoglycemia.  She completed an amiodarone taper on 10/2/2023 and she was transitioned to 200 mg by  mouth daily.      Interval History: She reports rare chest pain s/p MVR on 9/20/2023 but she had a sneezing episode this morning. She ambulated 375 feet with her rolling walker and standby assistance yesterday and she required contact guard assistance with sit to stand transitions. She remains on docusate for chronic constipation and she had a bowel movement yesterday.      Review of Systems   All other systems reviewed and are negative.    Objective:     Vital Signs (Most Recent):  Temp: 97.9 °F (36.6 °C) (10/06/23 0601)  Pulse: 74 (10/06/23 0601)  Resp: 20 (10/06/23 0448)  BP: (!) 118/56 (10/06/23 0601)  SpO2: (!) 94 % (10/06/23 0800) Vital Signs (24h Range):  Temp:  [97.9 °F (36.6 °C)-98.9 °F (37.2 °C)] 97.9 °F (36.6 °C)  Pulse:  [74-80] 74  Resp:  [20] 20  SpO2:  [92 %-94 %] 94 %  BP: (118-128)/(56-67) 118/56     Weight: 103.2 kg (227 lb 8.2 oz)  Body mass index is 37.86 kg/m².    Intake/Output Summary (Last 24 hours) at 10/6/2023 0901  Last data filed at 10/5/2023 1848  Gross per 24 hour   Intake 508.39 ml   Output 700 ml   Net -191.61 ml         Physical Exam  General - Obese elderly  female in no acute distress.  HEENT - NCAT. No scleral icterus. Oropharynx clear. Mucous membranes moist.  Neck - No lymphadenopathy, thyromegaly, or JVD.  CVS - Regular rate and rhythm. No murmurs, rubs, or gallops.  Resp - Lungs are clear to auscultation bilaterally. No rales, wheeze, or rhonchi.   GI - Soft, nontender, nondistended, normoactive bowel sounds present.   Extremities - Trace edema in the left lower extremity which is chronic.   Neuro - CN II through XII are grossly intact. No focal neurological deficits. Alert and oriented times four.   Psych - Normal affect.  Skin - Anterior chest surgical site c/d/i.        Significant Labs: All pertinent labs within the past 24 hours have been reviewed.  CBC:   Recent Labs   Lab 10/05/23  0326   WBC 7.12   HGB 11.1*   HCT 37.2        CMP:   Recent Labs   Lab  10/05/23  0326      K 4.1   CO2 25   BUN 14.6   CREATININE 0.73   CALCIUM 8.4       Significant Imaging: I have reviewed all pertinent imaging results/findings within the past 24 hours.    CXR 9/30/2023: Resolved pulmonary edema seen on the prior exam with minimal residual bibasilar subsegmental atelectasis.    CXR 9/25/2023: No acute abnormality of the chest.    CXR 9/23/2023: Bilateral small pleural effusions.    CXR 9/22/2023: No acute pulmonary process identified.    CXR 9/21/2023: Interval extubation.  Mild bibasilar atelectasis.    CXR 9/20/2023: No acute pulmonary process appreciated.    CXR 9/15/2023: No acute chest disease is identified.      Assessment/Plan:      * Status post mitral valve replacement  Continue routine postoperative care with PT/OT, sternal precautions, and pain control with norco as needed.  She is s/p mitral valve replacement as well as ligation of left atrial appendage on 9/20/2023 by Dr. Steven Rosales due to mitral stenosis.  Limited echocardiogram from 9/23/2023 showed normal left ventricular systolic function with an EF of 55%.    Sick sinus syndrome  Continue baby aspirin. She is s/p dual-chamber permanent pacemaker implantation on 9/25/2023 by Dr. Arnaud Moon.    Atrial fibrillation with RVR  Resolved. Continue amiodarone, beta-blocker, and rivarobaxan.    Physical deconditioning  Continue PT/OT.    Type II diabetes mellitus  Continue metformin which was decreased on 9/30/2023 due to hypoglycemia.  She was previously on glimepiride and farxiga as an outpatient which were stopped due to hypoglycemia.  Most recent hemoglobin A1c from 9/30/2023 was 6.0.    Hypertension  Continue lisinopril (substitute for benazepril) and metoprolol tartrate.    Hyperlipidemia  Continue atorvastatin.    History of left lower extremity DVT   Continue rivaroxaban.    Obstructive sleep apnea  Continue CPAP at night.    GERD (gastroesophageal reflux disease)  Continue pantoprazole (substitute for  dexilant) and famotidine.    COPD (chronic obstructive pulmonary disease)  Continue trelegy ellipta.    Asthma  Continue trelegy ellipta.    Chronic constipation  Continue docusate.    Glaucoma  Continue timolol ophthalmic solution.    Hypomagnesemia  Continue magnesium oxide.    Disposition  Anticipate discharge home with home health next week.        VTE Risk Mitigation (From admission, onward)           Ordered     rivaroxaban tablet 20 mg  With dinner         09/29/23 1153     Place sequential compression device  Until discontinued         09/29/23 1153                    Discharge Planning   SD:      Code Status: Full Code   Is the patient medically ready for discharge?:     Reason for patient still in hospital (select all that apply): PT / OT recommendations and Pending disposition  Discharge Plan A: Home with family, Home Health   Discharge Delays: None known at this time    This service was provided via telemedicine.  Type of Software: Audio/Visual.  Originating Site: [facility].  Distant Site: [place], [place].  Her exam was performed with the assistance of Batsheva Doherty RN.    Prosper Salazar MD  Department of Hospital Medicine   [facility]

## 2023-10-06 NOTE — PT/OT/SLP PROGRESS
Occupational Therapy  Treatment    Name: Kortney Leach    : 1945 (77 y.o.)  MRN: [id number]           TREATMENT SUMMARY AND RECOMMENDATIONS:      OT Date of Treatment: 10/06/23  OT Start Time: 910  OT Stop Time: 935  OT Total Time (min): 25 min      Subjective Assessment:   No complaints  Lethargic   x Awake, alert, cooperative  Impulsive    Uncooperative   Flat affect    Agitated  c/o pain   x Appropriate  c/o fatigue    Confused x Treated at bedside     Emotionally labile x Treated in gym/dept.      Other:        Therapy Precautions:    Cognitive deficits  Spinal precautions    Collar - hard x Sternal precautions    Collar - soft   TLSO   x Fall risk  LSO    Hip precautions - posterior  Knee immobilizer    Hip precautions - anterior  WBAT    Impaired communication  Partial weightbearing    Oxygen  TTWB    PEG tube  NWB    Visual deficits      Hearing deficits   Other:        Treatment Objectives:     Mobility Training:    Mobility task Assist level Comments    Bed mobility     Transfer     Sit to stands transitions SVP Recliner<standing using a RW and gait belt    Functional mobility SBA FM in the room using a RW and gait belt   Sitting balance     Standing balance      Other:        ADL Training:    ADL Assist level Comments    Feeding     Grooming/hygiene     Bathing     Upper body dressing     Lower body dressing     Toileting SVP +Void; able to manage clothing and perform anterior hygiene   Toilet transfer SVP BSC over toilet<standing using a RW and gait belt   Adaptive equipment training     Other:           Therapeutic Exercise:   Exercise Sets Reps Comments   UBE strengthening exercises 2 15 2# weighted dowel completing bicep curls, chest press, and shoulder press                         Additional Comments:      Assessment: Patient tolerated session well.    GOALS:   Multidisciplinary Problems       Occupational Therapy Goals          Problem: Occupational Therapy    Goal Priority  Disciplines Outcome Interventions   Occupational Therapy Goal     OT, PT/OT Ongoing, Progressing    Description: Goals to be met by: Discharge     Patient will increase functional independence with ADLs by performing:    UE Dressing with Set-up Assistance. GOAL MET 10/3/23  LE Dressing with Moderate Assistance. GOAL MET 10/3/23  Grooming while standing at sink with Stand-by Assistance. Goal met 10/2/23  Toileting from bedside commode with Stand-by Assistance for hygiene and clothing management. Goal met 10/2/23  Bathing from  shower chair/bench with Stand-by Assistance.  Toilet transfer to bedside commode with Contact Guard Assistance.     New Goals:     Pt will perform UB dressing requiring MI.   Pt will perform LB dressing requiring MI.   Pt will perform toileting requiring MI.   Pt will perform grooming while standing at the sink requiring MI.                          Recommendations:     Discharge Recommendations: home with home health, home health PT, home health OT  Discharge Equipment Recommendations:  none  Barriers to discharge:  Decreased caregiver support, Other (Comment) (Daughter works during the day)     Plan:     Patient to be seen 6 x/week to address the above listed problems via self-care/home management, therapeutic activities, therapeutic exercises  Plan of Care Expires: 10/20/23  Plan of Care Reviewed with: patient  Revisions made to plan of care: No      Skilled OT Minutes Provided: 30  Communication with Treatment Team:     Equipment recommendations:       At end of treatment, patient remained:   Up in chair     Up in wheelchair in room    In bed    With alarm activated    Bed rails up    Call bell in reach     Family/friends present    Restraints secured properly    In bathroom with CNA/RN notified   x In gym with PT/PTA/tech    Nurse aware    Other:

## 2023-10-06 NOTE — HOSPITAL COURSE
She was admitted to [facility] swing bed on 9/29/2023 for rehab following hospitalization at [facility] s/p mitral valve replacement due to mitral stenosis and sick sinus syndrome s/p dual-chamber permanent pacemaker implantation.  Her metformin was decreased from 1000 mg to 500 mg by mouth twice daily on 9/30/2023 due to hypoglycemia.  She completed an amiodarone taper on 10/2/2023 and she was transitioned to 200 mg by mouth daily.  She was started on cetirizine 10 mg PO daily as needed on 10/06/2023 due to an episode of sneezing, watery eyes, and runny nose and she began nasal irrigations twice daily on 10/9/2023 for chronic sinusitis. 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

## 2023-10-06 NOTE — PT/OT/SLP PROGRESS
Physical Therapy Treatment Note           Name: Kortney Leach    : 1945 (77 y.o.)  MRN: [id number]           TREATMENT SUMMARY AND RECOMMENDATIONS:    PT Received On: 10/06/23  PT Start Time: 1335     PT Stop Time: 1405  PT Total Time (min): 30 min     Subjective Assessment:   No complaints  Lethargic   x Awake, alert, cooperative  Uncooperative    Agitated  c/o pain    Appropriate  c/o fatigue    Confused  Treated at bedside     Emotionally labile x Treated in gym/dept.    Impulsive  Other:    Flat affect       Therapy Precautions:    Cognitive deficits  Spinal precautions    Collar - hard x Sternal precautions    Collar - soft   TLSO    Fall risk  LSO    Hip precautions - posterior  Knee immobilizer    Hip precautions - anterior  WBAT    Impaired communication  Partial weightbearing    Oxygen  TTWB    PEG tube  NWB    Visual deficits  Other:    Hearing deficits          Treatment Objectives:     Mobility Training:   Assist level Comments    Bed mobility     Transfer     Gait MOD I X350ft using RW   Sit to stand transitions SBA From wc level    Sitting balance     Standing balance      Wheelchair mobility     Car transfer     Other:          Therapeutic Exercise:   Exercise Sets Reps Comments   Hip flexion, hip ABD, LAQ, ankle PF/DF 1 20 Performed short sitting  2# ankle weights   LE cycling   5'F/5'B                   Additional Comments:      Assessment: Patient tolerated session well.    PT Plan: continue POC  Revisions made to plan of care: No    GOALS:   Multidisciplinary Problems       Physical Therapy Goals          Problem: Physical Therapy    Goal Priority Disciplines Outcome Goal Variances Interventions   Physical Therapy Goal     PT, PT/OT Ongoing, Progressing     Description: Goals to be met by: Discharge  POC: Patient to be seen 5-6x/week QD/BID As tolerable     Patient will increase functional independence with mobility by performin. Supine to sit with Modified  [place]  2. Sit to supine with Modified [place]  3. Sit to stand transfer with Modified [place]  4. Gait  x 350 feet with Modified [place] using Rolling Walker vs No AD vs LRAD.   5. Low Fall Risk on 5x sit to stand test                         Skilled PT Minutes Provided: 30 minutes   Communication with Treatment Team:     Equipment recommendations:       At end of treatment, patient remained:  x Up in chair     Up in wheelchair in room    In bed   x With alarm activated    Bed rails up   x Call bell in reach     Family/friends present    Restraints secured properly    In bathroom with CNA/RN notified    Nurse aware    In gym with therapist/tech    Other:

## 2023-10-06 NOTE — PT/OT/SLP PROGRESS
Occupational Therapy  Treatment    Name: Kortney Leach    : 1945 (77 y.o.)  MRN: [id number]           TREATMENT SUMMARY AND RECOMMENDATIONS:      OT Date of Treatment: 10/06/23  OT Start Time: 1300  OT Stop Time: 1330  OT Total Time (min): 30 min      Subjective Assessment:   No complaints  Lethargic   x Awake, alert, cooperative  Impulsive    Uncooperative   Flat affect    Agitated  c/o pain   x Appropriate  c/o fatigue    Confused x Treated at bedside     Emotionally labile x Treated in gym/dept.      Other:        Therapy Precautions:    Cognitive deficits  Spinal precautions    Collar - hard x Sternal precautions    Collar - soft   TLSO   x Fall risk  LSO    Hip precautions - posterior  Knee immobilizer    Hip precautions - anterior  WBAT    Impaired communication  Partial weightbearing    Oxygen  TTWB    PEG tube  NWB    Visual deficits      Hearing deficits   Other:        Treatment Objectives:     Mobility Training:    Mobility task Assist level Comments    Bed mobility Min A Supine<sitting EOB; assist to lift trunk upright    Transfer     Sit to stands transitions SVP EOB<standing using a RW and gait belt    Functional mobility SBA FM in the room using a RW and gait belt   Sitting balance     Standing balance  SBA Dynamic standing balance activity focusing on activity tolerance and reaching outside of KEM to increase independence with grooming, bathing, and dressing. Pt uses RW for support in standing and had no LOB.    Other:        ADL Training:    ADL Assist level Comments    Feeding     Grooming/hygiene     Bathing     Upper body dressing     Lower body dressing     Toileting SVP +Void; able to manage clothing and perform anterior hygiene   Toilet transfer SVP BSC over toilet<standing using a RW and gait belt   Adaptive equipment training     Other:           Therapeutic Exercise:   Exercise Sets Reps Comments   UBE endurance training  2 5 minutes Ergometer level 2; forwards and backwards                          Additional Comments:      Assessment: Patient tolerated session well.    GOALS:   Multidisciplinary Problems       Occupational Therapy Goals          Problem: Occupational Therapy    Goal Priority Disciplines Outcome Interventions   Occupational Therapy Goal     OT, PT/OT Ongoing, Progressing    Description: Goals to be met by: Discharge     Patient will increase functional independence with ADLs by performing:    UE Dressing with Set-up Assistance. GOAL MET 10/3/23  LE Dressing with Moderate Assistance. GOAL MET 10/3/23  Grooming while standing at sink with Stand-by Assistance. Goal met 10/2/23  Toileting from bedside commode with Stand-by Assistance for hygiene and clothing management. Goal met 10/2/23  Bathing from  shower chair/bench with Stand-by Assistance.  Toilet transfer to bedside commode with Contact Guard Assistance.     New Goals:     Pt will perform UB dressing requiring MI.   Pt will perform LB dressing requiring MI.   Pt will perform toileting requiring MI.   Pt will perform grooming while standing at the sink requiring MI.                          Recommendations:     Discharge Recommendations: home with home health, home health PT, home health OT  Discharge Equipment Recommendations:  none  Barriers to discharge:  Decreased caregiver support, Other (Comment) (Daughter works during the day)     Plan:     Patient to be seen 6 x/week to address the above listed problems via self-care/home management, therapeutic activities, therapeutic exercises  Plan of Care Expires: 10/20/23  Plan of Care Reviewed with: patient  Revisions made to plan of care: No      Skilled OT Minutes Provided: 30  Communication with Treatment Team:     Equipment recommendations:       At end of treatment, patient remained:   Up in chair     Up in wheelchair in room    In bed    With alarm activated    Bed rails up    Call bell in reach     Family/friends present    Restraints secured properly    In  bathroom with CNA/RN notified   x In gym with PT/PTA/tech    Nurse aware    Other:

## 2023-10-07 LAB — POCT GLUCOSE: 113 MG/DL (ref 70–110)

## 2023-10-07 PROCEDURE — 97530 THERAPEUTIC ACTIVITIES: CPT

## 2023-10-07 PROCEDURE — 94760 N-INVAS EAR/PLS OXIMETRY 1: CPT

## 2023-10-07 PROCEDURE — 11000004 HC SNF PRIVATE

## 2023-10-07 PROCEDURE — 99900035 HC TECH TIME PER 15 MIN (STAT)

## 2023-10-07 PROCEDURE — 25000003 PHARM REV CODE 250: Performed by: STUDENT IN AN ORGANIZED HEALTH CARE EDUCATION/TRAINING PROGRAM

## 2023-10-07 RX ADMIN — AZELASTINE HYDROCHLORIDE 274 MCG: 137 SPRAY, METERED NASAL at 09:10

## 2023-10-07 RX ADMIN — DOCUSATE SODIUM 100 MG: 100 CAPSULE, LIQUID FILLED ORAL at 08:10

## 2023-10-07 RX ADMIN — TIMOLOL MALEATE 1 DROP: 5 SOLUTION OPHTHALMIC at 08:10

## 2023-10-07 RX ADMIN — RIVAROXABAN 20 MG: 20 TABLET, FILM COATED ORAL at 04:10

## 2023-10-07 RX ADMIN — ASPIRIN 81 MG: 81 TABLET, COATED ORAL at 09:10

## 2023-10-07 RX ADMIN — DOCUSATE SODIUM 100 MG: 100 CAPSULE, LIQUID FILLED ORAL at 09:10

## 2023-10-07 RX ADMIN — HYDROCODONE BITARTRATE AND ACETAMINOPHEN 1 TABLET: 7.5; 325 TABLET ORAL at 09:10

## 2023-10-07 RX ADMIN — FAMOTIDINE 20 MG: 20 TABLET ORAL at 08:10

## 2023-10-07 RX ADMIN — Medication 400 MG: at 09:10

## 2023-10-07 RX ADMIN — FLUTICASONE FUROATE AND VILANTEROL TRIFENATATE 1 PUFF: 200; 25 POWDER RESPIRATORY (INHALATION) at 09:10

## 2023-10-07 RX ADMIN — CETIRIZINE HYDROCHLORIDE 10 MG: 10 TABLET, FILM COATED ORAL at 09:10

## 2023-10-07 RX ADMIN — UMECLIDINIUM 62.5 MCG: 62.5 AEROSOL, POWDER ORAL at 09:10

## 2023-10-07 RX ADMIN — LISINOPRIL 20 MG: 10 TABLET ORAL at 09:10

## 2023-10-07 RX ADMIN — AZELASTINE HYDROCHLORIDE 274 MCG: 137 SPRAY, METERED NASAL at 08:10

## 2023-10-07 RX ADMIN — ACETAMINOPHEN 650 MG: 325 TABLET ORAL at 08:10

## 2023-10-07 RX ADMIN — TIMOLOL MALEATE 1 DROP: 5 SOLUTION OPHTHALMIC at 09:10

## 2023-10-07 RX ADMIN — PANCRELIPASE 2 CAPSULE: 24000; 76000; 120000 CAPSULE, DELAYED RELEASE PELLETS ORAL at 06:10

## 2023-10-07 RX ADMIN — PANCRELIPASE 2 CAPSULE: 24000; 76000; 120000 CAPSULE, DELAYED RELEASE PELLETS ORAL at 11:10

## 2023-10-07 RX ADMIN — PANCRELIPASE 2 CAPSULE: 24000; 76000; 120000 CAPSULE, DELAYED RELEASE PELLETS ORAL at 04:10

## 2023-10-07 RX ADMIN — PANTOPRAZOLE SODIUM 40 MG: 40 TABLET, DELAYED RELEASE ORAL at 09:10

## 2023-10-07 RX ADMIN — METFORMIN HYDROCHLORIDE 500 MG: 500 TABLET ORAL at 06:10

## 2023-10-07 RX ADMIN — AMIODARONE HYDROCHLORIDE 200 MG: 200 TABLET ORAL at 09:10

## 2023-10-07 RX ADMIN — FOLIC ACID 1 MG: 1 TABLET ORAL at 09:10

## 2023-10-07 RX ADMIN — ATORVASTATIN CALCIUM 40 MG: 40 TABLET, FILM COATED ORAL at 09:10

## 2023-10-07 RX ADMIN — METFORMIN HYDROCHLORIDE 500 MG: 500 TABLET ORAL at 04:10

## 2023-10-07 NOTE — PT/OT/SLP PROGRESS
Occupational Therapy  Treatment    Name: Kortney Leach    : 1945 (77 y.o.)  MRN: [id number]           TREATMENT SUMMARY AND RECOMMENDATIONS:      OT Date of Treatment: 10/07/23  OT Start Time: 1010  OT Stop Time: 1023  OT Total Time (min): 13 min      Subjective Assessment:   No complaints  Lethargic   x Awake, alert, cooperative  Impulsive    Uncooperative   Flat affect    Agitated  c/o pain    Appropriate  c/o fatigue    Confused x Treated at bedside     Emotionally labile  Treated in gym/dept.     x Other:        Therapy Precautions:    Cognitive deficits  Spinal precautions    Collar - hard x Sternal precautions    Collar - soft   TLSO   x Fall risk  LSO    Hip precautions - posterior  Knee immobilizer    Hip precautions - anterior  WBAT    Impaired communication  Partial weightbearing    Oxygen  TTWB    PEG tube  NWB    Visual deficits      Hearing deficits   Other:        Treatment Objectives:     Mobility Training:    Mobility task Assist level Comments    Bed mobility     Transfer Min A From recliner using RW and gait belt   Sit to stands transitions Min A To push through into standing   Functional mobility CGA Ambulating through yeung ~200 ft with RW   Sitting balance     Standing balance      Other:          Additional Comments:  OT educated Pt on safety with ambulation and potential fall hazards. OT educated Pt on importance of frequent safe mobility to maintain health. Pt educated on scope and role of OT and rationale for OT services at this time.  Pt reported appreciation for OT intervention. Questions and concerns were invited and addressed, no additional questions or concerns at this time.      Assessment: Patient tolerated session well. Pt continues to benefit from skilled inpatient OT to address areas of deficit and increase safety with ADLs and mobility.      GOALS:   Multidisciplinary Problems       Occupational Therapy Goals          Problem: Occupational Therapy    Goal Priority  Disciplines Outcome Interventions   Occupational Therapy Goal     OT, PT/OT Ongoing, Progressing    Description: Goals to be met by: Discharge     Patient will increase functional independence with ADLs by performing:    UE Dressing with Set-up Assistance. GOAL MET 10/3/23  LE Dressing with Moderate Assistance. GOAL MET 10/3/23  Grooming while standing at sink with Stand-by Assistance. Goal met 10/2/23  Toileting from bedside commode with Stand-by Assistance for hygiene and clothing management. Goal met 10/2/23  Bathing from  shower chair/bench with Stand-by Assistance.  Toilet transfer to bedside commode with Contact Guard Assistance.     New Goals:     Pt will perform UB dressing requiring MI.   Pt will perform LB dressing requiring MI.   Pt will perform toileting requiring MI.   Pt will perform grooming while standing at the sink requiring MI.                          Recommendations:     Discharge Recommendations: home with home health, home health PT, home health OT  Discharge Equipment Recommendations:  none  Barriers to discharge:        Plan:     Patient to be seen 6 x/week to address the above listed problems via self-care/home management, therapeutic activities, therapeutic exercises  Plan of Care Expires: 10/20/23  Plan of Care Reviewed with: patient  Revisions made to plan of care: No      Skilled OT Minutes Provided: 13  Communication with Treatment Team: Discussed care with GABRIEL Herman.    At end of treatment, patient remained:  x Up in chair     Up in wheelchair in room    In bed   x With alarm activated    Bed rails up   x Call bell in reach     Family/friends present    Restraints secured properly    In bathroom with CNA/RN notified    In gym with PT/PTA/tech   x Nurse aware    Other:

## 2023-10-07 NOTE — PLAN OF CARE
Problem: Adult Inpatient Plan of Care  Goal: Plan of Care Review  Outcome: Ongoing, Progressing  Flowsheets (Taken 10/6/2023 2020)  Plan of Care Reviewed With: patient  Goal: Patient-Specific Goal (Individualized)  Outcome: Ongoing, Progressing  Flowsheets (Taken 10/6/2023 2020)  Anxieties, Fears or Concerns: None offered at this time.  Individualized Care Needs:   Safety with mobility to prevent injury   Pain management   Sternal precautions   Encourage to perform IS in prevention of atelectasis/pneumonia   Monitor for s/s of infection   Monitor blood glucose levels.  Goal: Absence of Hospital-Acquired Illness or Injury  Outcome: Ongoing, Progressing  Intervention: Identify and Manage Fall Risk  Flowsheets (Taken 10/6/2023 2020)  Safety Promotion/Fall Prevention:   bed alarm set   assistive device/personal item within reach   Fall Risk reviewed with patient/family   Fall Risk signage in place   gait belt with ambulation   high risk medications identified   medications reviewed   lighting adjusted   nonskid shoes/socks when out of bed   room near unit station   instructed to call staff for mobility  Intervention: Prevent Skin Injury  Flowsheets (Taken 10/6/2023 2020)  Body Position:   weight shifting   position changed independently   foot of bed elevated   supine  Skin Protection:   incontinence pads utilized   tubing/devices free from skin contact  Intervention: Prevent and Manage VTE (Venous Thromboembolism) Risk  Flowsheets (Taken 10/6/2023 2020)  Activity Management: Walk with assistive devise and /or staff member - L3  VTE Prevention/Management: (On Xarelto)   bleeding risk assessed   bleeding precations maintained   remove, assess skin, and reapply compression stockings   dorsiflexion/plantar flexion performed   fluids promoted   ROM (active) performed  Range of Motion: active ROM (range of motion) encouraged  Intervention: Prevent Infection  Flowsheets (Taken 10/6/2023 2020)  Infection Prevention:    environmental surveillance performed   equipment surfaces disinfected   hand hygiene promoted   personal protective equipment utilized   rest/sleep promoted  Goal: Optimal Comfort and Wellbeing  Outcome: Ongoing, Progressing  Intervention: Monitor Pain and Promote Comfort  Flowsheets (Taken 10/6/2023 2020)  Pain Management Interventions:   care clustered   pain management plan reviewed with patient/caregiver   medication offered but refused   quiet environment facilitated  Intervention: Provide Person-Centered Care  Flowsheets (Taken 10/6/2023 2020)  Trust Relationship/Rapport:   care explained   choices provided   emotional support provided   empathic listening provided   questions answered   questions encouraged   reassurance provided   thoughts/feelings acknowledged     Problem: Fall Injury Risk  Goal: Absence of Fall and Fall-Related Injury  Outcome: Ongoing, Progressing  Intervention: Identify and Manage Contributors  Flowsheets (Taken 10/6/2023 2020)  Self-Care Promotion:   independence encouraged   BADL personal objects within reach  Medication Review/Management:   medications reviewed   high-risk medications identified  Intervention: Promote Injury-Free Environment  Flowsheets (Taken 10/6/2023 2020)  Safety Promotion/Fall Prevention:   bed alarm set   assistive device/personal item within reach   Fall Risk reviewed with patient/family   Fall Risk signage in place   gait belt with ambulation   high risk medications identified   medications reviewed   lighting adjusted   nonskid shoes/socks when out of bed   room near unit station   instructed to call staff for mobility     Problem: Infection  Goal: Absence of Infection Signs and Symptoms  Outcome: Ongoing, Progressing  Intervention: Prevent or Manage Infection  Flowsheets (Taken 10/6/2023 2020)  Fever Reduction/Comfort Measures:   lightweight clothing   lightweight bedding   fluid intake increased  Infection Management: aseptic technique maintained      Problem: Skin Injury Risk Increased  Goal: Skin Health and Integrity  Outcome: Ongoing, Progressing  Intervention: Optimize Skin Protection  Flowsheets (Taken 10/6/2023 2020)  Pressure Reduction Techniques: frequent weight shift encouraged  Skin Protection:   incontinence pads utilized   tubing/devices free from skin contact  Head of Bed (HOB) Positioning: HOB at 20-30 degrees  Intervention: Promote and Optimize Oral Intake  Flowsheets (Taken 10/6/2023 2020)  Oral Nutrition Promotion: calorie-dense foods provided     Problem: Gas Exchange Impaired  Goal: Optimal Gas Exchange  Outcome: Ongoing, Progressing  Intervention: Optimize Oxygenation and Ventilation  Flowsheets (Taken 10/6/2023 2020)  Airway/Ventilation Management: (Perform Coughing and deep breathing exercises/IS; Sternal splinting) other (see comments)  Head of Bed (HOB) Positioning: HOB at 20-30 degrees     Problem: Pain Acute  Goal: Acceptable Pain Control and Functional Ability  Outcome: Ongoing, Progressing  Intervention: Develop Pain Management Plan  Flowsheets (Taken 10/6/2023 2020)  Pain Management Interventions:   care clustered   pain management plan reviewed with patient/caregiver   medication offered but refused   quiet environment facilitated  Intervention: Prevent or Manage Pain  Flowsheets (Taken 10/6/2023 2122)  Sleep/Rest Enhancement:   regular sleep/rest pattern promoted   room darkened   noise level reduced   awakenings minimized  Medication Review/Management:   medications reviewed   high-risk medications identified  Intervention: Optimize Psychosocial Wellbeing  Flowsheets (Taken 10/6/2023 2020)  Supportive Measures:   active listening utilized   positive reinforcement provided  Diversional Activities:   smartphone   television     Problem: Infection (Cardiac Rhythm Management Device)  Goal: Absence of Infection Signs and Symptoms  Outcome: Ongoing, Progressing  Intervention: Prevent or Manage Infection  Flowsheets (Taken 10/6/2023  2020)  Fever Reduction/Comfort Measures:   lightweight clothing   lightweight bedding   fluid intake increased  Infection Management: aseptic technique maintained     Problem: Pain (Cardiac Rhythm Management Device)  Goal: Acceptable Pain Level  Outcome: Ongoing, Progressing  Intervention: Prevent or Manage Pain  Flowsheets (Taken 10/6/2023 2020)  Pain Management Interventions:   care clustered   pain management plan reviewed with patient/caregiver   medication offered but refused   quiet environment facilitated     Problem: Diabetes Comorbidity  Goal: Blood Glucose Level Within Targeted Range  Outcome: Ongoing, Progressing  Intervention: Monitor and Manage Glycemia  Flowsheets (Taken 10/6/2023 2020)  Glycemic Management: blood glucose monitored

## 2023-10-07 NOTE — PROGRESS NOTES
[facility]  [facility]  Telehospitalist Progress Note    Patient Name: Kortney Leach  MRN: [id number]  Patient Class: - Swing   Admission Date: 9/29/2023  Length of Stay: 8 days  Attending Physician: Prosper Salazar MD  Primary Care Provider: Arnaldo Hernandez MD      Subjective:     Principal Problem:Status post mitral valve replacement      HPI:  Ms. Leach is a 77 year old  female with a history of type II diabetes mellitus, ABEL, hypertension, hyperlipidemia, and GERD who was originally admitted to [facility] on 9/20/2023 for elective mitral valve replacement as well as ligation of left atrial appendage by Dr. Steven Rosales due to mitral stenosis.  Postoperatively she developed junctional bradycardia with several episodes of atrial fibrillation with RVR and she underwent dual-chamber permanent pacemaker implantation on 9/25/2023 by Dr. Arnaud Moon secondary to sick sinus syndrome.  She was started on a baby aspirin, beta-blocker, and amiodarone taper with 400 mg by mouth twice daily for 3 days, then 200 mg by mouth daily for 3 days, then 200 mg by mouth twice daily, then 200 mg by mouth daily.  She had episodes of hypoglycemia and her farxiga and glimepiride were held.  She had no other complications throughout her hospital course and she was transferred to [facility] bed on 9/29/2023 for PT/OT and management of her multiple medical comorbidities.      Overview/Hospital Course:  She was admitted to [facility] bed on 9/29/2023 for rehab following hospitalization at [facility] s/p mitral valve replacement due to mitral stenosis and sick sinus syndrome s/p dual-chamber permanent pacemaker implantation.  Her metformin was decreased from 1000 mg to 500 mg by mouth twice daily on 9/30/2023 due to hypoglycemia.  She completed an amiodarone taper on 10/2/2023 and she was transitioned to 200 mg by  mouth daily.      Interval History: She was started on cetirizine 10 mg PO daily as needed yesterday for sneezing which has improved. She ambulated 350 feet with her rolling walker and modified independence twice yesterday and she required standby assistance with sit to stand transitions. She reports chest soreness s/p MVR but she denies any chest pain or shortness of breath.      Review of Systems   All other systems reviewed and are negative.    Objective:     Vital Signs (Most Recent):  Temp: 97.6 °F (36.4 °C) (10/07/23 0657)  Pulse: 74 (10/07/23 0657)  Resp: 18 (10/06/23 1903)  BP: (!) 144/83 (10/07/23 0657)  SpO2: 97 % (10/07/23 0657) Vital Signs (24h Range):  Temp:  [97.6 °F (36.4 °C)-98.3 °F (36.8 °C)] 97.6 °F (36.4 °C)  Pulse:  [74-83] 74  Resp:  [18] 18  SpO2:  [94 %-97 %] 97 %  BP: (137-144)/(82-83) 144/83     Weight: 103.2 kg (227 lb 8.2 oz)  Body mass index is 37.86 kg/m².    Intake/Output Summary (Last 24 hours) at 10/7/2023 0817  Last data filed at 10/6/2023 2025  Gross per 24 hour   Intake 870 ml   Output 301 ml   Net 569 ml      Physical Exam  General - Obese elderly  female in no acute distress.  HEENT - NCAT. No scleral icterus. Oropharynx clear. Mucous membranes moist.  Neck - No lymphadenopathy, thyromegaly, or JVD.  CVS - Regular rate and rhythm. No murmurs, rubs, or gallops.  Resp - Lungs are clear to auscultation bilaterally. No rales, wheeze, or rhonchi.   GI - Soft, nontender, nondistended, normoactive bowel sounds present.   Extremities - Trace edema in the left lower extremity which is chronic.   Neuro - CN II through XII are grossly intact. No focal neurological deficits. Alert and oriented times four.   Psych - Normal affect.  Skin - Anterior chest surgical site c/d/i.         Significant Labs: All pertinent labs within the past 24 hours have been reviewed.    10/05/2023:   CBC: WBC count 7.12, hemoglobin 11.1, hematocrit 372, platelet count 236.    BMP:  Sodium 140, potassium  4.1, chloride 104, CO2 25 14.6, creatinine 0.73, glucose 130, calcium 8.4, magnesium 1.8.    10/01/2023:   Magnesium 2.1.      9/30/2023:   CBC: WBC count 7.1, hemoglobin 11.8, hematocrit 38.1, platelet count 244.  BMP:  Sodium 140, potassium 4.7, chloride 104, CO2 26, BUN 8.6, creatinine 0.61, glucose 130, calcium 8.5, magnesium 1.4.          Significant Imaging: I have reviewed all pertinent imaging results/findings within the past 24 hours.     CXR 9/30/2023: Resolved pulmonary edema seen on the prior exam with minimal residual bibasilar subsegmental atelectasis.     CXR 9/25/2023: No acute abnormality of the chest.     CXR 9/23/2023: Bilateral small pleural effusions.     CXR 9/22/2023: No acute pulmonary process identified.     CXR 9/21/2023: Interval extubation.  Mild bibasilar atelectasis.     CXR 9/20/2023: No acute pulmonary process appreciated.     CXR 9/15/2023: No acute chest disease is identified.      Assessment/Plan:      * Status post mitral valve replacement  Continue routine postoperative care with PT/OT, sternal precautions, and pain control with norco as needed.  She is s/p mitral valve replacement as well as ligation of left atrial appendage on 9/20/2023 by Dr. Steven Rosales due to mitral stenosis.  Limited echocardiogram from 9/23/2023 showed normal left ventricular systolic function with an EF of 55%.    Sick sinus syndrome  Continue baby aspirin. She is s/p dual-chamber permanent pacemaker implantation on 9/25/2023 by Dr. Arnaud Moon.    Atrial fibrillation with RVR  Resolved. Continue amiodarone, beta-blocker, and rivarobaxan.    Physical deconditioning  Continue PT/OT.    Type II diabetes mellitus  Continue metformin which was decreased on 9/30/2023 due to hypoglycemia.  She was previously on glimepiride and farxiga as an outpatient which were stopped due to hypoglycemia.  Most recent hemoglobin A1c from 9/30/2023 was 6.0.    Hypertension  Continue lisinopril (substitute for benazepril) and  metoprolol tartrate.    Hyperlipidemia  Continue atorvastatin.    History of left lower extremity DVT   Continue rivaroxaban.    Obstructive sleep apnea  Continue CPAP at night.    GERD (gastroesophageal reflux disease)  Continue pantoprazole (substitute for dexilant) and famotidine.    COPD (chronic obstructive pulmonary disease)  Continue trelegy ellipta.    Asthma  Continue trelegy ellipta.    Chronic constipation  Continue docusate.    Glaucoma  Continue timolol ophthalmic solution.    Hypomagnesemia  Improved.  Continue magnesium oxide.    Disposition  Anticipate discharge home with home health next week.        VTE Risk Mitigation (From admission, onward)         Ordered     rivaroxaban tablet 20 mg  With dinner         09/29/23 1153     Place sequential compression device  Until discontinued         09/29/23 1153                Discharge Planning   SD:      Code Status: Full Code   Is the patient medically ready for discharge?:     Reason for patient still in hospital (select all that apply): PT / OT recommendations and Pending disposition  Discharge Plan A: Home with family, Home Health   Discharge Delays: None known at this time      This service was provided via telemedicine.  Type of Software: Audio/Visual.  Originating Site: [facility].  Distant Site: [facility].  Her exam was performed with the assistance of Amy Martinez RN.      Prosper Salazar MD  Department of Hospital Medicine   [facility]

## 2023-10-07 NOTE — SUBJECTIVE & OBJECTIVE
Interval History: She was started on cetirizine 10 mg PO daily as needed yesterday for sneezing which has improved. She ambulated 350 feet with her rolling walker and modified independence twice yesterday and she required standby assistance with sit to stand transitions. She reports chest soreness s/p MVR but she denies any chest pain or shortness of breath.      Review of Systems   All other systems reviewed and are negative.    Objective:     Vital Signs (Most Recent):  Temp: 97.6 °F (36.4 °C) (10/07/23 0657)  Pulse: 74 (10/07/23 0657)  Resp: 18 (10/06/23 1903)  BP: (!) 144/83 (10/07/23 0657)  SpO2: 97 % (10/07/23 0657) Vital Signs (24h Range):  Temp:  [97.6 °F (36.4 °C)-98.3 °F (36.8 °C)] 97.6 °F (36.4 °C)  Pulse:  [74-83] 74  Resp:  [18] 18  SpO2:  [94 %-97 %] 97 %  BP: (137-144)/(82-83) 144/83     Weight: 103.2 kg (227 lb 8.2 oz)  Body mass index is 37.86 kg/m².    Intake/Output Summary (Last 24 hours) at 10/7/2023 0817  Last data filed at 10/6/2023 2025  Gross per 24 hour   Intake 870 ml   Output 301 ml   Net 569 ml      Physical Exam  General - Obese elderly  female in no acute distress.  HEENT - NCAT. No scleral icterus. Oropharynx clear. Mucous membranes moist.  Neck - No lymphadenopathy, thyromegaly, or JVD.  CVS - Regular rate and rhythm. No murmurs, rubs, or gallops.  Resp - Lungs are clear to auscultation bilaterally. No rales, wheeze, or rhonchi.   GI - Soft, nontender, nondistended, normoactive bowel sounds present.   Extremities - Trace edema in the left lower extremity which is chronic.   Neuro - CN II through XII are grossly intact. No focal neurological deficits. Alert and oriented times four.   Psych - Normal affect.  Skin - Anterior chest surgical site c/d/i.         Significant Labs: All pertinent labs within the past 24 hours have been reviewed.    10/05/2023:   CBC: WBC count 7.12, hemoglobin 11.1, hematocrit 372, platelet count 236.    BMP:  Sodium 140, potassium 4.1, chloride 104,  CO2 25 14.6, creatinine 0.73, glucose 130, calcium 8.4, magnesium 1.8.    10/01/2023:   Magnesium 2.1.      9/30/2023:   CBC: WBC count 7.1, hemoglobin 11.8, hematocrit 38.1, platelet count 244.  BMP:  Sodium 140, potassium 4.7, chloride 104, CO2 26, BUN 8.6, creatinine 0.61, glucose 130, calcium 8.5, magnesium 1.4.          Significant Imaging: I have reviewed all pertinent imaging results/findings within the past 24 hours.     CXR 9/30/2023: Resolved pulmonary edema seen on the prior exam with minimal residual bibasilar subsegmental atelectasis.     CXR 9/25/2023: No acute abnormality of the chest.     CXR 9/23/2023: Bilateral small pleural effusions.     CXR 9/22/2023: No acute pulmonary process identified.     CXR 9/21/2023: Interval extubation.  Mild bibasilar atelectasis.     CXR 9/20/2023: No acute pulmonary process appreciated.     CXR 9/15/2023: No acute chest disease is identified.

## 2023-10-07 NOTE — PLAN OF CARE
Problem: Adult Inpatient Plan of Care  Goal: Plan of Care Review  10/5/2023 1509 by Batsheva Doherty RN  Outcome: Ongoing, Progressing  Flowsheets (Taken 10/5/2023 1509)  Plan of Care Reviewed With: patient  10/5/2023 1300 by Batsheva Doherty RN  Outcome: Ongoing, Progressing  Flowsheets (Taken 10/5/2023 1300)  Plan of Care Reviewed With: patient  Goal: Patient-Specific Goal (Individualized)  10/5/2023 1509 by Batsheva Doherty RN  Outcome: Ongoing, Progressing  Flowsheets (Taken 10/5/2023 1509)  Anxieties, Fears or Concerns: None  Individualized Care Needs: Maintian sternal precautions  10/5/2023 1300 by Batsheva Doherty RN  Outcome: Ongoing, Progressing  Flowsheets (Taken 10/5/2023 1300)  Anxieties, Fears or Concerns: None  Individualized Care Needs: Maintain sternal precautions  Goal: Absence of Hospital-Acquired Illness or Injury  10/5/2023 1509 by Batsheva Doherty RN  Outcome: Ongoing, Progressing  10/5/2023 1300 by Batsheva Doherty RN  Outcome: Ongoing, Progressing  Intervention: Identify and Manage Fall Risk  10/5/2023 1509 by Batsheva Doherty RN  Flowsheets (Taken 10/5/2023 1509)  Safety Promotion/Fall Prevention:   assistive device/personal item within reach   bed alarm set   in recliner, wheels locked   muscle strengthening facilitated   nonskid shoes/socks when out of bed   side rails raised x 3   instructed to call staff for mobility  10/5/2023 1300 by Batsheva Doherty RN  Flowsheets (Taken 10/5/2023 1300)  Safety Promotion/Fall Prevention:   assistive device/personal item within reach   bed alarm set   chair alarm set   Fall Risk reviewed with patient/family   in recliner, wheels locked   lighting adjusted   medications reviewed   muscle strengthening facilitated   nonskid shoes/socks when out of bed   instructed to call staff for mobility   side rails raised x 3  Intervention: Prevent Skin Injury  10/5/2023 1509 by Batsheva Doherty RN  Flowsheets (Taken 10/5/2023 1509)  Body Position:   legs  elevated   position changed independently   weight shifting   supine   sitting up in bed  Skin Protection:   adhesive use limited   incontinence pads utilized  10/5/2023 1300 by Batsheva Doherty RN  Flowsheets (Taken 10/5/2023 1300)  Skin Protection:   adhesive use limited   incontinence pads utilized  Intervention: Prevent and Manage VTE (Venous Thromboembolism) Risk  10/5/2023 1509 by Batsheva Doherty RN  Flowsheets (Taken 10/5/2023 1509)  Activity Management:   Rolling - L1   Sitting at edge of bed - L2   Standing - L3  VTE Prevention/Management:   ambulation promoted   fluids promoted   bleeding precations maintained  10/5/2023 1300 by Batsheva Doherty RN  Flowsheets (Taken 10/5/2023 1300)  Activity Management:   Ambulated -L4   Standing - L3   Rolling - L1   Up in chair - L3  Range of Motion: active ROM (range of motion) encouraged  Intervention: Prevent Infection  10/5/2023 1509 by Batsheva Doherty RN  Flowsheets (Taken 10/5/2023 1509)  Infection Prevention:   cohorting utilized   environmental surveillance performed   equipment surfaces disinfected   hand hygiene promoted   rest/sleep promoted   single patient room provided  10/5/2023 1300 by Batsheva Doherty RN  Flowsheets (Taken 10/5/2023 1300)  Infection Prevention:   cohorting utilized   environmental surveillance performed   equipment surfaces disinfected   hand hygiene promoted   rest/sleep promoted   single patient room provided  Goal: Optimal Comfort and Wellbeing  10/5/2023 1509 by Batsheva Doherty RN  Outcome: Ongoing, Progressing  10/5/2023 1300 by Batsheva Doherty RN  Outcome: Ongoing, Progressing  Intervention: Monitor Pain and Promote Comfort  10/5/2023 1509 by Batsheva Doherty RN  Flowsheets (Taken 10/5/2023 1509)  Pain Management Interventions:   care clustered   pillow support provided   medication offered   position adjusted  10/5/2023 1300 by Batsheva Doherty RN  Flowsheets (Taken 10/5/2023 1300)  Pain Management Interventions:    care clustered   medication offered   medication offered but refused   pillow support provided  Intervention: Provide Person-Centered Care  10/5/2023 1509 by Batsheva Doherty RN  Flowsheets (Taken 10/5/2023 1509)  Trust Relationship/Rapport:   care explained   questions answered   questions encouraged  10/5/2023 1300 by Batsheva Doherty RN  Flowsheets (Taken 10/5/2023 1300)  Trust Relationship/Rapport:   care explained   questions encouraged   questions answered  Goal: Readiness for Transition of Care  10/5/2023 1509 by Batsheva Doherty RN  Outcome: Ongoing, Progressing  10/5/2023 1300 by Batsheva Doherty RN  Outcome: Ongoing, Progressing     Problem: Fall Injury Risk  Goal: Absence of Fall and Fall-Related Injury  10/5/2023 1509 by Batsheva Doherty RN  Outcome: Ongoing, Progressing  10/5/2023 1300 by Batsheva Doherty RN  Outcome: Ongoing, Progressing  Intervention: Identify and Manage Contributors  Flowsheets (Taken 10/5/2023 1509)  Self-Care Promotion:   independence encouraged   BADL personal objects within reach   meal set-up provided   safe use of adaptive equipment encouraged  Intervention: Promote Injury-Free Environment  Flowsheets (Taken 10/5/2023 1509)  Safety Promotion/Fall Prevention:   assistive device/personal item within reach   bed alarm set   in recliner, wheels locked   muscle strengthening facilitated   nonskid shoes/socks when out of bed   side rails raised x 3   instructed to call staff for mobility     Problem: Infection  Goal: Absence of Infection Signs and Symptoms  10/5/2023 1509 by Batsheva Doherty RN  Outcome: Ongoing, Progressing  10/5/2023 1300 by Batsheva Doherty RN  Outcome: Ongoing, Progressing  Intervention: Prevent or Manage Infection  Flowsheets (Taken 10/5/2023 1509)  Infection Management: aseptic technique maintained     Problem: Skin Injury Risk Increased  Goal: Skin Health and Integrity  10/5/2023 1509 by Batsheva Doherty RN  Outcome: Ongoing, Progressing  10/5/2023  1300 by Batsheva Doherty RN  Outcome: Ongoing, Progressing  Intervention: Optimize Skin Protection  Flowsheets (Taken 10/5/2023 1509)  Skin Protection:   adhesive use limited   incontinence pads utilized  Head of Bed (HOB) Positioning: HOB at 30-45 degrees  Intervention: Promote and Optimize Oral Intake  Flowsheets (Taken 10/5/2023 1509)  Oral Nutrition Promotion:   calorie-dense foods provided   calorie-dense liquids provided   physical activity promoted   rest periods promoted   safe use of adaptive equipment encouraged     Problem: Gas Exchange Impaired  Goal: Optimal Gas Exchange  10/5/2023 1509 by Batsheva Doherty RN  Outcome: Ongoing, Progressing  10/5/2023 1300 by Batsheva Doherty RN  Outcome: Ongoing, Progressing  Intervention: Optimize Oxygenation and Ventilation  Flowsheets (Taken 10/5/2023 1509)  Airway/Ventilation Management: airway patency maintained  Head of Bed (HOB) Positioning: HOB at 30-45 degrees     Problem: Pain Acute  Goal: Acceptable Pain Control and Functional Ability  10/5/2023 1509 by Batsheva Doherty RN  Outcome: Ongoing, Progressing  10/5/2023 1300 by Batsheva Doherty RN  Outcome: Ongoing, Progressing  Intervention: Develop Pain Management Plan  Flowsheets (Taken 10/5/2023 1509)  Pain Management Interventions:   care clustered   pillow support provided   medication offered   position adjusted  Intervention: Prevent or Manage Pain  Flowsheets (Taken 10/5/2023 1509)  Bowel Elimination Promotion:   adequate fluid intake promoted   ambulation promoted     Problem: Device-Related Complication Risk (Cardiac Rhythm Management Device)  Goal: Effective Device Function  10/5/2023 1509 by Batsheva Doherty RN  Outcome: Ongoing, Progressing  10/5/2023 1300 by Batsheva Doherty RN  Outcome: Ongoing, Progressing     Problem: Infection (Cardiac Rhythm Management Device)  Goal: Absence of Infection Signs and Symptoms  10/5/2023 1509 by Batsheva Doherty RN  Outcome: Ongoing, Progressing  10/5/2023  1300 by Batsheva Doherty RN  Outcome: Ongoing, Progressing  Intervention: Prevent or Manage Infection  Flowsheets (Taken 10/5/2023 1509)  Infection Management: aseptic technique maintained     Problem: Pain (Cardiac Rhythm Management Device)  Goal: Acceptable Pain Level  10/5/2023 1509 by Batsheva Doherty RN  Outcome: Ongoing, Progressing  10/5/2023 1300 by Batsheva Doherty RN  Outcome: Ongoing, Progressing  Intervention: Prevent or Manage Pain  Flowsheets (Taken 10/5/2023 1509)  Pain Management Interventions:   care clustered   pillow support provided   medication offered   position adjusted     Problem: Impaired Wound Healing  Goal: Optimal Wound Healing  10/5/2023 1509 by Batsheva Doherty RN  Outcome: Ongoing, Progressing  10/5/2023 1300 by Batsheva Doherty RN  Outcome: Ongoing, Progressing  Intervention: Promote Wound Healing  Flowsheets (Taken 10/5/2023 1509)  Oral Nutrition Promotion:   calorie-dense foods provided   calorie-dense liquids provided   physical activity promoted   rest periods promoted   safe use of adaptive equipment encouraged  Activity Management:   Rolling - L1   Sitting at edge of bed - L2   Standing - L3  Pain Management Interventions:   care clustered   pillow support provided   medication offered   position adjusted

## 2023-10-08 PROBLEM — J32.9 CHRONIC SINUSITIS: Status: ACTIVE | Noted: 2023-10-08

## 2023-10-08 PROBLEM — D83.9 COMMON VARIABLE IMMUNODEFICIENCY: Status: ACTIVE | Noted: 2023-10-08

## 2023-10-08 LAB — POCT GLUCOSE: 104 MG/DL (ref 70–110)

## 2023-10-08 PROCEDURE — 25000003 PHARM REV CODE 250: Performed by: STUDENT IN AN ORGANIZED HEALTH CARE EDUCATION/TRAINING PROGRAM

## 2023-10-08 PROCEDURE — 94760 N-INVAS EAR/PLS OXIMETRY 1: CPT

## 2023-10-08 PROCEDURE — 99900035 HC TECH TIME PER 15 MIN (STAT)

## 2023-10-08 PROCEDURE — 11000004 HC SNF PRIVATE

## 2023-10-08 RX ORDER — CETIRIZINE HYDROCHLORIDE 10 MG/1
10 TABLET ORAL DAILY
Status: DISCONTINUED | OUTPATIENT
Start: 2023-10-09 | End: 2023-10-14 | Stop reason: HOSPADM

## 2023-10-08 RX ADMIN — TIMOLOL MALEATE 1 DROP: 5 SOLUTION OPHTHALMIC at 08:10

## 2023-10-08 RX ADMIN — PANCRELIPASE 2 CAPSULE: 24000; 76000; 120000 CAPSULE, DELAYED RELEASE PELLETS ORAL at 08:10

## 2023-10-08 RX ADMIN — AZELASTINE HYDROCHLORIDE 274 MCG: 137 SPRAY, METERED NASAL at 08:10

## 2023-10-08 RX ADMIN — AMIODARONE HYDROCHLORIDE 200 MG: 200 TABLET ORAL at 08:10

## 2023-10-08 RX ADMIN — DOCUSATE SODIUM 100 MG: 100 CAPSULE, LIQUID FILLED ORAL at 08:10

## 2023-10-08 RX ADMIN — CETIRIZINE HYDROCHLORIDE 10 MG: 10 TABLET, FILM COATED ORAL at 06:10

## 2023-10-08 RX ADMIN — PANTOPRAZOLE SODIUM 40 MG: 40 TABLET, DELAYED RELEASE ORAL at 08:10

## 2023-10-08 RX ADMIN — LISINOPRIL 20 MG: 10 TABLET ORAL at 08:10

## 2023-10-08 RX ADMIN — METFORMIN HYDROCHLORIDE 500 MG: 500 TABLET ORAL at 05:10

## 2023-10-08 RX ADMIN — FOLIC ACID 1 MG: 1 TABLET ORAL at 08:10

## 2023-10-08 RX ADMIN — ACETAMINOPHEN 650 MG: 325 TABLET ORAL at 08:10

## 2023-10-08 RX ADMIN — ASPIRIN 81 MG: 81 TABLET, COATED ORAL at 08:10

## 2023-10-08 RX ADMIN — Medication 400 MG: at 08:10

## 2023-10-08 RX ADMIN — PANCRELIPASE 2 CAPSULE: 24000; 76000; 120000 CAPSULE, DELAYED RELEASE PELLETS ORAL at 11:10

## 2023-10-08 RX ADMIN — FAMOTIDINE 20 MG: 20 TABLET ORAL at 08:10

## 2023-10-08 RX ADMIN — ATORVASTATIN CALCIUM 40 MG: 40 TABLET, FILM COATED ORAL at 08:10

## 2023-10-08 RX ADMIN — UMECLIDINIUM 62.5 MCG: 62.5 AEROSOL, POWDER ORAL at 08:10

## 2023-10-08 RX ADMIN — RIVAROXABAN 20 MG: 20 TABLET, FILM COATED ORAL at 05:10

## 2023-10-08 RX ADMIN — PANCRELIPASE 2 CAPSULE: 24000; 76000; 120000 CAPSULE, DELAYED RELEASE PELLETS ORAL at 05:10

## 2023-10-08 RX ADMIN — METFORMIN HYDROCHLORIDE 500 MG: 500 TABLET ORAL at 08:10

## 2023-10-08 RX ADMIN — FLUTICASONE FUROATE AND VILANTEROL TRIFENATATE 1 PUFF: 200; 25 POWDER RESPIRATORY (INHALATION) at 08:10

## 2023-10-08 NOTE — PLAN OF CARE
Problem: Adult Inpatient Plan of Care  Goal: Plan of Care Review  Outcome: Ongoing, Progressing  Flowsheets (Taken 10/7/2023 2053)  Plan of Care Reviewed With: patient  Goal: Patient-Specific Goal (Individualized)  Outcome: Ongoing, Progressing  Flowsheets (Taken 10/7/2023 2212)  Anxieties, Fears or Concerns: None offered at this time.  Individualized Care Needs:   Safety with mobility to prevent injury   Pain management   Sternal precautions   Encourage to perform IS in prevention of atelectasis/pneumonia   Monitor for s/s of infection   Monitor blood glucose levels.  Goal: Absence of Hospital-Acquired Illness or Injury  Outcome: Ongoing, Progressing  Intervention: Identify and Manage Fall Risk  Flowsheets (Taken 10/7/2023 2053)  Safety Promotion/Fall Prevention:   bed alarm set   assistive device/personal item within reach   Fall Risk reviewed with patient/family   Fall Risk signage in place   gait belt with ambulation   high risk medications identified   medications reviewed   lighting adjusted   nonskid shoes/socks when out of bed   room near unit station   side rails raised x 3   instructed to call staff for mobility  Intervention: Prevent Skin Injury  Flowsheets (Taken 10/7/2023 2053)  Body Position:   position changed independently   weight shifting   supine  Skin Protection:   incontinence pads utilized   silicone foam dressing in place   tubing/devices free from skin contact  Intervention: Prevent and Manage VTE (Venous Thromboembolism) Risk  Flowsheets (Taken 10/7/2023 2053)  Activity Management: Walk with assistive devise and /or staff member - L3  VTE Prevention/Management:   ambulation promoted   bleeding risk assessed   bleeding precations maintained   remove, assess skin, and reapply compression stockings   dorsiflexion/plantar flexion performed   fluids promoted   ROM (active) performed  Range of Motion: active ROM (range of motion) encouraged  Intervention: Prevent Infection  Flowsheets (Taken  10/7/2023 2053)  Infection Prevention:   environmental surveillance performed   equipment surfaces disinfected   hand hygiene promoted   personal protective equipment utilized   rest/sleep promoted  Goal: Optimal Comfort and Wellbeing  Outcome: Ongoing, Progressing  Intervention: Monitor Pain and Promote Comfort  Flowsheets (Taken 10/7/2023 2053)  Pain Management Interventions:   care clustered   pain management plan reviewed with patient/caregiver   medication offered   quiet environment facilitated  Intervention: Provide Person-Centered Care  Flowsheets (Taken 10/7/2023 2053)  Trust Relationship/Rapport:   care explained   choices provided   emotional support provided   empathic listening provided   questions answered   questions encouraged   reassurance provided   thoughts/feelings acknowledged     Problem: Fall Injury Risk  Goal: Absence of Fall and Fall-Related Injury  Outcome: Ongoing, Progressing  Intervention: Identify and Manage Contributors  Flowsheets (Taken 10/7/2023 2053)  Self-Care Promotion:   independence encouraged   BADL personal objects within reach   BADL personal routines maintained  Medication Review/Management:   medications reviewed   high-risk medications identified  Intervention: Promote Injury-Free Environment  Flowsheets (Taken 10/7/2023 2053)  Safety Promotion/Fall Prevention:   bed alarm set   assistive device/personal item within reach   Fall Risk reviewed with patient/family   Fall Risk signage in place   gait belt with ambulation   high risk medications identified   medications reviewed   lighting adjusted   nonskid shoes/socks when out of bed   room near unit station   side rails raised x 3   instructed to call staff for mobility     Problem: Infection  Goal: Absence of Infection Signs and Symptoms  Outcome: Ongoing, Progressing  Intervention: Prevent or Manage Infection  Flowsheets (Taken 10/7/2023 2053)  Fever Reduction/Comfort Measures:   lightweight clothing   lightweight  bedding   fluid intake increased  Infection Management: aseptic technique maintained     Problem: Skin Injury Risk Increased  Goal: Skin Health and Integrity  Outcome: Ongoing, Progressing  Intervention: Optimize Skin Protection  Flowsheets (Taken 10/7/2023 2053)  Pressure Reduction Techniques: frequent weight shift encouraged  Skin Protection:   incontinence pads utilized   silicone foam dressing in place   tubing/devices free from skin contact  Head of Bed (HOB) Positioning: HOB at 20-30 degrees  Intervention: Promote and Optimize Oral Intake  Flowsheets (Taken 10/7/2023 2053)  Oral Nutrition Promotion: calorie-dense foods provided     Problem: Gas Exchange Impaired  Goal: Optimal Gas Exchange  Outcome: Ongoing, Progressing  Intervention: Optimize Oxygenation and Ventilation  Flowsheets (Taken 10/7/2023 2053)  Airway/Ventilation Management: airway patency maintained  Head of Bed (HOB) Positioning: HOB at 20-30 degrees     Problem: Pain Acute  Goal: Acceptable Pain Control and Functional Ability  Outcome: Ongoing, Progressing  Intervention: Develop Pain Management Plan  Flowsheets (Taken 10/7/2023 2053)  Pain Management Interventions:   care clustered   pain management plan reviewed with patient/caregiver   medication offered   quiet environment facilitated  Intervention: Prevent or Manage Pain  Flowsheets (Taken 10/7/2023 2053)  Sleep/Rest Enhancement:   regular sleep/rest pattern promoted   room darkened   noise level reduced   awakenings minimized  Bowel Elimination Promotion: adequate fluid intake promoted  Medication Review/Management:   medications reviewed   high-risk medications identified  Intervention: Optimize Psychosocial Wellbeing  Flowsheets (Taken 10/7/2023 2053)  Supportive Measures:   active listening utilized   positive reinforcement provided   self-care encouraged  Diversional Activities:   smartphone   television     Problem: Infection (Cardiac Rhythm Management Device)  Goal: Absence of  Infection Signs and Symptoms  Outcome: Ongoing, Progressing  Intervention: Prevent or Manage Infection  Flowsheets (Taken 10/7/2023 2053)  Fever Reduction/Comfort Measures:   lightweight clothing   lightweight bedding   fluid intake increased  Infection Management: aseptic technique maintained     Problem: Impaired Wound Healing  Goal: Optimal Wound Healing  Outcome: Ongoing, Progressing  Intervention: Promote Wound Healing  Flowsheets (Taken 10/7/2023 2053)  Oral Nutrition Promotion: calorie-dense foods provided  Sleep/Rest Enhancement:   regular sleep/rest pattern promoted   room darkened   noise level reduced   awakenings minimized  Activity Management: Walk with assistive devise and /or staff member - L3  Pain Management Interventions:   care clustered   pain management plan reviewed with patient/caregiver   medication offered   quiet environment facilitated     Problem: Diabetes Comorbidity  Goal: Blood Glucose Level Within Targeted Range  Outcome: Ongoing, Progressing  Intervention: Monitor and Manage Glycemia  Flowsheets (Taken 10/7/2023 2053)  Glycemic Management: blood glucose monitored

## 2023-10-08 NOTE — ASSESSMENT & PLAN NOTE
Change cetirizine to 10 mg PO daily and start nasal irrigation twice daily. Hold antibiotics for now.

## 2023-10-08 NOTE — SUBJECTIVE & OBJECTIVE
Interval History: She reports a runny nose, sneezing, and watery eyes this morning after wearing her CPAP all night. She reports sinus pain and blood when she blows her nose but she denies any cough or headache. She had sinus surgery approximately 2 months ago and she was told to start irrigation twice daily if her symptoms flare up. She ambulated 200 feet with her rolling walker and contact guard assistance yesterday and she required minimal assistance with transfers and sit to stand transitions.      Review of Systems   All other systems reviewed and are negative.    Objective:     Vital Signs (Most Recent):  Temp: 98.2 °F (36.8 °C) (10/08/23 0714)  Pulse: 73 (10/08/23 0714)  Resp: 18 (10/07/23 1907)  BP: (!) 154/80 (10/08/23 0714)  SpO2: 96 % (10/08/23 0714) Vital Signs (24h Range):  Temp:  [98.2 °F (36.8 °C)-98.7 °F (37.1 °C)] 98.2 °F (36.8 °C)  Pulse:  [73-86] 73  Resp:  [18] 18  SpO2:  [95 %-99 %] 96 %  BP: (136-154)/(80) 154/80     Weight: 103.2 kg (227 lb 8.2 oz)  Body mass index is 37.86 kg/m².    Intake/Output Summary (Last 24 hours) at 10/8/2023 0807  Last data filed at 10/7/2023 2101  Gross per 24 hour   Intake 990 ml   Output --   Net 990 ml      Physical Exam  General - Obese elderly  female in no acute distress.  HEENT - NCAT. No scleral icterus. Oropharynx clear. Mucous membranes moist.  Neck - No lymphadenopathy, thyromegaly, or JVD.  CVS - Regular rate and rhythm. No murmurs, rubs, or gallops.  Resp - Lungs are clear to auscultation bilaterally. No rales, wheeze, or rhonchi.   GI - Soft, nontender, nondistended, normoactive bowel sounds present.   Extremities - Trace edema in the left lower extremity which is chronic.   Neuro - CN II through XII are grossly intact. No focal neurological deficits. Alert and oriented times four.   Psych - Normal affect.  Skin - Anterior chest surgical site c/d/i.         Significant Labs: All pertinent labs within the past 24 hours have been reviewed.      10/05/2023:   CBC: WBC count 7.12, hemoglobin 11.1, hematocrit 372, platelet count 236.    BMP:  Sodium 140, potassium 4.1, chloride 104, CO2 25 14.6, creatinine 0.73, glucose 130, calcium 8.4, magnesium 1.8.     10/01/2023:   Magnesium 2.1.       9/30/2023:   CBC: WBC count 7.1, hemoglobin 11.8, hematocrit 38.1, platelet count 244.  BMP:  Sodium 140, potassium 4.7, chloride 104, CO2 26, BUN 8.6, creatinine 0.61, glucose 130, calcium 8.5, magnesium 1.4.           Significant Imaging: I have reviewed all pertinent imaging results/findings within the past 24 hours.     CXR 9/30/2023: Resolved pulmonary edema seen on the prior exam with minimal residual bibasilar subsegmental atelectasis.     CXR 9/25/2023: No acute abnormality of the chest.     CXR 9/23/2023: Bilateral small pleural effusions.     CXR 9/22/2023: No acute pulmonary process identified.     CXR 9/21/2023: Interval extubation.  Mild bibasilar atelectasis.     CXR 9/20/2023: No acute pulmonary process appreciated.     CXR 9/15/2023: No acute chest disease is identified.

## 2023-10-08 NOTE — PROGRESS NOTES
[facility]  [facility] Medicine  Telehospitalist Progress Note    Patient Name: Kortney Leach  MRN: [id number]  Patient Class: - Swing   Admission Date: 9/29/2023  Length of Stay: 9 days  Attending Physician: Prosper Salazar MD  Primary Care Provider: Arnaldo Hernandez MD      Subjective:     Principal Problem:Status post mitral valve replacement       HPI:  Ms. Leach is a 77 year old  female with a history of type II diabetes mellitus, ABEL, hypertension, hyperlipidemia, and GERD who was originally admitted to [facility] on 9/20/2023 for elective mitral valve replacement as well as ligation of left atrial appendage by Dr. Steven Rosales due to mitral stenosis.  Postoperatively she developed junctional bradycardia with several episodes of atrial fibrillation with RVR and she underwent dual-chamber permanent pacemaker implantation on 9/25/2023 by Dr. Arnaud Moon secondary to sick sinus syndrome.  She was started on a baby aspirin, beta-blocker, and amiodarone taper with 400 mg by mouth twice daily for 3 days, then 200 mg by mouth daily for 3 days, then 200 mg by mouth twice daily, then 200 mg by mouth daily.  She had episodes of hypoglycemia and her farxiga and glimepiride were held.  She had no other complications throughout her hospital course and she was transferred to [facility] bed on 9/29/2023 for PT/OT and management of her multiple medical comorbidities.      Overview/Hospital Course:  She was admitted to [facility] bed on 9/29/2023 for rehab following hospitalization at [facility] s/p mitral valve replacement due to mitral stenosis and sick sinus syndrome s/p dual-chamber permanent pacemaker implantation.  Her metformin was decreased from 1000 mg to 500 mg by mouth twice daily on 9/30/2023 due to hypoglycemia.  She completed an amiodarone taper on 10/2/2023 and she was transitioned to 200 mg by  mouth daily.  She was started on cetirizine 10 mg PO daily as needed on 10/06/2023 due to an episode of sneezing, watery eyes, and runny nose.      Interval History: She reports a runny nose, sneezing, and watery eyes this morning after wearing her CPAP all night. She reports sinus pain and blood when she blows her nose but she denies any cough or headache. She had sinus surgery approximately 2 months ago and she was told to start nasal irrigation twice daily if her symptoms flare up. She ambulated 200 feet with her rolling walker and contact guard assistance yesterday and she required minimal assistance with transfers and sit to stand transitions.      Review of Systems   All other systems reviewed and are negative.    Objective:     Vital Signs (Most Recent):  Temp: 98.2 °F (36.8 °C) (10/08/23 0714)  Pulse: 73 (10/08/23 0714)  Resp: 18 (10/07/23 1907)  BP: (!) 154/80 (10/08/23 0714)  SpO2: 96 % (10/08/23 0714) Vital Signs (24h Range):  Temp:  [98.2 °F (36.8 °C)-98.7 °F (37.1 °C)] 98.2 °F (36.8 °C)  Pulse:  [73-86] 73  Resp:  [18] 18  SpO2:  [95 %-99 %] 96 %  BP: (136-154)/(80) 154/80     Weight: 103.2 kg (227 lb 8.2 oz)  Body mass index is 37.86 kg/m².    Intake/Output Summary (Last 24 hours) at 10/8/2023 0807  Last data filed at 10/7/2023 2101  Gross per 24 hour   Intake 990 ml   Output --   Net 990 ml      Physical Exam  General - Obese elderly  female in no acute distress.  HEENT - NCAT. No scleral icterus. Oropharynx clear. Mucous membranes moist.  Neck - No lymphadenopathy, thyromegaly, or JVD.  CVS - Regular rate and rhythm. No murmurs, rubs, or gallops.  Resp - Lungs are clear to auscultation bilaterally. No rales, wheeze, or rhonchi.   GI - Soft, nontender, nondistended, normoactive bowel sounds present.   Extremities - Trace edema in the left lower extremity which is chronic.   Neuro - CN II through XII are grossly intact. No focal neurological deficits. Alert and oriented times four.   Psych -  Normal affect.  Skin - Anterior chest surgical site c/d/i.         Significant Labs: All pertinent labs within the past 24 hours have been reviewed.     10/05/2023:   CBC: WBC count 7.12, hemoglobin 11.1, hematocrit 372, platelet count 236.    BMP:  Sodium 140, potassium 4.1, chloride 104, CO2 25 14.6, creatinine 0.73, glucose 130, calcium 8.4, magnesium 1.8.     10/01/2023:   Magnesium 2.1.       9/30/2023:   CBC: WBC count 7.1, hemoglobin 11.8, hematocrit 38.1, platelet count 244.  BMP:  Sodium 140, potassium 4.7, chloride 104, CO2 26, BUN 8.6, creatinine 0.61, glucose 130, calcium 8.5, magnesium 1.4.           Significant Imaging: I have reviewed all pertinent imaging results/findings within the past 24 hours.     CXR 9/30/2023: Resolved pulmonary edema seen on the prior exam with minimal residual bibasilar subsegmental atelectasis.     CXR 9/25/2023: No acute abnormality of the chest.     CXR 9/23/2023: Bilateral small pleural effusions.     CXR 9/22/2023: No acute pulmonary process identified.     CXR 9/21/2023: Interval extubation.  Mild bibasilar atelectasis.     CXR 9/20/2023: No acute pulmonary process appreciated.     CXR 9/15/2023: No acute chest disease is identified.         Assessment/Plan:      * Status post mitral valve replacement  Continue routine postoperative care with PT/OT, sternal precautions, and pain control with norco as needed.  She is s/p mitral valve replacement as well as ligation of left atrial appendage on 9/20/2023 by Dr. Steven Rosales due to mitral stenosis.  Limited echocardiogram from 9/23/2023 showed normal left ventricular systolic function with an EF of 55%.    Sick sinus syndrome  Continue baby aspirin. She is s/p dual-chamber permanent pacemaker implantation on 9/25/2023 by Dr. Arnaud Moon.    Atrial fibrillation with RVR  Resolved. Continue amiodarone, beta-blocker, and rivarobaxan.    Physical deconditioning  Continue PT/OT.    Type II diabetes mellitus  Continue metformin  which was decreased on 9/30/2023 due to hypoglycemia.  She was previously on glimepiride and farxiga as an outpatient which were stopped due to hypoglycemia.  Most recent hemoglobin A1c from 9/30/2023 was 6.0.    Chronic sinusitis  Change cetirizine to 10 mg PO daily and start nasal irrigation twice daily. Hold antibiotics for now.    Hypertension  Continue lisinopril (substitute for benazepril) and metoprolol tartrate.    Hyperlipidemia  Continue atorvastatin.    History of left lower extremity DVT   Continue rivaroxaban.    Obstructive sleep apnea  Continue CPAP at night.    GERD (gastroesophageal reflux disease)  Continue pantoprazole (substitute for dexilant) and famotidine.    COPD (chronic obstructive pulmonary disease)  Continue trelegy ellipta.    Common variable immunodeficiency  She was receiving IVIG once every 2 weeks at home. She missed her last infusion on 9/27/2023.    Asthma  Continue trelegy ellipta.    Chronic constipation  Continue docusate.    Glaucoma  Continue timolol ophthalmic solution.    Hypomagnesemia  Improved.  Continue magnesium oxide.    Disposition  Anticipate discharge home with home health next week.        VTE Risk Mitigation (From admission, onward)         Ordered     rivaroxaban tablet 20 mg  With dinner         09/29/23 1153     Place sequential compression device  Until discontinued         09/29/23 1153                Discharge Planning   SD:      Code Status: Full Code   Is the patient medically ready for discharge?:     Reason for patient still in hospital (select all that apply): PT / OT recommendations and Pending disposition  Discharge Plan A: Home with family, Home Health   Discharge Delays: None known at this time      This service was provided via telemedicine.  Type of Software: Audio/Visual.  Originating Site: [facility].  Distant Site: [place], [place].  Her exam was performed with the assistance of Cira Carroll RN.      Prosper Salazar MD  Department of  [facility] Medicine   [facility]

## 2023-10-09 LAB
ALBUMIN SERPL-MCNC: 3.2 G/DL (ref 3.4–4.8)
ALBUMIN/GLOB SERPL: 1 RATIO (ref 1.1–2)
ALP SERPL-CCNC: 81 UNIT/L (ref 40–150)
ALT SERPL-CCNC: 14 UNIT/L (ref 0–55)
AST SERPL-CCNC: 21 UNIT/L (ref 5–34)
BASOPHILS # BLD AUTO: 0.01 X10(3)/MCL
BASOPHILS NFR BLD AUTO: 0.2 %
BILIRUB SERPL-MCNC: 0.7 MG/DL
BUN SERPL-MCNC: 11.2 MG/DL (ref 9.8–20.1)
CALCIUM SERPL-MCNC: 9 MG/DL (ref 8.4–10.2)
CHLORIDE SERPL-SCNC: 106 MMOL/L (ref 98–107)
CO2 SERPL-SCNC: 26 MMOL/L (ref 23–31)
CREAT SERPL-MCNC: 0.75 MG/DL (ref 0.55–1.02)
EOSINOPHIL # BLD AUTO: 0.16 X10(3)/MCL (ref 0–0.9)
EOSINOPHIL NFR BLD AUTO: 2.4 %
ERYTHROCYTE [DISTWIDTH] IN BLOOD BY AUTOMATED COUNT: 14.3 % (ref 11.5–17)
GFR SERPLBLD CREATININE-BSD FMLA CKD-EPI: >60 MLS/MIN/1.73/M2
GLOBULIN SER-MCNC: 3.1 GM/DL (ref 2.4–3.5)
GLUCOSE SERPL-MCNC: 131 MG/DL (ref 82–115)
HCT VFR BLD AUTO: 41.4 % (ref 37–47)
HGB BLD-MCNC: 12.7 G/DL (ref 12–16)
IMM GRANULOCYTES # BLD AUTO: 0 X10(3)/MCL (ref 0–0.04)
IMM GRANULOCYTES NFR BLD AUTO: 0 %
LYMPHOCYTES # BLD AUTO: 1.91 X10(3)/MCL (ref 0.6–4.6)
LYMPHOCYTES NFR BLD AUTO: 29 %
MAGNESIUM SERPL-MCNC: 1.8 MG/DL (ref 1.6–2.6)
MCH RBC QN AUTO: 31.3 PG (ref 27–31)
MCHC RBC AUTO-ENTMCNC: 30.7 G/DL (ref 33–36)
MCV RBC AUTO: 102 FL (ref 80–94)
MONOCYTES # BLD AUTO: 0.58 X10(3)/MCL (ref 0.1–1.3)
MONOCYTES NFR BLD AUTO: 8.8 %
NEUTROPHILS # BLD AUTO: 3.93 X10(3)/MCL (ref 2.1–9.2)
NEUTROPHILS NFR BLD AUTO: 59.6 %
PLATELET # BLD AUTO: 268 X10(3)/MCL (ref 130–400)
PMV BLD AUTO: 10.5 FL (ref 7.4–10.4)
POTASSIUM SERPL-SCNC: 4.6 MMOL/L (ref 3.5–5.1)
PROT SERPL-MCNC: 6.3 GM/DL (ref 5.8–7.6)
RBC # BLD AUTO: 4.06 X10(6)/MCL (ref 4.2–5.4)
SODIUM SERPL-SCNC: 143 MMOL/L (ref 136–145)
WBC # SPEC AUTO: 6.59 X10(3)/MCL (ref 4.5–11.5)

## 2023-10-09 PROCEDURE — 25000003 PHARM REV CODE 250: Performed by: STUDENT IN AN ORGANIZED HEALTH CARE EDUCATION/TRAINING PROGRAM

## 2023-10-09 PROCEDURE — 99900035 HC TECH TIME PER 15 MIN (STAT)

## 2023-10-09 PROCEDURE — 85025 COMPLETE CBC W/AUTO DIFF WBC: CPT | Performed by: INTERNAL MEDICINE

## 2023-10-09 PROCEDURE — 11000004 HC SNF PRIVATE

## 2023-10-09 PROCEDURE — 83735 ASSAY OF MAGNESIUM: CPT | Performed by: INTERNAL MEDICINE

## 2023-10-09 PROCEDURE — 97110 THERAPEUTIC EXERCISES: CPT

## 2023-10-09 PROCEDURE — 97535 SELF CARE MNGMENT TRAINING: CPT

## 2023-10-09 PROCEDURE — 25000003 PHARM REV CODE 250: Performed by: INTERNAL MEDICINE

## 2023-10-09 PROCEDURE — 94760 N-INVAS EAR/PLS OXIMETRY 1: CPT

## 2023-10-09 PROCEDURE — 80053 COMPREHEN METABOLIC PANEL: CPT | Performed by: INTERNAL MEDICINE

## 2023-10-09 PROCEDURE — 97530 THERAPEUTIC ACTIVITIES: CPT

## 2023-10-09 RX ADMIN — FOLIC ACID 1 MG: 1 TABLET ORAL at 08:10

## 2023-10-09 RX ADMIN — ASPIRIN 81 MG: 81 TABLET, COATED ORAL at 08:10

## 2023-10-09 RX ADMIN — DOCUSATE SODIUM 100 MG: 100 CAPSULE, LIQUID FILLED ORAL at 08:10

## 2023-10-09 RX ADMIN — TIMOLOL MALEATE 1 DROP: 5 SOLUTION OPHTHALMIC at 08:10

## 2023-10-09 RX ADMIN — PANCRELIPASE 2 CAPSULE: 24000; 76000; 120000 CAPSULE, DELAYED RELEASE PELLETS ORAL at 12:10

## 2023-10-09 RX ADMIN — AMIODARONE HYDROCHLORIDE 200 MG: 200 TABLET ORAL at 08:10

## 2023-10-09 RX ADMIN — ACETAMINOPHEN 650 MG: 325 TABLET ORAL at 08:10

## 2023-10-09 RX ADMIN — CETIRIZINE HYDROCHLORIDE 10 MG: 10 TABLET, FILM COATED ORAL at 10:10

## 2023-10-09 RX ADMIN — ATORVASTATIN CALCIUM 40 MG: 40 TABLET, FILM COATED ORAL at 08:10

## 2023-10-09 RX ADMIN — METFORMIN HYDROCHLORIDE 500 MG: 500 TABLET ORAL at 08:10

## 2023-10-09 RX ADMIN — ACETAMINOPHEN 650 MG: 325 TABLET ORAL at 03:10

## 2023-10-09 RX ADMIN — FLUTICASONE FUROATE AND VILANTEROL TRIFENATATE 1 PUFF: 200; 25 POWDER RESPIRATORY (INHALATION) at 08:10

## 2023-10-09 RX ADMIN — UMECLIDINIUM 62.5 MCG: 62.5 AEROSOL, POWDER ORAL at 08:10

## 2023-10-09 RX ADMIN — METFORMIN HYDROCHLORIDE 500 MG: 500 TABLET ORAL at 04:10

## 2023-10-09 RX ADMIN — RIVAROXABAN 20 MG: 20 TABLET, FILM COATED ORAL at 04:10

## 2023-10-09 RX ADMIN — AZELASTINE HYDROCHLORIDE 274 MCG: 137 SPRAY, METERED NASAL at 08:10

## 2023-10-09 RX ADMIN — PANTOPRAZOLE SODIUM 40 MG: 40 TABLET, DELAYED RELEASE ORAL at 08:10

## 2023-10-09 RX ADMIN — PANCRELIPASE 2 CAPSULE: 24000; 76000; 120000 CAPSULE, DELAYED RELEASE PELLETS ORAL at 08:10

## 2023-10-09 RX ADMIN — Medication 400 MG: at 08:10

## 2023-10-09 RX ADMIN — PANCRELIPASE 2 CAPSULE: 24000; 76000; 120000 CAPSULE, DELAYED RELEASE PELLETS ORAL at 04:10

## 2023-10-09 RX ADMIN — LISINOPRIL 20 MG: 10 TABLET ORAL at 08:10

## 2023-10-09 RX ADMIN — FAMOTIDINE 20 MG: 20 TABLET ORAL at 08:10

## 2023-10-09 NOTE — PLAN OF CARE
Problem: Adult Inpatient Plan of Care  Goal: Plan of Care Review  Outcome: Ongoing, Progressing  Flowsheets (Taken 10/8/2023 2015)  Plan of Care Reviewed With: patient  Goal: Patient-Specific Goal (Individualized)  Outcome: Ongoing, Progressing  Flowsheets (Taken 10/8/2023 2015)  Anxieties, Fears or Concerns: Sinus irritation. Use of home med prescribed by ENT for irrigation and prevention of infection.  Individualized Care Needs:   Pharmacist will review home medication tomorrow morning to approve use as prescribed. Safety with mobility to prevent injury   Sternal and pacemaker precautions   Monitor for s/s of infection   Encourage IS use to prevent atelectasis/pneumonia.  Goal: Absence of Hospital-Acquired Illness or Injury  Outcome: Ongoing, Progressing  Intervention: Identify and Manage Fall Risk  Flowsheets (Taken 10/8/2023 2015)  Safety Promotion/Fall Prevention:   bed alarm set   assistive device/personal item within reach   Fall Risk reviewed with patient/family   Fall Risk signage in place   gait belt with ambulation   medications reviewed   lighting adjusted   nonskid shoes/socks when out of bed   room near unit station   instructed to call staff for mobility  Intervention: Prevent Skin Injury  Flowsheets (Taken 10/8/2023 2015)  Body Position:   position changed independently   supine   weight shifting  Skin Protection:   incontinence pads utilized   silicone foam dressing in place   tubing/devices free from skin contact  Intervention: Prevent and Manage VTE (Venous Thromboembolism) Risk  Flowsheets (Taken 10/8/2023 2015)  Activity Management: Walk with assistive devise and /or staff member - L3  VTE Prevention/Management: (On Xarelto)   bleeding risk assessed   bleeding precations maintained   remove, assess skin, and reapply compression stockings   dorsiflexion/plantar flexion performed   fluids promoted   ROM (active) performed  Range of Motion: active ROM (range of motion) encouraged  Intervention:  Prevent Infection  Flowsheets (Taken 10/8/2023 2015)  Infection Prevention:   environmental surveillance performed   equipment surfaces disinfected   hand hygiene promoted   personal protective equipment utilized   rest/sleep promoted  Goal: Optimal Comfort and Wellbeing  Outcome: Ongoing, Progressing  Intervention: Monitor Pain and Promote Comfort  Flowsheets (Taken 10/8/2023 2015)  Pain Management Interventions:   care clustered   pain management plan reviewed with patient/caregiver   medication offered   pillow support provided   quiet environment facilitated  Intervention: Provide Person-Centered Care  Flowsheets (Taken 10/8/2023 2015)  Trust Relationship/Rapport:   care explained   choices provided   emotional support provided   empathic listening provided   questions answered   questions encouraged   reassurance provided   thoughts/feelings acknowledged     Problem: Fall Injury Risk  Goal: Absence of Fall and Fall-Related Injury  Outcome: Ongoing, Progressing  Intervention: Identify and Manage Contributors  Flowsheets (Taken 10/8/2023 2015)  Self-Care Promotion:   independence encouraged   BADL personal objects within reach   BADL personal routines maintained  Medication Review/Management: medications reviewed  Intervention: Promote Injury-Free Environment  Flowsheets (Taken 10/8/2023 2015)  Safety Promotion/Fall Prevention:   bed alarm set   assistive device/personal item within reach   Fall Risk reviewed with patient/family   Fall Risk signage in place   gait belt with ambulation   medications reviewed   lighting adjusted   nonskid shoes/socks when out of bed   room near unit station   instructed to call staff for mobility     Problem: Infection  Goal: Absence of Infection Signs and Symptoms  Outcome: Ongoing, Progressing  Intervention: Prevent or Manage Infection  Flowsheets (Taken 10/8/2023 2015)  Fever Reduction/Comfort Measures:   lightweight clothing   lightweight bedding  Infection Management: aseptic  technique maintained     Problem: Skin Injury Risk Increased  Goal: Skin Health and Integrity  Outcome: Ongoing, Progressing  Intervention: Optimize Skin Protection  Flowsheets (Taken 10/8/2023 2015)  Pressure Reduction Techniques: frequent weight shift encouraged  Skin Protection:   incontinence pads utilized   silicone foam dressing in place   tubing/devices free from skin contact  Head of Bed (HOB) Positioning: HOB at 20-30 degrees  Intervention: Promote and Optimize Oral Intake  Flowsheets (Taken 10/8/2023 2015)  Oral Nutrition Promotion: calorie-dense foods provided     Problem: Gas Exchange Impaired  Goal: Optimal Gas Exchange  Outcome: Ongoing, Progressing  Intervention: Optimize Oxygenation and Ventilation  Flowsheets (Taken 10/8/2023 2015)  Airway/Ventilation Management: airway patency maintained  Head of Bed (HOB) Positioning: HOB at 20-30 degrees     Problem: Pain Acute  Goal: Acceptable Pain Control and Functional Ability  Outcome: Ongoing, Progressing  Intervention: Develop Pain Management Plan  Flowsheets (Taken 10/8/2023 2015)  Pain Management Interventions:   care clustered   pain management plan reviewed with patient/caregiver   medication offered   pillow support provided   quiet environment facilitated  Intervention: Prevent or Manage Pain  Flowsheets (Taken 10/8/2023 2015)  Sleep/Rest Enhancement:   regular sleep/rest pattern promoted   awakenings minimized   noise level reduced   room darkened  Sensory Stimulation Regulation:   care clustered   quiet environment promoted  Bowel Elimination Promotion: adequate fluid intake promoted  Medication Review/Management: medications reviewed     Problem: Infection (Cardiac Rhythm Management Device)  Goal: Absence of Infection Signs and Symptoms  Outcome: Ongoing, Progressing  Intervention: Prevent or Manage Infection  Flowsheets (Taken 10/8/2023 2015)  Fever Reduction/Comfort Measures:   lightweight clothing   lightweight bedding  Infection Management:  aseptic technique maintained     Problem: Impaired Wound Healing  Goal: Optimal Wound Healing  Outcome: Ongoing, Progressing  Intervention: Promote Wound Healing  Flowsheets (Taken 10/8/2023 2015)  Oral Nutrition Promotion: calorie-dense foods provided  Sleep/Rest Enhancement:   regular sleep/rest pattern promoted   awakenings minimized   noise level reduced   room darkened  Activity Management: Walk with assistive devise and /or staff member - L3  Pain Management Interventions:   care clustered   pain management plan reviewed with patient/caregiver   medication offered   pillow support provided   quiet environment facilitated     Problem: Diabetes Comorbidity  Goal: Blood Glucose Level Within Targeted Range  Outcome: Ongoing, Progressing  Intervention: Monitor and Manage Glycemia  Flowsheets (Taken 10/8/2023 2015)  Glycemic Management: blood glucose monitored

## 2023-10-09 NOTE — PROGRESS NOTES
Inpatient Nutrition Evaluation    Admit Date: 9/29/2023   Total duration of encounter: 10 days    Nutrition Recommendation/Prescription     Continue heart healthy diabetic diet.     Nutrition Assessment     Chart Review    Reason Seen: continuous nutrition monitoring, length of stay, and follow-up    Malnutrition Screening Tool Results   Have you recently lost weight without trying?: No  Have you been eating poorly because of a decreased appetite?: No   MST Score: 0     Diagnosis:  S/p mitral valve replacement, sick sinus syndrome, Physical deconditioning, DM type II, Chronic sinusitis, HTN, hyperlipidemia, history of left lower extremity DVT, Obstructive sleep apnea, GERD, COPD, common variable immunodeficiency, asthma, chronic constipation, glaucoma, hypomagnesemia, disposition    Relevant Medical History: Anticoagulant long-term use  Date Unknown  Asthma  Date Unknown  Chronic sinusitis, unspecified  Date Unknown  COPD (chronic obstructive pulmonary disease)  Date Unknown  CVID (common variable immunodeficiency)  Date Unknown  Diabetes mellitus, type 2  Date Unknown  GERD (gastroesophageal reflux disease)  Date Unknown  Hypertension  Date Unknown  Severe mitral valve regurgitation  Date Unknown  Sleep apnea   Date Unknown  SOB (shortness of breath)  Date Unknown  Unspecified glaucoma  Date Unknown  Weakness  Patrice as Reviewed    Nutrition-Related Medications: atorvastatin, famotidine, folic acid, lipase-protease amylase, magnesium oxide, metformin, pantoprazole,     Nutrition-Related Labs:   Latest Reference Range & Units 10/05/23 03:26 10/06/23 04:50 10/06/23 20:21 10/07/23 06:06 10/08/23 06:01 10/09/23 03:34   WBC 4.50 - 11.50 x10(3)/mcL 7.12     6.59   RBC 4.20 - 5.40 x10(6)/mcL 3.57 (L)     4.06 (L)   Hemoglobin 12.0 - 16.0 g/dL 11.1 (L)     12.7   Hematocrit 37.0 - 47.0 % 37.2     41.4   MCV 80.0 - 94.0 fL 104.2 (H)     102.0 (H)   MCH 27.0 - 31.0 pg 31.1 (H)     31.3 (H)   MCHC 33.0 - 36.0 g/dL 29.8 (L)      30.7 (L)   RDW 11.5 - 17.0 % 14.7     14.3   Platelet Count 130 - 400 x10(3)/mcL 246     268   MPV 7.4 - 10.4 fL 10.0     10.5 (H)   Neut % % 55.6     59.6   LYMPH % % 28.5     29.0   Mono % % 12.8     8.8   Eosinophil % % 2.5     2.4   Basophil % % 0.3     0.2   Immature Granulocytes % 0.3     0.0   Neut # 2.1 - 9.2 x10(3)/mcL 3.96     3.93   Lymph # 0.6 - 4.6 x10(3)/mcL 2.03     1.91   Mono # 0.1 - 1.3 x10(3)/mcL 0.91     0.58   Eos # 0 - 0.9 x10(3)/mcL 0.18     0.16   Baso # <=0.2 x10(3)/mcL 0.02     0.01   Immature Grans (Abs) 0 - 0.04 x10(3)/mcL 0.02     0.00   Sodium 136 - 145 mmol/L 140     143   Potassium 3.5 - 5.1 mmol/L 4.1     4.6   Chloride 98 - 107 mmol/L 104     106   CO2 23 - 31 mmol/L 25     26   Anion Gap mEq/L 11.0        BUN 9.8 - 20.1 mg/dL 14.6     11.2   Creatinine 0.55 - 1.02 mg/dL 0.73     0.75   BUN/CREAT RATIO  20        eGFR mls/min/1.73/m2 >60     >60   Glucose 82 - 115 mg/dL 130 (H)     131 (H)   Calcium 8.4 - 10.2 mg/dL 8.4     9.0   Magnesium  1.60 - 2.60 mg/dL 1.80     1.80   ALP 40 - 150 unit/L      81   PROTEIN TOTAL 5.8 - 7.6 gm/dL      6.3   Albumin 3.4 - 4.8 g/dL      3.2 (L)   Albumin/Globulin Ratio 1.1 - 2.0 ratio      1.0 (L)   BILIRUBIN TOTAL <=1.5 mg/dL      0.7   AST 5 - 34 unit/L      21   ALT 0 - 55 unit/L      14   Globulin, Total 2.4 - 3.5 gm/dL      3.1   POCT Glucose 70 - 110 mg/dL  115 (H) 106 113 (H) 104    (L): Data is abnormally low  (H): Data is abnormally high    Diet Order: Diet heart healthy Diabetic  Oral Supplement Order: none  Appetite/Oral Intake: good/% of meals  Factors Affecting Nutritional Intake: none identified  Food/Yazdanism/Cultural Preferences: none reported  Food Allergies: none reported    Skin Integrity: bruised (ecchymotic)  Wound(s):      Altered Skin Integrity 10/02/23 2054 Right medial Buttocks Incontinence associated dermatitis-Tissue loss description: Not applicable     Comments    Pt reports intake is good. No c/o of at this time.  "+BM per patient. LBM on 10/9/23. PO Intake % of meals.     Anthropometrics    Height: 5' 5" (165.1 cm)    Last Weight: 97.1 kg (214 lb 1.1 oz) (10/09/23 0500) Weight Method: Bed Scale  BMI (Calculated): 35.6  BMI Classification: obese grade II (BMI 35-39.9)     Ideal Body Weight (IBW), Female: 125 lb     % Ideal Body Weight, Female (lb): 182.02 %                             Usual Weight Provided By: not applicable    Wt Readings from Last 5 Encounters:   10/09/23 97.1 kg (214 lb 1.1 oz)   09/25/23 102.5 kg (225 lb 15.5 oz)   09/06/23 100.7 kg (222 lb 0.1 oz)   08/29/23 100.2 kg (220 lb 12.8 oz)   08/19/23 104.3 kg (230 lb)     Weight Change(s) Since Admission:  Admit Weight: 103.2 kg (227 lb 8.2 oz) (09/29/23 1132)  -6.1 kg wt loss. Questionable. Intake has been good. Will monitor intake.   Patient Education    Not applicable.    Monitoring & Evaluation     Dietitian will monitor food and beverage intake, weight, weight change, electrolyte/renal panel, glucose/endocrine profile, and gastrointestinal profile.  Nutrition Risk/Follow-Up: low (follow-up in 5-7 days)  Patients assigned 'low nutrition risk' status do not qualify for a full nutritional assessment but will be monitored and re-evaluated in a 5-7 day time period. Please consult if re-evaluation needed sooner.   "

## 2023-10-09 NOTE — PT/OT/SLP PROGRESS
Occupational Therapy  Treatment    Name: Kortney Leach    : 1945 (77 y.o.)  MRN: [id number]           TREATMENT SUMMARY AND RECOMMENDATIONS:      OT Date of Treatment: 10/09/23  OT Start Time: 1005  OT Stop Time: 1035  OT Total Time (min): 30 min      Subjective Assessment:   No complaints  Lethargic   x Awake, alert, cooperative  Impulsive    Uncooperative   Flat affect    Agitated  c/o pain   x Appropriate  c/o fatigue    Confused  Treated at bedside     Emotionally labile x Treated in gym/dept.      Other:        Therapy Precautions:    Cognitive deficits  Spinal precautions    Collar - hard x Sternal precautions    Collar - soft   TLSO   x Fall risk  LSO    Hip precautions - posterior  Knee immobilizer    Hip precautions - anterior  WBAT    Impaired communication  Partial weightbearing    Oxygen  TTWB    PEG tube  NWB    Visual deficits      Hearing deficits   Other:        Treatment Objectives:     Mobility Training:    Mobility task Assist level Comments    Bed mobility     Transfer SBA Stand step t/f from WC to recliner using a RW and gait belt    Sit to stands transitions SBA WC<standing using a RW and gait belt    Functional mobility SBA 300ft using a RW and gait belt; following with a WC   Sitting balance     Standing balance      Other:        ADL Training:    ADL Assist level Comments    Feeding     Grooming/hygiene     Bathing     Upper body dressing     Lower body dressing     Toileting     Toilet transfer     Adaptive equipment training     Other:           Therapeutic Exercise:   Exercise Sets Reps Comments   UBE strengthening exercises 3 10 2# weighted dowel completing bicep curls, chest press, and shoulder press   [id number] of forward and backward rows   UBE endurance training  2 5 minutes Ergometer level 2; forwards and backwards                   Additional Comments:      Assessment: Patient tolerated session well. Pt continues to show progress towards her goals. Pt states she feels  as if she is progressing and self-care tasks are getting easier to perform. She has a TTB at home and would like to practice a shower.     GOALS:   Multidisciplinary Problems       Occupational Therapy Goals          Problem: Occupational Therapy    Goal Priority Disciplines Outcome Interventions   Occupational Therapy Goal     OT, PT/OT Ongoing, Progressing    Description: Goals to be met by: Discharge     Patient will increase functional independence with ADLs by performing:    UE Dressing with Set-up Assistance. GOAL MET 10/3/23  LE Dressing with Moderate Assistance. GOAL MET 10/3/23  Grooming while standing at sink with Stand-by Assistance. Goal met 10/2/23  Toileting from bedside commode with Stand-by Assistance for hygiene and clothing management. Goal met 10/2/23  Bathing from  shower chair/bench with Stand-by Assistance.  Toilet transfer to bedside commode with Contact Guard Assistance.     New Goals:     Pt will perform UB dressing requiring MI.   Pt will perform LB dressing requiring MI.   Pt will perform toileting requiring MI.   Pt will perform grooming while standing at the sink requiring MI.                          Recommendations:     Discharge Recommendations: home with home health, home health PT, home health OT  Discharge Equipment Recommendations:  none  Barriers to discharge:  Decreased caregiver support, Other (Comment) (Daughter works during the day)     Plan:     Patient to be seen 6 x/week to address the above listed problems via self-care/home management, therapeutic activities, therapeutic exercises  Plan of Care Expires: 10/20/23  Plan of Care Reviewed with: patient  Revisions made to plan of care: No      Skilled OT Minutes Provided: 30  Communication with Treatment Team:     Equipment recommendations:       At end of treatment, patient remained:  x Up in chair     Up in wheelchair in room    In bed   x With alarm activated    Bed rails up   x Call bell in reach     Family/friends  present    Restraints secured properly    In bathroom with CNA/RN notified    In gym with PT/PTA/tech    Nurse aware    Other:

## 2023-10-09 NOTE — PLAN OF CARE
Problem: Adult Inpatient Plan of Care  Goal: Plan of Care Review  10/5/2023 1509 by Batsheva Doherty RN  Outcome: Ongoing, Progressing  Flowsheets (Taken 10/5/2023 1509)  Plan of Care Reviewed With: patient  10/5/2023 1300 by Batsheva Doherty RN  Outcome: Ongoing, Progressing  Flowsheets (Taken 10/5/2023 1300)  Plan of Care Reviewed With: patient  Goal: Patient-Specific Goal (Individualized)  10/5/2023 1509 by Batsheva Doherty RN  Outcome: Ongoing, Progressing  Flowsheets (Taken 10/5/2023 1509)  Anxieties, Fears or Concerns: None  Individualized Care Needs: M[place] sternal precautions  10/5/2023 1300 by Batsheva Doherty RN  Outcome: Ongoing, Progressing  Flowsheets (Taken 10/5/2023 1300)  Anxieties, Fears or Concerns: None  Individualized Care Needs: Maintain sternal precautions  Goal: Absence of Hospital-Acquired Illness or Injury  10/5/2023 1509 by Batsheva Doherty RN  Outcome: Ongoing, Progressing  10/5/2023 1300 by Batsheva Doherty RN  Outcome: Ongoing, Progressing  Intervention: Identify and Manage Fall Risk  10/5/2023 1509 by Batsheva Doherty RN  Flowsheets (Taken 10/5/2023 1509)  Safety Promotion/Fall Prevention:   assistive device/personal item within reach   bed alarm set   in recliner, wheels locked   muscle strengthening facilitated   nonskid shoes/socks when out of bed   side rails raised x 3   instructed to call staff for mobility  10/5/2023 1300 by Batsheva Doherty RN  Flowsheets (Taken 10/5/2023 1300)  Safety Promotion/Fall Prevention:   assistive device/personal item within reach   bed alarm set   chair alarm set   Fall Risk reviewed with patient/family   in recliner, wheels locked   lighting adjusted   medications reviewed   muscle strengthening facilitated   nonskid shoes/socks when out of bed   instructed to call staff for mobility   side rails raised x 3  Intervention: Prevent Skin Injury  10/5/2023 1509 by Batsheva Doherty RN  Flowsheets (Taken 10/5/2023 1509)  Body Position:   legs  elevated   position changed independently   weight shifting   supine   sitting up in bed  Skin Protection:   adhesive use limited   incontinence pads utilized  10/5/2023 1300 by Batsheva Doherty RN  Flowsheets (Taken 10/5/2023 1300)  Skin Protection:   adhesive use limited   incontinence pads utilized  Intervention: Prevent and Manage VTE (Venous Thromboembolism) Risk  10/5/2023 1509 by Batsheva Doherty RN  Flowsheets (Taken 10/5/2023 1509)  Activity Management:   Rolling - L1   Sitting at edge of bed - L2   Standing - L3  VTE Prevention/Management:   ambulation promoted   fluids promoted   bleeding precations maintained  10/5/2023 1300 by Batsheva Doherty RN  Flowsheets (Taken 10/5/2023 1300)  Activity Management:   Ambulated -L4   Standing - L3   Rolling - L1   Up in chair - L3  Range of Motion: active ROM (range of motion) encouraged  Intervention: Prevent Infection  10/5/2023 1509 by Batsheva Doherty RN  Flowsheets (Taken 10/5/2023 1509)  Infection Prevention:   cohorting utilized   environmental surveillance performed   equipment surfaces disinfected   hand hygiene promoted   rest/sleep promoted   single patient room provided  10/5/2023 1300 by Batsheva Doherty RN  Flowsheets (Taken 10/5/2023 1300)  Infection Prevention:   cohorting utilized   environmental surveillance performed   equipment surfaces disinfected   hand hygiene promoted   rest/sleep promoted   single patient room provided  Goal: Optimal Comfort and Wellbeing  10/5/2023 1509 by Batsheva Doherty RN  Outcome: Ongoing, Progressing  10/5/2023 1300 by Batsheva Doherty RN  Outcome: Ongoing, Progressing  Intervention: Monitor Pain and Promote Comfort  10/5/2023 1509 by Batsheva Doherty RN  Flowsheets (Taken 10/5/2023 1509)  Pain Management Interventions:   care clustered   pillow support provided   medication offered   position adjusted  10/5/2023 1300 by Batsheva Doherty RN  Flowsheets (Taken 10/5/2023 1300)  Pain Management Interventions:    care clustered   medication offered   medication offered but refused   pillow support provided  Intervention: Provide Person-Centered Care  10/5/2023 1509 by Batsheva Doherty RN  Flowsheets (Taken 10/5/2023 1509)  Trust Relationship/Rapport:   care explained   questions answered   questions encouraged  10/5/2023 1300 by Batsheva Doherty RN  Flowsheets (Taken 10/5/2023 1300)  Trust Relationship/Rapport:   care explained   questions encouraged   questions answered  Goal: Readiness for Transition of Care  10/5/2023 1509 by Batsheva Doherty RN  Outcome: Ongoing, Progressing  10/5/2023 1300 by Batsheva Doherty RN  Outcome: Ongoing, Progressing     Problem: Fall Injury Risk  Goal: Absence of Fall and Fall-Related Injury  10/5/2023 1509 by Batsheva Doherty RN  Outcome: Ongoing, Progressing  10/5/2023 1300 by Batsheva Doherty RN  Outcome: Ongoing, Progressing  Intervention: Identify and Manage Contributors  Flowsheets (Taken 10/5/2023 1509)  Self-Care Promotion:   independence encouraged   BADL personal objects within reach   meal set-up provided   safe use of adaptive equipment encouraged  Intervention: Promote Injury-Free Environment  Flowsheets (Taken 10/5/2023 1509)  Safety Promotion/Fall Prevention:   assistive device/personal item within reach   bed alarm set   in recliner, wheels locked   muscle strengthening facilitated   nonskid shoes/socks when out of bed   side rails raised x 3   instructed to call staff for mobility     Problem: Infection  Goal: Absence of Infection Signs and Symptoms  10/5/2023 1509 by Batsheva Doherty RN  Outcome: Ongoing, Progressing  10/5/2023 1300 by Batsheva Doherty RN  Outcome: Ongoing, Progressing  Intervention: Prevent or Manage Infection  Flowsheets (Taken 10/5/2023 1509)  Infection Management: aseptic technique maintained     Problem: Skin Injury Risk Increased  Goal: Skin Health and Integrity  10/5/2023 1509 by Batsheva Doherty RN  Outcome: Ongoing, Progressing  10/5/2023  1300 by Batsheva Doherty RN  Outcome: Ongoing, Progressing  Intervention: Optimize Skin Protection  Flowsheets (Taken 10/5/2023 1509)  Skin Protection:   adhesive use limited   incontinence pads utilized  Head of Bed (HOB) Positioning: HOB at 30-45 degrees  Intervention: Promote and Optimize Oral Intake  Flowsheets (Taken 10/5/2023 1509)  Oral Nutrition Promotion:   calorie-dense foods provided   calorie-dense liquids provided   physical activity promoted   rest periods promoted   safe use of adaptive equipment encouraged     Problem: Gas Exchange Impaired  Goal: Optimal Gas Exchange  10/5/2023 1509 by Batsheva Doherty RN  Outcome: Ongoing, Progressing  10/5/2023 1300 by Batsheva Doherty RN  Outcome: Ongoing, Progressing  Intervention: Optimize Oxygenation and Ventilation  Flowsheets (Taken 10/5/2023 1509)  Airway/Ventilation Management: airway patency maintained  Head of Bed (HOB) Positioning: HOB at 30-45 degrees     Problem: Pain Acute  Goal: Acceptable Pain Control and Functional Ability  10/5/2023 1509 by Batsheva Doherty RN  Outcome: Ongoing, Progressing  10/5/2023 1300 by Batsheva Doherty RN  Outcome: Ongoing, Progressing  Intervention: Develop Pain Management Plan  Flowsheets (Taken 10/5/2023 1509)  Pain Management Interventions:   care clustered   pillow support provided   medication offered   position adjusted  Intervention: Prevent or Manage Pain  Flowsheets (Taken 10/5/2023 1509)  Bowel Elimination Promotion:   adequate fluid intake promoted   ambulation promoted     Problem: Device-Related Complication Risk (Cardiac Rhythm Management Device)  Goal: Effective Device Function  10/5/2023 1509 by Batsheva Dohrety RN  Outcome: Ongoing, Progressing  10/5/2023 1300 by Batsheva Doherty RN  Outcome: Ongoing, Progressing     Problem: Infection (Cardiac Rhythm Management Device)  Goal: Absence of Infection Signs and Symptoms  10/5/2023 1509 by Batsheva Doherty RN  Outcome: Ongoing, Progressing  10/5/2023  1300 by Batsheva Doherty RN  Outcome: Ongoing, Progressing  Intervention: Prevent or Manage Infection  Flowsheets (Taken 10/5/2023 1509)  Infection Management: aseptic technique maintained     Problem: Pain (Cardiac Rhythm Management Device)  Goal: Acceptable Pain Level  10/5/2023 1509 by Batsheva Doherty RN  Outcome: Ongoing, Progressing  10/5/2023 1300 by Batsheva Doherty RN  Outcome: Ongoing, Progressing  Intervention: Prevent or Manage Pain  Flowsheets (Taken 10/5/2023 1509)  Pain Management Interventions:   care clustered   pillow support provided   medication offered   position adjusted     Problem: Impaired Wound Healing  Goal: Optimal Wound Healing  10/5/2023 1509 by Batsheva Doherty RN  Outcome: Ongoing, Progressing  10/5/2023 1300 by Batsheva Doherty RN  Outcome: Ongoing, Progressing  Intervention: Promote Wound Healing  Flowsheets (Taken 10/5/2023 1509)  Oral Nutrition Promotion:   calorie-dense foods provided   calorie-dense liquids provided   physical activity promoted   rest periods promoted   safe use of adaptive equipment encouraged  Activity Management:   Rolling - L1   Sitting at edge of bed - L2   Standing - L3  Pain Management Interventions:   care clustered   pillow support provided   medication offered   position adjusted

## 2023-10-09 NOTE — PLAN OF CARE
Problem: Occupational Therapy  Goal: Occupational Therapy Goal  Description: Goals to be met by: Discharge     Patient will increase functional independence with ADLs by performing:    UE Dressing with Set-up Assistance. GOAL MET 10/3/23  LE Dressing with Moderate Assistance. GOAL MET 10/3/23  Grooming while standing at sink with Stand-by Assistance. Goal met 10/2/23  Toileting from bedside commode with Stand-by Assistance for hygiene and clothing management. Goal met 10/2/23  Bathing from shower chair/bench with Stand-by Assistance.  Toilet transfer to bedside commode with Contact Guard Assistance. Goal met 10/9/23    New Goals:     Pt will perform UB dressing requiring MI.   Pt will perform LB dressing requiring MI.   Pt will perform toileting requiring MI.   Pt will perform grooming while standing at the sink requiring MI.     Outcome: Ongoing, Progressing      Laceration of right hand without foreign body, initial encounter

## 2023-10-09 NOTE — PT/OT/SLP PROGRESS
Physical Therapy Treatment Note           Name: Kortney Leach    : 1945 (77 y.o.)  MRN: [id number]           TREATMENT SUMMARY AND RECOMMENDATIONS:    PT Received On: 10/09/23  PT Start Time: 930     PT Stop Time: 1005  PT Total Time (min): 35 min     Subjective Assessment:   No complaints  Lethargic   x Awake, alert, cooperative  Uncooperative    Agitated  c/o pain    Appropriate  c/o fatigue    Confused  Treated at bedside     Emotionally labile x Treated in gym/dept.    Impulsive  Other:    Flat affect       Therapy Precautions:    Cognitive deficits  Spinal precautions    Collar - hard x Sternal precautions    Collar - soft   TLSO    Fall risk  LSO    Hip precautions - posterior  Knee immobilizer    Hip precautions - anterior  WBAT    Impaired communication  Partial weightbearing    Oxygen  TTWB    PEG tube  NWB    Visual deficits  Other:    Hearing deficits          Treatment Objectives:     Mobility Training:   Assist level Comments    Bed mobility     Transfer     Gait MOD I X350ft using RW   Sit to stand transitions SBA X10 sit to stands from wc level while maintaining sternal pxns   Sitting balance     Standing balance      Wheelchair mobility     Car transfer     Other:          Therapeutic Exercise:   Exercise Sets Reps Comments   Hip flexion, hip ABD, LAQ, hamstring curls, ankle PF/DF 1 20 Performed short sitting  2# ankle weights  Red TB                         Additional Comments:  Discussed with patient and OT different methods to carry out BADLs at home with use of RW - including walker tray or walker bag, etc.    Assessment: Patient tolerated session well. She is currently functioning at a SBA-MOD I level with functional mobility and is improving in strength, endurance, and exercise tolerance. She still requires minimal cues for maintenance of sternal precautions. She is progressing well towards set goals and will continue to benefit from skilled PT services to increase  functional independence for safe return home.     PT Plan: continue POC  Revisions made to plan of care: No    GOALS:   Multidisciplinary Problems       Physical Therapy Goals          Problem: Physical Therapy    Goal Priority Disciplines Outcome Goal Variances Interventions   Physical Therapy Goal     PT, PT/OT Ongoing, Progressing     Description: Goals to be met by: Discharge  POC: Patient to be seen 5-6x/week QD/BID As tolerable     Patient will increase functional independence with mobility by performin. Supine to sit with Modified [place]  2. Sit to supine with Modified [place]  3. Sit to stand transfer with Modified [place]  4. Gait  x 350 feet with Modified [place] using Rolling Walker vs No AD vs LRAD. -- goal met 10/9 using RW  5. Low Fall Risk on 5x sit to stand test                         Skilled PT Minutes Provided: 35 minutes   Communication with Treatment Team:     Equipment recommendations:       At end of treatment, patient remained:   Up in chair     Up in wheelchair in room    In bed    With alarm activated    Bed rails up    Call bell in reach     Family/friends present    Restraints secured properly    In bathroom with CNA/RN notified    Nurse aware   x In gym with therapist/tech    Other:

## 2023-10-09 NOTE — PT/OT/SLP PROGRESS
Physical Therapy Treatment Note           Name: Kortney Leach    : 1945 (77 y.o.)  MRN: [id number]           TREATMENT SUMMARY AND RECOMMENDATIONS:    PT Received On: 10/09/23  PT Start Time: 1333     PT Stop Time: 1400  PT Total Time (min): 27 min     Subjective Assessment:   No complaints  Lethargic   x Awake, alert, cooperative  Uncooperative    Agitated  c/o pain    Appropriate  c/o fatigue    Confused  Treated at bedside     Emotionally labile x Treated in gym/dept.    Impulsive  Other:    Flat affect       Therapy Precautions:    Cognitive deficits  Spinal precautions    Collar - hard x Sternal precautions    Collar - soft   TLSO    Fall risk  LSO    Hip precautions - posterior  Knee immobilizer    Hip precautions - anterior  WBAT    Impaired communication  Partial weightbearing    Oxygen  TTWB    PEG tube  NWB    Visual deficits  Other:    Hearing deficits          Treatment Objectives:     Mobility Training:   Assist level Comments    Bed mobility     Transfer     Gait MOD I X350ft using RW   Sit to stand transitions SBA-MOD I X5 sit to stands from wc level; good maintenance of sternal pxns   Sitting balance     Standing balance      Wheelchair mobility     Car transfer     Other:          Therapeutic Exercise:   Exercise Sets Reps Comments   LE cycling   5'F/ 5'B   Hip flexion, hip ABD, LAQ, ankle PF/DF 1 20 Performed short sitting  2# ankle weights                   Additional Comments:      Assessment: Patient tolerated session well. Discussed dc planning with patient this PM as she is reaching a level appropriate for dc. She reports she is getting stronger and gaining more confidence, however feels she is not at her previous level of function and feels she needs to be stronger to be home. Reports her daughter works during the day and she will be alone at home while she is at work, therefore needs to be as strong as possible. Encouraged patient to come up with her biggest challenges  at home so we can work on them in therapy. One thing patient reports which may be difficult is getting in/out of her bed at home -  she reports its a high bed. She will get with her daughter to have her daughter measure the height so therapy can work on techniques for bed mobility.    PT Plan: continue POC  Revisions made to plan of care: No    GOALS:   Multidisciplinary Problems       Physical Therapy Goals          Problem: Physical Therapy    Goal Priority Disciplines Outcome Goal Variances Interventions   Physical Therapy Goal     PT, PT/OT Ongoing, Progressing     Description: Goals to be met by: Discharge  POC: Patient to be seen 5-6x/week QD/BID As tolerable     Patient will increase functional independence with mobility by performin. Supine to sit with Modified [place]  2. Sit to supine with Modified [place]  3. Sit to stand transfer with Modified [place]  4. Gait  x 350 feet with Modified [place] using Rolling Walker vs No AD vs LRAD. -- goal met 10/9 using RW  5. Low Fall Risk on 5x sit to stand test                         Skilled PT Minutes Provided: 27 minutes   Communication with Treatment Team:     Equipment recommendations:       At end of treatment, patient remained:  x Up in chair     Up in wheelchair in room    In bed   x With alarm activated    Bed rails up   x Call bell in reach     Family/friends present    Restraints secured properly    In bathroom with CNA/RN notified    Nurse aware    In gym with therapist/tech    Other:

## 2023-10-09 NOTE — PLAN OF CARE
Problem: Physical Therapy  Goal: Physical Therapy Goal  Description: Goals to be met by: Discharge  POC: Patient to be seen 5-6x/week QD/BID As tolerable     Patient will increase functional independence with mobility by performin. Supine to sit with Modified [place]  2. Sit to supine with Modified [place]  3. Sit to stand transfer with Modified [place]  4. Gait  x 350 feet with Modified [place] using Rolling Walker vs No AD vs LRAD. -- goal met 10/9 using RW  5. Low Fall Risk on 5x sit to stand test    Outcome: Ongoing, Progressing

## 2023-10-09 NOTE — PT/OT/SLP PROGRESS
Occupational Therapy  Treatment    Name: Kortney Leach    : 1945 (77 y.o.)  MRN: [id number]           TREATMENT SUMMARY AND RECOMMENDATIONS:      OT Date of Treatment: 10/09/23  OT Start Time: 1250  OT Stop Time: 1330  OT Total Time (min): 40 min      Subjective Assessment:   No complaints  Lethargic   x Awake, alert, cooperative  Impulsive    Uncooperative   Flat affect    Agitated  c/o pain   x Appropriate  c/o fatigue    Confused x Treated at bedside     Emotionally labile  Treated in gym/dept.      Other:        Therapy Precautions:    Cognitive deficits  Spinal precautions    Collar - hard x Sternal precautions    Collar - soft   TLSO   x Fall risk  LSO    Hip precautions - posterior  Knee immobilizer    Hip precautions - anterior  WBAT    Impaired communication  Partial weightbearing    Oxygen  TTWB    PEG tube  NWB    Visual deficits      Hearing deficits   Other:        Treatment Objectives:     Mobility Training:    Mobility task Assist level Comments    Bed mobility     Transfer     Sit to stands transitions     Functional mobility     Sitting balance     Standing balance      Other:        ADL Training:    ADL Assist level Comments    Feeding     Grooming/hygiene     Bathing CGA/SBA Use of shower bench and HHS; able to wash 10/10 body parts; CGA when standing up to wash her backside   Upper body dressing Set-up Doff buttoned down shirt and don pull over shirt    Lower body dressing SBA Doff pants and brief and don new brief and pants   Toileting SVP +void; able to manage clothing and perform anterior hygiene   Toilet transfer SBA BSC<standing using a RW and gait belt    Adaptive equipment training     Other:           Therapeutic Exercise:   Exercise Sets Reps Comments                               Additional Comments:      Assessment: Patient tolerated session well. Pt on the toilet upon arrival to her room. Pt performs bathing well and is close to meeting her goal. Pt would benefit from  continued OT services to increase independence with occupational performance, decrease risk for falls, and decrease caregiver burden.     GOALS:   Multidisciplinary Problems       Occupational Therapy Goals          Problem: Occupational Therapy    Goal Priority Disciplines Outcome Interventions   Occupational Therapy Goal     OT, PT/OT Ongoing, Progressing    Description: Goals to be met by: Discharge     Patient will increase functional independence with ADLs by performing:    UE Dressing with Set-up Assistance. GOAL MET 10/3/23  LE Dressing with Moderate Assistance. GOAL MET 10/3/23  Grooming while standing at sink with Stand-by Assistance. Goal met 10/2/23  Toileting from bedside commode with Stand-by Assistance for hygiene and clothing management. Goal met 10/2/23  Bathing from  shower chair/bench with Stand-by Assistance.  Toilet transfer to bedside commode with Contact Guard Assistance.     New Goals:     Pt will perform UB dressing requiring MI.   Pt will perform LB dressing requiring MI.   Pt will perform toileting requiring MI.   Pt will perform grooming while standing at the sink requiring MI.                          Recommendations:     Discharge Recommendations: home with home health, home health PT, home health OT  Discharge Equipment Recommendations:  none  Barriers to discharge:  Decreased caregiver support, Other (Comment) (Daughter works during the day)     Plan:     Patient to be seen 6 x/week to address the above listed problems via self-care/home management, therapeutic activities, therapeutic exercises  Plan of Care Expires: 10/20/23  Plan of Care Reviewed with: patient  Revisions made to plan of care: Yes      Skilled OT Minutes Provided: 40  Communication with Treatment Team:     Equipment recommendations:       At end of treatment, patient remained:   Up in chair     Up in wheelchair in room    In bed    With alarm activated    Bed rails up    Call bell in reach     Family/friends present     Restraints secured properly    In bathroom with CNA/RN notified   x In gym with PT/PTA/tech    Nurse aware    Other:

## 2023-10-09 NOTE — PROGRESS NOTES
[facility] Surgical Unit  [facility] Medicine  Telehospitalist Progress Note    Patient Name: Kortney Leach  MRN: [id number]  Patient Class: - Swing   Admission Date: 9/29/2023  Length of Stay: 10 days  Attending Physician: Prosper Salazar MD  Primary Care Provider: Arnaldo Hernandez MD      Subjective:     Principal Problem:Status post mitral valve replacement      HPI:  Ms. Leach is a 77 year old  female with a history of type II diabetes mellitus, ABEL, hypertension, hyperlipidemia, and GERD who was originally admitted to [facility] on 9/20/2023 for elective mitral valve replacement as well as ligation of left atrial appendage by Dr. Steven Rosales due to mitral stenosis.  Postoperatively she developed junctional bradycardia with several episodes of atrial fibrillation with RVR and she underwent dual-chamber permanent pacemaker implantation on 9/25/2023 by Dr. Arnaud Moon secondary to sick sinus syndrome.  She was started on a baby aspirin, beta-blocker, and amiodarone taper with 400 mg by mouth twice daily for 3 days, then 200 mg by mouth daily for 3 days, then 200 mg by mouth twice daily, then 200 mg by mouth daily.  She had episodes of hypoglycemia and her farxiga and glimepiride were held.  She had no other complications throughout her hospital course and she was transferred to [facility] bed on 9/29/2023 for PT/OT and management of her multiple medical comorbidities.      Overview/Hospital Course:  She was admitted to [facility] bed on 9/29/2023 for rehab following hospitalization at [facility] s/p mitral valve replacement due to mitral stenosis and sick sinus syndrome s/p dual-chamber permanent pacemaker implantation.  Her metformin was decreased from 1000 mg to 500 mg by mouth twice daily on 9/30/2023 due to hypoglycemia.  She completed an amiodarone taper on 10/2/2023 and she was transitioned to 200 mg by  mouth daily.  She was started on cetirizine 10 mg PO daily as needed on 10/06/2023 due to an episode of sneezing, watery eyes, and runny nose.      Interval History: She had a sinus headache this morning at 3 am and she continues to have bleeding when she blows her nose. She states her runny nose and sneezing have improved. She has not yet started her nasal irrigations.      Review of Systems   All other systems reviewed and are negative.    Objective:     Vital Signs (Most Recent):  Temp: 98.8 °F (37.1 °C) (10/08/23 1925)  Pulse: 85 (10/08/23 1925)  Resp: 18 (10/08/23 1925)  BP: (!) 141/71 (10/08/23 1925)  SpO2: 96 % (10/08/23 1925) Vital Signs (24h Range):  Temp:  [98.8 °F (37.1 °C)] 98.8 °F (37.1 °C)  Pulse:  [80-85] 85  Resp:  [18] 18  SpO2:  [94 %-96 %] 96 %  BP: (141)/(71) 141/71     Weight: 97.1 kg (214 lb 1.1 oz)  Body mass index is 35.62 kg/m².    Intake/Output Summary (Last 24 hours) at 10/9/2023 0806  Last data filed at 10/8/2023 2022  Gross per 24 hour   Intake 1060 ml   Output --   Net 1060 ml      Physical Exam  General - Obese elderly  female in no acute distress.  HEENT - NCAT. No scleral icterus. Oropharynx clear. Mucous membranes moist.  Neck - No lymphadenopathy, thyromegaly, or JVD.  CVS - Regular rate and rhythm. No murmurs, rubs, or gallops.  Resp - Lungs are clear to auscultation bilaterally. No rales, wheeze, or rhonchi.   GI - Soft, nontender, nondistended, normoactive bowel sounds present.   Extremities - Trace edema in the left lower extremity which is chronic.   Neuro - CN II through XII are grossly intact. No focal neurological deficits. Alert and oriented times four.   Psych - Normal affect.  Skin - Anterior chest surgical site c/d/i.         Significant Labs: All pertinent labs within the past 24 hours have been reviewed.     10/9/2023:  CBC: WBC count 6.59, hemoglobin 12.7, hematocrit 41.4, platelet count 268.  CMP: Sodium 143, potassium 4.6, chloride 106, CO2 26, BUN 11.2,  creatinine 0.75, glucose 131, calcium 9.0, magnesium 1.8, alkaline phosphatase 81, total protein 6.3, albumin 3.2, total bilirubin 0.7, AST 21, ALT 14.    10/05/2023:   CBC: WBC count 7.12, hemoglobin 11.1, hematocrit 372, platelet count 236.    BMP:  Sodium 140, potassium 4.1, chloride 104, CO2 25 14.6, creatinine 0.73, glucose 130, calcium 8.4, magnesium 1.8.     10/01/2023:   Magnesium 2.1.       9/30/2023:   CBC: WBC count 7.1, hemoglobin 11.8, hematocrit 38.1, platelet count 244.  BMP:  Sodium 140, potassium 4.7, chloride 104, CO2 26, BUN 8.6, creatinine 0.61, glucose 130, calcium 8.5, magnesium 1.4.           Significant Imaging: I have reviewed all pertinent imaging results/findings within the past 24 hours.     CXR 9/30/2023: Resolved pulmonary edema seen on the prior exam with minimal residual bibasilar subsegmental atelectasis.     CXR 9/25/2023: No acute abnormality of the chest.     CXR 9/23/2023: Bilateral small pleural effusions.     CXR 9/22/2023: No acute pulmonary process identified.     CXR 9/21/2023: Interval extubation.  Mild bibasilar atelectasis.     CXR 9/20/2023: No acute pulmonary process appreciated.     CXR 9/15/2023: No acute chest disease is identified.         Assessment/Plan:      * Status post mitral valve replacement  Continue routine postoperative care with PT/OT, sternal precautions, and pain control with norco as needed.  She is s/p mitral valve replacement as well as ligation of left atrial appendage on 9/20/2023 by Dr. Steven Rosales due to mitral stenosis.  Limited echocardiogram from 9/23/2023 showed normal left ventricular systolic function with an EF of 55%.    Sick sinus syndrome  Continue baby aspirin. She is s/p dual-chamber permanent pacemaker implantation on 9/25/2023 by Dr. Arnaud Moon.    Atrial fibrillation with RVR  Resolved. Continue amiodarone, beta-blocker, and rivarobaxan.    Physical deconditioning  Continue PT/OT.    Type II diabetes mellitus  Continue metformin  which was decreased on 9/30/2023 due to hypoglycemia.  She was previously on glimepiride and farxiga as an outpatient which were stopped due to hypoglycemia.  Most recent hemoglobin A1c from 9/30/2023 was 6.0.    Chronic sinusitis  Continue nasal irrigations twice daily with vancomycin and cetirizine.    Hypertension  Continue lisinopril (substitute for benazepril) and metoprolol tartrate.    Hyperlipidemia  Continue atorvastatin.    History of left lower extremity DVT   Continue rivaroxaban.    Obstructive sleep apnea  Continue CPAP at night.    GERD (gastroesophageal reflux disease)  Continue pantoprazole (substitute for dexilant) and famotidine.    COPD (chronic obstructive pulmonary disease)  Continue trelegy ellipta.    Common variable immunodeficiency  She was receiving IVIG once every 2 weeks at home. She missed her last infusion on 9/27/2023.    Asthma  Continue trelegy ellipta.    Chronic constipation  Continue docusate.    Glaucoma  Continue timolol ophthalmic solution.    Hypomagnesemia  Improved.  Continue magnesium oxide.    Disposition  Anticipate discharge home with home health on 10/10/2023.    VTE Risk Mitigation (From admission, onward)         Ordered     rivaroxaban tablet 20 mg  With dinner         09/29/23 1153     Place sequential compression device  Until discontinued         09/29/23 1153                Discharge Planning   SD:      Code Status: Full Code   Is the patient medically ready for discharge?:     Reason for patient still in hospital (select all that apply): PT / OT recommendations and Pending disposition  Discharge Plan A: Home with family, Home Health   Discharge Delays: None known at this time      This service was provided via telemedicine.  Type of Software: Audio/Visual.  Originating Site: [facility].  Distant Site: [place], [place].  Her exam was performed with the assistance of Batsheva Doherty RN.      Prosper Salazar MD  Department of Hospital Medicine   [facility] [facility]

## 2023-10-09 NOTE — SUBJECTIVE & OBJECTIVE
Interval History: She had a sinus headache this morning at 3 am and she continues to have bleeding when she blows her nose. She states her runny nose and sneezing have improved. She has not yet started her nasal irrigations.      Review of Systems   All other systems reviewed and are negative.    Objective:     Vital Signs (Most Recent):  Temp: 98.8 °F (37.1 °C) (10/08/23 1925)  Pulse: 85 (10/08/23 1925)  Resp: 18 (10/08/23 1925)  BP: (!) 141/71 (10/08/23 1925)  SpO2: 96 % (10/08/23 1925) Vital Signs (24h Range):  Temp:  [98.8 °F (37.1 °C)] 98.8 °F (37.1 °C)  Pulse:  [80-85] 85  Resp:  [18] 18  SpO2:  [94 %-96 %] 96 %  BP: (141)/(71) 141/71     Weight: 97.1 kg (214 lb 1.1 oz)  Body mass index is 35.62 kg/m².    Intake/Output Summary (Last 24 hours) at 10/9/2023 0806  Last data filed at 10/8/2023 2022  Gross per 24 hour   Intake 1060 ml   Output --   Net 1060 ml      Physical Exam  General - Obese elderly  female in no acute distress.  HEENT - NCAT. No scleral icterus. Oropharynx clear. Mucous membranes moist.  Neck - No lymphadenopathy, thyromegaly, or JVD.  CVS - Regular rate and rhythm. No murmurs, rubs, or gallops.  Resp - Lungs are clear to auscultation bilaterally. No rales, wheeze, or rhonchi.   GI - Soft, nontender, nondistended, normoactive bowel sounds present.   Extremities - Trace edema in the left lower extremity which is chronic.   Neuro - CN II through XII are grossly intact. No focal neurological deficits. Alert and oriented times four.   Psych - Normal affect.  Skin - Anterior chest surgical site c/d/i.         Significant Labs: All pertinent labs within the past 24 hours have been reviewed.     10/9/2023:  CBC: WBC count 6.59, hemoglobin 12.7, hematocrit 41.4, platelet count 268.  CMP: Sodium 143, potassium 4.6, chloride 106, CO2 26, BUN 11.2, creatinine 0.75, glucose 131, calcium 9.0, magnesium 1.8, alkaline phosphatase 81, total protein 6.3, albumin 3.2, total bilirubin 0.7, AST 21, ALT  14.    10/05/2023:   CBC: WBC count 7.12, hemoglobin 11.1, hematocrit 372, platelet count 236.    BMP:  Sodium 140, potassium 4.1, chloride 104, CO2 25 14.6, creatinine 0.73, glucose 130, calcium 8.4, magnesium 1.8.     10/01/2023:   Magnesium 2.1.       9/30/2023:   CBC: WBC count 7.1, hemoglobin 11.8, hematocrit 38.1, platelet count 244.  BMP:  Sodium 140, potassium 4.7, chloride 104, CO2 26, BUN 8.6, creatinine 0.61, glucose 130, calcium 8.5, magnesium 1.4.           Significant Imaging: I have reviewed all pertinent imaging results/findings within the past 24 hours.     CXR 9/30/2023: Resolved pulmonary edema seen on the prior exam with minimal residual bibasilar subsegmental atelectasis.     CXR 9/25/2023: No acute abnormality of the chest.     CXR 9/23/2023: Bilateral small pleural effusions.     CXR 9/22/2023: No acute pulmonary process identified.     CXR 9/21/2023: Interval extubation.  Mild bibasilar atelectasis.     CXR 9/20/2023: No acute pulmonary process appreciated.     CXR 9/15/2023: No acute chest disease is identified.

## 2023-10-09 NOTE — PLAN OF CARE
[facility]  Discharge Reassessment    Primary Care Provider: Arnaldo Hernandez MD    Expected Discharge Date:     Reassessment (most recent)       Discharge Reassessment - 10/09/23 0733          Discharge Reassessment    Assessment Type Discharge Planning Reassessment     Did the patient's condition or plan change since previous assessment? No     Discharge Plan discussed with: Adult children     Communicated SD with patient/caregiver Date not available/Unable to determine     Discharge Plan A Home with family;Home Health     DME Needed Upon Discharge  none     Transition of Care Barriers None     Why the patient remains in the hospital Requires continued medical care        Post-Acute Status    Post-Acute Authorization Home Health     Home Health Status Pending medical clearance/testing     Hospital Resources/Appts/Education Provided Provided patient/caregiver with written discharge plan information     Discharge Delays None known at this time

## 2023-10-09 NOTE — NURSING
At 1300 nurse spoke to MARTHA Iverson for Dr Mixon ENT. Zayra clarified mediation orders for nasal rinse. Orders are to mix 1 saline packet, 1 capsule 0.75 mg betamethasone, and 1 vial of 1 gram vancomycin with distilled water in nasal flush bottle.  Patient may use 1-2 times per day. Instructions were faxed to hospital unit by Zayra and are in patient chart.

## 2023-10-10 LAB
POCT GLUCOSE: 113 MG/DL (ref 70–110)
POCT GLUCOSE: 94 MG/DL (ref 70–110)

## 2023-10-10 PROCEDURE — 97530 THERAPEUTIC ACTIVITIES: CPT | Mod: CQ

## 2023-10-10 PROCEDURE — 11000004 HC SNF PRIVATE

## 2023-10-10 PROCEDURE — 99900035 HC TECH TIME PER 15 MIN (STAT)

## 2023-10-10 PROCEDURE — 25000003 PHARM REV CODE 250: Performed by: STUDENT IN AN ORGANIZED HEALTH CARE EDUCATION/TRAINING PROGRAM

## 2023-10-10 PROCEDURE — 97110 THERAPEUTIC EXERCISES: CPT

## 2023-10-10 PROCEDURE — 94760 N-INVAS EAR/PLS OXIMETRY 1: CPT

## 2023-10-10 PROCEDURE — 97110 THERAPEUTIC EXERCISES: CPT | Mod: CQ

## 2023-10-10 PROCEDURE — 97530 THERAPEUTIC ACTIVITIES: CPT

## 2023-10-10 PROCEDURE — 25000003 PHARM REV CODE 250: Performed by: INTERNAL MEDICINE

## 2023-10-10 PROCEDURE — 97116 GAIT TRAINING THERAPY: CPT | Mod: CQ

## 2023-10-10 RX ADMIN — FLUTICASONE FUROATE AND VILANTEROL TRIFENATATE 1 PUFF: 200; 25 POWDER RESPIRATORY (INHALATION) at 11:10

## 2023-10-10 RX ADMIN — ATORVASTATIN CALCIUM 40 MG: 40 TABLET, FILM COATED ORAL at 08:10

## 2023-10-10 RX ADMIN — AZELASTINE HYDROCHLORIDE 274 MCG: 137 SPRAY, METERED NASAL at 11:10

## 2023-10-10 RX ADMIN — DOCUSATE SODIUM 100 MG: 100 CAPSULE, LIQUID FILLED ORAL at 08:10

## 2023-10-10 RX ADMIN — ASPIRIN 81 MG: 81 TABLET, COATED ORAL at 08:10

## 2023-10-10 RX ADMIN — FOLIC ACID 1 MG: 1 TABLET ORAL at 08:10

## 2023-10-10 RX ADMIN — TIMOLOL MALEATE 1 DROP: 5 SOLUTION OPHTHALMIC at 08:10

## 2023-10-10 RX ADMIN — METFORMIN HYDROCHLORIDE 500 MG: 500 TABLET ORAL at 04:10

## 2023-10-10 RX ADMIN — ACETAMINOPHEN 650 MG: 325 TABLET ORAL at 08:10

## 2023-10-10 RX ADMIN — PANCRELIPASE 2 CAPSULE: 24000; 76000; 120000 CAPSULE, DELAYED RELEASE PELLETS ORAL at 04:10

## 2023-10-10 RX ADMIN — CETIRIZINE HYDROCHLORIDE 10 MG: 10 TABLET, FILM COATED ORAL at 08:10

## 2023-10-10 RX ADMIN — AZELASTINE HYDROCHLORIDE 274 MCG: 137 SPRAY, METERED NASAL at 08:10

## 2023-10-10 RX ADMIN — RIVAROXABAN 20 MG: 20 TABLET, FILM COATED ORAL at 04:10

## 2023-10-10 RX ADMIN — AMIODARONE HYDROCHLORIDE 200 MG: 200 TABLET ORAL at 08:10

## 2023-10-10 RX ADMIN — PANCRELIPASE 2 CAPSULE: 24000; 76000; 120000 CAPSULE, DELAYED RELEASE PELLETS ORAL at 08:10

## 2023-10-10 RX ADMIN — METFORMIN HYDROCHLORIDE 500 MG: 500 TABLET ORAL at 08:10

## 2023-10-10 RX ADMIN — TIMOLOL MALEATE 1 DROP: 5 SOLUTION OPHTHALMIC at 11:10

## 2023-10-10 RX ADMIN — LISINOPRIL 20 MG: 10 TABLET ORAL at 08:10

## 2023-10-10 RX ADMIN — PANTOPRAZOLE SODIUM 40 MG: 40 TABLET, DELAYED RELEASE ORAL at 08:10

## 2023-10-10 RX ADMIN — FAMOTIDINE 20 MG: 20 TABLET ORAL at 08:10

## 2023-10-10 RX ADMIN — Medication 400 MG: at 08:10

## 2023-10-10 RX ADMIN — PANCRELIPASE 2 CAPSULE: 24000; 76000; 120000 CAPSULE, DELAYED RELEASE PELLETS ORAL at 11:10

## 2023-10-10 RX ADMIN — UMECLIDINIUM 62.5 MCG: 62.5 AEROSOL, POWDER ORAL at 11:10

## 2023-10-10 NOTE — ASSESSMENT & PLAN NOTE
She is scheduled to receive hizentra 30 gm SC x 1 on 10/11/2023. She was receiving hizentra injections once every 2 weeks at home. She missed her last injection on 9/27/2023.

## 2023-10-10 NOTE — PT/OT/SLP PROGRESS
Physical Therapy Treatment Note           Name: Kortney Leach    : 1945 (77 y.o.)  MRN: [id number]           TREATMENT SUMMARY AND RECOMMENDATIONS:    PT Received On: 10/10/23  PT Start Time: 1330     PT Stop Time: 1355  PT Total Time (min): 25 min     Subjective Assessment:  x No complaints  Lethargic   x Awake, alert, cooperative  Uncooperative    Agitated  c/o pain    Appropriate  c/o fatigue    Confused  Treated at bedside     Emotionally labile  Treated in gym/dept.    Impulsive  Other:    Flat affect       Therapy Precautions:    Cognitive deficits  Spinal precautions    Collar - hard x Sternal precautions    Collar - soft   TLSO    Fall risk  LSO    Hip precautions - posterior  Knee immobilizer    Hip precautions - anterior  WBAT    Impaired communication  Partial weightbearing    Oxygen  TTWB    PEG tube  NWB    Visual deficits  Other:    Hearing deficits          Treatment Objectives:     Mobility Training:   Assist level Comments    Bed mobility     Transfer     Gait SBA Amb on firm surface with  ft    Sit to stand transitions SBA Sit-stand 10 reps with no UE use   Sitting balance     Standing balance      Wheelchair mobility     Car transfer     Other:  Step ups  B LE step ups on 4 inch box with B UE supported        Therapeutic Exercise:   Exercise Sets Reps Comments   B LE exer standing  20 reps   Abd/add, knee lifts, calf raises                         Additional Comments:  No issues noted     Assessment: Patient tolerated session well.    PT Plan: continue  Revisions made to plan of care: No    GOALS:   Multidisciplinary Problems       Physical Therapy Goals          Problem: Physical Therapy    Goal Priority Disciplines Outcome Goal Variances Interventions   Physical Therapy Goal     PT, PT/OT Ongoing, Progressing     Description: Goals to be met by: Discharge  POC: Patient to be seen 5-6x/week QD/BID As tolerable     Patient will increase functional independence with  mobility by performin. Supine to sit with Modified [place]  2. Sit to supine with Modified [place]  3. Sit to stand transfer with Modified [place]  4. Gait  x 350 feet with Modified [place] using Rolling Walker vs No AD vs LRAD. -- goal met 10/9 using RW  5. Low Fall Risk on 5x sit to stand test                         Skilled PT Minutes Provided: 25   Communication with Treatment Team:     Equipment recommendations:       At end of treatment, patient remained:  x Up in chair     Up in wheelchair in room    In bed   x With alarm activated    Bed rails up   x Call bell in reach     Family/friends present    Restraints secured properly    In bathroom with CNA/RN notified    Nurse aware    In gym with therapist/tech    Other:

## 2023-10-10 NOTE — PT/OT/SLP PROGRESS
Occupational Therapy  Treatment    Name: Kortney Leach    : 1945 (77 y.o.)  MRN: [id number]           TREATMENT SUMMARY AND RECOMMENDATIONS:      OT Date of Treatment: 10/10/23  OT Start Time: 1300  OT Stop Time: 1330  OT Total Time (min): 30 min      Subjective Assessment:   No complaints  Lethargic   x Awake, alert, cooperative  Impulsive    Uncooperative   Flat affect    Agitated  c/o pain   x Appropriate  c/o fatigue    Confused  Treated at bedside     Emotionally labile x Treated in gym/dept.      Other:        Therapy Precautions:    Cognitive deficits  Spinal precautions    Collar - hard x Sternal precautions    Collar - soft   TLSO   x Fall risk  LSO    Hip precautions - posterior  Knee immobilizer    Hip precautions - anterior  WBAT    Impaired communication  Partial weightbearing    Oxygen  TTWB    PEG tube  NWB    Visual deficits      Hearing deficits   Other:        Treatment Objectives:     Mobility Training:    Mobility task Assist level Comments    Bed mobility SBA Supine<sitting EOB   Transfer     Sit to stands transitions SBA   SBA EOB<standing using RW and gait belt    [id number] t/fs on and off compliant surface initially using RW for support with BUEs, then one UE only, and finally no AD   Functional mobility SBA 400ft using a RW and gait belt; following with a WC   Sitting balance     Standing balance  SBA Dynamic standing balance activity on compliant surface having pt reach for resistive clips in various planes to increase independence with bathing. Pt uses RW for support with one UE. No LOB.    Other:        ADL Training:    ADL Assist level Comments    Feeding     Grooming/hygiene     Bathing     Upper body dressing     Lower body dressing     Toileting     Toilet transfer     Adaptive equipment training     Other:           Therapeutic Exercise:   Exercise Sets Reps Comments   UBE strengthening exercises  3 10 3# weighted dowel performing bicep curls, chest press, and straight  arm raises to shoulder height                         Additional Comments:      Assessment: Patient tolerated session well.     GOALS:   Multidisciplinary Problems       Occupational Therapy Goals          Problem: Occupational Therapy    Goal Priority Disciplines Outcome Interventions   Occupational Therapy Goal     OT, PT/OT Ongoing, Progressing    Description: Goals to be met by: Discharge     Patient will increase functional independence with ADLs by performing:    UE Dressing with Set-up Assistance. GOAL MET 10/3/23  LE Dressing with Moderate Assistance. GOAL MET 10/3/23  Grooming while standing at sink with Stand-by Assistance. Goal met 10/2/23  Toileting from bedside commode with Stand-by Assistance for hygiene and clothing management. Goal met 10/2/23  Bathing from shower chair/bench with Stand-by Assistance.  Toilet transfer to bedside commode with Contact Guard Assistance. Goal met 10/9/23    New Goals:     Pt will perform UB dressing requiring MI.   Pt will perform LB dressing requiring MI.   Pt will perform toileting requiring MI.   Pt will perform grooming while standing at the sink requiring MI.                          Recommendations:     Discharge Recommendations: home with home health, home health PT, home health OT  Discharge Equipment Recommendations:  none  Barriers to discharge:  Decreased caregiver support, Other (Comment) (Daughter works during the day)     Plan:     Patient to be seen 6 x/week to address the above listed problems via self-care/home management, therapeutic activities, therapeutic exercises  Plan of Care Expires: 10/20/23  Plan of Care Reviewed with: patient  Revisions made to plan of care: Yes      Skilled OT Minutes Provided: 30  Communication with Treatment Team:     Equipment recommendations:       At end of treatment, patient remained:   Up in chair     Up in wheelchair in room    In bed    With alarm activated    Bed rails up    Call bell in reach     Family/friends  present    Restraints secured properly    In bathroom with CNA/RN notified   x In gym with PT/PTA/tech    Nurse aware    Other:

## 2023-10-10 NOTE — PLAN OF CARE
Problem: Occupational Therapy  Goal: Occupational Therapy Goal  Description: Goals to be met by: Discharge     Patient will increase functional independence with ADLs by performing:    UE Dressing with Set-up Assistance. GOAL MET 10/3/23  LE Dressing with Moderate Assistance. GOAL MET 10/3/23  Grooming while standing at sink with Stand-by Assistance. Goal met 10/2/23  Toileting from bedside commode with Stand-by Assistance for hygiene and clothing management. Goal met 10/2/23  Bathing from shower chair/bench with Stand-by Assistance. GOAL MET 10/10/23  Toilet transfer to bedside commode with Contact Guard Assistance. Goal met 10/9/23    New Goals:     Pt will perform UB dressing requiring MI.   Pt will perform LB dressing requiring MI.   Pt will perform toileting requiring MI.   Pt will perform grooming while standing at the sink requiring MI.     Outcome: Ongoing, Progressing

## 2023-10-10 NOTE — PT/OT/SLP PROGRESS
Physical Therapy Treatment Note           Name: Kortney Leach    : 1945 (77 y.o.)  MRN: [id number]           TREATMENT SUMMARY AND RECOMMENDATIONS:    PT Received On: 10/10/23  PT Start Time: 1030     PT Stop Time: 1055  PT Total Time (min): 25 min     Subjective Assessment:  x No complaints  Lethargic   x Awake, alert, cooperative  Uncooperative    Agitated  c/o pain    Appropriate  c/o fatigue    Confused  Treated at bedside     Emotionally labile  Treated in gym/dept.    Impulsive  Other:    Flat affect       Therapy Precautions:    Cognitive deficits  Spinal precautions    Collar - hard x Sternal precautions    Collar - soft   TLSO    Fall risk  LSO    Hip precautions - posterior  Knee immobilizer    Hip precautions - anterior  WBAT    Impaired communication  Partial weightbearing    Oxygen  TTWB    PEG tube  NWB    Visual deficits  Other:    Hearing deficits          Treatment Objectives:     Mobility Training:   Assist level Comments    Bed mobility     Transfer     Gait SBA Amb on firm surface with  ft    Sit to stand transitions CGA Sit-stand 5 reps x 2 with no UE use   Sitting balance     Standing balance      Wheelchair mobility     Car transfer     Other:          Therapeutic Exercise:   Exercise Sets Reps Comments   UBE 10 min  UBE with B LE use   B LE exer sitting  20 reps   Ankle pumps, TKE, abd/add, knee lifts                    Additional Comments:  Sternal precautions     Assessment: Patient tolerated session well.    PT Plan: continue  Revisions made to plan of care: No    GOALS:   Multidisciplinary Problems       Physical Therapy Goals          Problem: Physical Therapy    Goal Priority Disciplines Outcome Goal Variances Interventions   Physical Therapy Goal     PT, PT/OT Ongoing, Progressing     Description: Goals to be met by: Discharge  POC: Patient to be seen 5-6x/week QD/BID As tolerable     Patient will increase functional independence with mobility by  performin. Supine to sit with Modified [place]  2. Sit to supine with Modified [place]  3. Sit to stand transfer with Modified [place]  4. Gait  x 350 feet with Modified [place] using Rolling Walker vs No AD vs LRAD. -- goal met 10/9 using RW  5. Low Fall Risk on 5x sit to stand test                         Skilled PT Minutes Provided: 25   Communication with Treatment Team:     Equipment recommendations:       At end of treatment, patient remained:  x Up in chair     Up in wheelchair in room    In bed   x With alarm activated    Bed rails up   x Call bell in reach     Family/friends present    Restraints secured properly    In bathroom with CNA/RN notified    Nurse aware    In gym with therapist/tech    Other:

## 2023-10-10 NOTE — PLAN OF CARE
Problem: Adult Inpatient Plan of Care  Goal: Plan of Care Review  Outcome: Ongoing, Progressing  Flowsheets (Taken 10/9/2023 2000)  Plan of Care Reviewed With: patient  Goal: Patient-Specific Goal (Individualized)  Outcome: Ongoing, Progressing  Flowsheets (Taken 10/9/2023 2000)  Anxieties, Fears or Concerns: None offered at this time.  Individualized Care Needs:   Safety with mobility to prevent injury   Pain management   Sternal and pacemaker precautions   Monitor for s/s of infection   Monitor blood glucose levels.  Goal: Absence of Hospital-Acquired Illness or Injury  Outcome: Ongoing, Progressing  Intervention: Identify and Manage Fall Risk  Flowsheets (Taken 10/9/2023 2000)  Safety Promotion/Fall Prevention:   bed alarm set   assistive device/personal item within reach   Fall Risk reviewed with patient/family   Fall Risk signage in place   gait belt with ambulation   high risk medications identified   medications reviewed   lighting adjusted   nonskid shoes/socks when out of bed   room near unit station   instructed to call staff for mobility  Intervention: Prevent Skin Injury  Flowsheets (Taken 10/9/2023 2000)  Body Position:   position changed independently   weight shifting   supine  Skin Protection:   incontinence pads utilized   tubing/devices free from skin contact   silicone foam dressing in place  Intervention: Prevent and Manage VTE (Venous Thromboembolism) Risk  Flowsheets (Taken 10/9/2023 2000)  Activity Management: Walk with assistive devise and /or staff member - L3  Range of Motion: active ROM (range of motion) encouraged  Intervention: Prevent Infection  Flowsheets (Taken 10/9/2023 2000)  Infection Prevention:   environmental surveillance performed   equipment surfaces disinfected   hand hygiene promoted   personal protective equipment utilized   rest/sleep promoted  Goal: Optimal Comfort and Wellbeing  Outcome: Ongoing, Progressing  Intervention: Monitor Pain and Promote Comfort  Flowsheets  (Taken 10/9/2023 2000)  Pain Management Interventions:   care clustered   pain management plan reviewed with patient/caregiver   medication offered   pillow support provided   quiet environment facilitated  Intervention: Provide Person-Centered Care  Flowsheets (Taken 10/9/2023 2000)  Trust Relationship/Rapport:   care explained   choices provided   emotional support provided   empathic listening provided   questions answered   questions encouraged   reassurance provided   thoughts/feelings acknowledged     Problem: Fall Injury Risk  Goal: Absence of Fall and Fall-Related Injury  Outcome: Ongoing, Progressing  Intervention: Identify and Manage Contributors  Flowsheets (Taken 10/9/2023 2000)  Self-Care Promotion:   independence encouraged   BADL personal objects within reach   BADL personal routines maintained  Medication Review/Management:   medications reviewed   high-risk medications identified  Intervention: Promote Injury-Free Environment  Flowsheets (Taken 10/9/2023 2000)  Safety Promotion/Fall Prevention:   bed alarm set   assistive device/personal item within reach   Fall Risk reviewed with patient/family   Fall Risk signage in place   gait belt with ambulation   high risk medications identified   medications reviewed   lighting adjusted   nonskid shoes/socks when out of bed   room near unit station   instructed to call staff for mobility     Problem: Infection  Goal: Absence of Infection Signs and Symptoms  Outcome: Ongoing, Progressing  Intervention: Prevent or Manage Infection  Flowsheets (Taken 10/9/2023 2000)  Fever Reduction/Comfort Measures:   lightweight clothing   lightweight bedding   fluid intake increased  Infection Management: aseptic technique maintained     Problem: Skin Injury Risk Increased  Goal: Skin Health and Integrity  Outcome: Ongoing, Progressing  Intervention: Optimize Skin Protection  Flowsheets (Taken 10/9/2023 2000)  Skin Protection:   incontinence pads utilized   tubing/devices  free from skin contact   silicone foam dressing in place  Head of Bed (HOB) Positioning: HOB at 20-30 degrees     Problem: Gas Exchange Impaired  Goal: Optimal Gas Exchange  Outcome: Ongoing, Progressing  Intervention: Optimize Oxygenation and Ventilation  Flowsheets (Taken 10/9/2023 2000)  Airway/Ventilation Management: airway patency maintained  Head of Bed (HOB) Positioning: HOB at 20-30 degrees     Problem: Pain Acute  Goal: Acceptable Pain Control and Functional Ability  Outcome: Ongoing, Progressing  Intervention: Develop Pain Management Plan  Flowsheets (Taken 10/9/2023 2000)  Pain Management Interventions:   care clustered   pain management plan reviewed with patient/caregiver   medication offered   pillow support provided   quiet environment facilitated     Problem: Infection (Cardiac Rhythm Management Device)  Goal: Absence of Infection Signs and Symptoms  Outcome: Ongoing, Progressing     Problem: Impaired Wound Healing  Goal: Optimal Wound Healing  Outcome: Ongoing, Progressing     Problem: Diabetes Comorbidity  Goal: Blood Glucose Level Within Targeted Range  Outcome: Ongoing, Progressing  Intervention: Monitor and Manage Glycemia  Flowsheets (Taken 10/9/2023 2000)  Glycemic Management: blood glucose monitored

## 2023-10-10 NOTE — PROGRESS NOTES
[facility] Surgical Unit  [facility] Medicine  Telehospitalist Progress Note    Patient Name: Kortney Leach  MRN: [id number]  Patient Class: - Swing   Admission Date: 9/29/2023  Length of Stay: 11 days  Attending Physician: Prosper Salazar MD  Primary Care Provider: Arnaldo Hernandez MD      Subjective:     Principal Problem:Status post mitral valve replacement      HPI:  Ms. Leach is a 77 year old  female with a history of type II diabetes mellitus, ABEL, hypertension, hyperlipidemia, and GERD who was originally admitted to [facility] on 9/20/2023 for elective mitral valve replacement as well as ligation of left atrial appendage by Dr. Steven Rosales due to mitral stenosis.  Postoperatively she developed junctional bradycardia with several episodes of atrial fibrillation with RVR and she underwent dual-chamber permanent pacemaker implantation on 9/25/2023 by Dr. Arnaud Moon secondary to sick sinus syndrome.  She was started on a baby aspirin, beta-blocker, and amiodarone taper with 400 mg by mouth twice daily for 3 days, then 200 mg by mouth daily for 3 days, then 200 mg by mouth twice daily, then 200 mg by mouth daily.  She had episodes of hypoglycemia and her farxiga and glimepiride were held.  She had no other complications throughout her hospital course and she was transferred to [facility] bed on 9/29/2023 for PT/OT and management of her multiple medical comorbidities.      Overview/Hospital Course:  She was admitted to [facility] bed on 9/29/2023 for rehab following hospitalization at [facility] s/p mitral valve replacement due to mitral stenosis and sick sinus syndrome s/p dual-chamber permanent pacemaker implantation.  Her metformin was decreased from 1000 mg to 500 mg by mouth twice daily on 9/30/2023 due to hypoglycemia.  She completed an amiodarone taper on 10/2/2023 and she was transitioned to 200 mg by  mouth daily.  She was started on cetirizine 10 mg PO daily as needed on 10/06/2023 due to an episode of sneezing, watery eyes, and runny nose and she began nasal irrigations on 10/9/2023 for chronic sinusitis.      Interval History: She started nasal irrigations yesterday and her sinus pressure and headaches have improved. She states that she is too weak to go home today and she is concerned because she is scheduled to receive hizentra 30 gm SC x 1 tomorrow for her common variable immunodeficiency. She ambulated 350 feet with her rolling walker and modified independence yesterday and she required standby assistance with sit to stand transitions.      Review of Systems   All other systems reviewed and are negative.    Objective:     Vital Signs (Most Recent):  Temp: 98.3 °F (36.8 °C) (10/10/23 0719)  Pulse: 79 (10/10/23 0719)  Resp: 16 (10/09/23 2000)  BP: (!) 141/81 (10/10/23 0719)  SpO2: 95 % (10/10/23 0719) Vital Signs (24h Range):  Temp:  [98.1 °F (36.7 °C)-98.6 °F (37 °C)] 98.3 °F (36.8 °C)  Pulse:  [79-87] 79  Resp:  [16-18] 16  SpO2:  [95 %-96 %] 95 %  BP: (141-159)/(71-81) 141/81     Weight: 97.1 kg (214 lb 1.1 oz)  Body mass index is 35.62 kg/m².    Intake/Output Summary (Last 24 hours) at 10/10/2023 0830  Last data filed at 10/9/2023 1600  Gross per 24 hour   Intake 480 ml   Output --   Net 480 ml      Physical Exam  General - Obese elderly  female in no acute distress.  HEENT - NCAT. No scleral icterus. Oropharynx clear. Mucous membranes moist.  Neck - No lymphadenopathy, thyromegaly, or JVD.  CVS - Regular rate and rhythm. No murmurs, rubs, or gallops.  Resp - Lungs are clear to auscultation bilaterally. No rales, wheeze, or rhonchi.   GI - Soft, nontender, nondistended, normoactive bowel sounds present.   Extremities - Trace edema in the left lower extremity which is chronic.   Neuro - CN II through XII are grossly intact. No focal neurological deficits. Alert and oriented times four.   Psych -  Normal affect.  Skin - Anterior chest surgical site c/d/i.         Significant Labs: All pertinent labs within the past 24 hours have been reviewed.     10/9/2023:  CBC: WBC count 6.59, hemoglobin 12.7, hematocrit 41.4, platelet count 268.  CMP: Sodium 143, potassium 4.6, chloride 106, CO2 26, BUN 11.2, creatinine 0.75, glucose 131, calcium 9.0, magnesium 1.8, alkaline phosphatase 81, total protein 6.3, albumin 3.2, total bilirubin 0.7, AST 21, ALT 14.     10/05/2023:   CBC: WBC count 7.12, hemoglobin 11.1, hematocrit 372, platelet count 236.    BMP:  Sodium 140, potassium 4.1, chloride 104, CO2 25 14.6, creatinine 0.73, glucose 130, calcium 8.4, magnesium 1.8.     10/01/2023:   Magnesium 2.1.       9/30/2023:   CBC: WBC count 7.1, hemoglobin 11.8, hematocrit 38.1, platelet count 244.  BMP:  Sodium 140, potassium 4.7, chloride 104, CO2 26, BUN 8.6, creatinine 0.61, glucose 130, calcium 8.5, magnesium 1.4.           Significant Imaging: I have reviewed all pertinent imaging results/findings within the past 24 hours.     CXR 9/30/2023: Resolved pulmonary edema seen on the prior exam with minimal residual bibasilar subsegmental atelectasis.     CXR 9/25/2023: No acute abnormality of the chest.     CXR 9/23/2023: Bilateral small pleural effusions.     CXR 9/22/2023: No acute pulmonary process identified.     CXR 9/21/2023: Interval extubation.  Mild bibasilar atelectasis.     CXR 9/20/2023: No acute pulmonary process appreciated.     CXR 9/15/2023: No acute chest disease is identified.      Assessment/Plan:      * Status post mitral valve replacement  Continue routine postoperative care with PT/OT, sternal precautions, and pain control with norco as needed.  She is s/p mitral valve replacement as well as ligation of left atrial appendage on 9/20/2023 by Dr. Steven Rosales due to mitral stenosis.  Limited echocardiogram from 9/23/2023 showed normal left ventricular systolic function with an EF of 55%.    Sick sinus  syndrome  Continue baby aspirin. She is s/p dual-chamber permanent pacemaker implantation on 9/25/2023 by Dr. Arnaud Moon.    Atrial fibrillation with RVR  Resolved. Continue amiodarone, beta-blocker, and rivarobaxan.    Physical deconditioning  Continue PT/OT.    Type II diabetes mellitus  Continue metformin which was decreased on 9/30/2023 due to hypoglycemia.  She was previously on glimepiride and farxiga as an outpatient which were stopped due to hypoglycemia.  Most recent hemoglobin A1c from 9/30/2023 was 6.0.    Chronic sinusitis  Continue nasal irrigations twice daily and cetirizine.    Hypertension  Continue lisinopril (substitute for benazepril) and metoprolol tartrate.    Hyperlipidemia  Continue atorvastatin.    History of left lower extremity DVT   Continue rivaroxaban.    Obstructive sleep apnea  Continue CPAP at night.    GERD (gastroesophageal reflux disease)  Continue pantoprazole (substitute for dexilant) and famotidine.    COPD (chronic obstructive pulmonary disease)  Continue trelegy ellipta.    Common variable immunodeficiency  She is scheduled to receive hizentra 30 gm SC x 1 on 10/11/2023. She was receiving hizentra injections once every 2 weeks at home. She missed her last injection on 9/27/2023.    Asthma  Continue trelegy ellipta.    Chronic constipation  Continue docusate.    Glaucoma  Continue timolol ophthalmic solution.    Hypomagnesemia  Improved.  Continue magnesium oxide.    Disposition  Anticipate discharge home with home health later this week.      VTE Risk Mitigation (From admission, onward)         Ordered     rivaroxaban tablet 20 mg  With dinner         09/29/23 1153     Place sequential compression device  Until discontinued         09/29/23 1153                Discharge Planning   SD:      Code Status: Full Code   Is the patient medically ready for discharge?:     Reason for patient still in hospital (select all that apply): PT / OT recommendations and Pending  disposition  Discharge Plan A: Home with family, Home Health   Discharge Delays: None known at this time      This service was provided via telemedicine.  Type of Software: Audio/Visual.  Originating Site: [facility].  Distant Site: [facility].  Her exam was performed with the assistance of Batsheva Doherty RN.      Prosper Salazar MD  Department of Hospital Medicine   [facility]

## 2023-10-10 NOTE — PT/OT/SLP PROGRESS
"Occupational Therapy  Treatment    Name: Kortney Leach    : 1945 (77 y.o.)  MRN: [id number]           TREATMENT SUMMARY AND RECOMMENDATIONS:      OT Date of Treatment: 10/10/23  OT Start Time: 1045  OT Stop Time: 1105  OT Total Time (min): 20 min      Subjective Assessment:   No complaints  Lethargic   x Awake, alert, cooperative  Impulsive    Uncooperative   Flat affect    Agitated  c/o pain   x Appropriate  c/o fatigue    Confused  Treated at bedside     Emotionally labile x Treated in gym/dept.      Other:        Therapy Precautions:    Cognitive deficits  Spinal precautions    Collar - hard x Sternal precautions    Collar - soft   TLSO   x Fall risk  LSO    Hip precautions - posterior  Knee immobilizer    Hip precautions - anterior  WBAT    Impaired communication  Partial weightbearing    Oxygen  TTWB    PEG tube  NWB    Visual deficits      Hearing deficits   Other:        Treatment Objectives:     Mobility Training:    Mobility task Assist level Comments    Bed mobility     Transfer SBA Stand pivot t/f from chair to WC using a RW   Sit to stands transitions SBA X2reps from chair<>standing using RW    Functional mobility     Sitting balance     Standing balance      Other:        ADL Training:    ADL Assist level Comments    Feeding     Grooming/hygiene     Bathing     Upper body dressing     Lower body dressing     Toileting     Toilet transfer     Adaptive equipment training     Other:           Therapeutic Exercise:   Exercise Sets Reps Comments   UBE strengthening exercises 3 10 3# weighted dowel performing bicep curls, chest press, shoulder press, backward/forward rows                         Additional Comments:      Assessment: Patient tolerated session fair. Pt demonstrates an increase in UB strength and endurance as compared to initial evaluation. Pt c/o of feeling hot and sweaty during session and stated " I feel like my sugar is dropping." Nursing notified and pt brought back to room " for nursing to assess.     GOALS:   Multidisciplinary Problems       Occupational Therapy Goals          Problem: Occupational Therapy    Goal Priority Disciplines Outcome Interventions   Occupational Therapy Goal     OT, PT/OT Ongoing, Progressing    Description: Goals to be met by: Discharge     Patient will increase functional independence with ADLs by performing:    UE Dressing with Set-up Assistance. GOAL MET 10/3/23  LE Dressing with Moderate Assistance. GOAL MET 10/3/23  Grooming while standing at sink with Stand-by Assistance. Goal met 10/2/23  Toileting from bedside commode with Stand-by Assistance for hygiene and clothing management. Goal met 10/2/23  Bathing from shower chair/bench with Stand-by Assistance.  Toilet transfer to bedside commode with Contact Guard Assistance. Goal met 10/9/23    New Goals:     Pt will perform UB dressing requiring MI.   Pt will perform LB dressing requiring MI.   Pt will perform toileting requiring MI.   Pt will perform grooming while standing at the sink requiring MI.                          Recommendations:     Discharge Recommendations: home with home health, home health PT, home health OT  Discharge Equipment Recommendations:  none  Barriers to discharge:  Decreased caregiver support, Other (Comment) (Daughter works during the day)     Plan:     Patient to be seen 6 x/week to address the above listed problems via self-care/home management, therapeutic activities, therapeutic exercises  Plan of Care Expires: 10/20/23  Plan of Care Reviewed with: patient  Revisions made to plan of care: No      Skilled OT Minutes Provided: 20  Communication with Treatment Team:     Equipment recommendations:       At end of treatment, patient remained:   Up in chair    x Up in wheelchair in room    In bed    With alarm activated    Bed rails up    Call bell in reach     Family/friends present    Restraints secured properly    In bathroom with CNA/RN notified    In gym with PT/PTA/tech   x  Nurse present    Other:

## 2023-10-10 NOTE — SUBJECTIVE & OBJECTIVE
Interval History: She started nasal irrigations yesterday and her sinus pressure and headaches have improved. She states that she is too weak to go home today and she is concerned because she is scheduled to receive hizentra 30 gm SC x 1 tomorrow for her common variable immunodeficiency. She ambulated 350 feet with her rolling walker and modified independence yesterday and she required standby assistance with sit to stand transitions.      Review of Systems   All other systems reviewed and are negative.    Objective:     Vital Signs (Most Recent):  Temp: 98.3 °F (36.8 °C) (10/10/23 0719)  Pulse: 79 (10/10/23 0719)  Resp: 16 (10/09/23 2000)  BP: (!) 141/81 (10/10/23 0719)  SpO2: 95 % (10/10/23 0719) Vital Signs (24h Range):  Temp:  [98.1 °F (36.7 °C)-98.6 °F (37 °C)] 98.3 °F (36.8 °C)  Pulse:  [79-87] 79  Resp:  [16-18] 16  SpO2:  [95 %-96 %] 95 %  BP: (141-159)/(71-81) 141/81     Weight: 97.1 kg (214 lb 1.1 oz)  Body mass index is 35.62 kg/m².    Intake/Output Summary (Last 24 hours) at 10/10/2023 0830  Last data filed at 10/9/2023 1600  Gross per 24 hour   Intake 480 ml   Output --   Net 480 ml      Physical Exam  General - Obese elderly  female in no acute distress.  HEENT - NCAT. No scleral icterus. Oropharynx clear. Mucous membranes moist.  Neck - No lymphadenopathy, thyromegaly, or JVD.  CVS - Regular rate and rhythm. No murmurs, rubs, or gallops.  Resp - Lungs are clear to auscultation bilaterally. No rales, wheeze, or rhonchi.   GI - Soft, nontender, nondistended, normoactive bowel sounds present.   Extremities - Trace edema in the left lower extremity which is chronic.   Neuro - CN II through XII are grossly intact. No focal neurological deficits. Alert and oriented times four.   Psych - Normal affect.  Skin - Anterior chest surgical site c/d/i.         Significant Labs: All pertinent labs within the past 24 hours have been reviewed.     10/9/2023:  CBC: WBC count 6.59, hemoglobin 12.7, hematocrit  41.4, platelet count 268.  CMP: Sodium 143, potassium 4.6, chloride 106, CO2 26, BUN 11.2, creatinine 0.75, glucose 131, calcium 9.0, magnesium 1.8, alkaline phosphatase 81, total protein 6.3, albumin 3.2, total bilirubin 0.7, AST 21, ALT 14.     10/05/2023:   CBC: WBC count 7.12, hemoglobin 11.1, hematocrit 372, platelet count 236.    BMP:  Sodium 140, potassium 4.1, chloride 104, CO2 25 14.6, creatinine 0.73, glucose 130, calcium 8.4, magnesium 1.8.     10/01/2023:   Magnesium 2.1.       9/30/2023:   CBC: WBC count 7.1, hemoglobin 11.8, hematocrit 38.1, platelet count 244.  BMP:  Sodium 140, potassium 4.7, chloride 104, CO2 26, BUN 8.6, creatinine 0.61, glucose 130, calcium 8.5, magnesium 1.4.           Significant Imaging: I have reviewed all pertinent imaging results/findings within the past 24 hours.     CXR 9/30/2023: Resolved pulmonary edema seen on the prior exam with minimal residual bibasilar subsegmental atelectasis.     CXR 9/25/2023: No acute abnormality of the chest.     CXR 9/23/2023: Bilateral small pleural effusions.     CXR 9/22/2023: No acute pulmonary process identified.     CXR 9/21/2023: Interval extubation.  Mild bibasilar atelectasis.     CXR 9/20/2023: No acute pulmonary process appreciated.     CXR 9/15/2023: No acute chest disease is identified.

## 2023-10-11 LAB — POCT GLUCOSE: 113 MG/DL (ref 70–110)

## 2023-10-11 PROCEDURE — 97110 THERAPEUTIC EXERCISES: CPT

## 2023-10-11 PROCEDURE — 11000004 HC SNF PRIVATE

## 2023-10-11 PROCEDURE — 97535 SELF CARE MNGMENT TRAINING: CPT

## 2023-10-11 PROCEDURE — 99900035 HC TECH TIME PER 15 MIN (STAT)

## 2023-10-11 PROCEDURE — 25000003 PHARM REV CODE 250: Performed by: INTERNAL MEDICINE

## 2023-10-11 PROCEDURE — 97116 GAIT TRAINING THERAPY: CPT | Mod: CQ

## 2023-10-11 PROCEDURE — 97110 THERAPEUTIC EXERCISES: CPT | Mod: CQ

## 2023-10-11 PROCEDURE — 25000003 PHARM REV CODE 250: Performed by: STUDENT IN AN ORGANIZED HEALTH CARE EDUCATION/TRAINING PROGRAM

## 2023-10-11 PROCEDURE — 94760 N-INVAS EAR/PLS OXIMETRY 1: CPT

## 2023-10-11 PROCEDURE — 97530 THERAPEUTIC ACTIVITIES: CPT

## 2023-10-11 PROCEDURE — 63600175 PHARM REV CODE 636 W HCPCS: Mod: JZ,TB | Performed by: FAMILY MEDICINE

## 2023-10-11 RX ADMIN — FAMOTIDINE 20 MG: 20 TABLET ORAL at 08:10

## 2023-10-11 RX ADMIN — DOCUSATE SODIUM 100 MG: 100 CAPSULE, LIQUID FILLED ORAL at 07:10

## 2023-10-11 RX ADMIN — METFORMIN HYDROCHLORIDE 500 MG: 500 TABLET ORAL at 04:10

## 2023-10-11 RX ADMIN — FLUTICASONE FUROATE AND VILANTEROL TRIFENATATE 1 PUFF: 200; 25 POWDER RESPIRATORY (INHALATION) at 07:10

## 2023-10-11 RX ADMIN — FOLIC ACID 1 MG: 1 TABLET ORAL at 07:10

## 2023-10-11 RX ADMIN — AZELASTINE HYDROCHLORIDE 274 MCG: 137 SPRAY, METERED NASAL at 08:10

## 2023-10-11 RX ADMIN — CETIRIZINE HYDROCHLORIDE 10 MG: 10 TABLET, FILM COATED ORAL at 08:10

## 2023-10-11 RX ADMIN — PANTOPRAZOLE SODIUM 40 MG: 40 TABLET, DELAYED RELEASE ORAL at 07:10

## 2023-10-11 RX ADMIN — PANCRELIPASE 2 CAPSULE: 24000; 76000; 120000 CAPSULE, DELAYED RELEASE PELLETS ORAL at 07:10

## 2023-10-11 RX ADMIN — PANCRELIPASE 2 CAPSULE: 24000; 76000; 120000 CAPSULE, DELAYED RELEASE PELLETS ORAL at 04:10

## 2023-10-11 RX ADMIN — LISINOPRIL 20 MG: 10 TABLET ORAL at 07:10

## 2023-10-11 RX ADMIN — Medication 400 MG: at 07:10

## 2023-10-11 RX ADMIN — ATORVASTATIN CALCIUM 40 MG: 40 TABLET, FILM COATED ORAL at 07:10

## 2023-10-11 RX ADMIN — TIMOLOL MALEATE 1 DROP: 5 SOLUTION OPHTHALMIC at 08:10

## 2023-10-11 RX ADMIN — RIVAROXABAN 20 MG: 20 TABLET, FILM COATED ORAL at 04:10

## 2023-10-11 RX ADMIN — METFORMIN HYDROCHLORIDE 500 MG: 500 TABLET ORAL at 07:10

## 2023-10-11 RX ADMIN — AMIODARONE HYDROCHLORIDE 200 MG: 200 TABLET ORAL at 07:10

## 2023-10-11 RX ADMIN — ASPIRIN 81 MG: 81 TABLET, COATED ORAL at 07:10

## 2023-10-11 RX ADMIN — HUMAN IMMUNOGLOBULIN G 30 G: 0.2 LIQUID SUBCUTANEOUS at 03:10

## 2023-10-11 RX ADMIN — DOCUSATE SODIUM 100 MG: 100 CAPSULE, LIQUID FILLED ORAL at 08:10

## 2023-10-11 RX ADMIN — PANCRELIPASE 2 CAPSULE: 24000; 76000; 120000 CAPSULE, DELAYED RELEASE PELLETS ORAL at 11:10

## 2023-10-11 RX ADMIN — UMECLIDINIUM 62.5 MCG: 62.5 AEROSOL, POWDER ORAL at 07:10

## 2023-10-11 NOTE — PLAN OF CARE
Weekly Staffing Report      Date Admitted: 9/29/2023 :   Staffing Date: 10/11/2023     Patient Active Problem List   Diagnosis    Chest pain    Sick sinus syndrome    Severe mitral valve regurgitation    Status post mitral valve replacement    Type II diabetes mellitus    Atrial fibrillation with RVR    Physical deconditioning    Hypertension    History of left lower extremity DVT     Hyperlipidemia    Glaucoma    GERD (gastroesophageal reflux disease)    Hypomagnesemia    Chronic constipation    COPD (chronic obstructive pulmonary disease)    Asthma    Obstructive sleep apnea    Disposition    Common variable immunodeficiency    Chronic sinusitis          Team Members Present:       Nursing Present     Yes [x]    No []    Physical Therapy Present    Yes [x]    No []    Occupational Therapy Present     Yes [x]    No []    Speech Therapy Present    Yes []    No []    NA [x]    Dietary Present    Yes [x]    No []        Physician Present   [] Leroy Miller    [] Thairy Reyes    [] JUANITA Petit    [x] KRISTYN Salazar     [] MAGO Monroe      Family Present    [x] Adult Children daughter at bedside     [] Spouse    [] POA    [] Friend/ Caregiver    [] Other       Interdisciplinary Meeting Notes:  PT- doing well MOD I with walking with walking 350ft at a time. Bed mobility / transfers Mdo I/SBA. Ready for discharge. OT- self care tasks Mod I. Will work on cooking tasks / standing for longer periods. Appetite good. Medically-issues with sinuses nasal irrigation helping. Here until next week plan to discharge home with home health when ready. All questions answered at this time.                 Please see Documented PT/OT/ST, Dietary, Nursing, and Physician notes for detailed treatment information.

## 2023-10-11 NOTE — SUBJECTIVE & OBJECTIVE
Interval History: She still reports weakness and she is scheduled to receive her hizentra injection today for common variable immunodeficiency. She ambulated 400 feet followed by 300 feet with her rolling walker and standby assistance yesterday and she required contact guard assistance with sit to stand transitions. She remains on nasal irrigations twice daily and her sinus pressure is improving.      Review of Systems   All other systems reviewed and are negative.    Objective:     Vital Signs (Most Recent):  Temp: 99.7 °F (37.6 °C) (10/11/23 0736)  Pulse: 82 (10/11/23 0736)  Resp: 18 (10/10/23 1931)  BP: (!) 143/82 (10/11/23 0736)  SpO2: 96 % (10/11/23 0736) Vital Signs (24h Range):  Temp:  [98.3 °F (36.8 °C)-99.7 °F (37.6 °C)] 99.7 °F (37.6 °C)  Pulse:  [82-96] 82  Resp:  [18] 18  SpO2:  [94 %-97 %] 96 %  BP: (143-151)/(62-82) 143/82     Weight: 97.1 kg (214 lb 1.1 oz)  Body mass index is 35.62 kg/m².    Intake/Output Summary (Last 24 hours) at 10/11/2023 0740  Last data filed at 10/10/2023 1719  Gross per 24 hour   Intake 960 ml   Output --   Net 960 ml      Physical Exam  General - Obese elderly  female in no acute distress.  HEENT - NCAT. No scleral icterus. Oropharynx clear. Mucous membranes moist.  Neck - No lymphadenopathy, thyromegaly, or JVD.  CVS - Regular rate and rhythm. No murmurs, rubs, or gallops.  Resp - Lungs are clear to auscultation bilaterally. No rales, wheeze, or rhonchi.   GI - Soft, nontender, nondistended, normoactive bowel sounds present.   Extremities - Trace edema in the left lower extremity which is chronic.   Neuro - CN II through XII are grossly intact. No focal neurological deficits. Alert and oriented times four.   Psych - Normal affect.  Skin - Anterior chest surgical site c/d/i.         Significant Labs: All pertinent labs within the past 24 hours have been reviewed.     10/9/2023:  CBC: WBC count 6.59, hemoglobin 12.7, hematocrit 41.4, platelet count 268.  CMP: Sodium  143, potassium 4.6, chloride 106, CO2 26, BUN 11.2, creatinine 0.75, glucose 131, calcium 9.0, magnesium 1.8, alkaline phosphatase 81, total protein 6.3, albumin 3.2, total bilirubin 0.7, AST 21, ALT 14.     10/05/2023:   CBC: WBC count 7.12, hemoglobin 11.1, hematocrit 372, platelet count 236.    BMP:  Sodium 140, potassium 4.1, chloride 104, CO2 25 14.6, creatinine 0.73, glucose 130, calcium 8.4, magnesium 1.8.     10/01/2023:   Magnesium 2.1.       9/30/2023:   CBC: WBC count 7.1, hemoglobin 11.8, hematocrit 38.1, platelet count 244.  BMP:  Sodium 140, potassium 4.7, chloride 104, CO2 26, BUN 8.6, creatinine 0.61, glucose 130, calcium 8.5, magnesium 1.4.           Significant Imaging: I have reviewed all pertinent imaging results/findings within the past 24 hours.     CXR 9/30/2023: Resolved pulmonary edema seen on the prior exam with minimal residual bibasilar subsegmental atelectasis.     CXR 9/25/2023: No acute abnormality of the chest.     CXR 9/23/2023: Bilateral small pleural effusions.     CXR 9/22/2023: No acute pulmonary process identified.     CXR 9/21/2023: Interval extubation.  Mild bibasilar atelectasis.     CXR 9/20/2023: No acute pulmonary process appreciated.     CXR 9/15/2023: No acute chest disease is identified.

## 2023-10-11 NOTE — PT/OT/SLP PROGRESS
Occupational Therapy  Treatment    Name: Kortney Leach    : 1945 (77 y.o.)  MRN: [id number]           TREATMENT SUMMARY AND RECOMMENDATIONS:      OT Date of Treatment: 10/11/23  OT Start Time: 1000  OT Stop Time: 1030  OT Total Time (min): 30 min      Subjective Assessment:   No complaints  Lethargic   x Awake, alert, cooperative  Impulsive    Uncooperative   Flat affect    Agitated  c/o pain   x Appropriate  c/o fatigue    Confused  Treated at bedside     Emotionally labile x Treated in gym/dept.      Other:        Therapy Precautions:    Cognitive deficits  Spinal precautions    Collar - hard x Sternal precautions    Collar - soft   TLSO   x Fall risk  LSO    Hip precautions - posterior  Knee immobilizer    Hip precautions - anterior  WBAT    Impaired communication  Partial weightbearing    Oxygen  TTWB    PEG tube  NWB    Visual deficits      Hearing deficits   Other:        Treatment Objectives:     Mobility Training:    Mobility task Assist level Comments    Bed mobility     Transfer     Sit to stands transitions SPV    Functional mobility SBA 400ft using a RW and gait belt; following with a WC   Sitting balance     Standing balance  SBA Aerobic activity focusing on increasing her stamina; pt instructed to stand, catch a ball, throw it back, and sit down; pt participated in x2 trials for 2.5 minutes and 1.5 minute respectively; no LOB noted   Other:        ADL Training:    ADL Assist level Comments    Feeding     Grooming/hygiene     Bathing     Upper body dressing     Lower body dressing     Toileting     Toilet transfer     Adaptive equipment training     Other:           Therapeutic Exercise:   Exercise Sets Reps Comments   UBE strengthening exercises 3 10 3# weighted dowel completing bicep curls, chest press, straight arm raises to shoulder height, and forward/backward rows                         Additional Comments:      Assessment: Patient tolerated session well. Pt stated she enjoyed the  aerobic activity. Pt demonstrates continued progress towards her goals.     GOALS:   Multidisciplinary Problems       Occupational Therapy Goals          Problem: Occupational Therapy    Goal Priority Disciplines Outcome Interventions   Occupational Therapy Goal     OT, PT/OT Ongoing, Progressing    Description: Goals to be met by: Discharge     Patient will increase functional independence with ADLs by performing:    UE Dressing with Set-up Assistance. GOAL MET 10/3/23  LE Dressing with Moderate Assistance. GOAL MET 10/3/23  Grooming while standing at sink with Stand-by Assistance. Goal met 10/2/23  Toileting from bedside commode with Stand-by Assistance for hygiene and clothing management. Goal met 10/2/23  Bathing from shower chair/bench with Stand-by Assistance. GOAL MET 10/10/23  Toilet transfer to bedside commode with Contact Guard Assistance. Goal met 10/9/23    New Goals:     Pt will perform UB dressing requiring MI.   Pt will perform LB dressing requiring MI.   Pt will perform toileting requiring MI.   Pt will perform grooming while standing at the sink requiring MI.                          Recommendations:     Discharge Recommendations: home with home health, home health PT, home health OT  Discharge Equipment Recommendations:  none  Barriers to discharge:  Decreased caregiver support, Other (Comment) (Daughter works during the day)     Plan:     Patient to be seen 6 x/week to address the above listed problems via self-care/home management, therapeutic activities, therapeutic exercises  Plan of Care Expires: 10/20/23  Plan of Care Reviewed with: patient  Revisions made to plan of care: No      Skilled OT Minutes Provided: 30  Communication with Treatment Team:     Equipment recommendations:       At end of treatment, patient remained:   Up in chair     Up in wheelchair in room    In bed    With alarm activated    Bed rails up    Call bell in reach     Family/friends present    Restraints secured properly     In bathroom with CNA/RN notified   x In gym with PT/PTA/tech    Nurse aware    Other:

## 2023-10-11 NOTE — PT/OT/SLP PROGRESS
Name: Kortney Abdullahi Hany    : 1945 (77 y.o.)  MRN: [id number]           Patient Unavailable      Patient unable to be seen at this time secondary to: pt receiving infusion this PM - will resume tomorrow

## 2023-10-11 NOTE — PATIENT CARE CONFERENCE
Name: Kortney Leach    : 1945 (77 y.o.)  MRN: [id number]            Interdisciplinary Team Conference     Case conference held with patient/family and care team to discuss progress, plan of care, barriers to be addressed for safe return home, equipment recommendations, and discharge planning. Communicated therapy progress with MD, RN, therapy clinicians and case management. All questions/concerns answered.

## 2023-10-11 NOTE — PROGRESS NOTES
[facility]  [place] [facility] Medicine  Telehospitalist Progress Note    Patient Name: Kortney Leach  MRN: [id number]  Patient Class: - Swing   Admission Date: 9/29/2023  Length of Stay: 12 days  Attending Physician: Prosper Salazar MD  Primary Care Provider: Arnaldo Hernandez MD      Subjective:     Principal Problem:Status post mitral valve replacement      HPI:  Ms. Leach is a 77 year old  female with a history of type II diabetes mellitus, ABEL, hypertension, hyperlipidemia, and GERD who was originally admitted to [facility] on 9/20/2023 for elective mitral valve replacement as well as ligation of left atrial appendage by Dr. Steven Rosales due to mitral stenosis.  Postoperatively she developed junctional bradycardia with several episodes of atrial fibrillation with RVR and she underwent dual-chamber permanent pacemaker implantation on 9/25/2023 by Dr. Arnaud Moon secondary to sick sinus syndrome.  She was started on a baby aspirin, beta-blocker, and amiodarone taper with 400 mg by mouth twice daily for 3 days, then 200 mg by mouth daily for 3 days, then 200 mg by mouth twice daily, then 200 mg by mouth daily.  She had episodes of hypoglycemia and her farxiga and glimepiride were held.  She had no other complications throughout her hospital course and she was transferred to [facility] bed on 9/29/2023 for PT/OT and management of her multiple medical comorbidities.      Overview/Hospital Course:  She was admitted to [facility] bed on 9/29/2023 for rehab following hospitalization at [facility] s/p mitral valve replacement due to mitral stenosis and sick sinus syndrome s/p dual-chamber permanent pacemaker implantation.  Her metformin was decreased from 1000 mg to 500 mg by mouth twice daily on 9/30/2023 due to hypoglycemia.  She completed an amiodarone taper on 10/2/2023 and she was transitioned to 200 mg by  mouth daily.  She was started on cetirizine 10 mg PO daily as needed on 10/06/2023 due to an episode of sneezing, watery eyes, and runny nose and she began nasal irrigations twice daily on 10/9/2023 for chronic sinusitis.      Interval History: She still reports weakness and she is scheduled to receive her hizentra injection today for common variable immunodeficiency. She ambulated 400 feet followed by 300 feet with her rolling walker and standby assistance yesterday and she required contact guard assistance with sit to stand transitions. She remains on nasal irrigations twice daily and her sinus pressure is improving.      Review of Systems   All other systems reviewed and are negative.    Objective:     Vital Signs (Most Recent):  Temp: 99.7 °F (37.6 °C) (10/11/23 0736)  Pulse: 82 (10/11/23 0736)  Resp: 18 (10/10/23 1931)  BP: (!) 143/82 (10/11/23 0736)  SpO2: 96 % (10/11/23 0736) Vital Signs (24h Range):  Temp:  [98.3 °F (36.8 °C)-99.7 °F (37.6 °C)] 99.7 °F (37.6 °C)  Pulse:  [82-96] 82  Resp:  [18] 18  SpO2:  [94 %-97 %] 96 %  BP: (143-151)/(62-82) 143/82     Weight: 97.1 kg (214 lb 1.1 oz)  Body mass index is 35.62 kg/m².    Intake/Output Summary (Last 24 hours) at 10/11/2023 0740  Last data filed at 10/10/2023 1719  Gross per 24 hour   Intake 960 ml   Output --   Net 960 ml      Physical Exam  General - Obese elderly  female in no acute distress.  HEENT - NCAT. No scleral icterus. Oropharynx clear. Mucous membranes moist.  Neck - No lymphadenopathy, thyromegaly, or JVD.  CVS - Regular rate and rhythm. No murmurs, rubs, or gallops.  Resp - Lungs are clear to auscultation bilaterally. No rales, wheeze, or rhonchi.   GI - Soft, nontender, nondistended, normoactive bowel sounds present.   Extremities - Trace edema in the left lower extremity which is chronic.   Neuro - CN II through XII are grossly intact. No focal neurological deficits. Alert and oriented times four.   Psych - Normal affect.  Skin -  Anterior chest surgical site c/d/i.         Significant Labs: All pertinent labs within the past 24 hours have been reviewed.     10/9/2023:  CBC: WBC count 6.59, hemoglobin 12.7, hematocrit 41.4, platelet count 268.  CMP: Sodium 143, potassium 4.6, chloride 106, CO2 26, BUN 11.2, creatinine 0.75, glucose 131, calcium 9.0, magnesium 1.8, alkaline phosphatase 81, total protein 6.3, albumin 3.2, total bilirubin 0.7, AST 21, ALT 14.     10/05/2023:   CBC: WBC count 7.12, hemoglobin 11.1, hematocrit 372, platelet count 236.    BMP:  Sodium 140, potassium 4.1, chloride 104, CO2 25 14.6, creatinine 0.73, glucose 130, calcium 8.4, magnesium 1.8.     10/01/2023:   Magnesium 2.1.       9/30/2023:   CBC: WBC count 7.1, hemoglobin 11.8, hematocrit 38.1, platelet count 244.  BMP:  Sodium 140, potassium 4.7, chloride 104, CO2 26, BUN 8.6, creatinine 0.61, glucose 130, calcium 8.5, magnesium 1.4.           Significant Imaging: I have reviewed all pertinent imaging results/findings within the past 24 hours.     CXR 9/30/2023: Resolved pulmonary edema seen on the prior exam with minimal residual bibasilar subsegmental atelectasis.     CXR 9/25/2023: No acute abnormality of the chest.     CXR 9/23/2023: Bilateral small pleural effusions.     CXR 9/22/2023: No acute pulmonary process identified.     CXR 9/21/2023: Interval extubation.  Mild bibasilar atelectasis.     CXR 9/20/2023: No acute pulmonary process appreciated.     CXR 9/15/2023: No acute chest disease is identified.      Assessment/Plan:      * Status post mitral valve replacement  Continue routine postoperative care with PT/OT, sternal precautions, and pain control with norco as needed.  She is s/p mitral valve replacement as well as ligation of left atrial appendage on 9/20/2023 by Dr. Steven Rosales due to mitral stenosis.  Limited echocardiogram from 9/23/2023 showed normal left ventricular systolic function with an EF of 55%.    Sick sinus syndrome  Continue baby  aspirin. She is s/p dual-chamber permanent pacemaker implantation on 9/25/2023 by Dr. Arnaud Moon.    Atrial fibrillation with RVR  Resolved. Continue amiodarone, beta-blocker, and rivarobaxan.    Physical deconditioning  Continue PT/OT.    Type II diabetes mellitus  Continue metformin which was decreased on 9/30/2023 due to hypoglycemia.  She was previously on glimepiride and farxiga as an outpatient which were stopped due to hypoglycemia.  Most recent hemoglobin A1c from 9/30/2023 was 6.0.    Chronic sinusitis  Continue nasal irrigations twice daily and cetirizine.    Hypertension  Continue lisinopril (substitute for benazepril) and metoprolol tartrate.    Hyperlipidemia  Continue atorvastatin.    History of left lower extremity DVT   Continue rivaroxaban.    Obstructive sleep apnea  Continue CPAP at night.    GERD (gastroesophageal reflux disease)  Continue pantoprazole (substitute for dexilant) and famotidine.    COPD (chronic obstructive pulmonary disease)  Continue trelegy ellipta.    Common variable immunodeficiency  She is scheduled to receive hizentra 30 gm SC x 1 on 10/11/2023. She was receiving hizentra injections once every 2 weeks at home. She missed her last injection on 9/27/2023.    Asthma  Continue trelegy ellipta.    Chronic constipation  Continue docusate.    Glaucoma  Continue timolol ophthalmic solution.    Hypomagnesemia  Improved.  Continue magnesium oxide.    Disposition  Anticipate discharge home with home health early next week.      VTE Risk Mitigation (From admission, onward)         Ordered     rivaroxaban tablet 20 mg  With dinner         09/29/23 1153     Place sequential compression device  Until discontinued         09/29/23 1153                Discharge Planning   SD:      Code Status: Full Code   Is the patient medically ready for discharge?:     Reason for patient still in hospital (select all that apply): PT / OT recommendations and Pending disposition  Discharge Plan A: Home with  family, Home Health   Discharge Delays: None known at this time      This service was provided via telemedicine.  Type of Software: Audio/Visual.  Originating Site: [facility].  Distant Site: [facility].  Her exam was performed with the assistance of Domitila Cruz RN.      Prosper Salazar MD  Department of Hospital Medicine   [facility]

## 2023-10-11 NOTE — ASSESSMENT & PLAN NOTE
She received hizentra 30 gm SC x 1 on 10/11/2023. She was previously receiving hizentra injections once every 2 weeks at home but she missed her injection on 9/27/2023 while she was in the hospital.

## 2023-10-11 NOTE — NURSING
Patient home medication dapagliflozin propanediol was returned to her daughter this morning at 0806 due to medication not having an active order.  Patient is aware and instructed her daughter not to throw the medication away in case the doctor would tell her to start taking it again.

## 2023-10-11 NOTE — PLAN OF CARE
Problem: Adult Inpatient Plan of Care  Goal: Plan of Care Review  Outcome: Ongoing, Progressing  Goal: Patient-Specific Goal (Individualized)  Outcome: Ongoing, Progressing  Flowsheets (Taken 10/11/2023 0800)  Anxieties, Fears or Concerns: None expressed  Individualized Care Needs: Safety, sternal precautions, pacemaker precautions, hizentra injection due today 10/11/23     Problem: Fall Injury Risk  Goal: Absence of Fall and Fall-Related Injury  Outcome: Ongoing, Progressing     Problem: Device-Related Complication Risk (Cardiac Rhythm Management Device)  Goal: Effective Device Function  Outcome: Ongoing, Progressing     Problem: Diabetes Comorbidity  Goal: Blood Glucose Level Within Targeted Range  Outcome: Ongoing, Progressing

## 2023-10-11 NOTE — PLAN OF CARE
Problem: Adult Inpatient Plan of Care  Goal: Plan of Care Review  Outcome: Ongoing, Progressing  Flowsheets (Taken 10/10/2023 2345)  Plan of Care Reviewed With:   patient   daughter  Goal: Patient-Specific Goal (Individualized)  Outcome: Ongoing, Progressing  Flowsheets (Taken 10/10/2023 2345)  Anxieties, Fears or Concerns: none at this time  Individualized Care Needs: Pain management, Safety with mobility to prevent injury, Sternal and pacemaker precautions, Monitor for s/s of infection, Monitor blood glucose, Home CPAP nightly, hizentra injections every 2 weeks next dose tomorrow 10/11 at 1pm  Goal: Absence of Hospital-Acquired Illness or Injury  Outcome: Ongoing, Progressing  Goal: Optimal Comfort and Wellbeing  Outcome: Ongoing, Progressing     Problem: Infection  Goal: Absence of Infection Signs and Symptoms  Outcome: Ongoing, Progressing  Intervention: Prevent or Manage Infection  Flowsheets (Taken 10/10/2023 2000)  Infection Management: aseptic technique maintained

## 2023-10-11 NOTE — PT/OT/SLP PROGRESS
Physical Therapy Treatment Note           Name: Kortney Leach    : 1945 (77 y.o.)  MRN: [id number]           TREATMENT SUMMARY AND RECOMMENDATIONS:    PT Received On: 10/11/23  PT Start Time: 1030     PT Stop Time: 1055  PT Total Time (min): 25 min     Subjective Assessment:  x No complaints  Lethargic    Awake, alert, cooperative  Uncooperative    Agitated  c/o pain    Appropriate  c/o fatigue    Confused  Treated at bedside     Emotionally labile  Treated in gym/dept.    Impulsive  Other:    Flat affect       Therapy Precautions:    Cognitive deficits  Spinal precautions    Collar - hard x Sternal precautions    Collar - soft   TLSO    Fall risk  LSO    Hip precautions - posterior  Knee immobilizer    Hip precautions - anterior  WBAT    Impaired communication  Partial weightbearing    Oxygen  TTWB    PEG tube  NWB    Visual deficits  Other:    Hearing deficits          Treatment Objectives:     Mobility Training:   Assist level Comments    Bed mobility     Transfer     Gait [place] Amb on firm surface with  ft    Sit to stand transitions SBA Sit-stand 5 reps x 2 with no UE use   Sitting balance     Standing balance      Wheelchair mobility     Car transfer     Other:          Therapeutic Exercise:   Exercise Sets Reps Comments   B LE exer sitting  1 20  Ankle pumps, TKE, abd/add, knee lifts    B LE exer standing 1 10 B UE supported- abd/add, knee lifts, mini squats, calf raises                   Additional Comments:  Sternal precautions followed    Assessment: Patient tolerated session well.    PT Plan: continue  Revisions made to plan of care: No    GOALS:   Multidisciplinary Problems       Physical Therapy Goals          Problem: Physical Therapy    Goal Priority Disciplines Outcome Goal Variances Interventions   Physical Therapy Goal     PT, PT/OT Ongoing, Progressing     Description: Goals to be met by: Discharge  POC: Patient to be seen 5-6x/week QD/BID As tolerable     Patient will  increase functional independence with mobility by performin. Supine to sit with Modified [place]  2. Sit to supine with Modified [place]  3. Sit to stand transfer with Modified [place]  4. Gait  x 350 feet with Modified [place] using Rolling Walker vs No AD vs LRAD. -- goal met 10/9 using RW  5. Low Fall Risk on 5x sit to stand test                         Skilled PT Minutes Provided: 25   Communication with Treatment Team:     Equipment recommendations:       At end of treatment, patient remained:  x Up in chair     Up in wheelchair in room    In bed   x With alarm activated    Bed rails up   x Call bell in reach     Family/friends present    Restraints secured properly    In bathroom with CNA/RN notified    Nurse aware    In gym with therapist/tech    Other:

## 2023-10-11 NOTE — PROGRESS NOTES
Name: Kortney Leach    : 1945 (77 y.o.)  MRN: [id number]            Interdisciplinary Team Conference     Case conference held with patient/family and care team to discuss progress, plan of care, barriers to be addressed for safe return home, equipment recommendations, and discharge planning. Communicated therapy progress with MD, RN, therapy clinicians and case management. All questions/concerns answered.

## 2023-10-12 LAB — POCT GLUCOSE: 124 MG/DL (ref 70–110)

## 2023-10-12 PROCEDURE — 25000003 PHARM REV CODE 250: Performed by: INTERNAL MEDICINE

## 2023-10-12 PROCEDURE — 11000004 HC SNF PRIVATE

## 2023-10-12 PROCEDURE — 25000003 PHARM REV CODE 250: Performed by: STUDENT IN AN ORGANIZED HEALTH CARE EDUCATION/TRAINING PROGRAM

## 2023-10-12 PROCEDURE — 97530 THERAPEUTIC ACTIVITIES: CPT

## 2023-10-12 PROCEDURE — 97116 GAIT TRAINING THERAPY: CPT | Mod: CQ

## 2023-10-12 PROCEDURE — 99900035 HC TECH TIME PER 15 MIN (STAT)

## 2023-10-12 PROCEDURE — 25000242 PHARM REV CODE 250 ALT 637 W/ HCPCS: Performed by: STUDENT IN AN ORGANIZED HEALTH CARE EDUCATION/TRAINING PROGRAM

## 2023-10-12 PROCEDURE — 94760 N-INVAS EAR/PLS OXIMETRY 1: CPT

## 2023-10-12 PROCEDURE — 97110 THERAPEUTIC EXERCISES: CPT

## 2023-10-12 PROCEDURE — 97110 THERAPEUTIC EXERCISES: CPT | Mod: CQ

## 2023-10-12 RX ADMIN — DOCUSATE SODIUM 100 MG: 100 CAPSULE, LIQUID FILLED ORAL at 08:10

## 2023-10-12 RX ADMIN — ATORVASTATIN CALCIUM 40 MG: 40 TABLET, FILM COATED ORAL at 10:10

## 2023-10-12 RX ADMIN — PANCRELIPASE 2 CAPSULE: 24000; 76000; 120000 CAPSULE, DELAYED RELEASE PELLETS ORAL at 05:10

## 2023-10-12 RX ADMIN — AZELASTINE HYDROCHLORIDE 274 MCG: 137 SPRAY, METERED NASAL at 09:10

## 2023-10-12 RX ADMIN — PANCRELIPASE 2 CAPSULE: 24000; 76000; 120000 CAPSULE, DELAYED RELEASE PELLETS ORAL at 11:10

## 2023-10-12 RX ADMIN — FAMOTIDINE 20 MG: 20 TABLET ORAL at 08:10

## 2023-10-12 RX ADMIN — ASPIRIN 81 MG: 81 TABLET, COATED ORAL at 10:10

## 2023-10-12 RX ADMIN — LISINOPRIL 20 MG: 10 TABLET ORAL at 10:10

## 2023-10-12 RX ADMIN — METFORMIN HYDROCHLORIDE 500 MG: 500 TABLET ORAL at 10:10

## 2023-10-12 RX ADMIN — AMIODARONE HYDROCHLORIDE 200 MG: 200 TABLET ORAL at 10:10

## 2023-10-12 RX ADMIN — FLUTICASONE FUROATE AND VILANTEROL TRIFENATATE 1 PUFF: 200; 25 POWDER RESPIRATORY (INHALATION) at 10:10

## 2023-10-12 RX ADMIN — ACETAMINOPHEN 650 MG: 325 TABLET ORAL at 10:10

## 2023-10-12 RX ADMIN — PANCRELIPASE 2 CAPSULE: 24000; 76000; 120000 CAPSULE, DELAYED RELEASE PELLETS ORAL at 10:10

## 2023-10-12 RX ADMIN — RIVAROXABAN 20 MG: 20 TABLET, FILM COATED ORAL at 05:10

## 2023-10-12 RX ADMIN — UMECLIDINIUM 62.5 MCG: 62.5 AEROSOL, POWDER ORAL at 11:10

## 2023-10-12 RX ADMIN — DOCUSATE SODIUM 100 MG: 100 CAPSULE, LIQUID FILLED ORAL at 10:10

## 2023-10-12 RX ADMIN — ACETAMINOPHEN 650 MG: 325 TABLET ORAL at 08:10

## 2023-10-12 RX ADMIN — CETIRIZINE HYDROCHLORIDE 10 MG: 10 TABLET, FILM COATED ORAL at 10:10

## 2023-10-12 RX ADMIN — PANTOPRAZOLE SODIUM 40 MG: 40 TABLET, DELAYED RELEASE ORAL at 10:10

## 2023-10-12 RX ADMIN — FOLIC ACID 1 MG: 1 TABLET ORAL at 10:10

## 2023-10-12 RX ADMIN — METFORMIN HYDROCHLORIDE 500 MG: 500 TABLET ORAL at 05:10

## 2023-10-12 RX ADMIN — AZELASTINE HYDROCHLORIDE 274 MCG: 137 SPRAY, METERED NASAL at 08:10

## 2023-10-12 RX ADMIN — TIMOLOL MALEATE 1 DROP: 5 SOLUTION OPHTHALMIC at 10:10

## 2023-10-12 RX ADMIN — TIMOLOL MALEATE 1 DROP: 5 SOLUTION OPHTHALMIC at 08:10

## 2023-10-12 RX ADMIN — Medication 400 MG: at 10:10

## 2023-10-12 NOTE — PLAN OF CARE
Problem: Physical Therapy  Goal: Physical Therapy Goal  Description: Goals to be met by: Discharge  POC: Patient to be seen 5-6x/week QD As tolerable     Patient will increase functional independence with mobility by performin. Supine to sit with Modified [place]  2. Sit to supine with Modified [place]  3. Sit to stand transfer with Modified [place] --goal met  4. Gait  x 350 feet with Modified [place] using Rolling Walker vs No AD vs LRAD. -- goal met 10/9 using RW  5. Low Fall Risk on 5x sit to stand test --goal met

## 2023-10-12 NOTE — PLAN OF CARE
[facility]  Discharge Reassessment    Primary Care Provider: Arnaldo Hernandez MD    Expected Discharge Date:     Reassessment (most recent)       Discharge Reassessment - 10/12/23 1110          Discharge Reassessment    Assessment Type Discharge Planning Reassessment     Did the patient's condition or plan change since previous assessment? No     Discharge Plan discussed with: Adult children     Communicated SD with patient/caregiver Date not available/Unable to determine     Discharge Plan A Home with family;Home Health     DME Needed Upon Discharge  none     Transition of Care Barriers None     Why the patient remains in the hospital Requires continued medical care        Post-Acute Status    Post-Acute Authorization Home Health     Home Health Status Pending medical clearance/testing     Hospital Resources/Appts/Education Provided Provided patient/caregiver with written discharge plan information     Discharge Delays None known at this time

## 2023-10-12 NOTE — PLAN OF CARE
Spoke to patient who stated she spoke with her daughter. They would both like patient to be discharged Saturday. Patient needs no DME and is already set up with home health. Will notify care team

## 2023-10-12 NOTE — PT/OT/SLP PROGRESS
Physical Therapy Treatment Note           Name: Kortney Leach    : 1945 (77 y.o.)  MRN: [id number]           TREATMENT SUMMARY AND RECOMMENDATIONS:    PT Received On: 10/12/23  PT Start Time: 1300     PT Stop Time: 1315  PT Total Time (min): 15 min     Subjective Assessment:  x No complaints  Lethargic    Awake, alert, cooperative  Uncooperative    Agitated  c/o pain    Appropriate  c/o fatigue    Confused  Treated at bedside     Emotionally labile  Treated in gym/dept.    Impulsive  Other:    Flat affect       Therapy Precautions:    Cognitive deficits  Spinal precautions    Collar - hard x Sternal precautions    Collar - soft   TLSO    Fall risk  LSO    Hip precautions - posterior  Knee immobilizer    Hip precautions - anterior  WBAT    Impaired communication  Partial weightbearing    Oxygen  TTWB    PEG tube  NWB    Visual deficits  Other:    Hearing deficits          Treatment Objectives:     Mobility Training:   Assist level Comments    Bed mobility     Transfer     Gait [place] Amb on firm surface with  ft    Sit to stand transitions     Sitting balance     Standing balance      Wheelchair mobility     Car transfer     Other:          Therapeutic Exercise:   Exercise Sets Reps Comments                               Additional Comments:  No issues noted    Assessment: Patient tolerated session well.    PT Plan: continue  Revisions made to plan of care: No    GOALS:   Multidisciplinary Problems       Physical Therapy Goals          Problem: Physical Therapy    Goal Priority Disciplines Outcome Goal Variances Interventions   Physical Therapy Goal     PT, PT/OT Ongoing, Progressing     Description: Goals to be met by: Discharge  POC: Patient to be seen 5-6x/week QD/BID As tolerable     Patient will increase functional independence with mobility by performin. Supine to sit with Modified [place]  2. Sit to supine with Modified [place]  3. Sit to stand transfer with Modified  [place]  4. Gait  x 350 feet with Modified [place] using Rolling Walker vs No AD vs LRAD. -- goal met 10/9 using RW  5. Low Fall Risk on 5x sit to stand test                         Skilled PT Minutes Provided: 15   Communication with Treatment Team:     Equipment recommendations:       At end of treatment, patient remained:  x Up in chair     Up in wheelchair in room    In bed    With alarm activated    Bed rails up   x Call bell in reach     Family/friends present    Restraints secured properly    In bathroom with CNA/RN notified    Nurse aware    In gym with therapist/tech    Other:

## 2023-10-12 NOTE — PT/OT/SLP PROGRESS
Physical Therapy Treatment Note           Name: Kortney Leach    : 1945 (77 y.o.)  MRN: [id number]           TREATMENT SUMMARY AND RECOMMENDATIONS:    PT Received On: 10/12/23  PT Start Time: 1000     PT Stop Time: 1025  PT Total Time (min): 25 min     Subjective Assessment:  x No complaints  Lethargic    Awake, alert, cooperative  Uncooperative    Agitated  c/o pain    Appropriate  c/o fatigue    Confused  Treated at bedside     Emotionally labile  Treated in gym/dept.    Impulsive  Other:    Flat affect       Therapy Precautions:    Cognitive deficits  Spinal precautions    Collar - hard x Sternal precautions    Collar - soft   TLSO    Fall risk  LSO    Hip precautions - posterior  Knee immobilizer    Hip precautions - anterior  WBAT    Impaired communication  Partial weightbearing    Oxygen  TTWB    PEG tube  NWB    Visual deficits  Other:    Hearing deficits          Treatment Objectives:     Mobility Training:   Assist level Comments    Bed mobility     Transfer     Gait [place] Amb on firm surface with  ft    Sit to stand transitions [place] Sit-stand 5 reps x 2 with no UE use   Sitting balance     Standing balance      Wheelchair mobility     Car transfer     Other:          Therapeutic Exercise:   Exercise Sets Reps Comments   B LE exer sitting  Yellow theraband 20 reps  Ankle pumps, TKE, abd/add, knee lifts    B LE exer standing  1 20 B UE supported- abd/add, knee lifts, mini squats , calf raises                   Additional Comments:  Sternal precautions followed    Assessment: Patient tolerated session well.    PT Plan: continue  Revisions made to plan of care: No    GOALS:   Multidisciplinary Problems       Physical Therapy Goals          Problem: Physical Therapy    Goal Priority Disciplines Outcome Goal Variances Interventions   Physical Therapy Goal     PT, PT/OT Ongoing, Progressing     Description: Goals to be met by: Discharge  POC: Patient to be seen 5-6x/week QD/BID As  tolerable     Patient will increase functional independence with mobility by performin. Supine to sit with Modified [place]  2. Sit to supine with Modified [place]  3. Sit to stand transfer with Modified [place]  4. Gait  x 350 feet with Modified [place] using Rolling Walker vs No AD vs LRAD. -- goal met 10/9 using RW  5. Low Fall Risk on 5x sit to stand test                         Skilled PT Minutes Provided: 25   Communication with Treatment Team:     Equipment recommendations:       At end of treatment, patient remained:  x Up in chair     Up in wheelchair in room    In bed   x With alarm activated    Bed rails up   x Call bell in reach     Family/friends present    Restraints secured properly    In bathroom with CNA/RN notified    Nurse aware    In gym with therapist/tech    Other:

## 2023-10-12 NOTE — SUBJECTIVE & OBJECTIVE
Interval History: She received hizentra 30 gm SC x 1 yesterday for common variable immunodeficiency. She remains on nasal irrigations twice daily for chronic sinusitis which is improving. She ambulated 400 feet with her rolling walker and modified independence yesterday morning and she required standby assistance with sit to stand transitions.      Review of Systems   All other systems reviewed and are negative.    Objective:     Vital Signs (Most Recent):  Temp: 98.6 °F (37 °C) (10/12/23 0726)  Pulse: 93 (10/12/23 0821)  Resp: 18 (10/12/23 0821)  BP: 134/65 (10/12/23 0726)  SpO2: 95 % (10/12/23 0821) Vital Signs (24h Range):  Temp:  [98.6 °F (37 °C)-99.2 °F (37.3 °C)] 98.6 °F (37 °C)  Pulse:  [85-93] 93  Resp:  [18] 18  SpO2:  [95 %] 95 %  BP: (128-145)/(65-82) 134/65     Weight: 97.1 kg (214 lb 1.1 oz)  Body mass index is 35.62 kg/m².    Intake/Output Summary (Last 24 hours) at 10/12/2023 0828  Last data filed at 10/11/2023 1731  Gross per 24 hour   Intake 480 ml   Output --   Net 480 ml      Physical Exam  General - Obese elderly  female in no acute distress.  HEENT - NCAT. No scleral icterus. Oropharynx clear. Mucous membranes moist.  Neck - No lymphadenopathy, thyromegaly, or JVD.  CVS - Regular rate and rhythm. No murmurs, rubs, or gallops.  Resp - Lungs are clear to auscultation bilaterally. No rales, wheeze, or rhonchi.   GI - Soft, nontender, nondistended, normoactive bowel sounds present.   Extremities - Trace edema in the left lower extremity which is chronic.   Neuro - CN II through XII are grossly intact. No focal neurological deficits. Alert and oriented times four.   Psych - Normal affect.  Skin - Anterior chest surgical site c/d/i.         Significant Labs: All pertinent labs within the past 24 hours have been reviewed.     10/9/2023:  CBC: WBC count 6.59, hemoglobin 12.7, hematocrit 41.4, platelet count 268.  CMP: Sodium 143, potassium 4.6, chloride 106, CO2 26, BUN 11.2, creatinine 0.75,  glucose 131, calcium 9.0, magnesium 1.8, alkaline phosphatase 81, total protein 6.3, albumin 3.2, total bilirubin 0.7, AST 21, ALT 14.     10/05/2023:   CBC: WBC count 7.12, hemoglobin 11.1, hematocrit 372, platelet count 236.    BMP:  Sodium 140, potassium 4.1, chloride 104, CO2 25 14.6, creatinine 0.73, glucose 130, calcium 8.4, magnesium 1.8.     10/01/2023:   Magnesium 2.1.       9/30/2023:   CBC: WBC count 7.1, hemoglobin 11.8, hematocrit 38.1, platelet count 244.  BMP:  Sodium 140, potassium 4.7, chloride 104, CO2 26, BUN 8.6, creatinine 0.61, glucose 130, calcium 8.5, magnesium 1.4.           Significant Imaging: I have reviewed all pertinent imaging results/findings within the past 24 hours.     CXR 9/30/2023: Resolved pulmonary edema seen on the prior exam with minimal residual bibasilar subsegmental atelectasis.     CXR 9/25/2023: No acute abnormality of the chest.     CXR 9/23/2023: Bilateral small pleural effusions.     CXR 9/22/2023: No acute pulmonary process identified.     CXR 9/21/2023: Interval extubation.  Mild bibasilar atelectasis.     CXR 9/20/2023: No acute pulmonary process appreciated.     CXR 9/15/2023: No acute chest disease is identified.

## 2023-10-12 NOTE — PLAN OF CARE
Problem: Adult Inpatient Plan of Care  Goal: Plan of Care Review  Outcome: Ongoing, Progressing  Flowsheets (Taken 10/11/2023 2000)  Plan of Care Reviewed With: patient  Goal: Patient-Specific Goal (Individualized)  Outcome: Ongoing, Progressing  Flowsheets (Taken 10/11/2023 2000)  Anxieties, Fears or Concerns: none  Individualized Care Needs: VSS     Problem: Fall Injury Risk  Goal: Absence of Fall and Fall-Related Injury  Outcome: Ongoing, Progressing  Intervention: Identify and Manage Contributors  Flowsheets (Taken 10/11/2023 2000)  Self-Care Promotion:   independence encouraged   BADL personal objects within reach   safe use of adaptive equipment encouraged  Medication Review/Management: medications reviewed  Intervention: Promote Injury-Free Environment  Flowsheets (Taken 10/11/2023 2000)  Safety Promotion/Fall Prevention:   assistive device/personal item within reach   bed alarm set   Fall Risk reviewed with patient/family   nonskid shoes/socks when out of bed   lighting adjusted   medications reviewed   instructed to call staff for mobility     Problem: Infection  Goal: Absence of Infection Signs and Symptoms  Outcome: Ongoing, Progressing  Intervention: Prevent or Manage Infection  Flowsheets (Taken 10/11/2023 2000)  Fever Reduction/Comfort Measures:   lightweight clothing   lightweight bedding  Infection Management: aseptic technique maintained  Isolation Precautions:   protective   precautions maintained

## 2023-10-12 NOTE — PROGRESS NOTES
[facility] Surgical Unit  [facility] Medicine  Telehospitalist Progress Note    Patient Name: Kortney Leach  MRN: [id number]  Patient Class: - Swing   Admission Date: 9/29/2023  Length of Stay: 13 days  Attending Physician: Prosper Salazar MD  Primary Care Provider: Arnaldo Hernandez MD      Subjective:     Principal Problem:Status post mitral valve replacement      HPI:  Ms. Leach is a 77 year old  female with a history of type II diabetes mellitus, ABEL, hypertension, hyperlipidemia, and GERD who was originally admitted to [facility] on 9/20/2023 for elective mitral valve replacement as well as ligation of left atrial appendage by Dr. Steven Rosales due to mitral stenosis.  Postoperatively she developed junctional bradycardia with several episodes of atrial fibrillation with RVR and she underwent dual-chamber permanent pacemaker implantation on 9/25/2023 by Dr. Arnaud Moon secondary to sick sinus syndrome.  She was started on a baby aspirin, beta-blocker, and amiodarone taper with 400 mg by mouth twice daily for 3 days, then 200 mg by mouth daily for 3 days, then 200 mg by mouth twice daily, then 200 mg by mouth daily.  She had episodes of hypoglycemia and her farxiga and glimepiride were held.  She had no other complications throughout her hospital course and she was transferred to [facility] bed on 9/29/2023 for PT/OT and management of her multiple medical comorbidities.      Overview/Hospital Course:  She was admitted to [facility] bed on 9/29/2023 for rehab following hospitalization at [facility] s/p mitral valve replacement due to mitral stenosis and sick sinus syndrome s/p dual-chamber permanent pacemaker implantation.  Her metformin was decreased from 1000 mg to 500 mg by mouth twice daily on 9/30/2023 due to hypoglycemia.  She completed an amiodarone taper on 10/2/2023 and she was transitioned to 200 mg by  mouth daily.  She was started on cetirizine 10 mg PO daily as needed on 10/06/2023 due to an episode of sneezing, watery eyes, and runny nose and she began nasal irrigations twice daily on 10/9/2023 for chronic sinusitis. 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      Interval History: She received hizentra 30 gm SC x 1 yesterday for common variable immunodeficiency. She remains on nasal irrigations twice daily for chronic sinusitis which is improving. She ambulated 400 feet with her rolling walker and modified independence yesterday morning and she required standby assistance with sit to stand transitions.      Review of Systems   All other systems reviewed and are negative.    Objective:     Vital Signs (Most Recent):  Temp: 98.6 °F (37 °C) (10/12/23 0726)  Pulse: 93 (10/12/23 0821)  Resp: 18 (10/12/23 0821)  BP: 134/65 (10/12/23 0726)  SpO2: 95 % (10/12/23 0821) Vital Signs (24h Range):  Temp:  [98.6 °F (37 °C)-99.2 °F (37.3 °C)] 98.6 °F (37 °C)  Pulse:  [85-93] 93  Resp:  [18] 18  SpO2:  [95 %] 95 %  BP: (128-145)/(65-82) 134/65     Weight: 97.1 kg (214 lb 1.1 oz)  Body mass index is 35.62 kg/m².    Intake/Output Summary (Last 24 hours) at 10/12/2023 0828  Last data filed at 10/11/2023 1731  Gross per 24 hour   Intake 480 ml   Output --   Net 480 ml      Physical Exam  General - Obese elderly  female in no acute distress.  HEENT - NCAT. No scleral icterus. Oropharynx clear. Mucous membranes moist.  Neck - No lymphadenopathy, thyromegaly, or JVD.  CVS - Regular rate and rhythm. No murmurs, rubs, or gallops.  Resp - Lungs are clear to auscultation bilaterally. No rales, wheeze, or rhonchi.   GI - Soft, nontender, nondistended, normoactive bowel sounds present.   Extremities - Trace edema in the left lower extremity which is chronic.   Neuro - CN II through XII are grossly intact. No focal neurological deficits. Alert and oriented times four.   Psych - Normal  affect.  Skin - Anterior chest surgical site c/d/i.         Significant Labs: All pertinent labs within the past 24 hours have been reviewed.     10/9/2023:  CBC: WBC count 6.59, hemoglobin 12.7, hematocrit 41.4, platelet count 268.  CMP: Sodium 143, potassium 4.6, chloride 106, CO2 26, BUN 11.2, creatinine 0.75, glucose 131, calcium 9.0, magnesium 1.8, alkaline phosphatase 81, total protein 6.3, albumin 3.2, total bilirubin 0.7, AST 21, ALT 14.     10/05/2023:   CBC: WBC count 7.12, hemoglobin 11.1, hematocrit 372, platelet count 236.    BMP:  Sodium 140, potassium 4.1, chloride 104, CO2 25 14.6, creatinine 0.73, glucose 130, calcium 8.4, magnesium 1.8.     10/01/2023:   Magnesium 2.1.       9/30/2023:   CBC: WBC count 7.1, hemoglobin 11.8, hematocrit 38.1, platelet count 244.  BMP:  Sodium 140, potassium 4.7, chloride 104, CO2 26, BUN 8.6, creatinine 0.61, glucose 130, calcium 8.5, magnesium 1.4.           Significant Imaging: I have reviewed all pertinent imaging results/findings within the past 24 hours.     CXR 9/30/2023: Resolved pulmonary edema seen on the prior exam with minimal residual bibasilar subsegmental atelectasis.     CXR 9/25/2023: No acute abnormality of the chest.     CXR 9/23/2023: Bilateral small pleural effusions.     CXR 9/22/2023: No acute pulmonary process identified.     CXR 9/21/2023: Interval extubation.  Mild bibasilar atelectasis.     CXR 9/20/2023: No acute pulmonary process appreciated.     CXR 9/15/2023: No acute chest disease is identified.      Assessment/Plan:      * Status post mitral valve replacement  Continue routine postoperative care with PT/OT, sternal precautions, and pain control with norco as needed.  She is s/p mitral valve replacement as well as ligation of left atrial appendage on 9/20/2023 by Dr. Steven Rosales due to mitral stenosis.  Limited echocardiogram from 9/23/2023 showed normal left ventricular systolic function with an EF of 55%.    Sick sinus  syndrome  Continue baby aspirin. She is s/p dual-chamber permanent pacemaker implantation on 9/25/2023 by Dr. Arnaud Moon.    Atrial fibrillation with RVR  Resolved. Continue amiodarone, beta-blocker, and rivarobaxan.    Physical deconditioning  Continue PT/OT.    Type II diabetes mellitus  Continue metformin which was decreased on 9/30/2023 due to hypoglycemia.  She was previously on glimepiride and farxiga as an outpatient which were stopped due to hypoglycemia.  Most recent hemoglobin A1c from 9/30/2023 was 6.0.    Chronic sinusitis  Continue nasal irrigations twice daily and cetirizine.    Hypertension  Continue lisinopril (substitute for benazepril) and metoprolol tartrate.    Hyperlipidemia  Continue atorvastatin.    History of left lower extremity DVT   Continue rivaroxaban.    Obstructive sleep apnea  Continue CPAP at night.    GERD (gastroesophageal reflux disease)  Continue pantoprazole (substitute for dexilant) and famotidine.    COPD (chronic obstructive pulmonary disease)  Continue trelegy ellipta.    Common variable immunodeficiency  She received hizentra 30 gm SC x 1 on 10/11/2023. She was previously receiving hizentra injections once every 2 weeks at home but she missed her injection on 9/27/2023 while she was in the hospital.    Asthma  Continue trelegy ellipta.    Chronic constipation  Continue docusate.    Glaucoma  Continue timolol ophthalmic solution.    Hypomagnesemia  Improved.  Continue magnesium oxide.    Disposition  Anticipate discharge home with home health early next week.      VTE Risk Mitigation (From admission, onward)         Ordered     rivaroxaban tablet 20 mg  With dinner         09/29/23 1153     Place sequential compression device  Until discontinued         09/29/23 1153                Discharge Planning   SD:      Code Status: Full Code   Is the patient medically ready for discharge?:     Reason for patient still in hospital (select all that apply): PT / OT recommendations and  Pending disposition  Discharge Plan A: Home with family, Home Health   Discharge Delays: None known at this time      This service was provided via telemedicine.  Type of Software: Audio/Visual.  Originating Site: [facility].  Distant Site: [facility].  Her exam was performed with the assistance of Domitila Cruz RN.      Prosper Salazar MD  Department of Hospital Medicine   [facility]

## 2023-10-12 NOTE — PROGRESS NOTES
Name: Kortney Leach    : 1945 (77 y.o.)  MRN: [id number]           Patient POC Update:      Following conference with PTA on patient's performance today, POC and PT goals have been updated. Patient is currently functioning at a Modified Independent level with all functional mobility using RW and with good adherence to sternal precautions. She has been updated to QD PT sessions daily until dc.

## 2023-10-12 NOTE — PT/OT/SLP PROGRESS
Occupational Therapy  Treatment    Name: Kortney Leach    : 1945 (77 y.o.)  MRN: [id number]           TREATMENT SUMMARY AND RECOMMENDATIONS:      OT Date of Treatment: 10/12/23  OT Start Time: 930  OT Stop Time: 1000  OT Total Time (min): 30 min      Subjective Assessment:   No complaints  Lethargic   x Awake, alert, cooperative  Impulsive    Uncooperative   Flat affect    Agitated  c/o pain   x Appropriate  c/o fatigue    Confused  Treated at bedside     Emotionally labile x Treated in gym/dept.      Other:        Therapy Precautions:    Cognitive deficits  Spinal precautions    Collar - hard x Sternal precautions    Collar - soft   TLSO    Fall risk  LSO    Hip precautions - posterior  Knee immobilizer    Hip precautions - anterior  WBAT    Impaired communication  Partial weightbearing    Oxygen  TTWB    PEG tube  NWB    Visual deficits      Hearing deficits   Other:        Treatment Objectives:     Mobility Training:    Mobility task Assist level Comments    Bed mobility     Transfer     Sit to stands transitions SPV [id number] from chair<>standing    Functional mobility SBA 400ft using a RW and gait belt, following with a WC   Sitting balance     Standing balance  SBA Dynamic standing balance activity targeting challenging her balance and reaching outside of KEM on compliant surface to increase independence with bathing. Pt noted with stable balance throughout activity using RW for support as needed.    Other:        ADL Training:    ADL Assist level Comments    Feeding     Grooming/hygiene     Bathing     Upper body dressing     Lower body dressing     Toileting     Toilet transfer     Adaptive equipment training     Other:           Therapeutic Exercise:   Exercise Sets Reps Comments   UBE strengthening exercises 4 10 3# weighted dowel performing bicep curls, chest press, shoulder press, forward/backward rows                          Additional Comments:      Assessment: Patient tolerated session  well. Pt states she performed her own sponge bath and dressing this morning with MI. Pt feels she is ready to discharge home.     GOALS:   Multidisciplinary Problems       Occupational Therapy Goals          Problem: Occupational Therapy    Goal Priority Disciplines Outcome Interventions   Occupational Therapy Goal     OT, PT/OT Ongoing, Progressing    Description: Goals to be met by: Discharge     Patient will increase functional independence with ADLs by performing:    UE Dressing with Set-up Assistance. GOAL MET 10/3/23  LE Dressing with Moderate Assistance. GOAL MET 10/3/23  Grooming while standing at sink with Stand-by Assistance. Goal met 10/2/23  Toileting from bedside commode with Stand-by Assistance for hygiene and clothing management. Goal met 10/2/23  Bathing from shower chair/bench with Stand-by Assistance. GOAL MET 10/10/23  Toilet transfer to bedside commode with Contact Guard Assistance. Goal met 10/9/23    New Goals:     Pt will perform UB dressing requiring MI.   Pt will perform LB dressing requiring MI.   Pt will perform toileting requiring MI.   Pt will perform grooming while standing at the sink requiring MI.                          Recommendations:     Discharge Recommendations: home with home health, home health PT, home health OT  Discharge Equipment Recommendations:  none  Barriers to discharge:  Decreased caregiver support, Other (Comment) (Daughter works during the day)     Plan:     Patient to be seen 6 x/week to address the above listed problems via self-care/home management, therapeutic activities, therapeutic exercises  Plan of Care Expires: 10/20/23  Plan of Care Reviewed with: patient  Revisions made to plan of care: No      Skilled OT Minutes Provided: 30  Communication with Treatment Team:     Equipment recommendations:       At end of treatment, patient remained:   Up in chair     Up in wheelchair in room    In bed    With alarm activated    Bed rails up    Call bell in reach      Family/friends present    Restraints secured properly    In bathroom with CNA/RN notified   x In gym with PT/PTA/tech    Nurse aware    Other:

## 2023-10-13 LAB
ANION GAP SERPL CALC-SCNC: 11 MEQ/L
BASOPHILS # BLD AUTO: 0.01 X10(3)/MCL
BASOPHILS NFR BLD AUTO: 0.2 %
BUN SERPL-MCNC: 13.6 MG/DL (ref 9.8–20.1)
CALCIUM SERPL-MCNC: 8.7 MG/DL (ref 8.4–10.2)
CHLORIDE SERPL-SCNC: 106 MMOL/L (ref 98–107)
CO2 SERPL-SCNC: 24 MMOL/L (ref 23–31)
CREAT SERPL-MCNC: 0.75 MG/DL (ref 0.55–1.02)
CREAT/UREA NIT SERPL: 18
EOSINOPHIL # BLD AUTO: 0.14 X10(3)/MCL (ref 0–0.9)
EOSINOPHIL NFR BLD AUTO: 2.7 %
ERYTHROCYTE [DISTWIDTH] IN BLOOD BY AUTOMATED COUNT: 14.5 % (ref 11.5–17)
GFR SERPLBLD CREATININE-BSD FMLA CKD-EPI: >60 MLS/MIN/1.73/M2
GLUCOSE SERPL-MCNC: 117 MG/DL (ref 82–115)
HCT VFR BLD AUTO: 39.6 % (ref 37–47)
HGB BLD-MCNC: 12.2 G/DL (ref 12–16)
IMM GRANULOCYTES # BLD AUTO: 0 X10(3)/MCL (ref 0–0.04)
IMM GRANULOCYTES NFR BLD AUTO: 0 %
LYMPHOCYTES # BLD AUTO: 2.39 X10(3)/MCL (ref 0.6–4.6)
LYMPHOCYTES NFR BLD AUTO: 45.9 %
MAGNESIUM SERPL-MCNC: 1.9 MG/DL (ref 1.6–2.6)
MCH RBC QN AUTO: 31 PG (ref 27–31)
MCHC RBC AUTO-ENTMCNC: 30.8 G/DL (ref 33–36)
MCV RBC AUTO: 100.5 FL (ref 80–94)
MONOCYTES # BLD AUTO: 0.57 X10(3)/MCL (ref 0.1–1.3)
MONOCYTES NFR BLD AUTO: 10.9 %
NEUTROPHILS # BLD AUTO: 2.1 X10(3)/MCL (ref 2.1–9.2)
NEUTROPHILS NFR BLD AUTO: 40.3 %
PLATELET # BLD AUTO: 202 X10(3)/MCL (ref 130–400)
PMV BLD AUTO: 11 FL (ref 7.4–10.4)
POTASSIUM SERPL-SCNC: 4.2 MMOL/L (ref 3.5–5.1)
RBC # BLD AUTO: 3.94 X10(6)/MCL (ref 4.2–5.4)
SODIUM SERPL-SCNC: 141 MMOL/L (ref 136–145)
WBC # SPEC AUTO: 5.21 X10(3)/MCL (ref 4.5–11.5)

## 2023-10-13 PROCEDURE — 83735 ASSAY OF MAGNESIUM: CPT | Performed by: INTERNAL MEDICINE

## 2023-10-13 PROCEDURE — 27000221 HC OXYGEN, UP TO 24 HOURS

## 2023-10-13 PROCEDURE — 99900035 HC TECH TIME PER 15 MIN (STAT)

## 2023-10-13 PROCEDURE — 11000004 HC SNF PRIVATE

## 2023-10-13 PROCEDURE — 25000003 PHARM REV CODE 250: Performed by: STUDENT IN AN ORGANIZED HEALTH CARE EDUCATION/TRAINING PROGRAM

## 2023-10-13 PROCEDURE — 80048 BASIC METABOLIC PNL TOTAL CA: CPT | Performed by: INTERNAL MEDICINE

## 2023-10-13 PROCEDURE — 85025 COMPLETE CBC W/AUTO DIFF WBC: CPT | Performed by: INTERNAL MEDICINE

## 2023-10-13 PROCEDURE — 25000003 PHARM REV CODE 250: Performed by: INTERNAL MEDICINE

## 2023-10-13 PROCEDURE — 94760 N-INVAS EAR/PLS OXIMETRY 1: CPT

## 2023-10-13 PROCEDURE — 97116 GAIT TRAINING THERAPY: CPT | Mod: CQ

## 2023-10-13 RX ADMIN — FOLIC ACID 1 MG: 1 TABLET ORAL at 08:10

## 2023-10-13 RX ADMIN — CETIRIZINE HYDROCHLORIDE 10 MG: 10 TABLET, FILM COATED ORAL at 08:10

## 2023-10-13 RX ADMIN — ACETAMINOPHEN 650 MG: 325 TABLET ORAL at 08:10

## 2023-10-13 RX ADMIN — RIVAROXABAN 20 MG: 20 TABLET, FILM COATED ORAL at 04:10

## 2023-10-13 RX ADMIN — METFORMIN HYDROCHLORIDE 500 MG: 500 TABLET ORAL at 08:10

## 2023-10-13 RX ADMIN — LISINOPRIL 20 MG: 10 TABLET ORAL at 08:10

## 2023-10-13 RX ADMIN — PANCRELIPASE 2 CAPSULE: 24000; 76000; 120000 CAPSULE, DELAYED RELEASE PELLETS ORAL at 12:10

## 2023-10-13 RX ADMIN — ATORVASTATIN CALCIUM 40 MG: 40 TABLET, FILM COATED ORAL at 08:10

## 2023-10-13 RX ADMIN — PANCRELIPASE 2 CAPSULE: 24000; 76000; 120000 CAPSULE, DELAYED RELEASE PELLETS ORAL at 08:10

## 2023-10-13 RX ADMIN — AZELASTINE HYDROCHLORIDE 274 MCG: 137 SPRAY, METERED NASAL at 08:10

## 2023-10-13 RX ADMIN — ASPIRIN 81 MG: 81 TABLET, COATED ORAL at 08:10

## 2023-10-13 RX ADMIN — UMECLIDINIUM 62.5 MCG: 62.5 AEROSOL, POWDER ORAL at 08:10

## 2023-10-13 RX ADMIN — PANCRELIPASE 2 CAPSULE: 24000; 76000; 120000 CAPSULE, DELAYED RELEASE PELLETS ORAL at 04:10

## 2023-10-13 RX ADMIN — DOCUSATE SODIUM 100 MG: 100 CAPSULE, LIQUID FILLED ORAL at 08:10

## 2023-10-13 RX ADMIN — FLUTICASONE FUROATE AND VILANTEROL TRIFENATATE 1 PUFF: 200; 25 POWDER RESPIRATORY (INHALATION) at 08:10

## 2023-10-13 RX ADMIN — DOCUSATE SODIUM 100 MG: 100 CAPSULE, LIQUID FILLED ORAL at 09:10

## 2023-10-13 RX ADMIN — Medication 400 MG: at 08:10

## 2023-10-13 RX ADMIN — AMIODARONE HYDROCHLORIDE 200 MG: 200 TABLET ORAL at 08:10

## 2023-10-13 RX ADMIN — TIMOLOL MALEATE 1 DROP: 5 SOLUTION OPHTHALMIC at 09:10

## 2023-10-13 RX ADMIN — METFORMIN HYDROCHLORIDE 500 MG: 500 TABLET ORAL at 04:10

## 2023-10-13 RX ADMIN — PANTOPRAZOLE SODIUM 40 MG: 40 TABLET, DELAYED RELEASE ORAL at 08:10

## 2023-10-13 RX ADMIN — FAMOTIDINE 20 MG: 20 TABLET ORAL at 09:10

## 2023-10-13 RX ADMIN — TIMOLOL MALEATE 1 DROP: 5 SOLUTION OPHTHALMIC at 08:10

## 2023-10-13 RX ADMIN — AZELASTINE HYDROCHLORIDE 274 MCG: 137 SPRAY, METERED NASAL at 09:10

## 2023-10-13 RX ADMIN — ACETAMINOPHEN 650 MG: 325 TABLET ORAL at 04:10

## 2023-10-13 NOTE — ASSESSMENT & PLAN NOTE
Continue trelegy ellipta.   [facility]) Vascular Surgery--[place]  Dina ALLEN Prasanth Figures, [address] [facility], [facility],  [address]    Follow-up    Referring Provider:  January Lomeli MD    Patient Name: Mukesh Ibarra  YOB: 1935  Date: 05/14/20    History:  Mukesh Ibarra is a 79 y.o. female being followed for AAA, which is tentatively scheduled for repair in the near future. In the interim, she underwent a right lower extremity arterial duplex in follow-up of her prior bypass graft. This was suggestive of a patent bypass graft with velocities below the threshold to support graft flow. Denies any rest pain but is having some claudication. Her walking is, however, somewhat limited. I personally reviewed the following study and its images:  VL DUP LOWER EXTREMITY ARTERIES BILATERAL  5/5/2020   1. Patent right femoral-popliteal artery bypass though decreased velocities    are consistent with impending graft failure.    2. Findings suggest right aortoiliac inflow disease and right popliteal    artery stenosis.    3. There is diffuse atherosclerotic multi-level disease of the left lower    extremity.    4. Bilaterally, the tibioperoneal arteries are difficult to assess secondary    to calcification. Vital Signs  BP (!) 140/69   Pulse 69   Temp 98.2 °F (36.8 °C)   Ht 5' 2\" (1.575 m)   Wt 126 lb (57.2 kg)   BMI 23.05 kg/m²     Physical Examination:  General:  alert and oriented to person, place and time, well-developed and well-nourished, in no acute distress and frail-appearing  Heart: RRR no m/r/g  Lungs:  CTA B no w/r/r  Abdomen: soft, NT/ND, no masses. Extremities: No cyanosis or discoloration. Normal capillary refill bilaterally. Decreased hair along the hairbearing areas bilaterally. No ulcerations noted. Vascular exam:   Pulses:    carotid brachial radial femoral popliteal DP PT   RIGHT 2 2 2 2 dopp dopp dopp   LEFT              Impression/Plan:  1.   AAA 5.3cm, asymptomatic   --this is an increase in size from 4.1

## 2023-10-13 NOTE — PT/OT/SLP PROGRESS
Physical Therapy Treatment Note           Name: Kortney Leach    : 1945 (77 y.o.)  MRN: [id number]           TREATMENT SUMMARY AND RECOMMENDATIONS:    PT Received On: 10/13/23  PT Start Time: 1000     PT Stop Time: 1015  PT Total Time (min): 15 min     Subjective Assessment:   No complaints  Lethargic   x Awake, alert, cooperative  Uncooperative    Agitated  c/o pain    Appropriate  c/o fatigue    Confused  Treated at bedside     Emotionally labile  Treated in gym/dept.    Impulsive  Other:    Flat affect       Therapy Precautions:    Cognitive deficits  Spinal precautions    Collar - hard x Sternal precautions    Collar - soft   TLSO    Fall risk  LSO    Hip precautions - posterior  Knee immobilizer    Hip precautions - anterior  WBAT    Impaired communication  Partial weightbearing    Oxygen  TTWB    PEG tube  NWB    Visual deficits  Other:    Hearing deficits          Treatment Objectives:     Mobility Training:   Assist level Comments    Bed mobility     Transfer     Gait [place] on firm surface with  ft    Sit to stand transitions     Sitting balance     Standing balance      Wheelchair mobility     Car transfer     Other:          Therapeutic Exercise:   Exercise Sets Reps Comments                               Additional Comments:  Sternal precautions followed    Assessment: Patient tolerated session well.    PT Plan: continue  Revisions made to plan of care: No    GOALS:   Multidisciplinary Problems       Physical Therapy Goals          Problem: Physical Therapy    Goal Priority Disciplines Outcome Goal Variances Interventions   Physical Therapy Goal     PT, PT/OT Ongoing, Progressing     Description: Goals to be met by: Discharge  POC: Patient to be seen 5-6x/week QD As tolerable     Patient will increase functional independence with mobility by performin. Supine to sit with Modified [place]  2. Sit to supine with Modified [place]  3. Sit to stand transfer with  Modified [place] --goal met  4. Gait  x 350 feet with Modified [place] using Rolling Walker vs No AD vs LRAD. -- goal met 10/9 using RW  5. Low Fall Risk on 5x sit to stand test --goal met                         Skilled PT Minutes Provided: 15   Communication with Treatment Team:     Equipment recommendations:       At end of treatment, patient remained:  x Up in chair     Up in wheelchair in room    In bed    With alarm activated    Bed rails up   x Call bell in reach     Family/friends present    Restraints secured properly    In bathroom with CNA/RN notified    Nurse aware    In gym with therapist/tech    Other:

## 2023-10-13 NOTE — PROGRESS NOTES
[facility] Medical Surgical Unit  [facility] Medicine  Progress Note    Patient Name: Kortney Leach  MRN: [id number]  Patient Class: - Swing   Admission Date: 9/29/2023  Length of Stay: 14 days  Attending Physician: Prosper Salazar MD  Primary Care Provider: Arnaldo Hernandez MD        Subjective:     Principal Problem:Status post mitral valve replacement        HPI:  Ms. Leach is a 77 year old  female with a history of type II diabetes mellitus, ABEL, hypertension, hyperlipidemia, and GERD who was originally admitted to [facility] on 9/20/2023 for elective mitral valve replacement as well as ligation of left atrial appendage by Dr. Steven Rosales due to mitral stenosis.  Postoperatively she developed junctional bradycardia with several episodes of atrial fibrillation with RVR and she underwent dual-chamber permanent pacemaker implantation on 9/25/2023 by Dr. Arnaud Moon secondary to sick sinus syndrome.  She was started on a baby aspirin, beta-blocker, and amiodarone taper with 400 mg by mouth twice daily for 3 days, then 200 mg by mouth daily for 3 days, then 200 mg by mouth twice daily, then 200 mg by mouth daily.  She had episodes of hypoglycemia and her farxiga and glimepiride were held.  She had no other complications throughout her hospital course and she was transferred to [facility] bed on 9/29/2023 for PT/OT and management of her multiple medical comorbidities.      Overview/Hospital Course:  She was admitted to [facility] bed on 9/29/2023 for rehab following hospitalization at [facility] s/p mitral valve replacement due to mitral stenosis and sick sinus syndrome s/p dual-chamber permanent pacemaker implantation.  Her metformin was decreased from 1000 mg to 500 mg by mouth twice daily on 9/30/2023 due to hypoglycemia.  She completed an amiodarone taper on 10/2/2023 and she was transitioned to 200 mg by mouth daily.   She was started on cetirizine 10 mg PO daily as needed on 10/06/2023 due to an episode of sneezing, watery eyes, and runny nose and she began nasal irrigations twice daily on 10/9/2023 for chronic sinusitis. 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      Interval History: Pt feels well and anxious for DC tomorrow. Pt has met all PT goals and family training has been completed.     Review of Systems   Constitutional: Negative.    HENT: Negative.     Respiratory: Negative.     Cardiovascular: Negative.    Gastrointestinal: Negative.    Genitourinary: Negative.    Neurological: Negative.      Objective:     Vital Signs (Most Recent):  Temp: 98.1 °F (36.7 °C) (10/13/23 0851)  Pulse: 85 (10/13/23 0856)  Resp: 18 (10/13/23 0856)  BP: (!) 143/81 (10/12/23 1843)  SpO2: (!) 94 % (10/12/23 1945) Vital Signs (24h Range):  Temp:  [98.1 °F (36.7 °C)] 98.1 °F (36.7 °C)  Pulse:  [85-86] 85  Resp:  [17-18] 18  SpO2:  [91 %-94 %] 94 %  BP: (143)/(81) 143/81     Weight: 97.1 kg (214 lb 1.1 oz)  Body mass index is 35.62 kg/m².    Intake/Output Summary (Last 24 hours) at 10/13/2023 1153  Last data filed at 10/13/2023 0903  Gross per 24 hour   Intake 480 ml   Output --   Net 480 ml         Physical Exam  HENT:      Head: Normocephalic and atraumatic.      Nose: Nose normal.   Eyes:      Conjunctiva/sclera: Conjunctivae normal.   Cardiovascular:      Rate and Rhythm: Normal rate and regular rhythm.   Pulmonary:      Effort: Pulmonary effort is normal.      Breath sounds: Normal breath sounds.   Abdominal:      Palpations: Abdomen is soft.   Skin:     General: Skin is warm and dry.   Neurological:      Mental Status: She is alert and oriented to person, place, and time.   Psychiatric:         Mood and Affect: Mood normal.             Significant Labs: All pertinent labs within the past 24 hours have been reviewed.    Significant Imaging: I have reviewed all pertinent imaging results/findings  within the past 24 hours.      Assessment/Plan:      * Status post mitral valve replacement  Continue routine postoperative care with PT/OT, sternal precautions, and pain control with norco as needed.  She is s/p mitral valve replacement as well as ligation of left atrial appendage on 9/20/2023 by Dr. Steven Roasles due to mitral stenosis.  Limited echocardiogram from 9/23/2023 showed normal left ventricular systolic function with an EF of 55%.    Chronic sinusitis  Continue nasal irrigations twice daily and cetirizine.    Common variable immunodeficiency  She received hizentra 30 gm SC x 1 on 10/11/2023. She was previously receiving hizentra injections once every 2 weeks at home but she missed her injection on 9/27/2023 while she was in the hospital.    Disposition  Anticipate discharge home tomorrow.    Obstructive sleep apnea  Continue CPAP at night.    Asthma  Continue trelegy ellipta.    COPD (chronic obstructive pulmonary disease)  Continue trelegy ellipta.    Chronic constipation  Continue docusate.    Hypomagnesemia  Improved.  Continue magnesium oxide.    GERD (gastroesophageal reflux disease)  Continue pantoprazole (substitute for dexilant) and famotidine.    Glaucoma  Continue timolol ophthalmic solution.    Hyperlipidemia  Continue atorvastatin.    History of left lower extremity DVT   Continue rivaroxaban.    Hypertension  Continue lisinopril (substitute for benazepril) and metoprolol tartrate.    Physical deconditioning  Continue PT/OT.    Atrial fibrillation with RVR  Resolved. Continue amiodarone, beta-blocker, and rivarobaxan.    Type II diabetes mellitus  Continue metformin which was decreased on 9/30/2023 due to hypoglycemia.  She was previously on glimepiride and farxiga as an outpatient which were stopped due to hypoglycemia.  Most recent hemoglobin A1c from 9/30/2023 was 6.0.    Severe mitral valve regurgitation        Sick sinus syndrome  Continue baby aspirin. She is s/p dual-chamber  permanent pacemaker implantation on 9/25/2023 by Dr. Arnaud Moon.      VTE Risk Mitigation (From admission, onward)         Ordered     rivaroxaban tablet 20 mg  With dinner         09/29/23 1153     Place sequential compression device  Until discontinued         09/29/23 1153                Discharge Planning   SD:      Code Status: Full Code   Is the patient medically ready for discharge?:     Reason for patient still in hospital (select all that apply): Treatment  Discharge Plan A: Home with family, Home Health   Discharge Delays: None known at this time    Service was provided using HIPPA compliant web platform using SOC for audio/visual equipment  Patient Location: [place]  Provider Location:Home  Participants on call: bedside RN, patient  Physician on call : Dr. Marisabel Petit DO  Department of Hospital Medicine   [facility] - Medical Surgical Unit

## 2023-10-13 NOTE — PLAN OF CARE
Problem: Occupational Therapy  Goal: Occupational Therapy Goal  Description: Goals to be met by: Discharge     Patient will increase functional independence with ADLs by performing:    UE Dressing with Set-up Assistance. GOAL MET 10/3/23  LE Dressing with Moderate Assistance. GOAL MET 10/3/23  Grooming while standing at sink with Stand-by Assistance. Goal met 10/2/23  Toileting from bedside commode with Stand-by Assistance for hygiene and clothing management. Goal met 10/2/23  Bathing from shower chair/bench with Stand-by Assistance. GOAL MET 10/10/23  Toilet transfer to bedside commode with Contact Guard Assistance. Goal met 10/9/23    New Goals:     Pt will perform UB dressing requiring MI. GOAL MET 10/12/23  Pt will perform LB dressing requiring MI. GOAL MET 10/12/23  Pt will perform toileting requiring MI. GOAL MET 10/12/23  Pt will perform grooming while standing at the sink requiring MI. GOAL MET 10/12/23    Outcome: Met

## 2023-10-13 NOTE — PT/OT/SLP DISCHARGE
Occupational Therapy Discharge Summary    Kortney Leach  MRN: [id number]   Principal Problem: Status post mitral valve replacement      Patient Discharged from acute Occupational Therapy on 10/13/23.    Assessment:      Patient has met all goals and is not appropriate for therapy.    Objective:     GOALS:   Multidisciplinary Problems       Occupational Therapy Goals       Not on file              Multidisciplinary Problems (Resolved)          Problem: Occupational Therapy    Goal Priority Disciplines Outcome Interventions   Occupational Therapy Goal   (Resolved)     OT, PT/OT Met    Description: Goals to be met by: Discharge     Patient will increase functional independence with ADLs by performing:    UE Dressing with Set-up Assistance. GOAL MET 10/3/23  LE Dressing with Moderate Assistance. GOAL MET 10/3/23  Grooming while standing at sink with Stand-by Assistance. Goal met 10/2/23  Toileting from bedside commode with Stand-by Assistance for hygiene and clothing management. Goal met 10/2/23  Bathing from shower chair/bench with Stand-by Assistance. GOAL MET 10/10/23  Toilet transfer to bedside commode with Contact Guard Assistance. Goal met 10/9/23    New Goals:     Pt will perform UB dressing requiring MI. GOAL MET 10/12/23  Pt will perform LB dressing requiring MI. GOAL MET 10/12/23  Pt will perform toileting requiring MI. GOAL MET 10/12/23  Pt will perform grooming while standing at the sink requiring MI. GOAL MET 10/12/23                         Reasons for Discontinuation of Therapy Services  Satisfactory goal achievement.      Plan:     Patient Discharged to: Home with Home Health Service    10/13/2023

## 2023-10-13 NOTE — PLAN OF CARE
Problem: Impaired Wound Healing  Goal: Optimal Wound Healing  Outcome: Ongoing, Progressing  Intervention: Promote Wound Healing  Flowsheets (Taken 10/13/2023 1218)  Oral Nutrition Promotion: calorie-dense foods provided  Sleep/Rest Enhancement:   regular sleep/rest pattern promoted   consistent schedule promoted  Activity Management:   Rolling - L1   Ambulated to bathroom - L4  Pain Management Interventions:   care clustered   medication offered     Problem: Diabetes Comorbidity  Goal: Blood Glucose Level Within Targeted Range  Outcome: Ongoing, Progressing  Intervention: Monitor and Manage Glycemia  Flowsheets (Taken 10/13/2023 1218)  Glycemic Management: blood glucose monitored

## 2023-10-13 NOTE — SUBJECTIVE & OBJECTIVE
Interval History: Pt feels well and anxious for DC tomorrow. Pt has met all PT goals and family training has been completed.     Review of Systems   Constitutional: Negative.    HENT: Negative.     Respiratory: Negative.     Cardiovascular: Negative.    Gastrointestinal: Negative.    Genitourinary: Negative.    Neurological: Negative.      Objective:     Vital Signs (Most Recent):  Temp: 98.1 °F (36.7 °C) (10/13/23 0851)  Pulse: 85 (10/13/23 0856)  Resp: 18 (10/13/23 0856)  BP: (!) 143/81 (10/12/23 1843)  SpO2: (!) 94 % (10/12/23 1945) Vital Signs (24h Range):  Temp:  [98.1 °F (36.7 °C)] 98.1 °F (36.7 °C)  Pulse:  [85-86] 85  Resp:  [17-18] 18  SpO2:  [91 %-94 %] 94 %  BP: (143)/(81) 143/81     Weight: 97.1 kg (214 lb 1.1 oz)  Body mass index is 35.62 kg/m².    Intake/Output Summary (Last 24 hours) at 10/13/2023 1153  Last data filed at 10/13/2023 0903  Gross per 24 hour   Intake 480 ml   Output --   Net 480 ml         Physical Exam  HENT:      Head: Normocephalic and atraumatic.      Nose: Nose normal.   Eyes:      Conjunctiva/sclera: Conjunctivae normal.   Cardiovascular:      Rate and Rhythm: Normal rate and regular rhythm.   Pulmonary:      Effort: Pulmonary effort is normal.      Breath sounds: Normal breath sounds.   Abdominal:      Palpations: Abdomen is soft.   Skin:     General: Skin is warm and dry.   Neurological:      Mental Status: She is alert and oriented to person, place, and time.   Psychiatric:         Mood and Affect: Mood normal.             Significant Labs: All pertinent labs within the past 24 hours have been reviewed.    Significant Imaging: I have reviewed all pertinent imaging results/findings within the past 24 hours.

## 2023-10-14 VITALS
DIASTOLIC BLOOD PRESSURE: 80 MMHG | SYSTOLIC BLOOD PRESSURE: 138 MMHG | HEIGHT: 65 IN | RESPIRATION RATE: 18 BRPM | TEMPERATURE: 99 F | HEART RATE: 84 BPM | OXYGEN SATURATION: 97 % | WEIGHT: 214.06 LBS | BODY MASS INDEX: 35.67 KG/M2

## 2023-10-14 LAB — POCT GLUCOSE: 111 MG/DL (ref 70–110)

## 2023-10-14 PROCEDURE — 94760 N-INVAS EAR/PLS OXIMETRY 1: CPT

## 2023-10-14 PROCEDURE — 25000003 PHARM REV CODE 250: Performed by: STUDENT IN AN ORGANIZED HEALTH CARE EDUCATION/TRAINING PROGRAM

## 2023-10-14 PROCEDURE — 99900035 HC TECH TIME PER 15 MIN (STAT)

## 2023-10-14 RX ORDER — ATORVASTATIN CALCIUM 40 MG/1
40 TABLET, FILM COATED ORAL DAILY
Qty: 30 TABLET | Refills: 0 | Status: SHIPPED | OUTPATIENT
Start: 2023-10-15 | End: 2024-10-14

## 2023-10-14 RX ORDER — METFORMIN HYDROCHLORIDE 500 MG/1
500 TABLET ORAL 2 TIMES DAILY WITH MEALS
Qty: 180 TABLET | Refills: 3 | Status: SHIPPED | OUTPATIENT
Start: 2023-10-14 | End: 2024-10-13

## 2023-10-14 RX ADMIN — METFORMIN HYDROCHLORIDE 500 MG: 500 TABLET ORAL at 07:10

## 2023-10-14 RX ADMIN — PANCRELIPASE 2 CAPSULE: 24000; 76000; 120000 CAPSULE, DELAYED RELEASE PELLETS ORAL at 07:10

## 2023-10-14 RX ADMIN — ACETAMINOPHEN 650 MG: 325 TABLET ORAL at 07:10

## 2023-10-14 NOTE — PLAN OF CARE
Problem: Adult Inpatient Plan of Care  Goal: Plan of Care Review  Outcome: Ongoing, Progressing  Goal: Patient-Specific Goal (Individualized)  Outcome: Ongoing, Progressing  Flowsheets (Taken 10/13/2023 2145)  Anxieties, Fears or Concerns: No concerns noted at this time.  Individualized Care Needs: Monitor pt for s/s infection and answer any questions regarding her Discharge.  Goal: Absence of Hospital-Acquired Illness or Injury  Outcome: Ongoing, Progressing  Goal: Optimal Comfort and Wellbeing  Outcome: Ongoing, Progressing  Goal: Readiness for Transition of Care  Outcome: Ongoing, Progressing     Problem: Infection  Goal: Absence of Infection Signs and Symptoms  Outcome: Ongoing, Progressing  Intervention: Prevent or Manage Infection  Flowsheets (Taken 10/13/2023 2145)  Fever Reduction/Comfort Measures:   fluid intake increased   humidification temperature decreased  Infection Management: aseptic technique maintained  Isolation Precautions:   protective   precautions maintained     Problem: Skin Injury Risk Increased  Goal: Skin Health and Integrity  Outcome: Ongoing, Progressing  Intervention: Promote and Optimize Oral Intake  Flowsheets (Taken 10/13/2023 2145)  Oral Nutrition Promotion:   physical activity promoted   rest periods promoted     Problem: Device-Related Complication Risk (Cardiac Rhythm Management Device)  Goal: Effective Device Function  Outcome: Ongoing, Progressing  Intervention: Optimize Device Care and Function  Flowsheets (Taken 10/13/2023 2145)  Pacer Function: standby     Problem: Diabetes Comorbidity  Goal: Blood Glucose Level Within Targeted Range  Outcome: Ongoing, Progressing  Intervention: Monitor and Manage Glycemia  Flowsheets (Taken 10/13/2023 2145)  Glycemic Management:   blood glucose monitored   oral hydration promoted

## 2023-10-14 NOTE — DISCHARGE SUMMARY
[facility]  Hospital Medicine  Discharge Summary      Patient Name: Kortney Leach  MRN: [id number]  Admission Date: 9/29/2023  Hospital Length of Stay: 15 days  Discharge Date and Time:  10/14/2023 10:07 AM  Attending Physician: Prosper Salazar MD   Discharging Provider: Richard Ruiz MD  Discharge Provider Team: Networked reference to record PCT   Primary Care Provider: Arnaldo Hernandez MD    HPI:  Ms. Leach is a 77 year old  female with a history of type II diabetes mellitus, ABEL, hypertension, hyperlipidemia, and GERD who was originally admitted to [facility] on 9/20/2023 for elective mitral valve replacement as well as ligation of left atrial appendage by Dr. Steven Rosales due to mitral stenosis.  Postoperatively she developed junctional bradycardia with several episodes of atrial fibrillation with RVR and she underwent dual-chamber permanent pacemaker implantation on 9/25/2023 by Dr. Arnaud Moon secondary to sick sinus syndrome.  She was started on a baby aspirin, beta-blocker, and amiodarone taper with 400 mg by mouth twice daily for 3 days, then 200 mg by mouth daily for 3 days, then 200 mg by mouth twice daily, then 200 mg by mouth daily.  She had episodes of hypoglycemia and her farxiga and glimepiride were held.  She had no other complications throughout her hospital course and she was transferred to [facility] on 9/29/2023 for PT/OT and management of her multiple medical comorbidities.      * No surgery found *     Overview/Hospital Course:  She was admitted to [facility] bed on 9/29/2023 for rehab following hospitalization at [facility] s/p mitral valve replacement due to mitral stenosis and sick sinus syndrome s/p dual-chamber permanent pacemaker implantation.  Her metformin was decreased from 1000 mg to 500 mg by mouth twice daily on 9/30/2023 due to hypoglycemia.  She completed an  amiodarone taper on 10/2/2023 and she was transitioned to 200 mg by mouth daily.  She was started on cetirizine 10 mg PO daily as needed on 10/06/2023 due to an episode of sneezing, watery eyes, and runny nose and she began nasal irrigations twice daily on 10/9/2023 for chronic sinusitis. 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    Consults:     Final Active Diagnoses:    Diagnosis Date Noted POA    PRINCIPAL PROBLEM:  Status post mitral valve replacement [Z95.2] 10/06/2023 Not Applicable    Common variable immunodeficiency [D83.9] 10/08/2023 Unknown    Chronic sinusitis [J32.9] 10/08/2023 Unknown    Type II diabetes mellitus [E11.9] 10/06/2023 Unknown    Atrial fibrillation with RVR [I48.91] 10/06/2023 Unknown    Physical deconditioning [R53.81] 10/06/2023 Unknown    Hypertension [I10] 10/06/2023 Unknown    History of left lower extremity DVT  [Z86.718] 10/06/2023 Not Applicable    Hyperlipidemia [E78.5] 10/06/2023 Unknown    Glaucoma [H40.9] 10/06/2023 Unknown    GERD (gastroesophageal reflux disease) [K21.9] 10/06/2023 Unknown    Hypomagnesemia [E83.42] 10/06/2023 Unknown    Chronic constipation [K59.09] 10/06/2023 Unknown    COPD (chronic obstructive pulmonary disease) [J44.9] 10/06/2023 Unknown    Asthma [J45.909] 10/06/2023 Unknown    Obstructive sleep apnea [G47.33] 10/06/2023 Unknown    Disposition [Z02.9] 10/06/2023 Not Applicable    Severe mitral valve regurgitation [I34.0] 09/29/2023 Yes    Sick sinus syndrome [I49.5] 09/25/2023 Unknown      Problems Resolved During this Admission:      Discharged Condition: stable    Disposition: Home or Self Care    Follow Up:   Follow-up Information       Arnaud Moon MD Follow up.    Specialties: Cardiology, Pathology  Why: For wound check, go on 10/16 @ 11 am. Spoke to Rose  Contact information:  [address] [place] [address] [phone]               [facility] Follow up.    Specialty: Home Health  Services  Why: DISCHARGING NURSE: Please call home health to notify when patient is discharged.Also please fax AVS and Discharge Summary to [phone]  Contact information:  [address] [place] [address]  [phone]               Arnaldo Hernandez MD. Go on 10/20/2023.    Specialty: Family Medicine  Why: 10/20 @ 9:00 a.m.  Caitlyn to [place].  Contact information:  [place] [address]  [phone]                           Patient Instructions:      Ambulatory referral/consult to Home Health   Standing Status: Future   Referral Priority: Routine Referral Type: Home Health   Referral Reason: Specialty Services Required   Requested Specialty: Home Health Services   Number of Visits Requested: 1     Medications:  Reconciled Home Medications:      Medication List        START taking these medications      atorvastatin 40 MG tablet  Commonly known as: LIPITOR  Take 1 tablet (40 mg total) by mouth once daily.  Start taking on: October 15, 2023            CHANGE how you take these medications      metFORMIN 500 MG tablet  Commonly known as: GLUCOPHAGE  Take 1 tablet (500 mg total) by mouth 2 (two) times daily with meals.  What changed:   medication strength  how much to take            CONTINUE taking these medications      amiodarone 200 MG Tab  Commonly known as: PACERONE  Take 2 tablets (400 mg total) by mouth 2 (two) times daily for 3 days, THEN 1 tablet (200 mg total) 2 (two) times daily for 3 days, THEN 1 tablet (200 mg total) 2 (two) times daily.  Start taking on: September 26, 2023     aspirin 81 MG EC tablet  Commonly known as: ECOTRIN  Take 1 tablet (81 mg total) by mouth once daily.     azelastine 137 mcg (0.1 %) nasal spray  Commonly known as: ASTELIN  2 sprays by Nasal route 2 (two) times daily.     benazepriL 20 MG tablet  Commonly known as: LOTENSIN  Take 20 mg by mouth once daily.     CREON 24,000-76,000 -120,000 unit capsule  Generic drug: lipase-protease-amylase  24,000-76,000-120,000 units  Take 1-2 capsules by mouth 3 (three) times daily with meals. 2 caps with meals and 1 cap c snacks     dexlansoprazole 60 mg capsule  Commonly known as: DEXILANT  Take by mouth once daily.     DUPIXENT SYRINGE 300 mg/2 mL Syrg  Generic drug: dupilumab  Inject 300 mg into the skin every 14 (fourteen) days.     famotidine 40 MG tablet  Commonly known as: PEPCID  Take 40 mg by mouth every evening.     FARXIGA 10 mg tablet  Generic drug: dapagliflozin propanediol  Take 10 mg by mouth once daily.     glimepiride 2 MG tablet  Commonly known as: AMARYL  Take 4 mg by mouth 2 (two) times a day.     HIZENTRA SUBQ  Inject into the skin every 14 (fourteen) days.     HYDROcodone-acetaminophen 5-325 mg per tablet  Commonly known as: NORCO  Take 1 tablet by mouth every 6 (six) hours as needed for Pain.     rivaroxaban 20 mg Tab  Commonly known as: XARELTO  Take 1 tablet (20 mg total) by mouth daily with dinner or evening meal.     timoloL 0.5 % ophthalmic solution  Commonly known as: BETIMOL  Place 1 drop into both eyes 2 (two) times daily.     TRELEGY ELLIPTA 200-62.5-25 mcg inhaler  Generic drug: fluticasone-umeclidin-vilanter  Inhale 1 puff into the lungs once daily.            STOP taking these medications      doxycycline 100 MG tablet  Commonly known as: VIBRA-TABS            ASK your doctor about these medications      metoprolol succinate 25 MG 24 hr tablet  Commonly known as: TOPROL-XL  Take 25 mg by mouth once daily.              Significant Diagnostic Studies: Labs: All labs within the past 24 hours have been reviewed      Pending Diagnostic Studies:       None          Indwelling Lines/Drains at time of discharge:   Lines/Drains/Airways       None                   Time spent on the discharge of patient: <30 minutes         Richard Ruiz MD  Department of Hospital Medicine  [facility]

## 2023-10-14 NOTE — PLAN OF CARE
Problem: Adult Inpatient Plan of Care  Goal: Plan of Care Review  Outcome: Met  Goal: Patient-Specific Goal (Individualized)  Outcome: Met  Goal: Absence of Hospital-Acquired Illness or Injury  Outcome: Met  Goal: Optimal Comfort and Wellbeing  Outcome: Met  Goal: Readiness for Transition of Care  Outcome: Met     Problem: Fall Injury Risk  Goal: Absence of Fall and Fall-Related Injury  Outcome: Met     Problem: Infection  Goal: Absence of Infection Signs and Symptoms  Outcome: Met     Problem: Skin Injury Risk Increased  Goal: Skin Health and Integrity  Outcome: Met     Problem: Gas Exchange Impaired  Goal: Optimal Gas Exchange  Outcome: Met     Problem: Pain Acute  Goal: Acceptable Pain Control and Functional Ability  Outcome: Met     Problem: Device-Related Complication Risk (Cardiac Rhythm Management Device)  Goal: Effective Device Function  Outcome: Met     Problem: Pain (Cardiac Rhythm Management Device)  Goal: Acceptable Pain Level  Outcome: Met     Problem: Impaired Wound Healing  Goal: Optimal Wound Healing  Outcome: Met     Problem: Diabetes Comorbidity  Goal: Blood Glucose Level Within Targeted Range  Outcome: Met

## 2023-10-14 NOTE — NURSING
Discharge instructions given to patient and patient's daughter. Personal belongings returned to patient. Call placed to [facility] [phone] to notify of patient discharge, spoke with nurse TASHI Regalado and DC summary faxed to [facility]. Pt departed facility in stable condition via wheelchair per POV.

## 2023-10-15 PROCEDURE — G0180 MD CERTIFICATION HHA PATIENT: HCPCS | Mod: ,,, | Performed by: SPECIALIST

## 2023-10-15 PROCEDURE — G0180 PR HOME HEALTH MD CERTIFICATION: ICD-10-PCS | Mod: ,,, | Performed by: SPECIALIST

## 2023-10-16 ENCOUNTER — PATIENT OUTREACH (OUTPATIENT)
Dept: ADMINISTRATIVE | Facility: CLINIC | Age: 78
End: 2023-10-16
Payer: MEDICARE

## 2023-10-16 ENCOUNTER — TELEPHONE (OUTPATIENT)
Dept: ADMINISTRATIVE | Facility: CLINIC | Age: 78
End: 2023-10-16
Payer: MEDICARE

## 2023-10-16 NOTE — PROGRESS NOTES
C3 nurse spoke with Kortney Leach  for a [facility] post hospital discharge follow up call. Pt is currently with  nurse clarifying med list and requesting a call back later to complete med rec portion of call.    The patient has a scheduled HOSFU appointment with Arnaldo Hernandez MD (Family Medicine) on 10/20/2023 @ 9:00 a.m.

## 2023-10-16 NOTE — PROGRESS NOTES
Referred by C3 nurse. Ms. Leach needed clarification on which medications she should be taking and then requested new prescriptions for medications of which she was out. The medications we reviewed were as follows: atorvastatin, metformin, amiodarone, glimepiride and Toprol XL. Ms. Leach recalled being told to stop taking medication(s), but could not recall which one(s). Upon review of discharge note and AVS, patient was only instructed to stop taking the antibiotic doxycycline. She recalled being told to stop Farxiga and something else. Upon further review, Ms. Elliss Farxiga and glimepiride had been held at one point due to hypoglycemia, but both were listed to at discharge to continue at home. She verbalized understanding. Ms. Elliss atorvastatin and metformin were filled at [facility] pharmacy and were ready for pickup (which she has picked up). Determined that new RX's were not needed at this time. The amiodarone was on hold at [facility] and the glimepiride and Toprol XL 25 mg each had refills at her local pharmacy, [facility]. [employer] will transfer the amiodarone RX at [facility] to their pharmacy and fill it along with the glimepiride and Toprol XL. Phoned Ms. Leach to let her know but got her vm. Called back and spoke to her to make sure she understood which medications she would be picking up today and how to take them. Patient verbalized understanding.

## 2023-10-16 NOTE — PROGRESS NOTES
C3 nurse attempted to contact Kortney Leach to complete med rec portion of [facility] post discharge follow up call. The patient is still unable to complete med rec at this time. C3 nurse messaged [facility] pharmacist for assistance with pt med rec concerns.

## 2023-10-18 NOTE — PROGRESS NOTES
C3 nurse spoke to Kortney Leach to confirm with patient that all medication concerns were addressed. Pt confirmed that all medication questions answered and med rec was completed with [facility] pharmacist. Denies any further needs at this time.

## 2023-10-18 NOTE — PLAN OF CARE
[facility]  Discharge Final Note    Primary Care Provider: Arnaldo Hernandez MD    Expected Discharge Date: 10/14/2023    Final Discharge Note (most recent)       Final Note - 10/18/23 1311          Final Note    Assessment Type Final Discharge Note     Anticipated Discharge Disposition Home-Health Care [facility]     What phone number can be called within the next 1-3 days to see how you are doing after discharge? [phone]     Hospital Resources/Appts/Education Provided Provided patient/caregiver with written discharge plan information        Post-Acute Status    Post-Acute Authorization Home Health     Home Health Status Set-up Complete/Auth obtained     Discharge Delays None known at this time                     Important Message from Medicare             Contact Info       Arnaud Moon MD   Specialty: Cardiology, Pathology    [address]  [facility] [address]   Phone: [phone]       Next Steps: Follow up    Instructions: For wound check, go on 10/16 @ 11 am. Spoke to Rose    [employer]   Specialty: Home Health Services    [address] [facility] [address]   Phone: [phone]       Next Steps: Follow up    Instructions: DISCHARGING NURSE: Please call home health to notify when patient is discharged.Also please fax AVS and Discharge Summary to [phone]    Arnaldo Hernandez MD   Specialty: Family Medicine   Relationship: PCP - General    [place] [address]   Phone: [phone]       Next Steps: Go on 10/20/2023    Instructions: 10/20 @ 9:00 a.mDerick Wolfe

## 2023-10-19 NOTE — PLAN OF CARE
Problem: Physical Therapy  Goal: Physical Therapy Goal  Description: Goals to be met by: Discharge  POC: Patient to be seen 5-6x/week QD As tolerable     Patient will increase functional independence with mobility by performin. Supine to sit with Modified [place]  2. Sit to supine with Modified [place]  3. Sit to stand transfer with Modified [place] --goal met  4. Gait  x 350 feet with Modified [place] using Rolling Walker vs No AD vs LRAD. -- goal met 10/9 using RW  5. Low Fall Risk on 5x sit to stand test --goal met    Outcome: Met

## 2023-10-19 NOTE — PT/OT/SLP DISCHARGE
Physical Therapy Discharge Summary    Name: Kortney Leach  MRN: [id number]   Principal Problem: Status post mitral valve replacement     Patient Discharged from acute Physical Therapy on 10/14/2023.  Please refer to prior PT noted date on 10/13/2023 for functional status.     Assessment:     Patient has met all goals and is not appropriate for therapy.    Objective:     GOALS:   Multidisciplinary Problems       Physical Therapy Goals       Not on file              Multidisciplinary Problems (Resolved)          Problem: Physical Therapy    Goal Priority Disciplines Outcome Goal Variances Interventions   Physical Therapy Goal   (Resolved)     PT, PT/OT Met     Description: Goals to be met by: Discharge  POC: Patient to be seen 5-6x/week QD As tolerable     Patient will increase functional independence with mobility by performin. Supine to sit with Modified [place]  2. Sit to supine with Modified [place]  3. Sit to stand transfer with Modified [place] --goal met  4. Gait  x 350 feet with Modified [place] using Rolling Walker vs No AD vs LRAD. -- goal met 10/9 using RW  5. Low Fall Risk on 5x sit to stand test --goal met                         Reasons for Discontinuation of Therapy Services  Satisfactory goal achievement.      Plan:     Patient Discharged to: Home with Home Health Service.      10/19/2023

## 2023-10-24 ENCOUNTER — OFFICE VISIT (OUTPATIENT)
Dept: CARDIAC SURGERY | Facility: CLINIC | Age: 78
End: 2023-10-24
Payer: MEDICARE

## 2023-10-24 VITALS
WEIGHT: 206 LBS | SYSTOLIC BLOOD PRESSURE: 148 MMHG | DIASTOLIC BLOOD PRESSURE: 71 MMHG | BODY MASS INDEX: 33.11 KG/M2 | HEART RATE: 83 BPM | OXYGEN SATURATION: 96 % | HEIGHT: 66 IN | RESPIRATION RATE: 18 BRPM

## 2023-10-24 DIAGNOSIS — Z95.3 STATUS POST MITRAL VALVE REPLACEMENT WITH BIOPROSTHETIC VALVE: Primary | ICD-10-CM

## 2023-10-24 PROCEDURE — 99024 PR POST-OP FOLLOW-UP VISIT: ICD-10-PCS | Mod: ,,, | Performed by: SPECIALIST

## 2023-10-24 PROCEDURE — 99024 POSTOP FOLLOW-UP VISIT: CPT | Mod: ,,, | Performed by: SPECIALIST

## 2023-10-24 RX ORDER — BENAZEPRIL HYDROCHLORIDE 40 MG/1
40 TABLET ORAL
COMMUNITY
Start: 2023-10-21

## 2023-10-24 RX ORDER — NIFEDIPINE 30 MG/1
30 TABLET, EXTENDED RELEASE ORAL
COMMUNITY
Start: 2023-10-22

## 2023-10-24 RX ORDER — FLUTICASONE FUROATE, UMECLIDINIUM BROMIDE AND VILANTEROL TRIFENATATE 100; 62.5; 25 UG/1; UG/1; UG/1
1 POWDER RESPIRATORY (INHALATION)
COMMUNITY
Start: 2023-09-05

## 2023-10-24 NOTE — PROGRESS NOTES
Patient returns about a month out from an open mitral valve replacement with a 25 mm mosaic porcine valve.  The patient is much better than preoperatively.  Her breathing is markedly improved.  She is still fairly weak in his walking with a walker.  She has no significant pain.  Heart has a regular rate and rhythm   The lungs are clear to auscultation bilaterally  Abdomen is soft   Extremities have no swelling   The sternum is stable   Her incision has healed nicely with no sign of infection  Overall the patient is doing very well  We will refer her to cardiac rehab  Follow up long-term with cardiology  Return to clinic p.r.n.

## 2023-11-27 ENCOUNTER — EXTERNAL HOME HEALTH (OUTPATIENT)
Dept: HOME HEALTH SERVICES | Facility: HOSPITAL | Age: 78
End: 2023-11-27
Payer: MEDICARE

## 2023-11-30 ENCOUNTER — LAB VISIT (OUTPATIENT)
Dept: LAB | Facility: HOSPITAL | Age: 78
End: 2023-11-30
Attending: PHYSICIAN ASSISTANT
Payer: MEDICARE

## 2023-11-30 DIAGNOSIS — R19.7 DIARRHEA OF PRESUMED INFECTIOUS ORIGIN: Primary | ICD-10-CM

## 2023-11-30 DIAGNOSIS — R53.83 FATIGUE, UNSPECIFIED TYPE: ICD-10-CM

## 2023-11-30 DIAGNOSIS — R06.2 WHEEZING: ICD-10-CM

## 2023-11-30 DIAGNOSIS — R06.09 DYSPNEA ON EXERTION: ICD-10-CM

## 2023-11-30 LAB
ALBUMIN SERPL-MCNC: 3.8 G/DL (ref 3.4–4.8)
ALBUMIN/GLOB SERPL: 1.1 RATIO (ref 1.1–2)
ALP SERPL-CCNC: 58 UNIT/L (ref 40–150)
ALT SERPL-CCNC: 18 UNIT/L (ref 0–55)
AMYLASE SERPL-CCNC: 30 UNIT/L (ref 20–160)
AST SERPL-CCNC: 20 UNIT/L (ref 5–34)
BASOPHILS # BLD AUTO: 0.01 X10(3)/MCL
BASOPHILS NFR BLD AUTO: 0.2 %
BILIRUB SERPL-MCNC: 0.6 MG/DL
BUN SERPL-MCNC: 11.3 MG/DL (ref 9.8–20.1)
CALCIUM SERPL-MCNC: 9.3 MG/DL (ref 8.4–10.2)
CHLORIDE SERPL-SCNC: 107 MMOL/L (ref 98–107)
CO2 SERPL-SCNC: 25 MMOL/L (ref 23–31)
CREAT SERPL-MCNC: 0.71 MG/DL (ref 0.55–1.02)
EOSINOPHIL # BLD AUTO: 0.05 X10(3)/MCL (ref 0–0.9)
EOSINOPHIL NFR BLD AUTO: 1 %
ERYTHROCYTE [DISTWIDTH] IN BLOOD BY AUTOMATED COUNT: 14.6 % (ref 11.5–17)
GFR SERPLBLD CREATININE-BSD FMLA CKD-EPI: >60 MLS/MIN/1.73/M2
GLOBULIN SER-MCNC: 3.4 GM/DL (ref 2.4–3.5)
GLUCOSE SERPL-MCNC: 109 MG/DL (ref 82–115)
HCT VFR BLD AUTO: 40.7 % (ref 37–47)
HGB BLD-MCNC: 12.8 G/DL (ref 12–16)
IMM GRANULOCYTES # BLD AUTO: 0.01 X10(3)/MCL (ref 0–0.04)
IMM GRANULOCYTES NFR BLD AUTO: 0.2 %
LIPASE SERPL-CCNC: 14 U/L
LYMPHOCYTES # BLD AUTO: 2.06 X10(3)/MCL (ref 0.6–4.6)
LYMPHOCYTES NFR BLD AUTO: 42.6 %
MCH RBC QN AUTO: 31.3 PG (ref 27–31)
MCHC RBC AUTO-ENTMCNC: 31.4 G/DL (ref 33–36)
MCV RBC AUTO: 99.5 FL (ref 80–94)
MONOCYTES # BLD AUTO: 0.44 X10(3)/MCL (ref 0.1–1.3)
MONOCYTES NFR BLD AUTO: 9.1 %
NEUTROPHILS # BLD AUTO: 2.27 X10(3)/MCL (ref 2.1–9.2)
NEUTROPHILS NFR BLD AUTO: 46.9 %
NRBC BLD AUTO-RTO: 0 %
PLATELET # BLD AUTO: 172 X10(3)/MCL (ref 130–400)
PMV BLD AUTO: 11.6 FL (ref 7.4–10.4)
POTASSIUM SERPL-SCNC: 4.7 MMOL/L (ref 3.5–5.1)
PROT SERPL-MCNC: 7.2 GM/DL (ref 5.8–7.6)
RBC # BLD AUTO: 4.09 X10(6)/MCL (ref 4.2–5.4)
SODIUM SERPL-SCNC: 143 MMOL/L (ref 136–145)
T3FREE SERPL-MCNC: 2.66 PG/ML (ref 1.58–3.91)
T4 SERPL-MCNC: 8.13 UG/DL (ref 4.87–11.72)
TSH SERPL-ACNC: 2.08 UIU/ML (ref 0.35–4.94)
WBC # SPEC AUTO: 4.84 X10(3)/MCL (ref 4.5–11.5)

## 2023-11-30 PROCEDURE — 85025 COMPLETE CBC W/AUTO DIFF WBC: CPT

## 2023-11-30 PROCEDURE — 84443 ASSAY THYROID STIM HORMONE: CPT

## 2023-11-30 PROCEDURE — 80053 COMPREHEN METABOLIC PANEL: CPT

## 2023-11-30 PROCEDURE — 83690 ASSAY OF LIPASE: CPT

## 2023-11-30 PROCEDURE — 84436 ASSAY OF TOTAL THYROXINE: CPT

## 2023-11-30 PROCEDURE — 36415 COLL VENOUS BLD VENIPUNCTURE: CPT

## 2023-11-30 PROCEDURE — 82150 ASSAY OF AMYLASE: CPT

## 2023-11-30 PROCEDURE — 84481 FREE ASSAY (FT-3): CPT

## 2023-11-30 PROCEDURE — 0003M LIVER DIS 10 ASSAYS W/NASH: CPT

## 2023-12-01 ENCOUNTER — DOCUMENT SCAN (OUTPATIENT)
Dept: HOME HEALTH SERVICES | Facility: HOSPITAL | Age: 78
End: 2023-12-01
Payer: MEDICARE

## 2023-12-04 LAB
A2 MACROGLOB SERPL-MCNC: 336 MG/DL (ref 100–280)
ALT SERPL W P-5'-P-CCNC: 21 U/L (ref 7–45)
ANNOTATION COMMENT IMP: ABNORMAL
APO A-I SERPL-MCNC: 152 MG/DL
AST SERPL W P-5'-P-CCNC: 22 U/L (ref 8–43)
BILIRUB SERPL-MCNC: 0.4 MG/DL (ref 0–1.2)
CHOLEST SERPL-MCNC: 179 MG/DL
FIBROSIS STAGE SERPL QL: ABNORMAL
GGT SERPL-CCNC: 53 U/L (ref 5–36)
GLUCOSE P FAST SERPL-MCNC: 105 MG/DL (ref 70–100)
HAPTOGLOB SERPL NEPH-MCNC: 213 MG/DL (ref 30–200)
LIVER FIBR SCORE SERPL CALC.FIBROSURE: 0.5
LIVER FIBROSIS INTERPRETATION SER-IMP: ABNORMAL
LIVER STEATOSIS GRADE SERPL QL: ABNORMAL
LIVER STEATOSIS INTERP SERPL-IMP: ABNORMAL
LIVER STEATOSIS SCORE SERPL: 0.66
NASH GRADE SERPL QL: ABNORMAL
NASH INTERPRETATION SERPL-IMP: ABNORMAL
NASH SCORE SERPL: 0.7
SERIAL #: ABNORMAL
TRIGL SERPL-MCNC: 214 MG/DL

## 2023-12-26 ENCOUNTER — DOCUMENT SCAN (OUTPATIENT)
Dept: HOME HEALTH SERVICES | Facility: HOSPITAL | Age: 78
End: 2023-12-26
Payer: MEDICARE

## 2024-01-04 ENCOUNTER — HOSPITAL ENCOUNTER (EMERGENCY)
Facility: HOSPITAL | Age: 79
Discharge: HOME | End: 2024-01-04
Attending: EMERGENCY MEDICINE
Payer: MEDICARE

## 2024-01-04 VITALS
SYSTOLIC BLOOD PRESSURE: 183 MMHG | BODY MASS INDEX: 33.11 KG/M2 | WEIGHT: 206 LBS | OXYGEN SATURATION: 98 % | TEMPERATURE: 98 F | DIASTOLIC BLOOD PRESSURE: 80 MMHG | HEIGHT: 66 IN | HEART RATE: 71 BPM | RESPIRATION RATE: 20 BRPM

## 2024-01-04 DIAGNOSIS — R11.0 NAUSEA: ICD-10-CM

## 2024-01-04 DIAGNOSIS — N39.0 ACUTE UTI (URINARY TRACT INFECTION): Primary | ICD-10-CM

## 2024-01-04 LAB
ALBUMIN SERPL-MCNC: 4 G/DL (ref 3.4–4.8)
ALBUMIN/GLOB SERPL: 1.3 RATIO (ref 1.1–2)
ALP SERPL-CCNC: 59 UNIT/L (ref 40–150)
ALT SERPL-CCNC: 27 UNIT/L (ref 0–55)
APPEARANCE UR: CLEAR
AST SERPL-CCNC: 24 UNIT/L (ref 5–34)
BACTERIA #/AREA URNS AUTO: ABNORMAL /HPF
BASOPHILS # BLD AUTO: 0.03 X10(3)/MCL
BASOPHILS NFR BLD AUTO: 0.5 %
BILIRUB SERPL-MCNC: 0.8 MG/DL
BILIRUB UR QL STRIP.AUTO: NEGATIVE
BUN SERPL-MCNC: 18 MG/DL (ref 9.8–20.1)
CALCIUM SERPL-MCNC: 9 MG/DL (ref 8.4–10.2)
CHLORIDE SERPL-SCNC: 107 MMOL/L (ref 98–107)
CO2 SERPL-SCNC: 25 MMOL/L (ref 23–31)
COLOR UR AUTO: YELLOW
CREAT SERPL-MCNC: 0.83 MG/DL (ref 0.55–1.02)
EOSINOPHIL # BLD AUTO: 0.07 X10(3)/MCL (ref 0–0.9)
EOSINOPHIL NFR BLD AUTO: 1.2 %
ERYTHROCYTE [DISTWIDTH] IN BLOOD BY AUTOMATED COUNT: 14.6 % (ref 11.5–17)
GFR SERPLBLD CREATININE-BSD FMLA CKD-EPI: >60 MLS/MIN/1.73/M2
GLOBULIN SER-MCNC: 3.2 GM/DL (ref 2.4–3.5)
GLUCOSE SERPL-MCNC: 130 MG/DL (ref 82–115)
GLUCOSE UR QL STRIP.AUTO: NORMAL
HCT VFR BLD AUTO: 40.8 % (ref 37–47)
HGB BLD-MCNC: 13.3 G/DL (ref 12–16)
HYALINE CASTS #/AREA URNS LPF: ABNORMAL /LPF
IMM GRANULOCYTES # BLD AUTO: 0.01 X10(3)/MCL (ref 0–0.04)
IMM GRANULOCYTES NFR BLD AUTO: 0.2 %
KETONES UR QL STRIP.AUTO: NEGATIVE
LEUKOCYTE ESTERASE UR QL STRIP.AUTO: 250
LYMPHOCYTES # BLD AUTO: 1.99 X10(3)/MCL (ref 0.6–4.6)
LYMPHOCYTES NFR BLD AUTO: 34.7 %
MAGNESIUM SERPL-MCNC: 2 MG/DL (ref 1.6–2.6)
MCH RBC QN AUTO: 30.8 PG (ref 27–31)
MCHC RBC AUTO-ENTMCNC: 32.6 G/DL (ref 33–36)
MCV RBC AUTO: 94.4 FL (ref 80–94)
MONOCYTES # BLD AUTO: 0.5 X10(3)/MCL (ref 0.1–1.3)
MONOCYTES NFR BLD AUTO: 8.7 %
MUCOUS THREADS URNS QL MICRO: ABNORMAL /LPF
NEUTROPHILS # BLD AUTO: 3.13 X10(3)/MCL (ref 2.1–9.2)
NEUTROPHILS NFR BLD AUTO: 54.7 %
NITRITE UR QL STRIP.AUTO: NEGATIVE
NRBC BLD AUTO-RTO: 0 %
PH UR STRIP.AUTO: 5 [PH]
PLATELET # BLD AUTO: 198 X10(3)/MCL (ref 130–400)
PMV BLD AUTO: 11.7 FL (ref 7.4–10.4)
POTASSIUM SERPL-SCNC: 4.4 MMOL/L (ref 3.5–5.1)
PROT SERPL-MCNC: 7.2 GM/DL (ref 5.8–7.6)
PROT UR QL STRIP.AUTO: NEGATIVE
RBC # BLD AUTO: 4.32 X10(6)/MCL (ref 4.2–5.4)
RBC #/AREA URNS AUTO: ABNORMAL /HPF
RBC UR QL AUTO: NEGATIVE
SODIUM SERPL-SCNC: 139 MMOL/L (ref 136–145)
SP GR UR STRIP.AUTO: 1.02 (ref 1–1.03)
SQUAMOUS #/AREA URNS LPF: ABNORMAL /HPF
TROPONIN I SERPL-MCNC: <0.01 NG/ML (ref 0–0.04)
UROBILINOGEN UR STRIP-ACNC: NORMAL
WBC # SPEC AUTO: 5.73 X10(3)/MCL (ref 4.5–11.5)
WBC #/AREA URNS AUTO: ABNORMAL /HPF

## 2024-01-04 PROCEDURE — 87077 CULTURE AEROBIC IDENTIFY: CPT | Performed by: PHYSICIAN ASSISTANT

## 2024-01-04 PROCEDURE — 93005 ELECTROCARDIOGRAM TRACING: CPT

## 2024-01-04 PROCEDURE — 99285 EMERGENCY DEPT VISIT HI MDM: CPT | Mod: 25

## 2024-01-04 PROCEDURE — 80053 COMPREHEN METABOLIC PANEL: CPT | Performed by: PHYSICIAN ASSISTANT

## 2024-01-04 PROCEDURE — 85025 COMPLETE CBC W/AUTO DIFF WBC: CPT | Performed by: PHYSICIAN ASSISTANT

## 2024-01-04 PROCEDURE — 84484 ASSAY OF TROPONIN QUANT: CPT | Performed by: PHYSICIAN ASSISTANT

## 2024-01-04 PROCEDURE — 87086 URINE CULTURE/COLONY COUNT: CPT | Performed by: PHYSICIAN ASSISTANT

## 2024-01-04 PROCEDURE — 93010 ELECTROCARDIOGRAM REPORT: CPT | Mod: ,,, | Performed by: INTERNAL MEDICINE

## 2024-01-04 PROCEDURE — 81001 URINALYSIS AUTO W/SCOPE: CPT | Performed by: PHYSICIAN ASSISTANT

## 2024-01-04 PROCEDURE — 83735 ASSAY OF MAGNESIUM: CPT | Performed by: PHYSICIAN ASSISTANT

## 2024-01-04 RX ORDER — SULFAMETHOXAZOLE AND TRIMETHOPRIM 800; 160 MG/1; MG/1
1 TABLET ORAL 2 TIMES DAILY
Qty: 14 TABLET | Refills: 0 | Status: SHIPPED | OUTPATIENT
Start: 2024-01-04 | End: 2024-01-11

## 2024-01-04 RX ORDER — ONDANSETRON 4 MG/1
4 TABLET, FILM COATED ORAL EVERY 6 HOURS
Qty: 12 TABLET | Refills: 0 | Status: SHIPPED | OUTPATIENT
Start: 2024-01-04

## 2024-01-04 NOTE — FIRST PROVIDER EVALUATION
"Medical screening examination initiated.  I have conducted a focused provider triage encounter, findings are as follows:    Chief Complaint   Patient presents with    Diarrhea     Presents via [facility] with c/o nausea, vomiting, and diarrhea onset today. Was at [facility] at onset of symptoms due to recent UTI;s. Given 4mg zofran en route.      Brief history of present illness:  78 y.o. female presents to the ED via EMS with n/v/d onset today. Was at [facility] for an appt when the symptoms started. Given zofran with EMS    Vitals:    01/04/24 1159   BP: 115/89   Pulse: 67   Resp: 18   Temp: 98.9 °F (37.2 °C)   TempSrc: Oral   SpO2: 99%   Weight: 93.4 kg (206 lb)   Height: 5' 6" (1.676 m)     Pertinent physical exam:  Awake, alert, non-labored respirations    Brief workup plan:  labs, UA    Preliminary workup initiated; this workup will be continued and followed by the physician or advanced practice provider that is assigned to the patient when roomed.  "

## 2024-01-04 NOTE — ED PROVIDER NOTES
Encounter Date: 1/4/2024       History     Chief Complaint   Patient presents with    Diarrhea     Presents via [facility] with c/o nausea, vomiting, and diarrhea onset today. Was at [facility]rology at onset of symptoms due to recent UTI;s. Given 4mg zofran en route.      78 y.o. female presents to the ED via EMS with episode of nausea, weakness, and 1 episode of diarrhea while at the urology clinic this morning. Patient states she was going to establish with new doctor, Dr Kunz, for recurrent UTIs. States she has a chronic abdominal pressure as well as pressure with urination.  Notes that she has been having a little bit of dysuria over the last several days.  Denies vomiting, fever, chills, chest pain or shortness of breath. Patient has history of MV replacement in September 2023 with Dr Rosales, on blood thinners    The history is provided by the patient. No  was used.     Review of patient's allergies indicates:   Allergen Reactions    Adhesive tape-silicones      Past Medical History:   Diagnosis Date    Anticoagulant long-term use     Asthma     Chronic sinusitis, unspecified     COPD (chronic obstructive pulmonary disease)     CVID (common variable immunodeficiency)     Diabetes mellitus, type 2     GERD (gastroesophageal reflux disease)     Hypertension     Severe mitral valve regurgitation     Sleep apnea     uses CPAP    SOB (shortness of breath)     Unspecified glaucoma     Weakness      Past Surgical History:   Procedure Laterality Date    A-V CARDIAC PACEMAKER INSERTION N/A 9/25/2023    Procedure: INSERTION, CARDIAC PACEMAKER, DUAL CHAMBER;  Surgeon: Arnaud Moon MD;  Location: [facility] CATH LAB;  Service: Cardiology;  Laterality: N/A;  [facility]    APPENDECTOMY      CATARACT EXTRACTION Bilateral     COLONOSCOPY      EGD, WITH BALLOON DILATION      ESOPHAGOGASTRODUODENOSCOPY      LEFT HEART CATHETERIZATION Left 09/06/2023    Procedure: Left heart cath;  Surgeon: Vijay Uribe MD;  Location:  [facility] CATH LAB;  Service: Cardiology;  Laterality: Left;  [facility]    MITRAL VALVE REPLACEMENT N/A 2023    Procedure: REPLACEMENT, MITRAL VALVE;  Surgeon: Steven Rosales IV, MD;  Location: [facility];  Service: Cardiovascular;  Laterality: N/A;    TONSILLECTOMY      TOTAL KNEE ARTHROPLASTY Bilateral      No family history on file.  Social History     Tobacco Use    Smoking status: Former     Current packs/day: 0.00     Types: Cigarettes     Quit date:      Years since quittin.0    Smokeless tobacco: Never   Substance Use Topics    Alcohol use: Not Currently    Drug use: Not Currently     Review of Systems   Constitutional:  Negative for chills and fever.   Eyes:  Negative for visual disturbance.   Respiratory:  Negative for cough and shortness of breath.    Cardiovascular:  Negative for chest pain.   Gastrointestinal:  Positive for abdominal pain, diarrhea and nausea. Negative for vomiting.   Genitourinary:  Positive for dysuria. Negative for flank pain.   Musculoskeletal:  Negative for arthralgias.   Skin:  Negative for color change and rash.   Neurological:  Positive for weakness. Negative for dizziness and headaches.   Psychiatric/Behavioral:  Negative for behavioral problems.    All other systems reviewed and are negative.      Physical Exam     Initial Vitals [24 1159]   BP Pulse Resp Temp SpO2   115/89 67 18 98.9 °F (37.2 °C) 99 %      MAP       --         Physical Exam    Nursing note and vitals reviewed.  Constitutional: She appears well-developed and well-nourished.   HENT:   Head: Normocephalic and atraumatic.   Eyes: EOM are normal. Pupils are equal, round, and reactive to light.   Neck: Neck supple.   Cardiovascular:  Normal rate, regular rhythm and normal heart sounds.           Pulmonary/Chest: Breath sounds normal.   Abdominal: Abdomen is soft. Bowel sounds are normal. She exhibits no distension. There is abdominal tenderness in the suprapubic area. There is no rebound and no guarding.    Musculoskeletal:         General: Normal range of motion.      Cervical back: Neck supple.     Neurological: She is alert and oriented to person, place, and time. She has normal strength. GCS score is 15. GCS eye subscore is 4. GCS verbal subscore is 5. GCS motor subscore is 6.   Skin: Skin is warm and dry.   Psychiatric: She has a normal mood and affect.         ED Course   Procedures  Labs Reviewed   COMPREHENSIVE METABOLIC PANEL - Abnormal; Notable for the following components:       Result Value    Glucose Level 130 (*)     All other components within normal limits   URINALYSIS, REFLEX TO URINE CULTURE - Abnormal; Notable for the following components:    Leukocyte Esterase,  (*)     WBC, UA 11-20 (*)     Bacteria, UA Occasional (*)     Mucous, UA Trace (*)     Hyaline Casts, UA 0-2 (*)     All other components within normal limits   CBC WITH DIFFERENTIAL - Abnormal; Notable for the following components:    MCV 94.4 (*)     MCHC 32.6 (*)     MPV 11.7 (*)     All other components within normal limits   MAGNESIUM - Normal   TROPONIN I - Normal   CULTURE, URINE   CBC W/ AUTO DIFFERENTIAL    Narrative:     The following orders were created for panel order CBC auto differential.  Procedure                               Abnormality         Status                     ---------                               -----------         ------                     CBC with Differential[[id number]]       Abnormal            Final result                 Please view results for these tests on the individual orders.   COVID/FLU A&B PCR        ECG Results              EKG 12-lead (Final result)  Result time 01/04/24 18:29:56      Final result by Interface, Lab In [facility] (01/04/24 18:29:56)                   Narrative:    Test Reason : R53.1,    Vent. Rate : 069 BPM     Atrial Rate : 069 BPM     P-R Int : 180 ms          QRS Dur : 086 ms      QT Int : 442 ms       P-R-T Axes : 070 012 044 degrees     QTc Int : 473 ms    Normal  sinus rhythm  Normal EKG    When compared with ECG of 26-SEP-2023 04:32,  No significant change was found  Confirmed by Tony Villegas MD ([id number]) on 1/4/2024 6:29:49 PM    Referred By: EDVIN SALTER           Confirmed By:Tony Villegas MD                                  Imaging Results              X-Ray Chest 1 View (Final result)  Result time 01/04/24 16:52:52      Final result by Jaylene Mirza MD (01/04/24 16:52:52)                   Impression:      No acute abnormality of the chest.      Electronically signed by: Jaylene Mirza  Date:    01/04/2024  Time:    16:52               Narrative:    EXAMINATION:  XR CHEST 1 VIEW    CLINICAL HISTORY:  weakness;    TECHNIQUE:  AP chest    COMPARISON:  Chest x-ray dated 10/01/2023    FINDINGS:  Left chest wall pacemaker is in place.  The heart is normal in size.  The lungs are clear.  There is no pleural effusion or visible pneumothorax.                                       Medications - No data to display  Medical Decision Making  Differential diagnosis:  COVID, flu, urinary tract infection, gastroenteritis, ACS, dehydration, GINGER    78 y.o. female presents to the ED via EMS with episode of nausea, weakness, and 1 episode of diarrhea while at the urology clinic this morning. Patient states she was going to establish with new doctor, Dr Kunz, for recurrent UTIs. States she has a chronic abdominal pressure as well as pressure with urination.  Notes that she has been having a little bit of dysuria over the last several days.  Denies vomiting, fever, chills, chest pain or shortness of breath. Patient has history of MV replacement in September 2023 with Dr Rosales, on blood thinners      Amount and/or Complexity of Data Reviewed  Labs: ordered.  Radiology: ordered.  ECG/medicine tests: ordered.    Risk  Prescription drug management.               ED Course as of 01/04/24 1922   Thu Jan 04, 2024   1624 Patient refusing COVID/flu swab  [MA]   1805 Patient states  she feels okay at this time.  No new complaints.  Awaiting UA results [MA]   1810 Patient eating crackers, drinking juice, in NAD. States she feels fine at this time. Continues to deny abdominal pain, chest pain, or SOB. UA with signs of UTI. Will d/c home on abx with instructions to f/u with urology; culture pending  [MA]      ED Course User Index  [MA] Barry Quiles PA-C                           Clinical Impression:  Final diagnoses:  [N39.0] Acute UTI (urinary tract infection) (Primary)  [R11.0] Nausea          ED Disposition Condition    Discharge Stable          ED Prescriptions       Medication Sig Dispense Start Date End Date Auth. Provider    sulfamethoxazole-trimethoprim 800-160mg (BACTRIM DS) 800-160 mg Tab Take 1 tablet by mouth 2 (two) times daily. for 7 days 14 tablet 1/4/2024 1/11/2024 Barry Quiles PA-C    ondansetron (ZOFRAN) 4 MG tablet Take 1 tablet (4 mg total) by mouth every 6 (six) hours. 12 tablet 1/4/2024 -- Barry Quiles PA-C          Follow-up Information       Follow up With Specialties Details Why Contact Info    [facility] - Emergency Dept Emergency Medicine In 1 week If symptoms worsen [address] [facility] [id number] [phone]    Urology-Referrals, [address] [address]  [phone]               Barry Quiles PA-C  01/04/24 1922

## 2024-01-07 LAB
BACTERIA UR CULT: ABNORMAL
BACTERIA UR CULT: ABNORMAL

## 2024-01-08 NOTE — ED NOTES
Called bactrim to [facility] pharmacy per daughter request.  States will  for pt

## 2024-02-28 ENCOUNTER — LAB VISIT (OUTPATIENT)
Dept: LAB | Facility: HOSPITAL | Age: 79
End: 2024-02-28
Attending: ALLERGY & IMMUNOLOGY
Payer: MEDICARE

## 2024-02-28 DIAGNOSIS — J01.91 RECURRENT ACUTE SINUSITIS: ICD-10-CM

## 2024-02-28 DIAGNOSIS — D83.9 COMMON VARIABLE IMMUNODEFICIENCY: Primary | ICD-10-CM

## 2024-02-28 DIAGNOSIS — J45.40 MODERATE PERSISTENT ASTHMA: ICD-10-CM

## 2024-02-28 LAB
ALBUMIN SERPL-MCNC: 3.8 G/DL (ref 3.4–4.8)
ALBUMIN/GLOB SERPL: 1.3 RATIO (ref 1.1–2)
ALP SERPL-CCNC: 56 UNIT/L (ref 40–150)
ALT SERPL-CCNC: 22 UNIT/L (ref 0–55)
AST SERPL-CCNC: 23 UNIT/L (ref 5–34)
BASOPHILS # BLD AUTO: 0.02 X10(3)/MCL
BASOPHILS NFR BLD AUTO: 0.4 %
BILIRUB SERPL-MCNC: 0.7 MG/DL
BUN SERPL-MCNC: 18.6 MG/DL (ref 9.8–20.1)
CALCIUM SERPL-MCNC: 9.1 MG/DL (ref 8.4–10.2)
CHLORIDE SERPL-SCNC: 107 MMOL/L (ref 98–107)
CO2 SERPL-SCNC: 26 MMOL/L (ref 23–31)
CREAT SERPL-MCNC: 0.84 MG/DL (ref 0.55–1.02)
EOSINOPHIL # BLD AUTO: 0.04 X10(3)/MCL (ref 0–0.9)
EOSINOPHIL NFR BLD AUTO: 0.8 %
ERYTHROCYTE [DISTWIDTH] IN BLOOD BY AUTOMATED COUNT: 14.8 % (ref 11.5–17)
GFR SERPLBLD CREATININE-BSD FMLA CKD-EPI: >60 MLS/MIN/1.73/M2
GLOBULIN SER-MCNC: 3 GM/DL (ref 2.4–3.5)
GLUCOSE SERPL-MCNC: 154 MG/DL (ref 82–115)
HCT VFR BLD AUTO: 41.4 % (ref 37–47)
HGB BLD-MCNC: 12.5 G/DL (ref 12–16)
IGG SERPL-MCNC: 980 MG/DL (ref 522–1631)
IMM GRANULOCYTES # BLD AUTO: 0.01 X10(3)/MCL (ref 0–0.04)
IMM GRANULOCYTES NFR BLD AUTO: 0.2 %
LYMPHOCYTES # BLD AUTO: 2.05 X10(3)/MCL (ref 0.6–4.6)
LYMPHOCYTES NFR BLD AUTO: 39.7 %
MCH RBC QN AUTO: 29.3 PG (ref 27–31)
MCHC RBC AUTO-ENTMCNC: 30.2 G/DL (ref 33–36)
MCV RBC AUTO: 97.2 FL (ref 80–94)
MONOCYTES # BLD AUTO: 0.42 X10(3)/MCL (ref 0.1–1.3)
MONOCYTES NFR BLD AUTO: 8.1 %
NEUTROPHILS # BLD AUTO: 2.63 X10(3)/MCL (ref 2.1–9.2)
NEUTROPHILS NFR BLD AUTO: 50.8 %
NRBC BLD AUTO-RTO: 0 %
PLATELET # BLD AUTO: 159 X10(3)/MCL (ref 130–400)
PMV BLD AUTO: 11.5 FL (ref 7.4–10.4)
POTASSIUM SERPL-SCNC: 4.5 MMOL/L (ref 3.5–5.1)
PROT SERPL-MCNC: 6.8 GM/DL (ref 5.8–7.6)
RBC # BLD AUTO: 4.26 X10(6)/MCL (ref 4.2–5.4)
SODIUM SERPL-SCNC: 142 MMOL/L (ref 136–145)
WBC # SPEC AUTO: 5.17 X10(3)/MCL (ref 4.5–11.5)

## 2024-02-28 PROCEDURE — 82784 ASSAY IGA/IGD/IGG/IGM EACH: CPT

## 2024-02-28 PROCEDURE — 36415 COLL VENOUS BLD VENIPUNCTURE: CPT

## 2024-02-28 PROCEDURE — 80053 COMPREHEN METABOLIC PANEL: CPT

## 2024-02-28 PROCEDURE — 85025 COMPLETE CBC W/AUTO DIFF WBC: CPT

## 2024-03-12 NOTE — PLAN OF CARE
Problem: Adult Inpatient Plan of Care  Goal: Plan of Care Review  10/5/2023 1509 by Batsheva Doherty RN  Outcome: Ongoing, Progressing  Flowsheets (Taken 10/5/2023 1509)  Plan of Care Reviewed With: patient  10/5/2023 1300 by Batsheva Doherty RN  Outcome: Ongoing, Progressing  Flowsheets (Taken 10/5/2023 1300)  Plan of Care Reviewed With: patient  Goal: Patient-Specific Goal (Individualized)  10/5/2023 1509 by Batsheva Doherty RN  Outcome: Ongoing, Progressing  Flowsheets (Taken 10/5/2023 1509)  Anxieties, Fears or Concerns: None  Individualized Care Needs: Maintian sternal precautions  10/5/2023 1300 by Batsheva Doherty RN  Outcome: Ongoing, Progressing  Flowsheets (Taken 10/5/2023 1300)  Anxieties, Fears or Concerns: None  Individualized Care Needs: Maintain sternal precautions  Goal: Absence of Hospital-Acquired Illness or Injury  10/5/2023 1509 by Batsheva Doherty RN  Outcome: Ongoing, Progressing  10/5/2023 1300 by Batsheva Doherty RN  Outcome: Ongoing, Progressing  Intervention: Identify and Manage Fall Risk  10/5/2023 1509 by Batsheva Doherty RN  Flowsheets (Taken 10/5/2023 1509)  Safety Promotion/Fall Prevention:   assistive device/personal item within reach   bed alarm set   in recliner, wheels locked   muscle strengthening facilitated   nonskid shoes/socks when out of bed   side rails raised x 3   instructed to call staff for mobility  10/5/2023 1300 by Batsheva Doherty RN  Flowsheets (Taken 10/5/2023 1300)  Safety Promotion/Fall Prevention:   assistive device/personal item within reach   bed alarm set   chair alarm set   Fall Risk reviewed with patient/family   in recliner, wheels locked   lighting adjusted   medications reviewed   muscle strengthening facilitated   nonskid shoes/socks when out of bed   instructed to call staff for mobility   side rails raised x 3  Intervention: Prevent Skin Injury  10/5/2023 1509 by Batsheva Doherty RN  Flowsheets (Taken 10/5/2023 1509)  Body Position:   legs  elevated   position changed independently   weight shifting   supine   sitting up in bed  Skin Protection:   adhesive use limited   incontinence pads utilized  10/5/2023 1300 by Batsheva Doherty RN  Flowsheets (Taken 10/5/2023 1300)  Skin Protection:   adhesive use limited   incontinence pads utilized  Intervention: Prevent and Manage VTE (Venous Thromboembolism) Risk  10/5/2023 1509 by Batsheva Doherty RN  Flowsheets (Taken 10/5/2023 1509)  Activity Management:   Rolling - L1   Sitting at edge of bed - L2   Standing - L3  VTE Prevention/Management:   ambulation promoted   fluids promoted   bleeding precations maintained  10/5/2023 1300 by Batsheva Doherty RN  Flowsheets (Taken 10/5/2023 1300)  Activity Management:   Ambulated -L4   Standing - L3   Rolling - L1   Up in chair - L3  Range of Motion: active ROM (range of motion) encouraged  Intervention: Prevent Infection  10/5/2023 1509 by Batsheva Doherty RN  Flowsheets (Taken 10/5/2023 1509)  Infection Prevention:   cohorting utilized   environmental surveillance performed   equipment surfaces disinfected   hand hygiene promoted   rest/sleep promoted   single patient room provided  10/5/2023 1300 by Batsheva Doherty RN  Flowsheets (Taken 10/5/2023 1300)  Infection Prevention:   cohorting utilized   environmental surveillance performed   equipment surfaces disinfected   hand hygiene promoted   rest/sleep promoted   single patient room provided  Goal: Optimal Comfort and Wellbeing  10/5/2023 1509 by Batsheva Doherty RN  Outcome: Ongoing, Progressing  10/5/2023 1300 by Batsheva Doherty RN  Outcome: Ongoing, Progressing  Intervention: Monitor Pain and Promote Comfort  10/5/2023 1509 by Batsheva Doherty RN  Flowsheets (Taken 10/5/2023 1509)  Pain Management Interventions:   care clustered   pillow support provided   medication offered   position adjusted  10/5/2023 1300 by Batsheva Doherty RN  Flowsheets (Taken 10/5/2023 1300)  Pain Management Interventions:    care clustered   medication offered   medication offered but refused   pillow support provided  Intervention: Provide Person-Centered Care  10/5/2023 1509 by Batsheva Doherty RN  Flowsheets (Taken 10/5/2023 1509)  Trust Relationship/Rapport:   care explained   questions answered   questions encouraged  10/5/2023 1300 by Batsheva Doherty RN  Flowsheets (Taken 10/5/2023 1300)  Trust Relationship/Rapport:   care explained   questions encouraged   questions answered  Goal: Readiness for Transition of Care  10/5/2023 1509 by Batsheva Doherty RN  Outcome: Ongoing, Progressing  10/5/2023 1300 by Batsheva Doherty RN  Outcome: Ongoing, Progressing     Problem: Fall Injury Risk  Goal: Absence of Fall and Fall-Related Injury  10/5/2023 1509 by Batsheva Doherty RN  Outcome: Ongoing, Progressing  10/5/2023 1300 by Batsheva Doherty RN  Outcome: Ongoing, Progressing  Intervention: Identify and Manage Contributors  Flowsheets (Taken 10/5/2023 1509)  Self-Care Promotion:   independence encouraged   BADL personal objects within reach   meal set-up provided   safe use of adaptive equipment encouraged  Intervention: Promote Injury-Free Environment  Flowsheets (Taken 10/5/2023 1509)  Safety Promotion/Fall Prevention:   assistive device/personal item within reach   bed alarm set   in recliner, wheels locked   muscle strengthening facilitated   nonskid shoes/socks when out of bed   side rails raised x 3   instructed to call staff for mobility     Problem: Infection  Goal: Absence of Infection Signs and Symptoms  10/5/2023 1509 by Batsheva Doherty RN  Outcome: Ongoing, Progressing  10/5/2023 1300 by Batsheva Doherty RN  Outcome: Ongoing, Progressing  Intervention: Prevent or Manage Infection  Flowsheets (Taken 10/5/2023 1509)  Infection Management: aseptic technique maintained     Problem: Skin Injury Risk Increased  Goal: Skin Health and Integrity  10/5/2023 1509 by Batsheva Doherty RN  Outcome: Ongoing, Progressing  10/5/2023  1300 by Batsheva Doherty RN  Outcome: Ongoing, Progressing  Intervention: Optimize Skin Protection  Flowsheets (Taken 10/5/2023 1509)  Skin Protection:   adhesive use limited   incontinence pads utilized  Head of Bed (HOB) Positioning: HOB at 30-45 degrees  Intervention: Promote and Optimize Oral Intake  Flowsheets (Taken 10/5/2023 1509)  Oral Nutrition Promotion:   calorie-dense foods provided   calorie-dense liquids provided   physical activity promoted   rest periods promoted   safe use of adaptive equipment encouraged     Problem: Gas Exchange Impaired  Goal: Optimal Gas Exchange  10/5/2023 1509 by Batsheva Doherty RN  Outcome: Ongoing, Progressing  10/5/2023 1300 by Batsheva Doherty RN  Outcome: Ongoing, Progressing  Intervention: Optimize Oxygenation and Ventilation  Flowsheets (Taken 10/5/2023 1509)  Airway/Ventilation Management: airway patency maintained  Head of Bed (HOB) Positioning: HOB at 30-45 degrees     Problem: Pain Acute  Goal: Acceptable Pain Control and Functional Ability  10/5/2023 1509 by Batsheva Doherty RN  Outcome: Ongoing, Progressing  10/5/2023 1300 by Batsheva Doherty RN  Outcome: Ongoing, Progressing  Intervention: Develop Pain Management Plan  Flowsheets (Taken 10/5/2023 1509)  Pain Management Interventions:   care clustered   pillow support provided   medication offered   position adjusted  Intervention: Prevent or Manage Pain  Flowsheets (Taken 10/5/2023 1509)  Bowel Elimination Promotion:   adequate fluid intake promoted   ambulation promoted     Problem: Device-Related Complication Risk (Cardiac Rhythm Management Device)  Goal: Effective Device Function  10/5/2023 1509 by Batsheva Doherty RN  Outcome: Ongoing, Progressing  10/5/2023 1300 by Batsheva Doherty RN  Outcome: Ongoing, Progressing     Problem: Infection (Cardiac Rhythm Management Device)  Goal: Absence of Infection Signs and Symptoms  10/5/2023 1509 by Batsheva Doherty RN  Outcome: Ongoing, Progressing  10/5/2023  1300 by Batsheva Doherty RN  Outcome: Ongoing, Progressing  Intervention: Prevent or Manage Infection  Flowsheets (Taken 10/5/2023 1509)  Infection Management: aseptic technique maintained     Problem: Pain (Cardiac Rhythm Management Device)  Goal: Acceptable Pain Level  10/5/2023 1509 by Batsheva Doherty RN  Outcome: Ongoing, Progressing  10/5/2023 1300 by Batsheva Doherty RN  Outcome: Ongoing, Progressing  Intervention: Prevent or Manage Pain  Flowsheets (Taken 10/5/2023 1509)  Pain Management Interventions:   care clustered   pillow support provided   medication offered   position adjusted     Problem: Impaired Wound Healing  Goal: Optimal Wound Healing  10/5/2023 1509 by Batsheva Doherty RN  Outcome: Ongoing, Progressing  10/5/2023 1300 by Batsheva Doherty RN  Outcome: Ongoing, Progressing  Intervention: Promote Wound Healing  Flowsheets (Taken 10/5/2023 1509)  Oral Nutrition Promotion:   calorie-dense foods provided   calorie-dense liquids provided   physical activity promoted   rest periods promoted   safe use of adaptive equipment encouraged  Activity Management:   Rolling - L1   Sitting at edge of bed - L2   Standing - L3  Pain Management Interventions:   care clustered   pillow support provided   medication offered   position adjusted      Same as Pre-Op Diagnosis

## 2024-06-25 NOTE — PROGRESS NOTES
"Inpatient Nutrition Evaluation    Admit Date: 9/29/2023   Total duration of encounter: 3 days    Nutrition Recommendation/Prescription     Continue heart healthy diabetic diet.     Nutrition Assessment     Chart Review    Reason Seen: continuous nutrition monitoring, length of stay, and follow-up    Malnutrition Screening Tool Results   Have you recently lost weight without trying?: No  Have you been eating poorly because of a decreased appetite?: No   MST Score: 0     Diagnosis:  Severe mitral valve regurgitation 9/29/2023      SSS (sick sinus syndrome)        Relevant Medical History: hypertension, diabetes mellitus, COPD, ABEL (has home CPAP    Nutrition-Related Medications:   Atorvastatin, folic acid, lipase-protease-amylase, magnesium oxide, metformin, pantoprazole, rivaroxaban,   Nutrition-Related Labs:   Latest Reference Range & Units 10/02/23 05:24   POCT Glucose 70 - 110 mg/dL 122 (H)   (H): Data is abnormally high    Diet Order: Diet heart healthy Diabetic  Oral Supplement Order: none  Appetite/Oral Intake: good/% of meals  Factors Affecting Nutritional Intake: none identified  Food/Anglican/Cultural Preferences:     Food Allergies: none reported    Skin Integrity: bruised (ecchymotic), incision  Wound(s):       Comments    Pt reports intake is good. +BM per patient. Discussed food preferences and marked menus. Will monitor.     Anthropometrics    Height: 5' 5" (165.1 cm)    Last Weight: 103.2 kg (227 lb 8.2 oz) (09/29/23 1132) Weight Method: Bed Scale  BMI (Calculated): 37.9  BMI Classification: obese grade II (BMI 35-39.9)     Ideal Body Weight (IBW), Female: 125 lb     % Ideal Body Weight, Female (lb): 182.02 %                             Usual Weight Provided By: not applicable    Wt Readings from Last 5 Encounters:   09/29/23 103.2 kg (227 lb 8.2 oz)   09/25/23 102.5 kg (225 lb 15.5 oz)   09/06/23 100.7 kg (222 lb 0.1 oz)   08/29/23 100.2 kg (220 lb 12.8 oz)   08/19/23 104.3 kg (230 lb) "     Weight Change(s) Since Admission:  Admit Weight: 103.2 kg (227 lb 8.2 oz) (09/29/23 1132)   Wt Stable     Patient Education    Not applicable.    Monitoring & Evaluation     Dietitian will monitor food and beverage intake, weight, weight change, electrolyte/renal panel, glucose/endocrine profile, and gastrointestinal profile.  Nutrition Risk/Follow-Up: low (follow-up in 5-7 days)  Patients assigned 'low nutrition risk' status do not qualify for a full nutritional assessment but will be monitored and re-evaluated in a 5-7 day time period. Please consult if re-evaluation needed sooner.    Condition:: FBSE Please Describe Your Condition:: No spots of concern today

## 2024-08-27 ENCOUNTER — LAB VISIT (OUTPATIENT)
Dept: LAB | Facility: HOSPITAL | Age: 79
End: 2024-08-27
Attending: ALLERGY & IMMUNOLOGY
Payer: MEDICARE

## 2024-08-27 DIAGNOSIS — J45.40 ASTHMA, MODERATE PERSISTENT: ICD-10-CM

## 2024-08-27 DIAGNOSIS — J01.91 RECURRENT ACUTE SINUSITIS: ICD-10-CM

## 2024-08-27 DIAGNOSIS — D83.9 COMMON VARIABLE IMMUNODEFICIENCY: Primary | ICD-10-CM

## 2024-08-27 LAB
ALBUMIN SERPL-MCNC: 3.9 G/DL (ref 3.4–4.8)
ALBUMIN/GLOB SERPL: 1.1 RATIO (ref 1.1–2)
ALP SERPL-CCNC: 65 UNIT/L (ref 40–150)
ALT SERPL-CCNC: 27 UNIT/L (ref 0–55)
ANION GAP SERPL CALC-SCNC: 13 MEQ/L
AST SERPL-CCNC: 33 UNIT/L (ref 5–34)
BASOPHILS # BLD AUTO: 0.02 X10(3)/MCL
BASOPHILS NFR BLD AUTO: 0.4 %
BILIRUB SERPL-MCNC: 0.6 MG/DL
BUN SERPL-MCNC: 16 MG/DL (ref 9.8–20.1)
CALCIUM SERPL-MCNC: 9.6 MG/DL (ref 8.4–10.2)
CHLORIDE SERPL-SCNC: 109 MMOL/L (ref 98–107)
CO2 SERPL-SCNC: 18 MMOL/L (ref 23–31)
CREAT SERPL-MCNC: 0.89 MG/DL (ref 0.55–1.02)
CREAT/UREA NIT SERPL: 18
EOSINOPHIL # BLD AUTO: 0.06 X10(3)/MCL (ref 0–0.9)
EOSINOPHIL NFR BLD AUTO: 1.1 %
ERYTHROCYTE [DISTWIDTH] IN BLOOD BY AUTOMATED COUNT: 18.2 % (ref 11.5–17)
GFR SERPLBLD CREATININE-BSD FMLA CKD-EPI: >60 ML/MIN/1.73/M2
GLOBULIN SER-MCNC: 3.4 GM/DL (ref 2.4–3.5)
GLUCOSE SERPL-MCNC: 221 MG/DL (ref 82–115)
HCT VFR BLD AUTO: 41 % (ref 37–47)
HGB BLD-MCNC: 13 G/DL (ref 12–16)
IGG SERPL-MCNC: 933 MG/DL (ref 522–1631)
IMM GRANULOCYTES # BLD AUTO: 0.02 X10(3)/MCL (ref 0–0.04)
IMM GRANULOCYTES NFR BLD AUTO: 0.4 %
LYMPHOCYTES # BLD AUTO: 1.9 X10(3)/MCL (ref 0.6–4.6)
LYMPHOCYTES NFR BLD AUTO: 33.5 %
MCH RBC QN AUTO: 29.3 PG (ref 27–31)
MCHC RBC AUTO-ENTMCNC: 31.7 G/DL (ref 33–36)
MCV RBC AUTO: 92.6 FL (ref 80–94)
MONOCYTES # BLD AUTO: 0.43 X10(3)/MCL (ref 0.1–1.3)
MONOCYTES NFR BLD AUTO: 7.6 %
NEUTROPHILS # BLD AUTO: 3.24 X10(3)/MCL (ref 2.1–9.2)
NEUTROPHILS NFR BLD AUTO: 57 %
NRBC BLD AUTO-RTO: 0 %
PLATELET # BLD AUTO: 170 X10(3)/MCL (ref 130–400)
PMV BLD AUTO: 11.9 FL (ref 7.4–10.4)
POTASSIUM SERPL-SCNC: 5 MMOL/L (ref 3.5–5.1)
PROT SERPL-MCNC: 7.3 GM/DL (ref 5.8–7.6)
RBC # BLD AUTO: 4.43 X10(6)/MCL (ref 4.2–5.4)
SODIUM SERPL-SCNC: 140 MMOL/L (ref 136–145)
WBC # BLD AUTO: 5.67 X10(3)/MCL (ref 4.5–11.5)

## 2024-08-27 PROCEDURE — 36415 COLL VENOUS BLD VENIPUNCTURE: CPT

## 2024-08-27 PROCEDURE — 82784 ASSAY IGA/IGD/IGG/IGM EACH: CPT

## 2024-08-27 PROCEDURE — 85025 COMPLETE CBC W/AUTO DIFF WBC: CPT

## 2024-08-27 PROCEDURE — 80053 COMPREHEN METABOLIC PANEL: CPT

## 2024-09-06 ENCOUNTER — HOSPITAL ENCOUNTER (EMERGENCY)
Facility: HOSPITAL | Age: 79
Discharge: HOME | End: 2024-09-06
Attending: EMERGENCY MEDICINE
Payer: MEDICARE

## 2024-09-06 VITALS
SYSTOLIC BLOOD PRESSURE: 155 MMHG | BODY MASS INDEX: 35.36 KG/M2 | RESPIRATION RATE: 18 BRPM | WEIGHT: 220 LBS | OXYGEN SATURATION: 96 % | HEART RATE: 68 BPM | HEIGHT: 66 IN | DIASTOLIC BLOOD PRESSURE: 75 MMHG | TEMPERATURE: 98 F

## 2024-09-06 DIAGNOSIS — M16.11 OSTEOARTHRITIS OF RIGHT HIP, UNSPECIFIED OSTEOARTHRITIS TYPE: ICD-10-CM

## 2024-09-06 DIAGNOSIS — M25.559 HIP PAIN: Primary | ICD-10-CM

## 2024-09-06 PROCEDURE — 99284 EMERGENCY DEPT VISIT MOD MDM: CPT | Mod: 25

## 2024-09-06 PROCEDURE — 25000003 PHARM REV CODE 250: Performed by: EMERGENCY MEDICINE

## 2024-09-06 RX ORDER — HYDROCODONE BITARTRATE AND ACETAMINOPHEN 10; 325 MG/1; MG/1
1 TABLET ORAL
Status: COMPLETED | OUTPATIENT
Start: 2024-09-06 | End: 2024-09-06

## 2024-09-06 RX ORDER — HYDROCODONE BITARTRATE AND ACETAMINOPHEN 10; 325 MG/1; MG/1
1 TABLET ORAL EVERY 6 HOURS PRN
Qty: 20 TABLET | Refills: 0 | Status: SHIPPED | OUTPATIENT
Start: 2024-09-06

## 2024-09-06 RX ORDER — PREDNISONE 20 MG/1
20 TABLET ORAL DAILY
Qty: 5 TABLET | Refills: 0 | Status: SHIPPED | OUTPATIENT
Start: 2024-09-06 | End: 2024-09-11

## 2024-09-06 RX ADMIN — HYDROCODONE BITARTRATE AND ACETAMINOPHEN 1 TABLET: 10; 325 TABLET ORAL at 09:09

## 2024-09-06 NOTE — ED PROVIDER NOTES
Encounter Date: 9/6/2024       History     Chief Complaint   Patient presents with    Abdominal Pain     Pt complaint left lower abd pain that radiates to the back lastngiht, and this moning. Pt states that she cannot apply pressure to right leg without causing  abd pain radiating to the back. Pts orthopedist Dr Mares with PCP is Dr DALIA Hernandez with Dr Vasques as GI physician.     78-year-old female history of mitral valve replacement on Xarelto, COPD, diabetes was in her usual state of health until yesterday when she started having right groin pain radiating to her right buttock.  Pain now is mostly in her right hip.  No trauma or injuries.  Pain is described as sharp and really only with movement of hip, walking and standing.  No numbness, weakness, urinary symptoms or GI symptoms.  Patient has not taken anything for her symptoms today.    The history is provided by the patient.     Review of patient's allergies indicates:   Allergen Reactions    Adhesive tape-silicones      Past Medical History:   Diagnosis Date    Anticoagulant long-term use     Asthma     Chronic sinusitis, unspecified     COPD (chronic obstructive pulmonary disease)     CVID (common variable immunodeficiency)     Diabetes mellitus, type 2     GERD (gastroesophageal reflux disease)     Hypertension     Severe mitral valve regurgitation     Sleep apnea     uses CPAP    SOB (shortness of breath)     Unspecified glaucoma     Weakness      Past Surgical History:   Procedure Laterality Date    A-V CARDIAC PACEMAKER INSERTION N/A 9/25/2023    Procedure: INSERTION, CARDIAC PACEMAKER, DUAL CHAMBER;  Surgeon: Arnaud Moon MD;  Location: [facility] CATH LAB;  Service: Cardiology;  Laterality: N/A;  [facility]    APPENDECTOMY      CATARACT EXTRACTION Bilateral     COLONOSCOPY      EGD, WITH BALLOON DILATION      ESOPHAGOGASTRODUODENOSCOPY      LEFT HEART CATHETERIZATION Left 09/06/2023    Procedure: Left heart cath;  Surgeon: Vijay Uribe MD;  Location: [facility] CATH LAB;   Service: Cardiology;  Laterality: Left;  [facility]    MITRAL VALVE REPLACEMENT N/A 2023    Procedure: REPLACEMENT, MITRAL VALVE;  Surgeon: Steven Rosales IV, MD;  Location: [facility] OR;  Service: Cardiovascular;  Laterality: N/A;    TONSILLECTOMY      TOTAL KNEE ARTHROPLASTY Bilateral      No family history on file.  Social History     Tobacco Use    Smoking status: Former     Current packs/day: 0.00     Types: Cigarettes     Quit date:      Years since quittin.6    Smokeless tobacco: Never   Substance Use Topics    Alcohol use: Not Currently    Drug use: Not Currently     Review of Systems   Constitutional:  Negative for chills, diaphoresis, fatigue and fever.   HENT:  Negative for congestion, drooling, ear discharge, ear pain, postnasal drip, rhinorrhea, sinus pressure, sinus pain, sore throat and tinnitus.    Eyes:  Negative for discharge.   Respiratory:  Negative for cough, chest tightness, shortness of breath and wheezing.    Cardiovascular:  Negative for chest pain and palpitations.   Gastrointestinal:  Negative for abdominal pain, diarrhea, nausea and vomiting.   Genitourinary:  Negative for frequency, hematuria and urgency.   Musculoskeletal:  Negative for back pain, neck pain and neck stiffness.   Skin:  Negative for rash.   Neurological:  Negative for syncope, weakness, light-headedness, numbness and headaches.   Psychiatric/Behavioral:  Negative for suicidal ideas.        Physical Exam     Initial Vitals [24 0901]   BP Pulse Resp Temp SpO2   (!) 159/76 69 18 97.9 °F (36.6 °C) 96 %      MAP       --         Physical Exam    Nursing note and vitals reviewed.  Constitutional: No distress.   Cardiovascular:  Normal rate.           Pulses:       Dorsalis pedis pulses are 2+ on the right side and 2+ on the left side.   Pulmonary/Chest: No respiratory distress.   Abdominal: Abdomen is soft. She exhibits no distension. There is no abdominal tenderness. There is no guarding.   Musculoskeletal:          General: No tenderness or edema.      Right hip: No tenderness or bony tenderness. Decreased range of motion.      Comments: Increased pain in right hip with passive and active range of motion     Neurological: She is alert and oriented to person, place, and time. She has normal strength. No sensory deficit.   Skin: Skin is warm and dry. No rash noted. No erythema.         ED Course   Procedures  Labs Reviewed - No data to display       Imaging Results              X-Ray Hip 2 or 3 views Right with Pelvis when performed (Final result)  Result time 09/06/24 11:04:42      Final result by Albino Cadet MD (09/06/24 11:04:42)                   Impression:      No acute osseous process appreciated.      Electronically signed by: Albino Cadet  Date:    09/06/2024  Time:    11:04               Narrative:    EXAMINATION:  XR HIP WITH PELVIS WHEN PERFORMED 2 OR 3 VIEWS RIGHT    CLINICAL HISTORY:  Pain in unspecified hip    TECHNIQUE:  AP view of the pelvis with frontal and frog leg lateral views of the right hip    COMPARISON:  None    FINDINGS:  No acute fracture identified.  Right hip aligned with moderate underlying degenerative changes.  4 cm central pelvic calcification likely fibroid.                                       Medications   HYDROcodone-acetaminophen  mg per tablet 1 tablet (1 tablet Oral Given 9/6/24 0947)     Medical Decision Making  Medical Decision Making  Problem list/ differential diagnosis including but not limited to:  Inguinal hernia, appendicitis, kidney stone, ovarian pathology, osteoarthritis, pathologic fracture, bursitis      Patient's chronic illnesses impacting care:  Diabetes    Diagnostic test considered but not ordered:    My interpretations:      Radiology reports  Moderate Osteoarthritis right hip    Discussion of case with external qualified healthcare professionals:  Not applicable    Review of external notes( inpt, ems, NH, clinic):      Decision  rules/scores:    Medications reviewed:  Xarelto  Medications ordered in the ER:  [place]  Discharge prescriptions:  Norco, prednisone    Social variables possible impacting patient's healthcare:    Code status/discussion    Shared decision making:  Patient says steroids do not usually increase her glucose    Consideration for admission versus discharge: stable for discharge     Amount and/or Complexity of Data Reviewed  Radiology: ordered.    Risk  Prescription drug management.               ED Course as of 09/06/24 1118   Fri Sep 06, 2024   1111 Patient's pain better. [WC]      ED Course User Index  [WC] Sanket Oliva MD                           Clinical Impression:  Final diagnoses:  [M25.559] Hip pain (Primary)  [M16.11] Osteoarthritis of right hip, unspecified osteoarthritis type          ED Disposition Condition    Discharge Stable          ED Prescriptions       Medication Sig Dispense Start Date End Date Auth. Provider    HYDROcodone-acetaminophen (NORCO)  mg per tablet Take 1 tablet by mouth every 6 (six) hours as needed for Pain. 20 tablet 9/6/2024 -- Sanket Oliva MD    predniSONE (DELTASONE) 20 MG tablet Take 1 tablet (20 mg total) by mouth once daily. for 5 days 5 tablet 9/6/2024 9/11/2024 Sanket Oliva MD          Follow-up Information       Follow up With Specialties Details Why Contact Info    Arnaldo Hernandez MD Family Medicine In 1 week  [address] [place] [address]  [phone]               Sanket Oliva MD  09/06/24 1111

## 2024-09-06 NOTE — ED TRIAGE NOTES
Pt complaint left lower abd pain that radiates to the back lastngiht, and this moning. Pt states that she cannot apply pressure to right leg without causing  abd pain radiating to the back. Pts orthopedist Dr Mares with PCP is Dr DALIA Hernandez with Dr Vasques as GI physician.

## 2024-09-06 NOTE — DISCHARGE INSTRUCTIONS
If prednisone raises your sugar above 250 stopped the prednisone   Phillip Marte  1966  [id number]    HISTORY OF PRESENT ILLNESS:  Mr. Josefina Banda is a 47 y.o. male returns for a follow up visit for multiple medical problems. His current presenting problems are   1. Chronic pain syndrome    2. DDD (degenerative disc disease), cervical    3. Failed neck syndrome    4. DDD (degenerative disc disease), lumbar    5. Spondylolisthesis of lumbosacral region    6. Fibromyalgia    7. Primary insomnia    . As per information/history obtained from the PADT(patient assessment and documentation tool) - He complains of pain in the neck, upper back, mid back and lower back with radiation to the shoulders Bilateral, arms Bilateral, elbows Bilateral, hands Bilateral, buttocks, hips Bilateral, upper leg Bilateral, knees Bilateral, lower leg Bilateral, ankles Bilateral and feet Bilateral He rates the pain 8/10 and describes it as sharp, aching, burning, numbness, pins and needles. Pain is made worse by: movement, walking, standing, sitting, bending, lifting. Current treatment regimen has helped relieve about 30% of the pain. He denies side effects from the current pain regimen. Patient reports that since the last follow up visit the physical functioning is worse, family/social relationships are unchanged, mood is worse and sleep patterns are worse, and that the overall functioning is worse. Patient denies neurological bowel or bladder. Patient denies misusing/abusing his narcotic pain medications or using any illegal drugs. There are No indicators for possible drug abuse, addiction or diversion problems. Upon obtaining the medical history from Mr. Josefina Banda regarding today's office visit for his presenting problems,  Patient states he is doing fair. He complains his back hurts more than his neck. He says he Is working full time, and managing somewhat with his medications. He denies any constipation symptoms. he mentions his weight has been stable.  He reports he is managing his ADLs and house chores. ALLERGIES: Patients list of allergies were reviewed     MEDICATIONS: Mr. Parminder Garcia list of medications were reviewed. His current medications are   Outpatient Medications Prior to Visit   Medication Sig Dispense Refill    oxyCODONE-acetaminophen (PERCOCET) 7.5-325 MG per tablet Take 1 tablet by mouth every 6 hours as needed for Pain for up to 28 days. Max 2 per day 56 tablet 0    oxyCODONE ER (XTAMPZA ER) 13.5 MG C12A Take 13.5 mg by mouth 2 times daily for 28 days. 64 each 0     No facility-administered medications prior to visit. SOCIAL/FAMILY/PAST MEDICAL HISTORY: Mr. Franco Welch, family and past medical history was reviewed. REVIEW OF SYSTEMS:    Respiratory: Negative for apnea, chest tightness and shortness of breath or change in baseline breathing. Gastrointestinal: Negative for nausea, vomiting, abdominal pain, diarrhea, constipation, blood in stool and abdominal distention. PHYSICAL EXAM:   Nursing note and vitals reviewed. /81 (Site: Right Upper Arm, Position: Sitting, Cuff Size: Large Adult)   Pulse 88   Ht 6' 1\" (1.854 m)   Wt (!) 325 lb (147.4 kg)   BMI 42.88 kg/m²   Constitutional: He appears well-developed and well-nourished. No acute distress. Skin: Skin is warm and dry, good turgor. No rash noted. He is not diaphoretic. Cardiovascular: Normal rate, regular rhythm, normal heart sounds, and does not have murmur. Pulmonary/Chest: Effort normal. No respiratory distress. He does not have wheezes in the lung fields. He has no rales. Neurological/Psychiatric:He is alert and oriented to person, place, and time. Coordination is  normal.  His mood isAppropriate and affect is Flat/blunted and Anxious . IMPRESSION:   1. Chronic pain syndrome    2. DDD (degenerative disc disease), cervical    3. Failed neck syndrome    4. DDD (degenerative disc disease), lumbar    5. Spondylolisthesis of lumbosacral region    6.  Fibromyalgia        PLAN:  Informed verbal consent was obtained  -Continue with current opioid regimen   -Adv Biofeedback, relaxation and meditation techniques. Referral to psychologist for CBT was also discussed with patient   -He was advised to increase fluids ( 5-7  glasses of fluid daily), limit caffeine, avoid cheese products, increase dietary fiber, increase activity and exercise as tolerated and relax regularly and enjoy meals   -Back exercises as advised   -Urine drug screen with GC/MS for opiates and drugs of abuse was ordered and will follow up on results. Current Outpatient Medications   Medication Sig Dispense Refill    oxyCODONE-acetaminophen (PERCOCET) 7.5-325 MG per tablet Take 1 tablet by mouth every 6 hours as needed for Pain for up to 28 days. Max 2 per day 56 tablet 0    oxyCODONE ER (XTAMPZA ER) 13.5 MG C12A Take 13.5 mg by mouth 2 times daily for 28 days. 64 each 0     No current facility-administered medications for this visit. I will continue his current medication regimen  which is part of the above treatment schedule. It has been helping with Mr. Ryan Spencer chronic  medical problems which for this visit include:   Diagnoses of Chronic pain syndrome, DDD (degenerative disc disease), cervical, Failed neck syndrome, DDD (degenerative disc disease), lumbar, Spondylolisthesis of lumbosacral region, Fibromyalgia, and Primary insomnia were pertinent to this visit. Risks and benefits of the medications and other alternative treatments  including no treatment were discussed with the patient. The common side effects of these medications were also explained to the patient. Informed verbal consent was obtained. Goals of current treatment regimen include improvement in pain, restoration of functioning- with focus on improvement in physical performance, general activity, work or disability,emotional distress, health care utilization and  decreased medication consumption.  Will continue to monitor progress towards achieving/maintaining therapeutic goals with special emphasis on  1. Improvement in perceived interfernce  of pain with ADL's. Ability to do home exercises independently. Ability to do household chores indoor and/or outdoor work and social and leisure activities. Improve psychosocial and physical functioning. - he is showing progression towards this treatment goal with the current regimen. He was advised against drinking alcohol with the narcotic pain medicines, advised against driving or handling machinery while adjusting the dose of medicines or if having cognitive  issues related to the current medications. Risk of overdose and death, if medicines not taken as prescribed, were also discussed. If the patient develops new symptoms or if the symptoms worsen, the patient should call the office. While transcribing every attempt was made to maintain the accuracy of the note in terms of it's contents,there may have been some errors made inadvertently. Thank you for allowing me to participate in the care of this patient.     Jessica Warner MD.    Cc: MD GEETHA Sarmiento, Alvin Marinelli am scribing for and in the presence of Dr. Jessica Warner.   05/14/19  5:23 PM  ALVERTO Mason, Dr. Jessica Warner, personally performed the services described in this documentation as scribed by   Alvin Marinelli MA in my presence and it is both accurate and complete

## 2024-09-10 NOTE — Clinical Note
Patient notified and appt scheduled   The procedural consent was signed. A history and physical note was completed in the chart.

## 2024-10-28 DIAGNOSIS — E04.1 LEFT THYROID NODULE: Primary | ICD-10-CM

## 2024-11-11 ENCOUNTER — HOSPITAL ENCOUNTER (OUTPATIENT)
Dept: RADIOLOGY | Facility: HOSPITAL | Age: 79
Discharge: HOME | End: 2024-11-11
Attending: NURSE PRACTITIONER
Payer: MEDICARE

## 2024-11-11 DIAGNOSIS — E04.1 LEFT THYROID NODULE: ICD-10-CM

## 2024-11-11 PROCEDURE — 76536 US EXAM OF HEAD AND NECK: CPT | Mod: TC

## 2024-11-22 ENCOUNTER — OFFICE VISIT (OUTPATIENT)
Dept: URGENT CARE | Facility: CLINIC | Age: 79
End: 2024-11-22
Payer: MEDICARE

## 2024-11-22 ENCOUNTER — HOSPITAL ENCOUNTER (INPATIENT)
Facility: HOSPITAL | Age: 79
LOS: 11 days | Discharge: NURSING, REHAB OR LONG-TERM CARE | DRG: 321 | End: 2024-12-03
Attending: STUDENT IN AN ORGANIZED HEALTH CARE EDUCATION/TRAINING PROGRAM | Admitting: STUDENT IN AN ORGANIZED HEALTH CARE EDUCATION/TRAINING PROGRAM
Payer: MEDICARE

## 2024-11-22 VITALS
RESPIRATION RATE: 20 BRPM | DIASTOLIC BLOOD PRESSURE: 78 MMHG | TEMPERATURE: 97 F | WEIGHT: 220 LBS | SYSTOLIC BLOOD PRESSURE: 136 MMHG | OXYGEN SATURATION: 95 % | BODY MASS INDEX: 35.36 KG/M2 | HEART RATE: 98 BPM | HEIGHT: 66 IN

## 2024-11-22 DIAGNOSIS — R00.0 TACHYCARDIA: ICD-10-CM

## 2024-11-22 DIAGNOSIS — I05.0 MITRAL VALVE STENOSIS, UNSPECIFIED ETIOLOGY: ICD-10-CM

## 2024-11-22 DIAGNOSIS — R09.02 HYPOXIA: ICD-10-CM

## 2024-11-22 DIAGNOSIS — R07.9 CHEST PAIN: ICD-10-CM

## 2024-11-22 DIAGNOSIS — I50.9 CHF (CONGESTIVE HEART FAILURE), NYHA CLASS II: ICD-10-CM

## 2024-11-22 DIAGNOSIS — I25.10 CAD (CORONARY ARTERY DISEASE): ICD-10-CM

## 2024-11-22 DIAGNOSIS — R06.02 SHORTNESS OF BREATH: Primary | ICD-10-CM

## 2024-11-22 DIAGNOSIS — J96.01 ACUTE HYPOXEMIC RESPIRATORY FAILURE: ICD-10-CM

## 2024-11-22 DIAGNOSIS — I50.9 CONGESTIVE HEART FAILURE, UNSPECIFIED HF CHRONICITY, UNSPECIFIED HEART FAILURE TYPE: ICD-10-CM

## 2024-11-22 DIAGNOSIS — J44.1 COPD EXACERBATION: Primary | ICD-10-CM

## 2024-11-22 DIAGNOSIS — I34.0 SEVERE MITRAL VALVE REGURGITATION: ICD-10-CM

## 2024-11-22 DIAGNOSIS — I48.91 ATRIAL FIBRILLATION WITH RVR: ICD-10-CM

## 2024-11-22 DIAGNOSIS — R06.02 SOB (SHORTNESS OF BREATH): ICD-10-CM

## 2024-11-22 LAB
ALBUMIN SERPL-MCNC: 3.4 G/DL (ref 3.4–4.8)
ALBUMIN/GLOB SERPL: 0.9 RATIO (ref 1.1–2)
ALP SERPL-CCNC: 70 UNIT/L (ref 40–150)
ALT SERPL-CCNC: 13 UNIT/L (ref 0–55)
ANION GAP SERPL CALC-SCNC: 12 MEQ/L
APTT PPP: 33.9 SECONDS (ref 23.2–33.7)
AST SERPL-CCNC: 22 UNIT/L (ref 5–34)
BASOPHILS # BLD AUTO: 0.04 X10(3)/MCL
BASOPHILS NFR BLD AUTO: 0.3 %
BILIRUB SERPL-MCNC: 0.8 MG/DL
BNP BLD-MCNC: 161.1 PG/ML
BUN SERPL-MCNC: 13 MG/DL (ref 9.8–20.1)
CALCIUM SERPL-MCNC: 9.3 MG/DL (ref 8.4–10.2)
CHLORIDE SERPL-SCNC: 110 MMOL/L (ref 98–107)
CO2 SERPL-SCNC: 17 MMOL/L (ref 23–31)
CREAT SERPL-MCNC: 0.84 MG/DL (ref 0.55–1.02)
CREAT/UREA NIT SERPL: 15
EOSINOPHIL # BLD AUTO: 0.09 X10(3)/MCL (ref 0–0.9)
EOSINOPHIL NFR BLD AUTO: 0.7 %
ERYTHROCYTE [DISTWIDTH] IN BLOOD BY AUTOMATED COUNT: 17.4 % (ref 11.5–17)
EST. AVERAGE GLUCOSE BLD GHB EST-MCNC: 148.5 MG/DL
FLUAV AG UPPER RESP QL IA.RAPID: NOT DETECTED
FLUBV AG UPPER RESP QL IA.RAPID: NOT DETECTED
GFR SERPLBLD CREATININE-BSD FMLA CKD-EPI: >60 ML/MIN/1.73/M2
GLOBULIN SER-MCNC: 3.7 GM/DL (ref 2.4–3.5)
GLUCOSE SERPL-MCNC: 166 MG/DL (ref 82–115)
HBA1C MFR BLD: 6.8 %
HCT VFR BLD AUTO: 40.7 % (ref 37–47)
HGB BLD-MCNC: 13.1 G/DL (ref 12–16)
IMM GRANULOCYTES # BLD AUTO: 0.06 X10(3)/MCL (ref 0–0.04)
IMM GRANULOCYTES NFR BLD AUTO: 0.5 %
INR PPP: 2.2
LYMPHOCYTES # BLD AUTO: 3.39 X10(3)/MCL (ref 0.6–4.6)
LYMPHOCYTES NFR BLD AUTO: 26 %
MCH RBC QN AUTO: 29.5 PG (ref 27–31)
MCHC RBC AUTO-ENTMCNC: 32.2 G/DL (ref 33–36)
MCV RBC AUTO: 91.7 FL (ref 80–94)
MONOCYTES # BLD AUTO: 1.33 X10(3)/MCL (ref 0.1–1.3)
MONOCYTES NFR BLD AUTO: 10.2 %
NEUTROPHILS # BLD AUTO: 8.12 X10(3)/MCL (ref 2.1–9.2)
NEUTROPHILS NFR BLD AUTO: 62.3 %
NRBC BLD AUTO-RTO: 0 %
OHS QRS DURATION: 84 MS
OHS QTC CALCULATION: 454 MS
PLATELET # BLD AUTO: 249 X10(3)/MCL (ref 130–400)
PMV BLD AUTO: 11 FL (ref 7.4–10.4)
POTASSIUM SERPL-SCNC: 4.4 MMOL/L (ref 3.5–5.1)
PROT SERPL-MCNC: 7.1 GM/DL (ref 5.8–7.6)
PROTHROMBIN TIME: 24.1 SECONDS (ref 12.5–14.5)
RBC # BLD AUTO: 4.44 X10(6)/MCL (ref 4.2–5.4)
SARS-COV-2 RNA RESP QL NAA+PROBE: NOT DETECTED
SODIUM SERPL-SCNC: 139 MMOL/L (ref 136–145)
TROPONIN I SERPL-MCNC: 0.03 NG/ML (ref 0–0.04)
WBC # BLD AUTO: 13.03 X10(3)/MCL (ref 4.5–11.5)

## 2024-11-22 PROCEDURE — 0240U COVID/FLU A&B PCR: CPT | Performed by: STUDENT IN AN ORGANIZED HEALTH CARE EDUCATION/TRAINING PROGRAM

## 2024-11-22 PROCEDURE — 93005 ELECTROCARDIOGRAM TRACING: CPT

## 2024-11-22 PROCEDURE — 25000003 PHARM REV CODE 250: Performed by: INTERNAL MEDICINE

## 2024-11-22 PROCEDURE — 27100171 HC OXYGEN HIGH FLOW UP TO 24 HOURS

## 2024-11-22 PROCEDURE — 5A0935A ASSISTANCE WITH RESPIRATORY VENTILATION, LESS THAN 24 CONSECUTIVE HOURS, HIGH NASAL FLOW/VELOCITY: ICD-10-PCS | Performed by: STUDENT IN AN ORGANIZED HEALTH CARE EDUCATION/TRAINING PROGRAM

## 2024-11-22 PROCEDURE — 96374 THER/PROPH/DIAG INJ IV PUSH: CPT

## 2024-11-22 PROCEDURE — 63600175 PHARM REV CODE 636 W HCPCS: Performed by: STUDENT IN AN ORGANIZED HEALTH CARE EDUCATION/TRAINING PROGRAM

## 2024-11-22 PROCEDURE — 94760 N-INVAS EAR/PLS OXIMETRY 1: CPT

## 2024-11-22 PROCEDURE — 99900035 HC TECH TIME PER 15 MIN (STAT)

## 2024-11-22 PROCEDURE — 27000221 HC OXYGEN, UP TO 24 HOURS

## 2024-11-22 PROCEDURE — 99285 EMERGENCY DEPT VISIT HI MDM: CPT | Mod: 25

## 2024-11-22 PROCEDURE — 94799 UNLISTED PULMONARY SVC/PX: CPT

## 2024-11-22 PROCEDURE — 21400001 HC TELEMETRY ROOM

## 2024-11-22 PROCEDURE — 27000190 HC CPAP FULL FACE MASK W/VALVE

## 2024-11-22 PROCEDURE — 25000242 PHARM REV CODE 250 ALT 637 W/ HCPCS: Performed by: STUDENT IN AN ORGANIZED HEALTH CARE EDUCATION/TRAINING PROGRAM

## 2024-11-22 PROCEDURE — 36415 COLL VENOUS BLD VENIPUNCTURE: CPT | Performed by: INTERNAL MEDICINE

## 2024-11-22 PROCEDURE — 94761 N-INVAS EAR/PLS OXIMETRY MLT: CPT

## 2024-11-22 PROCEDURE — 93010 ELECTROCARDIOGRAM REPORT: CPT | Mod: ,,, | Performed by: INTERNAL MEDICINE

## 2024-11-22 PROCEDURE — 94644 CONT INHLJ TX 1ST HOUR: CPT

## 2024-11-22 PROCEDURE — 85730 THROMBOPLASTIN TIME PARTIAL: CPT | Performed by: STUDENT IN AN ORGANIZED HEALTH CARE EDUCATION/TRAINING PROGRAM

## 2024-11-22 PROCEDURE — 83036 HEMOGLOBIN GLYCOSYLATED A1C: CPT | Performed by: INTERNAL MEDICINE

## 2024-11-22 PROCEDURE — 96375 TX/PRO/DX INJ NEW DRUG ADDON: CPT

## 2024-11-22 PROCEDURE — 99900031 HC PATIENT EDUCATION (STAT)

## 2024-11-22 PROCEDURE — 83880 ASSAY OF NATRIURETIC PEPTIDE: CPT | Performed by: STUDENT IN AN ORGANIZED HEALTH CARE EDUCATION/TRAINING PROGRAM

## 2024-11-22 PROCEDURE — 85025 COMPLETE CBC W/AUTO DIFF WBC: CPT | Performed by: STUDENT IN AN ORGANIZED HEALTH CARE EDUCATION/TRAINING PROGRAM

## 2024-11-22 PROCEDURE — 63600175 PHARM REV CODE 636 W HCPCS: Performed by: INTERNAL MEDICINE

## 2024-11-22 PROCEDURE — 11000001 HC ACUTE MED/SURG PRIVATE ROOM

## 2024-11-22 PROCEDURE — 94660 CPAP INITIATION&MGMT: CPT

## 2024-11-22 PROCEDURE — 85610 PROTHROMBIN TIME: CPT | Performed by: STUDENT IN AN ORGANIZED HEALTH CARE EDUCATION/TRAINING PROGRAM

## 2024-11-22 PROCEDURE — 84484 ASSAY OF TROPONIN QUANT: CPT | Performed by: STUDENT IN AN ORGANIZED HEALTH CARE EDUCATION/TRAINING PROGRAM

## 2024-11-22 PROCEDURE — 25500020 PHARM REV CODE 255: Performed by: STUDENT IN AN ORGANIZED HEALTH CARE EDUCATION/TRAINING PROGRAM

## 2024-11-22 PROCEDURE — 80053 COMPREHEN METABOLIC PANEL: CPT | Performed by: STUDENT IN AN ORGANIZED HEALTH CARE EDUCATION/TRAINING PROGRAM

## 2024-11-22 RX ORDER — DUPILUMAB 300 MG/2ML
300 INJECTION, SOLUTION SUBCUTANEOUS
Status: ON HOLD | COMMUNITY
End: 2024-12-03 | Stop reason: HOSPADM

## 2024-11-22 RX ORDER — FAMOTIDINE 20 MG/1
40 TABLET, FILM COATED ORAL NIGHTLY
Status: DISCONTINUED | OUTPATIENT
Start: 2024-11-22 | End: 2024-12-03 | Stop reason: HOSPADM

## 2024-11-22 RX ORDER — IPRATROPIUM BROMIDE AND ALBUTEROL SULFATE 2.5; .5 MG/3ML; MG/3ML
3 SOLUTION RESPIRATORY (INHALATION) EVERY 4 HOURS PRN
Status: DISCONTINUED | OUTPATIENT
Start: 2024-11-22 | End: 2024-12-03 | Stop reason: HOSPADM

## 2024-11-22 RX ORDER — IBUPROFEN 200 MG
16 TABLET ORAL
Status: DISCONTINUED | OUTPATIENT
Start: 2024-11-22 | End: 2024-12-03 | Stop reason: HOSPADM

## 2024-11-22 RX ORDER — PANTOPRAZOLE SODIUM 40 MG/1
1 TABLET, DELAYED RELEASE ORAL EVERY MORNING
Status: ON HOLD | COMMUNITY

## 2024-11-22 RX ORDER — AMLODIPINE AND BENAZEPRIL HYDROCHLORIDE 10; 20 MG/1; MG/1
1 CAPSULE ORAL DAILY
Status: ON HOLD | COMMUNITY
End: 2024-12-03 | Stop reason: HOSPADM

## 2024-11-22 RX ORDER — ACETAMINOPHEN 325 MG/1
650 TABLET ORAL EVERY 8 HOURS PRN
Status: DISCONTINUED | OUTPATIENT
Start: 2024-11-22 | End: 2024-12-03 | Stop reason: HOSPADM

## 2024-11-22 RX ORDER — TALC
6 POWDER (GRAM) TOPICAL NIGHTLY PRN
Status: DISCONTINUED | OUTPATIENT
Start: 2024-11-22 | End: 2024-12-03 | Stop reason: HOSPADM

## 2024-11-22 RX ORDER — METOPROLOL SUCCINATE 25 MG/1
25 TABLET, EXTENDED RELEASE ORAL DAILY
Status: DISCONTINUED | OUTPATIENT
Start: 2024-11-23 | End: 2024-11-26

## 2024-11-22 RX ORDER — ESTRADIOL 0.1 MG/G
0.5 CREAM VAGINAL
Status: ON HOLD | COMMUNITY
Start: 2023-10-30 | End: 2027-02-01

## 2024-11-22 RX ORDER — IPRATROPIUM BROMIDE AND ALBUTEROL SULFATE 2.5; .5 MG/3ML; MG/3ML
9 SOLUTION RESPIRATORY (INHALATION)
Status: COMPLETED | OUTPATIENT
Start: 2024-11-22 | End: 2024-11-22

## 2024-11-22 RX ORDER — AMIODARONE HYDROCHLORIDE 200 MG/1
200 TABLET ORAL DAILY
Status: ON HOLD | COMMUNITY

## 2024-11-22 RX ORDER — AMIODARONE HYDROCHLORIDE 200 MG/1
200 TABLET ORAL DAILY
Status: DISCONTINUED | OUTPATIENT
Start: 2024-11-23 | End: 2024-12-03 | Stop reason: HOSPADM

## 2024-11-22 RX ORDER — ATORVASTATIN CALCIUM 40 MG/1
40 TABLET, FILM COATED ORAL DAILY
Status: DISCONTINUED | OUTPATIENT
Start: 2024-11-23 | End: 2024-12-03 | Stop reason: HOSPADM

## 2024-11-22 RX ORDER — GLUCAGON 1 MG
1 KIT INJECTION
Status: DISCONTINUED | OUTPATIENT
Start: 2024-11-22 | End: 2024-12-03 | Stop reason: HOSPADM

## 2024-11-22 RX ORDER — INSULIN ASPART 100 [IU]/ML
0-10 INJECTION, SOLUTION INTRAVENOUS; SUBCUTANEOUS
Status: DISCONTINUED | OUTPATIENT
Start: 2024-11-22 | End: 2024-12-03 | Stop reason: HOSPADM

## 2024-11-22 RX ORDER — ONDANSETRON HYDROCHLORIDE 2 MG/ML
4 INJECTION, SOLUTION INTRAVENOUS EVERY 8 HOURS PRN
Status: DISCONTINUED | OUTPATIENT
Start: 2024-11-22 | End: 2024-12-03 | Stop reason: HOSPADM

## 2024-11-22 RX ORDER — AMLODIPINE BESYLATE 10 MG/1
10 TABLET ORAL
Status: ON HOLD | COMMUNITY
Start: 2024-11-19

## 2024-11-22 RX ORDER — METHYLPREDNISOLONE SOD SUCC 125 MG
125 VIAL (EA) INJECTION
Status: COMPLETED | OUTPATIENT
Start: 2024-11-22 | End: 2024-11-22

## 2024-11-22 RX ORDER — IBUPROFEN 200 MG
24 TABLET ORAL
Status: DISCONTINUED | OUTPATIENT
Start: 2024-11-22 | End: 2024-12-03 | Stop reason: HOSPADM

## 2024-11-22 RX ORDER — ATORVASTATIN CALCIUM 40 MG/1
40 TABLET, FILM COATED ORAL DAILY
Status: ON HOLD | COMMUNITY

## 2024-11-22 RX ORDER — FUROSEMIDE 40 MG/1
40 TABLET ORAL
Status: ON HOLD | COMMUNITY
Start: 2024-11-19

## 2024-11-22 RX ORDER — PANTOPRAZOLE SODIUM 40 MG/1
40 TABLET, DELAYED RELEASE ORAL EVERY MORNING
Status: DISCONTINUED | OUTPATIENT
Start: 2024-11-23 | End: 2024-12-03 | Stop reason: HOSPADM

## 2024-11-22 RX ORDER — FUROSEMIDE 10 MG/ML
80 INJECTION INTRAMUSCULAR; INTRAVENOUS
Status: COMPLETED | OUTPATIENT
Start: 2024-11-22 | End: 2024-11-22

## 2024-11-22 RX ORDER — AMLODIPINE AND BENAZEPRIL HYDROCHLORIDE 10; 20 MG/1; MG/1
1 CAPSULE ORAL DAILY
Status: DISCONTINUED | OUTPATIENT
Start: 2024-11-23 | End: 2024-11-22

## 2024-11-22 RX ORDER — NIFEDIPINE 30 MG/1
30 TABLET, EXTENDED RELEASE ORAL DAILY
Status: DISCONTINUED | OUTPATIENT
Start: 2024-11-23 | End: 2024-12-03 | Stop reason: HOSPADM

## 2024-11-22 RX ORDER — SODIUM CHLORIDE 0.9 % (FLUSH) 0.9 %
10 SYRINGE (ML) INJECTION
Status: DISCONTINUED | OUTPATIENT
Start: 2024-11-22 | End: 2024-12-03 | Stop reason: HOSPADM

## 2024-11-22 RX ADMIN — IOHEXOL 55 ML: 350 INJECTION, SOLUTION INTRAVENOUS at 02:11

## 2024-11-22 RX ADMIN — METHYLPREDNISOLONE SODIUM SUCCINATE 60 MG: 40 INJECTION, POWDER, FOR SOLUTION INTRAMUSCULAR; INTRAVENOUS at 09:11

## 2024-11-22 RX ADMIN — FAMOTIDINE 40 MG: 20 TABLET, FILM COATED ORAL at 09:11

## 2024-11-22 RX ADMIN — FUROSEMIDE 80 MG: 10 INJECTION, SOLUTION INTRAMUSCULAR; INTRAVENOUS at 11:11

## 2024-11-22 RX ADMIN — METHYLPREDNISOLONE SODIUM SUCCINATE 125 MG: 125 INJECTION, POWDER, FOR SOLUTION INTRAMUSCULAR; INTRAVENOUS at 10:11

## 2024-11-22 RX ADMIN — IPRATROPIUM BROMIDE AND ALBUTEROL SULFATE 9 ML: .5; 3 SOLUTION RESPIRATORY (INHALATION) at 10:11

## 2024-11-22 NOTE — ED PROVIDER NOTES
Encounter Date: 11/22/2024    SCRIBE #1 NOTE: I, Zelalem Wild, am scribing for, and in the presence of,  Ricky Long MD. I have scribed the following portions of the note - the EKG reading. Other sections scribed: HPI, ROS, PE.       History     Chief Complaint   Patient presents with    Shortness of Breath     Increasing SOB over the last month, sent from , was 88% on RA, arrives on 3L N/C sees khalif Rand of COPD     Pt is an 79 y.o. female with a past medical history of asthma, COPD, DM, GERD, HTN, sleep apnea, presenting to the ED complaining of shortness of breath. Pt seems to be chronically short of breath but she has been struggling with her breathing specifically over the last month. She went to an urgent care this morning and her O2 sat was at 88% on room air, was put on 3 LPM via nasal canula and sent here. Pt's cardiologist is Dr. Uribe and her pulmonologist is Dr. Mendez.     The history is provided by the patient. No  was used.     Review of patient's allergies indicates:   Allergen Reactions    Adhesive tape-silicones      Past Medical History:   Diagnosis Date    Anticoagulant long-term use     Asthma     Chronic sinusitis, unspecified     COPD (chronic obstructive pulmonary disease)     CVID (common variable immunodeficiency)     Diabetes mellitus, type 2     GERD (gastroesophageal reflux disease)     Hypertension     Severe mitral valve regurgitation     Sleep apnea     uses CPAP    SOB (shortness of breath)     Unspecified glaucoma     Weakness      Past Surgical History:   Procedure Laterality Date    A-V CARDIAC PACEMAKER INSERTION N/A 9/25/2023    Procedure: INSERTION, CARDIAC PACEMAKER, DUAL CHAMBER;  Surgeon: Arnaud Moon MD;  Location: [facility] CATH LAB;  Service: Cardiology;  Laterality: N/A;  [facility]    APPENDECTOMY      CATARACT EXTRACTION Bilateral     COLONOSCOPY      EGD, WITH BALLOON DILATION      ESOPHAGOGASTRODUODENOSCOPY      LEFT HEART CATHETERIZATION  Left 2023    Procedure: Left heart cath;  Surgeon: Vijay Uribe MD;  Location: [facility] CATH LAB;  Service: Cardiology;  Laterality: Left;  [facility]    MITRAL VALVE REPLACEMENT N/A 2023    Procedure: REPLACEMENT, MITRAL VALVE;  Surgeon: Steven Rosales IV, MD;  Location: [facility] OR;  Service: Cardiovascular;  Laterality: N/A;    TONSILLECTOMY      TOTAL KNEE ARTHROPLASTY Bilateral      Family History   Problem Relation Name Age of Onset    Stroke Mother      Alzheimer's disease Mother      Lung cancer Father      Diabetes Father      Multiple sclerosis Sister      Diabetes Brother       Social History     Tobacco Use    Smoking status: Former     Current packs/day: 0.00     Types: Cigarettes     Quit date:      Years since quittin.9    Smokeless tobacco: Never   Substance Use Topics    Alcohol use: Not Currently    Drug use: Not Currently     Review of Systems   Constitutional:  Negative for fever.   HENT:  Negative for sore throat.    Eyes:  Negative for visual disturbance.   Respiratory:  Positive for cough (mild). Negative for shortness of breath.    Cardiovascular:  Negative for chest pain.   Gastrointestinal:  Positive for nausea. Negative for abdominal pain and vomiting.   Genitourinary:  Negative for dysuria.   Musculoskeletal:  Negative for joint swelling.   Skin:  Negative for rash.   Neurological:  Negative for weakness.   Psychiatric/Behavioral:  Negative for confusion.        Physical Exam     Initial Vitals [24 0959]   BP Pulse Resp Temp SpO2   (!) 168/88 90 (!) 26 97.9 °F (36.6 °C) 96 %      MAP       --         Physical Exam    Nursing note and vitals reviewed.  Constitutional: She appears well-developed and well-nourished. She appears distressed.   HENT:   Head: Normocephalic and atraumatic.   Eyes: EOM are normal. Pupils are equal, round, and reactive to light.   Neck:   Normal range of motion.  Cardiovascular:  Normal rate, regular rhythm, normal heart sounds and intact distal  pulses.           No murmur heard.  Pulmonary/Chest: Tachypnea noted. She is in respiratory distress. She has wheezes. She has rales.   Diminished breath sounds across all lung fields.  Diminished air movement.   Abdominal: Abdomen is soft. She exhibits no distension. There is no abdominal tenderness. There is no rebound.   Musculoskeletal:         General: No tenderness or edema. Normal range of motion.      Cervical back: Normal range of motion.     Neurological: She is alert. She has normal strength. No cranial nerve deficit. GCS score is 15. GCS eye subscore is 4. GCS verbal subscore is 5. GCS motor subscore is 6.   Skin: Skin is warm and dry. Capillary refill takes less than 2 seconds. No rash noted. No erythema.   Psychiatric: She has a normal mood and affect.         ED Course   Procedures  Labs Reviewed   COMPREHENSIVE METABOLIC PANEL - Abnormal       Result Value    Sodium 139      Potassium 4.4      Chloride 110 (*)     CO2 17 (*)     Glucose 166 (*)     Blood Urea Nitrogen 13.0      Creatinine 0.84      Calcium 9.3      Protein Total 7.1      Albumin 3.4      Globulin 3.7 (*)     Albumin/Globulin Ratio 0.9 (*)     Bilirubin Total 0.8      ALP 70      ALT 13      AST 22      eGFR >60      Anion Gap 12.0      BUN/Creatinine Ratio 15     B-TYPE NATRIURETIC PEPTIDE - Abnormal    Natriuretic Peptide 161.1 (*)    APTT - Abnormal    PTT 33.9 (*)    PROTIME-INR - Abnormal    PT 24.1 (*)     INR 2.2 (*)    CBC WITH DIFFERENTIAL - Abnormal    WBC 13.03 (*)     RBC 4.44      Hgb 13.1      Hct 40.7      MCV 91.7      MCH 29.5      MCHC 32.2 (*)     RDW 17.4 (*)     Platelet 249      MPV 11.0 (*)     Neut % 62.3      Lymph % 26.0      Mono % 10.2      Eos % 0.7      Basophil % 0.3      Lymph # 3.39      Neut # 8.12      Mono # 1.33 (*)     Eos # 0.09      Baso # 0.04      IG# 0.06 (*)     IG% 0.5      NRBC% 0.0     COVID/FLU A&B PCR - Normal    Influenza A PCR Not Detected      Influenza B PCR Not Detected       SARS-CoV-2 PCR Not Detected      Narrative:     The Xpert Xpress SARS-CoV-2/FLU/RSV plus is a rapid, multiplexed real-time PCR test intended for the simultaneous qualitative detection and differentiation of SARS-CoV-2, Influenza A, Influenza B, and respiratory syncytial virus (RSV) viral RNA in either nasopharyngeal swab or nasal swab specimens.         TROPONIN I - Normal    Troponin-I 0.031     CBC W/ AUTO DIFFERENTIAL    Narrative:     The following orders were created for panel order CBC auto differential.  Procedure                               Abnormality         Status                     ---------                               -----------         ------                     CBC with Differential[[id number]]       Abnormal            Final result                 Please view results for these tests on the individual orders.   URINALYSIS, REFLEX TO URINE CULTURE     EKG Readings: (Independently Interpreted)   Normal sinus rhythm.  Rate of 97.  Normal intervals.  No STEMI.     ECG Results              EKG 12-lead (Final result)        Collection Time Result Time QRS Duration OHS QTC Calculation    11/22/24 10:06:48 11/22/24 10:28:21 84 454                     Final result by Interface, Lab In [facility] (11/22/24 10:28:24)                   Narrative:    Test Reason : R06.02,    Vent. Rate :  97 BPM     Atrial Rate :  97 BPM     P-R Int : 200 ms          QRS Dur :  84 ms      QT Int : 358 ms       P-R-T Axes :  52  72  62 degrees    QTcB Int : 454 ms    Normal sinus rhythm  Septal infarct (cited on or before 26-Sep-2023)  Abnormal ECG  When compared with ECG of 04-Jan-2024 16:12,  Questionable change in The axis  Confirmed by Sanket Lombardo ([id number]) on 11/22/2024 10:28:20 AM    Referred By:            Confirmed By: Sanket Lombardo                                  Imaging Results              CTA Chest Non-Coronary (PE Studies) (Final result)  Result time 11/22/24 14:45:39      Final result by Sohail Longoria MD  (11/22/24 14:45:39)                   Impression:      1.  No pulmonary embolism identified.    2.  Cardiomegaly and mild congestive changes.    3.  Bilateral prominent pleural effusions and lower lung lobes compressive consolidations.      Electronically signed by: Sohail Longoria  Date:    11/22/2024  Time:    14:45               Narrative:    EXAMINATION:  CTA CHEST NON CORONARY (PE STUDIES)    CLINICAL HISTORY:  Pulmonary embolism (PE) suspected, high prob;    TECHNIQUE:  Sequential axial images performed of the chest using pulmonary embolism protocol following intravenous contrast bolus. Sagittal and contrast reformations performed.    Dose product length of 294 mGycm. Automated exposure control was utilized to minimize radiation dose.    COMPARISON:  CT chest December 21, 2022.  Chest radiograph November 22, 2024.    FINDINGS:  Optimal contrast bolus timing with adequately opacified pulmonary artery system is without evidence of filling defects from pulmonary thromboembolism within the main pulmonary artery and the major branches.  Thoracic aorta is without aneurysmal dilatation or dissection.  Cardiac chambers are enlarged in size.  There is no pericardial effusion.  There is precarinal 1.6 cm enlarged lymph node.    There is large right and moderate volume left pleural effusion.  There are bilateral lower lung zones compressive consolidations.  Aerated portion of the lungs are remarkable for ground opacities reflective of mild congestive process.  There is no dense consolidation.    Sternotomy changes.  Thoracic spinal epidural neurostimulator electrodes.                                       X-Ray Chest AP Portable (Final result)  Result time 11/22/24 11:15:45      Final result by Jason Andrews MD (11/22/24 11:15:45)                   Impression:      Prominence of the right hilum may relate to pulmonary vascular congestion the suggest follow-up PA and lateral radiographs to exclude underlying lesion.   Possible small pleural effusions.      Electronically signed by: Jason Andrews  Date:    11/22/2024  Time:    11:15               Narrative:    EXAMINATION:  XR CHEST AP PORTABLE    CLINICAL HISTORY:  Shortness of breath    COMPARISON:  4 January 2024    FINDINGS:  Frontal view of the chest was obtained. Heart and mediastinum unchanged.  There is prominence of the right hilum.  Increased interstitial prominence particularly left lung.  Possible small pleural effusions.  No pneumothorax seen.                                       Medications   albuterol-ipratropium 2.5 mg-0.5 mg/3 mL nebulizer solution 9 mL (9 mLs Nebulization Given 11/22/24 1043)   methylPREDNISolone sodium succinate injection 125 mg (125 mg Intravenous Given 11/22/24 1029)   furosemide injection 80 mg (80 mg Intravenous Given 11/22/24 1155)   iohexoL (OMNIPAQUE 350) injection 55 mL (55 mLs Intravenous Given 11/22/24 1415)     Medical Decision Making  Judging by the patient's chief complaint and pertinent history, the patient has the following possible differential diagnoses, including but not limited to the following.  Some of these are deemed to be lower likelihood and some more likely based on my physical exam and history combined with possible lab work and/or imaging studies.   Please see the pertinent studies, and refer to the HPI.  Some of these diagnoses will take further evaluation to fully rule out, perhaps as an outpatient and the patient was encouraged to follow up when discharged for more comprehensive evaluation.    ACS, pneumonia, COVID/Flu, congestive heart failure, asthma, COPD, pleural effusion, pulmonary edema, acute bronchitis, PE, pneumothorax, hemothorax, aortic dissection, electrolyte abnormalities, anemia, anxiety     Patient was a 79-year-old female presents to the emergency department for shortness of breath, hypoxia.  She was hypoxic on 3 L of oxygen.  Satting 88% in respiratory distress, with increased work of breathing.   Crackles bilaterally.  Occasional wheeze.  Chest x-ray with concern for possible pulmonary vascular congestion, bilateral pleural effusion.  Given dose of Lasix, given DuoNeb, Solu-Medrol.  Placed on placed on BiPAP with improvement.  All results discussed with the patient.  Given her respiratory distress requiring BiPAP will admit.  Will likely be able to wean BiPAP with continued diuresis.  All results have been discussed with the patient and family.  Answered all questions at this time.  Verbalized understanding agreed to plan.    Problems Addressed:  Acute hypoxemic respiratory failure: acute illness or injury that poses a threat to life or bodily functions  Congestive heart failure, unspecified HF chronicity, unspecified heart failure type: acute illness or injury that poses a threat to life or bodily functions  COPD exacerbation: acute illness or injury that poses a threat to life or bodily functions  Hypoxia: acute illness or injury that poses a threat to life or bodily functions  SOB (shortness of breath): acute illness or injury that poses a threat to life or bodily functions    Amount and/or Complexity of Data Reviewed  External Data Reviewed: ECG and notes.  Labs: ordered.  Radiology: ordered and independent interpretation performed.  ECG/medicine tests: ordered and independent interpretation performed.  Discussion of management or test interpretation with external provider(s): Discussed with hospital medicine who will admit the patient.    Risk  Prescription drug management.  Parenteral controlled substances.  Decision regarding hospitalization.  Diagnosis or treatment significantly limited by social determinants of health.            Scribe Attestation:   Scribe #1: I performed the above scribed service and the documentation accurately describes the services I performed. I attest to the accuracy of the note.    Attending Attestation:           Physician Attestation for Scribe:  Physician Attestation  Statement for Scribe #1: I, Ricky Long MD, reviewed documentation, as scribed by Zelalem Wild in my presence, and it is both accurate and complete.                                    Clinical Impression:  Final diagnoses:  [R06.02] SOB (shortness of breath)  [J96.01] Acute hypoxemic respiratory failure (Primary)  [R09.02] Hypoxia  [I50.9] Congestive heart failure, unspecified HF chronicity, unspecified heart failure type  [J44.1] COPD exacerbation          ED Disposition Condition    Admit Stable                Ricky Long MD  11/22/24 [id number]

## 2024-11-22 NOTE — Clinical Note
The catheter was inserted into the, was removed from the and was inserted over the wire into the mid   left anterior descending. An angiography was performed of the left coronary arteries. Multiple views were taken.

## 2024-11-22 NOTE — ED NOTES
Pt restless in bed, feeling flushed. O2 sat dropped to 84 @ 3L NC. Placed on 15 oxymask, O2 stats increased to 94. Pt placed on BIPAP.

## 2024-11-22 NOTE — Clinical Note
The catheter was inserted into the and was inserted over the wire into the pulmonary artery. Hemodynamics were performed.

## 2024-11-22 NOTE — PROGRESS NOTES
"Subjective:      Patient ID: Kortney Leach is a 79 y.o. female.    Vitals:  height is 5' 6" (1.676 m) and weight is 99.8 kg (220 lb). Her oral temperature is 97.4 °F (36.3 °C). Her blood pressure is 136/78 and her pulse is 98. Her respiration is 20 and oxygen saturation is 95%.     Chief Complaint: Shortness of Breath     Patient is a 79 y.o. female who presents to urgent care with complaints of shortness of breath for the past several days, nausea since yesterday. Patient had an appt with pulmonologist this morning but canceled due to nausea.  Nausea subsided on arrival here, increasing weakness and fatigue without chest pain.  Alleviating factors include none. Patient denies vomiting, abdominal, diarrhea, chest pain, dizziness.       Constitution: Positive for fatigue and generalized weakness.   Cardiovascular:  Negative for chest pain.   Respiratory:  Positive for shortness of breath.       Objective:     Physical Exam   Constitutional: She is oriented to person, place, and time. She appears well-developed. She is cooperative.  Non-toxic appearance. She does not appear ill. No distress.   HENT:   Head: Normocephalic and atraumatic.   Ears:   Right Ear: Hearing, tympanic membrane, external ear and ear canal normal.   Left Ear: Hearing, tympanic membrane, external ear and ear canal normal.   Nose: Nose normal. No mucosal edema, rhinorrhea or nasal deformity. No epistaxis. Right sinus exhibits no maxillary sinus tenderness and no frontal sinus tenderness. Left sinus exhibits no maxillary sinus tenderness and no frontal sinus tenderness.   Mouth/Throat: Uvula is midline, oropharynx is clear and moist and mucous membranes are normal. No trismus in the jaw. Normal dentition. No uvula swelling. No posterior oropharyngeal erythema.   Eyes: Conjunctivae and lids are normal. Right eye exhibits no discharge. Left eye exhibits no discharge. No scleral icterus.   Neck: Trachea normal and phonation normal. Neck supple. "   Cardiovascular: Normal rate, regular rhythm, normal heart sounds and normal pulses.   Pulmonary/Chest: Effort normal and breath sounds normal. No respiratory distress.   Abdominal: Normal appearance and bowel sounds are normal. She exhibits no distension and no mass. Soft. There is no abdominal tenderness.   Musculoskeletal: Normal range of motion.         General: No deformity. Normal range of motion.   Neurological: She is alert and oriented to person, place, and time. She exhibits normal muscle tone. Coordination normal.   Skin: Skin is warm, dry, intact, not diaphoretic and not pale.   Psychiatric: Her speech is normal and behavior is normal. Judgment and thought content normal.   Nursing note and vitals reviewed.    Previous History:     Review of patient's allergies indicates:   Allergen Reactions    Adhesive tape-silicones        Past Medical History:   Diagnosis Date    Anticoagulant long-term use     Asthma     Chronic sinusitis, unspecified     COPD (chronic obstructive pulmonary disease)     CVID (common variable immunodeficiency)     Diabetes mellitus, type 2     GERD (gastroesophageal reflux disease)     Hypertension     Severe mitral valve regurgitation     Sleep apnea     uses CPAP    SOB (shortness of breath)     Unspecified glaucoma     Weakness      Current Outpatient Medications   Medication Instructions    amiodarone (PACERONE) 200 MG Tab Take 2 tablets (400 mg total) by mouth 2 (two) times daily for 3 days, THEN 1 tablet (200 mg total) 2 (two) times daily for 3 days, THEN 1 tablet (200 mg total) 2 (two) times daily.    amLODIPine (NORVASC) 10 mg    aspirin (ECOTRIN) 81 mg, Oral, Daily    atorvastatin (LIPITOR) 40 mg, Oral, Daily    azelastine (ASTELIN) 137 mcg (0.1 %) nasal spray 2 sprays, 2 times daily    benazepriL (LOTENSIN) 20 mg, Daily    benazepriL (LOTENSIN) 40 mg    dexlansoprazole (DEXILANT) 60 mg capsule Daily    DUPIXENT SYRINGE 300 mg, Every 14 days    famotidine (PEPCID) 40 mg,  Nightly    FARXIGA 10 mg, Oral, Daily    fluticasone-umeclidin-vilanter (TRELEGY ELLIPTA) 200-62.5-25 mcg inhaler 1 puff, Daily    furosemide (LASIX) 40 mg    glimepiride (AMARYL) 4 mg, 2 times daily    HYDROcodone-acetaminophen (NORCO)  mg per tablet 1 tablet, Oral, Every 6 hours PRN    immun glob G,IgG,/pro/IgA 0-50 (HIZENTRA SUBQ) Every 14 days    lipase-protease-amylase 24,000-76,000-120,000 units (CREON) 24,000-76,000 -120,000 unit capsule 1-2 capsules, Oral, 3 times daily with meals, 2 caps with meals and 1 cap c snacks    metFORMIN (GLUCOPHAGE) 500 mg, Oral, 2 times daily with meals    metoprolol succinate (TOPROL-XL) 25 mg, Daily    NIFEdipine (PROCARDIA-XL) 30 mg    ondansetron (ZOFRAN) 4 mg, Oral, Every 6 hours    pantoprazole (PROTONIX) 40 MG tablet 1 tablet, Every morning    rivaroxaban (XARELTO) 20 mg, Oral, With dinner    timoloL (BETIMOL) 0.5 % ophthalmic solution 1 drop, Both Eyes, 2 times daily    TRELEGY ELLIPTA 100-62.5-25 mcg DsDv 1 puff     Past Surgical History:   Procedure Laterality Date    A-V CARDIAC PACEMAKER INSERTION N/A 9/25/2023    Procedure: INSERTION, CARDIAC PACEMAKER, DUAL CHAMBER;  Surgeon: Arnaud Moon MD;  Location: [facility] CATH LAB;  Service: Cardiology;  Laterality: N/A;  [facility]    APPENDECTOMY      CATARACT EXTRACTION Bilateral     COLONOSCOPY      EGD, WITH BALLOON DILATION      ESOPHAGOGASTRODUODENOSCOPY      LEFT HEART CATHETERIZATION Left 09/06/2023    Procedure: Left heart cath;  Surgeon: Vijay Uribe MD;  Location: [facility] CATH LAB;  Service: Cardiology;  Laterality: Left;  [facility]    MITRAL VALVE REPLACEMENT N/A 9/20/2023    Procedure: REPLACEMENT, MITRAL VALVE;  Surgeon: Steven Rosales IV, MD;  Location: [facility] OR;  Service: Cardiovascular;  Laterality: N/A;    TONSILLECTOMY      TOTAL KNEE ARTHROPLASTY Bilateral      Family History   Problem Relation Name Age of Onset    Stroke Mother      Alzheimer's disease Mother      Lung cancer Father      Diabetes Father       Multiple sclerosis Sister      Diabetes Brother         Social History     Tobacco Use    Smoking status: Former     Current packs/day: 0.00     Types: Cigarettes     Quit date:      Years since quittin.9    Smokeless tobacco: Never   Substance Use Topics    Alcohol use: Not Currently    Drug use: Not Currently     Assessment:     1. Shortness of breath        Plan:   Rapid assessment done in triage  patient  c/o shortness for breath and increasing weakness.  Patient declined COVID and flu testing.  Nausea subsided, oxygen applied initial sat 88% improved to 95%.  EMS called for transport.  EKG showing artifact, EMS EKG also showing artifact irregular rhythm.    Shortness of breath  -     POCT EKG 12-LEAD (Manually Resulted by Ordering Provider)

## 2024-11-23 LAB
ALBUMIN SERPL-MCNC: 3.3 G/DL (ref 3.4–4.8)
ALBUMIN/GLOB SERPL: 1.1 RATIO (ref 1.1–2)
ALP SERPL-CCNC: 65 UNIT/L (ref 40–150)
ALT SERPL-CCNC: 12 UNIT/L (ref 0–55)
ANION GAP SERPL CALC-SCNC: 11 MEQ/L
APICAL FOUR CHAMBER EJECTION FRACTION: 70 %
APICAL TWO CHAMBER EJECTION FRACTION: 63 %
AST SERPL-CCNC: 20 UNIT/L (ref 5–34)
AV INDEX (PROSTH): 0.75
AV MEAN GRADIENT: 6 MMHG
AV PEAK GRADIENT: 11.6 MMHG
AV VALVE AREA BY VELOCITY RATIO: 2.4 CM²
AV VALVE AREA: 2.4 CM²
AV VELOCITY RATIO: 0.76
BASOPHILS # BLD AUTO: 0.02 X10(3)/MCL
BASOPHILS NFR BLD AUTO: 0.1 %
BILIRUB SERPL-MCNC: 0.5 MG/DL
BSA FOR ECHO PROCEDURE: 2.16 M2
BUN SERPL-MCNC: 25.6 MG/DL (ref 9.8–20.1)
CALCIUM SERPL-MCNC: 8.7 MG/DL (ref 8.4–10.2)
CHLORIDE SERPL-SCNC: 108 MMOL/L (ref 98–107)
CO2 SERPL-SCNC: 21 MMOL/L (ref 23–31)
CREAT SERPL-MCNC: 0.94 MG/DL (ref 0.55–1.02)
CREAT/UREA NIT SERPL: 27
CV ECHO LV RWT: 0.54 CM
DOP CALC AO PEAK VEL: 1.7 M/S
DOP CALC AO VTI: 32.2 CM
DOP CALC LVOT AREA: 3.1 CM2
DOP CALC LVOT DIAMETER: 2 CM
DOP CALC LVOT PEAK VEL: 1.3 M/S
DOP CALC LVOT STROKE VOLUME: 75.7 CM3
DOP CALC MV VTI: 110 CM
DOP CALCLVOT PEAK VEL VTI: 24.1 CM
E WAVE DECELERATION TIME: 222 MSEC
E/A RATIO: 1.11
E/E' RATIO: 28.24 M/S
ECHO LV POSTERIOR WALL: 1.4 CM (ref 0.6–1.1)
EOSINOPHIL # BLD AUTO: 0 X10(3)/MCL (ref 0–0.9)
EOSINOPHIL NFR BLD AUTO: 0 %
ERYTHROCYTE [DISTWIDTH] IN BLOOD BY AUTOMATED COUNT: 17.5 % (ref 11.5–17)
FRACTIONAL SHORTENING: 34.6 % (ref 28–44)
GFR SERPLBLD CREATININE-BSD FMLA CKD-EPI: >60 ML/MIN/1.73/M2
GLOBULIN SER-MCNC: 3 GM/DL (ref 2.4–3.5)
GLUCOSE SERPL-MCNC: 232 MG/DL (ref 82–115)
HCT VFR BLD AUTO: 36.2 % (ref 37–47)
HGB BLD-MCNC: 11.9 G/DL (ref 12–16)
IMM GRANULOCYTES # BLD AUTO: 0.06 X10(3)/MCL (ref 0–0.04)
IMM GRANULOCYTES NFR BLD AUTO: 0.4 %
INTERVENTRICULAR SEPTUM: 1.4 CM (ref 0.6–1.1)
LEFT ATRIUM AREA SYSTOLIC (APICAL 2 CHAMBER): 22.5 CM2
LEFT ATRIUM AREA SYSTOLIC (APICAL 4 CHAMBER): 23 CM2
LEFT ATRIUM SIZE: 4.2 CM
LEFT ATRIUM VOLUME INDEX MOD: 35.7 ML/M2
LEFT ATRIUM VOLUME MOD: 74.2 ML
LEFT INTERNAL DIMENSION IN SYSTOLE: 3.4 CM (ref 2.1–4)
LEFT VENTRICLE DIASTOLIC VOLUME INDEX: 62.26 ML/M2
LEFT VENTRICLE DIASTOLIC VOLUME: 129.51 ML
LEFT VENTRICLE END DIASTOLIC VOLUME APICAL 2 CHAMBER: 25.9 ML
LEFT VENTRICLE END DIASTOLIC VOLUME APICAL 4 CHAMBER: 44.2 ML
LEFT VENTRICLE END SYSTOLIC VOLUME APICAL 2 CHAMBER: 65 ML
LEFT VENTRICLE END SYSTOLIC VOLUME APICAL 4 CHAMBER: 76.4 ML
LEFT VENTRICLE MASS INDEX: 148.8 G/M2
LEFT VENTRICLE SYSTOLIC VOLUME INDEX: 22.8 ML/M2
LEFT VENTRICLE SYSTOLIC VOLUME: 47.44 ML
LEFT VENTRICULAR INTERNAL DIMENSION IN DIASTOLE: 5.2 CM (ref 3.5–6)
LEFT VENTRICULAR MASS: 309.6 G
LV LATERAL E/E' RATIO: 24 M/S
LV SEPTAL E/E' RATIO: 34.29 M/S
LVED V (TEICH): 129.51 ML
LVES V (TEICH): 47.44 ML
LVOT MG: 3 MMHG
LVOT MV: 0.81 CM/S
LYMPHOCYTES # BLD AUTO: 1.63 X10(3)/MCL (ref 0.6–4.6)
LYMPHOCYTES NFR BLD AUTO: 12 %
MCH RBC QN AUTO: 29.8 PG (ref 27–31)
MCHC RBC AUTO-ENTMCNC: 32.9 G/DL (ref 33–36)
MCV RBC AUTO: 90.7 FL (ref 80–94)
MONOCYTES # BLD AUTO: 0.56 X10(3)/MCL (ref 0.1–1.3)
MONOCYTES NFR BLD AUTO: 4.1 %
MV MEAN GRADIENT: 19 MMHG
MV PEAK A VEL: 2.17 M/S
MV PEAK E VEL: 2.4 M/S
MV PEAK GRADIENT: 37 MMHG
MV STENOSIS PRESSURE HALF TIME: 162 MS
MV VALVE AREA BY CONTINUITY EQUATION: 0.69 CM2
MV VALVE AREA P 1/2 METHOD: 1.36 CM2
NEUTROPHILS # BLD AUTO: 11.26 X10(3)/MCL (ref 2.1–9.2)
NEUTROPHILS NFR BLD AUTO: 83.4 %
NRBC BLD AUTO-RTO: 0 %
PISA TR MAX VEL: 3.5 M/S
PLATELET # BLD AUTO: 224 X10(3)/MCL (ref 130–400)
PMV BLD AUTO: 10.3 FL (ref 7.4–10.4)
POCT GLUCOSE: 209 MG/DL (ref 70–110)
POTASSIUM SERPL-SCNC: 5.3 MMOL/L (ref 3.5–5.1)
PROT SERPL-MCNC: 6.3 GM/DL (ref 5.8–7.6)
PV PEAK GRADIENT: 5 MMHG
PV PEAK VELOCITY: 1.13 M/S
RA PRESSURE ESTIMATED: 8 MMHG
RBC # BLD AUTO: 3.99 X10(6)/MCL (ref 4.2–5.4)
RV TB RVSP: 12 MMHG
SODIUM SERPL-SCNC: 140 MMOL/L (ref 136–145)
TDI LATERAL: 0.1 M/S
TDI SEPTAL: 0.07 M/S
TDI: 0.09 M/S
TR MAX PG: 49 MMHG
TRICUSPID ANNULAR PLANE SYSTOLIC EXCURSION: 1.56 CM
TV REST PULMONARY ARTERY PRESSURE: 57 MMHG
WBC # BLD AUTO: 13.53 X10(3)/MCL (ref 4.5–11.5)
Z-SCORE OF LEFT VENTRICULAR DIMENSION IN END DIASTOLE: -2.03
Z-SCORE OF LEFT VENTRICULAR DIMENSION IN END SYSTOLE: -1.08

## 2024-11-23 PROCEDURE — 27100171 HC OXYGEN HIGH FLOW UP TO 24 HOURS

## 2024-11-23 PROCEDURE — 63600175 PHARM REV CODE 636 W HCPCS: Performed by: INTERNAL MEDICINE

## 2024-11-23 PROCEDURE — 94760 N-INVAS EAR/PLS OXIMETRY 1: CPT

## 2024-11-23 PROCEDURE — 99900035 HC TECH TIME PER 15 MIN (STAT)

## 2024-11-23 PROCEDURE — 25000003 PHARM REV CODE 250: Performed by: INTERNAL MEDICINE

## 2024-11-23 PROCEDURE — 85025 COMPLETE CBC W/AUTO DIFF WBC: CPT | Performed by: INTERNAL MEDICINE

## 2024-11-23 PROCEDURE — 94660 CPAP INITIATION&MGMT: CPT

## 2024-11-23 PROCEDURE — 36415 COLL VENOUS BLD VENIPUNCTURE: CPT | Performed by: INTERNAL MEDICINE

## 2024-11-23 PROCEDURE — 80053 COMPREHEN METABOLIC PANEL: CPT | Performed by: INTERNAL MEDICINE

## 2024-11-23 PROCEDURE — 11000001 HC ACUTE MED/SURG PRIVATE ROOM

## 2024-11-23 PROCEDURE — 99900031 HC PATIENT EDUCATION (STAT)

## 2024-11-23 PROCEDURE — 21400001 HC TELEMETRY ROOM

## 2024-11-23 RX ORDER — FUROSEMIDE 10 MG/ML
40 INJECTION INTRAMUSCULAR; INTRAVENOUS EVERY 12 HOURS
Status: DISCONTINUED | OUTPATIENT
Start: 2024-11-23 | End: 2024-11-27

## 2024-11-23 RX ADMIN — INSULIN ASPART 4 UNITS: 100 INJECTION, SOLUTION INTRAVENOUS; SUBCUTANEOUS at 05:11

## 2024-11-23 RX ADMIN — AMIODARONE HYDROCHLORIDE 200 MG: 200 TABLET ORAL at 08:11

## 2024-11-23 RX ADMIN — FAMOTIDINE 40 MG: 20 TABLET, FILM COATED ORAL at 08:11

## 2024-11-23 RX ADMIN — Medication 6 MG: at 01:11

## 2024-11-23 RX ADMIN — RIVAROXABAN 20 MG: 10 TABLET, FILM COATED ORAL at 04:11

## 2024-11-23 RX ADMIN — SODIUM ZIRCONIUM CYCLOSILICATE 10 G: 10 POWDER, FOR SUSPENSION ORAL at 11:11

## 2024-11-23 RX ADMIN — PANTOPRAZOLE SODIUM 40 MG: 40 TABLET, DELAYED RELEASE ORAL at 05:11

## 2024-11-23 RX ADMIN — FUROSEMIDE 40 MG: 10 INJECTION, SOLUTION INTRAMUSCULAR; INTRAVENOUS at 08:11

## 2024-11-23 RX ADMIN — PANCRELIPASE 2 CAPSULE: 120000; 24000; 76000 CAPSULE, DELAYED RELEASE PELLETS ORAL at 08:11

## 2024-11-23 RX ADMIN — METOPROLOL SUCCINATE 25 MG: 25 TABLET, EXTENDED RELEASE ORAL at 08:11

## 2024-11-23 RX ADMIN — NIFEDIPINE 30 MG: 30 TABLET, FILM COATED, EXTENDED RELEASE ORAL at 08:11

## 2024-11-23 RX ADMIN — FUROSEMIDE 40 MG: 10 INJECTION, SOLUTION INTRAMUSCULAR; INTRAVENOUS at 09:11

## 2024-11-23 NOTE — CONSULTS
Inpatient consult to Cardiology  Consult performed by: Caitlin Antonio FNP  Consult ordered by: Kirk Petersen MD  Reason for consult: CHF        [facility] *    Cardiology  Consult Note    Patient Name: Kortney Leach  MRN: [id number]  Admission Date: 11/22/2024  Hospital Length of Stay: 1 days  Code Status: Full Code   Attending Provider: Geovanny Franklin MD   Consulting Provider: YANIRA Watkins  Primary Care Physician: Arnaldo Hernandez MD  Principal Problem:COPD exacerbation    Patient information was obtained from patient, past medical records, ER records, and primary team.     Subjective:     Chief Complaint/Reason for Consult: CHF    HPI: Ms. Leach is a 78 y/o female with a history of VHD, GEMINI, DM II, PAF, ABEL, COPD, HTN, DVT SSS, HLD, who is known to [facility], Dr. Uribe/Red. She presented to the ER on 11.22.24 with complaints of shortness of breath. She reports worsening shortness of breath over the last month. She was sent from urgent care for being 88% on room air. She was started on oxygen therapy an sent to the ER. In the ER, she had some respiratory distress and was started on BiPAP. Significant labs WBC 13.53, H&H 11.9/36.2, K 5.3, Chloride 108, CO2 21, Glucose 232, .1. Flu, COVID and COVID negative. CTA Chest negative for PE, but shows Cardiomegaly and mild congestive changes and Bilateral prominent pleural effusions and lower lung lobes compressive consolidations. Cxr shows prominence of the right hilum may relate to pulmonary vascular congestion and possible small pleural effusions. [facility] has been consulted for CHF.        PMH: VHD, GEMINI, DM II, PAF, ABEL, COPD, HTN, DVT SSS, HLD, Asthma, Left Thyroid Nodule, GERD, Chronic Sinusitis, Common Variable Immunodeficiency, Glaucoma  PSH: PPM, LHC, MVR, Bilateral Cataract Extraction, Total Knee Replacement, Bilateral mastectomy, Appendectomy, Colonoscopy, EGD, Tonsillectomy,    Family History: Father - Lung Cancer;  Mother - Alzheimer, CVA; Sister - HTN, Multiple Sclerosis, DM II  Social History: Former Smoker (Quit in 1998). Denies illicit drug use and alcohol use.     Previous Cardiac Diagnostics:   ECHO (11.14.24):  The study quality is average. The left ventricle is normal in size. Global left ventricular systolic function is normal. The left ventricular ejection fraction is 65%. Left ventricular diastolic function is normal. Mild concentric left ventricular hypertrophy is present. The left atrium is moderately enlarged. (4.6 cms). A normal functioning prosthetic mitral valve is noted. MG = 4.3 mmHg. The pulmonary artery systolic pressure is 37 mmHg.     PPM Insertion (9.25.23):  Successful implantation of PPM Dual. [facility]     PET (1.24.23):  This is a normal perfusion study, no perfusion defects noted. There is no evidence of ischemia.This scan is suggestive of low risk for future cardiovascular events. The left ventricular cavity is noted to be normal on the stress studies. The stress left ventricular ejection fraction was calculated to be 68% and left ventricular global function is normal. The rest left ventricular cavity is noted to be normal. The rest left ventricular ejection fraction was calculated to be 63% and rest left ventricular global function is normal. When compared to the resting ejection fraction (63%), the stress ejection fraction (68%) has increased. Transient ischemic dilatation is present and has been described as a marker for high risk coronary artery disease. It has also been described in microvascular disease, hypertensive heart disease as well as cardiac deconditioning. The study quality is good.   There was a rise in myocardial blood flow between rest and stress.  Global myocardial blood flow reserve was 2.72.  Normal global coronary flow reserve is suggestive of low coronary event risk.    Carotid US (1.24.23):  The study quality is good. There is no plaque noted in the proximal right internal  carotid artery. 1-39% stenosis in the proximal left internal carotid artery based on Bluth Criteria. Antegrade right vertebral artery flow. Antegrade left vertebral artery flow.    LHC (9.6.23):  Normal Coronaries     Review of patient's allergies indicates:   Allergen Reactions    Adhesive tape-silicones      Current Facility-Administered Medications on File Prior to Encounter   Medication    0.9%  NaCl infusion    diazePAM tablet 10 mg    diphenhydrAMINE capsule 50 mg     Current Outpatient Medications on File Prior to Encounter   Medication Sig    amiodarone (PACERONE) 200 MG Tab Take 200 mg by mouth once daily.    atorvastatin (LIPITOR) 40 MG tablet Take 40 mg by mouth once daily.    azelastine (ASTELIN) 137 mcg (0.1 %) nasal spray 2 sprays by Nasal route 2 (two) times daily.    dexlansoprazole (DEXILANT) 60 mg capsule Take by mouth once daily.    dupilumab (DUPIXENT PEN) 300 mg/2 mL PnIj Inject 300 mg into the skin every 14 (fourteen) days.    estradioL (ESTRACE) 0.01 % (0.1 mg/gram) vaginal cream Place 0.5 g vaginally twice a week.    famotidine (PEPCID) 40 MG tablet Take 40 mg by mouth every evening.    glimepiride (AMARYL) 2 MG tablet Take 4 mg by mouth 2 (two) times a day.    lipase-protease-amylase 24,000-76,000-120,000 units (CREON) 24,000-76,000 -120,000 unit capsule Take 1-2 capsules by mouth 3 (three) times daily with meals. 2 caps with meals and 1 cap c snacks    metFORMIN (GLUCOPHAGE) 500 MG tablet Take 1 tablet (500 mg total) by mouth 2 (two) times daily with meals.    metoprolol succinate (TOPROL-XL) 25 MG 24 hr tablet Take 25 mg by mouth once daily.    NIFEdipine (PROCARDIA-XL) 30 MG (OSM) 24 hr tablet Take 30 mg by mouth.    pantoprazole (PROTONIX) 40 MG tablet Take 1 tablet by mouth every morning.    TRELEGY ELLIPTA 100-62.5-25 mcg DsDv Take 1 puff by mouth.    amLODIPine (NORVASC) 10 MG tablet Take 10 mg by mouth.    amlodipine-benazepril 10-20mg (LOTREL) 10-20 mg per capsule Take 1 capsule by  mouth once daily.    aspirin (ECOTRIN) 81 MG EC tablet Take 1 tablet (81 mg total) by mouth once daily.    benazepriL (LOTENSIN) 20 MG tablet Take 20 mg by mouth once daily.    benazepriL (LOTENSIN) 40 MG tablet Take 40 mg by mouth.    dapagliflozin (FARXIGA) 10 mg tablet Take 10 mg by mouth once daily.    DUPIXENT SYRINGE 300 mg/2 mL Syrg Inject 300 mg into the skin every 14 (fourteen) days.    fluticasone-umeclidin-vilanter (TRELEGY ELLIPTA) 200-62.5-25 mcg inhaler Inhale 1 puff into the lungs once daily.    furosemide (LASIX) 40 MG tablet Take 40 mg by mouth.    HYDROcodone-acetaminophen (NORCO)  mg per tablet Take 1 tablet by mouth every 6 (six) hours as needed for Pain.    immun glob G,IgG,/pro/IgA 0-50 (HIZENTRA SUBQ) Inject into the skin every 14 (fourteen) days.    ondansetron (ZOFRAN) 4 MG tablet Take 1 tablet (4 mg total) by mouth every 6 (six) hours. (Patient not taking: Reported on 11/22/2024)    rivaroxaban (XARELTO) 20 mg Tab Take 1 tablet (20 mg total) by mouth daily with dinner or evening meal.    timoloL (BETIMOL) 0.5 % ophthalmic solution Place 1 drop into both eyes 2 (two) times daily.           Review of Systems   Respiratory:  Positive for shortness of breath. Negative for chest tightness.    Cardiovascular:  Negative for chest pain, palpitations and leg swelling.   All other systems reviewed and are negative.    Objective:     Vital Signs (Most Recent):  Temp: (!) 95.9 °F (35.5 °C) (11/23/24 0737)  Pulse: 78 (11/23/24 0910)  Resp: (!) 27 (11/23/24 0910)  BP: 115/65 (11/23/24 0737)  SpO2: 97 % (11/23/24 0910) Vital Signs (24h Range):  Temp:  [95.9 °F (35.5 °C)-98.1 °F (36.7 °C)] 95.9 °F (35.5 °C)  Pulse:  [] 78  Resp:  [16-30] 27  SpO2:  [94 %-98 %] 97 %  BP: ()/(47-88) 115/65   Weight: 99.8 kg (220 lb)  Body mass index is 35.51 kg/m².  SpO2: 97 %       Intake/Output Summary (Last 24 hours) at 11/23/2024 0953  Last data filed at 11/22/2024 2230  Gross per 24 hour   Intake 290  ml   Output 400 ml   Net -110 ml     Lines/Drains/Airways       Peripheral Intravenous Line  Duration                  Peripheral IV - Single Lumen 11/22/24 1309 20 G Anterior;Right Forearm <1 day                  Significant Labs:   Chemistries:   Recent Labs   Lab 11/22/24  1037 11/23/24  0532    140   K 4.4 5.3*   * 108*   CO2 17* 21*   BUN 13.0 25.6*   CREATININE 0.84 0.94   CALCIUM 9.3 8.7   BILITOT 0.8 0.5   ALKPHOS 70 65   ALT 13 12   AST 22 20   GLUCOSE 166* 232*   TROPONINI 0.031  --         CBC/Anemia Labs: Coags:    Recent Labs   Lab 11/22/24  1037 11/23/24  0532   WBC 13.03* 13.53*   HGB 13.1 11.9*   HCT 40.7 36.2*    224   MCV 91.7 90.7   RDW 17.4* 17.5*    Recent Labs   Lab 11/22/24  1037   INR 2.2*   APTT 33.9*        Significant Imaging:  Imaging Results              CTA Chest Non-Coronary (PE Studies) (Final result)  Result time 11/22/24 14:45:39      Final result by Sohail Longoria MD (11/22/24 14:45:39)                   Impression:      1.  No pulmonary embolism identified.    2.  Cardiomegaly and mild congestive changes.    3.  Bilateral prominent pleural effusions and lower lung lobes compressive consolidations.      Electronically signed by: Sohail Longoria  Date:    11/22/2024  Time:    14:45               Narrative:    EXAMINATION:  CTA CHEST NON CORONARY (PE STUDIES)    CLINICAL HISTORY:  Pulmonary embolism (PE) suspected, high prob;    TECHNIQUE:  Sequential axial images performed of the chest using pulmonary embolism protocol following intravenous contrast bolus. Sagittal and contrast reformations performed.    Dose product length of 294 mGycm. Automated exposure control was utilized to minimize radiation dose.    COMPARISON:  CT chest December 21, 2022.  Chest radiograph November 22, 2024.    FINDINGS:  Optimal contrast bolus timing with adequately opacified pulmonary artery system is without evidence of filling defects from pulmonary thromboembolism within the main  pulmonary artery and the major branches.  Thoracic aorta is without aneurysmal dilatation or dissection.  Cardiac chambers are enlarged in size.  There is no pericardial effusion.  There is precarinal 1.6 cm enlarged lymph node.    There is large right and moderate volume left pleural effusion.  There are bilateral lower lung zones compressive consolidations.  Aerated portion of the lungs are remarkable for ground opacities reflective of mild congestive process.  There is no dense consolidation.    Sternotomy changes.  Thoracic spinal epidural neurostimulator electrodes.                                       X-Ray Chest AP Portable (Final result)  Result time 11/22/24 11:15:45      Final result by Jason Andrews MD (11/22/24 11:15:45)                   Impression:      Prominence of the right hilum may relate to pulmonary vascular congestion the suggest follow-up PA and lateral radiographs to exclude underlying lesion.  Possible small pleural effusions.      Electronically signed by: Jason Andrews  Date:    11/22/2024  Time:    11:15               Narrative:    EXAMINATION:  XR CHEST AP PORTABLE    CLINICAL HISTORY:  Shortness of breath    COMPARISON:  4 January 2024    FINDINGS:  Frontal view of the chest was obtained. Heart and mediastinum unchanged.  There is prominence of the right hilum.  Increased interstitial prominence particularly left lung.  Possible small pleural effusions.  No pneumothorax seen.                                    EKG:       Telemetry:  SR    Physical Exam  Vitals and nursing note reviewed.   HENT:      Head: Normocephalic.      Mouth/Throat:      Mouth: Mucous membranes are dry.   Eyes:      Extraocular Movements: Extraocular movements intact.   Cardiovascular:      Rate and Rhythm: Normal rate and regular rhythm.      Pulses: Normal pulses.   Pulmonary:      Effort: Pulmonary effort is normal.      Comments: Diminished Lung Sounds   Abdominal:      Palpations: Abdomen is soft.    Musculoskeletal:         General: Normal range of motion.      Cervical back: Normal range of motion.   Skin:     General: Skin is warm.   Neurological:      Mental Status: She is alert and oriented to person, place, and time.   Psychiatric:         Mood and Affect: Mood normal.         Behavior: Behavior normal.       Home Medications:   Current Facility-Administered Medications on File Prior to Encounter   Medication Dose Route Frequency Provider Last Rate Last Admin    0.9%  NaCl infusion   Intravenous On Call Procedure Vijay Uribe MD        diazePAM tablet 10 mg  10 mg Oral On Call Procedure Vijay Uribe MD   10 mg at 09/06/23 1021    diphenhydrAMINE capsule 50 mg  50 mg Oral On Call Procedure Vijay Uribe MD   50 mg at 09/06/23 1021     Current Outpatient Medications on File Prior to Encounter   Medication Sig Dispense Refill    amiodarone (PACERONE) 200 MG Tab Take 200 mg by mouth once daily.      atorvastatin (LIPITOR) 40 MG tablet Take 40 mg by mouth once daily.      azelastine (ASTELIN) 137 mcg (0.1 %) nasal spray 2 sprays by Nasal route 2 (two) times daily.      dexlansoprazole (DEXILANT) 60 mg capsule Take by mouth once daily.      dupilumab (DUPIXENT PEN) 300 mg/2 mL PnIj Inject 300 mg into the skin every 14 (fourteen) days.      estradioL (ESTRACE) 0.01 % (0.1 mg/gram) vaginal cream Place 0.5 g vaginally twice a week.      famotidine (PEPCID) 40 MG tablet Take 40 mg by mouth every evening.      glimepiride (AMARYL) 2 MG tablet Take 4 mg by mouth 2 (two) times a day.      lipase-protease-amylase 24,000-76,000-120,000 units (CREON) 24,000-76,000 -120,000 unit capsule Take 1-2 capsules by mouth 3 (three) times daily with meals. 2 caps with meals and 1 cap c snacks      metFORMIN (GLUCOPHAGE) 500 MG tablet Take 1 tablet (500 mg total) by mouth 2 (two) times daily with meals. 180 tablet 3    metoprolol succinate (TOPROL-XL) 25 MG 24 hr tablet Take 25 mg by mouth once daily.      NIFEdipine  (PROCARDIA-XL) 30 MG (OSM) 24 hr tablet Take 30 mg by mouth.      pantoprazole (PROTONIX) 40 MG tablet Take 1 tablet by mouth every morning.      TRELEGY ELLIPTA 100-62.5-25 mcg DsDv Take 1 puff by mouth.      amLODIPine (NORVASC) 10 MG tablet Take 10 mg by mouth.      amlodipine-benazepril 10-20mg (LOTREL) 10-20 mg per capsule Take 1 capsule by mouth once daily.      aspirin (ECOTRIN) 81 MG EC tablet Take 1 tablet (81 mg total) by mouth once daily. 30 tablet 0    benazepriL (LOTENSIN) 20 MG tablet Take 20 mg by mouth once daily.      benazepriL (LOTENSIN) 40 MG tablet Take 40 mg by mouth.      dapagliflozin (FARXIGA) 10 mg tablet Take 10 mg by mouth once daily.      DUPIXENT SYRINGE 300 mg/2 mL Syrg Inject 300 mg into the skin every 14 (fourteen) days.      fluticasone-umeclidin-vilanter (TRELEGY ELLIPTA) 200-62.5-25 mcg inhaler Inhale 1 puff into the lungs once daily.      furosemide (LASIX) 40 MG tablet Take 40 mg by mouth.      HYDROcodone-acetaminophen (NORCO)  mg per tablet Take 1 tablet by mouth every 6 (six) hours as needed for Pain. 20 tablet 0    immun glob G,IgG,/pro/IgA 0-50 (HIZENTRA SUBQ) Inject into the skin every 14 (fourteen) days.      ondansetron (ZOFRAN) 4 MG tablet Take 1 tablet (4 mg total) by mouth every 6 (six) hours. (Patient not taking: Reported on 11/22/2024) 12 tablet 0    rivaroxaban (XARELTO) 20 mg Tab Take 1 tablet (20 mg total) by mouth daily with dinner or evening meal.      timoloL (BETIMOL) 0.5 % ophthalmic solution Place 1 drop into both eyes 2 (two) times daily.       Current Schedule Inpatient Medications:   amiodarone  200 mg Oral Daily    atorvastatin  40 mg Oral Daily    famotidine  40 mg Oral QHS    furosemide (LASIX) injection  40 mg Intravenous Q12H    lipase-protease-amylase 24,000-76,000-120,000 units  2 capsule Oral TID WM    metoprolol succinate  25 mg Oral Daily    NIFEdipine  30 mg Oral Daily    pantoprazole  40 mg Oral QAM    rivaroxaban  20 mg Oral Daily with  dinner     Continuous Infusions:    Assessment:   Heart Failure, Unspecified ?    - EF 65% with no Diastolic Dysfunction (11.14.24)   Acute Hypoxemic Respiratory Failure - requiring oxygenation   PNA ?    - CTA Chest (11.22.24): Llower lung lobes compressive consolidations.   Bilateral Pleural Effusions  Anemia (Mild)  Hyperkalemia  Leukocytosis   COPD Exacerbation ?  VHD    - s/p MVR (9.20.23)  GEMINI  DM II  PAF - Currently SR    - [id number] Score 6 (9.7% Stroke Risk Per Year)    - s/p Ligation of SUKHI (9.20.23) - Endostapler   ABEL  HTN  DVT    - on Xarelto Outpatient   SSS    - s/p PPM (9.25.23): [facility]   HLD  Asthma  Left Thyroid Nodule  GERD  Chronic Sinusitis  Common Variable Immunodeficiency  Glaucoma  No Known History of GI Bleed       Plan:   ECHO Pending   Diurese if clinically indicated   Resume Home Medications   Accurate I&O/Daily Weight   Hyperkalemia per Primary Team   Labs in AM: CBC, BMP, and Mag     Thank you for your consult.     Caitlin Antonio, ELIZABETHP  Cardiology  [facility]    I personally reviewed the NP history and physical above.  See below MDM component performed by me (Yvon Skelton MD):    Acute on chronic diastolic CHF  VHD    Bio MVR stenosis, MG of 19 (but MVR is only 1 year old), will review the echo    Might need GORDO TMVR if this is real  PHTN (57)  PPM  PAF    Was on Amio, had SUKHI-L  DVT    Was on Xarelto    Recs:  Diuresis, she is responding, but not at baseline  Will follow along

## 2024-11-23 NOTE — H&P
[facility] Hospital Medicine History & Physical Examination       Patient Name: Kortney Leach  MRN: [id number]  Patient Class: IP- Inpatient   Admission Date: 11/22/2024 10:02 AM  Length of Stay: 0  Admitting Service: Hospital Medicine   Attending Physician: Kirk Petersen MD   Primary Care Provider: Arnaldo Hernandez MD  History source: EMR, patient and/or patient's family    CHIEF COMPLAINT   Shortness of Breath (Increasing SOB over the last month, sent from , was 88% on RA, arrives on 3L N/C sees [place], hx of COPD)    HISTORY OF PRESENT ILLNESS:   Patient is an 79-year-old female with a past medical history A-fib, HTN, valvular heart disease s/p MVR, nonobstructive CAD, DVT , IVC filter, DM, GERD, chronic sinusitis status post sinus surgery, ABEL, obesity and asthma/COPD not on home oxygen who presented to the ER with progressive shortness of breath over the last month.  She initially was seen in urgent care but due to sats being 88% on room air she was deferred to the ER.    She arrived afebrile hemodynamically stable maintaining adequate sats on 3 L nasal cannula but abruptly desatted in the ER requiring initiation of BiPAP.  Laboratory work showed a BNP of 160 (last EF 55% 09/2023), CTA showed no pulmonary embolus, noted cardiomegaly and mild congestive changes and pleural effusions and lower lung compressive consolidations.  EKG noted normal sinus rhythm.    She was given 80 mg of Lasix, albuterol nebulizer treatment, IV Solu-Medrol and admitted to the hospitalist service for further management.    PAST MEDICAL HISTORY:     Past Medical History:   Diagnosis Date    Anticoagulant long-term use     Asthma     Chronic sinusitis, unspecified     COPD (chronic obstructive pulmonary disease)     CVID (common variable immunodeficiency)     Diabetes mellitus, type 2     GERD (gastroesophageal reflux disease)     Hypertension     Severe mitral valve  regurgitation     Sleep apnea     uses CPAP    SOB (shortness of breath)     Unspecified glaucoma     Weakness        PAST SURGICAL HISTORY:     Past Surgical History:   Procedure Laterality Date    A-V CARDIAC PACEMAKER INSERTION N/A 2023    Procedure: INSERTION, CARDIAC PACEMAKER, DUAL CHAMBER;  Surgeon: Arnaud Moon MD;  Location: [facility] CATH LAB;  Service: Cardiology;  Laterality: N/A;  [facility]    APPENDECTOMY      CATARACT EXTRACTION Bilateral     COLONOSCOPY      EGD, WITH BALLOON DILATION      ESOPHAGOGASTRODUODENOSCOPY      LEFT HEART CATHETERIZATION Left 2023    Procedure: Left heart cath;  Surgeon: Vijay Uribe MD;  Location: [facility] CATH LAB;  Service: Cardiology;  Laterality: Left;  [facility]    MITRAL VALVE REPLACEMENT N/A 2023    Procedure: REPLACEMENT, MITRAL VALVE;  Surgeon: Steven Rosales IV, MD;  Location: [facility];  Service: Cardiovascular;  Laterality: N/A;    TONSILLECTOMY      TOTAL KNEE ARTHROPLASTY Bilateral        ALLERGIES:   Adhesive tape-silicones    FAMILY HISTORY:   Reviewed and non-contributory     SOCIAL HISTORY:   Screening for Social Drivers for health: Patient screened for food insecurity, housing instability, transportation needs, utility   difficulties, and interpersonal safety (select all that apply as identified as concern)  []Housing or Food  []Transportation Needs  []Utility Difficulties  []Interpersonal safety  [x]None  Social History     Tobacco Use    Smoking status: Former     Current packs/day: 0.00     Types: Cigarettes     Quit date:      Years since quittin.9    Smokeless tobacco: Never   Substance Use Topics    Alcohol use: Not Currently        HOME MEDICATIONS:     Prior to Admission medications    Medication Sig Start Date End Date Taking? Authorizing Provider   amiodarone (PACERONE) 200 MG Tab Take 2 tablets (400 mg total) by mouth 2 (two) times daily for 3 days, THEN 1 tablet (200 mg total) 2 (two) times daily for 3 days, THEN 1 tablet (200 mg  total) 2 (two) times daily. 9/26/23 11/1/23  Madeline Sher, YANIRA   amLODIPine (NORVASC) 10 MG tablet Take 10 mg by mouth. 11/19/24   Provider, Historical   aspirin (ECOTRIN) 81 MG EC tablet Take 1 tablet (81 mg total) by mouth once daily. 9/23/23 9/22/24  Sebastian Jones, PADonnaC   atorvastatin (LIPITOR) 40 MG tablet Take 1 tablet (40 mg total) by mouth once daily. 10/15/23 10/14/24  Richard Ruiz MD   azelastine (ASTELIN) 137 mcg (0.1 %) nasal spray 2 sprays by Nasal route 2 (two) times daily.    Provider, Historical   benazepriL (LOTENSIN) 20 MG tablet Take 20 mg by mouth once daily.    Provider, Historical   benazepriL (LOTENSIN) 40 MG tablet Take 40 mg by mouth. 10/21/23   Provider, Historical   dapagliflozin (FARXIGA) 10 mg tablet Take 10 mg by mouth once daily.    Provider, Historical   dexlansoprazole (DEXILANT) 60 mg capsule Take by mouth once daily. 8/1/23   Provider, Historical   DUPIXENT SYRINGE 300 mg/2 mL Syrg Inject 300 mg into the skin every 14 (fourteen) days. 7/31/23   Provider, Historical   famotidine (PEPCID) 40 MG tablet Take 40 mg by mouth every evening. 6/30/23   Provider, Historical   fluticasone-umeclidin-vilanter (TRELEGY ELLIPTA) 200-62.5-25 mcg inhaler Inhale 1 puff into the lungs once daily.    Provider, Historical   furosemide (LASIX) 40 MG tablet Take 40 mg by mouth. 11/19/24   Provider, Historical   glimepiride (AMARYL) 2 MG tablet Take 4 mg by mouth 2 (two) times a day.    Provider, Historical   HYDROcodone-acetaminophen (NORCO)  mg per tablet Take 1 tablet by mouth every 6 (six) hours as needed for Pain. 9/6/24   Sanket Oliva MD   immun glob G,IgG,/pro/IgA 0-50 (HIZENTRA SUBQ) Inject into the skin every 14 (fourteen) days.    Provider, Historical   lipase-protease-amylase 24,000-76,000-120,000 units (CREON) 24,000-76,000 -120,000 unit capsule Take 1-2 capsules by mouth 3 (three) times daily with meals. 2 caps with meals and 1 cap c snacks    Provider, Historical  "  metFORMIN (GLUCOPHAGE) 500 MG tablet Take 1 tablet (500 mg total) by mouth 2 (two) times daily with meals. 10/14/23 11/22/24  Richard Ruiz MD   metoprolol succinate (TOPROL-XL) 25 MG 24 hr tablet Take 25 mg by mouth once daily.    Provider, Historical   NIFEdipine (PROCARDIA-XL) 30 MG (OSM) 24 hr tablet Take 30 mg by mouth. 10/22/23   Provider, Historical   ondansetron (ZOFRAN) 4 MG tablet Take 1 tablet (4 mg total) by mouth every 6 (six) hours.  Patient not taking: Reported on 11/22/2024 1/4/24   Barry Lopez PA-C   pantoprazole (PROTONIX) 40 MG tablet Take 1 tablet by mouth every morning.    Provider, Historical   rivaroxaban (XARELTO) 20 mg Tab Take 1 tablet (20 mg total) by mouth daily with dinner or evening meal. 9/26/23   Madeline Sher FNP   timoloL (BETIMOL) 0.5 % ophthalmic solution Place 1 drop into both eyes 2 (two) times daily.    Provider, Historical   TRELEGY ELLIPTA 100-62.5-25 mcg DsDv Take 1 puff by mouth.  Patient not taking: Reported on 11/22/2024 9/5/23   Provider, Historical       REVIEW OF SYSTEMS:   Except as documented, all other systems reviewed and negative     PHYSICAL EXAM:   T 97.7 °F (36.5 °C)   /62   P 92   RR (!) 22   O2 97 %  GENERAL: awake, alert, oriented and in no acute distress, non-toxic appearing   HEENT: normocephalic atraumatic   NECK: supple   LUNGS: Clear bilaterally, no wheezing or rales, no accessory muscle use   CVS: Regular rate and rhythm, normal peripheral perfusion  ABD: Soft, non-tender, non-distended, bowel sounds present  EXTREMITIES: no clubbing or cyanosis  SKIN: Warm, dry.   NEURO: alert and oriented, grossly without focal deficits   PSYCHIATRIC: Cooperative    LABS AND IMAGING:     Recent Labs     11/22/24  1037   WBC 13.03*   RBC 4.44   HGB 13.1   HCT 40.7   MCV 91.7   MCH 29.5   MCHC 32.2*   RDW 17.4*        No results for input(s): "LACTIC" in the last 72 hours.  Recent Labs     11/22/24  1037   INR 2.2*   APTT 33.9*     No " "results for input(s): "HGBA1C", "CHOL", "TRIG", "LDL", "VLDL", "HDL" in the last 72 hours.   Recent Labs     11/22/24  1037      K 4.4   CO2 17*   BUN 13.0   CREATININE 0.84   GLUCOSE 166*   CALCIUM 9.3   ALBUMIN 3.4   GLOBULIN 3.7*   ALKPHOS 70   ALT 13   AST 22   BILITOT 0.8     Recent Labs     11/22/24  1037   .1*   TROPONINI 0.031          CTA Chest Non-Coronary (PE Studies)  Narrative: EXAMINATION:  CTA CHEST NON CORONARY (PE STUDIES)    CLINICAL HISTORY:  Pulmonary embolism (PE) suspected, high prob;    TECHNIQUE:  Sequential axial images performed of the chest using pulmonary embolism protocol following intravenous contrast bolus. Sagittal and contrast reformations performed.    Dose product length of 294 mGycm. Automated exposure control was utilized to minimize radiation dose.    COMPARISON:  CT chest December 21, 2022.  Chest radiograph November 22, 2024.    FINDINGS:  Optimal contrast bolus timing with adequately opacified pulmonary artery system is without evidence of filling defects from pulmonary thromboembolism within the main pulmonary artery and the major branches.  Thoracic aorta is without aneurysmal dilatation or dissection.  Cardiac chambers are enlarged in size.  There is no pericardial effusion.  There is precarinal 1.6 cm enlarged lymph node.    There is large right and moderate volume left pleural effusion.  There are bilateral lower lung zones compressive consolidations.  Aerated portion of the lungs are remarkable for ground opacities reflective of mild congestive process.  There is no dense consolidation.    Sternotomy changes.  Thoracic spinal epidural neurostimulator electrodes.  Impression: 1.  No pulmonary embolism identified.    2.  Cardiomegaly and mild congestive changes.    3.  Bilateral prominent pleural effusions and lower lung lobes compressive consolidations.    Electronically signed by: Sohail Longoria  Date:    11/22/2024  Time:    14:45  X-Ray Chest AP " Portable  Narrative: EXAMINATION:  XR CHEST AP PORTABLE    CLINICAL HISTORY:  Shortness of breath    COMPARISON:  4 January 2024    FINDINGS:  Frontal view of the chest was obtained. Heart and mediastinum unchanged.  There is prominence of the right hilum.  Increased interstitial prominence particularly left lung.  Possible small pleural effusions.  No pneumothorax seen.  Impression: Prominence of the right hilum may relate to pulmonary vascular congestion the suggest follow-up PA and lateral radiographs to exclude underlying lesion.  Possible small pleural effusions.    Electronically signed by: Jason Andrews  Date:    11/22/2024  Time:    11:15      ASSESSMENT & PLAN:     Decompensated diastolic CHF   Acute hypoxemic respiratory failure 2/2 above    Hx:  A-fib, HTN, valvular heart disease s/p MVR, nonobstructive CAD, DVT , IVC filter, DM, GERD, chronic sinusitis status post sinus surgery, ABEL, obesity and asthma/COPD     -IV Lasix x1, obtain echo, tele monitoring, strict I's and O's  -obtain cho  -duo nebs prn  -resume home meds as appropriate pendisng med rec    DVT prophylaxis: cont Xarelto   Code status: full     If patient was admitted under observational status it is with my approval/permission.     At least 55 min was spent on this history and physical.  Time seen: 1AM 11/23  Critical care time = 35 min; Critical care diagnosis = resp failure  Kirk Petersen MD

## 2024-11-23 NOTE — PROGRESS NOTES
[facility] Hospital Medicine Progress Note        Chief Complaint: Inpatient Follow-up for HF    HPI:   Patient is an 79-year-old female with a past medical history A-fib, HTN, valvular heart disease s/p MVR, nonobstructive CAD, DVT , IVC filter, DM, GERD, chronic sinusitis status post sinus surgery, ABEL, obesity and asthma/COPD not on home oxygen who presented to the ER with progressive shortness of breath over the last month.  She initially was seen in urgent care but due to sats being 88% on room air she was deferred to the ER.     She arrived afebrile hemodynamically stable maintaining adequate sats on 3 L nasal cannula but abruptly desatted in the ER requiring initiation of BiPAP.  Laboratory work showed a BNP of 160 (last EF 55% 09/2023), CTA showed no pulmonary embolus, noted cardiomegaly and mild congestive changes and pleural effusions and lower lung compressive consolidations.  EKG noted normal sinus rhythm.     She was given 80 mg of Lasix, albuterol nebulizer treatment, IV Solu-Medrol and admitted to the hospitalist service for further management.      Interval Hx:   Patient today awake and comfortable. Still SOB and on NIPPV. Denies any chest pain, cough, fever or chills.     Family at bedside, explained in detail about the patients condition, diagnosis, vitals, labs and treatment plan. They understand and agree with the plan. All their questions were answered.      Case was discussed with patient's nurse and  on the floor.    Objective/physical exam:  General: In no acute distress, Obese   Chest: Nelson diminished breath sounds   Heart: RRR, +S1, S2, no appreciable murmur  Abdomen: Soft, nontender, BS +  MSK: Warm, no lower extremity edema, no clubbing or cyanosis  Neurologic: Alert and oriented x4, Cranial nerve II-XII intact, Strength 5/5 in all 4 extremities    VITAL SIGNS: 24 HRS MIN & MAX LAST   Temp  Min: 95.9 °F (35.5 °C)  Max: 98.1 °F (36.7 °C) (!) 95.9  °F (35.5 °C)   BP  Min: 98/53  Max: 135/57 115/65   Pulse  Min: 69  Max: 102  78   Resp  Min: 16  Max: 30 (!) 27   SpO2  Min: 94 %  Max: 98 % 97 %     I have reviewed the following labs:  Recent Labs   Lab 11/22/24  1037 11/23/24  0532   WBC 13.03* 13.53*   RBC 4.44 3.99*   HGB 13.1 11.9*   HCT 40.7 36.2*   MCV 91.7 90.7   MCH 29.5 29.8   MCHC 32.2* 32.9*   RDW 17.4* 17.5*    224   MPV 11.0* 10.3     Recent Labs   Lab 11/22/24  1037 11/23/24  0532    140   K 4.4 5.3*   * 108*   CO2 17* 21*   BUN 13.0 25.6*   CREATININE 0.84 0.94   CALCIUM 9.3 8.7   ALBUMIN 3.4 3.3*   ALKPHOS 70 65   ALT 13 12   AST 22 20   BILITOT 0.8 0.5     Microbiology Results (last 7 days)       ** No results found for the last 168 hours. **             See below for Radiology    Assessment/Plan:  Acute on chronic diastolic decompensated HF  Acute respiratory failure with hypoxia needing BIPAP support   Nelson pulmonary edema   Obesity     Hx:  A-fib, HTN, valvular heart disease s/p MVR, nonobstructive CAD, DVT , IVC filter, DM, GERD, chronic sinusitis status post sinus surgery, ABEL, obesity and asthma      Plan:  Patient still SOB. Did not receive lasix since admission.   Ordered lasix 40 mg q 12 hrs   Will closely monitor patients daily weight, urine out put, renal parameters and volume status    Continue BIPAP as needed     Current meds reviewed     Reviewed today's labs and  , K 5.3, BUN 25, Crt 0.94    Critical care note:  Critical care diagnosis: HF needing iv lasix   Critical care interventions: Hands-on evaluation, review of labs/radiographs/records and discussion with patient and family if present  Critical care time spent: 35 minutes     VTE prophylaxis: Xarelto     Patient condition:  Guarded    Anticipated discharge and Disposition:   Home with        All diagnosis and differential diagnosis have been reviewed; assessment and plan has been documented; I have personally reviewed the labs and test results that  are presently available; I have reviewed the patients medication list; I have reviewed the consulting providers response and recommendations. I have reviewed or attempted to review medical records based upon their availability    All of the patient's questions have been  addressed and answered. Patient's is agreeable to the above stated plan. I will continue to monitor closely and make adjustments to medical management as needed.    Portions of this note dictated using EMR integrated voice recognition software, and may be subject to voice recognition errors not corrected at proofreading. Please contact writer for clarification if needed.   _____________________________________________________________________    Malnutrition Status:  Nutrition consulted. Most recent weight and BMI monitored-     Measurements:  Wt Readings from Last 1 Encounters:   11/22/24 99.8 kg (220 lb)   Body mass index is 35.51 kg/m².    Patient has been screened and assessed by RD.    Malnutrition Type:  Context:    Level:      Malnutrition Characteristic Summary:       Interventions/Recommendations (treatment strategy):        Scheduled Med:   amiodarone  200 mg Oral Daily    atorvastatin  40 mg Oral Daily    famotidine  40 mg Oral QHS    furosemide (LASIX) injection  40 mg Intravenous Q12H    lipase-protease-amylase 24,000-76,000-120,000 units  2 capsule Oral TID WM    metoprolol succinate  25 mg Oral Daily    NIFEdipine  30 mg Oral Daily    pantoprazole  40 mg Oral QAM    rivaroxaban  20 mg Oral Daily with dinner      Continuous Infusions:     PRN Meds:    Current Facility-Administered Medications:     acetaminophen, 650 mg, Oral, Q8H PRN    acetaminophen, 650 mg, Oral, Q8H PRN    albuterol-ipratropium, 3 mL, Nebulization, Q4H PRN    dextrose 10%, 12.5 g, Intravenous, PRN    dextrose 10%, 25 g, Intravenous, PRN    glucagon (human recombinant), 1 mg, Intramuscular, PRN    glucose, 16 g, Oral, PRN    glucose, 24 g, Oral, PRN    insulin aspart  U-100, 0-10 Units, Subcutaneous, QID (AC + HS) PRN    melatonin, 6 mg, Oral, Nightly PRN    ondansetron, 4 mg, Intravenous, Q8H PRN    sodium chloride 0.9%, 10 mL, Intravenous, PRN     Radiology:  I have personally reviewed the following imaging and agree with the radiologist.     CTA Chest Non-Coronary (PE Studies)  Narrative: EXAMINATION:  CTA CHEST NON CORONARY (PE STUDIES)    CLINICAL HISTORY:  Pulmonary embolism (PE) suspected, high prob;    TECHNIQUE:  Sequential axial images performed of the chest using pulmonary embolism protocol following intravenous contrast bolus. Sagittal and contrast reformations performed.    Dose product length of 294 mGycm. Automated exposure control was utilized to minimize radiation dose.    COMPARISON:  CT chest December 21, 2022.  Chest radiograph November 22, 2024.    FINDINGS:  Optimal contrast bolus timing with adequately opacified pulmonary artery system is without evidence of filling defects from pulmonary thromboembolism within the main pulmonary artery and the major branches.  Thoracic aorta is without aneurysmal dilatation or dissection.  Cardiac chambers are enlarged in size.  There is no pericardial effusion.  There is precarinal 1.6 cm enlarged lymph node.    There is large right and moderate volume left pleural effusion.  There are bilateral lower lung zones compressive consolidations.  Aerated portion of the lungs are remarkable for ground opacities reflective of mild congestive process.  There is no dense consolidation.    Sternotomy changes.  Thoracic spinal epidural neurostimulator electrodes.  Impression: 1.  No pulmonary embolism identified.    2.  Cardiomegaly and mild congestive changes.    3.  Bilateral prominent pleural effusions and lower lung lobes compressive consolidations.    Electronically signed by: Sohail Longoria  Date:    11/22/2024  Time:    14:45  X-Ray Chest AP Portable  Narrative: EXAMINATION:  XR CHEST AP PORTABLE    CLINICAL HISTORY:  Shortness  of breath    COMPARISON:  4 January 2024    FINDINGS:  Frontal view of the chest was obtained. Heart and mediastinum unchanged.  There is prominence of the right hilum.  Increased interstitial prominence particularly left lung.  Possible small pleural effusions.  No pneumothorax seen.  Impression: Prominence of the right hilum may relate to pulmonary vascular congestion the suggest follow-up PA and lateral radiographs to exclude underlying lesion.  Possible small pleural effusions.    Electronically signed by: Jason Andrews  Date:    11/22/2024  Time:    11:15      Geovanny Franklin MD  Department of Hospital Medicine   [facility]   11/23/2024

## 2024-11-24 LAB
ANION GAP SERPL CALC-SCNC: 15 MEQ/L
BASOPHILS # BLD AUTO: 0.03 X10(3)/MCL
BASOPHILS NFR BLD AUTO: 0.2 %
BUN SERPL-MCNC: 27.6 MG/DL (ref 9.8–20.1)
CALCIUM SERPL-MCNC: 9.1 MG/DL (ref 8.4–10.2)
CHLORIDE SERPL-SCNC: 104 MMOL/L (ref 98–107)
CO2 SERPL-SCNC: 24 MMOL/L (ref 23–31)
CREAT SERPL-MCNC: 0.85 MG/DL (ref 0.55–1.02)
CREAT/UREA NIT SERPL: 32
EOSINOPHIL # BLD AUTO: 0.01 X10(3)/MCL (ref 0–0.9)
EOSINOPHIL NFR BLD AUTO: 0.1 %
ERYTHROCYTE [DISTWIDTH] IN BLOOD BY AUTOMATED COUNT: 17.5 % (ref 11.5–17)
GFR SERPLBLD CREATININE-BSD FMLA CKD-EPI: >60 ML/MIN/1.73/M2
GLUCOSE SERPL-MCNC: 157 MG/DL (ref 82–115)
HCT VFR BLD AUTO: 40.1 % (ref 37–47)
HGB BLD-MCNC: 13 G/DL (ref 12–16)
IMM GRANULOCYTES # BLD AUTO: 0.09 X10(3)/MCL (ref 0–0.04)
IMM GRANULOCYTES NFR BLD AUTO: 0.6 %
LYMPHOCYTES # BLD AUTO: 3.79 X10(3)/MCL (ref 0.6–4.6)
LYMPHOCYTES NFR BLD AUTO: 26.8 %
MAGNESIUM SERPL-MCNC: 2 MG/DL (ref 1.6–2.6)
MCH RBC QN AUTO: 29.5 PG (ref 27–31)
MCHC RBC AUTO-ENTMCNC: 32.4 G/DL (ref 33–36)
MCV RBC AUTO: 90.9 FL (ref 80–94)
MONOCYTES # BLD AUTO: 1.58 X10(3)/MCL (ref 0.1–1.3)
MONOCYTES NFR BLD AUTO: 11.2 %
NEUTROPHILS # BLD AUTO: 8.65 X10(3)/MCL (ref 2.1–9.2)
NEUTROPHILS NFR BLD AUTO: 61.1 %
NRBC BLD AUTO-RTO: 0.1 %
PLATELET # BLD AUTO: 223 X10(3)/MCL (ref 130–400)
PMV BLD AUTO: 10.5 FL (ref 7.4–10.4)
POCT GLUCOSE: 181 MG/DL (ref 70–110)
POTASSIUM SERPL-SCNC: 4.6 MMOL/L (ref 3.5–5.1)
RBC # BLD AUTO: 4.41 X10(6)/MCL (ref 4.2–5.4)
SODIUM SERPL-SCNC: 143 MMOL/L (ref 136–145)
WBC # BLD AUTO: 14.15 X10(3)/MCL (ref 4.5–11.5)

## 2024-11-24 PROCEDURE — 94660 CPAP INITIATION&MGMT: CPT

## 2024-11-24 PROCEDURE — 21400001 HC TELEMETRY ROOM

## 2024-11-24 PROCEDURE — 80048 BASIC METABOLIC PNL TOTAL CA: CPT | Performed by: INTERNAL MEDICINE

## 2024-11-24 PROCEDURE — 25000242 PHARM REV CODE 250 ALT 637 W/ HCPCS: Performed by: STUDENT IN AN ORGANIZED HEALTH CARE EDUCATION/TRAINING PROGRAM

## 2024-11-24 PROCEDURE — 99900031 HC PATIENT EDUCATION (STAT)

## 2024-11-24 PROCEDURE — 25000242 PHARM REV CODE 250 ALT 637 W/ HCPCS: Performed by: INTERNAL MEDICINE

## 2024-11-24 PROCEDURE — 63600175 PHARM REV CODE 636 W HCPCS: Performed by: INTERNAL MEDICINE

## 2024-11-24 PROCEDURE — 27100171 HC OXYGEN HIGH FLOW UP TO 24 HOURS

## 2024-11-24 PROCEDURE — 36415 COLL VENOUS BLD VENIPUNCTURE: CPT | Performed by: INTERNAL MEDICINE

## 2024-11-24 PROCEDURE — 85025 COMPLETE CBC W/AUTO DIFF WBC: CPT

## 2024-11-24 PROCEDURE — 94761 N-INVAS EAR/PLS OXIMETRY MLT: CPT

## 2024-11-24 PROCEDURE — 25000003 PHARM REV CODE 250: Performed by: INTERNAL MEDICINE

## 2024-11-24 PROCEDURE — 94760 N-INVAS EAR/PLS OXIMETRY 1: CPT

## 2024-11-24 PROCEDURE — 11000001 HC ACUTE MED/SURG PRIVATE ROOM

## 2024-11-24 PROCEDURE — 99900035 HC TECH TIME PER 15 MIN (STAT)

## 2024-11-24 PROCEDURE — 94640 AIRWAY INHALATION TREATMENT: CPT

## 2024-11-24 PROCEDURE — 83735 ASSAY OF MAGNESIUM: CPT

## 2024-11-24 RX ORDER — IPRATROPIUM BROMIDE AND ALBUTEROL SULFATE 2.5; .5 MG/3ML; MG/3ML
3 SOLUTION RESPIRATORY (INHALATION) EVERY 8 HOURS
Status: DISCONTINUED | OUTPATIENT
Start: 2024-11-24 | End: 2024-12-03 | Stop reason: HOSPADM

## 2024-11-24 RX ADMIN — AMIODARONE HYDROCHLORIDE 200 MG: 200 TABLET ORAL at 07:11

## 2024-11-24 RX ADMIN — METOPROLOL SUCCINATE 25 MG: 25 TABLET, EXTENDED RELEASE ORAL at 07:11

## 2024-11-24 RX ADMIN — ONDANSETRON 4 MG: 2 INJECTION INTRAMUSCULAR; INTRAVENOUS at 01:11

## 2024-11-24 RX ADMIN — ACETAMINOPHEN 325MG 650 MG: 325 TABLET ORAL at 01:11

## 2024-11-24 RX ADMIN — PANCRELIPASE 2 CAPSULE: 120000; 24000; 76000 CAPSULE, DELAYED RELEASE PELLETS ORAL at 07:11

## 2024-11-24 RX ADMIN — NIFEDIPINE 30 MG: 30 TABLET, FILM COATED, EXTENDED RELEASE ORAL at 07:11

## 2024-11-24 RX ADMIN — RIVAROXABAN 20 MG: 10 TABLET, FILM COATED ORAL at 05:11

## 2024-11-24 RX ADMIN — PANTOPRAZOLE SODIUM 40 MG: 40 TABLET, DELAYED RELEASE ORAL at 06:11

## 2024-11-24 RX ADMIN — FUROSEMIDE 40 MG: 10 INJECTION, SOLUTION INTRAMUSCULAR; INTRAVENOUS at 08:11

## 2024-11-24 RX ADMIN — IPRATROPIUM BROMIDE AND ALBUTEROL SULFATE 3 ML: 2.5; .5 SOLUTION RESPIRATORY (INHALATION) at 08:11

## 2024-11-24 RX ADMIN — IPRATROPIUM BROMIDE AND ALBUTEROL SULFATE 3 ML: 2.5; .5 SOLUTION RESPIRATORY (INHALATION) at 05:11

## 2024-11-24 RX ADMIN — ATORVASTATIN CALCIUM 40 MG: 40 TABLET, FILM COATED ORAL at 07:11

## 2024-11-24 RX ADMIN — FUROSEMIDE 40 MG: 10 INJECTION, SOLUTION INTRAMUSCULAR; INTRAVENOUS at 07:11

## 2024-11-24 RX ADMIN — PANCRELIPASE 2 CAPSULE: 120000; 24000; 76000 CAPSULE, DELAYED RELEASE PELLETS ORAL at 05:11

## 2024-11-24 RX ADMIN — FAMOTIDINE 40 MG: 20 TABLET, FILM COATED ORAL at 08:11

## 2024-11-24 RX ADMIN — ACETAMINOPHEN 325MG 650 MG: 325 TABLET ORAL at 06:11

## 2024-11-24 NOTE — PROGRESS NOTES
[facility]    Cardiology  Progress Note    Patient Name: Kortney Leach  MRN: [id number]  Admission Date: 11/22/2024  Hospital Length of Stay: 2 days  Code Status: Full Code   Attending Physician: Geovanny Franklin MD   Primary Care Physician: Arnaldo Hernandez MD  Expected Discharge Date:   Principal Problem:COPD exacerbation    Subjective:     Brief HPI/Hospital Course: Ms. Leach is a 80 y/o female with a history of VHD, GEMINI, DM II, PAF, ABEL, COPD, HTN, DVT SSS, HLD, who is known to [facility], Dr. Uribe/Red. She presented to the ER on 11.22.24 with complaints of shortness of breath. She reports worsening shortness of breath over the last month. She was sent from urgent care for being 88% on room air. She was started on oxygen therapy an sent to the ER. In the ER, she had some respiratory distress and was started on BiPAP. Significant labs WBC 13.53, H&H 11.9/36.2, K 5.3, Chloride 108, CO2 21, Glucose 232, .1. Flu, COVID and COVID negative. CTA Chest negative for PE, but shows Cardiomegaly and mild congestive changes and Bilateral prominent pleural effusions and lower lung lobes compressive consolidations. Cxr shows prominence of the right hilum may relate to pulmonary vascular congestion and possible small pleural effusions. [facility] has been consulted for CHF.        11.24.24: NAD. VSS. No complaints of CP/Palps. Reports some improvement in her SOB. 2350 urine output/1640 ml Fluid Negative. Crackle noted in bases.      PMH: VHD, GEMINI, DM II, PAF, ABEL, COPD, HTN, DVT SSS, HLD, Asthma, Left Thyroid Nodule, GERD, Chronic Sinusitis, Common Variable Immunodeficiency, Glaucoma  PSH: PPM, LHC, MVR, Bilateral Cataract Extraction, Total Knee Replacement, Bilateral mastectomy, Appendectomy, Colonoscopy, EGD, Tonsillectomy,    Family History: Father - Lung Cancer; Mother - Alzheimer, CVA; Sister - HTN, Multiple Sclerosis, DM II  Social History: Former Smoker (Quit in 1998). Denies illicit drug use  and alcohol use.      Previous Cardiac Diagnostics:   ECHO (11.23.24):  Left Ventricle: The left ventricle is normal in size. Mildly increased wall thickness. There is normal systolic function with a visually estimated ejection fraction of 60 - 65%. Grade I diastolic dysfunction. Right Ventricle: Normal right ventricular cavity size. Systolic function is borderline low. Aortic Valve: There is aortic valve sclerosis. Mitral Valve: There is a bioprosthetic valve in the mitral position (25 mm mosaic porcine valve).  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 There is mild (1+) regurgitation. Tricuspid Valve: There moderate (2+) regurgitation. The estimated pulmonary artery systolic pressure is 57 mmHg. Pacemaker lead noted.  Recommend transesophageal echocardiogram to properly evaluate the bioprosthetic mitral valve.    ECHO (11.14.24):  The study quality is average. The left ventricle is normal in size. Global left ventricular systolic function is normal. The left ventricular ejection fraction is 65%. Left ventricular diastolic function is normal. Mild concentric left ventricular hypertrophy is present. The left atrium is moderately enlarged. (4.6 cms). A normal functioning prosthetic mitral valve is noted. MG = 4.3 mmHg. The pulmonary artery systolic pressure is 37 mmHg.      PPM Insertion (9.25.23):  Successful implantation of PPM Dual. [facility]      PET (1.24.23):  This is a normal perfusion study, no perfusion defects noted. There is no evidence of ischemia.This scan is suggestive of low risk for future cardiovascular events. The left ventricular cavity is noted to be normal on the stress studies. The stress left ventricular ejection fraction was calculated to be 68% and left ventricular global function is normal. The rest left ventricular cavity is noted to be normal. The rest left ventricular ejection fraction  was calculated to be 63% and rest left ventricular global function is normal. When compared to the resting ejection fraction (63%), the stress ejection fraction (68%) has increased. Transient ischemic dilatation is present and has been described as a marker for high risk coronary artery disease. It has also been described in microvascular disease, hypertensive heart disease as well as cardiac deconditioning. The study quality is good.   There was a rise in myocardial blood flow between rest and stress.  Global myocardial blood flow reserve was 2.72.  Normal global coronary flow reserve is suggestive of low coronary event risk.     Carotid US (1.24.23):  The study quality is good. There is no plaque noted in the proximal right internal carotid artery. 1-39% stenosis in the proximal left internal carotid artery based on Bluth Criteria. Antegrade right vertebral artery flow. Antegrade left vertebral artery flow.     LHC (9.6.23):  Normal Coronaries     Review of Systems   Cardiovascular:  Negative for chest pain, dyspnea on exertion, leg swelling and palpitations.   Respiratory:  Positive for shortness of breath.    All other systems reviewed and are negative.    Objective:     Vital Signs (Most Recent):  Temp: 97.6 °F (36.4 °C) (11/24/24 1104)  Pulse: 83 (11/24/24 1104)  Resp: 16 (11/24/24 0855)  BP: (!) 117/50 (11/24/24 1104)  SpO2: 96 % (11/24/24 1104) Vital Signs (24h Range):  Temp:  [97 °F (36.1 °C)-98.8 °F (37.1 °C)] 97.6 °F (36.4 °C)  Pulse:  [67-89] 83  Resp:  [16-25] 16  SpO2:  [93 %-97 %] 96 %  BP: (109-133)/(50-71) 117/50   Weight: 98.2 kg (216 lb 8 oz)  Body mass index is 34.94 kg/m².  SpO2: 96 %       Intake/Output Summary (Last 24 hours) at 11/24/2024 1145  Last data filed at 11/24/2024 1007  Gross per 24 hour   Intake 710 ml   Output 2200 ml   Net -1490 ml     Lines/Drains/Airways       Peripheral Intravenous Line  Duration                  Peripheral IV - Single Lumen 11/22/24 1309 20 G Anterior;Right  Forearm 1 day                    Significant Labs:   Chemistries:   Recent Labs   Lab 11/22/24  1037 11/23/24  0532 11/24/24  0347    140 143   K 4.4 5.3* 4.6   * 108* 104   CO2 17* 21* 24   BUN 13.0 25.6* 27.6*   CREATININE 0.84 0.94 0.85   CALCIUM 9.3 8.7 9.1   BILITOT 0.8 0.5  --    ALKPHOS 70 65  --    ALT 13 12  --    AST 22 20  --    GLUCOSE 166* 232* 157*   MG  --   --  2.00   TROPONINI 0.031  --   --         CBC/Anemia Labs: Coags:    Recent Labs   Lab 11/22/24  1037 11/23/24  0532 11/24/24  0347   WBC 13.03* 13.53* 14.15*   HGB 13.1 11.9* 13.0   HCT 40.7 36.2* 40.1    224 223   MCV 91.7 90.7 90.9   RDW 17.4* 17.5* 17.5*    Recent Labs   Lab 11/22/24  1037   INR 2.2*   APTT 33.9*        Telemetry:  SR    Physical Exam  Vitals and nursing note reviewed.   HENT:      Head: Normocephalic.      Nose: Nose normal.      Mouth/Throat:      Mouth: Mucous membranes are dry.   Eyes:      Extraocular Movements: Extraocular movements intact.   Cardiovascular:      Rate and Rhythm: Normal rate and regular rhythm.      Pulses: Normal pulses.      Heart sounds: Murmur heard.   Pulmonary:      Effort: Pulmonary effort is normal.      Breath sounds: Rales present.      Comments: Diminished Lung Sounds   Abdominal:      Palpations: Abdomen is soft.   Musculoskeletal:         General: Normal range of motion.      Cervical back: Normal range of motion.   Skin:     General: Skin is warm.   Neurological:      Mental Status: She is alert and oriented to person, place, and time.   Psychiatric:         Mood and Affect: Mood normal.         Behavior: Behavior normal.       Current Schedule Inpatient Medications:   amiodarone  200 mg Oral Daily    atorvastatin  40 mg Oral Daily    famotidine  40 mg Oral QHS    furosemide (LASIX) injection  40 mg Intravenous Q12H    lipase-protease-amylase 24,000-76,000-120,000 units  2 capsule Oral TID WM    metoprolol succinate  25 mg Oral Daily    NIFEdipine  30 mg Oral Daily     pantoprazole  40 mg Oral QAM    rivaroxaban  20 mg Oral Daily with dinner     Continuous Infusions:      Assessment:   Acute Diastolic Heart Failure - likely related to VHD    - EF 60-65% with G 1 Diastolic Dysfunction (11.14.24)   VHD    - ECHO (11.23.24): 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     - s/p MVR (9.20.23): Mitral valve replacement with a 25 mm mosaic porcine valve    - Moderate TR    - Mild MR  Acute Hypoxemic Respiratory Failure - requiring oxygenation   PNA ?    - CTA Chest (11.22.24): Llower lung lobes compressive consolidations.   Bilateral Pleural Effusions  Anemia (Mild)  Hyperkalemia - Resolved  Leukocytosis - Worsening   COPD Exacerbation ?  Pulmonary HTN  GEMINI  DM II  PAF - Currently SR    - [id number] Score 6 (9.7% Stroke Risk Per Year)    - s/p Ligation of SUKHI (9.20.23) - Endostapler   ABEL  HTN  DVT    - on Xarelto Outpatient   SSS    - s/p PPM (9.25.23): [facility]   HLD  Asthma  Left Thyroid Nodule  GERD  Chronic Sinusitis  Common Variable Immunodeficiency  Glaucoma  No Known History of GI Bleed       Plan:   Discussed with case with Dr. Skelton  ECHO Reviewed by Dr. Skelton   Will plan for MARYBETH once patient is more euvolemic  Continue IV Diurese (Lasix 40 mg oral BID)  Might need GORDO TMVR   Wean oxygen as tolerated   Keep Mag > 2 and Potassium > 4  Accurate I&O/Daily Weight   Labs in AM: CBC, BMP, and Mag     YANIRA Watkins  Cardiology  [facility]

## 2024-11-24 NOTE — PROGRESS NOTES
[facility] Hospital Medicine Progress Note        Chief Complaint: Inpatient Follow-up for HF    HPI:   Patient is an 79-year-old female with a past medical history A-fib, HTN, valvular heart disease s/p MVR, nonobstructive CAD, DVT , IVC filter, DM, GERD, chronic sinusitis status post sinus surgery, ABEL, obesity and asthma/COPD not on home oxygen who presented to the ER with progressive shortness of breath over the last month.  She initially was seen in urgent care but due to sats being 88% on room air she was deferred to the ER.     She arrived afebrile hemodynamically stable maintaining adequate sats on 3 L nasal cannula but abruptly desatted in the ER requiring initiation of BiPAP.  Laboratory work showed a BNP of 160 (last EF 55% 09/2023), CTA showed no pulmonary embolus, noted cardiomegaly and mild congestive changes and pleural effusions and lower lung compressive consolidations.  EKG noted normal sinus rhythm.     She was given 80 mg of Lasix, albuterol nebulizer treatment, IV Solu-Medrol and admitted to the hospitalist service for further management.  Continued on IV Lasix b.i.d..  Able to be weaned off of BiPAP and currently on 3 L O2 via NC with adequate saturations.      Interval Hx:   Today, patient stated she was feeling better and had no new complaints.  Continues to have some shortness breath and dyspnea on exertion without orthopnea.  Will continue weaning O2 as tolerated; currently on 3 L O2 via NC.  Will add DuoNebs.    Objective/physical exam:  General: In no acute distress, Obese   Chest: Nelson diminished breath sounds   Heart: RRR, +S1, S2, no appreciable murmur  Abdomen: Soft, nontender, BS +  MSK: Warm, no lower extremity edema, no clubbing or cyanosis  Neurologic: Alert and oriented x4, Cranial nerve II-XII intact, Strength 5/5 in all 4 extremities    VITAL SIGNS: 24 HRS MIN & MAX LAST   Temp  Min: 97 °F (36.1 °C)  Max: 98.8 °F (37.1 °C) 97.6 °F (36.4 °C)   BP   Min: 109/70  Max: 133/71 (!) 117/50   Pulse  Min: 67  Max: 89  87   Resp  Min: 16  Max: 25 16   SpO2  Min: 93 %  Max: 97 % 96 %     I have reviewed the following labs:  Recent Labs   Lab 11/22/24  1037 11/23/24  0532 11/24/24  0347   WBC 13.03* 13.53* 14.15*   RBC 4.44 3.99* 4.41   HGB 13.1 11.9* 13.0   HCT 40.7 36.2* 40.1   MCV 91.7 90.7 90.9   MCH 29.5 29.8 29.5   MCHC 32.2* 32.9* 32.4*   RDW 17.4* 17.5* 17.5*    224 223   MPV 11.0* 10.3 10.5*     Recent Labs   Lab 11/22/24  1037 11/23/24  0532 11/24/24  0347    140 143   K 4.4 5.3* 4.6   * 108* 104   CO2 17* 21* 24   BUN 13.0 25.6* 27.6*   CREATININE 0.84 0.94 0.85   CALCIUM 9.3 8.7 9.1   MG  --   --  2.00   ALBUMIN 3.4 3.3*  --    ALKPHOS 70 65  --    ALT 13 12  --    AST 22 20  --    BILITOT 0.8 0.5  --      Assessment/Plan:  Acute on chronic diastolic decompensated HF  Acute respiratory failure with hypoxia needing BIPAP support   Nelson pulmonary edema   Obesity     Hx:  A-fib, HTN, valvular heart disease s/p MVR, nonobstructive CAD, DVT , IVC filter, DM, GERD, chronic sinusitis status post sinus surgery, ABEL, obesity and asthma    Plan:  Continues to be admitted   Continues to have shortness breath and dyspnea on exertion   Saturating well on 3 L O2 via NC  Remains on IV Lasix b.i.d.   Cardiology on board; follow recommendations   Adding DuoNebs q.8  Continued on amiodarone, atorvastatin, Pepcid, metoprolol succinate, nifedipine, Protonix, Xarelto  ISS LD ordered    Critical care note:  Critical care diagnosis: HF needing iv lasix   Critical care interventions: Hands-on evaluation, review of labs/radiographs/records and discussion with patient and family if present  Critical care time spent: 35 minutes     VTE prophylaxis: Xarelto     Patient condition:  Guarded    Anticipated discharge and Disposition:   Home with        All diagnosis and differential diagnosis have been reviewed; assessment and plan has been documented; I have personally  reviewed the labs and test results that are presently available; I have reviewed the patients medication list; I have reviewed the consulting providers response and recommendations. I have reviewed or attempted to review medical records based upon their availability    All of the patient's questions have been  addressed and answered. Patient's is agreeable to the above stated plan. I will continue to monitor closely and make adjustments to medical management as needed.    Portions of this note dictated using EMR integrated voice recognition software, and may be subject to voice recognition errors not corrected at proofreading. Please contact writer for clarification if needed.   _____________________________________________________________________  Radiology:  I have personally reviewed the following imaging and agree with the radiologist.     Echo    Left Ventricle: The left ventricle is normal in size. Mildly increased   wall thickness. There is normal systolic function with a visually   estimated ejection fraction of 60 - 65%. Grade I diastolic dysfunction.    Right Ventricle: Normal right ventricular cavity size. Systolic   function is borderline low.    Aortic Valve: There is aortic valve sclerosis.    Mitral Valve: There is a bioprosthetic valve in the mitral position (25   mm mosaic porcine valve).  There is evidence of bioprosthetic mitral valve   stenosis The mean pressure gradient across the mitral valve is 19 mmHg at   a heart rate of 78 bpm. There is mild (1+) regurgitation.    Tricuspid Valve: There moderate (2+) regurgitation.    The estimated pulmonary artery systolic pressure is 57 mmHg.    Pacemaker lead noted.    ------------------------------------------------------    Recommend transesophageal echocardiogram to properly evaluate the   bioprosthetic mitral valve.      iTm Roa MD  Department of Hospital Medicine   [facility]   11/24/2024

## 2024-11-24 NOTE — PLAN OF CARE
Problem: Adult Inpatient Plan of Care  Goal: Plan of Care Review  11/23/2024 2055 by Kenna Olsen RN  Outcome: Progressing  11/23/2024 2055 by Kenna Olsen RN  Outcome: Progressing  Goal: Patient-Specific Goal (Individualized)  11/23/2024 2055 by Kenna Olsen RN  Outcome: Progressing  11/23/2024 2055 by Kenna Olsen RN  Outcome: Progressing  Goal: Absence of Hospital-Acquired Illness or Injury  11/23/2024 2055 by Kenna Olsen RN  Outcome: Progressing  11/23/2024 2055 by Kenna Olsen RN  Outcome: Progressing  Goal: Optimal Comfort and Wellbeing  11/23/2024 2055 by Kenna Olsen RN  Outcome: Progressing  11/23/2024 2055 by Kenna Olsen RN  Outcome: Progressing  Goal: Readiness for Transition of Care  11/23/2024 2055 by Kenna Olsen RN  Outcome: Progressing  11/23/2024 2055 by Kenna Olsen RN  Outcome: Progressing     Problem: Adult Inpatient Plan of Care  Goal: Plan of Care Review  11/23/2024 2055 by Kenna Olsen RN  Outcome: Progressing  11/23/2024 2055 by Kenna Olsen RN  Outcome: Progressing  Goal: Patient-Specific Goal (Individualized)  11/23/2024 2055 by Kenna Olsen RN  Outcome: Progressing  11/23/2024 2055 by Kenna Olsen RN  Outcome: Progressing  Goal: Absence of Hospital-Acquired Illness or Injury  11/23/2024 2055 by Kenna Olsen RN  Outcome: Progressing  11/23/2024 2055 by Kenna Olsen RN  Outcome: Progressing  Goal: Optimal Comfort and Wellbeing  11/23/2024 2055 by Kenna Olsen RN  Outcome: Progressing  11/23/2024 2055 by Kenna Olsen RN  Outcome: Progressing  Goal: Readiness for Transition of Care  11/23/2024 2055 by Kenna Olsen RN  Outcome: Progressing  11/23/2024 2055 by Kenna Olsen RN  Outcome: Progressing     Problem: Diabetes Comorbidity  Goal: Blood Glucose Level Within Targeted Range  11/23/2024 2055 by Kenna Olsen, RN  Outcome: Progressing  11/23/2024 2055 by Kenna Olsen, RN  Outcome:  Progressing     Problem: Fall Injury Risk  Goal: Absence of Fall and Fall-Related Injury  11/23/2024 2055 by Kenna Olsen RN  Outcome: Progressing  11/23/2024 2055 by Kenna Olsen RN  Outcome: Progressing     Problem: Heart Failure  Goal: Optimal Coping  Outcome: Progressing  Goal: Optimal Cardiac Output  Outcome: Progressing  Goal: Stable Heart Rate and Rhythm  Outcome: Progressing  Goal: Optimal Functional Ability  Outcome: Progressing  Goal: Fluid and Electrolyte Balance  Outcome: Progressing  Goal: Improved Oral Intake  Outcome: Progressing  Goal: Effective Oxygenation and Ventilation  Outcome: Progressing  Goal: Effective Breathing Pattern During Sleep  Outcome: Progressing

## 2024-11-25 LAB
ANION GAP SERPL CALC-SCNC: 14 MEQ/L
BASOPHILS # BLD AUTO: 0.03 X10(3)/MCL
BASOPHILS NFR BLD AUTO: 0.3 %
BUN SERPL-MCNC: 22.2 MG/DL (ref 9.8–20.1)
CALCIUM SERPL-MCNC: 8.5 MG/DL (ref 8.4–10.2)
CHLORIDE SERPL-SCNC: 102 MMOL/L (ref 98–107)
CO2 SERPL-SCNC: 23 MMOL/L (ref 23–31)
CREAT SERPL-MCNC: 0.8 MG/DL (ref 0.55–1.02)
CREAT/UREA NIT SERPL: 28
EOSINOPHIL # BLD AUTO: 0.05 X10(3)/MCL (ref 0–0.9)
EOSINOPHIL NFR BLD AUTO: 0.5 %
ERYTHROCYTE [DISTWIDTH] IN BLOOD BY AUTOMATED COUNT: 17.4 % (ref 11.5–17)
GFR SERPLBLD CREATININE-BSD FMLA CKD-EPI: >60 ML/MIN/1.73/M2
GLUCOSE SERPL-MCNC: 194 MG/DL (ref 82–115)
HCT VFR BLD AUTO: 39 % (ref 37–47)
HGB BLD-MCNC: 12.6 G/DL (ref 12–16)
IMM GRANULOCYTES # BLD AUTO: 0.04 X10(3)/MCL (ref 0–0.04)
IMM GRANULOCYTES NFR BLD AUTO: 0.4 %
LYMPHOCYTES # BLD AUTO: 3.25 X10(3)/MCL (ref 0.6–4.6)
LYMPHOCYTES NFR BLD AUTO: 29.3 %
MAGNESIUM SERPL-MCNC: 1.9 MG/DL (ref 1.6–2.6)
MCH RBC QN AUTO: 29.6 PG (ref 27–31)
MCHC RBC AUTO-ENTMCNC: 32.3 G/DL (ref 33–36)
MCV RBC AUTO: 91.5 FL (ref 80–94)
MONOCYTES # BLD AUTO: 1.28 X10(3)/MCL (ref 0.1–1.3)
MONOCYTES NFR BLD AUTO: 11.5 %
NEUTROPHILS # BLD AUTO: 6.45 X10(3)/MCL (ref 2.1–9.2)
NEUTROPHILS NFR BLD AUTO: 58 %
NRBC BLD AUTO-RTO: 0 %
PLATELET # BLD AUTO: 202 X10(3)/MCL (ref 130–400)
PMV BLD AUTO: 10.6 FL (ref 7.4–10.4)
POTASSIUM SERPL-SCNC: 4 MMOL/L (ref 3.5–5.1)
RBC # BLD AUTO: 4.26 X10(6)/MCL (ref 4.2–5.4)
SODIUM SERPL-SCNC: 139 MMOL/L (ref 136–145)
WBC # BLD AUTO: 11.1 X10(3)/MCL (ref 4.5–11.5)

## 2024-11-25 PROCEDURE — 94640 AIRWAY INHALATION TREATMENT: CPT

## 2024-11-25 PROCEDURE — 99900035 HC TECH TIME PER 15 MIN (STAT)

## 2024-11-25 PROCEDURE — 63600175 PHARM REV CODE 636 W HCPCS: Performed by: INTERNAL MEDICINE

## 2024-11-25 PROCEDURE — 25000003 PHARM REV CODE 250: Performed by: INTERNAL MEDICINE

## 2024-11-25 PROCEDURE — 94761 N-INVAS EAR/PLS OXIMETRY MLT: CPT

## 2024-11-25 PROCEDURE — 80048 BASIC METABOLIC PNL TOTAL CA: CPT

## 2024-11-25 PROCEDURE — 99900031 HC PATIENT EDUCATION (STAT)

## 2024-11-25 PROCEDURE — 27100171 HC OXYGEN HIGH FLOW UP TO 24 HOURS

## 2024-11-25 PROCEDURE — 85025 COMPLETE CBC W/AUTO DIFF WBC: CPT

## 2024-11-25 PROCEDURE — 36415 COLL VENOUS BLD VENIPUNCTURE: CPT

## 2024-11-25 PROCEDURE — 25000242 PHARM REV CODE 250 ALT 637 W/ HCPCS: Performed by: INTERNAL MEDICINE

## 2024-11-25 PROCEDURE — 25000242 PHARM REV CODE 250 ALT 637 W/ HCPCS: Performed by: STUDENT IN AN ORGANIZED HEALTH CARE EDUCATION/TRAINING PROGRAM

## 2024-11-25 PROCEDURE — 21400001 HC TELEMETRY ROOM

## 2024-11-25 PROCEDURE — 83735 ASSAY OF MAGNESIUM: CPT

## 2024-11-25 RX ADMIN — ACETAMINOPHEN 325MG 650 MG: 325 TABLET ORAL at 11:11

## 2024-11-25 RX ADMIN — FUROSEMIDE 40 MG: 10 INJECTION, SOLUTION INTRAMUSCULAR; INTRAVENOUS at 08:11

## 2024-11-25 RX ADMIN — METOPROLOL SUCCINATE 25 MG: 25 TABLET, EXTENDED RELEASE ORAL at 08:11

## 2024-11-25 RX ADMIN — NIFEDIPINE 30 MG: 30 TABLET, FILM COATED, EXTENDED RELEASE ORAL at 08:11

## 2024-11-25 RX ADMIN — IPRATROPIUM BROMIDE AND ALBUTEROL SULFATE 3 ML: 2.5; .5 SOLUTION RESPIRATORY (INHALATION) at 03:11

## 2024-11-25 RX ADMIN — ACETAMINOPHEN 325MG 650 MG: 325 TABLET ORAL at 03:11

## 2024-11-25 RX ADMIN — FAMOTIDINE 40 MG: 20 TABLET, FILM COATED ORAL at 08:11

## 2024-11-25 RX ADMIN — IPRATROPIUM BROMIDE AND ALBUTEROL SULFATE 3 ML: 2.5; .5 SOLUTION RESPIRATORY (INHALATION) at 11:11

## 2024-11-25 RX ADMIN — INSULIN ASPART 2 UNITS: 100 INJECTION, SOLUTION INTRAVENOUS; SUBCUTANEOUS at 08:11

## 2024-11-25 RX ADMIN — IPRATROPIUM BROMIDE AND ALBUTEROL SULFATE 3 ML: 2.5; .5 SOLUTION RESPIRATORY (INHALATION) at 07:11

## 2024-11-25 RX ADMIN — AMIODARONE HYDROCHLORIDE 200 MG: 200 TABLET ORAL at 08:11

## 2024-11-25 RX ADMIN — RIVAROXABAN 20 MG: 10 TABLET, FILM COATED ORAL at 05:11

## 2024-11-25 RX ADMIN — PANTOPRAZOLE SODIUM 40 MG: 40 TABLET, DELAYED RELEASE ORAL at 06:11

## 2024-11-25 RX ADMIN — ACETAMINOPHEN 325MG 650 MG: 325 TABLET ORAL at 04:11

## 2024-11-25 NOTE — PROGRESS NOTES
[facility] Hospital Medicine Progress Note        Chief Complaint: Inpatient Follow-up for HF    HPI:   79-year-old female with A-fib, HTN, valvular heart disease s/p MVR, nonobstructive CAD, DVT , IVC filter, DM, GERD, chronic sinusitis status post sinus surgery, ABEL, obesity and asthma/COPD not on home oxygen who presented to the ER with progressive shortness of breath over the last month.  She initially was seen in urgent care but due to sats being 88% on room air she was deferred to the ER.     She arrived afebrile hemodynamically stable maintaining adequate sats on 3 L nasal cannula but abruptly desatted in the ER requiring initiation of BiPAP.  Laboratory work showed a BNP of 160 (last EF 55% 09/2023), CTA showed no pulmonary embolus, noted cardiomegaly and mild congestive changes and pleural effusions and lower lung compressive consolidations.  EKG noted normal sinus rhythm.     She was given 80 mg of Lasix, albuterol nebulizer treatment, IV Solu-Medrol and admitted to the hospitalist service for further management.  Continued on IV Lasix b.i.d..  Able to be weaned off of BiPAP and currently on 3 L O2 via NC with adequate saturations.  Added DuoNebs.      Interval Hx:   Today, patient stated she was feeling better and had no new complaints. Continues to have some shortness breath and dyspnea on exertion.  Weaned to RA.  Will get ambulatory saturations.  Cardiology planning for MARYBETH to assess MVR on 11/27.    Objective/physical exam:  General: In no acute distress, Obese   Chest: Nelson diminished breath sounds   Heart: RRR, +S1, S2, no appreciable murmur  Abdomen: Soft, nontender, BS +  MSK: Warm, no lower extremity edema, no clubbing or cyanosis  Neurologic: Alert and oriented x4, Cranial nerve II-XII intact, Strength 5/5 in all 4 extremities    VITAL SIGNS: 24 HRS MIN & MAX LAST   Temp  Min: 97.5 °F (36.4 °C)  Max: 99 °F (37.2 °C) 97.9 °F (36.6 °C)   BP  Min: 99/55  Max: 132/74  (!) 108/55   Pulse  Min: 61  Max: 92  84   Resp  Min: 18  Max: 20 18   SpO2  Min: 88 %  Max: 98 % 97 %     I have reviewed the following labs:  Recent Labs   Lab 11/23/24  0532 11/24/24 0347 11/25/24 0418   WBC 13.53* 14.15* 11.10   RBC 3.99* 4.41 4.26   HGB 11.9* 13.0 12.6   HCT 36.2* 40.1 39.0   MCV 90.7 90.9 91.5   MCH 29.8 29.5 29.6   MCHC 32.9* 32.4* 32.3*   RDW 17.5* 17.5* 17.4*    223 202   MPV 10.3 10.5* 10.6*     Recent Labs   Lab 11/22/24  1037 11/23/24 0532 11/24/24 0347 11/25/24 0418    140 143 139   K 4.4 5.3* 4.6 4.0   * 108* 104 102   CO2 17* 21* 24 23   BUN 13.0 25.6* 27.6* 22.2*   CREATININE 0.84 0.94 0.85 0.80   CALCIUM 9.3 8.7 9.1 8.5   MG  --   --  2.00 1.90   ALBUMIN 3.4 3.3*  --   --    ALKPHOS 70 65  --   --    ALT 13 12  --   --    AST 22 20  --   --    BILITOT 0.8 0.5  --   --      Assessment/Plan:  Acute on chronic diastolic decompensated HF  Acute respiratory failure with hypoxia requiring supplemental oxygen 2/2 B/L pulmonary edema   Obesity   AF   HTN   VHD s/p MVR  CAD   DVT   IVC filter  DM   GERD   Chronic sinusitis s/p sinus surgery   ABEL   Obesity   Asthma    Plan:  Continues to be admitted   Continues to have shortness breath and dyspnea on exertion   Weaned to RA with adequate saturations  Plan for pepito on 11/27 with [facility]  Remains on IV Lasix b.i.d.   Cardiology on board; follow recommendations   Added DuoNebs q.8  Continued on amiodarone, atorvastatin, Pepcid, metoprolol succinate, nifedipine, Protonix, Xarelto  ISS LD ordered    Critical care note:  Critical care diagnosis: HF needing iv lasix   Critical care interventions: Hands-on evaluation, review of labs/radiographs/records and discussion with patient and family if present  Critical care time spent: 35 minutes     VTE prophylaxis: Xarelto     Patient condition:  Guarded    Anticipated discharge and Disposition:   Home with        All diagnosis and differential diagnosis have been reviewed; assessment  and plan has been documented; I have personally reviewed the labs and test results that are presently available; I have reviewed the patients medication list; I have reviewed the consulting providers response and recommendations. I have reviewed or attempted to review medical records based upon their availability    All of the patient's questions have been  addressed and answered. Patient's is agreeable to the above stated plan. I will continue to monitor closely and make adjustments to medical management as needed.    Portions of this note dictated using EMR integrated voice recognition software, and may be subject to voice recognition errors not corrected at proofreading. Please contact writer for clarification if needed.   _____________________________________________________________________  Radiology:  I have personally reviewed the following imaging and agree with the radiologist.     Echo    Left Ventricle: The left ventricle is normal in size. Mildly increased   wall thickness. There is normal systolic function with a visually   estimated ejection fraction of 60 - 65%. Grade I diastolic dysfunction.    Right Ventricle: Normal right ventricular cavity size. Systolic   function is borderline low.    Aortic Valve: There is aortic valve sclerosis.    Mitral Valve: There is a bioprosthetic valve in the mitral position (25   mm mosaic porcine valve).  There is evidence of bioprosthetic mitral valve   stenosis The mean pressure gradient across the mitral valve is 19 mmHg at   a heart rate of 78 bpm. There is mild (1+) regurgitation.    Tricuspid Valve: There moderate (2+) regurgitation.    The estimated pulmonary artery systolic pressure is 57 mmHg.    Pacemaker lead noted.    ------------------------------------------------------    Recommend transesophageal echocardiogram to properly evaluate the   bioprosthetic mitral valve.      Tim Roa MD  Department of Hospital Medicine   [facility]  [facility]   11/25/2024

## 2024-11-25 NOTE — PLAN OF CARE
11/25/24 0942   Medicare Message   Important Message from Medicare regarding Discharge Appeal Rights Given to patient/caregiver;Explained to patient/caregiver

## 2024-11-25 NOTE — PROGRESS NOTES
[facility] *    Cardiology  Progress Note    Patient Name: Kortney Leach  MRN: [id number]  Admission Date: 11/22/2024  Hospital Length of Stay: 3 days  Code Status: Full Code   Attending Physician: Geovanny Franklin MD   Primary Care Physician: Arnaldo Hernandez MD  Expected Discharge Date:   Principal Problem:COPD exacerbation    Subjective:     Brief HPI/Hospital Course: Ms. Leach is a 80 y/o female with a history of VHD, GEMINI, DM II, PAF, ABEL, COPD, HTN, DVT SSS, HLD, who is known to [facility], Dr. Uribe/Red. She presented to the ER on 11.22.24 with complaints of shortness of breath. She reports worsening shortness of breath over the last month. She was sent from urgent care for being 88% on room air. She was started on oxygen therapy an sent to the ER. In the ER, she had some respiratory distress and was started on BiPAP. Significant labs WBC 13.53, H&H 11.9/36.2, K 5.3, Chloride 108, CO2 21, Glucose 232, .1. Flu, COVID and COVID negative. CTA Chest negative for PE, but shows Cardiomegaly and mild congestive changes and Bilateral prominent pleural effusions and lower lung lobes compressive consolidations. Cxr shows prominence of the right hilum may relate to pulmonary vascular congestion and possible small pleural effusions. [facility] has been consulted for CHF.        11.24.24: NAD. VSS. No complaints of CP/Palps. Reports some improvement in her SOB. 2350 urine output/1640 ml Fluid Negative. Crackle noted in bases.   11.25.24: NAD. VSS. No complaints of CP/Palp. Remains SOB. 1800 urine output/1700 ml Net Negative . Labs Stable.      PMH: VHD, GEMINI, DM II, PAF, ABEL, COPD, HTN, DVT SSS, HLD, Asthma, Left Thyroid Nodule, GERD, Chronic Sinusitis, Common Variable Immunodeficiency, Glaucoma  PSH: PPM, LHC, MVR, Bilateral Cataract Extraction, Total Knee Replacement, Bilateral mastectomy, Appendectomy, Colonoscopy, EGD, Tonsillectomy,    Family History: Father - Lung Cancer; Mother - Alzheimer,  CVA; Sister - HTN, Multiple Sclerosis, DM II  Social History: Former Smoker (Quit in 1998). Denies illicit drug use and alcohol use.      Previous Cardiac Diagnostics:   ECHO (11.23.24):  Left Ventricle: The left ventricle is normal in size. Mildly increased wall thickness. There is normal systolic function with a visually estimated ejection fraction of 60 - 65%. Grade I diastolic dysfunction. Right Ventricle: Normal right ventricular cavity size. Systolic function is borderline low. Aortic Valve: There is aortic valve sclerosis. Mitral Valve: There is a bioprosthetic valve in the mitral position (25 mm mosaic porcine valve).  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 There is mild (1+) regurgitation. Tricuspid Valve: There moderate (2+) regurgitation. The estimated pulmonary artery systolic pressure is 57 mmHg. Pacemaker lead noted.  Recommend transesophageal echocardiogram to properly evaluate the bioprosthetic mitral valve.    ECHO (11.14.24):  The study quality is average. The left ventricle is normal in size. Global left ventricular systolic function is normal. The left ventricular ejection fraction is 65%. Left ventricular diastolic function is normal. Mild concentric left ventricular hypertrophy is present. The left atrium is moderately enlarged. (4.6 cms). A normal functioning prosthetic mitral valve is noted. MG = 4.3 mmHg. The pulmonary artery systolic pressure is 37 mmHg.      PPM Insertion (9.25.23):  Successful implantation of PPM Dual. [facility]      PET (1.24.23):  This is a normal perfusion study, no perfusion defects noted. There is no evidence of ischemia.This scan is suggestive of low risk for future cardiovascular events. The left ventricular cavity is noted to be normal on the stress studies. The stress left ventricular ejection fraction was calculated to be 68% and left ventricular global  function is normal. The rest left ventricular cavity is noted to be normal. The rest left ventricular ejection fraction was calculated to be 63% and rest left ventricular global function is normal. When compared to the resting ejection fraction (63%), the stress ejection fraction (68%) has increased. Transient ischemic dilatation is present and has been described as a marker for high risk coronary artery disease. It has also been described in microvascular disease, hypertensive heart disease as well as cardiac deconditioning. The study quality is good.   There was a rise in myocardial blood flow between rest and stress.  Global myocardial blood flow reserve was 2.72.  Normal global coronary flow reserve is suggestive of low coronary event risk.     Carotid US (1.24.23):  The study quality is good. There is no plaque noted in the proximal right internal carotid artery. 1-39% stenosis in the proximal left internal carotid artery based on Bluth Criteria. Antegrade right vertebral artery flow. Antegrade left vertebral artery flow.     LHC (9.6.23):  Normal Coronaries     Review of Systems   Cardiovascular:  Negative for chest pain, dyspnea on exertion, leg swelling and palpitations.   Respiratory:  Positive for shortness of breath.    All other systems reviewed and are negative.    Objective:     Vital Signs (Most Recent):  Temp: 98.1 °F (36.7 °C) (11/25/24 0346)  Pulse: 84 (11/25/24 0746)  Resp: 18 (11/25/24 0746)  BP: 117/60 (11/25/24 0346)  SpO2: (!) 92 % (11/25/24 0746) Vital Signs (24h Range):  Temp:  [97.5 °F (36.4 °C)-99 °F (37.2 °C)] 98.1 °F (36.7 °C)  Pulse:  [71-89] 84  Resp:  [16-20] 18  SpO2:  [88 %-97 %] 92 %  BP: ()/(50-79) 117/60   Weight: 96.4 kg (212 lb 8 oz)  Body mass index is 34.3 kg/m².  SpO2: (!) 92 %       Intake/Output Summary (Last 24 hours) at 11/25/2024 7010  Last data filed at 11/24/2024 1656  Gross per 24 hour   Intake 100 ml   Output 1800 ml   Net -1700 ml     Lines/Drains/Airways        Peripheral Intravenous Line  Duration                  Peripheral IV - Single Lumen 11/22/24 1309 20 G Anterior;Right Forearm 2 days                    Significant Labs:   Chemistries:   Recent Labs   Lab 11/22/24  1037 11/23/24  0532 11/24/24  0347 11/25/24  0418    140 143 139   K 4.4 5.3* 4.6 4.0   * 108* 104 102   CO2 17* 21* 24 23   BUN 13.0 25.6* 27.6* 22.2*   CREATININE 0.84 0.94 0.85 0.80   CALCIUM 9.3 8.7 9.1 8.5   BILITOT 0.8 0.5  --   --    ALKPHOS 70 65  --   --    ALT 13 12  --   --    AST 22 20  --   --    GLUCOSE 166* 232* 157* 194*   MG  --   --  2.00 1.90   TROPONINI 0.031  --   --   --         CBC/Anemia Labs: Coags:    Recent Labs   Lab 11/23/24  0532 11/24/24 0347 11/25/24  0418   WBC 13.53* 14.15* 11.10   HGB 11.9* 13.0 12.6   HCT 36.2* 40.1 39.0    223 202   MCV 90.7 90.9 91.5   RDW 17.5* 17.5* 17.4*    Recent Labs   Lab 11/22/24  1037   INR 2.2*   APTT 33.9*        Telemetry:  SR    Physical Exam  Vitals and nursing note reviewed.   HENT:      Head: Normocephalic.      Nose: Nose normal.      Mouth/Throat:      Mouth: Mucous membranes are dry.   Eyes:      Extraocular Movements: Extraocular movements intact.   Cardiovascular:      Rate and Rhythm: Normal rate and regular rhythm.      Pulses: Normal pulses.      Heart sounds: Murmur heard.   Pulmonary:      Effort: Pulmonary effort is normal.      Breath sounds: Rales present.      Comments: Diminished Lung Sounds   Abdominal:      Palpations: Abdomen is soft.   Musculoskeletal:         General: Normal range of motion.      Cervical back: Normal range of motion.   Skin:     General: Skin is warm.   Neurological:      Mental Status: She is alert and oriented to person, place, and time.   Psychiatric:         Mood and Affect: Mood normal.         Behavior: Behavior normal.       Current Schedule Inpatient Medications:   albuterol-ipratropium  3 mL Nebulization Q8H    amiodarone  200 mg Oral Daily    atorvastatin  40 mg  Oral Daily    famotidine  40 mg Oral QHS    furosemide (LASIX) injection  40 mg Intravenous Q12H    lipase-protease-amylase 24,000-76,000-120,000 units  2 capsule Oral TID WM    metoprolol succinate  25 mg Oral Daily    NIFEdipine  30 mg Oral Daily    pantoprazole  40 mg Oral QAM    rivaroxaban  20 mg Oral Daily with dinner     Continuous Infusions:      Assessment:   Acute Diastolic Heart Failure - likely related to VHD    - EF 60-65% with G 1 Diastolic Dysfunction (11.14.24)   VHD    - ECHO (11.23.24): There is evidence of bioprosthetic mitral valve stenosis The mean pressure gradient across the mitral valve is 19 mmHg at a heart rate of 78 bpm.     - s/p MVR (9.20.23): Mitral valve replacement with a 25 mm mosaic porcine valve    - Moderate TR    - Mild MR  Acute Hypoxemic Respiratory Failure - requiring oxygenation   PNA ?    - CTA Chest (11.22.24): Llower lung lobes compressive consolidations.   Bilateral Pleural Effusions  Anemia (Mild)  Hyperkalemia - Resolved  Leukocytosis - Rsolved   COPD Exacerbation ?  Pulmonary HTN  GEMINI  DM II  PAF - Currently SR    - [id number] Score 6 (9.7% Stroke Risk Per Year)    - s/p Ligation of SUKHI (9.20.23) - Endostapler   ABEL  HTN  DVT    - on Xarelto Outpatient   SSS    - s/p PPM (9.25.23): [facility]   HLD  Asthma  Left Thyroid Nodule  GERD  Chronic Sinusitis  Common Variable Immunodeficiency  Glaucoma  No Known History of GI Bleed       Plan:   Continue IV Diurese (Lasix 40 mg oral BID)  Will plan for MARYBETH on 11.27.24  Risk, Benefits and Alternatives Reviewed and Discussed with the PT and their Family and they wish to proceed with above Procedure.  Consents Obtained (Placed in Chart)/Procedure Scheduled   Might need GORDO TMVR   Wean oxygen as tolerated   Keep Mag > 2 and Potassium > 4  Accurate I&O/Daily Weight   Labs in AM: CBC, BMP, and Mag     YANIRA Watkins  Cardiology  [facility]

## 2024-11-25 NOTE — PROGRESS NOTES
Inpatient Nutrition Evaluation    Admit Date: 11/22/2024   Total duration of encounter: 3 days    Nutrition Recommendation/Prescription     Diet diabetic Cardiac (Low Na/Chol), Lactose Free; 2000 Calories (up to 75 gm per meal) ordered  Add Boost Glucose control daily (provides 190 kcal and 16 g protein per container)  Encouraged adequate PO intake  Monitor appetite/PO intake, weight, and labs    Nutrition Assessment     Chart Review    Reason Seen: continuous nutrition monitoring COPD    Malnutrition Screening Tool Results   Have you recently lost weight without trying?: No  Have you been eating poorly because of a decreased appetite?: No   MST Score: 0     Diagnosis:  Acute on chronic diastolic decompensated HF  Acute respiratory failure with hypoxia requiring supplemental oxygen 2/2 B/L pulmonary edema   Obesity   AF   HTN   VHD s/p MVR  CAD   DVT   IVC filter  DM   GERD   Chronic sinusitis s/p sinus surgery   ABEL   Obesity   Asthma    Relevant Medical History:    Anticoagulant long-term use      Asthma      Chronic sinusitis, unspecified      COPD (chronic obstructive pulmonary disease)      CVID (common variable immunodeficiency)      Diabetes mellitus, type 2      GERD (gastroesophageal reflux disease)      Hypertension      Severe mitral valve regurgitation      Sleep apnea       uses CPAP    SOB (shortness of breath)      Unspecified glaucoma      Weakness        Nutrition-Related Medications:   Scheduled Meds:   albuterol-ipratropium  3 mL Nebulization Q8H    amiodarone  200 mg Oral Daily    atorvastatin  40 mg Oral Daily    famotidine  40 mg Oral QHS    furosemide (LASIX) injection  40 mg Intravenous Q12H    lipase-protease-amylase 24,000-76,000-120,000 units  2 capsule Oral TID WM    metoprolol succinate  25 mg Oral Daily    NIFEdipine  30 mg Oral Daily    pantoprazole  40 mg Oral QAM    rivaroxaban  20 mg Oral Daily with dinner     Continuous Infusions:  PRN Meds:.  Current Facility-Administered  Medications:     acetaminophen, 650 mg, Oral, Q8H PRN    acetaminophen, 650 mg, Oral, Q8H PRN    albuterol-ipratropium, 3 mL, Nebulization, Q4H PRN    dextrose 10%, 12.5 g, Intravenous, PRN    dextrose 10%, 25 g, Intravenous, PRN    glucagon (human recombinant), 1 mg, Intramuscular, PRN    glucose, 16 g, Oral, PRN    glucose, 24 g, Oral, PRN    insulin aspart U-100, 0-10 Units, Subcutaneous, QID (AC + HS) PRN    melatonin, 6 mg, Oral, Nightly PRN    ondansetron, 4 mg, Intravenous, Q8H PRN    sodium chloride 0.9%, 10 mL, Intravenous, PRN      Nutrition-Related Labs:  Recent Labs   Lab 11/22/24  1037 11/22/24 2027 11/23/24  0532 11/24/24  0347 11/25/24  0418     --  140 143 139   K 4.4  --  5.3* 4.6 4.0   CALCIUM 9.3  --  8.7 9.1 8.5   MG  --   --   --  2.00 1.90   *  --  108* 104 102   CO2 17*  --  21* 24 23   BUN 13.0  --  25.6* 27.6* 22.2*   CREATININE 0.84  --  0.94 0.85 0.80   EGFRNORACEVR >60  --  >60 >60 >60   GLUCOSE 166*  --  232* 157* 194*   BILITOT 0.8  --  0.5  --   --    ALKPHOS 70  --  65  --   --    ALT 13  --  12  --   --    AST 22  --  20  --   --    ALBUMIN 3.4  --  3.3*  --   --    HGBA1C  --  6.8  --   --   --    WBC 13.03*  --  13.53* 14.15* 11.10   HGB 13.1  --  11.9* 13.0 12.6   HCT 40.7  --  36.2* 40.1 39.0         Diet Order: Diet diabetic Cardiac (Low Na/Chol), Lactose Free; 2000 Calories (up to 75 gm per meal)  Diet NPO Except for: Sips with Medication  Oral Supplement Order: none  Appetite/Oral Intake: poor/25-50% of meals  Factors Affecting Nutritional Intake: decreased appetite  Food/[facility]/Cultural Preferences: none reported  Food Allergies: none reported       Wound(s):   none noted    Comments    11/25/2024: Unable to provide NFPE due to pt being asleep, spoke with pt's sister. The sister reports a poor appetite/PO intake for ~2 weeks prior to admit and currently. The sister agreeable to vanilla ONS. The sister denies vomiting, constipation, diarrhea, and  "chewing/swallowing difficulties. The sister reports nausea. Per EMR, pt weighed 99.8 kg on 10/22/2024 (3.4% wt loss in 1 month, insignificant). Encouraged adequate PO intake. Last BM documented as 2024. Will monitor.    Anthropometrics    Height: 5' 6" (167.6 cm) Height Method: Stated  Last Weight: 96.4 kg (212 lb 8 oz) (24 0500) Weight Method: Bed Scale  BMI (Calculated): 34.3  BMI Classification: obese grade I (BMI 30-34.9)     Ideal Body Weight (IBW), Female: 130 lb     % Ideal Body Weight, Female (lb): 169.23 %                    Usual Body Weight (UBW), k.8 kg  % Usual Body Weight: 96.78     Usual Weight Provided By: EMR weight history    Wt Readings from Last 5 Encounters:   24 96.4 kg (212 lb 8 oz)   24 99.8 kg (220 lb)   24 99.8 kg (220 lb)   24 93.4 kg (206 lb)   10/24/23 93.4 kg (206 lb)     Weight Change(s) Since Admission:  Admit Weight: 99.8 kg (220 lb) (24 0959)  2024: 96.4 kg    Patient Education    Not applicable.    Monitoring & Evaluation     Dietitian will monitor food and beverage intake, weight, electrolyte/renal panel, glucose/endocrine profile, and gastrointestinal profile.  Nutrition Risk/Follow-Up: low (follow-up in 5-7 days)  Patients assigned 'low nutrition risk' status do not qualify for a full nutritional assessment but will be monitored and re-evaluated in a 5-7 day time period. Please consult if re-evaluation needed sooner.    " Oriented - self; Oriented - place; Oriented - time

## 2024-11-25 NOTE — PLAN OF CARE
Problem: Adult Inpatient Plan of Care  Goal: Plan of Care Review  Outcome: Progressing  Goal: Patient-Specific Goal (Individualized)  Outcome: Progressing  Goal: Absence of Hospital-Acquired Illness or Injury  Outcome: Progressing  Goal: Optimal Comfort and Wellbeing  Outcome: Progressing  Goal: Readiness for Transition of Care  Outcome: Progressing     Problem: Diabetes Comorbidity  Goal: Blood Glucose Level Within Targeted Range  Outcome: Progressing     Problem: Fall Injury Risk  Goal: Absence of Fall and Fall-Related Injury  Outcome: Progressing     Problem: Heart Failure  Goal: Optimal Coping  Outcome: Progressing  Goal: Optimal Cardiac Output  Outcome: Progressing  Goal: Stable Heart Rate and Rhythm  Outcome: Progressing  Goal: Optimal Functional Ability  Outcome: Progressing  Goal: Fluid and Electrolyte Balance  Outcome: Progressing  Goal: Improved Oral Intake  Outcome: Progressing  Goal: Effective Oxygenation and Ventilation  Outcome: Progressing  Goal: Effective Breathing Pattern During Sleep  Outcome: Progressing

## 2024-11-25 NOTE — PLAN OF CARE
11/25/24 0943   Discharge Assessment   Assessment Type Discharge Planning Assessment   Confirmed/corrected address, phone number and insurance Yes   Source of Information patient   When was your last doctors appointment?   (PCP is Dr. Arnaldo Hernandez. Patient reports last PCP appointment was 2 months ago.)   Reason For Admission SOB   People in Home child(min), adult  (Daughter Lives with Patient)   Do you expect to return to your current living situation? Yes   Do you have help at home or someone to help you manage your care at home? Yes   Who are your caregiver(s) and their phone number(s)? Anita/Daughter/[phone]   Current cognitive status: Alert/Oriented   Walking or Climbing Stairs Difficulty yes   Walking or Climbing Stairs ambulation difficulty, requires equipment   Mobility Management Ambulates with a RW PRN   Dressing/Bathing Difficulty yes   Dressing/Bathing bathing difficulty, requires equipment   Home Accessibility wheelchair accessible   Home Layout Able to live on 1st floor   Equipment Currently Used at Home nebulizer;CPAP;walker, rolling   Readmission within 30 days? No   Do you currently have service(s) that help you manage your care at home? No   Do you take prescription medications? Yes   Do you have prescription coverage? Yes   Do you have any problems affording any of your prescribed medications? No   Who is going to help you get home at discharge? Daughter/Sister   How do you get to doctors appointments? family or friend will provide   Are you on dialysis? No   Do you take coumadin? No   Discharge Plan A Home   Discharge Plan B Home   Discharge Plan discussed with: Patient     List of DME providers given. Verbal FOC obtained for Erich. Referral Sent via [employer].

## 2024-11-26 LAB
ANION GAP SERPL CALC-SCNC: 13 MEQ/L
BACTERIA #/AREA URNS AUTO: ABNORMAL /HPF
BASOPHILS # BLD AUTO: 0.03 X10(3)/MCL
BASOPHILS NFR BLD AUTO: 0.2 %
BILIRUB UR QL STRIP.AUTO: NEGATIVE
BUN SERPL-MCNC: 17.4 MG/DL (ref 9.8–20.1)
CALCIUM SERPL-MCNC: 8.5 MG/DL (ref 8.4–10.2)
CHLORIDE SERPL-SCNC: 98 MMOL/L (ref 98–107)
CLARITY UR: CLEAR
CO2 SERPL-SCNC: 25 MMOL/L (ref 23–31)
COLOR UR AUTO: ABNORMAL
CREAT SERPL-MCNC: 0.8 MG/DL (ref 0.55–1.02)
CREAT/UREA NIT SERPL: 22
EOSINOPHIL # BLD AUTO: 0.04 X10(3)/MCL (ref 0–0.9)
EOSINOPHIL NFR BLD AUTO: 0.3 %
ERYTHROCYTE [DISTWIDTH] IN BLOOD BY AUTOMATED COUNT: 17.1 % (ref 11.5–17)
GFR SERPLBLD CREATININE-BSD FMLA CKD-EPI: >60 ML/MIN/1.73/M2
GLUCOSE SERPL-MCNC: 204 MG/DL (ref 70–110)
GLUCOSE SERPL-MCNC: 204 MG/DL (ref 82–115)
GLUCOSE UR QL STRIP: NORMAL
HCT VFR BLD AUTO: 40 % (ref 37–47)
HGB BLD-MCNC: 13.1 G/DL (ref 12–16)
HGB UR QL STRIP: NEGATIVE
IMM GRANULOCYTES # BLD AUTO: 0.06 X10(3)/MCL (ref 0–0.04)
IMM GRANULOCYTES NFR BLD AUTO: 0.5 %
KETONES UR QL STRIP: NEGATIVE
LEUKOCYTE ESTERASE UR QL STRIP: 250
LYMPHOCYTES # BLD AUTO: 3.82 X10(3)/MCL (ref 0.6–4.6)
LYMPHOCYTES NFR BLD AUTO: 30.6 %
MAGNESIUM SERPL-MCNC: 1.9 MG/DL (ref 1.6–2.6)
MCH RBC QN AUTO: 29.8 PG (ref 27–31)
MCHC RBC AUTO-ENTMCNC: 32.8 G/DL (ref 33–36)
MCV RBC AUTO: 91.1 FL (ref 80–94)
MONOCYTES # BLD AUTO: 1.59 X10(3)/MCL (ref 0.1–1.3)
MONOCYTES NFR BLD AUTO: 12.8 %
MUCOUS THREADS URNS QL MICRO: ABNORMAL /LPF
NEUTROPHILS # BLD AUTO: 6.93 X10(3)/MCL (ref 2.1–9.2)
NEUTROPHILS NFR BLD AUTO: 55.6 %
NITRITE UR QL STRIP: NEGATIVE
NRBC BLD AUTO-RTO: 0 %
PH UR STRIP: 5.5 [PH]
PLATELET # BLD AUTO: 192 X10(3)/MCL (ref 130–400)
PMV BLD AUTO: 10.8 FL (ref 7.4–10.4)
POCT GLUCOSE: 187 MG/DL (ref 70–110)
POCT GLUCOSE: 205 MG/DL (ref 70–110)
POCT GLUCOSE: 277 MG/DL (ref 70–110)
POCT GLUCOSE: 282 MG/DL (ref 70–110)
POTASSIUM SERPL-SCNC: 3.7 MMOL/L (ref 3.5–5.1)
PROT UR QL STRIP: NEGATIVE
RBC # BLD AUTO: 4.39 X10(6)/MCL (ref 4.2–5.4)
RBC #/AREA URNS AUTO: ABNORMAL /HPF
SODIUM SERPL-SCNC: 136 MMOL/L (ref 136–145)
SP GR UR STRIP.AUTO: 1.01 (ref 1–1.03)
SQUAMOUS #/AREA URNS LPF: ABNORMAL /HPF
UROBILINOGEN UR STRIP-ACNC: NORMAL
WBC # BLD AUTO: 12.47 X10(3)/MCL (ref 4.5–11.5)
WBC #/AREA URNS AUTO: ABNORMAL /HPF

## 2024-11-26 PROCEDURE — 36415 COLL VENOUS BLD VENIPUNCTURE: CPT

## 2024-11-26 PROCEDURE — 80048 BASIC METABOLIC PNL TOTAL CA: CPT

## 2024-11-26 PROCEDURE — 94760 N-INVAS EAR/PLS OXIMETRY 1: CPT

## 2024-11-26 PROCEDURE — 21400001 HC TELEMETRY ROOM

## 2024-11-26 PROCEDURE — 63600175 PHARM REV CODE 636 W HCPCS: Performed by: INTERNAL MEDICINE

## 2024-11-26 PROCEDURE — 85025 COMPLETE CBC W/AUTO DIFF WBC: CPT

## 2024-11-26 PROCEDURE — 97166 OT EVAL MOD COMPLEX 45 MIN: CPT

## 2024-11-26 PROCEDURE — 99900031 HC PATIENT EDUCATION (STAT)

## 2024-11-26 PROCEDURE — 81001 URINALYSIS AUTO W/SCOPE: CPT | Performed by: INTERNAL MEDICINE

## 2024-11-26 PROCEDURE — 27000221 HC OXYGEN, UP TO 24 HOURS

## 2024-11-26 PROCEDURE — 25000003 PHARM REV CODE 250

## 2024-11-26 PROCEDURE — 97535 SELF CARE MNGMENT TRAINING: CPT

## 2024-11-26 PROCEDURE — 97162 PT EVAL MOD COMPLEX 30 MIN: CPT

## 2024-11-26 PROCEDURE — 87086 URINE CULTURE/COLONY COUNT: CPT | Performed by: INTERNAL MEDICINE

## 2024-11-26 PROCEDURE — 99900035 HC TECH TIME PER 15 MIN (STAT)

## 2024-11-26 PROCEDURE — 63600175 PHARM REV CODE 636 W HCPCS: Performed by: STUDENT IN AN ORGANIZED HEALTH CARE EDUCATION/TRAINING PROGRAM

## 2024-11-26 PROCEDURE — 94640 AIRWAY INHALATION TREATMENT: CPT

## 2024-11-26 PROCEDURE — 83735 ASSAY OF MAGNESIUM: CPT

## 2024-11-26 PROCEDURE — 25000242 PHARM REV CODE 250 ALT 637 W/ HCPCS: Performed by: STUDENT IN AN ORGANIZED HEALTH CARE EDUCATION/TRAINING PROGRAM

## 2024-11-26 PROCEDURE — 25000003 PHARM REV CODE 250: Performed by: INTERNAL MEDICINE

## 2024-11-26 PROCEDURE — 63600175 PHARM REV CODE 636 W HCPCS

## 2024-11-26 RX ORDER — CEFTRIAXONE 2 G/1
2 INJECTION, POWDER, FOR SOLUTION INTRAMUSCULAR; INTRAVENOUS
Status: DISCONTINUED | OUTPATIENT
Start: 2024-11-26 | End: 2024-11-28

## 2024-11-26 RX ORDER — METOPROLOL SUCCINATE 25 MG/1
25 TABLET, EXTENDED RELEASE ORAL ONCE
Status: COMPLETED | OUTPATIENT
Start: 2024-11-26 | End: 2024-11-26

## 2024-11-26 RX ORDER — METOPROLOL SUCCINATE 50 MG/1
50 TABLET, EXTENDED RELEASE ORAL DAILY
Status: DISCONTINUED | OUTPATIENT
Start: 2024-11-27 | End: 2024-12-03 | Stop reason: HOSPADM

## 2024-11-26 RX ORDER — MAGNESIUM SULFATE HEPTAHYDRATE 40 MG/ML
2 INJECTION, SOLUTION INTRAVENOUS ONCE
Status: COMPLETED | OUTPATIENT
Start: 2024-11-26 | End: 2024-11-26

## 2024-11-26 RX ORDER — POTASSIUM CHLORIDE 20 MEQ/1
40 TABLET, EXTENDED RELEASE ORAL ONCE
Status: COMPLETED | OUTPATIENT
Start: 2024-11-26 | End: 2024-11-26

## 2024-11-26 RX ADMIN — IPRATROPIUM BROMIDE AND ALBUTEROL SULFATE 3 ML: 2.5; .5 SOLUTION RESPIRATORY (INHALATION) at 08:11

## 2024-11-26 RX ADMIN — IPRATROPIUM BROMIDE AND ALBUTEROL SULFATE 3 ML: 2.5; .5 SOLUTION RESPIRATORY (INHALATION) at 04:11

## 2024-11-26 RX ADMIN — AMIODARONE HYDROCHLORIDE 200 MG: 200 TABLET ORAL at 08:11

## 2024-11-26 RX ADMIN — METOPROLOL SUCCINATE 25 MG: 25 TABLET, EXTENDED RELEASE ORAL at 08:11

## 2024-11-26 RX ADMIN — POTASSIUM CHLORIDE 40 MEQ: 1500 TABLET, EXTENDED RELEASE ORAL at 08:11

## 2024-11-26 RX ADMIN — MAGNESIUM SULFATE HEPTAHYDRATE 2 G: 40 INJECTION, SOLUTION INTRAVENOUS at 08:11

## 2024-11-26 RX ADMIN — ATORVASTATIN CALCIUM 40 MG: 40 TABLET, FILM COATED ORAL at 08:11

## 2024-11-26 RX ADMIN — METOPROLOL SUCCINATE 25 MG: 25 TABLET, EXTENDED RELEASE ORAL at 11:11

## 2024-11-26 RX ADMIN — ACETAMINOPHEN 325MG 650 MG: 325 TABLET ORAL at 08:11

## 2024-11-26 RX ADMIN — FUROSEMIDE 40 MG: 10 INJECTION, SOLUTION INTRAMUSCULAR; INTRAVENOUS at 08:11

## 2024-11-26 RX ADMIN — FAMOTIDINE 40 MG: 20 TABLET, FILM COATED ORAL at 08:11

## 2024-11-26 RX ADMIN — NIFEDIPINE 30 MG: 30 TABLET, FILM COATED, EXTENDED RELEASE ORAL at 08:11

## 2024-11-26 RX ADMIN — PANTOPRAZOLE SODIUM 40 MG: 40 TABLET, DELAYED RELEASE ORAL at 08:11

## 2024-11-26 RX ADMIN — INSULIN ASPART 6 UNITS: 100 INJECTION, SOLUTION INTRAVENOUS; SUBCUTANEOUS at 08:11

## 2024-11-26 RX ADMIN — ONDANSETRON 4 MG: 2 INJECTION INTRAMUSCULAR; INTRAVENOUS at 04:11

## 2024-11-26 RX ADMIN — CEFTRIAXONE SODIUM 2 G: 2 INJECTION, POWDER, FOR SOLUTION INTRAMUSCULAR; INTRAVENOUS at 08:11

## 2024-11-26 RX ADMIN — INSULIN ASPART 6 UNITS: 100 INJECTION, SOLUTION INTRAVENOUS; SUBCUTANEOUS at 11:11

## 2024-11-26 NOTE — PROGRESS NOTES
[facility] Hospital Medicine Progress Note        Chief Complaint: Inpatient Follow-up for HF    HPI:   79-year-old female with A-fib, HTN, valvular heart disease s/p MVR, nonobstructive CAD, DVT , IVC filter, DM, GERD, chronic sinusitis status post sinus surgery, ABEL, obesity and asthma/COPD not on home oxygen who presented to the ER with progressive shortness of breath over the last month.  She initially was seen in urgent care but due to sats being 88% on room air she was deferred to the ER.     She arrived afebrile hemodynamically stable maintaining adequate sats on 3 L nasal cannula but abruptly desatted in the ER requiring initiation of BiPAP.  Laboratory work showed a BNP of 160 (last EF 55% 09/2023), CTA showed no pulmonary embolus, noted cardiomegaly and mild congestive changes and pleural effusions and lower lung compressive consolidations.  EKG noted normal sinus rhythm.     She was given 80 mg of Lasix, albuterol nebulizer treatment, IV Solu-Medrol and admitted to the hospitalist service for further management.  Continued on IV Lasix b.i.d..  Able to be weaned off of BiPAP and currently on 3 L O2 via NC with adequate saturations.  Added DuoNebs.  Cardiology planning for MARYBETH to assess MVR on 11/27.  Desaturating to 88% during ambulation on RA.      Interval Hx:   Today, patient stated she was feeling better and had no new complaints.  Reporting improvement in shortness of breath and dyspnea on exertion.  PT consulted.      Objective/physical exam:  General: In no acute distress, Obese   Chest: Nelson diminished breath sounds   Heart: RRR, +S1, S2, no appreciable murmur  Abdomen: Soft, nontender, BS +  MSK: Warm, no lower extremity edema, no clubbing or cyanosis  Neurologic: Alert and oriented x4, Cranial nerve II-XII intact, Strength 5/5 in all 4 extremities    VITAL SIGNS: 24 HRS MIN & MAX LAST   Temp  Min: 98.5 °F (36.9 °C)  Max: 99.6 °F (37.6 °C) 98.5 °F (36.9 °C)   BP  Min:  114/67  Max: 134/74 114/67   Pulse  Min: 77  Max: 100  77   Resp  Min: 18  Max: 20 18   SpO2  Min: 88 %  Max: 96 % 96 %     I have reviewed the following labs:  Recent Labs   Lab 11/24/24 0347 11/25/24 0418 11/26/24  0408   WBC 14.15* 11.10 12.47*   RBC 4.41 4.26 4.39   HGB 13.0 12.6 13.1   HCT 40.1 39.0 40.0   MCV 90.9 91.5 91.1   MCH 29.5 29.6 29.8   MCHC 32.4* 32.3* 32.8*   RDW 17.5* 17.4* 17.1*    202 192   MPV 10.5* 10.6* 10.8*     Recent Labs   Lab 11/22/24  1037 11/23/24  0532 11/24/24 0347 11/25/24 0418 11/26/24  0408    140 143 139 136   K 4.4 5.3* 4.6 4.0 3.7   * 108* 104 102 98   CO2 17* 21* 24 23 25   BUN 13.0 25.6* 27.6* 22.2* 17.4   CREATININE 0.84 0.94 0.85 0.80 0.80   CALCIUM 9.3 8.7 9.1 8.5 8.5   MG  --   --  2.00 1.90 1.90   ALBUMIN 3.4 3.3*  --   --   --    ALKPHOS 70 65  --   --   --    ALT 13 12  --   --   --    AST 22 20  --   --   --    BILITOT 0.8 0.5  --   --   --      Assessment/Plan:  Acute on chronic diastolic decompensated HF  Acute respiratory failure with hypoxia requiring supplemental oxygen 2/2 B/L pulmonary edema   Obesity   AF   HTN   VHD s/p MVR  CAD   DVT   IVC filter  DM   GERD   Chronic sinusitis s/p sinus surgery   ABEL   Obesity   Asthma    Plan:  Continues to be admitted   Reporting improvement in shortness of breath and dyspnea on exertion   Weaned to RA with adequate saturations  Plan for MARYBETH on 11/27 with [facility]  Remains on IV Lasix b.i.d. with adequate diuresis  Cardiology on board; follow recommendations   Added DuoNebs q.8  Continued on amiodarone, atorvastatin, Pepcid, metoprolol succinate, nifedipine, Protonix, Xarelto  ISS LD ordered    Critical care note:  Critical care diagnosis: HF needing iv lasix   Critical care interventions: Hands-on evaluation, review of labs/radiographs/records and discussion with patient and family if present  Critical care time spent: 35 minutes     VTE prophylaxis: Xarelto     Patient condition:  Guarded    Anticipated  discharge and Disposition:   Home with        All diagnosis and differential diagnosis have been reviewed; assessment and plan has been documented; I have personally reviewed the labs and test results that are presently available; I have reviewed the patients medication list; I have reviewed the consulting providers response and recommendations. I have reviewed or attempted to review medical records based upon their availability    All of the patient's questions have been  addressed and answered. Patient's is agreeable to the above stated plan. I will continue to monitor closely and make adjustments to medical management as needed.    Portions of this note dictated using EMR integrated voice recognition software, and may be subject to voice recognition errors not corrected at proofreading. Please contact writer for clarification if needed.   _____________________________________________________________________  Radiology:  I have personally reviewed the following imaging and agree with the radiologist.     Echo    Left Ventricle: The left ventricle is normal in size. Mildly increased   wall thickness. There is normal systolic function with a visually   estimated ejection fraction of 60 - 65%. Grade I diastolic dysfunction.    Right Ventricle: Normal right ventricular cavity size. Systolic   function is borderline low.    Aortic Valve: There is aortic valve sclerosis.    Mitral Valve: There is a bioprosthetic valve in the mitral position (25   mm mosaic porcine valve).  There is evidence of bioprosthetic mitral valve   stenosis The mean pressure gradient across the mitral valve is 19 mmHg at   a heart rate of 78 bpm. There is mild (1+) regurgitation.    Tricuspid Valve: There moderate (2+) regurgitation.    The estimated pulmonary artery systolic pressure is 57 mmHg.    Pacemaker lead noted.    ------------------------------------------------------    Recommend transesophageal echocardiogram to properly evaluate  the   bioprosthetic mitral valve.      Tim Roa MD  Department of Hospital Medicine   [facility]   11/26/2024

## 2024-11-26 NOTE — PLAN OF CARE
Problem: Occupational Therapy  Goal: Occupational Therapy Goal  Description: Goals to be met by 12/26/2024    Pt will complete grooming standing at sink with LRAD with modified independence.  Pt will complete UB dressing with modified independence.  Pt will complete LB dressing with modified independence using LRAD.  Pt will complete toileting with modified independence using LRAD.  Pt will complete functional mobility to/from toilet and toilet transfer with modified independence using LRAD.  Outcome: Progressing

## 2024-11-26 NOTE — PT/OT/SLP EVAL
Occupational Therapy  Evaluation    Name: Kortney Leach  MRN: [id number]    Recommendations:     Discharge therapy intensity: Moderate Intensity Therapy   Discharge Equipment Recommendations:  to be determined by next level of care  Barriers to discharge:   (ongoing medical needs; placement)    Assessment:     Kortney Leach is a 79 y.o. female with a medical diagnosis of COPD. Patient with history of A-fib, HTN, valvular heart disease s/p MVR, nonobstructive CAD, DVT, IVC filter, DM, GERD, chronic sinusitis status post sinus surgery, ABEL, obesity and asthma/COPD. She presents with the following performance deficits affecting function: weakness, impaired endurance, impaired self care skills, impaired functional mobility, impaired cardiopulmonary response to activity.     Patient is indep with ADLs and mobility at baseline. During today's session, patient required min A overall for mobility and ADL tasks. Patient's O2 maintained above 90% on 3L. Patient is home alone for several hours throughout the day as her daughter works full time. Patient would benefit from moderate intensity therapy upon discharge to increase functional mobility and indep with ADL tasks.    Plan for MARYBETH 11/27    Rehab Prognosis: Good; patient would benefit from acute skilled OT services to address these deficits and reach maximum level of function.       Plan:     Patient to be seen 4 x/week to address the above listed problems via self-care/home management, therapeutic activities, therapeutic exercises  Plan of Care Expires: 12/26/24  Plan of Care Reviewed with: patient    Subjective     Chief Complaint: SOB   Patient/Family Comments/goals: increase indep with ADLs    Occupational Profile:  Living Environment: lives with daughter with no steps to enter; tub combo with bench  Previous level of function: indep with ADL tasks  Roles and Routines: none stated  Equipment Used at Home: walker, rolling, bedside commode, CPAP, bath  bench  Assistance upon Discharge: TBD; daughter works full-time    Pain/Comfort:  Pain Rating 1: 0/10    Patients cultural, spiritual, Christianity conflicts given the current situation: no    Objective:     OT communicated with nurse prior to session.      Patient was found HOB elevated with peripheral IV, telemetry, pulse ox (continuous), PureWick, oxygen upon OT entry to room.    General Precautions: Standard, fall  Orthopedic Precautions: N/A  Braces: N/A    Vital Signs: Supplemental 02: 3L 93-95%    Bed Mobility:    Patient completed Supine to Sit with minimum assistance    Functional Mobility/Transfers:  Patient completed Sit <> Stand Transfer with minimum assistance  with  rolling walker   Patient completed Toilet Transfer Step Transfer technique with minimum assistance with  rolling walker and grab bars  Functional Mobility: slow pace; good safety    Activities of Daily Living:  Upper Body Dressing: minimum assistance alida robe  Lower Body Dressing: minimum assistance alida socks  Toileting: contact guard assistance sitting on toilet    AMPA Total Score: 18    Functional Cognition:  Intact    Visual Perceptual Skills:  Intact    Upper Extremity Function:  Right Upper Extremity:   Range of Motion: WFL  Globally weak    Left Upper Extremity:  Range of Motion: WFL  Globally weak    Balance:   Static sitting balance: WFL  Dynamic standing balance:min A with RW    Patient Education:  Patient provided with verbal education education regarding OT role/goals/POC, fall prevention, safety awareness, and Discharge/DME recommendations.  Understanding was verbalized, however additional teaching warranted.     Patient left up in chair with all lines intact, call button in reach, and nurse notified.    GOALS:   Multidisciplinary Problems       Occupational Therapy Goals          Problem: Occupational Therapy    Goal Priority Disciplines Outcome Interventions   Occupational Therapy Goal     OT, PT/OT Progressing     Description: Goals to be met by 12/26/2024    Pt will complete grooming standing at sink with LRAD with modified independence.  Pt will complete UB dressing with modified independence.  Pt will complete LB dressing with modified independence using LRAD.  Pt will complete toileting with modified independence using LRAD.  Pt will complete functional mobility to/from toilet and toilet transfer with modified independence using LRAD.                       History:     Past Medical History:   Diagnosis Date    Anticoagulant long-term use     Asthma     Chronic sinusitis, unspecified     COPD (chronic obstructive pulmonary disease)     CVID (common variable immunodeficiency)     Diabetes mellitus, type 2     GERD (gastroesophageal reflux disease)     Hypertension     Severe mitral valve regurgitation     Sleep apnea     uses CPAP    SOB (shortness of breath)     Unspecified glaucoma     Weakness          Past Surgical History:   Procedure Laterality Date    A-V CARDIAC PACEMAKER INSERTION N/A 9/25/2023    Procedure: INSERTION, CARDIAC PACEMAKER, DUAL CHAMBER;  Surgeon: Arnaud Moon MD;  Location: [facility] CATH LAB;  Service: Cardiology;  Laterality: N/A;  SJM    APPENDECTOMY      CATARACT EXTRACTION Bilateral     COLONOSCOPY      EGD, WITH BALLOON DILATION      ESOPHAGOGASTRODUODENOSCOPY      LEFT HEART CATHETERIZATION Left 09/06/2023    Procedure: Left heart cath;  Surgeon: Vijay Uribe MD;  Location: [facility] CATH LAB;  Service: Cardiology;  Laterality: Left;  [facility]    MITRAL VALVE REPLACEMENT N/A 9/20/2023    Procedure: REPLACEMENT, MITRAL VALVE;  Surgeon: Steven Rosales IV, MD;  Location: [facility];  Service: Cardiovascular;  Laterality: N/A;    TONSILLECTOMY      TOTAL KNEE ARTHROPLASTY Bilateral        Time Tracking:     OT Date of Treatment:    OT Start Time: 1010  OT Stop Time: 1053  OT Total Time (min): 43 min    Extended time taken during session for MD and student nurse assessments    Billable Minutes:Evaluation  mod  Self Care/Home Management 15    11/26/2024

## 2024-11-26 NOTE — PT/OT/SLP EVAL
Physical Therapy Evaluation    Patient Name:  Kortney Leach   MRN:  [id number]    Recommendations:     Discharge therapy intensity: Moderate Intensity Therapy   Discharge Equipment Recommendations: none   Barriers to discharge: Impaired mobility and Ongoing medical needs    Assessment:     Kortney Leach is a 79 y.o. female admitted with a medical diagnosis of acute heart failure, respiratory failure 2/2 pulmonary edema.  She presents with the following impairments/functional limitations: weakness, impaired balance, impaired endurance, impaired functional mobility, gait instability, decreased lower extremity function. Pt tolerated session well, just finished ambulating with OT but agreeable to demonstrate t/f for PT evaluation. Increased fatigue/SOB, but 02 stable throughout.     Rehab Prognosis: Good; patient would benefit from acute skilled PT services to address these deficits and reach maximum level of function.    Recent Surgery: * No surgery found *      Plan:     During this hospitalization, patient would benefit from acute PT services 5 x/week to address the identified rehab impairments via gait training, therapeutic activities, therapeutic exercises and progress toward the following goals:    Plan of Care Expires:  01/04/25    Subjective     Chief Complaint: weakness  Patient/Family Comments/goals: to get stronger  Pain/Comfort:  Pain Rating 1: 0/10    Patients cultural, spiritual, Restorationism conflicts given the current situation:      Living Environment:  Pts daughter lives with her in a SLH with flat entrance.   Prior to admission, patients level of function was decline over the last 2 months requiring RW use.  Equipment used at home: walker, rolling.  DME owned (not currently used): none.  Upon discharge, patient will have assistance from daughter.    Objective:     Communicated with RN prior to session.  Patient found ambulatory in room/yeung with telemetry, peripheral IV, oxygen  upon PT  entry to room.    General Precautions: Standard, fall  Orthopedic Precautions:N/A   Braces: N/A  Respiratory Status: Nasal cannula, flow 3 L/min    Exams:  RLE Strength: 4- globally  LLE Strength: 4- globally  Skin integrity: Visible skin intact      Functional Mobility:  Transfers:     Sit to Stand:  minimum assistance with rolling walker  Bed to Chair: contact guard assistance with  rolling walker  using  Step Transfer  Gait: Pt found ambulatory with OT with RW CGA. Pt able to t/f and demo dynamic marching without UE support with Lin 2/2 instability.       AM-PAC 6 CLICK MOBILITY  Total Score:17       Treatment & Education:    Patient provided with verbal education education regarding PT role/goals/POC, fall prevention, and safety awareness.  Understanding was verbalized.     Patient left up in chair with all lines intact, call button in reach, and OT present.    GOALS:   Multidisciplinary Problems       Physical Therapy Goals          Problem: Physical Therapy    Goal Priority Disciplines Outcome Interventions   Physical Therapy Goal     PT, PT/OT Progressing    Description: Goals to be met by: d/c     Patient will increase functional independence with mobility by performin. Supine to sit with Modified [place]  2. Sit to supine with Modified [place]  3. Sit to stand transfer with Modified [place]  4. Bed to chair transfer with Modified [place] using Rolling Walker  5. Gait  x 150 feet with Modified [place] using Rolling Walker.                          History:     Past Medical History:   Diagnosis Date    Anticoagulant long-term use     Asthma     Chronic sinusitis, unspecified     COPD (chronic obstructive pulmonary disease)     CVID (common variable immunodeficiency)     Diabetes mellitus, type 2     GERD (gastroesophageal reflux disease)     Hypertension     Severe mitral valve regurgitation     Sleep apnea     uses CPAP    SOB (shortness of breath)     Unspecified glaucoma      Weakness        Past Surgical History:   Procedure Laterality Date    A-V CARDIAC PACEMAKER INSERTION N/A 9/25/2023    Procedure: INSERTION, CARDIAC PACEMAKER, DUAL CHAMBER;  Surgeon: Arnaud Moon MD;  Location: [facility] CATH LAB;  Service: Cardiology;  Laterality: N/A;  SJM    APPENDECTOMY      CATARACT EXTRACTION Bilateral     COLONOSCOPY      EGD, WITH BALLOON DILATION      ESOPHAGOGASTRODUODENOSCOPY      LEFT HEART CATHETERIZATION Left 09/06/2023    Procedure: Left heart cath;  Surgeon: Vijay Uribe MD;  Location: [facility] CATH LAB;  Service: Cardiology;  Laterality: Left;  [facility]    MITRAL VALVE REPLACEMENT N/A 9/20/2023    Procedure: REPLACEMENT, MITRAL VALVE;  Surgeon: Steven Rosales IV, MD;  Location: [facility] OR;  Service: Cardiovascular;  Laterality: N/A;    TONSILLECTOMY      TOTAL KNEE ARTHROPLASTY Bilateral        Time Tracking:     PT Received On: 11/26/24  PT Start Time: 1047     PT Stop Time: 1057  PT Total Time (min): 10 min     Billable Minutes: Evaluation 10      11/26/2024

## 2024-11-26 NOTE — PLAN OF CARE
Problem: Physical Therapy  Goal: Physical Therapy Goal  Description: Goals to be met by: d/c     Patient will increase functional independence with mobility by performin. Supine to sit with Modified [place]  2. Sit to supine with Modified [place]  3. Sit to stand transfer with Modified [place]  4. Bed to chair transfer with Modified [place] using Rolling Walker  5. Gait  x 150 feet with Modified [place] using Rolling Walker.     Outcome: Progressing

## 2024-11-26 NOTE — PROGRESS NOTES
[facility] *    Cardiology  Progress Note    Patient Name: Kortney Leach  MRN: [id number]  Admission Date: 11/22/2024  Hospital Length of Stay: 4 days  Code Status: Full Code   Attending Physician: Geovanny Franklin MD   Primary Care Physician: Arnaldo Hernandez MD  Expected Discharge Date:   Principal Problem:COPD exacerbation    Subjective:     Brief HPI/Hospital Course: Ms. Leach is a 80 y/o female with a history of VHD, GEMINI, DM II, PAF, ABEL, COPD, HTN, DVT SSS, HLD, who is known to [facility], Dr. rUibe/Red. She presented to the ER on 11.22.24 with complaints of shortness of breath. She reports worsening shortness of breath over the last month. She was sent from urgent care for being 88% on room air. She was started on oxygen therapy an sent to the ER. In the ER, she had some respiratory distress and was started on BiPAP. Significant labs WBC 13.53, H&H 11.9/36.2, K 5.3, Chloride 108, CO2 21, Glucose 232, .1. Flu, COVID and COVID negative. CTA Chest negative for PE, but shows Cardiomegaly and mild congestive changes and Bilateral prominent pleural effusions and lower lung lobes compressive consolidations. Cxr shows prominence of the right hilum may relate to pulmonary vascular congestion and possible small pleural effusions. [facility] has been consulted for CHF.        11.24.24: NAD. VSS. No complaints of CP/Palps. Reports some improvement in her SOB. 2350 urine output/1640 ml Fluid Negative. Crackle noted in bases.   11.25.24: NAD. VSS. No complaints of CP/Palp. Remains SOB. 1800 urine output/1700 ml Net Negative . Labs Stable.   11.26.24: NAD. VSS. No complaints of CP/SOB/Palps. Reports shortness of breath has significantly improved, but unable to lye flat. 1900 urine output over 24 hours/Fluid net negative 1120. WBC 12.47     PMH: VHD, GEMINI, DM II, PAF, ABEL, COPD, HTN, DVT SSS, HLD, Asthma, Left Thyroid Nodule, GERD, Chronic Sinusitis, Common Variable Immunodeficiency, Glaucoma  PSH:  PPM, LHC, MVR, Bilateral Cataract Extraction, Total Knee Replacement, Bilateral mastectomy, Appendectomy, Colonoscopy, EGD, Tonsillectomy,    Family History: Father - Lung Cancer; Mother - Alzheimer, CVA; Sister - HTN, Multiple Sclerosis, DM II  Social History: Former Smoker (Quit in 1998). Denies illicit drug use and alcohol use.      Previous Cardiac Diagnostics:   ECHO (11.23.24):  Left Ventricle: The left ventricle is normal in size. Mildly increased wall thickness. There is normal systolic function with a visually estimated ejection fraction of 60 - 65%. Grade I diastolic dysfunction. Right Ventricle: Normal right ventricular cavity size. Systolic function is borderline low. Aortic Valve: There is aortic valve sclerosis. Mitral Valve: There is a bioprosthetic valve in the mitral position (25 mm mosaic porcine valve).  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 There is mild (1+) regurgitation. Tricuspid Valve: There moderate (2+) regurgitation. The estimated pulmonary artery systolic pressure is 57 mmHg. Pacemaker lead noted.  Recommend transesophageal echocardiogram to properly evaluate the bioprosthetic mitral valve.    ECHO (11.14.24):  The study quality is average. The left ventricle is normal in size. Global left ventricular systolic function is normal. The left ventricular ejection fraction is 65%. Left ventricular diastolic function is normal. Mild concentric left ventricular hypertrophy is present. The left atrium is moderately enlarged. (4.6 cms). A normal functioning prosthetic mitral valve is noted. MG = 4.3 mmHg. The pulmonary artery systolic pressure is 37 mmHg.      PPM Insertion (9.25.23):  Successful implantation of PPM Dual. [facility]      PET (1.24.23):  This is a normal perfusion study, no perfusion defects noted. There is no evidence of ischemia.This scan is suggestive of low risk for future  cardiovascular events. The left ventricular cavity is noted to be normal on the stress studies. The stress left ventricular ejection fraction was calculated to be 68% and left ventricular global function is normal. The rest left ventricular cavity is noted to be normal. The rest left ventricular ejection fraction was calculated to be 63% and rest left ventricular global function is normal. When compared to the resting ejection fraction (63%), the stress ejection fraction (68%) has increased. Transient ischemic dilatation is present and has been described as a marker for high risk coronary artery disease. It has also been described in microvascular disease, hypertensive heart disease as well as cardiac deconditioning. The study quality is good.   There was a rise in myocardial blood flow between rest and stress.  Global myocardial blood flow reserve was 2.72.  Normal global coronary flow reserve is suggestive of low coronary event risk.     Carotid US (1.24.23):  The study quality is good. There is no plaque noted in the proximal right internal carotid artery. 1-39% stenosis in the proximal left internal carotid artery based on Bluth Criteria. Antegrade right vertebral artery flow. Antegrade left vertebral artery flow.     LHC (9.6.23):  Normal Coronaries     Review of Systems   Cardiovascular:  Negative for chest pain, dyspnea on exertion, leg swelling and palpitations.   Respiratory:  Positive for shortness of breath.    All other systems reviewed and are negative.    Objective:     Vital Signs (Most Recent):  Temp: 99.6 °F (37.6 °C) (11/26/24 0700)  Pulse: 86 (11/26/24 0700)  Resp: 18 (11/26/24 0700)  BP: (!) 127/59 (11/26/24 0700)  SpO2: (!) 90 % (11/26/24 0700) Vital Signs (24h Range):  Temp:  [97.9 °F (36.6 °C)-99.6 °F (37.6 °C)] 99.6 °F (37.6 °C)  Pulse:  [61-92] 86  Resp:  [18] 18  SpO2:  [88 %-98 %] 90 %  BP: (108-133)/(55-82) 127/59   Weight: 96.4 kg (212 lb 8 oz)  Body mass index is 34.3 kg/m².  SpO2: (!)  90 %       Intake/Output Summary (Last 24 hours) at 11/26/2024 0740  Last data filed at 11/26/2024 0500  Gross per 24 hour   Intake 780 ml   Output 1900 ml   Net -1120 ml     Lines/Drains/Airways       Peripheral Intravenous Line  Duration                  Peripheral IV - Single Lumen 11/22/24 1309 20 G Anterior;Right Forearm 3 days                    Significant Labs:   Chemistries:   Recent Labs   Lab 11/22/24  1037 11/23/24  0532 11/24/24  0347 11/25/24  0418 11/26/24  0408    140 143 139 136   K 4.4 5.3* 4.6 4.0 3.7   * 108* 104 102 98   CO2 17* 21* 24 23 25   BUN 13.0 25.6* 27.6* 22.2* 17.4   CREATININE 0.84 0.94 0.85 0.80 0.80   CALCIUM 9.3 8.7 9.1 8.5 8.5   BILITOT 0.8 0.5  --   --   --    ALKPHOS 70 65  --   --   --    ALT 13 12  --   --   --    AST 22 20  --   --   --    GLUCOSE 166* 232* 157* 194* 204*   MG  --   --  2.00 1.90 1.90   TROPONINI 0.031  --   --   --   --         CBC/Anemia Labs: Coags:    Recent Labs   Lab 11/24/24  0347 11/25/24 0418 11/26/24  0408   WBC 14.15* 11.10 12.47*   HGB 13.0 12.6 13.1   HCT 40.1 39.0 40.0    202 192   MCV 90.9 91.5 91.1   RDW 17.5* 17.4* 17.1*    Recent Labs   Lab 11/22/24  1037   INR 2.2*   APTT 33.9*        Telemetry:  SR    Physical Exam  Vitals and nursing note reviewed.   HENT:      Head: Normocephalic.      Nose: Nose normal.      Mouth/Throat:      Mouth: Mucous membranes are dry.   Eyes:      Conjunctiva/sclera: Conjunctivae normal.   Cardiovascular:      Rate and Rhythm: Normal rate and regular rhythm.      Pulses: Normal pulses.      Heart sounds: Murmur heard.   Pulmonary:      Effort: Pulmonary effort is normal.      Breath sounds: Rales present.      Comments: Diminished Lung Sounds   On NC  Abdominal:      Palpations: Abdomen is soft.   Musculoskeletal:         General: Normal range of motion.      Cervical back: Normal range of motion.   Skin:     General: Skin is warm.   Neurological:      Mental Status: She is alert and oriented  to person, place, and time.   Psychiatric:         Mood and Affect: Mood normal.         Behavior: Behavior normal.       Current Schedule Inpatient Medications:   albuterol-ipratropium  3 mL Nebulization Q8H    amiodarone  200 mg Oral Daily    atorvastatin  40 mg Oral Daily    famotidine  40 mg Oral QHS    furosemide (LASIX) injection  40 mg Intravenous Q12H    lipase-protease-amylase 24,000-76,000-120,000 units  2 capsule Oral TID WM    metoprolol succinate  25 mg Oral Daily    NIFEdipine  30 mg Oral Daily    pantoprazole  40 mg Oral QAM    rivaroxaban  20 mg Oral Daily with dinner     Continuous Infusions:      Assessment:   Acute Diastolic Heart Failure - likely related to VHD    - EF 60-65% with G 1 Diastolic Dysfunction (11.14.24)   VHD    - ECHO (11.23.24): 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     - s/p MVR (9.20.23): Mitral valve replacement with a 25 mm mosaic porcine valve    - Moderate TR    - Mild MR  Acute Hypoxemic Respiratory Failure - requiring oxygenation   PNA ?    - CTA Chest (11.22.24): Llower lung lobes compressive consolidations.   Bilateral Pleural Effusions  Anemia (Mild)  Hyperkalemia - Resolved  Leukocytosis - Rsolved   COPD Exacerbation ?  Pulmonary HTN  GEMINI  DM II  PAF - Currently SR    - [id number] Score 6 (9.7% Stroke Risk Per Year)    - s/p Ligation of SUKHI (9.20.23) - Endostapler   ABEL  HTN  DVT    - has IVC Filter     - on Xarelto Outpatient   SSS    - s/p PPM (9.25.23): [facility]   HLD  Asthma  Left Thyroid Nodule  GERD  Chronic Sinusitis  Common Variable Immunodeficiency  Glaucoma  No Known History of GI Bleed       Plan:   Continue IV Diurese (Lasix 40 mg oral BID)  NPO after MN  Plan for MARYBETH on 11.27.24  Consents Obtained (Placed in Chart)/Procedure Scheduled   Hold Xarelto; Last Dose on 11.25.24; FD Lovenox  Today and Hold in AM  Consider RHC/LHC on 11.27.24 if able to lie  flat  Might need GORDO TMVR   Keep Mag > 2 and Potassium > 4  Accurate I&O/Daily Weight   Labs in AM: CBC, BMP, and Mag     YANIRA Watkins  Cardiology  [facility]

## 2024-11-27 ENCOUNTER — ANESTHESIA (OUTPATIENT)
Dept: CARDIOLOGY | Facility: HOSPITAL | Age: 79
End: 2024-11-27
Payer: MEDICARE

## 2024-11-27 ENCOUNTER — ANESTHESIA EVENT (OUTPATIENT)
Dept: CARDIOLOGY | Facility: HOSPITAL | Age: 79
End: 2024-11-27
Payer: MEDICARE

## 2024-11-27 LAB
ANION GAP SERPL CALC-SCNC: 14 MEQ/L
BASOPHILS # BLD AUTO: 0.04 X10(3)/MCL
BASOPHILS NFR BLD AUTO: 0.3 %
BSA FOR ECHO PROCEDURE: 2.16 M2
BUN SERPL-MCNC: 21.7 MG/DL (ref 9.8–20.1)
CALCIUM SERPL-MCNC: 8.7 MG/DL (ref 8.4–10.2)
CHLORIDE SERPL-SCNC: 98 MMOL/L (ref 98–107)
CO2 SERPL-SCNC: 24 MMOL/L (ref 23–31)
CREAT SERPL-MCNC: 0.88 MG/DL (ref 0.55–1.02)
CREAT/UREA NIT SERPL: 25
DOP CALC MV VTI: 97.7 CM
EOSINOPHIL # BLD AUTO: 0.04 X10(3)/MCL (ref 0–0.9)
EOSINOPHIL NFR BLD AUTO: 0.3 %
ERYTHROCYTE [DISTWIDTH] IN BLOOD BY AUTOMATED COUNT: 16.9 % (ref 11.5–17)
GFR SERPLBLD CREATININE-BSD FMLA CKD-EPI: >60 ML/MIN/1.73/M2
GLUCOSE SERPL-MCNC: 212 MG/DL (ref 82–115)
HCT VFR BLD AUTO: 40.5 % (ref 37–47)
HGB BLD-MCNC: 13.2 G/DL (ref 12–16)
IMM GRANULOCYTES # BLD AUTO: 0.08 X10(3)/MCL (ref 0–0.04)
IMM GRANULOCYTES NFR BLD AUTO: 0.6 %
LYMPHOCYTES # BLD AUTO: 3.17 X10(3)/MCL (ref 0.6–4.6)
LYMPHOCYTES NFR BLD AUTO: 23.4 %
MAGNESIUM SERPL-MCNC: 2.4 MG/DL (ref 1.6–2.6)
MCH RBC QN AUTO: 29.5 PG (ref 27–31)
MCHC RBC AUTO-ENTMCNC: 32.6 G/DL (ref 33–36)
MCV RBC AUTO: 90.4 FL (ref 80–94)
MONOCYTES # BLD AUTO: 1.87 X10(3)/MCL (ref 0.1–1.3)
MONOCYTES NFR BLD AUTO: 13.8 %
MV MEAN GRADIENT: 16 MMHG
MV PEAK GRADIENT: 25 MMHG
NEUTROPHILS # BLD AUTO: 8.37 X10(3)/MCL (ref 2.1–9.2)
NEUTROPHILS NFR BLD AUTO: 61.6 %
NRBC BLD AUTO-RTO: 0 %
PLATELET # BLD AUTO: 201 X10(3)/MCL (ref 130–400)
PMV BLD AUTO: 10.8 FL (ref 7.4–10.4)
POCT GLUCOSE: 215 MG/DL (ref 70–110)
POCT GLUCOSE: 328 MG/DL (ref 70–110)
POTASSIUM SERPL-SCNC: 4.4 MMOL/L (ref 3.5–5.1)
RBC # BLD AUTO: 4.48 X10(6)/MCL (ref 4.2–5.4)
SODIUM SERPL-SCNC: 136 MMOL/L (ref 136–145)
WBC # BLD AUTO: 13.57 X10(3)/MCL (ref 4.5–11.5)

## 2024-11-27 PROCEDURE — 94640 AIRWAY INHALATION TREATMENT: CPT

## 2024-11-27 PROCEDURE — 36415 COLL VENOUS BLD VENIPUNCTURE: CPT

## 2024-11-27 PROCEDURE — 99223 1ST HOSP IP/OBS HIGH 75: CPT | Mod: ,,, | Performed by: PHYSICIAN ASSISTANT

## 2024-11-27 PROCEDURE — 94761 N-INVAS EAR/PLS OXIMETRY MLT: CPT

## 2024-11-27 PROCEDURE — 63600175 PHARM REV CODE 636 W HCPCS: Performed by: STUDENT IN AN ORGANIZED HEALTH CARE EDUCATION/TRAINING PROGRAM

## 2024-11-27 PROCEDURE — 85025 COMPLETE CBC W/AUTO DIFF WBC: CPT

## 2024-11-27 PROCEDURE — C1751 CATH, INF, PER/CENT/MIDLINE: HCPCS | Performed by: STUDENT IN AN ORGANIZED HEALTH CARE EDUCATION/TRAINING PROGRAM

## 2024-11-27 PROCEDURE — 80048 BASIC METABOLIC PNL TOTAL CA: CPT

## 2024-11-27 PROCEDURE — 25500020 PHARM REV CODE 255: Performed by: STUDENT IN AN ORGANIZED HEALTH CARE EDUCATION/TRAINING PROGRAM

## 2024-11-27 PROCEDURE — 99900035 HC TECH TIME PER 15 MIN (STAT)

## 2024-11-27 PROCEDURE — 63600175 PHARM REV CODE 636 W HCPCS: Performed by: INTERNAL MEDICINE

## 2024-11-27 PROCEDURE — 63600175 PHARM REV CODE 636 W HCPCS: Performed by: NURSE ANESTHETIST, CERTIFIED REGISTERED

## 2024-11-27 PROCEDURE — C1894 INTRO/SHEATH, NON-LASER: HCPCS | Performed by: STUDENT IN AN ORGANIZED HEALTH CARE EDUCATION/TRAINING PROGRAM

## 2024-11-27 PROCEDURE — 99900031 HC PATIENT EDUCATION (STAT)

## 2024-11-27 PROCEDURE — 83735 ASSAY OF MAGNESIUM: CPT

## 2024-11-27 PROCEDURE — 97110 THERAPEUTIC EXERCISES: CPT | Mod: CQ

## 2024-11-27 PROCEDURE — 4A023N8 MEASUREMENT OF CARDIAC SAMPLING AND PRESSURE, BILATERAL, PERCUTANEOUS APPROACH: ICD-10-PCS | Performed by: STUDENT IN AN ORGANIZED HEALTH CARE EDUCATION/TRAINING PROGRAM

## 2024-11-27 PROCEDURE — 94760 N-INVAS EAR/PLS OXIMETRY 1: CPT

## 2024-11-27 PROCEDURE — 25000003 PHARM REV CODE 250: Performed by: INTERNAL MEDICINE

## 2024-11-27 PROCEDURE — 25000003 PHARM REV CODE 250

## 2024-11-27 PROCEDURE — B2111ZZ FLUOROSCOPY OF MULTIPLE CORONARY ARTERIES USING LOW OSMOLAR CONTRAST: ICD-10-PCS | Performed by: STUDENT IN AN ORGANIZED HEALTH CARE EDUCATION/TRAINING PROGRAM

## 2024-11-27 PROCEDURE — C1769 GUIDE WIRE: HCPCS | Performed by: STUDENT IN AN ORGANIZED HEALTH CARE EDUCATION/TRAINING PROGRAM

## 2024-11-27 PROCEDURE — 93460 R&L HRT ART/VENTRICLE ANGIO: CPT | Performed by: STUDENT IN AN ORGANIZED HEALTH CARE EDUCATION/TRAINING PROGRAM

## 2024-11-27 PROCEDURE — 25000242 PHARM REV CODE 250 ALT 637 W/ HCPCS: Performed by: STUDENT IN AN ORGANIZED HEALTH CARE EDUCATION/TRAINING PROGRAM

## 2024-11-27 PROCEDURE — 21400001 HC TELEMETRY ROOM

## 2024-11-27 RX ORDER — FUROSEMIDE 10 MG/ML
40 INJECTION INTRAMUSCULAR; INTRAVENOUS DAILY
Status: DISCONTINUED | OUTPATIENT
Start: 2024-11-28 | End: 2024-12-02

## 2024-11-27 RX ORDER — ASPIRIN 81 MG/1
81 TABLET ORAL DAILY
Status: DISCONTINUED | OUTPATIENT
Start: 2024-11-28 | End: 2024-12-03 | Stop reason: HOSPADM

## 2024-11-27 RX ORDER — GLUCAGON 1 MG
1 KIT INJECTION
OUTPATIENT
Start: 2024-11-27

## 2024-11-27 RX ORDER — PROPOFOL 10 MG/ML
INJECTION, EMULSION INTRAVENOUS
Status: DISCONTINUED | OUTPATIENT
Start: 2024-11-27 | End: 2024-11-27

## 2024-11-27 RX ORDER — HYDROCODONE BITARTRATE AND ACETAMINOPHEN 5; 325 MG/1; MG/1
1 TABLET ORAL EVERY 4 HOURS PRN
Status: DISCONTINUED | OUTPATIENT
Start: 2024-11-27 | End: 2024-12-03 | Stop reason: HOSPADM

## 2024-11-27 RX ORDER — MORPHINE SULFATE 4 MG/ML
2 INJECTION, SOLUTION INTRAMUSCULAR; INTRAVENOUS EVERY 4 HOURS PRN
Status: DISCONTINUED | OUTPATIENT
Start: 2024-11-27 | End: 2024-12-03 | Stop reason: HOSPADM

## 2024-11-27 RX ORDER — ONDANSETRON HYDROCHLORIDE 2 MG/ML
4 INJECTION, SOLUTION INTRAVENOUS ONCE AS NEEDED
OUTPATIENT
Start: 2024-11-27 | End: 2036-04-25

## 2024-11-27 RX ORDER — LIDOCAINE HYDROCHLORIDE 20 MG/ML
INJECTION INTRAVENOUS
Status: DISCONTINUED | OUTPATIENT
Start: 2024-11-27 | End: 2024-11-27

## 2024-11-27 RX ORDER — HYDRALAZINE HYDROCHLORIDE 20 MG/ML
10 INJECTION INTRAMUSCULAR; INTRAVENOUS EVERY 4 HOURS PRN
Status: DISCONTINUED | OUTPATIENT
Start: 2024-11-27 | End: 2024-12-03 | Stop reason: HOSPADM

## 2024-11-27 RX ORDER — IOPAMIDOL 755 MG/ML
INJECTION, SOLUTION INTRAVASCULAR
Status: DISCONTINUED | OUTPATIENT
Start: 2024-11-27 | End: 2024-11-27 | Stop reason: HOSPADM

## 2024-11-27 RX ORDER — LIDOCAINE HYDROCHLORIDE 10 MG/ML
INJECTION, SOLUTION EPIDURAL; INFILTRATION; INTRACAUDAL; PERINEURAL
Status: DISCONTINUED | OUTPATIENT
Start: 2024-11-27 | End: 2024-11-27 | Stop reason: HOSPADM

## 2024-11-27 RX ADMIN — PANCRELIPASE 2 CAPSULE: 120000; 24000; 76000 CAPSULE, DELAYED RELEASE PELLETS ORAL at 04:11

## 2024-11-27 RX ADMIN — AMIODARONE HYDROCHLORIDE 200 MG: 200 TABLET ORAL at 08:11

## 2024-11-27 RX ADMIN — PROPOFOL 20 MG: 10 INJECTION, EMULSION INTRAVENOUS at 01:11

## 2024-11-27 RX ADMIN — PANTOPRAZOLE SODIUM 40 MG: 40 TABLET, DELAYED RELEASE ORAL at 08:11

## 2024-11-27 RX ADMIN — LIDOCAINE HYDROCHLORIDE 80 MG: 20 INJECTION INTRAVENOUS at 01:11

## 2024-11-27 RX ADMIN — INSULIN ASPART 4 UNITS: 100 INJECTION, SOLUTION INTRAVENOUS; SUBCUTANEOUS at 08:11

## 2024-11-27 RX ADMIN — PROPOFOL 50 MG: 10 INJECTION, EMULSION INTRAVENOUS at 01:11

## 2024-11-27 RX ADMIN — PANCRELIPASE 2 CAPSULE: 120000; 24000; 76000 CAPSULE, DELAYED RELEASE PELLETS ORAL at 08:11

## 2024-11-27 RX ADMIN — CEFTRIAXONE SODIUM 2 G: 2 INJECTION, POWDER, FOR SOLUTION INTRAMUSCULAR; INTRAVENOUS at 09:11

## 2024-11-27 RX ADMIN — FAMOTIDINE 40 MG: 20 TABLET, FILM COATED ORAL at 08:11

## 2024-11-27 RX ADMIN — ATORVASTATIN CALCIUM 40 MG: 40 TABLET, FILM COATED ORAL at 08:11

## 2024-11-27 RX ADMIN — INSULIN ASPART 4 UNITS: 100 INJECTION, SOLUTION INTRAVENOUS; SUBCUTANEOUS at 12:11

## 2024-11-27 RX ADMIN — IPRATROPIUM BROMIDE AND ALBUTEROL SULFATE 3 ML: 2.5; .5 SOLUTION RESPIRATORY (INHALATION) at 08:11

## 2024-11-27 RX ADMIN — INSULIN ASPART 4 UNITS: 100 INJECTION, SOLUTION INTRAVENOUS; SUBCUTANEOUS at 09:11

## 2024-11-27 RX ADMIN — NIFEDIPINE 30 MG: 30 TABLET, FILM COATED, EXTENDED RELEASE ORAL at 08:11

## 2024-11-27 RX ADMIN — METOPROLOL SUCCINATE 50 MG: 50 TABLET, EXTENDED RELEASE ORAL at 08:11

## 2024-11-27 RX ADMIN — ACETAMINOPHEN 325MG 650 MG: 325 TABLET ORAL at 11:11

## 2024-11-27 RX ADMIN — PANCRELIPASE 2 CAPSULE: 120000; 24000; 76000 CAPSULE, DELAYED RELEASE PELLETS ORAL at 12:11

## 2024-11-27 RX ADMIN — FUROSEMIDE 40 MG: 10 INJECTION, SOLUTION INTRAMUSCULAR; INTRAVENOUS at 09:11

## 2024-11-27 NOTE — ANESTHESIA PREPROCEDURE EVALUATION
11/27/2024  Kortney Leach is a 79 y.o., female, who presents for the following:    Scheduled providers: Sanket Lombardo MD; Raiza Nicole CRNA; Nani Castro RNProcedure: TRANSESOPHAGEAL ECHO (MARYBETH) (CUPID ONLY)Diagnosis:      Severe mitral valve regurgitation [I34.0]      SOB (shortness of breath) [R06.02]      Hypoxia [R09.02]      COPD exacerbation [J44.1]      Acute hypoxemic respiratory failure [J96.01]      Congestive heart failure, unspecified HF chronicity, unspecified heart failure type [I50.9]Location: [facility]  Cath Lab Pre/Post     Brief HPI/Hospital Course: Ms. Leach is a 80 y/o female with a history of VHD, GEMINI, DM II, PAF, ABEL, COPD, HTN, DVT SSS, HLD, who is known to [facility], Dr. Uribe/Red. She presented to the ER on 11.22.24 with complaints of shortness of breath. She reports worsening shortness of breath over the last month. She was sent from urgent care for being 88% on room air. She was started on oxygen therapy an sent to the ER. In the ER, she had some respiratory distress and was started on BiPAP. Significant labs WBC 13.53, H&H 11.9/36.2, K 5.3, Chloride 108, CO2 21, Glucose 232, .1. Flu, COVID and COVID negative. CTA Chest negative for PE, but shows Cardiomegaly and mild congestive changes and Bilateral prominent pleural effusions and lower lung lobes compressive consolidations. CXR shows prominence of the right hilum may relate to pulmonary vascular congestion and possible small pleural effusions. [facility] has been consulted for CHF.        ECHO (11.23.24):  Left Ventricle: The left ventricle is normal in size. Mildly increased wall thickness. There is normal systolic function with a visually estimated ejection fraction of 60 - 65%. Grade I diastolic dysfunction. Right Ventricle: Normal right ventricular cavity size. Systolic function is  borderline low. Aortic Valve: There is aortic valve sclerosis. Mitral Valve: There is a bioprosthetic valve in the mitral position (25 mm mosaic porcine valve).  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 There is mild (1+) regurgitation. Tricuspid Valve: There moderate (2+) regurgitation. The estimated pulmonary artery systolic pressure is 57 mmHg. Pacemaker lead noted.  Recommend transesophageal echocardiogram to properly evaluate the bioprosthetic mitral valve.    Assessments:  Acute on chronic diastolic CHF  VHD    Bio MVR stenosis, MG of 19 (but MVR is only 1 year old), will review the echo    Might need GORDO TMVR if this is real  PHTN (57)  PPM  PAF    Was on Amio, had SUKHI-L  DVT    Was on Xarelto      Pre-op Assessment    I have reviewed the Patient Summary Reports.     I have reviewed the Nursing Notes. I have reviewed the NPO Status.   I have reviewed the Medications.     Review of Systems  Anesthesia Hx:  No problems with previous Anesthesia             Denies Family Hx of Anesthesia complications.    Denies Personal Hx of Anesthesia complications.                    Cardiovascular:     Hypertension               Acute on Chronic Heart Failure  MVR - 9/2023  SSS / AF w/ RVR Hx                           Pulmonary:   COPD, moderate Asthma  Shortness of breath  Sleep Apnea                Hepatic/GI:     GERD                Endocrine:  Diabetes, type 2               Physical Exam  General: Alert, Oriented and Cooperative  On O2 per mask  Airway:  Mallampati: III   Mouth Opening: Small, but > 3cm  TM Distance: 4 - 6 cm  Tongue: Normal  Neck ROM: Normal ROM  Short neck  Dental:  Intact    Chest/Lungs:  Normal Respiratory Rate    Heart:  Rate: Normal  Rhythm: Regular Rhythm        Anesthesia Plan  Type of Anesthesia, risks & benefits discussed:    Anesthesia Type: Gen Natural Airway  Intra-op Monitoring Plan:  Standard ASA Monitors  Post Op Pain Control Plan: IV/PO Opioids PRN  Induction:  IV  Airway Plan: Direct  Informed Consent: Informed consent signed with the Patient and all parties understand the risks and agree with anesthesia plan.  All questions answered. Patient consented to blood products? No  ASA Score: 4  Day of Surgery Review of History & Physical: H&P Update referred to the surgeon/provider.  Anesthesia Plan Notes: Nasal cannula vs facemask supplemental oxygenation   For patients with ABEL/obesity, may consider SuperNoval Nasal CPAP      Ready For Surgery From Anesthesia Perspective.     .

## 2024-11-27 NOTE — PROGRESS NOTES
Inpatient Nutrition Evaluation    Admit Date: 11/22/2024   Total duration of encounter: 5 days    Nutrition Recommendation/Prescription     Diet NPO ordered, previous diet diabetic Cardiac (Low Na/Chol), Lactose Free; 2000 Calories (up to 75 gm per meal)   Boost Glucose control daily (provides 190 kcal and 16 g protein per container)  Encouraged adequate PO intake  Monitor appetite/PO intake, weight, and labs    Nutrition Assessment     Chart Review    Reason Seen: continuous nutrition monitoring and follow-up COPD    Malnutrition Screening Tool Results   Have you recently lost weight without trying?: No  Have you been eating poorly because of a decreased appetite?: No   MST Score: 0     Diagnosis:  Acute on chronic diastolic decompensated HF  Acute respiratory failure with hypoxia requiring supplemental oxygen 2/2 B/L pulmonary edema   Obesity   AF   HTN   VHD s/p MVR  CAD   DVT   IVC filter  DM   GERD   Chronic sinusitis s/p sinus surgery   ABEL   Obesity   Asthma    Relevant Medical History:    Anticoagulant long-term use      Asthma      Chronic sinusitis, unspecified      COPD (chronic obstructive pulmonary disease)      CVID (common variable immunodeficiency)      Diabetes mellitus, type 2      GERD (gastroesophageal reflux disease)      Hypertension      Severe mitral valve regurgitation      Sleep apnea       uses CPAP    SOB (shortness of breath)      Unspecified glaucoma      Weakness        Nutrition-Related Medications:   Scheduled Meds:   albuterol-ipratropium  3 mL Nebulization Q8H    amiodarone  200 mg Oral Daily    atorvastatin  40 mg Oral Daily    cefTRIAXone (Rocephin) IV (PEDS and ADULTS)  2 g Intravenous Q24H    famotidine  40 mg Oral QHS    [START ON 11/28/2024] furosemide (LASIX) injection  40 mg Intravenous Daily    lipase-protease-amylase 24,000-76,000-120,000 units  2 capsule Oral TID WM    metoprolol succinate  50 mg Oral Daily    NIFEdipine  30 mg Oral Daily    pantoprazole  40 mg Oral QAM      Continuous Infusions:  PRN Meds:.  Current Facility-Administered Medications:     acetaminophen, 650 mg, Oral, Q8H PRN    acetaminophen, 650 mg, Oral, Q8H PRN    albuterol-ipratropium, 3 mL, Nebulization, Q4H PRN    dextrose 10%, 12.5 g, Intravenous, PRN    dextrose 10%, 25 g, Intravenous, PRN    glucagon (human recombinant), 1 mg, Intramuscular, PRN    glucose, 16 g, Oral, PRN    glucose, 24 g, Oral, PRN    hydrALAZINE, 10 mg, Intravenous, Q4H PRN    HYDROcodone-acetaminophen, 1 tablet, Oral, Q4H PRN    insulin aspart U-100, 0-10 Units, Subcutaneous, QID (AC + HS) PRN    iopamidoL, , , PRN    LIDOcaine (PF) 10 mg/ml (1%), , , PRN    melatonin, 6 mg, Oral, Nightly PRN    morphine, 2 mg, Intravenous, Q4H PRN    ondansetron, 4 mg, Intravenous, Q8H PRN    sodium chloride 0.9%, 10 mL, Intravenous, PRN      Nutrition-Related Labs:  Recent Labs   Lab 11/22/24  1037 11/22/24 2027 11/23/24  0532 11/24/24  0347 11/25/24  0418 11/26/24  0408 11/27/24  0424     --  140 143 139 136 136   K 4.4  --  5.3* 4.6 4.0 3.7 4.4   CALCIUM 9.3  --  8.7 9.1 8.5 8.5 8.7   MG  --   --   --  2.00 1.90 1.90 2.40   *  --  108* 104 102 98 98   CO2 17*  --  21* 24 23 25 24   BUN 13.0  --  25.6* 27.6* 22.2* 17.4 21.7*   CREATININE 0.84  --  0.94 0.85 0.80 0.80 0.88   EGFRNORACEVR >60  --  >60 >60 >60 >60 >60   GLUCOSE 166*  --  232* 157* 194* 204* 212*   BILITOT 0.8  --  0.5  --   --   --   --    ALKPHOS 70  --  65  --   --   --   --    ALT 13  --  12  --   --   --   --    AST 22  --  20  --   --   --   --    ALBUMIN 3.4  --  3.3*  --   --   --   --    HGBA1C  --  6.8  --   --   --   --   --    WBC 13.03*  --  13.53* 14.15* 11.10 12.47* 13.57*   HGB 13.1  --  11.9* 13.0 12.6 13.1 13.2   HCT 40.7  --  36.2* 40.1 39.0 40.0 40.5         Diet Order: Diet NPO Except for: Sips with Medication  Oral Supplement Order: none  Appetite/Oral Intake: NPO/NPO  Factors Affecting Nutritional Intake: decreased appetite and  "NPO  Food/Mormonism/Cultural Preferences: none reported  Food Allergies: none reported       Wound(s):   none noted    Comments    2024: Unable to provide NFPE due to pt being asleep, spoke with pt's sister. The sister reports a poor appetite/PO intake for ~2 weeks prior to admit and currently. The sister agreeable to vanilla ONS. The sister denies vomiting, constipation, diarrhea, and chewing/swallowing difficulties. The sister reports nausea. Per EMR, pt weighed 99.8 kg on 10/22/2024 (3.4% wt loss in 1 month, insignificant). Encouraged adequate PO intake. Last BM documented as 2024. Will monitor.    2024: Pt outside of room at time of visit. Pt NPO. Per nursing student, pt had ~50% PO intake yesterday. No N/V/D reported. Last BM documented as 2024. Will monitor.    Anthropometrics    Height: 5' 6" (167.6 cm) Height Method: Stated  Last Weight: 96.4 kg (212 lb 8 oz) (24 0500) Weight Method: Bed Scale  BMI (Calculated): 34.3  BMI Classification: obese grade I (BMI 30-34.9)     Ideal Body Weight (IBW), Female: 130 lb     % Ideal Body Weight, Female (lb): 169.23 %                    Usual Body Weight (UBW), k.8 kg  % Usual Body Weight: 96.78     Usual Weight Provided By: EMR weight history    Wt Readings from Last 5 Encounters:   24 96.4 kg (212 lb 8 oz)   24 99.8 kg (220 lb)   24 99.8 kg (220 lb)   24 93.4 kg (206 lb)   10/24/23 93.4 kg (206 lb)     Weight Change(s) Since Admission:  Admit Weight: 99.8 kg (220 lb) (24 0959)  2024: 96.4 kg  2024: no new wts    Patient Education    Not applicable.    Monitoring & Evaluation     Dietitian will monitor food and beverage intake, weight, electrolyte/renal panel, glucose/endocrine profile, and gastrointestinal profile.  Nutrition Risk/Follow-Up: low (follow-up in 5-7 days)  Patients assigned 'low nutrition risk' status do not qualify for a full nutritional assessment but will be monitored and " re-evaluated in a 5-7 day time period. Please consult if re-evaluation needed sooner.

## 2024-11-27 NOTE — CONSULTS
CT SURGERY PROGRESS NOTE  Kortney Leach  79 y.o.  1945    Patients Procedure: Procedure(s) (LRB):  CATHETERIZATION, HEART, BOTH LEFT AND RIGHT (N/A)    Subjective  Interval History:  80 y/o female with a history of VHD, GEMINI, DM II, PAF, ABEL, COPD, HTN, DVT SSS, HLD, who is known to [facility], Dr. Uribe/Red.     No [resents ED 11/22 CC shortness of breath over the last month. She was sent from urgent care for being 88% on room air    WBC 13.53, H&H 11.9/36.2, K 5.3, Chloride 108, CO2 21, Glucose 232, .1. Flu, COVID and COVID negative.     CTA Chest negative for PE, but shows Cardiomegaly and mild congestive changes and Bilateral prominent pleural effusions and lower lung lobes compressive consolidations.     CXR prominence of the right hilum may relate to pulmonary vascular congestion and possible small pleural effusions.        PMH: VHD, GEMINI, DM II, PAF, ABEL, COPD, HTN, DVT SSS, HLD, Asthma, Left Thyroid Nodule, GERD, Chronic Sinusitis, Common Variable Immunodeficiency, Glaucoma  PSH: PPM, LHC, MVR, Bilateral Cataract Extraction, Total Knee Replacement, Bilateral mastectomy, Appendectomy, Colonoscopy, EGD, Tonsillectomy,    Family History: Father - Lung Cancer; Mother - Alzheimer, CVA; Sister - HTN, Multiple Sclerosis, DM II  Social History: Former Smoker (Quit in 1998). Denies illicit drug use and alcohol use.      Previous Cardiac Diagnostics:   ECHO (11.23.24):  Left Ventricle: The left ventricle is normal in size. Mildly increased wall thickness. There is normal systolic function with a visually estimated ejection fraction of 60 - 65%. Grade I diastolic dysfunction. Right Ventricle: Normal right ventricular cavity size. Systolic function is borderline low. Aortic Valve: There is aortic valve sclerosis. Mitral Valve: There is a bioprosthetic valve in the mitral position (25 mm mosaic porcine valve).  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 There is mild (1+) regurgitation. Tricuspid Valve: There moderate (2+) regurgitation. The estimated pulmonary artery systolic pressure is 57 mmHg. Pacemaker lead noted.  Recommend transesophageal echocardiogram to properly evaluate the bioprosthetic mitral valve.    Review of Systems   Constitutional:  Positive for malaise/fatigue.   HENT: Negative.     Eyes: Negative.    Respiratory:  Positive for shortness of breath.    Cardiovascular:         HPI   Gastrointestinal: Negative.    Musculoskeletal: Negative.    Neurological: Negative.    Psychiatric/Behavioral: Negative.         Medication List  Infusions    Scheduled   albuterol-ipratropium  3 mL Nebulization Q8H    amiodarone  200 mg Oral Daily    [START ON 11/28/2024] aspirin  81 mg Oral Daily    atorvastatin  40 mg Oral Daily    cefTRIAXone (Rocephin) IV (PEDS and ADULTS)  2 g Intravenous Q24H    famotidine  40 mg Oral QHS    [START ON 11/28/2024] furosemide (LASIX) injection  40 mg Intravenous Daily    lipase-protease-amylase 24,000-76,000-120,000 units  2 capsule Oral TID WM    metoprolol succinate  50 mg Oral Daily    NIFEdipine  30 mg Oral Daily    pantoprazole  40 mg Oral QAM       Objective:  Recent Vitals:  Temp:  [97.4 °F (36.3 °C)-99.2 °F (37.3 °C)] 98.7 °F (37.1 °C)  Pulse:  [72-87] 72  Resp:  [16-20] 20  SpO2:  [88 %-97 %] 97 %  BP: (116-139)/() 118/85    Physical Exam  Constitutional:       Appearance: Normal appearance.   HENT:      Head: Normocephalic.   Eyes:      Extraocular Movements: Extraocular movements intact.      Pupils: Pupils are equal, round, and reactive to light.   Cardiovascular:      Rate and Rhythm: Normal rate. Rhythm irregular.      Heart sounds: Murmur heard.      Systolic murmur is present.      Diastolic murmur is present.   Pulmonary:      Effort: Pulmonary effort is normal.   Abdominal:      General: Abdomen is flat.   Musculoskeletal:         General: Normal range of  "motion.      Right lower leg: Edema present.      Left lower leg: Edema present.   Skin:     General: Skin is warm.   Neurological:      General: No focal deficit present.      Mental Status: She is alert.   Psychiatric:         Mood and Affect: Mood normal.          I/O last 24 hrs:  Intake/Output - Last 3 Shifts         11/25 0700 11/26 0659 11/26 0700 11/27 0659 11/27 0700 11/28 0659    P.O. 780 440 0    Total Intake(mL/kg) 780 (8.1) 440 (4.6) 0 (0)    Urine (mL/kg/hr) 1900 (0.8) 200 (0.1) 350 (0.3)    Stool 0  0    Total Output 1900 200 350    Net -1120 +240 -350           Urine Occurrence 1 x      Stool Occurrence 0 x 0 x 0 x            Labs  ABGs: No results for input(s): "PH", "PCO2", "PO2", "HCO3", "POCSATURATED", "BE" in the last 48 hours.  BMP:   Recent Labs   Lab 11/27/24 0424      K 4.4   CL 98   CO2 24   BUN 21.7*   CREATININE 0.88   CALCIUM 8.7   MG 2.40     Cardiac markers: No results for input(s): "CKMB", "CPKMB", "TROPONINT", "TROPONINI", "MYOGLOBIN" in the last 48 hours.  CBC:   Recent Labs   Lab 11/27/24 0424   WBC 13.57*   RBC 4.48   HGB 13.2   HCT 40.5      MCV 90.4   MCH 29.5   MCHC 32.6*     CMP:   Recent Labs   Lab 11/27/24 0424   CALCIUM 8.7      K 4.4   CO2 24   CL 98   BUN 21.7*   CREATININE 0.88         Imaging:   CT: No results found in the last 24 hours.  CXR: No results found in the last 24 hours.  Image Hyperlink     Show images for Transesophageal echo (MARYBETH)  Summary  Show Result Comparison     Left Ventricle: The left ventricle is normal in size. Septal motion is consistent with post-operative status. There is normal systolic function with a visually estimated ejection fraction of 60 - 65%.    Right Ventricle: Normal right ventricular cavity size. Systolic function is normal.    Left Atrium: Left atrium is severely dilated.    Mitral Valve: There is a bioprosthetic valve in the mitral position. There is severe stenosis. The mean pressure gradient across the " mitral valve is 16 mmHg at a heart rate of  bpm. There is moderate regurgitation.        Documentation Hyperlink     Show images for Cardiac catheterization    ASSESSMENT/PLAN:    Pt found to have persistent CHF with apparent prosthetic jazlyn valve stenosis and mild-moderate mitral regurgitation. Pt received MVR (25mm MDT Mosaic Bioprosthesis) and at that time was noted to have severe/circumferential mitral valve annular calcification.  This was aggressively debrided but was only able to accommodate at 25mm bioprosthesis which was tiled to allow adequate placement without paravalvular regurgitation.  Considering these factors, it is unlikely that valve-in-valve procedure will improve mitral valve stenosis due to the likelihood that the mitral valve annulus will be further narrowed by adding tissue/stent inside an already narrow orifice.  MARYBETH is insufficient to assess the location or presence of paravalvular regurgitation, the patient does have a small central closing jet which is common for this valve prosthesis, especially when the valve is oversized for the annulus.  3D MARYBETH would be better to assess the location of other regurgitant areas. LHC and PCI noted.    Would recommend optimization of CHF and heart team discussion.

## 2024-11-27 NOTE — PROGRESS NOTES
"[facility]    Cardiology  Progress Note    Patient Name: Kortney Leach  MRN: [id number]  Admission Date: 11/22/2024  Hospital Length of Stay: 5 days  Code Status: Full Code   Attending Physician: Geovanny Franklin MD   Primary Care Physician: Arnaldo Hernandez MD  Expected Discharge Date:   Principal Problem:COPD exacerbation    Subjective:     Brief HPI/Hospital Course: Ms. Leach is a 78 y/o female with a history of VHD, GEMINI, DM II, PAF, ABEL, COPD, HTN, DVT SSS, HLD, who is known to [facility], Dr. Uribe/Red. She presented to the ER on 11.22.24 with complaints of shortness of breath. She reports worsening shortness of breath over the last month. She was sent from urgent care for being 88% on room air. She was started on oxygen therapy an sent to the ER. In the ER, she had some respiratory distress and was started on BiPAP. Significant labs WBC 13.53, H&H 11.9/36.2, K 5.3, Chloride 108, CO2 21, Glucose 232, .1. Flu, COVID and COVID negative. CTA Chest negative for PE, but shows Cardiomegaly and mild congestive changes and Bilateral prominent pleural effusions and lower lung lobes compressive consolidations. Cxr shows prominence of the right hilum may relate to pulmonary vascular congestion and possible small pleural effusions. [facility] has been consulted for CHF.        11.24.24: NAD. VSS. No complaints of CP/Palps. Reports some improvement in her SOB. 2350 urine output/1640 ml Fluid Negative. Crackle noted in bases.   11.25.24: NAD. VSS. No complaints of CP/Palp. Remains SOB. 1800 urine output/1700 ml Net Negative . Labs Stable.   11.26.24: NAD. VSS. No complaints of CP/SOB/Palps. Reports shortness of breath has significantly improved, but unable to lye flat. 1900 urine output over 24 hours/Fluid net negative 1120. WBC 12.47  11.27.24: NAD. VSS. No complaints of CP/Palps. Reports shortness of breath. "I feel okay" WBC 13.57. no output documented.     PMH: VHD, GEMINI, DM II, PAF, ABEL, COPD, " HTN, DVT SSS, HLD, Asthma, Left Thyroid Nodule, GERD, Chronic Sinusitis, Common Variable Immunodeficiency, Glaucoma  PSH: PPM, LHC, MVR, Bilateral Cataract Extraction, Total Knee Replacement, Bilateral mastectomy, Appendectomy, Colonoscopy, EGD, Tonsillectomy,    Family History: Father - Lung Cancer; Mother - Alzheimer, CVA; Sister - HTN, Multiple Sclerosis, DM II  Social History: Former Smoker (Quit in 1998). Denies illicit drug use and alcohol use.      Previous Cardiac Diagnostics:   ECHO (11.23.24):  Left Ventricle: The left ventricle is normal in size. Mildly increased wall thickness. There is normal systolic function with a visually estimated ejection fraction of 60 - 65%. Grade I diastolic dysfunction. Right Ventricle: Normal right ventricular cavity size. Systolic function is borderline low. Aortic Valve: There is aortic valve sclerosis. Mitral Valve: There is a bioprosthetic valve in the mitral position (25 mm mosaic porcine valve).  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 There is mild (1+) regurgitation. Tricuspid Valve: There moderate (2+) regurgitation. The estimated pulmonary artery systolic pressure is 57 mmHg. Pacemaker lead noted.  Recommend transesophageal echocardiogram to properly evaluate the bioprosthetic mitral valve.    ECHO (11.14.24):  The study quality is average. The left ventricle is normal in size. Global left ventricular systolic function is normal. The left ventricular ejection fraction is 65%. Left ventricular diastolic function is normal. Mild concentric left ventricular hypertrophy is present. The left atrium is moderately enlarged. (4.6 cms). A normal functioning prosthetic mitral valve is noted. MG = 4.3 mmHg. The pulmonary artery systolic pressure is 37 mmHg.      PPM Insertion (9.25.23):  Successful implantation of PPM Dual. [facility]      PET (1.24.23):  This is a normal  perfusion study, no perfusion defects noted. There is no evidence of ischemia.This scan is suggestive of low risk for future cardiovascular events. The left ventricular cavity is noted to be normal on the stress studies. The stress left ventricular ejection fraction was calculated to be 68% and left ventricular global function is normal. The rest left ventricular cavity is noted to be normal. The rest left ventricular ejection fraction was calculated to be 63% and rest left ventricular global function is normal. When compared to the resting ejection fraction (63%), the stress ejection fraction (68%) has increased. Transient ischemic dilatation is present and has been described as a marker for high risk coronary artery disease. It has also been described in microvascular disease, hypertensive heart disease as well as cardiac deconditioning. The study quality is good.   There was a rise in myocardial blood flow between rest and stress.  Global myocardial blood flow reserve was 2.72.  Normal global coronary flow reserve is suggestive of low coronary event risk.     Carotid US (1.24.23):  The study quality is good. There is no plaque noted in the proximal right internal carotid artery. 1-39% stenosis in the proximal left internal carotid artery based on Bluth Criteria. Antegrade right vertebral artery flow. Antegrade left vertebral artery flow.     LHC (9.6.23):  Normal Coronaries     Review of Systems   Cardiovascular:  Negative for chest pain, dyspnea on exertion, leg swelling and palpitations.   Respiratory:  Positive for shortness of breath.    All other systems reviewed and are negative.    Objective:     Vital Signs (Most Recent):  Temp: 97.5 °F (36.4 °C) (11/27/24 0700)  Pulse: 76 (11/27/24 0700)  Resp: 18 (11/27/24 0700)  BP: (!) 139/100 (11/27/24 0700)  SpO2: (!) 90 % (11/27/24 0700) Vital Signs (24h Range):  Temp:  [97.4 °F (36.3 °C)-99.2 °F (37.3 °C)] 97.5 °F (36.4 °C)  Pulse:  [] 76  Resp:  [18-20]  18  SpO2:  [88 %-96 %] 90 %  BP: (114-139)/() 139/100   Weight: 96.4 kg (212 lb 8 oz)  Body mass index is 34.3 kg/m².  SpO2: (!) 90 %       Intake/Output Summary (Last 24 hours) at 11/27/2024 0722  Last data filed at 11/26/2024 1254  Gross per 24 hour   Intake 440 ml   Output --   Net 440 ml     Lines/Drains/Airways       Peripheral Intravenous Line  Duration                  Peripheral IV - Single Lumen 11/22/24 1309 20 G Anterior;Right Forearm 4 days                    Significant Labs:   Chemistries:   Recent Labs   Lab 11/22/24  1037 11/23/24  0532 11/23/24  0532 11/24/24  0347 11/25/24 0418 11/26/24 0408 11/27/24 0424    140  --  143 139 136 136   K 4.4 5.3*  --  4.6 4.0 3.7 4.4   * 108*  --  104 102 98 98   CO2 17* 21*  --  24 23 25 24   BUN 13.0 25.6*  --  27.6* 22.2* 17.4 21.7*   CREATININE 0.84 0.94  --  0.85 0.80 0.80 0.88   CALCIUM 9.3 8.7  --  9.1 8.5 8.5 8.7   BILITOT 0.8 0.5  --   --   --   --   --    ALKPHOS 70 65  --   --   --   --   --    ALT 13 12  --   --   --   --   --    AST 22 20  --   --   --   --   --    GLUCOSE 166* 232*  --  157* 194* 204* 212*   MG  --   --    < > 2.00 1.90 1.90 2.40   TROPONINI 0.031  --   --   --   --   --   --     < > = values in this interval not displayed.        CBC/Anemia Labs: Coags:    Recent Labs   Lab 11/25/24 0418 11/26/24 0408 11/27/24 0424   WBC 11.10 12.47* 13.57*   HGB 12.6 13.1 13.2   HCT 39.0 40.0 40.5    192 201   MCV 91.5 91.1 90.4   RDW 17.4* 17.1* 16.9    Recent Labs   Lab 11/22/24  1037   INR 2.2*   APTT 33.9*        Telemetry:  SR    Physical Exam  Vitals and nursing note reviewed.   HENT:      Head: Normocephalic.      Nose: Nose normal.      Mouth/Throat:      Mouth: Mucous membranes are dry.   Eyes:      Conjunctiva/sclera: Conjunctivae normal.   Cardiovascular:      Rate and Rhythm: Normal rate and regular rhythm.      Pulses: Normal pulses.      Heart sounds: Murmur heard.   Pulmonary:      Effort: Pulmonary  effort is normal.      Breath sounds: No rales.      Comments: Diminished Lung Sounds   On RA  Abdominal:      Palpations: Abdomen is soft.   Musculoskeletal:         General: Normal range of motion.      Cervical back: Normal range of motion.   Skin:     General: Skin is warm.   Neurological:      Mental Status: She is alert and oriented to person, place, and time.   Psychiatric:         Mood and Affect: Mood normal.         Behavior: Behavior normal.       Current Schedule Inpatient Medications:   albuterol-ipratropium  3 mL Nebulization Q8H    amiodarone  200 mg Oral Daily    atorvastatin  40 mg Oral Daily    cefTRIAXone (Rocephin) IV (PEDS and ADULTS)  2 g Intravenous Q24H    famotidine  40 mg Oral QHS    furosemide (LASIX) injection  40 mg Intravenous Q12H    lipase-protease-amylase 24,000-76,000-120,000 units  2 capsule Oral TID WM    metoprolol succinate  50 mg Oral Daily    NIFEdipine  30 mg Oral Daily    pantoprazole  40 mg Oral QAM     Continuous Infusions:      Assessment:   Acute Diastolic Heart Failure - likely related to VHD    - EF 60-65% with G 1 Diastolic Dysfunction (11.14.24)   VHD    - ECHO (11.23.24): 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     - s/p MVR (9.20.23): Mitral valve replacement with a 25 mm mosaic porcine valve    - Moderate TR    - Mild MR  Acute Hypoxemic Respiratory Failure - requiring oxygenation   PNA ?    - CTA Chest (11.22.24): Llower lung lobes compressive consolidations.   Bilateral Pleural Effusions  Anemia (Mild)  Hyperkalemia - Resolved  Leukocytosis - Rsolved   COPD Exacerbation ?  UTI    - Urine culture shows gram negative rods  Pulmonary HTN  GEMINI  DM II  PAF - Currently SR    - [id number] Score 6 (9.7% Stroke Risk Per Year)    - s/p Ligation of SUHKI (9.20.23) - Endostapler   ABEL  HTN  DVT    - has IVC Filter     - on Xarelto Outpatient   SSS    - s/p PPM (9.25.23):  [facility]   HLD  Asthma  Left Thyroid Nodule  GERD  Chronic Sinusitis  Common Variable Immunodeficiency  Glaucoma  No Known History of GI Bleed       Plan:   Continue IV Diurese (Lasix 40 mg IV Daily)  NPO after MN  Plan for MARYBETH Today   Plan for R/LHC with Valve Study Today   Consents Obtained (Placed in Chart)/Procedure Scheduled   Hold Xarelto; Last Dose on 11.25.24  Might need GORDO TMVR   Keep Mag > 2 and Potassium > 4  Accurate I&O/Daily Weight   Labs in AM: CBC, BMP, and Mag     PROCEDURE APPROPRIATENESS  Planned Procedure: R/LHC  Consent: In Chart   Able to Lie Flat: yes  Creatinine: 0.88  Potassium: 4.4  H&H: 13.2/40.5  Platelets: 201  Anticoagulation: Xarelto; Last dose 11.26.24  Antiplatelets (LOAD for High Risk PCI): none  Metformin: no  IV Dye Allergy: no  Dye Allergy Premeds Given: n/a  NPO: yes  Risk, Benefits and Alternatives Reviewed and Discussed with the PT and their Family and they wish to proceed with above Procedure.     YANIRA Watkins  Cardiology  [facility]

## 2024-11-27 NOTE — PT/OT/SLP PROGRESS
Physical Therapy Treatment    Patient Name:  Kortney Leach   MRN:  [id number]    Recommendations:     Discharge therapy intensity: Moderate Intensity Therapy   Discharge Equipment Recommendations: none  Barriers to discharge: Impaired mobility, Ongoing medical needs, and placement.    Assessment:     Kortney Leach is a 79 y.o. female admitted with a medical diagnosis of acute heart failure, respiratory failure 2/2 pulmonary edema.  She presents with the following impairments/functional limitations: weakness, impaired balance, impaired endurance, impaired functional mobility, gait instability, decreased lower extremity function.    Rehab Prognosis: Good; patient would benefit from acute skilled PT services to address these deficits and reach maximum level of function.    Recent Surgery: Procedure(s) (LRB):  CATHETERIZATION, HEART, BOTH LEFT AND RIGHT (N/A) Day of Surgery    Plan:     During this hospitalization, patient would benefit from acute PT services 5 x/week to address the identified rehab impairments via gait training, therapeutic activities, therapeutic exercises and progress toward the following goals:    Plan of Care Expires:  01/04/25    Subjective     Chief Complaint: Fatigue  Patient/Family Comments/goals:   Pain/Comfort:         Objective:     Communicated with RN prior to session.  Patient found supine with telemetry, peripheral IV, oxygen upon PT entry to room.     General Precautions: Standard, fall  Orthopedic Precautions: N/A  Braces: N/A  Respiratory Status: Nasal cannula, flow 2 L/min  Skin Integrity: Visible skin intact    Functional Mobility:  Bed Mobility:     Supine to Sit: minimum assistance  Transfers:     Sit to Stand:  contact guard assistance with rolling walker  Bed to Chair: contact guard assistance with  rolling walker  using  Step Transfer; refused further func mob 2/2 MARYBETH in afternoon    Therapeutic Activities/Exercises:  Seated therex x 30ea  LAQ  Marches  Hip  abd/add    Education:  Patient provided with verbal education education regarding PT role/goals/POC, fall prevention, and safety awareness.  Understanding was verbalized.     Patient left up in chair with all lines intact, call button in reach, and daughter present    GOALS:   Multidisciplinary Problems       Physical Therapy Goals          Problem: Physical Therapy    Goal Priority Disciplines Outcome Interventions   Physical Therapy Goal     PT, PT/OT Progressing    Description: Goals to be met by: d/c     Patient will increase functional independence with mobility by performin. Supine to sit with Modified [place]  2. Sit to supine with Modified [place]  3. Sit to stand transfer with Modified [place]  4. Bed to chair transfer with Modified [place] using Rolling Walker  5. Gait  x 150 feet with Modified [place] using Rolling Walker.                          Time Tracking:     PT Received On: 24  PT Start Time: 1050     PT Stop Time: 1106  PT Total Time (min): 16 min     Billable Minutes: Therapeutic Exercise 1    Treatment Type: Treatment  PT/PTA: PTA     Number of PTA visits since last PT visit: 2024

## 2024-11-27 NOTE — ANESTHESIA POSTPROCEDURE EVALUATION
Anesthesia Post Evaluation    Patient: Kortney Leach    Procedure(s) Performed: * No procedures listed *    Final Anesthesia Type: general      Patient location during evaluation: Cath Lab  Patient participation: Yes- Able to Participate  Level of consciousness: oriented and awake  Post-procedure vital signs: reviewed and stable  Pain management: adequate  Airway patency: patent    PONV status at discharge: No PONV  Anesthetic complications: no      Cardiovascular status: stable and hemodynamically stable  Respiratory status: spontaneous ventilation and unassisted  Hydration status: euvolemic  Follow-up not needed.  Comments: [facility]              Vitals Value Taken Time   /67 11/27/24 1509   Temp 37.1 11/27/24 1509   Pulse 77 11/27/24 1509   Resp 20 11/27/24 1509   SpO2 97% 11/27/24 1509         No case tracking events are documented in the log.      Pain/Gaudencio Score: Pain Rating Prior to Med Admin: 3 (11/26/2024  8:53 AM)  Pain Rating Post Med Admin: 1 (11/26/2024  9:44 AM)  Gaudencio Score: 10 (11/27/2024  2:58 PM)

## 2024-11-27 NOTE — PLAN OF CARE
11/27/24 1038   Discharge Reassessment   Assessment Type Discharge Planning Reassessment   Discharge Plan discussed with: Patient   Discharge Plan A Skilled Nursing Facility   Discharge Plan B Skilled Nursing Facility   Post-Acute Status   Post-Acute Authorization Placement     PT/OT recommended SNF. List of providers given. Patient has beed to [facility] in the past and would like to return. FOC obtained. Referral sent via Epic.

## 2024-11-27 NOTE — TRANSFER OF CARE
"Anesthesia Transfer of Care Note    Patient: Kortney Leach    Procedure(s) Performed: * No procedures listed *    Patient location: Other: CVSS    Anesthesia Type: MAC    Transport from OR: Transported from OR on room air with adequate spontaneous ventilation    Post pain: adequate analgesia    Post assessment: no apparent anesthetic complications and tolerated procedure well    Post vital signs: stable    Level of consciousness: responds to stimulation    Nausea/Vomiting: no nausea/vomiting    Complications: none    Transfer of care protocol was followedComments: Report given to Nani cath lab RN.      Last vitals: Visit Vitals  /67 (BP Location: Right arm, Patient Position: Lying)   Pulse 77   Temp 37.1 °C (98.7 °F) (Oral)   Resp 20   Ht 5' 6" (1.676 m)   Wt 96.4 kg (212 lb 8 oz)   SpO2 97%   Breastfeeding No   BMI 34.30 kg/m²     "

## 2024-11-28 LAB
ANION GAP SERPL CALC-SCNC: 16 MEQ/L
BASOPHILS # BLD AUTO: 0.04 X10(3)/MCL
BASOPHILS NFR BLD AUTO: 0.3 %
BUN SERPL-MCNC: 25.7 MG/DL (ref 9.8–20.1)
CALCIUM SERPL-MCNC: 8.6 MG/DL (ref 8.4–10.2)
CHLORIDE SERPL-SCNC: 97 MMOL/L (ref 98–107)
CO2 SERPL-SCNC: 22 MMOL/L (ref 23–31)
CREAT SERPL-MCNC: 0.88 MG/DL (ref 0.55–1.02)
CREAT/UREA NIT SERPL: 29
EOSINOPHIL # BLD AUTO: 0.09 X10(3)/MCL (ref 0–0.9)
EOSINOPHIL NFR BLD AUTO: 0.6 %
ERYTHROCYTE [DISTWIDTH] IN BLOOD BY AUTOMATED COUNT: 16.8 % (ref 11.5–17)
GFR SERPLBLD CREATININE-BSD FMLA CKD-EPI: >60 ML/MIN/1.73/M2
GLUCOSE SERPL-MCNC: 195 MG/DL (ref 82–115)
HCT VFR BLD AUTO: 38.1 % (ref 37–47)
HGB BLD-MCNC: 12.5 G/DL (ref 12–16)
IMM GRANULOCYTES # BLD AUTO: 0.07 X10(3)/MCL (ref 0–0.04)
IMM GRANULOCYTES NFR BLD AUTO: 0.5 %
LYMPHOCYTES # BLD AUTO: 3.66 X10(3)/MCL (ref 0.6–4.6)
LYMPHOCYTES NFR BLD AUTO: 25.6 %
MAGNESIUM SERPL-MCNC: 2.3 MG/DL (ref 1.6–2.6)
MCH RBC QN AUTO: 29.9 PG (ref 27–31)
MCHC RBC AUTO-ENTMCNC: 32.8 G/DL (ref 33–36)
MCV RBC AUTO: 91.1 FL (ref 80–94)
MONOCYTES # BLD AUTO: 1.99 X10(3)/MCL (ref 0.1–1.3)
MONOCYTES NFR BLD AUTO: 13.9 %
NEUTROPHILS # BLD AUTO: 8.43 X10(3)/MCL (ref 2.1–9.2)
NEUTROPHILS NFR BLD AUTO: 59.1 %
NRBC BLD AUTO-RTO: 0 %
PLATELET # BLD AUTO: 213 X10(3)/MCL (ref 130–400)
PMV BLD AUTO: 10.6 FL (ref 7.4–10.4)
POCT GLUCOSE: 208 MG/DL (ref 70–110)
POCT GLUCOSE: 233 MG/DL (ref 70–110)
POTASSIUM SERPL-SCNC: 4.1 MMOL/L (ref 3.5–5.1)
RBC # BLD AUTO: 4.18 X10(6)/MCL (ref 4.2–5.4)
SODIUM SERPL-SCNC: 135 MMOL/L (ref 136–145)
WBC # BLD AUTO: 14.28 X10(3)/MCL (ref 4.5–11.5)

## 2024-11-28 PROCEDURE — 25000003 PHARM REV CODE 250: Performed by: INTERNAL MEDICINE

## 2024-11-28 PROCEDURE — 94760 N-INVAS EAR/PLS OXIMETRY 1: CPT

## 2024-11-28 PROCEDURE — 63600175 PHARM REV CODE 636 W HCPCS: Performed by: STUDENT IN AN ORGANIZED HEALTH CARE EDUCATION/TRAINING PROGRAM

## 2024-11-28 PROCEDURE — 25000003 PHARM REV CODE 250

## 2024-11-28 PROCEDURE — 27000221 HC OXYGEN, UP TO 24 HOURS

## 2024-11-28 PROCEDURE — 99900031 HC PATIENT EDUCATION (STAT)

## 2024-11-28 PROCEDURE — 83735 ASSAY OF MAGNESIUM: CPT

## 2024-11-28 PROCEDURE — 63600175 PHARM REV CODE 636 W HCPCS: Performed by: NURSE PRACTITIONER

## 2024-11-28 PROCEDURE — 21400001 HC TELEMETRY ROOM

## 2024-11-28 PROCEDURE — 94640 AIRWAY INHALATION TREATMENT: CPT

## 2024-11-28 PROCEDURE — 63600175 PHARM REV CODE 636 W HCPCS

## 2024-11-28 PROCEDURE — 36415 COLL VENOUS BLD VENIPUNCTURE: CPT

## 2024-11-28 PROCEDURE — 80048 BASIC METABOLIC PNL TOTAL CA: CPT

## 2024-11-28 PROCEDURE — 99900035 HC TECH TIME PER 15 MIN (STAT)

## 2024-11-28 PROCEDURE — 85025 COMPLETE CBC W/AUTO DIFF WBC: CPT

## 2024-11-28 PROCEDURE — 25500020 PHARM REV CODE 255

## 2024-11-28 PROCEDURE — 25000242 PHARM REV CODE 250 ALT 637 W/ HCPCS: Performed by: STUDENT IN AN ORGANIZED HEALTH CARE EDUCATION/TRAINING PROGRAM

## 2024-11-28 PROCEDURE — 63600175 PHARM REV CODE 636 W HCPCS: Performed by: INTERNAL MEDICINE

## 2024-11-28 RX ORDER — CETIRIZINE HYDROCHLORIDE 10 MG/1
10 TABLET ORAL DAILY
Status: DISCONTINUED | OUTPATIENT
Start: 2024-11-28 | End: 2024-12-03 | Stop reason: HOSPADM

## 2024-11-28 RX ORDER — CEFTRIAXONE 1 G/1
1 INJECTION, POWDER, FOR SOLUTION INTRAMUSCULAR; INTRAVENOUS
Status: DISCONTINUED | OUTPATIENT
Start: 2024-11-29 | End: 2024-11-30

## 2024-11-28 RX ORDER — ENOXAPARIN SODIUM 100 MG/ML
1 INJECTION SUBCUTANEOUS EVERY 12 HOURS
Status: COMPLETED | OUTPATIENT
Start: 2024-11-28 | End: 2024-11-28

## 2024-11-28 RX ADMIN — METOPROLOL SUCCINATE 50 MG: 50 TABLET, EXTENDED RELEASE ORAL at 08:11

## 2024-11-28 RX ADMIN — ENOXAPARIN SODIUM 100 MG: 100 INJECTION SUBCUTANEOUS at 09:11

## 2024-11-28 RX ADMIN — IPRATROPIUM BROMIDE AND ALBUTEROL SULFATE 3 ML: 2.5; .5 SOLUTION RESPIRATORY (INHALATION) at 04:11

## 2024-11-28 RX ADMIN — IPRATROPIUM BROMIDE AND ALBUTEROL SULFATE 3 ML: 2.5; .5 SOLUTION RESPIRATORY (INHALATION) at 07:11

## 2024-11-28 RX ADMIN — PANCRELIPASE 2 CAPSULE: 120000; 24000; 76000 CAPSULE, DELAYED RELEASE PELLETS ORAL at 04:11

## 2024-11-28 RX ADMIN — ASPIRIN 81 MG: 81 TABLET, COATED ORAL at 08:11

## 2024-11-28 RX ADMIN — PANCRELIPASE 2 CAPSULE: 120000; 24000; 76000 CAPSULE, DELAYED RELEASE PELLETS ORAL at 12:11

## 2024-11-28 RX ADMIN — ATORVASTATIN CALCIUM 40 MG: 40 TABLET, FILM COATED ORAL at 09:11

## 2024-11-28 RX ADMIN — FUROSEMIDE 40 MG: 10 INJECTION, SOLUTION INTRAMUSCULAR; INTRAVENOUS at 04:11

## 2024-11-28 RX ADMIN — FAMOTIDINE 40 MG: 20 TABLET, FILM COATED ORAL at 09:11

## 2024-11-28 RX ADMIN — CETIRIZINE HYDROCHLORIDE 10 MG: 10 TABLET, FILM COATED ORAL at 08:11

## 2024-11-28 RX ADMIN — CEFTRIAXONE SODIUM 2 G: 2 INJECTION, POWDER, FOR SOLUTION INTRAMUSCULAR; INTRAVENOUS at 08:11

## 2024-11-28 RX ADMIN — IOHEXOL 100 ML: 350 INJECTION, SOLUTION INTRAVENOUS at 10:11

## 2024-11-28 RX ADMIN — PANTOPRAZOLE SODIUM 40 MG: 40 TABLET, DELAYED RELEASE ORAL at 05:11

## 2024-11-28 RX ADMIN — AMIODARONE HYDROCHLORIDE 200 MG: 200 TABLET ORAL at 08:11

## 2024-11-28 RX ADMIN — PANCRELIPASE 2 CAPSULE: 120000; 24000; 76000 CAPSULE, DELAYED RELEASE PELLETS ORAL at 08:11

## 2024-11-28 RX ADMIN — INSULIN ASPART 4 UNITS: 100 INJECTION, SOLUTION INTRAVENOUS; SUBCUTANEOUS at 05:11

## 2024-11-28 RX ADMIN — ACETAMINOPHEN 325MG 650 MG: 325 TABLET ORAL at 08:11

## 2024-11-28 RX ADMIN — ACETAMINOPHEN 325MG 650 MG: 325 TABLET ORAL at 09:11

## 2024-11-28 NOTE — PLAN OF CARE
Problem: Adult Inpatient Plan of Care  Goal: Plan of Care Review  11/28/2024 0033 by Gretta Morales RN  Outcome: Progressing  11/28/2024 0033 by Gretta Morales RN  Outcome: Progressing  Goal: Patient-Specific Goal (Individualized)  11/28/2024 0033 by Gretta Morales RN  Outcome: Progressing  11/28/2024 0033 by Gretta Morales, RN  Outcome: Progressing  Goal: Absence of Hospital-Acquired Illness or Injury  11/28/2024 0033 by Gretta Morales RN  Outcome: Progressing  11/28/2024 0033 by Gretta Morales RN  Outcome: Progressing  Goal: Optimal Comfort and Wellbeing  11/28/2024 0033 by Gretta Morales RN  Outcome: Progressing  11/28/2024 0033 by Gretta Morales, RN  Outcome: Progressing  Goal: Readiness for Transition of Care  11/28/2024 0033 by Gretta Morales RN  Outcome: Progressing  11/28/2024 0033 by Gretta Morales, RN  Outcome: Progressing     Problem: Diabetes Comorbidity  Goal: Blood Glucose Level Within Targeted Range  11/28/2024 0033 by Gretta Morales RN  Outcome: Progressing  11/28/2024 0033 by Gretta Morales, RN  Outcome: Progressing     Problem: Fall Injury Risk  Goal: Absence of Fall and Fall-Related Injury  11/28/2024 0033 by Gretta Morales, RN  Outcome: Progressing  11/28/2024 0033 by Gretta Morales, RN  Outcome: Progressing     Problem: Heart Failure  Goal: Optimal Coping  11/28/2024 0033 by Gretta Morales, RN  Outcome: Progressing  11/28/2024 0033 by Gretta Morales, RN  Outcome: Progressing  Goal: Optimal Cardiac Output  11/28/2024 0033 by Gretta Morales, RN  Outcome: Progressing  11/28/2024 0033 by Gretta Morales, RN  Outcome: Progressing  Goal: Stable Heart Rate and Rhythm  11/28/2024 0033 by Gretta Morales, RN  Outcome: Progressing  11/28/2024 0033 by Gretta Morales, RN  Outcome: Progressing  Goal: Optimal Functional Ability  11/28/2024 0033 by Gretta Morales, RN  Outcome: Progressing  11/28/2024 0033 by Andrew  GABRIEL Glynn  Outcome: Progressing  Goal: Fluid and Electrolyte Balance  11/28/2024 0033 by Gretta Morales, RN  Outcome: Progressing  11/28/2024 0033 by Gretta Morales RN  Outcome: Progressing  Goal: Improved Oral Intake  11/28/2024 0033 by Gretta Morales RN  Outcome: Progressing  11/28/2024 0033 by Gretta Morales, RN  Outcome: Progressing  Goal: Effective Oxygenation and Ventilation  11/28/2024 0033 by Gretta Morales, RN  Outcome: Progressing  11/28/2024 0033 by Gretta Morales, RN  Outcome: Progressing  Goal: Effective Breathing Pattern During Sleep  11/28/2024 0033 by Gretta Morales, RN  Outcome: Progressing  11/28/2024 0033 by Gretta Morales, RN  Outcome: Progressing

## 2024-11-28 NOTE — PROGRESS NOTES
[facility] Hospital Medicine Progress Note        Chief Complaint: Inpatient Follow-up for HF    HPI:   79-year-old woman with A-fib, HTN, valvular heart disease s/p MVR, CAD, DVT s/p IVC filter on xarelto, T2DM, GERD, SSS s/p PPM, HTN, HLD, chronic sinusitis status post sinus surgery, ABEL, obesity and asthma/COPD not on home oxygen who presented to the ER with progressive shortness of breath over the last month.  She initially was seen in urgent care but due to sats being 88% on room air she was deferred to the ER.     She arrived afebrile hemodynamically stable maintaining adequate sats on 3 L nasal cannula but abruptly desatted in the ER requiring initiation of BiPAP.  Laboratory work showed a BNP of 160 (last EF 55% 09/2023), CTA showed no pulmonary embolus, noted cardiomegaly and mild congestive changes and pleural effusions and lower lung compressive consolidations.  EKG noted normal sinus rhythm.     She was given 80 mg of Lasix, albuterol nebulizer treatment, IV Solu-Medrol and admitted to the hospitalist service for further management.  Continued on IV Lasix b.i.d..  Able to be weaned off of BiPAP and currently on 3 L O2 via NC with adequate saturations.  Added DuoNebs. Desaturating to 88% during ambulation on RA.    Underwent MARYBETH on 11/27 which showed severe mitral stenosis and moderate MR and mid LAD 70% stenosis with elevated PCWP and PA 47/26    Interval Hx:   Currently on 2L NC. Denies chest pain, cough, SOB. No LE swelling.       Objective/physical exam:  General: In no acute distress, Obese   Chest: Nelson diminished breath sounds   Heart: RRR, +S1, S2, no appreciable murmur  Abdomen: Soft, nontender, BS +  MSK: Warm, no lower extremity edema, no clubbing or cyanosis  Neurologic: Alert and oriented x4, Cranial nerve II-XII intact, Strength 5/5 in all 4 extremities    VITAL SIGNS: 24 HRS MIN & MAX LAST   Temp  Min: 97.5 °F (36.4 °C)  Max: 98.7 °F (37.1 °C) 98.2 °F (36.8 °C)    BP  Min: 100/58  Max: 123/67 107/60   Pulse  Min: 72  Max: 84  79   Resp  Min: 16  Max: 20 18   SpO2  Min: 91 %  Max: 97 % 95 %     I have reviewed the following labs:  Recent Labs   Lab 11/26/24 0408 11/27/24 0424 11/28/24 0416   WBC 12.47* 13.57* 14.28*   RBC 4.39 4.48 4.18*   HGB 13.1 13.2 12.5   HCT 40.0 40.5 38.1   MCV 91.1 90.4 91.1   MCH 29.8 29.5 29.9   MCHC 32.8* 32.6* 32.8*   RDW 17.1* 16.9 16.8    201 213   MPV 10.8* 10.8* 10.6*     Recent Labs   Lab 11/22/24  1037 11/23/24  0532 11/24/24  0347 11/26/24 0408 11/27/24 0424 11/28/24 0416    140   < > 136 136 135*   K 4.4 5.3*   < > 3.7 4.4 4.1   * 108*   < > 98 98 97*   CO2 17* 21*   < > 25 24 22*   BUN 13.0 25.6*   < > 17.4 21.7* 25.7*   CREATININE 0.84 0.94   < > 0.80 0.88 0.88   CALCIUM 9.3 8.7   < > 8.5 8.7 8.6   MG  --   --    < > 1.90 2.40 2.30   ALBUMIN 3.4 3.3*  --   --   --   --    ALKPHOS 70 65  --   --   --   --    ALT 13 12  --   --   --   --    AST 22 20  --   --   --   --    BILITOT 0.8 0.5  --   --   --   --     < > = values in this interval not displayed.     Assessment/Plan:  # Acute on chronic diastolic decompensated HF  # Acute respiratory failure with hypoxia requiring supplemental oxygen 2/2 B/L pulmonary edema   # Pulmonary HTN  # Severe mitral stenosis  # UTI  # Obesity   # pAF s/p LAAL in 2023  # HTN  # HLD  # VHD s/p MVR  # CAD   # DVT  s/p IVC filter  # DM   # GERD   # Chronic sinusitis s/p sinus surgery   # ABEL   # Obesity   # Asthma/COPD ?    - wean oxygen as tolerated  - MARYBETH and LHC noted, plan for CT mitral valve and CT surgery consult  - IV lasix 40 mg daily  - cardiology recs appreciated  - hold home xarelto until cleared by cardiology  - continue aspirin, statin, amiodarone, metop XL 50 mg daily, nifedipine 30 mg daily  - continue ceftriaxone and wait for culture speciation  - incentive spirometer  - laverne  - PT/OT    Critical care note:  Critical care diagnosis: HF needing IV lasix   Critical  care interventions: Hands-on evaluation, review of labs/radiographs/records and discussion with patient and family if present  Critical care time spent: 35 minutes     VTE prophylaxis: Xarelto once cleared by cardiology    Patient condition:  Guarded    Anticipated discharge and Disposition:   PT recommending SNF      All diagnosis and differential diagnosis have been reviewed; assessment and plan has been documented; I have personally reviewed the labs and test results that are presently available; I have reviewed the patients medication list; I have reviewed the consulting providers response and recommendations. I have reviewed or attempted to review medical records based upon their availability    All of the patient's questions have been  addressed and answered. Patient's is agreeable to the above stated plan. I will continue to monitor closely and make adjustments to medical management as needed.    _____________________________________________________________________  Radiology:  I have personally reviewed the following imaging and agree with the radiologist.     Cardiac catheterization  The procedure log was documented by No documenter listed and verified by   Sebastian Watters Jr, MD.    Date: 11/27/2024  Time: 9:21 PM    Procedure:  Left heart catheterization with coronary angiography  Right heart catheterization  Measurement of LVEDP     Preoperative diagnosis:  Heart failure    Postoperative diagnosis:  Coronary artery disease     Access:  Right common femoral artery  Right common femoral vein    Estimated blood loss: 10 cc  Complications: None     Summary:  Consent obtained. Risks and benefits discussed with the patient. The   patient was brought to the cath lab in a fasting state. The patient was   prepped and draped in usual sterile fashion. A preprocedure time-out was   performed.     Ultrasound-guided right common femoral venous access via modified   Seldinger technique using a micropuncture kit. A 7  Nigerian sheath was   inserted into the right common femoral vein.  Ultrasound-guided right common femoral arterial access via modified   Seldinger technique using a micropuncture kit. A 5 Nigerian sheath was   inserted into the right common femoral artery.     A right heart catheterization was being performed using a [place]-Dar[place]us   catheter.  A 5 Nigerian Pigtail catheter was used to cross aortic valve, and   simultaneous LV and PCW pressures were measured.   The transvalvular aortic gradient was measured by pullback method.    A 5 Nigerian JL4 catheter was used to cannulate the left main coronary   artery; angiography was performed, and multiple fluoroscopic views were   obtained.  A 5 Nigerian JR4 catheter was used to cannulate the right coronary artery;   angiography was performed, and multiple fluoroscopic views were obtained.    At the end the procedure, all wires and catheters were removed.   Hemostasis achieved with manual compression.     Findings:  - There is single vessel coronary artery disease.  - Mid Left Anterior Descending has a calcified 70% stenosis.  - LVEDP is normal at 6 mmHg.  - RHC with elevated R sided filling pressures. RA 7 mmHg, RV 50/7 mmHg, PA   47/26 mmHg (mean 33 mmHg), PCWP 24 mmHg.  - PCWP to LV diastolic mean gradient was calculated to be 22 mmHg.  - Transpulmonary gradient is 8 mmHg.  - Normal Cardiac Output/Index at 4.7/2.3, as calculated by Rahel equation.  - PVR is 1.7 Woods units  - SVR is 1260 dyne-sec*cm^-5  - Pulmonary Artery Pulsatility Index (Nury) is 3.0  - Cardiac Power Output () is 0.84     Assessment/Plan:  - Patient is a 79 y.o. female with a history of prior bioprosthetic mitral   valve replacement now presents with heart failure.  Transesophageal   echocardiogram was obtained.  Final report is pending at this time, but   there were findings consistent with severe bioprosthetic MVR stenosis.    Cardiac catheterization findings shown above do suggest that there is a   gradient  of around 22 mm Hg across the bioprosthetic valve.  -recommend consultation with CT surgery   -obtain CT with mitral valve in valve protocol for possible TMVR  -LAD should be revascularized as well    Sebastian Watters MD  Interventional Cardiology/Structural Heart Disease  Cardiovascular [facility]  Transesophageal echo (MARYBETH)    Left Ventricle: The left ventricle is normal in size. Septal motion is   consistent with post-operative status. There is normal systolic function   with a visually estimated ejection fraction of 60 - 65%.    Right Ventricle: Normal right ventricular cavity size. Systolic   function is normal.    Left Atrium: Left atrium is severely dilated.    Mitral Valve: There is a bioprosthetic valve in the mitral position.   There is severe stenosis. The mean pressure gradient across the mitral   valve is 16 mmHg at a heart rate of  bpm. There is moderate regurgitation.      Catracho Galan MD  Department of Hospital Medicine   [facility]   11/28/2024

## 2024-11-28 NOTE — OP NOTE
[facility] - 6th Floor Medical Telemetry  Brief Operative Note    SUMMARY     Surgery Date: 11/27/2024     Surgeons and Role:     * Sebastian Watters Jr., MD - Primary    Assisting Surgeon: None    Pre-op Diagnosis:  Mitral valve stenosis, unspecified etiology [I05.0]    Post-op Diagnosis:  Post-Op Diagnosis Codes:     * Mitral valve stenosis, unspecified etiology [I05.0]    Procedure(s) (LRB):  CATHETERIZATION, HEART, BOTH LEFT AND RIGHT (N/A)    Anesthesia: Local    Implants:  * No implants in log *    Operative Findings: 70% prox LAD; bioMVR stenosis    Estimated Blood Loss: * No values recorded between 11/27/2024  2:21 PM and 11/27/2024  3:12 PM *    Estimated Blood Loss has been documented.         Specimens:   Specimen (24h ago, onward)      None            [id number]

## 2024-11-28 NOTE — PROGRESS NOTES
"[facility] *    Cardiology  Progress Note    Patient Name: Kortney Leach  MRN: [id number]  Admission Date: 11/22/2024  Hospital Length of Stay: 6 days  Code Status: Full Code   Attending Physician: Catracho Galan,*   Primary Care Physician: Arnaldo Hernandez MD  Expected Discharge Date:   Principal Problem:COPD exacerbation    Subjective:     Brief HPI/Hospital Course: Ms. Leach is a 80 y/o female with a history of VHD, GEMINI, DM II, PAF, ABEL, COPD, HTN, DVT SSS, HLD, who is known to [facility], Dr. Uribe/Red. She presented to the ER on 11.22.24 with complaints of shortness of breath. She reports worsening shortness of breath over the last month. She was sent from urgent care for being 88% on room air. She was started on oxygen therapy an sent to the ER. In the ER, she had some respiratory distress and was started on BiPAP. Significant labs WBC 13.53, H&H 11.9/36.2, K 5.3, Chloride 108, CO2 21, Glucose 232, .1. Flu, COVID and COVID negative. CTA Chest negative for PE, but shows Cardiomegaly and mild congestive changes and Bilateral prominent pleural effusions and lower lung lobes compressive consolidations. Cxr shows prominence of the right hilum may relate to pulmonary vascular congestion and possible small pleural effusions. [facility] has been consulted for CHF.        11.24.24: NAD. VSS. No complaints of CP/Palps. Reports some improvement in her SOB. 2350 urine output/1640 ml Fluid Negative. Crackle noted in bases.   11.25.24: NAD. VSS. No complaints of CP/Palp. Remains SOB. 1800 urine output/1700 ml Net Negative . Labs Stable.   11.26.24: NAD. VSS. No complaints of CP/SOB/Palps. Reports shortness of breath has significantly improved, but unable to lye flat. 1900 urine output over 24 hours/Fluid net negative 1120. WBC 12.47  11.27.24: NAD. VSS. No complaints of CP/Palps. Reports shortness of breath. "I feel okay" WBC 13.57. no output documented.  11.28.24: NAD. "I am doing well." Family " at Bedside. Denies CP, SOB and Palps. Plans for LAD PCI in AM (11.29.24).      PMH: VHD, GEMINI, DM II, PAF, ABEL, COPD, HTN, DVT SSS, HLD, Asthma, Left Thyroid Nodule, GERD, Chronic Sinusitis, Common Variable Immunodeficiency, Glaucoma  PSH: PPM, LHC, MVR, Bilateral Cataract Extraction, Total Knee Replacement, Bilateral mastectomy, Appendectomy, Colonoscopy, EGD, Tonsillectomy,    Family History: Father - Lung Cancer; Mother - Alzheimer, CVA; Sister - HTN, Multiple Sclerosis, DM II  Social History: Former Smoker (Quit in 1998). Denies illicit drug use and alcohol use.      Previous Cardiac Diagnostics:   [facility] 11.27.24:  Findings:  - There is single vessel coronary artery disease.  - Mid Left Anterior Descending has a calcified 70% stenosis.  - LVEDP is normal at 6 mmHg.  - RHC with elevated R sided filling pressures. RA 7 mmHg, RV 50/7 mmHg, PA 47/26 mmHg (mean 33 mmHg), PCWP 24 mmHg.  - PCWP to LV diastolic mean gradient was calculated to be 22 mmHg.  - Transpulmonary gradient is 8 mmHg.  - Normal Cardiac Output/Index at 4.7/2.3, as calculated by Rahel equation.  - PVR is 1.7 Woods units  - SVR is 1260 dyne-sec*cm^-5  - Pulmonary Artery Pulsatility Index (Nury) is 3.0  - Cardiac Power Output () is 0.84  Assessment/Plan:  - Patient is a 79 y.o. female with a history of prior bioprosthetic mitral valve replacement now presents with heart failure.  Transesophageal echocardiogram was obtained.  Final report is pending at this time, but there were findings consistent with severe bioprosthetic MVR stenosis.  Cardiac catheterization findings shown above do suggest that there is a gradient of around 22 mm Hg across the bioprosthetic valve.  -recommend consultation with CT surgery   -obtain CT with mitral valve in valve protocol for possible TMVR  -LAD should be revascularized as well    MARYBETH (11.27.24):  Left Ventricle: The left ventricle is normal in size. Septal motion is consistent with post-operative status. There is  normal systolic function with a visually estimated ejection fraction of 60 - 65%.  Right Ventricle: Normal right ventricular cavity size. Systolic function is normal.  Left Atrium: Left atrium is severely dilated.  Mitral Valve: There is a bioprosthetic valve in the mitral position. There is severe stenosis. The mean pressure gradient across the mitral valve is 16 mmHg at a heart rate of  bpm. There is moderate regurgitation.    ECHO (11.23.24):  Left Ventricle: The left ventricle is normal in size. Mildly increased wall thickness. There is normal systolic function with a visually estimated ejection fraction of 60 - 65%. Grade I diastolic dysfunction. Right Ventricle: Normal right ventricular cavity size. Systolic function is borderline low. Aortic Valve: There is aortic valve sclerosis. Mitral Valve: There is a bioprosthetic valve in the mitral position (25 mm mosaic porcine valve).  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 There is mild (1+) regurgitation. Tricuspid Valve: There moderate (2+) regurgitation. The estimated pulmonary artery systolic pressure is 57 mmHg. Pacemaker lead noted.  Recommend transesophageal echocardiogram to properly evaluate the bioprosthetic mitral valve.    ECHO (11.14.24):  The study quality is average. The left ventricle is normal in size. Global left ventricular systolic function is normal. The left ventricular ejection fraction is 65%. Left ventricular diastolic function is normal. Mild concentric left ventricular hypertrophy is present. The left atrium is moderately enlarged. (4.6 cms). A normal functioning prosthetic mitral valve is noted. MG = 4.3 mmHg. The pulmonary artery systolic pressure is 37 mmHg.      PPM Insertion (9.25.23):  Successful implantation of PPM Dual. [facility]      PET (1.24.23):  This is a normal perfusion study, no perfusion defects noted. There is no  evidence of ischemia.This scan is suggestive of low risk for future cardiovascular events. The left ventricular cavity is noted to be normal on the stress studies. The stress left ventricular ejection fraction was calculated to be 68% and left ventricular global function is normal. The rest left ventricular cavity is noted to be normal. The rest left ventricular ejection fraction was calculated to be 63% and rest left ventricular global function is normal. When compared to the resting ejection fraction (63%), the stress ejection fraction (68%) has increased. Transient ischemic dilatation is present and has been described as a marker for high risk coronary artery disease. It has also been described in microvascular disease, hypertensive heart disease as well as cardiac deconditioning. The study quality is good.   There was a rise in myocardial blood flow between rest and stress.  Global myocardial blood flow reserve was 2.72.  Normal global coronary flow reserve is suggestive of low coronary event risk.     Carotid US (1.24.23):  The study quality is good. There is no plaque noted in the proximal right internal carotid artery. 1-39% stenosis in the proximal left internal carotid artery based on Bluth Criteria. Antegrade right vertebral artery flow. Antegrade left vertebral artery flow.     LHC (9.6.23):  Normal Coronaries     Review of Systems   Constitutional: Positive for malaise/fatigue.   Cardiovascular:  Negative for chest pain, dyspnea on exertion, leg swelling and palpitations.   Respiratory:  Positive for shortness of breath.    All other systems reviewed and are negative.    Objective:     Vital Signs (Most Recent):  Temp: 98 °F (36.7 °C) (11/28/24 1140)  Pulse: 64 (11/28/24 1140)  Resp: 18 (11/28/24 1140)  BP: (!) 99/56 (11/28/24 1140)  SpO2: 97 % (11/28/24 1140) Vital Signs (24h Range):  Temp:  [97.5 °F (36.4 °C)-98.2 °F (36.8 °C)] 98 °F (36.7 °C)  Pulse:  [64-84] 64  Resp:  [17-20] 18  SpO2:  [91 %-97 %]  97 %  BP: ()/(56-85) 99/56   Weight: 96.4 kg (212 lb 8 oz)  Body mass index is 34.3 kg/m².  SpO2: 97 %       Intake/Output Summary (Last 24 hours) at 11/28/2024 1458  Last data filed at 11/28/2024 1403  Gross per 24 hour   Intake 980 ml   Output 750 ml   Net 230 ml     Lines/Drains/Airways       Peripheral Intravenous Line  Duration                  Peripheral IV - Single Lumen 11/27/24 0915 22 G Anterior;Left Forearm 1 day                  Significant Labs:   Chemistries:   Recent Labs   Lab 11/22/24  1037 11/23/24  0532 11/23/24  0532 11/24/24  0347 11/25/24 0418 11/26/24 0408 11/27/24 0424 11/28/24 0416    140  --  143 139 136 136 135*   K 4.4 5.3*  --  4.6 4.0 3.7 4.4 4.1   * 108*  --  104 102 98 98 97*   CO2 17* 21*  --  24 23 25 24 22*   BUN 13.0 25.6*  --  27.6* 22.2* 17.4 21.7* 25.7*   CREATININE 0.84 0.94  --  0.85 0.80 0.80 0.88 0.88   CALCIUM 9.3 8.7  --  9.1 8.5 8.5 8.7 8.6   BILITOT 0.8 0.5  --   --   --   --   --   --    ALKPHOS 70 65  --   --   --   --   --   --    ALT 13 12  --   --   --   --   --   --    AST 22 20  --   --   --   --   --   --    GLUCOSE 166* 232*  --  157* 194* 204* 212* 195*   MG  --   --    < > 2.00 1.90 1.90 2.40 2.30   TROPONINI 0.031  --   --   --   --   --   --   --     < > = values in this interval not displayed.        CBC/Anemia Labs: Coags:    Recent Labs   Lab 11/26/24  0408 11/27/24 0424 11/28/24 0416   WBC 12.47* 13.57* 14.28*   HGB 13.1 13.2 12.5   HCT 40.0 40.5 38.1    201 213   MCV 91.1 90.4 91.1   RDW 17.1* 16.9 16.8    Recent Labs   Lab 11/22/24  1037   INR 2.2*   APTT 33.9*        Telemetry: SR    Physical Exam  Vitals reviewed.   Constitutional:       General: She is not in acute distress.  HENT:      Head: Normocephalic.      Mouth/Throat:      Mouth: Mucous membranes are dry.   Eyes:      Extraocular Movements: Extraocular movements intact.      Conjunctiva/sclera: Conjunctivae normal.   Cardiovascular:      Rate and Rhythm:  Normal rate and regular rhythm.      Pulses: Normal pulses.      Heart sounds: Murmur heard.   Pulmonary:      Effort: Pulmonary effort is normal. No respiratory distress.      Breath sounds: Examination of the right-lower field reveals decreased breath sounds. Examination of the left-lower field reveals decreased breath sounds. Decreased breath sounds present.      Comments: RA  Abdominal:      Palpations: Abdomen is soft.   Skin:     General: Skin is warm.      Comments: R Groin Soft/Flat, Non-Tender, No Sign of Bleed/Infection. +2 BLE Palpable Pedal Pulses    Neurological:      General: No focal deficit present.      Mental Status: She is alert and oriented to person, place, and time.   Psychiatric:         Mood and Affect: Mood normal.         Behavior: Behavior normal.         Judgment: Judgment normal.       Current Schedule Inpatient Medications:   albuterol-ipratropium  3 mL Nebulization Q8H    amiodarone  200 mg Oral Daily    aspirin  81 mg Oral Daily    atorvastatin  40 mg Oral Daily    [START ON 11/29/2024] cefTRIAXone (Rocephin) IV (PEDS and ADULTS)  1 g Intravenous Q24H    cetirizine  10 mg Oral Daily    famotidine  40 mg Oral QHS    furosemide (LASIX) injection  40 mg Intravenous Daily    lipase-protease-amylase 24,000-76,000-120,000 units  2 capsule Oral TID WM    metoprolol succinate  50 mg Oral Daily    NIFEdipine  30 mg Oral Daily    pantoprazole  40 mg Oral QAM     Assessment:   Acute Diastolic Heart Failure - Related to VHD/Severe MS - Compensated     - s/p LHC/RHC (11.27.24) severe bioprosthetic MVR stenosis.  Cardiac Catheterization findings shown above do suggest that there is a gradient of around 22 mm Hg across the bioprosthetic valve, Recommend consultation with CT surgery, Obtain CT with mitral valve in valve protocol for possible TMVR, LAD should be revascularized as well.    - EF 60-65% with G 1 Diastolic Dysfunction (11.14.24)   VHD    - MARYBETH (11.27.24) - Mitral Valve: There is a  bioprosthetic valve in the mitral position. There is severe stenosis. The mean pressure gradient across the mitral valve is 16 mmHg at a heart rate of bpm. There is moderate regurgitation.     - ECHO (11.23.24): There is evidence of bioprosthetic mitral valve stenosis The mean pressure gradient across the mitral valve is 19 mmHg at a heart rate of 78 bpm.     - s/p MVR (9.20.23): Mitral valve replacement with a 25 mm mosaic porcine valve    - Moderate TR    - Mild MR  Acute Hypoxemic Respiratory Failure requiring O2 Therapy - Resolved   PNA ?    - CTA Chest (11.22.24): Llower lung lobes compressive consolidations.   Bilateral Pleural Effusions  Anemia (Mild)  Hyperkalemia - Resolved  Leukocytosis - Rsolved   COPD Exacerbation - Stable   UTI    - UCX - > 100,000 GNR  Pulmonary HTN  GEMINI  DM II  PAF - Currently SR    - [id number] Score 6 (9.7% Stroke Risk Per Year)    - s/p Ligation of SUKHI (9.20.23) - Endostapler   ABEL  HTN  DVT    - has IVC Filter     - on Xarelto Outpatient   SSS    - s/p PPM (9.25.23): [facility]   HLD  Asthma  Left Thyroid Nodule  GERD  Chronic Sinusitis  Common Variable Immunodeficiency  Glaucoma  No Known History of GI Bleed     Plan:   Continue Amio, ASA, Statin, BB, Nifedipine  Lovenox 1mg/kg SQ x 1 (2100) RE Stroke Risk Reduction   Keep NPO after MN  Schedule/Consent for LHC in AM for LAD Intervention (11.29.24)    - PT has IVC Filter   Risk, Benefits and Alternatives Reviewed and Discussed with the PT and their Family and they wish to proceed with above Procedure.   Consult CV Surgery for MS - Plans for Valve-in-Valve for MS (T-MAVR) on Monday   Keep K > 4.0 and Mg > 2.0   CT Chest Films Complete and with Structural Heart Team for Review   Labs and EKG in AM: CBC, CMP and Mg     Mitchell Petersen, ANP  Cardiology  [facility]    I agree with the findings of the complexity of problems addressed and take responsibility for the plan's risks and complications. I approved the plan  documented by Mitchell Petersen NP.

## 2024-11-29 LAB
ALBUMIN SERPL-MCNC: 2.8 G/DL (ref 3.4–4.8)
ALBUMIN/GLOB SERPL: 0.9 RATIO (ref 1.1–2)
ALP SERPL-CCNC: 88 UNIT/L (ref 40–150)
ALT SERPL-CCNC: 22 UNIT/L (ref 0–55)
ANION GAP SERPL CALC-SCNC: 14 MEQ/L
AST SERPL-CCNC: 27 UNIT/L (ref 5–34)
BACTERIA UR CULT: ABNORMAL
BACTERIA UR CULT: ABNORMAL
BASOPHILS # BLD AUTO: 0.06 X10(3)/MCL
BASOPHILS NFR BLD AUTO: 0.5 %
BILIRUB SERPL-MCNC: 0.7 MG/DL
BUN SERPL-MCNC: 27.5 MG/DL (ref 9.8–20.1)
CALCIUM SERPL-MCNC: 8.6 MG/DL (ref 8.4–10.2)
CHLORIDE SERPL-SCNC: 98 MMOL/L (ref 98–107)
CO2 SERPL-SCNC: 22 MMOL/L (ref 23–31)
CREAT SERPL-MCNC: 0.86 MG/DL (ref 0.55–1.02)
CREAT/UREA NIT SERPL: 32
EOSINOPHIL # BLD AUTO: 0.16 X10(3)/MCL (ref 0–0.9)
EOSINOPHIL NFR BLD AUTO: 1.2 %
ERYTHROCYTE [DISTWIDTH] IN BLOOD BY AUTOMATED COUNT: 16.6 % (ref 11.5–17)
GFR SERPLBLD CREATININE-BSD FMLA CKD-EPI: >60 ML/MIN/1.73/M2
GLOBULIN SER-MCNC: 3.1 GM/DL (ref 2.4–3.5)
GLUCOSE SERPL-MCNC: 207 MG/DL (ref 82–115)
HCT VFR BLD AUTO: 38.3 % (ref 37–47)
HGB BLD-MCNC: 12.6 G/DL (ref 12–16)
IMM GRANULOCYTES # BLD AUTO: 0.07 X10(3)/MCL (ref 0–0.04)
IMM GRANULOCYTES NFR BLD AUTO: 0.5 %
LYMPHOCYTES # BLD AUTO: 3.33 X10(3)/MCL (ref 0.6–4.6)
LYMPHOCYTES NFR BLD AUTO: 25.6 %
MAGNESIUM SERPL-MCNC: 2.1 MG/DL (ref 1.6–2.6)
MCH RBC QN AUTO: 29.9 PG (ref 27–31)
MCHC RBC AUTO-ENTMCNC: 32.9 G/DL (ref 33–36)
MCV RBC AUTO: 90.8 FL (ref 80–94)
MONOCYTES # BLD AUTO: 1.88 X10(3)/MCL (ref 0.1–1.3)
MONOCYTES NFR BLD AUTO: 14.4 %
NEUTROPHILS # BLD AUTO: 7.53 X10(3)/MCL (ref 2.1–9.2)
NEUTROPHILS NFR BLD AUTO: 57.8 %
NRBC BLD AUTO-RTO: 0.2 %
OHS QRS DURATION: 92 MS
OHS QRS DURATION: 96 MS
OHS QTC CALCULATION: 374 MS
OHS QTC CALCULATION: 456 MS
PLATELET # BLD AUTO: 229 X10(3)/MCL (ref 130–400)
PMV BLD AUTO: 10.9 FL (ref 7.4–10.4)
POCT GLUCOSE: 349 MG/DL (ref 70–110)
POTASSIUM SERPL-SCNC: 3.7 MMOL/L (ref 3.5–5.1)
PROT SERPL-MCNC: 5.9 GM/DL (ref 5.8–7.6)
RBC # BLD AUTO: 4.22 X10(6)/MCL (ref 4.2–5.4)
SODIUM SERPL-SCNC: 134 MMOL/L (ref 136–145)
WBC # BLD AUTO: 13.03 X10(3)/MCL (ref 4.5–11.5)

## 2024-11-29 PROCEDURE — C1760 CLOSURE DEV, VASC: HCPCS | Performed by: INTERNAL MEDICINE

## 2024-11-29 PROCEDURE — 94760 N-INVAS EAR/PLS OXIMETRY 1: CPT

## 2024-11-29 PROCEDURE — 99024 POSTOP FOLLOW-UP VISIT: CPT | Mod: POP,,, | Performed by: PHYSICIAN ASSISTANT

## 2024-11-29 PROCEDURE — 93005 ELECTROCARDIOGRAM TRACING: CPT

## 2024-11-29 PROCEDURE — 36415 COLL VENOUS BLD VENIPUNCTURE: CPT

## 2024-11-29 PROCEDURE — 63600175 PHARM REV CODE 636 W HCPCS: Performed by: INTERNAL MEDICINE

## 2024-11-29 PROCEDURE — 25000003 PHARM REV CODE 250: Performed by: INTERNAL MEDICINE

## 2024-11-29 PROCEDURE — 93010 ELECTROCARDIOGRAM REPORT: CPT | Mod: 76,,, | Performed by: INTERNAL MEDICINE

## 2024-11-29 PROCEDURE — 25000003 PHARM REV CODE 250

## 2024-11-29 PROCEDURE — C1874 STENT, COATED/COV W/DEL SYS: HCPCS | Performed by: INTERNAL MEDICINE

## 2024-11-29 PROCEDURE — 93799 UNLISTED CV SVC/PROCEDURE: CPT | Mod: LD | Performed by: INTERNAL MEDICINE

## 2024-11-29 PROCEDURE — 11000001 HC ACUTE MED/SURG PRIVATE ROOM

## 2024-11-29 PROCEDURE — 21400001 HC TELEMETRY ROOM

## 2024-11-29 PROCEDURE — 027034Z DILATION OF CORONARY ARTERY, ONE ARTERY WITH DRUG-ELUTING INTRALUMINAL DEVICE, PERCUTANEOUS APPROACH: ICD-10-PCS | Performed by: INTERNAL MEDICINE

## 2024-11-29 PROCEDURE — 27201423 OPTIME MED/SURG SUP & DEVICES STERILE SUPPLY: Performed by: INTERNAL MEDICINE

## 2024-11-29 PROCEDURE — 80053 COMPREHEN METABOLIC PANEL: CPT | Performed by: NURSE PRACTITIONER

## 2024-11-29 PROCEDURE — 94761 N-INVAS EAR/PLS OXIMETRY MLT: CPT

## 2024-11-29 PROCEDURE — 93010 ELECTROCARDIOGRAM REPORT: CPT | Mod: ,,, | Performed by: INTERNAL MEDICINE

## 2024-11-29 PROCEDURE — 99900031 HC PATIENT EDUCATION (STAT)

## 2024-11-29 PROCEDURE — 94640 AIRWAY INHALATION TREATMENT: CPT

## 2024-11-29 PROCEDURE — C1894 INTRO/SHEATH, NON-LASER: HCPCS | Performed by: INTERNAL MEDICINE

## 2024-11-29 PROCEDURE — C1887 CATHETER, GUIDING: HCPCS | Performed by: INTERNAL MEDICINE

## 2024-11-29 PROCEDURE — 99900035 HC TECH TIME PER 15 MIN (STAT)

## 2024-11-29 PROCEDURE — C1769 GUIDE WIRE: HCPCS | Performed by: INTERNAL MEDICINE

## 2024-11-29 PROCEDURE — 99153 MOD SED SAME PHYS/QHP EA: CPT | Performed by: INTERNAL MEDICINE

## 2024-11-29 PROCEDURE — C9600 PERC DRUG-EL COR STENT SING: HCPCS | Mod: LD | Performed by: INTERNAL MEDICINE

## 2024-11-29 PROCEDURE — 27000221 HC OXYGEN, UP TO 24 HOURS

## 2024-11-29 PROCEDURE — 63600175 PHARM REV CODE 636 W HCPCS

## 2024-11-29 PROCEDURE — C1725 CATH, TRANSLUMIN NON-LASER: HCPCS | Performed by: INTERNAL MEDICINE

## 2024-11-29 PROCEDURE — 83735 ASSAY OF MAGNESIUM: CPT | Performed by: NURSE PRACTITIONER

## 2024-11-29 PROCEDURE — 25500020 PHARM REV CODE 255: Performed by: INTERNAL MEDICINE

## 2024-11-29 PROCEDURE — A4216 STERILE WATER/SALINE, 10 ML: HCPCS | Performed by: INTERNAL MEDICINE

## 2024-11-29 PROCEDURE — 85025 COMPLETE CBC W/AUTO DIFF WBC: CPT

## 2024-11-29 PROCEDURE — 99152 MOD SED SAME PHYS/QHP 5/>YRS: CPT | Performed by: INTERNAL MEDICINE

## 2024-11-29 PROCEDURE — 63600175 PHARM REV CODE 636 W HCPCS: Performed by: NURSE PRACTITIONER

## 2024-11-29 PROCEDURE — 25000242 PHARM REV CODE 250 ALT 637 W/ HCPCS: Performed by: STUDENT IN AN ORGANIZED HEALTH CARE EDUCATION/TRAINING PROGRAM

## 2024-11-29 DEVICE — ANGIO-SEAL VIP VASCULAR CLOSURE DEVICE
Type: IMPLANTABLE DEVICE | Site: CORONARY | Status: FUNCTIONAL
Brand: ANGIO-SEAL

## 2024-11-29 DEVICE — EVEROLIMUS-ELUTING PLATINUM CHROMIUM CORONARY STENT SYSTEM
Type: IMPLANTABLE DEVICE | Site: CORONARY | Status: FUNCTIONAL
Brand: SYNERGY™ XD

## 2024-11-29 RX ORDER — FENTANYL CITRATE 50 UG/ML
INJECTION, SOLUTION INTRAMUSCULAR; INTRAVENOUS
Status: DISCONTINUED | OUTPATIENT
Start: 2024-11-29 | End: 2024-11-29 | Stop reason: HOSPADM

## 2024-11-29 RX ORDER — CLOPIDOGREL BISULFATE 300 MG/1
TABLET, FILM COATED ORAL
Status: DISCONTINUED | OUTPATIENT
Start: 2024-11-29 | End: 2024-11-29 | Stop reason: HOSPADM

## 2024-11-29 RX ORDER — HEPARIN SODIUM 1000 [USP'U]/ML
INJECTION, SOLUTION INTRAVENOUS; SUBCUTANEOUS
Status: DISCONTINUED | OUTPATIENT
Start: 2024-11-29 | End: 2024-11-29 | Stop reason: HOSPADM

## 2024-11-29 RX ORDER — CLOPIDOGREL BISULFATE 75 MG/1
75 TABLET ORAL DAILY
Status: CANCELLED | OUTPATIENT
Start: 2024-11-29

## 2024-11-29 RX ORDER — ENOXAPARIN SODIUM 100 MG/ML
1 INJECTION SUBCUTANEOUS EVERY 12 HOURS
Status: DISCONTINUED | OUTPATIENT
Start: 2024-11-29 | End: 2024-12-01

## 2024-11-29 RX ORDER — IOPAMIDOL 755 MG/ML
INJECTION, SOLUTION INTRAVASCULAR
Status: DISCONTINUED | OUTPATIENT
Start: 2024-11-29 | End: 2024-11-29 | Stop reason: HOSPADM

## 2024-11-29 RX ORDER — FUROSEMIDE 10 MG/ML
INJECTION INTRAMUSCULAR; INTRAVENOUS
Status: DISCONTINUED | OUTPATIENT
Start: 2024-11-29 | End: 2024-11-29 | Stop reason: HOSPADM

## 2024-11-29 RX ORDER — MIDAZOLAM HYDROCHLORIDE 1 MG/ML
INJECTION INTRAMUSCULAR; INTRAVENOUS
Status: DISCONTINUED | OUTPATIENT
Start: 2024-11-29 | End: 2024-11-29 | Stop reason: HOSPADM

## 2024-11-29 RX ORDER — CLOPIDOGREL BISULFATE 75 MG/1
75 TABLET ORAL DAILY
Status: DISCONTINUED | OUTPATIENT
Start: 2024-11-30 | End: 2024-12-03 | Stop reason: HOSPADM

## 2024-11-29 RX ORDER — LIDOCAINE HYDROCHLORIDE 10 MG/ML
INJECTION, SOLUTION INFILTRATION; PERINEURAL
Status: DISCONTINUED | OUTPATIENT
Start: 2024-11-29 | End: 2024-11-29 | Stop reason: HOSPADM

## 2024-11-29 RX ADMIN — CETIRIZINE HYDROCHLORIDE 10 MG: 10 TABLET, FILM COATED ORAL at 09:11

## 2024-11-29 RX ADMIN — ENOXAPARIN SODIUM 100 MG: 100 INJECTION SUBCUTANEOUS at 08:11

## 2024-11-29 RX ADMIN — PANTOPRAZOLE SODIUM 40 MG: 40 TABLET, DELAYED RELEASE ORAL at 05:11

## 2024-11-29 RX ADMIN — FAMOTIDINE 40 MG: 20 TABLET, FILM COATED ORAL at 08:11

## 2024-11-29 RX ADMIN — IPRATROPIUM BROMIDE AND ALBUTEROL SULFATE 3 ML: 2.5; .5 SOLUTION RESPIRATORY (INHALATION) at 11:11

## 2024-11-29 RX ADMIN — AMIODARONE HYDROCHLORIDE 200 MG: 200 TABLET ORAL at 09:11

## 2024-11-29 RX ADMIN — IPRATROPIUM BROMIDE AND ALBUTEROL SULFATE 3 ML: 2.5; .5 SOLUTION RESPIRATORY (INHALATION) at 03:11

## 2024-11-29 RX ADMIN — INSULIN ASPART 5 UNITS: 100 INJECTION, SOLUTION INTRAVENOUS; SUBCUTANEOUS at 08:11

## 2024-11-29 RX ADMIN — CEFTRIAXONE SODIUM 1 G: 1 INJECTION, POWDER, FOR SOLUTION INTRAMUSCULAR; INTRAVENOUS at 10:11

## 2024-11-29 RX ADMIN — ATORVASTATIN CALCIUM 40 MG: 40 TABLET, FILM COATED ORAL at 08:11

## 2024-11-29 RX ADMIN — PANCRELIPASE 2 CAPSULE: 120000; 24000; 76000 CAPSULE, DELAYED RELEASE PELLETS ORAL at 05:11

## 2024-11-29 RX ADMIN — METOPROLOL SUCCINATE 50 MG: 50 TABLET, EXTENDED RELEASE ORAL at 09:11

## 2024-11-29 RX ADMIN — PANCRELIPASE 2 CAPSULE: 120000; 24000; 76000 CAPSULE, DELAYED RELEASE PELLETS ORAL at 09:11

## 2024-11-29 RX ADMIN — ASPIRIN 81 MG: 81 TABLET, COATED ORAL at 09:11

## 2024-11-29 RX ADMIN — ACETAMINOPHEN 325MG 650 MG: 325 TABLET ORAL at 08:11

## 2024-11-29 RX ADMIN — IPRATROPIUM BROMIDE AND ALBUTEROL SULFATE 3 ML: 2.5; .5 SOLUTION RESPIRATORY (INHALATION) at 08:11

## 2024-11-29 RX ADMIN — SODIUM CHLORIDE 10 ML: 9 INJECTION, SOLUTION INTRAMUSCULAR; INTRAVENOUS; SUBCUTANEOUS at 10:11

## 2024-11-29 NOTE — PROGRESS NOTES
[facility] Hospital Medicine Progress Note        Chief Complaint: Inpatient Follow-up for HF    HPI:   79-year-old woman with A-fib, HTN, valvular heart disease s/p MVR, CAD, DVT s/p IVC filter on xarelto, T2DM, GERD, SSS s/p PPM, HTN, HLD, chronic sinusitis status post sinus surgery, ABEL, obesity and asthma/COPD not on home oxygen who presented to the ER with progressive shortness of breath over the last month.  She initially was seen in urgent care but due to sats being 88% on room air she was deferred to the ER.     She arrived afebrile hemodynamically stable maintaining adequate sats on 3 L nasal cannula but abruptly desatted in the ER requiring initiation of BiPAP.  Laboratory work showed a BNP of 160 (last EF 55% 09/2023), CTA showed no pulmonary embolus, noted cardiomegaly and mild congestive changes and pleural effusions and lower lung compressive consolidations.  EKG noted normal sinus rhythm.     She was given 80 mg of Lasix, albuterol nebulizer treatment, IV Solu-Medrol and admitted to the hospitalist service for further management.  Continued on IV Lasix b.i.d..  Able to be weaned off of BiPAP and currently on 3 L O2 via NC with adequate saturations.  Added DuoNebs. Desaturating to 88% during ambulation on RA.    Underwent MARYBETH on 11/27 which showed severe mitral stenosis and moderate MR and mid LAD 70% stenosis with elevated PCWP and PA 47/26. Plan on repeat LHC on 11/29 and T-MAVR on Monday.    Interval Hx:   Currently on 2L NC. Daughter at bedside. All questions answered. Patient reports SOB when walking. Otherwise no complaints.       Objective/physical exam:  General: In no acute distress, Obese   Chest: Nelson diminished breath sounds   Heart: RRR, +S1, S2, no appreciable murmur  Abdomen: Soft, nontender, BS +  MSK: Warm, no lower extremity edema, no clubbing or cyanosis  Neurologic: Alert and oriented x4, Cranial nerve II-XII intact, Strength 5/5 in all 4  extremities    VITAL SIGNS: 24 HRS MIN & MAX LAST   Temp  Min: 97.4 °F (36.3 °C)  Max: 99.1 °F (37.3 °C) 99.1 °F (37.3 °C)   BP  Min: 95/44  Max: 129/93 105/66   Pulse  Min: 64  Max: 81  81   Resp  Min: 17  Max: 20 18   SpO2  Min: 92 %  Max: 97 % 95 %     I have reviewed the following labs:  Recent Labs   Lab 11/27/24 0424 11/28/24 0416 11/29/24 0455   WBC 13.57* 14.28* 13.03*   RBC 4.48 4.18* 4.22   HGB 13.2 12.5 12.6   HCT 40.5 38.1 38.3   MCV 90.4 91.1 90.8   MCH 29.5 29.9 29.9   MCHC 32.6* 32.8* 32.9*   RDW 16.9 16.8 16.6    213 229   MPV 10.8* 10.6* 10.9*     Recent Labs   Lab 11/23/24  0532 11/24/24 0347 11/27/24 0424 11/28/24 0416 11/29/24  0455      < > 136 135* 134*   K 5.3*   < > 4.4 4.1 3.7   *   < > 98 97* 98   CO2 21*   < > 24 22* 22*   BUN 25.6*   < > 21.7* 25.7* 27.5*   CREATININE 0.94   < > 0.88 0.88 0.86   CALCIUM 8.7   < > 8.7 8.6 8.6   MG  --    < > 2.40 2.30 2.10   ALBUMIN 3.3*  --   --   --  2.8*   ALKPHOS 65  --   --   --  88   ALT 12  --   --   --  22   AST 20  --   --   --  27   BILITOT 0.5  --   --   --  0.7    < > = values in this interval not displayed.     Assessment/Plan:  # Acute on chronic diastolic decompensated HF  # Acute respiratory failure with hypoxia requiring supplemental oxygen 2/2 B/L pulmonary edema   # Pulmonary HTN  # Severe mitral stenosis  # K.pneumonia and proteus mirabilis UTI  # Obesity   # pAF s/p LAAL in 2023  # HTN  # HLD  # VHD s/p MVR  # CAD   # DVT  s/p IVC filter  # DM   # GERD   # Chronic sinusitis s/p sinus surgery   # ABEL   # Obesity   # Asthma/COPD ?    - wean oxygen as tolerated  - MARYBETH and LHC noted   - plan for repeat LHC on 11/29   - plan for T-MAVR with CT surgery on Monday  - IV lasix 40 mg daily  - cardiology recs appreciated  - hold home xarelto until cleared by cardiology/resume full dose lovenox once ok by cardiology post PCI  - continue aspirin, statin, amiodarone, metop XL 50 mg daily, nifedipine 30 mg daily  - continue  ceftriaxone - last dose on 11/30  - incentive spirometer  - duonebs  - PT/OT    Critical care note:  Critical care diagnosis: HF needing IV lasix   Critical care interventions: Hands-on evaluation, review of labs/radiographs/records and discussion with patient and family if present  Critical care time spent: 35 minutes     VTE prophylaxis: Xarelto/full dose lovenox once cleared by cardiology    Patient condition:  Guarded    Anticipated discharge and Disposition:   PT recommending SNF      All diagnosis and differential diagnosis have been reviewed; assessment and plan has been documented; I have personally reviewed the labs and test results that are presently available; I have reviewed the patients medication list; I have reviewed the consulting providers response and recommendations. I have reviewed or attempted to review medical records based upon their availability    All of the patient's questions have been  addressed and answered. Patient's is agreeable to the above stated plan. I will continue to monitor closely and make adjustments to medical management as needed.    _____________________________________________________________________  Radiology:  I have personally reviewed the following imaging and agree with the radiologist.     CTA Chest Aorta Non Coronary  Narrative: EXAMINATION:  CTA CHEST AORTA NON CORONARY    CLINICAL HISTORY:  Gated CTA Chest Abdomen - Mitral Valve in Valve Protocol;    TECHNIQUE:  Helically acquired images with axial, sagittal and coronal reformations were obtained from the thoracic inlet to the lung bases followingthe IV administration of contrast.  CTA timed for evaluation of the aorta MIP images were performed.  Exam was EKG gated.    Automated tube current modulation, weight-based exposure dosing, and/or iterative reconstruction technique utilized to reach lowest reasonably achievable exposure rate.    DLP: 776 mGy*cm    COMPARISON:  No relevant priors available for comparison  at time of this dictation    FINDINGS:  BASE OF NECK: No significant abnormality.    AORTA: Aortic atherosclerosis.  Ascending aorta measures 3.5 cm in diameter.    PULMONARY VASCULATURE: Normal appearance of the pulmonary arteries taking into account timing of the contrast bolus.    HEART: There are postsurgical changes at the mitral valve.  There are calcifications at the mitral valve annulus.  There are coronary artery calcifications.    CARLOS/MEDIASTINUM: Small mediastinal and hilar lymph nodes, nonspecific.    AIRWAYS: Patent.    LUNGS/PLEURA: There are small layering pleural effusions.  There is septal thickening and mild ground-glass density within the lungs.  Dependent atelectasis bilaterally.    UPPER ABDOMEN: Partially imaged IVC filter.  Mild splenomegaly.    THORACIC SOFT TISSUES: Cardiac pacer device in place via left subclavian approach.    BONES: Postop sternotomy.  Partially imaged spinal stimulator device.  Impression: Small pleural effusions and changes of pulmonary interstitial edema    Electronically signed by: Lelo Rogers  Date:    11/28/2024  Time:    12:05      Catracho Galan MD  Department of Hospital Medicine   [facility]   11/29/2024

## 2024-11-29 NOTE — PROGRESS NOTES
CT SURGERY PROGRESS NOTE  Kortney Leach  79 y.o.  1945    Patients Procedure: Procedure(s) (LRB):  CATHETERIZATION, HEART, BOTH LEFT AND RIGHT (N/A)    Subjective  Interval History:   80 y/o female with a history of VHD, GEMINI, DM II, PAF, ABEL, COPD, HTN, DVT SSS, HLD, who is known to [facility], Dr. Uribe/Red.       MS s/p MVR Dr Rosales:  Date of service 09/20/2023   Preop diagnosis:  Mitral stenosis  Postop diagnosis:  Same  Procedure:  Mitral valve replacement with a 25 mm mosaic porcine valve; ligation of left atrial appendage with Endo stapler  Surgeon:  Connie  Assistant:  Lenin Nayakresents ED 11/22 CC shortness of breath over the last month. She was sent from urgent care for being 88% on room air     WBC 13.53, H&H 11.9/36.2, K 5.3, Chloride 108, CO2 21, Glucose 232, .1. Flu, COVID and COVID negative.      CTA Chest negative for PE, but shows Cardiomegaly and mild congestive changes and Bilateral prominent pleural effusions and lower lung lobes compressive consolidations.      CXR prominence of the right hilum may relate to pulmonary vascular congestion and possible small pleural effusions.         PMH: VHD, GEMINI, DM II, PAF, ABEL, COPD, HTN, DVT SSS, HLD, Asthma, Left Thyroid Nodule, GERD, Chronic Sinusitis, Common Variable Immunodeficiency, Glaucoma  PSH: PPM, LHC, MVR, Bilateral Cataract Extraction, Total Knee Replacement, Bilateral mastectomy, Appendectomy, Colonoscopy, EGD, Tonsillectomy,    Family History: Father - Lung Cancer; Mother - Alzheimer, CVA; Sister - HTN, Multiple Sclerosis, DM II  Social History: Former Smoker (Quit in 1998). Denies illicit drug use and alcohol use.        ROS    Medication List  Infusions    Scheduled   albuterol-ipratropium  3 mL Nebulization Q8H    amiodarone  200 mg Oral Daily    aspirin  81 mg Oral Daily    atorvastatin  40 mg Oral Daily    cefTRIAXone (Rocephin) IV (PEDS and ADULTS)  1 g Intravenous Q24H    cetirizine  10 mg Oral Daily     famotidine  40 mg Oral QHS    furosemide (LASIX) injection  40 mg Intravenous Daily    lipase-protease-amylase 24,000-76,000-120,000 units  2 capsule Oral TID WM    metoprolol succinate  50 mg Oral Daily    NIFEdipine  30 mg Oral Daily    pantoprazole  40 mg Oral QAM       Objective:  Recent Vitals:  Temp:  [97.4 °F (36.3 °C)-99.1 °F (37.3 °C)] 99.1 °F (37.3 °C)  Pulse:  [64-81] 81  Resp:  [17-20] 18  SpO2:  [92 %-97 %] 95 %  BP: ()/(44-93) 105/66    Physical Exam     I/O last 24 hrs:  Intake/Output - Last 3 Shifts         11/27 0700 11/28 0659 11/28 0700 11/29 0659 11/29 0700 11/30 0659    P.O. 620 900     Total Intake(mL/kg) 620 (6.4) 900 (9.3)     Urine (mL/kg/hr) 900 (0.4) 700 (0.3)     Stool 0 0     Total Output 900 700     Net -280 +200            Urine Occurrence  2 x     Stool Occurrence 0 x 1 x             Labs  BMP:   Recent Labs   Lab 11/29/24  0455   *   K 3.7   CL 98   CO2 22*   BUN 27.5*   CREATININE 0.86   CALCIUM 8.6   MG 2.10     CBC:   Recent Labs   Lab 11/29/24  0455   WBC 13.03*   RBC 4.22   HGB 12.6   HCT 38.3      MCV 90.8   MCH 29.9   MCHC 32.9*     CMP:   Recent Labs   Lab 11/29/24  0455   CALCIUM 8.6   ALBUMIN 2.8*   *   K 3.7   CO2 22*   CL 98   BUN 27.5*   CREATININE 0.86   ALKPHOS 88   ALT 22   AST 27   BILITOT 0.7         Imaging:   CXR: No results found in the last 24 hours.        ASSESSMENT/PLAN:    Pt found to have persistent CHF with apparent prosthetic jazlyn valve stenosis and mild-moderate mitral regurgitation. Pt received MVR (25mm MDT Mosaic Bioprosthesis) and at that time was noted to have severe/circumferential mitral valve annular calcification.  This was aggressively debrided but was only able to accommodate at 25mm bioprosthesis which was tiled to allow adequate placement without paravalvular regurgitation.  Considering these factors, it is unlikely that valve-in-valve procedure will improve mitral valve stenosis due to the likelihood that the  mitral valve annulus will be further narrowed by adding tissue/stent inside an already narrow orifice.  MARYBETH is insufficient to assess the location or presence of paravalvular regurgitation, the patient does have a small central closing jet which is common for this valve prosthesis, especially when the valve is oversized for the annulus.  3D MARYBETH would be better to assess the location of other regurgitant areas. LHC and PCI noted.    Would recommend optimization of CHF and heart team discussion.

## 2024-11-29 NOTE — PROGRESS NOTES
"[facility] *    Cardiology  Progress Note    Patient Name: Kortney Leach  MRN: [id number]  Admission Date: 11/22/2024  Hospital Length of Stay: 7 days  Code Status: Full Code   Attending Physician: Catracho Galan,*   Primary Care Physician: Arnaldo Hernandez MD  Expected Discharge Date:   Principal Problem:COPD exacerbation    Subjective:     Brief HPI/Hospital Course: Ms. Leach is a 80 y/o female with a history of VHD, GEMINI, DM II, PAF, ABEL, COPD, HTN, DVT SSS, HLD, who is known to [facility], Dr. Uribe/Red. She presented to the ER on 11.22.24 with complaints of shortness of breath. She reports worsening shortness of breath over the last month. She was sent from urgent care for being 88% on room air. She was started on oxygen therapy an sent to the ER. In the ER, she had some respiratory distress and was started on BiPAP. Significant labs WBC 13.53, H&H 11.9/36.2, K 5.3, Chloride 108, CO2 21, Glucose 232, .1. Flu, COVID and COVID negative. CTA Chest negative for PE, but shows Cardiomegaly and mild congestive changes and Bilateral prominent pleural effusions and lower lung lobes compressive consolidations. Cxr shows prominence of the right hilum may relate to pulmonary vascular congestion and possible small pleural effusions. [facility] has been consulted for CHF.        11.24.24: NAD. VSS. No complaints of CP/Palps. Reports some improvement in her SOB. 2350 urine output/1640 ml Fluid Negative. Crackle noted in bases.   11.25.24: NAD. VSS. No complaints of CP/Palp. Remains SOB. 1800 urine output/1700 ml Net Negative . Labs Stable.   11.26.24: NAD. VSS. No complaints of CP/SOB/Palps. Reports shortness of breath has significantly improved, but unable to lye flat. 1900 urine output over 24 hours/Fluid net negative 1120. WBC 12.47  11.27.24: NAD. VSS. No complaints of CP/Palps. Reports shortness of breath. "I feel okay" WBC 13.57. no output documented.  11.28.24: NAD. "I am doing well." Family " "at Bedside. Denies CP, SOB and Palps. Plans for LAD PCI in AM (11.29.24).   11.29.24: NAD. "I am good." Denies CP, SOB and Palps. NPO for LHC and PCI of LAD. BUN/Crea 27.5/0.86     PMH: VHD, GEMINI, DM II, PAF, ABEL, COPD, HTN, DVT SSS, HLD, Asthma, Left Thyroid Nodule, GERD, Chronic Sinusitis, Common Variable Immunodeficiency, Glaucoma  PSH: PPM, LHC, MVR, Bilateral Cataract Extraction, Total Knee Replacement, Bilateral mastectomy, Appendectomy, Colonoscopy, EGD, Tonsillectomy,    Family History: Father - Lung Cancer; Mother - Alzheimer, CVA; Sister - HTN, Multiple Sclerosis, DM II  Social History: Former Smoker (Quit in 1998). Denies illicit drug use and alcohol use.      Previous Cardiac Diagnostics:   [facility] 11.27.24:  Findings:  - There is single vessel coronary artery disease.  - Mid Left Anterior Descending has a calcified 70% stenosis.  - LVEDP is normal at 6 mmHg.  - RHC with elevated R sided filling pressures. RA 7 mmHg, RV 50/7 mmHg, PA 47/26 mmHg (mean 33 mmHg), PCWP 24 mmHg.  - PCWP to LV diastolic mean gradient was calculated to be 22 mmHg.  - Transpulmonary gradient is 8 mmHg.  - Normal Cardiac Output/Index at 4.7/2.3, as calculated by Rahel equation.  - PVR is 1.7 Woods units  - SVR is 1260 dyne-sec*cm^-5  - Pulmonary Artery Pulsatility Index ([place]) is 3.0  - Cardiac Power Output () is 0.84  Assessment/Plan:  - Patient is a 79 y.o. female with a history of prior bioprosthetic mitral valve replacement now presents with heart failure.  Transesophageal echocardiogram was obtained.  Final report is pending at this time, but there were findings consistent with severe bioprosthetic MVR stenosis.  Cardiac catheterization findings shown above do suggest that there is a gradient of around 22 mm Hg across the bioprosthetic valve.  -recommend consultation with CT surgery   -obtain CT with mitral valve in valve protocol for possible TMVR  -LAD should be revascularized as well    MARYBETH (11.27.24):  Left Ventricle: The " left ventricle is normal in size. Septal motion is consistent with post-operative status. There is normal systolic function with a visually estimated ejection fraction of 60 - 65%.  Right Ventricle: Normal right ventricular cavity size. Systolic function is normal.  Left Atrium: Left atrium is severely dilated.  Mitral Valve: There is a bioprosthetic valve in the mitral position. There is severe stenosis. The mean pressure gradient across the mitral valve is 16 mmHg at a heart rate of  bpm. There is moderate regurgitation.    ECHO (11.23.24):  Left Ventricle: The left ventricle is normal in size. Mildly increased wall thickness. There is normal systolic function with a visually estimated ejection fraction of 60 - 65%. Grade I diastolic dysfunction. Right Ventricle: Normal right ventricular cavity size. Systolic function is borderline low. Aortic Valve: There is aortic valve sclerosis. Mitral Valve: There is a bioprosthetic valve in the mitral position (25 mm mosaic porcine valve).  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 There is mild (1+) regurgitation. Tricuspid Valve: There moderate (2+) regurgitation. The estimated pulmonary artery systolic pressure is 57 mmHg. Pacemaker lead noted.  Recommend transesophageal echocardiogram to properly evaluate the bioprosthetic mitral valve.    ECHO (11.14.24):  The study quality is average. The left ventricle is normal in size. Global left ventricular systolic function is normal. The left ventricular ejection fraction is 65%. Left ventricular diastolic function is normal. Mild concentric left ventricular hypertrophy is present. The left atrium is moderately enlarged. (4.6 cms). A normal functioning prosthetic mitral valve is noted. MG = 4.3 mmHg. The pulmonary artery systolic pressure is 37 mmHg.      PPM Insertion (9.25.23):  Successful implantation of PPM Dual. [facility]       PET (1.24.23):  This is a normal perfusion study, no perfusion defects noted. There is no evidence of ischemia.This scan is suggestive of low risk for future cardiovascular events. The left ventricular cavity is noted to be normal on the stress studies. The stress left ventricular ejection fraction was calculated to be 68% and left ventricular global function is normal. The rest left ventricular cavity is noted to be normal. The rest left ventricular ejection fraction was calculated to be 63% and rest left ventricular global function is normal. When compared to the resting ejection fraction (63%), the stress ejection fraction (68%) has increased. Transient ischemic dilatation is present and has been described as a marker for high risk coronary artery disease. It has also been described in microvascular disease, hypertensive heart disease as well as cardiac deconditioning. The study quality is good.   There was a rise in myocardial blood flow between rest and stress.  Global myocardial blood flow reserve was 2.72.  Normal global coronary flow reserve is suggestive of low coronary event risk.     Carotid US (1.24.23):  The study quality is good. There is no plaque noted in the proximal right internal carotid artery. 1-39% stenosis in the proximal left internal carotid artery based on Bluth Criteria. Antegrade right vertebral artery flow. Antegrade left vertebral artery flow.     LHC (9.6.23):  Normal Coronaries     Review of Systems   Constitutional: Positive for malaise/fatigue. Negative for night sweats.   Cardiovascular:  Negative for chest pain, dyspnea on exertion, leg swelling and palpitations.   Respiratory:  Positive for shortness of breath.    All other systems reviewed and are negative.    Objective:     Vital Signs (Most Recent):  Temp: 99.1 °F (37.3 °C) (11/29/24 0418)  Pulse: 81 (11/29/24 0805)  Resp: 18 (11/29/24 0805)  BP: 123/83 (11/29/24 1204)  SpO2: 95 % (11/29/24 0805) Vital Signs (24h  "Range):  Temp:  [97.4 °F (36.3 °C)-99.1 °F (37.3 °C)] 99.1 °F (37.3 °C)  Pulse:  [68-81] 81  Resp:  [17-20] 18  SpO2:  [92 %-96 %] 95 %  BP: ()/(44-93) 123/83   Weight: 96.4 kg (212 lb 8 oz)  Body mass index is 34.3 kg/m².  SpO2: 95 %       Intake/Output Summary (Last 24 hours) at 11/29/2024 1242  Last data filed at 11/28/2024 2202  Gross per 24 hour   Intake 900 ml   Output 700 ml   Net 200 ml     Lines/Drains/Airways       Peripheral Intravenous Line  Duration                  Peripheral IV - Single Lumen 11/27/24 0915 22 G Anterior;Left Forearm 2 days                  Significant Labs:   Chemistries:   Recent Labs   Lab 11/23/24  0532 11/24/24  0347 11/25/24 0418 11/26/24 0408 11/27/24 0424 11/28/24 0416 11/29/24 0455      < > 139 136 136 135* 134*   K 5.3*   < > 4.0 3.7 4.4 4.1 3.7   *   < > 102 98 98 97* 98   CO2 21*   < > 23 25 24 22* 22*   BUN 25.6*   < > 22.2* 17.4 21.7* 25.7* 27.5*   CREATININE 0.94   < > 0.80 0.80 0.88 0.88 0.86   CALCIUM 8.7   < > 8.5 8.5 8.7 8.6 8.6   BILITOT 0.5  --   --   --   --   --  0.7   ALKPHOS 65  --   --   --   --   --  88   ALT 12  --   --   --   --   --  22   AST 20  --   --   --   --   --  27   GLUCOSE 232*   < > 194* 204* 212* 195* 207*   MG  --    < > 1.90 1.90 2.40 2.30 2.10    < > = values in this interval not displayed.        CBC/Anemia Labs: Coags:    Recent Labs   Lab 11/27/24  0424 11/28/24  0416 11/29/24  0455   WBC 13.57* 14.28* 13.03*   HGB 13.2 12.5 12.6   HCT 40.5 38.1 38.3    213 229   MCV 90.4 91.1 90.8   RDW 16.9 16.8 16.6    No results for input(s): "PT", "INR", "APTT" in the last 168 hours.       Telemetry: SR    Physical Exam  Vitals reviewed.   Constitutional:       General: She is not in acute distress.  HENT:      Head: Normocephalic.      Mouth/Throat:      Mouth: Mucous membranes are dry.   Eyes:      Extraocular Movements: Extraocular movements intact.   Cardiovascular:      Rate and Rhythm: Normal rate and regular " rhythm.      Pulses: Normal pulses.      Heart sounds: Murmur heard.   Pulmonary:      Effort: Pulmonary effort is normal. No respiratory distress.      Breath sounds: Examination of the right-lower field reveals decreased breath sounds. Examination of the left-lower field reveals decreased breath sounds. Decreased breath sounds present.      Comments: RA  Abdominal:      Palpations: Abdomen is soft.   Skin:     General: Skin is warm.      Comments: R Groin Soft/Flat, Non-Tender, No Sign of Bleed/Infection. +2 BLE Palpable Pedal Pulses    Neurological:      General: No focal deficit present.      Mental Status: She is alert and oriented to person, place, and time. Mental status is at baseline.   Psychiatric:         Mood and Affect: Mood normal.         Behavior: Behavior normal.       Current Schedule Inpatient Medications:   albuterol-ipratropium  3 mL Nebulization Q8H    amiodarone  200 mg Oral Daily    aspirin  81 mg Oral Daily    atorvastatin  40 mg Oral Daily    cefTRIAXone (Rocephin) IV (PEDS and ADULTS)  1 g Intravenous Q24H    cetirizine  10 mg Oral Daily    famotidine  40 mg Oral QHS    furosemide (LASIX) injection  40 mg Intravenous Daily    lipase-protease-amylase 24,000-76,000-120,000 units  2 capsule Oral TID WM    metoprolol succinate  50 mg Oral Daily    NIFEdipine  30 mg Oral Daily    pantoprazole  40 mg Oral QAM     Assessment:   Acute Diastolic Heart Failure - Related to VHD/Severe MS - Compensated     - s/p LHC/RHC (11.27.24) severe bioprosthetic MVR stenosis.  Cardiac Catheterization findings shown above do suggest that there is a gradient of around 22 mm Hg across the bioprosthetic valve, Recommend consultation with CT surgery, Obtain CT with mitral valve in valve protocol for possible TMVR, LAD should be revascularized as well.    - EF 60-65% with G 1 Diastolic Dysfunction (11.14.24)   VHD    - MARYBETH (11.27.24) - Mitral Valve: There is a bioprosthetic valve in the mitral position. There is  severe stenosis. The mean pressure gradient across the mitral valve is 16 mmHg at a heart rate of bpm. There is moderate regurgitation.     - ECHO (11.23.24): There is evidence of bioprosthetic mitral valve stenosis The mean pressure gradient across the mitral valve is 19 mmHg at a heart rate of 78 bpm.     - s/p MVR (9.20.23): Mitral valve replacement with a 25 mm mosaic porcine valve    - Moderate TR    - Mild MR  Acute Hypoxemic Respiratory Failure requiring O2 Therapy - Resolved   PNA ?    - CTA Chest (11.22.24): Llower lung lobes compressive consolidations.   Bilateral Pleural Effusions  Anemia (Mild)  Hyperkalemia - Resolved  Leukocytosis - Rsolved   COPD Exacerbation - Stable   UTI    - UCX - > 100,000 GNR  Pulmonary HTN  GEMINI  DM II  PAF - Currently SR    - [id number] Score 6 (9.7% Stroke Risk Per Year)    - s/p Ligation of SUKHI (9.20.23) - Endostapler   ABEL  HTN  DVT    - has IVC Filter     - on Xarelto Outpatient   SSS    - s/p PPM (9.25.23): [facility]   HLD  Asthma  Left Thyroid Nodule  GERD  Chronic Sinusitis  Common Variable Immunodeficiency  Glaucoma  No Known History of GI Bleed     Plan:   Continue Amio, ASA, Statin, BB, Nifedipine  Resume Lovenox 1mg/kg SQ x 1 (2100) RE Stroke Risk Reduction post LHC Today (11.30.24)  Keep NPO  Will Proceed with LHC in AM for LAD Intervention (11.29.24)    - PT has IVC Filter   Risk, Benefits and Alternatives Reviewed and Discussed with the PT and their Family and they wish to proceed with above Procedure. (Consents on Chart)  Consult CV Surgery for MS - Plans for Valve-in-Valve for MS (T-MAVR) on Monday   Keep K > 4.0 and Mg > 2.0   CT Chest Films Complete and with Structural Heart Team for Review   Labs and EKG in AM: CBC, CMP and Mg     Mitchell Petersen, ANP  Cardiology  [facility]

## 2024-11-29 NOTE — PT/OT/SLP PROGRESS
Occupational Therapy      Patient Name:  Kortney Leach   MRN:  [id number]    Patient not seen today secondary to pt OOR for heart cath . Will follow-up as schedule permits.    11/29/2024

## 2024-11-30 LAB
ALBUMIN SERPL-MCNC: 2.7 G/DL (ref 3.4–4.8)
ALBUMIN/GLOB SERPL: 0.7 RATIO (ref 1.1–2)
ALP SERPL-CCNC: 81 UNIT/L (ref 40–150)
ALT SERPL-CCNC: 25 UNIT/L (ref 0–55)
ANION GAP SERPL CALC-SCNC: 12 MEQ/L
AST SERPL-CCNC: 34 UNIT/L (ref 5–34)
BASOPHILS # BLD AUTO: 0.06 X10(3)/MCL
BASOPHILS # BLD AUTO: 0.07 X10(3)/MCL
BASOPHILS NFR BLD AUTO: 0.4 %
BASOPHILS NFR BLD AUTO: 0.5 %
BILIRUB SERPL-MCNC: 0.7 MG/DL
BUN SERPL-MCNC: 25.3 MG/DL (ref 9.8–20.1)
CALCIUM SERPL-MCNC: 9.1 MG/DL (ref 8.4–10.2)
CHLORIDE SERPL-SCNC: 96 MMOL/L (ref 98–107)
CO2 SERPL-SCNC: 26 MMOL/L (ref 23–31)
CREAT SERPL-MCNC: 0.98 MG/DL (ref 0.55–1.02)
CREAT/UREA NIT SERPL: 26
EOSINOPHIL # BLD AUTO: 0.17 X10(3)/MCL (ref 0–0.9)
EOSINOPHIL # BLD AUTO: 0.18 X10(3)/MCL (ref 0–0.9)
EOSINOPHIL NFR BLD AUTO: 1.1 %
EOSINOPHIL NFR BLD AUTO: 1.3 %
ERYTHROCYTE [DISTWIDTH] IN BLOOD BY AUTOMATED COUNT: 16.5 % (ref 11.5–17)
ERYTHROCYTE [DISTWIDTH] IN BLOOD BY AUTOMATED COUNT: 16.6 % (ref 11.5–17)
GFR SERPLBLD CREATININE-BSD FMLA CKD-EPI: 58 ML/MIN/1.73/M2
GLOBULIN SER-MCNC: 3.7 GM/DL (ref 2.4–3.5)
GLUCOSE SERPL-MCNC: 208 MG/DL (ref 82–115)
HCT VFR BLD AUTO: 36.2 % (ref 37–47)
HCT VFR BLD AUTO: 37.2 % (ref 37–47)
HGB BLD-MCNC: 12 G/DL (ref 12–16)
HGB BLD-MCNC: 12.1 G/DL (ref 12–16)
IMM GRANULOCYTES # BLD AUTO: 0.07 X10(3)/MCL (ref 0–0.04)
IMM GRANULOCYTES # BLD AUTO: 0.09 X10(3)/MCL (ref 0–0.04)
IMM GRANULOCYTES NFR BLD AUTO: 0.5 %
IMM GRANULOCYTES NFR BLD AUTO: 0.6 %
LYMPHOCYTES # BLD AUTO: 3.27 X10(3)/MCL (ref 0.6–4.6)
LYMPHOCYTES # BLD AUTO: 4.24 X10(3)/MCL (ref 0.6–4.6)
LYMPHOCYTES NFR BLD AUTO: 23.3 %
LYMPHOCYTES NFR BLD AUTO: 28.1 %
MAGNESIUM SERPL-MCNC: 2.1 MG/DL (ref 1.6–2.6)
MCH RBC QN AUTO: 29.4 PG (ref 27–31)
MCH RBC QN AUTO: 29.7 PG (ref 27–31)
MCHC RBC AUTO-ENTMCNC: 32.5 G/DL (ref 33–36)
MCHC RBC AUTO-ENTMCNC: 33.1 G/DL (ref 33–36)
MCV RBC AUTO: 89.6 FL (ref 80–94)
MCV RBC AUTO: 90.5 FL (ref 80–94)
MONOCYTES # BLD AUTO: 1.73 X10(3)/MCL (ref 0.1–1.3)
MONOCYTES # BLD AUTO: 1.9 X10(3)/MCL (ref 0.1–1.3)
MONOCYTES NFR BLD AUTO: 12.3 %
MONOCYTES NFR BLD AUTO: 12.6 %
NEUTROPHILS # BLD AUTO: 8.66 X10(3)/MCL (ref 2.1–9.2)
NEUTROPHILS # BLD AUTO: 8.69 X10(3)/MCL (ref 2.1–9.2)
NEUTROPHILS NFR BLD AUTO: 57.2 %
NEUTROPHILS NFR BLD AUTO: 62.1 %
NRBC BLD AUTO-RTO: 0 %
NRBC BLD AUTO-RTO: 0 %
OHS QRS DURATION: 90 MS
OHS QTC CALCULATION: 450 MS
PLATELET # BLD AUTO: 237 X10(3)/MCL (ref 130–400)
PLATELET # BLD AUTO: 261 X10(3)/MCL (ref 130–400)
PMV BLD AUTO: 10 FL (ref 7.4–10.4)
PMV BLD AUTO: 10.4 FL (ref 7.4–10.4)
POTASSIUM SERPL-SCNC: 3.8 MMOL/L (ref 3.5–5.1)
PROT SERPL-MCNC: 6.4 GM/DL (ref 5.8–7.6)
RBC # BLD AUTO: 4.04 X10(6)/MCL (ref 4.2–5.4)
RBC # BLD AUTO: 4.11 X10(6)/MCL (ref 4.2–5.4)
SODIUM SERPL-SCNC: 134 MMOL/L (ref 136–145)
WBC # BLD AUTO: 14.02 X10(3)/MCL (ref 4.5–11.5)
WBC # BLD AUTO: 15.11 X10(3)/MCL (ref 4.5–11.5)

## 2024-11-30 PROCEDURE — 93005 ELECTROCARDIOGRAM TRACING: CPT

## 2024-11-30 PROCEDURE — 83735 ASSAY OF MAGNESIUM: CPT | Performed by: NURSE PRACTITIONER

## 2024-11-30 PROCEDURE — 94640 AIRWAY INHALATION TREATMENT: CPT

## 2024-11-30 PROCEDURE — 25000003 PHARM REV CODE 250

## 2024-11-30 PROCEDURE — 21400001 HC TELEMETRY ROOM

## 2024-11-30 PROCEDURE — 63600175 PHARM REV CODE 636 W HCPCS: Performed by: INTERNAL MEDICINE

## 2024-11-30 PROCEDURE — 36415 COLL VENOUS BLD VENIPUNCTURE: CPT | Performed by: INTERNAL MEDICINE

## 2024-11-30 PROCEDURE — 93010 ELECTROCARDIOGRAM REPORT: CPT | Mod: 76,,, | Performed by: INTERNAL MEDICINE

## 2024-11-30 PROCEDURE — 80053 COMPREHEN METABOLIC PANEL: CPT | Performed by: NURSE PRACTITIONER

## 2024-11-30 PROCEDURE — 93010 ELECTROCARDIOGRAM REPORT: CPT | Mod: ,,, | Performed by: INTERNAL MEDICINE

## 2024-11-30 PROCEDURE — 97530 THERAPEUTIC ACTIVITIES: CPT | Mod: CQ

## 2024-11-30 PROCEDURE — 85025 COMPLETE CBC W/AUTO DIFF WBC: CPT

## 2024-11-30 PROCEDURE — 99900035 HC TECH TIME PER 15 MIN (STAT)

## 2024-11-30 PROCEDURE — 25000003 PHARM REV CODE 250: Performed by: NURSE PRACTITIONER

## 2024-11-30 PROCEDURE — 63600175 PHARM REV CODE 636 W HCPCS

## 2024-11-30 PROCEDURE — 99900031 HC PATIENT EDUCATION (STAT)

## 2024-11-30 PROCEDURE — 25000003 PHARM REV CODE 250: Performed by: INTERNAL MEDICINE

## 2024-11-30 PROCEDURE — 36415 COLL VENOUS BLD VENIPUNCTURE: CPT

## 2024-11-30 PROCEDURE — 25000242 PHARM REV CODE 250 ALT 637 W/ HCPCS: Performed by: INTERNAL MEDICINE

## 2024-11-30 PROCEDURE — 85025 COMPLETE CBC W/AUTO DIFF WBC: CPT | Performed by: INTERNAL MEDICINE

## 2024-11-30 PROCEDURE — 27000221 HC OXYGEN, UP TO 24 HOURS

## 2024-11-30 PROCEDURE — 11000001 HC ACUTE MED/SURG PRIVATE ROOM

## 2024-11-30 PROCEDURE — 25000242 PHARM REV CODE 250 ALT 637 W/ HCPCS: Performed by: STUDENT IN AN ORGANIZED HEALTH CARE EDUCATION/TRAINING PROGRAM

## 2024-11-30 PROCEDURE — 97116 GAIT TRAINING THERAPY: CPT | Mod: CQ

## 2024-11-30 PROCEDURE — 94760 N-INVAS EAR/PLS OXIMETRY 1: CPT

## 2024-11-30 PROCEDURE — 63600175 PHARM REV CODE 636 W HCPCS: Performed by: NURSE PRACTITIONER

## 2024-11-30 RX ORDER — FUROSEMIDE 10 MG/ML
20 INJECTION INTRAMUSCULAR; INTRAVENOUS ONCE
Status: COMPLETED | OUTPATIENT
Start: 2024-11-30 | End: 2024-11-30

## 2024-11-30 RX ADMIN — PANCRELIPASE 2 CAPSULE: 120000; 24000; 76000 CAPSULE, DELAYED RELEASE PELLETS ORAL at 12:11

## 2024-11-30 RX ADMIN — CEFTRIAXONE SODIUM 1 G: 1 INJECTION, POWDER, FOR SOLUTION INTRAMUSCULAR; INTRAVENOUS at 08:11

## 2024-11-30 RX ADMIN — INSULIN ASPART 4 UNITS: 100 INJECTION, SOLUTION INTRAVENOUS; SUBCUTANEOUS at 05:11

## 2024-11-30 RX ADMIN — INSULIN ASPART 2 UNITS: 100 INJECTION, SOLUTION INTRAVENOUS; SUBCUTANEOUS at 08:11

## 2024-11-30 RX ADMIN — ACETAMINOPHEN 325MG 650 MG: 325 TABLET ORAL at 08:11

## 2024-11-30 RX ADMIN — FUROSEMIDE 20 MG: 10 INJECTION, SOLUTION INTRAMUSCULAR; INTRAVENOUS at 07:11

## 2024-11-30 RX ADMIN — ASPIRIN 81 MG: 81 TABLET, COATED ORAL at 08:11

## 2024-11-30 RX ADMIN — FAMOTIDINE 40 MG: 20 TABLET, FILM COATED ORAL at 08:11

## 2024-11-30 RX ADMIN — PANCRELIPASE 2 CAPSULE: 120000; 24000; 76000 CAPSULE, DELAYED RELEASE PELLETS ORAL at 08:11

## 2024-11-30 RX ADMIN — ATORVASTATIN CALCIUM 40 MG: 40 TABLET, FILM COATED ORAL at 08:11

## 2024-11-30 RX ADMIN — PANTOPRAZOLE SODIUM 40 MG: 40 TABLET, DELAYED RELEASE ORAL at 05:11

## 2024-11-30 RX ADMIN — IPRATROPIUM BROMIDE AND ALBUTEROL SULFATE 3 ML: 2.5; .5 SOLUTION RESPIRATORY (INHALATION) at 04:11

## 2024-11-30 RX ADMIN — INSULIN ASPART 2 UNITS: 100 INJECTION, SOLUTION INTRAVENOUS; SUBCUTANEOUS at 12:11

## 2024-11-30 RX ADMIN — ENOXAPARIN SODIUM 100 MG: 100 INJECTION SUBCUTANEOUS at 08:11

## 2024-11-30 RX ADMIN — IPRATROPIUM BROMIDE AND ALBUTEROL SULFATE 3 ML: 2.5; .5 SOLUTION RESPIRATORY (INHALATION) at 08:11

## 2024-11-30 RX ADMIN — AMIODARONE HYDROCHLORIDE 200 MG: 200 TABLET ORAL at 08:11

## 2024-11-30 RX ADMIN — ACETAMINOPHEN 325MG 650 MG: 325 TABLET ORAL at 07:11

## 2024-11-30 RX ADMIN — CETIRIZINE HYDROCHLORIDE 10 MG: 10 TABLET, FILM COATED ORAL at 08:11

## 2024-11-30 RX ADMIN — PANCRELIPASE 2 CAPSULE: 120000; 24000; 76000 CAPSULE, DELAYED RELEASE PELLETS ORAL at 04:11

## 2024-11-30 RX ADMIN — CLOPIDOGREL BISULFATE 75 MG: 75 TABLET ORAL at 08:11

## 2024-11-30 RX ADMIN — FUROSEMIDE 40 MG: 10 INJECTION, SOLUTION INTRAMUSCULAR; INTRAVENOUS at 08:11

## 2024-11-30 RX ADMIN — INSULIN ASPART 4 UNITS: 100 INJECTION, SOLUTION INTRAVENOUS; SUBCUTANEOUS at 04:11

## 2024-11-30 RX ADMIN — METOPROLOL SUCCINATE 50 MG: 50 TABLET, EXTENDED RELEASE ORAL at 08:11

## 2024-11-30 RX ADMIN — NIFEDIPINE 30 MG: 30 TABLET, FILM COATED, EXTENDED RELEASE ORAL at 08:11

## 2024-11-30 NOTE — PT/OT/SLP PROGRESS
Physical Therapy Treatment    Patient Name:  Kortney Leach   MRN:  [id number]    Recommendations:     Discharge therapy intensity: Moderate Intensity Therapy   Discharge Equipment Recommendations: none  Barriers to discharge: Impaired mobility, Ongoing medical needs, and placement    Assessment:     Kortney Leach is a 79 y.o. female admitted with a medical diagnosis of acute heart failure, respiratory failure 2/2 pulmonary edema.  She presents with the following impairments/functional limitations: weakness, impaired balance, impaired endurance, impaired functional mobility, gait instability, decreased lower extremity function.    Rehab Prognosis: Good; patient would benefit from acute skilled PT services to address these deficits and reach maximum level of function.    Recent Surgery: Procedure(s) (LRB):  Angiogram, Coronary, with Left Heart Cath (N/A) 1 Day Post-Op    Plan:     During this hospitalization, patient would benefit from acute PT services 5 x/week to address the identified rehab impairments via gait training, therapeutic activities, therapeutic exercises and progress toward the following goals:    Plan of Care Expires:  01/04/25    Subjective     Chief Complaint: Fatigue  Patient/Family Comments/goals: Get stronger  Pain/Comfort:         Objective:     Communicated with RN prior to session.  Patient found supine with telemetry, peripheral IV, oxygen upon PT entry to room.     General Precautions: Standard, fall  Orthopedic Precautions: N/A  Braces: N/A  Respiratory Status: Nasal cannula, flow 3 L/min  Skin Integrity: Visible skin intact    Functional Mobility:  Bed Mobility:     Supine to Sit: minimum assistance  Transfers:     Sit to Stand:  minimum assistance with rolling walker  Toilet Transfer: contact guard assistance with  rolling walker  using  Step Transfer; +void independent w/pericare.  Gait: 8'/60' cga w/RW; vc for AD management and improved posture; low endurence noted during tx  session.  Balance: sba for static seated balance EOB and on toilet.    Therapeutic Activities/Exercises:  Educated about importance of seated therex while UIC; LAQ and marches x 20 ea every two hours. Encouraged pt to eat lunch despite having no appetite.     Education:  Patient provided with verbal education education regarding PT role/goals/POC, fall prevention, safety awareness, and home exercise program.  Understanding was verbalized.     Patient left up in chair with all lines intact, call button in reach, and geomat cushion    GOALS:   Multidisciplinary Problems       Physical Therapy Goals          Problem: Physical Therapy    Goal Priority Disciplines Outcome Interventions   Physical Therapy Goal     PT, PT/OT Progressing    Description: Goals to be met by: d/c     Patient will increase functional independence with mobility by performin. Supine to sit with Modified [place]  2. Sit to supine with Modified [place]  3. Sit to stand transfer with Modified [place]  4. Bed to chair transfer with Modified [place] using Rolling Walker  5. Gait  x 150 feet with Modified [place] using Rolling Walker.                          Time Tracking:     PT Received On: 24  PT Start Time: 1201     PT Stop Time: 1233  PT Total Time (min): 32 min     Billable Minutes: Gait Training 1 and Therapeutic Activity 1    Treatment Type: Treatment  PT/PTA: PTA     Number of PTA visits since last PT visit: 2     2024

## 2024-11-30 NOTE — PROGRESS NOTES
[facility] Hospital Medicine Progress Note        Chief Complaint: Inpatient Follow-up for HF    HPI:   79-year-old woman with A-fib, HTN, valvular heart disease s/p MVR, CAD, DVT s/p IVC filter on xarelto, T2DM, GERD, SSS s/p PPM, HTN, HLD, chronic sinusitis status post sinus surgery, ABEL, obesity and asthma/COPD not on home oxygen who presented to the ER with progressive shortness of breath over the last month.  She initially was seen in urgent care but due to sats being 88% on room air she was deferred to the ER.     She arrived afebrile hemodynamically stable maintaining adequate sats on 3 L nasal cannula but abruptly desatted in the ER requiring initiation of BiPAP.  Laboratory work showed a BNP of 160 (last EF 55% 09/2023), CTA showed no pulmonary embolus, noted cardiomegaly and mild congestive changes and pleural effusions and lower lung compressive consolidations.  EKG noted normal sinus rhythm.     She was given 80 mg of Lasix, albuterol nebulizer treatment, IV Solu-Medrol and admitted to the hospitalist service for further management.  Continued on IV Lasix b.i.d..  Able to be weaned off of BiPAP and currently on 3 L O2 via NC with adequate saturations.  Added DuoNebs. Desaturating to 88% during ambulation on RA.    Underwent MARYBETH on 11/27 which showed severe mitral stenosis and moderate MR and mid LAD 70% stenosis with elevated PCWP and PA 47/26. Plan on repeat LHC on 11/29 and T-MAVR on Monday.    Patient underwent repeat LHC on 11/29 with stent to prox LAD.     Interval Hx:   On 3L NC this morning. Reports SOB when walking but no chest pain. No LE edema.       Objective/physical exam:  General: In no acute distress, Obese   Chest: Nelson diminished breath sounds   Heart: RRR, +S1, S2, no appreciable murmur  Abdomen: Soft, nontender, BS +  MSK: Warm, no lower extremity edema, no clubbing or cyanosis  Neurologic: Alert and oriented x4, Cranial nerve II-XII intact, Strength 5/5  in all 4 extremities    VITAL SIGNS: 24 HRS MIN & MAX LAST   Temp  Min: 98.1 °F (36.7 °C)  Max: 99 °F (37.2 °C) 98.7 °F (37.1 °C)   BP  Min: 100/54  Max: 129/62 117/82   Pulse  Min: 62  Max: 92  78   Resp  Min: 17  Max: 22 18   SpO2  Min: 93 %  Max: 99 % 95 %     I have reviewed the following labs:  Recent Labs   Lab 11/29/24 0455 11/30/24  0004 11/30/24 0618   WBC 13.03* 15.11* 14.02*   RBC 4.22 4.04* 4.11*   HGB 12.6 12.0 12.1   HCT 38.3 36.2* 37.2   MCV 90.8 89.6 90.5   MCH 29.9 29.7 29.4   MCHC 32.9* 33.1 32.5*   RDW 16.6 16.5 16.6    237 261   MPV 10.9* 10.0 10.4     Recent Labs   Lab 11/28/24  0416 11/29/24 0455 11/30/24 0618   * 134* 134*   K 4.1 3.7 3.8   CL 97* 98 96*   CO2 22* 22* 26   BUN 25.7* 27.5* 25.3*   CREATININE 0.88 0.86 0.98   CALCIUM 8.6 8.6 9.1   MG 2.30 2.10 2.10   ALBUMIN  --  2.8* 2.7*   ALKPHOS  --  88 81   ALT  --  22 25   AST  --  27 34   BILITOT  --  0.7 0.7     Assessment/Plan:  # Acute on chronic diastolic decompensated HF  # Acute respiratory failure with hypoxia requiring supplemental oxygen 2/2 B/L pulmonary edema   # Pulmonary HTN  # Severe mitral stenosis  # K.pneumonia and proteus mirabilis UTI - treated  # Obesity   # pAF s/p LAAL in 2023  # HTN  # HLD  # VHD s/p MVR  # CAD   # DVT  s/p IVC filter  # DM   # GERD   # Chronic sinusitis s/p sinus surgery   # ABEL   # Obesity   # Asthma/COPD ?    - wean oxygen as tolerated  - MARYBETH and LHC noted   - repeat LHC on 11/29 with stent to prox LAD   - plan for possible T-MAVR with CT surgery on Monday; ongoing discussions  - IV lasix 40 mg daily  - cardiology recs appreciated  - currently on full dose lovenox  - continue aspirin, start plavix, statin, amiodarone, metop XL 50 mg daily, nifedipine 30 mg daily  - ceftriaxone - last dose on 11/30  - incentive spirometer  - laverne  - PT/OT    Critical care note:  Critical care diagnosis: HF needing IV lasix   Critical care interventions: Hands-on evaluation, review of  labs/radiographs/records and discussion with patient and family if present  Critical care time spent: 35 minutes     VTE prophylaxis: full dose lovenox    Patient condition:  Guarded    Anticipated discharge and Disposition:   PT recommending SNF    All diagnosis and differential diagnosis have been reviewed; assessment and plan has been documented; I have personally reviewed the labs and test results that are presently available; I have reviewed the patients medication list; I have reviewed the consulting providers response and recommendations. I have reviewed or attempted to review medical records based upon their availability    All of the patient's questions have been  addressed and answered. Patient's is agreeable to the above stated plan. I will continue to monitor closely and make adjustments to medical management as needed.    _____________________________________________________________________  Radiology:  I have personally reviewed the following imaging and agree with the radiologist.     Cardiac catheterization    Eccentric Prox LAD lesion was 80% stenosed with 0% stenosis   post-intervention. iFR 0.84    Prox LAD lesion: A STENT SYNERGY XD [id number] stent was successfully   placed at 8 GEO for 10 sec. Proximal part of the stent was post dilated by   using 3.5/12 mm NC and a 4/8 mm NC balloons at 12 atmospheres.    The procedure log was documented by No documenter listed and verified by   Tatiana Jarrett MD.    Date: 11/29/2024  Time: 11:51 AM    After obtaining informed consent patient was brought to the cath lab in a   fasting state.  Right groin area was prepped and draped usual sterile   manner using ChloraPrep solution.  2% lidocaine was given for local   anesthesia.  Six French sheath was placed in the right femoral artery   using Seldinger technique.  Six French EBU 3.0 guide catheter used for the   intervention.  IFR of the proximal LAD was performed which was 0.84.    Heparin was given for  anticoagulation.  Intervention was done on the same   IFR wire which was placed in the distal LAD.  Proximal LAD lesion was   pre-dilated by using 2.5/15 mm balloon and stented with a 3/16 mm synergy   drug-eluting stent and proximal part of the stent was post dilated by   using NC balloon 3.5/12 mm and a 4/8 mm NC balloon at 12 atmospheres for   10 seconds.  Post stenting angiogram shows lesion reduced from 0%.    Patient tolerated the procedure very well no complications noted.  Right   femoral artery angiogram was performed and Angio-Seal closure device was   applied achieving hemostasis.      Catracho Galan MD  Department of Hospital Medicine   [facility]   11/30/2024

## 2024-11-30 NOTE — PROGRESS NOTES
"[facility] *    Cardiology  Progress Note    Patient Name: Kortney Leach  MRN: [id number]  Admission Date: 11/22/2024  Hospital Length of Stay: 8 days  Code Status: Full Code   Attending Physician: Catracho Galan,*   Primary Care Physician: Arnaldo Hernandez MD  Expected Discharge Date:   Principal Problem:COPD exacerbation    Subjective:     Brief HPI/Hospital Course: Ms. Leach is a 78 y/o female with a history of VHD, GEMINI, DM II, PAF, ABEL, COPD, HTN, DVT SSS, HLD, who is known to [facility], Dr. Uribe/Red. She presented to the ER on 11.22.24 with complaints of shortness of breath. She reports worsening shortness of breath over the last month. She was sent from urgent care for being 88% on room air. She was started on oxygen therapy an sent to the ER. In the ER, she had some respiratory distress and was started on BiPAP. Significant labs WBC 13.53, H&H 11.9/36.2, K 5.3, Chloride 108, CO2 21, Glucose 232, .1. Flu, COVID and COVID negative. CTA Chest negative for PE, but shows Cardiomegaly and mild congestive changes and Bilateral prominent pleural effusions and lower lung lobes compressive consolidations. Cxr shows prominence of the right hilum may relate to pulmonary vascular congestion and possible small pleural effusions. [facility] has been consulted for CHF.        11.24.24: NAD. VSS. No complaints of CP/Palps. Reports some improvement in her SOB. 2350 urine output/1640 ml Fluid Negative. Crackle noted in bases.   11.25.24: NAD. VSS. No complaints of CP/Palp. Remains SOB. 1800 urine output/1700 ml Net Negative . Labs Stable.   11.26.24: NAD. VSS. No complaints of CP/SOB/Palps. Reports shortness of breath has significantly improved, but unable to lye flat. 1900 urine output over 24 hours/Fluid net negative 1120. WBC 12.47  11.27.24: NAD. VSS. No complaints of CP/Palps. Reports shortness of breath. "I feel okay" WBC 13.57. no output documented.  11.28.24: NAD. "I am doing well." Family " "at Bedside. Denies CP, SOB and Palps. Plans for LAD PCI in AM (11.29.24).   11.29.24: NAD. "I am good." Denies CP, SOB and Palps. NPO for LHC and PCI of LAD. BUN/Crea 27.5/0.86  11.30.24: NAD. "I am better." Denies CP, SOB and Palps. S/P PCI of LAD.      PMH: VHD, GEMINI, DM II, PAF, ABEL, COPD, HTN, DVT SSS, HLD, Asthma, Left Thyroid Nodule, GERD, Chronic Sinusitis, Common Variable Immunodeficiency, Glaucoma  PSH: PPM, LHC, MVR, Bilateral Cataract Extraction, Total Knee Replacement, Bilateral mastectomy, Appendectomy, Colonoscopy, EGD, Tonsillectomy,    Family History: Father - Lung Cancer; Mother - Alzheimer, CVA; Sister - HTN, Multiple Sclerosis, DM II  Social History: Former Smoker (Quit in 1998). Denies illicit drug use and alcohol use.      Previous Cardiac Diagnostics:   [facility] 11.29.24:  Eccentric Prox LAD lesion was 80% stenosed with 0% stenosis post-intervention. iFR 0.84  Prox LAD lesion: A STENT SYNERGY XD 3.0X16MM stent was successfully placed at 8 GEO for 10 sec. Proximal part of the stent was post dilated by using 3.5/12 mm NC and a 4/8 mm NC balloons at 12 atmospheres.    [facility] 11.27.24:  Findings:  - There is single vessel coronary artery disease.  - Mid Left Anterior Descending has a calcified 70% stenosis.  - LVEDP is normal at 6 mmHg.  - RHC with elevated R sided filling pressures. RA 7 mmHg, RV 50/7 mmHg, PA 47/26 mmHg (mean 33 mmHg), PCWP 24 mmHg.  - PCWP to LV diastolic mean gradient was calculated to be 22 mmHg.  - Transpulmonary gradient is 8 mmHg.  - Normal Cardiac Output/Index at 4.7/2.3, as calculated by Rahel equation.  - PVR is 1.7 Woods units  - SVR is 1260 dyne-sec*cm^-5  - Pulmonary Artery Pulsatility Index (Nury) is 3.0  - Cardiac Power Output () is 0.84  Assessment/Plan:  - Patient is a 79 y.o. female with a history of prior bioprosthetic mitral valve replacement now presents with heart failure.  Transesophageal echocardiogram was obtained.  Final report is pending at this time, but there " were findings consistent with severe bioprosthetic MVR stenosis.  Cardiac catheterization findings shown above do suggest that there is a gradient of around 22 mm Hg across the bioprosthetic valve.  -recommend consultation with CT surgery   -obtain CT with mitral valve in valve protocol for possible TMVR  -LAD should be revascularized as well    MARYBETH (11.27.24):  Left Ventricle: The left ventricle is normal in size. Septal motion is consistent with post-operative status. There is normal systolic function with a visually estimated ejection fraction of 60 - 65%.  Right Ventricle: Normal right ventricular cavity size. Systolic function is normal.  Left Atrium: Left atrium is severely dilated.  Mitral Valve: There is a bioprosthetic valve in the mitral position. There is severe stenosis. The mean pressure gradient across the mitral valve is 16 mmHg at a heart rate of  bpm. There is moderate regurgitation.    ECHO (11.23.24):  Left Ventricle: The left ventricle is normal in size. Mildly increased wall thickness. There is normal systolic function with a visually estimated ejection fraction of 60 - 65%. Grade I diastolic dysfunction. Right Ventricle: Normal right ventricular cavity size. Systolic function is borderline low. Aortic Valve: There is aortic valve sclerosis. Mitral Valve: There is a bioprosthetic valve in the mitral position (25 mm mosaic porcine valve).  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 There is mild (1+) regurgitation. Tricuspid Valve: There moderate (2+) regurgitation. The estimated pulmonary artery systolic pressure is 57 mmHg. Pacemaker lead noted.  Recommend transesophageal echocardiogram to properly evaluate the bioprosthetic mitral valve.    ECHO (11.14.24):  The study quality is average. The left ventricle is normal in size. Global left ventricular systolic function is normal. The left  ventricular ejection fraction is 65%. Left ventricular diastolic function is normal. Mild concentric left ventricular hypertrophy is present. The left atrium is moderately enlarged. (4.6 cms). A normal functioning prosthetic mitral valve is noted. MG = 4.3 mmHg. The pulmonary artery systolic pressure is 37 mmHg.      PPM Insertion (9.25.23):  Successful implantation of PPM Dual. [facility]      PET (1.24.23):  This is a normal perfusion study, no perfusion defects noted. There is no evidence of ischemia.This scan is suggestive of low risk for future cardiovascular events. The left ventricular cavity is noted to be normal on the stress studies. The stress left ventricular ejection fraction was calculated to be 68% and left ventricular global function is normal. The rest left ventricular cavity is noted to be normal. The rest left ventricular ejection fraction was calculated to be 63% and rest left ventricular global function is normal. When compared to the resting ejection fraction (63%), the stress ejection fraction (68%) has increased. Transient ischemic dilatation is present and has been described as a marker for high risk coronary artery disease. It has also been described in microvascular disease, hypertensive heart disease as well as cardiac deconditioning. The study quality is good.   There was a rise in myocardial blood flow between rest and stress.  Global myocardial blood flow reserve was 2.72.  Normal global coronary flow reserve is suggestive of low coronary event risk.     Carotid US (1.24.23):  The study quality is good. There is no plaque noted in the proximal right internal carotid artery. 1-39% stenosis in the proximal left internal carotid artery based on Bluth Criteria. Antegrade right vertebral artery flow. Antegrade left vertebral artery flow.     LHC (9.6.23):  Normal Coronaries     Review of Systems   Constitutional: Positive for malaise/fatigue. Negative for night sweats.   Cardiovascular:   "Negative for chest pain, leg swelling and palpitations.   Respiratory:  Positive for shortness of breath.    All other systems reviewed and are negative.    Objective:     Vital Signs (Most Recent):  Temp: 98.7 °F (37.1 °C) (11/30/24 1119)  Pulse: 78 (11/30/24 1119)  Resp: 18 (11/30/24 1119)  BP: 117/82 (11/30/24 1119)  SpO2: 95 % (11/30/24 1119) Vital Signs (24h Range):  Temp:  [98.1 °F (36.7 °C)-99 °F (37.2 °C)] 98.7 °F (37.1 °C)  Pulse:  [62-92] 78  Resp:  [17-22] 18  SpO2:  [93 %-99 %] 95 %  BP: (100-129)/(54-82) 117/82   Weight: 96.4 kg (212 lb 8 oz)  Body mass index is 34.3 kg/m².  SpO2: 95 %       Intake/Output Summary (Last 24 hours) at 11/30/2024 1403  Last data filed at 11/29/2024 1419  Gross per 24 hour   Intake 360 ml   Output 800 ml   Net -440 ml     Lines/Drains/Airways       Peripheral Intravenous Line  Duration                  Peripheral IV - Single Lumen 11/27/24 0915 22 G Anterior;Left Forearm 3 days                  Significant Labs:   Chemistries:   Recent Labs   Lab 11/26/24  0408 11/27/24  0424 11/28/24  0416 11/29/24  0455 11/30/24  0618    136 135* 134* 134*   K 3.7 4.4 4.1 3.7 3.8   CL 98 98 97* 98 96*   CO2 25 24 22* 22* 26   BUN 17.4 21.7* 25.7* 27.5* 25.3*   CREATININE 0.80 0.88 0.88 0.86 0.98   CALCIUM 8.5 8.7 8.6 8.6 9.1   BILITOT  --   --   --  0.7 0.7   ALKPHOS  --   --   --  88 81   ALT  --   --   --  22 25   AST  --   --   --  27 34   GLUCOSE 204* 212* 195* 207* 208*   MG 1.90 2.40 2.30 2.10 2.10        CBC/Anemia Labs: Coags:    Recent Labs   Lab 11/29/24  0455 11/30/24  0004 11/30/24  0618   WBC 13.03* 15.11* 14.02*   HGB 12.6 12.0 12.1   HCT 38.3 36.2* 37.2    237 261   MCV 90.8 89.6 90.5   RDW 16.6 16.5 16.6    No results for input(s): "PT", "INR", "APTT" in the last 168 hours.       Telemetry: SR    Physical Exam  Vitals reviewed.   Constitutional:       General: She is not in acute distress.  HENT:      Head: Normocephalic.      Mouth/Throat:      Mouth: Mucous " membranes are moist.   Eyes:      Extraocular Movements: Extraocular movements intact.   Cardiovascular:      Rate and Rhythm: Normal rate and regular rhythm.      Pulses: Normal pulses.      Heart sounds: Murmur heard.   Pulmonary:      Effort: Pulmonary effort is normal. No respiratory distress.      Breath sounds: Examination of the right-lower field reveals decreased breath sounds. Examination of the left-lower field reveals decreased breath sounds. Decreased breath sounds present.      Comments: RA  Abdominal:      Palpations: Abdomen is soft.   Musculoskeletal:         General: No swelling.   Skin:     General: Skin is warm.      Comments: R Groin Soft/Flat, Non-Tender, No Sign of Bleed/Infection. +2 BLE Palpable Pedal Pulses    Neurological:      General: No focal deficit present.      Mental Status: She is alert and oriented to person, place, and time.   Psychiatric:         Mood and Affect: Mood normal.         Behavior: Behavior normal.         Judgment: Judgment normal.       Current Schedule Inpatient Medications:   albuterol-ipratropium  3 mL Nebulization Q8H    amiodarone  200 mg Oral Daily    aspirin  81 mg Oral Daily    atorvastatin  40 mg Oral Daily    cetirizine  10 mg Oral Daily    clopidogreL  75 mg Oral Daily    enoxparin  1 mg/kg Subcutaneous Q12H (prophylaxis, 0900/2100)    famotidine  40 mg Oral QHS    furosemide (LASIX) injection  40 mg Intravenous Daily    lipase-protease-amylase 24,000-76,000-120,000 units  2 capsule Oral TID WM    metoprolol succinate  50 mg Oral Daily    NIFEdipine  30 mg Oral Daily    pantoprazole  40 mg Oral QAM     Assessment:   Acute Diastolic Heart Failure - Related to VHD/Severe MS - Compensated     - s/p LHC/RHC (11.27.24) severe bioprosthetic MVR stenosis.  Cardiac Catheterization findings shown above do suggest that there is a gradient of around 22 mm Hg across the bioprosthetic valve, Recommend consultation with CT surgery, Obtain CT with mitral valve in valve  protocol for possible TMVR, LAD should be revascularized as well.    - EF 60-65% with G 1 Diastolic Dysfunction (11.14.24)   VHD    - MARYBETH (11.27.24) - Mitral Valve: There is a bioprosthetic valve in the mitral position. There is severe stenosis. The mean pressure gradient across the mitral valve is 16 mmHg at a heart rate of bpm. There is moderate regurgitation.     - ECHO (11.23.24): There is evidence of bioprosthetic mitral valve stenosis The mean pressure gradient across the mitral valve is 19 mmHg at a heart rate of 78 bpm.     - s/p MVR (9.20.23): Mitral valve replacement with a 25 mm mosaic porcine valve    - Moderate TR    - Mild MR  CAD/Stents    - s/p LHC (11.29.24) - Eccentric Prox LAD lesion was 80% stenosed with 0% stenosis post-intervention. iFR 0.84. Prox LAD lesion: A STENT SYNERGY XD 3.0X16MM stent was successfully placed at 8 GEO for 10 sec. Proximal part of the stent was post dilated by using 3.5/12 mm NC and a 4/8 mm NC balloons at 12 atmospheres.  Acute Hypoxemic Respiratory Failure requiring O2 Therapy - Resolved   PNA ?    - CTA Chest (11.22.24): Llower lung lobes compressive consolidations.   Bilateral Pleural Effusions  Anemia (Mild)  Hyperkalemia - Resolved  Leukocytosis - Rsolved   COPD Exacerbation - Stable   UTI    - UCX - > 100,000 GNR  Pulmonary HTN  GEMINI  DM II  PAF - Currently SR    - [id number] Score 6 (9.7% Stroke Risk Per Year)    - s/p Ligation of SUKHI (9.20.23) - Endostapler   ABEL  HTN  DVT s/p IVC Filter     - on Xarelto Outpatient   SSS    - s/p PPM (9.25.23): [facility]   HLD  Asthma  Left Thyroid Nodule  GERD  Chronic Sinusitis  Common Variable Immunodeficiency  Glaucoma  No Known History of GI Bleed     Plan:   Continue Amio, ASA, Statin, BB, Nifedipine, Plavix   Continue FD Lovenox 1mg/kg SQ BID Re Stroke Risk Reduction/Hx of DVT   CV Surgery Following for MS - Continued Discussions for Valve-in-Valve for MS (T-MAVR)  Keep K > 4.0 and Mg > 2.0   CT Chest Films Complete and  with Structural Heart Team for Review   Will Continue to Follow  Labs in AM: CBC, CMP and Mg     Mitchell Petersen, GOPI  Cardiology  [facility]    I agree with the findings of the complexity of problems addressed and take responsibility for the plan's risks and complications. I approved the plan documented by Mitchell Petersen NP.

## 2024-12-01 LAB
ALBUMIN SERPL-MCNC: 2.7 G/DL (ref 3.4–4.8)
ALBUMIN/GLOB SERPL: 0.9 RATIO (ref 1.1–2)
ALP SERPL-CCNC: 77 UNIT/L (ref 40–150)
ALT SERPL-CCNC: 22 UNIT/L (ref 0–55)
ANION GAP SERPL CALC-SCNC: 12 MEQ/L
AST SERPL-CCNC: 22 UNIT/L (ref 5–34)
BASOPHILS # BLD AUTO: 0.04 X10(3)/MCL
BASOPHILS NFR BLD AUTO: 0.3 %
BILIRUB SERPL-MCNC: 0.7 MG/DL
BUN SERPL-MCNC: 26.4 MG/DL (ref 9.8–20.1)
CALCIUM SERPL-MCNC: 8.6 MG/DL (ref 8.4–10.2)
CHLORIDE SERPL-SCNC: 97 MMOL/L (ref 98–107)
CO2 SERPL-SCNC: 27 MMOL/L (ref 23–31)
CREAT SERPL-MCNC: 0.84 MG/DL (ref 0.55–1.02)
CREAT/UREA NIT SERPL: 31
EOSINOPHIL # BLD AUTO: 0.15 X10(3)/MCL (ref 0–0.9)
EOSINOPHIL NFR BLD AUTO: 1.1 %
ERYTHROCYTE [DISTWIDTH] IN BLOOD BY AUTOMATED COUNT: 16.4 % (ref 11.5–17)
GFR SERPLBLD CREATININE-BSD FMLA CKD-EPI: >60 ML/MIN/1.73/M2
GLOBULIN SER-MCNC: 3 GM/DL (ref 2.4–3.5)
GLUCOSE SERPL-MCNC: 215 MG/DL (ref 82–115)
HCT VFR BLD AUTO: 35.5 % (ref 37–47)
HGB BLD-MCNC: 11.6 G/DL (ref 12–16)
IMM GRANULOCYTES # BLD AUTO: 0.08 X10(3)/MCL (ref 0–0.04)
IMM GRANULOCYTES NFR BLD AUTO: 0.6 %
LYMPHOCYTES # BLD AUTO: 2.71 X10(3)/MCL (ref 0.6–4.6)
LYMPHOCYTES NFR BLD AUTO: 20.1 %
MAGNESIUM SERPL-MCNC: 2.2 MG/DL (ref 1.6–2.6)
MCH RBC QN AUTO: 29.5 PG (ref 27–31)
MCHC RBC AUTO-ENTMCNC: 32.7 G/DL (ref 33–36)
MCV RBC AUTO: 90.3 FL (ref 80–94)
MONOCYTES # BLD AUTO: 1.44 X10(3)/MCL (ref 0.1–1.3)
MONOCYTES NFR BLD AUTO: 10.7 %
NEUTROPHILS # BLD AUTO: 9.03 X10(3)/MCL (ref 2.1–9.2)
NEUTROPHILS NFR BLD AUTO: 67.2 %
NRBC BLD AUTO-RTO: 0 %
OHS QRS DURATION: 94 MS
OHS QRS DURATION: 96 MS
OHS QTC CALCULATION: 455 MS
OHS QTC CALCULATION: 471 MS
PHOSPHATE SERPL-MCNC: 4.5 MG/DL (ref 2.3–4.7)
PLATELET # BLD AUTO: 277 X10(3)/MCL (ref 130–400)
PMV BLD AUTO: 10 FL (ref 7.4–10.4)
POCT GLUCOSE: 179 MG/DL (ref 70–110)
POCT GLUCOSE: 191 MG/DL (ref 70–110)
POCT GLUCOSE: 207 MG/DL (ref 70–110)
POCT GLUCOSE: 217 MG/DL (ref 70–110)
POCT GLUCOSE: 224 MG/DL (ref 70–110)
POCT GLUCOSE: 226 MG/DL (ref 70–110)
POCT GLUCOSE: 241 MG/DL (ref 70–110)
POCT GLUCOSE: 252 MG/DL (ref 70–110)
POTASSIUM SERPL-SCNC: 3.7 MMOL/L (ref 3.5–5.1)
PROT SERPL-MCNC: 5.7 GM/DL (ref 5.8–7.6)
RBC # BLD AUTO: 3.93 X10(6)/MCL (ref 4.2–5.4)
SODIUM SERPL-SCNC: 136 MMOL/L (ref 136–145)
WBC # BLD AUTO: 13.45 X10(3)/MCL (ref 4.5–11.5)

## 2024-12-01 PROCEDURE — 85025 COMPLETE CBC W/AUTO DIFF WBC: CPT

## 2024-12-01 PROCEDURE — 25000242 PHARM REV CODE 250 ALT 637 W/ HCPCS: Performed by: STUDENT IN AN ORGANIZED HEALTH CARE EDUCATION/TRAINING PROGRAM

## 2024-12-01 PROCEDURE — 63600175 PHARM REV CODE 636 W HCPCS: Performed by: NURSE PRACTITIONER

## 2024-12-01 PROCEDURE — 83735 ASSAY OF MAGNESIUM: CPT

## 2024-12-01 PROCEDURE — 25000003 PHARM REV CODE 250: Performed by: NURSE PRACTITIONER

## 2024-12-01 PROCEDURE — 11000001 HC ACUTE MED/SURG PRIVATE ROOM

## 2024-12-01 PROCEDURE — 25000003 PHARM REV CODE 250

## 2024-12-01 PROCEDURE — 25000242 PHARM REV CODE 250 ALT 637 W/ HCPCS: Performed by: INTERNAL MEDICINE

## 2024-12-01 PROCEDURE — 99900035 HC TECH TIME PER 15 MIN (STAT)

## 2024-12-01 PROCEDURE — 94760 N-INVAS EAR/PLS OXIMETRY 1: CPT

## 2024-12-01 PROCEDURE — 93010 ELECTROCARDIOGRAM REPORT: CPT | Mod: ,,, | Performed by: INTERNAL MEDICINE

## 2024-12-01 PROCEDURE — 80053 COMPREHEN METABOLIC PANEL: CPT | Performed by: NURSE PRACTITIONER

## 2024-12-01 PROCEDURE — 99900031 HC PATIENT EDUCATION (STAT)

## 2024-12-01 PROCEDURE — 63600175 PHARM REV CODE 636 W HCPCS: Performed by: INTERNAL MEDICINE

## 2024-12-01 PROCEDURE — 21400001 HC TELEMETRY ROOM

## 2024-12-01 PROCEDURE — 36415 COLL VENOUS BLD VENIPUNCTURE: CPT

## 2024-12-01 PROCEDURE — 63600175 PHARM REV CODE 636 W HCPCS

## 2024-12-01 PROCEDURE — 84100 ASSAY OF PHOSPHORUS: CPT

## 2024-12-01 PROCEDURE — 93005 ELECTROCARDIOGRAM TRACING: CPT

## 2024-12-01 PROCEDURE — 27000221 HC OXYGEN, UP TO 24 HOURS

## 2024-12-01 PROCEDURE — 25000003 PHARM REV CODE 250: Performed by: INTERNAL MEDICINE

## 2024-12-01 PROCEDURE — 94640 AIRWAY INHALATION TREATMENT: CPT

## 2024-12-01 RX ORDER — ENOXAPARIN SODIUM 100 MG/ML
1 INJECTION SUBCUTANEOUS ONCE
Status: COMPLETED | OUTPATIENT
Start: 2024-12-01 | End: 2024-12-01

## 2024-12-01 RX ORDER — POTASSIUM CHLORIDE 20 MEQ/1
20 TABLET, EXTENDED RELEASE ORAL ONCE
Status: COMPLETED | OUTPATIENT
Start: 2024-12-01 | End: 2024-12-01

## 2024-12-01 RX ADMIN — PANCRELIPASE 2 CAPSULE: 120000; 24000; 76000 CAPSULE, DELAYED RELEASE PELLETS ORAL at 04:12

## 2024-12-01 RX ADMIN — NIFEDIPINE 30 MG: 30 TABLET, FILM COATED, EXTENDED RELEASE ORAL at 08:12

## 2024-12-01 RX ADMIN — ENOXAPARIN SODIUM 100 MG: 100 INJECTION SUBCUTANEOUS at 08:12

## 2024-12-01 RX ADMIN — METOPROLOL SUCCINATE 50 MG: 50 TABLET, EXTENDED RELEASE ORAL at 08:12

## 2024-12-01 RX ADMIN — INSULIN ASPART 2 UNITS: 100 INJECTION, SOLUTION INTRAVENOUS; SUBCUTANEOUS at 08:12

## 2024-12-01 RX ADMIN — ASPIRIN 81 MG: 81 TABLET, COATED ORAL at 08:12

## 2024-12-01 RX ADMIN — IPRATROPIUM BROMIDE AND ALBUTEROL SULFATE 3 ML: 2.5; .5 SOLUTION RESPIRATORY (INHALATION) at 08:12

## 2024-12-01 RX ADMIN — PANCRELIPASE 2 CAPSULE: 120000; 24000; 76000 CAPSULE, DELAYED RELEASE PELLETS ORAL at 11:12

## 2024-12-01 RX ADMIN — IPRATROPIUM BROMIDE AND ALBUTEROL SULFATE 3 ML: 2.5; .5 SOLUTION RESPIRATORY (INHALATION) at 07:12

## 2024-12-01 RX ADMIN — INSULIN ASPART 4 UNITS: 100 INJECTION, SOLUTION INTRAVENOUS; SUBCUTANEOUS at 11:12

## 2024-12-01 RX ADMIN — CETIRIZINE HYDROCHLORIDE 10 MG: 10 TABLET, FILM COATED ORAL at 08:12

## 2024-12-01 RX ADMIN — IPRATROPIUM BROMIDE AND ALBUTEROL SULFATE 3 ML: 2.5; .5 SOLUTION RESPIRATORY (INHALATION) at 12:12

## 2024-12-01 RX ADMIN — ATORVASTATIN CALCIUM 40 MG: 40 TABLET, FILM COATED ORAL at 08:12

## 2024-12-01 RX ADMIN — INSULIN ASPART 4 UNITS: 100 INJECTION, SOLUTION INTRAVENOUS; SUBCUTANEOUS at 05:12

## 2024-12-01 RX ADMIN — ACETAMINOPHEN 325MG 650 MG: 325 TABLET ORAL at 08:12

## 2024-12-01 RX ADMIN — AMIODARONE HYDROCHLORIDE 200 MG: 200 TABLET ORAL at 08:12

## 2024-12-01 RX ADMIN — FAMOTIDINE 40 MG: 20 TABLET, FILM COATED ORAL at 08:12

## 2024-12-01 RX ADMIN — POTASSIUM CHLORIDE 20 MEQ: 1500 TABLET, EXTENDED RELEASE ORAL at 08:12

## 2024-12-01 RX ADMIN — PANTOPRAZOLE SODIUM 40 MG: 40 TABLET, DELAYED RELEASE ORAL at 05:12

## 2024-12-01 RX ADMIN — CLOPIDOGREL BISULFATE 75 MG: 75 TABLET ORAL at 08:12

## 2024-12-01 RX ADMIN — PANCRELIPASE 2 CAPSULE: 120000; 24000; 76000 CAPSULE, DELAYED RELEASE PELLETS ORAL at 08:12

## 2024-12-01 RX ADMIN — IPRATROPIUM BROMIDE AND ALBUTEROL SULFATE 3 ML: 2.5; .5 SOLUTION RESPIRATORY (INHALATION) at 03:12

## 2024-12-01 RX ADMIN — FUROSEMIDE 40 MG: 10 INJECTION, SOLUTION INTRAMUSCULAR; INTRAVENOUS at 08:12

## 2024-12-01 NOTE — PROGRESS NOTES
"[facility] *    Cardiology  Progress Note    Patient Name: Kortney Leach  MRN: [id number]  Admission Date: 11/22/2024  Hospital Length of Stay: 9 days  Code Status: Full Code   Attending Physician: Catracho Galan,*   Primary Care Physician: Arnaldo Hernandez MD  Expected Discharge Date:   Principal Problem:COPD exacerbation    Subjective:     Brief HPI/Hospital Course: Ms. Leach is a 78 y/o female with a history of VHD, GEMINI, DM II, PAF, ABEL, COPD, HTN, DVT SSS, HLD, who is known to [facility], Dr. Uribe/Red. She presented to the ER on 11.22.24 with complaints of shortness of breath. She reports worsening shortness of breath over the last month. She was sent from urgent care for being 88% on room air. She was started on oxygen therapy an sent to the ER. In the ER, she had some respiratory distress and was started on BiPAP. Significant labs WBC 13.53, H&H 11.9/36.2, K 5.3, Chloride 108, CO2 21, Glucose 232, .1. Flu, COVID and COVID negative. CTA Chest negative for PE, but shows Cardiomegaly and mild congestive changes and Bilateral prominent pleural effusions and lower lung lobes compressive consolidations. Cxr shows prominence of the right hilum may relate to pulmonary vascular congestion and possible small pleural effusions. [facility] has been consulted for CHF.        11.24.24: NAD. VSS. No complaints of CP/Palps. Reports some improvement in her SOB. 2350 urine output/1640 ml Fluid Negative. Crackle noted in bases.   11.25.24: NAD. VSS. No complaints of CP/Palp. Remains SOB. 1800 urine output/1700 ml Net Negative . Labs Stable.   11.26.24: NAD. VSS. No complaints of CP/SOB/Palps. Reports shortness of breath has significantly improved, but unable to lye flat. 1900 urine output over 24 hours/Fluid net negative 1120. WBC 12.47  11.27.24: NAD. VSS. No complaints of CP/Palps. Reports shortness of breath. "I feel okay" WBC 13.57. no output documented.  11.28.24: NAD. "I am doing well." Family " "at Bedside. Denies CP, SOB and Palps. Plans for LAD PCI in AM (11.29.24).   11.29.24: NAD. "I am good." Denies CP, SOB and Palps. NPO for LHC and PCI of LAD. BUN/Crea 27.5/0.86  11.30.24: NAD. "I am better." Denies CP, SOB and Palps. S/P PCI of LAD.   12.1.24: NAD. "I am good." Awaiting Valve Intervention/Mitral Valve. Denies CP, SOB and Palps.      PMH: VHD, GEMINI, DM II, PAF, ABEL, COPD, HTN, DVT SSS, HLD, Asthma, Left Thyroid Nodule, GERD, Chronic Sinusitis, Common Variable Immunodeficiency, Glaucoma  PSH: PPM, LHC, MVR, Bilateral Cataract Extraction, Total Knee Replacement, Bilateral mastectomy, Appendectomy, Colonoscopy, EGD, Tonsillectomy,    Family History: Father - Lung Cancer; Mother - Alzheimer, CVA; Sister - HTN, Multiple Sclerosis, DM II  Social History: Former Smoker (Quit in 1998). Denies illicit drug use and alcohol use.      Previous Cardiac Diagnostics:   [facility] 11.29.24:  Eccentric Prox LAD lesion was 80% stenosed with 0% stenosis post-intervention. iFR 0.84  Prox LAD lesion: A STENT SYNERGY XD [id number] stent was successfully placed at 8 GEO for 10 sec. Proximal part of the stent was post dilated by using 3.5/12 mm NC and a 4/8 mm NC balloons at 12 atmospheres.    [facility] 11.27.24:  Findings:  - There is single vessel coronary artery disease.  - Mid Left Anterior Descending has a calcified 70% stenosis.  - LVEDP is normal at 6 mmHg.  - RHC with elevated R sided filling pressures. RA 7 mmHg, RV 50/7 mmHg, PA 47/26 mmHg (mean 33 mmHg), PCWP 24 mmHg.  - PCWP to LV diastolic mean gradient was calculated to be 22 mmHg.  - Transpulmonary gradient is 8 mmHg.  - Normal Cardiac Output/Index at 4.7/2.3, as calculated by Rahel equation.  - PVR is 1.7 Woods units  - SVR is 1260 dyne-sec*cm^-5  - Pulmonary Artery Pulsatility Index ([place]) is 3.0  - Cardiac Power Output () is 0.84  Assessment/Plan:  - Patient is a 79 y.o. female with a history of prior bioprosthetic mitral valve replacement now presents with heart " failure.  Transesophageal echocardiogram was obtained.  Final report is pending at this time, but there were findings consistent with severe bioprosthetic MVR stenosis.  Cardiac catheterization findings shown above do suggest that there is a gradient of around 22 mm Hg across the bioprosthetic valve.  -recommend consultation with CT surgery   -obtain CT with mitral valve in valve protocol for possible TMVR  -LAD should be revascularized as well    MARYBETH (11.27.24):  Left Ventricle: The left ventricle is normal in size. Septal motion is consistent with post-operative status. There is normal systolic function with a visually estimated ejection fraction of 60 - 65%.  Right Ventricle: Normal right ventricular cavity size. Systolic function is normal.  Left Atrium: Left atrium is severely dilated.  Mitral Valve: There is a bioprosthetic valve in the mitral position. There is severe stenosis. The mean pressure gradient across the mitral valve is 16 mmHg at a heart rate of  bpm. There is moderate regurgitation.    ECHO (11.23.24):  Left Ventricle: The left ventricle is normal in size. Mildly increased wall thickness. There is normal systolic function with a visually estimated ejection fraction of 60 - 65%. Grade I diastolic dysfunction. Right Ventricle: Normal right ventricular cavity size. Systolic function is borderline low. Aortic Valve: There is aortic valve sclerosis. Mitral Valve: There is a bioprosthetic valve in the mitral position (25 mm mosaic porcine valve).  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 There is mild (1+) regurgitation. Tricuspid Valve: There moderate (2+) regurgitation. The estimated pulmonary artery systolic pressure is 57 mmHg. Pacemaker lead noted.  Recommend transesophageal echocardiogram to properly evaluate the bioprosthetic mitral valve.    ECHO (11.14.24):  The study quality is average.  The left ventricle is normal in size. Global left ventricular systolic function is normal. The left ventricular ejection fraction is 65%. Left ventricular diastolic function is normal. Mild concentric left ventricular hypertrophy is present. The left atrium is moderately enlarged. (4.6 cms). A normal functioning prosthetic mitral valve is noted. MG = 4.3 mmHg. The pulmonary artery systolic pressure is 37 mmHg.      PPM Insertion (9.25.23):  Successful implantation of PPM Dual. [facility]      PET (1.24.23):  This is a normal perfusion study, no perfusion defects noted. There is no evidence of ischemia.This scan is suggestive of low risk for future cardiovascular events. The left ventricular cavity is noted to be normal on the stress studies. The stress left ventricular ejection fraction was calculated to be 68% and left ventricular global function is normal. The rest left ventricular cavity is noted to be normal. The rest left ventricular ejection fraction was calculated to be 63% and rest left ventricular global function is normal. When compared to the resting ejection fraction (63%), the stress ejection fraction (68%) has increased. Transient ischemic dilatation is present and has been described as a marker for high risk coronary artery disease. It has also been described in microvascular disease, hypertensive heart disease as well as cardiac deconditioning. The study quality is good.   There was a rise in myocardial blood flow between rest and stress.  Global myocardial blood flow reserve was 2.72.  Normal global coronary flow reserve is suggestive of low coronary event risk.     Carotid US (1.24.23):  The study quality is good. There is no plaque noted in the proximal right internal carotid artery. 1-39% stenosis in the proximal left internal carotid artery based on Bluth Criteria. Antegrade right vertebral artery flow. Antegrade left vertebral artery flow.     LHC (9.6.23):  Normal Coronaries     Review of  "Systems   Constitutional: Positive for malaise/fatigue. Negative for fever.   Cardiovascular:  Negative for chest pain, leg swelling, orthopnea and palpitations.   Respiratory:  Positive for shortness of breath.    All other systems reviewed and are negative.    Objective:     Vital Signs (Most Recent):  Temp: 98.1 °F (36.7 °C) (12/01/24 1139)  Pulse: 78 (12/01/24 1200)  Resp: 18 (12/01/24 1139)  BP: 105/61 (12/01/24 1139)  SpO2: 95 % (12/01/24 1301) Vital Signs (24h Range):  Temp:  [98 °F (36.7 °C)-99 °F (37.2 °C)] 98.1 °F (36.7 °C)  Pulse:  [75-91] 78  Resp:  [14-22] 18  SpO2:  [93 %-98 %] 95 %  BP: (105-121)/(56-78) 105/61   Weight: 96.4 kg (212 lb 8 oz)  Body mass index is 34.3 kg/m².  SpO2: 95 %       Intake/Output Summary (Last 24 hours) at 12/1/2024 1340  Last data filed at 12/1/2024 0800  Gross per 24 hour   Intake 240 ml   Output 700 ml   Net -460 ml     Lines/Drains/Airways       Peripheral Intravenous Line  Duration                  Peripheral IV - Single Lumen 11/27/24 0915 22 G Anterior;Left Forearm 4 days         Peripheral IV - Single Lumen 12/01/24 0800 22 G Anterior;Right Forearm <1 day                  Significant Labs:   Chemistries:   Recent Labs   Lab 11/27/24  0424 11/28/24  0416 11/29/24  0455 11/30/24  0618 12/01/24  0624    135* 134* 134* 136   K 4.4 4.1 3.7 3.8 3.7   CL 98 97* 98 96* 97*   CO2 24 22* 22* 26 27   BUN 21.7* 25.7* 27.5* 25.3* 26.4*   CREATININE 0.88 0.88 0.86 0.98 0.84   CALCIUM 8.7 8.6 8.6 9.1 8.6   BILITOT  --   --  0.7 0.7 0.7   ALKPHOS  --   --  88 81 77   ALT  --   --  22 25 22   AST  --   --  27 34 22   GLUCOSE 212* 195* 207* 208* 215*   MG 2.40 2.30 2.10 2.10 2.20   PHOS  --   --   --   --  4.5        CBC/Anemia Labs: Coags:    Recent Labs   Lab 11/30/24  0004 11/30/24  0618 12/01/24  0624   WBC 15.11* 14.02* 13.45*   HGB 12.0 12.1 11.6*   HCT 36.2* 37.2 35.5*    261 277   MCV 89.6 90.5 90.3   RDW 16.5 16.6 16.4    No results for input(s): "PT", "INR", " ""APTT" in the last 168 hours.       Telemetry: SR    Physical Exam  Vitals reviewed.   Constitutional:       General: She is not in acute distress.  HENT:      Head: Normocephalic and atraumatic.      Mouth/Throat:      Mouth: Mucous membranes are moist.   Eyes:      Extraocular Movements: Extraocular movements intact.   Cardiovascular:      Rate and Rhythm: Normal rate and regular rhythm.      Pulses: Normal pulses.      Heart sounds: Murmur heard.   Pulmonary:      Effort: Pulmonary effort is normal. No respiratory distress.      Breath sounds: Examination of the right-lower field reveals decreased breath sounds. Examination of the left-lower field reveals decreased breath sounds. Decreased breath sounds present.      Comments: RA  Abdominal:      Palpations: Abdomen is soft.   Musculoskeletal:         General: No swelling.   Skin:     General: Skin is warm.      Comments: R Groin Soft/Flat, Non-Tender, No Sign of Bleed/Infection. +2 BLE Palpable Pedal Pulses    Neurological:      General: No focal deficit present.      Mental Status: She is alert and oriented to person, place, and time.   Psychiatric:         Behavior: Behavior normal.         Thought Content: Thought content normal.         Judgment: Judgment normal.       Current Schedule Inpatient Medications:   albuterol-ipratropium  3 mL Nebulization Q8H    amiodarone  200 mg Oral Daily    aspirin  81 mg Oral Daily    atorvastatin  40 mg Oral Daily    cetirizine  10 mg Oral Daily    clopidogreL  75 mg Oral Daily    enoxparin  1 mg/kg Subcutaneous Q12H (prophylaxis, 0900/2100)    famotidine  40 mg Oral QHS    furosemide (LASIX) injection  40 mg Intravenous Daily    lipase-protease-amylase 24,000-76,000-120,000 units  2 capsule Oral TID WM    metoprolol succinate  50 mg Oral Daily    NIFEdipine  30 mg Oral Daily    pantoprazole  40 mg Oral QAM     Assessment:   Acute Diastolic Heart Failure - Related to VHD/Severe MS - Compensated     - s/p LHC/RHC (11.27.24) " severe bioprosthetic MVR stenosis.  Cardiac Catheterization findings shown above do suggest that there is a gradient of around 22 mm Hg across the bioprosthetic valve, Recommend consultation with CT surgery, Obtain CT with mitral valve in valve protocol for possible TMVR, LAD should be revascularized as well.    - EF 60-65% with G 1 Diastolic Dysfunction (11.14.24)   VHD    - MARYBETH (11.27.24) - Mitral Valve: There is a bioprosthetic valve in the mitral position. There is severe stenosis. The mean pressure gradient across the mitral valve is 16 mmHg at a heart rate of bpm. There is moderate regurgitation.     - ECHO (11.23.24): There is evidence of bioprosthetic mitral valve stenosis The mean pressure gradient across the mitral valve is 19 mmHg at a heart rate of 78 bpm.     - s/p MVR (9.20.23): Mitral valve replacement with a 25 mm mosaic porcine valve    - Moderate TR    - Mild MR  CAD/Stents    - s/p LHC (11.29.24) - Eccentric Prox LAD lesion was 80% stenosed with 0% stenosis post-intervention. iFR 0.84. Prox LAD lesion: A STENT SYNERGY XD 3.0X16MM stent was successfully placed at 8 GEO for 10 sec. Proximal part of the stent was post dilated by using 3.5/12 mm NC and a 4/8 mm NC balloons at 12 atmospheres.  Acute Hypoxemic Respiratory Failure requiring O2 Therapy - Resolved   PNA ?    - CTA Chest (11.22.24): Llower lung lobes compressive consolidations.   Bilateral Pleural Effusions  Anemia (Mild)  Hyperkalemia - Resolved  Leukocytosis - Rsolved   COPD Exacerbation - Stable   UTI    - UCX - Klebsiella Pneumoniae, Proteus Mirabilis  Pulmonary HTN  GEMINI  DM II  PAF - Currently SR    - [id number] Score 6 (9.7% Stroke Risk Per Year)    - s/p Ligation of SUKHI (9.20.23) - Endostapler   ABEL  HTN  DVT s/p IVC Filter     - on Xarelto Outpatient   SSS    - s/p PPM (9.25.23): [facility]   HLD  Asthma  Left Thyroid Nodule  GERD  Chronic Sinusitis  Common Variable Immunodeficiency  Glaucoma  No Known History of GI Bleed     Plan:    Continue Amio, ASA, Statin, BB, Nifedipine, Plavix   Continue FD Lovenox 1mg/kg SQ BID Re Stroke Risk Reduction/Hx of DVT (Change Dose to 1 Time at 2100)   NPO after MN - Possible Mitral Valve Intervention   CV Surgery Following for MS - Continued Discussions for Valve-in-Valve for MS (T-MAVR)  Keep K > 4.0 and Mg > 2.0   CT Chest Films Complete and with Structural Heart Team for Review   Will Continue to Follow  Labs in AM: CBC, CMP and Mg     GOPI Zuleta  Cardiology  [facility]    I agree with the findings of the complexity of problems addressed and take responsibility for the plan's risks and complications. I approved the plan documented by Mitchell Petersen NP.

## 2024-12-01 NOTE — PROGRESS NOTES
[facility] Hospital Medicine Progress Note        Chief Complaint: Inpatient Follow-up for HF    HPI:   79-year-old woman with A-fib, HTN, valvular heart disease s/p MVR, CAD, DVT s/p IVC filter on xarelto, T2DM, GERD, SSS s/p PPM, HTN, HLD, chronic sinusitis status post sinus surgery, ABEL, obesity and asthma/COPD not on home oxygen who presented to the ER with progressive shortness of breath over the last month.  She initially was seen in urgent care but due to sats being 88% on room air she was deferred to the ER.     She arrived afebrile hemodynamically stable maintaining adequate sats on 3 L nasal cannula but abruptly desatted in the ER requiring initiation of BiPAP.  Laboratory work showed a BNP of 160 (last EF 55% 09/2023), CTA showed no pulmonary embolus, noted cardiomegaly and mild congestive changes and pleural effusions and lower lung compressive consolidations.  EKG noted normal sinus rhythm.     She was given 80 mg of Lasix, albuterol nebulizer treatment, IV Solu-Medrol and admitted to the hospitalist service for further management.  Continued on IV Lasix b.i.d..  Able to be weaned off of BiPAP and currently on 3 L O2 via NC with adequate saturations.  Added DuoNebs. Desaturating to 88% during ambulation on RA.    Underwent MARYBETH on 11/27 which showed severe mitral stenosis and moderate MR and mid LAD 70% stenosis with elevated PCWP and PA 47/26. Plan on repeat LHC on 11/29 and T-MAVR on Monday.    Patient underwent repeat LHC on 11/29 with stent to prox LAD.     Interval Hx:   Still on 3L NC. Sitting up in recliner today. Pending structural heart/CT surgery team discussion    Was given additional dose of IV lasix yesterday evening. CXR showed pulm edema.       Objective/physical exam:  General: In no acute distress, Obese   Chest: Nelson diminished breath sounds   Heart: RRR, +S1, S2, no appreciable murmur  Abdomen: Soft, nontender, BS +  MSK: Warm, no lower extremity edema, no  clubbing or cyanosis  Neurologic: Alert and oriented x4, Cranial nerve II-XII intact, Strength 5/5 in all 4 extremities    VITAL SIGNS: 24 HRS MIN & MAX LAST   Temp  Min: 98 °F (36.7 °C)  Max: 99 °F (37.2 °C) 98.1 °F (36.7 °C)   BP  Min: 105/61  Max: 121/61 105/61   Pulse  Min: 75  Max: 91  78   Resp  Min: 14  Max: 22 18   SpO2  Min: 93 %  Max: 98 % 98 %     I have reviewed the following labs:  Recent Labs   Lab 11/30/24  0004 11/30/24 0618 12/01/24 0624   WBC 15.11* 14.02* 13.45*   RBC 4.04* 4.11* 3.93*   HGB 12.0 12.1 11.6*   HCT 36.2* 37.2 35.5*   MCV 89.6 90.5 90.3   MCH 29.7 29.4 29.5   MCHC 33.1 32.5* 32.7*   RDW 16.5 16.6 16.4    261 277   MPV 10.0 10.4 10.0     Recent Labs   Lab 11/29/24  0455 11/30/24  0618 12/01/24  0624   * 134* 136   K 3.7 3.8 3.7   CL 98 96* 97*   CO2 22* 26 27   BUN 27.5* 25.3* 26.4*   CREATININE 0.86 0.98 0.84   CALCIUM 8.6 9.1 8.6   MG 2.10 2.10 2.20   ALBUMIN 2.8* 2.7* 2.7*   ALKPHOS 88 81 77   ALT 22 25 22   AST 27 34 22   BILITOT 0.7 0.7 0.7     Assessment/Plan:  # Acute on chronic diastolic decompensated HF  # Acute respiratory failure with hypoxia requiring supplemental oxygen 2/2 B/L pulmonary edema   # Pulmonary HTN  # Severe mitral stenosis  # K.pneumonia and proteus mirabilis UTI - treated  # Obesity   # pAF s/p LAAL in 2023  # HTN  # HLD  # VHD s/p MVR  # CAD   # DVT  s/p IVC filter  # DM   # GERD   # Chronic sinusitis s/p sinus surgery   # ABEL   # Obesity   # Asthma/COPD ?    - wean oxygen as tolerated  - MARYBETH and LHC noted   - repeat LHC on 11/29 with stent to prox LAD   - plan for possible T-MAVR with CT surgery on Monday; ongoing discussions  - IV lasix 40 mg daily (was given additional dose of IV lasix yesterday evening)  - cardiology recs appreciated  - currently on full dose lovenox  - continue aspirin, plavix, statin, amiodarone, metop XL 50 mg daily, nifedipine 30 mg daily  - completed ceftriaxone for UTI on 11/30  - incentive spirometer  - laverne  -  PT/OT    Critical care note:  Critical care diagnosis: HF needing IV lasix   Critical care interventions: Hands-on evaluation, review of labs/radiographs/records and discussion with patient and family if present  Critical care time spent: 35 minutes     VTE prophylaxis: full dose lovenox    Patient condition:  Guarded    Anticipated discharge and Disposition:   PT recommending SNF    All diagnosis and differential diagnosis have been reviewed; assessment and plan has been documented; I have personally reviewed the labs and test results that are presently available; I have reviewed the patients medication list; I have reviewed the consulting providers response and recommendations. I have reviewed or attempted to review medical records based upon their availability    All of the patient's questions have been  addressed and answered. Patient's is agreeable to the above stated plan. I will continue to monitor closely and make adjustments to medical management as needed.    _____________________________________________________________________  Radiology:  I have personally reviewed the following imaging and agree with the radiologist.     X-Ray Chest 1 View  Narrative: EXAMINATION:  XR CHEST 1 VIEW    CLINICAL HISTORY:  SOB;    TECHNIQUE:  AP chest    COMPARISON:  Chest x-ray dated 11/20/2024    FINDINGS:  Left chest wall pacemaker is in place.  The heart is normal in size.  There are increased interstitial markings and prominence of the central pulmonary vasculature.  There is no pleural effusion or visible pneumothorax.  Impression: Congestive changes of the lungs.    Electronically signed by: Jaylene Mirza  Date:    12/01/2024  Time:    10:45      Catracho Galan MD  Department of Hospital Medicine   [facility]   12/01/2024

## 2024-12-02 LAB
ALBUMIN SERPL-MCNC: 2.8 G/DL (ref 3.4–4.8)
ALBUMIN/GLOB SERPL: 0.9 RATIO (ref 1.1–2)
ALP SERPL-CCNC: 79 UNIT/L (ref 40–150)
ALT SERPL-CCNC: 22 UNIT/L (ref 0–55)
ANION GAP SERPL CALC-SCNC: 14 MEQ/L
AST SERPL-CCNC: 24 UNIT/L (ref 5–34)
BASOPHILS # BLD AUTO: 0.04 X10(3)/MCL
BASOPHILS NFR BLD AUTO: 0.3 %
BILIRUB SERPL-MCNC: 0.7 MG/DL
BUN SERPL-MCNC: 22.1 MG/DL (ref 9.8–20.1)
CALCIUM SERPL-MCNC: 8.7 MG/DL (ref 8.4–10.2)
CHLORIDE SERPL-SCNC: 97 MMOL/L (ref 98–107)
CO2 SERPL-SCNC: 25 MMOL/L (ref 23–31)
CREAT SERPL-MCNC: 0.88 MG/DL (ref 0.55–1.02)
CREAT/UREA NIT SERPL: 25
EOSINOPHIL # BLD AUTO: 0.2 X10(3)/MCL (ref 0–0.9)
EOSINOPHIL NFR BLD AUTO: 1.4 %
ERYTHROCYTE [DISTWIDTH] IN BLOOD BY AUTOMATED COUNT: 16.4 % (ref 11.5–17)
GFR SERPLBLD CREATININE-BSD FMLA CKD-EPI: >60 ML/MIN/1.73/M2
GLOBULIN SER-MCNC: 3 GM/DL (ref 2.4–3.5)
GLUCOSE SERPL-MCNC: 205 MG/DL (ref 82–115)
HCT VFR BLD AUTO: 34.7 % (ref 37–47)
HGB BLD-MCNC: 11.4 G/DL (ref 12–16)
IMM GRANULOCYTES # BLD AUTO: 0.07 X10(3)/MCL (ref 0–0.04)
IMM GRANULOCYTES NFR BLD AUTO: 0.5 %
INR PPP: 1.1
LYMPHOCYTES # BLD AUTO: 3.25 X10(3)/MCL (ref 0.6–4.6)
LYMPHOCYTES NFR BLD AUTO: 22.2 %
MAGNESIUM SERPL-MCNC: 2.1 MG/DL (ref 1.6–2.6)
MCH RBC QN AUTO: 29.5 PG (ref 27–31)
MCHC RBC AUTO-ENTMCNC: 32.9 G/DL (ref 33–36)
MCV RBC AUTO: 89.7 FL (ref 80–94)
MONOCYTES # BLD AUTO: 1.57 X10(3)/MCL (ref 0.1–1.3)
MONOCYTES NFR BLD AUTO: 10.7 %
NEUTROPHILS # BLD AUTO: 9.49 X10(3)/MCL (ref 2.1–9.2)
NEUTROPHILS NFR BLD AUTO: 64.9 %
NRBC BLD AUTO-RTO: 0.1 %
OHS QRS DURATION: 88 MS
OHS QTC CALCULATION: 475 MS
PLATELET # BLD AUTO: 300 X10(3)/MCL (ref 130–400)
PMV BLD AUTO: 9.9 FL (ref 7.4–10.4)
POCT GLUCOSE: 195 MG/DL (ref 70–110)
POCT GLUCOSE: 198 MG/DL (ref 70–110)
POCT GLUCOSE: 219 MG/DL (ref 70–110)
POCT GLUCOSE: 229 MG/DL (ref 70–110)
POTASSIUM SERPL-SCNC: 3.8 MMOL/L (ref 3.5–5.1)
PROT SERPL-MCNC: 5.8 GM/DL (ref 5.8–7.6)
PROTHROMBIN TIME: 14.3 SECONDS (ref 12.5–14.5)
RBC # BLD AUTO: 3.87 X10(6)/MCL (ref 4.2–5.4)
SODIUM SERPL-SCNC: 136 MMOL/L (ref 136–145)
WBC # BLD AUTO: 14.62 X10(3)/MCL (ref 4.5–11.5)

## 2024-12-02 PROCEDURE — 25000003 PHARM REV CODE 250: Performed by: NURSE PRACTITIONER

## 2024-12-02 PROCEDURE — 80053 COMPREHEN METABOLIC PANEL: CPT

## 2024-12-02 PROCEDURE — 25000003 PHARM REV CODE 250: Performed by: INTERNAL MEDICINE

## 2024-12-02 PROCEDURE — 83735 ASSAY OF MAGNESIUM: CPT

## 2024-12-02 PROCEDURE — 94640 AIRWAY INHALATION TREATMENT: CPT

## 2024-12-02 PROCEDURE — 25000242 PHARM REV CODE 250 ALT 637 W/ HCPCS: Performed by: STUDENT IN AN ORGANIZED HEALTH CARE EDUCATION/TRAINING PROGRAM

## 2024-12-02 PROCEDURE — 85025 COMPLETE CBC W/AUTO DIFF WBC: CPT

## 2024-12-02 PROCEDURE — 99900035 HC TECH TIME PER 15 MIN (STAT)

## 2024-12-02 PROCEDURE — 99900031 HC PATIENT EDUCATION (STAT)

## 2024-12-02 PROCEDURE — 63600175 PHARM REV CODE 636 W HCPCS: Performed by: INTERNAL MEDICINE

## 2024-12-02 PROCEDURE — 97535 SELF CARE MNGMENT TRAINING: CPT

## 2024-12-02 PROCEDURE — 11000001 HC ACUTE MED/SURG PRIVATE ROOM

## 2024-12-02 PROCEDURE — 36415 COLL VENOUS BLD VENIPUNCTURE: CPT

## 2024-12-02 PROCEDURE — 27000221 HC OXYGEN, UP TO 24 HOURS

## 2024-12-02 PROCEDURE — 21400001 HC TELEMETRY ROOM

## 2024-12-02 PROCEDURE — 94760 N-INVAS EAR/PLS OXIMETRY 1: CPT

## 2024-12-02 PROCEDURE — 85610 PROTHROMBIN TIME: CPT

## 2024-12-02 PROCEDURE — 25000003 PHARM REV CODE 250

## 2024-12-02 RX ORDER — WARFARIN SODIUM 5 MG/1
5 TABLET ORAL DAILY
Status: DISCONTINUED | OUTPATIENT
Start: 2024-12-02 | End: 2024-12-03 | Stop reason: HOSPADM

## 2024-12-02 RX ORDER — FUROSEMIDE 40 MG/1
40 TABLET ORAL DAILY
Status: DISCONTINUED | OUTPATIENT
Start: 2024-12-03 | End: 2024-12-03 | Stop reason: HOSPADM

## 2024-12-02 RX ADMIN — PANTOPRAZOLE SODIUM 40 MG: 40 TABLET, DELAYED RELEASE ORAL at 05:12

## 2024-12-02 RX ADMIN — CETIRIZINE HYDROCHLORIDE 10 MG: 10 TABLET, FILM COATED ORAL at 09:12

## 2024-12-02 RX ADMIN — IPRATROPIUM BROMIDE AND ALBUTEROL SULFATE 3 ML: 2.5; .5 SOLUTION RESPIRATORY (INHALATION) at 12:12

## 2024-12-02 RX ADMIN — ACETAMINOPHEN 325MG 650 MG: 325 TABLET ORAL at 08:12

## 2024-12-02 RX ADMIN — METOPROLOL SUCCINATE 50 MG: 50 TABLET, EXTENDED RELEASE ORAL at 09:12

## 2024-12-02 RX ADMIN — IPRATROPIUM BROMIDE AND ALBUTEROL SULFATE 3 ML: 2.5; .5 SOLUTION RESPIRATORY (INHALATION) at 08:12

## 2024-12-02 RX ADMIN — INSULIN ASPART 2 UNITS: 100 INJECTION, SOLUTION INTRAVENOUS; SUBCUTANEOUS at 12:12

## 2024-12-02 RX ADMIN — FAMOTIDINE 40 MG: 20 TABLET, FILM COATED ORAL at 08:12

## 2024-12-02 RX ADMIN — ATORVASTATIN CALCIUM 40 MG: 40 TABLET, FILM COATED ORAL at 08:12

## 2024-12-02 RX ADMIN — INSULIN ASPART 2 UNITS: 100 INJECTION, SOLUTION INTRAVENOUS; SUBCUTANEOUS at 05:12

## 2024-12-02 RX ADMIN — ASPIRIN 81 MG: 81 TABLET, COATED ORAL at 09:12

## 2024-12-02 RX ADMIN — WARFARIN SODIUM 5 MG: 5 TABLET ORAL at 05:12

## 2024-12-02 RX ADMIN — CLOPIDOGREL BISULFATE 75 MG: 75 TABLET ORAL at 09:12

## 2024-12-02 RX ADMIN — NIFEDIPINE 30 MG: 30 TABLET, FILM COATED, EXTENDED RELEASE ORAL at 09:12

## 2024-12-02 RX ADMIN — PANCRELIPASE 2 CAPSULE: 120000; 24000; 76000 CAPSULE, DELAYED RELEASE PELLETS ORAL at 05:12

## 2024-12-02 RX ADMIN — INSULIN ASPART 3 UNITS: 100 INJECTION, SOLUTION INTRAVENOUS; SUBCUTANEOUS at 08:12

## 2024-12-02 RX ADMIN — IPRATROPIUM BROMIDE AND ALBUTEROL SULFATE 3 ML: 2.5; .5 SOLUTION RESPIRATORY (INHALATION) at 04:12

## 2024-12-02 RX ADMIN — ACETAMINOPHEN 325MG 650 MG: 325 TABLET ORAL at 06:12

## 2024-12-02 RX ADMIN — AMIODARONE HYDROCHLORIDE 200 MG: 200 TABLET ORAL at 09:12

## 2024-12-02 NOTE — PROGRESS NOTES
"[facility] *    Cardiology  Progress Note    Patient Name: Kortney Leach  MRN: [id number]  Admission Date: 11/22/2024  Hospital Length of Stay: 10 days  Code Status: Full Code   Attending Physician: Catracho Galan,*   Primary Care Physician: Arnaldo Hernandez MD  Expected Discharge Date:   Principal Problem:COPD exacerbation    Subjective:     Brief HPI/Hospital Course: Ms. Leach is a 78 y/o female with a history of VHD, GEMINI, DM II, PAF, ABEL, COPD, HTN, DVT SSS, HLD, who is known to [facility], Dr. Uribe/Red. She presented to the ER on 11.22.24 with complaints of shortness of breath. She reports worsening shortness of breath over the last month. She was sent from urgent care for being 88% on room air. She was started on oxygen therapy an sent to the ER. In the ER, she had some respiratory distress and was started on BiPAP. Significant labs WBC 13.53, H&H 11.9/36.2, K 5.3, Chloride 108, CO2 21, Glucose 232, .1. Flu, COVID and COVID negative. CTA Chest negative for PE, but shows Cardiomegaly and mild congestive changes and Bilateral prominent pleural effusions and lower lung lobes compressive consolidations. Cxr shows prominence of the right hilum may relate to pulmonary vascular congestion and possible small pleural effusions. [facility] has been consulted for CHF.        11.24.24: NAD. VSS. No complaints of CP/Palps. Reports some improvement in her SOB. 2350 urine output/1640 ml Fluid Negative. Crackle noted in bases.   11.25.24: NAD. VSS. No complaints of CP/Palp. Remains SOB. 1800 urine output/1700 ml Net Negative . Labs Stable.   11.26.24: NAD. VSS. No complaints of CP/SOB/Palps. Reports shortness of breath has significantly improved, but unable to lye flat. 1900 urine output over 24 hours/Fluid net negative 1120. WBC 12.47  11.27.24: NAD. VSS. No complaints of CP/Palps. Reports shortness of breath. "I feel okay" WBC 13.57. no output documented.  11.28.24: NAD. "I am doing well." " "Family at Bedside. Denies CP, SOB and Palps. Plans for LAD PCI in AM (11.29.24).   11.29.24: NAD. "I am good." Denies CP, SOB and Palps. NPO for LHC and PCI of LAD. BUN/Crea 27.5/0.86  11.30.24: NAD. "I am better." Denies CP, SOB and Palps. S/P PCI of LAD.   12.1.24: NAD. "I am good." Awaiting Valve Intervention/Mitral Valve. Denies CP, SOB and Palps.   12.2.24: NAD. VSS. No complaints of CP/Palps. Reports SOB with exertion. "I feel okay" Family present. Answered all questions       PMH: VHD, GEMINI, DM II, PAF, ABEL, COPD, HTN, DVT SSS, HLD, Asthma, Left Thyroid Nodule, GERD, Chronic Sinusitis, Common Variable Immunodeficiency, Glaucoma  PSH: PPM, LHC, MVR, Bilateral Cataract Extraction, Total Knee Replacement, Bilateral mastectomy, Appendectomy, Colonoscopy, EGD, Tonsillectomy,    Family History: Father - Lung Cancer; Mother - Alzheimer, CVA; Sister - HTN, Multiple Sclerosis, DM II  Social History: Former Smoker (Quit in 1998). Denies illicit drug use and alcohol use.      Previous Cardiac Diagnostics:   [facility] 11.29.24:  Eccentric Prox LAD lesion was 80% stenosed with 0% stenosis post-intervention. iFR 0.84  Prox LAD lesion: A STENT SYNERGY XD [id number] stent was successfully placed at 8 GEO for 10 sec. Proximal part of the stent was post dilated by using 3.5/12 mm NC and a 4/8 mm NC balloons at 12 atmospheres.    [facility] 11.27.24:  Findings:  - There is single vessel coronary artery disease.  - Mid Left Anterior Descending has a calcified 70% stenosis.  - LVEDP is normal at 6 mmHg.  - RHC with elevated R sided filling pressures. RA 7 mmHg, RV 50/7 mmHg, PA 47/26 mmHg (mean 33 mmHg), PCWP 24 mmHg.  - PCWP to LV diastolic mean gradient was calculated to be 22 mmHg.  - Transpulmonary gradient is 8 mmHg.  - Normal Cardiac Output/Index at 4.7/2.3, as calculated by Rahel equation.  - PVR is 1.7 Woods units  - SVR is 1260 dyne-sec*cm^-5  - Pulmonary Artery Pulsatility Index (Nury) is 3.0  - Cardiac Power Output () is " 0.84  Assessment/Plan:  - Patient is a 79 y.o. female with a history of prior bioprosthetic mitral valve replacement now presents with heart failure.  Transesophageal echocardiogram was obtained.  Final report is pending at this time, but there were findings consistent with severe bioprosthetic MVR stenosis.  Cardiac catheterization findings shown above do suggest that there is a gradient of around 22 mm Hg across the bioprosthetic valve.  -recommend consultation with CT surgery   -obtain CT with mitral valve in valve protocol for possible TMVR  -LAD should be revascularized as well    MARYBETH (11.27.24):  Left Ventricle: The left ventricle is normal in size. Septal motion is consistent with post-operative status. There is normal systolic function with a visually estimated ejection fraction of 60 - 65%.  Right Ventricle: Normal right ventricular cavity size. Systolic function is normal.  Left Atrium: Left atrium is severely dilated.  Mitral Valve: There is a bioprosthetic valve in the mitral position. There is severe stenosis. The mean pressure gradient across the mitral valve is 16 mmHg at a heart rate of  bpm. There is moderate regurgitation.    ECHO (11.23.24):  Left Ventricle: The left ventricle is normal in size. Mildly increased wall thickness. There is normal systolic function with a visually estimated ejection fraction of 60 - 65%. Grade I diastolic dysfunction. Right Ventricle: Normal right ventricular cavity size. Systolic function is borderline low. Aortic Valve: There is aortic valve sclerosis. Mitral Valve: There is a bioprosthetic valve in the mitral position (25 mm mosaic porcine valve).  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 There is mild (1+) regurgitation. Tricuspid Valve: There moderate (2+) regurgitation. The estimated pulmonary artery systolic pressure is 57 mmHg. Pacemaker lead  noted.  Recommend transesophageal echocardiogram to properly evaluate the bioprosthetic mitral valve.    ECHO (11.14.24):  The study quality is average. The left ventricle is normal in size. Global left ventricular systolic function is normal. The left ventricular ejection fraction is 65%. Left ventricular diastolic function is normal. Mild concentric left ventricular hypertrophy is present. The left atrium is moderately enlarged. (4.6 cms). A normal functioning prosthetic mitral valve is noted. MG = 4.3 mmHg. The pulmonary artery systolic pressure is 37 mmHg.      PPM Insertion (9.25.23):  Successful implantation of PPM Dual. [facility]      PET (1.24.23):  This is a normal perfusion study, no perfusion defects noted. There is no evidence of ischemia.This scan is suggestive of low risk for future cardiovascular events. The left ventricular cavity is noted to be normal on the stress studies. The stress left ventricular ejection fraction was calculated to be 68% and left ventricular global function is normal. The rest left ventricular cavity is noted to be normal. The rest left ventricular ejection fraction was calculated to be 63% and rest left ventricular global function is normal. When compared to the resting ejection fraction (63%), the stress ejection fraction (68%) has increased. Transient ischemic dilatation is present and has been described as a marker for high risk coronary artery disease. It has also been described in microvascular disease, hypertensive heart disease as well as cardiac deconditioning. The study quality is good.   There was a rise in myocardial blood flow between rest and stress.  Global myocardial blood flow reserve was 2.72.  Normal global coronary flow reserve is suggestive of low coronary event risk.     Carotid US (1.24.23):  The study quality is good. There is no plaque noted in the proximal right internal carotid artery. 1-39% stenosis in the proximal left internal carotid artery based  on Bluth Criteria. Antegrade right vertebral artery flow. Antegrade left vertebral artery flow.     LHC (9.6.23):  Normal Coronaries     Review of Systems   Constitutional: Positive for malaise/fatigue. Negative for fever.   Cardiovascular:  Negative for chest pain, leg swelling, orthopnea and palpitations.   Respiratory:  Positive for shortness of breath.    All other systems reviewed and are negative.    Objective:     Vital Signs (Most Recent):  Temp: 97.7 °F (36.5 °C) (12/02/24 0742)  Pulse: 76 (12/02/24 0742)  Resp: 18 (12/02/24 0415)  BP: (!) 115/54 (12/02/24 0742)  SpO2: 95 % (12/02/24 0742) Vital Signs (24h Range):  Temp:  [97.5 °F (36.4 °C)-98.5 °F (36.9 °C)] 97.7 °F (36.5 °C)  Pulse:  [73-93] 76  Resp:  [14-20] 18  SpO2:  [94 %-98 %] 95 %  BP: ()/(54-76) 115/54   Weight: 96.4 kg (212 lb 8 oz)  Body mass index is 34.3 kg/m².  SpO2: 95 %       Intake/Output Summary (Last 24 hours) at 12/2/2024 0804  Last data filed at 12/2/2024 0500  Gross per 24 hour   Intake 1560 ml   Output 600 ml   Net 960 ml     Lines/Drains/Airways       Peripheral Intravenous Line  Duration                  Peripheral IV - Single Lumen 11/27/24 0915 22 G Anterior;Left Forearm 4 days         Peripheral IV - Single Lumen 12/01/24 0800 22 G Anterior;Right Forearm 1 day                  Significant Labs:   Chemistries:   Recent Labs   Lab 11/28/24  0416 11/28/24  0416 11/29/24  0455 11/30/24  0618 12/01/24  0624 12/02/24  0507   *  --  134* 134* 136 136   K 4.1  --  3.7 3.8 3.7 3.8   CL 97*  --  98 96* 97* 97*   CO2 22*  --  22* 26 27 25   BUN 25.7*  --  27.5* 25.3* 26.4* 22.1*   CREATININE 0.88  --  0.86 0.98 0.84 0.88   CALCIUM 8.6  --  8.6 9.1 8.6 8.7   BILITOT  --    < > 0.7 0.7 0.7 0.7   ALKPHOS  --    < > 88 81 77 79   ALT  --    < > 22 25 22 22   AST  --    < > 27 34 22 24   GLUCOSE 195*  --  207* 208* 215* 205*   MG 2.30  --  2.10 2.10 2.20 2.10   PHOS  --   --   --   --  4.5  --     < > = values in this interval not  "displayed.        CBC/Anemia Labs: Coags:    Recent Labs   Lab 11/30/24  0618 12/01/24  0624 12/02/24  0507   WBC 14.02* 13.45* 14.62*   HGB 12.1 11.6* 11.4*   HCT 37.2 35.5* 34.7*    277 300   MCV 90.5 90.3 89.7   RDW 16.6 16.4 16.4    No results for input(s): "PT", "INR", "APTT" in the last 168 hours.       Telemetry: SR    Physical Exam  Vitals and nursing note reviewed.   Constitutional:       General: She is not in acute distress.  HENT:      Head: Normocephalic and atraumatic.      Mouth/Throat:      Mouth: Mucous membranes are moist.   Eyes:      Conjunctiva/sclera: Conjunctivae normal.   Cardiovascular:      Rate and Rhythm: Normal rate and regular rhythm.      Pulses: Normal pulses.      Heart sounds: Murmur heard.   Pulmonary:      Effort: Pulmonary effort is normal. No respiratory distress.      Breath sounds: Examination of the right-lower field reveals decreased breath sounds. Examination of the left-lower field reveals decreased breath sounds. Decreased breath sounds present.      Comments: On NC 2 L  Abdominal:      Palpations: Abdomen is soft.   Musculoskeletal:         General: No swelling.   Skin:     General: Skin is warm.      Comments: R Groin Soft/Flat, Non-Tender, No Sign of Bleed/Infection. +2 BLE Palpable Pedal Pulses    Neurological:      Mental Status: She is alert and oriented to person, place, and time.   Psychiatric:         Behavior: Behavior normal.       Current Schedule Inpatient Medications:   albuterol-ipratropium  3 mL Nebulization Q8H    amiodarone  200 mg Oral Daily    aspirin  81 mg Oral Daily    atorvastatin  40 mg Oral Daily    cetirizine  10 mg Oral Daily    clopidogreL  75 mg Oral Daily    famotidine  40 mg Oral QHS    furosemide (LASIX) injection  40 mg Intravenous Daily    lipase-protease-amylase 24,000-76,000-120,000 units  2 capsule Oral TID WM    metoprolol succinate  50 mg Oral Daily    NIFEdipine  30 mg Oral Daily    pantoprazole  40 mg Oral QAM "     Assessment:   Acute Diastolic Heart Failure - Related to VHD/Severe MS - Compensated     - s/p LHC/RHC (11.27.24) severe bioprosthetic MVR stenosis.  Cardiac Catheterization findings shown above do suggest that there is a gradient of around 22 mm Hg across the bioprosthetic valve, Recommend consultation with CT surgery, Obtain CT with mitral valve in valve protocol for possible TMVR, LAD should be revascularized as well.    - EF 60-65% with G 1 Diastolic Dysfunction (11.14.24)   VHD    - MARYBETH (11.27.24) - Mitral Valve: There is a bioprosthetic valve in the mitral position. There is severe stenosis. The mean pressure gradient across the mitral valve is 16 mmHg at a heart rate of bpm. There is moderate regurgitation.     - ECHO (11.23.24): There is evidence of bioprosthetic mitral valve stenosis The mean pressure gradient across the mitral valve is 19 mmHg at a heart rate of 78 bpm.     - s/p MVR (9.20.23): Mitral valve replacement with a 25 mm mosaic porcine valve    - Moderate TR    - Mild MR  CAD/Stents    - s/p LHC (11.29.24) - Eccentric Prox LAD lesion was 80% stenosed with 0% stenosis post-intervention. iFR 0.84. Prox LAD lesion: A STENT SYNERGY XD 3.0X16MM stent was successfully placed at 8 GEO for 10 sec. Proximal part of the stent was post dilated by using 3.5/12 mm NC and a 4/8 mm NC balloons at 12 atmospheres.  Acute Hypoxemic Respiratory Failure requiring O2 Therapy - Resolved   PNA ?    - CTA Chest (11.22.24): Llower lung lobes compressive consolidations.   Bilateral Pleural Effusions  Anemia (Mild)  Hyperkalemia - Resolved  Leukocytosis   COPD Exacerbation - Stable   UTI    - UCX - Klebsiella Pneumoniae, Proteus Mirabilis  Pulmonary HTN  GEMINI  DM II  PAF - Currently SR    - [id number] Score 6 (9.7% Stroke Risk Per Year)    - s/p Ligation of SUKHI (9.20.23) - Endostapler   ABEL  HTN  DVT s/p IVC Filter     - on Xarelto Outpatient   SSS    - s/p PPM (9.25.23): [facility]   HLD  Asthma  Left Thyroid  Nodule  GERD  Chronic Sinusitis  Common Variable Immunodeficiency  Glaucoma  No Known History of GI Bleed     Plan:   Ok to Eat from cardiology standpoint  Start Lasix 40 mg oral Daily   INR Today   Case Discussed between Dr. Lynn and Dr. Cervantes; will start Coumadin to see if the gradient can be decreased  Will set patient up in [facility]   Start Coumadin 5 mg oral Daily; INR Goal 2.0-3.0   Continue Amio, ASA, Statin, BB, Nifedipine, Plavix   Triple Therapy for 1 month then Discontinue ASA  Keep K > 4.0 and Mg > 2.0   INR in 3 days  ECHO in 4-6 weeks   Follow-up with Dr. Watters in 4-6 weeks to discuss valve  Will need Follow-up with Dr. Uribe in 1-2 weeks  Labs in AM: CBC, BMP, Mag, and INR     YANIRA Watkins  Cardiology  [facility]    Physician addendum:  I have seen and examined this patient as a split-shared visit with the JACQUELYN d/t complicated medical management of above problems written in assessment and high acuity requiring physician expertise in medical decision-making. I performed the substantive portion of the history and exam. Above medical decision-making is also formulated by me.    Plan:  C/w diuresis. Long discussion on poor diet and high salt load. Will start coumadin for now. Dietitian consult and case management for home oxygen. Remaining as above.     Calvin Sheth MD [facility]  Int. Cardiologist

## 2024-12-02 NOTE — PROGRESS NOTES
[facility]  Hospital Medicine Progress Note        Chief Complaint: Inpatient Follow-up for HF    HPI:   79-year-old woman with A-fib, HTN, valvular heart disease s/p MVR, CAD, DVT s/p IVC filter on xarelto, T2DM, GERD, SSS s/p PPM, HTN, HLD, chronic sinusitis status post sinus surgery, ABEL, obesity and asthma/COPD not on home oxygen who presented to the ER with progressive shortness of breath over the last month.  She initially was seen in urgent care but due to sats being 88% on room air she was deferred to the ER.     She arrived afebrile hemodynamically stable maintaining adequate sats on 3 L nasal cannula but abruptly desatted in the ER requiring initiation of BiPAP.  Laboratory work showed a BNP of 160 (last EF 55% 09/2023), CTA showed no pulmonary embolus, noted cardiomegaly and mild congestive changes and pleural effusions and lower lung compressive consolidations.  EKG noted normal sinus rhythm.     She was given 80 mg of Lasix, albuterol nebulizer treatment, IV Solu-Medrol and admitted to the hospitalist service for further management.  Continued on IV Lasix b.i.d..  Able to be weaned off of BiPAP and currently on 3 L O2 via NC with adequate saturations.  Added DuoNebs. Desaturating to 88% during ambulation on RA.    Underwent MARYBETH on 11/27 which showed severe mitral stenosis and moderate MR and mid LAD 70% stenosis with elevated PCWP and PA 47/26. Plan on repeat LHC on 11/29 and T-MAVR on Monday.    Patient underwent repeat LHC on 11/29 with stent to prox LAD.     Interval Hx:   On 2-L NC. Reports doing okay this morning. Pending structural heart/CT surgery team discussion. No chest pain.     Was given additional dose of IV lasix yesterday evening. CXR showed pulm edema.       Objective/physical exam:  General: In no acute distress, Obese   Chest: Nelson diminished breath sounds   Heart: RRR, +S1, S2, no appreciable murmur  Abdomen: Soft, nontender, BS +  MSK: Warm, no lower  extremity edema, no clubbing or cyanosis  Neurologic: Alert and oriented x4, Cranial nerve II-XII intact, Strength 5/5 in all 4 extremities    VITAL SIGNS: 24 HRS MIN & MAX LAST   Temp  Min: 97.5 °F (36.4 °C)  Max: 98.5 °F (36.9 °C) 97.7 °F (36.5 °C)   BP  Min: 96/57  Max: 128/76 (!) 115/54   Pulse  Min: 73  Max: 93  76   Resp  Min: 16  Max: 20 18   SpO2  Min: 95 %  Max: 98 % 96 %     I have reviewed the following labs:  Recent Labs   Lab 11/30/24 0618 12/01/24  0624 12/02/24  0507   WBC 14.02* 13.45* 14.62*   RBC 4.11* 3.93* 3.87*   HGB 12.1 11.6* 11.4*   HCT 37.2 35.5* 34.7*   MCV 90.5 90.3 89.7   MCH 29.4 29.5 29.5   MCHC 32.5* 32.7* 32.9*   RDW 16.6 16.4 16.4    277 300   MPV 10.4 10.0 9.9     Recent Labs   Lab 11/30/24 0618 12/01/24  0624 12/02/24  0507   * 136 136   K 3.8 3.7 3.8   CL 96* 97* 97*   CO2 26 27 25   BUN 25.3* 26.4* 22.1*   CREATININE 0.98 0.84 0.88   CALCIUM 9.1 8.6 8.7   MG 2.10 2.20 2.10   ALBUMIN 2.7* 2.7* 2.8*   ALKPHOS 81 77 79   ALT 25 22 22   AST 34 22 24   BILITOT 0.7 0.7 0.7     Assessment/Plan:  # Acute on chronic diastolic decompensated HF  # Acute respiratory failure with hypoxia requiring supplemental oxygen 2/2 B/L pulmonary edema   # Pulmonary HTN  # Severe mitral stenosis  # K.pneumonia and proteus mirabilis UTI - treated  # Obesity   # pAF s/p LAAL in 2023  # HTN  # HLD  # VHD s/p MVR  # CAD   # DVT  s/p IVC filter  # DM   # GERD   # Chronic sinusitis s/p sinus surgery   # ABEL   # Obesity   # Asthma/COPD ?    - wean oxygen as tolerated  - MARYBETH and LHC noted   - repeat LHC on 11/29 with stent to prox LAD   - plan for possible T-MAVR with CT surgery on Monday; ongoing discussions  - IV lasix 40 mg daily  - cardiology recs appreciated  - currently on full dose lovenox  - continue aspirin, plavix, statin, amiodarone, metop XL 50 mg daily, nifedipine 30 mg daily  - completed ceftriaxone for UTI on 11/30  - incentive spirometer  - laverne  - PT/OT    Critical care  note:  Critical care diagnosis: HF needing IV lasix   Critical care interventions: Hands-on evaluation, review of labs/radiographs/records and discussion with patient and family if present  Critical care time spent: 35 minutes     VTE prophylaxis: full dose lovenox    Patient condition:  Guarded    Anticipated discharge and Disposition:   PT recommending SNF    All diagnosis and differential diagnosis have been reviewed; assessment and plan has been documented; I have personally reviewed the labs and test results that are presently available; I have reviewed the patients medication list; I have reviewed the consulting providers response and recommendations. I have reviewed or attempted to review medical records based upon their availability    All of the patient's questions have been  addressed and answered. Patient's is agreeable to the above stated plan. I will continue to monitor closely and make adjustments to medical management as needed.    _____________________________________________________________________  Radiology:  I have personally reviewed the following imaging and agree with the radiologist.     Cardiac catheterization    Eccentric Prox LAD lesion was 80% stenosed with 0% stenosis   post-intervention. iFR 0.84    Prox LAD lesion: A STENT SYNERGY XD [id number] stent was successfully   placed at 8 GEO for 10 sec. Proximal part of the stent was post dilated by   using 3.5/12 mm NC and a 4/8 mm NC balloons at 12 atmospheres.    The procedure log was documented by No documenter listed and verified by   Tatiana Jarrett MD.    Date: 11/29/2024  Time: 11:51 AM    After obtaining informed consent patient was brought to the cath lab in a   fasting state.  Right groin area was prepped and draped usual sterile   manner using ChloraPrep solution.  2% lidocaine was given for local   anesthesia.  Six French sheath was placed in the right femoral artery   using Seldinger technique.  Six French EBU 3.0 guide catheter  used for the   intervention.  IFR of the proximal LAD was performed which was 0.84.    Heparin was given for anticoagulation.  Intervention was done on the same   IFR wire which was placed in the distal LAD.  Proximal LAD lesion was   pre-dilated by using 2.5/15 mm balloon and stented with a 3/16 mm synergy   drug-eluting stent and proximal part of the stent was post dilated by   using NC balloon 3.5/12 mm and a 4/8 mm NC balloon at 12 atmospheres for   10 seconds.  Post stenting angiogram shows lesion reduced from 0%.    Patient tolerated the procedure very well no complications noted.  Right   femoral artery angiogram was performed and Angio-Seal closure device was   applied achieving hemostasis.  Cardiac catheterization  The procedure log was documented by No documenter listed and verified by   Sebastian Watters Jr, MD.    Date: 11/27/2024  Time: 9:21 PM    Procedure:  Left heart catheterization with coronary angiography  Right heart catheterization  Measurement of LVEDP     Preoperative diagnosis:  Heart failure    Postoperative diagnosis:  Coronary artery disease     Access:  Right common femoral artery  Right common femoral vein    Estimated blood loss: 10 cc  Complications: None     Summary:  Consent obtained. Risks and benefits discussed with the patient. The   patient was brought to the cath lab in a fasting state. The patient was   prepped and draped in usual sterile fashion. A preprocedure time-out was   performed.     Ultrasound-guided right common femoral venous access via modified   Seldinger technique using a micropuncture kit. A 7 Taiwanese sheath was   inserted into the right common femoral vein.  Ultrasound-guided right common femoral arterial access via modified   Seldinger technique using a micropuncture kit. A 5 Taiwanese sheath was   inserted into the right common femoral artery.     A right heart catheterization was being performed using a [place]field-Darrius   catheter.  A 5 Taiwanese Pigtail catheter was used to  cross aortic valve, and   simultaneous LV and PCW pressures were measured.   The transvalvular aortic gradient was measured by pullback method.    A 5 Monegasque JL4 catheter was used to cannulate the left main coronary   artery; angiography was performed, and multiple fluoroscopic views were   obtained.  A 5 Monegasque JR4 catheter was used to cannulate the right coronary artery;   angiography was performed, and multiple fluoroscopic views were obtained.    At the end the procedure, all wires and catheters were removed.   Hemostasis achieved with manual compression.     Findings:  - There is single vessel coronary artery disease.  - Mid Left Anterior Descending has a calcified 70% stenosis.  - LVEDP is normal at 6 mmHg.  - RHC with elevated R sided filling pressures. RA 7 mmHg, RV 50/7 mmHg, PA   47/26 mmHg (mean 33 mmHg), PCWP 24 mmHg.  - PCWP to LV diastolic mean gradient was calculated to be 22 mmHg.  - Transpulmonary gradient is 8 mmHg.  - Normal Cardiac Output/Index at 4.7/2.3, as calculated by Rahel equation.  - PVR is 1.7 Woods units  - SVR is 1260 dyne-sec*cm^-5  - Pulmonary Artery Pulsatility Index ([place]) is 3.0  - Cardiac Power Output () is 0.84     Assessment/Plan:  - Patient is a 79 y.o. female with a history of prior bioprosthetic mitral   valve replacement now presents with heart failure.  Transesophageal   echocardiogram was obtained.  Final report is pending at this time, but   there were findings consistent with severe bioprosthetic MVR stenosis.    Cardiac catheterization findings shown above do suggest that there is a   gradient of around 22 mm Hg across the bioprosthetic valve.  -recommend consultation with CT surgery   -obtain CT with mitral valve in valve protocol for possible TMVR  -LAD should be revascularized as well    Sebastian Watters MD  Interventional Cardiology/Structural Heart Disease  [facility] Jonathon Galan MD  Department of Hospital Medicine    [facility]   12/02/2024

## 2024-12-02 NOTE — PT/OT/SLP PROGRESS
Occupational Therapy   Treatment    Name: Kortney Leach  MRN: [id number]  Admitting Diagnosis:  COPD exacerbation  3 Days Post-Op    Recommendations:     Recommended therapy intensity at discharge: Moderate Intensity Therapy   Discharge Equipment Recommendations:  to be determined by next level of care  Barriers to discharge:   (ongoing medical needs; placement)    Assessment:     Kortney Leach is a 79 y.o. female with a medical diagnosis of COPD exacerbation.  She presents with motivation to participate and SOB with activity. Performance deficits affecting function are weakness, impaired endurance, impaired self care skills, impaired functional mobility, impaired cardiopulmonary response to activity.     Rehab Prognosis:  Good; patient would benefit from acute skilled OT services to address these deficits and reach maximum level of function.       Plan:     Patient to be seen 4 x/week to address the above listed problems via self-care/home management, therapeutic activities, therapeutic exercises  Plan of Care Expires: 12/26/24  Plan of Care Reviewed with: patient    Subjective     Pain/Comfort:  Pain Rating 1: 0/10    Objective:     Communicated with: nurse prior to session.  Patient found HOB elevated with peripheral IV, telemetry, pulse ox (continuous), oxygen upon OT entry to room.    General Precautions: Standard, fall    Orthopedic Precautions:N/A  Braces: N/A  Respiratory Status: Nasal cannula, flow 3 L/min  Vital Signs: Supplemental 02: >90% throughout  ;required verbal cues to breathe through nose     Occupational Performance:     Bed Mobility:    Patient completed Supine to Sit with minimum assistance     Functional Mobility/Transfers:  Patient completed Sit <> Stand Transfer with minimum assistance  with  rolling walker   Patient completed Bed <> Chair Transfer using Step Transfer technique with contact guard assistance with rolling walker  Patient completed Toilet Transfer Step Transfer  technique with contact guard assistance with  rolling walker and grab bars  Functional Mobility: no LOB    Activities of Daily Living:  Grooming: contact guard assistance standing at sink to brush teeth and wash face  Lower Body Dressing: minimum assistance adjust socks and alida brief  Toileting: contact guard assistance sitting on toilet  Patient encouraged to call for nursing staff to assist to bathroom vs using [facility]    AMPAC 6 Click ADL: 21    Patient Education:  Patient provided with verbal education education regarding OT role/goals/POC, fall prevention, safety awareness, and Discharge/DME recommendations.  Understanding was verbalized, however additional teaching warranted.      Patient left up in chair with all lines intact, call button in reach, and nurse notified.    GOALS:   Multidisciplinary Problems       Occupational Therapy Goals          Problem: Occupational Therapy    Goal Priority Disciplines Outcome Interventions   Occupational Therapy Goal     OT, PT/OT Progressing    Description: Goals to be met by 12/26/2024    Pt will complete grooming standing at sink with LRAD with modified independence.  Pt will complete UB dressing with modified independence.  Pt will complete LB dressing with modified independence using LRAD.  Pt will complete toileting with modified independence using LRAD.  Pt will complete functional mobility to/from toilet and toilet transfer with modified independence using LRAD.                       Time Tracking:     OT Date of Treatment: 12/02/24  OT Start Time: 1356  OT Stop Time: 1411  OT Total Time (min): 15 min    Billable Minutes:Self Care/Home Management 15    OT/JACOB: OT     Number of JACOB visits since last OT visit: 1    12/2/2024

## 2024-12-02 NOTE — PROGRESS NOTES
Inpatient Nutrition Evaluation    Admit Date: 11/22/2024   Total duration of encounter: 10 days    Nutrition Recommendation/Prescription     Diet NPO ordered, previous diet heart healthy, diabetic 2000 Calories (up to 75 gm per meal)  Change Boost Glucose control any flavor BID (provides 190 kcal and 16 g protein per container).  Discontinue Boost Breeze per patient request.  Encouraged adequate PO intake  Monitor appetite/PO intake, weight, and labs    Nutrition Assessment     Chart Review    Reason Seen: continuous nutrition monitoring and follow-up COPD    Malnutrition Screening Tool Results   Have you recently lost weight without trying?: No  Have you been eating poorly because of a decreased appetite?: No   MST Score: 0     Diagnosis:  Acute on chronic diastolic decompensated HF  Acute respiratory failure with hypoxia requiring supplemental oxygen 2/2 B/L pulmonary edema   Obesity   AF   HTN   VHD s/p MVR  CAD   DVT   IVC filter  DM   GERD   Chronic sinusitis s/p sinus surgery   ABEL   Obesity   Asthma    Relevant Medical History:    Anticoagulant long-term use      Asthma      Chronic sinusitis, unspecified      COPD (chronic obstructive pulmonary disease)      CVID (common variable immunodeficiency)      Diabetes mellitus, type 2      GERD (gastroesophageal reflux disease)      Hypertension      Severe mitral valve regurgitation      Sleep apnea       uses CPAP    SOB (shortness of breath)      Unspecified glaucoma      Weakness        Nutrition-Related Medications:   Scheduled Meds:   albuterol-ipratropium  3 mL Nebulization Q8H    amiodarone  200 mg Oral Daily    aspirin  81 mg Oral Daily    atorvastatin  40 mg Oral Daily    cetirizine  10 mg Oral Daily    clopidogreL  75 mg Oral Daily    famotidine  40 mg Oral QHS    furosemide (LASIX) injection  40 mg Intravenous Daily    lipase-protease-amylase 24,000-76,000-120,000 units  2 capsule Oral TID WM    metoprolol succinate  50 mg Oral Daily    NIFEdipine  30  mg Oral Daily    pantoprazole  40 mg Oral QAM     Continuous Infusions:  PRN Meds:.  Current Facility-Administered Medications:     acetaminophen, 650 mg, Oral, Q8H PRN    acetaminophen, 650 mg, Oral, Q8H PRN    albuterol-ipratropium, 3 mL, Nebulization, Q4H PRN    dextrose 10%, 12.5 g, Intravenous, PRN    dextrose 10%, 25 g, Intravenous, PRN    glucagon (human recombinant), 1 mg, Intramuscular, PRN    glucose, 16 g, Oral, PRN    glucose, 24 g, Oral, PRN    hydrALAZINE, 10 mg, Intravenous, Q4H PRN    HYDROcodone-acetaminophen, 1 tablet, Oral, Q4H PRN    insulin aspart U-100, 0-10 Units, Subcutaneous, QID (AC + HS) PRN    melatonin, 6 mg, Oral, Nightly PRN    morphine, 2 mg, Intravenous, Q4H PRN    ondansetron, 4 mg, Intravenous, Q8H PRN    sodium chloride 0.9%, 10 mL, Intravenous, PRN      Nutrition-Related Labs:  Recent Labs   Lab 11/26/24  0408 11/27/24  0424 11/28/24  0416 11/29/24  0455 11/30/24  0004 11/30/24  0618 12/01/24  0624 12/02/24  0507    136 135* 134*  --  134* 136 136   K 3.7 4.4 4.1 3.7  --  3.8 3.7 3.8   CALCIUM 8.5 8.7 8.6 8.6  --  9.1 8.6 8.7   PHOS  --   --   --   --   --   --  4.5  --    MG 1.90 2.40 2.30 2.10  --  2.10 2.20 2.10   CL 98 98 97* 98  --  96* 97* 97*   CO2 25 24 22* 22*  --  26 27 25   BUN 17.4 21.7* 25.7* 27.5*  --  25.3* 26.4* 22.1*   CREATININE 0.80 0.88 0.88 0.86  --  0.98 0.84 0.88   EGFRNORACEVR >60 >60 >60 >60  --  58 >60 >60   GLUCOSE 204* 212* 195* 207*  --  208* 215* 205*   BILITOT  --   --   --  0.7  --  0.7 0.7 0.7   ALKPHOS  --   --   --  88  --  81 77 79   ALT  --   --   --  22  --  25 22 22   AST  --   --   --  27  --  34 22 24   ALBUMIN  --   --   --  2.8*  --  2.7* 2.7* 2.8*   WBC 12.47* 13.57* 14.28* 13.03* 15.11* 14.02* 13.45* 14.62*   HGB 13.1 13.2 12.5 12.6 12.0 12.1 11.6* 11.4*   HCT 40.0 40.5 38.1 38.3 36.2* 37.2 35.5* 34.7*         Diet Order: Diet NPO Except for: Sips with Medication  Oral Supplement Order: none  Appetite/Oral Intake:  "NPO/NPO  Factors Affecting Nutritional Intake: decreased appetite and NPO  Food/Taoism/Cultural Preferences: none reported  Food Allergies: none reported       Wound(s):   none noted    Comments    2024: Unable to provide NFPE due to pt being asleep, spoke with pt's sister. The sister reports a poor appetite/PO intake for ~2 weeks prior to admit and currently. The sister agreeable to vanilla ONS. The sister denies vomiting, constipation, diarrhea, and chewing/swallowing difficulties. The sister reports nausea. Per EMR, pt weighed 99.8 kg on 10/22/2024 (3.4% wt loss in 1 month, insignificant). Encouraged adequate PO intake. Last BM documented as 2024. Will monitor.    2024: Pt outside of room at time of visit. Pt NPO. Per nursing student, pt had ~50% PO intake yesterday. No N/V/D reported. Last BM documented as 2024. Will monitor.    24: Patient currently NPO for procedure today. Family reports fair-poor oral intake of meals served prior to NPO order. Denies any nausea, vomiting, diarrhea and/or constipation. Drinking Boost Glucose control daily, would like to discontinue Boost Breeze. Will change to all flavors of Boost Glucose Control per patient request.     Anthropometrics    Height: 5' 6" (167.6 cm) Height Method: Stated  Last Weight: 96.4 kg (212 lb 8 oz) (24 0500) Weight Method: Bed Scale  BMI (Calculated): 34.3  BMI Classification: obese grade I (BMI 30-34.9)     Ideal Body Weight (IBW), Female: 130 lb     % Ideal Body Weight, Female (lb): 169.23 %                    Usual Body Weight (UBW), k.8 kg  % Usual Body Weight: 96.78     Usual Weight Provided By: EMR weight history    Wt Readings from Last 5 Encounters:   24 96.4 kg (212 lb 8 oz)   24 99.8 kg (220 lb)   24 99.8 kg (220 lb)   24 93.4 kg (206 lb)   10/24/23 93.4 kg (206 lb)     Weight Change(s) Since Admission:  Admit Weight: 99.8 kg (220 lb) (24 0959)  2024: 96.4 " kg  11/27/2024: no new wts    Patient Education    Not applicable.    Monitoring & Evaluation     Dietitian will monitor food and beverage intake, weight, electrolyte/renal panel, glucose/endocrine profile, and gastrointestinal profile.  Nutrition Risk/Follow-Up: low (follow-up in 5-7 days)  Patients assigned 'low nutrition risk' status do not qualify for a full nutritional assessment but will be monitored and re-evaluated in a 5-7 day time period. Please consult if re-evaluation needed sooner.

## 2024-12-03 ENCOUNTER — HOSPITAL ENCOUNTER (INPATIENT)
Facility: HOSPITAL | Age: 79
LOS: 12 days | Discharge: ANOTHER HOSPITAL | DRG: 314 | End: 2024-12-15
Attending: INTERNAL MEDICINE | Admitting: INTERNAL MEDICINE
Payer: MEDICARE

## 2024-12-03 VITALS
SYSTOLIC BLOOD PRESSURE: 139 MMHG | WEIGHT: 212.5 LBS | OXYGEN SATURATION: 97 % | HEIGHT: 66 IN | DIASTOLIC BLOOD PRESSURE: 66 MMHG | RESPIRATION RATE: 16 BRPM | HEART RATE: 79 BPM | BODY MASS INDEX: 34.15 KG/M2 | TEMPERATURE: 99 F

## 2024-12-03 DIAGNOSIS — I38 VALVULAR HEART DISEASE: ICD-10-CM

## 2024-12-03 DIAGNOSIS — R07.9 CHEST PAIN: ICD-10-CM

## 2024-12-03 LAB
ANION GAP SERPL CALC-SCNC: 13 MEQ/L
BACTERIA #/AREA URNS AUTO: NORMAL /HPF
BASOPHILS # BLD AUTO: 0.04 X10(3)/MCL
BASOPHILS NFR BLD AUTO: 0.3 %
BILIRUB UR QL STRIP.AUTO: NEGATIVE
BUN SERPL-MCNC: 22.4 MG/DL (ref 9.8–20.1)
CALCIUM SERPL-MCNC: 9.2 MG/DL (ref 8.4–10.2)
CHLORIDE SERPL-SCNC: 98 MMOL/L (ref 98–107)
CLARITY UR: CLEAR
CO2 SERPL-SCNC: 26 MMOL/L (ref 23–31)
COLOR UR AUTO: ABNORMAL
CREAT SERPL-MCNC: 0.85 MG/DL (ref 0.55–1.02)
CREAT/UREA NIT SERPL: 26
EOSINOPHIL # BLD AUTO: 0.22 X10(3)/MCL (ref 0–0.9)
EOSINOPHIL NFR BLD AUTO: 1.6 %
ERYTHROCYTE [DISTWIDTH] IN BLOOD BY AUTOMATED COUNT: 16.7 % (ref 11.5–17)
GFR SERPLBLD CREATININE-BSD FMLA CKD-EPI: >60 ML/MIN/1.73/M2
GLUCOSE SERPL-MCNC: 191 MG/DL (ref 82–115)
GLUCOSE UR QL STRIP: NEGATIVE
HCT VFR BLD AUTO: 33.6 % (ref 37–47)
HGB BLD-MCNC: 11 G/DL (ref 12–16)
HGB UR QL STRIP: NEGATIVE
IMM GRANULOCYTES # BLD AUTO: 0.14 X10(3)/MCL (ref 0–0.04)
IMM GRANULOCYTES NFR BLD AUTO: 1 %
INR PPP: 1.2
KETONES UR QL STRIP: NEGATIVE
LEUKOCYTE ESTERASE UR QL STRIP: ABNORMAL
LYMPHOCYTES # BLD AUTO: 2.77 X10(3)/MCL (ref 0.6–4.6)
LYMPHOCYTES NFR BLD AUTO: 20.6 %
MAGNESIUM SERPL-MCNC: 1.9 MG/DL (ref 1.6–2.6)
MCH RBC QN AUTO: 29.9 PG (ref 27–31)
MCHC RBC AUTO-ENTMCNC: 32.7 G/DL (ref 33–36)
MCV RBC AUTO: 91.3 FL (ref 80–94)
MONOCYTES # BLD AUTO: 1.28 X10(3)/MCL (ref 0.1–1.3)
MONOCYTES NFR BLD AUTO: 9.5 %
NEUTROPHILS # BLD AUTO: 9.01 X10(3)/MCL (ref 2.1–9.2)
NEUTROPHILS NFR BLD AUTO: 67 %
NITRITE UR QL STRIP: NEGATIVE
NRBC BLD AUTO-RTO: 0 %
PH UR STRIP: 5.5 [PH]
PLATELET # BLD AUTO: 315 X10(3)/MCL (ref 130–400)
PMV BLD AUTO: 10.2 FL (ref 7.4–10.4)
POCT GLUCOSE: 169 MG/DL (ref 70–110)
POCT GLUCOSE: 192 MG/DL (ref 70–110)
POCT GLUCOSE: 230 MG/DL (ref 70–110)
POCT GLUCOSE: 281 MG/DL (ref 70–110)
POTASSIUM SERPL-SCNC: 3.6 MMOL/L (ref 3.5–5.1)
PROT UR QL STRIP: NEGATIVE
PROTHROMBIN TIME: 14.7 SECONDS (ref 12.5–14.5)
RBC # BLD AUTO: 3.68 X10(6)/MCL (ref 4.2–5.4)
RBC #/AREA URNS AUTO: NORMAL /HPF
SODIUM SERPL-SCNC: 137 MMOL/L (ref 136–145)
SP GR UR STRIP.AUTO: 1.02 (ref 1–1.03)
SQUAMOUS #/AREA URNS AUTO: NORMAL /HPF
UROBILINOGEN UR STRIP-ACNC: 0.2
WBC # BLD AUTO: 13.46 X10(3)/MCL (ref 4.5–11.5)
WBC #/AREA URNS AUTO: NORMAL /HPF

## 2024-12-03 PROCEDURE — 99900035 HC TECH TIME PER 15 MIN (STAT)

## 2024-12-03 PROCEDURE — 25000242 PHARM REV CODE 250 ALT 637 W/ HCPCS: Performed by: INTERNAL MEDICINE

## 2024-12-03 PROCEDURE — 85610 PROTHROMBIN TIME: CPT

## 2024-12-03 PROCEDURE — 99900031 HC PATIENT EDUCATION (STAT)

## 2024-12-03 PROCEDURE — 27000221 HC OXYGEN, UP TO 24 HOURS

## 2024-12-03 PROCEDURE — 85025 COMPLETE CBC W/AUTO DIFF WBC: CPT

## 2024-12-03 PROCEDURE — 94760 N-INVAS EAR/PLS OXIMETRY 1: CPT

## 2024-12-03 PROCEDURE — 94761 N-INVAS EAR/PLS OXIMETRY MLT: CPT

## 2024-12-03 PROCEDURE — 63600175 PHARM REV CODE 636 W HCPCS: Performed by: INTERNAL MEDICINE

## 2024-12-03 PROCEDURE — 36415 COLL VENOUS BLD VENIPUNCTURE: CPT

## 2024-12-03 PROCEDURE — 25000003 PHARM REV CODE 250: Performed by: NURSE PRACTITIONER

## 2024-12-03 PROCEDURE — 94640 AIRWAY INHALATION TREATMENT: CPT

## 2024-12-03 PROCEDURE — 25000003 PHARM REV CODE 250: Performed by: INTERNAL MEDICINE

## 2024-12-03 PROCEDURE — 11000004 HC SNF PRIVATE

## 2024-12-03 PROCEDURE — 25000003 PHARM REV CODE 250

## 2024-12-03 PROCEDURE — 25000242 PHARM REV CODE 250 ALT 637 W/ HCPCS: Performed by: STUDENT IN AN ORGANIZED HEALTH CARE EDUCATION/TRAINING PROGRAM

## 2024-12-03 PROCEDURE — 81003 URINALYSIS AUTO W/O SCOPE: CPT | Performed by: INTERNAL MEDICINE

## 2024-12-03 PROCEDURE — 83735 ASSAY OF MAGNESIUM: CPT

## 2024-12-03 PROCEDURE — 80048 BASIC METABOLIC PNL TOTAL CA: CPT

## 2024-12-03 RX ORDER — CLOPIDOGREL BISULFATE 75 MG/1
75 TABLET ORAL DAILY
Status: DISCONTINUED | OUTPATIENT
Start: 2024-12-04 | End: 2024-12-15 | Stop reason: HOSPADM

## 2024-12-03 RX ORDER — HYDROCODONE BITARTRATE AND ACETAMINOPHEN 5; 325 MG/1; MG/1
1 TABLET ORAL EVERY 4 HOURS PRN
Status: DISCONTINUED | OUTPATIENT
Start: 2024-12-03 | End: 2024-12-09

## 2024-12-03 RX ORDER — HYDRALAZINE HYDROCHLORIDE 20 MG/ML
10 INJECTION INTRAMUSCULAR; INTRAVENOUS EVERY 4 HOURS PRN
Status: CANCELLED | OUTPATIENT
Start: 2024-12-03

## 2024-12-03 RX ORDER — ACETAMINOPHEN 325 MG/1
650 TABLET ORAL EVERY 8 HOURS PRN
Status: CANCELLED | OUTPATIENT
Start: 2024-12-03

## 2024-12-03 RX ORDER — IBUPROFEN 200 MG
16 TABLET ORAL
Status: CANCELLED | OUTPATIENT
Start: 2024-12-03

## 2024-12-03 RX ORDER — ASPIRIN 81 MG/1
81 TABLET ORAL DAILY
Qty: 25 TABLET | Refills: 0 | Status: ON HOLD | OUTPATIENT
Start: 2024-12-03 | End: 2024-12-28

## 2024-12-03 RX ORDER — GLUCAGON 1 MG
1 KIT INJECTION
Status: DISCONTINUED | OUTPATIENT
Start: 2024-12-03 | End: 2024-12-15 | Stop reason: HOSPADM

## 2024-12-03 RX ORDER — ACETAMINOPHEN 325 MG/1
650 TABLET ORAL EVERY 8 HOURS PRN
Status: DISCONTINUED | OUTPATIENT
Start: 2024-12-03 | End: 2024-12-12

## 2024-12-03 RX ORDER — GLUCAGON 1 MG
1 KIT INJECTION
Status: CANCELLED | OUTPATIENT
Start: 2024-12-03

## 2024-12-03 RX ORDER — LOPERAMIDE HYDROCHLORIDE 2 MG/1
2 CAPSULE ORAL EVERY 4 HOURS PRN
Status: DISCONTINUED | OUTPATIENT
Start: 2024-12-03 | End: 2024-12-15 | Stop reason: HOSPADM

## 2024-12-03 RX ORDER — CETIRIZINE HYDROCHLORIDE 10 MG/1
10 TABLET ORAL DAILY
Status: DISCONTINUED | OUTPATIENT
Start: 2024-12-04 | End: 2024-12-15 | Stop reason: HOSPADM

## 2024-12-03 RX ORDER — FAMOTIDINE 20 MG/1
40 TABLET, FILM COATED ORAL NIGHTLY
Status: CANCELLED | OUTPATIENT
Start: 2024-12-03

## 2024-12-03 RX ORDER — FUROSEMIDE 40 MG/1
40 TABLET ORAL DAILY
Status: CANCELLED | OUTPATIENT
Start: 2024-12-04

## 2024-12-03 RX ORDER — ATORVASTATIN CALCIUM 40 MG/1
40 TABLET, FILM COATED ORAL DAILY
Status: CANCELLED | OUTPATIENT
Start: 2024-12-03

## 2024-12-03 RX ORDER — WARFARIN SODIUM 5 MG/1
5 TABLET ORAL DAILY
Qty: 30 TABLET | Refills: 0 | Status: ON HOLD | OUTPATIENT
Start: 2024-12-03 | End: 2025-01-02

## 2024-12-03 RX ORDER — SODIUM CHLORIDE 0.9 % (FLUSH) 0.9 %
10 SYRINGE (ML) INJECTION
Status: CANCELLED | OUTPATIENT
Start: 2024-12-03

## 2024-12-03 RX ORDER — SODIUM CHLORIDE 0.9 % (FLUSH) 0.9 %
10 SYRINGE (ML) INJECTION
Status: DISCONTINUED | OUTPATIENT
Start: 2024-12-03 | End: 2024-12-15 | Stop reason: HOSPADM

## 2024-12-03 RX ORDER — IPRATROPIUM BROMIDE AND ALBUTEROL SULFATE 2.5; .5 MG/3ML; MG/3ML
3 SOLUTION RESPIRATORY (INHALATION) EVERY 8 HOURS
Status: CANCELLED | OUTPATIENT
Start: 2024-12-03

## 2024-12-03 RX ORDER — IBUPROFEN 200 MG
24 TABLET ORAL
Status: CANCELLED | OUTPATIENT
Start: 2024-12-03

## 2024-12-03 RX ORDER — NIFEDIPINE 30 MG/1
30 TABLET, EXTENDED RELEASE ORAL DAILY
Status: CANCELLED | OUTPATIENT
Start: 2024-12-04

## 2024-12-03 RX ORDER — METOPROLOL SUCCINATE 50 MG/1
50 TABLET, EXTENDED RELEASE ORAL DAILY
Status: DISCONTINUED | OUTPATIENT
Start: 2024-12-04 | End: 2024-12-15 | Stop reason: HOSPADM

## 2024-12-03 RX ORDER — ONDANSETRON HYDROCHLORIDE 2 MG/ML
4 INJECTION, SOLUTION INTRAVENOUS EVERY 6 HOURS PRN
Status: DISCONTINUED | OUTPATIENT
Start: 2024-12-03 | End: 2024-12-03

## 2024-12-03 RX ORDER — WARFARIN SODIUM 5 MG/1
5 TABLET ORAL DAILY
Status: CANCELLED | OUTPATIENT
Start: 2024-12-03

## 2024-12-03 RX ORDER — PANTOPRAZOLE SODIUM 40 MG/1
40 TABLET, DELAYED RELEASE ORAL EVERY MORNING
Status: DISCONTINUED | OUTPATIENT
Start: 2024-12-04 | End: 2024-12-15 | Stop reason: HOSPADM

## 2024-12-03 RX ORDER — TALC
6 POWDER (GRAM) TOPICAL NIGHTLY PRN
Status: DISCONTINUED | OUTPATIENT
Start: 2024-12-03 | End: 2024-12-15 | Stop reason: HOSPADM

## 2024-12-03 RX ORDER — CALCIUM CARBONATE 200(500)MG
1000 TABLET,CHEWABLE ORAL 3 TIMES DAILY PRN
Status: DISCONTINUED | OUTPATIENT
Start: 2024-12-03 | End: 2024-12-15 | Stop reason: HOSPADM

## 2024-12-03 RX ORDER — INSULIN ASPART 100 [IU]/ML
0-10 INJECTION, SOLUTION INTRAVENOUS; SUBCUTANEOUS
Status: DISCONTINUED | OUTPATIENT
Start: 2024-12-03 | End: 2024-12-15 | Stop reason: HOSPADM

## 2024-12-03 RX ORDER — HYDROCODONE BITARTRATE AND ACETAMINOPHEN 5; 325 MG/1; MG/1
1 TABLET ORAL EVERY 4 HOURS PRN
Status: CANCELLED | OUTPATIENT
Start: 2024-12-03

## 2024-12-03 RX ORDER — CLOPIDOGREL BISULFATE 75 MG/1
75 TABLET ORAL DAILY
Qty: 30 TABLET | Refills: 0 | Status: ON HOLD | OUTPATIENT
Start: 2024-12-04 | End: 2025-01-03

## 2024-12-03 RX ORDER — INSULIN ASPART 100 [IU]/ML
0-10 INJECTION, SOLUTION INTRAVENOUS; SUBCUTANEOUS
Status: CANCELLED | OUTPATIENT
Start: 2024-12-03

## 2024-12-03 RX ORDER — ASPIRIN 81 MG/1
81 TABLET ORAL DAILY
Status: CANCELLED | OUTPATIENT
Start: 2024-12-04

## 2024-12-03 RX ORDER — CETIRIZINE HYDROCHLORIDE 10 MG/1
10 TABLET ORAL DAILY
Status: CANCELLED | OUTPATIENT
Start: 2024-12-04

## 2024-12-03 RX ORDER — MORPHINE SULFATE 4 MG/ML
2 INJECTION, SOLUTION INTRAMUSCULAR; INTRAVENOUS EVERY 4 HOURS PRN
Status: DISCONTINUED | OUTPATIENT
Start: 2024-12-03 | End: 2024-12-09

## 2024-12-03 RX ORDER — HYDRALAZINE HYDROCHLORIDE 20 MG/ML
10 INJECTION INTRAMUSCULAR; INTRAVENOUS EVERY 4 HOURS PRN
Status: DISCONTINUED | OUTPATIENT
Start: 2024-12-03 | End: 2024-12-15 | Stop reason: HOSPADM

## 2024-12-03 RX ORDER — AMIODARONE HYDROCHLORIDE 200 MG/1
200 TABLET ORAL DAILY
Status: CANCELLED | OUTPATIENT
Start: 2024-12-04

## 2024-12-03 RX ORDER — ONDANSETRON HYDROCHLORIDE 2 MG/ML
4 INJECTION, SOLUTION INTRAVENOUS EVERY 8 HOURS PRN
Status: CANCELLED | OUTPATIENT
Start: 2024-12-03

## 2024-12-03 RX ORDER — TALC
6 POWDER (GRAM) TOPICAL NIGHTLY PRN
Status: CANCELLED | OUTPATIENT
Start: 2024-12-03

## 2024-12-03 RX ORDER — CLOPIDOGREL BISULFATE 75 MG/1
75 TABLET ORAL DAILY
Status: CANCELLED | OUTPATIENT
Start: 2024-12-04

## 2024-12-03 RX ORDER — IPRATROPIUM BROMIDE AND ALBUTEROL SULFATE 2.5; .5 MG/3ML; MG/3ML
3 SOLUTION RESPIRATORY (INHALATION) EVERY 4 HOURS PRN
Status: DISCONTINUED | OUTPATIENT
Start: 2024-12-03 | End: 2024-12-15 | Stop reason: HOSPADM

## 2024-12-03 RX ORDER — IPRATROPIUM BROMIDE AND ALBUTEROL SULFATE 2.5; .5 MG/3ML; MG/3ML
3 SOLUTION RESPIRATORY (INHALATION) EVERY 8 HOURS
Status: DISCONTINUED | OUTPATIENT
Start: 2024-12-03 | End: 2024-12-04

## 2024-12-03 RX ORDER — PANTOPRAZOLE SODIUM 40 MG/1
40 TABLET, DELAYED RELEASE ORAL EVERY MORNING
Status: CANCELLED | OUTPATIENT
Start: 2024-12-04

## 2024-12-03 RX ORDER — MORPHINE SULFATE 4 MG/ML
2 INJECTION, SOLUTION INTRAMUSCULAR; INTRAVENOUS EVERY 4 HOURS PRN
Status: CANCELLED | OUTPATIENT
Start: 2024-12-03

## 2024-12-03 RX ORDER — BENZONATATE 100 MG/1
100 CAPSULE ORAL 3 TIMES DAILY PRN
Status: DISCONTINUED | OUTPATIENT
Start: 2024-12-03 | End: 2024-12-15 | Stop reason: HOSPADM

## 2024-12-03 RX ORDER — AMIODARONE HYDROCHLORIDE 200 MG/1
200 TABLET ORAL DAILY
Status: DISCONTINUED | OUTPATIENT
Start: 2024-12-04 | End: 2024-12-15 | Stop reason: HOSPADM

## 2024-12-03 RX ORDER — FAMOTIDINE 20 MG/1
40 TABLET, FILM COATED ORAL NIGHTLY
Status: DISCONTINUED | OUTPATIENT
Start: 2024-12-03 | End: 2024-12-15 | Stop reason: HOSPADM

## 2024-12-03 RX ORDER — METOPROLOL SUCCINATE 50 MG/1
50 TABLET, EXTENDED RELEASE ORAL DAILY
Status: CANCELLED | OUTPATIENT
Start: 2024-12-04

## 2024-12-03 RX ORDER — IBUPROFEN 200 MG
24 TABLET ORAL
Status: DISCONTINUED | OUTPATIENT
Start: 2024-12-03 | End: 2024-12-15 | Stop reason: HOSPADM

## 2024-12-03 RX ORDER — ONDANSETRON HYDROCHLORIDE 2 MG/ML
4 INJECTION, SOLUTION INTRAVENOUS EVERY 8 HOURS PRN
Status: DISCONTINUED | OUTPATIENT
Start: 2024-12-03 | End: 2024-12-15 | Stop reason: HOSPADM

## 2024-12-03 RX ORDER — FUROSEMIDE 40 MG/1
40 TABLET ORAL DAILY
Status: DISCONTINUED | OUTPATIENT
Start: 2024-12-04 | End: 2024-12-15 | Stop reason: HOSPADM

## 2024-12-03 RX ORDER — CLONIDINE HYDROCHLORIDE 0.1 MG/1
0.2 TABLET ORAL EVERY 4 HOURS PRN
Status: DISCONTINUED | OUTPATIENT
Start: 2024-12-03 | End: 2024-12-07

## 2024-12-03 RX ORDER — ASPIRIN 81 MG/1
81 TABLET ORAL DAILY
Status: DISCONTINUED | OUTPATIENT
Start: 2024-12-04 | End: 2024-12-15 | Stop reason: HOSPADM

## 2024-12-03 RX ORDER — ATORVASTATIN CALCIUM 40 MG/1
40 TABLET, FILM COATED ORAL DAILY
Status: DISCONTINUED | OUTPATIENT
Start: 2024-12-03 | End: 2024-12-15 | Stop reason: HOSPADM

## 2024-12-03 RX ORDER — WARFARIN SODIUM 5 MG/1
5 TABLET ORAL DAILY
Status: DISCONTINUED | OUTPATIENT
Start: 2024-12-03 | End: 2024-12-05

## 2024-12-03 RX ORDER — METOPROLOL SUCCINATE 50 MG/1
50 TABLET, EXTENDED RELEASE ORAL DAILY
Qty: 30 TABLET | Refills: 0 | Status: ON HOLD | OUTPATIENT
Start: 2024-12-04 | End: 2025-01-03

## 2024-12-03 RX ORDER — SIMETHICONE 80 MG
1 TABLET,CHEWABLE ORAL 4 TIMES DAILY PRN
Status: DISCONTINUED | OUTPATIENT
Start: 2024-12-03 | End: 2024-12-15 | Stop reason: HOSPADM

## 2024-12-03 RX ORDER — IBUPROFEN 200 MG
16 TABLET ORAL
Status: DISCONTINUED | OUTPATIENT
Start: 2024-12-03 | End: 2024-12-15 | Stop reason: HOSPADM

## 2024-12-03 RX ORDER — BISACODYL 5 MG
5 TABLET, DELAYED RELEASE (ENTERIC COATED) ORAL DAILY PRN
Status: DISCONTINUED | OUTPATIENT
Start: 2024-12-03 | End: 2024-12-15 | Stop reason: HOSPADM

## 2024-12-03 RX ORDER — NIFEDIPINE 30 MG/1
30 TABLET, EXTENDED RELEASE ORAL DAILY
Status: DISCONTINUED | OUTPATIENT
Start: 2024-12-04 | End: 2024-12-06

## 2024-12-03 RX ORDER — IPRATROPIUM BROMIDE AND ALBUTEROL SULFATE 2.5; .5 MG/3ML; MG/3ML
3 SOLUTION RESPIRATORY (INHALATION) EVERY 4 HOURS PRN
Status: CANCELLED | OUTPATIENT
Start: 2024-12-03

## 2024-12-03 RX ADMIN — FAMOTIDINE 40 MG: 20 TABLET, FILM COATED ORAL at 08:12

## 2024-12-03 RX ADMIN — PANCRELIPASE 2 CAPSULE: 120000; 24000; 76000 CAPSULE, DELAYED RELEASE PELLETS ORAL at 04:12

## 2024-12-03 RX ADMIN — ACETAMINOPHEN 650 MG: 325 TABLET ORAL at 04:12

## 2024-12-03 RX ADMIN — PANTOPRAZOLE SODIUM 40 MG: 40 TABLET, DELAYED RELEASE ORAL at 05:12

## 2024-12-03 RX ADMIN — CETIRIZINE HYDROCHLORIDE 10 MG: 10 TABLET, FILM COATED ORAL at 08:12

## 2024-12-03 RX ADMIN — ACETAMINOPHEN 325MG 650 MG: 325 TABLET ORAL at 08:12

## 2024-12-03 RX ADMIN — PANCRELIPASE 2 CAPSULE: 120000; 24000; 76000 CAPSULE, DELAYED RELEASE PELLETS ORAL at 11:12

## 2024-12-03 RX ADMIN — WARFARIN SODIUM 5 MG: 5 TABLET ORAL at 04:12

## 2024-12-03 RX ADMIN — METOPROLOL SUCCINATE 50 MG: 50 TABLET, EXTENDED RELEASE ORAL at 08:12

## 2024-12-03 RX ADMIN — CLOPIDOGREL BISULFATE 75 MG: 75 TABLET ORAL at 08:12

## 2024-12-03 RX ADMIN — INSULIN ASPART 2 UNITS: 100 INJECTION, SOLUTION INTRAVENOUS; SUBCUTANEOUS at 04:12

## 2024-12-03 RX ADMIN — AMIODARONE HYDROCHLORIDE 200 MG: 200 TABLET ORAL at 08:12

## 2024-12-03 RX ADMIN — FUROSEMIDE 40 MG: 40 TABLET ORAL at 08:12

## 2024-12-03 RX ADMIN — ASPIRIN 81 MG: 81 TABLET, COATED ORAL at 08:12

## 2024-12-03 RX ADMIN — NIFEDIPINE 30 MG: 30 TABLET, FILM COATED, EXTENDED RELEASE ORAL at 08:12

## 2024-12-03 RX ADMIN — INSULIN ASPART 6 UNITS: 100 INJECTION, SOLUTION INTRAVENOUS; SUBCUTANEOUS at 11:12

## 2024-12-03 RX ADMIN — ATORVASTATIN CALCIUM 40 MG: 40 TABLET, FILM COATED ORAL at 08:12

## 2024-12-03 RX ADMIN — PANCRELIPASE 2 CAPSULE: 120000; 24000; 76000 CAPSULE, DELAYED RELEASE PELLETS ORAL at 08:12

## 2024-12-03 RX ADMIN — IPRATROPIUM BROMIDE AND ALBUTEROL SULFATE 3 ML: .5; 3 SOLUTION RESPIRATORY (INHALATION) at 05:12

## 2024-12-03 RX ADMIN — INSULIN ASPART 2 UNITS: 100 INJECTION, SOLUTION INTRAVENOUS; SUBCUTANEOUS at 05:12

## 2024-12-03 RX ADMIN — INSULIN ASPART 2 UNITS: 100 INJECTION, SOLUTION INTRAVENOUS; SUBCUTANEOUS at 08:12

## 2024-12-03 RX ADMIN — IPRATROPIUM BROMIDE AND ALBUTEROL SULFATE 3 ML: 2.5; .5 SOLUTION RESPIRATORY (INHALATION) at 08:12

## 2024-12-03 NOTE — PROGRESS NOTES
"[facility] *    Cardiology  Progress Note    Patient Name: Kortney Leach  MRN: [id number]  Admission Date: 11/22/2024  Hospital Length of Stay: 11 days  Code Status: Full Code   Attending Physician: Catracho Galan,*   Primary Care Physician: Arnaldo Hernandez MD  Expected Discharge Date:   Principal Problem:COPD exacerbation    Subjective:     Brief HPI/Hospital Course: Ms. Leach is a 78 y/o female with a history of VHD, GEMINI, DM II, PAF, ABEL, COPD, HTN, DVT SSS, HLD, who is known to [facility], Dr. Uribe/Red. She presented to the ER on 11.22.24 with complaints of shortness of breath. She reports worsening shortness of breath over the last month. She was sent from urgent care for being 88% on room air. She was started on oxygen therapy an sent to the ER. In the ER, she had some respiratory distress and was started on BiPAP. Significant labs WBC 13.53, H&H 11.9/36.2, K 5.3, Chloride 108, CO2 21, Glucose 232, .1. Flu, COVID and COVID negative. CTA Chest negative for PE, but shows Cardiomegaly and mild congestive changes and Bilateral prominent pleural effusions and lower lung lobes compressive consolidations. Cxr shows prominence of the right hilum may relate to pulmonary vascular congestion and possible small pleural effusions. [facility] has been consulted for CHF.        11.24.24: NAD. VSS. No complaints of CP/Palps. Reports some improvement in her SOB. 2350 urine output/1640 ml Fluid Negative. Crackle noted in bases.   11.25.24: NAD. VSS. No complaints of CP/Palp. Remains SOB. 1800 urine output/1700 ml Net Negative . Labs Stable.   11.26.24: NAD. VSS. No complaints of CP/SOB/Palps. Reports shortness of breath has significantly improved, but unable to lye flat. 1900 urine output over 24 hours/Fluid net negative 1120. WBC 12.47  11.27.24: NAD. VSS. No complaints of CP/Palps. Reports shortness of breath. "I feel okay" WBC 13.57. no output documented.  11.28.24: NAD. "I am doing well." " "Family at Bedside. Denies CP, SOB and Palps. Plans for LAD PCI in AM (11.29.24).   11.29.24: NAD. "I am good." Denies CP, SOB and Palps. NPO for LHC and PCI of LAD. BUN/Crea 27.5/0.86  11.30.24: NAD. "I am better." Denies CP, SOB and Palps. S/P PCI of LAD.   12.1.24: NAD. "I am good." Awaiting Valve Intervention/Mitral Valve. Denies CP, SOB and Palps.   12.2.24: NAD. VSS. No complaints of CP/Palps. Reports SOB with exertion. "I feel okay" Family present. Answered all questions  12.3.24: NAD. VSS. No complaints of CP/SOB/Palps. "I'm good today"  INR 1.2. Labs Stable.       PMH: VHD, GEMINI, DM II, PAF, ABEL, COPD, HTN, DVT SSS, HLD, Asthma, Left Thyroid Nodule, GERD, Chronic Sinusitis, Common Variable Immunodeficiency, Glaucoma  PSH: PPM, LHC, MVR, Bilateral Cataract Extraction, Total Knee Replacement, Bilateral mastectomy, Appendectomy, Colonoscopy, EGD, Tonsillectomy,    Family History: Father - Lung Cancer; Mother - Alzheimer, CVA; Sister - HTN, Multiple Sclerosis, DM II  Social History: Former Smoker (Quit in 1998). Denies illicit drug use and alcohol use.      Previous Cardiac Diagnostics:   [facility] 11.29.24:  Eccentric Prox LAD lesion was 80% stenosed with 0% stenosis post-intervention. iFR 0.84  Prox LAD lesion: A STENT SYNERGY XD [id number] stent was successfully placed at 8 GEO for 10 sec. Proximal part of the stent was post dilated by using 3.5/12 mm NC and a 4/8 mm NC balloons at 12 atmospheres.    [facility] 11.27.24:  Findings:  - There is single vessel coronary artery disease.  - Mid Left Anterior Descending has a calcified 70% stenosis.  - LVEDP is normal at 6 mmHg.  - RHC with elevated R sided filling pressures. RA 7 mmHg, RV 50/7 mmHg, PA 47/26 mmHg (mean 33 mmHg), PCWP 24 mmHg.  - PCWP to LV diastolic mean gradient was calculated to be 22 mmHg.  - Transpulmonary gradient is 8 mmHg.  - Normal Cardiac Output/Index at 4.7/2.3, as calculated by Rahel equation.  - PVR is 1.7 Woods units  - SVR is 1260 dyne-sec*cm^-5  - " Pulmonary Artery Pulsatility Index (Nury) is 3.0  - Cardiac Power Output () is 0.84  Assessment/Plan:  - Patient is a 79 y.o. female with a history of prior bioprosthetic mitral valve replacement now presents with heart failure.  Transesophageal echocardiogram was obtained.  Final report is pending at this time, but there were findings consistent with severe bioprosthetic MVR stenosis.  Cardiac catheterization findings shown above do suggest that there is a gradient of around 22 mm Hg across the bioprosthetic valve.  -recommend consultation with CT surgery   -obtain CT with mitral valve in valve protocol for possible TMVR  -LAD should be revascularized as well    MARYBETH (11.27.24):  Left Ventricle: The left ventricle is normal in size. Septal motion is consistent with post-operative status. There is normal systolic function with a visually estimated ejection fraction of 60 - 65%.  Right Ventricle: Normal right ventricular cavity size. Systolic function is normal.  Left Atrium: Left atrium is severely dilated.  Mitral Valve: There is a bioprosthetic valve in the mitral position. There is severe stenosis. The mean pressure gradient across the mitral valve is 16 mmHg at a heart rate of  bpm. There is moderate regurgitation.    ECHO (11.23.24):  Left Ventricle: The left ventricle is normal in size. Mildly increased wall thickness. There is normal systolic function with a visually estimated ejection fraction of 60 - 65%. Grade I diastolic dysfunction. Right Ventricle: Normal right ventricular cavity size. Systolic function is borderline low. Aortic Valve: There is aortic valve sclerosis. Mitral Valve: There is a bioprosthetic valve in the mitral position (25 mm mosaic porcine valve).  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 There is mild (1+) regurgitation. Tricuspid Valve: There moderate (2+) regurgitation. The  estimated pulmonary artery systolic pressure is 57 mmHg. Pacemaker lead noted.  Recommend transesophageal echocardiogram to properly evaluate the bioprosthetic mitral valve.    ECHO (11.14.24):  The study quality is average. The left ventricle is normal in size. Global left ventricular systolic function is normal. The left ventricular ejection fraction is 65%. Left ventricular diastolic function is normal. Mild concentric left ventricular hypertrophy is present. The left atrium is moderately enlarged. (4.6 cms). A normal functioning prosthetic mitral valve is noted. MG = 4.3 mmHg. The pulmonary artery systolic pressure is 37 mmHg.      PPM Insertion (9.25.23):  Successful implantation of PPM Dual. [facility]      PET (1.24.23):  This is a normal perfusion study, no perfusion defects noted. There is no evidence of ischemia.This scan is suggestive of low risk for future cardiovascular events. The left ventricular cavity is noted to be normal on the stress studies. The stress left ventricular ejection fraction was calculated to be 68% and left ventricular global function is normal. The rest left ventricular cavity is noted to be normal. The rest left ventricular ejection fraction was calculated to be 63% and rest left ventricular global function is normal. When compared to the resting ejection fraction (63%), the stress ejection fraction (68%) has increased. Transient ischemic dilatation is present and has been described as a marker for high risk coronary artery disease. It has also been described in microvascular disease, hypertensive heart disease as well as cardiac deconditioning. The study quality is good.   There was a rise in myocardial blood flow between rest and stress.  Global myocardial blood flow reserve was 2.72.  Normal global coronary flow reserve is suggestive of low coronary event risk.     Carotid US (1.24.23):  The study quality is good. There is no plaque noted in the proximal right internal carotid  artery. 1-39% stenosis in the proximal left internal carotid artery based on Bluth Criteria. Antegrade right vertebral artery flow. Antegrade left vertebral artery flow.     LH (9.6.23):  Normal Coronaries     Review of Systems   Constitutional: Positive for malaise/fatigue. Negative for fever.   Cardiovascular:  Negative for chest pain, leg swelling, orthopnea and palpitations.   Respiratory:  Negative for shortness of breath.    All other systems reviewed and are negative.    Objective:     Vital Signs (Most Recent):  Temp: 97.8 °F (36.6 °C) (12/03/24 0715)  Pulse: 80 (12/03/24 0715)  Resp: 18 (12/03/24 0715)  BP: 128/85 (12/03/24 0715)  SpO2: 95 % (12/03/24 0715) Vital Signs (24h Range):  Temp:  [97.8 °F (36.6 °C)-99.4 °F (37.4 °C)] 97.8 °F (36.6 °C)  Pulse:  [70-80] 80  Resp:  [18-19] 18  SpO2:  [92 %-96 %] 95 %  BP: ()/(51-85) 128/85   Weight: 96.4 kg (212 lb 8 oz)  Body mass index is 34.3 kg/m².  SpO2: 95 %       Intake/Output Summary (Last 24 hours) at 12/3/2024 0814  Last data filed at 12/3/2024 0500  Gross per 24 hour   Intake 582 ml   Output 1250 ml   Net -668 ml     Lines/Drains/Airways       Peripheral Intravenous Line  Duration                  Peripheral IV - Single Lumen 11/27/24 0915 22 G Anterior;Left Forearm 5 days         Peripheral IV - Single Lumen 12/01/24 0800 22 G Anterior;Right Forearm 2 days                  Significant Labs:   Chemistries:   Recent Labs   Lab 11/29/24  0455 11/30/24  0618 12/01/24  0624 12/02/24  0507 12/03/24  0459   * 134* 136 136 137   K 3.7 3.8 3.7 3.8 3.6   CL 98 96* 97* 97* 98   CO2 22* 26 27 25 26   BUN 27.5* 25.3* 26.4* 22.1* 22.4*   CREATININE 0.86 0.98 0.84 0.88 0.85   CALCIUM 8.6 9.1 8.6 8.7 9.2   BILITOT 0.7 0.7 0.7 0.7  --    ALKPHOS 88 81 77 79  --    ALT 22 25 22 22  --    AST 27 34 22 24  --    GLUCOSE 207* 208* 215* 205* 191*   MG 2.10 2.10 2.20 2.10 1.90   PHOS  --   --  4.5  --   --         CBC/Anemia Labs: Coags:    Recent Labs   Lab  12/01/24  0624 12/02/24  0507 12/03/24  0459   WBC 13.45* 14.62* 13.46*   HGB 11.6* 11.4* 11.0*   HCT 35.5* 34.7* 33.6*    300 315   MCV 90.3 89.7 91.3   RDW 16.4 16.4 16.7    Recent Labs   Lab 12/02/24  1239 12/03/24  0459   INR 1.1 1.2          Telemetry: SR    Physical Exam  Vitals and nursing note reviewed.   Constitutional:       General: She is not in acute distress.  HENT:      Head: Normocephalic and atraumatic.      Mouth/Throat:      Mouth: Mucous membranes are moist.   Eyes:      Extraocular Movements: Extraocular movements intact.   Cardiovascular:      Rate and Rhythm: Normal rate and regular rhythm.      Pulses: Normal pulses.      Heart sounds: Murmur heard.   Pulmonary:      Effort: Pulmonary effort is normal. No respiratory distress.      Breath sounds: Examination of the right-lower field reveals decreased breath sounds. Examination of the left-lower field reveals decreased breath sounds. Decreased breath sounds present.      Comments: On NC 2 L  Abdominal:      Palpations: Abdomen is soft.   Musculoskeletal:         General: No swelling.   Skin:     General: Skin is warm.      Comments: R Groin Soft/Flat, Non-Tender, No Sign of Bleed/Infection. +2 BLE Palpable Pedal Pulses    Neurological:      Mental Status: She is alert and oriented to person, place, and time.   Psychiatric:         Mood and Affect: Mood normal.       Current Schedule Inpatient Medications:   albuterol-ipratropium  3 mL Nebulization Q8H    amiodarone  200 mg Oral Daily    aspirin  81 mg Oral Daily    atorvastatin  40 mg Oral Daily    cetirizine  10 mg Oral Daily    clopidogreL  75 mg Oral Daily    famotidine  40 mg Oral QHS    furosemide  40 mg Oral Daily    lipase-protease-amylase 24,000-76,000-120,000 units  2 capsule Oral TID WM    metoprolol succinate  50 mg Oral Daily    NIFEdipine  30 mg Oral Daily    pantoprazole  40 mg Oral QAM    warfarin  5 mg Oral Daily     Assessment:   Acute Diastolic Heart Failure - Related  to VHD/Severe MS - Compensated     - s/p LHC/RHC (11.27.24) severe bioprosthetic MVR stenosis.  Cardiac Catheterization findings shown above do suggest that there is a gradient of around 22 mm Hg across the bioprosthetic valve, Recommend consultation with CT surgery, Obtain CT with mitral valve in valve protocol for possible TMVR, LAD should be revascularized as well.    - EF 60-65% with G 1 Diastolic Dysfunction (11.14.24)   VHD    - MARYBETH (11.27.24) - Mitral Valve: There is a bioprosthetic valve in the mitral position. There is severe stenosis. The mean pressure gradient across the mitral valve is 16 mmHg at a heart rate of bpm. There is moderate regurgitation.     - ECHO (11.23.24): There is evidence of bioprosthetic mitral valve stenosis The mean pressure gradient across the mitral valve is 19 mmHg at a heart rate of 78 bpm.     - s/p MVR (9.20.23): Mitral valve replacement with a 25 mm mosaic porcine valve    - Moderate TR    - Mild MR  CAD/Stents    - s/p LHC (11.29.24) - Eccentric Prox LAD lesion was 80% stenosed with 0% stenosis post-intervention. iFR 0.84. Prox LAD lesion: A STENT SYNERGY XD 3.0X16MM stent was successfully placed at 8 GEO for 10 sec. Proximal part of the stent was post dilated by using 3.5/12 mm NC and a 4/8 mm NC balloons at 12 atmospheres.  Acute Hypoxemic Respiratory Failure requiring O2 Therapy - Resolved   PNA ?    - CTA Chest (11.22.24): Llower lung lobes compressive consolidations.   Bilateral Pleural Effusions  Anemia (Mild)  Hyperkalemia - Resolved  Leukocytosis   COPD Exacerbation - Stable   UTI    - UCX - Klebsiella Pneumoniae, Proteus Mirabilis  Pulmonary HTN  GEMINI  DM II  PAF - Currently SR    - [id number] Score 6 (9.7% Stroke Risk Per Year)    - s/p Ligation of SUKHI (9.20.23) - Endostapler   ABEL  HTN  DVT s/p IVC Filter     - on Xarelto Outpatient   SSS    - s/p PPM (9.25.23): [facility]   HLD  Asthma  Left Thyroid Nodule  GERD  Chronic Sinusitis  Common Variable  Immunodeficiency  Glaucoma  No Known History of GI Bleed     Plan:   Continue Coumadin 5 mg oral Daily; INR Goal 2.0-3.0   Continue  Lasix 40 mg oral Daily   Continue Amio, ASA, Statin, BB, Nifedipine, Plavix   Triple Therapy for 1 month then Discontinue ASA; Last Dose of ASA on 12.28.24  Case Discussed between Dr. Lynn and Dr. Cervantes; will start Coumadin to see if the gradient can be decreased  Set patient up in [facility]   Keep K > 4.0 and Mg > 2.0   INR in 3 days  ECHO in 4-6 weeks   Follow-up with Dr. Watters in 4-6 weeks to discuss valve  Follow-up with Dr. Uribe in 1-2 weeks  Ok to Discharge from cardiology standpoint    We will sign off at this time.     YANIRA Watkins  Cardiology  [facility]    Physician addendum:  I have seen and examined this patient as a split-shared visit with the JACQUELYN d/t complicated medical management of above problems written in assessment and high acuity requiring physician expertise in medical decision-making. I performed the substantive portion of the history and exam. Above medical decision-making is also formulated by me.    Plan:  Ok for d/c. Educated on low sodium diet. Will c/w coumadin for now. Has respective follow up as outpatient.     Calvin Sheth MD [facility]  Int. Cardiologist

## 2024-12-03 NOTE — PLAN OF CARE
12/03/24 1330   Final Note   Assessment Type Final Discharge Note   Anticipated Discharge Disposition Swing Bed   Post-Acute Status   Post-Acute Authorization Placement     Discharge to [facility]. Nurse to call report to Batsheva at [phone]. [facility] will transport at 1500.

## 2024-12-03 NOTE — DISCHARGE SUMMARY
[facility] Medicine Discharge Summary    Admit Date: 11/22/2024  Discharge Date and Time: 12/3/202532:28 PM  Admitting Physician:  Team  Discharging Physician: Catracho Galan MD.  Primary Care Physician: Arnaldo Hernandez MD  Consults: Cardiology and Cardiothoracic/Vascular Surgery    Discharge Diagnoses:  # Acute on chronic diastolic decompensated HF  # Acute respiratory failure with hypoxia requiring supplemental oxygen 2/2 B/L pulmonary edema   # Pulmonary HTN  # Severe mitral stenosis  # K.pneumonia and proteus mirabilis UTI - treated  # Obesity   # pAF s/p LAAL in 2023  # HTN  # HLD  # VHD s/p MVR  # CAD   # DVT  s/p IVC filter  # DM   # GERD   # Chronic sinusitis s/p sinus surgery   # ABEL   # Obesity   # Asthma/COPD ?    Hospital Course:   79-year-old woman with A-fib, HTN, valvular heart disease s/p MVR, CAD, DVT s/p IVC filter on xarelto, T2DM, GERD, SSS s/p PPM, HTN, HLD, chronic sinusitis status post sinus surgery, ABEL, obesity and asthma/COPD not on home oxygen who presented to the ER with progressive shortness of breath over the last month.  She initially was seen in urgent care but due to sats being 88% on room air she was deferred to the ER.     She arrived afebrile hemodynamically stable maintaining adequate sats on 3 L nasal cannula but abruptly desatted in the ER requiring initiation of BiPAP.  Laboratory work showed a BNP of 160 (last EF 55% 09/2023), CTA showed no pulmonary embolus, noted cardiomegaly and mild congestive changes and pleural effusions and lower lung compressive consolidations.  EKG noted normal sinus rhythm.     She was given 80 mg of Lasix, albuterol nebulizer treatment, IV Solu-Medrol and admitted to the hospitalist service for further management.  Continued on IV Lasix b.i.d..  Able to be weaned off of BiPAP and currently on 3 L O2 via NC with adequate saturations.  Added DuoNebs. Desaturating to 88% during ambulation on RA. She  was also treated for a UTI.      Underwent MARYBETH on 11/27 which showed severe mitral stenosis and moderate MR and mid LAD 70% stenosis with elevated PCWP and PA 47/26. Patient underwent repeat LHC on 11/29 with stent to prox LAD. Patient was seen by CT surgery and structural heart team and a possible valve in valve were discussed but the plan was to start Coumadin to see if the gradient could be decreased and have a repeat TTE in a few weeks time. Patient will follow up with cardiology, structural heart and CT surgery team. She will also follow follow with her PCP and pulm team. She will continue triple therapy (aspirin, plavix, coumadin) and then stop aspirin on 12/28/2024 and continue plavix and coumadin after that. She will be set up in the [facility] coumadin clinic. She will follow up with Dr. Uribe and Dr. Watters. She was stable to be discharged from the cardiology perspective to SNF. She will be discharged on oxygen. The IV lasix was switched to PO lasix. Family in agreement - all questions answered. Patient was seen and examined on day of discharge.     Vitals:  VITAL SIGNS: 24 HRS MIN & MAX LAST   Temp  Min: 97.8 °F (36.6 °C)  Max: 99.4 °F (37.4 °C) 98.7 °F (37.1 °C)   BP  Min: 97/51  Max: 128/85 120/72   Pulse  Min: 70  Max: 80  74   Resp  Min: 16  Max: 19 16   SpO2  Min: 92 %  Max: 98 % 98 %       Physical Exam:  General: In no acute distress, Obese   Chest: on 3L NC - clear to auscultation bilaterally  Heart: RRR, +S1, S2, no appreciable murmur  Abdomen: Soft, nontender, BS +  MSK: Warm, no lower extremity edema, no clubbing or cyanosis  Neurologic: Alert and oriented x4, Cranial nerve II-XII intact, Strength 5/5 in all 4 extremities    Procedures Performed: No admission procedures for hospital encounter.     Significant Diagnostic Studies: See Full reports for all details    Recent Labs   Lab 12/01/24  0624 12/02/24  0507 12/03/24  0459   WBC 13.45* 14.62* 13.46*   RBC 3.93* 3.87* 3.68*   HGB 11.6* 11.4* 11.0*    HCT 35.5* 34.7* 33.6*   MCV 90.3 89.7 91.3   MCH 29.5 29.5 29.9   MCHC 32.7* 32.9* 32.7*   RDW 16.4 16.4 16.7    300 315   MPV 10.0 9.9 10.2       Recent Labs   Lab 11/30/24  0618 12/01/24  0624 12/02/24  0507 12/03/24  0459   * 136 136 137   K 3.8 3.7 3.8 3.6   CL 96* 97* 97* 98   CO2 26 27 25 26   BUN 25.3* 26.4* 22.1* 22.4*   CREATININE 0.98 0.84 0.88 0.85   CALCIUM 9.1 8.6 8.7 9.2   MG 2.10 2.20 2.10 1.90   ALBUMIN 2.7* 2.7* 2.8*  --    ALKPHOS 81 77 79  --    ALT 25 22 22  --    AST 34 22 24  --    BILITOT 0.7 0.7 0.7  --         Microbiology Results (last 7 days)       Procedure Component Value Units Date/Time    Urine culture [[id number]]  (Abnormal)  (Susceptibility) Collected: 11/26/24 0537    Order Status: Completed Specimen: Urine Updated: 11/29/24 0656     Urine Culture >/= 100,000 colonies/ml Klebsiella pneumoniae ssp pneumoniae      >/= 100,000 colonies/ml Proteus mirabilis             Cardiac catheterization    Eccentric Prox LAD lesion was 80% stenosed with 0% stenosis   post-intervention. iFR 0.84    Prox LAD lesion: A STENT SYNERGY XD [id number] stent was successfully   placed at 8 GEO for 10 sec. Proximal part of the stent was post dilated by   using 3.5/12 mm NC and a 4/8 mm NC balloons at 12 atmospheres.    The procedure log was documented by No documenter listed and verified by   Tatiana Jarrett MD.    Date: 11/29/2024  Time: 11:51 AM    After obtaining informed consent patient was brought to the cath lab in a   fasting state.  Right groin area was prepped and draped usual sterile   manner using ChloraPrep solution.  2% lidocaine was given for local   anesthesia.  Six Faroese sheath was placed in the right femoral artery   using Seldinger technique.  Six Faroese EBU 3.0 guide catheter used for the   intervention.  IFR of the proximal LAD was performed which was 0.84.    Heparin was given for anticoagulation.  Intervention was done on the same   IFR wire which was placed in the distal  LAD.  Proximal LAD lesion was   pre-dilated by using 2.5/15 mm balloon and stented with a 3/16 mm synergy   drug-eluting stent and proximal part of the stent was post dilated by   using NC balloon 3.5/12 mm and a 4/8 mm NC balloon at 12 atmospheres for   10 seconds.  Post stenting angiogram shows lesion reduced from 0%.    Patient tolerated the procedure very well no complications noted.  Right   femoral artery angiogram was performed and Angio-Seal closure device was   applied achieving hemostasis.  Cardiac catheterization  The procedure log was documented by No documenter listed and verified by   Sebastian Watters Jr, MD.    Date: 11/27/2024  Time: 9:21 PM    Procedure:  Left heart catheterization with coronary angiography  Right heart catheterization  Measurement of LVEDP     Preoperative diagnosis:  Heart failure    Postoperative diagnosis:  Coronary artery disease     Access:  Right common femoral artery  Right common femoral vein    Estimated blood loss: 10 cc  Complications: None     Summary:  Consent obtained. Risks and benefits discussed with the patient. The   patient was brought to the cath lab in a fasting state. The patient was   prepped and draped in usual sterile fashion. A preprocedure time-out was   performed.     Ultrasound-guided right common femoral venous access via modified   Seldinger technique using a micropuncture kit. A 7 Moroccan sheath was   inserted into the right common femoral vein.  Ultrasound-guided right common femoral arterial access via modified   Seldinger technique using a micropuncture kit. A 5 Moroccan sheath was   inserted into the right common femoral artery.     A right heart catheterization was being performed using a [place]   catheter.  A 5 Moroccan Pigtail catheter was used to cross aortic valve, and   simultaneous LV and PCW pressures were measured.   The transvalvular aortic gradient was measured by pullback method.    A 5 Moroccan JL4 catheter was used to cannulate the left  main coronary   artery; angiography was performed, and multiple fluoroscopic views were   obtained.  A 5 Czech JR4 catheter was used to cannulate the right coronary artery;   angiography was performed, and multiple fluoroscopic views were obtained.    At the end the procedure, all wires and catheters were removed.   Hemostasis achieved with manual compression.     Findings:  - There is single vessel coronary artery disease.  - Mid Left Anterior Descending has a calcified 70% stenosis.  - LVEDP is normal at 6 mmHg.  - RHC with elevated R sided filling pressures. RA 7 mmHg, RV 50/7 mmHg, PA   47/26 mmHg (mean 33 mmHg), PCWP 24 mmHg.  - PCWP to LV diastolic mean gradient was calculated to be 22 mmHg.  - Transpulmonary gradient is 8 mmHg.  - Normal Cardiac Output/Index at 4.7/2.3, as calculated by Rahel equation.  - PVR is 1.7 Woods units  - SVR is 1260 dyne-sec*cm^-5  - Pulmonary Artery Pulsatility Index ([place]) is 3.0  - Cardiac Power Output () is 0.84     Assessment/Plan:  - Patient is a 79 y.o. female with a history of prior bioprosthetic mitral   valve replacement now presents with heart failure.  Transesophageal   echocardiogram was obtained.  Final report is pending at this time, but   there were findings consistent with severe bioprosthetic MVR stenosis.    Cardiac catheterization findings shown above do suggest that there is a   gradient of around 22 mm Hg across the bioprosthetic valve.  -recommend consultation with CT surgery   -obtain CT with mitral valve in valve protocol for possible TMVR  -LAD should be revascularized as well    Sebastian Watters MD  Interventional Cardiology/Structural Heart Disease  [facility]         Medication List        START taking these medications      clopidogreL 75 mg tablet  Commonly known as: PLAVIX  Take 1 tablet (75 mg total) by mouth once daily.  Start taking on: December 4, 2024     warfarin 5 MG tablet  Commonly known as: COUMADIN  Take 1  tablet (5 mg total) by mouth Daily.            CHANGE how you take these medications      amiodarone 200 MG Tab  Commonly known as: PACERONE  What changed: Another medication with the same name was removed. Continue taking this medication, and follow the directions you see here.     atorvastatin 40 MG tablet  Commonly known as: LIPITOR  What changed: Another medication with the same name was removed. Continue taking this medication, and follow the directions you see here.     DUPIXENT SYRINGE 300 mg/2 mL Syrg  Generic drug: dupilumab  What changed: Another medication with the same name was removed. Continue taking this medication, and follow the directions you see here.     metoprolol succinate 50 MG 24 hr tablet  Commonly known as: TOPROL-XL  Take 1 tablet (50 mg total) by mouth once daily.  Start taking on: December 4, 2024  What changed:   medication strength  how much to take     TRELEGY ELLIPTA 200-62.5-25 mcg inhaler  Generic drug: fluticasone-umeclidin-vilanter  What changed: Another medication with the same name was removed. Continue taking this medication, and follow the directions you see here.            CONTINUE taking these medications      aspirin 81 MG EC tablet  Commonly known as: ECOTRIN  Take 1 tablet (81 mg total) by mouth once daily. for 25 days     CREON 24,000-76,000 -120,000 unit capsule  Generic drug: lipase-protease-amylase 24,000-76,000-120,000 units     estradioL 0.01 % (0.1 mg/gram) vaginal cream  Commonly known as: ESTRACE     famotidine 40 MG tablet  Commonly known as: PEPCID     furosemide 40 MG tablet  Commonly known as: LASIX     glimepiride 2 MG tablet  Commonly known as: AMARYL     HIZENTRA SUBQ     HYDROcodone-acetaminophen  mg per tablet  Commonly known as: NORCO  Take 1 tablet by mouth every 6 (six) hours as needed for Pain.     metFORMIN 500 MG tablet  Commonly known as: GLUCOPHAGE  Take 1 tablet (500 mg total) by mouth 2 (two) times daily with meals.     NIFEdipine 30 MG  (OSM) 24 hr tablet  Commonly known as: PROCARDIA-XL     pantoprazole 40 MG tablet  Commonly known as: PROTONIX     timoloL 0.5 % ophthalmic solution  Commonly known as: BETIMOL            STOP taking these medications      amLODIPine 10 MG tablet  Commonly known as: NORVASC     amlodipine-benazepril 10-20mg 10-20 mg per capsule  Commonly known as: LOTREL     azelastine 137 mcg (0.1 %) nasal spray  Commonly known as: ASTELIN     benazepriL 20 MG tablet  Commonly known as: LOTENSIN     benazepriL 40 MG tablet  Commonly known as: LOTENSIN     dexlansoprazole 60 mg capsule  Commonly known as: DEXILANT     FARXIGA 10 mg tablet  Generic drug: dapagliflozin propanediol     ondansetron 4 MG tablet  Commonly known as: ZOFRAN     rivaroxaban 20 mg Tab  Commonly known as: XARELTO               Where to Get Your Medications        These medications were sent to [facility]  Pharmacy - [facility] [address] [place] [address]  [address] [facility] [address]      Phone: [phone]   aspirin 81 MG EC tablet  clopidogreL 75 mg tablet  metoprolol succinate 50 MG 24 hr tablet  warfarin 5 MG tablet          Explained in detail to the patient about the discharge plan, medications, and follow-up visits. Pt understands and agrees with the treatment plan  Discharge Disposition: [facility] bed  Discharged Condition: stable  Diet-   Dietary Orders (From admission, onward)       Start     Ordered    12/02/24 1226  Dietary nutrition supplements BID; Boost Glucose Control - Any flavor  Continuous        Question Answer Comment   Frequency: BID    Select PO Supplement: Boost Glucose Control - Any flavor        12/02/24 1226    12/02/24 1212  Diet Heart Healthy  Diet effective now         12/02/24 1211                   Medications Per DC med rec  Activities as tolerated   Follow-up Information       Vijay Uribe MD Follow up on 12/11/2024.    Specialty: Cardiology  Why: @ 11:00AM  Contact information:  [place]  [address]  [phone]               Sebastian Watters Jr., MD Follow up on 12/30/2024.    Specialty: Cardiology  Why: @ 2:15PM for ECHO  Contact information:  [address] [place] [address]  [phone]               Arnaldo Hernandez MD. Call in 1 week(s).    Specialty: Family Medicine  Contact information:  [address] [place] [address]  [phone]               Sebastian Watters Jr., MD Follow up on 1/8/2025.    Specialty: Cardiology  Why: @ 2:40PM  Contact information:  [address]  [place] [address]  [phone]               Cook, Carlee, NP. Call in 2 week(s).    Specialty: Family Medicine  Contact information:  [address]    [place] [address]  [phone]                           For further questions contact hospitalist office    Discharge time 65 minutes    For worsening symptoms, chest pain, shortness of breath, increased abdominal pain, high grade fever, stroke or stroke like symptoms, immediately go to the nearest Emergency Room or call 911 as soon as possible.      Catracho Shi M.D, on 12/3/2024. at 12:28 PM.

## 2024-12-04 PROBLEM — I38 VALVULAR HEART DISEASE: Status: ACTIVE | Noted: 2024-12-04

## 2024-12-04 PROBLEM — E44.0 MODERATE MALNUTRITION: Status: ACTIVE | Noted: 2024-12-04

## 2024-12-04 LAB
ALBUMIN SERPL-MCNC: 2.5 G/DL (ref 3.4–4.8)
ALBUMIN/GLOB SERPL: 0.8 RATIO (ref 1.1–2)
ALP SERPL-CCNC: 75 UNIT/L (ref 40–150)
ALT SERPL-CCNC: 21 UNIT/L (ref 0–55)
ANION GAP SERPL CALC-SCNC: 9 MEQ/L
AST SERPL-CCNC: 26 UNIT/L (ref 5–34)
BASOPHILS # BLD AUTO: 0.03 X10(3)/MCL
BASOPHILS NFR BLD AUTO: 0.2 %
BILIRUB SERPL-MCNC: 0.7 MG/DL
BUN SERPL-MCNC: 22.7 MG/DL (ref 9.8–20.1)
CALCIUM SERPL-MCNC: 8.9 MG/DL (ref 8.4–10.2)
CHLORIDE SERPL-SCNC: 96 MMOL/L (ref 98–107)
CO2 SERPL-SCNC: 28 MMOL/L (ref 23–31)
CREAT SERPL-MCNC: 0.83 MG/DL (ref 0.55–1.02)
CREAT/UREA NIT SERPL: 27
EOSINOPHIL # BLD AUTO: 0.18 X10(3)/MCL (ref 0–0.9)
EOSINOPHIL NFR BLD AUTO: 1.2 %
ERYTHROCYTE [DISTWIDTH] IN BLOOD BY AUTOMATED COUNT: 16.6 % (ref 11.5–17)
GFR SERPLBLD CREATININE-BSD FMLA CKD-EPI: >60 ML/MIN/1.73/M2
GLOBULIN SER-MCNC: 3.2 GM/DL (ref 2.4–3.5)
GLUCOSE SERPL-MCNC: 179 MG/DL (ref 82–115)
HCT VFR BLD AUTO: 34.4 % (ref 37–47)
HGB BLD-MCNC: 11 G/DL (ref 12–16)
IMM GRANULOCYTES # BLD AUTO: 0.1 X10(3)/MCL (ref 0–0.04)
IMM GRANULOCYTES NFR BLD AUTO: 0.7 %
INR PPP: 1.7
LYMPHOCYTES # BLD AUTO: 3.42 X10(3)/MCL (ref 0.6–4.6)
LYMPHOCYTES NFR BLD AUTO: 23.4 %
MAGNESIUM SERPL-MCNC: 1.8 MG/DL (ref 1.6–2.6)
MCH RBC QN AUTO: 29.9 PG (ref 27–31)
MCHC RBC AUTO-ENTMCNC: 32 G/DL (ref 33–36)
MCV RBC AUTO: 93.5 FL (ref 80–94)
MONOCYTES # BLD AUTO: 1.45 X10(3)/MCL (ref 0.1–1.3)
MONOCYTES NFR BLD AUTO: 9.9 %
NEUTROPHILS # BLD AUTO: 9.45 X10(3)/MCL (ref 2.1–9.2)
NEUTROPHILS NFR BLD AUTO: 64.6 %
PHOSPHATE SERPL-MCNC: 5 MG/DL (ref 2.3–4.7)
PLATELET # BLD AUTO: 306 X10(3)/MCL (ref 130–400)
PMV BLD AUTO: 9.8 FL (ref 7.4–10.4)
POCT GLUCOSE: 177 MG/DL (ref 70–110)
POCT GLUCOSE: 219 MG/DL (ref 70–110)
POCT GLUCOSE: 237 MG/DL (ref 70–110)
POTASSIUM SERPL-SCNC: 3.8 MMOL/L (ref 3.5–5.1)
PROT SERPL-MCNC: 5.7 GM/DL (ref 5.8–7.6)
PROTHROMBIN TIME: 16.3 SECONDS (ref 12.5–14.5)
RBC # BLD AUTO: 3.68 X10(6)/MCL (ref 4.2–5.4)
SODIUM SERPL-SCNC: 133 MMOL/L (ref 136–145)
WBC # BLD AUTO: 14.63 X10(3)/MCL (ref 4.5–11.5)

## 2024-12-04 PROCEDURE — 94640 AIRWAY INHALATION TREATMENT: CPT

## 2024-12-04 PROCEDURE — 63600175 PHARM REV CODE 636 W HCPCS: Performed by: INTERNAL MEDICINE

## 2024-12-04 PROCEDURE — 84100 ASSAY OF PHOSPHORUS: CPT | Performed by: INTERNAL MEDICINE

## 2024-12-04 PROCEDURE — 99900035 HC TECH TIME PER 15 MIN (STAT)

## 2024-12-04 PROCEDURE — 94799 UNLISTED PULMONARY SVC/PX: CPT

## 2024-12-04 PROCEDURE — 85610 PROTHROMBIN TIME: CPT | Performed by: INTERNAL MEDICINE

## 2024-12-04 PROCEDURE — 25000003 PHARM REV CODE 250: Performed by: INTERNAL MEDICINE

## 2024-12-04 PROCEDURE — 97166 OT EVAL MOD COMPLEX 45 MIN: CPT

## 2024-12-04 PROCEDURE — 83735 ASSAY OF MAGNESIUM: CPT | Performed by: INTERNAL MEDICINE

## 2024-12-04 PROCEDURE — 85025 COMPLETE CBC W/AUTO DIFF WBC: CPT | Performed by: INTERNAL MEDICINE

## 2024-12-04 PROCEDURE — 97530 THERAPEUTIC ACTIVITIES: CPT

## 2024-12-04 PROCEDURE — 80053 COMPREHEN METABOLIC PANEL: CPT | Performed by: INTERNAL MEDICINE

## 2024-12-04 PROCEDURE — 94760 N-INVAS EAR/PLS OXIMETRY 1: CPT

## 2024-12-04 PROCEDURE — 11000004 HC SNF PRIVATE

## 2024-12-04 PROCEDURE — 25000003 PHARM REV CODE 250: Performed by: PHYSICIAN ASSISTANT

## 2024-12-04 PROCEDURE — 36415 COLL VENOUS BLD VENIPUNCTURE: CPT | Performed by: INTERNAL MEDICINE

## 2024-12-04 PROCEDURE — 63600175 PHARM REV CODE 636 W HCPCS: Performed by: PHYSICIAN ASSISTANT

## 2024-12-04 PROCEDURE — 25000242 PHARM REV CODE 250 ALT 637 W/ HCPCS: Performed by: PHYSICIAN ASSISTANT

## 2024-12-04 PROCEDURE — 97162 PT EVAL MOD COMPLEX 30 MIN: CPT

## 2024-12-04 PROCEDURE — 27000221 HC OXYGEN, UP TO 24 HOURS

## 2024-12-04 PROCEDURE — 25000242 PHARM REV CODE 250 ALT 637 W/ HCPCS: Performed by: INTERNAL MEDICINE

## 2024-12-04 RX ORDER — IPRATROPIUM BROMIDE AND ALBUTEROL SULFATE 2.5; .5 MG/3ML; MG/3ML
3 SOLUTION RESPIRATORY (INHALATION)
Status: DISCONTINUED | OUTPATIENT
Start: 2024-12-04 | End: 2024-12-06

## 2024-12-04 RX ORDER — CALCIUM ACETATE 667 MG/1
1334 CAPSULE ORAL
Status: DISCONTINUED | OUTPATIENT
Start: 2024-12-04 | End: 2024-12-05

## 2024-12-04 RX ORDER — INSULIN GLARGINE 100 [IU]/ML
5 INJECTION, SOLUTION SUBCUTANEOUS DAILY
Status: DISCONTINUED | OUTPATIENT
Start: 2024-12-04 | End: 2024-12-15 | Stop reason: HOSPADM

## 2024-12-04 RX ORDER — ALPRAZOLAM 0.25 MG/1
0.25 TABLET ORAL 3 TIMES DAILY PRN
Status: DISCONTINUED | OUTPATIENT
Start: 2024-12-04 | End: 2024-12-15 | Stop reason: HOSPADM

## 2024-12-04 RX ORDER — LANOLIN ALCOHOL/MO/W.PET/CERES
400 CREAM (GRAM) TOPICAL DAILY
Status: DISCONTINUED | OUTPATIENT
Start: 2024-12-04 | End: 2024-12-15 | Stop reason: HOSPADM

## 2024-12-04 RX ADMIN — FUROSEMIDE 40 MG: 40 TABLET ORAL at 08:12

## 2024-12-04 RX ADMIN — INSULIN ASPART 4 UNITS: 100 INJECTION, SOLUTION INTRAVENOUS; SUBCUTANEOUS at 11:12

## 2024-12-04 RX ADMIN — IPRATROPIUM BROMIDE AND ALBUTEROL SULFATE 3 ML: .5; 3 SOLUTION RESPIRATORY (INHALATION) at 07:12

## 2024-12-04 RX ADMIN — INSULIN ASPART 1 UNITS: 100 INJECTION, SOLUTION INTRAVENOUS; SUBCUTANEOUS at 08:12

## 2024-12-04 RX ADMIN — ATORVASTATIN CALCIUM 40 MG: 40 TABLET, FILM COATED ORAL at 08:12

## 2024-12-04 RX ADMIN — INSULIN ASPART 2 UNITS: 100 INJECTION, SOLUTION INTRAVENOUS; SUBCUTANEOUS at 06:12

## 2024-12-04 RX ADMIN — PANCRELIPASE 2 CAPSULE: 120000; 24000; 76000 CAPSULE, DELAYED RELEASE PELLETS ORAL at 12:12

## 2024-12-04 RX ADMIN — ACETAMINOPHEN 650 MG: 325 TABLET ORAL at 12:12

## 2024-12-04 RX ADMIN — WARFARIN SODIUM 5 MG: 5 TABLET ORAL at 05:12

## 2024-12-04 RX ADMIN — Medication 400 MG: at 12:12

## 2024-12-04 RX ADMIN — ASPIRIN 81 MG: 81 TABLET, COATED ORAL at 08:12

## 2024-12-04 RX ADMIN — AMIODARONE HYDROCHLORIDE 200 MG: 200 TABLET ORAL at 08:12

## 2024-12-04 RX ADMIN — FAMOTIDINE 40 MG: 20 TABLET, FILM COATED ORAL at 08:12

## 2024-12-04 RX ADMIN — CLOPIDOGREL BISULFATE 75 MG: 75 TABLET ORAL at 08:12

## 2024-12-04 RX ADMIN — METOPROLOL SUCCINATE 50 MG: 50 TABLET, EXTENDED RELEASE ORAL at 08:12

## 2024-12-04 RX ADMIN — PANCRELIPASE 2 CAPSULE: 120000; 24000; 76000 CAPSULE, DELAYED RELEASE PELLETS ORAL at 05:12

## 2024-12-04 RX ADMIN — ACETAMINOPHEN 650 MG: 325 TABLET ORAL at 08:12

## 2024-12-04 RX ADMIN — CALCIUM ACETATE 1334 MG: 667 CAPSULE ORAL at 05:12

## 2024-12-04 RX ADMIN — CALCIUM ACETATE 1334 MG: 667 CAPSULE ORAL at 12:12

## 2024-12-04 RX ADMIN — PANCRELIPASE 2 CAPSULE: 120000; 24000; 76000 CAPSULE, DELAYED RELEASE PELLETS ORAL at 08:12

## 2024-12-04 RX ADMIN — CETIRIZINE HYDROCHLORIDE 10 MG: 10 TABLET, FILM COATED ORAL at 08:12

## 2024-12-04 RX ADMIN — IPRATROPIUM BROMIDE AND ALBUTEROL SULFATE 3 ML: .5; 3 SOLUTION RESPIRATORY (INHALATION) at 02:12

## 2024-12-04 RX ADMIN — ALPRAZOLAM 0.25 MG: 0.25 TABLET ORAL at 03:12

## 2024-12-04 RX ADMIN — PANTOPRAZOLE SODIUM 40 MG: 40 TABLET, DELAYED RELEASE ORAL at 06:12

## 2024-12-04 RX ADMIN — NIFEDIPINE 30 MG: 30 TABLET, FILM COATED, EXTENDED RELEASE ORAL at 08:12

## 2024-12-04 RX ADMIN — INSULIN GLARGINE 5 UNITS: 100 INJECTION, SOLUTION SUBCUTANEOUS at 08:12

## 2024-12-04 RX ADMIN — ACETAMINOPHEN 650 MG: 325 TABLET ORAL at 02:12

## 2024-12-04 RX ADMIN — INSULIN ASPART 4 UNITS: 100 INJECTION, SOLUTION INTRAVENOUS; SUBCUTANEOUS at 04:12

## 2024-12-04 NOTE — PLAN OF CARE
Problem: Adult Inpatient Plan of Care  Goal: Plan of Care Review  Outcome: Progressing  Flowsheets (Taken 12/3/2024 1826)  Plan of Care Reviewed With: patient  Goal: Patient-Specific Goal (Individualized)  Outcome: Progressing  Goal: Absence of Hospital-Acquired Illness or Injury  Outcome: Progressing  Intervention: Identify and Manage Fall Risk  Flowsheets (Taken 12/3/2024 1826)  Safety Promotion/Fall Prevention:   assistive device/personal item within reach   bed alarm set   commode/urinal/bedpan at bedside   Fall Risk reviewed with patient/family   instructed to call staff for mobility   lighting adjusted   medications reviewed   nonskid shoes/socks when out of bed   muscle strengthening facilitated   room near unit station   side rails raised x 3  Intervention: Prevent Skin Injury  Flowsheets (Taken 12/3/2024 1826)  Body Position:   position changed independently   log-rolled   sitting up in bed   supine   weight shifting  Skin Protection:   incontinence pads utilized   transparent dressing maintained  Device Skin Pressure Protection:   absorbent pad utilized/changed   adhesive use limited   positioning supports utilized   tubing/devices free from skin contact  Intervention: Prevent and Manage VTE (Venous Thromboembolism) Risk  Flowsheets (Taken 12/3/2024 1826)  VTE Prevention/Management:   ambulation promoted   bleeding precautions maintained   bleeding risk assessed   fluids promoted  Intervention: Prevent Infection  Flowsheets (Taken 12/3/2024 1826)  Infection Prevention:   cohorting utilized   environmental surveillance performed   hand hygiene promoted   equipment surfaces disinfected   single patient room provided   rest/sleep promoted  Goal: Optimal Comfort and Wellbeing  Outcome: Progressing  Intervention: Monitor Pain and Promote Comfort  Flowsheets (Taken 12/3/2024 1826)  Pain Management Interventions:   pillow support provided   position adjusted  Intervention: Provide Person-Centered  Care  Flowsheets (Taken 12/3/2024 1826)  Trust Relationship/Rapport:   care explained   choices provided   emotional support provided   questions encouraged  Goal: Readiness for Transition of Care  Outcome: Progressing     Problem: Diabetes Comorbidity  Goal: Blood Glucose Level Within Targeted Range  Outcome: Progressing  Intervention: Monitor and Manage Glycemia  Flowsheets (Taken 12/3/2024 1826)  Glycemic Management: blood glucose monitored     Problem: Wound  Goal: Optimal Coping  Outcome: Progressing  Goal: Optimal Functional Ability  Outcome: Progressing  Intervention: Optimize Functional Ability  Flowsheets (Taken 12/3/2024 1826)  Activity Management: Rolling - L1  Goal: Absence of Infection Signs and Symptoms  Outcome: Progressing  Intervention: Prevent or Manage Infection  Flowsheets (Taken 12/3/2024 1826)  Infection Management: aseptic technique maintained  Isolation Precautions:   precautions maintained   protective  Goal: Improved Oral Intake  Outcome: Progressing  Goal: Optimal Pain Control and Function  Outcome: Progressing  Intervention: Prevent or Manage Pain  Flowsheets (Taken 12/3/2024 1826)  Pain Management Interventions:   pillow support provided   position adjusted  Goal: Skin Health and Integrity  Outcome: Progressing  Intervention: Optimize Skin Protection  Flowsheets (Taken 12/3/2024 1826)  Pressure Reduction Techniques: frequent weight shift encouraged  Pressure Reduction Devices: positioning supports utilized  Skin Protection:   incontinence pads utilized   transparent dressing maintained  Activity Management: Rolling - L1  Head of Bed (HOB) Positioning:   HOB at 20-30 degrees   HOB at 30-45 degrees  Goal: Optimal Wound Healing  Outcome: Progressing     Problem: Fall Injury Risk  Goal: Absence of Fall and Fall-Related Injury  Outcome: Progressing  Intervention: Identify and Manage Contributors  Flowsheets (Taken 12/3/2024 1826)  Self-Care Promotion:   independence encouraged   meal set-up  provided   BADL personal objects within reach   BADL personal routines maintained   adaptive equipment use encouraged  Medication Review/Management: medications reviewed  Intervention: Promote Injury-Free Environment  Flowsheets (Taken 12/3/2024 0207)  Safety Promotion/Fall Prevention:   assistive device/personal item within reach   bed alarm set   commode/urinal/bedpan at bedside   Fall Risk reviewed with patient/family   instructed to call staff for mobility   lighting adjusted   medications reviewed   nonskid shoes/socks when out of bed   muscle strengthening facilitated   room near unit station   side rails raised x 3

## 2024-12-04 NOTE — PT/OT/SLP EVAL
Occupational Therapy Evaluation  Swing    Name: Kortney Leach  MRN: [id number]  Admitting Diagnosis: Valvular heart disease  Recent Surgery: * No surgery found *      Recommendations:     Discharge Recommendations:    Level of Assistance Recommended: Intermittent supervision  Discharge Equipment Recommendations: none  Barriers to discharge: Decreased caregiver support, Other (Comment) (current medical and functional status)    Assessment:     Kortney Leach is a 79 y.o. female with a medical diagnosis of Valvular heart disease. PMH of A-fib, HTN, valvular heart disease s/p MVR, CAD, DVT s/p IVC filter on xarelto, T2DM, GERD, SSS s/p PPM, HTN, HLD, chronic sinusitis status post sinus surgery, ABEL, obesity and asthma/COPD not on home oxygen who presented to the ER with progressive shortness of breath over the last month.  She initially was seen in urgent care but due to sats being 88% on room air she was deferred to the ER.She arrived afebrile hemodynamically stable maintaining adequate sats on 3 L nasal cannula but abruptly desatted in the ER requiring initiation of BiPAP. Laboratory work showed a BNP of 160 (last EF 55% 09/2023), CTA showed no pulmonary embolus, noted cardiomegaly and mild congestive changes and pleural effusions and lower lung compressive consolidations. EKG noted normal sinus rhythm. She was given 80 mg of Lasix, albuterol nebulizer treatment, IV Solu-Medrol and admitted to the hospitalist service for further management.  Continued on IV Lasix b.i.d. Able to be weaned off of BiPAP and currently on 3 L O2 via NC with adequate saturations.  Added DuoNebs. Desaturating to 88% during ambulation on RA. She was also treated for a UTI. Underwent MARYBETH on 11/27 which showed severe mitral stenosis and moderate MR and mid LAD 70% stenosis with elevated PCWP and PA 47/26. Patient underwent repeat LHC on 11/29 with stent to prox LAD. Patient was seen by CT surgery and structural heart team and a  possible valve in valve were discussed but the plan was to start Coumadin to see if the gradient could be decreased and have a repeat TTE in a few weeks time. Patient will follow up with cardiology, structural heart and CT surgery team. She will also follow follow with her PCP and pulm team. She will continue triple therapy (aspirin, plavix, coumadin) and then stop aspirin on 12/28/2024 and continue plavix and coumadin after that. She will be set up in the [facility] coumadin clinic. She will follow up with Dr. Uribe and Dr. Watters. She was stable to be discharged from the cardiology perspective to SNF. She will be discharged on oxygen. The IV lasix was switched to PO lasix. She weakness, impaired endurance, impaired self care skills, impaired functional mobility, impaired cardiopulmonary response to activity. Pt participated in OT evaluation well, but seemed anxious and concerned about her oxygen and her SOB. Pt requires min verbal cues to breath in through her nose out through her mouth. Pt's O2 SATS remained between 93-94% throughout evaluation. Pt would benefit from moderate intensity OT services to increase independence with occupational performance, decrease risks for falls, and improve QOL.     Rehab Prognosis: Good; patient would benefit from acute skilled OT services to address these deficits and reach maximum level of function.    Plan:     Patient to be seen  (5-6x/week; QD-BID as appropriate) to address the above listed problems via self-care/home management, therapeutic activities, therapeutic exercises (energy conservation techniques)  Plan of Care Expires: 12/25/24  Plan of Care Reviewed with: patient    Subjective     Chief Complaint: SOB  Patient Comments/Goals: Return home   Pain/Comfort:  Pain Rating 1: 0/10    Patients cultural, spiritual, Anabaptist conflicts given the current situation: no    Social History:  Living Environment: Patient lives with their daughter in a single story home, number of  outside stairs: 0, tub-shower combo with a TTB.   Prior Level of Function: Prior to admission, patient was independent with ADLs and functional mobility.  Roles and Routines: Retired, mother  Equipment Used at Home: bath bench, bedside commode, walker, rolling, CPAP  DME owned (not currently used): none  Assistance Upon Discharge: Unknown; daughter works during the day     Objective:     Communicated with NSG and PT prior to session. Patient found up in chair with bed alarm, blood pressure cuff, oxygen, peripheral IV, telemetry upon OT entry to room.    General Precautions: Standard, fall   Orthopedic Precautions:    Braces:    Respiratory Status: Nasal cannula, flow 2 L/min; pt's O2 SATS remained between 93-94% throughout evaluation; min verbal cues to facilitate pt breathing in through nose and out through her mouth     Occupational Performance    Bed Mobility:  Rolling/Turning to Right with stand by assistance  Scooting anteriorly to EOB to have both feet planted on floor: stand by assistance  Supine to sit from right side of bed with stand by assistance    Functional Mobility/Transfers:  Sit <> Stand Transfer with minimum assistance with rolling walker  Bed <> Chair Transfer using Step Transfer technique with contact guard assistance with rolling walker  Functional Mobility: 15ft using a RW and a gait belt    Activities of Daily Living:  Lower Body Dressing: minimum assistance don socks sitting EOB; assist to get sock around toes    Cognitive/Visual Perceptual:  Cognitive/Psychosocial Skills:     -       Oriented to: Person, Place, Time, and Situation     Physical Exam:  Balance:    -       Dynamic sitting balance: GOOD; Dynamic standing balance: FAIR  Upper Extremity Range of Motion:     -       Right Upper Extremity: WFL  -       Left Upper Extremity: WFL  Upper Extremity Strength:    -       Right Upper Extremity: globally 3+/5  -       Left Upper Extremity: globally 3+/5    Strength:    -       Right  Upper Extremity: WFL  -       Left Upper Extremity: WFL  Fine Motor Coordination:    -       Intact  Gross motor coordination:   WFL    AMPAC 6 Click ADL:  AMPAC Total Score: 19    Treatment & Education:  Therapist provided facilitation and instruction of proper body mechanics, energy conservation, and fall prevention strategies during tasks listed above.  Patient educated on role of OT, POC and goals for therapy  Patient educated on importance of OOB activities with staff member assistance and sitting OOB majority of the day.     Patient clear to ambulate to/from bathroom with RN/PCT, assist xMin-CGA using a RW and a gait belt .    Patient left up in chair with all lines intact, call button in reach, and chair alarm on.    GOALS:   Multidisciplinary Problems       Occupational Therapy Goals          Problem: Occupational Therapy    Goal Priority Disciplines Outcome Interventions   Occupational Therapy Goal     OT, PT/OT Progressing    Description: Goals to be met by: Discharge    Patient will increase functional independence with ADLs by performing:    UE Dressing with Modified [place].  LE Dressing with Stand-by Assistance.  Grooming while seated at sink with Modified [place].  Toileting from toilet with Stand-by Assistance for hygiene and clothing management.   Bathing from  shower chair/bench with Stand-by Assistance.  Toilet transfer to toilet with Stand-by Assistance.  Increased functional strength to 4/5 for functional mobility and self-care.   Pt will incorporate energy conservation techniques while performing her self-care.                          History:     Past Medical History:   Diagnosis Date    Anticoagulant long-term use     Asthma     Chronic sinusitis, unspecified     COPD (chronic obstructive pulmonary disease)     CVID (common variable immunodeficiency)     Diabetes mellitus, type 2     GERD (gastroesophageal reflux disease)     Hypertension     Severe mitral valve regurgitation      Sleep apnea     uses CPAP    SOB (shortness of breath)     Unspecified glaucoma     Weakness          Past Surgical History:   Procedure Laterality Date    A-V CARDIAC PACEMAKER INSERTION N/A 9/25/2023    Procedure: INSERTION, CARDIAC PACEMAKER, DUAL CHAMBER;  Surgeon: Arnaud Moon MD;  Location: [facility] CATH LAB;  Service: Cardiology;  Laterality: N/A;  [facility]    ANGIOGRAM, CORONARY, WITH LEFT HEART CATHETERIZATION N/A 11/29/2024    Procedure: Angiogram, Coronary, with Left Heart Cath;  Surgeon: Tatiana Jarrett MD;  Location: [facility] CATH LAB;  Service: Cardiology;  Laterality: N/A;    APPENDECTOMY      CATARACT EXTRACTION Bilateral     CATHETERIZATION OF BOTH LEFT AND RIGHT HEART N/A 11/27/2024    Procedure: CATHETERIZATION, HEART, BOTH LEFT AND RIGHT;  Surgeon: Sebastian Watters Jr., MD;  Location: [facility] CATH LAB;  Service: Cardiology;  Laterality: N/A;  With Valve Study    COLONOSCOPY      EGD, WITH BALLOON DILATION      ESOPHAGOGASTRODUODENOSCOPY      LEFT HEART CATHETERIZATION Left 09/06/2023    Procedure: Left heart cath;  Surgeon: Vijay Uribe MD;  Location: [facility] CATH LAB;  Service: Cardiology;  Laterality: Left;  [facility]    MITRAL VALVE REPLACEMENT N/A 9/20/2023    Procedure: REPLACEMENT, MITRAL VALVE;  Surgeon: Steven Rosales IV, MD;  Location: [facility];  Service: Cardiovascular;  Laterality: N/A;    TONSILLECTOMY      TOTAL KNEE ARTHROPLASTY Bilateral        Time Tracking:     OT Date of Treatment: 12/04/24  OT Start Time: 0902  OT Stop Time: 0924  OT Total Time (min): 22 min    Billable Minutes: Evaluation 22 12/4/2024

## 2024-12-04 NOTE — H&P
[facility]  [facility] MEDICINE - H&P ADMISSION NOTE    Patient Name: Kortney Leach  MRN: [id number]  Patient Class: IP- Swing   Admission Date: 12/3/2024   Admitting Physician: SUZANNE Service   Attending Physician: MARTHA Stewart  Primary Care Provider: Arnaldo Hernandez MD  Face-to-Face encounter date: 12/04/2024      CHIEF COMPLAINT     No chief complaint on file.    HISTORY OF PRESENTING ILLNESS     Patient is an 79 year old female with a past medical history of VHD s/p mosaic bioprosthetic MVR, CAD, PAF s/p SUKHI ligation, ABEL, COPD/asthma, HTN, DVT s/p IVC filter, SSS s/p PPM, HLD, DM2 who presented to [facility] on 11/23[facility]2024 due to worsening SOB over the past month. She was noted to be in acute decompensated diastolic CHF. Cardiology was consulted and repeat echo revealed EF 60-65%, grade 1 diastolic dysfunction, mean pressure gradient across the mitral valve is 19 mmHg, pulmonary artery systolic pressure is 57 mmHg. MARYBETH 11/27/2024 revealed severe mitral valve stenosis with moderate regurgitation. LHC and RHC 11/27/2024 revealed 70% proximal LAD stenosis and bioprosthetic mitral valve stenosis.  She was evaluated by CV surgery who recommended to optimize CHF prior to intervention for mitral valve.  She subsequently received stenting/ballooning to proximal LAD on 11/29/2024.  Case was discussed between Dr. Lynn and Dr. Cervantes and decision was made to start Coumadin to see if gradient of mitral valve stenosis could be decreased.  She was started on Coumadin 5 mg daily on 11/02/2024 with a goal INR of 2-3.  Plan to continue triple therapy with aspirin, Plavix, Coumadin for 1 month and discontinue aspirin on 12/28/2024.  She will need to follow up echo in 4-6 weeks as well as a follow up with Dr. Watters in 4-6 weeks to discuss plan with mitral valve.  During this admission she was also treated for a Klebsiella pneumoniae and Proteus mirabilis UTI.  She was transferred to our  [facility] Facility for continued physical therapy.  Upon my exam today, patient noted to have significant shortness of breath although O2 sat within normal limits.  Increase patient's oxygen from 2 L nasal cannula to 3 L nasal cannula to make her more comfortable.  Patient does not require supplemental oxygen at baseline, but may require upon discharge.    PAST MEDICAL HISTORY     Past Medical History:   Diagnosis Date    Anticoagulant long-term use     Asthma     Chronic sinusitis, unspecified     COPD (chronic obstructive pulmonary disease)     CVID (common variable immunodeficiency)     Diabetes mellitus, type 2     GERD (gastroesophageal reflux disease)     Hypertension     Severe mitral valve regurgitation     Sleep apnea     uses CPAP    SOB (shortness of breath)     Unspecified glaucoma     Weakness      PAST SURGICAL HISTORY     Past Surgical History:   Procedure Laterality Date    A-V CARDIAC PACEMAKER INSERTION N/A 9/25/2023    Procedure: INSERTION, CARDIAC PACEMAKER, DUAL CHAMBER;  Surgeon: Arnaud Moon MD;  Location: [facility] CATH LAB;  Service: Cardiology;  Laterality: N/A;  [facility]    ANGIOGRAM, CORONARY, WITH LEFT HEART CATHETERIZATION N/A 11/29/2024    Procedure: Angiogram, Coronary, with Left Heart Cath;  Surgeon: Tatiana Jarrett MD;  Location: [facility] CATH LAB;  Service: Cardiology;  Laterality: N/A;    APPENDECTOMY      CATARACT EXTRACTION Bilateral     CATHETERIZATION OF BOTH LEFT AND RIGHT HEART N/A 11/27/2024    Procedure: CATHETERIZATION, HEART, BOTH LEFT AND RIGHT;  Surgeon: Sebastian Watters Jr., MD;  Location: [facility] CATH LAB;  Service: Cardiology;  Laterality: N/A;  With Valve Study    COLONOSCOPY      EGD, WITH BALLOON DILATION      ESOPHAGOGASTRODUODENOSCOPY      LEFT HEART CATHETERIZATION Left 09/06/2023    Procedure: Left heart cath;  Surgeon: Vijay Uribe MD;  Location: [facility] CATH LAB;  Service: Cardiology;  Laterality: Left;  [facility]    MITRAL VALVE REPLACEMENT N/A 9/20/2023    Procedure: REPLACEMENT,  MITRAL VALVE;  Surgeon: Steven Rosales IV, MD;  Location: [facility];  Service: Cardiovascular;  Laterality: N/A;    TONSILLECTOMY      TOTAL KNEE ARTHROPLASTY Bilateral      FAMILY HISTORY     Reviewed and noncontributory to this case    SOCIAL HISTORY     Social History     Socioeconomic History    Marital status: Single   Tobacco Use    Smoking status: Former     Current packs/day: 0.00     Types: Cigarettes     Quit date:      Years since quittin.9    Smokeless tobacco: Never   Substance and Sexual Activity    Alcohol use: Not Currently    Drug use: Not Currently    Sexual activity: Not Currently     Social Drivers of Health     Financial Resource Strain: Low Risk  (2024)    Overall Financial Resource Strain (CARDIA)     Difficulty of Paying Living Expenses: Not hard at all   Food Insecurity: No Food Insecurity (2024)    Hunger Vital Sign     Worried About Running Out of Food in the Last Year: Never true     Ran Out of Food in the Last Year: Never true   Transportation Needs: No Transportation Needs (2024)    TRANSPORTATION NEEDS     Transportation : No   Stress: No Stress Concern Present (2024)    [employer] - Occupational Stress Questionnaire     Feeling of Stress : Not at all   Housing Stability: Low Risk  (2024)    Housing Stability Vital Sign     Unable to Pay for Housing in the Last Year: No     Homeless in the Last Year: No     Patient's screen for food insecurity, housing instability, transportation needs, utility difficulties, and interpersonal safety. Social work consulted for discharge planning, communicating with patient's insurance during admission.      HOME MEDICATIONS     Prior to Admission medications    Medication Sig Start Date End Date Taking? Authorizing Provider   amiodarone (PACERONE) 200 MG Tab Take 200 mg by mouth once daily.    Provider, Historical   aspirin (ECOTRIN) 81 MG EC tablet Take 1 tablet (81 mg total) by  mouth once daily. for 25 days 12/3/24 12/28/24  Catracho Galan MD   atorvastatin (LIPITOR) 40 MG tablet Take 40 mg by mouth once daily.    Provider, Historical   clopidogreL (PLAVIX) 75 mg tablet Take 1 tablet (75 mg total) by mouth once daily. 12/4/24 1/3/25  Catracho Galan MD   DUPIXENT SYRINGE 300 mg/2 mL Syrg Inject 300 mg into the skin every 14 (fourteen) days. 7/31/23   Provider, Historical   estradioL (ESTRACE) 0.01 % (0.1 mg/gram) vaginal cream Place 0.5 g vaginally twice a week. 10/30/23 2/1/27  Provider, Historical   famotidine (PEPCID) 40 MG tablet Take 40 mg by mouth every evening. 6/30/23   Provider, Historical   fluticasone-umeclidin-vilanter (TRELEGY ELLIPTA) 200-62.5-25 mcg inhaler Inhale 1 puff into the lungs once daily.    Provider, Historical   furosemide (LASIX) 40 MG tablet Take 40 mg by mouth. 11/19/24   Provider, Historical   glimepiride (AMARYL) 2 MG tablet Take 4 mg by mouth 2 (two) times a day.    Provider, Historical   HYDROcodone-acetaminophen (NORCO)  mg per tablet Take 1 tablet by mouth every 6 (six) hours as needed for Pain. 9/6/24   Sanket Oliva MD   immun glob G,IgG,/pro/IgA 0-50 (HIZENTRA SUBQ) Inject into the skin every 14 (fourteen) days.    Provider, Historical   lipase-protease-amylase 24,000-76,000-120,000 units (CREON) 24,000-76,000 -120,000 unit capsule Take 1-2 capsules by mouth 3 (three) times daily with meals. 2 caps with meals and 1 cap c snacks    Provider, Historical   metFORMIN (GLUCOPHAGE) 500 MG tablet Take 1 tablet (500 mg total) by mouth 2 (two) times daily with meals. 10/14/23 11/22/24  Richard Ruiz MD   metoprolol succinate (TOPROL-XL) 50 MG 24 hr tablet Take 1 tablet (50 mg total) by mouth once daily. 12/4/24 1/3/25  Catracho Galan MD   NIFEdipine (PROCARDIA-XL) 30 MG (OSM) 24 hr tablet Take 30 mg by mouth. 10/22/23   Provider, Historical   pantoprazole (PROTONIX) 40 MG tablet Take 1 tablet by mouth every morning.     Provider, Historical   timoloL (BETIMOL) 0.5 % ophthalmic solution Place 1 drop into both eyes 2 (two) times daily.    Provider, Historical   warfarin (COUMADIN) 5 MG tablet Take 1 tablet (5 mg total) by mouth Daily. 12/3/24 1/2/25  Catracho Galan MD     ALLERGIES     Adhesive tape-silicones    REVIEW OF SYSTEMS     Except as documented above, all other systems reviewed and negative    PHYSICAL EXAM     Vitals:    12/04/24 0834   BP: (!) 129/57   Pulse:    Resp:    Temp:         General:  In no acute distress, resting comfortably  Head and neck:  Atraumatic, normocephalic, moist mucous membranes, supple neck  Chest:  Diffuse wheezing   Heart:  S1, S2, murmur  Abdomen:  Soft, nontender, BS +  MSK:  Warm, no lower extremity edema, no clubbing or cyanosis  Neuro:  Alert and oriented x4, moving all extremities with good strength  Integumentary:  No obvious skin rash  Psychiatry:  Appropriate mood and affect    ASSESSMENT AND PLAN     Acute hypoxemic respiratory failure 2/2 below  Acute on chronic decompensated HFpEF with pulmonary edema - resolved  Pulmonary hypertension  Severe mitral stenosis  Echo 11/23/2024 EF 60-65%, grade 1 diastolic dysfunction, mean pressure gradient across the mitral valve is 19 mmHg, pulmonary artery systolic pressure is 57 mmHg  MARYBETH 11/27/2024 revealed severe mitral valve stenosis with moderate regurgitation  LHC and RHC 11/27/2024 revealed 70% proximal LAD stenosis and bioprosthetic mitral valve stenosis  CV surgery recommended to optimize CHF prior to intervention for mitral valve Stenting/ballooning to proximal LAD on 11/29/2024  Dr. Lynn and Dr. Cervantes recommended to start Coumadin 5mg QD with goal INR of 2-3 to see if gradient of mitral valve stenosis could be decreased  Continue triple therapy with aspirin, Plavix, Coumadin for 1 month and discontinue aspirin on 12/28/2024  Follow up echo in 4-6 weeks  Follow up with Dr. Watters in 4-6 weeks to discuss plan with  mitral valve  O2 per protocol, wean as tolerated    VHD s/p mosaic bioprosthetic MVR  CAD s/p stenting/ballooning  PAF s/p SUKHI ligation  HTN, HLD  DVT s/p IVC filter  SSS s/p PPM  Continue amiodarone 200 mg daily, aspirin 81 mg daily (discontinue on 12/28/2024), statin, Plavix 75 mg daily, Lasix 40 mg daily, Toprol-XL 50 mg daily, nifedipine 30 mg daily, warfarin 5 mg daily  Daily PT INRs    Hyperphosphatemia  PhosLo x3 doses    Hx of COPD/asthma  ABEL   Does not require home oxygen at baseline  DuoNebs q.4 while awake    Recent Klebsiella pneumoniae and Proteus mirabilis UTI - completed treatment    DM2  Recent A1c 6.8% within the last month  Start Lantus 5 units daily  Moderate dose SSI    DVT prophylaxis:  Warfarin    Code status: Full  __________________________________________________________________  LABS/MICRO/MEDS/DIAGNOSTICS     LABS  Recent Labs     12/04/24  0440   *   K 3.8   CO2 28   BUN 22.7*   CREATININE 0.83   GLUCOSE 179*   CALCIUM 8.9   ALKPHOS 75   AST 26   ALT 21   ALBUMIN 2.5*     Recent Labs     12/04/24  0440   WBC 14.63*   RBC 3.68*   HCT 34.4*   MCV 93.5        MICROBIOLOGY  Microbiology Results (last 7 days)       ** No results found for the last 168 hours. **          MEDICATIONS   albuterol-ipratropium  3 mL Nebulization Q4H While awake    amiodarone  200 mg Oral Daily    aspirin  81 mg Oral Daily    atorvastatin  40 mg Oral Daily    cetirizine  10 mg Oral Daily    clopidogreL  75 mg Oral Daily    famotidine  40 mg Oral QHS    furosemide  40 mg Oral Daily    insulin glargine U-100  5 Units Subcutaneous Daily    lipase-protease-amylase 24,000-76,000-120,000 units  2 capsule Oral TID WM    metoprolol succinate  50 mg Oral Daily    NIFEdipine  30 mg Oral Daily    pantoprazole  40 mg Oral QAM    warfarin  5 mg Oral Daily      INFUSIONS    DIAGNOSTIC TESTS  No orders to display      Patient information was obtained from patient, patient's family, past medical records and ER  records.   All diagnosis and differential diagnosis have been reviewed; assessment and plan has been documented. I have personally reviewed the labs and test results that are presently available; I have reviewed the patients medication list. I have reviewed the consulting providers response and recommendations. I have reviewed or attempted to review medical records based upon their availability.  All of the patient's questions have been addressed and answered. Patient's is agreeable to the above stated plan. I will continue to monitor closely and make adjustments to medical management as needed.  This note was created using [employer] voice recognition software that occasionally misinterpreted phrases or words.  Please contact me if any questions may rise regarding documentation to clarify verbiage.      MARTHA Stewart   Internal Medicine  Department of Hospital Medicine [facility]

## 2024-12-04 NOTE — PROGRESS NOTES
Inpatient Nutrition Assessment    Admit Date: 12/3/2024   Total duration of encounter: 1 day   Patient Age: 79 y.o.    Nutrition Recommendation/Prescription     Continue heart healthy low sodium diabetic 2,000 calories up to 75 gm carbohydrates per meals  Continue boost glucose control BID provides 190 kcal and 16 gm of protein per serving  Monitor intake, wt, labs,medications    Communication of Recommendations: reviewed with provider and reviewed with patient    Nutrition Assessment     Malnutrition Assessment/Nutrition-Focused Physical Exam    Malnutrition Context: acute illness or injury (12/04/24 1338)  Malnutrition Level: moderate (12/04/24 1338)  Energy Intake (Malnutrition): less than or equal to 50% for greater than or equal to 5 days (12/04/24 1338)  Weight Loss (Malnutrition): other (see comments) (Unable to assess) (12/04/24 1338)  Subcutaneous Fat (Malnutrition): moderate depletion (12/04/24 1338)  Orbital Region (Subcutaneous Fat Loss): mild depletion  Upper Arm Region (Subcutaneous Fat Loss): moderate depletion        Dorothy Region (Muscle Loss): well nourished  Clavicle Bone Region (Muscle Loss): other (see comments) (Unable to assess)  Clavicle and Acromion Bone Region (Muscle Loss): other (see comments) (Unable to assess)                 Fluid Accumulation (Malnutrition): other (see comments) (Unable to assess) (12/04/24 1338)        A minimum of two characteristics is recommended for diagnosis of either severe or non-severe malnutrition.    Chart Review    Reason Seen: continuous nutrition monitoring and length of stay, new [facility] bed    Malnutrition Screening Tool Results   Have you recently lost weight without trying?: No  Have you been eating poorly because of a decreased appetite?: No   MST Score: 0   Diagnosis:  Acute hypoxemic respiratory failure 2/2 below  Acute on chronic decompensated HFpEF with pulmonary edema - resolved  Pulmonary hypertension  Severe mitral stenosis  VHD s/p mosaic  bioprosthetic MVR  CAD s/p stenting/ballooning  PAF s/p SUKHI ligation  HTN, HLD  DVT s/p IVC filter  SSS s/p PPM  Hyperphosphatemia   Hx of COPD/asthma  ABEL   Recent Klebsiella pneumoniae and Proteus mirabilis UTI : complete treatment  DM2  DVT prophylaxis    Relevant Medical History:    Anticoagulant long-term use      Asthma      Chronic sinusitis, unspecified      COPD (chronic obstructive pulmonary disease)      CVID (common variable immunodeficiency)      Diabetes mellitus, type 2      GERD (gastroesophageal reflux disease)      Hypertension      Severe mitral valve regurgitation      Sleep apnea       uses CPAP    SOB (shortness of breath)      Unspecified glaucoma      Weakness        Scheduled Medications:  albuterol-ipratropium, 3 mL, Q4H While awake  amiodarone, 200 mg, Daily  aspirin, 81 mg, Daily  atorvastatin, 40 mg, Daily  calcium acetate(phosphat bind), 1,334 mg, TID WM  cetirizine, 10 mg, Daily  clopidogreL, 75 mg, Daily  famotidine, 40 mg, QHS  furosemide, 40 mg, Daily  insulin glargine U-100, 5 Units, Daily  lipase-protease-amylase 24,000-76,000-120,000 units, 2 capsule, TID WM  magnesium oxide, 400 mg, Daily  metoprolol succinate, 50 mg, Daily  NIFEdipine, 30 mg, Daily  pantoprazole, 40 mg, QAM  warfarin, 5 mg, Daily    Continuous Infusions:   PRN Medications:  acetaminophen, 650 mg, Q8H PRN  acetaminophen, 650 mg, Q8H PRN  albuterol-ipratropium, 3 mL, Q4H PRN  benzonatate, 100 mg, TID PRN  bisacodyL, 5 mg, Daily PRN  calcium carbonate, 1,000 mg, TID PRN  cloNIDine, 0.2 mg, Q4H PRN  dextrose 10%, 12.5 g, PRN  dextrose 10%, 25 g, PRN  glucagon (human recombinant), 1 mg, PRN  glucose, 16 g, PRN  glucose, 24 g, PRN  hydrALAZINE, 10 mg, Q4H PRN  HYDROcodone-acetaminophen, 1 tablet, Q4H PRN  insulin aspart U-100, 0-10 Units, QID (AC + HS) PRN  loperamide, 2 mg, Q4H PRN  melatonin, 6 mg, Nightly PRN  morphine, 2 mg, Q4H PRN  ondansetron, 4 mg, Q8H PRN  simethicone, 1 tablet, QID PRN  sodium chloride  0.9%, 10 mL, PRN    Calorie Containing IV Medications: no significant kcals from medications at this time    Recent Labs   Lab 11/28/24  0416 11/29/24  0455 11/30/24  0004 11/30/24  0618 12/01/24  0624 12/02/24  0507 12/03/24  0459 12/04/24  0440   * 134*  --  134* 136 136 137 133*   K 4.1 3.7  --  3.8 3.7 3.8 3.6 3.8   CALCIUM 8.6 8.6  --  9.1 8.6 8.7 9.2 8.9   PHOS  --   --   --   --  4.5  --   --  5.0*   MG 2.30 2.10  --  2.10 2.20 2.10 1.90 1.80   CL 97* 98  --  96* 97* 97* 98 96*   CO2 22* 22*  --  26 27 25 26 28   BUN 25.7* 27.5*  --  25.3* 26.4* 22.1* 22.4* 22.7*   CREATININE 0.88 0.86  --  0.98 0.84 0.88 0.85 0.83   EGFRNORACEVR >60 >60  --  58 >60 >60 >60 >60   GLUCOSE 195* 207*  --  208* 215* 205* 191* 179*   BILITOT  --  0.7  --  0.7 0.7 0.7  --  0.7   ALKPHOS  --  88  --  81 77 79  --  75   ALT  --  22  --  25 22 22  --  21   AST  --  27  --  34 22 24  --  26   ALBUMIN  --  2.8*  --  2.7* 2.7* 2.8*  --  2.5*   WBC 14.28* 13.03* 15.11* 14.02* 13.45* 14.62* 13.46* 14.63*   HGB 12.5 12.6 12.0 12.1 11.6* 11.4* 11.0* 11.0*   HCT 38.1 38.3 36.2* 37.2 35.5* 34.7* 33.6* 34.4*     Nutrition Orders:  Diet Heart Healthy Low Sodium,2gm, Diabetic; 2000 Calories (up to 75 gm per meal)  Dietary nutrition supplements BID; Boost Glucose Control - Any flavor    Appetite/Oral Intake: poor/0-25% of meals  Factors Affecting Nutritional Intake: decreased appetite  Social Needs Impacting Access to Food:   Food Insecurity: No Food Insecurity (11/22/2024)     Hunger Vital Sign      Worried About Running Out of Food in the Last Year: Never true      Ran Out of Food in the Last Year: Never true     Food/Mormonism/Cultural Preferences: none reported  Food Allergies: none reported  Last Bowel Movement: 12/01/24  Wound(s):      Comments    12/04/24: Pt reports decrease intake x 3 weeks. Pt reports possible wt loss and is unsure. #. Percent of wt change: -3.04 from UBW. Pt stated didn't eat breakfast this am. Pt is  "drinking the boost glucose control. Family present. Discussed food preferences to improved intake, request peanut butter sugar free jelly sandwich for lunch. Labs/medications review. Pt on calcium acetate, furosemide, lipase protease amylase, magnesium oxide, and coumadin. Will monitor dark leafy consumption. Pt on heart healthy low sodium diabetic diet with boost glucose control BID. Will monitor and encourage good PO Intake. +BM on 24.    Anthropometrics    Height: 5' 6" (167.6 cm), Height Method: Stated  Last Weight: 96.6 kg (212 lb 15.4 oz) (24 1327), Weight Method: Bed Scale  BMI (Calculated): 34.4  BMI Classification: obese grade I (BMI 30-34.9)     Ideal Body Weight (IBW), Female: 130 lb     % Ideal Body Weight, Female (lb): 163.82 %                    Usual Body Weight (UBW), k kg  % Usual Body Weight: 96.8  % Weight Change From Usual Weight: -3.4 %  Usual Weight Provided By: patient    Wt Readings from Last 5 Encounters:   24 96.6 kg (212 lb 15.4 oz)   24 96.4 kg (212 lb 8 oz)   24 99.8 kg (220 lb)   24 99.8 kg (220 lb)   24 93.4 kg (206 lb)     Weight Change(s) Since Admission:   Wt Readings from Last 1 Encounters:   24 1327 96.6 kg (212 lb 15.4 oz)   24 1600 96.6 kg (212 lb 15.4 oz)   Admit Weight: 96.6 kg (212 lb 15.4 oz) (24 1600), Weight Method: Bed Scale    Estimated Needs    Weight Used For Calorie Calculations: 96.6 kg (212 lb 15.4 oz)  Energy Calorie Requirements (kcal): 1932 kcal (20 kcal/kg/CBW)  Energy Need Method: Kcal/kg  Weight Used For Protein Calculations: 96.6 kg (212 lb 15.4 oz)  Protein Requirements: 96.8 gm (1.0 gm/kg/CBW)  Fluid Requirements (mL): 1932 mL (1 mL/kcal)  CHO Requirement: 217 gm CHO per day (45% est kcal needs)     Enteral Nutrition     Patient not receiving enteral nutrition at this time.    Parenteral Nutrition     Patient not receiving parenteral nutrition support at this time.    Evaluation of Received " Nutrient Intake    Calories: not meeting estimated needs  Protein: not meeting estimated needs    Patient Education     Not applicable.    Nutrition Diagnosis     PES: Inadequate oral intake related to acute illness as evidenced by 0% of breakfast. (new)     PES: Moderate acute disease or injury related malnutrition Related to Inadequate protein energy intake As Evidenced by:  - weight loss: Unable to assess - energy intake: <= 50% for 3 weeks (meets criteria for <= 50% >= 5 days (severe - acute)) - loss of subcutaneous fat: 1 area of moderate fat loss (Triceps Skinfold), 1 area of mild fat loss (Infraorbital) new    Nutrition Interventions     Intervention(s): general/healthful diet, commercial beverage, and collaboration with other providers  Intervention(s): Oral diet/nutrient modifications;Care coordination or referral;Oral nutritional supplement;Manage feeding environment    Goal: Consume % of meals/snacks by follow-up. (new)      Nutrition Goals & Monitoring     Dietitian will monitor: food and beverage intake, weight, weight change, electrolyte/renal panel, glucose/endocrine profile, and gastrointestinal profile  Discharge planning: continue heart healthy diabetic  diet  Nutrition Risk/Follow-Up: moderate (follow-up in 3-5 days)   Please consult if re-assessment needed sooner.

## 2024-12-04 NOTE — PT/OT/SLP EVAL
Physical Therapy Treatment Note           Name: Kortney Leach    : 1945 (79 y.o.)  MRN: [id number]           TREATMENT SUMMARY AND RECOMMENDATIONS:    PT Received On: 24  PT Start Time: 1400     PT Stop Time: 1415  PT Total Time (min): 15 min     Subjective Assessment:   No complaints  Lethargic    Awake, alert, cooperative  Uncooperative    Agitated  c/o pain    Appropriate  c/o fatigue    Confused  Treated at bedside     Emotionally labile  Treated in gym/dept.    Impulsive X  Other: SOB    Flat affect       Therapy Precautions:    Cognitive deficits  Spinal precautions    Collar - hard  Sternal precautions    Collar - soft   TLSO   x Fall risk  LSO    Hip precautions - posterior  Knee immobilizer    Hip precautions - anterior  WBAT    Impaired communication  Partial weightbearing   x Oxygen - 3L O2  TTWB    PEG tube  NWB    Visual deficits  Other:    Hearing deficits          Treatment Objectives:     Mobility Training:   Assist level Comments    Bed mobility  SBA Sup>sit   Transfer  CGA Sit<>stand and commode transfer   Gait  CGA 15ft to/from commode using RW   Sit to stand transitions     Sitting balance     Standing balance      Wheelchair mobility     Car transfer     Other:          Therapeutic Exercise:   Exercise Sets Reps Comments                               Additional Comments:  Requesting breathing treatment prior to therapy in AM.    Assessment: Patient unable to participate in therapy for PM session due to concerns with dyspnea. She required no more than CGA for functional mobility and vitals WNL despite significant dyspnea.     PT Plan: Cont PT POC.  Revisions made to plan of care: No    GOALS:   Multidisciplinary Problems       Physical Therapy Goals          Problem: Physical Therapy    Goal Priority Disciplines Outcome Interventions   Physical Therapy Goal     PT, PT/OT Progressing    Description: Goals to be met by: discharge     Patient will increase functional  independence with mobility by performin. Sit to stand transfer with Supervision and minimal dyspnea.   2. Gait  x 150 feet with Supervision using Rolling Walker and minimal dyspnea.                          Skilled PT Minutes Provided: 15 min   Communication with Treatment Team:     Equipment recommendations:       At end of treatment, patient remained:   Up in chair     Up in wheelchair in room   x In bed   x With alarm activated   x Bed rails up   x Call bell in reach     Family/friends present    Restraints secured properly    In bathroom with CNA/RN notified    Nurse aware    In gym with therapist/tech    Other:

## 2024-12-04 NOTE — PLAN OF CARE
Problem: Adult Inpatient Plan of Care  Goal: Plan of Care Review  Outcome: Progressing  Flowsheets (Taken 12/3/2024 1826)  Plan of Care Reviewed With: patient  Goal: Patient-Specific Goal (Individualized)  Outcome: Progressing  Goal: Absence of Hospital-Acquired Illness or Injury  Outcome: Progressing  Intervention: Identify and Manage Fall Risk  Flowsheets (Taken 12/3/2024 1826)  Safety Promotion/Fall Prevention:   assistive device/personal item within reach   bed alarm set   commode/urinal/bedpan at bedside   Fall Risk reviewed with patient/family   instructed to call staff for mobility   lighting adjusted   medications reviewed   nonskid shoes/socks when out of bed   muscle strengthening facilitated   room near unit station   side rails raised x 3  Intervention: Prevent Skin Injury  Flowsheets (Taken 12/3/2024 1826)  Body Position:   position changed independently   log-rolled   sitting up in bed   supine   weight shifting  Skin Protection:   incontinence pads utilized   transparent dressing maintained  Device Skin Pressure Protection:   absorbent pad utilized/changed   adhesive use limited   positioning supports utilized   tubing/devices free from skin contact  Intervention: Prevent and Manage VTE (Venous Thromboembolism) Risk  Flowsheets (Taken 12/3/2024 1826)  VTE Prevention/Management:   ambulation promoted   bleeding precautions maintained   bleeding risk assessed   fluids promoted  Intervention: Prevent Infection  Flowsheets (Taken 12/3/2024 1826)  Infection Prevention:   cohorting utilized   environmental surveillance performed   hand hygiene promoted   equipment surfaces disinfected   single patient room provided   rest/sleep promoted  Goal: Optimal Comfort and Wellbeing  Outcome: Progressing  Intervention: Monitor Pain and Promote Comfort  Flowsheets (Taken 12/3/2024 1826)  Pain Management Interventions:   pillow support provided   position adjusted  Intervention: Provide Person-Centered  Care  Flowsheets (Taken 12/3/2024 1826)  Trust Relationship/Rapport:   care explained   choices provided   emotional support provided   questions encouraged  Goal: Readiness for Transition of Care  Outcome: Progressing     Problem: Diabetes Comorbidity  Goal: Blood Glucose Level Within Targeted Range  Outcome: Progressing  Intervention: Monitor and Manage Glycemia  Flowsheets (Taken 12/3/2024 1826)  Glycemic Management: blood glucose monitored     Problem: Wound  Goal: Optimal Coping  Outcome: Progressing  Goal: Optimal Functional Ability  Outcome: Progressing  Intervention: Optimize Functional Ability  Flowsheets (Taken 12/3/2024 1826)  Activity Management: Rolling - L1  Goal: Absence of Infection Signs and Symptoms  Outcome: Progressing  Intervention: Prevent or Manage Infection  Flowsheets (Taken 12/3/2024 1826)  Infection Management: aseptic technique maintained  Isolation Precautions:   precautions maintained   protective  Goal: Improved Oral Intake  Outcome: Progressing  Goal: Optimal Pain Control and Function  Outcome: Progressing  Intervention: Prevent or Manage Pain  Flowsheets (Taken 12/3/2024 1826)  Pain Management Interventions:   pillow support provided   position adjusted  Goal: Skin Health and Integrity  Outcome: Progressing  Intervention: Optimize Skin Protection  Flowsheets (Taken 12/3/2024 1826)  Pressure Reduction Techniques: frequent weight shift encouraged  Pressure Reduction Devices: positioning supports utilized  Skin Protection:   incontinence pads utilized   transparent dressing maintained  Activity Management: Rolling - L1  Head of Bed (HOB) Positioning:   HOB at 20-30 degrees   HOB at 30-45 degrees  Goal: Optimal Wound Healing  Outcome: Progressing     Problem: Fall Injury Risk  Goal: Absence of Fall and Fall-Related Injury  Outcome: Progressing  Intervention: Identify and Manage Contributors  Flowsheets (Taken 12/3/2024 1826)  Self-Care Promotion:   independence encouraged   meal set-up  provided   BADL personal objects within reach   BADL personal routines maintained   adaptive equipment use encouraged  Medication Review/Management: medications reviewed  Intervention: Promote Injury-Free Environment  Flowsheets (Taken 12/3/2024 4999)  Safety Promotion/Fall Prevention:   assistive device/personal item within reach   bed alarm set   commode/urinal/bedpan at bedside   Fall Risk reviewed with patient/family   instructed to call staff for mobility   lighting adjusted   medications reviewed   nonskid shoes/socks when out of bed   muscle strengthening facilitated   room near unit station   side rails raised x 3

## 2024-12-04 NOTE — PLAN OF CARE
Problem: Physical Therapy  Goal: Physical Therapy Goal  Description: Goals to be met by: discharge     Patient will increase functional independence with mobility by performin. Sit to stand transfer with Supervision and minimal dyspnea.   2. Gait  x 150 feet with Supervision using Rolling Walker and minimal dyspnea.     Outcome: Progressing

## 2024-12-04 NOTE — PLAN OF CARE
Problem: Occupational Therapy  Goal: Occupational Therapy Goal  Description: Goals to be met by: Discharge    Patient will increase functional independence with ADLs by performing:    UE Dressing with Modified Gila.  LE Dressing with Stand-by Assistance.  Grooming while seated at sink with Modified Gila.  Toileting from toilet with Stand-by Assistance for hygiene and clothing management.   Bathing from  shower chair/bench with Stand-by Assistance.  Toilet transfer to toilet with Stand-by Assistance.  Increased functional strength to 4/5 for functional mobility and self-care.   Pt will incorporate energy conservation techniques while performing her self-care.     Outcome: Progressing

## 2024-12-04 NOTE — PT/OT/SLP EVAL
Physical Therapy Evaluation    Patient Name: Kortney Leach   MRN: [id number]  Recent Surgery: * No surgery found *      Recommendations:     Discharge Recommendations: Low Intensity Therapy   Discharge Equipment Recommendations: none   Barriers to discharge: None    Assessment:     Kortney Leach is a 79 y.o. female admitted with a medical diagnosis of Valvular heart disease. She has a past medical history significant for: VHD s/p mosaic bioprosthetic MVR, CAD, PAF s/p SUKHI ligation, ABEL, COPD/asthma, HTN, DVT s/p IVC filter, SSS s/p PPM, HLD, DM2. She initially presented to [facility] on 11/23/2024 due to worsening SOB over the past month. She was noted to be in acute decompensated diastolic CHF. She was transferred to our facility for therapy services. The patient had fair tolerance to PT evaluation with significant shortness of breath despite vitals WNL throughout session. She presents with the following impairments/functional limitations: impaired endurance, impaired functional mobility, impaired balance, decreased lower extremity function, impaired cardiopulmonary response to activity.       Rehab Prognosis: Good; patient would benefit from acute PT services to address these deficits and reach maximum level of function.    Plan:     During this hospitalization, patient to be seen  (1-2 times per day for 5-6 days per week) to address the above listed problems via gait training, therapeutic activities, therapeutic exercises, therapeutic groups, neuromuscular re-education    Plan of Care Expires: 01/01/25    Subjective     Chief Complaint: SOB  Patient Comments/Goals: improve breathing, go home  Pain/Comfort:  Pain Rating 1: 0/10    Social History:  Living Environment: Patient lives with their daughter in a single story home with number of outside stair(s): 0 and tub-shower combo. She is alone all day while daughter is at work.  Prior Level of Function: Prior to admission, patient was independent with  ambulation and ADLs.  Equipment Used at Home: none  DME owned (not currently used):  will follow up in upcoming session  Assistance Upon Discharge: family    Objective:     Communicated with nursing prior to session. Patient found HOB elevated with bed alarm, telemetry, oxygen upon PT entry to room.    General Precautions: Standard,  (standard)   Orthopedic Precautions:     Braces:      Respiratory Status: Nasal cannula, flow 2 L/min - SpO2 93% at rest, remained >/=90% with activity      Exams:  Cognition: Patient is oriented to Person, Place, Time, Situation  RLE ROM: WFL  RLE Strength: Deficits: 4/5  LLE ROM: WFL  LLE Strength: Deficits: 4/5    Functional Mobility:  Gait belt applied - Yes  Bed Mobility  Supine to Sit: stand by assistance for increased time due to dyspnea  Transfers  Sit to Stand: minimum assistance with rolling walker  Gait  Patient ambulated 10 feet with rolling walker and contact guard assistance. Patient demonstrates decreased foot clearance, flexed posture, and decreased es. All lines remained intact throughout ambulation trail, portable Supplemental O2 2L utilized, and portable monitor utilized.  Balance  Sitting: stand by assistance  Standing: contact guard assistance    Therapeutic Activities and Exercises:   Patient educated on role of acute care PT and PT POC, safety while in hospital including calling nurse for mobility, and call light usage  Patient educated about importance of OOB mobility and remaining up in chair most of the day.  Patient educated about pursed lip breathing technique and cued for use with mobility.    AM-PAC 6 CLICK MOBILITY  Total Score:20    Patient left up in chair with all lines intact, call button in reach, and chair alarm on.    GOALS:   Multidisciplinary Problems       Physical Therapy Goals          Problem: Physical Therapy    Goal Priority Disciplines Outcome Interventions   Physical Therapy Goal     PT, PT/OT Progressing    Description: Goals to be  met by: discharge     Patient will increase functional independence with mobility by performin. Sit to stand transfer with Supervision and minimal dyspnea.   2. Gait  x 150 feet with Supervision using Rolling Walker and minimal dyspnea.                          History:     Past Medical History:   Diagnosis Date    Anticoagulant long-term use     Asthma     Chronic sinusitis, unspecified     COPD (chronic obstructive pulmonary disease)     CVID (common variable immunodeficiency)     Diabetes mellitus, type 2     GERD (gastroesophageal reflux disease)     Hypertension     Severe mitral valve regurgitation     Sleep apnea     uses CPAP    SOB (shortness of breath)     Unspecified glaucoma     Weakness        Past Surgical History:   Procedure Laterality Date    A-V CARDIAC PACEMAKER INSERTION N/A 2023    Procedure: INSERTION, CARDIAC PACEMAKER, DUAL CHAMBER;  Surgeon: Arnaud Moon MD;  Location: [facility] CATH LAB;  Service: Cardiology;  Laterality: N/A;  SJM    ANGIOGRAM, CORONARY, WITH LEFT HEART CATHETERIZATION N/A 2024    Procedure: Angiogram, Coronary, with Left Heart Cath;  Surgeon: Tatiana Jarrett MD;  Location: [facility] CATH LAB;  Service: Cardiology;  Laterality: N/A;    APPENDECTOMY      CATARACT EXTRACTION Bilateral     CATHETERIZATION OF BOTH LEFT AND RIGHT HEART N/A 2024    Procedure: CATHETERIZATION, HEART, BOTH LEFT AND RIGHT;  Surgeon: Sebastian Watters Jr., MD;  Location: [facility] CATH LAB;  Service: Cardiology;  Laterality: N/A;  With Valve Study    COLONOSCOPY      EGD, WITH BALLOON DILATION      ESOPHAGOGASTRODUODENOSCOPY      LEFT HEART CATHETERIZATION Left 2023    Procedure: Left heart cath;  Surgeon: Vijay Uribe MD;  Location: [facility] CATH LAB;  Service: Cardiology;  Laterality: Left;  [facility]    MITRAL VALVE REPLACEMENT N/A 2023    Procedure: REPLACEMENT, MITRAL VALVE;  Surgeon: Steven Rosales IV, MD;  Location: [facility] OR;  Service: Cardiovascular;  Laterality: N/A;     TONSILLECTOMY      TOTAL KNEE ARTHROPLASTY Bilateral        Time Tracking:     PT Received On: 12/04/24  PT Start Time: 0900  PT Stop Time: 0924  PT Total Time (min): 24 min     Billable Minutes: Evaluation Moderate Complexity    12/4/2024

## 2024-12-04 NOTE — PLAN OF CARE
Problem: Adult Inpatient Plan of Care  Goal: Plan of Care Review  Outcome: Progressing  Goal: Patient-Specific Goal (Individualized)  Outcome: Progressing  Goal: Absence of Hospital-Acquired Illness or Injury  Outcome: Progressing  Intervention: Identify and Manage Fall Risk  Flowsheets (Taken 12/4/2024 0736)  Safety Promotion/Fall Prevention:   assistive device/personal item within reach   bed alarm set   Fall Risk reviewed with patient/family   Fall Risk signage in place   instructed to call staff for mobility   patient expresses understanding of fall risk and prevention   room near unit station   side rails raised x 3  Intervention: Prevent Skin Injury  Flowsheets (Taken 12/4/2024 0736)  Body Position:   right   30 degrees   upper extremity elevated   weight shifting  Skin Protection:   incontinence pads utilized   skin sealant/moisture barrier applied  Device Skin Pressure Protection:   absorbent pad utilized/changed   tubing/devices free from skin contact  Intervention: Prevent and Manage VTE (Venous Thromboembolism) Risk  Flowsheets (Taken 12/4/2024 0736)  VTE Prevention/Management:   ambulation promoted   fluids promoted   ROM (active) performed  Intervention: Prevent Infection  Flowsheets (Taken 12/4/2024 0736)  Infection Prevention:   environmental surveillance performed   hand hygiene promoted   rest/sleep promoted   single patient room provided  Goal: Optimal Comfort and Wellbeing  Outcome: Progressing  Intervention: Monitor Pain and Promote Comfort  Flowsheets (Taken 12/4/2024 0736)  Pain Management Interventions:   pain management plan reviewed with patient/caregiver   pillow support provided   position adjusted   quiet environment facilitated   relaxation techniques promoted  Intervention: Provide Person-Centered Care  Flowsheets (Taken 12/4/2024 0736)  Trust Relationship/Rapport:   care explained   choices provided   emotional support provided   empathic listening provided   questions answered    questions encouraged   reassurance provided   thoughts/feelings acknowledged  Goal: Readiness for Transition of Care  Outcome: Progressing  Intervention: Mutually Develop Transition Plan  Flowsheets (Taken 12/4/2024 0736)  Equipment Currently Used at Home:   bedside commode   bath bench   CPAP   walker, rolling  Transportation Anticipated: family or friend will provide  Who are your caregiver(s) and their phone number(s)?: Anita Youssef [phone]  Communicated SD with patient/caregiver: Date not available/Unable to determine  Do you expect to return to your current living situation?: Yes  Do you have help at home or someone to help you manage your care at home?: Yes  Readmission within 30 days?: No  Do you currently have service(s) that help you manage your care at home?: No     Problem: Diabetes Comorbidity  Goal: Blood Glucose Level Within Targeted Range  Outcome: Progressing  Intervention: Monitor and Manage Glycemia  Flowsheets (Taken 12/4/2024 0736)  Glycemic Management: blood glucose monitored     Problem: Wound  Goal: Optimal Coping  Outcome: Progressing  Intervention: Support Patient and Family Response  Flowsheets (Taken 12/4/2024 0736)  Supportive Measures:   active listening utilized   self-care encouraged   self-reflection promoted   verbalization of feelings encouraged   relaxation techniques promoted  Family/Support System Care:   involvement promoted   presence promoted  Goal: Optimal Functional Ability  Outcome: Progressing  Intervention: Optimize Functional Ability  Flowsheets (Taken 12/4/2024 0736)  Assistive Device Utilized: (unknown as of yet) other (see comments)  Activity Management: Rolling - L1  Activity Assistance Provided: assistance, 1 person  Goal: Absence of Infection Signs and Symptoms  Outcome: Progressing  Intervention: Prevent or Manage Infection  Flowsheets (Taken 12/4/2024 0736)  Fever Reduction/Comfort Measures:   lightweight bedding   lightweight clothing  Infection  Management: aseptic technique maintained  Isolation Precautions:   precautions maintained   protective  Goal: Improved Oral Intake  Outcome: Progressing  Goal: Optimal Pain Control and Function  Outcome: Progressing  Goal: Skin Health and Integrity  Outcome: Progressing  Goal: Optimal Wound Healing  Outcome: Progressing     Problem: Fall Injury Risk  Goal: Absence of Fall and Fall-Related Injury  Outcome: Progressing  Intervention: Identify and Manage Contributors  Flowsheets (Taken 12/4/2024 4023)  Self-Care Promotion:   independence encouraged   BADL personal objects within reach   BADL personal routines maintained   meal set-up provided   adaptive equipment use encouraged  Medication Review/Management: medications reviewed

## 2024-12-04 NOTE — PROGRESS NOTES
Unable to treat pt at this time d/t pt c/o of SOB. Pt's nurse present. Will f/u with POC in AM as able.

## 2024-12-05 LAB
ALBUMIN SERPL-MCNC: 2.6 G/DL (ref 3.4–4.8)
ALBUMIN/GLOB SERPL: 0.8 RATIO (ref 1.1–2)
ALP SERPL-CCNC: 86 UNIT/L (ref 40–150)
ALT SERPL-CCNC: 18 UNIT/L (ref 0–55)
ANION GAP SERPL CALC-SCNC: 12 MEQ/L
AST SERPL-CCNC: 25 UNIT/L (ref 5–34)
BILIRUB SERPL-MCNC: 0.9 MG/DL
BUN SERPL-MCNC: 20.2 MG/DL (ref 9.8–20.1)
CALCIUM SERPL-MCNC: 9.5 MG/DL (ref 8.4–10.2)
CHLORIDE SERPL-SCNC: 99 MMOL/L (ref 98–107)
CO2 SERPL-SCNC: 27 MMOL/L (ref 23–31)
CREAT SERPL-MCNC: 0.74 MG/DL (ref 0.55–1.02)
CREAT/UREA NIT SERPL: 27
GFR SERPLBLD CREATININE-BSD FMLA CKD-EPI: >60 ML/MIN/1.73/M2
GLOBULIN SER-MCNC: 3.3 GM/DL (ref 2.4–3.5)
GLUCOSE SERPL-MCNC: 197 MG/DL (ref 82–115)
INR PPP: 3.5
MAGNESIUM SERPL-MCNC: 1.8 MG/DL (ref 1.6–2.6)
PHOSPHATE SERPL-MCNC: 5.3 MG/DL (ref 2.3–4.7)
POCT GLUCOSE: 148 MG/DL (ref 70–110)
POCT GLUCOSE: 178 MG/DL (ref 70–110)
POCT GLUCOSE: 218 MG/DL (ref 70–110)
POTASSIUM SERPL-SCNC: 4 MMOL/L (ref 3.5–5.1)
PROT SERPL-MCNC: 5.9 GM/DL (ref 5.8–7.6)
PROTHROMBIN TIME: 33.6 SECONDS (ref 12.5–14.5)
SODIUM SERPL-SCNC: 138 MMOL/L (ref 136–145)

## 2024-12-05 PROCEDURE — 94760 N-INVAS EAR/PLS OXIMETRY 1: CPT

## 2024-12-05 PROCEDURE — 97530 THERAPEUTIC ACTIVITIES: CPT

## 2024-12-05 PROCEDURE — 63600175 PHARM REV CODE 636 W HCPCS: Performed by: PHYSICIAN ASSISTANT

## 2024-12-05 PROCEDURE — 25000003 PHARM REV CODE 250: Performed by: PHYSICIAN ASSISTANT

## 2024-12-05 PROCEDURE — 94640 AIRWAY INHALATION TREATMENT: CPT

## 2024-12-05 PROCEDURE — 25000003 PHARM REV CODE 250: Performed by: INTERNAL MEDICINE

## 2024-12-05 PROCEDURE — 80053 COMPREHEN METABOLIC PANEL: CPT | Performed by: PHYSICIAN ASSISTANT

## 2024-12-05 PROCEDURE — 99900035 HC TECH TIME PER 15 MIN (STAT)

## 2024-12-05 PROCEDURE — 84100 ASSAY OF PHOSPHORUS: CPT | Performed by: PHYSICIAN ASSISTANT

## 2024-12-05 PROCEDURE — 85610 PROTHROMBIN TIME: CPT | Performed by: PHYSICIAN ASSISTANT

## 2024-12-05 PROCEDURE — 25000242 PHARM REV CODE 250 ALT 637 W/ HCPCS: Performed by: PHYSICIAN ASSISTANT

## 2024-12-05 PROCEDURE — 97116 GAIT TRAINING THERAPY: CPT

## 2024-12-05 PROCEDURE — 36415 COLL VENOUS BLD VENIPUNCTURE: CPT | Performed by: PHYSICIAN ASSISTANT

## 2024-12-05 PROCEDURE — 97110 THERAPEUTIC EXERCISES: CPT

## 2024-12-05 PROCEDURE — 97535 SELF CARE MNGMENT TRAINING: CPT

## 2024-12-05 PROCEDURE — 63600175 PHARM REV CODE 636 W HCPCS: Performed by: INTERNAL MEDICINE

## 2024-12-05 PROCEDURE — 27000221 HC OXYGEN, UP TO 24 HOURS

## 2024-12-05 PROCEDURE — 83735 ASSAY OF MAGNESIUM: CPT | Performed by: PHYSICIAN ASSISTANT

## 2024-12-05 PROCEDURE — 11000004 HC SNF PRIVATE

## 2024-12-05 RX ORDER — MAGNESIUM SULFATE HEPTAHYDRATE 40 MG/ML
2 INJECTION, SOLUTION INTRAVENOUS ONCE
Status: COMPLETED | OUTPATIENT
Start: 2024-12-05 | End: 2024-12-05

## 2024-12-05 RX ORDER — FLUTICASONE PROPIONATE 50 MCG
2 SPRAY, SUSPENSION (ML) NASAL DAILY
Status: DISCONTINUED | OUTPATIENT
Start: 2024-12-08 | End: 2024-12-07

## 2024-12-05 RX ORDER — FUROSEMIDE 10 MG/ML
20 INJECTION INTRAMUSCULAR; INTRAVENOUS ONCE
Status: COMPLETED | OUTPATIENT
Start: 2024-12-05 | End: 2024-12-05

## 2024-12-05 RX ORDER — WARFARIN SODIUM 5 MG/1
5 TABLET ORAL DAILY
Status: DISCONTINUED | OUTPATIENT
Start: 2024-12-06 | End: 2024-12-05

## 2024-12-05 RX ORDER — INSULIN ASPART 100 [IU]/ML
3 INJECTION, SOLUTION INTRAVENOUS; SUBCUTANEOUS
Status: DISCONTINUED | OUTPATIENT
Start: 2024-12-05 | End: 2024-12-15 | Stop reason: HOSPADM

## 2024-12-05 RX ORDER — CALCIUM ACETATE 667 MG/1
1334 CAPSULE ORAL
Status: COMPLETED | OUTPATIENT
Start: 2024-12-05 | End: 2024-12-05

## 2024-12-05 RX ORDER — OXYMETAZOLINE HCL 0.05 %
2 SPRAY, NON-AEROSOL (ML) NASAL 2 TIMES DAILY
Status: DISPENSED | OUTPATIENT
Start: 2024-12-05 | End: 2024-12-08

## 2024-12-05 RX ORDER — GUAIFENESIN 600 MG/1
600 TABLET, EXTENDED RELEASE ORAL 2 TIMES DAILY
Status: DISCONTINUED | OUTPATIENT
Start: 2024-12-05 | End: 2024-12-07

## 2024-12-05 RX ADMIN — INSULIN ASPART 2 UNITS: 100 INJECTION, SOLUTION INTRAVENOUS; SUBCUTANEOUS at 08:12

## 2024-12-05 RX ADMIN — HYDROCODONE BITARTRATE AND ACETAMINOPHEN 1 TABLET: 5; 325 TABLET ORAL at 03:12

## 2024-12-05 RX ADMIN — IPRATROPIUM BROMIDE AND ALBUTEROL SULFATE 3 ML: .5; 3 SOLUTION RESPIRATORY (INHALATION) at 07:12

## 2024-12-05 RX ADMIN — INSULIN ASPART 3 UNITS: 100 INJECTION, SOLUTION INTRAVENOUS; SUBCUTANEOUS at 04:12

## 2024-12-05 RX ADMIN — CETIRIZINE HYDROCHLORIDE 10 MG: 10 TABLET, FILM COATED ORAL at 09:12

## 2024-12-05 RX ADMIN — Medication 2 SPRAY: at 10:12

## 2024-12-05 RX ADMIN — IPRATROPIUM BROMIDE AND ALBUTEROL SULFATE 3 ML: .5; 3 SOLUTION RESPIRATORY (INHALATION) at 04:12

## 2024-12-05 RX ADMIN — INSULIN ASPART 4 UNITS: 100 INJECTION, SOLUTION INTRAVENOUS; SUBCUTANEOUS at 12:12

## 2024-12-05 RX ADMIN — GUAIFENESIN 600 MG: 600 TABLET, EXTENDED RELEASE ORAL at 10:12

## 2024-12-05 RX ADMIN — PANCRELIPASE 2 CAPSULE: 120000; 24000; 76000 CAPSULE, DELAYED RELEASE PELLETS ORAL at 12:12

## 2024-12-05 RX ADMIN — INSULIN GLARGINE 5 UNITS: 100 INJECTION, SOLUTION SUBCUTANEOUS at 09:12

## 2024-12-05 RX ADMIN — IPRATROPIUM BROMIDE AND ALBUTEROL SULFATE 3 ML: .5; 3 SOLUTION RESPIRATORY (INHALATION) at 11:12

## 2024-12-05 RX ADMIN — GUAIFENESIN 600 MG: 600 TABLET, EXTENDED RELEASE ORAL at 08:12

## 2024-12-05 RX ADMIN — ACETAMINOPHEN 650 MG: 325 TABLET ORAL at 10:12

## 2024-12-05 RX ADMIN — CALCIUM ACETATE 1334 MG: 667 CAPSULE ORAL at 04:12

## 2024-12-05 RX ADMIN — PANTOPRAZOLE SODIUM 40 MG: 40 TABLET, DELAYED RELEASE ORAL at 05:12

## 2024-12-05 RX ADMIN — ATORVASTATIN CALCIUM 40 MG: 40 TABLET, FILM COATED ORAL at 08:12

## 2024-12-05 RX ADMIN — INSULIN ASPART 2 UNITS: 100 INJECTION, SOLUTION INTRAVENOUS; SUBCUTANEOUS at 05:12

## 2024-12-05 RX ADMIN — FAMOTIDINE 40 MG: 20 TABLET, FILM COATED ORAL at 08:12

## 2024-12-05 RX ADMIN — INSULIN ASPART 3 UNITS: 100 INJECTION, SOLUTION INTRAVENOUS; SUBCUTANEOUS at 12:12

## 2024-12-05 RX ADMIN — NIFEDIPINE 30 MG: 30 TABLET, FILM COATED, EXTENDED RELEASE ORAL at 09:12

## 2024-12-05 RX ADMIN — PANCRELIPASE 2 CAPSULE: 120000; 24000; 76000 CAPSULE, DELAYED RELEASE PELLETS ORAL at 09:12

## 2024-12-05 RX ADMIN — CALCIUM ACETATE 1334 MG: 667 CAPSULE ORAL at 09:12

## 2024-12-05 RX ADMIN — Medication 400 MG: at 09:12

## 2024-12-05 RX ADMIN — CLOPIDOGREL BISULFATE 75 MG: 75 TABLET ORAL at 09:12

## 2024-12-05 RX ADMIN — HYDROCODONE BITARTRATE AND ACETAMINOPHEN 1 TABLET: 5; 325 TABLET ORAL at 08:12

## 2024-12-05 RX ADMIN — PANCRELIPASE 2 CAPSULE: 120000; 24000; 76000 CAPSULE, DELAYED RELEASE PELLETS ORAL at 04:12

## 2024-12-05 RX ADMIN — FUROSEMIDE 40 MG: 40 TABLET ORAL at 09:12

## 2024-12-05 RX ADMIN — CALCIUM ACETATE 1334 MG: 667 CAPSULE ORAL at 12:12

## 2024-12-05 RX ADMIN — MAGNESIUM SULFATE HEPTAHYDRATE 2 G: 40 INJECTION, SOLUTION INTRAVENOUS at 09:12

## 2024-12-05 RX ADMIN — FUROSEMIDE 20 MG: 10 INJECTION, SOLUTION INTRAMUSCULAR; INTRAVENOUS at 09:12

## 2024-12-05 RX ADMIN — WARFARIN SODIUM 1.25 MG: 2.5 TABLET ORAL at 04:12

## 2024-12-05 RX ADMIN — METOPROLOL SUCCINATE 50 MG: 50 TABLET, EXTENDED RELEASE ORAL at 09:12

## 2024-12-05 RX ADMIN — INSULIN ASPART 3 UNITS: 100 INJECTION, SOLUTION INTRAVENOUS; SUBCUTANEOUS at 09:12

## 2024-12-05 RX ADMIN — ASPIRIN 81 MG: 81 TABLET, COATED ORAL at 09:12

## 2024-12-05 RX ADMIN — AMIODARONE HYDROCHLORIDE 200 MG: 200 TABLET ORAL at 09:12

## 2024-12-05 NOTE — PROGRESS NOTES
[facility] MEDICINE ~ PROGRESS NOTE    CHIEF COMPLAINT     Hospital follow up for severe mitral valve stenosis    HOSPITAL COURSE     79 year old female with a past medical history of VHD s/p mosaic bioprosthetic MVR, CAD, PAF s/p SUKHI ligation, ABEL, COPD/asthma, HTN, DVT s/p IVC filter, SSS s/p PPM, HLD, DM2 who presented to [facility] on 11/23/2024 due to worsening SOB over the past month. She was noted to be in acute decompensated diastolic CHF. Cardiology was consulted and repeat echo revealed EF 60-65%, grade 1 diastolic dysfunction, mean pressure gradient across the mitral valve is 19 mmHg, pulmonary artery systolic pressure is 57 mmHg. MARYBETH 11/27/2024 revealed severe mitral valve stenosis with moderate regurgitation. LHC and RHC 11/27/2024 revealed 70% proximal LAD stenosis and bioprosthetic mitral valve stenosis.  She was evaluated by CV surgery who recommended to optimize CHF prior to intervention for mitral valve.  She subsequently received stenting/ballooning to proximal LAD on 11/29/2024.  Case was discussed between Dr. Lynn and Dr. Cervantes and decision was made to start Coumadin to see if gradient of mitral valve stenosis could be decreased.  She was started on Coumadin 5 mg daily on 11/02/2024 with a goal INR of 2-3.  Plan to continue triple therapy with aspirin, Plavix, Coumadin for 1 month and discontinue aspirin on 12/28/2024.  She will need to follow up echo in 4-6 weeks as well as a follow up with Dr. Watters in 4-6 weeks to discuss plan with mitral valve.  During this admission she was also treated for a Klebsiella pneumoniae and Proteus mirabilis UTI.  She was transferred to our [facility] for continued physical therapy.  Upon my exam today, patient noted to have significant shortness of breath although O2 sat within normal limits.  Increase patient's oxygen from 2 L nasal cannula to 3 L nasal cannula to make her more comfortable.  Patient does not  require supplemental oxygen at baseline, but may require upon discharge.     Today:  Patient appears more comfortable on exam today.  Repeat CXR from yesterday revealed improved volume status.  Giving an additional dose of Lasix 20 mg today.  INR elevated therefore we will decrease warfarin to 1.25 mg for 2 days and continue to monitor INR daily.  Adding aspart 3 units TID due to continued hyperglycemia.  Patient does complain of chronic sinusitis, adding Afrin and Mucinex for 3 days followed by daily Flonase.    OBJECTIVE/PHYSICAL EXAM     VITAL SIGNS (MOST RECENT):  Temp: 99 °F (37.2 °C) (12/05/24 0717)  Pulse: 79 (12/05/24 0906)  Resp: 18 (12/05/24 0707)  BP: 131/74 (12/05/24 0906)  SpO2: 97 % (12/05/24 0717) VITAL SIGNS (24 HOUR RANGE):  Temp:  [98.1 °F (36.7 °C)-99 °F (37.2 °C)] 99 °F (37.2 °C)  Pulse:  [73-83] 79  Resp:  [17-28] 18  SpO2:  [92 %-97 %] 97 %  BP: (107-131)/(56-90) 131/74     GENERAL: In no acute distress, afebrile  HEENT:  Atraumatic, normocephalic, moist mucous membranes, nasal cannula in place  CHEST:  No wheezing  HEART: S1, S2, murmur  ABDOMEN: Soft, nontender, BS +  MSK: Warm, no lower extremity edema, no clubbing or cyanosis  NEUROLOGIC: Alert and oriented x4, moving all extremities with good strength   INTEGUMENTARY:  No obvious skin rashes  PSYCHIATRY:  Appropriate mood and affect    ASSESSMENT/PLAN     Acute hypoxemic respiratory failure 2/2 below  Acute on chronic decompensated HFpEF with pulmonary edema - resolved  Pulmonary hypertension  Severe mitral stenosis  Echo 11/23/2024 EF 60-65%, grade 1 diastolic dysfunction, mean pressure gradient across the mitral valve is 19 mmHg, pulmonary artery systolic pressure is 57 mmHg  MARYBETH 11/27/2024 revealed severe mitral valve stenosis with moderate regurgitation  LHC and RHC 11/27/2024 revealed 70% proximal LAD stenosis and bioprosthetic mitral valve stenosis  CV surgery recommended to optimize CHF prior to intervention for mitral valve  Stenting/ballooning to proximal LAD on 11/29/2024  Dr. Lynn and Dr. Cervantes recommended to start Coumadin 5mg QD with goal INR of 2-3 to see if gradient of mitral valve stenosis could be decreased  Continue triple therapy with aspirin, Plavix, Coumadin for 1 month and discontinue aspirin on 12/28/2024  Follow up echo in 4-6 weeks  Follow up with Dr. Watters in 4-6 weeks to discuss plan with mitral valve  O2 per protocol, wean as tolerated     Severe mitral stenosis s/p mosaic bioprosthetic MVR  CAD s/p stenting/ballooning  PAF s/p SUKHI ligation  HTN, HLD  DVT s/p IVC filter  SSS s/p PPM  Continue amiodarone 200 mg daily, aspirin 81 mg daily (discontinue on 12/28/2024), statin, Plavix 75 mg daily, Lasix 40 mg daily, Toprol-XL 50 mg daily, nifedipine 30 mg daily, warfarin 5 mg daily  Daily PT INRs  INR elevated today - per up-to-date guidelines, changing warfarin to 1.25 mg daily for 2 days  Lasix 20 mg IV x1 today  Daily Mag-Ox 400 mg, magnesium sulfate 2 g x 1 today     Hyperphosphatemia  PhosLo x3 doses today      Hx of COPD/asthma  ABEL   CPAP q.h.s.  Does not require home oxygen at baseline  DuoNebs q.4 while awake     Recent Klebsiella pneumoniae and Proteus mirabilis UTI - completed treatment     DM2  Recent A1c 6.8% within the last month  Holding home glimepiride 4 mg b.i.d. and metformin 500 mg b.i.d. with current respiratory status  Started Lantus 5 units daily 12/04/2024  At aspart 3 units TID with meals 11/05/2024  Moderate dose SSI    Chronic sinusitis  Afrin and Mucinex x3 days followed by Flonase  Continue cetirizine  No indication for antibiotics at this time    Anxiety   Xanax prn      DVT prophylaxis:  Warfarin - decreased to 1.25 mg for 2 days     Code status: Full    Anticipated discharge and disposition:  Continue PT/OT at this time  __________________________________________________________________________    LABS/MICRO/MEDS/DIAGNOSTICS     LABS  Recent Labs     12/05/24  0430      K 4.0    CO2 27   BUN 20.2*   CREATININE 0.74   GLUCOSE 197*   CALCIUM 9.5   ALKPHOS 86   AST 25   ALT 18   ALBUMIN 2.6*     Recent Labs     12/04/24  0440   WBC 14.63*   RBC 3.68*   HCT 34.4*   MCV 93.5        MICROBIOLOGY  Microbiology Results (last 7 days)       ** No results found for the last 168 hours. **             MEDICATIONS   albuterol-ipratropium  3 mL Nebulization Q4H While awake    amiodarone  200 mg Oral Daily    aspirin  81 mg Oral Daily    atorvastatin  40 mg Oral Daily    calcium acetate(phosphat bind)  1,334 mg Oral TID WM    cetirizine  10 mg Oral Daily    clopidogreL  75 mg Oral Daily    famotidine  40 mg Oral QHS    [START ON 12/8/2024] fluticasone propionate  2 spray Each Nostril Daily    furosemide  40 mg Oral Daily    guaiFENesin  600 mg Oral BID    insulin aspart U-100  3 Units Subcutaneous TIDWM    insulin glargine U-100  5 Units Subcutaneous Daily    lipase-protease-amylase 24,000-76,000-120,000 units  2 capsule Oral TID WM    magnesium oxide  400 mg Oral Daily    magnesium sulfate IVPB  2 g Intravenous Once    metoprolol succinate  50 mg Oral Daily    NIFEdipine  30 mg Oral Daily    oxymetazoline  2 spray Each Nostril BID    pantoprazole  40 mg Oral QAM    warfarin  1.25 mg Oral Daily         INFUSIONS       DIAGNOSTIC TESTS  X-Ray Chest 1 View   Final Result      Improving volume status         Electronically signed by: Haider Chavarria   Date:    12/04/2024   Time:    18:25         EF   Date Value Ref Range Status   12/22/2022 55 % Final        NUTRITION STATUS  Patient meets ASPEN criteria for moderate malnutrition of acute illness or injury per RD assessment as evidenced by:  Energy Intake (Malnutrition): less than or equal to 50% for greater than or equal to 5 days  Weight Loss (Malnutrition): other (see comments) (Unable to assess)  Subcutaneous Fat (Malnutrition): moderate depletion     Fluid Accumulation (Malnutrition): other (see comments) (Unable to assess)        A minimum of two  characteristics is recommended for diagnosis of either severe or non-severe malnutrition.     Case related differential diagnoses have been reviewed; assessment and plan has been documented. I have personally reviewed the labs and test results that are currently available; I have reviewed the patients medication list. I have reviewed the consulting providers recommendations. I have reviewed or attempted to review medical records based upon their availability.  All of the patient's and/or family's questions have been addressed and answered to the best of my ability.  I will continue to monitor closely and make adjustments to medical management as needed.  This document was created using M*Modal Fluency Direct.  Transcription errors may have been made.  Please contact me if any questions may rise regarding documentation to clarify transcription.      MARTHA Stewart   Internal Medicine  Department of Hospital Medicine [facility]

## 2024-12-05 NOTE — PT/OT/SLP PROGRESS
Occupational Therapy  Treatment    Name: Kortney Leach    : 1945 (79 y.o.)  MRN: [id number]           TREATMENT SUMMARY AND RECOMMENDATIONS:      OT Date of Treatment: 24  OT Start Time:   OT Stop Time:   OT Total Time (min): 30 min      Subjective Assessment:   No complaints  Lethargic   x Awake, alert, cooperative  Impulsive    Uncooperative   Flat affect    Agitated  c/o pain   x Appropriate  c/o fatigue    Confused  Treated at bedside     Emotionally labile x Treated in gym/dept.    Anxious/fearful   Other:        Therapy Precautions:    Cognitive deficits  Spinal precautions    Collar - hard  Sternal precautions    Collar - soft   Cervical precautions    x Fall risk  LSO    Hip precautions - posterior  TLSO    Hip precautions - anterior  WBAT    Impaired communication  Partial weightbearing   x Oxygen 2L via NC  TTWB    PEG tube  NWB    Visual deficits  Knee immobilizer    Hearing deficits   Other:        Treatment Objectives:     Mobility Training:    Mobility task Assist level Comments    Bed mobility Mod A EOB<supine; assist for BLE management    Transfer CGA Stand step t/f to EOB using a RW   Sit to stands transitions Min A WC<standing using a RW   Functional mobility CGA X50ft using a RW and following with a WC for safety    Sitting balance     Standing balance      Wheelchair mobility        ADL Training:    ADL Assist level Comments    Feeding     Grooming/hygiene     Bathing     Upper body dressing     Lower body dressing     Toileting     Toilet transfer     Adaptive equipment training SBA Training in use of reacher to doff socks and a sock-aid to don them in order to promote energy conservation techniques and lessen the difficulty of LB dressing; pt noted with good return demo    Other:           Therapeutic Exercise:   Exercise Sets Reps Comments   BUE strengthening exercises  2 10 3# weighted dowel performing bicep curls, chest press, shoulder press, and forward/backward  rows                          Additional Comments:  Notified CNA to notify pt's nurse of pt c/o of back pain and spasms.     Assessment: Patient had a positive response to her session. Pt is making (+) gains towards her goals as evident of showing good return demo when using AE to perform LB dressing. Pt would benefit from continued OT services to increase independence with occupational performance, decrease risks for falls, and decrease caregiver burden. Recommend continuing POC.     GOALS:   Multidisciplinary Problems       Occupational Therapy Goals          Problem: Occupational Therapy    Goal Priority Disciplines Outcome Interventions   Occupational Therapy Goal     OT, PT/OT Progressing    Description: Goals to be met by: Discharge    Patient will increase functional independence with ADLs by performing:    UE Dressing with Modified [place].  LE Dressing with Stand-by Assistance.  Grooming while seated at sink with Modified Baxter.  Toileting from toilet with Stand-by Assistance for hygiene and clothing management.   Bathing from  shower chair/bench with Stand-by Assistance.  Toilet transfer to toilet with Stand-by Assistance.  Increased functional strength to 4/5 for functional mobility and self-care.   Pt will incorporate energy conservation techniques while performing her self-care.                          Recommendations:     Discharge Equipment Recommendations:  none     Plan:     Patient to be seen  (5-6x/week; QD-BID as appropriate) to address the above listed problems via self-care/home management, therapeutic activities, therapeutic exercises (energy conservation techniques)  Plan of Care Expires: 12/25/24  Revisions made to plan of care: No    Skilled OT Minutes Provided: 30  Communication with Treatment Team:     At end of treatment, patient remained:   Up in chair     Up in wheelchair in room   x In bed   x With alarm activated   x Bed rails up   x Call bell in reach     Family/friends  present    Restraints secured properly    In bathroom with CNA/RN notified    In gym with PT/PTA/tech   x Nurse aware    Other:

## 2024-12-05 NOTE — PLAN OF CARE
Problem: Adult Inpatient Plan of Care  Goal: Plan of Care Review  Outcome: Progressing  Goal: Patient-Specific Goal (Individualized)  Outcome: Progressing  Goal: Absence of Hospital-Acquired Illness or Injury  Outcome: Progressing  Intervention: Identify and Manage Fall Risk  Flowsheets (Taken 12/4/2024 1900)  Safety Promotion/Fall Prevention:   assistive device/personal item within reach   bed alarm set   Fall Risk reviewed with patient/family   Fall Risk signage in place   instructed to call staff for mobility   patient expresses understanding of fall risk and prevention   room near unit station   side rails raised x 3  Intervention: Prevent Skin Injury  Flowsheets (Taken 12/4/2024 1900)  Body Position:   right   30 degrees   upper extremity elevated   weight shifting  Skin Protection:   incontinence pads utilized   skin sealant/moisture barrier applied  Device Skin Pressure Protection:   absorbent pad utilized/changed   tubing/devices free from skin contact  Intervention: Prevent and Manage VTE (Venous Thromboembolism) Risk  Flowsheets (Taken 12/4/2024 1900)  VTE Prevention/Management:   ambulation promoted   fluids promoted   ROM (active) performed  Intervention: Prevent Infection  Flowsheets (Taken 12/4/2024 1900)  Infection Prevention:   environmental surveillance performed   hand hygiene promoted   rest/sleep promoted   single patient room provided  Goal: Optimal Comfort and Wellbeing  Outcome: Progressing  Intervention: Monitor Pain and Promote Comfort  Flowsheets (Taken 12/4/2024 1900)  Pain Management Interventions:   pain management plan reviewed with patient/caregiver   pillow support provided   position adjusted   quiet environment facilitated   relaxation techniques promoted  Intervention: Provide Person-Centered Care  Flowsheets (Taken 12/4/2024 1900)  Trust Relationship/Rapport:   care explained   choices provided   emotional support provided   empathic listening provided   questions answered    questions encouraged   reassurance provided   thoughts/feelings acknowledged  Goal: Readiness for Transition of Care  Outcome: Progressing  Intervention: Mutually Develop Transition Plan  Flowsheets (Taken 12/4/2024 1900)  Equipment Currently Used at Home:   bedside commode   bath bench   CPAP   walker, rolling  Transportation Anticipated: family or friend will provide  Who are your caregiver(s) and their phone number(s)?: Anita Youssef [phone]  Communicated SD with patient/caregiver: Date not available/Unable to determine  Do you expect to return to your current living situation?: Yes  Do you have help at home or someone to help you manage your care at home?: Yes  Readmission within 30 days?: No  Do you currently have service(s) that help you manage your care at home?: No     Problem: Diabetes Comorbidity  Goal: Blood Glucose Level Within Targeted Range  Outcome: Progressing  Intervention: Monitor and Manage Glycemia  Flowsheets (Taken 12/4/2024 1900)  Glycemic Management: blood glucose monitored     Problem: Wound  Goal: Optimal Coping  Outcome: Progressing  Intervention: Support Patient and Family Response  Flowsheets (Taken 12/4/2024 1900)  Supportive Measures:   active listening utilized   self-care encouraged   self-reflection promoted   verbalization of feelings encouraged   relaxation techniques promoted  Family/Support System Care:   involvement promoted   presence promoted  Goal: Optimal Functional Ability  Outcome: Progressing  Intervention: Optimize Functional Ability  Flowsheets (Taken 12/4/2024 1900)  Assistive Device Utilized: (unknown as of yet) other (see comments)  Activity Management: Rolling - L1  Activity Assistance Provided: assistance, 1 person  Goal: Absence of Infection Signs and Symptoms  Outcome: Progressing  Intervention: Prevent or Manage Infection  Flowsheets (Taken 12/4/2024 1900)  Fever Reduction/Comfort Measures:   lightweight bedding   lightweight clothing  Infection  Management: aseptic technique maintained  Isolation Precautions:   precautions maintained   protective  Goal: Improved Oral Intake  Outcome: Progressing  Goal: Optimal Pain Control and Function  Outcome: Progressing  Goal: Skin Health and Integrity  Outcome: Progressing  Goal: Optimal Wound Healing  Outcome: Progressing     Problem: Fall Injury Risk  Goal: Absence of Fall and Fall-Related Injury  Outcome: Progressing  Intervention: Identify and Manage Contributors  Flowsheets (Taken 12/4/2024 1900)  Self-Care Promotion:   independence encouraged   BADL personal objects within reach   BADL personal routines maintained   meal set-up provided   adaptive equipment use encouraged  Medication Review/Management: medications reviewed

## 2024-12-05 NOTE — PT/OT/SLP PROGRESS
Physical Therapy Treatment Note           Name: Kortney Leach    : 1945 (79 y.o.)  MRN: [id number]           TREATMENT SUMMARY AND RECOMMENDATIONS:    PT Received On: 24  PT Start Time: 1030     PT Stop Time: 1058  PT Total Time (min): 28 min     Subjective Assessment:   No complaints  Lethargic   x Awake, alert, cooperative  Uncooperative    Agitated     x c/o pain - LBP    Appropriate  c/o fatigue    Confused  Treated at bedside     Emotionally labile     x Treated in gym/dept.    Impulsive  Other:    Flat affect       Therapy Precautions:    Cognitive deficits  Spinal precautions    Collar - hard  Sternal precautions    Collar - soft   TLSO    Fall risk  LSO    Hip precautions - posterior  Knee immobilizer    Hip precautions - anterior  WBAT    Impaired communication  Partial weightbearing   x Oxygen - 2L via NC  TTWB    PEG tube  NWB    Visual deficits  Other:    Hearing deficits          Treatment Objectives:     Mobility Training:   Assist level Comments    Bed mobility      SBA Scooting EOB, sit>sup (increased time due to LBP)   Transfer   SBA/CGA Stand pivot with RW   Gait     CGA 50 ft level surfaces with RW   Sit to stand transitions   SBA/CGA 1x5 STS from WC with BUE use   Sitting balance     Standing balance      Wheelchair mobility     Car transfer     Other:          Therapeutic Exercise:   Exercise Sets Reps Comments   Hip flex, hip abd<>add, LAQ, ankle PF<>DF 1 30 Performed short sitting  2# ankle weights                         Additional Comments:  Feeling better with breathing. LBP today.    Assessment: Patient tolerated session well. She demonstrates increased activity tolerance today with noted improvement in breathing.     PT Plan: Cont PT POC.  Revisions made to plan of care: No    GOALS:   Multidisciplinary Problems       Physical Therapy Goals          Problem: Physical Therapy    Goal Priority Disciplines Outcome Interventions   Physical Therapy Goal     PT,  PT/OT Progressing    Description: Goals to be met by: discharge     Patient will increase functional independence with mobility by performin. Sit to stand transfer with Supervision and minimal dyspnea.   2. Gait  x 150 feet with Supervision using Rolling Walker and minimal dyspnea.                          Skilled PT Minutes Provided: 28 min   Communication with Treatment Team:     Equipment recommendations:       At end of treatment, patient remained:   Up in chair     Up in wheelchair in room   x In bed   x With alarm activated   x Bed rails up   x Call bell in reach    x Family/friends present    Restraints secured properly    In bathroom with CNA/RN notified    Nurse aware    In gym with therapist/tech    Other:

## 2024-12-05 NOTE — PT/OT/SLP PROGRESS
Occupational Therapy  Treatment    Name: Kortney Leach    : 1945 (79 y.o.)  MRN: [id number]           TREATMENT SUMMARY AND RECOMMENDATIONS:      OT Date of Treatment: 24  OT Start Time: 1000  OT Stop Time: 1030  OT Total Time (min): 30 min      Subjective Assessment:   No complaints  Lethargic   x Awake, alert, cooperative  Impulsive    Uncooperative   Flat affect    Agitated  c/o pain   x Appropriate  c/o fatigue    Confused x Treated at bedside     Emotionally labile x Treated in gym/dept.    Anxious/fearful   Other:        Therapy Precautions:    Cognitive deficits  Spinal precautions    Collar - hard  Sternal precautions    Collar - soft   Cervical precautions    x Fall risk  LSO    Hip precautions - posterior  TLSO    Hip precautions - anterior  WBAT    Impaired communication  Partial weightbearing   x Oxygen 2L via NC  TTWB    PEG tube  NWB    Visual deficits  Knee immobilizer    Hearing deficits   Other:        Treatment Objectives:     Mobility Training:    Mobility task Assist level Comments    Bed mobility     Transfer CGA Stand step t/f to her WC using a RW   Sit to stands transitions Min A BSChair<standing using a RW   Functional mobility     Sitting balance     Standing balance      Wheelchair mobility        ADL Training:    ADL Assist level Comments    Feeding     Grooming/hygiene CGA Hand hygiene while standing at the sink; RW anterior    Bathing     Upper body dressing     Lower body dressing     Toileting CGA +void; able to manage her clothing and perform anterior hygiene in sitting; using a RW for support with one UE in standing    Toilet transfer     Adaptive equipment training     Other:           Therapeutic Exercise:   Exercise Sets Reps Comments   UBE endurance training  2 5 min/ 3 min Ergometer low resistance; forwards/backwards                          Additional Comments:      Assessment: Patient had a positive response to her session. Pt would benefit from continued  OT services to increase independence with occupational performance, decrease risks for falls, and decrease caregiver burden. Recommend continuing POC.     GOALS:   Multidisciplinary Problems       Occupational Therapy Goals          Problem: Occupational Therapy    Goal Priority Disciplines Outcome Interventions   Occupational Therapy Goal     OT, PT/OT Progressing    Description: Goals to be met by: Discharge    Patient will increase functional independence with ADLs by performing:    UE Dressing with Modified [place].  LE Dressing with Stand-by Assistance.  Grooming while seated at sink with Modified [place].  Toileting from toilet with Stand-by Assistance for hygiene and clothing management.   Bathing from  shower chair/bench with Stand-by Assistance.  Toilet transfer to toilet with Stand-by Assistance.  Increased functional strength to 4/5 for functional mobility and self-care.   Pt will incorporate energy conservation techniques while performing her self-care.                          Recommendations:     Discharge Equipment Recommendations:  none     Plan:     Patient to be seen  (5-6x/week; QD-BID as appropriate) to address the above listed problems via self-care/home management, therapeutic activities, therapeutic exercises (energy conservation techniques)  Plan of Care Expires: 12/25/24  Revisions made to plan of care: No    Skilled OT Minutes Provided: 30  Communication with Treatment Team:     At end of treatment, patient remained:   Up in chair     Up in wheelchair in room    In bed    With alarm activated    Bed rails up    Call bell in reach     Family/friends present    Restraints secured properly    In bathroom with CNA/RN notified   x In gym with PT/PTA/tech    Nurse aware    Other:

## 2024-12-05 NOTE — PLAN OF CARE
Problem: Adult Inpatient Plan of Care  Goal: Plan of Care Review  Outcome: Progressing  Flowsheets (Taken 12/5/2024 0710)  Plan of Care Reviewed With: patient  Goal: Patient-Specific Goal (Individualized)  Outcome: Progressing  Flowsheets (Taken 12/5/2024 0710)  Individualized Care Needs: wean O2, PT/OT, replace electrolytes  Anxieties, Fears or Concerns: denies any concerns at this time  Goal: Absence of Hospital-Acquired Illness or Injury  Outcome: Progressing  Intervention: Identify and Manage Fall Risk  Flowsheets (Taken 12/5/2024 0710)  Safety Promotion/Fall Prevention:   assistive device/personal item within reach   bed alarm set   Fall Risk signage in place   Fall Risk reviewed with patient/family   patient expresses understanding of fall risk and prevention  Intervention: Prevent Skin Injury  Flowsheets (Taken 12/5/2024 0710)  Body Position: position changed independently  Skin Protection: drying agents applied  Device Skin Pressure Protection: absorbent pad utilized/changed  Intervention: Prevent and Manage VTE (Venous Thromboembolism) Risk  Flowsheets (Taken 12/5/2024 0710)  VTE Prevention/Management:   bleeding risk assessed   ambulation promoted  Intervention: Prevent Infection  Flowsheets (Taken 12/5/2024 0710)  Infection Prevention:   cohorting utilized   personal protective equipment utilized   environmental surveillance performed   rest/sleep promoted   equipment surfaces disinfected   single patient room provided   hand hygiene promoted  Goal: Optimal Comfort and Wellbeing  Outcome: Progressing  Intervention: Monitor Pain and Promote Comfort  Flowsheets (Taken 12/5/2024 0710)  Pain Management Interventions: care clustered  Intervention: Provide Person-Centered Care  Flowsheets (Taken 12/5/2024 0710)  Trust Relationship/Rapport:   care explained   questions encouraged   choices provided   reassurance provided   emotional support provided   thoughts/feelings acknowledged   empathic listening  provided   questions answered  Goal: Readiness for Transition of Care  Outcome: Progressing     Problem: Diabetes Comorbidity  Goal: Blood Glucose Level Within Targeted Range  Outcome: Progressing  Intervention: Monitor and Manage Glycemia  Flowsheets (Taken 12/5/2024 0710)  Glycemic Management:   blood glucose monitored   carbohydrate replacement provided     Problem: Wound  Goal: Optimal Coping  Outcome: Progressing  Intervention: Support Patient and Family Response  Flowsheets (Taken 12/5/2024 0710)  Supportive Measures: active listening utilized  Family/Support System Care: caregiver stress acknowledged  Goal: Optimal Functional Ability  Outcome: Progressing  Intervention: Optimize Functional Ability  Flowsheets (Taken 12/5/2024 0710)  Assistive Device Utilized:   gait belt   walker  Activity Management: Ambulated in room - L4  Activity Assistance Provided: independent  Goal: Absence of Infection Signs and Symptoms  Outcome: Progressing  Intervention: Prevent or Manage Infection  Flowsheets (Taken 12/5/2024 0710)  Fever Reduction/Comfort Measures:   lightweight clothing   lightweight bedding  Infection Management: aseptic technique maintained  Isolation Precautions:   protective   precautions maintained  Goal: Improved Oral Intake  Outcome: Progressing  Intervention: Promote and Optimize Oral Intake  Flowsheets (Taken 12/5/2024 0710)  Oral Nutrition Promotion: physical activity promoted  Nutrition Interventions: food preferences provided  Goal: Optimal Pain Control and Function  Outcome: Progressing  Intervention: Prevent or Manage Pain  Flowsheets (Taken 12/5/2024 0710)  Sleep/Rest Enhancement: awakenings minimized  Pain Management Interventions: care clustered  Goal: Skin Health and Integrity  Outcome: Progressing  Intervention: Optimize Skin Protection  Flowsheets (Taken 12/5/2024 0758)  Activity Management: Rolling - L1  Head of Bed (HOB) Positioning: HOB at 30 degrees  Goal: Optimal Wound Healing  Outcome:  Progressing     Problem: Fall Injury Risk  Goal: Absence of Fall and Fall-Related Injury  Outcome: Progressing

## 2024-12-05 NOTE — PLAN OF CARE
[facility]  Initial Discharge Assessment       Primary Care Provider: Arnaldo Hernandez MD    Admission Diagnosis: Valvular heart disease [I38]    Admission Date: 12/3/2024  Expected Discharge Date:          Payor: MEDICARE / Plan: MEDICARE PART A & B / Product Type: Government /     Extended Emergency Contact Information  Primary Emergency Contact: Anita Phoenix  Address: [address] [place] [address] [place] [address] [facility]  Home Phone: [phone]  Mobile Phone: [phone]  Relation: Daughter  Preferred language: English   needed? No  Secondary Emergency Contact: ТатьянаdeShereen jose  Mobile Phone: [phone]  Relation: Sister  Preferred language: English   needed? No              [facility] [place] - [address]  [address] [facility]  [place] [address]  Phone: [phone] Fax: [phone]      Initial Assessment (most recent)       Adult Discharge Assessment - 12/04/24 1602          Discharge Assessment    Assessment Type Discharge Planning Assessment     Confirmed/corrected address, phone number and insurance Yes     Confirmed Demographics Correct on Facesheet     Source of Information patient

## 2024-12-05 NOTE — PT/OT/SLP PROGRESS
Physical Therapy Treatment Note           Name: Kortney Leach    : 1945 (79 y.o.)  MRN: [id number]           TREATMENT SUMMARY AND RECOMMENDATIONS:    PT Received On: 24  PT Start Time: 1420     PT Stop Time: 1443  PT Total Time (min): 23 min     Subjective Assessment:   No complaints  Lethargic   x Awake, alert, cooperative  Uncooperative    Agitated     x c/o pain - LBP    Appropriate  c/o fatigue    Confused  Treated at bedside     Emotionally labile     x Treated in gym/dept.    Impulsive  Other:    Flat affect       Therapy Precautions:    Cognitive deficits  Spinal precautions    Collar - hard  Sternal precautions    Collar - soft   TLSO    Fall risk  LSO    Hip precautions - posterior  Knee immobilizer    Hip precautions - anterior  WBAT    Impaired communication  Partial weightbearing   x Oxygen - 2L via NC  TTWB    PEG tube  NWB    Visual deficits  Other:    Hearing deficits          Treatment Objectives:     Mobility Training:   Assist level Comments    Bed mobility     Transfer   SBA/CGA Stand pivot with RW   Gait      ft and 50 ft level surfaces with RW   Sit to stand transitions   SBA/CGA x5 STS from WC with BUE use   Sitting balance     Standing balance      Wheelchair mobility     Car transfer     Other:          Therapeutic Exercise:   Exercise Sets Reps Comments   Hip flex, hip abd<>add, heel raises 1 10 Standing with BUE support RW                         Additional Comments:    Assessment: Patient tolerated session well. She was able to increase ambulation distance today which ultimately impacts her independence and safety with household mobility.       PT Plan: Cont PT POC.  Revisions made to plan of care: No    GOALS:   Multidisciplinary Problems       Physical Therapy Goals          Problem: Physical Therapy    Goal Priority Disciplines Outcome Interventions   Physical Therapy Goal     PT, PT/OT Progressing    Description: Goals to be met by: discharge      Patient will increase functional independence with mobility by performin. Sit to stand transfer with Supervision and minimal dyspnea.   2. Gait  x 150 feet with Supervision using Rolling Walker and minimal dyspnea.                          Skilled PT Minutes Provided: 23 min   Communication with Treatment Team:     Equipment recommendations:       At end of treatment, patient remained:   Up in chair     Up in wheelchair in room    In bed    With alarm activated    Bed rails up    Call bell in reach     Family/friends present    Restraints secured properly    In bathroom with CNA/RN notified    Nurse aware   x In gym with therapist/tech    Other:

## 2024-12-06 LAB
INR PPP: 4.3
POCT GLUCOSE: 182 MG/DL (ref 70–110)
POCT GLUCOSE: 190 MG/DL (ref 70–110)
PROTHROMBIN TIME: 40.8 SECONDS (ref 12.5–14.5)

## 2024-12-06 PROCEDURE — 99900035 HC TECH TIME PER 15 MIN (STAT)

## 2024-12-06 PROCEDURE — 97110 THERAPEUTIC EXERCISES: CPT

## 2024-12-06 PROCEDURE — 85610 PROTHROMBIN TIME: CPT | Performed by: STUDENT IN AN ORGANIZED HEALTH CARE EDUCATION/TRAINING PROGRAM

## 2024-12-06 PROCEDURE — 11000004 HC SNF PRIVATE

## 2024-12-06 PROCEDURE — 25000003 PHARM REV CODE 250: Performed by: INTERNAL MEDICINE

## 2024-12-06 PROCEDURE — 25000003 PHARM REV CODE 250: Performed by: STUDENT IN AN ORGANIZED HEALTH CARE EDUCATION/TRAINING PROGRAM

## 2024-12-06 PROCEDURE — 25000003 PHARM REV CODE 250: Performed by: PHYSICIAN ASSISTANT

## 2024-12-06 PROCEDURE — 36415 COLL VENOUS BLD VENIPUNCTURE: CPT | Performed by: STUDENT IN AN ORGANIZED HEALTH CARE EDUCATION/TRAINING PROGRAM

## 2024-12-06 PROCEDURE — 27000221 HC OXYGEN, UP TO 24 HOURS

## 2024-12-06 PROCEDURE — 94760 N-INVAS EAR/PLS OXIMETRY 1: CPT

## 2024-12-06 PROCEDURE — 63600175 PHARM REV CODE 636 W HCPCS: Performed by: PHYSICIAN ASSISTANT

## 2024-12-06 PROCEDURE — 25000242 PHARM REV CODE 250 ALT 637 W/ HCPCS: Performed by: STUDENT IN AN ORGANIZED HEALTH CARE EDUCATION/TRAINING PROGRAM

## 2024-12-06 PROCEDURE — 25000242 PHARM REV CODE 250 ALT 637 W/ HCPCS: Performed by: PHYSICIAN ASSISTANT

## 2024-12-06 PROCEDURE — 63600175 PHARM REV CODE 636 W HCPCS: Performed by: STUDENT IN AN ORGANIZED HEALTH CARE EDUCATION/TRAINING PROGRAM

## 2024-12-06 PROCEDURE — 94640 AIRWAY INHALATION TREATMENT: CPT

## 2024-12-06 PROCEDURE — 97530 THERAPEUTIC ACTIVITIES: CPT

## 2024-12-06 PROCEDURE — 97535 SELF CARE MNGMENT TRAINING: CPT

## 2024-12-06 PROCEDURE — 63600175 PHARM REV CODE 636 W HCPCS: Performed by: INTERNAL MEDICINE

## 2024-12-06 RX ORDER — METHOCARBAMOL 500 MG/1
500 TABLET, FILM COATED ORAL 3 TIMES DAILY PRN
Status: DISCONTINUED | OUTPATIENT
Start: 2024-12-06 | End: 2024-12-07

## 2024-12-06 RX ORDER — IPRATROPIUM BROMIDE AND ALBUTEROL SULFATE 2.5; .5 MG/3ML; MG/3ML
3 SOLUTION RESPIRATORY (INHALATION)
Status: DISCONTINUED | OUTPATIENT
Start: 2024-12-06 | End: 2024-12-07

## 2024-12-06 RX ORDER — GLIMEPIRIDE 2 MG/1
4 TABLET ORAL 2 TIMES DAILY
Status: DISCONTINUED | OUTPATIENT
Start: 2024-12-06 | End: 2024-12-15 | Stop reason: HOSPADM

## 2024-12-06 RX ADMIN — METHOCARBAMOL 500 MG: 500 TABLET ORAL at 08:12

## 2024-12-06 RX ADMIN — METHOCARBAMOL 500 MG: 500 TABLET ORAL at 02:12

## 2024-12-06 RX ADMIN — INSULIN ASPART 3 UNITS: 100 INJECTION, SOLUTION INTRAVENOUS; SUBCUTANEOUS at 04:12

## 2024-12-06 RX ADMIN — PANTOPRAZOLE SODIUM 40 MG: 40 TABLET, DELAYED RELEASE ORAL at 06:12

## 2024-12-06 RX ADMIN — GUAIFENESIN 600 MG: 600 TABLET, EXTENDED RELEASE ORAL at 08:12

## 2024-12-06 RX ADMIN — HYDROCODONE BITARTRATE AND ACETAMINOPHEN 1 TABLET: 5; 325 TABLET ORAL at 08:12

## 2024-12-06 RX ADMIN — Medication 400 MG: at 08:12

## 2024-12-06 RX ADMIN — IPRATROPIUM BROMIDE AND ALBUTEROL SULFATE 3 ML: .5; 3 SOLUTION RESPIRATORY (INHALATION) at 07:12

## 2024-12-06 RX ADMIN — INSULIN GLARGINE 5 UNITS: 100 INJECTION, SOLUTION SUBCUTANEOUS at 08:12

## 2024-12-06 RX ADMIN — PANCRELIPASE 2 CAPSULE: 120000; 24000; 76000 CAPSULE, DELAYED RELEASE PELLETS ORAL at 04:12

## 2024-12-06 RX ADMIN — GLIMEPIRIDE 4 MG: 2 TABLET ORAL at 10:12

## 2024-12-06 RX ADMIN — HYDROCODONE BITARTRATE AND ACETAMINOPHEN 1 TABLET: 5; 325 TABLET ORAL at 04:12

## 2024-12-06 RX ADMIN — GLIMEPIRIDE 4 MG: 2 TABLET ORAL at 08:12

## 2024-12-06 RX ADMIN — GUAIFENESIN 600 MG: 600 TABLET, EXTENDED RELEASE ORAL at 09:12

## 2024-12-06 RX ADMIN — INSULIN ASPART 3 UNITS: 100 INJECTION, SOLUTION INTRAVENOUS; SUBCUTANEOUS at 08:12

## 2024-12-06 RX ADMIN — FUROSEMIDE 40 MG: 40 TABLET ORAL at 08:12

## 2024-12-06 RX ADMIN — FAMOTIDINE 40 MG: 20 TABLET, FILM COATED ORAL at 08:12

## 2024-12-06 RX ADMIN — PANCRELIPASE 2 CAPSULE: 120000; 24000; 76000 CAPSULE, DELAYED RELEASE PELLETS ORAL at 12:12

## 2024-12-06 RX ADMIN — INSULIN ASPART 3 UNITS: 100 INJECTION, SOLUTION INTRAVENOUS; SUBCUTANEOUS at 10:12

## 2024-12-06 RX ADMIN — PANCRELIPASE 2 CAPSULE: 120000; 24000; 76000 CAPSULE, DELAYED RELEASE PELLETS ORAL at 08:12

## 2024-12-06 RX ADMIN — AMIODARONE HYDROCHLORIDE 200 MG: 200 TABLET ORAL at 08:12

## 2024-12-06 RX ADMIN — IPRATROPIUM BROMIDE AND ALBUTEROL SULFATE 3 ML: .5; 3 SOLUTION RESPIRATORY (INHALATION) at 12:12

## 2024-12-06 RX ADMIN — ATORVASTATIN CALCIUM 40 MG: 40 TABLET, FILM COATED ORAL at 08:12

## 2024-12-06 RX ADMIN — CETIRIZINE HYDROCHLORIDE 10 MG: 10 TABLET, FILM COATED ORAL at 08:12

## 2024-12-06 RX ADMIN — INSULIN ASPART 2 UNITS: 100 INJECTION, SOLUTION INTRAVENOUS; SUBCUTANEOUS at 06:12

## 2024-12-06 RX ADMIN — Medication 2 SPRAY: at 09:12

## 2024-12-06 RX ADMIN — CLOPIDOGREL BISULFATE 75 MG: 75 TABLET ORAL at 08:12

## 2024-12-06 RX ADMIN — ASPIRIN 81 MG: 81 TABLET, COATED ORAL at 09:12

## 2024-12-06 RX ADMIN — METOPROLOL SUCCINATE 50 MG: 50 TABLET, EXTENDED RELEASE ORAL at 08:12

## 2024-12-06 RX ADMIN — IPRATROPIUM BROMIDE AND ALBUTEROL SULFATE 3 ML: .5; 3 SOLUTION RESPIRATORY (INHALATION) at 06:12

## 2024-12-06 RX ADMIN — INSULIN ASPART 2 UNITS: 100 INJECTION, SOLUTION INTRAVENOUS; SUBCUTANEOUS at 12:12

## 2024-12-06 RX ADMIN — IPRATROPIUM BROMIDE AND ALBUTEROL SULFATE 3 ML: .5; 3 SOLUTION RESPIRATORY (INHALATION) at 03:12

## 2024-12-06 NOTE — PLAN OF CARE
Problem: Adult Inpatient Plan of Care  Goal: Plan of Care Review  Outcome: Progressing  Flowsheets (Taken 12/5/2024 1900)  Plan of Care Reviewed With: patient  Goal: Patient-Specific Goal (Individualized)  Outcome: Progressing  Flowsheets (Taken 12/5/2024 1900)  Individualized Care Needs: wean O2, PT/OT, replace electrolytes  Anxieties, Fears or Concerns: denies any concerns at this time  Goal: Absence of Hospital-Acquired Illness or Injury  Outcome: Progressing  Intervention: Identify and Manage Fall Risk  Flowsheets (Taken 12/5/2024 1900)  Safety Promotion/Fall Prevention:   assistive device/personal item within reach   bed alarm set   Fall Risk signage in place   Fall Risk reviewed with patient/family   patient expresses understanding of fall risk and prevention  Intervention: Prevent Skin Injury  Flowsheets (Taken 12/5/2024 1900)  Body Position: position changed independently  Skin Protection: drying agents applied  Device Skin Pressure Protection: absorbent pad utilized/changed  Intervention: Prevent and Manage VTE (Venous Thromboembolism) Risk  Flowsheets (Taken 12/5/2024 1900)  VTE Prevention/Management:   bleeding risk assessed   ambulation promoted  Intervention: Prevent Infection  Flowsheets (Taken 12/5/2024 1900)  Infection Prevention:   cohorting utilized   personal protective equipment utilized   environmental surveillance performed   rest/sleep promoted   equipment surfaces disinfected   single patient room provided   hand hygiene promoted  Goal: Optimal Comfort and Wellbeing  Outcome: Progressing  Intervention: Monitor Pain and Promote Comfort  Flowsheets (Taken 12/5/2024 1900)  Pain Management Interventions: care clustered  Intervention: Provide Person-Centered Care  Flowsheets (Taken 12/5/2024 1900)  Trust Relationship/Rapport:   care explained   questions encouraged   choices provided   reassurance provided   emotional support provided   thoughts/feelings acknowledged   empathic listening  provided   questions answered  Goal: Readiness for Transition of Care  Outcome: Progressing     Problem: Diabetes Comorbidity  Goal: Blood Glucose Level Within Targeted Range  Outcome: Progressing  Intervention: Monitor and Manage Glycemia  Flowsheets (Taken 12/5/2024 1900)  Glycemic Management:   blood glucose monitored   carbohydrate replacement provided     Problem: Wound  Goal: Optimal Coping  Outcome: Progressing  Intervention: Support Patient and Family Response  Flowsheets (Taken 12/5/2024 1900)  Supportive Measures: active listening utilized  Family/Support System Care: caregiver stress acknowledged  Goal: Optimal Functional Ability  Outcome: Progressing  Intervention: Optimize Functional Ability  Flowsheets (Taken 12/5/2024 1900)  Assistive Device Utilized:   gait belt   walker  Activity Management: Ambulated in room - L4  Activity Assistance Provided: independent  Goal: Absence of Infection Signs and Symptoms  Outcome: Progressing  Intervention: Prevent or Manage Infection  Flowsheets (Taken 12/5/2024 1900)  Fever Reduction/Comfort Measures:   lightweight clothing   lightweight bedding  Infection Management: aseptic technique maintained  Isolation Precautions:   protective   precautions maintained  Goal: Improved Oral Intake  Outcome: Progressing  Intervention: Promote and Optimize Oral Intake  Flowsheets (Taken 12/5/2024 1900)  Oral Nutrition Promotion: physical activity promoted  Nutrition Interventions: food preferences provided  Goal: Optimal Pain Control and Function  Outcome: Progressing  Intervention: Prevent or Manage Pain  Flowsheets (Taken 12/5/2024 1900)  Sleep/Rest Enhancement: awakenings minimized  Pain Management Interventions: care clustered  Goal: Skin Health and Integrity  Outcome: Progressing  Intervention: Optimize Skin Protection  Flowsheets (Taken 12/5/2024 90388)  Activity Management: Rolling - L1  Head of Bed (HOB) Positioning: HOB at 30 degrees  Goal: Optimal Wound Healing  Outcome:  Progressing     Problem: Fall Injury Risk  Goal: Absence of Fall and Fall-Related Injury  Outcome: Progressing

## 2024-12-06 NOTE — PLAN OF CARE
Problem: Adult Inpatient Plan of Care  Goal: Plan of Care Review  Outcome: Progressing  Flowsheets (Taken 12/6/2024 0753)  Plan of Care Reviewed With: patient  Goal: Patient-Specific Goal (Individualized)  Outcome: Progressing  Flowsheets (Taken 12/6/2024 0753)  Individualized Care Needs: wean O2, PT/OT  Anxieties, Fears or Concerns: denies any concerns at this time  Goal: Absence of Hospital-Acquired Illness or Injury  Outcome: Progressing  Intervention: Prevent Skin Injury  Flowsheets (Taken 12/6/2024 0753)  Device Skin Pressure Protection:   absorbent pad utilized/changed   pressure points protected  Intervention: Prevent and Manage VTE (Venous Thromboembolism) Risk  Flowsheets (Taken 12/6/2024 0753)  VTE Prevention/Management:   bleeding risk assessed   dorsiflexion/plantar flexion performed  Intervention: Prevent Infection  Flowsheets (Taken 12/6/2024 0753)  Infection Prevention:   cohorting utilized   personal protective equipment utilized   environmental surveillance performed   rest/sleep promoted   equipment surfaces disinfected   single patient room provided   hand hygiene promoted  Goal: Optimal Comfort and Wellbeing  Outcome: Progressing  Intervention: Monitor Pain and Promote Comfort  Flowsheets (Taken 12/6/2024 0753)  Pain Management Interventions: care clustered  Intervention: Provide Person-Centered Care  Flowsheets (Taken 12/6/2024 0753)  Trust Relationship/Rapport:   care explained   questions encouraged   choices provided   reassurance provided   emotional support provided   thoughts/feelings acknowledged   empathic listening provided   questions answered  Goal: Readiness for Transition of Care  Outcome: Progressing     Problem: Diabetes Comorbidity  Goal: Blood Glucose Level Within Targeted Range  Outcome: Progressing  Intervention: Monitor and Manage Glycemia  Flowsheets (Taken 12/6/2024 0753)  Glycemic Management: blood glucose monitored     Problem: Wound  Goal: Optimal Coping  Outcome:  Progressing  Intervention: Support Patient and Family Response  Flowsheets (Taken 12/6/2024 9990)  Supportive Measures: active listening utilized  Family/Support System Care: self-care encouraged  Goal: Optimal Functional Ability  Outcome: Progressing  Goal: Absence of Infection Signs and Symptoms  Outcome: Progressing  Goal: Improved Oral Intake  Outcome: Progressing  Goal: Optimal Pain Control and Function  Outcome: Progressing  Goal: Skin Health and Integrity  Outcome: Progressing  Goal: Optimal Wound Healing  Outcome: Progressing     Problem: Fall Injury Risk  Goal: Absence of Fall and Fall-Related Injury  Outcome: Progressing

## 2024-12-06 NOTE — PLAN OF CARE
[facility]  Discharge Reassessment    Primary Care Provider: Arnaldo Hernandez MD    Expected Discharge Date:     Reassessment (most recent)       Discharge Reassessment - 12/06/24 1145          Discharge Reassessment    Assessment Type Discharge Planning Reassessment     Did the patient's condition or plan change since previous assessment? No     Discharge Plan discussed with: Patient;Adult children     Discharge Plan A Home;Home Health     DME Needed Upon Discharge  other (see comments)   TBD    Transition of Care Barriers None     Why the patient remains in the hospital Requires continued medical care        Post-Acute Status    Discharge Delays None known at this time

## 2024-12-06 NOTE — PT/OT/SLP PROGRESS
Physical Therapy Treatment Note           Name: Kortney Leach    : 1945 (79 y.o.)  MRN: [id number]           TREATMENT SUMMARY AND RECOMMENDATIONS:    PT Received On: 24  PT Start Time: 1300     PT Stop Time: 1326  PT Total Time (min): 26 min     Subjective Assessment:   No complaints  Lethargic   x Awake, alert, cooperative  Uncooperative    Agitated     x c/o pain - LBP    Appropriate  c/o fatigue    Confused  Treated at bedside     Emotionally labile     x Treated in gym/dept.    Impulsive  Other:    Flat affect       Therapy Precautions:    Cognitive deficits  Spinal precautions    Collar - hard  Sternal precautions    Collar - soft   TLSO    Fall risk  LSO    Hip precautions - posterior  Knee immobilizer    Hip precautions - anterior  WBAT    Impaired communication  Partial weightbearing   x Oxygen - 3L via NC  TTWB    PEG tube  NWB    Visual deficits  Other:    Hearing deficits          Treatment Objectives:     Mobility Training:   Assist level Comments    Bed mobility     Transfer       Gait     CGA 50 ft level surfaces with RW   Sit to stand transitions    Min A x5 STS from WC/EOB with BUE use   Sitting balance     Standing balance      Wheelchair mobility     Car transfer     Other:          Therapeutic Exercise:   Exercise Sets Reps Comments   LE bike   5' fwd/5' rev, Level 2   Alt toe taps 4-inch 2 10 Supported stand RW                   Additional Comments:    Assessment: Patient required increased physical assistance with transfers due to muscle spasms/increased pain with sit>stand transitions. She continues to benefit from skilled PT to increased functional capacity and safety.       PT Plan: Cont PT POC.  Revisions made to plan of care: No    GOALS:   Multidisciplinary Problems       Physical Therapy Goals          Problem: Physical Therapy    Goal Priority Disciplines Outcome Interventions   Physical Therapy Goal     PT, PT/OT Progressing    Description: Goals to be met  by: discharge     Patient will increase functional independence with mobility by performin. Sit to stand transfer with Supervision and minimal dyspnea.   2. Gait  x 150 feet with Supervision using Rolling Walker and minimal dyspnea.                          Skilled PT Minutes Provided: 26 min   Communication with Treatment Team:     Equipment recommendations:       At end of treatment, patient remained:   Up in chair     Up in wheelchair in room   x In bed   x With alarm activated   x Bed rails up   x Call bell in reach     Family/friends present    Restraints secured properly    In bathroom with CNA/RN notified    Nurse aware    In gym with therapist/tech    Other:

## 2024-12-06 NOTE — PT/OT/SLP PROGRESS
Physical Therapy Treatment Note           Name: Kortney Leach    : 1945 (79 y.o.)  MRN: [id number]           TREATMENT SUMMARY AND RECOMMENDATIONS:    PT Received On: 24  PT Start Time: 936     PT Stop Time: 1011  PT Total Time (min): 35 min     Subjective Assessment:   No complaints  Lethargic   x Awake, alert, cooperative  Uncooperative    Agitated     x c/o pain - LBP    Appropriate  c/o fatigue    Confused  Treated at bedside     Emotionally labile     x Treated in gym/dept.    Impulsive  Other:    Flat affect       Therapy Precautions:    Cognitive deficits  Spinal precautions    Collar - hard  Sternal precautions    Collar - soft   TLSO    Fall risk  LSO    Hip precautions - posterior  Knee immobilizer    Hip precautions - anterior  WBAT    Impaired communication  Partial weightbearing   x Oxygen - 3L via NC  TTWB    PEG tube  NWB    Visual deficits  Other:    Hearing deficits          Treatment Objectives:     Mobility Training:   Assist level Comments    Bed mobility     Transfer   SBA/CGA Stand pivot with RW   Gait     CGA 10 ft level surfaces with RW   Sit to stand transitions   SBA/CGA x5 STS from WC with BUE use   Sitting balance     Standing balance      Wheelchair mobility     Car transfer     Other:          Therapeutic Exercise:   Exercise Sets Reps Comments   Hip flex, hip abd<>add, LAQ, ankle PF<>DF 1 30 Performed short sitting  2# ankle weights                         Additional Comments:    Assessment: Increased back pain and SOB requiring increased time and therapeutic rest throughout session. She continues to benefit from skilled PT to increased functional capacity and safety.       PT Plan: Cont PT POC.  Revisions made to plan of care: No    GOALS:   Multidisciplinary Problems       Physical Therapy Goals          Problem: Physical Therapy    Goal Priority Disciplines Outcome Interventions   Physical Therapy Goal     PT, PT/OT Progressing    Description: Goals to  be met by: discharge     Patient will increase functional independence with mobility by performin. Sit to stand transfer with Supervision and minimal dyspnea.   2. Gait  x 150 feet with Supervision using Rolling Walker and minimal dyspnea.                          Skilled PT Minutes Provided: 35 min   Communication with Treatment Team:     Equipment recommendations:       At end of treatment, patient remained:   Up in chair     Up in wheelchair in room   x In bed   x With alarm activated   x Bed rails up   x Call bell in reach     Family/friends present    Restraints secured properly    In bathroom with CNA/RN notified    Nurse aware    In gym with therapist/tech    Other:

## 2024-12-06 NOTE — PT/OT/SLP PROGRESS
Occupational Therapy  Treatment    Name: Kortney Leach    : 1945 (79 y.o.)  MRN: [id number]           TREATMENT SUMMARY AND RECOMMENDATIONS:      OT Date of Treatment: 24  OT Start Time:   OT Stop Time: 103  OT Total Time (min): 30 min      Subjective Assessment:   No complaints  Lethargic   x Awake, alert, cooperative  Impulsive    Uncooperative   Flat affect    Agitated x c/o pain   x Appropriate  c/o fatigue    Confused x Treated at bedside     Emotionally labile  Treated in gym/dept.    Anxious/fearful   Other:        Therapy Precautions:    Cognitive deficits  Spinal precautions    Collar - hard  Sternal precautions    Collar - soft   Cervical precautions    x Fall risk  LSO    Hip precautions - posterior  TLSO    Hip precautions - anterior  WBAT    Impaired communication  Partial weightbearing   x Oxygen 2L via NC  TTWB    PEG tube  NWB    Visual deficits  Knee immobilizer    Hearing deficits   Other:        Treatment Objectives:     Mobility Training:    Mobility task Assist level Comments    Bed mobility SBA Supine<sitting EOB using bed rails    Transfer     Sit to stands transitions CGA EOB elevated<standing using a RW   Functional mobility CGA FM in her room from EOB<toilet<WC using a RW   Sitting balance     Standing balance      Wheelchair mobility        ADL Training:    ADL Assist level Comments    Feeding     Grooming/hygiene     Bathing     Upper body dressing Set-up Doff button down shirt and don a pull-over shirt while sitting EOB   Lower body dressing CGA   Mod A Doff pants   Don a new pair of pants sitting on the toilet; assist to thread BLE d/t increased back pain   Toileting CGA +void; able to manage clothing and perform anterior hygiene in standing using a RW for support with one UE   Toilet transfer Min A Toilet<>standing using a RW and a grab bar    Adaptive equipment training     Other:           Therapeutic Exercise:   Exercise Sets Reps Comments                                Additional Comments:      Assessment: Patient had a fair response to her session. Pt is made (-) gains towards her goals this session d/t having increased back pain limiting her mobility and ability to perform her self-care more independently. Pt would benefit from continued OT services to increase independence with occupational performance, decrease risks for falls, and decrease caregiver burden. Recommend continuing POC.     GOALS:   Multidisciplinary Problems       Occupational Therapy Goals          Problem: Occupational Therapy    Goal Priority Disciplines Outcome Interventions   Occupational Therapy Goal     OT, PT/OT Progressing    Description: Goals to be met by: Discharge    Patient will increase functional independence with ADLs by performing:    UE Dressing with Modified [place].  LE Dressing with Stand-by Assistance.  Grooming while seated at sink with Modified [place].  Toileting from toilet with Stand-by Assistance for hygiene and clothing management.   Bathing from  shower chair/bench with Stand-by Assistance.  Toilet transfer to toilet with Stand-by Assistance.  Increased functional strength to 4/5 for functional mobility and self-care.   Pt will incorporate energy conservation techniques while performing her self-care.                          Recommendations:     Discharge Equipment Recommendations:  none     Plan:     Patient to be seen  (5-6x/week; QD-BID as appropriate) to address the above listed problems via self-care/home management, therapeutic activities, therapeutic exercises (energy conservation techniques)  Plan of Care Expires: 12/25/24  Revisions made to plan of care: No    Skilled OT Minutes Provided: 30  Communication with Treatment Team:     At end of treatment, patient remained:   Up in chair     Up in wheelchair in room    In bed    With alarm activated    Bed rails up    Call bell in reach     Family/friends present    Restraints secured properly    In  bathroom with CNA/RN notified   x In hallway with PT    Nurse aware    Other:

## 2024-12-06 NOTE — PROGRESS NOTES
Inpatient Nutrition Assessment    Admit Date: 12/3/2024   Total duration of encounter: 3 days   Patient Age: 79 y.o.    Nutrition Recommendation/Prescription     Continue heart healthy low sodium diabetic 2,000 calories up to 75 gm carbohydrates per meals  Continue boost glucose control BID provides 190 kcal and 16 gm of protein per serving  Monitor intake, wt, labs,medications    Communication of Recommendations: reviewed with provider and reviewed with patient    Nutrition Assessment     Malnutrition Assessment/Nutrition-Focused Physical Exam    Malnutrition Context: acute illness or injury (12/04/24 1338)  Malnutrition Level: moderate (12/04/24 1338)  Energy Intake (Malnutrition): less than or equal to 50% for greater than or equal to 5 days (12/04/24 1338)  Weight Loss (Malnutrition): other (see comments) (Unable to assess) (12/04/24 1338)  Subcutaneous Fat (Malnutrition): moderate depletion (12/04/24 1338)  Orbital Region (Subcutaneous Fat Loss): mild depletion  Upper Arm Region (Subcutaneous Fat Loss): moderate depletion        [place] [employer] Region (Muscle Loss): well nourished  Clavicle Bone Region (Muscle Loss): other (see comments) (Unable to assess)  Clavicle and Acromion Bone Region (Muscle Loss): other (see comments) (Unable to assess)                 Fluid Accumulation (Malnutrition): other (see comments) (Unable to assess) (12/04/24 1338)        A minimum of two characteristics is recommended for diagnosis of either severe or non-severe malnutrition.    Chart Review    Reason Seen: continuous nutrition monitoring and length of stay, new [facility] bed    Malnutrition Screening Tool Results   Have you recently lost weight without trying?: No  Have you been eating poorly because of a decreased appetite?: No   MST Score: 0   Diagnosis:  Acute hypoxemic respiratory failure 2/2 below  Acute on chronic decompensated HFpEF with pulmonary edema - resolved  Pulmonary hypertension  Severe mitral stenosis  VHD s/p mosaic  bioprosthetic MVR  CAD s/p stenting/ballooning  PAF s/p SUKHI ligation  HTN, HLD  DVT s/p IVC filter  SSS s/p PPM  Hyperphosphatemia   Hx of COPD/asthma  ABEL   Recent Klebsiella pneumoniae and Proteus mirabilis UTI : complete treatment  DM2  DVT prophylaxis    Relevant Medical History:    Anticoagulant long-term use      Asthma      Chronic sinusitis, unspecified      COPD (chronic obstructive pulmonary disease)      CVID (common variable immunodeficiency)      Diabetes mellitus, type 2      GERD (gastroesophageal reflux disease)      Hypertension      Severe mitral valve regurgitation      Sleep apnea       uses CPAP    SOB (shortness of breath)      Unspecified glaucoma      Weakness        Scheduled Medications:  albuterol-ipratropium, 3 mL, QID WAKE  amiodarone, 200 mg, Daily  aspirin, 81 mg, Daily  atorvastatin, 40 mg, Daily  cetirizine, 10 mg, Daily  clopidogreL, 75 mg, Daily  famotidine, 40 mg, QHS  [START ON 12/8/2024] fluticasone propionate, 2 spray, Daily  furosemide, 40 mg, Daily  glimepiride, 4 mg, BID  guaiFENesin, 600 mg, BID  insulin aspart U-100, 3 Units, TIDWM  insulin glargine U-100, 5 Units, Daily  lipase-protease-amylase 24,000-76,000-120,000 units, 2 capsule, TID WM  magnesium oxide, 400 mg, Daily  metoprolol succinate, 50 mg, Daily  oxymetazoline, 2 spray, BID  pantoprazole, 40 mg, QAM    Continuous Infusions:   PRN Medications:  acetaminophen, 650 mg, Q8H PRN  acetaminophen, 650 mg, Q8H PRN  albuterol-ipratropium, 3 mL, Q4H PRN  ALPRAZolam, 0.25 mg, TID PRN  benzonatate, 100 mg, TID PRN  bisacodyL, 5 mg, Daily PRN  calcium carbonate, 1,000 mg, TID PRN  cloNIDine, 0.2 mg, Q4H PRN  dextrose 10%, 12.5 g, PRN  dextrose 10%, 25 g, PRN  glucagon (human recombinant), 1 mg, PRN  glucose, 16 g, PRN  glucose, 24 g, PRN  hydrALAZINE, 10 mg, Q4H PRN  HYDROcodone-acetaminophen, 1 tablet, Q4H PRN  insulin aspart U-100, 0-10 Units, QID (AC + HS) PRN  loperamide, 2 mg, Q4H PRN  melatonin, 6 mg, Nightly  PRN  methocarbamoL, 500 mg, TID PRN  morphine, 2 mg, Q4H PRN  ondansetron, 4 mg, Q8H PRN  simethicone, 1 tablet, QID PRN  sodium chloride 0.9%, 10 mL, PRN    Calorie Containing IV Medications: no significant kcals from medications at this time    Recent Labs   Lab 11/30/24  0004 11/30/24  0618 12/01/24  0624 12/02/24  0507 12/03/24  0459 12/04/24  0440 12/05/24  0430   NA  --  134* 136 136 137 133* 138   K  --  3.8 3.7 3.8 3.6 3.8 4.0   CALCIUM  --  9.1 8.6 8.7 9.2 8.9 9.5   PHOS  --   --  4.5  --   --  5.0* 5.3*   MG  --  2.10 2.20 2.10 1.90 1.80 1.80   CL  --  96* 97* 97* 98 96* 99   CO2  --  26 27 25 26 28 27   BUN  --  25.3* 26.4* 22.1* 22.4* 22.7* 20.2*   CREATININE  --  0.98 0.84 0.88 0.85 0.83 0.74   EGFRNORACEVR  --  58 >60 >60 >60 >60 >60   GLUCOSE  --  208* 215* 205* 191* 179* 197*   BILITOT  --  0.7 0.7 0.7  --  0.7 0.9   ALKPHOS  --  81 77 79  --  75 86   ALT  --  25 22 22  --  21 18   AST  --  34 22 24  --  26 25   ALBUMIN  --  2.7* 2.7* 2.8*  --  2.5* 2.6*   WBC 15.11* 14.02* 13.45* 14.62* 13.46* 14.63*  --    HGB 12.0 12.1 11.6* 11.4* 11.0* 11.0*  --    HCT 36.2* 37.2 35.5* 34.7* 33.6* 34.4*  --      Nutrition Orders:  Diet Heart Healthy Low Sodium,2gm, Diabetic; 2000 Calories (up to 75 gm per meal)  Dietary nutrition supplements BID; Boost Glucose Control - Any flavor    Appetite/Oral Intake: fair/50-75% of meals  Factors Affecting Nutritional Intake: decreased appetite  Social Needs Impacting Access to Food:   Food Insecurity: No Food Insecurity (11/22/2024)     Hunger Vital Sign      Worried About Running Out of Food in the Last Year: Never true      Ran Out of Food in the Last Year: Never true     Food/Synagogue/Cultural Preferences: none reported  Food Allergies: none reported  Last Bowel Movement: 12/04/24  Wound(s):      Comments    12/04/24: Pt reports decrease intake x 3 weeks. Pt reports possible wt loss and is unsure. #. Percent of wt change: -3.04 from UBW. Pt stated didn't eat  "breakfast this am. Pt is drinking the boost glucose control. Family present. Discussed food preferences to improved intake, request peanut butter sugar free jelly sandwich for lunch. Labs/medications review. Pt on calcium acetate, furosemide, lipase protease amylase, magnesium oxide, and coumadin. Will monitor dark leafy consumption. Pt on heart healthy low sodium diabetic diet with boost glucose control BID. Will monitor and encourage good PO Intake. +BM on 24.    24: Made rounds at breakfast this am. Intake improved some, 50% of meal, pt stated. Pt SOB this am. PA present during rounds. Pt ate 1/2 [place] toast. +BM per patient. Labs/medications review. Daily weigh is now ordered. Pt drinking nutrition supplement as well. Pt having also muscle spasm today. Holding warfarin today per PA.     Anthropometrics    Height: 5' 6" (167.6 cm), Height Method: Stated  Last Weight: 96.6 kg (212 lb 15.4 oz) (24 1327), Weight Method: Bed Scale  BMI (Calculated): 34.4  BMI Classification: obese grade I (BMI 30-34.9)     Ideal Body Weight (IBW), Female: 130 lb     % Ideal Body Weight, Female (lb): 163.82 %                    Usual Body Weight (UBW), k kg  % Usual Body Weight: 96.8  % Weight Change From Usual Weight: -3.4 %  Usual Weight Provided By: patient    Wt Readings from Last 5 Encounters:   24 96.6 kg (212 lb 15.4 oz)   24 96.4 kg (212 lb 8 oz)   24 99.8 kg (220 lb)   24 99.8 kg (220 lb)   24 93.4 kg (206 lb)     Weight Change(s) Since Admission: No new wt recorded  Wt Readings from Last 1 Encounters:   24 1327 96.6 kg (212 lb 15.4 oz)   24 1600 96.6 kg (212 lb 15.4 oz)   Admit Weight: 96.6 kg (212 lb 15.4 oz) (24 1600), Weight Method: Bed Scale    Estimated Needs    Weight Used For Calorie Calculations: 96.6 kg (212 lb 15.4 oz)  Energy Calorie Requirements (kcal): 1932 kcal (20 kcal/kg/CBW)  Energy Need Method: Kcal/kg  Weight Used For Protein " Calculations: 96.6 kg (212 lb 15.4 oz)  Protein Requirements: 96.8 gm (1.0 gm/kg/CBW)  Fluid Requirements (mL): 1932 mL (1 mL/kcal)  CHO Requirement: 217 gm CHO per day (45% est kcal needs)     Enteral Nutrition     Patient not receiving enteral nutrition at this time.    Parenteral Nutrition     Patient not receiving parenteral nutrition support at this time.    Evaluation of Received Nutrient Intake    Calories: meeting estimated needs  Protein: meeting estimated needs    Patient Education     Not applicable.    Nutrition Diagnosis     PES: Inadequate oral intake related to acute illness as evidenced by 0% of breakfast. (Slowly progressing)     PES: Moderate acute disease or injury related malnutrition Related to Inadequate protein energy intake As Evidenced by:  - weight loss: Unable to assess - energy intake: <= 50% for 3 weeks (meets criteria for <= 50% >= 5 days (severe - acute)) - loss of subcutaneous fat: 1 area of moderate fat loss (Triceps [place]), 1 area of mild fat loss (Infraorbital) active    Nutrition Interventions     Intervention(s): general/healthful diet, commercial beverage, and collaboration with other providers  Intervention(s): Oral diet/nutrient modifications;Care coordination or referral;Oral nutritional supplement;Manage feeding environment    Goal: Consume % of meals/snacks by follow-up. (Slowly progressing)      Nutrition Goals & Monitoring     Dietitian will monitor: food and beverage intake, weight, weight change, electrolyte/renal panel, glucose/endocrine profile, and gastrointestinal profile  Discharge planning: continue heart healthy diabetic  diet  Nutrition Risk/Follow-Up: moderate (follow-up in 3-5 days)   Please consult if re-assessment needed sooner.

## 2024-12-06 NOTE — PROGRESS NOTES
[facility] MEDICINE ~ PROGRESS NOTE    CHIEF COMPLAINT     Hospital follow up for severe mitral valve stenosis    HOSPITAL COURSE     79 year old female with a past medical history of VHD s/p mosaic bioprosthetic MVR, CAD, PAF s/p SUKHI ligation, ABEL, COPD/asthma, HTN, DVT s/p IVC filter, SSS s/p PPM, HLD, DM2 who presented to [facility] on 11/23/2024 due to worsening SOB over the past month. She was noted to be in acute decompensated diastolic CHF. Cardiology was consulted and repeat echo revealed EF 60-65%, grade 1 diastolic dysfunction, mean pressure gradient across the mitral valve is 19 mmHg, pulmonary artery systolic pressure is 57 mmHg. MARYBETH 11/27/2024 revealed severe mitral valve stenosis with moderate regurgitation. LHC and RHC 11/27/2024 revealed 70% proximal LAD stenosis and bioprosthetic mitral valve stenosis.  She was evaluated by CV surgery who recommended to optimize CHF prior to intervention for mitral valve.  She subsequently received stenting/ballooning to proximal LAD on 11/29/2024.  Case was discussed between Dr. Lynn and Dr. Cervantes and decision was made to start Coumadin to see if gradient of mitral valve stenosis could be decreased.  She was started on Coumadin 5 mg daily on 11/02/2024 with a goal INR of 2-3.  Plan to continue triple therapy with aspirin, Plavix, Coumadin for 1 month and discontinue aspirin on 12/28/2024.  She will need to follow up echo in 4-6 weeks as well as a follow up with Dr. Watters in 4-6 weeks to discuss plan with mitral valve.  During this admission she was also treated for a Klebsiella pneumoniae and Proteus mirabilis UTI.  She was transferred to our [facility] for continued physical therapy.  Upon my exam today, patient noted to have significant shortness of breath although O2 sat within normal limits.  Increase patient's oxygen from 2 L nasal cannula to 3 L nasal cannula to make her more comfortable.  Patient does not  require supplemental oxygen at baseline, but may require upon discharge.     Repeat CXR 12/04/2024 revealed improved volume status.  Received an additional dose of Lasix 20 mg 12/05/2024.  INR elevated 12/05/2024 - decrease warfarin to 1.25 mg for 2 days and continue to monitor INR daily.        Today:  Patient without complaints on exam.  Patient did report muscle spasms to OT yesterday during her session.  Adding Robaxin 500 mg TID p.r.n..  Patient did request oxygen for comfort this morning. Holding warfarin for now until INR <3.    OBJECTIVE/PHYSICAL EXAM     VITAL SIGNS (MOST RECENT):  Temp: 98.1 °F (36.7 °C) (12/06/24 0646)  Pulse: 80 (12/06/24 0657)  Resp: 20 (12/06/24 0657)  BP: 130/63 (12/06/24 0646)  SpO2: 98 % (12/06/24 0657) VITAL SIGNS (24 HOUR RANGE):  Temp:  [97.9 °F (36.6 °C)-98.4 °F (36.9 °C)] 98.1 °F (36.7 °C)  Pulse:  [63-80] 80  Resp:  [17-22] 20  SpO2:  [91 %-98 %] 98 %  BP: (111-130)/(62-89) 130/63     GENERAL: In no acute distress, afebrile  HEENT:  Atraumatic, normocephalic, moist mucous membranes, nasal cannula in place  CHEST:  No wheezing  HEART: S1, S2, murmur  ABDOMEN: Soft, nontender, BS +  MSK: Warm, no lower extremity edema, no clubbing or cyanosis  NEUROLOGIC: Alert and oriented x4, moving all extremities with good strength   INTEGUMENTARY:  No obvious skin rashes  PSYCHIATRY:  Appropriate mood and affect    ASSESSMENT/PLAN     Acute hypoxemic respiratory failure 2/2 below  Acute on chronic decompensated HFpEF with pulmonary edema - resolved  Pulmonary hypertension  Severe mitral stenosis  Echo 11/23/2024 EF 60-65%, grade 1 diastolic dysfunction, mean pressure gradient across the mitral valve is 19 mmHg, pulmonary artery systolic pressure is 57 mmHg  MARYBETH 11/27/2024 revealed severe mitral valve stenosis with moderate regurgitation  LHC and RHC 11/27/2024 revealed 70% proximal LAD stenosis and bioprosthetic mitral valve stenosis - s/p stenting/ballooning to proximal LAD on  11/29/2024  CV surgery recommended to optimize CHF prior to intervention for mitral valve   Dr. Lynn and Dr. Cervantes recommended to start Coumadin 5mg QD with goal INR of 2-3 to see if gradient of mitral valve stenosis could be decreased  Continue triple therapy with aspirin, Plavix, Coumadin for 1 month and discontinue aspirin on 12/28/2024  Follow up echo in 4-6 weeks  Follow up with Dr. Watters in 4-6 weeks to discuss plan with mitral valve  O2 per protocol, wean as tolerated     Severe mitral stenosis s/p mosaic bioprosthetic MVR  CAD s/p stenting/ballooning  PAF s/p SUKHI ligation  HTN, HLD  DVT s/p IVC filter  SSS s/p PPM  Continue amiodarone 200 mg daily, aspirin 81 mg daily (discontinue on 12/28/2024), statin, Plavix 75 mg daily, Lasix 40 mg daily, Toprol-XL 50 mg daily, nifedipine 30 mg daily, warfarin 5 mg daily  Daily PT INRs  INR elevated today - Holding warfarin for now until INR <3 starting 12/06/2024  Daily Mag-Ox 400 mg     Hyperphosphatemia  PhosLo prn     Hx of COPD/asthma  ABEL   CPAP q.h.s.  Does not require home oxygen at baseline  DuoNebs q.4 while awake     Recent Klebsiella pneumoniae and Proteus mirabilis UTI - completed treatment     Hyperglycemia in setting of DM2  Recent A1c 6.8% within the last month  Holding home metformin 500 mg b.i.d.   Started Lantus 5 units daily 12/04/2024  Started aspart 3 units TID with meals 12/05/2024  Resumed home glimepiride 4 mg b.i.d. 12/06/2024  Moderate dose SSI    Chronic sinusitis  Afrin and Mucinex x3 days starting 12/05/2024 followed by Flonase daily   Continue cetirizine  No indication for antibiotics at this time    Anxiety   Xanax prn      DVT prophylaxis:  Holding warfarin for now until INR <3     Code status: Full    Anticipated discharge and disposition:  Continue PT/OT at this time  __________________________________________________________________________    LABS/MICRO/MEDS/DIAGNOSTICS     LABS  Recent Labs     12/05/24  0430      K  4.0   CO2 27   BUN 20.2*   CREATININE 0.74   GLUCOSE 197*   CALCIUM 9.5   ALKPHOS 86   AST 25   ALT 18   ALBUMIN 2.6*     Recent Labs     12/04/24  0440   WBC 14.63*   RBC 3.68*   HCT 34.4*   MCV 93.5        MICROBIOLOGY  Microbiology Results (last 7 days)       ** No results found for the last 168 hours. **             MEDICATIONS   albuterol-ipratropium  3 mL Nebulization QID WAKE    amiodarone  200 mg Oral Daily    aspirin  81 mg Oral Daily    atorvastatin  40 mg Oral Daily    cetirizine  10 mg Oral Daily    clopidogreL  75 mg Oral Daily    famotidine  40 mg Oral QHS    [START ON 12/8/2024] fluticasone propionate  2 spray Each Nostril Daily    furosemide  40 mg Oral Daily    guaiFENesin  600 mg Oral BID    insulin aspart U-100  3 Units Subcutaneous TIDWM    insulin glargine U-100  5 Units Subcutaneous Daily    lipase-protease-amylase 24,000-76,000-120,000 units  2 capsule Oral TID WM    magnesium oxide  400 mg Oral Daily    metoprolol succinate  50 mg Oral Daily    oxymetazoline  2 spray Each Nostril BID    pantoprazole  40 mg Oral QAM    warfarin  1.25 mg Oral Daily         INFUSIONS       DIAGNOSTIC TESTS  X-Ray Chest 1 View   Final Result      Improving volume status         Electronically signed by: Haider Chavarria   Date:    12/04/2024   Time:    18:25         EF   Date Value Ref Range Status   12/22/2022 55 % Final        NUTRITION STATUS  Patient meets ASPEN criteria for moderate malnutrition of acute illness or injury per RD assessment as evidenced by:  Energy Intake (Malnutrition): less than or equal to 50% for greater than or equal to 5 days  Weight Loss (Malnutrition): other (see comments) (Unable to assess)  Subcutaneous Fat (Malnutrition): moderate depletion     Fluid Accumulation (Malnutrition): other (see comments) (Unable to assess)        A minimum of two characteristics is recommended for diagnosis of either severe or non-severe malnutrition.     Case related differential diagnoses have  been reviewed; assessment and plan has been documented. I have personally reviewed the labs and test results that are currently available; I have reviewed the patients medication list. I have reviewed the consulting providers recommendations. I have reviewed or attempted to review medical records based upon their availability.  All of the patient's and/or family's questions have been addressed and answered to the best of my ability.  I will continue to monitor closely and make adjustments to medical management as needed.  This document was created using M*Modal Fluency Direct.  Transcription errors may have been made.  Please contact me if any questions may rise regarding documentation to clarify transcription.      MARTHA Stewart   Internal Medicine  Department of Hospital Medicine [facility]

## 2024-12-06 NOTE — PT/OT/SLP PROGRESS
Occupational Therapy  Treatment    Name: Kortney Leach    : 1945 (79 y.o.)  MRN: [id number]           TREATMENT SUMMARY AND RECOMMENDATIONS:      OT Date of Treatment: 24  OT Start Time: 1330  OT Stop Time: 1405  OT Total Time (min): 35 min      Subjective Assessment:   No complaints  Lethargic   x Awake, alert, cooperative  Impulsive    Uncooperative   Flat affect    Agitated  c/o pain   x Appropriate  c/o fatigue    Confused x Treated at bedside     Emotionally labile x Treated in gym/dept.    Anxious/fearful   Other:        Therapy Precautions:    Cognitive deficits  Spinal precautions    Collar - hard  Sternal precautions    Collar - soft   Cervical precautions    x Fall risk  LSO    Hip precautions - posterior  TLSO    Hip precautions - anterior  WBAT    Impaired communication  Partial weightbearing   x Oxygen 2-3L via NC  TTWB    PEG tube  NWB    Visual deficits  Knee immobilizer    Hearing deficits   Other:        Treatment Objectives:     Mobility Training:    Mobility task Assist level Comments    Bed mobility Mod A EOB<supine; assist for LE management    Transfer CGA Stand step t/f to EOB using a RW    Sit to stands transitions Min A WC<standing using a RW    Functional mobility CGA X50ft using a RW and following with a WC of safety    Sitting balance     Standing balance      Wheelchair mobility        ADL Training:    ADL Assist level Comments    Feeding     Grooming/hygiene CGA Hand hygiene while standing at the sink with RW anterior    Bathing     Upper body dressing     Lower body dressing     Toileting CGA +void; able to perform clothing management and perform anterior hygiene in sitting    Toilet transfer Min A Stand step t/f to toilet using a RW and grab bar   Toilet<standing using a RW and grab bar    Adaptive equipment training [place] her socks using a reacher and donning her socks using a sock-aid; pt showed good carry-over from previous session    Other:            Therapeutic Exercise:   Exercise Sets Reps Comments   UBE endurance training  2 5 minutes  Ergometer low resistance; forwards/backwards                          Additional Comments:  Nursing notified of pt c/o of lower back pain.     Assessment: Patient had a positive response to her session. Pt is making (+) gains towards her goals. Pt's biggest limiting factors are her SOB with exertion and lower back pain. Pt would benefit from continued OT services to increase independence with occupational performance, decrease risks for falls, and decrease caregiver burden. Recommend continuing POC.     GOALS:   Multidisciplinary Problems       Occupational Therapy Goals          Problem: Occupational Therapy    Goal Priority Disciplines Outcome Interventions   Occupational Therapy Goal     OT, PT/OT Progressing    Description: Goals to be met by: Discharge    Patient will increase functional independence with ADLs by performing:    UE Dressing with Modified [place].  LE Dressing with Stand-by Assistance.  Grooming while seated at sink with Modified [place].  Toileting from toilet with Stand-by Assistance for hygiene and clothing management.   Bathing from  shower chair/bench with Stand-by Assistance.  Toilet transfer to toilet with Stand-by Assistance.  Increased functional strength to 4/5 for functional mobility and self-care.   Pt will incorporate energy conservation techniques while performing her self-care.                          Recommendations:     Discharge Equipment Recommendations:  none     Plan:     Patient to be seen  (5-6x/week; QD-BID as appropriate) to address the above listed problems via self-care/home management, therapeutic activities, therapeutic exercises (energy conservation techniques)  Plan of Care Expires: 12/25/24  Revisions made to plan of care: No    Skilled OT Minutes Provided: 35  Communication with Treatment Team:     At end of treatment, patient remained:   Up in chair      Up in wheelchair in room   x In bed   x With alarm activated   x Bed rails up   x Call bell in reach     Family/friends present    Restraints secured properly    In bathroom with CNA/RN notified    In gym with PT/PTA/tech   x Nurse aware    Other:

## 2024-12-07 LAB
INR PPP: 4.4
MAGNESIUM SERPL-MCNC: 1.9 MG/DL (ref 1.6–2.6)
PHOSPHATE SERPL-MCNC: 5.6 MG/DL (ref 2.3–4.7)
POCT GLUCOSE: 100 MG/DL (ref 70–110)
POCT GLUCOSE: 121 MG/DL (ref 70–110)
POCT GLUCOSE: 141 MG/DL (ref 70–110)
POCT GLUCOSE: 144 MG/DL (ref 70–110)
POCT GLUCOSE: 156 MG/DL (ref 70–110)
PROTHROMBIN TIME: 41.5 SECONDS (ref 12.5–14.5)

## 2024-12-07 PROCEDURE — 85610 PROTHROMBIN TIME: CPT | Performed by: STUDENT IN AN ORGANIZED HEALTH CARE EDUCATION/TRAINING PROGRAM

## 2024-12-07 PROCEDURE — 11000004 HC SNF PRIVATE

## 2024-12-07 PROCEDURE — 25000242 PHARM REV CODE 250 ALT 637 W/ HCPCS: Performed by: STUDENT IN AN ORGANIZED HEALTH CARE EDUCATION/TRAINING PROGRAM

## 2024-12-07 PROCEDURE — 25000003 PHARM REV CODE 250: Performed by: STUDENT IN AN ORGANIZED HEALTH CARE EDUCATION/TRAINING PROGRAM

## 2024-12-07 PROCEDURE — 63600175 PHARM REV CODE 636 W HCPCS: Performed by: STUDENT IN AN ORGANIZED HEALTH CARE EDUCATION/TRAINING PROGRAM

## 2024-12-07 PROCEDURE — 25000003 PHARM REV CODE 250: Performed by: PHYSICIAN ASSISTANT

## 2024-12-07 PROCEDURE — 94760 N-INVAS EAR/PLS OXIMETRY 1: CPT

## 2024-12-07 PROCEDURE — 99900035 HC TECH TIME PER 15 MIN (STAT)

## 2024-12-07 PROCEDURE — 84100 ASSAY OF PHOSPHORUS: CPT | Performed by: STUDENT IN AN ORGANIZED HEALTH CARE EDUCATION/TRAINING PROGRAM

## 2024-12-07 PROCEDURE — 36415 COLL VENOUS BLD VENIPUNCTURE: CPT | Performed by: STUDENT IN AN ORGANIZED HEALTH CARE EDUCATION/TRAINING PROGRAM

## 2024-12-07 PROCEDURE — 27000221 HC OXYGEN, UP TO 24 HOURS

## 2024-12-07 PROCEDURE — 94640 AIRWAY INHALATION TREATMENT: CPT

## 2024-12-07 PROCEDURE — 97116 GAIT TRAINING THERAPY: CPT | Mod: CQ

## 2024-12-07 PROCEDURE — 83735 ASSAY OF MAGNESIUM: CPT | Performed by: STUDENT IN AN ORGANIZED HEALTH CARE EDUCATION/TRAINING PROGRAM

## 2024-12-07 RX ORDER — LIDOCAINE 50 MG/G
2 PATCH TOPICAL
Status: DISCONTINUED | OUTPATIENT
Start: 2024-12-07 | End: 2024-12-15 | Stop reason: HOSPADM

## 2024-12-07 RX ORDER — GUAIFENESIN 600 MG/1
600 TABLET, EXTENDED RELEASE ORAL 2 TIMES DAILY
Status: COMPLETED | OUTPATIENT
Start: 2024-12-07 | End: 2024-12-09

## 2024-12-07 RX ORDER — MAGNESIUM SULFATE HEPTAHYDRATE 40 MG/ML
2 INJECTION, SOLUTION INTRAVENOUS ONCE
Status: COMPLETED | OUTPATIENT
Start: 2024-12-07 | End: 2024-12-07

## 2024-12-07 RX ORDER — FLUTICASONE PROPIONATE 50 MCG
2 SPRAY, SUSPENSION (ML) NASAL DAILY
Status: COMPLETED | OUTPATIENT
Start: 2024-12-08 | End: 2024-12-10

## 2024-12-07 RX ORDER — DICLOFENAC SODIUM 10 MG/G
2 GEL TOPICAL 2 TIMES DAILY PRN
Status: DISCONTINUED | OUTPATIENT
Start: 2024-12-07 | End: 2024-12-15 | Stop reason: HOSPADM

## 2024-12-07 RX ORDER — METHOCARBAMOL 500 MG/1
500 TABLET, FILM COATED ORAL 3 TIMES DAILY
Status: DISCONTINUED | OUTPATIENT
Start: 2024-12-07 | End: 2024-12-08

## 2024-12-07 RX ADMIN — FAMOTIDINE 40 MG: 20 TABLET, FILM COATED ORAL at 09:12

## 2024-12-07 RX ADMIN — PANCRELIPASE 2 CAPSULE: 120000; 24000; 76000 CAPSULE, DELAYED RELEASE PELLETS ORAL at 10:12

## 2024-12-07 RX ADMIN — MORPHINE SULFATE 2 MG: 4 INJECTION, SOLUTION INTRAMUSCULAR; INTRAVENOUS at 09:12

## 2024-12-07 RX ADMIN — METHOCARBAMOL 500 MG: 500 TABLET ORAL at 08:12

## 2024-12-07 RX ADMIN — METOPROLOL SUCCINATE 50 MG: 50 TABLET, EXTENDED RELEASE ORAL at 08:12

## 2024-12-07 RX ADMIN — GLIMEPIRIDE 4 MG: 2 TABLET ORAL at 09:12

## 2024-12-07 RX ADMIN — PANCRELIPASE 2 CAPSULE: 120000; 24000; 76000 CAPSULE, DELAYED RELEASE PELLETS ORAL at 04:12

## 2024-12-07 RX ADMIN — DICLOFENAC SODIUM 2 G: 10 GEL TOPICAL at 10:12

## 2024-12-07 RX ADMIN — INSULIN GLARGINE 5 UNITS: 100 INJECTION, SOLUTION SUBCUTANEOUS at 08:12

## 2024-12-07 RX ADMIN — METHOCARBAMOL 500 MG: 500 TABLET ORAL at 03:12

## 2024-12-07 RX ADMIN — INSULIN ASPART 3 UNITS: 100 INJECTION, SOLUTION INTRAVENOUS; SUBCUTANEOUS at 10:12

## 2024-12-07 RX ADMIN — METHOCARBAMOL 500 MG: 500 TABLET ORAL at 09:12

## 2024-12-07 RX ADMIN — MAGNESIUM SULFATE HEPTAHYDRATE 2 G: 40 INJECTION, SOLUTION INTRAVENOUS at 08:12

## 2024-12-07 RX ADMIN — CLOPIDOGREL BISULFATE 75 MG: 75 TABLET ORAL at 08:12

## 2024-12-07 RX ADMIN — PANTOPRAZOLE SODIUM 40 MG: 40 TABLET, DELAYED RELEASE ORAL at 06:12

## 2024-12-07 RX ADMIN — INSULIN ASPART 3 UNITS: 100 INJECTION, SOLUTION INTRAVENOUS; SUBCUTANEOUS at 04:12

## 2024-12-07 RX ADMIN — CETIRIZINE HYDROCHLORIDE 10 MG: 10 TABLET, FILM COATED ORAL at 08:12

## 2024-12-07 RX ADMIN — IPRATROPIUM BROMIDE AND ALBUTEROL SULFATE 3 ML: .5; 3 SOLUTION RESPIRATORY (INHALATION) at 07:12

## 2024-12-07 RX ADMIN — GUAIFENESIN 600 MG: 600 TABLET, EXTENDED RELEASE ORAL at 09:12

## 2024-12-07 RX ADMIN — MORPHINE SULFATE 2 MG: 4 INJECTION, SOLUTION INTRAMUSCULAR; INTRAVENOUS at 04:12

## 2024-12-07 RX ADMIN — PANCRELIPASE 2 CAPSULE: 120000; 24000; 76000 CAPSULE, DELAYED RELEASE PELLETS ORAL at 08:12

## 2024-12-07 RX ADMIN — ATORVASTATIN CALCIUM 40 MG: 40 TABLET, FILM COATED ORAL at 09:12

## 2024-12-07 RX ADMIN — ASPIRIN 81 MG: 81 TABLET, COATED ORAL at 08:12

## 2024-12-07 RX ADMIN — INSULIN ASPART 3 UNITS: 100 INJECTION, SOLUTION INTRAVENOUS; SUBCUTANEOUS at 08:12

## 2024-12-07 RX ADMIN — FUROSEMIDE 40 MG: 40 TABLET ORAL at 08:12

## 2024-12-07 RX ADMIN — GLIMEPIRIDE 4 MG: 2 TABLET ORAL at 08:12

## 2024-12-07 RX ADMIN — MORPHINE SULFATE 2 MG: 4 INJECTION, SOLUTION INTRAMUSCULAR; INTRAVENOUS at 10:12

## 2024-12-07 RX ADMIN — Medication 400 MG: at 08:12

## 2024-12-07 RX ADMIN — GUAIFENESIN 600 MG: 600 TABLET, EXTENDED RELEASE ORAL at 08:12

## 2024-12-07 RX ADMIN — AMIODARONE HYDROCHLORIDE 200 MG: 200 TABLET ORAL at 08:12

## 2024-12-07 RX ADMIN — LIDOCAINE 5% 2 PATCH: 700 PATCH TOPICAL at 10:12

## 2024-12-07 RX ADMIN — HYDROCODONE BITARTRATE AND ACETAMINOPHEN 1 TABLET: 5; 325 TABLET ORAL at 08:12

## 2024-12-07 NOTE — PT/OT/SLP PROGRESS
Physical Therapy Treatment Note           Name: Kortney Leach    : 1945 (79 y.o.)  MRN: [id number]           TREATMENT SUMMARY AND RECOMMENDATIONS:    PT Received On: 24  PT Start Time: 1205     PT Stop Time: 1220  PT Total Time (min): 15 min     Subjective Assessment:   No complaints  Lethargic   x Awake, alert, cooperative  Uncooperative    Agitated     x c/o pain - LBP    Appropriate  c/o fatigue    Confused x Treated at bedside     Emotionally labile      Treated in gym/dept.    Impulsive  Other:    Flat affect       Therapy Precautions:    Cognitive deficits  Spinal precautions    Collar - hard  Sternal precautions    Collar - soft   TLSO    Fall risk  LSO    Hip precautions - posterior  Knee immobilizer    Hip precautions - anterior  WBAT    Impaired communication  Partial weightbearing   x Oxygen - 3L via NC  TTWB    PEG tube  NWB    Visual deficits  Other:    Hearing deficits          Treatment Objectives:     Mobility Training:   Assist level Comments    Bed mobility     Transfer       Gait      ft level surfaces with RW   Sit to stand transitions     Sitting balance     Standing balance      Wheelchair mobility     Car transfer     Other:          Therapeutic Exercise:   Exercise Sets Reps Comments                               Additional Comments:    Assessment: Patient felt better this afternoon and was agreeable to walk a short distance. She continues to benefit from skilled PT to increased functional capacity and safety.       PT Plan: Cont PT POC.  Revisions made to plan of care: No    GOALS:   Multidisciplinary Problems       Physical Therapy Goals          Problem: Physical Therapy    Goal Priority Disciplines Outcome Interventions   Physical Therapy Goal     PT, PT/OT Progressing    Description: Goals to be met by: discharge     Patient will increase functional independence with mobility by performin. Sit to stand transfer with Supervision and minimal  dyspnea.   2. Gait  x 150 feet with Supervision using Rolling Walker and minimal dyspnea.                          Skilled PT Minutes Provided: 15 min   Communication with Treatment Team:     Equipment recommendations:       At end of treatment, patient remained:  x Up in chair     Up in wheelchair in room    In bed   x With alarm activated   x Bed rails up   x Call bell in reach     Family/friends present    Restraints secured properly    In bathroom with CNA/RN notified    Nurse aware    In gym with therapist/tech    Other:

## 2024-12-07 NOTE — PLAN OF CARE
Problem: Adult Inpatient Plan of Care  Goal: Plan of Care Review  Outcome: Progressing  Flowsheets (Taken 12/7/2024 0120)  Plan of Care Reviewed With: patient  Goal: Patient-Specific Goal (Individualized)  Outcome: Progressing  Flowsheets (Taken 12/7/2024 0120)  Individualized Care Needs: safety, telemetry, monitor O2  Anxieties, Fears or Concerns: none expressed at this time  Patient/Family-Specific Goals (Include Timeframe): patient to get as close to baseline as possible by d\c  Goal: Absence of Hospital-Acquired Illness or Injury  Outcome: Progressing  Intervention: Identify and Manage Fall Risk  Flowsheets (Taken 12/7/2024 0120)  Safety Promotion/Fall Prevention:   assistive device/personal item within reach   bed alarm set   side rails raised x 3  Intervention: Prevent Skin Injury  Flowsheets (Taken 12/7/2024 0120)  Body Position: position changed independently  Skin Protection: drying agents applied  Device Skin Pressure Protection:   absorbent pad utilized/changed   pressure points protected  Intervention: Prevent and Manage VTE (Venous Thromboembolism) Risk  Flowsheets (Taken 12/7/2024 0120)  VTE Prevention/Management: ambulation promoted  Intervention: Prevent Infection  Flowsheets (Taken 12/7/2024 0120)  Infection Prevention:   cohorting utilized   rest/sleep promoted   personal protective equipment utilized  Goal: Optimal Comfort and Wellbeing  Outcome: Progressing  Intervention: Monitor Pain and Promote Comfort  Flowsheets (Taken 12/7/2024 0120)  Pain Management Interventions:   care clustered   quiet environment facilitated   relaxation techniques promoted  Intervention: Provide Person-Centered Care  Flowsheets (Taken 12/7/2024 0120)  Trust Relationship/Rapport:   care explained   reassurance provided   thoughts/feelings acknowledged  Goal: Readiness for Transition of Care  Outcome: Progressing  Intervention: Mutually Develop Transition Plan  Flowsheets (Taken 12/7/2024 0120)  Equipment Currently  Used at Home:   walker, rolling   CPAP   nebulizer  Transportation Anticipated: family or friend will provide  Who are your caregiver(s) and their phone number(s)?: (daughter) Anita [phone]  Communicated SD with patient/caregiver: Date not available/Unable to determine  Do you expect to return to your current living situation?: Yes  Do you have help at home or someone to help you manage your care at home?: Yes  Readmission within 30 days?: No  Do you currently have service(s) that help you manage your care at home?: No  Is the pt/caregiver preference to resume services with current agency: No     Problem: Diabetes Comorbidity  Goal: Blood Glucose Level Within Targeted Range  Outcome: Progressing  Intervention: Monitor and Manage Glycemia  Flowsheets (Taken 12/7/2024 0120)  Glycemic Management: blood glucose monitored     Problem: Wound  Goal: Optimal Coping  Outcome: Progressing  Intervention: Support Patient and Family Response  Flowsheets (Taken 12/7/2024 0120)  Supportive Measures: active listening utilized  Family/Support System Care: self-care encouraged  Goal: Optimal Functional Ability  Outcome: Progressing  Intervention: Optimize Functional Ability  Flowsheets (Taken 12/7/2024 0120)  Assistive Device Utilized:   gait belt   walker  Activity Management: Rolling - L1  Activity Assistance Provided: assistance, 1 person  Goal: Absence of Infection Signs and Symptoms  Outcome: Progressing  Intervention: Prevent or Manage Infection  Flowsheets (Taken 12/7/2024 0120)  Fever Reduction/Comfort Measures:   lightweight clothing   lightweight bedding  Infection Management: aseptic technique maintained  Isolation Precautions: precautions maintained  Goal: Improved Oral Intake  Outcome: Progressing  Intervention: Promote and Optimize Oral Intake  Flowsheets (Taken 12/7/2024 0120)  Oral Nutrition Promotion: physical activity promoted  Nutrition Interventions: food preferences provided  Goal: Optimal Pain Control  and Function  Outcome: Progressing  Intervention: Prevent or Manage Pain  Flowsheets (Taken 12/7/2024 0120)  Sleep/Rest Enhancement: awakenings minimized  Pain Management Interventions:   care clustered   quiet environment facilitated   relaxation techniques promoted  Goal: Skin Health and Integrity  Outcome: Progressing  Intervention: Optimize Skin Protection  Flowsheets (Taken 12/7/2024 0120)  Pressure Reduction Techniques: frequent weight shift encouraged  Pressure Reduction Devices: positioning supports utilized  Skin Protection: drying agents applied  Activity Management: Rolling - L1  Head of Bed (HOB) Positioning: HOB at 20-30 degrees  Goal: Optimal Wound Healing  Outcome: Progressing  Intervention: Promote Wound Healing  Flowsheets (Taken 12/7/2024 0120)  Sleep/Rest Enhancement: awakenings minimized     Problem: Fall Injury Risk  Goal: Absence of Fall and Fall-Related Injury  Outcome: Progressing  Intervention: Identify and Manage Contributors  Flowsheets (Taken 12/7/2024 0120)  Self-Care Promotion: independence encouraged  Medication Review/Management: medications reviewed  Intervention: Promote Injury-Free Environment  Flowsheets (Taken 12/7/2024 0120)  Safety Promotion/Fall Prevention:   assistive device/personal item within reach   bed alarm set   side rails raised x 3

## 2024-12-07 NOTE — PLAN OF CARE
Problem: Adult Inpatient Plan of Care  Goal: Plan of Care Review  Outcome: Progressing  Flowsheets (Taken 12/7/2024 0710)  Plan of Care Reviewed With: patient  Goal: Patient-Specific Goal (Individualized)  Outcome: Progressing  Flowsheets (Taken 12/7/2024 0710)  Individualized Care Needs: Safety, telemetry, PT/OT, pain mgt  Anxieties, Fears or Concerns: denies any concerns at this time  Goal: Absence of Hospital-Acquired Illness or Injury  Outcome: Progressing  Intervention: Identify and Manage Fall Risk  Flowsheets (Taken 12/7/2024 0710)  Safety Promotion/Fall Prevention:   assistive device/personal item within reach   bed alarm set   Fall Risk signage in place   Fall Risk reviewed with patient/family   patient expresses understanding of fall risk and prevention  Intervention: Prevent Skin Injury  Flowsheets (Taken 12/7/2024 0710)  Body Position: position changed independently  Skin Protection: protective footwear used  Device Skin Pressure Protection: absorbent pad utilized/changed  Intervention: Prevent and Manage VTE (Venous Thromboembolism) Risk  Flowsheets (Taken 12/7/2024 0710)  VTE Prevention/Management: bleeding risk assessed  Intervention: Prevent Infection  Flowsheets (Taken 12/7/2024 0710)  Infection Prevention:   cohorting utilized   personal protective equipment utilized   environmental surveillance performed   rest/sleep promoted   equipment surfaces disinfected   single patient room provided   hand hygiene promoted  Goal: Optimal Comfort and Wellbeing  Outcome: Progressing  Intervention: Monitor Pain and Promote Comfort  Flowsheets (Taken 12/7/2024 0710)  Pain Management Interventions: care clustered  Intervention: Provide Person-Centered Care  Flowsheets (Taken 12/7/2024 0710)  Trust Relationship/Rapport:   care explained   questions encouraged   choices provided   reassurance provided   emotional support provided   thoughts/feelings acknowledged   empathic listening provided   questions  answered  Goal: Readiness for Transition of Care  Outcome: Progressing     Problem: Diabetes Comorbidity  Goal: Blood Glucose Level Within Targeted Range  Outcome: Progressing  Intervention: Monitor and Manage Glycemia  Flowsheets (Taken 12/7/2024 0710)  Glycemic Management: blood glucose monitored     Problem: Wound  Goal: Optimal Coping  Outcome: Progressing  Intervention: Support Patient and Family Response  Flowsheets (Taken 12/7/2024 0710)  Supportive Measures: active listening utilized  Family/Support System Care: self-care encouraged  Goal: Optimal Functional Ability  Outcome: Progressing  Intervention: Optimize Functional Ability  Flowsheets (Taken 12/7/2024 0710)  Assistive Device Utilized:   walker   gait belt  Activity Management: Ambulated in room - L4  Activity Assistance Provided: independent  Goal: Absence of Infection Signs and Symptoms  Outcome: Progressing  Intervention: Prevent or Manage Infection  Flowsheets (Taken 12/7/2024 0710)  Infection Management: aseptic technique maintained  Isolation Precautions:   protective   precautions maintained  Goal: Improved Oral Intake  Outcome: Progressing  Intervention: Promote and Optimize Oral Intake  Flowsheets (Taken 12/7/2024 0710)  Oral Nutrition Promotion:   adaptive equipment use encouraged   physical activity promoted  Nutrition Interventions: food preferences provided  Goal: Optimal Pain Control and Function  Outcome: Progressing  Intervention: Prevent or Manage Pain  Flowsheets (Taken 12/7/2024 0710)  Sleep/Rest Enhancement: awakenings minimized  Pain Management Interventions: care clustered  Goal: Skin Health and Integrity  Outcome: Progressing  Intervention: Optimize Skin Protection  Flowsheets (Taken 12/7/2024 0710)  Pressure Reduction Techniques: frequent weight shift encouraged  Skin Protection: protective footwear used  Activity Management: Ambulated in room - L4  Head of Bed (HOB) Positioning: HOB at 30 degrees  Goal: Optimal Wound  Healing  Outcome: Progressing  Intervention: Promote Wound Healing  Flowsheets (Taken 12/7/2024 0710)  Sleep/Rest Enhancement: awakenings minimized     Problem: Fall Injury Risk  Goal: Absence of Fall and Fall-Related Injury  Outcome: Progressing  Intervention: Identify and Manage Contributors  Flowsheets (Taken 12/7/2024 0710)  Self-Care Promotion: independence encouraged  Medication Review/Management: medications reviewed  Intervention: Promote Injury-Free Environment  Flowsheets (Taken 12/7/2024 0710)  Safety Promotion/Fall Prevention:   assistive device/personal item within reach   bed alarm set   Fall Risk signage in place   Fall Risk reviewed with patient/family   patient expresses understanding of fall risk and prevention

## 2024-12-07 NOTE — PROGRESS NOTES
[facility] MEDICINE ~ PROGRESS NOTE    CHIEF COMPLAINT   Hospital follow up for severe mitral valve stenosis    HOSPITAL COURSE   79 year old female with a past medical history of VHD s/p mosaic bioprosthetic MVR, CAD, PAF s/p SUKHI ligation, ABEL, COPD/asthma, HTN, DVT s/p IVC filter, SSS s/p PPM, HLD, DM2 who presented to [facility] on 11/23/2024 due to worsening SOB over the past month. She was noted to be in acute decompensated diastolic CHF. Cardiology was consulted and repeat echo revealed EF 60-65%, grade 1 diastolic dysfunction, mean pressure gradient across the mitral valve is 19 mmHg, pulmonary artery systolic pressure is 57 mmHg. MARYBETH 11/27/2024 revealed severe mitral valve stenosis with moderate regurgitation. LHC and RHC 11/27/2024 revealed 70% proximal LAD stenosis and bioprosthetic mitral valve stenosis.  She was evaluated by CV surgery who recommended to optimize CHF prior to intervention for mitral valve.  She subsequently received stenting/ballooning to proximal LAD on 11/29/2024.  Case was discussed between Dr. Lynn and Dr. Cervantes and decision was made to start Coumadin to see if gradient of mitral valve stenosis could be decreased.  She was started on Coumadin 5 mg daily on 11/02/2024 with a goal INR of 2-3.  Plan to continue triple therapy with aspirin, Plavix, Coumadin for 1 month and discontinue aspirin on 12/28/2024.  She will need to follow up echo in 4-6 weeks as well as a follow up with Dr. Watters in 4-6 weeks to discuss plan with mitral valve.  During this admission she was also treated for a Klebsiella pneumoniae and Proteus mirabilis UTI.  She was transferred to our [facility] for continued physical therapy.  Upon my exam today, patient noted to have significant shortness of breath although O2 sat within normal limits.  Increase patient's oxygen from 2 L nasal cannula to 3 L nasal cannula to make her more comfortable.  Patient does not require  supplemental oxygen at baseline, but may require upon discharge.     Repeat CXR 12/04/2024 revealed improved volume status.  Received an additional dose of Lasix 20 mg 12/05/2024.  INR elevated 12/05/2024 - holding coumadin       Today:  Continuing to hold warfarin for now until INR <3.  A chair today complaining of bilateral paraspinal lumbar pain, we will schedule Robaxin today, encourage heat pack use.  OBJECTIVE/PHYSICAL EXAM     VITAL SIGNS (MOST RECENT):  Temp: 98.4 °F (36.9 °C) (12/07/24 0700)  Pulse: 75 (12/07/24 0700)  Resp: (!) 22 (12/07/24 0803)  BP: (!) 118/52 (12/07/24 0700)  SpO2: 98 % (12/07/24 0700) VITAL SIGNS (24 HOUR RANGE):  Temp:  [97.6 °F (36.4 °C)-98.6 °F (37 °C)] 98.4 °F (36.9 °C)  Pulse:  [69-86] 75  Resp:  [18-22] 22  SpO2:  [90 %-100 %] 98 %  BP: (118-148)/(52-86) 118/52   GENERAL: In no acute distress, afebrile  HEENT:  Atraumatic, normocephalic, moist mucous membranes, nasal cannula in place  CHEST:  No wheezing  HEART: S1, S2, murmur  ABDOMEN: Soft, nontender, BS +  MSK: Warm, no lower extremity edema, no clubbing or cyanosis  NEUROLOGIC: Alert and oriented x4, moving all extremities with good strength   INTEGUMENTARY:  No obvious skin rashes  PSYCHIATRY:  Appropriate mood and affect    ASSESSMENT/PLAN   Acute hypoxemic respiratory failure   Acute on chronic decompensated HFpEF with pulmonary edema - resolved  Pulmonary hypertension  Severe mitral stenosis  Echo 11/23/2024 EF 60-65%, grade 1 diastolic dysfunction, mean pressure gradient across the mitral valve is 19 mmHg, pulmonary artery systolic pressure is 57 mmHg  MARYBETH 11/27/2024 revealed severe mitral valve stenosis with moderate regurgitation  LHC and RHC 11/27/2024 revealed 70% proximal LAD stenosis and bioprosthetic mitral valve stenosis - s/p stenting/ballooning to proximal LAD on 11/29/2024  CV surgery recommended to optimize CHF prior to intervention for mitral valve   Dr. Lynn and Dr. Cervantes recommended to start Coumadin  "5mg QD with goal INR of 2-3 to see if gradient of mitral valve stenosis could be decreased  Continue triple therapy with aspirin, Plavix, Coumadin for 1 month and discontinue aspirin on 12/28/2024  Follow up echo in 4-6 weeks  Follow up with Dr. Watters in 4-6 weeks to discuss plan with mitral valve  P.r.n. supplemental oxygen, on room air currently     Severe mitral stenosis s/p mosaic bioprosthetic MVR  CAD s/p stenting/ballooning  PAF s/p SUKHI ligation  HTN, HLD  DVT s/p IVC filter  SSS s/p PPM  Continue amiodarone 200 mg daily, aspirin 81 mg daily (discontinue on 12/28/2024), statin, Plavix 75 mg daily, Lasix 40 mg daily, Toprol-XL 50 mg daily, nifedipine 30 mg daily  Warfarin held until INR less than 3   Daily PT INRs  Daily Mag-Ox 400 mg     Hx of COPD/asthma  ABEL   CPAP q.h.s.  Does not require home oxygen at baseline     Recent Klebsiella pneumoniae and Proteus mirabilis UTI - completed treatment     Hyperglycemia in setting of DM2  Recent A1c 6.8% within the last month  Holding home metformin 500 mg b.i.d.   Started Lantus 5 units daily 12/04/2024  Started aspart 3 units TID with meals 12/05/2024  Resumed home glimepiride 4 mg b.i.d. 12/06/2024  Moderate dose SSI    Chronic sinusitis  Afrin and Mucinex x3 days starting 12/05/2024 followed by Flonase daily   Continue cetirizine  No indication for antibiotics at this time    Anxiety   Xanax prn      DVT prophylaxis:  Holding warfarin for now until INR <3     Code status: Full    Anticipated discharge and disposition:  Continue PT/OT at this time  __________________________________________________________________________    LABS/MICRO/MEDS/DIAGNOSTICS     LABS  Recent Labs     12/05/24  0430      K 4.0   CO2 27   BUN 20.2*   CREATININE 0.74   GLUCOSE 197*   CALCIUM 9.5   ALKPHOS 86   AST 25   ALT 18   ALBUMIN 2.6*     No results for input(s): "WBC", "RBC", "HCT", "MCV", "PLT" in the last 72 hours.    Invalid input(s): "HG"    MICROBIOLOGY  Microbiology " Results (last 7 days)       ** No results found for the last 168 hours. **             MEDICATIONS   albuterol-ipratropium  3 mL Nebulization QID WAKE    amiodarone  200 mg Oral Daily    aspirin  81 mg Oral Daily    atorvastatin  40 mg Oral Daily    cetirizine  10 mg Oral Daily    clopidogreL  75 mg Oral Daily    famotidine  40 mg Oral QHS    [START ON 12/8/2024] fluticasone propionate  2 spray Each Nostril Daily    furosemide  40 mg Oral Daily    glimepiride  4 mg Oral BID    guaiFENesin  600 mg Oral BID    insulin aspart U-100  3 Units Subcutaneous TIDWM    insulin glargine U-100  5 Units Subcutaneous Daily    LIDOcaine  2 patch Transdermal Q24H    lipase-protease-amylase 24,000-76,000-120,000 units  2 capsule Oral TID WM    magnesium oxide  400 mg Oral Daily    methocarbamoL  500 mg Oral TID    metoprolol succinate  50 mg Oral Daily    oxymetazoline  2 spray Each Nostril BID    pantoprazole  40 mg Oral QAM         INFUSIONS       DIAGNOSTIC TESTS  X-Ray Chest 1 View   Final Result      Improving volume status         Electronically signed by: Haider Chavarria   Date:    12/04/2024   Time:    18:25         EF   Date Value Ref Range Status   12/22/2022 55 % Final        NUTRITION STATUS  Patient meets ASPEN criteria for moderate malnutrition of acute illness or injury per RD assessment as evidenced by:  Energy Intake (Malnutrition): less than or equal to 50% for greater than or equal to 5 days  Weight Loss (Malnutrition): other (see comments) (Unable to assess)  Subcutaneous Fat (Malnutrition): moderate depletion     Fluid Accumulation (Malnutrition): other (see comments) (Unable to assess)        A minimum of two characteristics is recommended for diagnosis of either severe or non-severe malnutrition.     Case related differential diagnoses have been reviewed; assessment and plan has been documented. I have personally reviewed the labs and test results that are currently available; I have reviewed the patients  medication list. I have reviewed the consulting providers recommendations. I have reviewed or attempted to review medical records based upon their availability.  All of the patient's and/or family's questions have been addressed and answered to the best of my ability.  I will continue to monitor closely and make adjustments to medical management as needed.  This document was created using M*Modal Fluency Direct.  Transcription errors may have been made.  Please contact me if any questions may rise regarding documentation to clarify transcription.      Thairy G Reyes, DO   Internal Medicine  Department of Hospital Medicine  [facility]

## 2024-12-07 NOTE — PT/OT/SLP PROGRESS
Physical Therapy      Patient Name:  Kortney Leach   MRN:  [id number]    Patient stated she could not participate at this time due to bad lower back pain. She said that lidocaine patches and hot packs were going to be administered soon . Will follow-up this afternoon.

## 2024-12-08 LAB
ANION GAP SERPL CALC-SCNC: 14 MEQ/L
BASOPHILS # BLD AUTO: 0.04 X10(3)/MCL
BASOPHILS NFR BLD AUTO: 0.3 %
BUN SERPL-MCNC: 26 MG/DL (ref 9.8–20.1)
CALCIUM SERPL-MCNC: 9 MG/DL (ref 8.4–10.2)
CHLORIDE SERPL-SCNC: 99 MMOL/L (ref 98–107)
CO2 SERPL-SCNC: 24 MMOL/L (ref 23–31)
CREAT SERPL-MCNC: 0.77 MG/DL (ref 0.55–1.02)
CREAT/UREA NIT SERPL: 34
EOSINOPHIL # BLD AUTO: 0.2 X10(3)/MCL (ref 0–0.9)
EOSINOPHIL NFR BLD AUTO: 1.6 %
ERYTHROCYTE [DISTWIDTH] IN BLOOD BY AUTOMATED COUNT: 17 % (ref 11.5–17)
GFR SERPLBLD CREATININE-BSD FMLA CKD-EPI: >60 ML/MIN/1.73/M2
GLUCOSE SERPL-MCNC: 158 MG/DL (ref 82–115)
HCT VFR BLD AUTO: 34.1 % (ref 37–47)
HGB BLD-MCNC: 11 G/DL (ref 12–16)
IMM GRANULOCYTES # BLD AUTO: 0.06 X10(3)/MCL (ref 0–0.04)
IMM GRANULOCYTES NFR BLD AUTO: 0.5 %
INR PPP: 3.2
LYMPHOCYTES # BLD AUTO: 2.29 X10(3)/MCL (ref 0.6–4.6)
LYMPHOCYTES NFR BLD AUTO: 18.1 %
MAGNESIUM SERPL-MCNC: 2.1 MG/DL (ref 1.6–2.6)
MCH RBC QN AUTO: 30.5 PG (ref 27–31)
MCHC RBC AUTO-ENTMCNC: 32.3 G/DL (ref 33–36)
MCV RBC AUTO: 94.5 FL (ref 80–94)
MONOCYTES # BLD AUTO: 1.29 X10(3)/MCL (ref 0.1–1.3)
MONOCYTES NFR BLD AUTO: 10.2 %
NEUTROPHILS # BLD AUTO: 8.79 X10(3)/MCL (ref 2.1–9.2)
NEUTROPHILS NFR BLD AUTO: 69.3 %
PLATELET # BLD AUTO: 396 X10(3)/MCL (ref 130–400)
PMV BLD AUTO: 9.7 FL (ref 7.4–10.4)
POCT GLUCOSE: 104 MG/DL (ref 70–110)
POCT GLUCOSE: 117 MG/DL (ref 70–110)
POCT GLUCOSE: 144 MG/DL (ref 70–110)
POCT GLUCOSE: 166 MG/DL (ref 70–110)
POCT GLUCOSE: 83 MG/DL (ref 70–110)
POTASSIUM SERPL-SCNC: 3.7 MMOL/L (ref 3.5–5.1)
PROTHROMBIN TIME: 30.1 SECONDS (ref 12.5–14.5)
RBC # BLD AUTO: 3.61 X10(6)/MCL (ref 4.2–5.4)
SODIUM SERPL-SCNC: 137 MMOL/L (ref 136–145)
WBC # BLD AUTO: 12.67 X10(3)/MCL (ref 4.5–11.5)

## 2024-12-08 PROCEDURE — 83735 ASSAY OF MAGNESIUM: CPT | Performed by: STUDENT IN AN ORGANIZED HEALTH CARE EDUCATION/TRAINING PROGRAM

## 2024-12-08 PROCEDURE — 94760 N-INVAS EAR/PLS OXIMETRY 1: CPT

## 2024-12-08 PROCEDURE — 99900035 HC TECH TIME PER 15 MIN (STAT)

## 2024-12-08 PROCEDURE — 25000242 PHARM REV CODE 250 ALT 637 W/ HCPCS: Performed by: STUDENT IN AN ORGANIZED HEALTH CARE EDUCATION/TRAINING PROGRAM

## 2024-12-08 PROCEDURE — 25000003 PHARM REV CODE 250: Performed by: STUDENT IN AN ORGANIZED HEALTH CARE EDUCATION/TRAINING PROGRAM

## 2024-12-08 PROCEDURE — 80048 BASIC METABOLIC PNL TOTAL CA: CPT | Performed by: STUDENT IN AN ORGANIZED HEALTH CARE EDUCATION/TRAINING PROGRAM

## 2024-12-08 PROCEDURE — 63600175 PHARM REV CODE 636 W HCPCS: Performed by: STUDENT IN AN ORGANIZED HEALTH CARE EDUCATION/TRAINING PROGRAM

## 2024-12-08 PROCEDURE — 25000003 PHARM REV CODE 250: Performed by: PHYSICIAN ASSISTANT

## 2024-12-08 PROCEDURE — 85025 COMPLETE CBC W/AUTO DIFF WBC: CPT | Performed by: STUDENT IN AN ORGANIZED HEALTH CARE EDUCATION/TRAINING PROGRAM

## 2024-12-08 PROCEDURE — 27000221 HC OXYGEN, UP TO 24 HOURS

## 2024-12-08 PROCEDURE — 11000004 HC SNF PRIVATE

## 2024-12-08 PROCEDURE — 36415 COLL VENOUS BLD VENIPUNCTURE: CPT | Performed by: STUDENT IN AN ORGANIZED HEALTH CARE EDUCATION/TRAINING PROGRAM

## 2024-12-08 PROCEDURE — 85610 PROTHROMBIN TIME: CPT | Performed by: STUDENT IN AN ORGANIZED HEALTH CARE EDUCATION/TRAINING PROGRAM

## 2024-12-08 RX ORDER — DOCUSATE SODIUM 100 MG/1
100 CAPSULE, LIQUID FILLED ORAL DAILY
Status: DISCONTINUED | OUTPATIENT
Start: 2024-12-08 | End: 2024-12-15 | Stop reason: HOSPADM

## 2024-12-08 RX ORDER — METHOCARBAMOL 750 MG/1
750 TABLET, FILM COATED ORAL 3 TIMES DAILY
Status: DISCONTINUED | OUTPATIENT
Start: 2024-12-08 | End: 2024-12-15 | Stop reason: HOSPADM

## 2024-12-08 RX ADMIN — INSULIN GLARGINE 5 UNITS: 100 INJECTION, SOLUTION SUBCUTANEOUS at 08:12

## 2024-12-08 RX ADMIN — METOPROLOL SUCCINATE 50 MG: 50 TABLET, EXTENDED RELEASE ORAL at 08:12

## 2024-12-08 RX ADMIN — MORPHINE SULFATE 2 MG: 4 INJECTION, SOLUTION INTRAMUSCULAR; INTRAVENOUS at 03:12

## 2024-12-08 RX ADMIN — INSULIN ASPART 3 UNITS: 100 INJECTION, SOLUTION INTRAVENOUS; SUBCUTANEOUS at 07:12

## 2024-12-08 RX ADMIN — PANTOPRAZOLE SODIUM 40 MG: 40 TABLET, DELAYED RELEASE ORAL at 06:12

## 2024-12-08 RX ADMIN — MORPHINE SULFATE 2 MG: 4 INJECTION, SOLUTION INTRAMUSCULAR; INTRAVENOUS at 12:12

## 2024-12-08 RX ADMIN — PANCRELIPASE 2 CAPSULE: 120000; 24000; 76000 CAPSULE, DELAYED RELEASE PELLETS ORAL at 04:12

## 2024-12-08 RX ADMIN — PANCRELIPASE 2 CAPSULE: 120000; 24000; 76000 CAPSULE, DELAYED RELEASE PELLETS ORAL at 07:12

## 2024-12-08 RX ADMIN — METHOCARBAMOL 750 MG: 750 TABLET ORAL at 08:12

## 2024-12-08 RX ADMIN — ATORVASTATIN CALCIUM 40 MG: 40 TABLET, FILM COATED ORAL at 08:12

## 2024-12-08 RX ADMIN — Medication 400 MG: at 08:12

## 2024-12-08 RX ADMIN — FLUTICASONE PROPIONATE 100 MCG: 50 SPRAY, METERED NASAL at 09:12

## 2024-12-08 RX ADMIN — PANCRELIPASE 2 CAPSULE: 120000; 24000; 76000 CAPSULE, DELAYED RELEASE PELLETS ORAL at 11:12

## 2024-12-08 RX ADMIN — GLIMEPIRIDE 4 MG: 2 TABLET ORAL at 08:12

## 2024-12-08 RX ADMIN — INSULIN ASPART 3 UNITS: 100 INJECTION, SOLUTION INTRAVENOUS; SUBCUTANEOUS at 04:12

## 2024-12-08 RX ADMIN — HYDROCODONE BITARTRATE AND ACETAMINOPHEN 1 TABLET: 5; 325 TABLET ORAL at 06:12

## 2024-12-08 RX ADMIN — LIDOCAINE 5% 2 PATCH: 700 PATCH TOPICAL at 11:12

## 2024-12-08 RX ADMIN — METHOCARBAMOL 500 MG: 500 TABLET ORAL at 08:12

## 2024-12-08 RX ADMIN — FAMOTIDINE 40 MG: 20 TABLET, FILM COATED ORAL at 08:12

## 2024-12-08 RX ADMIN — INSULIN ASPART 3 UNITS: 100 INJECTION, SOLUTION INTRAVENOUS; SUBCUTANEOUS at 11:12

## 2024-12-08 RX ADMIN — INSULIN ASPART 2 UNITS: 100 INJECTION, SOLUTION INTRAVENOUS; SUBCUTANEOUS at 06:12

## 2024-12-08 RX ADMIN — MORPHINE SULFATE 2 MG: 4 INJECTION, SOLUTION INTRAMUSCULAR; INTRAVENOUS at 08:12

## 2024-12-08 RX ADMIN — METHOCARBAMOL 750 MG: 750 TABLET ORAL at 02:12

## 2024-12-08 RX ADMIN — CETIRIZINE HYDROCHLORIDE 10 MG: 10 TABLET, FILM COATED ORAL at 08:12

## 2024-12-08 RX ADMIN — CLOPIDOGREL BISULFATE 75 MG: 75 TABLET ORAL at 08:12

## 2024-12-08 RX ADMIN — DOCUSATE SODIUM 100 MG: 100 CAPSULE, LIQUID FILLED ORAL at 11:12

## 2024-12-08 RX ADMIN — GUAIFENESIN 600 MG: 600 TABLET, EXTENDED RELEASE ORAL at 08:12

## 2024-12-08 RX ADMIN — WARFARIN SODIUM 1.25 MG: 2.5 TABLET ORAL at 04:12

## 2024-12-08 RX ADMIN — AMIODARONE HYDROCHLORIDE 200 MG: 200 TABLET ORAL at 08:12

## 2024-12-08 RX ADMIN — ASPIRIN 81 MG: 81 TABLET, COATED ORAL at 08:12

## 2024-12-08 RX ADMIN — FUROSEMIDE 40 MG: 40 TABLET ORAL at 08:12

## 2024-12-08 NOTE — PROGRESS NOTES
[facility] MEDICINE ~ PROGRESS NOTE    CHIEF COMPLAINT   Hospital follow up for severe mitral valve stenosis    HOSPITAL COURSE   79 year old female with a past medical history of VHD s/p mosaic bioprosthetic MVR, CAD, PAF s/p SUKHI ligation, ABEL, COPD/asthma, HTN, DVT s/p IVC filter, SSS s/p PPM, HLD, DM2 who presented to [facility] on 11/23/2024 due to worsening SOB over the past month. She was noted to be in acute decompensated diastolic CHF. Cardiology was consulted and repeat echo revealed EF 60-65%, grade 1 diastolic dysfunction, mean pressure gradient across the mitral valve is 19 mmHg, pulmonary artery systolic pressure is 57 mmHg. MARYBETH 11/27/2024 revealed severe mitral valve stenosis with moderate regurgitation. LHC and RHC 11/27/2024 revealed 70% proximal LAD stenosis and bioprosthetic mitral valve stenosis.  She was evaluated by CV surgery who recommended to optimize CHF prior to intervention for mitral valve.  She subsequently received stenting/ballooning to proximal LAD on 11/29/2024.  Case was discussed between Dr. Lynn and Dr. Cervantes and decision was made to start Coumadin to see if gradient of mitral valve stenosis could be decreased.  She was started on Coumadin 5 mg daily on 11/02/2024 with a goal INR of 2-3.  Plan to continue triple therapy with aspirin, Plavix, Coumadin for 1 month and discontinue aspirin on 12/28/2024.  She will need to follow up echo in 4-6 weeks as well as a follow up with Dr. Watters in 4-6 weeks to discuss plan with mitral valve.  During this admission she was also treated for a Klebsiella pneumoniae and Proteus mirabilis UTI.  She was transferred to our [facility] for continued physical therapy.  Upon my exam today, patient noted to have significant shortness of breath although O2 sat within normal limits.  Increase patient's oxygen from 2 L nasal cannula to 3 L nasal cannula to make her more comfortable.  Patient does not require  supplemental oxygen at baseline, but may require upon discharge.     Repeat CXR 12/04/2024 revealed improved volume status.  Received an additional dose of Lasix 20 mg 12/05/2024.  INR elevated 12/05/2024 - holding coumadin       Today:  INR 3.1 today, start Coumadin 1.25 this evening.  Continues to complain of low back muscle spasms, increase Robaxin  OBJECTIVE/PHYSICAL EXAM     VITAL SIGNS (MOST RECENT):  Temp: 97.5 °F (36.4 °C) (12/08/24 1122)  Pulse: 63 (12/08/24 1122)  Resp: 20 (12/08/24 0619)  BP: 115/83 (12/08/24 1122)  SpO2: 96 % (12/08/24 1122) VITAL SIGNS (24 HOUR RANGE):  Temp:  [97.4 °F (36.3 °C)-98.8 °F (37.1 °C)] 97.5 °F (36.4 °C)  Pulse:  [63-83] 63  Resp:  [18-20] 20  SpO2:  [92 %-96 %] 96 %  BP: (108-130)/(60-83) 115/83   GENERAL: In no acute distress, afebrile  HEENT:  Atraumatic, normocephalic, moist mucous membranes, nasal cannula in place  CHEST:  No wheezing  HEART: S1, S2, murmur  ABDOMEN: Soft, nontender, BS +  MSK: Warm, no lower extremity edema, no clubbing or cyanosis  NEUROLOGIC: Alert and oriented x4, moving all extremities with good strength   INTEGUMENTARY:  No obvious skin rashes  PSYCHIATRY:  Appropriate mood and affect    ASSESSMENT/PLAN   Acute hypoxemic respiratory failure   Acute on chronic decompensated HFpEF with pulmonary edema - resolved  Pulmonary hypertension  Severe mitral stenosis  Echo 11/23/2024 EF 60-65%, grade 1 diastolic dysfunction, mean pressure gradient across the mitral valve is 19 mmHg, pulmonary artery systolic pressure is 57 mmHg  MARYBETH 11/27/2024 revealed severe mitral valve stenosis with moderate regurgitation  LHC and RHC 11/27/2024 revealed 70% proximal LAD stenosis and bioprosthetic mitral valve stenosis - s/p stenting/ballooning to proximal LAD on 11/29/2024  CV surgery recommended to optimize CHF prior to intervention for mitral valve   Dr. Lynn and Dr. Cervantes recommended to start Coumadin 5mg QD with goal INR of 2-3 to see if gradient of mitral  valve stenosis could be decreased  Continue triple therapy with aspirin, Plavix, Coumadin for 1 month and discontinue aspirin on 12/28/2024  Follow up echo in 4-6 weeks  Follow up with Dr. Watters in 4-6 weeks to discuss plan with mitral valve  P.r.n. supplemental oxygen, on room air currently     Severe mitral stenosis s/p mosaic bioprosthetic MVR  CAD s/p stenting/ballooning  PAF s/p SUKHI ligation  HTN, HLD  DVT s/p IVC filter  SSS s/p PPM  Continue amiodarone 200 mg daily, aspirin 81 mg daily (discontinue on 12/28/2024), statin, Plavix 75 mg daily, Lasix 40 mg daily, Toprol-XL 50 mg daily, nifedipine 30 mg daily  Warfarin held until INR less than 3   Daily PT INRs  Daily Mag-Ox 400 mg     Hx of COPD/asthma  ABEL   CPAP q.h.s.  Does not require home oxygen at baseline     Recent Klebsiella pneumoniae and Proteus mirabilis UTI - completed treatment     Hyperglycemia in setting of DM2  Recent A1c 6.8% within the last month  Holding home metformin 500 mg b.i.d.   Started Lantus 5 units daily 12/04/2024  Started aspart 3 units TID with meals 12/05/2024  Resumed home glimepiride 4 mg b.i.d. 12/06/2024  Moderate dose SSI    Chronic sinusitis  Afrin and Mucinex x3 days starting 12/05/2024 followed by Flonase daily   Continue cetirizine  No indication for antibiotics at this time    Anxiety   Xanax prn      DVT prophylaxis:  Holding warfarin for now until INR <3     Code status: Full    Anticipated discharge and disposition:  Continue PT/OT at this time  __________________________________________________________________________    LABS/MICRO/MEDS/DIAGNOSTICS     LABS  Recent Labs     12/08/24  0614      K 3.7   CO2 24   BUN 26.0*   CREATININE 0.77   GLUCOSE 158*   CALCIUM 9.0     Recent Labs     12/08/24  0614   WBC 12.67*   RBC 3.61*   HCT 34.1*   MCV 94.5*          MICROBIOLOGY  Microbiology Results (last 7 days)       ** No results found for the last 168 hours. **             MEDICATIONS   amiodarone  200  mg Oral Daily    aspirin  81 mg Oral Daily    atorvastatin  40 mg Oral Daily    cetirizine  10 mg Oral Daily    clopidogreL  75 mg Oral Daily    docusate sodium  100 mg Oral Daily    famotidine  40 mg Oral QHS    fluticasone propionate  2 spray Each Nostril Daily    furosemide  40 mg Oral Daily    glimepiride  4 mg Oral BID    guaiFENesin  600 mg Oral BID    insulin aspart U-100  3 Units Subcutaneous TIDWM    insulin glargine U-100  5 Units Subcutaneous Daily    LIDOcaine  2 patch Transdermal Q24H    lipase-protease-amylase 24,000-76,000-120,000 units  2 capsule Oral TID WM    magnesium oxide  400 mg Oral Daily    methocarbamoL  750 mg Oral TID    metoprolol succinate  50 mg Oral Daily    pantoprazole  40 mg Oral QAM    warfarin  1.25 mg Oral Once         INFUSIONS       DIAGNOSTIC TESTS  X-Ray Chest 1 View   Final Result      Improving volume status         Electronically signed by: Haider Chavarria   Date:    12/04/2024   Time:    18:25         EF   Date Value Ref Range Status   12/22/2022 55 % Final        NUTRITION STATUS  Patient meets ASPEN criteria for moderate malnutrition of acute illness or injury per RD assessment as evidenced by:  Energy Intake (Malnutrition): less than or equal to 50% for greater than or equal to 5 days  Weight Loss (Malnutrition): other (see comments) (Unable to assess)  Subcutaneous Fat (Malnutrition): moderate depletion     Fluid Accumulation (Malnutrition): other (see comments) (Unable to assess)        A minimum of two characteristics is recommended for diagnosis of either severe or non-severe malnutrition.     Case related differential diagnoses have been reviewed; assessment and plan has been documented. I have personally reviewed the labs and test results that are currently available; I have reviewed the patients medication list. I have reviewed the consulting providers recommendations. I have reviewed or attempted to review medical records based upon their availability.  All of  the patient's and/or family's questions have been addressed and answered to the best of my ability.  I will continue to monitor closely and make adjustments to medical management as needed.  This document was created using M*Modal Fluency Direct.  Transcription errors may have been made.  Please contact me if any questions may rise regarding documentation to clarify transcription.      Thairy G Reyes, DO   Internal Medicine  Department of Hospital Medicine [facility]

## 2024-12-08 NOTE — PLAN OF CARE
Problem: Adult Inpatient Plan of Care  Goal: Plan of Care Review  Outcome: Progressing  Flowsheets (Taken 12/7/2024 1910)  Plan of Care Reviewed With: patient  Goal: Patient-Specific Goal (Individualized)  Outcome: Progressing  Flowsheets (Taken 12/7/2024 1910)  Individualized Care Needs: Pain management, Tele monitoring, PT/OT  Anxieties, Fears or Concerns: Pt concerned about no relief of her back pain  Patient/Family-Specific Goals (Include Timeframe): Improved mobility and pain control  Goal: Optimal Comfort and Wellbeing  Outcome: Progressing  Intervention: Monitor Pain and Promote Comfort  Flowsheets (Taken 12/7/2024 1910)  Pain Management Interventions:   care clustered   pain management plan reviewed with patient/caregiver   quiet environment facilitated     Problem: Diabetes Comorbidity  Goal: Blood Glucose Level Within Targeted Range  Outcome: Progressing     Problem: Wound  Goal: Optimal Pain Control and Function  Outcome: Progressing  Intervention: Prevent or Manage Pain  Flowsheets (Taken 12/7/2024 1910)  Sleep/Rest Enhancement:   awakenings minimized   regular sleep/rest pattern promoted  Pain Management Interventions:   care clustered   pain management plan reviewed with patient/caregiver   quiet environment facilitated     Problem: Fall Injury Risk  Goal: Absence of Fall and Fall-Related Injury  Outcome: Progressing  Intervention: Identify and Manage Contributors  Flowsheets (Taken 12/7/2024 1910)  Self-Care Promotion: BADL personal objects within reach  Medication Review/Management:   medications reviewed   high-risk medications identified

## 2024-12-09 LAB
ANION GAP SERPL CALC-SCNC: 12 MEQ/L
BASOPHILS # BLD AUTO: 0.04 X10(3)/MCL
BASOPHILS NFR BLD AUTO: 0.3 %
BUN SERPL-MCNC: 25.9 MG/DL (ref 9.8–20.1)
CALCIUM SERPL-MCNC: 8.8 MG/DL (ref 8.4–10.2)
CHLORIDE SERPL-SCNC: 99 MMOL/L (ref 98–107)
CO2 SERPL-SCNC: 27 MMOL/L (ref 23–31)
CREAT SERPL-MCNC: 0.8 MG/DL (ref 0.55–1.02)
CREAT/UREA NIT SERPL: 32
EOSINOPHIL # BLD AUTO: 0.24 X10(3)/MCL (ref 0–0.9)
EOSINOPHIL NFR BLD AUTO: 1.9 %
ERYTHROCYTE [DISTWIDTH] IN BLOOD BY AUTOMATED COUNT: 17 % (ref 11.5–17)
GFR SERPLBLD CREATININE-BSD FMLA CKD-EPI: >60 ML/MIN/1.73/M2
GLUCOSE SERPL-MCNC: 119 MG/DL (ref 82–115)
HCT VFR BLD AUTO: 34.1 % (ref 37–47)
HGB BLD-MCNC: 10.8 G/DL (ref 12–16)
IMM GRANULOCYTES # BLD AUTO: 0.07 X10(3)/MCL (ref 0–0.04)
IMM GRANULOCYTES NFR BLD AUTO: 0.5 %
INR PPP: 3.1
LYMPHOCYTES # BLD AUTO: 2.85 X10(3)/MCL (ref 0.6–4.6)
LYMPHOCYTES NFR BLD AUTO: 22.3 %
MAGNESIUM SERPL-MCNC: 2 MG/DL (ref 1.6–2.6)
MCH RBC QN AUTO: 29.8 PG (ref 27–31)
MCHC RBC AUTO-ENTMCNC: 31.7 G/DL (ref 33–36)
MCV RBC AUTO: 93.9 FL (ref 80–94)
MONOCYTES # BLD AUTO: 1.22 X10(3)/MCL (ref 0.1–1.3)
MONOCYTES NFR BLD AUTO: 9.6 %
NEUTROPHILS # BLD AUTO: 8.35 X10(3)/MCL (ref 2.1–9.2)
NEUTROPHILS NFR BLD AUTO: 65.4 %
PHOSPHATE SERPL-MCNC: 5 MG/DL (ref 2.3–4.7)
PLATELET # BLD AUTO: 450 X10(3)/MCL (ref 130–400)
PMV BLD AUTO: 9.3 FL (ref 7.4–10.4)
POCT GLUCOSE: 114 MG/DL (ref 70–110)
POCT GLUCOSE: 117 MG/DL (ref 70–110)
POCT GLUCOSE: 137 MG/DL (ref 70–110)
POCT GLUCOSE: 157 MG/DL (ref 70–110)
POTASSIUM SERPL-SCNC: 4 MMOL/L (ref 3.5–5.1)
PROTHROMBIN TIME: 29.2 SECONDS (ref 12.5–14.5)
RBC # BLD AUTO: 3.63 X10(6)/MCL (ref 4.2–5.4)
SODIUM SERPL-SCNC: 138 MMOL/L (ref 136–145)
WBC # BLD AUTO: 12.77 X10(3)/MCL (ref 4.5–11.5)

## 2024-12-09 PROCEDURE — 63600175 PHARM REV CODE 636 W HCPCS: Performed by: STUDENT IN AN ORGANIZED HEALTH CARE EDUCATION/TRAINING PROGRAM

## 2024-12-09 PROCEDURE — 63600175 PHARM REV CODE 636 W HCPCS: Performed by: HOSPITALIST

## 2024-12-09 PROCEDURE — 83735 ASSAY OF MAGNESIUM: CPT | Performed by: STUDENT IN AN ORGANIZED HEALTH CARE EDUCATION/TRAINING PROGRAM

## 2024-12-09 PROCEDURE — 97110 THERAPEUTIC EXERCISES: CPT

## 2024-12-09 PROCEDURE — 99900035 HC TECH TIME PER 15 MIN (STAT)

## 2024-12-09 PROCEDURE — 11000004 HC SNF PRIVATE

## 2024-12-09 PROCEDURE — 25000003 PHARM REV CODE 250: Performed by: STUDENT IN AN ORGANIZED HEALTH CARE EDUCATION/TRAINING PROGRAM

## 2024-12-09 PROCEDURE — 36415 COLL VENOUS BLD VENIPUNCTURE: CPT | Performed by: STUDENT IN AN ORGANIZED HEALTH CARE EDUCATION/TRAINING PROGRAM

## 2024-12-09 PROCEDURE — 80048 BASIC METABOLIC PNL TOTAL CA: CPT | Performed by: STUDENT IN AN ORGANIZED HEALTH CARE EDUCATION/TRAINING PROGRAM

## 2024-12-09 PROCEDURE — 85610 PROTHROMBIN TIME: CPT | Performed by: STUDENT IN AN ORGANIZED HEALTH CARE EDUCATION/TRAINING PROGRAM

## 2024-12-09 PROCEDURE — 25000003 PHARM REV CODE 250: Performed by: PHYSICIAN ASSISTANT

## 2024-12-09 PROCEDURE — 97530 THERAPEUTIC ACTIVITIES: CPT

## 2024-12-09 PROCEDURE — 85025 COMPLETE CBC W/AUTO DIFF WBC: CPT | Performed by: STUDENT IN AN ORGANIZED HEALTH CARE EDUCATION/TRAINING PROGRAM

## 2024-12-09 PROCEDURE — 97535 SELF CARE MNGMENT TRAINING: CPT

## 2024-12-09 PROCEDURE — 84100 ASSAY OF PHOSPHORUS: CPT | Performed by: STUDENT IN AN ORGANIZED HEALTH CARE EDUCATION/TRAINING PROGRAM

## 2024-12-09 PROCEDURE — 97116 GAIT TRAINING THERAPY: CPT

## 2024-12-09 PROCEDURE — 25000003 PHARM REV CODE 250: Performed by: HOSPITALIST

## 2024-12-09 RX ORDER — MORPHINE SULFATE 4 MG/ML
2 INJECTION, SOLUTION INTRAMUSCULAR; INTRAVENOUS EVERY 8 HOURS PRN
Status: DISCONTINUED | OUTPATIENT
Start: 2024-12-09 | End: 2024-12-15 | Stop reason: HOSPADM

## 2024-12-09 RX ORDER — HYDROCODONE BITARTRATE AND ACETAMINOPHEN 5; 325 MG/1; MG/1
1 TABLET ORAL EVERY 6 HOURS PRN
Status: DISCONTINUED | OUTPATIENT
Start: 2024-12-09 | End: 2024-12-12

## 2024-12-09 RX ADMIN — WARFARIN SODIUM 1.25 MG: 2.5 TABLET ORAL at 04:12

## 2024-12-09 RX ADMIN — INSULIN ASPART 3 UNITS: 100 INJECTION, SOLUTION INTRAVENOUS; SUBCUTANEOUS at 06:12

## 2024-12-09 RX ADMIN — ASPIRIN 81 MG: 81 TABLET, COATED ORAL at 08:12

## 2024-12-09 RX ADMIN — HYDROCODONE BITARTRATE AND ACETAMINOPHEN 1 TABLET: 5; 325 TABLET ORAL at 11:12

## 2024-12-09 RX ADMIN — CETIRIZINE HYDROCHLORIDE 10 MG: 10 TABLET, FILM COATED ORAL at 08:12

## 2024-12-09 RX ADMIN — METHOCARBAMOL 750 MG: 750 TABLET ORAL at 08:12

## 2024-12-09 RX ADMIN — FLUTICASONE PROPIONATE 100 MCG: 50 SPRAY, METERED NASAL at 08:12

## 2024-12-09 RX ADMIN — GLIMEPIRIDE 4 MG: 2 TABLET ORAL at 08:12

## 2024-12-09 RX ADMIN — FUROSEMIDE 40 MG: 40 TABLET ORAL at 08:12

## 2024-12-09 RX ADMIN — CLOPIDOGREL BISULFATE 75 MG: 75 TABLET ORAL at 08:12

## 2024-12-09 RX ADMIN — PANTOPRAZOLE SODIUM 40 MG: 40 TABLET, DELAYED RELEASE ORAL at 06:12

## 2024-12-09 RX ADMIN — DOCUSATE SODIUM 100 MG: 100 CAPSULE, LIQUID FILLED ORAL at 08:12

## 2024-12-09 RX ADMIN — LIDOCAINE 5% 2 PATCH: 700 PATCH TOPICAL at 11:12

## 2024-12-09 RX ADMIN — INSULIN GLARGINE 5 UNITS: 100 INJECTION, SOLUTION SUBCUTANEOUS at 08:12

## 2024-12-09 RX ADMIN — PANCRELIPASE 2 CAPSULE: 120000; 24000; 76000 CAPSULE, DELAYED RELEASE PELLETS ORAL at 04:12

## 2024-12-09 RX ADMIN — DICLOFENAC SODIUM 2 G: 10 GEL TOPICAL at 08:12

## 2024-12-09 RX ADMIN — ATORVASTATIN CALCIUM 40 MG: 40 TABLET, FILM COATED ORAL at 08:12

## 2024-12-09 RX ADMIN — GUAIFENESIN 600 MG: 600 TABLET, EXTENDED RELEASE ORAL at 08:12

## 2024-12-09 RX ADMIN — METOPROLOL SUCCINATE 50 MG: 50 TABLET, EXTENDED RELEASE ORAL at 08:12

## 2024-12-09 RX ADMIN — AMIODARONE HYDROCHLORIDE 200 MG: 200 TABLET ORAL at 08:12

## 2024-12-09 RX ADMIN — METHOCARBAMOL 750 MG: 750 TABLET ORAL at 04:12

## 2024-12-09 RX ADMIN — INSULIN ASPART 3 UNITS: 100 INJECTION, SOLUTION INTRAVENOUS; SUBCUTANEOUS at 04:12

## 2024-12-09 RX ADMIN — ACETAMINOPHEN 650 MG: 325 TABLET ORAL at 08:12

## 2024-12-09 RX ADMIN — PANCRELIPASE 2 CAPSULE: 120000; 24000; 76000 CAPSULE, DELAYED RELEASE PELLETS ORAL at 08:12

## 2024-12-09 RX ADMIN — MORPHINE SULFATE 2 MG: 4 INJECTION, SOLUTION INTRAMUSCULAR; INTRAVENOUS at 06:12

## 2024-12-09 RX ADMIN — BISACODYL 5 MG: 5 TABLET, COATED ORAL at 11:12

## 2024-12-09 RX ADMIN — FAMOTIDINE 40 MG: 20 TABLET, FILM COATED ORAL at 08:12

## 2024-12-09 RX ADMIN — MORPHINE SULFATE 2 MG: 4 INJECTION, SOLUTION INTRAMUSCULAR; INTRAVENOUS at 08:12

## 2024-12-09 RX ADMIN — Medication 400 MG: at 08:12

## 2024-12-09 RX ADMIN — HYDROCODONE BITARTRATE AND ACETAMINOPHEN 1 TABLET: 5; 325 TABLET ORAL at 08:12

## 2024-12-09 RX ADMIN — PANCRELIPASE 2 CAPSULE: 120000; 24000; 76000 CAPSULE, DELAYED RELEASE PELLETS ORAL at 11:12

## 2024-12-09 RX ADMIN — INSULIN ASPART 3 UNITS: 100 INJECTION, SOLUTION INTRAVENOUS; SUBCUTANEOUS at 11:12

## 2024-12-09 NOTE — PLAN OF CARE
Problem: Adult Inpatient Plan of Care  Goal: Plan of Care Review  Outcome: Progressing  Flowsheets (Taken 12/8/2024 1925)  Plan of Care Reviewed With: patient  Goal: Patient-Specific Goal (Individualized)  Outcome: Progressing  Flowsheets (Taken 12/8/2024 1925)  Anxieties, Fears or Concerns: None expressed at this time  Patient/Family-Specific Goals (Include Timeframe): Return to baseline by discharge  Goal: Absence of Hospital-Acquired Illness or Injury  Outcome: Progressing  Intervention: Identify and Manage Fall Risk  Flowsheets (Taken 12/8/2024 1925)  Safety Promotion/Fall Prevention:   assistive device/personal item within reach   bed alarm set   nonskid shoes/socks when out of bed  Intervention: Prevent Skin Injury  Flowsheets (Taken 12/8/2024 1925)  Body Position: position changed independently  Device Skin Pressure Protection: pressure points protected  Intervention: Prevent and Manage VTE (Venous Thromboembolism) Risk  Flowsheets (Taken 12/8/2024 1925)  VTE Prevention/Management:   bleeding precautions maintained   bleeding risk assessed  Intervention: Prevent Infection  Flowsheets (Taken 12/8/2024 1925)  Infection Prevention:   cohorting utilized   environmental surveillance performed   equipment surfaces disinfected   hand hygiene promoted   personal protective equipment utilized   rest/sleep promoted   single patient room provided  Goal: Optimal Comfort and Wellbeing  Outcome: Progressing  Intervention: Monitor Pain and Promote Comfort  Flowsheets (Taken 12/8/2024 1925)  Pain Management Interventions: care clustered  Intervention: Provide Person-Centered Care  Flowsheets (Taken 12/8/2024 1925)  Trust Relationship/Rapport:   care explained   choices provided   emotional support provided   empathic listening provided   questions answered   questions encouraged   reassurance provided   thoughts/feelings acknowledged  Goal: Readiness for Transition of Care  Outcome: Progressing     Problem: Diabetes  Comorbidity  Goal: Blood Glucose Level Within Targeted Range  Outcome: Progressing  Intervention: Monitor and Manage Glycemia  Flowsheets (Taken 12/8/2024 1925)  Glycemic Management: blood glucose monitored     Problem: Wound  Goal: Optimal Coping  Outcome: Progressing  Intervention: Support Patient and Family Response  Flowsheets (Taken 12/8/2024 1925)  Supportive Measures:   active listening utilized   self-care encouraged  Family/Support System Care: self-care encouraged  Goal: Optimal Functional Ability  Outcome: Progressing  Intervention: Optimize Functional Ability  Flowsheets (Taken 12/8/2024 1925)  Activity Management: Rolling - L1  Goal: Absence of Infection Signs and Symptoms  Outcome: Progressing  Intervention: Prevent or Manage Infection  Flowsheets (Taken 12/8/2024 1925)  Fever Reduction/Comfort Measures:   lightweight bedding   lightweight clothing  Infection Management: aseptic technique maintained  Isolation Precautions:   precautions maintained   protective  Goal: Improved Oral Intake  Outcome: Progressing  Goal: Optimal Pain Control and Function  Outcome: Progressing  Goal: Skin Health and Integrity  Outcome: Progressing  Goal: Optimal Wound Healing  Outcome: Progressing

## 2024-12-09 NOTE — PT/OT/SLP PROGRESS
Occupational Therapy  Treatment    Name: Kortney Leach    : 1945 (79 y.o.)  MRN: [id number]           TREATMENT SUMMARY AND RECOMMENDATIONS:      OT Date of Treatment: 24  OT Start Time: 1025  OT Stop Time: 1050  OT Total Time (min): 25 min      Subjective Assessment:   No complaints  Lethargic   x Awake, alert, cooperative  Impulsive    Uncooperative   Flat affect    Agitated  c/o pain   x Appropriate  c/o fatigue    Confused x Treated at bedside     Emotionally labile x Treated in gym/dept.    Anxious/fearful   Other:        Therapy Precautions:    Cognitive deficits  Spinal precautions    Collar - hard  Sternal precautions    Collar - soft   Cervical precautions    x Fall risk  LSO    Hip precautions - posterior  TLSO    Hip precautions - anterior  WBAT    Impaired communication  Partial weightbearing   x Oxygen 2-3L via NC  TTWB    PEG tube  NWB    Visual deficits  Knee immobilizer    Hearing deficits   Other:        Treatment Objectives:     Mobility Training:    Mobility task Assist level Comments    Bed mobility     Transfer CGA Stand step t/f to BSChair using a RW   Sit to stands transitions CGA-Min A WC and BSChair to standing using a RW   Functional mobility CGA X50ft using a RW and following with a WC for safety    Sitting balance     Standing balance      Wheelchair mobility        ADL Training:    ADL Assist level Comments    Feeding     Grooming/hygiene     Bathing     Upper body dressing     Lower body dressing CGA-SBA Doff and don her pants using a RW for support in standing with one UE    Toileting     Toilet transfer     Adaptive equipment training     Other:           Therapeutic Exercise:   Exercise Sets Reps Comments   BUE strengthening exercises  3 10 3# weighted dowel performing bicep curls, chest press, shoulder press, and forward/backward rows                          Additional Comments:      Assessment: Patient had a positive response to her session. Pt is making (+)  gains towards her goals as evident of being able to perform UB exercises with less rest breaks in between sets as compared to previous sessions. Pt would benefit from continued OT services to increase independence with occupational performance, decrease risks for falls, and decrease caregiver burden. Recommend continuing POC.     GOALS:   Multidisciplinary Problems       Occupational Therapy Goals          Problem: Occupational Therapy    Goal Priority Disciplines Outcome Interventions   Occupational Therapy Goal     OT, PT/OT Progressing    Description: Goals to be met by: Discharge    Patient will increase functional independence with ADLs by performing:    UE Dressing with Modified [place].  LE Dressing with Stand-by Assistance.  Grooming while seated at sink with Modified [place].  Toileting from toilet with Stand-by Assistance for hygiene and clothing management.   Bathing from  shower chair/bench with Stand-by Assistance.  Toilet transfer to toilet with Stand-by Assistance.  Increased functional strength to 4/5 for functional mobility and self-care.   Pt will incorporate energy conservation techniques while performing her self-care.                          Recommendations:     Discharge Equipment Recommendations:  none     Plan:     Patient to be seen  (5-6x/week; QD-BID as appropriate) to address the above listed problems via self-care/home management, therapeutic activities, therapeutic exercises (energy conservation techniques)  Plan of Care Expires: 12/25/24  Revisions made to plan of care: No    Skilled OT Minutes Provided: 30  Communication with Treatment Team:     At end of treatment, patient remained:  x Up in chair     Up in wheelchair in room    In bed   x With alarm activated    Bed rails up   x Call bell in reach     Family/friends present    Restraints secured properly    In bathroom with CNA/RN notified    In gym with PT/PTA/tech    Nurse aware    Other:

## 2024-12-09 NOTE — PLAN OF CARE
Problem: Adult Inpatient Plan of Care  Goal: Plan of Care Review  Outcome: Progressing  Flowsheets (Taken 12/9/2024 0701)  Plan of Care Reviewed With: patient  Goal: Patient-Specific Goal (Individualized)  Outcome: Progressing  Flowsheets (Taken 12/9/2024 0701)  Individualized Care Needs: pain mgt, PT/OT  Anxieties, Fears or Concerns: denies any concerns at this time  Goal: Absence of Hospital-Acquired Illness or Injury  Outcome: Progressing  Intervention: Identify and Manage Fall Risk  Flowsheets (Taken 12/9/2024 0701)  Safety Promotion/Fall Prevention:   assistive device/personal item within reach   bed alarm set   patient expresses understanding of fall risk and prevention  Intervention: Prevent Skin Injury  Flowsheets (Taken 12/9/2024 0701)  Body Position: position changed independently  Device Skin Pressure Protection: pressure points protected  Intervention: Prevent and Manage VTE (Venous Thromboembolism) Risk  Flowsheets (Taken 12/9/2024 0701)  VTE Prevention/Management:   ambulation promoted   remove, assess skin, and reapply sequential compression device   dorsiflexion/plantar flexion performed   bleeding risk assessed  Intervention: Prevent Infection  Flowsheets (Taken 12/9/2024 0701)  Infection Prevention:   cohorting utilized   personal protective equipment utilized   environmental surveillance performed   rest/sleep promoted   equipment surfaces disinfected   single patient room provided   hand hygiene promoted  Goal: Optimal Comfort and Wellbeing  Outcome: Progressing  Intervention: Monitor Pain and Promote Comfort  Flowsheets (Taken 12/9/2024 0701)  Pain Management Interventions: care clustered  Intervention: Provide Person-Centered Care  Flowsheets (Taken 12/9/2024 0701)  Trust Relationship/Rapport:   care explained   questions encouraged   choices provided   reassurance provided   emotional support provided   thoughts/feelings acknowledged   empathic listening provided   questions answered  Goal:  Readiness for Transition of Care  Outcome: Progressing     Problem: Diabetes Comorbidity  Goal: Blood Glucose Level Within Targeted Range  Outcome: Progressing  Intervention: Monitor and Manage Glycemia  Flowsheets (Taken 12/9/2024 0701)  Glycemic Management: blood glucose monitored     Problem: Wound  Goal: Optimal Coping  Outcome: Progressing  Intervention: Support Patient and Family Response  Flowsheets (Taken 12/9/2024 0701)  Supportive Measures:   active listening utilized   self-care encouraged  Family/Support System Care: self-care encouraged  Goal: Optimal Functional Ability  Outcome: Progressing  Intervention: Optimize Functional Ability  Flowsheets (Taken 12/9/2024 0701)  Assistive Device Utilized:   gait belt   walker  Activity Management: Rolling - L1  Activity Assistance Provided: assistance, 1 person  Goal: Absence of Infection Signs and Symptoms  Outcome: Progressing  Intervention: Prevent or Manage Infection  Flowsheets (Taken 12/9/2024 0701)  Fever Reduction/Comfort Measures:   lightweight clothing   lightweight bedding  Infection Management: aseptic technique maintained  Isolation Precautions:   protective   precautions maintained  Goal: Improved Oral Intake  Outcome: Progressing  Goal: Optimal Pain Control and Function  Outcome: Progressing  Goal: Skin Health and Integrity  Outcome: Progressing  Goal: Optimal Wound Healing  Outcome: Progressing     Problem: Fall Injury Risk  Goal: Absence of Fall and Fall-Related Injury  Outcome: Progressing

## 2024-12-09 NOTE — PLAN OF CARE
[facility]  Discharge Reassessment    Primary Care Provider: Arnaldo Hernandez MD    Expected Discharge Date:     Reassessment (most recent)       Discharge Reassessment - 12/09/24 1257          Discharge Reassessment    Assessment Type Discharge Planning Reassessment     Did the patient's condition or plan change since previous assessment? No     Discharge Plan discussed with: Patient;Adult children     Discharge Plan A Home;Home Health;Home with family     DME Needed Upon Discharge  other (see comments)   TBD    Transition of Care Barriers None     Why the patient remains in the hospital Requires continued medical care        Post-Acute Status    Discharge Delays None known at this time

## 2024-12-09 NOTE — PT/OT/SLP PROGRESS
Physical Therapy Treatment Note           Name: Kortney Leach    : 1945 (79 y.o.)  MRN: [id number]           TREATMENT SUMMARY AND RECOMMENDATIONS:    PT Received On: 24  PT Start Time: 1355     PT Stop Time: 1421  PT Total Time (min): 26 min     Subjective Assessment:   No complaints  Lethargic   x Awake, alert, cooperative  Uncooperative    Agitated     x c/o pain - LBP    Appropriate  c/o fatigue    Confused  Treated at bedside     Emotionally labile     x Treated in gym/dept.    Impulsive  Other:    Flat affect       Therapy Precautions:    Cognitive deficits  Spinal precautions    Collar - hard  Sternal precautions    Collar - soft   TLSO    Fall risk  LSO    Hip precautions - posterior  Knee immobilizer    Hip precautions - anterior  WBAT    Impaired communication  Partial weightbearing   x Oxygen - 3L via NC  TTWB    PEG tube  NWB    Visual deficits  Other:    Hearing deficits          Treatment Objectives:     Mobility Training:   Assist level Comments    Bed mobility     Transfer   CGA Stand pivot to standard chair and commode   Gait     CGA 50 ft level surfaces with RW   Sit to stand transitions    Min A x3 STS from WC/EOB with BUE use   Sitting balance     Standing balance   CGA/Min A To don/doff pants/brief and hand washing   Wheelchair mobility     Car transfer     Other:          Therapeutic Exercise:   Exercise Sets Reps Comments   LE bike   5' fwd/5' rev, Level 2                         Additional Comments:    Assessment: Pt with minor LOB during handwashing while standing at sink but was able to correct with Min A from therapist. She continues to benefit from skilled PT to increased functional capacity and safety.       PT Plan: Cont PT POC.  Revisions made to plan of care: No    GOALS:   Multidisciplinary Problems       Physical Therapy Goals          Problem: Physical Therapy    Goal Priority Disciplines Outcome Interventions   Physical Therapy Goal     PT, PT/OT  Progressing    Description: Goals to be met by: discharge     Patient will increase functional independence with mobility by performin. Sit to stand transfer with Supervision and minimal dyspnea.   2. Gait  x 150 feet with Supervision using Rolling Walker and minimal dyspnea.                          Skilled PT Minutes Provided: 26 min   Communication with Treatment Team:     Equipment recommendations:       At end of treatment, patient remained:   Up in chair     Up in wheelchair in room    In bed    With alarm activated    Bed rails up    Call bell in reach     Family/friends present    Restraints secured properly    In bathroom with CNA/RN notified    Nurse aware   x In gym with therapist/tech    Other:

## 2024-12-09 NOTE — PROGRESS NOTES
[facility] MEDICINE ~ PROGRESS NOTE    CHIEF COMPLAINT   Hospital follow up for severe mitral valve stenosis    HOSPITAL COURSE   79 year old female with a past medical history of VHD s/p mosaic bioprosthetic MVR, CAD, PAF s/p SUKHI ligation, ABEL, COPD/asthma, HTN, DVT s/p IVC filter, SSS s/p PPM, HLD, DM2 who presented to [facility] on 11/23/2024 due to worsening SOB over the past month. She was noted to be in acute decompensated diastolic CHF. Cardiology was consulted and repeat echo revealed EF 60-65%, grade 1 diastolic dysfunction, mean pressure gradient across the mitral valve is 19 mmHg, pulmonary artery systolic pressure is 57 mmHg. MARYBETH 11/27/2024 revealed severe mitral valve stenosis with moderate regurgitation. LHC and RHC 11/27/2024 revealed 70% proximal LAD stenosis and bioprosthetic mitral valve stenosis.  She was evaluated by CV surgery who recommended to optimize CHF prior to intervention for mitral valve.  She subsequently received stenting/ballooning to proximal LAD on 11/29/2024.  Case was discussed between Dr. Lynn and Dr. Cervantes and decision was made to start Coumadin to see if gradient of mitral valve stenosis could be decreased.  She was started on Coumadin 5 mg daily on 11/02/2024 with a goal INR of 2-3.  Plan to continue triple therapy with aspirin, Plavix, Coumadin for 1 month and discontinue aspirin on 12/28/2024.  She will need to follow up echo in 4-6 weeks as well as a follow up with Dr. Watters in 4-6 weeks to discuss plan with mitral valve.  During this admission she was also treated for a Klebsiella pneumoniae and Proteus mirabilis UTI.  She was transferred to our [facility] for continued physical therapy.  Yesterday, 12/8, pt had significant shortness of breath although O2 sat within normal limits. Patient's oxygen was increased from 2 L nasal cannula to 3 L nasal cannula to make her more comfortable.  Patient does not require supplemental  oxygen at baseline, but may require upon discharge.     Repeat CXR 12/04/2024 revealed improved volume status.  Received an additional dose of Lasix 20 mg 12/05/2024.  INR elevated 12/05/2024 - holding coumadin     12/8: INR 3.2 - started Coumadin 1.25 last night.  Continues to complain of low back muscle spasms, increased Robaxin     12/9 Pt feels better today. Pt denies sob. Pt is eating well and endorses constipation. Last BM was 4 days ago.I discussed adding Miralax. Pt is ambulating very little and uses walker to get to bathroom with contact guard assist. Pt c/o bacl pain and feels like her spinal stimulator is not working. Pt was given coumadin 1.25 mg last night. INR today is 3.1. Will give coumadin 1.25 mg po tonight. Changed pain regimen. Decreased morphine iv to q8hr and norco to q 6hr.       OBJECTIVE/PHYSICAL EXAM     VITAL SIGNS (MOST RECENT):  Temp: 97.5 °F (36.4 °C) (12/09/24 0719)  Pulse: 71 (12/09/24 0719)  Resp: 18 (12/09/24 0344)  BP: 121/77 (12/09/24 0719)  SpO2: (!) 94 % (12/09/24 0719) VITAL SIGNS (24 HOUR RANGE):  Temp:  [97.5 °F (36.4 °C)-98.6 °F (37 °C)] 97.5 °F (36.4 °C)  Pulse:  [63-75] 71  Resp:  [18] 18  SpO2:  [91 %-96 %] 94 %  BP: (101-121)/(42-87) 121/77   GENERAL: In no acute distress, afebrile  HEENT:  Atraumatic, normocephalic, moist mucous membranes, nasal cannula in place  CHEST:  No wheezing  HEART: S1, S2, murmur  ABDOMEN: Soft, nontender, BS +  MSK: Warm, no lower extremity edema, no clubbing or cyanosis  NEUROLOGIC: Alert and oriented x4, moving all extremities with good strength   INTEGUMENTARY:  No obvious skin rashes  PSYCHIATRY:  Appropriate mood and affect    ASSESSMENT/PLAN   Acute hypoxemic respiratory failure   Acute on chronic decompensated HFpEF with pulmonary edema - resolved  Pulmonary hypertension  Severe mitral stenosis  Echo 11/23/2024 EF 60-65%, grade 1 diastolic dysfunction, mean pressure gradient across the mitral valve is 19 mmHg, pulmonary artery systolic  pressure is 57 mmHg  MARYBETH 11/27/2024 revealed severe mitral valve stenosis with moderate regurgitation  LHC and RHC 11/27/2024 revealed 70% proximal LAD stenosis and bioprosthetic mitral valve stenosis - s/p stenting/ballooning to proximal LAD on 11/29/2024  CV surgery recommended to optimize CHF prior to intervention for mitral valve   Dr. Lynn and Dr. Cervantes recommended to start Coumadin 5mg QD with goal INR of 2-3 to see if gradient of mitral valve stenosis could be decreased  Pt with elevated INR, coumadin has been on hold. Pt restarted on coumadin on 12/8 and was given 1.25 mg on 12/8. Today, 12/9, INR 3.1 and pt will be given coumadin 1.25 mg po x1 this evening.  Check daily INR  Continue triple therapy with aspirin, Plavix, Coumadin for 1 month and discontinue aspirin on 12/28/2024  Follow up echo in 4-6 weeks  Follow up with Dr. Watters in 4-6 weeks to discuss plan with mitral valve  P.r.n. supplemental oxygen, on room air currently     Severe mitral stenosis s/p mosaic bioprosthetic MVR  CAD s/p stenting/ballooning  PAF s/p SUKHI ligation  HTN, HLD  DVT s/p IVC filter  SSS s/p PPM  Continue amiodarone 200 mg daily, aspirin 81 mg daily (discontinue on 12/28/2024), statin, Plavix 75 mg daily, Lasix 40 mg daily, Toprol-XL 50 mg daily, nifedipine 30 mg daily  Warfarin has been on hold and restarted on 12/8- see above  Daily PT INRs  Daily Mag-Ox 400 mg     Hx of COPD/asthma  ABEL   CPAP q.h.s.  Does not require home oxygen at baseline     Recent Klebsiella pneumoniae and Proteus mirabilis UTI - completed treatment     Hyperglycemia in setting of DM2  Recent A1c 6.8% within the last month  Holding home metformin 500 mg b.i.d.   Started Lantus 5 units daily 12/04/2024  Started aspart 3 units TID with meals 12/05/2024  Resumed home glimepiride 4 mg b.i.d. 12/06/2024  Moderate dose SSI    Chronic sinusitis  Afrin and Mucinex x3 days starting 12/05/2024 followed by Flonase daily   Continue cetirizine  No  indication for antibiotics at this time    Anxiety   Xanax prn      DVT prophylaxis:  warfarin     Code status: Full    Anticipated discharge and disposition:  Continue PT/OT at this time  __________________________________________________________________________    LABS/MICRO/MEDS/DIAGNOSTICS     LABS  Recent Labs     12/09/24  0431      K 4.0   CO2 27   BUN 25.9*   CREATININE 0.80   GLUCOSE 119*   CALCIUM 8.8     Recent Labs     12/09/24  0431   WBC 12.77*   RBC 3.63*   HCT 34.1*   MCV 93.9   *       MICROBIOLOGY  Microbiology Results (last 7 days)       ** No results found for the last 168 hours. **             MEDICATIONS   amiodarone  200 mg Oral Daily    aspirin  81 mg Oral Daily    atorvastatin  40 mg Oral Daily    cetirizine  10 mg Oral Daily    clopidogreL  75 mg Oral Daily    docusate sodium  100 mg Oral Daily    famotidine  40 mg Oral QHS    fluticasone propionate  2 spray Each Nostril Daily    furosemide  40 mg Oral Daily    glimepiride  4 mg Oral BID    guaiFENesin  600 mg Oral BID    insulin aspart U-100  3 Units Subcutaneous TIDWM    insulin glargine U-100  5 Units Subcutaneous Daily    LIDOcaine  2 patch Transdermal Q24H    lipase-protease-amylase 24,000-76,000-120,000 units  2 capsule Oral TID WM    magnesium oxide  400 mg Oral Daily    methocarbamoL  750 mg Oral TID    metoprolol succinate  50 mg Oral Daily    pantoprazole  40 mg Oral QAM         INFUSIONS       DIAGNOSTIC TESTS  X-Ray Chest 1 View   Final Result      Improving volume status         Electronically signed by: Haider Chavarria   Date:    12/04/2024   Time:    18:25         EF   Date Value Ref Range Status   12/22/2022 55 % Final        NUTRITION STATUS  Patient meets ASPEN criteria for moderate malnutrition of acute illness or injury per RD assessment as evidenced by:  Energy Intake (Malnutrition): less than or equal to 50% for greater than or equal to 5 days  Weight Loss (Malnutrition): other (see comments) (Unable to  assess)  Subcutaneous Fat (Malnutrition): moderate depletion     Fluid Accumulation (Malnutrition): other (see comments) (Unable to assess)        A minimum of two characteristics is recommended for diagnosis of either severe or non-severe malnutrition.     Case related differential diagnoses have been reviewed; assessment and plan has been documented. I have personally reviewed the labs and test results that are currently available; I have reviewed the patients medication list. I have reviewed the consulting providers recommendations. I have reviewed or attempted to review medical records based upon their availability.  All of the patient's and/or family's questions have been addressed and answered to the best of my ability.  I will continue to monitor closely and make adjustments to medical management as needed.  This document was created using [employer]*Modal Fluency Direct.  Transcription errors may have been made.  Please contact me if any questions may rise regarding documentation to clarify transcription.        Service was provided using HIPPA compliant web platform using SOC for audio/visual equipment  Patient Location: [place]  Provider Location:Home  Participants on call: bedside RN, patient  Physician on call : Dr. Marisabel Petit,    Internal Medicine  Department of [facility] Medicine  [facility]

## 2024-12-09 NOTE — PROGRESS NOTES
Inpatient Nutrition Assessment    Admit Date: 12/3/2024   Total duration of encounter: 6 days   Patient Age: 79 y.o.    Nutrition Recommendation/Prescription     Continue heart healthy low sodium diabetic 2,000 calories up to 75 gm carbohydrates per meals  Continue boost glucose control BID provides 190 kcal and 16 gm of protein per serving  Monitor intake, wt, labs,medications    Communication of Recommendations: reviewed with provider and reviewed with patient    Nutrition Assessment     Malnutrition Assessment/Nutrition-Focused Physical Exam    Malnutrition Context: acute illness or injury (12/04/24 1338)  Malnutrition Level: moderate (12/04/24 1338)  Energy Intake (Malnutrition): less than or equal to 50% for greater than or equal to 5 days (12/04/24 1338)  Weight Loss (Malnutrition): other (see comments) (Unable to assess) (12/04/24 1338)  Subcutaneous Fat (Malnutrition): moderate depletion (12/04/24 1338)  Orbital Region (Subcutaneous Fat Loss): mild depletion  Upper Arm Region (Subcutaneous Fat Loss): moderate depletion        [place] Region (Muscle Loss): well nourished  Clavicle Bone Region (Muscle Loss): other (see comments) (Unable to assess)  Clavicle and [place]romion Bone Region (Muscle Loss): other (see comments) (Unable to assess)                 Fluid Accumulation (Malnutrition): other (see comments) (Unable to assess) (12/04/24 1338)        A minimum of two characteristics is recommended for diagnosis of either severe or non-severe malnutrition.    Chart Review    Reason Seen: continuous nutrition monitoring and length of stay, new [facility] bed    Malnutrition Screening Tool Results   Have you recently lost weight without trying?: No  Have you been eating poorly because of a decreased appetite?: No   MST Score: 0   Diagnosis:  Acute hypoxemic respiratory failure 2/2 below  Acute on chronic decompensated HFpEF with pulmonary edema - resolved  Pulmonary hypertension  Severe mitral stenosis  VHD s/p mosaic  bioprosthetic MVR  CAD s/p stenting/ballooning  PAF s/p SUKHI ligation  HTN, HLD  DVT s/p IVC filter  SSS s/p PPM  Hyperphosphatemia   Hx of COPD/asthma  ABEL   Recent Klebsiella pneumoniae and Proteus mirabilis UTI : complete treatment  DM2  DVT prophylaxis  Hyperglycemia   Relevant Medical History:    Anticoagulant long-term use      Asthma      Chronic sinusitis, unspecified      COPD (chronic obstructive pulmonary disease)      CVID (common variable immunodeficiency)      Diabetes mellitus, type 2      GERD (gastroesophageal reflux disease)      Hypertension      Severe mitral valve regurgitation      Sleep apnea       uses CPAP    SOB (shortness of breath)      Unspecified glaucoma      Weakness        Scheduled Medications:  amiodarone, 200 mg, Daily  aspirin, 81 mg, Daily  atorvastatin, 40 mg, Daily  cetirizine, 10 mg, Daily  clopidogreL, 75 mg, Daily  docusate sodium, 100 mg, Daily  famotidine, 40 mg, QHS  fluticasone propionate, 2 spray, Daily  furosemide, 40 mg, Daily  glimepiride, 4 mg, BID  guaiFENesin, 600 mg, BID  insulin aspart U-100, 3 Units, TIDWM  insulin glargine U-100, 5 Units, Daily  LIDOcaine, 2 patch, Q24H  lipase-protease-amylase 24,000-76,000-120,000 units, 2 capsule, TID WM  magnesium oxide, 400 mg, Daily  methocarbamoL, 750 mg, TID  metoprolol succinate, 50 mg, Daily  pantoprazole, 40 mg, QAM  warfarin, 1.25 mg, Once    Continuous Infusions:   PRN Medications:  acetaminophen, 650 mg, Q8H PRN  acetaminophen, 650 mg, Q8H PRN  albuterol-ipratropium, 3 mL, Q4H PRN  ALPRAZolam, 0.25 mg, TID PRN  benzonatate, 100 mg, TID PRN  bisacodyL, 5 mg, Daily PRN  calcium carbonate, 1,000 mg, TID PRN  dextrose 10%, 12.5 g, PRN  dextrose 10%, 25 g, PRN  diclofenac sodium, 2 g, BID PRN  glucagon (human recombinant), 1 mg, PRN  glucose, 16 g, PRN  glucose, 24 g, PRN  hydrALAZINE, 10 mg, Q4H PRN  HYDROcodone-acetaminophen, 1 tablet, Q6H PRN  insulin aspart U-100, 0-10 Units, QID (AC + HS) PRN  loperamide, 2 mg,  Q4H PRN  melatonin, 6 mg, Nightly PRN  morphine, 2 mg, Q8H PRN  ondansetron, 4 mg, Q8H PRN  simethicone, 1 tablet, QID PRN  sodium chloride 0.9%, 10 mL, PRN    Calorie Containing IV Medications: no significant kcals from medications at this time    Recent Labs   Lab 12/03/24  0459 12/04/24  0440 12/05/24  0430 12/07/24  0605 12/08/24  0614 12/09/24  0431    133* 138  --  137 138   K 3.6 3.8 4.0  --  3.7 4.0   CALCIUM 9.2 8.9 9.5  --  9.0 8.8   PHOS  --  5.0* 5.3* 5.6*  --  5.0*   MG 1.90 1.80 1.80 1.90 2.10 2.00   CL 98 96* 99  --  99 99   CO2 26 28 27  --  24 27   BUN 22.4* 22.7* 20.2*  --  26.0* 25.9*   CREATININE 0.85 0.83 0.74  --  0.77 0.80   EGFRNORACEVR >60 >60 >60  --  >60 >60   GLUCOSE 191* 179* 197*  --  158* 119*   BILITOT  --  0.7 0.9  --   --   --    ALKPHOS  --  75 86  --   --   --    ALT  --  21 18  --   --   --    AST  --  26 25  --   --   --    ALBUMIN  --  2.5* 2.6*  --   --   --    WBC 13.46* 14.63*  --   --  12.67* 12.77*   HGB 11.0* 11.0*  --   --  11.0* 10.8*   HCT 33.6* 34.4*  --   --  34.1* 34.1*     Nutrition Orders:  Diet Heart Healthy Low Sodium,2gm, Diabetic; 2000 Calories (up to 75 gm per meal)  Dietary nutrition supplements BID; Boost Glucose Control - Any flavor    Appetite/Oral Intake: fair/25-50% of meals  Factors Affecting Nutritional Intake: decreased appetite  Social Needs Impacting Access to Food:   Food Insecurity: No Food Insecurity (11/22/2024)     Hunger Vital Sign      Worried About Running Out of Food in the Last Year: Never true      Ran Out of Food in the Last Year: Never true     Food/Moravian/Cultural Preferences: none reported  Food Allergies: none reported  Last Bowel Movement: 12/04/24  Wound(s):      Comments    12/04/24: Pt reports decrease intake x 3 weeks. Pt reports possible wt loss and is unsure. #. Percent of wt change: -3.04 from UBW. Pt stated didn't eat breakfast this am. Pt is drinking the boost glucose control. Family present. Discussed food  "preferences to improved intake, request peanut butter sugar free jelly sandwich for lunch. Labs/medications review. Pt on calcium acetate, furosemide, lipase protease amylase, magnesium oxide, and coumadin. Will monitor dark leafy consumption. Pt on heart healthy low sodium diabetic diet with boost glucose control BID. Will monitor and encourage good PO Intake. +BM on 24.    24: Made rounds at breakfast this am. Intake improved some, 50% of meal, pt stated. Pt SOB this am. PA present during rounds. Pt ate 1/2 [place] toast. +BM per patient. Labs/medications review. Daily weigh is now ordered. Pt drinking nutrition supplement as well. Pt having also muscle spasm today. Holding warfarin today per PA.     24: Pt reports intake still decrease, drinking boost glucose control. PO intake 25% of meals. Discussed food preferences to improved intake and adjust menus. Labs/medications review. +BM per EMR. No significant wt change. Will monitor.     Anthropometrics    Height: 5' 6" (167.6 cm), Height Method: Stated  Last Weight: 98 kg (216 lb 0.8 oz) (24 0500), Weight Method: Bed Scale  BMI (Calculated): 34.9  BMI Classification: obese grade I (BMI 30-34.9)     Ideal Body Weight (IBW), Female: 130 lb     % Ideal Body Weight, Female (lb): 163.82 %                    Usual Body Weight (UBW), k kg  % Usual Body Weight: 96.8  % Weight Change From Usual Weight: -3.4 %  Usual Weight Provided By: patient    Wt Readings from Last 5 Encounters:   24 98 kg (216 lb 0.8 oz)   24 96.4 kg (212 lb 8 oz)   24 99.8 kg (220 lb)   24 99.8 kg (220 lb)   24 93.4 kg (206 lb)     Weight Change(s) Since Admission:   24: No significant wt change.   Wt Readings from Last 1 Encounters:   24 0500 98 kg (216 lb 0.8 oz)   24 0520 96.9 kg (213 lb 10 oz)   24 0545 97.2 kg (214 lb 4.6 oz)   24 1327 96.6 kg (212 lb 15.4 oz)   24 1600 96.6 kg (212 lb 15.4 oz)   Admit " Weight: 96.6 kg (212 lb 15.4 oz) (12/03/24 1600), Weight Method: Bed Scale    Estimated Needs    Weight Used For Calorie Calculations: 98 kg (216 lb 0.8 oz)  Energy Calorie Requirements (kcal): 1960 kcal (20 kcal/kg/CBW)  Energy Need Method: Kcal/kg  Weight Used For Protein Calculations: 98 kg (216 lb 0.8 oz)  Protein Requirements: 98.2 gm (1.0 gm/kg/CBW)  Fluid Requirements (mL): 1960 mL (1 mL/kcal)  CHO Requirement: 221 gm CHO per day (45% est kcal needs)     Enteral Nutrition     Patient not receiving enteral nutrition at this time.    Parenteral Nutrition     Patient not receiving parenteral nutrition support at this time.    Evaluation of Received Nutrient Intake    Calories: meeting estimated needs  Protein: meeting estimated needs    Patient Education     Not applicable.    Nutrition Diagnosis     PES: Inadequate oral intake related to acute illness as evidenced by 0% of breakfast. (Slowly progressing)     PES: Moderate acute disease or injury related malnutrition Related to Inadequate protein energy intake As Evidenced by:  - weight loss: Unable to assess - energy intake: <= 50% for 3 weeks (meets criteria for <= 50% >= 5 days (severe - acute)) - loss of subcutaneous fat: 1 area of moderate fat loss (Triceps [place]), 1 area of mild fat loss (Infraorbital) active    Nutrition Interventions     Intervention(s): general/healthful diet, commercial beverage, and collaboration with other providers  Intervention(s): Oral diet/nutrient modifications;Care coordination or referral;Oral nutritional supplement;Manage feeding environment    Goal: Consume % of meals/snacks by follow-up. (Slowly progressing)      Nutrition Goals & Monitoring     Dietitian will monitor: food and beverage intake, weight, weight change, electrolyte/renal panel, glucose/endocrine profile, and gastrointestinal profile  Discharge planning: continue heart healthy diabetic  diet  Nutrition Risk/Follow-Up: moderate (follow-up in 3-5 days)    Please consult if re-assessment needed sooner.

## 2024-12-09 NOTE — PT/OT/SLP PROGRESS
Physical Therapy Treatment Note           Name: Kortney Leach    : 1945 (79 y.o.)  MRN: [id number]           TREATMENT SUMMARY AND RECOMMENDATIONS:    PT Received On: 24  PT Start Time: 1000     PT Stop Time: 1024  PT Total Time (min): 24 min     Subjective Assessment:   No complaints  Lethargic   x Awake, alert, cooperative  Uncooperative    Agitated     x c/o pain - LBP    Appropriate  c/o fatigue    Confused  Treated at bedside     Emotionally labile     x Treated in gym/dept.    Impulsive  Other:    Flat affect       Therapy Precautions:    Cognitive deficits  Spinal precautions    Collar - hard  Sternal precautions    Collar - soft   TLSO    Fall risk  LSO    Hip precautions - posterior  Knee immobilizer    Hip precautions - anterior  WBAT    Impaired communication  Partial weightbearing   x Oxygen - 3L via NC  TTWB    PEG tube  NWB    Visual deficits  Other:    Hearing deficits          Treatment Objectives:     Mobility Training:   Assist level Comments    Bed mobility     Transfer       Gait     CGA 50 ft level surfaces with RW   Sit to stand transitions    Min A x5 STS from WC/EOB with BUE use   Sitting balance     Standing balance      Wheelchair mobility     Car transfer     Other:          Therapeutic Exercise:   Exercise Sets Reps Comments   Hip flex, hip abd<>add, LAQ, ankle PF<>DF 1 30 Short sitting  2# black ankle weights   Alt toe taps 4-inch 2 10 Supported stand RW                   Additional Comments:    Assessment: Continues to be limited by back pain primarily with transitional movements. She continues to benefit from skilled PT to increased functional capacity and safety.       PT Plan: Cont PT POC.  Revisions made to plan of care: No    GOALS:   Multidisciplinary Problems       Physical Therapy Goals          Problem: Physical Therapy    Goal Priority Disciplines Outcome Interventions   Physical Therapy Goal     PT, PT/OT Progressing    Description: Goals to be met  by: discharge     Patient will increase functional independence with mobility by performin. Sit to stand transfer with Supervision and minimal dyspnea.   2. Gait  x 150 feet with Supervision using Rolling Walker and minimal dyspnea.                          Skilled PT Minutes Provided: 24 min   Communication with Treatment Team:     Equipment recommendations:       At end of treatment, patient remained:   Up in chair     Up in wheelchair in room    In bed    With alarm activated    Bed rails up    Call bell in reach     Family/friends present    Restraints secured properly    In bathroom with CNA/RN notified    Nurse aware   x In gym with therapist/tech    Other:

## 2024-12-09 NOTE — PT/OT/SLP PROGRESS
Occupational Therapy  Treatment    Name: Kortney Leach    : 1945 (79 y.o.)  MRN: [id number]           TREATMENT SUMMARY AND RECOMMENDATIONS:      OT Date of Treatment: 24  OT Start Time: 1430  OT Stop Time: 1500  OT Total Time (min): 30 min      Subjective Assessment:   No complaints  Lethargic   x Awake, alert, cooperative  Impulsive    Uncooperative   Flat affect    Agitated  c/o pain   x Appropriate  c/o fatigue    Confused  Treated at bedside     Emotionally labile x Treated in gym/dept.    Anxious/fearful   Other:        Therapy Precautions:    Cognitive deficits  Spinal precautions    Collar - hard  Sternal precautions    Collar - soft   Cervical precautions    x Fall risk  LSO    Hip precautions - posterior  TLSO    Hip precautions - anterior  WBAT    Impaired communication  Partial weightbearing   x Oxygen 2-3L via NC  TTWB    PEG tube  NWB    Visual deficits  Knee immobilizer    Hearing deficits   Other:        Treatment Objectives:     Mobility Training:    Mobility task Assist level Comments    Bed mobility SBA EOB<supine   Transfer CGA Stand step t/f to EOB using a RW   Sit to stands transitions CGA Regular chair<>standing using a RW multiple times throughout the session    Functional mobility CGA X50ft using a RW and following with a WC for safety    Sitting balance     Standing balance  SBA Pt engaged in a dynamic standing balance ax consisting of placing various sized rubber rings on vertical poles emphasizing standing tolerance to improve performance of her self-care. Pt was able to stand for 3 min and 57 seconds and 1 min and 37 seconds with a seated r/b in between trials d/t fatigue.    Wheelchair mobility        ADL Training:    ADL Assist level Comments    Feeding     Grooming/hygiene     Bathing     Upper body dressing     Lower body dressing     Toileting     Toilet transfer     Adaptive equipment training     Other:           Therapeutic Exercise:   Exercise Sets Reps  Comments   UBE endurance training  2 5 minutes  Ergometer; f/b                          Additional Comments:      Assessment: Patient had a positive response to her session.    GOALS:   Multidisciplinary Problems       Occupational Therapy Goals          Problem: Occupational Therapy    Goal Priority Disciplines Outcome Interventions   Occupational Therapy Goal     OT, PT/OT Progressing    Description: Goals to be met by: Discharge    Patient will increase functional independence with ADLs by performing:    UE Dressing with Modified [place].  LE Dressing with Stand-by Assistance.  Grooming while seated at sink with Modified [place].  Toileting from toilet with Stand-by Assistance for hygiene and clothing management.   Bathing from  shower chair/bench with Stand-by Assistance.  Toilet transfer to toilet with Stand-by Assistance.  Increased functional strength to 4/5 for functional mobility and self-care.   Pt will incorporate energy conservation techniques while performing her self-care.                          Recommendations:     Discharge Equipment Recommendations:  none     Plan:     Patient to be seen  (5-6x/week; QD-BID as appropriate) to address the above listed problems via self-care/home management, therapeutic activities, therapeutic exercises (energy conservation techniques)  Plan of Care Expires: 12/25/24  Revisions made to plan of care: No    Skilled OT Minutes Provided: 30  Communication with Treatment Team:     At end of treatment, patient remained:   Up in chair     Up in wheelchair in room   x In bed   x With alarm activated   x Bed rails up   x Call bell in reach     Family/friends present    Restraints secured properly    In bathroom with CNA/RN notified    In gym with PT/PTA/tech    Nurse aware    Other:

## 2024-12-10 LAB
ANION GAP SERPL CALC-SCNC: 14 MEQ/L
BACTERIA #/AREA URNS AUTO: ABNORMAL /HPF
BASOPHILS # BLD AUTO: 0.05 X10(3)/MCL
BASOPHILS NFR BLD AUTO: 0.4 %
BILIRUB UR QL STRIP.AUTO: NEGATIVE
BUN SERPL-MCNC: 27.7 MG/DL (ref 9.8–20.1)
CALCIUM SERPL-MCNC: 8.6 MG/DL (ref 8.4–10.2)
CHLORIDE SERPL-SCNC: 101 MMOL/L (ref 98–107)
CLARITY UR: CLEAR
CO2 SERPL-SCNC: 22 MMOL/L (ref 23–31)
COLOR UR AUTO: ABNORMAL
CREAT SERPL-MCNC: 0.76 MG/DL (ref 0.55–1.02)
CREAT/UREA NIT SERPL: 36
EOSINOPHIL # BLD AUTO: 0.25 X10(3)/MCL (ref 0–0.9)
EOSINOPHIL NFR BLD AUTO: 1.9 %
ERYTHROCYTE [DISTWIDTH] IN BLOOD BY AUTOMATED COUNT: 17.1 % (ref 11.5–17)
GFR SERPLBLD CREATININE-BSD FMLA CKD-EPI: >60 ML/MIN/1.73/M2
GLUCOSE SERPL-MCNC: 79 MG/DL (ref 82–115)
GLUCOSE UR QL STRIP: NEGATIVE
HCT VFR BLD AUTO: 31.2 % (ref 37–47)
HGB BLD-MCNC: 9.9 G/DL (ref 12–16)
HGB UR QL STRIP: NEGATIVE
HYALINE CASTS URNS QL MICRO: ABNORMAL /LPF
IMM GRANULOCYTES # BLD AUTO: 0.1 X10(3)/MCL (ref 0–0.04)
IMM GRANULOCYTES NFR BLD AUTO: 0.8 %
INR PPP: 3.3
KETONES UR QL STRIP: NEGATIVE
LEUKOCYTE ESTERASE UR QL STRIP: ABNORMAL
LYMPHOCYTES # BLD AUTO: 2.82 X10(3)/MCL (ref 0.6–4.6)
LYMPHOCYTES NFR BLD AUTO: 21.4 %
MCH RBC QN AUTO: 30.2 PG (ref 27–31)
MCHC RBC AUTO-ENTMCNC: 31.7 G/DL (ref 33–36)
MCV RBC AUTO: 95.1 FL (ref 80–94)
MONOCYTES # BLD AUTO: 1.15 X10(3)/MCL (ref 0.1–1.3)
MONOCYTES NFR BLD AUTO: 8.7 %
NEUTROPHILS # BLD AUTO: 8.78 X10(3)/MCL (ref 2.1–9.2)
NEUTROPHILS NFR BLD AUTO: 66.8 %
NITRITE UR QL STRIP: NEGATIVE
PH UR STRIP: 5.5 [PH]
PLATELET # BLD AUTO: 454 X10(3)/MCL (ref 130–400)
PMV BLD AUTO: 9.3 FL (ref 7.4–10.4)
POCT GLUCOSE: 102 MG/DL (ref 70–110)
POCT GLUCOSE: 108 MG/DL (ref 70–110)
POCT GLUCOSE: 117 MG/DL (ref 70–110)
POCT GLUCOSE: 184 MG/DL (ref 70–110)
POTASSIUM SERPL-SCNC: 3.4 MMOL/L (ref 3.5–5.1)
PROT UR QL STRIP: NEGATIVE
PROTHROMBIN TIME: 31.8 SECONDS (ref 12.5–14.5)
RBC # BLD AUTO: 3.28 X10(6)/MCL (ref 4.2–5.4)
RBC #/AREA URNS AUTO: ABNORMAL /HPF
SODIUM SERPL-SCNC: 137 MMOL/L (ref 136–145)
SP GR UR STRIP.AUTO: 1.02 (ref 1–1.03)
SQUAMOUS #/AREA URNS AUTO: ABNORMAL /HPF
UROBILINOGEN UR STRIP-ACNC: 0.2
WBC # BLD AUTO: 13.15 X10(3)/MCL (ref 4.5–11.5)
WBC #/AREA URNS AUTO: ABNORMAL /HPF

## 2024-12-10 PROCEDURE — 85025 COMPLETE CBC W/AUTO DIFF WBC: CPT | Performed by: HOSPITALIST

## 2024-12-10 PROCEDURE — 25000003 PHARM REV CODE 250: Performed by: STUDENT IN AN ORGANIZED HEALTH CARE EDUCATION/TRAINING PROGRAM

## 2024-12-10 PROCEDURE — 97530 THERAPEUTIC ACTIVITIES: CPT

## 2024-12-10 PROCEDURE — 63600175 PHARM REV CODE 636 W HCPCS: Performed by: HOSPITALIST

## 2024-12-10 PROCEDURE — 81003 URINALYSIS AUTO W/O SCOPE: CPT | Performed by: INTERNAL MEDICINE

## 2024-12-10 PROCEDURE — 97110 THERAPEUTIC EXERCISES: CPT

## 2024-12-10 PROCEDURE — 97116 GAIT TRAINING THERAPY: CPT

## 2024-12-10 PROCEDURE — 36415 COLL VENOUS BLD VENIPUNCTURE: CPT | Performed by: STUDENT IN AN ORGANIZED HEALTH CARE EDUCATION/TRAINING PROGRAM

## 2024-12-10 PROCEDURE — 11000004 HC SNF PRIVATE

## 2024-12-10 PROCEDURE — 80048 BASIC METABOLIC PNL TOTAL CA: CPT | Performed by: HOSPITALIST

## 2024-12-10 PROCEDURE — 87086 URINE CULTURE/COLONY COUNT: CPT | Performed by: INTERNAL MEDICINE

## 2024-12-10 PROCEDURE — 99900035 HC TECH TIME PER 15 MIN (STAT)

## 2024-12-10 PROCEDURE — 63600175 PHARM REV CODE 636 W HCPCS: Performed by: STUDENT IN AN ORGANIZED HEALTH CARE EDUCATION/TRAINING PROGRAM

## 2024-12-10 PROCEDURE — 25000003 PHARM REV CODE 250: Performed by: HOSPITALIST

## 2024-12-10 PROCEDURE — 25000003 PHARM REV CODE 250: Performed by: PHYSICIAN ASSISTANT

## 2024-12-10 PROCEDURE — 85610 PROTHROMBIN TIME: CPT | Performed by: STUDENT IN AN ORGANIZED HEALTH CARE EDUCATION/TRAINING PROGRAM

## 2024-12-10 RX ADMIN — CLOPIDOGREL BISULFATE 75 MG: 75 TABLET ORAL at 08:12

## 2024-12-10 RX ADMIN — PANTOPRAZOLE SODIUM 40 MG: 40 TABLET, DELAYED RELEASE ORAL at 06:12

## 2024-12-10 RX ADMIN — INSULIN ASPART 3 UNITS: 100 INJECTION, SOLUTION INTRAVENOUS; SUBCUTANEOUS at 03:12

## 2024-12-10 RX ADMIN — INSULIN GLARGINE 5 UNITS: 100 INJECTION, SOLUTION SUBCUTANEOUS at 08:12

## 2024-12-10 RX ADMIN — INSULIN ASPART 2 UNITS: 100 INJECTION, SOLUTION INTRAVENOUS; SUBCUTANEOUS at 11:12

## 2024-12-10 RX ADMIN — METHOCARBAMOL 750 MG: 750 TABLET ORAL at 03:12

## 2024-12-10 RX ADMIN — ATORVASTATIN CALCIUM 40 MG: 40 TABLET, FILM COATED ORAL at 08:12

## 2024-12-10 RX ADMIN — MORPHINE SULFATE 2 MG: 4 INJECTION, SOLUTION INTRAMUSCULAR; INTRAVENOUS at 04:12

## 2024-12-10 RX ADMIN — PANCRELIPASE 2 CAPSULE: 120000; 24000; 76000 CAPSULE, DELAYED RELEASE PELLETS ORAL at 04:12

## 2024-12-10 RX ADMIN — METHOCARBAMOL 750 MG: 750 TABLET ORAL at 08:12

## 2024-12-10 RX ADMIN — DOCUSATE SODIUM 100 MG: 100 CAPSULE, LIQUID FILLED ORAL at 08:12

## 2024-12-10 RX ADMIN — FUROSEMIDE 40 MG: 40 TABLET ORAL at 08:12

## 2024-12-10 RX ADMIN — METOPROLOL SUCCINATE 50 MG: 50 TABLET, EXTENDED RELEASE ORAL at 08:12

## 2024-12-10 RX ADMIN — FAMOTIDINE 40 MG: 20 TABLET, FILM COATED ORAL at 08:12

## 2024-12-10 RX ADMIN — PANCRELIPASE 2 CAPSULE: 120000; 24000; 76000 CAPSULE, DELAYED RELEASE PELLETS ORAL at 08:12

## 2024-12-10 RX ADMIN — ASPIRIN 81 MG: 81 TABLET, COATED ORAL at 08:12

## 2024-12-10 RX ADMIN — FLUTICASONE PROPIONATE 100 MCG: 50 SPRAY, METERED NASAL at 08:12

## 2024-12-10 RX ADMIN — HYDROCODONE BITARTRATE AND ACETAMINOPHEN 1 TABLET: 5; 325 TABLET ORAL at 11:12

## 2024-12-10 RX ADMIN — AMIODARONE HYDROCHLORIDE 200 MG: 200 TABLET ORAL at 08:12

## 2024-12-10 RX ADMIN — INSULIN ASPART 3 UNITS: 100 INJECTION, SOLUTION INTRAVENOUS; SUBCUTANEOUS at 11:12

## 2024-12-10 RX ADMIN — ACETAMINOPHEN 650 MG: 325 TABLET ORAL at 05:12

## 2024-12-10 RX ADMIN — GLIMEPIRIDE 4 MG: 2 TABLET ORAL at 08:12

## 2024-12-10 RX ADMIN — CETIRIZINE HYDROCHLORIDE 10 MG: 10 TABLET, FILM COATED ORAL at 08:12

## 2024-12-10 RX ADMIN — LIDOCAINE 5% 2 PATCH: 700 PATCH TOPICAL at 11:12

## 2024-12-10 RX ADMIN — INSULIN ASPART 3 UNITS: 100 INJECTION, SOLUTION INTRAVENOUS; SUBCUTANEOUS at 06:12

## 2024-12-10 RX ADMIN — MORPHINE SULFATE 2 MG: 4 INJECTION, SOLUTION INTRAMUSCULAR; INTRAVENOUS at 08:12

## 2024-12-10 RX ADMIN — Medication 400 MG: at 08:12

## 2024-12-10 NOTE — PT/OT/SLP PROGRESS
Occupational Therapy  Treatment    Name: Kortney Leach    : 1945 (79 y.o.)  MRN: [id number]           TREATMENT SUMMARY AND RECOMMENDATIONS:      OT Date of Treatment: 12/10/24  OT Start Time: 1300  OT Stop Time: 1330  OT Total Time (min): 30 min      Subjective Assessment:   No complaints  Lethargic   x Awake, alert, cooperative  Impulsive    Uncooperative   Flat affect    Agitated x c/o pain (lower back)   x Appropriate  c/o fatigue    Confused  Treated at bedside     Emotionally labile x Treated in gym/dept.    Anxious/fearful   Other:        Therapy Precautions:    Cognitive deficits  Spinal precautions    Collar - hard  Sternal precautions    Collar - soft   Cervical precautions    x Fall risk  LSO    Hip precautions - posterior  TLSO    Hip precautions - anterior  WBAT    Impaired communication  Partial weightbearing   x Oxygen 2-3L via NC  TTWB    PEG tube  NWB    Visual deficits  Knee immobilizer    Hearing deficits   Other:        Treatment Objectives:     Mobility Training:    Mobility task Assist level Comments    Bed mobility CGA Supine<sidelying<EOB using a bedrail    Transfer CGA Stand pivot t/f to a regular chair using a RW   Sit to stands transitions CGA EOB elevated<>standing using a RW   Functional mobility CGA X50ft using a RW   Sitting balance     Standing balance      Wheelchair mobility        ADL Training:    ADL Assist level Comments    Feeding     Grooming/hygiene     Bathing     Upper body dressing     Lower body dressing     Toileting     Toilet transfer     Adaptive equipment training     Other:           Therapeutic Exercise:   Exercise Sets Reps Comments   BUE strengthening exercises  2 10 2# weighted dowel performing bicep curls, chest press, shoulder press, and forward/backward rows    UBE endurance training  1 5 minutes  Ergometer medium resistance; forwards                    Additional Comments:      Assessment: Patient had a positive response to her session. Pt is  making (+) gains towards her goals as evident of having less pain this afternoon and being able to participate in her session as compared to this morning. Pt would benefit from continued OT services to increase independence with occupational performance, decrease caregiver burden, and decrease risks for falls. Recommend continuing POC.     GOALS:   Multidisciplinary Problems       Occupational Therapy Goals          Problem: Occupational Therapy    Goal Priority Disciplines Outcome Interventions   Occupational Therapy Goal     OT, PT/OT Progressing    Description: Goals to be met by: Discharge    Patient will increase functional independence with ADLs by performing:    UE Dressing with Modified [place].  LE Dressing with Stand-by Assistance.  Grooming while seated at sink with Modified [place].  Toileting from toilet with Stand-by Assistance for hygiene and clothing management.   Bathing from  shower chair/bench with Stand-by Assistance.  Toilet transfer to toilet with Stand-by Assistance.  Increased functional strength to 4/5 for functional mobility and self-care.   Pt will incorporate energy conservation techniques while performing her self-care.                          Recommendations:     Discharge Equipment Recommendations:  none     Plan:     Patient to be seen  (5-6x/week; QD-BID as appropriate) to address the above listed problems via self-care/home management, therapeutic activities, therapeutic exercises (energy conservation techniques)  Plan of Care Expires: 12/25/24  Revisions made to plan of care: No    Skilled OT Minutes Provided: 30  Communication with Treatment Team:     At end of treatment, patient remained:   Up in chair     Up in wheelchair in room    In bed    With alarm activated    Bed rails up    Call bell in reach     Family/friends present    Restraints secured properly    In bathroom with CNA/RN notified   x In gym with PT/PTA/tech    Nurse aware    Other:

## 2024-12-10 NOTE — PROGRESS NOTES
Hospitalist Progress      PATIENT NAME:Kortney Leach  DATE OF SERVICE:12/10/2024      Subjective/HOSPITAL COURSE;  79 year old female with a past medical history of VHD s/p mosaic bioprosthetic MVR, CAD, PAF s/p SUKHI ligation, ABEL, COPD/asthma, HTN, DVT s/p IVC filter, SSS s/p PPM, HLD, DM2 who presented to [facility] on 11/23/2024 due to worsening SOB over the past month. She was noted to be in acute decompensated diastolic CHF. Cardiology was consulted and repeat echo revealed EF 60-65%, grade 1 diastolic dysfunction, mean pressure gradient across the mitral valve is 19 mmHg, pulmonary artery systolic pressure is 57 mmHg. MARYBETH 11/27/2024 revealed severe mitral valve stenosis with moderate regurgitation. LHC and RHC 11/27/2024 revealed 70% proximal LAD stenosis and bioprosthetic mitral valve stenosis.  She was evaluated by CV surgery who recommended to optimize CHF prior to intervention for mitral valve.  She subsequently received stenting/ballooning to proximal LAD on 11/29/2024.  Case was discussed between Dr. Lynn and Dr. Cervantes and decision was made to start Coumadin to see if gradient of mitral valve stenosis could be decreased.  She was started on Coumadin 5 mg daily on 11/02/2024 with a goal INR of 2-3.  Plan to continue triple therapy with aspirin, Plavix, Coumadin for 1 month and discontinue aspirin on 12/28/2024.  She will need to follow up echo in 4-6 weeks as well as a follow up with Dr. Watters in 4-6 weeks to discuss plan with mitral valve.  During this admission she was also treated for a Klebsiella pneumoniae and Proteus mirabilis UTI.  She was transferred to our [facility] for continued physical therapy.  Yesterday, 12/8, pt had significant shortness of breath although O2 sat within normal limits. Patient's oxygen was increased from 2 L nasal cannula to 3 L nasal cannula to make her more comfortable.  Patient does not require supplemental oxygen at baseline, but may require upon  discharge.      Repeat CXR 12/04/2024 revealed improved volume status.  Received an additional dose of Lasix 20 mg 12/05/2024.  INR elevated 12/05/2024 - holding coumadin      12/8: INR 3.2 - started Coumadin 1.25 last night.  Continues to complain of low back muscle spasms, increased Robaxin      12/9 Pt feels better today. Pt denies sob. Pt is eating well and endorses constipation. Last BM was 4 days ago.I discussed adding Miralax. Pt is ambulating very little and uses walker to get to bathroom with contact guard assist. Pt c/o bacl pain and feels like her spinal stimulator is not working. Pt was given coumadin 1.25 mg last night. INR today is 3.1. Will give coumadin 1.25 mg po tonight. Changed pain regimen. Decreased morphine iv to q8hr and norco to q 6hr.      12/10;  Patient seen today, no active complaint except for recurrence of low back pain for which she is currently on analgesics.  No worsening shortness of breadth.  No chest pain.  Tolerating physical therapy.  INR today is 3.3.  We will hold off on Coumadin today.  Repeat PT/INR in the morning.            ROS negative except for above    Scheduled Meds:   amiodarone  200 mg Oral Daily    aspirin  81 mg Oral Daily    atorvastatin  40 mg Oral Daily    cetirizine  10 mg Oral Daily    clopidogreL  75 mg Oral Daily    docusate sodium  100 mg Oral Daily    famotidine  40 mg Oral QHS    fluticasone propionate  2 spray Each Nostril Daily    furosemide  40 mg Oral Daily    glimepiride  4 mg Oral BID    insulin aspart U-100  3 Units Subcutaneous TIDWM    insulin glargine U-100  5 Units Subcutaneous Daily    LIDOcaine  2 patch Transdermal Q24H    lipase-protease-amylase 24,000-76,000-120,000 units  2 capsule Oral TID WM    magnesium oxide  400 mg Oral Daily    methocarbamoL  750 mg Oral TID    metoprolol succinate  50 mg Oral Daily    pantoprazole  40 mg Oral QAM     Continuous Infusions:  PRN Meds:.  Current Facility-Administered Medications:     acetaminophen,  650 mg, Oral, Q8H PRN    acetaminophen, 650 mg, Oral, Q8H PRN    albuterol-ipratropium, 3 mL, Nebulization, Q4H PRN    ALPRAZolam, 0.25 mg, Oral, TID PRN    benzonatate, 100 mg, Oral, TID PRN    bisacodyL, 5 mg, Oral, Daily PRN    calcium carbonate, 1,000 mg, Oral, TID PRN    dextrose 10%, 12.5 g, Intravenous, PRN    dextrose 10%, 25 g, Intravenous, PRN    diclofenac sodium, 2 g, Topical (Top), BID PRN    glucagon (human recombinant), 1 mg, Intramuscular, PRN    glucose, 16 g, Oral, PRN    glucose, 24 g, Oral, PRN    hydrALAZINE, 10 mg, Intravenous, Q4H PRN    HYDROcodone-acetaminophen, 1 tablet, Oral, Q6H PRN    insulin aspart U-100, 0-10 Units, Subcutaneous, QID (AC + HS) PRN    loperamide, 2 mg, Oral, Q4H PRN    melatonin, 6 mg, Oral, Nightly PRN    morphine, 2 mg, Intravenous, Q8H PRN    ondansetron, 4 mg, Intravenous, Q8H PRN    simethicone, 1 tablet, Oral, QID PRN    sodium chloride 0.9%, 10 mL, Intravenous, PRN    VITALS:  Temp:  [97.4 °F (36.3 °C)-98.5 °F (36.9 °C)] 97.4 °F (36.3 °C)  Pulse:  [65-74] 67  Resp:  [17-18] (P) 18  SpO2:  [90 %-97 %] 96 %  BP: (112-136)/() (P) 116/61    Intake/Output Summary (Last 24 hours) at 12/10/2024 0858  Last data filed at 12/9/2024 1603  Gross per 24 hour   Intake 480 ml   Output --   Net 480 ml         [unfilled]    On Exam;    General appearance - no acute distress, awake & alert   HEENT - head atraumatic, PERRL , scleral anicteric,mucous membrane moist  Neck- no jvd, carotid bruits, lymphadenopathy, thromegaly  Pulm-equal chest expansion, normal percussion note, clear to auscultation bilaterally, reduced breath sounds  Heart -  Regular rate and rhythm, normal S1, S2  Abdomen - soft, nontender, nondistended,no organomegaly, bowel sounds present.  Extremities - no edema, no cyanosis, no clubbing  Neurologic - Awake, alert and oriented x3,moves all extremities.        LABORATORY:    CBC:   Lab Results   Component Value Date    WBC 13.15 (H) 12/10/2024    WBC 6.12  "06/13/2006    RBC 3.28 (L) 12/10/2024    RBC 4.81 06/13/2006    HGB 9.9 (L) 12/10/2024    HGB 15.6 06/13/2006    HCT 31.2 (L) 12/10/2024    HCT 40 09/20/2023     (H) 12/10/2024     06/13/2006     CMP:   Lab Results   Component Value Date     12/10/2024     06/13/2006    K 3.4 (L) 12/10/2024    K 3.9 06/13/2006     12/10/2024     06/13/2006    CO2 22 (L) 12/10/2024    CO2 23 06/13/2006    BUN 27.7 (H) 12/10/2024    BUN 11 06/13/2006    CREATININE 0.76 12/10/2024    CREATININE 0.7 06/13/2006    CALCIUM 8.6 12/10/2024    CALCIUM 9.4 06/13/2006    ALKPHOS 86 12/05/2024    ALKPHOS 69 06/13/2006    AST 25 12/05/2024    AST 34 (H) 06/13/2006    ALT 18 12/05/2024    ALT 42 (H) 06/13/2006    ALBUMIN 2.6 (L) 12/05/2024    ALBUMIN 5.0 06/13/2006    BILITOT 0.9 12/05/2024    BILITOT 0.7 06/13/2006     Cardiac markers:   Lab Results   Component Value Date    .1 (H) 11/22/2024    BNP 20 11/09/2020       Radiology:  Micro:  No components found for: "BLOODCX", "SPUTUMCX", "URINECX"    Radiology:  [unfilled]          ASSESSMENT AND PLAN:    Acute hypoxemic respiratory failure ; resolved.  Acute on chronic decompensated HFpEF with pulmonary edema - resolved  Pulmonary hypertension  Severe mitral stenosis  Echo 11/23/2024 EF 60-65%, grade 1 diastolic dysfunction, mean pressure gradient across the mitral valve is 19 mmHg, pulmonary artery systolic pressure is 57 mmHg  MARYBETH 11/27/2024 revealed severe mitral valve stenosis with moderate regurgitation  LHC and RHC 11/27/2024 revealed 70% proximal LAD stenosis and bioprosthetic mitral valve stenosis - s/p stenting/ballooning to proximal LAD on 11/29/2024  CV surgery recommended to optimize CHF prior to intervention for mitral valve   Dr. Lynn and Dr. Cervantes recommended to start Coumadin 5mg QD with goal INR of 2-3 to see if gradient of mitral valve stenosis could be decreased  Continue triple therapy with aspirin, Plavix, Coumadin for 1 month " and discontinue aspirin on 12/28/2024  Follow up echo in 4-6 weeks  Follow up with Dr. Watters in 4-6 weeks to discuss plan with mitral valve  Patient is off nasal cannula oxygen therapy.     Severe mitral stenosis s/p mosaic bioprosthetic MVR  CAD s/p stenting/ballooning  PAF s/p SUKHI ligation  HTN, HLD  DVT s/p IVC filter  SSS s/p PPM  Continue amiodarone 200 mg daily, aspirin 81 mg daily (discontinue on 12/28/2024), statin, Plavix 75 mg daily, Lasix 40 mg daily, Toprol-XL 50 mg daily,   On low dose Warfarin for INR 2-3, we will hold off on Coumadin today as the INR is 3.3.  Daily PT INRs  Daily Mag-Ox 400 mg     Hx of COPD/asthma  ABEL   Continue nightly CPAP.  Does not require home oxygen at baseline  On bronchodilaor     Recent Klebsiella pneumoniae and Proteus mirabilis UTI - completed treatment     Type 2 diabetes mellitus;, fairly controlled.  Recent A1c 6.8% within the last month  Holding home metformin 500 mg b.i.d.   On Lantus 5 units daily 12/04/2024  Continue aspart 3 units TID with meals 12/05/2024  On  home glimepiride 4 mg b.i.d. 12/06/2024  Moderate dose SSI     Chronic sinusitis  Afrin and Mucinex x3 days starting 12/05/2024 followed by Flonase daily   Continue cetirizine  No indication for antibiotics at this time     Anxiety   Xanax prn      Leukocytosis;   probably reactive, we will repeat CBC in the morning.  Repeat urinalysis.  And we will start empiric oral antibiotics in the morning if she continues to have worsening leukocytosis.     Obesity  Low-calorie diet advised     DVT prophylaxis:  Holding warfarin for now until INR <3     Code status: Full           Plans discussed in detail with patient and all questions answered. Discussed with RN caring for patient        Disposition-  For discharge home  when appropriate and stable      This note was completed using voice recognition software and transcription errors may occur.      Evaluation of the Patient was done via Telemedicine along side  the nursing staff.     Location of the Provider; [place], [place]      Location of the patient; [place]      Signed by   Aury Soni MD  12/10/2024,   8:58 AM

## 2024-12-10 NOTE — PROGRESS NOTES
Unable to be seen at this time d/t PT reporting pt is having a lot of pain and is tearful. Will f/u with POC in PM as able.

## 2024-12-10 NOTE — PLAN OF CARE
Problem: Adult Inpatient Plan of Care  Goal: Plan of Care Review  Outcome: Progressing  Flowsheets (Taken 12/9/2024 1859)  Plan of Care Reviewed With: patient  Goal: Patient-Specific Goal (Individualized)  Outcome: Progressing  Flowsheets (Taken 12/9/2024 1859)  Anxieties, Fears or Concerns: None expressed at this time  Patient/Family-Specific Goals (Include Timeframe): Return to baseline by discharge  Goal: Absence of Hospital-Acquired Illness or Injury  Outcome: Progressing  Intervention: Identify and Manage Fall Risk  Flowsheets (Taken 12/9/2024 1859)  Safety Promotion/Fall Prevention:   assistive device/personal item within reach   bed alarm set   nonskid shoes/socks when out of bed  Intervention: Prevent Skin Injury  Flowsheets (Taken 12/9/2024 1859)  Body Position: position changed independently  Skin Protection: incontinence pads utilized  Device Skin Pressure Protection: positioning supports utilized  Intervention: Prevent and Manage VTE (Venous Thromboembolism) Risk  Flowsheets (Taken 12/9/2024 1859)  VTE Prevention/Management:   bleeding precautions maintained   bleeding risk assessed  Intervention: Prevent Infection  Flowsheets (Taken 12/9/2024 1859)  Infection Prevention:   cohorting utilized   environmental surveillance performed   equipment surfaces disinfected   hand hygiene promoted   personal protective equipment utilized   rest/sleep promoted   single patient room provided  Goal: Optimal Comfort and Wellbeing  Outcome: Progressing  Intervention: Monitor Pain and Promote Comfort  Flowsheets (Taken 12/9/2024 1859)  Pain Management Interventions: care clustered  Intervention: Provide Person-Centered Care  Flowsheets (Taken 12/9/2024 1859)  Trust Relationship/Rapport:   care explained   emotional support provided   choices provided   empathic listening provided   questions answered   questions encouraged   reassurance provided   thoughts/feelings acknowledged  Goal: Readiness for Transition of  Care  Outcome: Progressing     Problem: Diabetes Comorbidity  Goal: Blood Glucose Level Within Targeted Range  Outcome: Progressing  Intervention: Monitor and Manage Glycemia  Flowsheets (Taken 12/9/2024 1859)  Glycemic Management: blood glucose monitored     Problem: Wound  Goal: Optimal Coping  Outcome: Progressing  Intervention: Support Patient and Family Response  Flowsheets (Taken 12/9/2024 1859)  Supportive Measures:   active listening utilized   self-care encouraged  Family/Support System Care: self-care encouraged  Goal: Optimal Functional Ability  Outcome: Progressing  Intervention: Optimize Functional Ability  Flowsheets (Taken 12/9/2024 1859)  Assistive Device Utilized:   gait belt   walker  Activity Management: Rolling - L1  Activity Assistance Provided: assistance, 1 person  Goal: Absence of Infection Signs and Symptoms  Outcome: Progressing  Intervention: Prevent or Manage Infection  Flowsheets (Taken 12/9/2024 1859)  Fever Reduction/Comfort Measures:   lightweight bedding   lightweight clothing  Isolation Precautions:   precautions maintained   protective  Goal: Improved Oral Intake  Outcome: Progressing  Goal: Optimal Pain Control and Function  Outcome: Progressing  Goal: Skin Health and Integrity  Outcome: Progressing  Goal: Optimal Wound Healing  Outcome: Progressing

## 2024-12-10 NOTE — PLAN OF CARE
Problem: Adult Inpatient Plan of Care  Goal: Plan of Care Review  Outcome: Progressing  Flowsheets (Taken 12/10/2024 0710)  Plan of Care Reviewed With: patient  Goal: Patient-Specific Goal (Individualized)  Outcome: Progressing  Flowsheets (Taken 12/10/2024 0710)  Individualized Care Needs: pain mgt, PT/OT  Anxieties, Fears or Concerns: denies any concerns at this time  Goal: Absence of Hospital-Acquired Illness or Injury  Outcome: Progressing  Intervention: Identify and Manage Fall Risk  Flowsheets (Taken 12/10/2024 0710)  Safety Promotion/Fall Prevention:   assistive device/personal item within reach   bed alarm set   Fall Risk reviewed with patient/family   Fall Risk signage in place   medications reviewed  Intervention: Prevent Skin Injury  Flowsheets (Taken 12/10/2024 0710)  Body Position: position changed independently  Skin Protection: incontinence pads utilized  Device Skin Pressure Protection: absorbent pad utilized/changed  Intervention: Prevent and Manage VTE (Venous Thromboembolism) Risk  Flowsheets (Taken 12/10/2024 0710)  VTE Prevention/Management:   ambulation promoted   dorsiflexion/plantar flexion performed  Intervention: Prevent Infection  Flowsheets (Taken 12/10/2024 0710)  Infection Prevention:   cohorting utilized   equipment surfaces disinfected   environmental surveillance performed   hand hygiene promoted   rest/sleep promoted   personal protective equipment utilized   single patient room provided  Goal: Optimal Comfort and Wellbeing  Outcome: Progressing  Intervention: Monitor Pain and Promote Comfort  Flowsheets (Taken 12/10/2024 0710)  Pain Management Interventions: care clustered  Intervention: Provide Person-Centered Care  Flowsheets (Taken 12/10/2024 0710)  Trust Relationship/Rapport:   care explained   questions encouraged   choices provided   reassurance provided   emotional support provided   thoughts/feelings acknowledged   empathic listening provided   questions answered  Goal:  Readiness for Transition of Care  Outcome: Progressing     Problem: Diabetes Comorbidity  Goal: Blood Glucose Level Within Targeted Range  Outcome: Progressing     Problem: Wound  Goal: Optimal Coping  Outcome: Progressing  Goal: Optimal Functional Ability  Outcome: Progressing  Goal: Absence of Infection Signs and Symptoms  Outcome: Progressing  Goal: Improved Oral Intake  Outcome: Progressing  Goal: Optimal Pain Control and Function  Outcome: Progressing  Goal: Skin Health and Integrity  Outcome: Progressing  Goal: Optimal Wound Healing  Outcome: Progressing     Problem: Fall Injury Risk  Goal: Absence of Fall and Fall-Related Injury  Outcome: Progressing  Intervention: Identify and Manage Contributors  Flowsheets (Taken 12/10/2024 0710)  Self-Care Promotion: independence encouraged  Medication Review/Management: medications reviewed  Intervention: Promote Injury-Free Environment  Flowsheets (Taken 12/10/2024 0710)  Safety Promotion/Fall Prevention:   assistive device/personal item within reach   bed alarm set   Fall Risk reviewed with patient/family   Fall Risk signage in place   medications reviewed

## 2024-12-10 NOTE — PROGRESS NOTES
Hospitalist Progress      PATIENT NAME:Kortney Leach  DATE OF SERVICE:12/9/2024      Subjective- /HOSPITAL COURSE;    79 year old female with a past medical history of VHD s/p mosaic bioprosthetic MVR, CAD, PAF s/p SUKHI ligation, ABEL, COPD/asthma, HTN, DVT s/p IVC filter, SSS s/p PPM, HLD, DM2 who presented to [facility] on 11/23/2024 due to worsening SOB over the past month. She was noted to be in acute decompensated diastolic CHF. Cardiology was consulted and repeat echo revealed EF 60-65%, grade 1 diastolic dysfunction, mean pressure gradient across the mitral valve is 19 mmHg, pulmonary artery systolic pressure is 57 mmHg. MARYBETH 11/27/2024 revealed severe mitral valve stenosis with moderate regurgitation. LHC and RHC 11/27/2024 revealed 70% proximal LAD stenosis and bioprosthetic mitral valve stenosis.  She was evaluated by CV surgery who recommended to optimize CHF prior to intervention for mitral valve.  She subsequently received stenting/ballooning to proximal LAD on 11/29/2024.  Case was discussed between Dr. Lynn and Dr. Cervantes and decision was made to start Coumadin to see if gradient of mitral valve stenosis could be decreased.  She was started on Coumadin 5 mg daily on 11/02/2024 with a goal INR of 2-3.  Plan to continue triple therapy with aspirin, Plavix, Coumadin for 1 month and discontinue aspirin on 12/28/2024.  She will need to follow up echo in 4-6 weeks as well as a follow up with Dr. Watters in 4-6 weeks to discuss plan with mitral valve.  During this admission she was also treated for a Klebsiella pneumoniae and Proteus mirabilis UTI.  She was transferred to our [facility] for continued physical therapy.  Yesterday, 12/8, pt had significant shortness of breath although O2 sat within normal limits. Patient's oxygen was increased from 2 L nasal cannula to 3 L nasal cannula to make her more comfortable.  Patient does not require supplemental oxygen at baseline, but may require upon  discharge.      Repeat CXR 12/04/2024 revealed improved volume status.  Received an additional dose of Lasix 20 mg 12/05/2024.  INR elevated 12/05/2024 - holding coumadin      12/8: INR 3.2 - started Coumadin 1.25 last night.  Continues to complain of low back muscle spasms, increased Robaxin      12/9 Pt feels better today. Pt denies sob. Pt is eating well and endorses constipation. Last BM was 4 days ago.I discussed adding Miralax. Pt is ambulating very little and uses walker to get to bathroom with contact guard assist. Pt c/o bacl pain and feels like her spinal stimulator is not working. Pt was given coumadin 1.25 mg last night. INR today is 3.1. Will give coumadin 1.25 mg po tonight. Changed pain regimen. Decreased morphine iv to q8hr and norco to q 6hr.     12/10;  Patient seen today, no active complaint except for recurrence of low back pain for which she is currently on analgesics.  No worsening shortness of breadth.  No chest pain.  Tolerating physical therapy.  INR today is 3.3.  We will hold off on Coumadin today.  Repeat PT/INR in the morning.        ROS negative except for above    Scheduled Meds:   amiodarone  200 mg Oral Daily    aspirin  81 mg Oral Daily    atorvastatin  40 mg Oral Daily    cetirizine  10 mg Oral Daily    clopidogreL  75 mg Oral Daily    docusate sodium  100 mg Oral Daily    famotidine  40 mg Oral QHS    fluticasone propionate  2 spray Each Nostril Daily    furosemide  40 mg Oral Daily    glimepiride  4 mg Oral BID    insulin aspart U-100  3 Units Subcutaneous TIDWM    insulin glargine U-100  5 Units Subcutaneous Daily    LIDOcaine  2 patch Transdermal Q24H    lipase-protease-amylase 24,000-76,000-120,000 units  2 capsule Oral TID WM    magnesium oxide  400 mg Oral Daily    methocarbamoL  750 mg Oral TID    metoprolol succinate  50 mg Oral Daily    pantoprazole  40 mg Oral QAM     Continuous Infusions:  PRN Meds:.  Current Facility-Administered Medications:     acetaminophen, 650 mg,  Oral, Q8H PRN    acetaminophen, 650 mg, Oral, Q8H PRN    albuterol-ipratropium, 3 mL, Nebulization, Q4H PRN    ALPRAZolam, 0.25 mg, Oral, TID PRN    benzonatate, 100 mg, Oral, TID PRN    bisacodyL, 5 mg, Oral, Daily PRN    calcium carbonate, 1,000 mg, Oral, TID PRN    dextrose 10%, 12.5 g, Intravenous, PRN    dextrose 10%, 25 g, Intravenous, PRN    diclofenac sodium, 2 g, Topical (Top), BID PRN    glucagon (human recombinant), 1 mg, Intramuscular, PRN    glucose, 16 g, Oral, PRN    glucose, 24 g, Oral, PRN    hydrALAZINE, 10 mg, Intravenous, Q4H PRN    HYDROcodone-acetaminophen, 1 tablet, Oral, Q6H PRN    insulin aspart U-100, 0-10 Units, Subcutaneous, QID (AC + HS) PRN    loperamide, 2 mg, Oral, Q4H PRN    melatonin, 6 mg, Oral, Nightly PRN    morphine, 2 mg, Intravenous, Q8H PRN    ondansetron, 4 mg, Intravenous, Q8H PRN    simethicone, 1 tablet, Oral, QID PRN    sodium chloride 0.9%, 10 mL, Intravenous, PRN    VITALS:  Temp:  [97.5 °F (36.4 °C)-98.6 °F (37 °C)] 98.5 °F (36.9 °C)  Pulse:  [67-75] 67  Resp:  [17-18] 18  SpO2:  [91 %-97 %] 95 %  BP: (101-122)/(42-77) 122/72    Intake/Output Summary (Last 24 hours) at 12/9/2024 2150  Last data filed at 12/9/2024 1603  Gross per 24 hour   Intake 720 ml   Output --   Net 720 ml         [unfilled]    On Exam;    General appearance - no acute distress, awake & alert   HEENT - head atraumatic, PERRL , scleral anicteric,mucous membrane moist  Neck- no jvd, carotid bruits, lymphadenopathy, thromegaly  Pulm-equal chest expansion, normal percussion note, clear to auscultation bilaterally, reduced breath sounds  Heart -  Regular rate and rhythm, normal S1, S2  Abdomen - soft, nontender, nondistended,no organomegaly, bowel sounds present.  Extremities - no edema, no cyanosis, no clubbing  Neurologic - Awake, alert and oriented x3,moves all extremities.    LABORATORY:    CBC:   Lab Results   Component Value Date    WBC 12.77 (H) 12/09/2024    WBC 6.12 06/13/2006    RBC 3.63 (L)  "12/09/2024    RBC 4.81 06/13/2006    HGB 10.8 (L) 12/09/2024    HGB 15.6 06/13/2006    HCT 34.1 (L) 12/09/2024    HCT 40 09/20/2023     (H) 12/09/2024     06/13/2006     CMP:   Lab Results   Component Value Date     12/09/2024     06/13/2006    K 4.0 12/09/2024    K 3.9 06/13/2006    CL 99 12/09/2024     06/13/2006    CO2 27 12/09/2024    CO2 23 06/13/2006    BUN 25.9 (H) 12/09/2024    BUN 11 06/13/2006    CREATININE 0.80 12/09/2024    CREATININE 0.7 06/13/2006    CALCIUM 8.8 12/09/2024    CALCIUM 9.4 06/13/2006    ALKPHOS 86 12/05/2024    ALKPHOS 69 06/13/2006    AST 25 12/05/2024    AST 34 (H) 06/13/2006    ALT 18 12/05/2024    ALT 42 (H) 06/13/2006    ALBUMIN 2.6 (L) 12/05/2024    ALBUMIN 5.0 06/13/2006    BILITOT 0.9 12/05/2024    BILITOT 0.7 06/13/2006     Cardiac markers:   Lab Results   Component Value Date    .1 (H) 11/22/2024    BNP 20 11/09/2020       Radiology:  Micro:  No components found for: "BLOODCX", "SPUTUMCX", "URINECX"    Radiology:  [unfilled]          ASSESSMENT AND PLAN:    Acute hypoxemic respiratory failure ; resolved.  Acute on chronic decompensated HFpEF with pulmonary edema - resolved  Pulmonary hypertension  Severe mitral stenosis  Echo 11/23/2024 EF 60-65%, grade 1 diastolic dysfunction, mean pressure gradient across the mitral valve is 19 mmHg, pulmonary artery systolic pressure is 57 mmHg  MARYBETH 11/27/2024 revealed severe mitral valve stenosis with moderate regurgitation  LHC and RHC 11/27/2024 revealed 70% proximal LAD stenosis and bioprosthetic mitral valve stenosis - s/p stenting/ballooning to proximal LAD on 11/29/2024  CV surgery recommended to optimize CHF prior to intervention for mitral valve   Dr. Lynn and Dr. Cervantes recommended to start Coumadin 5mg QD with goal INR of 2-3 to see if gradient of mitral valve stenosis could be decreased  Continue triple therapy with aspirin, Plavix, Coumadin for 1 month and discontinue aspirin on " 12/28/2024  Follow up echo in 4-6 weeks  Follow up with Dr. Watters in 4-6 weeks to discuss plan with mitral valve  Patient is off nasal cannula oxygen therapy.     Severe mitral stenosis s/p mosaic bioprosthetic MVR  CAD s/p stenting/ballooning  PAF s/p SUKHI ligation  HTN, HLD  DVT s/p IVC filter  SSS s/p PPM  Continue amiodarone 200 mg daily, aspirin 81 mg daily (discontinue on 12/28/2024), statin, Plavix 75 mg daily, Lasix 40 mg daily, Toprol-XL 50 mg daily,   On low dose Warfarin for INR 2-3, we will hold off on Coumadin today as the INR is 3.3.  Daily PT INRs  Daily Mag-Ox 400 mg     Hx of COPD/asthma  ABEL   Continue nightly CPAP.  Does not require home oxygen at baseline  On bronchodilaor     Recent Klebsiella pneumoniae and Proteus mirabilis UTI - completed treatment     Type 2 diabetes mellitus;, fairly controlled.  Recent A1c 6.8% within the last month  Holding home metformin 500 mg b.i.d.   On Lantus 5 units daily 12/04/2024  Continue aspart 3 units TID with meals 12/05/2024  On  home glimepiride 4 mg b.i.d. 12/06/2024  Moderate dose SSI     Chronic sinusitis  Afrin and Mucinex x3 days starting 12/05/2024 followed by Flonase daily   Continue cetirizine  No indication for antibiotics at this time     Anxiety   Xanax prn     Leukocytosis;   probably reactive, we will repeat CBC in the morning.  Repeat urinalysis.  And we will start empiric oral antibiotics in the morning if she continues to have worsening leukocytosis.    Obesity  Low-calorie diet advised     DVT prophylaxis:  Holding warfarin for now until INR <3     Code status: Full         Plans discussed in detail with patient and all questions answered. Discussed with RN caring for patient      Disposition-  For discharge home  when appropriate and stable     This note was completed using voice recognition software and transcription errors may occur.     Evaluation of the Patient was done via Telemedicine along side the nursing staff.    Location of  the Provider; [place], [place]     Location of the patient; [place]      Signed by   Aury Soni MD  12/9/2024,   9:50 PM

## 2024-12-10 NOTE — PT/OT/SLP PROGRESS
Name: Kortney Leach    : 1945 (79 y.o.)  MRN: [id number]           Patient Unavailable      Patient unable to be seen at this time secondary to: patient refusal due to pain. She was very tearful upon PT entry to room and had just given pain medications. Will resume PT POC in afternoon.

## 2024-12-10 NOTE — PT/OT/SLP PROGRESS
Physical Therapy Treatment Note           Name: Kortney Leach    : 1945 (79 y.o.)  MRN: [id number]           TREATMENT SUMMARY AND RECOMMENDATIONS:    PT Received On: 12/10/24  PT Start Time: 1330     PT Stop Time: 1406  PT Total Time (min): 36 min     Subjective Assessment:   No complaints  Lethargic   x Awake, alert, cooperative  Uncooperative    Agitated     x c/o pain - LBP    Appropriate  c/o fatigue    Confused  Treated at bedside     Emotionally labile     x Treated in gym/dept.    Impulsive  Other:    Flat affect       Therapy Precautions:    Cognitive deficits  Spinal precautions    Collar - hard  Sternal precautions    Collar - soft   TLSO    Fall risk  LSO    Hip precautions - posterior  Knee immobilizer    Hip precautions - anterior  WBAT    Impaired communication  Partial weightbearing   x Oxygen - 3L via NC  TTWB    PEG tube  NWB    Visual deficits  Other:    Hearing deficits          Treatment Objectives:     Mobility Training:   Assist level Comments    Bed mobility     Transfer     CGA Stand pivot to standard   Gait      ft and 50 ft level surfaces with RW   Sit to stand transitions     CGA X3 STS from WC/EOB with BUE use   Sitting balance     Standing balance      Wheelchair mobility     Car transfer     Other:          Therapeutic Exercise:   Exercise Sets Reps Comments   LE bike   5' fwd/5' rev, Level 2   Hip flex, hip abd<>add, LAQ, ankle PF<>DF 1 30 2# ankle weights, short sitting                   Additional Comments:    Assessment: Pt still tolerating limited gait distances although performing all functional mobility with CGA during today's session. She continues to benefit from skilled PT to increased functional capacity and safety.       PT Plan: Cont PT POC.  Revisions made to plan of care: No    GOALS:   Multidisciplinary Problems       Physical Therapy Goals          Problem: Physical Therapy    Goal Priority Disciplines Outcome Interventions   Physical  Therapy Goal     PT, PT/OT Progressing    Description: Goals to be met by: discharge     Patient will increase functional independence with mobility by performin. Sit to stand transfer with Supervision and minimal dyspnea.   2. Gait  x 150 feet with Supervision using Rolling Walker and minimal dyspnea.                          Skilled PT Minutes Provided: 36 min   Communication with Treatment Team:     Equipment recommendations:       At end of treatment, patient remained:  x Up in chair     Up in wheelchair in room    In bed   x With alarm activated    Bed rails up   x Call bell in reach     Family/friends present    Restraints secured properly    In bathroom with CNA/RN notified    Nurse aware    In gym with therapist/tech    Other:

## 2024-12-11 LAB
ANION GAP SERPL CALC-SCNC: 10 MEQ/L
BUN SERPL-MCNC: 29.3 MG/DL (ref 9.8–20.1)
CALCIUM SERPL-MCNC: 8.5 MG/DL (ref 8.4–10.2)
CHLORIDE SERPL-SCNC: 100 MMOL/L (ref 98–107)
CO2 SERPL-SCNC: 29 MMOL/L (ref 23–31)
CREAT SERPL-MCNC: 0.8 MG/DL (ref 0.55–1.02)
CREAT/UREA NIT SERPL: 37
ERYTHROCYTE [DISTWIDTH] IN BLOOD BY AUTOMATED COUNT: 16.9 % (ref 11.5–17)
GFR SERPLBLD CREATININE-BSD FMLA CKD-EPI: >60 ML/MIN/1.73/M2
GLUCOSE SERPL-MCNC: 93 MG/DL (ref 82–115)
HCT VFR BLD AUTO: 31.8 % (ref 37–47)
HGB BLD-MCNC: 10 G/DL (ref 12–16)
INR PPP: 2.3
MCH RBC QN AUTO: 29.7 PG (ref 27–31)
MCHC RBC AUTO-ENTMCNC: 31.4 G/DL (ref 33–36)
MCV RBC AUTO: 94.4 FL (ref 80–94)
PLATELET # BLD AUTO: 478 X10(3)/MCL (ref 130–400)
PMV BLD AUTO: 9.1 FL (ref 7.4–10.4)
POCT GLUCOSE: 112 MG/DL (ref 70–110)
POCT GLUCOSE: 124 MG/DL (ref 70–110)
POCT GLUCOSE: 136 MG/DL (ref 70–110)
POCT GLUCOSE: 83 MG/DL (ref 70–110)
POTASSIUM SERPL-SCNC: 3.8 MMOL/L (ref 3.5–5.1)
PROTHROMBIN TIME: 22.3 SECONDS (ref 12.5–14.5)
RBC # BLD AUTO: 3.37 X10(6)/MCL (ref 4.2–5.4)
SODIUM SERPL-SCNC: 139 MMOL/L (ref 136–145)
WBC # BLD AUTO: 13.27 X10(3)/MCL (ref 4.5–11.5)

## 2024-12-11 PROCEDURE — 85027 COMPLETE CBC AUTOMATED: CPT | Performed by: INTERNAL MEDICINE

## 2024-12-11 PROCEDURE — 63600175 PHARM REV CODE 636 W HCPCS: Performed by: STUDENT IN AN ORGANIZED HEALTH CARE EDUCATION/TRAINING PROGRAM

## 2024-12-11 PROCEDURE — 36415 COLL VENOUS BLD VENIPUNCTURE: CPT | Performed by: STUDENT IN AN ORGANIZED HEALTH CARE EDUCATION/TRAINING PROGRAM

## 2024-12-11 PROCEDURE — 25000003 PHARM REV CODE 250: Performed by: HOSPITALIST

## 2024-12-11 PROCEDURE — 97535 SELF CARE MNGMENT TRAINING: CPT

## 2024-12-11 PROCEDURE — 80048 BASIC METABOLIC PNL TOTAL CA: CPT | Performed by: INTERNAL MEDICINE

## 2024-12-11 PROCEDURE — 11000004 HC SNF PRIVATE

## 2024-12-11 PROCEDURE — 27000221 HC OXYGEN, UP TO 24 HOURS

## 2024-12-11 PROCEDURE — 25000003 PHARM REV CODE 250: Performed by: STUDENT IN AN ORGANIZED HEALTH CARE EDUCATION/TRAINING PROGRAM

## 2024-12-11 PROCEDURE — 25000003 PHARM REV CODE 250: Performed by: PHYSICIAN ASSISTANT

## 2024-12-11 PROCEDURE — 99900035 HC TECH TIME PER 15 MIN (STAT)

## 2024-12-11 PROCEDURE — 63600175 PHARM REV CODE 636 W HCPCS: Performed by: HOSPITALIST

## 2024-12-11 PROCEDURE — 85610 PROTHROMBIN TIME: CPT | Performed by: STUDENT IN AN ORGANIZED HEALTH CARE EDUCATION/TRAINING PROGRAM

## 2024-12-11 PROCEDURE — 94760 N-INVAS EAR/PLS OXIMETRY 1: CPT

## 2024-12-11 PROCEDURE — 97116 GAIT TRAINING THERAPY: CPT

## 2024-12-11 PROCEDURE — 25000003 PHARM REV CODE 250: Performed by: INTERNAL MEDICINE

## 2024-12-11 PROCEDURE — 97550 CAREGIVER TRAING 1ST 30 MIN: CPT

## 2024-12-11 RX ORDER — WARFARIN 2 MG/1
4 TABLET ORAL DAILY
Status: DISCONTINUED | OUTPATIENT
Start: 2024-12-11 | End: 2024-12-11

## 2024-12-11 RX ORDER — POTASSIUM CHLORIDE 20 MEQ/1
20 TABLET, EXTENDED RELEASE ORAL ONCE
Status: COMPLETED | OUTPATIENT
Start: 2024-12-11 | End: 2024-12-11

## 2024-12-11 RX ORDER — LACTULOSE 10 G/15ML
20 SOLUTION ORAL 2 TIMES DAILY
Status: DISCONTINUED | OUTPATIENT
Start: 2024-12-11 | End: 2024-12-15 | Stop reason: HOSPADM

## 2024-12-11 RX ORDER — CEFDINIR 300 MG/1
300 CAPSULE ORAL EVERY 12 HOURS
Status: DISCONTINUED | OUTPATIENT
Start: 2024-12-11 | End: 2024-12-14

## 2024-12-11 RX ORDER — BISACODYL 10 MG/1
10 SUPPOSITORY RECTAL DAILY PRN
Status: DISCONTINUED | OUTPATIENT
Start: 2024-12-11 | End: 2024-12-15 | Stop reason: HOSPADM

## 2024-12-11 RX ADMIN — DICLOFENAC SODIUM 2 G: 10 GEL TOPICAL at 08:12

## 2024-12-11 RX ADMIN — METOPROLOL SUCCINATE 50 MG: 50 TABLET, EXTENDED RELEASE ORAL at 08:12

## 2024-12-11 RX ADMIN — INSULIN GLARGINE 5 UNITS: 100 INJECTION, SOLUTION SUBCUTANEOUS at 08:12

## 2024-12-11 RX ADMIN — PANCRELIPASE 2 CAPSULE: 120000; 24000; 76000 CAPSULE, DELAYED RELEASE PELLETS ORAL at 11:12

## 2024-12-11 RX ADMIN — POTASSIUM CHLORIDE 20 MEQ: 1500 TABLET, EXTENDED RELEASE ORAL at 03:12

## 2024-12-11 RX ADMIN — INSULIN ASPART 3 UNITS: 100 INJECTION, SOLUTION INTRAVENOUS; SUBCUTANEOUS at 06:12

## 2024-12-11 RX ADMIN — FAMOTIDINE 40 MG: 20 TABLET, FILM COATED ORAL at 08:12

## 2024-12-11 RX ADMIN — DOCUSATE SODIUM 100 MG: 100 CAPSULE, LIQUID FILLED ORAL at 08:12

## 2024-12-11 RX ADMIN — ATORVASTATIN CALCIUM 40 MG: 40 TABLET, FILM COATED ORAL at 08:12

## 2024-12-11 RX ADMIN — WARFARIN SODIUM 3 MG: 2 TABLET ORAL at 04:12

## 2024-12-11 RX ADMIN — GLIMEPIRIDE 4 MG: 2 TABLET ORAL at 08:12

## 2024-12-11 RX ADMIN — FUROSEMIDE 40 MG: 40 TABLET ORAL at 08:12

## 2024-12-11 RX ADMIN — PANCRELIPASE 2 CAPSULE: 120000; 24000; 76000 CAPSULE, DELAYED RELEASE PELLETS ORAL at 06:12

## 2024-12-11 RX ADMIN — METHOCARBAMOL 750 MG: 750 TABLET ORAL at 08:12

## 2024-12-11 RX ADMIN — PANTOPRAZOLE SODIUM 40 MG: 40 TABLET, DELAYED RELEASE ORAL at 06:12

## 2024-12-11 RX ADMIN — METHOCARBAMOL 750 MG: 750 TABLET ORAL at 03:12

## 2024-12-11 RX ADMIN — HYDROCODONE BITARTRATE AND ACETAMINOPHEN 1 TABLET: 5; 325 TABLET ORAL at 06:12

## 2024-12-11 RX ADMIN — AMIODARONE HYDROCHLORIDE 200 MG: 200 TABLET ORAL at 08:12

## 2024-12-11 RX ADMIN — ONDANSETRON 4 MG: 2 INJECTION INTRAMUSCULAR; INTRAVENOUS at 10:12

## 2024-12-11 RX ADMIN — INSULIN ASPART 3 UNITS: 100 INJECTION, SOLUTION INTRAVENOUS; SUBCUTANEOUS at 12:12

## 2024-12-11 RX ADMIN — LACTULOSE 20 G: 10 SOLUTION ORAL at 08:12

## 2024-12-11 RX ADMIN — HYDROCODONE BITARTRATE AND ACETAMINOPHEN 1 TABLET: 5; 325 TABLET ORAL at 01:12

## 2024-12-11 RX ADMIN — LIDOCAINE 5% 2 PATCH: 700 PATCH TOPICAL at 10:12

## 2024-12-11 RX ADMIN — CLOPIDOGREL BISULFATE 75 MG: 75 TABLET ORAL at 08:12

## 2024-12-11 RX ADMIN — INSULIN ASPART 3 UNITS: 100 INJECTION, SOLUTION INTRAVENOUS; SUBCUTANEOUS at 04:12

## 2024-12-11 RX ADMIN — ASPIRIN 81 MG: 81 TABLET, COATED ORAL at 08:12

## 2024-12-11 RX ADMIN — CETIRIZINE HYDROCHLORIDE 10 MG: 10 TABLET, FILM COATED ORAL at 08:12

## 2024-12-11 RX ADMIN — HYDROCODONE BITARTRATE AND ACETAMINOPHEN 1 TABLET: 5; 325 TABLET ORAL at 08:12

## 2024-12-11 RX ADMIN — CEFDINIR 300 MG: 300 CAPSULE ORAL at 08:12

## 2024-12-11 RX ADMIN — PANCRELIPASE 2 CAPSULE: 120000; 24000; 76000 CAPSULE, DELAYED RELEASE PELLETS ORAL at 04:12

## 2024-12-11 RX ADMIN — Medication 400 MG: at 08:12

## 2024-12-11 RX ADMIN — MORPHINE SULFATE 2 MG: 4 INJECTION, SOLUTION INTRAMUSCULAR; INTRAVENOUS at 10:12

## 2024-12-11 NOTE — PT/OT/SLP PROGRESS
Occupational Therapy  Treatment    Name: Kortney Leach    : 1945 (79 y.o.)  MRN: [id number]           TREATMENT SUMMARY AND RECOMMENDATIONS:      OT Date of Treatment: 24  OT Start Time: 1330  OT Stop Time: 1345  OT Total Time (min): 15 min      Subjective Assessment:   No complaints  Lethargic   x Awake, alert, cooperative  Impulsive    Uncooperative   Flat affect    Agitated x c/o pain   x Appropriate  c/o fatigue    Confused x Treated at bedside     Emotionally labile  Treated in gym/dept.    Anxious/fearful   Other:        Therapy Precautions:    Cognitive deficits  Spinal precautions    Collar - hard  Sternal precautions    Collar - soft   Cervical precautions    x Fall risk  LSO    Hip precautions - posterior  TLSO    Hip precautions - anterior  WBAT    Impaired communication  Partial weightbearing   x Oxygen 2-3L via NC  TTWB    PEG tube  NWB    Visual deficits  Knee immobilizer    Hearing deficits   Other:        Treatment Objectives:     Mobility Training:    Mobility task Assist level Comments    Bed mobility     Transfer     Sit to stands transitions Min Ax2 BSChair<>standing using a RW; increased assist d/t pain in lower back    Functional mobility CGA FM from BSChair<toilet using a RW   Sitting balance     Standing balance      Wheelchair mobility        ADL Training:    ADL Assist level Comments    Feeding     Grooming/hygiene     Bathing     Upper body dressing     Lower body dressing     Toileting CGA Able to manage clothing    Toilet transfer CGA Stand step t/f to toilet using a RW   Adaptive equipment training     Other:           Therapeutic Exercise:   Exercise Sets Reps Comments                               Additional Comments:      Assessment: Patient had a fair response to her session. Pt requested to sit on the toilet for a while to see if she could have a BM. Pt educated to pull red cord to call for assistance when she wanted to get up. Pt verbalized understanding.  Nursing aware.     GOALS:   Multidisciplinary Problems       Occupational Therapy Goals          Problem: Occupational Therapy    Goal Priority Disciplines Outcome Interventions   Occupational Therapy Goal     OT, PT/OT Progressing    Description: Goals to be met by: Discharge    Patient will increase functional independence with ADLs by performing:    UE Dressing with Modified [place].  LE Dressing with Stand-by Assistance.  Grooming while seated at sink with Modified [place].  Toileting from toilet with Stand-by Assistance for hygiene and clothing management.   Bathing from  shower chair/bench with Stand-by Assistance.  Toilet transfer to toilet with Stand-by Assistance.  Increased functional strength to 4/5 for functional mobility and self-care.   Pt will incorporate energy conservation techniques while performing her self-care.                          Recommendations:     Discharge Equipment Recommendations:  none     Plan:     Patient to be seen  (5-6x/week; QD-BID as appropriate) to address the above listed problems via self-care/home management, therapeutic activities, therapeutic exercises (energy conservation techniques)  Plan of Care Expires: 12/25/24  Revisions made to plan of care: No    Skilled OT Minutes Provided: 15  Communication with Treatment Team:     At end of treatment, patient remained:   Up in chair     Up in wheelchair in room    In bed    With alarm activated    Bed rails up    Call bell in reach     Family/friends present    Restraints secured properly   x In bathroom with CNA/RN notified    In gym with PT/PTA/tech    Nurse aware    Other:

## 2024-12-11 NOTE — PT/OT/SLP PROGRESS
Name: Kortney Leach    : 1945 (79 y.o.)  MRN: [id number]           Patient Unavailable      Patient unable to be seen at this time secondary to: patient refusing to participate due to back pain. Will continue with PT POC in PM/as able.

## 2024-12-11 NOTE — PT/OT/SLP PROGRESS
Physical Therapy Treatment Note           Name: Kortney Leach    : 1945 (79 y.o.)  MRN: [id number]           TREATMENT SUMMARY AND RECOMMENDATIONS:    PT Received On: 24  PT Start Time: 1400     PT Stop Time: 1415  PT Total Time (min): 15 min     Subjective Assessment:   No complaints  Lethargic   x Awake, alert, cooperative  Uncooperative    Agitated     x c/o pain - LBP    Appropriate  c/o fatigue    Confused     x Treated at bedside     Emotionally labile      Treated in gym/dept.    Impulsive  Other:    Flat affect       Therapy Precautions:    Cognitive deficits  Spinal precautions    Collar - hard  Sternal precautions    Collar - soft   TLSO    Fall risk  LSO    Hip precautions - posterior  Knee immobilizer    Hip precautions - anterior  WBAT    Impaired communication  Partial weightbearing   x Oxygen - 3L via NC  TTWB    PEG tube  NWB    Visual deficits  Other:    Hearing deficits          Treatment Objectives:     Mobility Training:   Assist level Comments    Bed mobility     Transfer     SBA Stand pivot to commode    Gait      ft and 120 ft level surfaces with RW   Sit to stand transitions     CGA X3 STS from WC/EOB with BUE use   Sitting balance     Standing balance      Wheelchair mobility     Car transfer     Other:          Therapeutic Exercise:   Exercise Sets Reps Comments                               Additional Comments:    Assessment: Poor tolerance to therapy session with biggest limiting factor continues to be back pain. She continues to benefit from skilled PT to increased functional capacity and safety.       PT Plan: Cont PT POC.  Revisions made to plan of care: No    GOALS:   Multidisciplinary Problems       Physical Therapy Goals          Problem: Physical Therapy    Goal Priority Disciplines Outcome Interventions   Physical Therapy Goal     PT, PT/OT Progressing    Description: Goals to be met by: discharge     Patient will increase functional  independence with mobility by performin. Sit to stand transfer with Supervision and minimal dyspnea.   2. Gait  x 150 feet with Supervision using Rolling Walker and minimal dyspnea.                          Skilled PT Minutes Provided: 15 min   Communication with Treatment Team:     Equipment recommendations:       At end of treatment, patient remained:   Up in chair     Up in wheelchair in room    In bed    With alarm activated    Bed rails up    Call bell in reach     Family/friends present    Restraints secured properly   x In bathroom with CNA/RN notified    Nurse aware    In gym with therapist/tech    Other:

## 2024-12-11 NOTE — PLAN OF CARE
Problem: Adult Inpatient Plan of Care  Goal: Plan of Care Review  Outcome: Progressing  Flowsheets (Taken 12/10/2024 1911)  Plan of Care Reviewed With: patient  Goal: Patient-Specific Goal (Individualized)  Outcome: Progressing  Flowsheets (Taken 12/10/2024 1911)  Anxieties, Fears or Concerns: None expressed at this time  Patient/Family-Specific Goals (Include Timeframe): Return to baseline by discharge  Goal: Absence of Hospital-Acquired Illness or Injury  Outcome: Progressing  Intervention: Identify and Manage Fall Risk  Flowsheets (Taken 12/10/2024 1911)  Safety Promotion/Fall Prevention:   assistive device/personal item within reach   bed alarm set   nonskid shoes/socks when out of bed  Intervention: Prevent Skin Injury  Flowsheets (Taken 12/10/2024 1911)  Body Position: position changed independently  Skin Protection: incontinence pads utilized  Device Skin Pressure Protection: pressure points protected  Intervention: Prevent and Manage VTE (Venous Thromboembolism) Risk  Flowsheets (Taken 12/10/2024 1911)  VTE Prevention/Management:   bleeding risk assessed   bleeding precautions maintained  Intervention: Prevent Infection  Flowsheets (Taken 12/10/2024 1911)  Infection Prevention:   cohorting utilized   environmental surveillance performed   equipment surfaces disinfected   hand hygiene promoted   personal protective equipment utilized   rest/sleep promoted   single patient room provided  Goal: Optimal Comfort and Wellbeing  Outcome: Progressing  Intervention: Monitor Pain and Promote Comfort  Flowsheets (Taken 12/10/2024 1911)  Pain Management Interventions: care clustered  Intervention: Provide Person-Centered Care  Flowsheets (Taken 12/10/2024 1911)  Trust Relationship/Rapport:   care explained   choices provided   emotional support provided   empathic listening provided   questions answered   questions encouraged   reassurance provided   thoughts/feelings acknowledged  Goal: Readiness for Transition of  Care  Outcome: Progressing     Problem: Diabetes Comorbidity  Goal: Blood Glucose Level Within Targeted Range  Outcome: Progressing  Intervention: Monitor and Manage Glycemia  Flowsheets (Taken 12/10/2024 1911)  Glycemic Management: blood glucose monitored     Problem: Wound  Goal: Optimal Coping  Outcome: Progressing  Intervention: Support Patient and Family Response  Flowsheets (Taken 12/10/2024 1911)  Supportive Measures:   active listening utilized   self-care encouraged  Family/Support System Care: self-care encouraged  Goal: Optimal Functional Ability  Outcome: Progressing  Intervention: Optimize Functional Ability  Flowsheets (Taken 12/10/2024 1911)  Assistive Device Utilized:   gait belt   walker  Activity Management: Rolling - L1  Activity Assistance Provided: assistance, 1 person  Goal: Absence of Infection Signs and Symptoms  Outcome: Progressing  Intervention: Prevent or Manage Infection  Flowsheets (Taken 12/10/2024 1911)  Fever Reduction/Comfort Measures:   lightweight clothing   lightweight bedding  Infection Management: aseptic technique maintained  Isolation Precautions:   precautions maintained   protective  Goal: Improved Oral Intake  Outcome: Progressing  Goal: Optimal Pain Control and Function  Outcome: Progressing  Goal: Skin Health and Integrity  Outcome: Progressing  Goal: Optimal Wound Healing  Outcome: Progressing     Problem: Fall Injury Risk  Goal: Absence of Fall and Fall-Related Injury  Outcome: Progressing

## 2024-12-11 NOTE — PATIENT CARE CONFERENCE
Name: Kortney Leach    : 1945 (79 y.o.)  MRN: [id number]            Interdisciplinary Team Conference     Case conference held with patient/family and care team to discuss progress, plan of care, barriers to be addressed for safe return home, equipment recommendations, and discharge planning. Communicated therapy progress with MD, RN, therapy clinicians and case management. All questions/concerns answered.

## 2024-12-11 NOTE — PLAN OF CARE
Problem: Adult Inpatient Plan of Care  Goal: Plan of Care Review  Outcome: Progressing  Flowsheets (Taken 12/11/2024 0800)  Plan of Care Reviewed With:   patient   child  Goal: Patient-Specific Goal (Individualized)  Outcome: Progressing  Flowsheets (Taken 12/11/2024 0800)  Individualized Care Needs: Pain management, Bowel program management, PT/OT, Monitor Oxygen Saturation  Anxieties, Fears or Concerns: Back pain and need to have a bowel movement  Patient/Family-Specific Goals (Include Timeframe): Return to baseline by SD  Goal: Absence of Hospital-Acquired Illness or Injury  Outcome: Progressing  Goal: Optimal Comfort and Wellbeing  Outcome: Progressing  Goal: Readiness for Transition of Care  Outcome: Progressing     Problem: Diabetes Comorbidity  Goal: Blood Glucose Level Within Targeted Range  Outcome: Progressing     Problem: Wound  Goal: Optimal Coping  Outcome: Progressing  Goal: Optimal Functional Ability  Outcome: Progressing  Goal: Absence of Infection Signs and Symptoms  Outcome: Progressing  Goal: Improved Oral Intake  Outcome: Progressing  Goal: Optimal Pain Control and Function  Outcome: Progressing  Goal: Skin Health and Integrity  Outcome: Progressing  Goal: Optimal Wound Healing  Outcome: Progressing     Problem: Fall Injury Risk  Goal: Absence of Fall and Fall-Related Injury  Outcome: Progressing

## 2024-12-11 NOTE — PATIENT CARE CONFERENCE
Name: Kortney Leach    : 1945 (79 y.o.)  MRN: [id number]            Interdisciplinary Team Conference     Case conference held with patient/family and care team to discuss progress, plan of care, barriers to be addressed for safe return home, equipment recommendations, and discharge planning. Communicated therapy progress with MD, RN, therapy clinicians and case management. All questions/concerns answered.

## 2024-12-11 NOTE — PROGRESS NOTES
Hospitalist Progress      PATIENT NAME:Kortney Leach  DATE OF SERVICE:12/11/2024      Subjective/Hospital Course;    79 year old female with a past medical history of VHD s/p mosaic bioprosthetic MVR, CAD, PAF s/p SUKHI ligation, ABEL, COPD/asthma, HTN, DVT s/p IVC filter, SSS s/p PPM, HLD, DM2 who presented to [facility] on 11/23/2024 due to worsening SOB over the past month. She was noted to be in acute decompensated diastolic CHF. Cardiology was consulted and repeat echo revealed EF 60-65%, grade 1 diastolic dysfunction, mean pressure gradient across the mitral valve is 19 mmHg, pulmonary artery systolic pressure is 57 mmHg. MARYBETH 11/27/2024 revealed severe mitral valve stenosis with moderate regurgitation. LHC and RHC 11/27/2024 revealed 70% proximal LAD stenosis and bioprosthetic mitral valve stenosis.  She was evaluated by CV surgery who recommended to optimize CHF prior to intervention for mitral valve.  She subsequently received stenting/ballooning to proximal LAD on 11/29/2024.  Case was discussed between Dr. Lynn and Dr. Cevrantes and decision was made to start Coumadin to see if gradient of mitral valve stenosis could be decreased.  She was started on Coumadin 5 mg daily on 11/02/2024 with a goal INR of 2-3.  Plan to continue triple therapy with aspirin, Plavix, Coumadin for 1 month and discontinue aspirin on 12/28/2024.  She will need to follow up echo in 4-6 weeks as well as a follow up with Dr. Watters in 4-6 weeks to discuss plan with mitral valve.  During this admission she was also treated for a Klebsiella pneumoniae and Proteus mirabilis UTI.  She was transferred to our [facility] for continued physical therapy.  Yesterday, 12/8, pt had significant shortness of breath although O2 sat within normal limits. Patient's oxygen was increased from 2 L nasal cannula to 3 L nasal cannula to make her more comfortable.  Patient does not require supplemental oxygen at baseline, but may require upon  discharge.      Repeat CXR 12/04/2024 revealed improved volume status.  Received an additional dose of Lasix 20 mg 12/05/2024.  INR elevated 12/05/2024 - holding coumadin      12/8: INR 3.2 - started Coumadin 1.25 last night.  Continues to complain of low back muscle spasms, increased Robaxin      12/9 Pt feels better today. Pt denies sob. Pt is eating well and endorses constipation. Last BM was 4 days ago.I discussed adding Miralax. Pt is ambulating very little and uses walker to get to bathroom with contact guard assist. Pt c/o bacl pain and feels like her spinal stimulator is not working. Pt was given coumadin 1.25 mg last night. INR today is 3.1. Will give coumadin 1.25 mg po tonight. Changed pain regimen. Decreased morphine iv to q8hr and norco to q 6hr.      12/10;  Patient seen today, no active complaint except for recurrence of low back pain for which she is currently on analgesics.  No worsening shortness of breadth.  No chest pain.  Tolerating physical therapy.  INR today is 3.3.  We will hold off on Coumadin today.  Repeat PT/INR in the morning.      12/11;  Patient is seen today, no active complaint.  Currently on 3 L of nasal cannula oxygen therapy.  Due to acute hypoxic respiratory failure.  Patient has been weaned off nasal cannula oxygen therapy.  She does have persistent leukocytosis.  Urinalysis indicates possible UTI.  Patient has been to be commenced on Omnicef today.       ROS negative except for above    Scheduled Meds:   amiodarone  200 mg Oral Daily    aspirin  81 mg Oral Daily    atorvastatin  40 mg Oral Daily    cefdinir  300 mg Oral Q12H    cetirizine  10 mg Oral Daily    clopidogreL  75 mg Oral Daily    docusate sodium  100 mg Oral Daily    famotidine  40 mg Oral QHS    furosemide  40 mg Oral Daily    glimepiride  4 mg Oral BID    insulin aspart U-100  3 Units Subcutaneous TIDWM    insulin glargine U-100  5 Units Subcutaneous Daily    lactulose 10 gram/15 ml  20 g Oral BID    LIDOcaine  2  patch Transdermal Q24H    lipase-protease-amylase 24,000-76,000-120,000 units  2 capsule Oral TID WM    magnesium oxide  400 mg Oral Daily    methocarbamoL  750 mg Oral TID    metoprolol succinate  50 mg Oral Daily    pantoprazole  40 mg Oral QAM    potassium chloride  20 mEq Oral Once    warfarin  3 mg Oral Daily     Continuous Infusions:  PRN Meds:.  Current Facility-Administered Medications:     acetaminophen, 650 mg, Oral, Q8H PRN    acetaminophen, 650 mg, Oral, Q8H PRN    albuterol-ipratropium, 3 mL, Nebulization, Q4H PRN    ALPRAZolam, 0.25 mg, Oral, TID PRN    benzonatate, 100 mg, Oral, TID PRN    bisacodyL, 5 mg, Oral, Daily PRN    calcium carbonate, 1,000 mg, Oral, TID PRN    dextrose 10%, 12.5 g, Intravenous, PRN    dextrose 10%, 25 g, Intravenous, PRN    diclofenac sodium, 2 g, Topical (Top), BID PRN    glucagon (human recombinant), 1 mg, Intramuscular, PRN    glucose, 16 g, Oral, PRN    glucose, 24 g, Oral, PRN    hydrALAZINE, 10 mg, Intravenous, Q4H PRN    HYDROcodone-acetaminophen, 1 tablet, Oral, Q6H PRN    insulin aspart U-100, 0-10 Units, Subcutaneous, QID (AC + HS) PRN    loperamide, 2 mg, Oral, Q4H PRN    melatonin, 6 mg, Oral, Nightly PRN    morphine, 2 mg, Intravenous, Q8H PRN    ondansetron, 4 mg, Intravenous, Q8H PRN    simethicone, 1 tablet, Oral, QID PRN    sodium chloride 0.9%, 10 mL, Intravenous, PRN    VITALS:  Temp:  [97.5 °F (36.4 °C)-98.4 °F (36.9 °C)] 98.2 °F (36.8 °C)  Pulse:  [63-77] 77  Resp:  [13-18] 18  SpO2:  [92 %-97 %] 96 %  BP: (106-135)/(49-76) 135/63    Intake/Output Summary (Last 24 hours) at 12/11/2024 1327  Last data filed at 12/11/2024 1229  Gross per 24 hour   Intake 720 ml   Output 500 ml   Net 220 ml         [unfilled]    On Exam;    General appearance - no acute distress, awake & alert   HEENT - head atraumatic, PERRL , scleral anicteric,mucous membrane moist  Neck- no jvd, carotid bruits, lymphadenopathy, thromegaly  Pulm-equal chest expansion, normal percussion  "note, clear to auscultation bilaterally, reduced breath sounds  Heart -  Regular rate and rhythm, normal S1, S2  Abdomen - soft, nontender, nondistended,no organomegaly, bowel sounds present.  Extremities - no edema, no cyanosis, no clubbing  Neurologic - Awake, alert and oriented x3,moves all extremities.  Musculoskeletal - normal ROM of major joints  Skin - no rash, normal skin turgor      LABORATORY:    CBC:   Lab Results   Component Value Date    WBC 13.27 (H) 12/11/2024    WBC 6.12 06/13/2006    RBC 3.37 (L) 12/11/2024    RBC 4.81 06/13/2006    HGB 10.0 (L) 12/11/2024    HGB 15.6 06/13/2006    HCT 31.8 (L) 12/11/2024    HCT 40 09/20/2023     (H) 12/11/2024     06/13/2006     CMP:   Lab Results   Component Value Date     12/11/2024     06/13/2006    K 3.8 12/11/2024    K 3.9 06/13/2006     12/11/2024     06/13/2006    CO2 29 12/11/2024    CO2 23 06/13/2006    BUN 29.3 (H) 12/11/2024    BUN 11 06/13/2006    CREATININE 0.80 12/11/2024    CREATININE 0.7 06/13/2006    CALCIUM 8.5 12/11/2024    CALCIUM 9.4 06/13/2006    ALKPHOS 86 12/05/2024    ALKPHOS 69 06/13/2006    AST 25 12/05/2024    AST 34 (H) 06/13/2006    ALT 18 12/05/2024    ALT 42 (H) 06/13/2006    ALBUMIN 2.6 (L) 12/05/2024    ALBUMIN 5.0 06/13/2006    BILITOT 0.9 12/05/2024    BILITOT 0.7 06/13/2006     Cardiac markers:   Lab Results   Component Value Date    .1 (H) 11/22/2024    BNP 20 11/09/2020       Radiology:  Micro:  No components found for: "BLOODCX", "SPUTUMCX", "URINECX"    Radiology:  @[email or web address]@          ASSESSMENT AND PLAN:  Acute hypoxemic respiratory failure ; on 3 L of O2, being weaned off  Acute on chronic decompensated HFpEF with pulmonary edema - resolved  Pulmonary hypertension  Severe mitral stenosis  Echo 11/23/2024 EF 60-65%, grade 1 diastolic dysfunction, mean pressure gradient across the mitral valve is 19 mmHg, pulmonary artery systolic pressure is 57 mmHg  MARYBETH 11/27/2024 revealed severe " mitral valve stenosis with moderate regurgitation  LHC and RHC 11/27/2024 revealed 70% proximal LAD stenosis and bioprosthetic mitral valve stenosis - s/p stenting/ballooning to proximal LAD on 11/29/2024  CV surgery recommended to optimize CHF prior to intervention for mitral valve   Dr. Lynn and Dr. Cervantes recommended to start Coumadin 5mg QD with goal INR of 2-3 to see if gradient of mitral valve stenosis could be decreased  Continue triple therapy with aspirin, Plavix, Coumadin for 1 month and discontinue aspirin on 12/28/2024  Follow up echo in 4-6 weeks  Follow up with Dr. Watters in 4-6 weeks to discuss plan with mitral valve  Patient is still on nasal cannula oxygen therapy.being weaned off.     Severe mitral stenosis s/p mosaic bioprosthetic MVR  CAD s/p stenting/ballooning  PAF s/p SUKHI ligation  HTN, HLD  DVT s/p IVC filter  SSS s/p PPM  Continue amiodarone 200 mg daily, aspirin 81 mg daily (discontinue on 12/28/2024), statin, Plavix 75 mg daily, Lasix 40 mg daily, Toprol-XL 50 mg daily,   On low dose Warfarin for INR 2-3, we will  restart coumadin 3mg daily ,Daily PT INRs  Daily Mag-Ox 400 mg     Hx of COPD/asthma  ABEL   Continue nightly CPAP.  Does not require home oxygen at baseline  On bronchodilaor     Recent Klebsiella pneumoniae and Proteus mirabilis UTI - completed treatment     Type 2 diabetes mellitus;, fairly controlled.  Recent A1c 6.8% within the last month  Holding home metformin 500 mg b.i.d.   On Lantus 5 units daily 12/04/2024  Continue aspart 3 units TID with meals 12/05/2024  On  home glimepiride 4 mg b.i.d. 12/06/2024  Moderate dose SSI     Chronic sinusitis  Afrin and Mucinex x3 days starting 12/05/2024 followed by Flonase daily   Continue cetirizine  No indication for antibiotics at this time     Anxiety   Xanax prn      Leukocytosis; persistent, urinalysis indicates possible UTI, follow up the urine culture.  Commenced on empiric Omnicef.  Repeat CBC in the morning.      Obesity  Low-calorie diet advised     DVT prophylaxis:  Holding warfarin for now until INR <3     Code status: Full           Plans discussed in detail with patient and all questions answered. Discussed with RN caring for patient        Disposition-  For discharge home  when appropriate and stable      This note was completed using voice recognition software and transcription errors may occur.      Evaluation of the Patient was done via Telemedicine along side the nursing staff.     Location of the Provider; [place], [place]      Location of the patient; [place], [place]        Disposition-      This note was completed using voice recognition software and transcription errors may occur.           Signed by   Aury Soni MD  12/11/2024,   1:27 PM

## 2024-12-12 LAB
ERYTHROCYTE [DISTWIDTH] IN BLOOD BY AUTOMATED COUNT: 17.2 % (ref 11.5–17)
HCT VFR BLD AUTO: 32.2 % (ref 37–47)
HGB BLD-MCNC: 9.8 G/DL (ref 12–16)
INR PPP: 2.5
MCH RBC QN AUTO: 29.1 PG (ref 27–31)
MCHC RBC AUTO-ENTMCNC: 30.4 G/DL (ref 33–36)
MCV RBC AUTO: 95.5 FL (ref 80–94)
PLATELET # BLD AUTO: 504 X10(3)/MCL (ref 130–400)
PMV BLD AUTO: 9.1 FL (ref 7.4–10.4)
POCT GLUCOSE: 137 MG/DL (ref 70–110)
POCT GLUCOSE: 142 MG/DL (ref 70–110)
POCT GLUCOSE: 179 MG/DL (ref 70–110)
POCT GLUCOSE: 88 MG/DL (ref 70–110)
PROTHROMBIN TIME: 23.9 SECONDS (ref 12.5–14.5)
RBC # BLD AUTO: 3.37 X10(6)/MCL (ref 4.2–5.4)
WBC # BLD AUTO: 15.06 X10(3)/MCL (ref 4.5–11.5)

## 2024-12-12 PROCEDURE — 85610 PROTHROMBIN TIME: CPT | Performed by: STUDENT IN AN ORGANIZED HEALTH CARE EDUCATION/TRAINING PROGRAM

## 2024-12-12 PROCEDURE — 25000003 PHARM REV CODE 250: Performed by: STUDENT IN AN ORGANIZED HEALTH CARE EDUCATION/TRAINING PROGRAM

## 2024-12-12 PROCEDURE — 97530 THERAPEUTIC ACTIVITIES: CPT

## 2024-12-12 PROCEDURE — 85027 COMPLETE CBC AUTOMATED: CPT | Performed by: INTERNAL MEDICINE

## 2024-12-12 PROCEDURE — 25000003 PHARM REV CODE 250: Performed by: INTERNAL MEDICINE

## 2024-12-12 PROCEDURE — 63600175 PHARM REV CODE 636 W HCPCS: Performed by: HOSPITALIST

## 2024-12-12 PROCEDURE — 11000004 HC SNF PRIVATE

## 2024-12-12 PROCEDURE — 63600175 PHARM REV CODE 636 W HCPCS: Performed by: STUDENT IN AN ORGANIZED HEALTH CARE EDUCATION/TRAINING PROGRAM

## 2024-12-12 PROCEDURE — 36415 COLL VENOUS BLD VENIPUNCTURE: CPT | Performed by: STUDENT IN AN ORGANIZED HEALTH CARE EDUCATION/TRAINING PROGRAM

## 2024-12-12 PROCEDURE — 99900035 HC TECH TIME PER 15 MIN (STAT)

## 2024-12-12 PROCEDURE — 25000003 PHARM REV CODE 250: Performed by: PHYSICIAN ASSISTANT

## 2024-12-12 PROCEDURE — 27000221 HC OXYGEN, UP TO 24 HOURS

## 2024-12-12 PROCEDURE — 94761 N-INVAS EAR/PLS OXIMETRY MLT: CPT

## 2024-12-12 PROCEDURE — 94760 N-INVAS EAR/PLS OXIMETRY 1: CPT

## 2024-12-12 RX ORDER — GABAPENTIN 100 MG/1
200 CAPSULE ORAL 3 TIMES DAILY
Status: DISCONTINUED | OUTPATIENT
Start: 2024-12-12 | End: 2024-12-13

## 2024-12-12 RX ORDER — HYDROCODONE BITARTRATE AND ACETAMINOPHEN 5; 325 MG/1; MG/1
1 TABLET ORAL EVERY 4 HOURS PRN
Status: DISCONTINUED | OUTPATIENT
Start: 2024-12-12 | End: 2024-12-15 | Stop reason: HOSPADM

## 2024-12-12 RX ADMIN — MORPHINE SULFATE 2 MG: 4 INJECTION, SOLUTION INTRAMUSCULAR; INTRAVENOUS at 05:12

## 2024-12-12 RX ADMIN — AMIODARONE HYDROCHLORIDE 200 MG: 200 TABLET ORAL at 08:12

## 2024-12-12 RX ADMIN — FUROSEMIDE 40 MG: 40 TABLET ORAL at 08:12

## 2024-12-12 RX ADMIN — GABAPENTIN 200 MG: 100 CAPSULE ORAL at 03:12

## 2024-12-12 RX ADMIN — LACTULOSE 20 G: 10 SOLUTION ORAL at 09:12

## 2024-12-12 RX ADMIN — PANCRELIPASE 2 CAPSULE: 120000; 24000; 76000 CAPSULE, DELAYED RELEASE PELLETS ORAL at 08:12

## 2024-12-12 RX ADMIN — PANTOPRAZOLE SODIUM 40 MG: 40 TABLET, DELAYED RELEASE ORAL at 06:12

## 2024-12-12 RX ADMIN — CLOPIDOGREL BISULFATE 75 MG: 75 TABLET ORAL at 08:12

## 2024-12-12 RX ADMIN — DOCUSATE SODIUM 100 MG: 100 CAPSULE, LIQUID FILLED ORAL at 08:12

## 2024-12-12 RX ADMIN — GABAPENTIN 200 MG: 100 CAPSULE ORAL at 09:12

## 2024-12-12 RX ADMIN — GABAPENTIN 200 MG: 100 CAPSULE ORAL at 11:12

## 2024-12-12 RX ADMIN — FAMOTIDINE 40 MG: 20 TABLET, FILM COATED ORAL at 09:12

## 2024-12-12 RX ADMIN — PANCRELIPASE 2 CAPSULE: 120000; 24000; 76000 CAPSULE, DELAYED RELEASE PELLETS ORAL at 11:12

## 2024-12-12 RX ADMIN — METHOCARBAMOL 750 MG: 750 TABLET ORAL at 08:12

## 2024-12-12 RX ADMIN — HYDROCODONE BITARTRATE AND ACETAMINOPHEN 1 TABLET: 5; 325 TABLET ORAL at 09:12

## 2024-12-12 RX ADMIN — HYDROCODONE BITARTRATE AND ACETAMINOPHEN 1 TABLET: 5; 325 TABLET ORAL at 03:12

## 2024-12-12 RX ADMIN — CEFDINIR 300 MG: 300 CAPSULE ORAL at 09:12

## 2024-12-12 RX ADMIN — METHOCARBAMOL 750 MG: 750 TABLET ORAL at 09:12

## 2024-12-12 RX ADMIN — MORPHINE SULFATE 2 MG: 4 INJECTION, SOLUTION INTRAMUSCULAR; INTRAVENOUS at 08:12

## 2024-12-12 RX ADMIN — GLIMEPIRIDE 4 MG: 2 TABLET ORAL at 08:12

## 2024-12-12 RX ADMIN — ASPIRIN 81 MG: 81 TABLET, COATED ORAL at 08:12

## 2024-12-12 RX ADMIN — Medication 400 MG: at 08:12

## 2024-12-12 RX ADMIN — METHOCARBAMOL 750 MG: 750 TABLET ORAL at 03:12

## 2024-12-12 RX ADMIN — CETIRIZINE HYDROCHLORIDE 10 MG: 10 TABLET, FILM COATED ORAL at 08:12

## 2024-12-12 RX ADMIN — INSULIN ASPART 3 UNITS: 100 INJECTION, SOLUTION INTRAVENOUS; SUBCUTANEOUS at 05:12

## 2024-12-12 RX ADMIN — CEFDINIR 300 MG: 300 CAPSULE ORAL at 08:12

## 2024-12-12 RX ADMIN — INSULIN GLARGINE 5 UNITS: 100 INJECTION, SOLUTION SUBCUTANEOUS at 08:12

## 2024-12-12 RX ADMIN — LIDOCAINE 5% 2 PATCH: 700 PATCH TOPICAL at 11:12

## 2024-12-12 RX ADMIN — GLIMEPIRIDE 4 MG: 2 TABLET ORAL at 09:12

## 2024-12-12 RX ADMIN — PANCRELIPASE 2 CAPSULE: 120000; 24000; 76000 CAPSULE, DELAYED RELEASE PELLETS ORAL at 05:12

## 2024-12-12 RX ADMIN — INSULIN ASPART 3 UNITS: 100 INJECTION, SOLUTION INTRAVENOUS; SUBCUTANEOUS at 11:12

## 2024-12-12 RX ADMIN — ATORVASTATIN CALCIUM 40 MG: 40 TABLET, FILM COATED ORAL at 09:12

## 2024-12-12 RX ADMIN — HYDROCODONE BITARTRATE AND ACETAMINOPHEN 1 TABLET: 5; 325 TABLET ORAL at 11:12

## 2024-12-12 RX ADMIN — ALPRAZOLAM 0.25 MG: 0.25 TABLET ORAL at 09:12

## 2024-12-12 RX ADMIN — WARFARIN SODIUM 3 MG: 2 TABLET ORAL at 05:12

## 2024-12-12 NOTE — PROGRESS NOTES
Hospitalist Progress      PATIENT NAME:Kortney Leach  DATE OF SERVICE:12/12/2024      Subjective/Hospital Course;    79 year old female with a past medical history of VHD s/p mosaic bioprosthetic MVR, CAD, PAF s/p SUKHI ligation, ABEL, COPD/asthma, HTN, DVT s/p IVC filter, SSS s/p PPM, HLD, DM2 who presented to [facility] on 11/23/2024 due to worsening SOB over the past month. She was noted to be in acute decompensated diastolic CHF. Cardiology was consulted and repeat echo revealed EF 60-65%, grade 1 diastolic dysfunction, mean pressure gradient across the mitral valve is 19 mmHg, pulmonary artery systolic pressure is 57 mmHg. MARYBETH 11/27/2024 revealed severe mitral valve stenosis with moderate regurgitation. LHC and RHC 11/27/2024 revealed 70% proximal LAD stenosis and bioprosthetic mitral valve stenosis.  She was evaluated by CV surgery who recommended to optimize CHF prior to intervention for mitral valve.  She subsequently received stenting/ballooning to proximal LAD on 11/29/2024.  Case was discussed between Dr. Lynn and Dr. Cervantes and decision was made to start Coumadin to see if gradient of mitral valve stenosis could be decreased.  She was started on Coumadin 5 mg daily on 11/02/2024 with a goal INR of 2-3.  Plan to continue triple therapy with aspirin, Plavix, Coumadin for 1 month and discontinue aspirin on 12/28/2024.  She will need to follow up echo in 4-6 weeks as well as a follow up with Dr. Watters in 4-6 weeks to discuss plan with mitral valve.  During this admission she was also treated for a Klebsiella pneumoniae and Proteus mirabilis UTI.  She was transferred to our [facility] for continued physical therapy.  Yesterday, 12/8, pt had significant shortness of breath although O2 sat within normal limits. Patient's oxygen was increased from 2 L nasal cannula to 3 L nasal cannula to make her more comfortable.  Patient does not require supplemental oxygen at baseline, but may require upon  discharge.      Repeat CXR 12/04/2024 revealed improved volume status.  Received an additional dose of Lasix 20 mg 12/05/2024.  INR elevated 12/05/2024 - holding coumadin      12/8: INR 3.2 - started Coumadin 1.25 last night.  Continues to complain of low back muscle spasms, increased Robaxin      12/9 Pt feels better today. Pt denies sob. Pt is eating well and endorses constipation. Last BM was 4 days ago.I discussed adding Miralax. Pt is ambulating very little and uses walker to get to bathroom with contact guard assist. Pt c/o bacl pain and feels like her spinal stimulator is not working. Pt was given coumadin 1.25 mg last night. INR today is 3.1. Will give coumadin 1.25 mg po tonight. Changed pain regimen. Decreased morphine iv to q8hr and norco to q 6hr.      12/10;  Patient seen today, no active complaint except for recurrence of low back pain for which she is currently on analgesics.  No worsening shortness of breadth.  No chest pain.  Tolerating physical therapy.  INR today is 3.3.  We will hold off on Coumadin today.  Repeat PT/INR in the morning.      12/11;  Patient is seen today, no active complaint.  Currently on 3 L of nasal cannula oxygen therapy.  Due to acute hypoxic respiratory failure.  Patient has been weaned off nasal cannula oxygen therapy.  She does have persistent leukocytosis.  Urinalysis indicates possible UTI.  Patient has been to be commenced on Omnicef today.    12/12  Leukocytosis worsening. Up to 15 today. Patient also with increased pain. Reports bowel movement yesterday. Denies N/V/F/C. Discussed increasing frequency of pain meds also along with adding adjuvant treatments. She was in agreement. Nursing at bedside        ROS negative except for above    Scheduled Meds:   amiodarone  200 mg Oral Daily    aspirin  81 mg Oral Daily    atorvastatin  40 mg Oral Daily    cefdinir  300 mg Oral Q12H    cetirizine  10 mg Oral Daily    clopidogreL  75 mg Oral Daily    docusate sodium  100 mg  Oral Daily    famotidine  40 mg Oral QHS    furosemide  40 mg Oral Daily    gabapentin  200 mg Oral TID    glimepiride  4 mg Oral BID    insulin aspart U-100  3 Units Subcutaneous TIDWM    insulin glargine U-100  5 Units Subcutaneous Daily    lactulose 10 gram/15 ml  20 g Oral BID    LIDOcaine  2 patch Transdermal Q24H    lipase-protease-amylase 24,000-76,000-120,000 units  2 capsule Oral TID WM    magnesium oxide  400 mg Oral Daily    methocarbamoL  750 mg Oral TID    metoprolol succinate  50 mg Oral Daily    pantoprazole  40 mg Oral QAM    warfarin  3 mg Oral Daily     Continuous Infusions:  PRN Meds:.  Current Facility-Administered Medications:     albuterol-ipratropium, 3 mL, Nebulization, Q4H PRN    ALPRAZolam, 0.25 mg, Oral, TID PRN    benzonatate, 100 mg, Oral, TID PRN    bisacodyL, 5 mg, Oral, Daily PRN    bisacodyL, 10 mg, Rectal, Daily PRN    calcium carbonate, 1,000 mg, Oral, TID PRN    dextrose 10%, 12.5 g, Intravenous, PRN    dextrose 10%, 25 g, Intravenous, PRN    diclofenac sodium, 2 g, Topical (Top), BID PRN    glucagon (human recombinant), 1 mg, Intramuscular, PRN    glucose, 16 g, Oral, PRN    glucose, 24 g, Oral, PRN    hydrALAZINE, 10 mg, Intravenous, Q4H PRN    HYDROcodone-acetaminophen, 1 tablet, Oral, Q4H PRN    insulin aspart U-100, 0-10 Units, Subcutaneous, QID (AC + HS) PRN    loperamide, 2 mg, Oral, Q4H PRN    melatonin, 6 mg, Oral, Nightly PRN    morphine, 2 mg, Intravenous, Q8H PRN    ondansetron, 4 mg, Intravenous, Q8H PRN    simethicone, 1 tablet, Oral, QID PRN    sodium chloride 0.9%, 10 mL, Intravenous, PRN    VITALS:  Temp:  [97.4 °F (36.3 °C)-98.3 °F (36.8 °C)] 97.4 °F (36.3 °C)  Pulse:  [64-69] 65  Resp:  [18-20] 18  SpO2:  [90 %-99 %] 94 %  BP: ()/(51-81) 97/51    Intake/Output Summary (Last 24 hours) at 12/12/2024 0900  Last data filed at 12/12/2024 0857  Gross per 24 hour   Intake 960 ml   Output --   Net 960 ml         On Exam;    General appearance - no acute distress,  "awake & alert   HEENT - head atraumatic, PERRL , scleral anicteric,mucous membrane moist  Neck- no jvd, carotid bruits, lymphadenopathy, thromegaly  Pulm-equal chest expansion, normal percussion note, clear to auscultation bilaterally, reduced breath sounds  Heart -  Regular rate and rhythm, normal S1, S2  Abdomen - soft, nontender, nondistended,no organomegaly, bowel sounds present.  Extremities - no edema, no cyanosis, no clubbing  Neurologic - Awake, alert and oriented x3,moves all extremities.  Musculoskeletal - normal ROM of major joints  Skin - no rash, normal skin turgor      LABORATORY:    CBC:   Lab Results   Component Value Date    WBC 15.06 (H) 12/12/2024    WBC 6.12 06/13/2006    RBC 3.37 (L) 12/12/2024    RBC 4.81 06/13/2006    HGB 9.8 (L) 12/12/2024    HGB 15.6 06/13/2006    HCT 32.2 (L) 12/12/2024    HCT 40 09/20/2023     (H) 12/12/2024     06/13/2006     CMP:   Lab Results   Component Value Date     12/11/2024     06/13/2006    K 3.8 12/11/2024    K 3.9 06/13/2006     12/11/2024     06/13/2006    CO2 29 12/11/2024    CO2 23 06/13/2006    BUN 29.3 (H) 12/11/2024    BUN 11 06/13/2006    CREATININE 0.80 12/11/2024    CREATININE 0.7 06/13/2006    CALCIUM 8.5 12/11/2024    CALCIUM 9.4 06/13/2006    ALKPHOS 86 12/05/2024    ALKPHOS 69 06/13/2006    AST 25 12/05/2024    AST 34 (H) 06/13/2006    ALT 18 12/05/2024    ALT 42 (H) 06/13/2006    ALBUMIN 2.6 (L) 12/05/2024    ALBUMIN 5.0 06/13/2006    BILITOT 0.9 12/05/2024    BILITOT 0.7 06/13/2006     Cardiac markers:   Lab Results   Component Value Date    .1 (H) 11/22/2024    BNP 20 11/09/2020       Radiology:  Micro:  No components found for: "BLOODCX", "SPUTUMCX", "URINECX"    Radiology:  [unfilled]          ASSESSMENT AND PLAN:  Acute hypoxemic respiratory failure ; on 3 L of O2, being weaned off  Acute on chronic decompensated HFpEF with pulmonary edema - resolved  Pulmonary hypertension  Severe mitral " stenosis  Echo 11/23/2024 EF 60-65%, grade 1 diastolic dysfunction, mean pressure gradient across the mitral valve is 19 mmHg, pulmonary artery systolic pressure is 57 mmHg  MARYBETH 11/27/2024 revealed severe mitral valve stenosis with moderate regurgitation  LHC and RHC 11/27/2024 revealed 70% proximal LAD stenosis and bioprosthetic mitral valve stenosis - s/p stenting/ballooning to proximal LAD on 11/29/2024  CV surgery recommended to optimize CHF prior to intervention for mitral valve   Dr. Lynn and Dr. Cervantes recommended to start Coumadin 5mg QD with goal INR of 2-3 to see if gradient of mitral valve stenosis could be decreased  Continue triple therapy with aspirin, Plavix, Coumadin for 1 month and discontinue aspirin on 12/28/2024  Follow up echo in 4-6 weeks  Follow up with Dr. Watters in 4-6 weeks to discuss plan with mitral valve  Patient is still on nasal cannula oxygen therapy.being weaned off.     Severe mitral stenosis s/p mosaic bioprosthetic MVR  CAD s/p stenting/ballooning  PAF s/p SUKHI ligation  HTN, HLD  DVT s/p IVC filter  SSS s/p PPM  Continue amiodarone 200 mg daily, aspirin 81 mg daily (discontinue on 12/28/2024), statin, Plavix 75 mg daily, Lasix 40 mg daily, Toprol-XL 50 mg daily,   On low dose Warfarin for INR 2-3, we will  restart coumadin 3mg daily ,Daily PT INRs  Daily Mag-Ox 400 mg     Hx of COPD/asthma  ABEL   Continue nightly CPAP.  Does not require home oxygen at baseline  On bronchodilaor     Recent Klebsiella pneumoniae and Proteus mirabilis UTI - completed treatment     Type 2 diabetes mellitus;, fairly controlled.  Recent A1c 6.8% within the last month  Holding home metformin 500 mg b.i.d.   On Lantus 5 units daily 12/04/2024  Continue aspart 3 units TID with meals 12/05/2024  On  home glimepiride 4 mg b.i.d. 12/06/2024  Moderate dose SSI     Chronic sinusitis  Afrin and Mucinex x3 days starting 12/05/2024 followed by Flonase daily   Continue cetirizine  No indication for  antibiotics at this time     Anxiety   Xanax prn      Leukocytosis; persistent, urinalysis indicates possible UTI, follow up the urine culture.  Commenced on empiric Omnicef.  Repeat CBC in the morning.     Obesity  Low-calorie diet advised     DVT prophylaxis:  Holding warfarin for now until INR <3     Code status: Full           Plans discussed in detail with patient and all questions answered. Discussed with RN caring for patient        Disposition-  For discharge home  when appropriate and stable      This note was completed using voice recognition software and transcription errors may occur.      Evaluation of the Patient was done via Telemedicine along side the nursing staff.     Location of the Provider; [place], [place]  Patient location: [place]           Signed by   Richard Ruiz MD  12/12/2024,   1:27 PM

## 2024-12-12 NOTE — PLAN OF CARE
Problem: Adult Inpatient Plan of Care  Goal: Plan of Care Review  Outcome: Progressing  Flowsheets (Taken 12/12/2024 0800)  Plan of Care Reviewed With:   patient   family  Goal: Patient-Specific Goal (Individualized)  Outcome: Progressing  Flowsheets (Taken 12/12/2024 0800)  Individualized Care Needs: Pain management, bowel program management, PT/OT, monitor O2 saturation  Anxieties, Fears or Concerns: Back pain  Patient/Family-Specific Goals (Include Timeframe): Return to baseline by SD  Goal: Absence of Hospital-Acquired Illness or Injury  Outcome: Progressing  Goal: Optimal Comfort and Wellbeing  Outcome: Progressing  Goal: Readiness for Transition of Care  Outcome: Progressing     Problem: Diabetes Comorbidity  Goal: Blood Glucose Level Within Targeted Range  Outcome: Progressing     Problem: Wound  Goal: Optimal Coping  Outcome: Progressing  Goal: Optimal Functional Ability  Outcome: Progressing  Goal: Absence of Infection Signs and Symptoms  Outcome: Progressing  Goal: Improved Oral Intake  Outcome: Progressing  Goal: Optimal Pain Control and Function  Outcome: Progressing  Goal: Skin Health and Integrity  Outcome: Progressing  Goal: Optimal Wound Healing  Outcome: Progressing     Problem: Fall Injury Risk  Goal: Absence of Fall and Fall-Related Injury  Outcome: Progressing

## 2024-12-12 NOTE — PT/OT/SLP PROGRESS
Physical Therapy Treatment Note           Name: Kortney Leach    : 1945 (79 y.o.)  MRN: [id number]           TREATMENT SUMMARY AND RECOMMENDATIONS:    PT Received On: 24  PT Start Time: 1300     PT Stop Time: 1315  PT Total Time (min): 15 min     Subjective Assessment:   No complaints  Lethargic   x Awake, alert, cooperative  Uncooperative    Agitated     x c/o pain - LBP    Appropriate  c/o fatigue    Confused     x Treated at bedside     Emotionally labile      Treated in gym/dept.    Impulsive  Other:    Flat affect       Therapy Precautions:    Cognitive deficits  Spinal precautions    Collar - hard  Sternal precautions    Collar - soft   TLSO    Fall risk  LSO    Hip precautions - posterior  Knee immobilizer    Hip precautions - anterior  WBAT    Impaired communication  Partial weightbearing   x Oxygen - 3L via NC  TTWB    PEG tube  NWB    Visual deficits  Other:    Hearing deficits          Treatment Objectives:     Mobility Training:   Assist level Comments    Bed mobility    SBA Increased time due to pain   Transfer     Min A Stand pivot with RW to bedside commode   Gait     Sit to stand transitions     Sitting balance     Standing balance      Wheelchair mobility     Car transfer     Other: toileting  CGA/Min A CGA for balance, Min A to don brief 2/2 pain       Therapeutic Exercise:   Exercise Sets Reps Comments                               Additional Comments:    Assessment: Patient only able to tolerate transfer to bedside commode due to pain. Her pain appeared improved at rest, but unchanged with activity.       PT Plan: Cont PT POC.  Revisions made to plan of care: No    GOALS:   Multidisciplinary Problems       Physical Therapy Goals          Problem: Physical Therapy    Goal Priority Disciplines Outcome Interventions   Physical Therapy Goal     PT, PT/OT Progressing    Description: Goals to be met by: discharge     Patient will increase functional independence with  mobility by performin. Sit to stand transfer with Supervision and minimal dyspnea.   2. Gait  x 150 feet with Supervision using Rolling Walker and minimal dyspnea.                          Skilled PT Minutes Provided: 15 min   Communication with Treatment Team:     Equipment recommendations:       At end of treatment, patient remained:   Up in chair     Up in wheelchair in room   x In bed   x With alarm activated   x Bed rails up   x Call bell in reach     Family/friends present    Restraints secured properly    In bathroom with CNA/RN notified    Nurse aware    In gym with therapist/tech    Other:

## 2024-12-12 NOTE — PLAN OF CARE
[facility] - [facility]  Discharge Reassessment    Primary Care Provider: Arnaldo Hernandez MD    Expected Discharge Date:     Reassessment (most recent)       Discharge Reassessment - 12/11/24 1211          Discharge Reassessment    Assessment Type Discharge Planning Reassessment     Did the patient's condition or plan change since previous assessment? No     Discharge Plan discussed with: Patient;Adult children     Discharge Plan A Home;Home Health     DME Needed Upon Discharge  other (see comments)   TBD    Transition of Care Barriers None     Why the patient remains in the hospital Requires continued medical care        Post-Acute Status    Discharge Delays None known at this time

## 2024-12-13 LAB
ALBUMIN SERPL-MCNC: 2.4 G/DL (ref 3.4–4.8)
ALBUMIN/GLOB SERPL: 0.7 RATIO (ref 1.1–2)
ALP SERPL-CCNC: 94 UNIT/L (ref 40–150)
ALT SERPL-CCNC: 13 UNIT/L (ref 0–55)
AMMONIA PLAS-MSCNC: 45.7 UMOL/L (ref 18–72)
ANION GAP SERPL CALC-SCNC: 12 MEQ/L
AST SERPL-CCNC: 23 UNIT/L (ref 5–34)
BACTERIA UR CULT: NO GROWTH
BASOPHILS # BLD AUTO: 0.03 X10(3)/MCL
BASOPHILS NFR BLD AUTO: 0.2 %
BILIRUB SERPL-MCNC: 0.5 MG/DL
BUN SERPL-MCNC: 30.2 MG/DL (ref 9.8–20.1)
CALCIUM SERPL-MCNC: 8.7 MG/DL (ref 8.4–10.2)
CHLORIDE SERPL-SCNC: 100 MMOL/L (ref 98–107)
CO2 SERPL-SCNC: 28 MMOL/L (ref 23–31)
CREAT SERPL-MCNC: 0.9 MG/DL (ref 0.55–1.02)
CREAT/UREA NIT SERPL: 34
EOSINOPHIL # BLD AUTO: 0.23 X10(3)/MCL (ref 0–0.9)
EOSINOPHIL NFR BLD AUTO: 1.7 %
ERYTHROCYTE [DISTWIDTH] IN BLOOD BY AUTOMATED COUNT: 17.3 % (ref 11.5–17)
GFR SERPLBLD CREATININE-BSD FMLA CKD-EPI: >60 ML/MIN/1.73/M2
GLOBULIN SER-MCNC: 3.4 GM/DL (ref 2.4–3.5)
GLUCOSE SERPL-MCNC: 136 MG/DL (ref 82–115)
HCT VFR BLD AUTO: 32.2 % (ref 37–47)
HGB BLD-MCNC: 9.9 G/DL (ref 12–16)
IMM GRANULOCYTES # BLD AUTO: 0.21 X10(3)/MCL (ref 0–0.04)
IMM GRANULOCYTES NFR BLD AUTO: 1.6 %
INR PPP: 3.9
LYMPHOCYTES # BLD AUTO: 2.89 X10(3)/MCL (ref 0.6–4.6)
LYMPHOCYTES NFR BLD AUTO: 21.8 %
MCH RBC QN AUTO: 29.8 PG (ref 27–31)
MCHC RBC AUTO-ENTMCNC: 30.7 G/DL (ref 33–36)
MCV RBC AUTO: 97 FL (ref 80–94)
MONOCYTES # BLD AUTO: 1.33 X10(3)/MCL (ref 0.1–1.3)
MONOCYTES NFR BLD AUTO: 10 %
NEUTROPHILS # BLD AUTO: 8.57 X10(3)/MCL (ref 2.1–9.2)
NEUTROPHILS NFR BLD AUTO: 64.7 %
PLATELET # BLD AUTO: 501 X10(3)/MCL (ref 130–400)
PMV BLD AUTO: 9 FL (ref 7.4–10.4)
POCT GLUCOSE: 120 MG/DL (ref 70–110)
POCT GLUCOSE: 133 MG/DL (ref 70–110)
POCT GLUCOSE: 136 MG/DL (ref 70–110)
POCT GLUCOSE: 186 MG/DL (ref 70–110)
POTASSIUM SERPL-SCNC: 4.1 MMOL/L (ref 3.5–5.1)
PROT SERPL-MCNC: 5.8 GM/DL (ref 5.8–7.6)
PROTHROMBIN TIME: 36.9 SECONDS (ref 12.5–14.5)
RBC # BLD AUTO: 3.32 X10(6)/MCL (ref 4.2–5.4)
SODIUM SERPL-SCNC: 140 MMOL/L (ref 136–145)
WBC # BLD AUTO: 13.26 X10(3)/MCL (ref 4.5–11.5)

## 2024-12-13 PROCEDURE — 82140 ASSAY OF AMMONIA: CPT | Performed by: INTERNAL MEDICINE

## 2024-12-13 PROCEDURE — 25000003 PHARM REV CODE 250: Performed by: PHYSICIAN ASSISTANT

## 2024-12-13 PROCEDURE — 25000003 PHARM REV CODE 250: Performed by: STUDENT IN AN ORGANIZED HEALTH CARE EDUCATION/TRAINING PROGRAM

## 2024-12-13 PROCEDURE — 97530 THERAPEUTIC ACTIVITIES: CPT

## 2024-12-13 PROCEDURE — 36415 COLL VENOUS BLD VENIPUNCTURE: CPT | Performed by: INTERNAL MEDICINE

## 2024-12-13 PROCEDURE — 63600175 PHARM REV CODE 636 W HCPCS: Performed by: STUDENT IN AN ORGANIZED HEALTH CARE EDUCATION/TRAINING PROGRAM

## 2024-12-13 PROCEDURE — 25000003 PHARM REV CODE 250: Performed by: INTERNAL MEDICINE

## 2024-12-13 PROCEDURE — 36415 COLL VENOUS BLD VENIPUNCTURE: CPT | Performed by: STUDENT IN AN ORGANIZED HEALTH CARE EDUCATION/TRAINING PROGRAM

## 2024-12-13 PROCEDURE — 27000221 HC OXYGEN, UP TO 24 HOURS

## 2024-12-13 PROCEDURE — 80053 COMPREHEN METABOLIC PANEL: CPT | Performed by: INTERNAL MEDICINE

## 2024-12-13 PROCEDURE — 99900035 HC TECH TIME PER 15 MIN (STAT)

## 2024-12-13 PROCEDURE — 11000004 HC SNF PRIVATE

## 2024-12-13 PROCEDURE — 94760 N-INVAS EAR/PLS OXIMETRY 1: CPT

## 2024-12-13 PROCEDURE — 85610 PROTHROMBIN TIME: CPT | Performed by: STUDENT IN AN ORGANIZED HEALTH CARE EDUCATION/TRAINING PROGRAM

## 2024-12-13 PROCEDURE — 85025 COMPLETE CBC W/AUTO DIFF WBC: CPT | Performed by: INTERNAL MEDICINE

## 2024-12-13 RX ORDER — GABAPENTIN 300 MG/1
300 CAPSULE ORAL 3 TIMES DAILY
Status: DISCONTINUED | OUTPATIENT
Start: 2024-12-13 | End: 2024-12-13

## 2024-12-13 RX ORDER — GABAPENTIN 100 MG/1
100 CAPSULE ORAL 3 TIMES DAILY
Status: DISCONTINUED | OUTPATIENT
Start: 2024-12-13 | End: 2024-12-14

## 2024-12-13 RX ADMIN — CETIRIZINE HYDROCHLORIDE 10 MG: 10 TABLET, FILM COATED ORAL at 09:12

## 2024-12-13 RX ADMIN — AMIODARONE HYDROCHLORIDE 200 MG: 200 TABLET ORAL at 09:12

## 2024-12-13 RX ADMIN — LACTULOSE 20 G: 10 SOLUTION ORAL at 09:12

## 2024-12-13 RX ADMIN — GABAPENTIN 100 MG: 100 CAPSULE ORAL at 08:12

## 2024-12-13 RX ADMIN — PANTOPRAZOLE SODIUM 40 MG: 40 TABLET, DELAYED RELEASE ORAL at 06:12

## 2024-12-13 RX ADMIN — FAMOTIDINE 40 MG: 20 TABLET, FILM COATED ORAL at 08:12

## 2024-12-13 RX ADMIN — PANCRELIPASE 2 CAPSULE: 120000; 24000; 76000 CAPSULE, DELAYED RELEASE PELLETS ORAL at 09:12

## 2024-12-13 RX ADMIN — INSULIN ASPART 2 UNITS: 100 INJECTION, SOLUTION INTRAVENOUS; SUBCUTANEOUS at 12:12

## 2024-12-13 RX ADMIN — LIDOCAINE 5% 2 PATCH: 700 PATCH TOPICAL at 10:12

## 2024-12-13 RX ADMIN — GABAPENTIN 300 MG: 300 CAPSULE ORAL at 02:12

## 2024-12-13 RX ADMIN — HYDROCODONE BITARTRATE AND ACETAMINOPHEN 1 TABLET: 5; 325 TABLET ORAL at 10:12

## 2024-12-13 RX ADMIN — HYDROCODONE BITARTRATE AND ACETAMINOPHEN 1 TABLET: 5; 325 TABLET ORAL at 02:12

## 2024-12-13 RX ADMIN — CLOPIDOGREL BISULFATE 75 MG: 75 TABLET ORAL at 09:12

## 2024-12-13 RX ADMIN — INSULIN GLARGINE 5 UNITS: 100 INJECTION, SOLUTION SUBCUTANEOUS at 09:12

## 2024-12-13 RX ADMIN — ASPIRIN 81 MG: 81 TABLET, COATED ORAL at 09:12

## 2024-12-13 RX ADMIN — INSULIN ASPART 3 UNITS: 100 INJECTION, SOLUTION INTRAVENOUS; SUBCUTANEOUS at 12:12

## 2024-12-13 RX ADMIN — GABAPENTIN 200 MG: 100 CAPSULE ORAL at 09:12

## 2024-12-13 RX ADMIN — Medication 400 MG: at 09:12

## 2024-12-13 RX ADMIN — CEFDINIR 300 MG: 300 CAPSULE ORAL at 09:12

## 2024-12-13 RX ADMIN — GLIMEPIRIDE 4 MG: 2 TABLET ORAL at 09:12

## 2024-12-13 RX ADMIN — LACTULOSE 20 G: 10 SOLUTION ORAL at 08:12

## 2024-12-13 RX ADMIN — INSULIN ASPART 3 UNITS: 100 INJECTION, SOLUTION INTRAVENOUS; SUBCUTANEOUS at 04:12

## 2024-12-13 RX ADMIN — PANCRELIPASE 2 CAPSULE: 120000; 24000; 76000 CAPSULE, DELAYED RELEASE PELLETS ORAL at 04:12

## 2024-12-13 RX ADMIN — INSULIN ASPART 3 UNITS: 100 INJECTION, SOLUTION INTRAVENOUS; SUBCUTANEOUS at 09:12

## 2024-12-13 RX ADMIN — METHOCARBAMOL 750 MG: 750 TABLET ORAL at 09:12

## 2024-12-13 RX ADMIN — ALPRAZOLAM 0.25 MG: 0.25 TABLET ORAL at 09:12

## 2024-12-13 RX ADMIN — HYDROCODONE BITARTRATE AND ACETAMINOPHEN 1 TABLET: 5; 325 TABLET ORAL at 06:12

## 2024-12-13 RX ADMIN — ATORVASTATIN CALCIUM 40 MG: 40 TABLET, FILM COATED ORAL at 08:12

## 2024-12-13 RX ADMIN — METHOCARBAMOL 750 MG: 750 TABLET ORAL at 08:12

## 2024-12-13 RX ADMIN — DOCUSATE SODIUM 100 MG: 100 CAPSULE, LIQUID FILLED ORAL at 09:12

## 2024-12-13 RX ADMIN — GLIMEPIRIDE 4 MG: 2 TABLET ORAL at 08:12

## 2024-12-13 RX ADMIN — PANCRELIPASE 2 CAPSULE: 120000; 24000; 76000 CAPSULE, DELAYED RELEASE PELLETS ORAL at 12:12

## 2024-12-13 RX ADMIN — CEFDINIR 300 MG: 300 CAPSULE ORAL at 08:12

## 2024-12-13 NOTE — PROGRESS NOTES
Unable to treat pt at this time d/t pt stating she is having 10/10 back pain. Nursing present and administering medication. Will f/u with POC in AM.

## 2024-12-13 NOTE — PT/OT/SLP PROGRESS
Name: Kortney Moraesdeaux    : 1945 (79 y.o.)  MRN: [id number]           Patient Unavailable      Patient unable to be seen at this time secondary to: back pain. Will resume with PT POC as able.

## 2024-12-13 NOTE — PROGRESS NOTES
Hospitalist Progress      PATIENT NAME:Kortney Leach  DATE OF SERVICE:12/13/2024      Subjective/Hospital Course;    79 year old female with a past medical history of VHD s/p mosaic bioprosthetic MVR, CAD, PAF s/p SUKHI ligation, ABEL, COPD/asthma, HTN, DVT s/p IVC filter, SSS s/p PPM, HLD, DM2 who presented to [facility] on 11/23/2024 due to worsening SOB over the past month. She was noted to be in acute decompensated diastolic CHF. Cardiology was consulted and repeat echo revealed EF 60-65%, grade 1 diastolic dysfunction, mean pressure gradient across the mitral valve is 19 mmHg, pulmonary artery systolic pressure is 57 mmHg. MARYBETH 11/27/2024 revealed severe mitral valve stenosis with moderate regurgitation. LHC and RHC 11/27/2024 revealed 70% proximal LAD stenosis and bioprosthetic mitral valve stenosis.  She was evaluated by CV surgery who recommended to optimize CHF prior to intervention for mitral valve.  She subsequently received stenting/ballooning to proximal LAD on 11/29/2024.  Case was discussed between Dr. Lynn and Dr. Cervantes and decision was made to start Coumadin to see if gradient of mitral valve stenosis could be decreased.  She was started on Coumadin 5 mg daily on 11/02/2024 with a goal INR of 2-3.  Plan to continue triple therapy with aspirin, Plavix, Coumadin for 1 month and discontinue aspirin on 12/28/2024.  She will need to follow up echo in 4-6 weeks as well as a follow up with Dr. Watters in 4-6 weeks to discuss plan with mitral valve.  During this admission she was also treated for a Klebsiella pneumoniae and Proteus mirabilis UTI.  She was transferred to our [facility] for continued physical therapy.  Yesterday, 12/8, pt had significant shortness of breath although O2 sat within normal limits. Patient's oxygen was increased from 2 L nasal cannula to 3 L nasal cannula to make her more comfortable.  Patient does not require supplemental oxygen at baseline, but may require upon  discharge.      Repeat CXR 12/04/2024 revealed improved volume status.  Received an additional dose of Lasix 20 mg 12/05/2024.  INR elevated 12/05/2024 - holding coumadin      12/8: INR 3.2 - started Coumadin 1.25 last night.  Continues to complain of low back muscle spasms, increased Robaxin      12/9 Pt feels better today. Pt denies sob. Pt is eating well and endorses constipation. Last BM was 4 days ago.I discussed adding Miralax. Pt is ambulating very little and uses walker to get to bathroom with contact guard assist. Pt c/o bacl pain and feels like her spinal stimulator is not working. Pt was given coumadin 1.25 mg last night. INR today is 3.1. Will give coumadin 1.25 mg po tonight. Changed pain regimen. Decreased morphine iv to q8hr and norco to q 6hr.      12/10;  Patient seen today, no active complaint except for recurrence of low back pain for which she is currently on analgesics.  No worsening shortness of breadth.  No chest pain.  Tolerating physical therapy.  INR today is 3.3.  We will hold off on Coumadin today.  Repeat PT/INR in the morning.      12/11;  Patient is seen today, no active complaint.  Currently on 3 L of nasal cannula oxygen therapy.  Due to acute hypoxic respiratory failure.  Patient has been weaned off nasal cannula oxygen therapy.  She does have persistent leukocytosis.  Urinalysis indicates possible UTI.  Patient has been to be commenced on Omnicef today.    12/12  Leukocytosis worsening. Up to 15 today. Patient also with increased pain. Reports bowel movement yesterday. Denies N/V/F/C. Discussed increasing frequency of pain meds also along with adding adjuvant treatments. She was in agreement. Nursing at bedside     12/13  Leukocytosis improving. Pain a little better controlled however still present. Denies N/V/F/C. Will increase gabapentin today       ROS negative except for above    Scheduled Meds:   amiodarone  200 mg Oral Daily    aspirin  81 mg Oral Daily    atorvastatin  40 mg  Oral Daily    cefdinir  300 mg Oral Q12H    cetirizine  10 mg Oral Daily    clopidogreL  75 mg Oral Daily    docusate sodium  100 mg Oral Daily    famotidine  40 mg Oral QHS    furosemide  40 mg Oral Daily    gabapentin  200 mg Oral TID    glimepiride  4 mg Oral BID    insulin aspart U-100  3 Units Subcutaneous TIDWM    insulin glargine U-100  5 Units Subcutaneous Daily    lactulose 10 gram/15 ml  20 g Oral BID    LIDOcaine  2 patch Transdermal Q24H    lipase-protease-amylase 24,000-76,000-120,000 units  2 capsule Oral TID WM    magnesium oxide  400 mg Oral Daily    methocarbamoL  750 mg Oral TID    metoprolol succinate  50 mg Oral Daily    pantoprazole  40 mg Oral QAM    warfarin  3 mg Oral Daily     Continuous Infusions:  PRN Meds:.  Current Facility-Administered Medications:     albuterol-ipratropium, 3 mL, Nebulization, Q4H PRN    ALPRAZolam, 0.25 mg, Oral, TID PRN    benzonatate, 100 mg, Oral, TID PRN    bisacodyL, 5 mg, Oral, Daily PRN    bisacodyL, 10 mg, Rectal, Daily PRN    calcium carbonate, 1,000 mg, Oral, TID PRN    dextrose 10%, 12.5 g, Intravenous, PRN    dextrose 10%, 25 g, Intravenous, PRN    diclofenac sodium, 2 g, Topical (Top), BID PRN    glucagon (human recombinant), 1 mg, Intramuscular, PRN    glucose, 16 g, Oral, PRN    glucose, 24 g, Oral, PRN    hydrALAZINE, 10 mg, Intravenous, Q4H PRN    HYDROcodone-acetaminophen, 1 tablet, Oral, Q4H PRN    insulin aspart U-100, 0-10 Units, Subcutaneous, QID (AC + HS) PRN    loperamide, 2 mg, Oral, Q4H PRN    melatonin, 6 mg, Oral, Nightly PRN    morphine, 2 mg, Intravenous, Q8H PRN    ondansetron, 4 mg, Intravenous, Q8H PRN    simethicone, 1 tablet, Oral, QID PRN    sodium chloride 0.9%, 10 mL, Intravenous, PRN    VITALS:  Temp:  [98.2 °F (36.8 °C)-98.5 °F (36.9 °C)] 98.2 °F (36.8 °C)  Pulse:  [63-71] 67  Resp:  [18-20] 18  SpO2:  [94 %-99 %] 97 %  BP: (107-124)/(54-83) 107/54    Intake/Output Summary (Last 24 hours) at 12/13/2024 1036  Last data filed at  "12/12/2024 1758  Gross per 24 hour   Intake 720 ml   Output --   Net 720 ml         On Exam;    General appearance - no acute distress, awake & alert   HEENT - head atraumatic, PERRL , scleral anicteric,mucous membrane moist  Neck- no jvd, carotid bruits, lymphadenopathy, thromegaly  Pulm-equal chest expansion, normal percussion note, clear to auscultation bilaterally, reduced breath sounds  Heart -  Regular rate and rhythm, normal S1, S2  Abdomen - soft, nontender, nondistended,no organomegaly, bowel sounds present.  Extremities - no edema, no cyanosis, no clubbing  Neurologic - Awake, alert and oriented x3,moves all extremities.  Musculoskeletal - normal ROM of major joints  Skin - no rash, normal skin turgor      LABORATORY:    CBC:   Lab Results   Component Value Date    WBC 13.26 (H) 12/13/2024    WBC 6.12 06/13/2006    RBC 3.32 (L) 12/13/2024    RBC 4.81 06/13/2006    HGB 9.9 (L) 12/13/2024    HGB 15.6 06/13/2006    HCT 32.2 (L) 12/13/2024    HCT 40 09/20/2023     (H) 12/13/2024     06/13/2006     CMP:   Lab Results   Component Value Date     12/13/2024     06/13/2006    K 4.1 12/13/2024    K 3.9 06/13/2006     12/13/2024     06/13/2006    CO2 28 12/13/2024    CO2 23 06/13/2006    BUN 30.2 (H) 12/13/2024    BUN 11 06/13/2006    CREATININE 0.90 12/13/2024    CREATININE 0.7 06/13/2006    CALCIUM 8.7 12/13/2024    CALCIUM 9.4 06/13/2006    ALKPHOS 94 12/13/2024    ALKPHOS 69 06/13/2006    AST 23 12/13/2024    AST 34 (H) 06/13/2006    ALT 13 12/13/2024    ALT 42 (H) 06/13/2006    ALBUMIN 2.4 (L) 12/13/2024    ALBUMIN 5.0 06/13/2006    BILITOT 0.5 12/13/2024    BILITOT 0.7 06/13/2006     Cardiac markers:   Lab Results   Component Value Date    .1 (H) 11/22/2024    BNP 20 11/09/2020       Radiology:  Micro:  No components found for: "BLOODCX", "SPUTUMCX", "URINECX"    Radiology:  [unfilled]          ASSESSMENT AND PLAN:  Acute hypoxemic respiratory failure ; on 3 L of O2, " being weaned off  Acute on chronic decompensated HFpEF with pulmonary edema - resolved  Pulmonary hypertension  Severe mitral stenosis  Echo 11/23/2024 EF 60-65%, grade 1 diastolic dysfunction, mean pressure gradient across the mitral valve is 19 mmHg, pulmonary artery systolic pressure is 57 mmHg  MARYBETH 11/27/2024 revealed severe mitral valve stenosis with moderate regurgitation  LHC and RHC 11/27/2024 revealed 70% proximal LAD stenosis and bioprosthetic mitral valve stenosis - s/p stenting/ballooning to proximal LAD on 11/29/2024  CV surgery recommended to optimize CHF prior to intervention for mitral valve   Dr. Lynn and Dr. Cervantes recommended to start Coumadin 5mg QD with goal INR of 2-3 to see if gradient of mitral valve stenosis could be decreased  Continue triple therapy with aspirin, Plavix, Coumadin for 1 month and discontinue aspirin on 12/28/2024  Follow up echo in 4-6 weeks  Follow up with Dr. Watters in 4-6 weeks to discuss plan with mitral valve  Patient is still on nasal cannula oxygen therapy.being weaned off.     Severe mitral stenosis s/p mosaic bioprosthetic MVR  CAD s/p stenting/ballooning  PAF s/p SUKHI ligation  HTN, HLD  DVT s/p IVC filter  SSS s/p PPM  Continue amiodarone 200 mg daily, aspirin 81 mg daily (discontinue on 12/28/2024), statin, Plavix 75 mg daily, Lasix 40 mg daily, Toprol-XL 50 mg daily,   On low dose Warfarin for INR 2-3, we will  restart coumadin 3mg daily ,Daily PT INRs  Daily Mag-Ox 400 mg    Chronic pain   Increased frequesncy of opioid to Q4H PRN and added gabapentin 200mg PO TID on 12/12  Increased gabapentin to 300mg today 12/13  Will continue to re-evaluate      Hx of COPD/asthma  ABEL   Continue nightly CPAP.  Does not require home oxygen at baseline  On bronchodilaor     Recent Klebsiella pneumoniae and Proteus mirabilis UTI - completed treatment     Type 2 diabetes mellitus;, fairly controlled.  Recent A1c 6.8% within the last month  Holding home metformin 500 mg  b.i.d.   On Lantus 5 units daily 12/04/2024  Continue aspart 3 units TID with meals 12/05/2024  On  home glimepiride 4 mg b.i.d. 12/06/2024  Moderate dose SSI     Chronic sinusitis  Afrin and Mucinex x3 days starting 12/05/2024 followed by Flonase daily   Continue cetirizine  No indication for antibiotics at this time     Anxiety   Xanax prn      Leukocytosis; persistent, urinalysis indicates possible UTI, follow up the urine culture.  Commenced on empiric Omnicef.   - Leukocytosis improving so far     Obesity  Low-calorie diet advised     DVT prophylaxis:  Holding warfarin for now until INR <3     Code status: Full       Plans discussed in detail with patient and all questions answered. Discussed with RN caring for patient        Disposition-  For discharge home  when appropriate and stable      This note was completed using voice recognition software and transcription errors may occur.      Evaluation of the Patient was done via Telemedicine along side the nursing staff.     Location of the Provider; [place], [place]  Patient location: [place]           Signed by   Richard Ruiz MD  12/13/2024,   1:27 PM

## 2024-12-13 NOTE — PLAN OF CARE
Problem: Adult Inpatient Plan of Care  Goal: Plan of Care Review  Outcome: Progressing  Flowsheets (Taken 12/13/2024 0800)  Plan of Care Reviewed With: patient  Goal: Patient-Specific Goal (Individualized)  Outcome: Progressing  Flowsheets (Taken 12/13/2024 0800)  Individualized Care Needs: Pain management, bowel program management, PT/OT, monitor O2 saturation  Anxieties, Fears or Concerns: Back pain  Patient/Family-Specific Goals (Include Timeframe): Return to baseline by SD  Goal: Absence of Hospital-Acquired Illness or Injury  Outcome: Progressing  Goal: Optimal Comfort and Wellbeing  Outcome: Progressing  Goal: Readiness for Transition of Care  Outcome: Progressing     Problem: Diabetes Comorbidity  Goal: Blood Glucose Level Within Targeted Range  Outcome: Progressing

## 2024-12-13 NOTE — PT/OT/SLP PROGRESS
Name: Kortney Leach    : 1945 (79 y.o.)  MRN: [id number]           Patient Unavailable      Patient unable to be seen at this time secondary to: patient refusal due to back pain. Will continue with PT POC in PM/as able.

## 2024-12-14 LAB
INR PPP: 4.1
POCT GLUCOSE: 132 MG/DL (ref 70–110)
POCT GLUCOSE: 134 MG/DL (ref 70–110)
POCT GLUCOSE: 149 MG/DL (ref 70–110)
POCT GLUCOSE: 54 MG/DL (ref 70–110)
PROTHROMBIN TIME: 38.8 SECONDS (ref 12.5–14.5)

## 2024-12-14 PROCEDURE — 85610 PROTHROMBIN TIME: CPT | Performed by: STUDENT IN AN ORGANIZED HEALTH CARE EDUCATION/TRAINING PROGRAM

## 2024-12-14 PROCEDURE — 25000242 PHARM REV CODE 250 ALT 637 W/ HCPCS: Performed by: INTERNAL MEDICINE

## 2024-12-14 PROCEDURE — 99900035 HC TECH TIME PER 15 MIN (STAT)

## 2024-12-14 PROCEDURE — 27000221 HC OXYGEN, UP TO 24 HOURS

## 2024-12-14 PROCEDURE — 63600175 PHARM REV CODE 636 W HCPCS: Performed by: STUDENT IN AN ORGANIZED HEALTH CARE EDUCATION/TRAINING PROGRAM

## 2024-12-14 PROCEDURE — 25000003 PHARM REV CODE 250: Performed by: INTERNAL MEDICINE

## 2024-12-14 PROCEDURE — 25000003 PHARM REV CODE 250: Performed by: STUDENT IN AN ORGANIZED HEALTH CARE EDUCATION/TRAINING PROGRAM

## 2024-12-14 PROCEDURE — 36415 COLL VENOUS BLD VENIPUNCTURE: CPT | Performed by: STUDENT IN AN ORGANIZED HEALTH CARE EDUCATION/TRAINING PROGRAM

## 2024-12-14 PROCEDURE — 94760 N-INVAS EAR/PLS OXIMETRY 1: CPT

## 2024-12-14 PROCEDURE — 25000003 PHARM REV CODE 250: Performed by: PHYSICIAN ASSISTANT

## 2024-12-14 PROCEDURE — 63600175 PHARM REV CODE 636 W HCPCS: Performed by: HOSPITALIST

## 2024-12-14 PROCEDURE — 11000004 HC SNF PRIVATE

## 2024-12-14 RX ORDER — GABAPENTIN 100 MG/1
100 CAPSULE ORAL 2 TIMES DAILY
Status: DISCONTINUED | OUTPATIENT
Start: 2024-12-14 | End: 2024-12-15 | Stop reason: HOSPADM

## 2024-12-14 RX ORDER — FLUTICASONE PROPIONATE 50 MCG
2 SPRAY, SUSPENSION (ML) NASAL 2 TIMES DAILY
Status: DISCONTINUED | OUTPATIENT
Start: 2024-12-14 | End: 2024-12-15 | Stop reason: HOSPADM

## 2024-12-14 RX ORDER — CEFDINIR 300 MG/1
300 CAPSULE ORAL EVERY 12 HOURS
Status: DISCONTINUED | OUTPATIENT
Start: 2024-12-14 | End: 2024-12-15 | Stop reason: HOSPADM

## 2024-12-14 RX ADMIN — LIDOCAINE 5% 2 PATCH: 700 PATCH TOPICAL at 01:12

## 2024-12-14 RX ADMIN — INSULIN GLARGINE 5 UNITS: 100 INJECTION, SOLUTION SUBCUTANEOUS at 09:12

## 2024-12-14 RX ADMIN — PANTOPRAZOLE SODIUM 40 MG: 40 TABLET, DELAYED RELEASE ORAL at 06:12

## 2024-12-14 RX ADMIN — METHOCARBAMOL 750 MG: 750 TABLET ORAL at 09:12

## 2024-12-14 RX ADMIN — METOPROLOL SUCCINATE 50 MG: 50 TABLET, EXTENDED RELEASE ORAL at 09:12

## 2024-12-14 RX ADMIN — MORPHINE SULFATE 2 MG: 4 INJECTION, SOLUTION INTRAMUSCULAR; INTRAVENOUS at 08:12

## 2024-12-14 RX ADMIN — CEFDINIR 300 MG: 300 CAPSULE ORAL at 01:12

## 2024-12-14 RX ADMIN — FLUTICASONE PROPIONATE 100 MCG: 50 SPRAY, METERED NASAL at 09:12

## 2024-12-14 RX ADMIN — GABAPENTIN 100 MG: 100 CAPSULE ORAL at 08:12

## 2024-12-14 RX ADMIN — CEFDINIR 300 MG: 300 CAPSULE ORAL at 08:12

## 2024-12-14 RX ADMIN — CETIRIZINE HYDROCHLORIDE 10 MG: 10 TABLET, FILM COATED ORAL at 09:12

## 2024-12-14 RX ADMIN — HYDROCODONE BITARTRATE AND ACETAMINOPHEN 1 TABLET: 5; 325 TABLET ORAL at 05:12

## 2024-12-14 RX ADMIN — INSULIN ASPART 3 UNITS: 100 INJECTION, SOLUTION INTRAVENOUS; SUBCUTANEOUS at 05:12

## 2024-12-14 RX ADMIN — ASPIRIN 81 MG: 81 TABLET, COATED ORAL at 09:12

## 2024-12-14 RX ADMIN — CIPROFLOXACIN 750 MG: 250 TABLET, COATED ORAL at 08:12

## 2024-12-14 RX ADMIN — FUROSEMIDE 40 MG: 40 TABLET ORAL at 09:12

## 2024-12-14 RX ADMIN — AMIODARONE HYDROCHLORIDE 200 MG: 200 TABLET ORAL at 09:12

## 2024-12-14 RX ADMIN — GABAPENTIN 100 MG: 100 CAPSULE ORAL at 09:12

## 2024-12-14 RX ADMIN — MORPHINE SULFATE 2 MG: 4 INJECTION, SOLUTION INTRAMUSCULAR; INTRAVENOUS at 06:12

## 2024-12-14 RX ADMIN — LACTULOSE 20 G: 10 SOLUTION ORAL at 09:12

## 2024-12-14 RX ADMIN — DOCUSATE SODIUM 100 MG: 100 CAPSULE, LIQUID FILLED ORAL at 09:12

## 2024-12-14 RX ADMIN — GLIMEPIRIDE 4 MG: 2 TABLET ORAL at 09:12

## 2024-12-14 RX ADMIN — FAMOTIDINE 40 MG: 20 TABLET, FILM COATED ORAL at 08:12

## 2024-12-14 RX ADMIN — PANCRELIPASE 2 CAPSULE: 120000; 24000; 76000 CAPSULE, DELAYED RELEASE PELLETS ORAL at 09:12

## 2024-12-14 RX ADMIN — CLOPIDOGREL BISULFATE 75 MG: 75 TABLET ORAL at 09:12

## 2024-12-14 RX ADMIN — PANCRELIPASE 2 CAPSULE: 120000; 24000; 76000 CAPSULE, DELAYED RELEASE PELLETS ORAL at 05:12

## 2024-12-14 RX ADMIN — METHOCARBAMOL 750 MG: 750 TABLET ORAL at 08:12

## 2024-12-14 RX ADMIN — INSULIN ASPART 3 UNITS: 100 INJECTION, SOLUTION INTRAVENOUS; SUBCUTANEOUS at 09:12

## 2024-12-14 RX ADMIN — CIPROFLOXACIN 750 MG: 250 TABLET, COATED ORAL at 09:12

## 2024-12-14 RX ADMIN — ATORVASTATIN CALCIUM 40 MG: 40 TABLET, FILM COATED ORAL at 08:12

## 2024-12-14 RX ADMIN — METHOCARBAMOL 750 MG: 750 TABLET ORAL at 03:12

## 2024-12-14 RX ADMIN — GLIMEPIRIDE 4 MG: 2 TABLET ORAL at 08:12

## 2024-12-14 RX ADMIN — PANCRELIPASE 2 CAPSULE: 120000; 24000; 76000 CAPSULE, DELAYED RELEASE PELLETS ORAL at 01:12

## 2024-12-14 RX ADMIN — FLUTICASONE PROPIONATE 100 MCG: 50 SPRAY, METERED NASAL at 08:12

## 2024-12-14 RX ADMIN — INSULIN ASPART 3 UNITS: 100 INJECTION, SOLUTION INTRAVENOUS; SUBCUTANEOUS at 01:12

## 2024-12-14 RX ADMIN — Medication 400 MG: at 09:12

## 2024-12-14 NOTE — PROGRESS NOTES
Hospitalist Progress      PATIENT NAME:Kortney Leach  DATE OF SERVICE:12/14/2024      Subjective/Hospital Course;    79 year old female with a past medical history of VHD s/p mosaic bioprosthetic MVR, CAD, PAF s/p SUKHI ligation, ABEL, COPD/asthma, HTN, DVT s/p IVC filter, SSS s/p PPM, HLD, DM2 who presented to [facility] on 11/23/2024 due to worsening SOB over the past month. She was noted to be in acute decompensated diastolic CHF. Cardiology was consulted and repeat echo revealed EF 60-65%, grade 1 diastolic dysfunction, mean pressure gradient across the mitral valve is 19 mmHg, pulmonary artery systolic pressure is 57 mmHg. MARYBETH 11/27/2024 revealed severe mitral valve stenosis with moderate regurgitation. LHC and RHC 11/27/2024 revealed 70% proximal LAD stenosis and bioprosthetic mitral valve stenosis.  She was evaluated by CV surgery who recommended to optimize CHF prior to intervention for mitral valve.  She subsequently received stenting/ballooning to proximal LAD on 11/29/2024.  Case was discussed between Dr. Lynn and Dr. Cervantes and decision was made to start Coumadin to see if gradient of mitral valve stenosis could be decreased.  She was started on Coumadin 5 mg daily on 11/02/2024 with a goal INR of 2-3.  Plan to continue triple therapy with aspirin, Plavix, Coumadin for 1 month and discontinue aspirin on 12/28/2024.  She will need to follow up echo in 4-6 weeks as well as a follow up with Dr. Watters in 4-6 weeks to discuss plan with mitral valve.  During this admission she was also treated for a Klebsiella pneumoniae and Proteus mirabilis UTI.  She was transferred to our [facility] for continued physical therapy.  Yesterday, 12/8, pt had significant shortness of breath although O2 sat within normal limits. Patient's oxygen was increased from 2 L nasal cannula to 3 L nasal cannula to make her more comfortable.  Patient does not require supplemental oxygen at baseline, but may require upon  discharge.      Repeat CXR 12/04/2024 revealed improved volume status.  Received an additional dose of Lasix 20 mg 12/05/2024.  INR elevated 12/05/2024 - holding coumadin      12/8: INR 3.2 - started Coumadin 1.25 last night.  Continues to complain of low back muscle spasms, increased Robaxin      12/9 Pt feels better today. Pt denies sob. Pt is eating well and endorses constipation. Last BM was 4 days ago.I discussed adding Miralax. Pt is ambulating very little and uses walker to get to bathroom with contact guard assist. Pt c/o bacl pain and feels like her spinal stimulator is not working. Pt was given coumadin 1.25 mg last night. INR today is 3.1. Will give coumadin 1.25 mg po tonight. Changed pain regimen. Decreased morphine iv to q8hr and norco to q 6hr.      12/10;  Patient seen today, no active complaint except for recurrence of low back pain for which she is currently on analgesics.  No worsening shortness of breadth.  No chest pain.  Tolerating physical therapy.  INR today is 3.3.  We will hold off on Coumadin today.  Repeat PT/INR in the morning.      12/11;  Patient is seen today, no active complaint.  Currently on 3 L of nasal cannula oxygen therapy.  Due to acute hypoxic respiratory failure.  Patient has been weaned off nasal cannula oxygen therapy.  She does have persistent leukocytosis.  Urinalysis indicates possible UTI.  Patient has been to be commenced on Omnicef today.    12/12  Leukocytosis worsening. Up to 15 today. Patient also with increased pain. Reports bowel movement yesterday. Denies N/V/F/C. Discussed increasing frequency of pain meds also along with adding adjuvant treatments. She was in agreement. Nursing at bedside     12/13  Leukocytosis improving. Pain a little better controlled however still present. Denies N/V/F/C. Will increase gabapentin today    12/14  C/O sinus infection. Pos for this on CT. Has much back pain but also c/o mental grogginess.  I am starting cipro for sinus  infection and Flonase. D/C cephalosporin. X ray lumbar spine.        ROS negative except for above    Scheduled Meds:   amiodarone  200 mg Oral Daily    aspirin  81 mg Oral Daily    atorvastatin  40 mg Oral Daily    cefdinir  300 mg Oral Q12H    cetirizine  10 mg Oral Daily    ciprofloxacin HCl  750 mg Oral Q12H    clopidogreL  75 mg Oral Daily    docusate sodium  100 mg Oral Daily    famotidine  40 mg Oral QHS    fluticasone propionate  2 spray Each Nostril BID    furosemide  40 mg Oral Daily    gabapentin  100 mg Oral BID    glimepiride  4 mg Oral BID    insulin aspart U-100  3 Units Subcutaneous TIDWM    insulin glargine U-100  5 Units Subcutaneous Daily    lactulose 10 gram/15 ml  20 g Oral BID    LIDOcaine  2 patch Transdermal Q24H    lipase-protease-amylase 24,000-76,000-120,000 units  2 capsule Oral TID WM    magnesium oxide  400 mg Oral Daily    methocarbamoL  750 mg Oral TID    metoprolol succinate  50 mg Oral Daily    pantoprazole  40 mg Oral QAM    warfarin  3 mg Oral Daily     Continuous Infusions:  PRN Meds:.  Current Facility-Administered Medications:     albuterol-ipratropium, 3 mL, Nebulization, Q4H PRN    ALPRAZolam, 0.25 mg, Oral, TID PRN    benzonatate, 100 mg, Oral, TID PRN    bisacodyL, 5 mg, Oral, Daily PRN    bisacodyL, 10 mg, Rectal, Daily PRN    calcium carbonate, 1,000 mg, Oral, TID PRN    dextrose 10%, 12.5 g, Intravenous, PRN    dextrose 10%, 25 g, Intravenous, PRN    diclofenac sodium, 2 g, Topical (Top), BID PRN    glucagon (human recombinant), 1 mg, Intramuscular, PRN    glucose, 16 g, Oral, PRN    glucose, 24 g, Oral, PRN    hydrALAZINE, 10 mg, Intravenous, Q4H PRN    HYDROcodone-acetaminophen, 1 tablet, Oral, Q4H PRN    insulin aspart U-100, 0-10 Units, Subcutaneous, QID (AC + HS) PRN    loperamide, 2 mg, Oral, Q4H PRN    melatonin, 6 mg, Oral, Nightly PRN    morphine, 2 mg, Intravenous, Q8H PRN    ondansetron, 4 mg, Intravenous, Q8H PRN    simethicone, 1 tablet, Oral, QID PRN     sodium chloride 0.9%, 10 mL, Intravenous, PRN    VITALS:  Temp:  [97.8 °F (36.6 °C)-99.6 °F (37.6 °C)] 99.3 °F (37.4 °C)  Pulse:  [] 87  Resp:  [17-20] 20  SpO2:  [93 %-99 %] 98 %  BP: (110-139)/(49-88) 139/59    Intake/Output Summary (Last 24 hours) at 12/14/2024 0830  Last data filed at 12/13/2024 1800  Gross per 24 hour   Intake 360 ml   Output --   Net 360 ml         On Exam;    General appearance - no acute distress, awake & alert   HEENT - head atraumatic, PERRL , scleral anicteric,mucous membrane moist  Neck- no jvd, carotid bruits, lymphadenopathy, thromegaly  Pulm-equal chest expansion, normal percussion note, clear to auscultation bilaterally, reduced breath sounds  Heart -  Regular rate and rhythm, normal S1, S2  Abdomen - soft, nontender, nondistended,no organomegaly, bowel sounds present.  Extremities - no edema, no cyanosis, no clubbing  Neurologic - Awake, alert and oriented x3,moves all extremities.  Musculoskeletal - normal ROM of major joints  Skin - no rash, normal skin turgor      LABORATORY:    CBC:   Lab Results   Component Value Date    WBC 13.26 (H) 12/13/2024    WBC 6.12 06/13/2006    RBC 3.32 (L) 12/13/2024    RBC 4.81 06/13/2006    HGB 9.9 (L) 12/13/2024    HGB 15.6 06/13/2006    HCT 32.2 (L) 12/13/2024    HCT 40 09/20/2023     (H) 12/13/2024     06/13/2006     CMP:   Lab Results   Component Value Date     12/13/2024     06/13/2006    K 4.1 12/13/2024    K 3.9 06/13/2006     12/13/2024     06/13/2006    CO2 28 12/13/2024    CO2 23 06/13/2006    BUN 30.2 (H) 12/13/2024    BUN 11 06/13/2006    CREATININE 0.90 12/13/2024    CREATININE 0.7 06/13/2006    CALCIUM 8.7 12/13/2024    CALCIUM 9.4 06/13/2006    ALKPHOS 94 12/13/2024    ALKPHOS 69 06/13/2006    AST 23 12/13/2024    AST 34 (H) 06/13/2006    ALT 13 12/13/2024    ALT 42 (H) 06/13/2006    ALBUMIN 2.4 (L) 12/13/2024    ALBUMIN 5.0 06/13/2006    BILITOT 0.5 12/13/2024    BILITOT 0.7 06/13/2006  "    Cardiac markers:   Lab Results   Component Value Date    .1 (H) 11/22/2024    BNP 20 11/09/2020       Radiology:  Micro:  No components found for: "BLOODCX", "SPUTUMCX", "URINECX"    Radiology:  [unfilled]          ASSESSMENT AND PLAN:  Acute hypoxemic respiratory failure ; on 3 L of O2, being weaned off  Acute on chronic decompensated HFpEF with pulmonary edema - resolved  Pulmonary hypertension  Severe mitral stenosis  Echo 11/23/2024 EF 60-65%, grade 1 diastolic dysfunction, mean pressure gradient across the mitral valve is 19 mmHg, pulmonary artery systolic pressure is 57 mmHg  MARYBETH 11/27/2024 revealed severe mitral valve stenosis with moderate regurgitation  LHC and RHC 11/27/2024 revealed 70% proximal LAD stenosis and bioprosthetic mitral valve stenosis - s/p stenting/ballooning to proximal LAD on 11/29/2024  CV surgery recommended to optimize CHF prior to intervention for mitral valve   Dr. Lynn and Dr. Cervantes recommended to start Coumadin 5mg QD with goal INR of 2-3 to see if gradient of mitral valve stenosis could be decreased  Continue triple therapy with aspirin, Plavix, Coumadin for 1 month and discontinue aspirin on 12/28/2024  Follow up echo in 4-6 weeks  Follow up with Dr. Watters in 4-6 weeks to discuss plan with mitral valve  Patient is still on nasal cannula oxygen therapy.being weaned off.     Severe mitral stenosis s/p mosaic bioprosthetic MVR  CAD s/p stenting/ballooning  PAF s/p SUKHI ligation  HTN, HLD  DVT s/p IVC filter  SSS s/p PPM  Continue amiodarone 200 mg daily, aspirin 81 mg daily (discontinue on 12/28/2024), statin, Plavix 75 mg daily, Lasix 40 mg daily, Toprol-XL 50 mg daily,   On low dose Warfarin for INR 2-3, we will  restart coumadin 3mg daily ,Daily PT INRs  Daily Mag-Ox 400 mg    Chronic pain   Increased frequesncy of opioid to Q4H PRN and added gabapentin 200mg PO TID on 12/12  Increased gabapentin to 300mg today 12/13  Will continue to re-evaluate      Hx of " COPD/asthma  ABLE   Continue nightly CPAP.  Does not require home oxygen at baseline  On bronchodilaor     Recent Klebsiella pneumoniae and Proteus mirabilis UTI - completed treatment     Type 2 diabetes mellitus;, fairly controlled.  Recent A1c 6.8% within the last month  Holding home metformin 500 mg b.i.d.   On Lantus 5 units daily 12/04/2024  Continue aspart 3 units TID with meals 12/05/2024  On  home glimepiride 4 mg b.i.d. 12/06/2024  Moderate dose SSI     Chronic sinusitis  Afrin and Mucinex x3 days starting 12/05/2024 followed by Flonase daily   Continue cetirizine  No indication for antibiotics at this time     Anxiety   Xanax prn      Leukocytosis; persistent, urinalysis indicates possible UTI, follow up the urine culture.  Commenced on empiric Omnicef.   - Leukocytosis improving so far     Obesity  Low-calorie diet advised     DVT prophylaxis:  Holding warfarin for now until INR <3     Code status: Full       Plans discussed in detail with patient and all questions answered. Discussed with RN caring for patient        Disposition-  For discharge home  when appropriate and stable      This note was completed using voice recognition software and transcription errors may occur.      Evaluation of the Patient was done via Telemedicine along side the nursing staff.     Location of the Provider; [place], [place]  Patient location: [place]           Signed by   Carla Alex MD  12/14/2024,   1:27 PM

## 2024-12-14 NOTE — PLAN OF CARE
Problem: Adult Inpatient Plan of Care  Goal: Plan of Care Review  12/14/2024 1536 by Yessi Hammonds RN  Outcome: Progressing  12/14/2024 1536 by Yessi Hammonds RN  Outcome: Progressing  Goal: Patient-Specific Goal (Individualized)  12/14/2024 1536 by Yessi Hammonds RN  Outcome: Progressing  12/14/2024 1536 by Yessi Hammonds RN  Outcome: Progressing  Goal: Absence of Hospital-Acquired Illness or Injury  12/14/2024 1536 by Yessi Hammonds RN  Outcome: Progressing  12/14/2024 1536 by Yessi Hammonds RN  Outcome: Progressing  Intervention: Identify and Manage Fall Risk  Flowsheets (Taken 12/14/2024 1536)  Safety Promotion/Fall Prevention:   assistive device/personal item within reach   bed alarm set   Fall Risk reviewed with patient/family   Fall Risk signage in place   family expresses understanding of fall risk and prevention   family to remain at bedside   gait belt with ambulation   instructed to call staff for mobility   nonskid shoes/socks when out of bed   patient expresses understanding of fall risk and prevention   room near unit station   side rails raised x 3  Intervention: Prevent Skin Injury  Flowsheets (Taken 12/14/2024 1536)  Body Position:   position changed independently   upper extremity elevated   weight shifting  Skin Protection:   incontinence pads utilized   skin sealant/moisture barrier applied  Device Skin Pressure Protection:   absorbent pad utilized/changed   tubing/devices free from skin contact  Intervention: Prevent and Manage VTE (Venous Thromboembolism) Risk  Flowsheets (Taken 12/14/2024 1536)  VTE Prevention/Management:   ambulation promoted   fluids promoted   ROM (active) performed   ROM (passive) performed  Intervention: Prevent Infection  Flowsheets (Taken 12/14/2024 1536)  Infection Prevention:   environmental surveillance performed   hand hygiene promoted   single patient room provided  Goal: Optimal Comfort and Wellbeing  12/14/2024 1536 by Yessi Hammonds RN  Outcome:  Progressing  12/14/2024 1536 by Yessi Hammonds RN  Outcome: Progressing  Intervention: Monitor Pain and Promote Comfort  Flowsheets (Taken 12/14/2024 1536)  Pain Management Interventions:   pain management plan reviewed with patient/caregiver   pillow support provided   relaxation techniques promoted  Intervention: Provide Person-Centered Care  Flowsheets (Taken 12/14/2024 1536)  Trust Relationship/Rapport:   care explained   choices provided   emotional support provided   empathic listening provided   questions answered   questions encouraged   reassurance provided   thoughts/feelings acknowledged  Goal: Readiness for Transition of Care  12/14/2024 1536 by Yessi Hammonds RN  Outcome: Progressing  12/14/2024 1536 by Yessi Hammonds RN  Outcome: Progressing  Intervention: Mutually Develop Transition Plan  Flowsheets (Taken 12/14/2024 1536)  Equipment Currently Used at Home:   CPAP   nebulizer   walker, rolling  Transportation Anticipated:   car, drives self   family or friend will provide  Who are your caregiver(s) and their phone number(s)?: matt Howard, [phone]  Communicated SD with patient/caregiver: Date not available/Unable to determine  Do you expect to return to your current living situation?: Yes  Do you have help at home or someone to help you manage your care at home?: Yes  Do you currently have service(s) that help you manage your care at home?: No  Is the pt/caregiver preference to resume services with current agency: No     Problem: Diabetes Comorbidity  Goal: Blood Glucose Level Within Targeted Range  12/14/2024 1536 by Yessi Hammonds, RN  Outcome: Progressing  12/14/2024 1536 by Yessi Hammonds, RN  Outcome: Progressing  Intervention: Monitor and Manage Glycemia  Flowsheets (Taken 12/14/2024 1536)  Glycemic Management: blood glucose monitored     Problem: Wound  Goal: Optimal Coping  12/14/2024 1536 by Yessi Hammonds, RN  Outcome: Progressing  12/14/2024 1536 by Yessi Hammonds,  RN  Outcome: Progressing  Intervention: Support Patient and Family Response  Flowsheets (Taken 12/14/2024 1536)  Supportive Measures:   relaxation techniques promoted   self-care encouraged   self-reflection promoted   self-responsibility promoted   verbalization of feelings encouraged  Family/Support System Care:   involvement promoted   presence promoted   self-care encouraged   support provided  Goal: Optimal Functional Ability  12/14/2024 1536 by Yessi Hammonds RN  Outcome: Progressing  12/14/2024 1536 by Yessi Hammonds RN  Outcome: Progressing  Intervention: Optimize Functional Ability  Flowsheets (Taken 12/14/2024 1536)  Assistive Device Utilized:   gait belt   walker  Activity Management:   Rolling - L1   Walk with assistive devise and /or staff member - L3  Activity Assistance Provided: assistance, 1 person  Goal: Absence of Infection Signs and Symptoms  12/14/2024 1536 by Yessi Hammonds RN  Outcome: Progressing  12/14/2024 1536 by Yessi Hammonds RN  Outcome: Progressing  Intervention: Prevent or Manage Infection  Flowsheets (Taken 12/14/2024 1536)  Fever Reduction/Comfort Measures:   lightweight bedding   lightweight clothing  Infection Management: aseptic technique maintained  Isolation Precautions:   precautions maintained   protective  Goal: Improved Oral Intake  12/14/2024 1536 by Yessi Hammonds RN  Outcome: Progressing  12/14/2024 1536 by Yessi Hammonds RN  Outcome: Progressing  Intervention: Promote and Optimize Oral Intake  Flowsheets (Taken 12/14/2024 1536)  Nutrition Interventions:   frequent small meals provided   meal set-up provided   meals from home/family encouraged  Goal: Optimal Pain Control and Function  12/14/2024 1536 by Yessi Hammonds RN  Outcome: Progressing  12/14/2024 1536 by Yessi Hammonds RN  Outcome: Progressing  Intervention: Prevent or Manage Pain  Flowsheets (Taken 12/14/2024 1536)  Sleep/Rest Enhancement: relaxation techniques promoted  Pain Management Interventions:    pain management plan reviewed with patient/caregiver   pillow support provided   relaxation techniques promoted  Goal: Skin Health and Integrity  12/14/2024 1536 by Yessi Hammonds RN  Outcome: Progressing  12/14/2024 1536 by Yessi Hammonds RN  Outcome: Progressing  Goal: Optimal Wound Healing  12/14/2024 1536 by Yessi Hammonds RN  Outcome: Progressing  12/14/2024 1536 by Yessi Hammonds RN  Outcome: Progressing     Problem: Fall Injury Risk  Goal: Absence of Fall and Fall-Related Injury  12/14/2024 1536 by Yessi Hammonds RN  Outcome: Progressing  12/14/2024 1536 by Yessi Hammonds RN  Outcome: Progressing  Intervention: Identify and Manage Contributors  Flowsheets (Taken 12/14/2024 1536)  Self-Care Promotion:   independence encouraged   BADL personal objects within reach   BADL personal routines maintained   meal set-up provided   adaptive equipment use encouraged  Medication Review/Management: medications reviewed  Intervention: Promote Injury-Free Environment  Flowsheets (Taken 12/14/2024 1536)  Safety Promotion/Fall Prevention:   assistive device/personal item within reach   bed alarm set   Fall Risk reviewed with patient/family   Fall Risk signage in place   family expresses understanding of fall risk and prevention   family to remain at bedside   gait belt with ambulation   instructed to call staff for mobility   nonskid shoes/socks when out of bed   patient expresses understanding of fall risk and prevention   room near unit station   side rails raised x 3

## 2024-12-14 NOTE — PLAN OF CARE
Problem: Adult Inpatient Plan of Care  Goal: Plan of Care Review  12/13/2024 1936 by Kati Santiago RN  Outcome: Progressing  12/13/2024 1316 by Kati Santiago RN  Outcome: Progressing  Flowsheets (Taken 12/13/2024 0800)  Plan of Care Reviewed With: patient  Goal: Patient-Specific Goal (Individualized)  12/13/2024 1936 by Kati Santiago RN  Outcome: Progressing  12/13/2024 1316 by Kati Santiago RN  Outcome: Progressing  Flowsheets (Taken 12/13/2024 0800)  Individualized Care Needs: Pain management, bowel program management, PT/OT, monitor O2 saturation  Anxieties, Fears or Concerns: Back pain  Patient/Family-Specific Goals (Include Timeframe): Return to baseline by SD  Goal: Absence of Hospital-Acquired Illness or Injury  12/13/2024 1936 by Kati Santiago RN  Outcome: Progressing  12/13/2024 1316 by Kati Santiago RN  Outcome: Progressing  Goal: Optimal Comfort and Wellbeing  12/13/2024 1936 by Kati Santiago RN  Outcome: Progressing  12/13/2024 1316 by Kati Santiago RN  Outcome: Progressing  Goal: Readiness for Transition of Care  12/13/2024 1936 by Kati Santiago RN  Outcome: Progressing  12/13/2024 1316 by Kati Santiago RN  Outcome: Progressing

## 2024-12-14 NOTE — PROGRESS NOTES
Unable to treat pt at this time d/t pt c/o of pain and seeming tearful. Nursing aware. Will f/u with POC on Monday.

## 2024-12-15 ENCOUNTER — HOSPITAL ENCOUNTER (INPATIENT)
Facility: HOSPITAL | Age: 79
LOS: 13 days | Discharge: HOSPICE | DRG: 551 | End: 2024-12-28
Payer: MEDICARE

## 2024-12-15 VITALS
SYSTOLIC BLOOD PRESSURE: 102 MMHG | WEIGHT: 218.25 LBS | RESPIRATION RATE: 18 BRPM | TEMPERATURE: 98 F | DIASTOLIC BLOOD PRESSURE: 67 MMHG | HEART RATE: 68 BPM | OXYGEN SATURATION: 96 % | BODY MASS INDEX: 35.08 KG/M2 | HEIGHT: 66 IN

## 2024-12-15 DIAGNOSIS — D83.9 COMMON VARIABLE IMMUNODEFICIENCY: ICD-10-CM

## 2024-12-15 DIAGNOSIS — A49.8 PSEUDOMONAS AERUGINOSA INFECTION: ICD-10-CM

## 2024-12-15 DIAGNOSIS — D72.829 LEUKOCYTOSIS, UNSPECIFIED TYPE: ICD-10-CM

## 2024-12-15 DIAGNOSIS — Z16.30 DRUG RESISTANCE: ICD-10-CM

## 2024-12-15 DIAGNOSIS — I49.9 ABNORMAL HEART RHYTHM: ICD-10-CM

## 2024-12-15 DIAGNOSIS — I48.91 A-FIB: ICD-10-CM

## 2024-12-15 DIAGNOSIS — J96.91 HYPOXIC RESPIRATORY FAILURE: ICD-10-CM

## 2024-12-15 DIAGNOSIS — R94.31 EKG ABNORMALITIES: ICD-10-CM

## 2024-12-15 DIAGNOSIS — K92.1 MELENA: Primary | ICD-10-CM

## 2024-12-15 DIAGNOSIS — I25.10 CAD (CORONARY ARTERY DISEASE): ICD-10-CM

## 2024-12-15 DIAGNOSIS — M54.9 BACK PAIN: ICD-10-CM

## 2024-12-15 DIAGNOSIS — R06.02 SHORTNESS OF BREATH: ICD-10-CM

## 2024-12-15 DIAGNOSIS — I38 VALVULAR HEART DISEASE: ICD-10-CM

## 2024-12-15 DIAGNOSIS — R94.31 EKG ABNORMALITY: ICD-10-CM

## 2024-12-15 LAB
ALBUMIN SERPL-MCNC: 2.5 G/DL (ref 3.4–4.8)
ALBUMIN/GLOB SERPL: 0.7 RATIO (ref 1.1–2)
ALP SERPL-CCNC: 98 UNIT/L (ref 40–150)
ALT SERPL-CCNC: 15 UNIT/L (ref 0–55)
ANION GAP SERPL CALC-SCNC: 9 MEQ/L
AST SERPL-CCNC: 30 UNIT/L (ref 5–34)
BASOPHILS # BLD AUTO: 0.07 X10(3)/MCL
BASOPHILS NFR BLD AUTO: 0.5 %
BILIRUB SERPL-MCNC: 0.6 MG/DL
BUN SERPL-MCNC: 20.2 MG/DL (ref 9.8–20.1)
CALCIUM SERPL-MCNC: 8.4 MG/DL (ref 8.4–10.2)
CHLORIDE SERPL-SCNC: 101 MMOL/L (ref 98–107)
CO2 SERPL-SCNC: 28 MMOL/L (ref 23–31)
CREAT SERPL-MCNC: 0.73 MG/DL (ref 0.55–1.02)
CREAT/UREA NIT SERPL: 28
EOSINOPHIL # BLD AUTO: 0.3 X10(3)/MCL (ref 0–0.9)
EOSINOPHIL NFR BLD AUTO: 2 %
ERYTHROCYTE [DISTWIDTH] IN BLOOD BY AUTOMATED COUNT: 17.9 % (ref 11.5–17)
GFR SERPLBLD CREATININE-BSD FMLA CKD-EPI: >60 ML/MIN/1.73/M2
GLOBULIN SER-MCNC: 3.6 GM/DL (ref 2.4–3.5)
GLUCOSE SERPL-MCNC: 123 MG/DL (ref 82–115)
HCT VFR BLD AUTO: 30 % (ref 37–47)
HGB BLD-MCNC: 9.6 G/DL (ref 12–16)
IMM GRANULOCYTES # BLD AUTO: 0.23 X10(3)/MCL (ref 0–0.04)
IMM GRANULOCYTES NFR BLD AUTO: 1.6 %
INR PPP: 2.7
LYMPHOCYTES # BLD AUTO: 2.54 X10(3)/MCL (ref 0.6–4.6)
LYMPHOCYTES NFR BLD AUTO: 17.3 %
MCH RBC QN AUTO: 30.3 PG (ref 27–31)
MCHC RBC AUTO-ENTMCNC: 32 G/DL (ref 33–36)
MCV RBC AUTO: 94.6 FL (ref 80–94)
MONOCYTES # BLD AUTO: 1.26 X10(3)/MCL (ref 0.1–1.3)
MONOCYTES NFR BLD AUTO: 8.6 %
NEUTROPHILS # BLD AUTO: 10.27 X10(3)/MCL (ref 2.1–9.2)
NEUTROPHILS NFR BLD AUTO: 70 %
NRBC BLD AUTO-RTO: 2.2 %
PLATELET # BLD AUTO: 410 X10(3)/MCL (ref 130–400)
PMV BLD AUTO: 9 FL (ref 7.4–10.4)
POCT GLUCOSE: 122 MG/DL (ref 70–110)
POCT GLUCOSE: 144 MG/DL (ref 70–110)
POCT GLUCOSE: 152 MG/DL (ref 70–110)
POTASSIUM SERPL-SCNC: 4.3 MMOL/L (ref 3.5–5.1)
PROT SERPL-MCNC: 6.1 GM/DL (ref 5.8–7.6)
PROTHROMBIN TIME: 26.1 SECONDS (ref 12.5–14.5)
RBC # BLD AUTO: 3.17 X10(6)/MCL (ref 4.2–5.4)
SODIUM SERPL-SCNC: 138 MMOL/L (ref 136–145)
WBC # BLD AUTO: 14.67 X10(3)/MCL (ref 4.5–11.5)

## 2024-12-15 PROCEDURE — 25000003 PHARM REV CODE 250: Performed by: STUDENT IN AN ORGANIZED HEALTH CARE EDUCATION/TRAINING PROGRAM

## 2024-12-15 PROCEDURE — 25000003 PHARM REV CODE 250: Performed by: INTERNAL MEDICINE

## 2024-12-15 PROCEDURE — 85025 COMPLETE CBC W/AUTO DIFF WBC: CPT

## 2024-12-15 PROCEDURE — 27000221 HC OXYGEN, UP TO 24 HOURS

## 2024-12-15 PROCEDURE — 63600175 PHARM REV CODE 636 W HCPCS: Performed by: HOSPITALIST

## 2024-12-15 PROCEDURE — 25000003 PHARM REV CODE 250

## 2024-12-15 PROCEDURE — 36415 COLL VENOUS BLD VENIPUNCTURE: CPT | Performed by: STUDENT IN AN ORGANIZED HEALTH CARE EDUCATION/TRAINING PROGRAM

## 2024-12-15 PROCEDURE — 85610 PROTHROMBIN TIME: CPT | Performed by: STUDENT IN AN ORGANIZED HEALTH CARE EDUCATION/TRAINING PROGRAM

## 2024-12-15 PROCEDURE — 63600175 PHARM REV CODE 636 W HCPCS: Performed by: STUDENT IN AN ORGANIZED HEALTH CARE EDUCATION/TRAINING PROGRAM

## 2024-12-15 PROCEDURE — 99900035 HC TECH TIME PER 15 MIN (STAT)

## 2024-12-15 PROCEDURE — 11000001 HC ACUTE MED/SURG PRIVATE ROOM

## 2024-12-15 PROCEDURE — 21400001 HC TELEMETRY ROOM

## 2024-12-15 PROCEDURE — 25000003 PHARM REV CODE 250: Performed by: PHYSICIAN ASSISTANT

## 2024-12-15 PROCEDURE — 36415 COLL VENOUS BLD VENIPUNCTURE: CPT

## 2024-12-15 PROCEDURE — 5A09357 ASSISTANCE WITH RESPIRATORY VENTILATION, LESS THAN 24 CONSECUTIVE HOURS, CONTINUOUS POSITIVE AIRWAY PRESSURE: ICD-10-PCS

## 2024-12-15 PROCEDURE — 80053 COMPREHEN METABOLIC PANEL: CPT

## 2024-12-15 RX ORDER — INSULIN ASPART 100 [IU]/ML
1-10 INJECTION, SOLUTION INTRAVENOUS; SUBCUTANEOUS
Status: DISCONTINUED | OUTPATIENT
Start: 2024-12-15 | End: 2024-12-23

## 2024-12-15 RX ORDER — ACETAMINOPHEN 325 MG/1
650 TABLET ORAL EVERY 8 HOURS PRN
Status: DISCONTINUED | OUTPATIENT
Start: 2024-12-15 | End: 2024-12-15

## 2024-12-15 RX ORDER — BACLOFEN 5 MG/1
10 TABLET ORAL 3 TIMES DAILY PRN
Status: DISCONTINUED | OUTPATIENT
Start: 2024-12-15 | End: 2024-12-15 | Stop reason: HOSPADM

## 2024-12-15 RX ORDER — ACETAMINOPHEN 325 MG/1
650 TABLET ORAL EVERY 4 HOURS PRN
Status: DISCONTINUED | OUTPATIENT
Start: 2024-12-15 | End: 2024-12-15

## 2024-12-15 RX ORDER — LIDOCAINE 50 MG/G
1 PATCH TOPICAL
Status: DISCONTINUED | OUTPATIENT
Start: 2024-12-15 | End: 2024-12-27

## 2024-12-15 RX ORDER — OXYCODONE HYDROCHLORIDE 5 MG/1
5 TABLET ORAL EVERY 6 HOURS PRN
Status: DISCONTINUED | OUTPATIENT
Start: 2024-12-15 | End: 2024-12-27

## 2024-12-15 RX ORDER — ASPIRIN 81 MG/1
81 TABLET ORAL DAILY
Status: DISCONTINUED | OUTPATIENT
Start: 2024-12-16 | End: 2024-12-16

## 2024-12-15 RX ORDER — POLYETHYLENE GLYCOL 3350 17 G/17G
17 POWDER, FOR SOLUTION ORAL DAILY
Status: DISCONTINUED | OUTPATIENT
Start: 2024-12-15 | End: 2024-12-16

## 2024-12-15 RX ORDER — WARFARIN SODIUM 5 MG/1
5 TABLET ORAL DAILY
Status: DISCONTINUED | OUTPATIENT
Start: 2024-12-15 | End: 2024-12-17

## 2024-12-15 RX ORDER — FUROSEMIDE 40 MG/1
40 TABLET ORAL DAILY
Status: DISCONTINUED | OUTPATIENT
Start: 2024-12-16 | End: 2024-12-17

## 2024-12-15 RX ORDER — ATORVASTATIN CALCIUM 40 MG/1
40 TABLET, FILM COATED ORAL NIGHTLY
Status: DISCONTINUED | OUTPATIENT
Start: 2024-12-15 | End: 2024-12-24

## 2024-12-15 RX ORDER — GLUCAGON 1 MG
1 KIT INJECTION
Status: DISCONTINUED | OUTPATIENT
Start: 2024-12-15 | End: 2024-12-27

## 2024-12-15 RX ORDER — CLOPIDOGREL BISULFATE 75 MG/1
75 TABLET ORAL DAILY
Status: DISCONTINUED | OUTPATIENT
Start: 2024-12-16 | End: 2024-12-23

## 2024-12-15 RX ORDER — TIMOLOL MALEATE 5 MG/ML
1 SOLUTION/ DROPS OPHTHALMIC 2 TIMES DAILY
Status: DISCONTINUED | OUTPATIENT
Start: 2024-12-15 | End: 2024-12-27

## 2024-12-15 RX ORDER — ACETAMINOPHEN 500 MG
1000 TABLET ORAL EVERY 8 HOURS PRN
Status: DISCONTINUED | OUTPATIENT
Start: 2024-12-15 | End: 2024-12-29 | Stop reason: HOSPADM

## 2024-12-15 RX ORDER — ONDANSETRON HYDROCHLORIDE 2 MG/ML
4 INJECTION, SOLUTION INTRAVENOUS EVERY 8 HOURS PRN
Status: DISCONTINUED | OUTPATIENT
Start: 2024-12-15 | End: 2024-12-29 | Stop reason: HOSPADM

## 2024-12-15 RX ORDER — SENNOSIDES 8.6 MG/1
8.6 TABLET ORAL DAILY PRN
Status: DISCONTINUED | OUTPATIENT
Start: 2024-12-15 | End: 2024-12-27

## 2024-12-15 RX ORDER — PANTOPRAZOLE SODIUM 40 MG/1
40 TABLET, DELAYED RELEASE ORAL EVERY MORNING
Status: DISCONTINUED | OUTPATIENT
Start: 2024-12-16 | End: 2024-12-20

## 2024-12-15 RX ORDER — AMIODARONE HYDROCHLORIDE 200 MG/1
200 TABLET ORAL DAILY
Status: DISCONTINUED | OUTPATIENT
Start: 2024-12-16 | End: 2024-12-24

## 2024-12-15 RX ORDER — IBUPROFEN 200 MG
24 TABLET ORAL
Status: DISCONTINUED | OUTPATIENT
Start: 2024-12-15 | End: 2024-12-27

## 2024-12-15 RX ORDER — FAMOTIDINE 20 MG/1
40 TABLET, FILM COATED ORAL NIGHTLY
Status: DISCONTINUED | OUTPATIENT
Start: 2024-12-15 | End: 2024-12-21

## 2024-12-15 RX ORDER — OXYCODONE HYDROCHLORIDE 10 MG/1
10 TABLET ORAL EVERY 6 HOURS PRN
Status: DISCONTINUED | OUTPATIENT
Start: 2024-12-15 | End: 2024-12-29 | Stop reason: HOSPADM

## 2024-12-15 RX ORDER — NIFEDIPINE 30 MG/1
30 TABLET, EXTENDED RELEASE ORAL DAILY
Status: DISCONTINUED | OUTPATIENT
Start: 2024-12-16 | End: 2024-12-27

## 2024-12-15 RX ORDER — METOPROLOL SUCCINATE 50 MG/1
50 TABLET, EXTENDED RELEASE ORAL DAILY
Status: DISCONTINUED | OUTPATIENT
Start: 2024-12-16 | End: 2024-12-20

## 2024-12-15 RX ORDER — IPRATROPIUM BROMIDE AND ALBUTEROL SULFATE 2.5; .5 MG/3ML; MG/3ML
3 SOLUTION RESPIRATORY (INHALATION) EVERY 4 HOURS PRN
Status: DISCONTINUED | OUTPATIENT
Start: 2024-12-15 | End: 2024-12-27

## 2024-12-15 RX ORDER — IBUPROFEN 200 MG
16 TABLET ORAL
Status: DISCONTINUED | OUTPATIENT
Start: 2024-12-15 | End: 2024-12-27

## 2024-12-15 RX ADMIN — Medication 400 MG: at 08:12

## 2024-12-15 RX ADMIN — METHOCARBAMOL 750 MG: 750 TABLET ORAL at 08:12

## 2024-12-15 RX ADMIN — FUROSEMIDE 40 MG: 40 TABLET ORAL at 08:12

## 2024-12-15 RX ADMIN — LIDOCAINE 5% 2 PATCH: 700 PATCH TOPICAL at 11:12

## 2024-12-15 RX ADMIN — LIDOCAINE 5% 1 PATCH: 700 PATCH TOPICAL at 05:12

## 2024-12-15 RX ADMIN — INSULIN ASPART 3 UNITS: 100 INJECTION, SOLUTION INTRAVENOUS; SUBCUTANEOUS at 11:12

## 2024-12-15 RX ADMIN — AMIODARONE HYDROCHLORIDE 200 MG: 200 TABLET ORAL at 08:12

## 2024-12-15 RX ADMIN — INSULIN GLARGINE 5 UNITS: 100 INJECTION, SOLUTION SUBCUTANEOUS at 08:12

## 2024-12-15 RX ADMIN — WARFARIN SODIUM 5 MG: 5 TABLET ORAL at 05:12

## 2024-12-15 RX ADMIN — GLIMEPIRIDE 4 MG: 2 TABLET ORAL at 08:12

## 2024-12-15 RX ADMIN — ATORVASTATIN CALCIUM 40 MG: 40 TABLET, FILM COATED ORAL at 09:12

## 2024-12-15 RX ADMIN — GABAPENTIN 100 MG: 100 CAPSULE ORAL at 08:12

## 2024-12-15 RX ADMIN — CIPROFLOXACIN 750 MG: 250 TABLET, COATED ORAL at 08:12

## 2024-12-15 RX ADMIN — BACLOFEN 10 MG: 5 TABLET ORAL at 11:12

## 2024-12-15 RX ADMIN — FLUTICASONE PROPIONATE 100 MCG: 50 SPRAY, METERED NASAL at 09:12

## 2024-12-15 RX ADMIN — OXYCODONE HYDROCHLORIDE 5 MG: 5 TABLET ORAL at 10:12

## 2024-12-15 RX ADMIN — ASPIRIN 81 MG: 81 TABLET, COATED ORAL at 08:12

## 2024-12-15 RX ADMIN — METOPROLOL SUCCINATE 50 MG: 50 TABLET, EXTENDED RELEASE ORAL at 08:12

## 2024-12-15 RX ADMIN — HYDROCODONE BITARTRATE AND ACETAMINOPHEN 1 TABLET: 5; 325 TABLET ORAL at 08:12

## 2024-12-15 RX ADMIN — PANCRELIPASE 1 CAPSULE: 120000; 24000; 76000 CAPSULE, DELAYED RELEASE PELLETS ORAL at 05:12

## 2024-12-15 RX ADMIN — TIMOLOL MALEATE 1 DROP: 5 SOLUTION OPHTHALMIC at 10:12

## 2024-12-15 RX ADMIN — MORPHINE SULFATE 2 MG: 4 INJECTION, SOLUTION INTRAMUSCULAR; INTRAVENOUS at 01:12

## 2024-12-15 RX ADMIN — PANCRELIPASE 2 CAPSULE: 120000; 24000; 76000 CAPSULE, DELAYED RELEASE PELLETS ORAL at 08:12

## 2024-12-15 RX ADMIN — POLYETHYLENE GLYCOL 3350 17 G: 17 POWDER, FOR SOLUTION ORAL at 05:12

## 2024-12-15 RX ADMIN — CEFDINIR 300 MG: 300 CAPSULE ORAL at 08:12

## 2024-12-15 RX ADMIN — DOCUSATE SODIUM 100 MG: 100 CAPSULE, LIQUID FILLED ORAL at 08:12

## 2024-12-15 RX ADMIN — CLOPIDOGREL BISULFATE 75 MG: 75 TABLET ORAL at 08:12

## 2024-12-15 RX ADMIN — FAMOTIDINE 40 MG: 20 TABLET, FILM COATED ORAL at 09:12

## 2024-12-15 RX ADMIN — PANCRELIPASE 2 CAPSULE: 120000; 24000; 76000 CAPSULE, DELAYED RELEASE PELLETS ORAL at 11:12

## 2024-12-15 RX ADMIN — CETIRIZINE HYDROCHLORIDE 10 MG: 10 TABLET, FILM COATED ORAL at 08:12

## 2024-12-15 RX ADMIN — PANTOPRAZOLE SODIUM 40 MG: 40 TABLET, DELAYED RELEASE ORAL at 06:12

## 2024-12-15 RX ADMIN — LACTULOSE 20 G: 10 SOLUTION ORAL at 08:12

## 2024-12-15 NOTE — DISCHARGE SUMMARY
[facility]  HOSPITAL MEDICINE - DISCHARGE SUMMARY    Patient Name: Kortney Leach  MRN: [id number]  Admission Date: 12/3/2024  Discharge Date: 12/15/2024  Hospital Length of Stay: 12 days  Discharge Provider: Carla Alex MD  Primary Care Provider: Arnaldo Hernandez MD      HOSPITAL COURSE       79 year old female with a past medical history of VHD s/p mosaic bioprosthetic MVR, CAD, PAF s/p SUKHI ligation, ABEL, COPD/asthma, HTN, DVT s/p IVC filter, SSS s/p PPM, HLD, DM2 who presented to [facility] on 11/23/2024 due to worsening SOB over the past month. She was noted to be in acute decompensated diastolic CHF. Cardiology was consulted and repeat echo revealed EF 60-65%, grade 1 diastolic dysfunction, mean pressure gradient across the mitral valve is 19 mmHg, pulmonary artery systolic pressure is 57 mmHg. MARYBETH 11/27/2024 revealed severe mitral valve stenosis with moderate regurgitation. LHC and RHC 11/27/2024 revealed 70% proximal LAD stenosis and bioprosthetic mitral valve stenosis.  She was evaluated by CV surgery who recommended to optimize CHF prior to intervention for mitral valve.  She subsequently received stenting/ballooning to proximal LAD on 11/29/2024.  Case was discussed between Dr. Lynn and Dr. Cervantes and decision was made to start Coumadin to see if gradient of mitral valve stenosis could be decreased.  She was started on Coumadin 5 mg daily on 11/02/2024 with a goal INR of 2-3.  Plan to continue triple therapy with aspirin, Plavix, Coumadin for 1 month and discontinue aspirin on 12/28/2024.  She will need to follow up echo in 4-6 weeks as well as a follow up with Dr. Watters in 4-6 weeks to discuss plan with mitral valve.  During this admission she was also treated for a Klebsiella pneumoniae and Proteus mirabilis UTI.  She was transferred to our [facility] for continued physical therapy.  Yesterday, 12/8, pt had significant shortness of breath although O2 sat  within normal limits. Patient's oxygen was increased from 2 L nasal cannula to 3 L nasal cannula to make her more comfortable.  Patient does not require supplemental oxygen at baseline, but may require upon discharge.      Repeat CXR 12/04/2024 revealed improved volume status.  Received an additional dose of Lasix 20 mg 12/05/2024.  INR elevated 12/05/2024 - holding coumadin      12/8: INR 3.2 - started Coumadin 1.25 last night.  Continues to complain of low back muscle spasms, increased Robaxin      12/9 Pt feels better today. Pt denies sob. Pt is eating well and endorses constipation. Last BM was 4 days ago.I discussed adding Miralax. Pt is ambulating very little and uses walker to get to bathroom with contact guard assist. Pt c/o bacl pain and feels like her spinal stimulator is not working. Pt was given coumadin 1.25 mg last night. INR today is 3.1. Will give coumadin 1.25 mg po tonight. Changed pain regimen. Decreased morphine iv to q8hr and norco to q 6hr.      12/10;  Patient seen today, no active complaint except for recurrence of low back pain for which she is currently on analgesics.  No worsening shortness of breadth.  No chest pain.  Tolerating physical therapy.  INR today is 3.3.  We will hold off on Coumadin today.  Repeat PT/INR in the morning.       12/11;  Patient is seen today, no active complaint.  Currently on 3 L of nasal cannula oxygen therapy.  Due to acute hypoxic respiratory failure.  Patient has been weaned off nasal cannula oxygen therapy.  She does have persistent leukocytosis.  Urinalysis indicates possible UTI.  Patient has been to be commenced on Omnicef today.     12/12  Leukocytosis worsening. Up to 15 today. Patient also with increased pain. Reports bowel movement yesterday. Denies N/V/F/C. Discussed increasing frequency of pain meds also along with adding adjuvant treatments. She was in agreement. Nursing at bedside      12/13  Leukocytosis improving. Pain a little better  controlled however still present. Denies N/V/F/C. Will increase gabapentin today     12/14  C/O sinus infection. Pos for this on CT head. Has much back pain but also c/o mental grogginess.  I am starting cipro for sinus infection and Flonase. CT lumbar spine.     12/15  Daughter in room and we had a long discussion about her mom and the pain that is preventing her from even moving in bed much less participating in therapy.  Patient actively had a muscle spasm during exam.  I did order a CT L spine yesterday which is very abnormal and likely the cause of her pain. I did add baclofen prn. I did discuss with daughter and patient that I will reach out to neurosurgery via an e consult and see what they recommend.  At this point I am at an impasse and cannot really help the patient and my facility.  On another note INR is 2.7 will resume coumadin tonight.   CT Lumbar  revealed:   At L1-L2, there is bulging of annulus fibrosis and facet arthropathy which results in mild central canal stenosis.  There are no narrowings of the neural foramen.     At L2-L3, there is a generalized disc bulge, ligamentum flavum thickening and facet arthropathy contributing to cause mild central canal stenosis.  There is mild narrowing of the right neural foramen.  The left neural foramen is patent.                                                     At L3-L4, there is broad osteophyte disc complex, ligamentum flavum thickening and facet arthropathy.  In conjunction, these findings cause mild to moderate central canal stenosis.  Right neural foramen is patent.  There is mild narrowing of the left neural foramen.        At L4-L5, there is broad disc protrusion which lateralizes into the left neural foramen.  There is ligamentum flavum thickening and facet arthropathy which is more pronounced on the left.  Thecal sac is deviated towards the right.  Central canal stenosis is moderate.  There is mild narrowing of right neural foramen.  There is  moderate narrowing of the left neural foramen caused by lateralized portion of the disc and spondylosis.     At L5-S1, there is mild osteophyte disc complex and facet arthropathy.  Ventral thecal sac is slightly flattened without significant central canal stenosis.  Right neural foramen is patent.  There is mild narrowing of the left neural foramen caused by uncovertebral and facet arthropathy.        Spoke with hospital medicine who will accept patient for neurosugery consultation as recommended by neurosurgery at the transfer center in [place]    PHYSICAL EXAM     Most Recent Vital Signs:  Temp: 98.4 °F (36.9 °C) (12/15/24 1140)  Pulse: 68 (12/15/24 1140)  Resp: 18 (12/15/24 0844)  BP: 102/67 (12/15/24 1140)  SpO2: 96 % (12/15/24 1140)   GENERAL: In no acute distress, afebrile  HEENT:  CHEST: Clear to auscultation bilaterally  HEART: S1, S2, no appreciable murmur  ABDOMEN: Soft, nontender, BS +  MSK: Warm, no lower extremity edema, no clubbing or cyanosis  NEUROLOGIC: Alert and oriented x4, moving all extremities with good strength   INTEGUMENTARY:  PSYCHIATRY:          DISCHARGE DIAGNOSIS     Active Hospital Problems    Diagnosis  POA    *Valvular heart disease [I38]  Yes    Moderate malnutrition [E44.0]  Yes      Resolved Hospital Problems   No resolved problems to display.        Lumbar degenerative disc disease and spondylosis   At this time she is functionally bed bound d/t intractable pain. She cannot move from bed much less participate in therapy.   E consult to neurosurgery  Lumbar vertebrae height is unremarkable and there is no significant listhesis.  No acute fracture or malalignment identified.  No apparent paraspinal soft inflammations.  Note is made of inferior vena cava filter.  There are uterine dense calcifications likely related to fibroids.  Endometrium is not delineated..  Disc segmental analysis is given below:     At L1-L2, there is bulging of annulus fibrosis and facet arthropathy which  results in mild central canal stenosis.  There are no narrowings of the neural foramen.     At L2-L3, there is a generalized disc bulge, ligamentum flavum thickening and facet arthropathy contributing to cause mild central canal stenosis.  There is mild narrowing of the right neural foramen.  The left neural foramen is patent.     At L3-L4, there is broad osteophyte disc complex, ligamentum flavum thickening and facet arthropathy.  In conjunction, these findings cause mild to moderate central canal stenosis.  Right neural foramen is patent.  There is mild narrowing of the left neural foramen.     At L4-L5, there is broad disc protrusion which lateralizes into the left neural foramen.  There is ligamentum flavum thickening and facet arthropathy which is more pronounced on the left.  Thecal sac is deviated towards the right.  Central canal stenosis is moderate.  There is mild narrowing of right neural foramen.  There is moderate narrowing of the left neural foramen caused by lateralized portion of the disc and spondylosis.     At L5-S1, there is mild osteophyte disc complex and facet arthropathy.  Ventral thecal sac is slightly flattened without significant central canal stenosis.  Right neural foramen is patent.  There is mild narrowing of the left neural foramen caused by uncovertebral and facet arthropathy.        Acute hypoxemic respiratory failure ; on 3 L of O2, being weaned off  Acute on chronic decompensated HFpEF with pulmonary edema - resolved  Pulmonary hypertension  Severe mitral stenosis  Echo 11/23/2024 EF 60-65%, grade 1 diastolic dysfunction, mean pressure gradient across the mitral valve is 19 mmHg, pulmonary artery systolic pressure is 57 mmHg  MARYBETH 11/27/2024 revealed severe mitral valve stenosis with moderate regurgitation  LHC and RHC 11/27/2024 revealed 70% proximal LAD stenosis and bioprosthetic mitral valve stenosis - s/p stenting/ballooning to proximal LAD on 11/29/2024  CV surgery recommended  to optimize CHF prior to intervention for mitral valve   Dr. Lynn and Dr. Cervantes recommended to start Coumadin 5mg QD with goal INR of 2-3 to see if gradient of mitral valve stenosis could be decreased  Continue triple therapy with aspirin, Plavix, Coumadin for 1 month and discontinue aspirin on 12/28/2024  Follow up echo in 4-6 weeks  Follow up with Dr. Watters in 4-6 weeks to discuss plan with mitral valve  Patient is still on nasal cannula oxygen therapy.being weaned off.     Severe mitral stenosis s/p mosaic bioprosthetic MVR  CAD s/p stenting/ballooning  PAF s/p SUKHI ligation  HTN, HLD  DVT s/p IVC filter  SSS s/p PPM  Continue amiodarone 200 mg daily, aspirin 81 mg daily (discontinue on 12/28/2024), statin, Plavix 75 mg daily, Lasix 40 mg daily, Toprol-XL 50 mg daily,   On low dose Warfarin for INR 2-3, we will  restart coumadin 3mg daily ,Daily PT INRs  Daily Mag-Ox 400 mg     Chronic pain   Increased frequesncy of opioid to Q4H PRN and added gabapentin 200mg PO TID on 12/12  Increased gabapentin to 300mg today 12/13  Will continue to re-evaluate      Hx of COPD/asthma  ABEL   Continue nightly CPAP.  Does not require home oxygen at baseline  On bronchodilaor     Recent Klebsiella pneumoniae and Proteus mirabilis UTI - completed treatment     Type 2 diabetes mellitus;, fairly controlled.  Recent A1c 6.8% within the last month  Holding home metformin 500 mg b.i.d.   On Lantus 5 units daily 12/04/2024  Continue aspart 3 units TID with meals 12/05/2024  On  home glimepiride 4 mg b.i.d. 12/06/2024  Moderate dose SSI     Chronic sinusitis  Afrin and Mucinex x3 days starting 12/05/2024 followed by Flonase daily   Continue cetirizine  No indication for antibiotics at this time     Anxiety   Xanax prn      Leukocytosis; persistent, urinalysis indicates possible UTI, follow up the urine culture.  Commenced on empiric Omnicef.   - Leukocytosis improving so far     Obesity  Low-calorie diet advised     DVT  prophylaxis:  Holding warfarin for now until INR <3     Code status: Full    _____________________________________________________________________________      DISCHARGE MED REC     Current Discharge Medication List        CONTINUE these medications which have NOT CHANGED    Details   amiodarone (PACERONE) 200 MG Tab Take 200 mg by mouth once daily.      aspirin (ECOTRIN) 81 MG EC tablet Take 1 tablet (81 mg total) by mouth once daily. for 25 days  Qty: 25 tablet, Refills: 0      atorvastatin (LIPITOR) 40 MG tablet Take 40 mg by mouth once daily.      clopidogreL (PLAVIX) 75 mg tablet Take 1 tablet (75 mg total) by mouth once daily.  Qty: 30 tablet, Refills: 0      DUPIXENT SYRINGE 300 mg/2 mL Syrg Inject 300 mg into the skin every 14 (fourteen) days.      estradioL (ESTRACE) 0.01 % (0.1 mg/gram) vaginal cream Place 0.5 g vaginally twice a week.      famotidine (PEPCID) 40 MG tablet Take 40 mg by mouth every evening.      fluticasone-umeclidin-vilanter (TRELEGY ELLIPTA) 200-62.5-25 mcg inhaler Inhale 1 puff into the lungs once daily.      furosemide (LASIX) 40 MG tablet Take 40 mg by mouth.      glimepiride (AMARYL) 2 MG tablet Take 4 mg by mouth 2 (two) times a day.      HYDROcodone-acetaminophen (NORCO)  mg per tablet Take 1 tablet by mouth every 6 (six) hours as needed for Pain.  Qty: 20 tablet, Refills: 0    Comments: Quantity prescribed more than 7 day supply? No  Associated Diagnoses: Osteoarthritis of right hip, unspecified osteoarthritis type      immun glob G,IgG,/pro/IgA 0-50 (HIZENTRA SUBQ) Inject into the skin every 14 (fourteen) days.      lipase-protease-amylase 24,000-76,000-120,000 units (CREON) 24,000-76,000 -120,000 unit capsule Take 1-2 capsules by mouth 3 (three) times daily with meals. 2 caps with meals and 1 cap c snacks      metFORMIN (GLUCOPHAGE) 500 MG tablet Take 1 tablet (500 mg total) by mouth 2 (two) times daily with meals.  Qty: 180 tablet, Refills: 3      metoprolol succinate  (TOPROL-XL) 50 MG 24 hr tablet Take 1 tablet (50 mg total) by mouth once daily.  Qty: 30 tablet, Refills: 0    Comments: .      NIFEdipine (PROCARDIA-XL) 30 MG (OSM) 24 hr tablet Take 30 mg by mouth.      pantoprazole (PROTONIX) 40 MG tablet Take 1 tablet by mouth every morning.      timoloL (BETIMOL) 0.5 % ophthalmic solution Place 1 drop into both eyes 2 (two) times daily.      warfarin (COUMADIN) 5 MG tablet Take 1 tablet (5 mg total) by mouth Daily.  Qty: 30 tablet, Refills: 0                CONSULTS         FOLLOW UP      Follow-up Information       Vijay Uribe MD Follow up.    Specialty: Cardiology  Why: 12/11/24 11am  Contact information:  [address]  [place] [address]  [phone]               Sebastian Watters Jr., MD Follow up.    Specialty: Cardiology  Why: ECHO scheduled 12-30-24 2:15pm    Office appointment 1/8/2025 2:40pm  Contact information:  [address]  [place] [address]  [phone]               Arnaldo Hernandez MD Follow up.    Specialty: Family Medicine  Contact information:  [address] [place] [address]  [phone]               Carlee Miramontes NP Follow up.    Specialty: Family Medicine  Why: [facility] patient has seen this provider in the past  Contact information:  [address]    [place] [address]  [phone]                                 DISCHARGE INSTRUCTIONS     Explained in detail to the patient about the discharge plan, medications, and follow-up visits. Pt understands and agrees with the treatment plan.  Discharged Condition: stable  Diet as tolerated  Activities as tolerated  Discharge to: Another Health Care Institution Not Defined higher level of care    TIME SPENT ON DISCHARGE   35 minutes        Carla Alex MD  Internal Medicine  Department of Hospital Medicine [facility]      This document was created using electronic dictation services.  Please excuse any errors that  may have been made.  Contact me if any questions regarding documentation to clarify verbiage.

## 2024-12-15 NOTE — H&P
[facility] Hospital Medicine History & Physical Examination       Patient Name: Kortney Leach  MRN: [id number]  Patient Class: IP- Inpatient   Admission Date: 12/15/2024   Admitting Physician: SUZANNE Service   Length of Stay: 0  Attending Physician: Catracho Galan MD  Primary Care Provider: Arnaldo Hernandez MD  Face-to-Face encounter date: 12/15/2024  Code Status:Full Code  Chief Complaint: Back pain      Screening for Social Drivers for health:  Patient screened for food insecurity, housing instability, transportation needs, utility difficulties, and interpersonal safety (select all that apply as identified as concern)  []Housing or Food  []Transportation Needs  []Utility Difficulties  []Interpersonal safety  [x]None      Patient information was obtained from patient, patient's family, past medical records and ER records.  ED records were reviewed in detail and documented below    HISTORY OF PRESENT ILLNESS:   79-year-old woman with A-fib, HTN, valvular heart disease s/p MVR, CVID, CAD s/p PCI to LAD (11/29), DVT s/p IVC filter, T2DM, GERD, SSS s/p PPM, HTN, HLD, chronic sinusitis status post sinus surgery, ABEL, obesity, asthma/COPD and a recent admission for acute decompensated diastolic heart failure where she underwent a MARYBETH on 11/27 which showed severe mitral stenosis and moderate MR and LHC on 11/29 with stent to prox LAD. Patient was seen by CT surgery and structural heart team and a possible valve in valve was discussed but the plan was to start Coumadin to see if the gradient could be decreased and have a repeat TTE in a few weeks time. She was then subsequently discharged on 2L oxygen to [facility] with close cardiology follow up. She presents on [facility] on 12/15 from [employer] due to low back associated with weakness and for neurosurgical evaluation.    She reports that since she went to rehab and started working with PT - she has been having lower back pain not  "radiating down her legs. She denies urinary or stool incontinence. No saddle anesthesia. Reports constipation. She is able to move all her extremities. She used to walk with a walker at rehab but walking became difficult due to back pain. She reports her breathing has been "so and so" while she has been there. She had a CT scan done at rehab which showed Lumbar degenerative disc disease and spondylosis and she was transferred here for neurosurgery evaluation      PAST MEDICAL HISTORY:     Past Medical History:   Diagnosis Date    Anticoagulant long-term use     Asthma     Chronic sinusitis, unspecified     COPD (chronic obstructive pulmonary disease)     CVID (common variable immunodeficiency)     Diabetes mellitus, type 2     GERD (gastroesophageal reflux disease)     Hypertension     Severe mitral valve regurgitation     Sleep apnea     uses CPAP    SOB (shortness of breath)     Unspecified glaucoma     Weakness        PAST SURGICAL HISTORY:     Past Surgical History:   Procedure Laterality Date    A-V CARDIAC PACEMAKER INSERTION N/A 9/25/2023    Procedure: INSERTION, CARDIAC PACEMAKER, DUAL CHAMBER;  Surgeon: Arnaud Moon MD;  Location: [facility] CATH LAB;  Service: Cardiology;  Laterality: N/A;  [facility]    ANGIOGRAM, CORONARY, WITH LEFT HEART CATHETERIZATION N/A 11/29/2024    Procedure: Angiogram, Coronary, with Left Heart Cath;  Surgeon: Tatiana Jarrett MD;  Location: [facility] CATH LAB;  Service: Cardiology;  Laterality: N/A;    APPENDECTOMY      CATARACT EXTRACTION Bilateral     CATHETERIZATION OF BOTH LEFT AND RIGHT HEART N/A 11/27/2024    Procedure: CATHETERIZATION, HEART, BOTH LEFT AND RIGHT;  Surgeon: Sebastian Watters Jr., MD;  Location: [facility] CATH LAB;  Service: Cardiology;  Laterality: N/A;  With Valve Study    COLONOSCOPY      EGD, WITH BALLOON DILATION      ESOPHAGOGASTRODUODENOSCOPY      LEFT HEART CATHETERIZATION Left 09/06/2023    Procedure: Left heart cath;  Surgeon: Vijay Uribe MD;  Location: [facility]" LAB;  Service: Cardiology;  Laterality: Left;  [facility]    MITRAL VALVE REPLACEMENT N/A 2023    Procedure: REPLACEMENT, MITRAL VALVE;  Surgeon: Steven Rosales IV, MD;  Location: [facility] OR;  Service: Cardiovascular;  Laterality: N/A;    TONSILLECTOMY      TOTAL KNEE ARTHROPLASTY Bilateral        ALLERGIES:   Adhesive tape-silicones    FAMILY HISTORY:   Reviewed and negative    SOCIAL HISTORY:     Social History     Tobacco Use    Smoking status: Former     Current packs/day: 0.00     Types: Cigarettes     Quit date:      Years since quittin.9    Smokeless tobacco: Never   Substance Use Topics    Alcohol use: Not Currently        HOME MEDICATIONS:     Prior to Admission medications    Medication Sig Start Date End Date Taking? Authorizing Provider   amiodarone (PACERONE) 200 MG Tab Take 200 mg by mouth once daily.    Provider, Historical   aspirin (ECOTRIN) 81 MG EC tablet Take 1 tablet (81 mg total) by mouth once daily. for 25 days 12/3/24 12/28/24  Catracho Galan MD   atorvastatin (LIPITOR) 40 MG tablet Take 40 mg by mouth once daily.    Provider, Historical   clopidogreL (PLAVIX) 75 mg tablet Take 1 tablet (75 mg total) by mouth once daily. 12/4/24 1/3/25  Catracho Galan MD   DUPIXENT SYRINGE 300 mg/2 mL Syrg Inject 300 mg into the skin every 14 (fourteen) days. 23   Provider, Historical   estradioL (ESTRACE) 0.01 % (0.1 mg/gram) vaginal cream Place 0.5 g vaginally twice a week. 10/30/23 2/1/27  Provider, Historical   famotidine (PEPCID) 40 MG tablet Take 40 mg by mouth every evening. 23   Provider, Historical   fluticasone-umeclidin-vilanter (TRELEGY ELLIPTA) 200-62.5-25 mcg inhaler Inhale 1 puff into the lungs once daily.    Provider, Historical   furosemide (LASIX) 40 MG tablet Take 40 mg by mouth. 24   Provider, Historical   glimepiride (AMARYL) 2 MG tablet Take 4 mg by mouth 2 (two) times a day.    Provider, Historical   HYDROcodone-acetaminophen (NORCO)  mg  per tablet Take 1 tablet by mouth every 6 (six) hours as needed for Pain. 9/6/24   Sanket Oliva MD   immun glob G,IgG,/pro/IgA 0-50 (HIZENTRA SUBQ) Inject into the skin every 14 (fourteen) days.    Provider, Historical   lipase-protease-amylase 24,000-76,000-120,000 units (CREON) 24,000-76,000 -120,000 unit capsule Take 1-2 capsules by mouth 3 (three) times daily with meals. 2 caps with meals and 1 cap c snacks    Provider, Historical   metFORMIN (GLUCOPHAGE) 500 MG tablet Take 1 tablet (500 mg total) by mouth 2 (two) times daily with meals. 10/14/23 11/22/24  Richard Ruiz MD   metoprolol succinate (TOPROL-XL) 50 MG 24 hr tablet Take 1 tablet (50 mg total) by mouth once daily. 12/4/24 1/3/25  Catracho Galan MD   NIFEdipine (PROCARDIA-XL) 30 MG (OSM) 24 hr tablet Take 30 mg by mouth. 10/22/23   Provider, Historical   pantoprazole (PROTONIX) 40 MG tablet Take 1 tablet by mouth every morning.    Provider, Historical   timoloL (BETIMOL) 0.5 % ophthalmic solution Place 1 drop into both eyes 2 (two) times daily.    Provider, Historical   warfarin (COUMADIN) 5 MG tablet Take 1 tablet (5 mg total) by mouth Daily. 12/3/24 1/2/25  Catracho Galan MD       REVIEW OF SYSTEMS:   Except as documented, all other systems reviewed and negative     PHYSICAL EXAM:     VITAL SIGNS: 24 HRS MIN & MAX LAST   Temp  Min: 98.2 °F (36.8 °C)  Max: 98.8 °F (37.1 °C) 98.2 °F (36.8 °C)   BP  Min: 102/67  Max: 141/62 124/71   Pulse  Min: 64  Max: 70  67   Resp  Min: 18  Max: 20     SpO2  Min: 93 %  Max: 99 % (!) 93 %     General appearance: elderly pleasant woman in no apparent distress  HENT: Atraumatic head. Moist mucous membranes of oral cavity.  Eyes: Normal extraocular movements.   Neck: Supple.   Lungs: Clear to auscultation bilaterally. No wheezing present.   Heart: Regular rate and rhythm. S1 and S2 present with no murmurs/gallop/rub. No pedal edema. No JVD present.   Abdomen: Soft, non-distended,  non-tender. No rebound tenderness/guarding. Bowel sounds are normal.   Extremities: No cyanosis, clubbing, or edema.  Skin: No Rash.   Neuro: Motor and sensory exams grossly intact. Good tone. Movement in bilateral lower extremities limited by pain. No focal neurological deficits  Psych/mental status: Appropriate mood and affect. Responds appropriately to questions.     LABS AND IMAGING:     Recent Labs   Lab 12/11/24  0538 12/12/24  0514 12/13/24  0441   WBC 13.27* 15.06* 13.26*   RBC 3.37* 3.37* 3.32*   HGB 10.0* 9.8* 9.9*   HCT 31.8* 32.2* 32.2*   MCV 94.4* 95.5* 97.0*   MCH 29.7 29.1 29.8   MCHC 31.4* 30.4* 30.7*   RDW 16.9 17.2* 17.3*   * 504* 501*   MPV 9.1 9.1 9.0       Recent Labs   Lab 12/09/24  0431 12/10/24  0503 12/11/24  0538 12/13/24  0441    137 139 140   K 4.0 3.4* 3.8 4.1   CL 99 101 100 100   CO2 27 22* 29 28   BUN 25.9* 27.7* 29.3* 30.2*   CREATININE 0.80 0.76 0.80 0.90   CALCIUM 8.8 8.6 8.5 8.7   MG 2.00  --   --   --    ALBUMIN  --   --   --  2.4*   ALKPHOS  --   --   --  94   ALT  --   --   --  13   AST  --   --   --  23   BILITOT  --   --   --  0.5       Microbiology Results (last 7 days)       ** No results found for the last 168 hours. **             CT Lumbar Spine Without Contrast  Narrative: EXAMINATION:  CT LUMBAR SPINE WITHOUT CONTRAST    CLINICAL HISTORY:  Low back pain, increased fracture risk;    TECHNIQUE:  Multidetector axial images were performed of the lumbar spine without contrast and the images were reformatted.    Dose length product of 108 mGycm. Automated exposure control was utilized to minimize radiation dose.    COMPARISON:  None available    FINDINGS:  Lumbar vertebrae height is unremarkable and there is no significant listhesis.  No acute fracture or malalignment identified.  No apparent paraspinal soft inflammations.  Note is made of inferior vena cava filter.  There are uterine dense calcifications likely related to fibroids.  Endometrium is not  delineated..  Disc segmental analysis is given below:    At L1-L2, there is bulging of annulus fibrosis and facet arthropathy which results in mild central canal stenosis.  There are no narrowings of the neural foramen.    At L2-L3, there is a generalized disc bulge, ligamentum flavum thickening and facet arthropathy contributing to cause mild central canal stenosis.  There is mild narrowing of the right neural foramen.  The left neural foramen is patent.    At L3-L4, there is broad osteophyte disc complex, ligamentum flavum thickening and facet arthropathy.  In conjunction, these findings cause mild to moderate central canal stenosis.  Right neural foramen is patent.  There is mild narrowing of the left neural foramen.    At L4-L5, there is broad disc protrusion which lateralizes into the left neural foramen.  There is ligamentum flavum thickening and facet arthropathy which is more pronounced on the left.  Thecal sac is deviated towards the right.  Central canal stenosis is moderate.  There is mild narrowing of right neural foramen.  There is moderate narrowing of the left neural foramen caused by lateralized portion of the disc and spondylosis.    At L5-S1, there is mild osteophyte disc complex and facet arthropathy.  Ventral thecal sac is slightly flattened without significant central canal stenosis.  Right neural foramen is patent.  There is mild narrowing of the left neural foramen caused by uncovertebral and facet arthropathy.  Impression: Lumbar degenerative disc disease and spondylosis level by level discussed above.    Electronically signed by: Sohail Longoria  Date:    12/14/2024  Time:    17:13      ASSESSMENT & PLAN:   # Low back pain 2/2 DJD and spinal stenosis  # Chronic diastolic HF - compensated  # Chronic hypoxic respiratory failure requiring supplemental oxygen (on 2 L oxygen)   # Pulmonary HTN  # Severe mitral stenosis  # Obesity   # pAF s/p LAAL in 2023  # Anemia - stable. No signs of active  bleeding. Patient is on triple therapy though and had a Hgb drop since then which has been stable  # Leukocytosis - recently treated for UTI but currently has no dysuria. No fevers or chills. No abdominal pain/N/V  # HTN  # HLD  # VHD s/p MVR (09/20/2023)  # CAD s/p PCI to LAD (11/29/2024)  # DVT s/p IVC filter  # SSS s/p PPM  # T2DM   # GERD   # Chronic sinusitis s/p sinus surgery   # ABEL on CPAP  # Obesity   # CVID  # Constipation  # Asthma/COPD ? - does not have official PFTs done    - obtain CXR   - obtain admission labs  - obtain MRI L spine  - pain control  - NGSY consult  - PT/OT  - continue aspirin, plavix and warfarin (aspirin has to be stopped on 12/28)  - continue amiodarone, atorvastatin, nifedipine  - cardiology consult  - daily PT/INR  - PPI  - insulin sliding scale  - continue home oxygen  - bowel regimen  - iron profile    VTE Prophylaxis: warfarin    Patient condition:  Fair    I spent 90 mins on this admission     Catracho Galan MD  Department of Hospital Medicine   [facility]   12/15/2024  __________________________________________________________________________  INPATIENT LIST OF MEDICATIONS     Scheduled Meds:   [START ON 12/16/2024] amiodarone  200 mg Oral Daily    [START ON 12/16/2024] aspirin  81 mg Oral Daily    atorvastatin  40 mg Oral QHS    [START ON 12/16/2024] clopidogreL  75 mg Oral Daily    famotidine  40 mg Oral QHS    [START ON 12/16/2024] furosemide  40 mg Oral Daily    LIDOcaine  1 patch Transdermal Q24H    lipase-protease-amylase 24,000-76,000-120,000 units  1 capsule Oral TID WM    [START ON 12/16/2024] metoprolol succinate  50 mg Oral Daily    [START ON 12/16/2024] NIFEdipine  30 mg Oral Daily    [START ON 12/16/2024] pantoprazole  40 mg Oral QAM    timolol maleate 0.5%  1 drop Both Eyes BID    warfarin  5 mg Oral Daily     Continuous Infusions:  PRN Meds:.  Current Facility-Administered Medications:     acetaminophen, 650 mg, Oral, Q8H  PRN    acetaminophen, 650 mg, Oral, Q4H PRN    dextrose 10%, 12.5 g, Intravenous, PRN    dextrose 10%, 25 g, Intravenous, PRN    glucagon (human recombinant), 1 mg, Intramuscular, PRN    glucose, 16 g, Oral, PRN    glucose, 24 g, Oral, PRN    insulin aspart U-100, 1-10 Units, Subcutaneous, QID (AC + HS) PRN    ondansetron, 4 mg, Intravenous, Q8H PRN    oxyCODONE, 5 mg, Oral, Q6H PRN    oxyCODONE, 10 mg, Oral, Q6H PRN      12/15/2024    ________________________________________________________________________________    Catracho Galan MD   12/15/2024

## 2024-12-15 NOTE — PLAN OF CARE
Problem: Adult Inpatient Plan of Care  Goal: Plan of Care Review  Outcome: Progressing  Flowsheets (Taken 12/15/2024 0930)  Plan of Care Reviewed With: patient  Goal: Patient-Specific Goal (Individualized)  Outcome: Progressing  Flowsheets (Taken 12/15/2024 0930)  Individualized Care Needs: pain management, bowel regimen, encourage PT/OT, monitor O2 saturation, possible transfer  Anxieties, Fears or Concerns: get back pain under control  Patient/Family-Specific Goals (Include Timeframe): return to baseline by SD  Goal: Absence of Hospital-Acquired Illness or Injury  Outcome: Progressing  Intervention: Identify and Manage Fall Risk  Flowsheets (Taken 12/15/2024 0930)  Safety Promotion/Fall Prevention:   assistive device/personal item within reach   bed alarm set   instructed to call staff for mobility   family to remain at bedside   room near unit station   side rails raised x 3  Intervention: Prevent Skin Injury  Flowsheets (Taken 12/15/2024 0930)  Body Position: sitting up in bed  Skin Protection:   incontinence pads utilized   transparent dressing maintained  Device Skin Pressure Protection:   absorbent pad utilized/changed   tubing/devices free from skin contact  Intervention: Prevent and Manage VTE (Venous Thromboembolism) Risk  Flowsheets (Taken 12/15/2024 0930)  VTE Prevention/Management:   ambulation promoted   bleeding precautions maintained   bleeding risk assessed  Intervention: Prevent Infection  Flowsheets (Taken 12/15/2024 0930)  Infection Prevention:   cohorting utilized   equipment surfaces disinfected   hand hygiene promoted  Goal: Optimal Comfort and Wellbeing  Outcome: Progressing  Intervention: Monitor Pain and Promote Comfort  Flowsheets (Taken 12/15/2024 0930)  Pain Management Interventions:   care clustered   quiet environment facilitated   relaxation techniques promoted  Intervention: Provide Person-Centered Care  Flowsheets (Taken 12/15/2024 0930)  Trust Relationship/Rapport:   care  explained   questions encouraged  Goal: Readiness for Transition of Care  Outcome: Progressing  Intervention: Mutually Develop Transition Plan  Flowsheets (Taken 12/15/2024 0930)  Communicated SD with patient/caregiver: Date not available/Unable to determine     Problem: Diabetes Comorbidity  Goal: Blood Glucose Level Within Targeted Range  Outcome: Progressing  Intervention: Monitor and Manage Glycemia  Flowsheets (Taken 12/15/2024 0930)  Glycemic Management:   blood glucose monitored   supplemental insulin given   oral hydration promoted     Problem: Wound  Goal: Optimal Coping  Outcome: Progressing  Intervention: Support Patient and Family Response  Flowsheets (Taken 12/15/2024 0930)  Supportive Measures:   active listening utilized   self-care encouraged  Family/Support System Care: caregiver stress acknowledged  Goal: Optimal Functional Ability  Outcome: Progressing  Intervention: Optimize Functional Ability  Flowsheets (Taken 12/15/2024 0930)  Assistive Device Utilized:   walker   gait belt  Activity Management: Rolling - L1  Activity Assistance Provided: assistance, 1 person  Goal: Absence of Infection Signs and Symptoms  Outcome: Progressing  Intervention: Prevent or Manage Infection  Flowsheets (Taken 12/15/2024 0930)  Fever Reduction/Comfort Measures:   lightweight clothing   lightweight bedding  Infection Management: aseptic technique maintained  Goal: Improved Oral Intake  Outcome: Progressing  Intervention: Promote and Optimize Oral Intake  Flowsheets (Taken 12/15/2024 0930)  Oral Nutrition Promotion:   adaptive equipment use encouraged   physical activity promoted  Nutrition Interventions:   food preferences provided   frequent small meals provided  Goal: Optimal Pain Control and Function  Outcome: Progressing  Intervention: Prevent or Manage Pain  Flowsheets (Taken 12/15/2024 0930)  Sleep/Rest Enhancement:   awakenings minimized   relaxation techniques promoted  Pain Management Interventions:   care  clustered   quiet environment facilitated   relaxation techniques promoted  Goal: Skin Health and Integrity  Outcome: Progressing  Intervention: Optimize Skin Protection  Flowsheets (Taken 12/15/2024 0930)  Pressure Reduction Techniques:   frequent weight shift encouraged   weight shift assistance provided   positioned off wounds  Pressure Reduction Devices:   positioning supports utilized   foam padding utilized  Skin Protection:   incontinence pads utilized   transparent dressing maintained  Activity Management: Rolling - L1  Head of Bed (HOB) Positioning: HOB at 20-30 degrees  Goal: Optimal Wound Healing  Outcome: Progressing  Intervention: Promote Wound Healing  Flowsheets (Taken 12/15/2024 0930)  Sleep/Rest Enhancement:   awakenings minimized   relaxation techniques promoted     Problem: Fall Injury Risk  Goal: Absence of Fall and Fall-Related Injury  Outcome: Progressing  Intervention: Identify and Manage Contributors  Flowsheets (Taken 12/15/2024 0930)  Self-Care Promotion:   independence encouraged   BADL personal objects within reach  Medication Review/Management: medications reviewed  Intervention: Promote Injury-Free Environment  Flowsheets (Taken 12/15/2024 0930)  Safety Promotion/Fall Prevention:   assistive device/personal item within reach   bed alarm set   instructed to call staff for mobility   family to remain at bedside   room near unit station   side rails raised x 3     Problem: Skin Injury Risk Increased  Goal: Skin Health and Integrity  Outcome: Progressing  Intervention: Optimize Skin Protection  Flowsheets (Taken 12/15/2024 0930)  Pressure Reduction Techniques:   frequent weight shift encouraged   weight shift assistance provided   positioned off wounds  Pressure Reduction Devices:   positioning supports utilized   foam padding utilized  Skin Protection:   incontinence pads utilized   transparent dressing maintained  Activity Management: Rolling - L1  Head of Bed (HOB) Positioning: HOB at  20-30 degrees  Intervention: Promote and Optimize Oral Intake  Flowsheets (Taken 12/15/2024 6730)  Oral Nutrition Promotion:   adaptive equipment use encouraged   physical activity promoted  Nutrition Interventions:   food preferences provided   frequent small meals provided

## 2024-12-15 NOTE — PROGRESS NOTES
Hospitalist Progress      PATIENT NAME:Kortney Leach  DATE OF SERVICE:12/15/2024      Subjective/Hospital Course;    79 year old female with a past medical history of VHD s/p mosaic bioprosthetic MVR, CAD, PAF s/p SUKHI ligation, ABEL, COPD/asthma, HTN, DVT s/p IVC filter, SSS s/p PPM, HLD, DM2 who presented to [facility] on 11/23/2024 due to worsening SOB over the past month. She was noted to be in acute decompensated diastolic CHF. Cardiology was consulted and repeat echo revealed EF 60-65%, grade 1 diastolic dysfunction, mean pressure gradient across the mitral valve is 19 mmHg, pulmonary artery systolic pressure is 57 mmHg. MARYBETH 11/27/2024 revealed severe mitral valve stenosis with moderate regurgitation. LHC and RHC 11/27/2024 revealed 70% proximal LAD stenosis and bioprosthetic mitral valve stenosis.  She was evaluated by CV surgery who recommended to optimize CHF prior to intervention for mitral valve.  She subsequently received stenting/ballooning to proximal LAD on 11/29/2024.  Case was discussed between Dr. Lynn and Dr. Cervantes and decision was made to start Coumadin to see if gradient of mitral valve stenosis could be decreased.  She was started on Coumadin 5 mg daily on 11/02/2024 with a goal INR of 2-3.  Plan to continue triple therapy with aspirin, Plavix, Coumadin for 1 month and discontinue aspirin on 12/28/2024.  She will need to follow up echo in 4-6 weeks as well as a follow up with Dr. Watters in 4-6 weeks to discuss plan with mitral valve.  During this admission she was also treated for a Klebsiella pneumoniae and Proteus mirabilis UTI.  She was transferred to our [facility] for continued physical therapy.  Yesterday, 12/8, pt had significant shortness of breath although O2 sat within normal limits. Patient's oxygen was increased from 2 L nasal cannula to 3 L nasal cannula to make her more comfortable.  Patient does not require supplemental oxygen at baseline, but may require upon  discharge.      Repeat CXR 12/04/2024 revealed improved volume status.  Received an additional dose of Lasix 20 mg 12/05/2024.  INR elevated 12/05/2024 - holding coumadin      12/8: INR 3.2 - started Coumadin 1.25 last night.  Continues to complain of low back muscle spasms, increased Robaxin      12/9 Pt feels better today. Pt denies sob. Pt is eating well and endorses constipation. Last BM was 4 days ago.I discussed adding Miralax. Pt is ambulating very little and uses walker to get to bathroom with contact guard assist. Pt c/o bacl pain and feels like her spinal stimulator is not working. Pt was given coumadin 1.25 mg last night. INR today is 3.1. Will give coumadin 1.25 mg po tonight. Changed pain regimen. Decreased morphine iv to q8hr and norco to q 6hr.      12/10;  Patient seen today, no active complaint except for recurrence of low back pain for which she is currently on analgesics.  No worsening shortness of breadth.  No chest pain.  Tolerating physical therapy.  INR today is 3.3.  We will hold off on Coumadin today.  Repeat PT/INR in the morning.      12/11;  Patient is seen today, no active complaint.  Currently on 3 L of nasal cannula oxygen therapy.  Due to acute hypoxic respiratory failure.  Patient has been weaned off nasal cannula oxygen therapy.  She does have persistent leukocytosis.  Urinalysis indicates possible UTI.  Patient has been to be commenced on Omnicef today.    12/12  Leukocytosis worsening. Up to 15 today. Patient also with increased pain. Reports bowel movement yesterday. Denies N/V/F/C. Discussed increasing frequency of pain meds also along with adding adjuvant treatments. She was in agreement. Nursing at bedside     12/13  Leukocytosis improving. Pain a little better controlled however still present. Denies N/V/F/C. Will increase gabapentin today    12/14  C/O sinus infection. Pos for this on CT head. Has much back pain but also c/o mental grogginess.  I am starting cipro for sinus  infection and Flonase. CT lumbar spine.    12/15  Daughter in room and we had a long discussion about her mom and the pain that is preventing her from even moving in bed much less participating in therapy.  Patient actively had a muscle spasm during exam.  I did order a CT L spine yesterday which is very abnormal and likely the cause of her pain. I did add baclofen prn. I did discuss with daughter and patient that I will reach out to neurosurgery via an e consult and see what they recommend.  At this point I am at an impasse and cannot really help the patient and my facility.  On another note INR is 2.7 will resume coumadin tonight.   CT Lumbar  revealed:   At L1-L2, there is bulging of annulus fibrosis and facet arthropathy which results in mild central canal stenosis.  There are no narrowings of the neural foramen.     At L2-L3, there is a generalized disc bulge, ligamentum flavum thickening and facet arthropathy contributing to cause mild central canal stenosis.  There is mild narrowing of the right neural foramen.  The left neural foramen is patent.         At L3-L4, there is broad osteophyte disc complex, ligamentum flavum thickening and facet arthropathy.  In conjunction, these findings cause mild to moderate central canal stenosis.  Right neural foramen is patent.  There is mild narrowing of the left neural foramen.       At L4-L5, there is broad disc protrusion which lateralizes into the left neural foramen.  There is ligamentum flavum thickening and facet arthropathy which is more pronounced on the left.  Thecal sac is deviated towards the right.  Central canal stenosis is moderate.  There is mild narrowing of right neural foramen.  There is moderate narrowing of the left neural foramen caused by lateralized portion of the disc and spondylosis.     At L5-S1, there is mild osteophyte disc complex and facet arthropathy.  Ventral thecal sac is slightly flattened without significant central canal stenosis.   Right neural foramen is patent.  There is mild narrowing of the left neural foramen caused by uncovertebral and facet arthropathy.       ROS negative except for above    Scheduled Meds:   amiodarone  200 mg Oral Daily    aspirin  81 mg Oral Daily    atorvastatin  40 mg Oral Daily    cefdinir  300 mg Oral Q12H    cetirizine  10 mg Oral Daily    ciprofloxacin HCl  750 mg Oral Q12H    clopidogreL  75 mg Oral Daily    docusate sodium  100 mg Oral Daily    famotidine  40 mg Oral QHS    fluticasone propionate  2 spray Each Nostril BID    furosemide  40 mg Oral Daily    gabapentin  100 mg Oral BID    glimepiride  4 mg Oral BID    insulin aspart U-100  3 Units Subcutaneous TIDWM    insulin glargine U-100  5 Units Subcutaneous Daily    lactulose 10 gram/15 ml  20 g Oral BID    LIDOcaine  2 patch Transdermal Q24H    lipase-protease-amylase 24,000-76,000-120,000 units  2 capsule Oral TID WM    magnesium oxide  400 mg Oral Daily    methocarbamoL  750 mg Oral TID    metoprolol succinate  50 mg Oral Daily    pantoprazole  40 mg Oral QAM    warfarin  3 mg Oral Daily     Continuous Infusions:  PRN Meds:.  Current Facility-Administered Medications:     albuterol-ipratropium, 3 mL, Nebulization, Q4H PRN    ALPRAZolam, 0.25 mg, Oral, TID PRN    baclofen, 10 mg, Oral, TID PRN    benzonatate, 100 mg, Oral, TID PRN    bisacodyL, 5 mg, Oral, Daily PRN    bisacodyL, 10 mg, Rectal, Daily PRN    calcium carbonate, 1,000 mg, Oral, TID PRN    dextrose 10%, 12.5 g, Intravenous, PRN    dextrose 10%, 25 g, Intravenous, PRN    diclofenac sodium, 2 g, Topical (Top), BID PRN    glucagon (human recombinant), 1 mg, Intramuscular, PRN    glucose, 16 g, Oral, PRN    glucose, 24 g, Oral, PRN    hydrALAZINE, 10 mg, Intravenous, Q4H PRN    HYDROcodone-acetaminophen, 1 tablet, Oral, Q4H PRN    insulin aspart U-100, 0-10 Units, Subcutaneous, QID (AC + HS) PRN    loperamide, 2 mg, Oral, Q4H PRN    melatonin, 6 mg, Oral, Nightly PRN    morphine, 2 mg,  Intravenous, Q8H PRN    ondansetron, 4 mg, Intravenous, Q8H PRN    simethicone, 1 tablet, Oral, QID PRN    sodium chloride 0.9%, 10 mL, Intravenous, PRN    VITALS:  Temp:  [98.3 °F (36.8 °C)-99.3 °F (37.4 °C)] 98.4 °F (36.9 °C)  Pulse:  [64-87] (P) 65  Resp:  [18-20] (P) 18  SpO2:  [93 %-100 %] 99 %  BP: (108-141)/(53-88) (P) 141/62    Intake/Output Summary (Last 24 hours) at 12/15/2024 0846  Last data filed at 12/14/2024 1334  Gross per 24 hour   Intake 400 ml   Output --   Net 400 ml         On Exam;    General appearance - no acute distress, awake & alert   HEENT - head atraumatic, PERRL , scleral anicteric,mucous membrane moist  Neck- no jvd, carotid bruits, lymphadenopathy, thromegaly  Pulm-equal chest expansion, normal percussion note, clear to auscultation bilaterally, reduced breath sounds  Heart -  Regular rate and rhythm, normal S1, S2  Abdomen - soft, nontender, nondistended,no organomegaly, bowel sounds present.  Extremities - no edema, no cyanosis, no clubbing  Neurologic - Awake, alert and oriented x3,moves all extremities.  Musculoskeletal - normal ROM of major joints  Skin - no rash, normal skin turgor      LABORATORY:    CBC:   Lab Results   Component Value Date    WBC 13.26 (H) 12/13/2024    WBC 6.12 06/13/2006    RBC 3.32 (L) 12/13/2024    RBC 4.81 06/13/2006    HGB 9.9 (L) 12/13/2024    HGB 15.6 06/13/2006    HCT 32.2 (L) 12/13/2024    HCT 40 09/20/2023     (H) 12/13/2024     06/13/2006     CMP:   Lab Results   Component Value Date     12/13/2024     06/13/2006    K 4.1 12/13/2024    K 3.9 06/13/2006     12/13/2024     06/13/2006    CO2 28 12/13/2024    CO2 23 06/13/2006    BUN 30.2 (H) 12/13/2024    BUN 11 06/13/2006    CREATININE 0.90 12/13/2024    CREATININE 0.7 06/13/2006    CALCIUM 8.7 12/13/2024    CALCIUM 9.4 06/13/2006    ALKPHOS 94 12/13/2024    ALKPHOS 69 06/13/2006    AST 23 12/13/2024    AST 34 (H) 06/13/2006    ALT 13 12/13/2024    ALT 42 (H)  "06/13/2006    ALBUMIN 2.4 (L) 12/13/2024    ALBUMIN 5.0 06/13/2006    BILITOT 0.5 12/13/2024    BILITOT 0.7 06/13/2006     Cardiac markers:   Lab Results   Component Value Date    .1 (H) 11/22/2024    BNP 20 11/09/2020       Radiology:  Micro:  No components found for: "BLOODCX", "SPUTUMCX", "URINECX"    Radiology:  [unfilled]          ASSESSMENT AND PLAN:      Lumbar degenerative disc disease and spondylosis   At this time she is functionally bed bound d/t intractable pain. She cannot move from bed much less participate in therapy.   E consult to neurosurgery  Lumbar vertebrae height is unremarkable and there is no significant listhesis.  No acute fracture or malalignment identified.  No apparent paraspinal soft inflammations.  Note is made of inferior vena cava filter.  There are uterine dense calcifications likely related to fibroids.  Endometrium is not delineated..  Disc segmental analysis is given below:     At L1-L2, there is bulging of annulus fibrosis and facet arthropathy which results in mild central canal stenosis.  There are no narrowings of the neural foramen.     At L2-L3, there is a generalized disc bulge, ligamentum flavum thickening and facet arthropathy contributing to cause mild central canal stenosis.  There is mild narrowing of the right neural foramen.  The left neural foramen is patent.     At L3-L4, there is broad osteophyte disc complex, ligamentum flavum thickening and facet arthropathy.  In conjunction, these findings cause mild to moderate central canal stenosis.  Right neural foramen is patent.  There is mild narrowing of the left neural foramen.     At L4-L5, there is broad disc protrusion which lateralizes into the left neural foramen.  There is ligamentum flavum thickening and facet arthropathy which is more pronounced on the left.  Thecal sac is deviated towards the right.  Central canal stenosis is moderate.  There is mild narrowing of right neural foramen.  There is moderate " narrowing of the left neural foramen caused by lateralized portion of the disc and spondylosis.     At L5-S1, there is mild osteophyte disc complex and facet arthropathy.  Ventral thecal sac is slightly flattened without significant central canal stenosis.  Right neural foramen is patent.  There is mild narrowing of the left neural foramen caused by uncovertebral and facet arthropathy.       Acute hypoxemic respiratory failure ; on 3 L of O2, being weaned off  Acute on chronic decompensated HFpEF with pulmonary edema - resolved  Pulmonary hypertension  Severe mitral stenosis  Echo 11/23/2024 EF 60-65%, grade 1 diastolic dysfunction, mean pressure gradient across the mitral valve is 19 mmHg, pulmonary artery systolic pressure is 57 mmHg  MARYBETH 11/27/2024 revealed severe mitral valve stenosis with moderate regurgitation  LHC and RHC 11/27/2024 revealed 70% proximal LAD stenosis and bioprosthetic mitral valve stenosis - s/p stenting/ballooning to proximal LAD on 11/29/2024  CV surgery recommended to optimize CHF prior to intervention for mitral valve   Dr. Lynn and Dr. Cervantes recommended to start Coumadin 5mg QD with goal INR of 2-3 to see if gradient of mitral valve stenosis could be decreased  Continue triple therapy with aspirin, Plavix, Coumadin for 1 month and discontinue aspirin on 12/28/2024  Follow up echo in 4-6 weeks  Follow up with Dr. Watters in 4-6 weeks to discuss plan with mitral valve  Patient is still on nasal cannula oxygen therapy.being weaned off.     Severe mitral stenosis s/p mosaic bioprosthetic MVR  CAD s/p stenting/ballooning  PAF s/p SUKHI ligation  HTN, HLD  DVT s/p IVC filter  SSS s/p PPM  Continue amiodarone 200 mg daily, aspirin 81 mg daily (discontinue on 12/28/2024), statin, Plavix 75 mg daily, Lasix 40 mg daily, Toprol-XL 50 mg daily,   On low dose Warfarin for INR 2-3, we will  restart coumadin 3mg daily ,Daily PT INRs  Daily Mag-Ox 400 mg    Chronic pain   Increased frequesncy of  opioid to Q4H PRN and added gabapentin 200mg PO TID on 12/12  Increased gabapentin to 300mg today 12/13  Will continue to re-evaluate      Hx of COPD/asthma  ABEL   Continue nightly CPAP.  Does not require home oxygen at baseline  On bronchodilaor     Recent Klebsiella pneumoniae and Proteus mirabilis UTI - completed treatment     Type 2 diabetes mellitus;, fairly controlled.  Recent A1c 6.8% within the last month  Holding home metformin 500 mg b.i.d.   On Lantus 5 units daily 12/04/2024  Continue aspart 3 units TID with meals 12/05/2024  On  home glimepiride 4 mg b.i.d. 12/06/2024  Moderate dose SSI     Chronic sinusitis  Afrin and Mucinex x3 days starting 12/05/2024 followed by Flonase daily   Continue cetirizine  No indication for antibiotics at this time     Anxiety   Xanax prn      Leukocytosis; persistent, urinalysis indicates possible UTI, follow up the urine culture.  Commenced on empiric Omnicef.   - Leukocytosis improving so far     Obesity  Low-calorie diet advised     DVT prophylaxis:  Holding warfarin for now until INR <3     Code status: Full       Plans discussed in detail with patient and all questions answered. Discussed with RN caring for patient        Disposition-  For discharge home  when appropriate and stable      This note was completed using voice recognition software and transcription errors may occur.      Evaluation of the Patient was done via Telemedicine along side the nursing staff.     Location of the Provider; [place], [place]  Patient location: [place]           Signed by   Carla Alex MD  12/15/2024,   1:27 PM

## 2024-12-15 NOTE — NURSING
Pt to be transferred to [facility] for neurosurgery. Called report to Sunitha, THIAGO @ 2931, answered all questions and concerns. IV remained in place, saline locked and patent. [facility] belongings via stretcher.

## 2024-12-15 NOTE — PLAN OF CARE
Problem: Adult Inpatient Plan of Care  Goal: Plan of Care Review  Outcome: Progressing  Flowsheets (Taken 12/14/2024 2000)  Plan of Care Reviewed With: patient  Goal: Patient-Specific Goal (Individualized)  Outcome: Progressing  Flowsheets (Taken 12/14/2024 2000)  Individualized Care Needs: Pain management, bowel program management, PT/OT, monitor O2 saturation  Anxieties, Fears or Concerns: Back pain  Patient/Family-Specific Goals (Include Timeframe): Return to baseline by SD  Goal: Absence of Hospital-Acquired Illness or Injury  Outcome: Progressing  Goal: Optimal Comfort and Wellbeing  Outcome: Progressing  Goal: Readiness for Transition of Care  Outcome: Progressing     Problem: Diabetes Comorbidity  Goal: Blood Glucose Level Within Targeted Range  Outcome: Progressing

## 2024-12-16 LAB
ALBUMIN SERPL-MCNC: 2.4 G/DL (ref 3.4–4.8)
ALBUMIN/GLOB SERPL: 0.7 RATIO (ref 1.1–2)
ALP SERPL-CCNC: 96 UNIT/L (ref 40–150)
ALT SERPL-CCNC: 13 UNIT/L (ref 0–55)
ANION GAP SERPL CALC-SCNC: 9 MEQ/L
AST SERPL-CCNC: 26 UNIT/L (ref 5–34)
BASOPHILS # BLD AUTO: 0.04 X10(3)/MCL
BASOPHILS NFR BLD AUTO: 0.3 %
BILIRUB SERPL-MCNC: 0.7 MG/DL
BNP BLD-MCNC: 661.9 PG/ML
BUN SERPL-MCNC: 17.3 MG/DL (ref 9.8–20.1)
CALCIUM SERPL-MCNC: 8.5 MG/DL (ref 8.4–10.2)
CHLORIDE SERPL-SCNC: 100 MMOL/L (ref 98–107)
CO2 SERPL-SCNC: 29 MMOL/L (ref 23–31)
CREAT SERPL-MCNC: 0.76 MG/DL (ref 0.55–1.02)
CREAT/UREA NIT SERPL: 23
EOSINOPHIL # BLD AUTO: 0.27 X10(3)/MCL (ref 0–0.9)
EOSINOPHIL NFR BLD AUTO: 1.9 %
ERYTHROCYTE [DISTWIDTH] IN BLOOD BY AUTOMATED COUNT: 18.1 % (ref 11.5–17)
FERRITIN SERPL-MCNC: 98.28 NG/ML (ref 4.63–204)
GFR SERPLBLD CREATININE-BSD FMLA CKD-EPI: >60 ML/MIN/1.73/M2
GLOBULIN SER-MCNC: 3.6 GM/DL (ref 2.4–3.5)
GLUCOSE SERPL-MCNC: 127 MG/DL (ref 82–115)
HCT VFR BLD AUTO: 30.2 % (ref 37–47)
HGB BLD-MCNC: 9.5 G/DL (ref 12–16)
IMM GRANULOCYTES # BLD AUTO: 0.19 X10(3)/MCL (ref 0–0.04)
IMM GRANULOCYTES NFR BLD AUTO: 1.4 %
INR PPP: 2.8
IRON SATN MFR SERPL: 16 % (ref 20–50)
IRON SERPL-MCNC: 29 UG/DL (ref 50–170)
LYMPHOCYTES # BLD AUTO: 2.21 X10(3)/MCL (ref 0.6–4.6)
LYMPHOCYTES NFR BLD AUTO: 15.9 %
MAGNESIUM SERPL-MCNC: 2.2 MG/DL (ref 1.6–2.6)
MCH RBC QN AUTO: 29.5 PG (ref 27–31)
MCHC RBC AUTO-ENTMCNC: 31.5 G/DL (ref 33–36)
MCV RBC AUTO: 93.8 FL (ref 80–94)
MONOCYTES # BLD AUTO: 1.23 X10(3)/MCL (ref 0.1–1.3)
MONOCYTES NFR BLD AUTO: 8.9 %
NEUTROPHILS # BLD AUTO: 9.92 X10(3)/MCL (ref 2.1–9.2)
NEUTROPHILS NFR BLD AUTO: 71.6 %
NRBC BLD AUTO-RTO: 2.4 %
PLATELET # BLD AUTO: 447 X10(3)/MCL (ref 130–400)
PMV BLD AUTO: 8.9 FL (ref 7.4–10.4)
POCT GLUCOSE: 146 MG/DL (ref 70–110)
POTASSIUM SERPL-SCNC: 4.3 MMOL/L (ref 3.5–5.1)
PROT SERPL-MCNC: 6 GM/DL (ref 5.8–7.6)
PROTHROMBIN TIME: 28.8 SECONDS (ref 12.5–14.5)
RBC # BLD AUTO: 3.22 X10(6)/MCL (ref 4.2–5.4)
SODIUM SERPL-SCNC: 138 MMOL/L (ref 136–145)
TIBC SERPL-MCNC: 155 UG/DL (ref 70–310)
TIBC SERPL-MCNC: 184 UG/DL (ref 250–450)
TRANSFERRIN SERPL-MCNC: 179 MG/DL
VIT B12 SERPL-MCNC: 460 PG/ML (ref 213–816)
WBC # BLD AUTO: 13.86 X10(3)/MCL (ref 4.5–11.5)

## 2024-12-16 PROCEDURE — 27000221 HC OXYGEN, UP TO 24 HOURS

## 2024-12-16 PROCEDURE — 25000003 PHARM REV CODE 250

## 2024-12-16 PROCEDURE — 83550 IRON BINDING TEST: CPT

## 2024-12-16 PROCEDURE — 80053 COMPREHEN METABOLIC PANEL: CPT | Performed by: NURSE PRACTITIONER

## 2024-12-16 PROCEDURE — 83735 ASSAY OF MAGNESIUM: CPT

## 2024-12-16 PROCEDURE — 83880 ASSAY OF NATRIURETIC PEPTIDE: CPT

## 2024-12-16 PROCEDURE — 63600175 PHARM REV CODE 636 W HCPCS

## 2024-12-16 PROCEDURE — 63600175 PHARM REV CODE 636 W HCPCS: Performed by: NURSE PRACTITIONER

## 2024-12-16 PROCEDURE — 85025 COMPLETE CBC W/AUTO DIFF WBC: CPT | Performed by: NURSE PRACTITIONER

## 2024-12-16 PROCEDURE — 21400001 HC TELEMETRY ROOM

## 2024-12-16 PROCEDURE — 82607 VITAMIN B-12: CPT

## 2024-12-16 PROCEDURE — 36415 COLL VENOUS BLD VENIPUNCTURE: CPT

## 2024-12-16 PROCEDURE — 82728 ASSAY OF FERRITIN: CPT

## 2024-12-16 PROCEDURE — 97530 THERAPEUTIC ACTIVITIES: CPT

## 2024-12-16 PROCEDURE — 99900035 HC TECH TIME PER 15 MIN (STAT)

## 2024-12-16 PROCEDURE — 97162 PT EVAL MOD COMPLEX 30 MIN: CPT

## 2024-12-16 PROCEDURE — 85610 PROTHROMBIN TIME: CPT

## 2024-12-16 RX ORDER — LANOLIN ALCOHOL/MO/W.PET/CERES
1 CREAM (GRAM) TOPICAL DAILY
Status: DISCONTINUED | OUTPATIENT
Start: 2024-12-16 | End: 2024-12-20

## 2024-12-16 RX ORDER — BISACODYL 10 MG/1
10 SUPPOSITORY RECTAL ONCE
Status: COMPLETED | OUTPATIENT
Start: 2024-12-16 | End: 2024-12-16

## 2024-12-16 RX ORDER — FUROSEMIDE 10 MG/ML
40 INJECTION INTRAMUSCULAR; INTRAVENOUS ONCE
Status: COMPLETED | OUTPATIENT
Start: 2024-12-16 | End: 2024-12-16

## 2024-12-16 RX ORDER — POLYETHYLENE GLYCOL 3350 17 G/17G
17 POWDER, FOR SOLUTION ORAL 2 TIMES DAILY
Status: DISCONTINUED | OUTPATIENT
Start: 2024-12-16 | End: 2024-12-20

## 2024-12-16 RX ADMIN — LIDOCAINE 5% 1 PATCH: 700 PATCH TOPICAL at 04:12

## 2024-12-16 RX ADMIN — TIMOLOL MALEATE 1 DROP: 5 SOLUTION OPHTHALMIC at 09:12

## 2024-12-16 RX ADMIN — PANCRELIPASE 1 CAPSULE: 120000; 24000; 76000 CAPSULE, DELAYED RELEASE PELLETS ORAL at 08:12

## 2024-12-16 RX ADMIN — INSULIN ASPART 2 UNITS: 100 INJECTION, SOLUTION INTRAVENOUS; SUBCUTANEOUS at 11:12

## 2024-12-16 RX ADMIN — FERROUS SULFATE TAB 325 MG (65 MG ELEMENTAL FE) 1 EACH: 325 (65 FE) TAB at 09:12

## 2024-12-16 RX ADMIN — AMIODARONE HYDROCHLORIDE 200 MG: 200 TABLET ORAL at 09:12

## 2024-12-16 RX ADMIN — WARFARIN SODIUM 5 MG: 5 TABLET ORAL at 04:12

## 2024-12-16 RX ADMIN — PANTOPRAZOLE SODIUM 40 MG: 40 TABLET, DELAYED RELEASE ORAL at 09:12

## 2024-12-16 RX ADMIN — CLOPIDOGREL BISULFATE 75 MG: 75 TABLET ORAL at 09:12

## 2024-12-16 RX ADMIN — POLYETHYLENE GLYCOL 3350 17 G: 17 POWDER, FOR SOLUTION ORAL at 10:12

## 2024-12-16 RX ADMIN — BISACODYL 10 MG: 10 SUPPOSITORY RECTAL at 02:12

## 2024-12-16 RX ADMIN — FAMOTIDINE 40 MG: 20 TABLET, FILM COATED ORAL at 08:12

## 2024-12-16 RX ADMIN — ATORVASTATIN CALCIUM 40 MG: 40 TABLET, FILM COATED ORAL at 08:12

## 2024-12-16 RX ADMIN — FUROSEMIDE 40 MG: 40 TABLET ORAL at 09:12

## 2024-12-16 RX ADMIN — PANCRELIPASE 1 CAPSULE: 120000; 24000; 76000 CAPSULE, DELAYED RELEASE PELLETS ORAL at 04:12

## 2024-12-16 RX ADMIN — NIFEDIPINE 30 MG: 30 TABLET, FILM COATED, EXTENDED RELEASE ORAL at 09:12

## 2024-12-16 RX ADMIN — ASPIRIN 81 MG: 81 TABLET, COATED ORAL at 09:12

## 2024-12-16 RX ADMIN — FUROSEMIDE 40 MG: 10 INJECTION, SOLUTION INTRAMUSCULAR; INTRAVENOUS at 01:12

## 2024-12-16 RX ADMIN — OXYCODONE HYDROCHLORIDE 5 MG: 5 TABLET ORAL at 09:12

## 2024-12-16 RX ADMIN — OXYCODONE HYDROCHLORIDE 5 MG: 5 TABLET ORAL at 04:12

## 2024-12-16 RX ADMIN — METOPROLOL SUCCINATE 50 MG: 50 TABLET, EXTENDED RELEASE ORAL at 09:12

## 2024-12-16 NOTE — PT/OT/SLP EVAL
"Physical Therapy Evaluation    Patient Name:  Kortney Leach   MRN:  [id number]    Recommendations:     Discharge therapy intensity: Moderate Intensity Therapy   Discharge Equipment Recommendations: none   Barriers to discharge: Decreased caregiver support, Impaired mobility, and Ongoing medical needs    Assessment:     Kortney Leach is a 79 y.o. female admitted with a medical diagnosis of low back pain 2/2 DJD and spinal stenosis, acute on chronic diastolic HF, chronic hypoxic RF on supplemental O2.  She presents with the following impairments/functional limitations: weakness, impaired endurance, impaired self care skills, impaired functional mobility, gait instability, impaired balance, pain. Cleared by NSGY to mobilize pt. Pt with fair tolerance to PT eval, limited mostly by back pain. Pt reports she has been ambulating with a RW for the past few months, but has not walked "in several days" due to increasing low back pain. On eval, pt requiring min-modA for bed mobility, Jesus for sit<>stand, and CGA for taking sidesteps along EOB with RW. Pt lives with her daughter who works so pt is alone during the day. At this time, recommending mod intensity PT at d/c in order to maximize return to functional independence.    Rehab Prognosis: Good; patient would benefit from acute skilled PT services to address these deficits and reach maximum level of function.    Recent Surgery: * No surgery found *      Plan:     During this hospitalization, patient would benefit from acute PT services 5 x/week to address the identified rehab impairments via gait training, therapeutic activities, therapeutic exercises and progress toward the following goals:    Plan of Care Expires:  01/16/25    Subjective     Chief Complaint: back pain  Patient/Family Comments/goals: to return to PLOF  Pain/Comfort:  Pain Rating 1: 4/10  Location 1: back  Pain Addressed 1: Nurse notified, Distraction, Reposition, Cessation of Activity  Pain " Rating Post-Intervention 1: 8/10    Patients cultural, spiritual, Sabianism conflicts given the current situation: no    Living Environment:  Pt lives with her dgtr in a SLH with flat entrance; pt is alone during the day while dgtr is at work  Prior to admission, patients level of function was Spencer with rolling walker.  Equipment used at home: CPAP, walker, rolling, bath bench, bedside commode.  DME owned (not currently used): single point cane.  Upon discharge, patient will have assistance from dgtr, but limited.    Objective:     Communicated with NSG prior to session.  Patient found HOB elevated with oxygen, PureWick, SCD  upon PT entry to room.    General Precautions: Standard, fall  Orthopedic Precautions:N/A   Braces: N/A  Respiratory Status:  CPAP      Exams:  Sensation:    -       Intact  light/touch BLE  RLE ROM: WFL  RLE Strength: hip 4/5, knee/ankle 5/5  LLE ROM: WFL  LLE Strength: hip 4/5, knee/ankle 5/5  Skin integrity: Visible skin intact      Functional Mobility:  Bed Mobility:     Rolling L and R: stand-by assistance, increased time required  Scooting: minimum assistance  Supine to Sit: minimum assistance  Sit to Supine: moderate assistance  Transfers:     Sit to Stand:  minimum assistance with rolling walker  Gait: Pt sidestepped x ~2' towards HOB with CGA and RW, requested pt to remain standing while changing chux pads but pt insisted on sitting 2/2 pain and fatigue   Balance: CGA static sitting balance with BUE support and rails, occasional posterior lean 2/2 pain      AM-PAC 6 CLICK MOBILITY  Total Score:13       Treatment & Education:  Pt with +BM upon standing, unsure if it was active or old, increased time for changing chux and performing pericare. Requested pt to remain standing for cleanup but pt unable to tolerate standing for increased time 2/2 pain, so changing of chux pads and pericare performed by therapist while pt in bed    Patient provided with verbal education education regarding  PT role/goals/POC, fall prevention, and safety awareness.  Understanding was verbalized.     Patient left HOB elevated with all lines intact, call button in reach, and NSG notified.    GOALS:   Multidisciplinary Problems       Physical Therapy Goals          Problem: Physical Therapy    Goal Priority Disciplines Outcome Interventions   Physical Therapy Goal     PT, PT/OT Progressing    Description: Goals to be met by: 25     Patient will increase functional independence with mobility by performin. Supine to sit with Modified Beckham  2. Sit to supine with Modified Beckham  3. Sit to stand transfer with Modified Beckham  4. Bed to chair transfer with Modified Beckham using LRAD  5. Gait  x 150 feet with Modified Beckham using LRAD                         History:     Past Medical History:   Diagnosis Date    Anticoagulant long-term use     Asthma     Chronic sinusitis, unspecified     COPD (chronic obstructive pulmonary disease)     CVID (common variable immunodeficiency)     Diabetes mellitus, type 2     GERD (gastroesophageal reflux disease)     Hypertension     Severe mitral valve regurgitation     Sleep apnea     uses CPAP    SOB (shortness of breath)     Unspecified glaucoma     Weakness        Past Surgical History:   Procedure Laterality Date    A-V CARDIAC PACEMAKER INSERTION N/A 2023    Procedure: INSERTION, CARDIAC PACEMAKER, DUAL CHAMBER;  Surgeon: Arnaud Moon MD;  Location: [facility] CATH LAB;  Service: Cardiology;  Laterality: N/A;  SJM    ANGIOGRAM, CORONARY, WITH LEFT HEART CATHETERIZATION N/A 2024    Procedure: Angiogram, Coronary, with Left Heart Cath;  Surgeon: Tatiana Jarrett MD;  Location: [facility] CATH LAB;  Service: Cardiology;  Laterality: N/A;    APPENDECTOMY      CATARACT EXTRACTION Bilateral     CATHETERIZATION OF BOTH LEFT AND RIGHT HEART N/A 2024    Procedure: CATHETERIZATION, HEART, BOTH LEFT AND RIGHT;  Surgeon: Sebastian Watters Jr., MD;   Location: [facility] CATH LAB;  Service: Cardiology;  Laterality: N/A;  With Valve Study    COLONOSCOPY      EGD, WITH BALLOON DILATION      ESOPHAGOGASTRODUODENOSCOPY      LEFT HEART CATHETERIZATION Left 09/06/2023    Procedure: Left heart cath;  Surgeon: Vijay Uribe MD;  Location: [facility] CATH LAB;  Service: Cardiology;  Laterality: Left;  [facility]    MITRAL VALVE REPLACEMENT N/A 9/20/2023    Procedure: REPLACEMENT, MITRAL VALVE;  Surgeon: Steven Rosales IV, MD;  Location: [facility];  Service: Cardiovascular;  Laterality: N/A;    TONSILLECTOMY      TOTAL KNEE ARTHROPLASTY Bilateral        Time Tracking:     PT Received On: 12/16/24  PT Start Time: 1130     PT Stop Time: 1155  PT Total Time (min): 25 min     Billable Minutes: Evaluation 15 and Therapeutic Activity 10      12/16/2024

## 2024-12-16 NOTE — CONSULTS
Inpatient consult to Cardiology  Consult performed by: Rhoda Escoto FNP  Consult ordered by: Catracho Galan MD  Reason for consult: Severe mitral stenosis - known patient on coumadin        [facility] *    Cardiology  Consult Note    Patient Name: Kortney Leach  MRN: [id number]  Admission Date: 12/15/2024  Hospital Length of Stay: 1 days  Code Status: Full Code   Attending Provider: Catracho Galan,*   Consulting Provider: YANIRA Turner  Primary Care Physician: Arnaldo Hernandez MD  Principal Problem:<principal problem not specified>    Patient information was obtained from patient, past medical records, and ER records.     Subjective:     Reason for Consult: Severe Mitral Stenosis - Known patient on Coumadin     HPI: Ms. Leach is an 79 year old female who is known to [facility], Dr. Uribe & Dr. Moon. She presents to the ER from rehab with complaints of lower back pain/spasms. She endorses worsening but denies CP or palps. She reports not being able to walk due to the back pain & constipation. Of note, she was recently discharged from [facility] on 12.3.24 for acute CHF exacerbation. She underwent a MARYBETH that revealed Severe MS & Mod MR and underwent a stent to the prox LAD. She was seen by CV surgery & structural heart with plans to start Coumadin to see if the gradient can be decreased. Plan is for repeat TTE in a few weeks to re-evaluate. Significant labs include WBC 13.86, Plt 447. A CXR was obtained and demonstrated increased density on the right with small right-sided pleural effusion cannot rule out infiltrate. This appears worse than on previous. He was admitted to hospital medicine with a consult to neurosurgery. [facility] has been consulted due to the patient's known severe MS.       PMH: VHD, GEMINI, DM II, PAF, ABEL, COPD, HTN, DVT SSS, HLD, Asthma, Left Thyroid Nodule, GERD, Chronic Sinusitis, Common Variable Immunodeficiency, Glaucoma  PSH: PPM, LHC, MVR, Bilateral  Cataract Extraction, Total Knee Replacement, Bilateral mastectomy, Appendectomy, Colonoscopy, EGD, Tonsillectomy,    Family History: Father - Lung Cancer; Mother - Alzheimer, CVA; Sister - HTN, Multiple Sclerosis, DM II  Social History: Former Smoker (Quit in 1998). Denies illicit drug use and alcohol use.      Previous Cardiac Diagnostics:   [facility] 11.29.24:  Eccentric Prox LAD lesion was 80% stenosed with 0% stenosis post-intervention. iFR 0.84  Prox LAD lesion: A STENT SYNERGY XD [id number] stent was successfully placed at 8 GEO for 10 sec. Proximal part of the stent was post dilated by using 3.5/12 mm NC and a 4/8 mm NC balloons at 12 atmospheres.     [facility] 11.27.24:  Findings:  - There is single vessel coronary artery disease.  - Mid Left Anterior Descending has a calcified 70% stenosis.  - LVEDP is normal at 6 mmHg.  - RHC with elevated R sided filling pressures. RA 7 mmHg, RV 50/7 mmHg, PA 47/26 mmHg (mean 33 mmHg), PCWP 24 mmHg.  - PCWP to LV diastolic mean gradient was calculated to be 22 mmHg.  - Transpulmonary gradient is 8 mmHg.  - Normal Cardiac Output/Index at 4.7/2.3, as calculated by Rahel equation.  - PVR is 1.7 Woods units  - SVR is 1260 dyne-sec*cm^-5  - Pulmonary Artery Pulsatility Index (Nury) is 3.0  - Cardiac Power Output () is 0.84  Assessment/Plan:  - Patient is a 79 y.o. female with a history of prior bioprosthetic mitral valve replacement now presents with heart failure.  Transesophageal echocardiogram was obtained.  Final report is pending at this time, but there were findings consistent with severe bioprosthetic MVR stenosis.  Cardiac catheterization findings shown above do suggest that there is a gradient of around 22 mm Hg across the bioprosthetic valve.  -recommend consultation with CT surgery   -obtain CT with mitral valve in valve protocol for possible TMVR  -LAD should be revascularized as well     MARYBETH (11.27.24):  Left Ventricle: The left ventricle is normal in size. Septal motion is  consistent with post-operative status. There is normal systolic function with a visually estimated ejection fraction of 60 - 65%.  Right Ventricle: Normal right ventricular cavity size. Systolic function is normal.  Left Atrium: Left atrium is severely dilated.  Mitral Valve: There is a bioprosthetic valve in the mitral position. There is severe stenosis. The mean pressure gradient across the mitral valve is 16 mmHg at a heart rate of  bpm. There is moderate regurgitation.     ECHO (11.23.24):  Left Ventricle: The left ventricle is normal in size. Mildly increased wall thickness. There is normal systolic function with a visually estimated ejection fraction of 60 - 65%. Grade I diastolic dysfunction. Right Ventricle: Normal right ventricular cavity size. Systolic function is borderline low. Aortic Valve: There is aortic valve sclerosis. Mitral Valve: There is a bioprosthetic valve in the mitral position (25 mm mosaic porcine valve).  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 There is mild (1+) regurgitation. Tricuspid Valve: There moderate (2+) regurgitation. The estimated pulmonary artery systolic pressure is 57 mmHg. Pacemaker lead noted.  Recommend transesophageal echocardiogram to properly evaluate the bioprosthetic mitral valve.     ECHO (11.14.24):  The study quality is average. The left ventricle is normal in size. Global left ventricular systolic function is normal. The left ventricular ejection fraction is 65%. Left ventricular diastolic function is normal. Mild concentric left ventricular hypertrophy is present. The left atrium is moderately enlarged. (4.6 cms). A normal functioning prosthetic mitral valve is noted. MG = 4.3 mmHg. The pulmonary artery systolic pressure is 37 mmHg.      PPM Insertion (9.25.23):  Successful implantation of PPM Dual. [facility]      PET (1.24.23):  This is a normal perfusion  study, no perfusion defects noted. There is no evidence of ischemia.This scan is suggestive of low risk for future cardiovascular events. The left ventricular cavity is noted to be normal on the stress studies. The stress left ventricular ejection fraction was calculated to be 68% and left ventricular global function is normal. The rest left ventricular cavity is noted to be normal. The rest left ventricular ejection fraction was calculated to be 63% and rest left ventricular global function is normal. When compared to the resting ejection fraction (63%), the stress ejection fraction (68%) has increased. Transient ischemic dilatation is present and has been described as a marker for high risk coronary artery disease. It has also been described in microvascular disease, hypertensive heart disease as well as cardiac deconditioning. The study quality is good.   There was a rise in myocardial blood flow between rest and stress.  Global myocardial blood flow reserve was 2.72.  Normal global coronary flow reserve is suggestive of low coronary event risk.     Carotid US (1.24.23):  The study quality is good. There is no plaque noted in the proximal right internal carotid artery. 1-39% stenosis in the proximal left internal carotid artery based on Bluth Criteria. Antegrade right vertebral artery flow. Antegrade left vertebral artery flow.     LHC (9.6.23):  Normal Coronaries       Review of patient's allergies indicates:   Allergen Reactions    Adhesive tape-silicones      Current Facility-Administered Medications on File Prior to Encounter   Medication    0.9%  NaCl infusion    diazePAM tablet 10 mg    diphenhydrAMINE capsule 50 mg    [DISCONTINUED] albuterol-ipratropium 2.5 mg-0.5 mg/3 mL nebulizer solution 3 mL    [DISCONTINUED] ALPRAZolam tablet 0.25 mg    [DISCONTINUED] amiodarone tablet 200 mg    [DISCONTINUED] aspirin EC tablet 81 mg    [DISCONTINUED] atorvastatin tablet 40 mg    [DISCONTINUED] baclofen tablet 10 mg     [DISCONTINUED] benzonatate capsule 100 mg    [DISCONTINUED] bisacodyL EC tablet 5 mg    [DISCONTINUED] bisacodyL suppository 10 mg    [DISCONTINUED] calcium carbonate 200 mg calcium (500 mg) chewable tablet 1,000 mg    [DISCONTINUED] cefdinir capsule 300 mg    [DISCONTINUED] cetirizine tablet 10 mg    [DISCONTINUED] ciprofloxacin HCl tablet 750 mg    [DISCONTINUED] clopidogreL tablet 75 mg    [DISCONTINUED] dextrose 10% bolus 125 mL 125 mL    [DISCONTINUED] dextrose 10% bolus 250 mL 250 mL    [DISCONTINUED] diclofenac sodium 1 % gel 2 g    [DISCONTINUED] docusate sodium capsule 100 mg    [DISCONTINUED] famotidine tablet 40 mg    [DISCONTINUED] fluticasone propionate 50 mcg/actuation nasal spray 100 mcg    [DISCONTINUED] furosemide tablet 40 mg    [DISCONTINUED] gabapentin capsule 100 mg    [DISCONTINUED] glimepiride tablet 4 mg    [DISCONTINUED] glucagon (human recombinant) injection 1 mg    [DISCONTINUED] glucose chewable tablet 16 g    [DISCONTINUED] glucose chewable tablet 24 g    [DISCONTINUED] hydrALAZINE injection 10 mg    [DISCONTINUED] HYDROcodone-acetaminophen 5-325 mg per tablet 1 tablet    [DISCONTINUED] insulin aspart U-100 injection 0-10 Units    [DISCONTINUED] insulin aspart U-100 injection 3 Units    [DISCONTINUED] insulin glargine U-100 (Lantus) injection 5 Units    [DISCONTINUED] lactulose 10 gram/15 ml solution 20 g    [DISCONTINUED] LIDOcaine 5 % patch 2 patch    [DISCONTINUED] lipase-protease-amylase 24,000-76,000-120,000 units capsule 2 capsule    [DISCONTINUED] loperamide capsule 2 mg    [DISCONTINUED] magnesium oxide tablet 400 mg    [DISCONTINUED] melatonin tablet 6 mg    [DISCONTINUED] methocarbamoL tablet 750 mg    [DISCONTINUED] metoprolol succinate (TOPROL-XL) 24 hr tablet 50 mg    [DISCONTINUED] morphine injection 2 mg    [DISCONTINUED] ondansetron injection 4 mg    [DISCONTINUED] pantoprazole EC tablet 40 mg    [DISCONTINUED] simethicone chewable tablet 80 mg    [DISCONTINUED] sodium  chloride 0.9% flush 10 mL    [DISCONTINUED] warfarin tablet 3 mg     Current Outpatient Medications on File Prior to Encounter   Medication Sig    amiodarone (PACERONE) 200 MG Tab Take 200 mg by mouth once daily.    aspirin (ECOTRIN) 81 MG EC tablet Take 1 tablet (81 mg total) by mouth once daily. for 25 days    atorvastatin (LIPITOR) 40 MG tablet Take 40 mg by mouth once daily.    clopidogreL (PLAVIX) 75 mg tablet Take 1 tablet (75 mg total) by mouth once daily.    DUPIXENT SYRINGE 300 mg/2 mL Syrg Inject 300 mg into the skin every 14 (fourteen) days.    estradioL (ESTRACE) 0.01 % (0.1 mg/gram) vaginal cream Place 0.5 g vaginally twice a week.    famotidine (PEPCID) 40 MG tablet Take 40 mg by mouth every evening.    fluticasone-umeclidin-vilanter (TRELEGY ELLIPTA) 200-62.5-25 mcg inhaler Inhale 1 puff into the lungs once daily.    furosemide (LASIX) 40 MG tablet Take 40 mg by mouth.    glimepiride (AMARYL) 2 MG tablet Take 4 mg by mouth 2 (two) times a day.    HYDROcodone-acetaminophen (NORCO)  mg per tablet Take 1 tablet by mouth every 6 (six) hours as needed for Pain.    immun glob G,IgG,/pro/IgA 0-50 (HIZENTRA SUBQ) Inject into the skin every 14 (fourteen) days.    lipase-protease-amylase 24,000-76,000-120,000 units (CREON) 24,000-76,000 -120,000 unit capsule Take 1-2 capsules by mouth 3 (three) times daily with meals. 2 caps with meals and 1 cap c snacks    metFORMIN (GLUCOPHAGE) 500 MG tablet Take 1 tablet (500 mg total) by mouth 2 (two) times daily with meals.    metoprolol succinate (TOPROL-XL) 50 MG 24 hr tablet Take 1 tablet (50 mg total) by mouth once daily.    NIFEdipine (PROCARDIA-XL) 30 MG (OSM) 24 hr tablet Take 30 mg by mouth.    pantoprazole (PROTONIX) 40 MG tablet Take 1 tablet by mouth every morning.    timoloL (BETIMOL) 0.5 % ophthalmic solution Place 1 drop into both eyes 2 (two) times daily.    warfarin (COUMADIN) 5 MG tablet Take 1 tablet (5 mg total) by mouth Daily.     Family History        Problem Relation (Age of Onset)    Alzheimer's disease Mother    Diabetes Father, Brother    Lung cancer Father    Multiple sclerosis Sister    Stroke Mother          Tobacco Use    Smoking status: Former     Current packs/day: 0.00     Types: Cigarettes     Quit date:      Years since quittin.9    Smokeless tobacco: Never   Substance and Sexual Activity    Alcohol use: Not Currently    Drug use: Not Currently    Sexual activity: Not Currently       Review of Systems   Respiratory:  Positive for shortness of breath.    Cardiovascular:  Negative for chest pain and palpitations.   Gastrointestinal:  Positive for constipation.   Musculoskeletal:  Positive for back pain.   Neurological:  Positive for weakness.   All other systems reviewed and are negative.    Objective:     Vital Signs (Most Recent):  Temp: 98.9 °F (37.2 °C) (24)  Pulse: 67 (24)  Resp: 19 (24)  BP: (!) 125/59 (24)  SpO2: 95 % (24) Vital Signs (24h Range):  Temp:  [98.1 °F (36.7 °C)-98.9 °F (37.2 °C)] 98.9 °F (37.2 °C)  Pulse:  [65-72] 67  Resp:  [17-19] 19  SpO2:  [90 %-98 %] 95 %  BP: (102-141)/(51-71) 125/59      There is no height or weight on file to calculate BMI.  SpO2: 95 %       Intake/Output Summary (Last 24 hours) at 2024 0758  Last data filed at 2024 0139  Gross per 24 hour   Intake --   Output 550 ml   Net -550 ml     Lines/Drains/Airways       Peripheral Intravenous Line  Duration                  Peripheral IV - Single Lumen 24 0835 No Posterior;Right Forearm 6 days                  Significant Labs:   Chemistries:   Recent Labs   Lab 12/10/24  0503 24  0538 24  0441 12/15/24  1613 24  0507    139 140 138 138   K 3.4* 3.8 4.1 4.3 4.3    100 100 101 100   CO2 22* 29 28 28 29   BUN 27.7* 29.3* 30.2* 20.2* 17.3   CREATININE 0.76 0.80 0.90 0.73 0.76   CALCIUM 8.6 8.5 8.7 8.4 8.5   BILITOT  --   --  0.5 0.6 0.7   ALKPHOS  --   --   94 98 96   ALT  --   --  13 15 13   AST  --   --  23 30 26   GLUCOSE 79* 93 136* 123* 127*   MG  --   --   --   --  2.20        CBC/Anemia Labs: Coags:    Recent Labs   Lab 12/13/24  0441 12/15/24  1612 12/16/24  0507   WBC 13.26* 14.67* 13.86*   HGB 9.9* 9.6* 9.5*   HCT 32.2* 30.0* 30.2*   * 410* 447*   MCV 97.0* 94.6* 93.8   RDW 17.3* 17.9* 18.1*   IRON  --   --  29*   FERRITIN  --   --  98.28   [id number]  --   --  460    Recent Labs   Lab 12/14/24  0437 12/15/24  0501 12/16/24  0507   INR 4.1* 2.7* 2.8*        Significant Imaging:    EKG: None to review    Telemetry:  SR    Physical Exam  HENT:      Head: Normocephalic.      Nose: Nose normal.      Mouth/Throat:      Mouth: Mucous membranes are moist.   Eyes:      Extraocular Movements: Extraocular movements intact.   Cardiovascular:      Rate and Rhythm: Normal rate and regular rhythm.      Pulses: Normal pulses.      Heart sounds: Murmur heard.   Pulmonary:      Effort: Pulmonary effort is normal.      Comments: Diminished breath sounds on right  Abdominal:      Palpations: Abdomen is soft.   Skin:     General: Skin is warm and dry.   Neurological:      Mental Status: She is alert and oriented to person, place, and time.   Psychiatric:         Behavior: Behavior normal.         Home Medications:   Current Facility-Administered Medications on File Prior to Encounter   Medication Dose Route Frequency Provider Last Rate Last Admin    0.9%  NaCl infusion   Intravenous On Call Procedure Vijay Uribe MD        diazePAM tablet 10 mg  10 mg Oral On Call Procedure Vijay Uribe MD   10 mg at 09/06/23 1021    diphenhydrAMINE capsule 50 mg  50 mg Oral On Call Procedure Vijay Uribe MD   50 mg at 09/06/23 1021    [DISCONTINUED] albuterol-ipratropium 2.5 mg-0.5 mg/3 mL nebulizer solution 3 mL  3 mL Nebulization Q4H PRN Reyes, Thairy G, DO        [DISCONTINUED] ALPRAZolam tablet 0.25 mg  0.25 mg Oral TID PRN Reyes, Thairy G, DO   0.25 mg at 12/13/24 0904     [DISCONTINUED] amiodarone tablet 200 mg  200 mg Oral Daily Reyes, Thairy G, DO   200 mg at 12/15/24 0843    [DISCONTINUED] aspirin EC tablet 81 mg  81 mg Oral Daily Reyes, Thairy G, DO   81 mg at 12/15/24 0843    [DISCONTINUED] atorvastatin tablet 40 mg  40 mg Oral Daily ReyesFatuma G, DO   40 mg at 12/14/24 2040    [DISCONTINUED] baclofen tablet 10 mg  10 mg Oral TID PRN Carla Alex MD   10 mg at 12/15/24 1121    [DISCONTINUED] benzonatate capsule 100 mg  100 mg Oral TID PRN Reyes, Thairy G, DO        [DISCONTINUED] bisacodyL EC tablet 5 mg  5 mg Oral Daily PRN Reyes, Thairy G, DO   5 mg at 12/09/24 1108    [DISCONTINUED] bisacodyL suppository 10 mg  10 mg Rectal Daily PRN Reyes, Thairy G, DO        [DISCONTINUED] calcium carbonate 200 mg calcium (500 mg) chewable tablet 1,000 mg  1,000 mg Oral TID PRN Reyes, Thairy G, DO        [DISCONTINUED] cefdinir capsule 300 mg  300 mg Oral Q12H Carla Alex MD   300 mg at 12/15/24 0843    [DISCONTINUED] cetirizine tablet 10 mg  10 mg Oral Daily Reyes, Thairy G, DO   10 mg at 12/15/24 0842    [DISCONTINUED] ciprofloxacin HCl tablet 750 mg  750 mg Oral Q12H Carla Alex MD   750 mg at 12/15/24 0843    [DISCONTINUED] clopidogreL tablet 75 mg  75 mg Oral Daily Reyes, Thairy G, DO   75 mg at 12/15/24 0843    [DISCONTINUED] dextrose 10% bolus 125 mL 125 mL  12.5 g Intravenous PRN Reyes, Thairy G, DO        [DISCONTINUED] dextrose 10% bolus 250 mL 250 mL  25 g Intravenous PRN Reyes, Thairy G, DO        [DISCONTINUED] diclofenac sodium 1 % gel 2 g  2 g Topical (Top) BID PRN Reyes, Thairy G, DO   2 g at 12/11/24 0839    [DISCONTINUED] docusate sodium capsule 100 mg  100 mg Oral Daily Reyes, Thairy G, DO   100 mg at 12/15/24 0842    [DISCONTINUED] famotidine tablet 40 mg  40 mg Oral QHS Reyes, Thairy G, DO   40 mg at 12/14/24 2039    [DISCONTINUED] fluticasone propionate 50 mcg/actuation nasal spray 100 mcg  2 spray Each Nostril BID Carla Alex MD   100 mcg at  12/15/24 0900    [DISCONTINUED] furosemide tablet 40 mg  40 mg Oral Daily Reyes, Thairy G, DO   40 mg at 12/15/24 0843    [DISCONTINUED] gabapentin capsule 100 mg  100 mg Oral BID Carla Alex MD   100 mg at 12/15/24 0844    [DISCONTINUED] glimepiride tablet 4 mg  4 mg Oral BID Kiah Carpenter PA   4 mg at 12/15/24 0842    [DISCONTINUED] glucagon (human recombinant) injection 1 mg  1 mg Intramuscular PRN Reyes, Thairy G, DO        [DISCONTINUED] glucose chewable tablet 16 g  16 g Oral PRN Reyes, Thairy G, DO        [DISCONTINUED] glucose chewable tablet 24 g  24 g Oral PRN Reyes, Thairy G, DO        [DISCONTINUED] hydrALAZINE injection 10 mg  10 mg Intravenous Q4H PRN Reyes, Thairy G, DO        [DISCONTINUED] HYDROcodone-acetaminophen 5-325 mg per tablet 1 tablet  1 tablet Oral Q4H PRN Richard Ruiz MD   1 tablet at 12/15/24 0844    [DISCONTINUED] insulin aspart U-100 injection 0-10 Units  0-10 Units Subcutaneous QID (AC + HS) PRN Reyes, Thairy G, DO   2 Units at 12/13/24 1207    [DISCONTINUED] insulin aspart U-100 injection 3 Units  3 Units Subcutaneous TIDWM Reyes, Thairy G, DO   3 Units at 12/15/24 1121    [DISCONTINUED] insulin glargine U-100 (Lantus) injection 5 Units  5 Units Subcutaneous Daily Reyes, Thairy G, DO   5 Units at 12/15/24 0845    [DISCONTINUED] lactulose 10 gram/15 ml solution 20 g  20 g Oral BID Aury Soni MD   20 g at 12/15/24 0844    [DISCONTINUED] LIDOcaine 5 % patch 2 patch  2 patch Transdermal Q24H Reyes, Thairy G, DO   2 patch at 12/15/24 1121    [DISCONTINUED] lipase-protease-amylase 24,000-76,000-120,000 units capsule 2 capsule  2 capsule Oral TID WM Reyes, Thairy G, DO   2 capsule at 12/15/24 1121    [DISCONTINUED] loperamide capsule 2 mg  2 mg Oral Q4H PRN Reyes, Thairy G, DO        [DISCONTINUED] magnesium oxide tablet 400 mg  400 mg Oral Daily Reyes, Thairy G, DO   400 mg at 12/15/24 0843    [DISCONTINUED] melatonin tablet 6 mg  6 mg Oral Nightly PRN Reyes, Thairy  G, DO        [DISCONTINUED] methocarbamoL tablet 750 mg  750 mg Oral TID Reyes, Thairy G, DO   750 mg at 12/15/24 0843    [DISCONTINUED] metoprolol succinate (TOPROL-XL) 24 hr tablet 50 mg  50 mg Oral Daily Reyes, Thairy G, DO   50 mg at 12/15/24 0843    [DISCONTINUED] morphine injection 2 mg  2 mg Intravenous Q8H PRN MarisabelMartina, DO   2 mg at 12/15/24 1303    [DISCONTINUED] ondansetron injection 4 mg  4 mg Intravenous Q8H PRN Reyes, Thairy G, DO   4 mg at 12/11/24 1014    [DISCONTINUED] pantoprazole EC tablet 40 mg  40 mg Oral QAM Reyes, Thairy G, DO   40 mg at 12/15/24 0634    [DISCONTINUED] simethicone chewable tablet 80 mg  1 tablet Oral QID PRN ReyesFatuma, DO        [DISCONTINUED] sodium chloride 0.9% flush 10 mL  10 mL Intravenous PRN ReyesFatuma DO        [DISCONTINUED] warfarin tablet 3 mg  3 mg Oral Daily Carla Alex MD         Current Outpatient Medications on File Prior to Encounter   Medication Sig Dispense Refill    amiodarone (PACERONE) 200 MG Tab Take 200 mg by mouth once daily.      aspirin (ECOTRIN) 81 MG EC tablet Take 1 tablet (81 mg total) by mouth once daily. for 25 days 25 tablet 0    atorvastatin (LIPITOR) 40 MG tablet Take 40 mg by mouth once daily.      clopidogreL (PLAVIX) 75 mg tablet Take 1 tablet (75 mg total) by mouth once daily. 30 tablet 0    DUPIXENT SYRINGE 300 mg/2 mL Syrg Inject 300 mg into the skin every 14 (fourteen) days.      estradioL (ESTRACE) 0.01 % (0.1 mg/gram) vaginal cream Place 0.5 g vaginally twice a week.      famotidine (PEPCID) 40 MG tablet Take 40 mg by mouth every evening.      fluticasone-umeclidin-vilanter (TRELEGY ELLIPTA) 200-62.5-25 mcg inhaler Inhale 1 puff into the lungs once daily.      furosemide (LASIX) 40 MG tablet Take 40 mg by mouth.      glimepiride (AMARYL) 2 MG tablet Take 4 mg by mouth 2 (two) times a day.      HYDROcodone-acetaminophen (NORCO)  mg per tablet Take 1 tablet by mouth every 6 (six) hours as needed for  Pain. 20 tablet 0    immun glob G,IgG,/pro/IgA 0-50 (HIZENTRA SUBQ) Inject into the skin every 14 (fourteen) days.      lipase-protease-amylase 24,000-76,000-120,000 units (CREON) 24,000-76,000 -120,000 unit capsule Take 1-2 capsules by mouth 3 (three) times daily with meals. 2 caps with meals and 1 cap c snacks      metFORMIN (GLUCOPHAGE) 500 MG tablet Take 1 tablet (500 mg total) by mouth 2 (two) times daily with meals. 180 tablet 3    metoprolol succinate (TOPROL-XL) 50 MG 24 hr tablet Take 1 tablet (50 mg total) by mouth once daily. 30 tablet 0    NIFEdipine (PROCARDIA-XL) 30 MG (OSM) 24 hr tablet Take 30 mg by mouth.      pantoprazole (PROTONIX) 40 MG tablet Take 1 tablet by mouth every morning.      timoloL (BETIMOL) 0.5 % ophthalmic solution Place 1 drop into both eyes 2 (two) times daily.      warfarin (COUMADIN) 5 MG tablet Take 1 tablet (5 mg total) by mouth Daily. 30 tablet 0     Current Schedule Inpatient Medications:   amiodarone  200 mg Oral Daily    aspirin  81 mg Oral Daily    atorvastatin  40 mg Oral QHS    clopidogreL  75 mg Oral Daily    famotidine  40 mg Oral QHS    ferrous sulfate  1 tablet Oral Daily    furosemide  40 mg Oral Daily    LIDOcaine  1 patch Transdermal Q24H    lipase-protease-amylase 24,000-76,000-120,000 units  1 capsule Oral TID WM    metoprolol succinate  50 mg Oral Daily    NIFEdipine  30 mg Oral Daily    pantoprazole  40 mg Oral QAM    polyethylene glycol  17 g Oral Daily    timolol maleate 0.5%  1 drop Both Eyes BID    warfarin  5 mg Oral Daily     Continuous Infusions:    Assessment:   Acute on Chronic Diastolic Heart Failure - Related to VHD/Severe MS     - EF 60-65% with G 1 Diastolic Dysfunction (11.14.24)   VHD    - MARYBETH (11.27.24) - Mitral Valve: There is a bioprosthetic valve in the mitral position. There is severe stenosis. The mean pressure gradient across the mitral valve is 16 mmHg at a heart rate of bpm. There is moderate regurgitation.     - ECHO (11.23.24): There  is evidence of bioprosthetic mitral valve stenosis The mean pressure gradient across the mitral valve is 19 mmHg at a heart rate of 78 bpm.     - s/p MVR (9.20.23): Mitral valve replacement with a 25 mm mosaic porcine valve    - Moderate TR    - Mild MR  CAD/Stents    - s/p LHC (11.29.24) - Eccentric Prox LAD lesion was 80% stenosed with 0% stenosis post-intervention. iFR 0.84. Prox LAD lesion: A STENT SYNERGY XD [id number] stent was successfully placed at 8 GEO for 10 sec. Proximal part of the stent was post dilated by using 3.5/12 mm NC and a 4/8 mm NC balloons at 12 atmospheres.  Chronic Hypoxic Respiratory Failure Requiring Supplemental O2  Anemia (Mild)  Leukocytosis   COPD Without Exacerbation   Pulmonary HTN  GEMINI  DM II  PAF - Currently SR    - [id number] Score 6 (9.7% Stroke Risk Per Year)    - s/p Ligation of SUKHI (9.20.23) - [facility]   ABEL  HTN  DVT s/p IVC Filter     - on Xarelto Outpatient   SSS    - s/p PPM (9.25.23): [facility]   HLD  Asthma  Left Thyroid Nodule  GERD  Chronic Sinusitis  Common Variable Immunodeficiency  Glaucoma  No Known History of GI Bleed       Plan:   Obtain BNP.  She reports worsening SOB. Currently on Lasix 40 mg PO daily. Can benefit from IV lasix.   Continue Coumadin 5 mg oral Daily; INR Goal 2.0-3.0   Continue Amio, Statin, BB, Nifedipine.  Currently on triple therapy. Due to the patient's age and higher risk of bleeding, will discontinue ASA now and continue Plavix & Coumadin.   Plan was to repeat echo in 4-6 weeks from previous admission to re-evaluate mean pressure gradient across mitral valve.   Neurosurgery on board to further evaluate lower back pain.     Thank you for your consult.     Rhoda Escoto, YO  Cardiology  [facility]

## 2024-12-16 NOTE — CONSULTS
[facility] Neuro  Neurosurgery  Consult Note    Inpatient consult to Neurosurgery  Consult performed by: Rocío Green, AGACNP-BC  Consult ordered by: Rose Bateman, AGACNP-BC        Subjective:     Chief Complaint/Reason for Admission:  Back pain    History of Present Illness:  This is an 79-year-old  female with past medical history significant for atrial fib, hypertension, valvular heart disease status post mitral valve replacement, CAD, DVT status post IVC filter on Xarelto, diabetes, GERD, sick sinus syndrome status post pacemaker, hypertension, hyperlipidemia, ABEL, obesity, asthma, COPD, spinal cord stimulator.  Initially here for respiratory issues.  CTA with no PE.  Patient with cardiomegaly and mild congestive changes in pleural effusions.  Given Lasix.  Seen by Cardiology and pulmonology.  Had a left heart catheterization on 11/29/2024 with a stent to the LAD.    Neurosurgery was consulted for increasing back pain.    CT lumbar spine performed on 12/13/2024:    At L1-L2, there is bulging of annulus fibrosis and facet arthropathy which results in mild central canal stenosis.  There are no narrowings of the neural foramen.     At L2-L3, there is a generalized disc bulge, ligamentum flavum thickening and facet arthropathy contributing to cause mild central canal stenosis.  There is mild narrowing of the right neural foramen.  The left neural foramen is patent.     At L3-L4, there is broad osteophyte disc complex, ligamentum flavum thickening and facet arthropathy.  In conjunction, these findings cause mild to moderate central canal stenosis.  Right neural foramen is patent.  There is mild narrowing of the left neural foramen.     At L4-L5, there is broad disc protrusion which lateralizes into the left neural foramen.  There is ligamentum flavum thickening and facet arthropathy which is more pronounced on the left.  Thecal sac is deviated towards the right.  Central canal  stenosis is moderate.  There is mild narrowing of right neural foramen.  There is moderate narrowing of the left neural foramen caused by lateralized portion of the disc and spondylosis.     At L5-S1, there is mild osteophyte disc complex and facet arthropathy.  Ventral thecal sac is slightly flattened without significant central canal stenosis.  Right neural foramen is patent.  There is mild narrowing of the left neural foramen caused by uncovertebral and facet arthropathy.     Impression:     Lumbar degenerative disc disease and spondylosis level by level discussed above.    On physical exam patient is lying in bed with family at bedside.  She is wearing CPAP apparatus.  She is fully oriented with a GCS of 15.  She moves all extremities in his actually strong to all individual muscle groups 5/5.  Sensation is intact.  She has no saddle anesthesia.  No new bowel or bladder issues other than constipation from opioid usage.  She does have lower back pain that is significant upon any type of movement.    She is awaiting an MRI of the lumbar spine but has a pacemaker and a spinal cord stimulator.      PTA Medications   Medication Sig    amiodarone (PACERONE) 200 MG Tab Take 200 mg by mouth once daily.    aspirin (ECOTRIN) 81 MG EC tablet Take 1 tablet (81 mg total) by mouth once daily. for 25 days    atorvastatin (LIPITOR) 40 MG tablet Take 40 mg by mouth once daily.    clopidogreL (PLAVIX) 75 mg tablet Take 1 tablet (75 mg total) by mouth once daily.    DUPIXENT SYRINGE 300 mg/2 mL Syrg Inject 300 mg into the skin every 14 (fourteen) days.    estradioL (ESTRACE) 0.01 % (0.1 mg/gram) vaginal cream Place 0.5 g vaginally twice a week.    famotidine (PEPCID) 40 MG tablet Take 40 mg by mouth every evening.    fluticasone-umeclidin-vilanter (TRELEGY ELLIPTA) 200-62.5-25 mcg inhaler Inhale 1 puff into the lungs once daily.    furosemide (LASIX) 40 MG tablet Take 40 mg by mouth.    glimepiride (AMARYL) 2 MG tablet Take 4 mg  by mouth 2 (two) times a day.    HYDROcodone-acetaminophen (NORCO)  mg per tablet Take 1 tablet by mouth every 6 (six) hours as needed for Pain.    immun glob G,IgG,/pro/IgA 0-50 (HIZENTRA SUBQ) Inject into the skin every 14 (fourteen) days.    lipase-protease-amylase 24,000-76,000-120,000 units (CREON) 24,000-76,000 -120,000 unit capsule Take 1-2 capsules by mouth 3 (three) times daily with meals. 2 caps with meals and 1 cap c snacks    metFORMIN (GLUCOPHAGE) 500 MG tablet Take 1 tablet (500 mg total) by mouth 2 (two) times daily with meals.    metoprolol succinate (TOPROL-XL) 50 MG 24 hr tablet Take 1 tablet (50 mg total) by mouth once daily.    NIFEdipine (PROCARDIA-XL) 30 MG (OSM) 24 hr tablet Take 30 mg by mouth.    pantoprazole (PROTONIX) 40 MG tablet Take 1 tablet by mouth every morning.    timoloL (BETIMOL) 0.5 % ophthalmic solution Place 1 drop into both eyes 2 (two) times daily.    warfarin (COUMADIN) 5 MG tablet Take 1 tablet (5 mg total) by mouth Daily.       Review of patient's allergies indicates:   Allergen Reactions    Adhesive tape-silicones        Past Medical History:   Diagnosis Date    Anticoagulant long-term use     Asthma     Chronic sinusitis, unspecified     COPD (chronic obstructive pulmonary disease)     CVID (common variable immunodeficiency)     Diabetes mellitus, type 2     GERD (gastroesophageal reflux disease)     Hypertension     Severe mitral valve regurgitation     Sleep apnea     uses CPAP    SOB (shortness of breath)     Unspecified glaucoma     Weakness      Past Surgical History:   Procedure Laterality Date    A-V CARDIAC PACEMAKER INSERTION N/A 9/25/2023    Procedure: INSERTION, CARDIAC PACEMAKER, DUAL CHAMBER;  Surgeon: Arnaud Moon MD;  Location: [facility] CATH LAB;  Service: Cardiology;  Laterality: N/A;  [facility]    ANGIOGRAM, CORONARY, WITH LEFT HEART CATHETERIZATION N/A 11/29/2024    Procedure: Angiogram, Coronary, with Left Heart Cath;  Surgeon: Tatiana Jarrett MD;   Location: [facility] CATH LAB;  Service: Cardiology;  Laterality: N/A;    APPENDECTOMY      CATARACT EXTRACTION Bilateral     CATHETERIZATION OF BOTH LEFT AND RIGHT HEART N/A 2024    Procedure: CATHETERIZATION, HEART, BOTH LEFT AND RIGHT;  Surgeon: Sebastian Watters Jr., MD;  Location: [facility] CATH LAB;  Service: Cardiology;  Laterality: N/A;  With Valve Study    COLONOSCOPY      EGD, WITH BALLOON DILATION      ESOPHAGOGASTRODUODENOSCOPY      LEFT HEART CATHETERIZATION Left 2023    Procedure: Left heart cath;  Surgeon: Vijay Uribe MD;  Location: [facility] CATH LAB;  Service: Cardiology;  Laterality: Left;  [facility]    MITRAL VALVE REPLACEMENT N/A 2023    Procedure: REPLACEMENT, MITRAL VALVE;  Surgeon: Steven Rosales IV, MD;  Location: [facility];  Service: Cardiovascular;  Laterality: N/A;    TONSILLECTOMY      TOTAL KNEE ARTHROPLASTY Bilateral      Family History       Problem Relation (Age of Onset)    Alzheimer's disease Mother    Diabetes Father, Brother    Lung cancer Father    Multiple sclerosis Sister    Stroke Mother          Tobacco Use    Smoking status: Former     Current packs/day: 0.00     Types: Cigarettes     Quit date:      Years since quittin.    Smokeless tobacco: Never   Substance and Sexual Activity    Alcohol use: Not Currently    Drug use: Not Currently    Sexual activity: Not Currently     Review of Systems   Musculoskeletal:  Positive for back pain.     Objective:     Weight: 98.9 kg (218 lb)  Body mass index is 35.2 kg/m².  Vital Signs (Most Recent):  Temp: 98.9 °F (37.2 °C) (24 0728)  Pulse: 67 (24 0728)  Resp: 19 (24 0959)  BP: (!) 125/59 (24 0958)  SpO2: 95 % (24 0728) Vital Signs (24h Range):  Temp:  [98.1 °F (36.7 °C)-98.9 °F (37.2 °C)] 98.9 °F (37.2 °C)  Pulse:  [67-72] 67  Resp:  [17-19] 19  SpO2:  [90 %-96 %] 95 %  BP: (102-125)/(51-71) 125/59                              Physical Exam:  Nursing note and vitals reviewed.    Constitutional: She  appears well-developed and well-nourished. She is not diaphoretic. No distress.     Eyes: Pupils are equal, round, and reactive to light. Conjunctivae and EOM are normal.     Cardiovascular: Normal rate.     Abdominal: Soft. Bowel sounds are normal.     Skin: Skin displays no rash on trunk. Skin displays no lesions on trunk.     Psych/Behavior: She is alert. She is oriented to person, place, and time. She has a normal mood and affect.     Musculoskeletal:        Back: Range of motion is limited.        Right Upper Extremities: Muscle strength is 5/5.        Left Upper Extremities: Muscle strength is 5/5.       Right Lower Extremities: Muscle strength is 5/5.        Left Lower Extremities: Muscle strength is 5/5.      Comments: 5/5 motor strength to all extremities with sensation intact   No saddle anesthesia   No Marilin, no clonus, negative Babinski.    No new bowel or bladder issues other than constipation from opioid usage.     Neurological:        Sensory: There is no sensory deficit in the trunk. There is no sensory deficit in the extremities.        DTRs: DTRs are DTRS NORMAL AND SYMMETRICnormal and symmetric. She displays no Babinski's sign on the right side. She displays no Babinski's sign on the left side.        Cranial nerves: Cranial nerve(s) II, III, IV, V, VI, VII, VIII, IX, X, XI and XII are intact.   GCS 15   Awake and oriented   Follows commands   Moves all extremities   PERRLA       Significant Labs:  Recent Labs   Lab 12/15/24  1613 12/16/24  0507    138   K 4.3 4.3    100   CO2 28 29   BUN 20.2* 17.3   CREATININE 0.73 0.76   CALCIUM 8.4 8.5   MG  --  2.20     Recent Labs   Lab 12/15/24  1612 12/16/24  0507   WBC 14.67* 13.86*   HGB 9.6* 9.5*   HCT 30.0* 30.2*   * 447*     Recent Labs   Lab 12/15/24  0501 12/16/24  0507   INR 2.7* 2.8*     Microbiology Results (last 7 days)       ** No results found for the last 168 hours. **              Assessment/Plan:    Back pain  Lumbar  degenerative disc disease by evidence of CT lumbar spine.      Multimodal pain control   Await to see if patient pacemaker and spinal cord stimulator compatible for MRI of the lumbar spine.    Continue PTOT   Fall precautions   SCDs   Consider IR consult for LESI injection.       There are no hospital problems to display for this patient.      Thank you for your consult. I will follow-up with patient. Please contact us if you have any additional questions.    Rocío Green [facility]-BC  Neurosurgery  [facility]

## 2024-12-16 NOTE — PLAN OF CARE
Problem: Physical Therapy  Goal: Physical Therapy Goal  Description: Goals to be met by: 25     Patient will increase functional independence with mobility by performin. Supine to sit with Modified [place]  2. Sit to supine with Modified [place]  3. Sit to stand transfer with Modified [place]  4. Bed to chair transfer with Modified [place] using LRAD  5. Gait  x 150 feet with Modified [place] using LRAD    Outcome: Progressing

## 2024-12-16 NOTE — PROGRESS NOTES
"[facility] Hospital Medicine Progress Note        Chief Complaint: Inpatient Follow-up     HPI:   79-year-old woman with A-fib, HTN, valvular heart disease s/p MVR, CVID, CAD s/p PCI to LAD (11/29), DVT s/p IVC filter, T2DM, GERD, SSS s/p PPM, HTN, HLD, chronic sinusitis status post sinus surgery, ABEL, obesity, asthma/COPD and a recent admission for acute decompensated diastolic heart failure where she underwent a MARYBETH on 11/27 which showed severe mitral stenosis and moderate MR and LHC on 11/29 with stent to prox LAD. Patient was seen by CT surgery and structural heart team and a possible valve in valve was discussed but the plan was to start Coumadin to see if the gradient could be decreased and have a repeat TTE in a few weeks time. She was then subsequently discharged on 2L oxygen to [place] with close cardiology follow up. She presents on [facility] on 12/15 from [place] due to low back associated with weakness and for neurosurgical evaluation.     She reports that since she went to rehab and started working with PT - she has been having lower back pain not radiating down her legs. She denies urinary or stool incontinence. No saddle anesthesia. Reports constipation. She is able to move all her extremities. She used to walk with a walker at rehab but walking became difficult due to back pain. She reports her breathing has been "so and so" while she has been there. She had a CT scan done at rehab which showed Lumbar degenerative disc disease and spondylosis and she was transferred here for neurosurgery evaluation. CXR showed increased density on the right with a small right sided pleural effusion. proBNP 893.3. Cardiology consulted.     Interval Hx:   Reports constipation and back pain. On 2L oxygen. Denies chest pain or SOB.     Case was discussed with patient's nurse and  on the floor.    Objective/physical exam:  General: In no acute distress, afebrile  Chest: Clear " to auscultation bilaterally  Heart: RRR, +S1, S2, no appreciable murmur  Abdomen: Soft, nontender, BS +  MSK: Warm, no lower extremity edema, no clubbing or cyanosis  Neurologic: Alert and oriented x4, Cranial nerve II-XII intact, Strength 5/5 in all 4 extremities    VITAL SIGNS: 24 HRS MIN & MAX LAST   Temp  Min: 98.1 °F (36.7 °C)  Max: 98.9 °F (37.2 °C) 98.6 °F (37 °C)   BP  Min: 115/54  Max: 130/63 130/63   Pulse  Min: 67  Max: 73  73   Resp  Min: 17  Max: 19 18   SpO2  Min: 90 %  Max: 97 % 97 %     I have reviewed the following labs:  Recent Labs   Lab 12/13/24  0441 12/15/24  1612 12/16/24  0507   WBC 13.26* 14.67* 13.86*   RBC 3.32* 3.17* 3.22*   HGB 9.9* 9.6* 9.5*   HCT 32.2* 30.0* 30.2*   MCV 97.0* 94.6* 93.8   MCH 29.8 30.3 29.5   MCHC 30.7* 32.0* 31.5*   RDW 17.3* 17.9* 18.1*   * 410* 447*   MPV 9.0 9.0 8.9     Recent Labs   Lab 12/13/24  0441 12/15/24  1613 12/16/24  0507    138 138   K 4.1 4.3 4.3    101 100   CO2 28 28 29   BUN 30.2* 20.2* 17.3   CREATININE 0.90 0.73 0.76   CALCIUM 8.7 8.4 8.5   MG  --   --  2.20   ALBUMIN 2.4* 2.5* 2.4*   ALKPHOS 94 98 96   ALT 13 15 13   AST 23 30 26   BILITOT 0.5 0.6 0.7     Microbiology Results (last 7 days)       ** No results found for the last 168 hours. **             See below for Radiology    Assessment/Plan:  # Low back pain 2/2 DJD and spinal stenosis  # Acute on Chronic diastolic HF   # Chronic hypoxic respiratory failure requiring supplemental oxygen (on 2 L oxygen)   # Pulmonary HTN  # Severe mitral stenosis  # Obesity   # pAF s/p LAAL in 2023  # Anemia - stable. No signs of active bleeding. Patient is on triple therapy though and had a Hgb drop since then which has been stable  # iron deficiency anemia  # Leukocytosis - recently treated for UTI but currently has no dysuria. No fevers or chills. No abdominal pain/N/V  # HTN  # HLD  # VHD s/p MVR (09/20/2023)  # CAD s/p PCI to LAD (11/29/2024)  # DVT s/p IVC filter  # SSS s/p PPM  # T2DM    # GERD   # Chronic sinusitis s/p sinus surgery   # ABEL on CPAP  # Obesity   # CVID  # Opioid induced constipation  # Asthma/COPD ? - does not have official PFTs done     - obtain MRI L spine; as per radiology patient automatically ruled out of MRI given PPM and spinal stimulator  - pain control  - NGSY consult   - as per NGSY consult IR for LESI  - PT/OT  - plavix and warfarin - as per cardiology to STOP aspirin now  - continue amiodarone, atorvastatin, nifedipine  - IV lasix 40 mg x 1 on 12/16 and then resume PO lasix 40 mg daily on 12/17  - cardiology consult  - daily PT/INR  - PPI  - insulin sliding scale  - continue home oxygen  - bowel regimen. Will give suppository and enema today  - iron supplementation    Critical care diagnosis: HF requiring IV lasix  Critical care time: 45 mins    VTE prophylaxis: warfarin    Patient condition:  Fair    Anticipated discharge and Disposition:       All diagnosis and differential diagnosis have been reviewed; assessment and plan has been documented; I have personally reviewed the labs and test results that are presently available; I have reviewed the patients medication list; I have reviewed the consulting providers response and recommendations. I have reviewed or attempted to review medical records based upon their availability    All of the patient's questions have been  addressed and answered. Patient's is agreeable to the above stated plan. I will continue to monitor closely and make adjustments to medical management as needed.  _____________________________________________________________________    Malnutrition Status:  Nutrition consulted. Most recent weight and BMI monitored-     Measurements:  Wt Readings from Last 1 Encounters:   12/16/24 98.9 kg (218 lb)   Body mass index is 35.2 kg/m².    Patient has been screened and assessed by RD.    Malnutrition Type:  Context:    Level:      Malnutrition Characteristic Summary:       Interventions/Recommendations  (treatment strategy):        Scheduled Med:   amiodarone  200 mg Oral Daily    atorvastatin  40 mg Oral QHS    bisacodyL  10 mg Rectal Once    clopidogreL  75 mg Oral Daily    famotidine  40 mg Oral QHS    ferrous sulfate  1 tablet Oral Daily    furosemide (LASIX) injection  40 mg Intravenous Once    furosemide  40 mg Oral Daily    LIDOcaine  1 patch Transdermal Q24H    lipase-protease-amylase 24,000-76,000-120,000 units  1 capsule Oral TID WM    metoprolol succinate  50 mg Oral Daily    NIFEdipine  30 mg Oral Daily    pantoprazole  40 mg Oral QAM    polyethylene glycol  17 g Oral BID    timolol maleate 0.5%  1 drop Both Eyes BID    warfarin  5 mg Oral Daily      Continuous Infusions:     PRN Meds:    Current Facility-Administered Medications:     acetaminophen, 1,000 mg, Oral, Q8H PRN    albuterol-ipratropium, 3 mL, Nebulization, Q4H PRN    dextrose 10%, 12.5 g, Intravenous, PRN    dextrose 10%, 25 g, Intravenous, PRN    glucagon (human recombinant), 1 mg, Intramuscular, PRN    glucose, 16 g, Oral, PRN    glucose, 24 g, Oral, PRN    insulin aspart U-100, 1-10 Units, Subcutaneous, QID (AC + HS) PRN    ondansetron, 4 mg, Intravenous, Q8H PRN    oxyCODONE, 5 mg, Oral, Q6H PRN    oxyCODONE, 10 mg, Oral, Q6H PRN    senna, 8.6 mg, Oral, Daily PRN     Radiology:  I have personally reviewed the following imaging and agree with the radiologist.     X-Ray Chest 1 View  Narrative: EXAMINATION:  XR CHEST 1 VIEW    CLINICAL HISTORY:  hypoxia;    TECHNIQUE:  Single frontal view of the chest was performed.    COMPARISON:  12/04/2024    FINDINGS:  There are increased markings bilaterally most pronounced in the right lower lobe.  There is blunting of the right costophrenic angle and I cannot rule out a small effusion.  Heart size is within normal limits.  Pacing device is in place.  There is vascular calcification noted.  Patient has had a previous sternotomy.  Impression: Increased density on the right with small right-sided  pleural effusion cannot rule out infiltrate.  This appears   worse than on previous    Electronically signed by: Brayden Hickman MD  Date:    12/15/2024  Time:    17:25      Catracho Galan MD  Department of Hospital Medicine   [facility]   12/16/2024

## 2024-12-16 NOTE — PLAN OF CARE
12/16/24 1550   Discharge Assessment   Assessment Type Discharge Planning Assessment   Confirmed/corrected address, phone number and insurance Yes   Confirmed Demographics Correct on Facesheet   Source of Information patient   Communicated SD with patient/caregiver Date not available/Unable to determine   Reason For Admission back pain   People in Home child(min), adult   Do you expect to return to your current living situation? No   Do you have help at home or someone to help you manage your care at home? Yes   Who are your caregiver(s) and their phone number(s)? benitez joshua dtr, [phone]   Prior to hospitilization cognitive status: Unable to Assess   Current cognitive status: Alert/Oriented   Home Layout Able to live on 1st floor   Equipment Currently Used at Home cane, straight;bath bench;walker, rolling   Readmission within 30 days? Yes   Patient currently being followed by outpatient case management? No   Do you currently have service(s) that help you manage your care at home? No   Do you take prescription medications? Yes   Do you have prescription coverage? Yes   Coverage [facility], [facility]   Do you have any problems affording any of your prescribed medications? No   Is the patient taking medications as prescribed? yes   Who is going to help you get home at discharge? family   How do you get to doctors appointments? family or friend will provide   Are you on dialysis? No   Do you take coumadin? No   Discharge Plan A Skilled Nursing Facility   Discharge Plan B Skilled Nursing Facility   DME Needed Upon Discharge  none   Discharge Plan discussed with: Patient   Transition of Care Barriers None   OTHER   Name(s) of People in Home dtr     Completed assessment with pt at bedside, sister present. Introduced self and explained role as SW. Pt verb understanding to all questions asked. PCP is Arnaldo Hernandez and pharmacy is [facility] in [place]. Pt agreeable with therapy recs and requested referral be sent to  [facility]. Referral sent via Epic.     Bella Arvizu LCSW

## 2024-12-16 NOTE — PLAN OF CARE
[facility]  Discharge Final Note    Primary Care Provider: Arnaldo Hernandez MD    Expected Discharge Date: 12/20/2024    Final Discharge Note (most recent)       Final Note - 12/16/24 1436          Final Note    Assessment Type Final Discharge Note     Anticipated Discharge Disposition Planned Readmission - Short Term Hospital     What phone number can be called within the next 1-3 days to see how you are doing after discharge? [phone]     Hospital Resources/Appts/Education Provided Provided patient/caregiver with written discharge plan information        Post-Acute Status    Discharge Delays None known at this time                     Important Message from Medicare             Contact Info       Vijay Uribe MD   Specialty: Cardiology    [address]  [place] [address]   Phone: [phone]       Next Steps: Follow up    Instructions: 12/11/24 11am    Sebastian Watters Jr., MD   Specialty: Cardiology    [address]  [place] [address]   Phone: [phone]       Next Steps: Follow up    Instructions: ECHO scheduled 12-30-24 2:15pm    Office appointment 1/8/2025 2:40pm    Arnaldo Hernandez MD   Specialty: Family Medicine   Relationship: PCP - General    [address] [place] [address]   Phone: [phone]       Next Steps: Follow up    Carlee Miramontes NP   Specialty: Family Medicine    [address]    [place] [address]   Phone: [phone]       Next Steps: Follow up    Instructions: Pulmonary Clinic patient has seen this provider in the past

## 2024-12-17 LAB
ALBUMIN SERPL-MCNC: 2.4 G/DL (ref 3.4–4.8)
ALBUMIN SERPL-MCNC: 2.6 G/DL (ref 3.4–4.8)
ALBUMIN SERPL-MCNC: 2.7 G/DL (ref 3.4–4.8)
ALBUMIN/GLOB SERPL: 0.6 RATIO (ref 1.1–2)
ALBUMIN/GLOB SERPL: 0.7 RATIO (ref 1.1–2)
ALBUMIN/GLOB SERPL: 0.8 RATIO (ref 1.1–2)
ALLENS TEST BLOOD GAS (OHS): YES
ALLENS TEST BLOOD GAS (OHS): YES
ALP SERPL-CCNC: 106 UNIT/L (ref 40–150)
ALP SERPL-CCNC: 109 UNIT/L (ref 40–150)
ALP SERPL-CCNC: 115 UNIT/L (ref 40–150)
ALT SERPL-CCNC: 10 UNIT/L (ref 0–55)
ALT SERPL-CCNC: 12 UNIT/L (ref 0–55)
ALT SERPL-CCNC: 16 UNIT/L (ref 0–55)
ANION GAP SERPL CALC-SCNC: 11 MEQ/L
ANION GAP SERPL CALC-SCNC: 13 MEQ/L
ANION GAP SERPL CALC-SCNC: 15 MEQ/L
AST SERPL-CCNC: 25 UNIT/L (ref 5–34)
AST SERPL-CCNC: 27 UNIT/L (ref 5–34)
AST SERPL-CCNC: 52 UNIT/L (ref 5–34)
BACTERIA #/AREA URNS AUTO: ABNORMAL /HPF
BASE EXCESS BLD CALC-SCNC: 3.5 MMOL/L (ref -2–2)
BASE EXCESS BLD CALC-SCNC: 4.2 MMOL/L (ref -2–2)
BASOPHILS # BLD AUTO: 0.07 X10(3)/MCL
BASOPHILS # BLD AUTO: 0.09 X10(3)/MCL
BASOPHILS NFR BLD AUTO: 0.4 %
BASOPHILS NFR BLD AUTO: 0.4 %
BILIRUB SERPL-MCNC: 0.8 MG/DL
BILIRUB UR QL STRIP.AUTO: NEGATIVE
BIPAP(E) BLOOD GAS (OHS): 8 CM H2O
BIPAP(I) BLOOD GAS (OHS): 14 CM H2O
BLOOD GAS SAMPLE TYPE (OHS): ABNORMAL
BLOOD GAS SAMPLE TYPE (OHS): ABNORMAL
BNP BLD-MCNC: 608.3 PG/ML
BUN SERPL-MCNC: 23.7 MG/DL (ref 9.8–20.1)
BUN SERPL-MCNC: 27 MG/DL (ref 9.8–20.1)
BUN SERPL-MCNC: 27.6 MG/DL (ref 9.8–20.1)
CA-I BLD-SCNC: 1 MMOL/L (ref 1.12–1.23)
CA-I BLD-SCNC: 1.07 MMOL/L (ref 1.12–1.23)
CALCIUM SERPL-MCNC: 7.7 MG/DL (ref 8.4–10.2)
CALCIUM SERPL-MCNC: 7.9 MG/DL (ref 8.4–10.2)
CALCIUM SERPL-MCNC: 8.4 MG/DL (ref 8.4–10.2)
CHLORIDE SERPL-SCNC: 103 MMOL/L (ref 98–107)
CHLORIDE SERPL-SCNC: 97 MMOL/L (ref 98–107)
CHLORIDE SERPL-SCNC: 98 MMOL/L (ref 98–107)
CLARITY UR: CLEAR
CO2 BLDA-SCNC: 29 MMOL/L
CO2 BLDA-SCNC: 34.8 MMOL/L
CO2 SERPL-SCNC: 22 MMOL/L (ref 23–31)
CO2 SERPL-SCNC: 25 MMOL/L (ref 23–31)
CO2 SERPL-SCNC: 26 MMOL/L (ref 23–31)
COHGB MFR BLDA: 1.5 % (ref 0.5–1.5)
COLOR UR AUTO: ABNORMAL
CREAT SERPL-MCNC: 0.7 MG/DL (ref 0.55–1.02)
CREAT SERPL-MCNC: 0.75 MG/DL (ref 0.55–1.02)
CREAT SERPL-MCNC: 0.99 MG/DL (ref 0.55–1.02)
CREAT/UREA NIT SERPL: 28
CREAT/UREA NIT SERPL: 34
CREAT/UREA NIT SERPL: 36
DRAWN BY BLOOD GAS (OHS): ABNORMAL
DRAWN BY BLOOD GAS (OHS): ABNORMAL
EOSINOPHIL # BLD AUTO: 0.2 X10(3)/MCL (ref 0–0.9)
EOSINOPHIL # BLD AUTO: 0.29 X10(3)/MCL (ref 0–0.9)
EOSINOPHIL NFR BLD AUTO: 0.9 %
EOSINOPHIL NFR BLD AUTO: 1.7 %
ERYTHROCYTE [DISTWIDTH] IN BLOOD BY AUTOMATED COUNT: 18.3 % (ref 11.5–17)
ERYTHROCYTE [DISTWIDTH] IN BLOOD BY AUTOMATED COUNT: 18.7 % (ref 11.5–17)
GFR SERPLBLD CREATININE-BSD FMLA CKD-EPI: 58 ML/MIN/1.73/M2
GFR SERPLBLD CREATININE-BSD FMLA CKD-EPI: >60 ML/MIN/1.73/M2
GFR SERPLBLD CREATININE-BSD FMLA CKD-EPI: >60 ML/MIN/1.73/M2
GLOBULIN SER-MCNC: 3.1 GM/DL (ref 2.4–3.5)
GLOBULIN SER-MCNC: 3.9 GM/DL (ref 2.4–3.5)
GLOBULIN SER-MCNC: 4.4 GM/DL (ref 2.4–3.5)
GLUCOSE SERPL-MCNC: 166 MG/DL (ref 82–115)
GLUCOSE SERPL-MCNC: 224 MG/DL (ref 82–115)
GLUCOSE SERPL-MCNC: 286 MG/DL (ref 82–115)
GLUCOSE UR QL STRIP: NORMAL
HCO3 BLDA-SCNC: 27.8 MMOL/L (ref 22–26)
HCO3 BLDA-SCNC: 32.7 MMOL/L (ref 22–26)
HCT VFR BLD AUTO: 30 % (ref 37–47)
HCT VFR BLD AUTO: 31.5 % (ref 37–47)
HGB BLD-MCNC: 9.4 G/DL (ref 12–16)
HGB BLD-MCNC: 9.9 G/DL (ref 12–16)
HGB UR QL STRIP: NEGATIVE
HYALINE CASTS #/AREA URNS LPF: ABNORMAL /LPF
IMM GRANULOCYTES # BLD AUTO: 0.38 X10(3)/MCL (ref 0–0.04)
IMM GRANULOCYTES # BLD AUTO: 0.61 X10(3)/MCL (ref 0–0.04)
IMM GRANULOCYTES NFR BLD AUTO: 2.2 %
IMM GRANULOCYTES NFR BLD AUTO: 2.7 %
INHALED O2 CONCENTRATION: 30 %
INR PPP: 4.7
KETONES UR QL STRIP: NEGATIVE
LEUKOCYTE ESTERASE UR QL STRIP: NEGATIVE
LPM (OHS): 5
LYMPHOCYTES # BLD AUTO: 3.07 X10(3)/MCL (ref 0.6–4.6)
LYMPHOCYTES # BLD AUTO: 5.25 X10(3)/MCL (ref 0.6–4.6)
LYMPHOCYTES NFR BLD AUTO: 17.8 %
LYMPHOCYTES NFR BLD AUTO: 23.3 %
MCH RBC QN AUTO: 29.6 PG (ref 27–31)
MCH RBC QN AUTO: 29.9 PG (ref 27–31)
MCHC RBC AUTO-ENTMCNC: 31.3 G/DL (ref 33–36)
MCHC RBC AUTO-ENTMCNC: 31.4 G/DL (ref 33–36)
MCV RBC AUTO: 94.3 FL (ref 80–94)
MCV RBC AUTO: 95.5 FL (ref 80–94)
METHGB MFR BLDA: 0.6 % (ref 0.4–1.5)
MODE (OHS): ABNORMAL
MONOCYTES # BLD AUTO: 1.55 X10(3)/MCL (ref 0.1–1.3)
MONOCYTES # BLD AUTO: 2 X10(3)/MCL (ref 0.1–1.3)
MONOCYTES NFR BLD AUTO: 8.9 %
MONOCYTES NFR BLD AUTO: 9 %
NEUTROPHILS # BLD AUTO: 11.9 X10(3)/MCL (ref 2.1–9.2)
NEUTROPHILS # BLD AUTO: 14.43 X10(3)/MCL (ref 2.1–9.2)
NEUTROPHILS NFR BLD AUTO: 63.8 %
NEUTROPHILS NFR BLD AUTO: 68.9 %
NITRITE UR QL STRIP: NEGATIVE
NRBC BLD AUTO-RTO: 3.5 %
NRBC BLD AUTO-RTO: 3.8 %
O2 HB BLOOD GAS (OHS): 92.8 % (ref 94–97)
OXYGEN DEVICE BLOOD GAS (OHS): ABNORMAL
OXYHGB MFR BLDA: 9.8 G/DL (ref 12–16)
PCO2 BLDA: 40 MMHG (ref 35–45)
PCO2 BLDA: 68 MMHG (ref 35–45)
PH BLDA: 7.29 [PH] (ref 7.35–7.45)
PH BLDA: 7.45 [PH] (ref 7.35–7.45)
PH UR STRIP: 5.5 [PH]
PLATELET # BLD AUTO: 432 X10(3)/MCL (ref 130–400)
PLATELET # BLD AUTO: 454 X10(3)/MCL (ref 130–400)
PMV BLD AUTO: 9.2 FL (ref 7.4–10.4)
PMV BLD AUTO: 9.4 FL (ref 7.4–10.4)
PO2 BLDA: 67 MMHG (ref 80–100)
PO2 BLDA: 69 MMHG (ref 80–100)
POCT GLUCOSE: 115 MG/DL (ref 70–110)
POCT GLUCOSE: 137 MG/DL (ref 70–110)
POCT GLUCOSE: 155 MG/DL (ref 70–110)
POCT GLUCOSE: 292 MG/DL (ref 70–110)
POTASSIUM BLOOD GAS (OHS): 3.7 MMOL/L (ref 3.5–5)
POTASSIUM BLOOD GAS (OHS): 4.3 MMOL/L (ref 3.5–5)
POTASSIUM SERPL-SCNC: 4.1 MMOL/L (ref 3.5–5.1)
POTASSIUM SERPL-SCNC: 4.4 MMOL/L (ref 3.5–5.1)
POTASSIUM SERPL-SCNC: 5.8 MMOL/L (ref 3.5–5.1)
PROT SERPL-MCNC: 5.5 GM/DL (ref 5.8–7.6)
PROT SERPL-MCNC: 6.5 GM/DL (ref 5.8–7.6)
PROT SERPL-MCNC: 7.1 GM/DL (ref 5.8–7.6)
PROT UR QL STRIP: NEGATIVE
PROTHROMBIN TIME: 44.1 SECONDS (ref 12.5–14.5)
RBC # BLD AUTO: 3.14 X10(6)/MCL (ref 4.2–5.4)
RBC # BLD AUTO: 3.34 X10(6)/MCL (ref 4.2–5.4)
RBC #/AREA URNS AUTO: ABNORMAL /HPF
SAMPLE SITE BLOOD GAS (OHS): ABNORMAL
SAMPLE SITE BLOOD GAS (OHS): ABNORMAL
SAO2 % BLDA: 91 %
SAO2 % BLDA: 93.9 %
SODIUM BLOOD GAS (OHS): 131 MMOL/L (ref 137–145)
SODIUM BLOOD GAS (OHS): 132 MMOL/L (ref 137–145)
SODIUM SERPL-SCNC: 135 MMOL/L (ref 136–145)
SODIUM SERPL-SCNC: 136 MMOL/L (ref 136–145)
SODIUM SERPL-SCNC: 139 MMOL/L (ref 136–145)
SP GR UR STRIP.AUTO: 1.01 (ref 1–1.03)
SQUAMOUS #/AREA URNS LPF: ABNORMAL /HPF
TROPONIN I SERPL-MCNC: 0.02 NG/ML (ref 0–0.04)
UROBILINOGEN UR STRIP-ACNC: NORMAL
WBC # BLD AUTO: 17.26 X10(3)/MCL (ref 4.5–11.5)
WBC # BLD AUTO: 22.58 X10(3)/MCL (ref 4.5–11.5)
WBC #/AREA URNS AUTO: ABNORMAL /HPF

## 2024-12-17 PROCEDURE — 94640 AIRWAY INHALATION TREATMENT: CPT

## 2024-12-17 PROCEDURE — 87040 BLOOD CULTURE FOR BACTERIA: CPT

## 2024-12-17 PROCEDURE — 93005 ELECTROCARDIOGRAM TRACING: CPT

## 2024-12-17 PROCEDURE — 51702 INSERT TEMP BLADDER CATH: CPT

## 2024-12-17 PROCEDURE — 99900031 HC PATIENT EDUCATION (STAT)

## 2024-12-17 PROCEDURE — 21400001 HC TELEMETRY ROOM

## 2024-12-17 PROCEDURE — 63600175 PHARM REV CODE 636 W HCPCS

## 2024-12-17 PROCEDURE — 81001 URINALYSIS AUTO W/SCOPE: CPT

## 2024-12-17 PROCEDURE — 27100171 HC OXYGEN HIGH FLOW UP TO 24 HOURS

## 2024-12-17 PROCEDURE — 80053 COMPREHEN METABOLIC PANEL: CPT

## 2024-12-17 PROCEDURE — 94660 CPAP INITIATION&MGMT: CPT

## 2024-12-17 PROCEDURE — 94760 N-INVAS EAR/PLS OXIMETRY 1: CPT

## 2024-12-17 PROCEDURE — 5A09357 ASSISTANCE WITH RESPIRATORY VENTILATION, LESS THAN 24 CONSECUTIVE HOURS, CONTINUOUS POSITIVE AIRWAY PRESSURE: ICD-10-PCS

## 2024-12-17 PROCEDURE — 83880 ASSAY OF NATRIURETIC PEPTIDE: CPT

## 2024-12-17 PROCEDURE — 82803 BLOOD GASES ANY COMBINATION: CPT

## 2024-12-17 PROCEDURE — 27000190 HC CPAP FULL FACE MASK W/VALVE

## 2024-12-17 PROCEDURE — 36415 COLL VENOUS BLD VENIPUNCTURE: CPT

## 2024-12-17 PROCEDURE — 27000221 HC OXYGEN, UP TO 24 HOURS

## 2024-12-17 PROCEDURE — 99900035 HC TECH TIME PER 15 MIN (STAT)

## 2024-12-17 PROCEDURE — 85025 COMPLETE CBC W/AUTO DIFF WBC: CPT

## 2024-12-17 PROCEDURE — 36600 WITHDRAWAL OF ARTERIAL BLOOD: CPT

## 2024-12-17 PROCEDURE — 93010 ELECTROCARDIOGRAM REPORT: CPT | Mod: ,,, | Performed by: INTERNAL MEDICINE

## 2024-12-17 PROCEDURE — 85610 PROTHROMBIN TIME: CPT

## 2024-12-17 PROCEDURE — 25000003 PHARM REV CODE 250

## 2024-12-17 PROCEDURE — 25000242 PHARM REV CODE 250 ALT 637 W/ HCPCS: Performed by: NURSE PRACTITIONER

## 2024-12-17 PROCEDURE — 94761 N-INVAS EAR/PLS OXIMETRY MLT: CPT | Mod: XB

## 2024-12-17 PROCEDURE — 84484 ASSAY OF TROPONIN QUANT: CPT

## 2024-12-17 PROCEDURE — 97530 THERAPEUTIC ACTIVITIES: CPT

## 2024-12-17 RX ORDER — HYDROXYZINE HYDROCHLORIDE 25 MG/1
25 TABLET, FILM COATED ORAL ONCE
Status: COMPLETED | OUTPATIENT
Start: 2024-12-17 | End: 2024-12-17

## 2024-12-17 RX ORDER — CEFTRIAXONE 1 G/1
1 INJECTION, POWDER, FOR SOLUTION INTRAMUSCULAR; INTRAVENOUS
Status: DISCONTINUED | OUTPATIENT
Start: 2024-12-17 | End: 2024-12-17

## 2024-12-17 RX ORDER — NAPROXEN SODIUM 220 MG/1
81 TABLET, FILM COATED ORAL DAILY
Status: DISCONTINUED | OUTPATIENT
Start: 2024-12-17 | End: 2024-12-18

## 2024-12-17 RX ORDER — FUROSEMIDE 10 MG/ML
40 INJECTION INTRAMUSCULAR; INTRAVENOUS ONCE
Status: COMPLETED | OUTPATIENT
Start: 2024-12-17 | End: 2024-12-17

## 2024-12-17 RX ORDER — FUROSEMIDE 10 MG/ML
40 INJECTION INTRAMUSCULAR; INTRAVENOUS EVERY 24 HOURS
Status: DISCONTINUED | OUTPATIENT
Start: 2024-12-18 | End: 2024-12-17

## 2024-12-17 RX ORDER — FUROSEMIDE 10 MG/ML
40 INJECTION INTRAMUSCULAR; INTRAVENOUS EVERY 12 HOURS
Status: DISCONTINUED | OUTPATIENT
Start: 2024-12-18 | End: 2024-12-19

## 2024-12-17 RX ADMIN — PANCRELIPASE 1 CAPSULE: 120000; 24000; 76000 CAPSULE, DELAYED RELEASE PELLETS ORAL at 09:12

## 2024-12-17 RX ADMIN — IPRATROPIUM BROMIDE AND ALBUTEROL SULFATE 3 ML: 2.5; .5 SOLUTION RESPIRATORY (INHALATION) at 12:12

## 2024-12-17 RX ADMIN — PANCRELIPASE 1 CAPSULE: 120000; 24000; 76000 CAPSULE, DELAYED RELEASE PELLETS ORAL at 04:12

## 2024-12-17 RX ADMIN — ATORVASTATIN CALCIUM 40 MG: 40 TABLET, FILM COATED ORAL at 09:12

## 2024-12-17 RX ADMIN — FAMOTIDINE 40 MG: 20 TABLET, FILM COATED ORAL at 09:12

## 2024-12-17 RX ADMIN — FERROUS SULFATE TAB 325 MG (65 MG ELEMENTAL FE) 1 EACH: 325 (65 FE) TAB at 09:12

## 2024-12-17 RX ADMIN — AMIODARONE HYDROCHLORIDE 200 MG: 200 TABLET ORAL at 09:12

## 2024-12-17 RX ADMIN — PIPERACILLIN SODIUM AND TAZOBACTAM SODIUM 4.5 G: 4; .5 INJECTION, POWDER, LYOPHILIZED, FOR SOLUTION INTRAVENOUS at 09:12

## 2024-12-17 RX ADMIN — VANCOMYCIN HYDROCHLORIDE 1000 MG: 1 INJECTION, POWDER, LYOPHILIZED, FOR SOLUTION INTRAVENOUS at 11:12

## 2024-12-17 RX ADMIN — TIMOLOL MALEATE 1 DROP: 5 SOLUTION OPHTHALMIC at 09:12

## 2024-12-17 RX ADMIN — OXYCODONE HYDROCHLORIDE 5 MG: 5 TABLET ORAL at 05:12

## 2024-12-17 RX ADMIN — OXYCODONE HYDROCHLORIDE 5 MG: 5 TABLET ORAL at 04:12

## 2024-12-17 RX ADMIN — METOPROLOL SUCCINATE 50 MG: 50 TABLET, EXTENDED RELEASE ORAL at 09:12

## 2024-12-17 RX ADMIN — DEXTROSE MONOHYDRATE 1000 MG: 5 INJECTION, SOLUTION INTRAVENOUS at 09:12

## 2024-12-17 RX ADMIN — FUROSEMIDE 40 MG: 10 INJECTION, SOLUTION INTRAMUSCULAR; INTRAVENOUS at 05:12

## 2024-12-17 RX ADMIN — PANTOPRAZOLE SODIUM 40 MG: 40 TABLET, DELAYED RELEASE ORAL at 05:12

## 2024-12-17 RX ADMIN — FUROSEMIDE 40 MG: 40 TABLET ORAL at 09:12

## 2024-12-17 RX ADMIN — CLOPIDOGREL BISULFATE 75 MG: 75 TABLET ORAL at 09:12

## 2024-12-17 RX ADMIN — NIFEDIPINE 30 MG: 30 TABLET, FILM COATED, EXTENDED RELEASE ORAL at 09:12

## 2024-12-17 RX ADMIN — HYDROXYZINE HYDROCHLORIDE 25 MG: 25 TABLET, FILM COATED ORAL at 04:12

## 2024-12-17 RX ADMIN — IPRATROPIUM BROMIDE AND ALBUTEROL SULFATE 3 ML: 2.5; .5 SOLUTION RESPIRATORY (INHALATION) at 05:12

## 2024-12-17 NOTE — PROGRESS NOTES
"  [facility] *    Cardiology  Progress Note    Patient Name: Kortney Leach  MRN: [id number]  Admission Date: 12/15/2024  Hospital Length of Stay: 2 days  Code Status: Full Code   Attending Provider: Catracho Galan,*   Consulting Provider: YANIRA Turner  Primary Care Physician: Arnaldo Hernandez MD  Principal Problem:<principal problem not specified>    Patient information was obtained from patient, past medical records, and ER records.     Subjective:     Reason for Consult: Severe Mitral Stenosis - Known patient on Coumadin     HPI: Ms. Leach is an 79 year old female who is known to [facility], Dr. Uribe & Dr. Moon. She presents to the ER from rehab with complaints of lower back pain/spasms. She endorses worsening but denies CP or palps. She reports not being able to walk due to the back pain & constipation. Of note, she was recently discharged from [facility] on 12.3.24 for acute CHF exacerbation. She underwent a MARYBETH that revealed Severe MS & Mod MR and underwent a stent to the prox LAD. She was seen by CV surgery & structural heart with plans to start Coumadin to see if the gradient can be decreased. Plan is for repeat TTE in a few weeks to re-evaluate. Significant labs include WBC 13.86, Plt 447. A CXR was obtained and demonstrated increased density on the right with small right-sided pleural effusion cannot rule out infiltrate. This appears worse than on previous. He was admitted to hospital medicine with a consult to neurosurgery. [facility] has been consulted due to the patient's known severe MS.     12.17.24: NAD. Improvement in SOB. Inaccurate I&O's and daily weights. INR 4.7 today. On 3 L NC. "I am okay." SR on tele. BP stable. Had a BM.     PMH: VHD, GEMINI, DM II, PAF, ABEL, COPD, HTN, DVT SSS, HLD, Asthma, Left Thyroid Nodule, GERD, Chronic Sinusitis, Common Variable Immunodeficiency, Glaucoma  PSH: PPM, LHC, MVR, Bilateral Cataract Extraction, Total Knee Replacement, Bilateral " mastectomy, Appendectomy, Colonoscopy, EGD, Tonsillectomy,    Family History: Father - Lung Cancer; Mother - Alzheimer, CVA; Sister - HTN, Multiple Sclerosis, DM II  Social History: Former Smoker (Quit in 1998). Denies illicit drug use and alcohol use.      Previous Cardiac Diagnostics:   [facility] 11.29.24:  Eccentric Prox LAD lesion was 80% stenosed with 0% stenosis post-intervention. iFR 0.84  Prox LAD lesion: A STENT SYNERGY XD [id number] stent was successfully placed at 8 GEO for 10 sec. Proximal part of the stent was post dilated by using 3.5/12 mm NC and a 4/8 mm NC balloons at 12 atmospheres.     [facility] 11.27.24:  Findings:  - There is single vessel coronary artery disease.  - Mid Left Anterior Descending has a calcified 70% stenosis.  - LVEDP is normal at 6 mmHg.  - RHC with elevated R sided filling pressures. RA 7 mmHg, RV 50/7 mmHg, PA 47/26 mmHg (mean 33 mmHg), PCWP 24 mmHg.  - PCWP to LV diastolic mean gradient was calculated to be 22 mmHg.  - Transpulmonary gradient is 8 mmHg.  - Normal Cardiac Output/Index at 4.7/2.3, as calculated by Rahel equation.  - PVR is 1.7 Woods units  - SVR is 1260 dyne-sec*cm^-5  - Pulmonary Artery Pulsatility Index (Nury) is 3.0  - Cardiac Power Output () is 0.84  Assessment/Plan:  - Patient is a 79 y.o. female with a history of prior bioprosthetic mitral valve replacement now presents with heart failure.  Transesophageal echocardiogram was obtained.  Final report is pending at this time, but there were findings consistent with severe bioprosthetic MVR stenosis.  Cardiac catheterization findings shown above do suggest that there is a gradient of around 22 mm Hg across the bioprosthetic valve.  -recommend consultation with CT surgery   -obtain CT with mitral valve in valve protocol for possible TMVR  -LAD should be revascularized as well     MARYBETH (11.27.24):  Left Ventricle: The left ventricle is normal in size. Septal motion is consistent with post-operative status. There is normal  systolic function with a visually estimated ejection fraction of 60 - 65%.  Right Ventricle: Normal right ventricular cavity size. Systolic function is normal.  Left Atrium: Left atrium is severely dilated.  Mitral Valve: There is a bioprosthetic valve in the mitral position. There is severe stenosis. The mean pressure gradient across the mitral valve is 16 mmHg at a heart rate of  bpm. There is moderate regurgitation.     ECHO (11.23.24):  Left Ventricle: The left ventricle is normal in size. Mildly increased wall thickness. There is normal systolic function with a visually estimated ejection fraction of 60 - 65%. Grade I diastolic dysfunction. Right Ventricle: Normal right ventricular cavity size. Systolic function is borderline low. Aortic Valve: There is aortic valve sclerosis. Mitral Valve: There is a bioprosthetic valve in the mitral position (25 mm mosaic porcine valve).  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 There is mild (1+) regurgitation. Tricuspid Valve: There moderate (2+) regurgitation. The estimated pulmonary artery systolic pressure is 57 mmHg. Pacemaker lead noted.  Recommend transesophageal echocardiogram to properly evaluate the bioprosthetic mitral valve.     ECHO (11.14.24):  The study quality is average. The left ventricle is normal in size. Global left ventricular systolic function is normal. The left ventricular ejection fraction is 65%. Left ventricular diastolic function is normal. Mild concentric left ventricular hypertrophy is present. The left atrium is moderately enlarged. (4.6 cms). A normal functioning prosthetic mitral valve is noted. MG = 4.3 mmHg. The pulmonary artery systolic pressure is 37 mmHg.      PPM Insertion (9.25.23):  Successful implantation of PPM Dual. [facility]      PET (1.24.23):  This is a normal perfusion study, no perfusion defects noted. There is no evidence  of ischemia.This scan is suggestive of low risk for future cardiovascular events. The left ventricular cavity is noted to be normal on the stress studies. The stress left ventricular ejection fraction was calculated to be 68% and left ventricular global function is normal. The rest left ventricular cavity is noted to be normal. The rest left ventricular ejection fraction was calculated to be 63% and rest left ventricular global function is normal. When compared to the resting ejection fraction (63%), the stress ejection fraction (68%) has increased. Transient ischemic dilatation is present and has been described as a marker for high risk coronary artery disease. It has also been described in microvascular disease, hypertensive heart disease as well as cardiac deconditioning. The study quality is good.   There was a rise in myocardial blood flow between rest and stress.  Global myocardial blood flow reserve was 2.72.  Normal global coronary flow reserve is suggestive of low coronary event risk.     Carotid US (1.24.23):  The study quality is good. There is no plaque noted in the proximal right internal carotid artery. 1-39% stenosis in the proximal left internal carotid artery based on Bluth Criteria. Antegrade right vertebral artery flow. Antegrade left vertebral artery flow.     C (9.6.23):  Normal Coronaries       Review of patient's allergies indicates:   Allergen Reactions    Adhesive tape-silicones      Current Facility-Administered Medications on File Prior to Encounter   Medication    0.9%  NaCl infusion    diazePAM tablet 10 mg    diphenhydrAMINE capsule 50 mg     Current Outpatient Medications on File Prior to Encounter   Medication Sig    amiodarone (PACERONE) 200 MG Tab Take 200 mg by mouth once daily.    aspirin (ECOTRIN) 81 MG EC tablet Take 1 tablet (81 mg total) by mouth once daily. for 25 days    atorvastatin (LIPITOR) 40 MG tablet Take 40 mg by mouth once daily.    clopidogreL (PLAVIX) 75 mg  tablet Take 1 tablet (75 mg total) by mouth once daily.    DUPIXENT SYRINGE 300 mg/2 mL Syrg Inject 300 mg into the skin every 14 (fourteen) days.    estradioL (ESTRACE) 0.01 % (0.1 mg/gram) vaginal cream Place 0.5 g vaginally twice a week.    famotidine (PEPCID) 40 MG tablet Take 40 mg by mouth every evening.    fluticasone-umeclidin-vilanter (TRELEGY ELLIPTA) 200-62.5-25 mcg inhaler Inhale 1 puff into the lungs once daily.    furosemide (LASIX) 40 MG tablet Take 40 mg by mouth.    glimepiride (AMARYL) 2 MG tablet Take 4 mg by mouth 2 (two) times a day.    HYDROcodone-acetaminophen (NORCO)  mg per tablet Take 1 tablet by mouth every 6 (six) hours as needed for Pain.    immun glob G,IgG,/pro/IgA 0-50 (HIZENTRA SUBQ) Inject into the skin every 14 (fourteen) days.    lipase-protease-amylase 24,000-76,000-120,000 units (CREON) 24,000-76,000 -120,000 unit capsule Take 1-2 capsules by mouth 3 (three) times daily with meals. 2 caps with meals and 1 cap c snacks    metFORMIN (GLUCOPHAGE) 500 MG tablet Take 1 tablet (500 mg total) by mouth 2 (two) times daily with meals.    metoprolol succinate (TOPROL-XL) 50 MG 24 hr tablet Take 1 tablet (50 mg total) by mouth once daily.    NIFEdipine (PROCARDIA-XL) 30 MG (OSM) 24 hr tablet Take 30 mg by mouth.    pantoprazole (PROTONIX) 40 MG tablet Take 1 tablet by mouth every morning.    timoloL (BETIMOL) 0.5 % ophthalmic solution Place 1 drop into both eyes 2 (two) times daily.    warfarin (COUMADIN) 5 MG tablet Take 1 tablet (5 mg total) by mouth Daily.       Review of Systems   Respiratory:  Positive for shortness of breath (improving).    Cardiovascular:  Negative for chest pain and palpitations.   Gastrointestinal:  Negative for constipation.   Musculoskeletal:  Positive for back pain.   Neurological:  Positive for weakness.   All other systems reviewed and are negative.    Objective:     Vital Signs (Most Recent):  Temp: 98.6 °F (37 °C) (12/17/24 0742)  Pulse: 81 (12/17/24  0742)  Resp: 18 (12/17/24 0742)  BP: (!) 157/76 (12/17/24 0742)  SpO2: 95 % (12/17/24 0742) Vital Signs (24h Range):  Temp:  [97.8 °F (36.6 °C)-99.1 °F (37.3 °C)] 98.6 °F (37 °C)  Pulse:  [73-81] 81  Resp:  [17-24] 18  SpO2:  [93 %-97 %] 95 %  BP: (103-157)/(43-92) 157/76   Weight: 98.9 kg (218 lb)  Body mass index is 35.2 kg/m².  SpO2: 95 %       Intake/Output Summary (Last 24 hours) at 12/17/2024 1024  Last data filed at 12/17/2024 0635  Gross per 24 hour   Intake 240 ml   Output 251 ml   Net -11 ml     Lines/Drains/Airways       Peripheral Intravenous Line  Duration                  Peripheral IV - Single Lumen 12/09/24 0835 No Posterior;Right Forearm 8 days                  Significant Labs:   Chemistries:   Recent Labs   Lab 12/11/24  0538 12/11/24  0538 12/13/24  0441 12/15/24  1613 12/16/24  0507 12/17/24  0449     --  140 138 138 139   K 3.8  --  4.1 4.3 4.3 4.4     --  100 101 100 103   CO2 29  --  28 28 29 25   BUN 29.3*  --  30.2* 20.2* 17.3 23.7*   CREATININE 0.80  --  0.90 0.73 0.76 0.70   CALCIUM 8.5  --  8.7 8.4 8.5 7.9*   BILITOT  --    < > 0.5 0.6 0.7 0.8   ALKPHOS  --    < > 94 98 96 106   ALT  --    < > 13 15 13 10   AST  --    < > 23 30 26 25   GLUCOSE 93  --  136* 123* 127* 166*   MG  --   --   --   --  2.20  --     < > = values in this interval not displayed.        CBC/Anemia Labs: Coags:    Recent Labs   Lab 12/15/24  1612 12/16/24  0507 12/17/24  0449   WBC 14.67* 13.86* 17.26*   HGB 9.6* 9.5* 9.4*   HCT 30.0* 30.2* 30.0*   * 447* 432*   MCV 94.6* 93.8 95.5*   RDW 17.9* 18.1* 18.3*   IRON  --  29*  --    FERRITIN  --  98.28  --    [id number]  --  460  --     Recent Labs   Lab 12/15/24  0501 12/16/24  0507 12/17/24  0449   INR 2.7* 2.8* 4.7*        Significant Imaging:    EKG: None to review    Telemetry:  SR    Physical Exam  HENT:      Head: Normocephalic.      Nose: Nose normal.      Mouth/Throat:      Mouth: Mucous membranes are moist.   Eyes:      Extraocular  Movements: Extraocular movements intact.   Cardiovascular:      Rate and Rhythm: Normal rate and regular rhythm.      Pulses: Normal pulses.      Heart sounds: Murmur heard.   Pulmonary:      Effort: Pulmonary effort is normal.      Comments: Diminished breath sounds on right  Abdominal:      Palpations: Abdomen is soft.   Skin:     General: Skin is warm and dry.   Neurological:      Mental Status: She is alert and oriented to person, place, and time.   Psychiatric:         Mood and Affect: Mood normal.         Behavior: Behavior normal.         Home Medications:   Current Facility-Administered Medications on File Prior to Encounter   Medication Dose Route Frequency Provider Last Rate Last Admin    0.9%  NaCl infusion   Intravenous On Call Procedure Vijay Uribe MD        diazePAM tablet 10 mg  10 mg Oral On Call Procedure Vijay Uribe MD   10 mg at 09/06/23 1021    diphenhydrAMINE capsule 50 mg  50 mg Oral On Call Procedure Vijay Uribe MD   50 mg at 09/06/23 1021     Current Outpatient Medications on File Prior to Encounter   Medication Sig Dispense Refill    amiodarone (PACERONE) 200 MG Tab Take 200 mg by mouth once daily.      aspirin (ECOTRIN) 81 MG EC tablet Take 1 tablet (81 mg total) by mouth once daily. for 25 days 25 tablet 0    atorvastatin (LIPITOR) 40 MG tablet Take 40 mg by mouth once daily.      clopidogreL (PLAVIX) 75 mg tablet Take 1 tablet (75 mg total) by mouth once daily. 30 tablet 0    DUPIXENT SYRINGE 300 mg/2 mL Syrg Inject 300 mg into the skin every 14 (fourteen) days.      estradioL (ESTRACE) 0.01 % (0.1 mg/gram) vaginal cream Place 0.5 g vaginally twice a week.      famotidine (PEPCID) 40 MG tablet Take 40 mg by mouth every evening.      fluticasone-umeclidin-vilanter (TRELEGY ELLIPTA) 200-62.5-25 mcg inhaler Inhale 1 puff into the lungs once daily.      furosemide (LASIX) 40 MG tablet Take 40 mg by mouth.      glimepiride (AMARYL) 2 MG tablet Take 4 mg by mouth 2 (two) times a day.       HYDROcodone-acetaminophen (NORCO)  mg per tablet Take 1 tablet by mouth every 6 (six) hours as needed for Pain. 20 tablet 0    immun glob G,IgG,/pro/IgA 0-50 (HIZENTRA SUBQ) Inject into the skin every 14 (fourteen) days.      lipase-protease-amylase 24,000-76,000-120,000 units (CREON) 24,000-76,000 -120,000 unit capsule Take 1-2 capsules by mouth 3 (three) times daily with meals. 2 caps with meals and 1 cap c snacks      metFORMIN (GLUCOPHAGE) 500 MG tablet Take 1 tablet (500 mg total) by mouth 2 (two) times daily with meals. 180 tablet 3    metoprolol succinate (TOPROL-XL) 50 MG 24 hr tablet Take 1 tablet (50 mg total) by mouth once daily. 30 tablet 0    NIFEdipine (PROCARDIA-XL) 30 MG (OSM) 24 hr tablet Take 30 mg by mouth.      pantoprazole (PROTONIX) 40 MG tablet Take 1 tablet by mouth every morning.      timoloL (BETIMOL) 0.5 % ophthalmic solution Place 1 drop into both eyes 2 (two) times daily.      warfarin (COUMADIN) 5 MG tablet Take 1 tablet (5 mg total) by mouth Daily. 30 tablet 0     Current Schedule Inpatient Medications:   amiodarone  200 mg Oral Daily    atorvastatin  40 mg Oral QHS    clopidogreL  75 mg Oral Daily    famotidine  40 mg Oral QHS    ferrous sulfate  1 tablet Oral Daily    furosemide  40 mg Oral Daily    LIDOcaine  1 patch Transdermal Q24H    lipase-protease-amylase 24,000-76,000-120,000 units  1 capsule Oral TID WM    metoprolol succinate  50 mg Oral Daily    NIFEdipine  30 mg Oral Daily    pantoprazole  40 mg Oral QAM    polyethylene glycol  17 g Oral BID    timolol maleate 0.5%  1 drop Both Eyes BID     Continuous Infusions:    Assessment:   Acute on Chronic Diastolic Heart Failure - Related to VHD/Severe MS     - EF 60-65% with G 1 Diastolic Dysfunction (11.14.24)   VHD    - MARYBETH (11.27.24) - Mitral Valve: There is a bioprosthetic valve in the mitral position. There is severe stenosis. The mean pressure gradient across the mitral valve is 16 mmHg at a heart rate of bpm. There is  moderate regurgitation.     - ECHO (11.23.24): There is evidence of bioprosthetic mitral valve stenosis The mean pressure gradient across the mitral valve is 19 mmHg at a heart rate of 78 bpm.     - s/p MVR (9.20.23): Mitral valve replacement with a 25 mm mosaic porcine valve    - Moderate TR    - Mild MR  CAD/Stents    - s/p LHC (11.29.24) - Eccentric Prox LAD lesion was 80% stenosed with 0% stenosis post-intervention. iFR 0.84. Prox LAD lesion: A STENT SYNERGY XD 3.0X16MM stent was successfully placed at 8 GEO for 10 sec. Proximal part of the stent was post dilated by using 3.5/12 mm NC and a 4/8 mm NC balloons at 12 atmospheres.  Chronic Hypoxic Respiratory Failure Requiring Supplemental O2  Anemia (Mild)  Leukocytosis   COPD Without Exacerbation   Pulmonary HTN  GEMINI  DM II  PAF - Currently SR    - [id number] Score 6 (9.7% Stroke Risk Per Year)    - s/p Ligation of SUKHI (9.20.23) - Endostapler   ABEL  HTN  DVT s/p IVC Filter     - on Xarelto Outpatient   SSS    - s/p PPM (9.25.23): [facility]   HLD  Asthma  Left Thyroid Nodule  GERD  Chronic Sinusitis  Common Variable Immunodeficiency  Glaucoma  No Known History of GI Bleed       Plan:   INR is noted to be elevated -> 4.7 today. Hold Coumadin.  Neurosurgery requesting IR consult for lumbar injection. Will determine if bridging will be necessary.   Continue Lasix 40 mg PO daily.   Continue Plavix. Plan is for treatment with Coumadin & Plavix. (INR goal 2-3). Will repeat echo in 4-6 weeks from previous admission to re-evaluate mean pressure gradient across mitral valve.   Continue Amio, Statin, BB, Nifedipine.      Rhoda Escoto, YANIRA  Cardiology  [facility]

## 2024-12-17 NOTE — PLAN OF CARE
Problem: Physical Therapy  Goal: Physical Therapy Goal  Description: Goals to be met by: discharge     Patient will increase functional independence with mobility by performin. Sit to stand transfer with Supervision and minimal dyspnea.   2. Gait  x 150 feet with Supervision using Rolling Walker and minimal dyspnea.     Outcome: Adequate for Care Transition

## 2024-12-17 NOTE — PLAN OF CARE
F/u with Shari at [facility] swing bed requesting update on referral status. Stated referral is in review and she will update me when able.     ANDRES QuevedoW

## 2024-12-17 NOTE — PROGRESS NOTES
"[facility] Hospital Medicine Progress Note        Chief Complaint: Inpatient Follow-up     HPI:   79-year-old woman with A-fib, HTN, valvular heart disease s/p MVR, CVID, CAD s/p PCI to LAD (11/29), DVT s/p IVC filter, T2DM, GERD, SSS s/p PPM, HTN, HLD, chronic sinusitis status post sinus surgery, ABEL, obesity, asthma/COPD and a recent admission for acute decompensated diastolic heart failure where she underwent a MARYBETH on 11/27 which showed severe mitral stenosis and moderate MR and LHC on 11/29 with stent to prox LAD. Patient was seen by CT surgery and structural heart team and a possible valve in valve was discussed but the plan was to start Coumadin to see if the gradient could be decreased and have a repeat TTE in a few weeks time. She was then subsequently discharged on 2L oxygen to [place] with close cardiology follow up. She presents on [facility] on 12/15 from [place] due to low back associated with weakness and for neurosurgical evaluation.     She reports that since she went to rehab and started working with PT - she has been having lower back pain not radiating down her legs. She denies urinary or stool incontinence. No saddle anesthesia. Reports constipation. She is able to move all her extremities. She used to walk with a walker at rehab but walking became difficult due to back pain. She reports her breathing has been "so and so" while she has been there. She had a CT scan done at rehab which showed Lumbar degenerative disc disease and spondylosis and she was transferred here for neurosurgery evaluation. CXR showed increased density on the right with a small right sided pleural effusion. proBNP 893.3. Cardiology consulted.     Interval Hx:   Had a bowel movement this morning. Reports back pain. Denies chest pain - so and so SOB.     Case was discussed with patient's nurse and  on the floor.    Objective/physical exam:  General: In no acute distress, " afebrile  Chest: Clear to auscultation bilaterally  Heart: RRR, +S1, S2, no appreciable murmur  Abdomen: Soft, nontender, BS +  MSK: Warm, no lower extremity edema, no clubbing or cyanosis  Neurologic: Alert and oriented x4, Cranial nerve II-XII intact, Strength 5/5 in all 4 extremities    VITAL SIGNS: 24 HRS MIN & MAX LAST   Temp  Min: 97.8 °F (36.6 °C)  Max: 99.1 °F (37.3 °C) 97.9 °F (36.6 °C)   BP  Min: 103/43  Max: 157/76 132/70   Pulse  Min: 69  Max: 84  69   Resp  Min: 18  Max: 74 (!) 74   SpO2  Min: 92 %  Max: 95 % (!) 92 %     I have reviewed the following labs:  Recent Labs   Lab 12/16/24  0507 12/17/24  0449 12/17/24  1741   WBC 13.86* 17.26* 22.58*   RBC 3.22* 3.14* 3.34*   HGB 9.5* 9.4* 9.9*   HCT 30.2* 30.0* 31.5*   MCV 93.8 95.5* 94.3*   MCH 29.5 29.9 29.6   MCHC 31.5* 31.3* 31.4*   RDW 18.1* 18.3* 18.7*   * 432* 454*   MPV 8.9 9.2 9.4     Recent Labs   Lab 12/15/24  1613 12/16/24  0507 12/17/24  0449 12/17/24  1715    138 139  --    K 4.3 4.3 4.4  --     100 103  --    CO2 28 29 25  --    BUN 20.2* 17.3 23.7*  --    CREATININE 0.73 0.76 0.70  --    CALCIUM 8.4 8.5 7.9*  --    PH  --   --   --  7.290*   MG  --  2.20  --   --    ALBUMIN 2.5* 2.4* 2.4*  --    ALKPHOS 98 96 106  --    ALT 15 13 10  --    AST 30 26 25  --    BILITOT 0.6 0.7 0.8  --      Microbiology Results (last 7 days)       Procedure Component Value Units Date/Time    Blood Culture [[id number]]     Order Status: Sent Specimen: Blood              See below for Radiology    Assessment/Plan:  # Low back pain 2/2 DJD and spinal stenosis  # Acute on Chronic diastolic HF   # Chronic hypoxic respiratory failure requiring supplemental oxygen (on 2 L oxygen)   # Pulmonary HTN  # Severe mitral stenosis  # Obesity   # pAF s/p LAAL in 2023  # Anemia - stable. No signs of active bleeding. Patient is on triple therapy though and had a Hgb drop since then which has been stable  # iron deficiency anemia  # Leukocytosis - recently  treated for UTI but currently has no dysuria. No fevers or chills. No abdominal pain/N/V  # HTN  # HLD  # VHD s/p MVR (09/20/2023)  # CAD s/p PCI to LAD (11/29/2024)  # DVT s/p IVC filter  # SSS s/p PPM  # T2DM   # GERD   # Chronic sinusitis s/p sinus surgery   # ABEL on CPAP  # Obesity   # CVID  # Opioid induced constipation  # Asthma/COPD ? - does not have official PFTs done     - obtain MRI L spine; as per radiology patient automatically ruled out of MRI given PPM and spinal stimulator  - pain control  - NGSY consult   - as per NGSY consult IR for LESI  - PT/OT  - plavix and warfarin - as per cardiology to STOP aspirin now  - hold warfarin on 12/17 given elevated INR  - as per cardiology okay to hold warfarin but continue plavix. However as per IR - unable to do  LESI on any blood thinners  - continue amiodarone, atorvastatin, nifedipine  - IV lasix 40 mg x 1 on 12/16 and then resume PO lasix 40 mg daily on 12/17  - cardiology consult  - daily PT/INR  - PPI  - insulin sliding scale  - continue home oxygen  - continue bowel regimen  - iron supplementation    ADDENDUM UPDATE:  RRT called 5:27 pm for acute respiratory distress - patient had recently worked with PT. Immediately went up to bedside. Patient tachypneic. A&Ox4. Denies chest pain. Mildly lethargic. Mild bibasilar crackles. ABG 7.29/68/69. Placed on BiPAP. Initial EKG not great - repeat EKG showed NSR. Patient with recent stent. Given IV lasix 40 mg once. CBC with worsening leukocytosis and CXR with possible R sided infiltrate vs effusion - will start vancomycin and zosyn given recent hospitalization. Repeat ABG in 1 hour. Continue to monitor. Family updated at bedside    Critical care diagnosis: Acute hypoxic and hypercarbic respiratory failure requiring BiPAP and IV lasix  Critical care time: 65 mins      VTE prophylaxis: SCD - elevated INR    Patient condition:  Fair    Anticipated discharge and Disposition:       All diagnosis and differential diagnosis  have been reviewed; assessment and plan has been documented; I have personally reviewed the labs and test results that are presently available; I have reviewed the patients medication list; I have reviewed the consulting providers response and recommendations. I have reviewed or attempted to review medical records based upon their availability    All of the patient's questions have been  addressed and answered. Patient's is agreeable to the above stated plan. I will continue to monitor closely and make adjustments to medical management as needed.  _____________________________________________________________________    Malnutrition Status:  Nutrition consulted. Most recent weight and BMI monitored-     Measurements:  Wt Readings from Last 1 Encounters:   12/16/24 98.9 kg (218 lb)   Body mass index is 35.2 kg/m².    Patient has been screened and assessed by RD.    Malnutrition Type:  Context:    Level:      Malnutrition Characteristic Summary:       Interventions/Recommendations (treatment strategy):        Scheduled Med:   amiodarone  200 mg Oral Daily    aspirin  81 mg Oral Daily    atorvastatin  40 mg Oral QHS    clopidogreL  75 mg Oral Daily    famotidine  40 mg Oral QHS    ferrous sulfate  1 tablet Oral Daily    [START ON 12/18/2024] furosemide (LASIX) injection  40 mg Intravenous Daily    LIDOcaine  1 patch Transdermal Q24H    lipase-protease-amylase 24,000-76,000-120,000 units  1 capsule Oral TID WM    metoprolol succinate  50 mg Oral Daily    NIFEdipine  30 mg Oral Daily    pantoprazole  40 mg Oral QAM    piperacillin-tazobactam (Zosyn) IV (PEDS and ADULTS) (extended infusion is not appropriate)  4.5 g Intravenous Q8H    polyethylene glycol  17 g Oral BID    timolol maleate 0.5%  1 drop Both Eyes BID      Continuous Infusions:     PRN Meds:    Current Facility-Administered Medications:     acetaminophen, 1,000 mg, Oral, Q8H PRN    albuterol-ipratropium, 3 mL, Nebulization, Q4H PRN    dextrose 10%, 12.5 g,  Intravenous, PRN    dextrose 10%, 25 g, Intravenous, PRN    glucagon (human recombinant), 1 mg, Intramuscular, PRN    glucose, 16 g, Oral, PRN    glucose, 24 g, Oral, PRN    insulin aspart U-100, 1-10 Units, Subcutaneous, QID (AC + HS) PRN    ondansetron, 4 mg, Intravenous, Q8H PRN    oxyCODONE, 5 mg, Oral, Q6H PRN    oxyCODONE, 10 mg, Oral, Q6H PRN    senna, 8.6 mg, Oral, Daily PRN    Pharmacy to dose Vancomycin consult, , , Once **AND** vancomycin - pharmacy to dose, , Intravenous, pharmacy to manage frequency     Radiology:  I have personally reviewed the following imaging and agree with the radiologist.     X-Ray Chest 1 View  Narrative: EXAMINATION:  XR CHEST 1 VIEW    CLINICAL HISTORY:  hypoxia;    TECHNIQUE:  Single frontal view of the chest was performed.    COMPARISON:  12/04/2024    FINDINGS:  There are increased markings bilaterally most pronounced in the right lower lobe.  There is blunting of the right costophrenic angle and I cannot rule out a small effusion.  Heart size is within normal limits.  Pacing device is in place.  There is vascular calcification noted.  Patient has had a previous sternotomy.  Impression: Increased density on the right with small right-sided pleural effusion cannot rule out infiltrate.  This appears   worse than on previous    Electronically signed by: Brayden Hickman MD  Date:    12/15/2024  Time:    17:25      Catracho Galan MD  Department of Hospital Medicine   [facility]   12/17/2024

## 2024-12-17 NOTE — PT/OT/SLP DISCHARGE
Physical Therapy Discharge Summary    Name: Kortney Leach  MRN: [id number]   Principal Problem: Valvular heart disease     Patient Discharged from acute Physical Therapy on 12/15/24.     Assessment:     Patient transferred to lower level of care secondary to significant back pain, requiring transfer for neurosurgery evaluation    Objective:     GOALS:   Multidisciplinary Problems       Physical Therapy Goals          Problem: Physical Therapy    Goal Priority Disciplines Outcome Interventions   Physical Therapy Goal     PT, PT/OT Adequate for Care Transition    Description: Goals to be met by: discharge     Patient will increase functional independence with mobility by performin. Sit to stand transfer with Supervision and minimal dyspnea.   2. Gait  x 150 feet with Supervision using Rolling Walker and minimal dyspnea.                          Reasons for Discontinuation of Therapy Services  Patient is unable to continue work toward goals because of medical or psychosocial complications.      Plan:     Patient Discharged to:  [place] [facility] .      2024

## 2024-12-17 NOTE — PROGRESS NOTES
[facility]  Neurosurgery  Progress Note    Subjective:     Interval History:     No acute issues overnight   Coumadin is on hold for INR 4.7 per cardiology   IR consult for lumbar steroidal injection for pain control.  Patient has received them in the past and would like to go to rehab soon.    Post-Op Info:  * No surgery found *          Medications:  Continuous Infusions:  Scheduled Meds:   amiodarone  200 mg Oral Daily    atorvastatin  40 mg Oral QHS    clopidogreL  75 mg Oral Daily    famotidine  40 mg Oral QHS    ferrous sulfate  1 tablet Oral Daily    furosemide  40 mg Oral Daily    LIDOcaine  1 patch Transdermal Q24H    lipase-protease-amylase 24,000-76,000-120,000 units  1 capsule Oral TID WM    metoprolol succinate  50 mg Oral Daily    NIFEdipine  30 mg Oral Daily    pantoprazole  40 mg Oral QAM    polyethylene glycol  17 g Oral BID    timolol maleate 0.5%  1 drop Both Eyes BID     PRN Meds:  Current Facility-Administered Medications:     acetaminophen, 1,000 mg, Oral, Q8H PRN    albuterol-ipratropium, 3 mL, Nebulization, Q4H PRN    dextrose 10%, 12.5 g, Intravenous, PRN    dextrose 10%, 25 g, Intravenous, PRN    glucagon (human recombinant), 1 mg, Intramuscular, PRN    glucose, 16 g, Oral, PRN    glucose, 24 g, Oral, PRN    insulin aspart U-100, 1-10 Units, Subcutaneous, QID (AC + HS) PRN    ondansetron, 4 mg, Intravenous, Q8H PRN    oxyCODONE, 5 mg, Oral, Q6H PRN    oxyCODONE, 10 mg, Oral, Q6H PRN    senna, 8.6 mg, Oral, Daily PRN     Review of Systems  Objective:     Weight: 98.9 kg (218 lb)  Body mass index is 35.2 kg/m².  Vital Signs (Most Recent):  Temp: 98.6 °F (37 °C) (12/17/24 0742)  Pulse: 81 (12/17/24 0742)  Resp: 18 (12/17/24 0742)  BP: (!) 157/76 (12/17/24 0742)  SpO2: 95 % (12/17/24 0742) Vital Signs (24h Range):  Temp:  [97.8 °F (36.6 °C)-99.1 °F (37.3 °C)] 98.6 °F (37 °C)  Pulse:  [73-81] 81  Resp:  [17-24] 18  SpO2:  [93 %-97 %] 95 %  BP: (103-157)/(43-92)  157/76     Date 12/17/24 0700 - 12/18/24 0659   Shift 9365-2170 3714-3903 9132-2861 24 Hour Total   INTAKE   P.O. 360   360   Shift Total(mL/kg) 360(3.6)   360(3.6)   OUTPUT   Urine(mL/kg/hr) 300   300   Shift Total(mL/kg) 300(3)   300(3)   Weight (kg) 98.9 98.9 98.9 98.9                            Neurosurgery Physical Exam    Significant Labs:  Recent Labs   Lab 12/15/24  1613 12/16/24  0507 12/17/24  0449    138 139   K 4.3 4.3 4.4    100 103   CO2 28 29 25   BUN 20.2* 17.3 23.7*   CREATININE 0.73 0.76 0.70   CALCIUM 8.4 8.5 7.9*   MG  --  2.20  --      Recent Labs   Lab 12/15/24  1612 12/16/24  0507 12/17/24  0449   WBC 14.67* 13.86* 17.26*   HGB 9.6* 9.5* 9.4*   HCT 30.0* 30.2* 30.0*   * 447* 432*     Recent Labs   Lab 12/16/24  0507 12/17/24  0449   INR 2.8* 4.7*     Microbiology Results (last 7 days)       ** No results found for the last 168 hours. **            Assessment/Plan:    Patient with a multitude of medical issues not an appropriate surgical candidate  INR elevated cardiology holding Coumadin.  LESI injection for pain control.  Continue PTOT and ambulation  SCDs   Fall precautions   Rehab      Nothing else to add from a neurosurgical standpoint     There are no hospital problems to display for this patient.      Rocío Green, [facility]-BC  Neurosurgery  [facility]

## 2024-12-17 NOTE — PT/OT/SLP PROGRESS
Physical Therapy Treatment    Patient Name:  Kortney Leach   MRN:  [id number]    Recommendations:     Discharge therapy intensity: Moderate Intensity Therapy   Discharge Equipment Recommendations: none  Barriers to discharge: Decreased caregiver support, Impaired mobility, and Ongoing medical needs    Assessment:     Kortney Leach is a 79 y.o. female admitted with a medical diagnosis of  low back pain 2/2 DJD and spinal stenosis, acute on chronic diastolic HF, chronic hypoxic respiratory failure on supplemental O2.  She presents with the following impairments/functional limitations: weakness, impaired endurance, impaired self care skills, impaired functional mobility, gait instability, impaired balance, pain. Able to transfer to chair today with CGA-Jesus via stand pivot with RW.     Rehab Prognosis: Good; patient would benefit from acute skilled PT services to address these deficits and reach maximum level of function.    Recent Surgery: * No surgery found *      Plan:     During this hospitalization, patient would benefit from acute PT services 5 x/week to address the identified rehab impairments via gait training, therapeutic activities, therapeutic exercises and progress toward the following goals:    Plan of Care Expires:  01/16/25    Subjective     Chief Complaint: back pain  Patient/Family Comments/goals: to return to PLOF  Pain/Comfort:  Pain Rating 1: 8/10  Location 1: back  Pain Addressed 1: Reposition, Distraction      Objective:     Communicated with NSG prior to session.  Patient found supine with PureWick, oxygen, SCD upon PT entry to room.     General Precautions: Standard, fall  Orthopedic Precautions: N/A  Braces: N/A  Respiratory Status:  CPAP, attempted to transfer to 2L nc with NSG approval but pt became anxious and demanding to return to CPAP even with SpO2 above 93%  Skin Integrity: Visible skin intact      Functional Mobility:  Bed Mobility:     Rolling Left:  minimum  assistance  Rolling Right: minimum assistance  Scooting: minimum assistance  Supine to Sit: minimum assistance  Transfers:     Sit to Stand:  contact guard assistance with rolling walker  Bed to Chair: contact guard assistance with  rolling walker  using  Stand Pivot    Therapeutic Activities/Exercises:  Brief placed on pt while in supine for mobility, pt with +BM while rolling, increased time for cleanup, totalA for pericare, pt only able to tolerate being in sidelying position for ~1 min before needing to return to supine, one instance where SpO2 dropped to 84% and required increased time to recover >90% and cues for pursed lip breathing and not holding breath.  Pt educated on pursed lip breathing, performed with therapy demonstration/coaching for 2 mins while sitting EOB to assist in SpO2 recovery.     Education:  Patient provided with verbal education education regarding PT role/goals/POC, fall prevention, safety awareness, and discharge/DME recommendations.  Understanding was verbalized.     Patient left up in chair with all lines intact, call button in reach, reinaldo pad in place, NSG notified, and sister present    GOALS:   Multidisciplinary Problems       Physical Therapy Goals          Problem: Physical Therapy    Goal Priority Disciplines Outcome Interventions   Physical Therapy Goal     PT, PT/OT Progressing    Description: Goals to be met by: 25     Patient will increase functional independence with mobility by performin. Supine to sit with Modified [place]  2. Sit to supine with Modified [place]  3. Sit to stand transfer with Modified [place]  4. Bed to chair transfer with Modified [place] using LRAD  5. Gait  x 150 feet with Modified [place] using LRAD                         Time Tracking:     PT Received On: 24  PT Start Time: 1328     PT Stop Time: 1406  PT Total Time (min): 38 min     Billable Minutes: Therapeutic Activity 38    Treatment Type:  Treatment  PT/PTA: PT     Number of PTA visits since last PT visit: 1     12/17/2024

## 2024-12-18 LAB
ALBUMIN SERPL-MCNC: 2.7 G/DL (ref 3.4–4.8)
ALBUMIN/GLOB SERPL: 0.8 RATIO (ref 1.1–2)
ALLENS TEST BLOOD GAS (OHS): YES
ALLENS TEST BLOOD GAS (OHS): YES
ALP SERPL-CCNC: 111 UNIT/L (ref 40–150)
ALT SERPL-CCNC: 12 UNIT/L (ref 0–55)
ANION GAP SERPL CALC-SCNC: 13 MEQ/L
AST SERPL-CCNC: 31 UNIT/L (ref 5–34)
BASE EXCESS BLD CALC-SCNC: 3.7 MMOL/L (ref -2–2)
BASE EXCESS BLD CALC-SCNC: 6.2 MMOL/L (ref -2–2)
BASOPHILS # BLD AUTO: 0.09 X10(3)/MCL
BASOPHILS NFR BLD AUTO: 0.5 %
BILIRUB SERPL-MCNC: 0.8 MG/DL
BIPAP(E) BLOOD GAS (OHS): 6 CM H2O
BIPAP(I) BLOOD GAS (OHS): 12 CM H2O
BLOOD GAS SAMPLE TYPE (OHS): ABNORMAL
BLOOD GAS SAMPLE TYPE (OHS): ABNORMAL
BUN SERPL-MCNC: 33.1 MG/DL (ref 9.8–20.1)
CA-I BLD-SCNC: 1.13 MMOL/L (ref 1.12–1.23)
CA-I BLD-SCNC: 1.14 MMOL/L (ref 1.12–1.23)
CALCIUM SERPL-MCNC: 7.9 MG/DL (ref 8.4–10.2)
CHLORIDE SERPL-SCNC: 97 MMOL/L (ref 98–107)
CO2 BLDA-SCNC: 31.5 MMOL/L
CO2 BLDA-SCNC: 33.2 MMOL/L
CO2 SERPL-SCNC: 26 MMOL/L (ref 23–31)
COHGB MFR BLDA: 1.5 % (ref 0.5–1.5)
COHGB MFR BLDA: 1.7 % (ref 0.5–1.5)
CPAP BLOOD GAS (OHS): 5 CM H2O
CREAT SERPL-MCNC: 0.85 MG/DL (ref 0.55–1.02)
CREAT/UREA NIT SERPL: 39
DRAWN BY BLOOD GAS (OHS): ABNORMAL
DRAWN BY BLOOD GAS (OHS): ABNORMAL
EOSINOPHIL # BLD AUTO: 0.27 X10(3)/MCL (ref 0–0.9)
EOSINOPHIL NFR BLD AUTO: 1.6 %
ERYTHROCYTE [DISTWIDTH] IN BLOOD BY AUTOMATED COUNT: 18 % (ref 11.5–17)
GFR SERPLBLD CREATININE-BSD FMLA CKD-EPI: >60 ML/MIN/1.73/M2
GLOBULIN SER-MCNC: 3.3 GM/DL (ref 2.4–3.5)
GLUCOSE SERPL-MCNC: 146 MG/DL (ref 82–115)
HCO3 BLDA-SCNC: 29.9 MMOL/L (ref 22–26)
HCO3 BLDA-SCNC: 31.7 MMOL/L (ref 22–26)
HCT VFR BLD AUTO: 30.9 % (ref 37–47)
HGB BLD-MCNC: 9.7 G/DL (ref 12–16)
IMM GRANULOCYTES # BLD AUTO: 0.52 X10(3)/MCL (ref 0–0.04)
IMM GRANULOCYTES NFR BLD AUTO: 3 %
INHALED O2 CONCENTRATION: 35 %
INHALED O2 CONCENTRATION: 45 %
INR PPP: 6
LYMPHOCYTES # BLD AUTO: 3.31 X10(3)/MCL (ref 0.6–4.6)
LYMPHOCYTES NFR BLD AUTO: 19.1 %
MCH RBC QN AUTO: 30 PG (ref 27–31)
MCHC RBC AUTO-ENTMCNC: 31.4 G/DL (ref 33–36)
MCV RBC AUTO: 95.7 FL (ref 80–94)
METHGB MFR BLDA: 0.5 % (ref 0.4–1.5)
METHGB MFR BLDA: 0.8 % (ref 0.4–1.5)
MODE (OHS): ABNORMAL
MODE (OHS): ABNORMAL
MONOCYTES # BLD AUTO: 1.75 X10(3)/MCL (ref 0.1–1.3)
MONOCYTES NFR BLD AUTO: 10.1 %
MRSA PCR SCRN (OHS): NOT DETECTED
NEUTROPHILS # BLD AUTO: 11.36 X10(3)/MCL (ref 2.1–9.2)
NEUTROPHILS NFR BLD AUTO: 65.7 %
NRBC BLD AUTO-RTO: 6.8 %
O2 HB BLOOD GAS (OHS): 95.6 % (ref 94–97)
O2 HB BLOOD GAS (OHS): 95.6 % (ref 94–97)
OXYHGB MFR BLDA: 9.1 G/DL (ref 12–16)
OXYHGB MFR BLDA: 9.3 G/DL (ref 12–16)
PCO2 BLDA: 50 MMHG (ref 35–45)
PCO2 BLDA: 53 MMHG (ref 35–45)
PH BLDA: 7.36 [PH] (ref 7.35–7.45)
PH BLDA: 7.41 [PH] (ref 7.35–7.45)
PLATELET # BLD AUTO: 392 X10(3)/MCL (ref 130–400)
PMV BLD AUTO: 9.4 FL (ref 7.4–10.4)
PO2 BLDA: 88 MMHG (ref 80–100)
PO2 BLDA: 99 MMHG (ref 80–100)
POCT GLUCOSE: 128 MG/DL (ref 70–110)
POCT GLUCOSE: 178 MG/DL (ref 70–110)
POTASSIUM BLOOD GAS (OHS): 3.9 MMOL/L (ref 3.5–5)
POTASSIUM BLOOD GAS (OHS): 3.9 MMOL/L (ref 3.5–5)
POTASSIUM SERPL-SCNC: 4.6 MMOL/L (ref 3.5–5.1)
PROT SERPL-MCNC: 6 GM/DL (ref 5.8–7.6)
PROTHROMBIN TIME: 53 SECONDS (ref 12.5–14.5)
RBC # BLD AUTO: 3.23 X10(6)/MCL (ref 4.2–5.4)
SAMPLE SITE BLOOD GAS (OHS): ABNORMAL
SAMPLE SITE BLOOD GAS (OHS): ABNORMAL
SAO2 % BLDA: 96.8 %
SAO2 % BLDA: 97.4 %
SODIUM BLOOD GAS (OHS): 132 MMOL/L (ref 137–145)
SODIUM BLOOD GAS (OHS): 132 MMOL/L (ref 137–145)
SODIUM SERPL-SCNC: 136 MMOL/L (ref 136–145)
SPONT RR (OHS): 15 B/MIN
SPONT RR (OHS): 17 B/MIN
WBC # BLD AUTO: 17.3 X10(3)/MCL (ref 4.5–11.5)

## 2024-12-18 PROCEDURE — 63600175 PHARM REV CODE 636 W HCPCS: Performed by: NURSE PRACTITIONER

## 2024-12-18 PROCEDURE — 94660 CPAP INITIATION&MGMT: CPT

## 2024-12-18 PROCEDURE — 36415 COLL VENOUS BLD VENIPUNCTURE: CPT

## 2024-12-18 PROCEDURE — 82803 BLOOD GASES ANY COMBINATION: CPT

## 2024-12-18 PROCEDURE — 21400001 HC TELEMETRY ROOM

## 2024-12-18 PROCEDURE — 27100171 HC OXYGEN HIGH FLOW UP TO 24 HOURS

## 2024-12-18 PROCEDURE — 36600 WITHDRAWAL OF ARTERIAL BLOOD: CPT

## 2024-12-18 PROCEDURE — 25000003 PHARM REV CODE 250: Performed by: NURSE PRACTITIONER

## 2024-12-18 PROCEDURE — 80053 COMPREHEN METABOLIC PANEL: CPT

## 2024-12-18 PROCEDURE — 94760 N-INVAS EAR/PLS OXIMETRY 1: CPT

## 2024-12-18 PROCEDURE — 63600175 PHARM REV CODE 636 W HCPCS

## 2024-12-18 PROCEDURE — 25000003 PHARM REV CODE 250

## 2024-12-18 PROCEDURE — 94761 N-INVAS EAR/PLS OXIMETRY MLT: CPT | Mod: XB

## 2024-12-18 PROCEDURE — 85025 COMPLETE CBC W/AUTO DIFF WBC: CPT

## 2024-12-18 PROCEDURE — 99900035 HC TECH TIME PER 15 MIN (STAT)

## 2024-12-18 PROCEDURE — 87641 MR-STAPH DNA AMP PROBE: CPT

## 2024-12-18 PROCEDURE — 85610 PROTHROMBIN TIME: CPT

## 2024-12-18 PROCEDURE — 99900031 HC PATIENT EDUCATION (STAT)

## 2024-12-18 RX ORDER — ALPRAZOLAM 0.25 MG/1
0.25 TABLET ORAL ONCE
Status: COMPLETED | OUTPATIENT
Start: 2024-12-18 | End: 2024-12-18

## 2024-12-18 RX ORDER — MUPIROCIN 20 MG/G
OINTMENT TOPICAL 2 TIMES DAILY
Status: DISPENSED | OUTPATIENT
Start: 2024-12-20 | End: 2024-12-25

## 2024-12-18 RX ADMIN — FUROSEMIDE 40 MG: 10 INJECTION, SOLUTION INTRAMUSCULAR; INTRAVENOUS at 08:12

## 2024-12-18 RX ADMIN — OXYCODONE HYDROCHLORIDE 10 MG: 10 TABLET ORAL at 03:12

## 2024-12-18 RX ADMIN — LIDOCAINE 5% 1 PATCH: 700 PATCH TOPICAL at 04:12

## 2024-12-18 RX ADMIN — TIMOLOL MALEATE 1 DROP: 5 SOLUTION OPHTHALMIC at 08:12

## 2024-12-18 RX ADMIN — CLOPIDOGREL BISULFATE 75 MG: 75 TABLET ORAL at 09:12

## 2024-12-18 RX ADMIN — POLYETHYLENE GLYCOL 3350 17 G: 17 POWDER, FOR SOLUTION ORAL at 08:12

## 2024-12-18 RX ADMIN — ALPRAZOLAM 0.25 MG: 0.25 TABLET ORAL at 03:12

## 2024-12-18 RX ADMIN — AMIODARONE HYDROCHLORIDE 200 MG: 200 TABLET ORAL at 09:12

## 2024-12-18 RX ADMIN — ASPIRIN 81 MG CHEWABLE TABLET 81 MG: 81 TABLET CHEWABLE at 09:12

## 2024-12-18 RX ADMIN — FAMOTIDINE 40 MG: 20 TABLET, FILM COATED ORAL at 08:12

## 2024-12-18 RX ADMIN — FUROSEMIDE 40 MG: 10 INJECTION, SOLUTION INTRAMUSCULAR; INTRAVENOUS at 09:12

## 2024-12-18 RX ADMIN — TIMOLOL MALEATE 1 DROP: 5 SOLUTION OPHTHALMIC at 10:12

## 2024-12-18 RX ADMIN — PIPERACILLIN SODIUM AND TAZOBACTAM SODIUM 4.5 G: 4; .5 INJECTION, POWDER, LYOPHILIZED, FOR SOLUTION INTRAVENOUS at 02:12

## 2024-12-18 RX ADMIN — INSULIN ASPART 2 UNITS: 100 INJECTION, SOLUTION INTRAVENOUS; SUBCUTANEOUS at 12:12

## 2024-12-18 RX ADMIN — PANTOPRAZOLE SODIUM 40 MG: 40 TABLET, DELAYED RELEASE ORAL at 05:12

## 2024-12-18 RX ADMIN — ATORVASTATIN CALCIUM 40 MG: 40 TABLET, FILM COATED ORAL at 08:12

## 2024-12-18 RX ADMIN — NIFEDIPINE 30 MG: 30 TABLET, FILM COATED, EXTENDED RELEASE ORAL at 09:12

## 2024-12-18 RX ADMIN — VANCOMYCIN HYDROCHLORIDE 1500 MG: 1.5 INJECTION, POWDER, LYOPHILIZED, FOR SOLUTION INTRAVENOUS at 11:12

## 2024-12-18 RX ADMIN — PIPERACILLIN SODIUM AND TAZOBACTAM SODIUM 4.5 G: 4; .5 INJECTION, POWDER, LYOPHILIZED, FOR SOLUTION INTRAVENOUS at 05:12

## 2024-12-18 RX ADMIN — PIPERACILLIN SODIUM AND TAZOBACTAM SODIUM 4.5 G: 4; .5 INJECTION, POWDER, LYOPHILIZED, FOR SOLUTION INTRAVENOUS at 11:12

## 2024-12-18 RX ADMIN — METOPROLOL SUCCINATE 50 MG: 50 TABLET, EXTENDED RELEASE ORAL at 09:12

## 2024-12-18 RX ADMIN — FERROUS SULFATE TAB 325 MG (65 MG ELEMENTAL FE) 1 EACH: 325 (65 FE) TAB at 09:12

## 2024-12-18 NOTE — PLAN OF CARE
Lori Mccarthy at [facility] requesting update on referral status. Pending response. Pt no longer getting injection and will likely be ready to d/c tomorrow, per MD.     Bella Arvizu LCSW

## 2024-12-18 NOTE — PROGRESS NOTES
"[facility] Medicine [facility]        Chief Complaint: Inpatient Follow-up     HPI:   79-year-old woman with A-fib, HTN, valvular heart disease s/p MVR, CVID, CAD s/p PCI to LAD (11/29), DVT s/p IVC filter, T2DM, GERD, SSS s/p PPM, HTN, HLD, chronic sinusitis status post sinus surgery, ABEL, obesity, asthma/COPD and a recent admission for acute decompensated diastolic heart failure where she underwent a MARYBETH on 11/27 which showed severe mitral stenosis and moderate MR and LHC on 11/29 with stent to prox LAD. Patient was seen by CT surgery and structural heart team and a possible valve in valve was discussed but the plan was to start Coumadin to see if the gradient could be decreased and have a repeat TTE in a few weeks time. She was then subsequently discharged on 2L oxygen to [place] with close cardiology follow up. She presents on [facility] on 12/15 from [place] due to low back associated with weakness and for neurosurgical evaluation.     She reports that since she went to rehab and started working with PT - she has been having lower back pain not radiating down her legs. She denies urinary or stool incontinence. No saddle anesthesia. Reports constipation. She is able to move all her extremities. She used to walk with a walker at rehab but walking became difficult due to back pain. She reports her breathing has been "so and so" while she has been there. She had a CT scan done at rehab which showed Lumbar degenerative disc disease and spondylosis and she was transferred here for neurosurgery evaluation. CXR showed increased density on the right with a small right sided pleural effusion. proBNP 893.3. Cardiology consulted.     RRT called on 12/17 for acute respiratory distress - patient had recently worked with PT. Immediately went up to bedside. Patient tachypneic. A&Ox4. Denies chest pain. Mildly lethargic. Mild bibasilar crackles. ABG 7.29/68/69. Placed on BiPAP. " Initial EKG not great - repeat EKG showed NSR. Patient with recent stent. Given IV lasix 40 mg once. CBC with worsening leukocytosis and CXR with possible R sided infiltrate vs effusion - will start vancomycin and zosyn given recent hospitalization. Repeat ABG in 1 hour was better.    Interval Hx:   Seen this morning. A&Ox4. Drowsy but arousable and able to follow commands.     Case was discussed with patient's nurse and  on the floor.    Objective/physical exam:  General: In no acute distress, afebrile  Chest: Clear to auscultation bilaterally  Heart: RRR, +S1, S2, no appreciable murmur  Abdomen: Soft, nontender, BS +  MSK: Warm, no lower extremity edema, no clubbing or cyanosis  Neurologic: Alert and oriented x4, Cranial nerve II-XII intact, Strength 5/5 in all 4 extremities    VITAL SIGNS: 24 HRS MIN & MAX LAST   Temp  Min: 96.7 °F (35.9 °C)  Max: 98.3 °F (36.8 °C) 97 °F (36.1 °C)   BP  Min: 102/56  Max: 168/92 115/67   Pulse  Min: 66  Max: 91  66   Resp  Min: 17  Max: 32 17   SpO2  Min: 86 %  Max: 100 % 98 %     I have reviewed the following labs:  Recent Labs   Lab 12/17/24 0449 12/17/24 1741 12/18/24  0450   WBC 17.26* 22.58* 17.30*   RBC 3.14* 3.34* 3.23*   HGB 9.4* 9.9* 9.7*   HCT 30.0* 31.5* 30.9*   MCV 95.5* 94.3* 95.7*   MCH 29.9 29.6 30.0   MCHC 31.3* 31.4* 31.4*   RDW 18.3* 18.7* 18.0*   * 454* 392   MPV 9.2 9.4 9.4     Recent Labs   Lab 12/16/24  0507 12/17/24  0449 12/17/24  1741 12/17/24  1956 12/17/24  2004 12/18/24  0450 12/18/24  1022 12/18/24  1242      < > 135*  --  136 136  --   --    K 4.3   < > 5.8*  --  4.1 4.6  --   --       < > 98  --  97* 97*  --   --    CO2 29   < > 22*  --  26 26  --   --    BUN 17.3   < > 27.6*  --  27.0* 33.1*  --   --    CREATININE 0.76   < > 0.99  --  0.75 0.85  --   --    CALCIUM 8.5   < > 8.4  --  7.7* 7.9*  --   --    PH  --    < >  --  7.450  --   --  7.360 7.410   MG 2.20  --   --   --   --   --   --   --    ALBUMIN 2.4*   < >  2.7*  --  2.6* 2.7*  --   --    ALKPHOS 96   < > 115  --  109 111  --   --    ALT 13   < > 16  --  12 12  --   --    AST 26   < > 52*  --  27 31  --   --    BILITOT 0.7   < > 0.8  --  0.8 0.8  --   --     < > = values in this interval not displayed.     Microbiology Results (last 7 days)       Procedure Component Value Units Date/Time    Blood Culture [[id number]] Collected: 12/17/24 2004    Order Status: Resulted Specimen: Blood Updated: 12/17/24 2005             See below for Radiology    Assessment/Plan:  # Lethargy 2/2 possibly hypercapnia  # Low back pain 2/2 DJD and spinal stenosis  # Acute on Chronic diastolic HF   # Chronic hypoxic respiratory failure requiring supplemental oxygen (on 2 L oxygen)   # Pulmonary HTN  # Severe mitral stenosis  # Obesity   # pAF s/p LAAL in 2023  # Anemia - stable. No signs of active bleeding. Patient is on triple therapy though and had a Hgb drop since then which has been stable  # iron deficiency anemia  # Leukocytosis - recently treated for UTI but currently has no dysuria. No fevers or chills. No abdominal pain/N/V  # HTN  # HLD  # VHD s/p MVR (09/20/2023)  # CAD s/p PCI to LAD (11/29/2024)  # DVT s/p IVC filter  # SSS s/p PPM  # T2DM   # GERD   # Chronic sinusitis s/p sinus surgery   # ABEL on CPAP  # Obesity   # CVID  # Opioid induced constipation  # Asthma/COPD ? - does not have official PFTs done     - continue BiPAP and monitor ABG  - UA negative  - follow up Bcx  - continue vancomycin and zosyn until cultures negative for 48 hours. Leukocytosis improving.  - obtain CT Head   - MRI L spine; as per radiology patient automatically ruled out of MRI given PPM and spinal stimulator  - pain control  - NGSY consult   - as per NGSY consult IR for LESI  - PT/OT  - plavix and warfarin - as per cardiology to STOP aspirin now  - hold warfarin on 12/17 given elevated INR  - as per cardiology okay to hold warfarin but continue plavix. However as per IR - unable to do LESI on any blood  thinners and it is not feasible to hold given fresh stent  - continue amiodarone, atorvastatin, nifedipine  - IV lasix 40 mg BID  - cardiology consult  - daily PT/INR  - PPI  - insulin sliding scale  - continue home oxygen  - continue bowel regimen  - iron supplementation    Critical care diagnosis: Acute hypoxic and hypercarbic respiratory failure requiring BiPAP and IV lasix  Critical care time: 65 mins      VTE prophylaxis: SCD - elevated INR    Patient condition:  Fair    Anticipated discharge and Disposition:       All diagnosis and differential diagnosis have been reviewed; assessment and plan has been documented; I have personally reviewed the labs and test results that are presently available; I have reviewed the patients medication list; I have reviewed the consulting providers response and recommendations. I have reviewed or attempted to review medical records based upon their availability    All of the patient's questions have been  addressed and answered. Patient's is agreeable to the above stated plan. I will continue to monitor closely and make adjustments to medical management as needed.  _____________________________________________________________________    Malnutrition Status:  Nutrition consulted. Most recent weight and BMI monitored-     Measurements:  Wt Readings from Last 1 Encounters:   12/16/24 98.9 kg (218 lb)   Body mass index is 35.2 kg/m².    Patient has been screened and assessed by RD.    Malnutrition Type:  Context:    Level:      Malnutrition Characteristic Summary:       Interventions/Recommendations (treatment strategy):        Scheduled Med:   amiodarone  200 mg Oral Daily    aspirin  81 mg Oral Daily    atorvastatin  40 mg Oral QHS    clopidogreL  75 mg Oral Daily    famotidine  40 mg Oral QHS    ferrous sulfate  1 tablet Oral Daily    furosemide (LASIX) injection  40 mg Intravenous Q12H    LIDOcaine  1 patch Transdermal Q24H    lipase-protease-amylase 24,000-76,000-120,000 units   1 capsule Oral TID WM    metoprolol succinate  50 mg Oral Daily    [START ON 12/20/2024] mupirocin   Nasal BID    NIFEdipine  30 mg Oral Daily    pantoprazole  40 mg Oral QAM    piperacillin-tazobactam (Zosyn) IV (PEDS and ADULTS) (extended infusion is not appropriate)  4.5 g Intravenous Q8H    polyethylene glycol  17 g Oral BID    sodium zirconium cyclosilicate  10 g Oral Once    timolol maleate 0.5%  1 drop Both Eyes BID    vancomycin 1,500 mg in D5W 250 mL IVPB (admixture device)  1,500 mg Intravenous Q24H      Continuous Infusions:     PRN Meds:    Current Facility-Administered Medications:     acetaminophen, 1,000 mg, Oral, Q8H PRN    albuterol-ipratropium, 3 mL, Nebulization, Q4H PRN    dextrose 10%, 12.5 g, Intravenous, PRN    dextrose 10%, 25 g, Intravenous, PRN    glucagon (human recombinant), 1 mg, Intramuscular, PRN    glucose, 16 g, Oral, PRN    glucose, 24 g, Oral, PRN    insulin aspart U-100, 1-10 Units, Subcutaneous, QID (AC + HS) PRN    ondansetron, 4 mg, Intravenous, Q8H PRN    oxyCODONE, 5 mg, Oral, Q6H PRN    oxyCODONE, 10 mg, Oral, Q6H PRN    senna, 8.6 mg, Oral, Daily PRN    Pharmacy to dose Vancomycin consult, , , Once **AND** vancomycin - pharmacy to dose, , Intravenous, pharmacy to manage frequency     Radiology:  I have personally reviewed the following imaging and agree with the radiologist.     X-Ray Chest 1 View  Narrative: EXAMINATION:  XR CHEST 1 VIEW    CLINICAL HISTORY:  SOB;    TECHNIQUE:  Single frontal view of the chest was performed.    COMPARISON:  12/15/2024    FINDINGS:  There is some pulmonary edema pulmonary venous congestion bilaterally.  There is areas of atelectasis versus developing infiltrate in the right lower lobe.  There are small effusions seen bilaterally.  The heart is normal in size.  Postsurgical changes seen the mediastinum.  There is a pacemaker in the left anterior chest wall.  Bones and joints show no acute abnormality.  Impression: Findings consistent with  increased volume status overall not significantly changed since prior examination    Electronically signed by: Haider Chavarria  Date:    12/17/2024  Time:    18:06      Catracho Galan MD  Department of Hospital Medicine   [facility]   12/18/2024

## 2024-12-18 NOTE — PROGRESS NOTES
Pharmacokinetic Initial Assessment: IV Vancomycin    Assessment/Plan:    Initiate intravenous vancomycin with loading dose of 2000 mg once followed by a maintenance dose of vancomycin 1500mg IV every 24 hours  Desired empiric serum trough concentration is 10 to 20 mcg/mL  Draw vancomycin trough on 12/19 at 1900.  Pharmacy will continue to follow and monitor vancomycin.       Patient brief summary:  Kortney Leach is a 79 y.o. female initiated on antimicrobial therapy with IV Vancomycin for treatment of suspected sepsis    Drug Allergies:   Review of patient's allergies indicates:   Allergen Reactions    Adhesive tape-silicones        Actual Body Weight:   98.9 kg    Renal Function:   Estimated Creatinine Clearance: 53.7 mL/min (based on SCr of 0.99 mg/dL).,     Dialysis Method (if applicable):  N/A    CBC (last 72 hours):  Recent Labs   Lab Result Units 12/15/24  1612 12/16/24  0507 12/17/24  0449 12/17/24  1741   WBC x10(3)/mcL 14.67* 13.86* 17.26* 22.58*   Hgb g/dL 9.6* 9.5* 9.4* 9.9*   Hct % 30.0* 30.2* 30.0* 31.5*   Platelet x10(3)/mcL 410* 447* 432* 454*   Mono % % 8.6 8.9 9.0 8.9   Eos % % 2.0 1.9 1.7 0.9   Basophil % % 0.5 0.3 0.4 0.4       Metabolic Panel (last 72 hours):  Recent Labs   Lab Result Units 12/15/24  1613 12/16/24  0507 12/17/24  0449 12/17/24  1715 12/17/24  1741   Sodium mmol/L 138 138 139  --  135*   Sodium, Blood Gas mmol/L  --   --   --  132*  --    Potassium mmol/L 4.3 4.3 4.4  --  5.8*   Potassium, Blood Gas mmol/L  --   --   --  4.3  --    Chloride mmol/L 101 100 103  --  98   CO2 mmol/L 28 29 25  --  22*   Glucose mg/dL 123* 127* 166*  --  286*   Blood Urea Nitrogen mg/dL 20.2* 17.3 23.7*  --  27.6*   Creatinine mg/dL 0.73 0.76 0.70  --  0.99   Albumin g/dL 2.5* 2.4* 2.4*  --  2.7*   Bilirubin Total mg/dL 0.6 0.7 0.8  --  0.8   ALP unit/L 98 96 106  --  115   AST unit/L 30 26 25  --  52*   ALT unit/L 15 13 10  --  16   Magnesium Level mg/dL  --  2.20  --   --   --        Drug  "levels (last 3 results):  No results for input(s): "VANCOMYCINRA", "VANCORANDOM", "VANCOMYCINPE", "VANCOPEAK", "VANCOMYCINTR", "VANCOTROUGH" in the last 72 hours.    Microbiologic Results:  Microbiology Results (last 7 days)       Procedure Component Value Units Date/Time    Blood Culture [[id number]]     Order Status: Sent Specimen: Blood             "

## 2024-12-18 NOTE — AI DETERIORATION ALERT
Artificial Intelligence Notification  [facility]  [address]  [place] [address]  Phone: [phone]    This documentation was triggered by an Artificial Intelligence Notification:    Admit Date: 12/15/2024   LOS: 2  Code Status: Full Code  : 1945  Age: 79 y.o.  Weight:   Wt Readings from Last 1 Encounters:   24 98.9 kg (218 lb)        Sex: female  Bed: [facility] A  MRN: [id number]  Attending Physician: Catracho Galan,*     Date of Alert: 2024  Time AI Alert Received:             Vitals:    24 1848   BP: (!) 144/68   Pulse: 85   Resp: 20   Temp: 97.9 °F (36.6 °C)     SpO2: 98 %      Artificial Intelligence alert discussed with Provider:     Name: NA   Date/Time of Provider Notification: 703      Patient Condition: see RRT documentation from previous response nurse. I assessed pt at bedside upon arrival to shift. She is no longer in respiratory distress on BiPAP, pt states she feels much better, she is AAOx4. No further interventions needed from ICU, will keep an eye out for ABG results.

## 2024-12-18 NOTE — PT/OT/SLP PROGRESS
Physical Therapy      Patient Name:  Kortney Leach   MRN:  [id number]    Patient not seen today secondary to critically elevated INR contraindicated for mobility. Will f/u as appropriate.    12/18/2024

## 2024-12-18 NOTE — PLAN OF CARE
Problem: Adult Inpatient Plan of Care  Goal: Optimal Comfort and Wellbeing  Outcome: Progressing  Intervention: Monitor Pain and Promote Comfort  Flowsheets (Taken 12/18/2024 1535)  Pain Management Interventions:   around-the-clock dosing utilized   pain management plan reviewed with patient/caregiver   quiet environment facilitated   relaxation techniques promoted   position adjusted   pillow support provided  Intervention: Provide Person-Centered Care  Flowsheets (Taken 12/18/2024 1535)  Trust Relationship/Rapport:   care explained   questions encouraged   choices provided   reassurance provided   emotional support provided   thoughts/feelings acknowledged   empathic listening provided   questions answered     Problem: Diabetes Comorbidity  Goal: Blood Glucose Level Within Targeted Range  Outcome: Progressing  Intervention: Monitor and Manage Glycemia  Flowsheets (Taken 12/18/2024 1535)  Glycemic Management: blood glucose monitored     Problem: Skin Injury Risk Increased  Goal: Skin Health and Integrity  Outcome: Progressing  Intervention: Optimize Skin Protection  Flowsheets (Taken 12/18/2024 1535)  Pressure Reduction Techniques:   frequent weight shift encouraged   weight shift assistance provided   positioned off wounds   pressure points protected  Pressure Reduction Devices:   heel offloading device utilized   positioning supports utilized  Skin Protection: incontinence pads utilized  Activity Management: Ankle pumps - L1  Head of Bed (HOB) Positioning: HOB at 30 degrees  Intervention: Promote and Optimize Oral Intake  Flowsheets (Taken 12/18/2024 1535)  Oral Nutrition Promotion: adaptive equipment use encouraged  Nutrition Interventions:   food preferences provided   supplemental drinks provided

## 2024-12-19 LAB
ABS NEUT (OLG): 13.11 X10(3)/MCL (ref 2.1–9.2)
ALBUMIN SERPL-MCNC: 2.3 G/DL (ref 3.4–4.8)
ALBUMIN/GLOB SERPL: 0.7 RATIO (ref 1.1–2)
ALP SERPL-CCNC: 99 UNIT/L (ref 40–150)
ALT SERPL-CCNC: 14 UNIT/L (ref 0–55)
ANION GAP SERPL CALC-SCNC: 9 MEQ/L
ANISOCYTOSIS BLD QL SMEAR: ABNORMAL
AST SERPL-CCNC: 33 UNIT/L (ref 5–34)
BILIRUB SERPL-MCNC: 1 MG/DL
BUN SERPL-MCNC: 31.5 MG/DL (ref 9.8–20.1)
CALCIUM SERPL-MCNC: 7.9 MG/DL (ref 8.4–10.2)
CHLORIDE SERPL-SCNC: 98 MMOL/L (ref 98–107)
CO2 SERPL-SCNC: 30 MMOL/L (ref 23–31)
CREAT SERPL-MCNC: 0.81 MG/DL (ref 0.55–1.02)
CREAT/UREA NIT SERPL: 39
EOSINOPHIL NFR BLD MANUAL: 0.32 X10(3)/MCL (ref 0–0.9)
EOSINOPHIL NFR BLD MANUAL: 2 %
ERYTHROCYTE [DISTWIDTH] IN BLOOD BY AUTOMATED COUNT: 18.3 % (ref 11.5–17)
GFR SERPLBLD CREATININE-BSD FMLA CKD-EPI: >60 ML/MIN/1.73/M2
GLOBULIN SER-MCNC: 3.4 GM/DL (ref 2.4–3.5)
GLUCOSE SERPL-MCNC: 137 MG/DL (ref 82–115)
HCT VFR BLD AUTO: 25.4 % (ref 37–47)
HGB BLD-MCNC: 8 G/DL (ref 12–16)
INR PPP: 6.7
INSTRUMENT WBC (OLG): 15.79 X10(3)/MCL
LYMPHOCYTES NFR BLD MANUAL: 1.58 X10(3)/MCL
LYMPHOCYTES NFR BLD MANUAL: 10 %
MACROCYTES BLD QL SMEAR: ABNORMAL
MAGNESIUM SERPL-MCNC: 2 MG/DL (ref 1.6–2.6)
MCH RBC QN AUTO: 30.2 PG (ref 27–31)
MCHC RBC AUTO-ENTMCNC: 31.5 G/DL (ref 33–36)
MCV RBC AUTO: 95.8 FL (ref 80–94)
MONOCYTES NFR BLD MANUAL: 0.95 X10(3)/MCL (ref 0.1–1.3)
MONOCYTES NFR BLD MANUAL: 6 %
NEUTROPHILS NFR BLD MANUAL: 83 %
NRBC BLD AUTO-RTO: 7.5 %
NRBC BLD MANUAL-RTO: 13 %
OHS QRS DURATION: 96 MS
OHS QTC CALCULATION: 484 MS
PLATELET # BLD AUTO: 352 X10(3)/MCL (ref 130–400)
PLATELET # BLD EST: NORMAL 10*3/UL
PMV BLD AUTO: 9.6 FL (ref 7.4–10.4)
POCT GLUCOSE: 104 MG/DL (ref 70–110)
POCT GLUCOSE: 124 MG/DL (ref 70–110)
POCT GLUCOSE: 134 MG/DL (ref 70–110)
POCT GLUCOSE: 159 MG/DL (ref 70–110)
POIKILOCYTOSIS BLD QL SMEAR: ABNORMAL
POLYCHROMASIA BLD QL SMEAR: ABNORMAL
POTASSIUM SERPL-SCNC: 3.4 MMOL/L (ref 3.5–5.1)
PROT SERPL-MCNC: 5.7 GM/DL (ref 5.8–7.6)
PROTHROMBIN TIME: 57.8 SECONDS (ref 12.5–14.5)
RBC # BLD AUTO: 2.65 X10(6)/MCL (ref 4.2–5.4)
RBC MORPH BLD: ABNORMAL
SCHISTOCYTE (OLG): ABNORMAL
SODIUM SERPL-SCNC: 137 MMOL/L (ref 136–145)
WBC # BLD AUTO: 15.79 X10(3)/MCL (ref 4.5–11.5)

## 2024-12-19 PROCEDURE — 36415 COLL VENOUS BLD VENIPUNCTURE: CPT

## 2024-12-19 PROCEDURE — 94660 CPAP INITIATION&MGMT: CPT

## 2024-12-19 PROCEDURE — 21400001 HC TELEMETRY ROOM

## 2024-12-19 PROCEDURE — 94761 N-INVAS EAR/PLS OXIMETRY MLT: CPT

## 2024-12-19 PROCEDURE — 85610 PROTHROMBIN TIME: CPT

## 2024-12-19 PROCEDURE — 99900035 HC TECH TIME PER 15 MIN (STAT)

## 2024-12-19 PROCEDURE — 25000003 PHARM REV CODE 250

## 2024-12-19 PROCEDURE — 80053 COMPREHEN METABOLIC PANEL: CPT

## 2024-12-19 PROCEDURE — 63600175 PHARM REV CODE 636 W HCPCS: Performed by: NURSE PRACTITIONER

## 2024-12-19 PROCEDURE — 85025 COMPLETE CBC W/AUTO DIFF WBC: CPT

## 2024-12-19 PROCEDURE — 99900031 HC PATIENT EDUCATION (STAT)

## 2024-12-19 PROCEDURE — 94760 N-INVAS EAR/PLS OXIMETRY 1: CPT

## 2024-12-19 PROCEDURE — 27100171 HC OXYGEN HIGH FLOW UP TO 24 HOURS

## 2024-12-19 PROCEDURE — 25000003 PHARM REV CODE 250: Performed by: STUDENT IN AN ORGANIZED HEALTH CARE EDUCATION/TRAINING PROGRAM

## 2024-12-19 PROCEDURE — 25000003 PHARM REV CODE 250: Performed by: NURSE PRACTITIONER

## 2024-12-19 PROCEDURE — 83735 ASSAY OF MAGNESIUM: CPT

## 2024-12-19 PROCEDURE — 63600175 PHARM REV CODE 636 W HCPCS

## 2024-12-19 RX ORDER — FUROSEMIDE 40 MG/1
40 TABLET ORAL DAILY
Status: DISCONTINUED | OUTPATIENT
Start: 2024-12-20 | End: 2024-12-21

## 2024-12-19 RX ORDER — SIMETHICONE 80 MG
1 TABLET,CHEWABLE ORAL 3 TIMES DAILY PRN
Status: DISCONTINUED | OUTPATIENT
Start: 2024-12-19 | End: 2024-12-27

## 2024-12-19 RX ORDER — PHYTONADIONE 5 MG/1
10 TABLET ORAL ONCE
Status: COMPLETED | OUTPATIENT
Start: 2024-12-19 | End: 2024-12-19

## 2024-12-19 RX ORDER — TALC
6 POWDER (GRAM) TOPICAL NIGHTLY PRN
Status: DISCONTINUED | OUTPATIENT
Start: 2024-12-19 | End: 2024-12-27

## 2024-12-19 RX ADMIN — METOPROLOL SUCCINATE 50 MG: 50 TABLET, EXTENDED RELEASE ORAL at 09:12

## 2024-12-19 RX ADMIN — POTASSIUM BICARBONATE 20 MEQ: 391 TABLET, EFFERVESCENT ORAL at 03:12

## 2024-12-19 RX ADMIN — ATORVASTATIN CALCIUM 40 MG: 40 TABLET, FILM COATED ORAL at 09:12

## 2024-12-19 RX ADMIN — FUROSEMIDE 40 MG: 10 INJECTION, SOLUTION INTRAMUSCULAR; INTRAVENOUS at 09:12

## 2024-12-19 RX ADMIN — NIFEDIPINE 30 MG: 30 TABLET, FILM COATED, EXTENDED RELEASE ORAL at 09:12

## 2024-12-19 RX ADMIN — PIPERACILLIN SODIUM AND TAZOBACTAM SODIUM 4.5 G: 4; .5 INJECTION, POWDER, LYOPHILIZED, FOR SOLUTION INTRAVENOUS at 07:12

## 2024-12-19 RX ADMIN — LIDOCAINE 5% 1 PATCH: 700 PATCH TOPICAL at 07:12

## 2024-12-19 RX ADMIN — Medication 6 MG: at 09:12

## 2024-12-19 RX ADMIN — PANCRELIPASE 1 CAPSULE: 120000; 24000; 76000 CAPSULE, DELAYED RELEASE PELLETS ORAL at 05:12

## 2024-12-19 RX ADMIN — FERROUS SULFATE TAB 325 MG (65 MG ELEMENTAL FE) 1 EACH: 325 (65 FE) TAB at 09:12

## 2024-12-19 RX ADMIN — POLYETHYLENE GLYCOL 3350 17 G: 17 POWDER, FOR SOLUTION ORAL at 09:12

## 2024-12-19 RX ADMIN — PHYTONADIONE 10 MG: 5 TABLET ORAL at 03:12

## 2024-12-19 RX ADMIN — PIPERACILLIN SODIUM AND TAZOBACTAM SODIUM 4.5 G: 4; .5 INJECTION, POWDER, LYOPHILIZED, FOR SOLUTION INTRAVENOUS at 11:12

## 2024-12-19 RX ADMIN — INSULIN ASPART 2 UNITS: 100 INJECTION, SOLUTION INTRAVENOUS; SUBCUTANEOUS at 11:12

## 2024-12-19 RX ADMIN — CLOPIDOGREL BISULFATE 75 MG: 75 TABLET ORAL at 09:12

## 2024-12-19 RX ADMIN — TIMOLOL MALEATE 1 DROP: 5 SOLUTION OPHTHALMIC at 09:12

## 2024-12-19 RX ADMIN — AMIODARONE HYDROCHLORIDE 200 MG: 200 TABLET ORAL at 09:12

## 2024-12-19 RX ADMIN — PANTOPRAZOLE SODIUM 40 MG: 40 TABLET, DELAYED RELEASE ORAL at 05:12

## 2024-12-19 RX ADMIN — ACETAMINOPHEN 1000 MG: 500 TABLET ORAL at 09:12

## 2024-12-19 RX ADMIN — TIMOLOL MALEATE 1 DROP: 5 SOLUTION OPHTHALMIC at 03:12

## 2024-12-19 RX ADMIN — FAMOTIDINE 40 MG: 20 TABLET, FILM COATED ORAL at 09:12

## 2024-12-19 RX ADMIN — SIMETHICONE 80 MG: 80 TABLET, CHEWABLE ORAL at 09:12

## 2024-12-19 RX ADMIN — PIPERACILLIN SODIUM AND TAZOBACTAM SODIUM 4.5 G: 4; .5 INJECTION, POWDER, LYOPHILIZED, FOR SOLUTION INTRAVENOUS at 03:12

## 2024-12-19 NOTE — PROGRESS NOTES
"[place] [facility] Progress Note        Chief Complaint: Inpatient Follow-up     HPI:   79-year-old woman with A-fib, HTN, valvular heart disease s/p MVR, CVID, CAD s/p PCI to LAD (11/29), DVT s/p IVC filter, T2DM, GERD, SSS s/p PPM, HTN, HLD, chronic sinusitis status post sinus surgery, ABEL, obesity, asthma/COPD and a recent admission for acute decompensated diastolic heart failure where she underwent a MARYBETH on 11/27 which showed severe mitral stenosis and moderate MR and LHC on 11/29 with stent to prox LAD. Patient was seen by CT surgery and structural heart team and a possible valve in valve was discussed but the plan was to start Coumadin to see if the gradient could be decreased and have a repeat TTE in a few weeks time. She was then subsequently discharged on 2L oxygen to [facility] with close cardiology follow up. She presents on [facility] on 12/15 from [facility] due to low back associated with weakness and for neurosurgical evaluation.     She reports that since she went to rehab and started working with PT - she has been having lower back pain not radiating down her legs. She denies urinary or stool incontinence. No saddle anesthesia. Reports constipation. She is able to move all her extremities. She used to walk with a walker at rehab but walking became difficult due to back pain. She reports her breathing has been "so and so" while she has been there. She had a CT scan done at rehab which showed Lumbar degenerative disc disease and spondylosis and she was transferred here for neurosurgery evaluation. CXR showed increased density on the right with a small right sided pleural effusion. proBNP 893.3. Cardiology consulted.     RRT called on 12/17 for acute respiratory distress - patient had recently worked with PT. Immediately went up to bedside. Patient tachypneic. A&Ox4. Denies chest pain. Mildly lethargic. Mild bibasilar crackles. ABG 7.29/68/69. Placed on BiPAP. " Initial EKG not great - repeat EKG showed NSR. Patient with recent stent. Given IV lasix 40 mg once. CBC with worsening leukocytosis and CXR with possible R sided infiltrate vs effusion - will start vancomycin and zosyn given recent hospitalization. Repeat ABG in 1 hour was better.    Interval Hx:   Patient seen and examined by bedside.  Family by bedside.  No acute overnight events.  Patient currently has CPAP on.  Awake and alert and able to follow commands.  Family is concerned that patient has deconditioned and appears significantly weaker.  Also concern that patient appetite is not the best and has not been eating as well    Case was discussed with patient's nurse and  on the floor.    Objective/physical exam:  General: In no acute distress, afebrile  Chest: Clear to auscultation bilaterally  Heart: RRR, +S1, S2, no appreciable murmur  Abdomen: Soft, nontender, BS +  MSK: Warm, no lower extremity edema, no clubbing or cyanosis  Neurologic: Alert and oriented x4, Cranial nerve II-XII intact, Strength 5/5 in all 4 extremities    VITAL SIGNS: 24 HRS MIN & MAX LAST   Temp  Min: 96.8 °F (36 °C)  Max: 98 °F (36.7 °C) 97.5 °F (36.4 °C)   BP  Min: 93/49  Max: 113/54 (!) 105/54   Pulse  Min: 64  Max: 69  64   Resp  Min: 17  Max: 20 20   SpO2  Min: 96 %  Max: 99 % 98 %     I have reviewed the following labs:  Recent Labs   Lab 12/17/24  1741 12/18/24  0450 12/19/24  0510   WBC 22.58* 17.30* 15.79  15.79*   RBC 3.34* 3.23* 2.65*   HGB 9.9* 9.7* 8.0*   HCT 31.5* 30.9* 25.4*   MCV 94.3* 95.7* 95.8*   MCH 29.6 30.0 30.2   MCHC 31.4* 31.4* 31.5*   RDW 18.7* 18.0* 18.3*   * 392 352   MPV 9.4 9.4 9.6     Recent Labs   Lab 12/16/24  0507 12/17/24  0449 12/17/24 1956 12/17/24 2004 12/18/24  0450 12/18/24  1022 12/18/24  1242 12/19/24  0510      < >  --  136 136  --   --  137   K 4.3   < >  --  4.1 4.6  --   --  3.4*      < >  --  97* 97*  --   --  98   CO2 29   < >  --  26 26  --   --  30   BUN  17.3   < >  --  27.0* 33.1*  --   --  31.5*   CREATININE 0.76   < >  --  0.75 0.85  --   --  0.81   CALCIUM 8.5   < >  --  7.7* 7.9*  --   --  7.9*   PH  --    < > 7.450  --   --  7.360 7.410  --    MG 2.20  --   --   --   --   --   --  2.00   ALBUMIN 2.4*   < >  --  2.6* 2.7*  --   --  2.3*   ALKPHOS 96   < >  --  109 111  --   --  99   ALT 13   < >  --  12 12  --   --  14   AST 26   < >  --  27 31  --   --  33   BILITOT 0.7   < >  --  0.8 0.8  --   --  1.0    < > = values in this interval not displayed.     Microbiology Results (last 7 days)       Procedure Component Value Units Date/Time    Blood Culture [[id number]]  (Normal) Collected: 12/17/24 2004    Order Status: Completed Specimen: Blood Updated: 12/18/24 2100     Blood Culture No Growth At 24 Hours             See below for Radiology    Assessment/Plan:  # Lethargy 2/2 possibly hypercapnia  # Low back pain 2/2 DJD and spinal stenosis  # Acute on Chronic diastolic HF   # Chronic hypoxic respiratory failure requiring supplemental oxygen (on 2 L oxygen)   # Pulmonary HTN  # Severe mitral stenosis  # Obesity   # pAF s/p LAAL in 2023  # Anemia - stable. No signs of active bleeding. Patient is on triple therapy though and had a Hgb drop since then which has been stable  # iron deficiency anemia  # Leukocytosis - recently treated for UTI but currently has no dysuria. No fevers or chills. No abdominal pain/N/V  # HTN  # HLD  # VHD s/p MVR (09/20/2023)  # CAD s/p PCI to LAD (11/29/2024)  Hx of DVT s/p IVC filter, SSS s/p PPM, T2DM, GERD ,Chronic sinusitis s/p sinus surgery , ABEL on CPAP, Obesity CVID, Opioid induced constipation, Asthma/COPD ? - does not have official PFTs done     ABGs done yesterday shows compensated PH, advised to continue CPAP at bedtime and during daytime naps   we can resume her chronic O2 during the day  infectious workup negative, blood cultures normal, urine culture unremarkable  MRSA PCR normal, discontinue vancomycin   we will deescalate  Zosyn tomorrow if remains clinically stable, day 3  MRI L spine; as per radiology patient automatically ruled out of MRI given PPM and spinal stimulator  NGSY consult   - as per NGSY consult IR for LESI; however IR unable to do due to unable to hold plavix due to recent stent  Continue PT/OT  continue Plavix due to recent stent to prevent stent thrombosis   Hold Coumadin due to elevated INR  Give 1 time dose of vitamin K 10 mg oral for elevated INR, recheck INR in a.m.  Drop in hemoglobin noted but no significant occult bleeding noted  Monitor stools and hgb closely for further drop  continue amiodarone, atorvastatin, nifedipine  Hold off on any further IV diuresis as patient appears euvolemic; start oral lasix tomorrow  Cardiology following, appreciate recs  Further recs based on clinical course  Labs in AM      Critical care note:  Critical care diagnosis: Elevated INR requiring Vitamin K  Critical care interventions: Hands-on evaluation, review of labs/radiographs/records and discussion with patient and family if present  Critical care time spent: 35 minutes     VTE prophylaxis: SCD - elevated INR    Patient condition:  Fair    Anticipated discharge and Disposition:   tbd    All diagnosis and differential diagnosis have been reviewed; assessment and plan has been documented; I have personally reviewed the labs and test results that are presently available; I have reviewed the patients medication list; I have reviewed the consulting providers response and recommendations. I have reviewed or attempted to review medical records based upon their availability    All of the patient's questions have been  addressed and answered. Patient's is agreeable to the above stated plan. I will continue to monitor closely and make adjustments to medical management as needed.  _____________________________________________________________________    Malnutrition Status:  Nutrition consulted. Most recent weight and BMI monitored-      Measurements:  Wt Readings from Last 1 Encounters:   12/16/24 98.9 kg (218 lb)   Body mass index is 35.2 kg/m².    Patient has been screened and assessed by RD.    Malnutrition Type:  Context:    Level:      Malnutrition Characteristic Summary:       Interventions/Recommendations (treatment strategy):        Scheduled Med:   amiodarone  200 mg Oral Daily    atorvastatin  40 mg Oral QHS    clopidogreL  75 mg Oral Daily    famotidine  40 mg Oral QHS    ferrous sulfate  1 tablet Oral Daily    [START ON 12/20/2024] furosemide  40 mg Oral Daily    LIDOcaine  1 patch Transdermal Q24H    lipase-protease-amylase 24,000-76,000-120,000 units  1 capsule Oral TID WM    metoprolol succinate  50 mg Oral Daily    [START ON 12/20/2024] mupirocin   Nasal BID    NIFEdipine  30 mg Oral Daily    pantoprazole  40 mg Oral QAM    phytonadione (vitamin K1)  10 mg Oral Once    piperacillin-tazobactam (Zosyn) IV (PEDS and ADULTS) (extended infusion is not appropriate)  4.5 g Intravenous Q8H    polyethylene glycol  17 g Oral BID    potassium bicarbonate  20 mEq Oral Once    sodium zirconium cyclosilicate  10 g Oral Once    timolol maleate 0.5%  1 drop Both Eyes BID      Continuous Infusions:     PRN Meds:    Current Facility-Administered Medications:     acetaminophen, 1,000 mg, Oral, Q8H PRN    albuterol-ipratropium, 3 mL, Nebulization, Q4H PRN    dextrose 10%, 12.5 g, Intravenous, PRN    dextrose 10%, 25 g, Intravenous, PRN    glucagon (human recombinant), 1 mg, Intramuscular, PRN    glucose, 16 g, Oral, PRN    glucose, 24 g, Oral, PRN    insulin aspart U-100, 1-10 Units, Subcutaneous, QID (AC + HS) PRN    ondansetron, 4 mg, Intravenous, Q8H PRN    oxyCODONE, 5 mg, Oral, Q6H PRN    oxyCODONE, 10 mg, Oral, Q6H PRN    senna, 8.6 mg, Oral, Daily PRN     Radiology:  I have personally reviewed the following imaging and agree with the radiologist.     CT Head Without Contrast  Narrative: EXAMINATION:  CT HEAD WITHOUT CONTRAST    CLINICAL  HISTORY:  Mental status change, unknown cause;    TECHNIQUE:  Axial scans were obtained from skull base to the vertex.    Coronal and sagittal reconstructions obtained from the axial data.    Automatic exposure control was utilized to limit radiation dose.    Contrast: None    Radiation Dose:    Total DLP: 885 mGy*cm    COMPARISON:  CT head dated 12/13/2024    FINDINGS:  There is no acute intracranial hemorrhage or edema. The gray-white matter differentiation is preserved.    There is no mass effect or midline shift.  There is diffuse parenchymal volume loss.  The basal cisterns are patent. There is no abnormal extra-axial fluid collection.    The calvarium and skull base are intact. The visualized paranasal sinuses and the mastoid air cells are clear.  Impression: No acute intracranial abnormality.    Electronically signed by: Jaylene Mirza  Date:    12/18/2024  Time:    14:18    Jeane Ortega DO  Department of Hospital Medicine  [facility]  12/19/2024

## 2024-12-19 NOTE — PROGRESS NOTES
"  [facility] *    Cardiology  Progress Note    Patient Name: Kortney Leach  MRN: [id number]  Admission Date: 12/15/2024  Hospital Length of Stay: 3 days  Code Status: Full Code   Attending Provider: Catracho Galan,*   Consulting Provider: YANIRA Taylor  Primary Care Physician: Arnaldo Hernandez MD  Principal Problem:<principal problem not specified>    Patient information was obtained from patient, past medical records, and ER records.     Subjective:     Reason for Consult: Severe Mitral Stenosis - Known patient on Coumadin     HPI: Ms. Leach is an 79 year old female who is known to [facility], Dr. Uribe & Dr. Moon. She presents to the ER from rehab with complaints of lower back pain/spasms. She endorses worsening but denies CP or palps. She reports not being able to walk due to the back pain & constipation. Of note, she was recently discharged from [facility] on 12.3.24 for acute CHF exacerbation. She underwent a MARYBETH that revealed Severe MS & Mod MR and underwent a stent to the prox LAD. She was seen by CV surgery & structural heart with plans to start Coumadin to see if the gradient can be decreased. Plan is for repeat TTE in a few weeks to re-evaluate. Significant labs include WBC 13.86, Plt 447. A CXR was obtained and demonstrated increased density on the right with small right-sided pleural effusion cannot rule out infiltrate. This appears worse than on previous. He was admitted to hospital medicine with a consult to neurosurgery. [facility] has been consulted due to the patient's known severe MS.     12.17.24: NAD. Improvement in SOB. Inaccurate I&O's and daily weights. INR 4.7 today. On 3 L NC. "I am okay." SR on tele. BP stable. Had a BM.   12.18.24: Resting in bed in NAD. ON Bipap.  Lethargic.  RRT called last night for respiratory distress.  INR 6.0.  Coumadin on hold.    PMH: VHD, GEMINI, DM II, PAF, ABEL, COPD, HTN, DVT SSS, HLD, Asthma, Left Thyroid Nodule, GERD, Chronic Sinusitis, " Common Variable Immunodeficiency, Glaucoma  PSH: PPM, LHC, MVR, Bilateral Cataract Extraction, Total Knee Replacement, Bilateral mastectomy, Appendectomy, Colonoscopy, EGD, Tonsillectomy,    Family History: Father - Lung Cancer; Mother - Alzheimer, CVA; Sister - HTN, Multiple Sclerosis, DM II  Social History: Former Smoker (Quit in 1998). Denies illicit drug use and alcohol use.      Previous Cardiac Diagnostics:   [facility] 11.29.24:  Eccentric Prox LAD lesion was 80% stenosed with 0% stenosis post-intervention. iFR 0.84  Prox LAD lesion: A STENT SYNERGY XD [id number] stent was successfully placed at 8 GEO for 10 sec. Proximal part of the stent was post dilated by using 3.5/12 mm NC and a 4/8 mm NC balloons at 12 atmospheres.     [facility] 11.27.24:  Findings:  - There is single vessel coronary artery disease.  - Mid Left Anterior Descending has a calcified 70% stenosis.  - LVEDP is normal at 6 mmHg.  - RHC with elevated R sided filling pressures. RA 7 mmHg, RV 50/7 mmHg, PA 47/26 mmHg (mean 33 mmHg), PCWP 24 mmHg.  - PCWP to LV diastolic mean gradient was calculated to be 22 mmHg.  - Transpulmonary gradient is 8 mmHg.  - Normal Cardiac Output/Index at 4.7/2.3, as calculated by Rahel equation.  - PVR is 1.7 Woods units  - SVR is 1260 dyne-sec*cm^-5  - Pulmonary Artery Pulsatility Index (Nury) is 3.0  - Cardiac Power Output () is 0.84  Assessment/Plan:  - Patient is a 79 y.o. female with a history of prior bioprosthetic mitral valve replacement now presents with heart failure.  Transesophageal echocardiogram was obtained.  Final report is pending at this time, but there were findings consistent with severe bioprosthetic MVR stenosis.  Cardiac catheterization findings shown above do suggest that there is a gradient of around 22 mm Hg across the bioprosthetic valve.  -recommend consultation with CT surgery   -obtain CT with mitral valve in valve protocol for possible TMVR  -LAD should be revascularized as well     MARYBETH  (11.27.24):  Left Ventricle: The left ventricle is normal in size. Septal motion is consistent with post-operative status. There is normal systolic function with a visually estimated ejection fraction of 60 - 65%.  Right Ventricle: Normal right ventricular cavity size. Systolic function is normal.  Left Atrium: Left atrium is severely dilated.  Mitral Valve: There is a bioprosthetic valve in the mitral position. There is severe stenosis. The mean pressure gradient across the mitral valve is 16 mmHg at a heart rate of  bpm. There is moderate regurgitation.     ECHO (11.23.24):  Left Ventricle: The left ventricle is normal in size. Mildly increased wall thickness. There is normal systolic function with a visually estimated ejection fraction of 60 - 65%. Grade I diastolic dysfunction. Right Ventricle: Normal right ventricular cavity size. Systolic function is borderline low. Aortic Valve: There is aortic valve sclerosis. Mitral Valve: There is a bioprosthetic valve in the mitral position (25 mm mosaic porcine valve).  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 There is mild (1+) regurgitation. Tricuspid Valve: There moderate (2+) regurgitation. The estimated pulmonary artery systolic pressure is 57 mmHg. Pacemaker lead noted.  Recommend transesophageal echocardiogram to properly evaluate the bioprosthetic mitral valve.     ECHO (11.14.24):  The study quality is average. The left ventricle is normal in size. Global left ventricular systolic function is normal. The left ventricular ejection fraction is 65%. Left ventricular diastolic function is normal. Mild concentric left ventricular hypertrophy is present. The left atrium is moderately enlarged. (4.6 cms). A normal functioning prosthetic mitral valve is noted. MG = 4.3 mmHg. The pulmonary artery systolic pressure is 37 mmHg.      PPM Insertion (9.25.23):  Successful  implantation of PPM Dual. [facility]      PET (1.24.23):  This is a normal perfusion study, no perfusion defects noted. There is no evidence of ischemia.This scan is suggestive of low risk for future cardiovascular events. The left ventricular cavity is noted to be normal on the stress studies. The stress left ventricular ejection fraction was calculated to be 68% and left ventricular global function is normal. The rest left ventricular cavity is noted to be normal. The rest left ventricular ejection fraction was calculated to be 63% and rest left ventricular global function is normal. When compared to the resting ejection fraction (63%), the stress ejection fraction (68%) has increased. Transient ischemic dilatation is present and has been described as a marker for high risk coronary artery disease. It has also been described in microvascular disease, hypertensive heart disease as well as cardiac deconditioning. The study quality is good.   There was a rise in myocardial blood flow between rest and stress.  Global myocardial blood flow reserve was 2.72.  Normal global coronary flow reserve is suggestive of low coronary event risk.     Carotid US (1.24.23):  The study quality is good. There is no plaque noted in the proximal right internal carotid artery. 1-39% stenosis in the proximal left internal carotid artery based on Bluth Criteria. Antegrade right vertebral artery flow. Antegrade left vertebral artery flow.     LHC (9.6.23):  Normal Coronaries       Review of patient's allergies indicates:   Allergen Reactions    Adhesive tape-silicones      Current Facility-Administered Medications on File Prior to Encounter   Medication    0.9%  NaCl infusion    diazePAM tablet 10 mg    diphenhydrAMINE capsule 50 mg     Current Outpatient Medications on File Prior to Encounter   Medication Sig    amiodarone (PACERONE) 200 MG Tab Take 200 mg by mouth once daily.    aspirin (ECOTRIN) 81 MG EC tablet Take 1 tablet (81 mg total)  by mouth once daily. for 25 days    atorvastatin (LIPITOR) 40 MG tablet Take 40 mg by mouth once daily.    clopidogreL (PLAVIX) 75 mg tablet Take 1 tablet (75 mg total) by mouth once daily.    DUPIXENT SYRINGE 300 mg/2 mL Syrg Inject 300 mg into the skin every 14 (fourteen) days.    estradioL (ESTRACE) 0.01 % (0.1 mg/gram) vaginal cream Place 0.5 g vaginally twice a week.    famotidine (PEPCID) 40 MG tablet Take 40 mg by mouth every evening.    fluticasone-umeclidin-vilanter (TRELEGY ELLIPTA) 200-62.5-25 mcg inhaler Inhale 1 puff into the lungs once daily.    furosemide (LASIX) 40 MG tablet Take 40 mg by mouth.    glimepiride (AMARYL) 2 MG tablet Take 4 mg by mouth 2 (two) times a day.    HYDROcodone-acetaminophen (NORCO)  mg per tablet Take 1 tablet by mouth every 6 (six) hours as needed for Pain.    immun glob G,IgG,/pro/IgA 0-50 (HIZENTRA SUBQ) Inject into the skin every 14 (fourteen) days.    lipase-protease-amylase 24,000-76,000-120,000 units (CREON) 24,000-76,000 -120,000 unit capsule Take 1-2 capsules by mouth 3 (three) times daily with meals. 2 caps with meals and 1 cap c snacks    metFORMIN (GLUCOPHAGE) 500 MG tablet Take 1 tablet (500 mg total) by mouth 2 (two) times daily with meals.    metoprolol succinate (TOPROL-XL) 50 MG 24 hr tablet Take 1 tablet (50 mg total) by mouth once daily.    NIFEdipine (PROCARDIA-XL) 30 MG (OSM) 24 hr tablet Take 30 mg by mouth.    pantoprazole (PROTONIX) 40 MG tablet Take 1 tablet by mouth every morning.    timoloL (BETIMOL) 0.5 % ophthalmic solution Place 1 drop into both eyes 2 (two) times daily.    warfarin (COUMADIN) 5 MG tablet Take 1 tablet (5 mg total) by mouth Daily.       Review of Systems   Unable to perform ROS: Acuity of condition   Respiratory:  Shortness of breath: improving.    Musculoskeletal:  Negative for back pain.     Objective:     Vital Signs (Most Recent):  Temp: 97 °F (36.1 °C) (12/18/24 1537)  Pulse: 67 (12/18/24 1537)  Resp: 17 (12/18/24  1537)  BP: (!) 107/58 (12/18/24 1537)  SpO2: 97 % (12/18/24 1537) Vital Signs (24h Range):  Temp:  [96.7 °F (35.9 °C)-98.3 °F (36.8 °C)] 97 °F (36.1 °C)  Pulse:  [65-86] 67  Resp:  [17-21] 17  SpO2:  [93 %-100 %] 97 %  BP: (102-144)/(48-76) 107/58  Arterial Line BP: (107)/(58) 107/58   Weight: 98.9 kg (218 lb)  Body mass index is 35.2 kg/m².  SpO2: 97 %       Intake/Output Summary (Last 24 hours) at 12/18/2024 1809  Last data filed at 12/18/2024 1200  Gross per 24 hour   Intake 0 ml   Output 1200 ml   Net -1200 ml     Lines/Drains/Airways       Drain  Duration                  Urethral Catheter 12/17/24 1730 Straight-tip 16 Fr. 1 day              Peripheral Intravenous Line  Duration                  Peripheral IV - Single Lumen 12/09/24 0835 No Posterior;Right Forearm 9 days         Peripheral IV - Single Lumen 12/17/24 1730 20 G Posterior;Right Hand 1 day                  Significant Labs:   Chemistries:   Recent Labs   Lab 12/16/24  0507 12/17/24 0449 12/17/24 1741 12/17/24 2004 12/18/24  0450    139 135* 136 136   K 4.3 4.4 5.8* 4.1 4.6    103 98 97* 97*   CO2 29 25 22* 26 26   BUN 17.3 23.7* 27.6* 27.0* 33.1*   CREATININE 0.76 0.70 0.99 0.75 0.85   CALCIUM 8.5 7.9* 8.4 7.7* 7.9*   BILITOT 0.7 0.8 0.8 0.8 0.8   ALKPHOS 96 106 115 109 111   ALT 13 10 16 12 12   AST 26 25 52* 27 31   GLUCOSE 127* 166* 286* 224* 146*   MG 2.20  --   --   --   --    TROPONINI  --   --  0.016  --   --         CBC/Anemia Labs: Coags:    Recent Labs   Lab 12/16/24  0507 12/17/24 0449 12/17/24  1741 12/18/24  0450   WBC 13.86* 17.26* 22.58* 17.30*   HGB 9.5* 9.4* 9.9* 9.7*   HCT 30.2* 30.0* 31.5* 30.9*   * 432* 454* 392   MCV 93.8 95.5* 94.3* 95.7*   RDW 18.1* 18.3* 18.7* 18.0*   IRON 29*  --   --   --    FERRITIN 98.28  --   --   --    [id number]  --   --   --     Recent Labs   Lab 12/16/24  0507 12/17/24  0449 12/18/24  0450   INR 2.8* 4.7* 6.0*        Significant Imaging:    EKG: None to  review    Telemetry:  SR    Physical Exam  Constitutional:       Appearance: She is obese. She is ill-appearing.   HENT:      Head: Normocephalic.      Nose: Nose normal.      Mouth/Throat:      Mouth: Mucous membranes are moist.   Eyes:      Extraocular Movements: Extraocular movements intact.   Cardiovascular:      Rate and Rhythm: Normal rate and regular rhythm.      Pulses: Normal pulses.      Heart sounds: Murmur heard.   Pulmonary:      Effort: Pulmonary effort is normal.      Comments: On Bipap  Abdominal:      Palpations: Abdomen is soft.   Skin:     General: Skin is warm and dry.   Neurological:      Comments: Pt arousable but lethargic.          Home Medications:   Current Facility-Administered Medications on File Prior to Encounter   Medication Dose Route Frequency Provider Last Rate Last Admin    0.9%  NaCl infusion   Intravenous On Call Procedure Vijay Uribe MD        diazePAM tablet 10 mg  10 mg Oral On Call Procedure Vijay Uribe MD   10 mg at 09/06/23 1021    diphenhydrAMINE capsule 50 mg  50 mg Oral On Call Procedure Vijay Uribe MD   50 mg at 09/06/23 1021     Current Outpatient Medications on File Prior to Encounter   Medication Sig Dispense Refill    amiodarone (PACERONE) 200 MG Tab Take 200 mg by mouth once daily.      aspirin (ECOTRIN) 81 MG EC tablet Take 1 tablet (81 mg total) by mouth once daily. for 25 days 25 tablet 0    atorvastatin (LIPITOR) 40 MG tablet Take 40 mg by mouth once daily.      clopidogreL (PLAVIX) 75 mg tablet Take 1 tablet (75 mg total) by mouth once daily. 30 tablet 0    DUPIXENT SYRINGE 300 mg/2 mL Syrg Inject 300 mg into the skin every 14 (fourteen) days.      estradioL (ESTRACE) 0.01 % (0.1 mg/gram) vaginal cream Place 0.5 g vaginally twice a week.      famotidine (PEPCID) 40 MG tablet Take 40 mg by mouth every evening.      fluticasone-umeclidin-vilanter (TRELEGY ELLIPTA) 200-62.5-25 mcg inhaler Inhale 1 puff into the lungs once daily.      furosemide (LASIX) 40  MG tablet Take 40 mg by mouth.      glimepiride (AMARYL) 2 MG tablet Take 4 mg by mouth 2 (two) times a day.      HYDROcodone-acetaminophen (NORCO)  mg per tablet Take 1 tablet by mouth every 6 (six) hours as needed for Pain. 20 tablet 0    immun glob G,IgG,/pro/IgA 0-50 (HIZENTRA SUBQ) Inject into the skin every 14 (fourteen) days.      lipase-protease-amylase 24,000-76,000-120,000 units (CREON) 24,000-76,000 -120,000 unit capsule Take 1-2 capsules by mouth 3 (three) times daily with meals. 2 caps with meals and 1 cap c snacks      metFORMIN (GLUCOPHAGE) 500 MG tablet Take 1 tablet (500 mg total) by mouth 2 (two) times daily with meals. 180 tablet 3    metoprolol succinate (TOPROL-XL) 50 MG 24 hr tablet Take 1 tablet (50 mg total) by mouth once daily. 30 tablet 0    NIFEdipine (PROCARDIA-XL) 30 MG (OSM) 24 hr tablet Take 30 mg by mouth.      pantoprazole (PROTONIX) 40 MG tablet Take 1 tablet by mouth every morning.      timoloL (BETIMOL) 0.5 % ophthalmic solution Place 1 drop into both eyes 2 (two) times daily.      warfarin (COUMADIN) 5 MG tablet Take 1 tablet (5 mg total) by mouth Daily. 30 tablet 0     Current Schedule Inpatient Medications:   amiodarone  200 mg Oral Daily    atorvastatin  40 mg Oral QHS    clopidogreL  75 mg Oral Daily    famotidine  40 mg Oral QHS    ferrous sulfate  1 tablet Oral Daily    furosemide (LASIX) injection  40 mg Intravenous Q12H    LIDOcaine  1 patch Transdermal Q24H    lipase-protease-amylase 24,000-76,000-120,000 units  1 capsule Oral TID WM    metoprolol succinate  50 mg Oral Daily    [START ON 12/20/2024] mupirocin   Nasal BID    NIFEdipine  30 mg Oral Daily    pantoprazole  40 mg Oral QAM    piperacillin-tazobactam (Zosyn) IV (PEDS and ADULTS) (extended infusion is not appropriate)  4.5 g Intravenous Q8H    polyethylene glycol  17 g Oral BID    sodium zirconium cyclosilicate  10 g Oral Once    timolol maleate 0.5%  1 drop Both Eyes BID    vancomycin 1,500 mg in D5W 250  mL IVPB (admixture device)  1,500 mg Intravenous Q24H     Continuous Infusions:    Assessment:   Acute on Chronic Diastolic Heart Failure - Related to VHD/Severe MS     - EF 60-65% with G 1 Diastolic Dysfunction (11.14.24)   VHD    - MARYBETH (11.27.24) - Mitral Valve: There is a bioprosthetic valve in the mitral position. There is severe stenosis. The mean pressure gradient across the mitral valve is 16 mmHg at a heart rate of bpm. There is moderate regurgitation.     - ECHO (11.23.24): There is evidence of bioprosthetic mitral valve stenosis The mean pressure gradient across the mitral valve is 19 mmHg at a heart rate of 78 bpm.     - s/p MVR (9.20.23): Mitral valve replacement with a 25 mm mosaic porcine valve    - Moderate TR    - Mild MR  Acute on Chronic Respiratory Failure Requiring BiPaP      -s/p RRT for respiratory distress 12.17.24  Acute hypoxic and hypercarbic respiratory failure requiring BiPAP and IV lasix   CAD/Stents    - s/p LHC (11.29.24) - Eccentric Prox LAD lesion was 80% stenosed with 0% stenosis post-intervention. iFR 0.84. Prox LAD lesion: A STENT SYNERGY XD 3.0X16MM stent was successfully placed at 8 GEO for 10 sec. Proximal part of the stent was post dilated by using 3.5/12 mm NC and a 4/8 mm NC balloons at 12 atmospheres.  Chronic Hypoxic Respiratory Failure Requiring Supplemental O2  Anemia (Mild)  Leukocytosis   COPD Without Exacerbation   Pulmonary HTN  GEMINI  DM II  PAF - Currently SR    - [id number] Score 6 (9.7% Stroke Risk Per Year)    - s/p Ligation of SUKHI (9.20.23) - Endostapler   ABEL  HTN  DVT s/p IVC Filter     - on Xarelto Outpatient   SSS    - s/p PPM (9.25.23): [facility]   HLD  Asthma  Left Thyroid Nodule  GERD  Chronic Sinusitis  Common Variable Immunodeficiency  Glaucoma  No Known History of GI Bleed       Plan:   Coumadin remains on hold.  INR 6.0 today. Resume when INR therapeutic   Pt currently on BiPap s/p RRT for respiratory failure 12.17.24.  Continue IV Lasix for  diuresis  Continue Plavix, Statin, Amio, Metoprolol, Nifedipine  No need for ASA while Coumadin on hold  No spinal injection at this time per Neuro  Will f/u in AM      YANIRA Taylor  Cardiology  [facility]

## 2024-12-19 NOTE — PROGRESS NOTES
"  [facility] *    Cardiology  Progress Note    Patient Name: Kortney Leach  MRN: [id number]  Admission Date: 12/15/2024  Hospital Length of Stay: 4 days  Code Status: Full Code   Attending Provider: Jeane Ortega DO   Consulting Provider: YANIRA Turner  Primary Care Physician: Arnaldo Hernandez MD  Principal Problem:<principal problem not specified>    Patient information was obtained from patient, past medical records, and ER records.     Subjective:     Reason for Consult: Severe Mitral Stenosis - Known patient on Coumadin     HPI: Ms. Leach is an 79 year old female who is known to [facility], Dr. Uribe & Dr. Moon. She presents to the ER from rehab with complaints of lower back pain/spasms. She endorses worsening but denies CP or palps. She reports not being able to walk due to the back pain & constipation. Of note, she was recently discharged from [facility] on 12.3.24 for acute CHF exacerbation. She underwent a MARYBETH that revealed Severe MS & Mod MR and underwent a stent to the prox LAD. She was seen by CV surgery & structural heart with plans to start Coumadin to see if the gradient can be decreased. Plan is for repeat TTE in a few weeks to re-evaluate. Significant labs include WBC 13.86, Plt 447. A CXR was obtained and demonstrated increased density on the right with small right-sided pleural effusion cannot rule out infiltrate. This appears worse than on previous. He was admitted to hospital medicine with a consult to neurosurgery. [facility] has been consulted due to the patient's known severe MS.     12.17.24: NAD. Improvement in SOB. Inaccurate I&O's and daily weights. INR 4.7 today. On 3 L NC. "I am okay." SR on tele. BP stable. Had a BM.   12.18.24: Resting in bed in NAD. ON Bipap.  Lethargic.  RRT called last night for respiratory distress.  INR 6.0.  Coumadin on hold.  12.19.24: NAD More alert today on CPAP. Reports feeling better. INR 6.7. BP soft but stable. SR on tele.     PMH: VALEROI, GEMINI, " DM II, PAF, ABEL, COPD, HTN, DVT SSS, HLD, Asthma, Left Thyroid Nodule, GERD, Chronic Sinusitis, Common Variable Immunodeficiency, Glaucoma  PSH: PPM, LHC, MVR, Bilateral Cataract Extraction, Total Knee Replacement, Bilateral mastectomy, Appendectomy, Colonoscopy, EGD, Tonsillectomy,    Family History: Father - Lung Cancer; Mother - Alzheimer, CVA; Sister - HTN, Multiple Sclerosis, DM II  Social History: Former Smoker (Quit in 1998). Denies illicit drug use and alcohol use.      Previous Cardiac Diagnostics:   [facility] 11.29.24:  Eccentric Prox LAD lesion was 80% stenosed with 0% stenosis post-intervention. iFR 0.84  Prox LAD lesion: A STENT SYNERGY XD [id number] stent was successfully placed at 8 GEO for 10 sec. Proximal part of the stent was post dilated by using 3.5/12 mm NC and a 4/8 mm NC balloons at 12 atmospheres.     [facility] 11.27.24:  Findings:  - There is single vessel coronary artery disease.  - Mid Left Anterior Descending has a calcified 70% stenosis.  - LVEDP is normal at 6 mmHg.  - RHC with elevated R sided filling pressures. RA 7 mmHg, RV 50/7 mmHg, PA 47/26 mmHg (mean 33 mmHg), PCWP 24 mmHg.  - PCWP to LV diastolic mean gradient was calculated to be 22 mmHg.  - Transpulmonary gradient is 8 mmHg.  - Normal Cardiac Output/Index at 4.7/2.3, as calculated by Rahel equation.  - PVR is 1.7 Woods units  - SVR is 1260 dyne-sec*cm^-5  - Pulmonary Artery Pulsatility Index (Nury) is 3.0  - Cardiac Power Output () is 0.84  Assessment/Plan:  - Patient is a 79 y.o. female with a history of prior bioprosthetic mitral valve replacement now presents with heart failure.  Transesophageal echocardiogram was obtained.  Final report is pending at this time, but there were findings consistent with severe bioprosthetic MVR stenosis.  Cardiac catheterization findings shown above do suggest that there is a gradient of around 22 mm Hg across the bioprosthetic valve.  -recommend consultation with CT surgery   -obtain CT with mitral  valve in valve protocol for possible TMVR  -LAD should be revascularized as well     MARYBETH (11.27.24):  Left Ventricle: The left ventricle is normal in size. Septal motion is consistent with post-operative status. There is normal systolic function with a visually estimated ejection fraction of 60 - 65%.  Right Ventricle: Normal right ventricular cavity size. Systolic function is normal.  Left Atrium: Left atrium is severely dilated.  Mitral Valve: There is a bioprosthetic valve in the mitral position. There is severe stenosis. The mean pressure gradient across the mitral valve is 16 mmHg at a heart rate of  bpm. There is moderate regurgitation.     ECHO (11.23.24):  Left Ventricle: The left ventricle is normal in size. Mildly increased wall thickness. There is normal systolic function with a visually estimated ejection fraction of 60 - 65%. Grade I diastolic dysfunction. Right Ventricle: Normal right ventricular cavity size. Systolic function is borderline low. Aortic Valve: There is aortic valve sclerosis. Mitral Valve: There is a bioprosthetic valve in the mitral position (25 mm mosaic porcine valve).  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 There is mild (1+) regurgitation. Tricuspid Valve: There moderate (2+) regurgitation. The estimated pulmonary artery systolic pressure is 57 mmHg. Pacemaker lead noted.  Recommend transesophageal echocardiogram to properly evaluate the bioprosthetic mitral valve.     ECHO (11.14.24):  The study quality is average. The left ventricle is normal in size. Global left ventricular systolic function is normal. The left ventricular ejection fraction is 65%. Left ventricular diastolic function is normal. Mild concentric left ventricular hypertrophy is present. The left atrium is moderately enlarged. (4.6 cms). A normal functioning prosthetic mitral valve is noted. MG = 4.3 mmHg. The  pulmonary artery systolic pressure is 37 mmHg.      PPM Insertion (9.25.23):  Successful implantation of PPM Dual. [facility]      PET (1.24.23):  This is a normal perfusion study, no perfusion defects noted. There is no evidence of ischemia.This scan is suggestive of low risk for future cardiovascular events. The left ventricular cavity is noted to be normal on the stress studies. The stress left ventricular ejection fraction was calculated to be 68% and left ventricular global function is normal. The rest left ventricular cavity is noted to be normal. The rest left ventricular ejection fraction was calculated to be 63% and rest left ventricular global function is normal. When compared to the resting ejection fraction (63%), the stress ejection fraction (68%) has increased. Transient ischemic dilatation is present and has been described as a marker for high risk coronary artery disease. It has also been described in microvascular disease, hypertensive heart disease as well as cardiac deconditioning. The study quality is good.   There was a rise in myocardial blood flow between rest and stress.  Global myocardial blood flow reserve was 2.72.  Normal global coronary flow reserve is suggestive of low coronary event risk.     Carotid US (1.24.23):  The study quality is good. There is no plaque noted in the proximal right internal carotid artery. 1-39% stenosis in the proximal left internal carotid artery based on Bluth Criteria. Antegrade right vertebral artery flow. Antegrade left vertebral artery flow.     LHC (9.6.23):  Normal Coronaries       Review of patient's allergies indicates:   Allergen Reactions    Adhesive tape-silicones      Current Facility-Administered Medications on File Prior to Encounter   Medication    0.9%  NaCl infusion    diazePAM tablet 10 mg    diphenhydrAMINE capsule 50 mg     Current Outpatient Medications on File Prior to Encounter   Medication Sig    amiodarone (PACERONE) 200 MG Tab Take 200  mg by mouth once daily.    aspirin (ECOTRIN) 81 MG EC tablet Take 1 tablet (81 mg total) by mouth once daily. for 25 days    atorvastatin (LIPITOR) 40 MG tablet Take 40 mg by mouth once daily.    clopidogreL (PLAVIX) 75 mg tablet Take 1 tablet (75 mg total) by mouth once daily.    DUPIXENT SYRINGE 300 mg/2 mL Syrg Inject 300 mg into the skin every 14 (fourteen) days.    estradioL (ESTRACE) 0.01 % (0.1 mg/gram) vaginal cream Place 0.5 g vaginally twice a week.    famotidine (PEPCID) 40 MG tablet Take 40 mg by mouth every evening.    fluticasone-umeclidin-vilanter (TRELEGY ELLIPTA) 200-62.5-25 mcg inhaler Inhale 1 puff into the lungs once daily.    furosemide (LASIX) 40 MG tablet Take 40 mg by mouth.    glimepiride (AMARYL) 2 MG tablet Take 4 mg by mouth 2 (two) times a day.    HYDROcodone-acetaminophen (NORCO)  mg per tablet Take 1 tablet by mouth every 6 (six) hours as needed for Pain.    immun glob G,IgG,/pro/IgA 0-50 (HIZENTRA SUBQ) Inject into the skin every 14 (fourteen) days.    lipase-protease-amylase 24,000-76,000-120,000 units (CREON) 24,000-76,000 -120,000 unit capsule Take 1-2 capsules by mouth 3 (three) times daily with meals. 2 caps with meals and 1 cap c snacks    metFORMIN (GLUCOPHAGE) 500 MG tablet Take 1 tablet (500 mg total) by mouth 2 (two) times daily with meals.    metoprolol succinate (TOPROL-XL) 50 MG 24 hr tablet Take 1 tablet (50 mg total) by mouth once daily.    NIFEdipine (PROCARDIA-XL) 30 MG (OSM) 24 hr tablet Take 30 mg by mouth.    pantoprazole (PROTONIX) 40 MG tablet Take 1 tablet by mouth every morning.    timoloL (BETIMOL) 0.5 % ophthalmic solution Place 1 drop into both eyes 2 (two) times daily.    warfarin (COUMADIN) 5 MG tablet Take 1 tablet (5 mg total) by mouth Daily.       Review of Systems   Respiratory:  Negative for shortness of breath (improving).    Cardiovascular:  Negative for chest pain and palpitations.   Musculoskeletal:  Negative for back pain.     Objective:      Vital Signs (Most Recent):  Temp: 97.5 °F (36.4 °C) (12/19/24 1103)  Pulse: 64 (12/19/24 1105)  Resp: 20 (12/19/24 1103)  BP: (!) 105/54 (12/19/24 1105)  SpO2: 98 % (12/19/24 1300) Vital Signs (24h Range):  Temp:  [96.8 °F (36 °C)-98 °F (36.7 °C)] 97.5 °F (36.4 °C)  Pulse:  [64-69] 64  Resp:  [17-20] 20  SpO2:  [96 %-99 %] 98 %  BP: ()/(47-58) 105/54  Arterial Line BP: (107)/(58) 107/58   Weight: 98.9 kg (218 lb)  Body mass index is 35.2 kg/m².  SpO2: 98 %       Intake/Output Summary (Last 24 hours) at 12/19/2024 1533  Last data filed at 12/19/2024 1419  Gross per 24 hour   Intake --   Output 1251 ml   Net -1251 ml     Lines/Drains/Airways       Drain  Duration                  Urethral Catheter 12/17/24 1730 Straight-tip 16 Fr. 1 day              Peripheral Intravenous Line  Duration                  Peripheral IV - Single Lumen 12/09/24 0835 No Posterior;Right Forearm 10 days         Peripheral IV - Single Lumen 12/18/24 2350 20 G Anterior;Left Forearm <1 day                  Significant Labs:   Chemistries:   Recent Labs   Lab 12/16/24  0507 12/17/24  0449 12/17/24  1741 12/17/24 2004 12/18/24  0450 12/19/24  0510    139 135* 136 136 137   K 4.3 4.4 5.8* 4.1 4.6 3.4*    103 98 97* 97* 98   CO2 29 25 22* 26 26 30   BUN 17.3 23.7* 27.6* 27.0* 33.1* 31.5*   CREATININE 0.76 0.70 0.99 0.75 0.85 0.81   CALCIUM 8.5 7.9* 8.4 7.7* 7.9* 7.9*   BILITOT 0.7 0.8 0.8 0.8 0.8 1.0   ALKPHOS 96 106 115 109 111 99   ALT 13 10 16 12 12 14   AST 26 25 52* 27 31 33   GLUCOSE 127* 166* 286* 224* 146* 137*   MG 2.20  --   --   --   --  2.00   TROPONINI  --   --  0.016  --   --   --         CBC/Anemia Labs: Coags:    Recent Labs   Lab 12/16/24  0507 12/17/24  0449 12/17/24  1741 12/18/24  0450 12/19/24  0510   WBC 13.86*   < > 22.58* 17.30* 15.79  15.79*   HGB 9.5*   < > 9.9* 9.7* 8.0*   HCT 30.2*   < > 31.5* 30.9* 25.4*   *   < > 454* 392 352   MCV 93.8   < > 94.3* 95.7* 95.8*   RDW 18.1*   < > 18.7*  18.0* 18.3*   IRON 29*  --   --   --   --    FERRITIN 98.28  --   --   --   --    [id number]  --   --   --   --     < > = values in this interval not displayed.    Recent Labs   Lab 12/17/24  0449 12/18/24  0450 12/19/24  0757   INR 4.7* 6.0* 6.7*        Significant Imaging:    EKG: None to review    Telemetry:  SR    Physical Exam  Constitutional:       Appearance: She is obese. She is ill-appearing.   HENT:      Head: Normocephalic.      Nose: Nose normal.      Mouth/Throat:      Mouth: Mucous membranes are moist.   Eyes:      Extraocular Movements: Extraocular movements intact.   Cardiovascular:      Rate and Rhythm: Normal rate and regular rhythm.      Pulses: Normal pulses.      Heart sounds: Murmur heard.   Pulmonary:      Effort: Pulmonary effort is normal.      Comments: On CPAP  Abdominal:      Palpations: Abdomen is soft.   Skin:     General: Skin is warm and dry.   Neurological:      Mental Status: She is alert.   Psychiatric:         Mood and Affect: Mood normal.         Home Medications:   Current Facility-Administered Medications on File Prior to Encounter   Medication Dose Route Frequency Provider Last Rate Last Admin    0.9%  NaCl infusion   Intravenous On Call Procedure Vijay Uribe MD        diazePAM tablet 10 mg  10 mg Oral On Call Procedure Vijay Uribe MD   10 mg at 09/06/23 1021    diphenhydrAMINE capsule 50 mg  50 mg Oral On Call Procedure Vijay Uribe MD   50 mg at 09/06/23 1021     Current Outpatient Medications on File Prior to Encounter   Medication Sig Dispense Refill    amiodarone (PACERONE) 200 MG Tab Take 200 mg by mouth once daily.      aspirin (ECOTRIN) 81 MG EC tablet Take 1 tablet (81 mg total) by mouth once daily. for 25 days 25 tablet 0    atorvastatin (LIPITOR) 40 MG tablet Take 40 mg by mouth once daily.      clopidogreL (PLAVIX) 75 mg tablet Take 1 tablet (75 mg total) by mouth once daily. 30 tablet 0    DUPIXENT SYRINGE 300 mg/2 mL Syrg Inject 300 mg into the skin  every 14 (fourteen) days.      estradioL (ESTRACE) 0.01 % (0.1 mg/gram) vaginal cream Place 0.5 g vaginally twice a week.      famotidine (PEPCID) 40 MG tablet Take 40 mg by mouth every evening.      fluticasone-umeclidin-vilanter (TRELEGY ELLIPTA) 200-62.5-25 mcg inhaler Inhale 1 puff into the lungs once daily.      furosemide (LASIX) 40 MG tablet Take 40 mg by mouth.      glimepiride (AMARYL) 2 MG tablet Take 4 mg by mouth 2 (two) times a day.      HYDROcodone-acetaminophen (NORCO)  mg per tablet Take 1 tablet by mouth every 6 (six) hours as needed for Pain. 20 tablet 0    immun glob G,IgG,/pro/IgA 0-50 (HIZENTRA SUBQ) Inject into the skin every 14 (fourteen) days.      lipase-protease-amylase 24,000-76,000-120,000 units (CREON) 24,000-76,000 -120,000 unit capsule Take 1-2 capsules by mouth 3 (three) times daily with meals. 2 caps with meals and 1 cap c snacks      metFORMIN (GLUCOPHAGE) 500 MG tablet Take 1 tablet (500 mg total) by mouth 2 (two) times daily with meals. 180 tablet 3    metoprolol succinate (TOPROL-XL) 50 MG 24 hr tablet Take 1 tablet (50 mg total) by mouth once daily. 30 tablet 0    NIFEdipine (PROCARDIA-XL) 30 MG (OSM) 24 hr tablet Take 30 mg by mouth.      pantoprazole (PROTONIX) 40 MG tablet Take 1 tablet by mouth every morning.      timoloL (BETIMOL) 0.5 % ophthalmic solution Place 1 drop into both eyes 2 (two) times daily.      warfarin (COUMADIN) 5 MG tablet Take 1 tablet (5 mg total) by mouth Daily. 30 tablet 0     Current Schedule Inpatient Medications:   amiodarone  200 mg Oral Daily    atorvastatin  40 mg Oral QHS    clopidogreL  75 mg Oral Daily    famotidine  40 mg Oral QHS    ferrous sulfate  1 tablet Oral Daily    [START ON 12/20/2024] furosemide  40 mg Oral Daily    LIDOcaine  1 patch Transdermal Q24H    lipase-protease-amylase 24,000-76,000-120,000 units  1 capsule Oral TID WM    metoprolol succinate  50 mg Oral Daily    [START ON 12/20/2024] mupirocin   Nasal BID     NIFEdipine  30 mg Oral Daily    pantoprazole  40 mg Oral QAM    piperacillin-tazobactam (Zosyn) IV (PEDS and ADULTS) (extended infusion is not appropriate)  4.5 g Intravenous Q8H    polyethylene glycol  17 g Oral BID    sodium zirconium cyclosilicate  10 g Oral Once    timolol maleate 0.5%  1 drop Both Eyes BID     Continuous Infusions:    Assessment:   Acute on Chronic Diastolic Heart Failure - Related to VHD/Severe MS     - EF 60-65% with G 1 Diastolic Dysfunction (11.14.24)   VHD    - MARYBETH (11.27.24) - Mitral Valve: There is a bioprosthetic valve in the mitral position. There is severe stenosis. The mean pressure gradient across the mitral valve is 16 mmHg at a heart rate of bpm. There is moderate regurgitation.     - ECHO (11.23.24): There is evidence of bioprosthetic mitral valve stenosis The mean pressure gradient across the mitral valve is 19 mmHg at a heart rate of 78 bpm.     - s/p MVR (9.20.23): Mitral valve replacement with a 25 mm mosaic porcine valve    - Moderate TR    - Mild MR  Acute on Chronic Respiratory Failure Requiring BiPaP      -s/p RRT for respiratory distress 12.17.24  Acute hypoxic and hypercarbic respiratory failure requiring BiPAP and IV lasix   CAD/Stents    - s/p LHC (11.29.24) - Eccentric Prox LAD lesion was 80% stenosed with 0% stenosis post-intervention. iFR 0.84. Prox LAD lesion: A STENT SYNERGY XD 3.0X16MM stent was successfully placed at 8 GEO for 10 sec. Proximal part of the stent was post dilated by using 3.5/12 mm NC and a 4/8 mm NC balloons at 12 atmospheres.  Chronic Hypoxic Respiratory Failure Requiring Supplemental O2  Anemia (Mild)  Leukocytosis   COPD Without Exacerbation   Pulmonary HTN  GEMINI  DM II  PAF - Currently SR    - [id number] Score 6 (9.7% Stroke Risk Per Year)    - s/p Ligation of SUKHI (9.20.23) - Endostapler   ABEL  HTN  DVT s/p IVC Filter   SSS    - s/p PPM (9.25.23): [facility]ey   HLD  Asthma  Left Thyroid Nodule  GERD  Chronic Sinusitis  Common Variable  Immunodeficiency  Glaucoma  No Known History of GI Bleed       Plan:   Coumadin remains on hold.  Vitamin K given per hospital medicine. Recheck INR in the morning.   Plan to resume PO lasix tomorrow.   Continue Plavix, Statin, Amio, Metoprolol, Nifedipine  No spinal injection at this time per Neuro  Other medical management per primary team.   Will continue to follow.       Rhoda Escoto, YANIRA  Cardiology  [facility]     I agree with the findings of the complexity of problems addressed and take responsibility for the plan's risks and complications. I approved the plan documented by Rhoda Escoto NP.

## 2024-12-19 NOTE — PT/OT/SLP PROGRESS
Physical Therapy      Patient Name:  Kortney Leach   MRN:  [id number]    Pt with INR of 6.7, mobility and OOB is contraindicated at this time. PT to hold today, will f/u when appropriate.

## 2024-12-19 NOTE — PLAN OF CARE
Lori Mccarthy at [facility] requesting update. Inquired about whether or not they could accept pt today, as she will likely be ready to d/c. pending response.     [facility]    0815 Received response from Shari stating they have a new physician today so she will review referral with him and let me know if they can admit today.     0915 Pt's INR elevated. No d/c today. Possibly tomorrow. Updated Shari.

## 2024-12-20 LAB
ALBUMIN SERPL-MCNC: 2.3 G/DL (ref 3.4–4.8)
ALBUMIN/GLOB SERPL: 0.7 RATIO (ref 1.1–2)
ALP SERPL-CCNC: 97 UNIT/L (ref 40–150)
ALT SERPL-CCNC: 13 UNIT/L (ref 0–55)
ANION GAP SERPL CALC-SCNC: 11 MEQ/L
AST SERPL-CCNC: 35 UNIT/L (ref 5–34)
BILIRUB SERPL-MCNC: 0.9 MG/DL
BUN SERPL-MCNC: 30.8 MG/DL (ref 9.8–20.1)
CALCIUM SERPL-MCNC: 8 MG/DL (ref 8.4–10.2)
CHLORIDE SERPL-SCNC: 99 MMOL/L (ref 98–107)
CO2 SERPL-SCNC: 28 MMOL/L (ref 23–31)
COLOR STL: ABNORMAL
COLOR STL: NORMAL
CONSISTENCY STL: ABNORMAL
CONSISTENCY STL: NORMAL
CREAT SERPL-MCNC: 0.74 MG/DL (ref 0.55–1.02)
CREAT/UREA NIT SERPL: 42
ERYTHROCYTE [DISTWIDTH] IN BLOOD BY AUTOMATED COUNT: 19.1 % (ref 11.5–17)
GFR SERPLBLD CREATININE-BSD FMLA CKD-EPI: >60 ML/MIN/1.73/M2
GLOBULIN SER-MCNC: 3.3 GM/DL (ref 2.4–3.5)
GLUCOSE SERPL-MCNC: 133 MG/DL (ref 82–115)
HCT VFR BLD AUTO: 24.5 % (ref 37–47)
HEMOCCULT SP1 STL QL: NEGATIVE
HEMOCCULT SP1 STL QL: POSITIVE
HGB BLD-MCNC: 7.7 G/DL (ref 12–16)
INR PPP: 2.4
MCH RBC QN AUTO: 30.2 PG (ref 27–31)
MCHC RBC AUTO-ENTMCNC: 31.4 G/DL (ref 33–36)
MCV RBC AUTO: 96.1 FL (ref 80–94)
NRBC BLD AUTO-RTO: 5.9 %
PLATELET # BLD AUTO: 338 X10(3)/MCL (ref 130–400)
PMV BLD AUTO: 9.7 FL (ref 7.4–10.4)
POCT GLUCOSE: 126 MG/DL (ref 70–110)
POCT GLUCOSE: 142 MG/DL (ref 70–110)
POCT GLUCOSE: 144 MG/DL (ref 70–110)
POCT GLUCOSE: 203 MG/DL (ref 70–110)
POTASSIUM SERPL-SCNC: 3 MMOL/L (ref 3.5–5.1)
PROT SERPL-MCNC: 5.6 GM/DL (ref 5.8–7.6)
PROTHROMBIN TIME: 26.6 SECONDS (ref 12.5–14.5)
RBC # BLD AUTO: 2.55 X10(6)/MCL (ref 4.2–5.4)
SODIUM SERPL-SCNC: 138 MMOL/L (ref 136–145)
WBC # BLD AUTO: 16.49 X10(3)/MCL (ref 4.5–11.5)

## 2024-12-20 PROCEDURE — 82272 OCCULT BLD FECES 1-3 TESTS: CPT | Performed by: STUDENT IN AN ORGANIZED HEALTH CARE EDUCATION/TRAINING PROGRAM

## 2024-12-20 PROCEDURE — 27000221 HC OXYGEN, UP TO 24 HOURS

## 2024-12-20 PROCEDURE — 63600175 PHARM REV CODE 636 W HCPCS: Performed by: STUDENT IN AN ORGANIZED HEALTH CARE EDUCATION/TRAINING PROGRAM

## 2024-12-20 PROCEDURE — 85027 COMPLETE CBC AUTOMATED: CPT | Performed by: STUDENT IN AN ORGANIZED HEALTH CARE EDUCATION/TRAINING PROGRAM

## 2024-12-20 PROCEDURE — 80053 COMPREHEN METABOLIC PANEL: CPT | Performed by: STUDENT IN AN ORGANIZED HEALTH CARE EDUCATION/TRAINING PROGRAM

## 2024-12-20 PROCEDURE — 25000003 PHARM REV CODE 250: Performed by: STUDENT IN AN ORGANIZED HEALTH CARE EDUCATION/TRAINING PROGRAM

## 2024-12-20 PROCEDURE — 36415 COLL VENOUS BLD VENIPUNCTURE: CPT | Performed by: STUDENT IN AN ORGANIZED HEALTH CARE EDUCATION/TRAINING PROGRAM

## 2024-12-20 PROCEDURE — 82272 OCCULT BLD FECES 1-3 TESTS: CPT | Performed by: NURSE PRACTITIONER

## 2024-12-20 PROCEDURE — 63600175 PHARM REV CODE 636 W HCPCS: Performed by: NURSE PRACTITIONER

## 2024-12-20 PROCEDURE — 63600175 PHARM REV CODE 636 W HCPCS

## 2024-12-20 PROCEDURE — 21400001 HC TELEMETRY ROOM

## 2024-12-20 PROCEDURE — 97530 THERAPEUTIC ACTIVITIES: CPT

## 2024-12-20 PROCEDURE — 85610 PROTHROMBIN TIME: CPT | Performed by: STUDENT IN AN ORGANIZED HEALTH CARE EDUCATION/TRAINING PROGRAM

## 2024-12-20 PROCEDURE — 25000003 PHARM REV CODE 250

## 2024-12-20 RX ORDER — METOPROLOL SUCCINATE 25 MG/1
25 TABLET, EXTENDED RELEASE ORAL DAILY
Status: DISCONTINUED | OUTPATIENT
Start: 2024-12-20 | End: 2024-12-23

## 2024-12-20 RX ORDER — PANTOPRAZOLE SODIUM 40 MG/10ML
40 INJECTION, POWDER, LYOPHILIZED, FOR SOLUTION INTRAVENOUS 2 TIMES DAILY
Status: DISCONTINUED | OUTPATIENT
Start: 2024-12-20 | End: 2024-12-27

## 2024-12-20 RX ADMIN — PIPERACILLIN SODIUM AND TAZOBACTAM SODIUM 4.5 G: 4; .5 INJECTION, POWDER, LYOPHILIZED, FOR SOLUTION INTRAVENOUS at 11:12

## 2024-12-20 RX ADMIN — SIMETHICONE 80 MG: 80 TABLET, CHEWABLE ORAL at 07:12

## 2024-12-20 RX ADMIN — POTASSIUM BICARBONATE 40 MEQ: 391 TABLET, EFFERVESCENT ORAL at 09:12

## 2024-12-20 RX ADMIN — OXYCODONE HYDROCHLORIDE 10 MG: 10 TABLET ORAL at 11:12

## 2024-12-20 RX ADMIN — MUPIROCIN: 20 OINTMENT TOPICAL at 09:12

## 2024-12-20 RX ADMIN — FERROUS SULFATE TAB 325 MG (65 MG ELEMENTAL FE) 1 EACH: 325 (65 FE) TAB at 09:12

## 2024-12-20 RX ADMIN — FAMOTIDINE 40 MG: 20 TABLET, FILM COATED ORAL at 09:12

## 2024-12-20 RX ADMIN — METOPROLOL SUCCINATE 25 MG: 25 TABLET, EXTENDED RELEASE ORAL at 09:12

## 2024-12-20 RX ADMIN — PANTOPRAZOLE SODIUM 40 MG: 40 INJECTION, POWDER, FOR SOLUTION INTRAVENOUS at 09:12

## 2024-12-20 RX ADMIN — LIDOCAINE 5% 1 PATCH: 700 PATCH TOPICAL at 04:12

## 2024-12-20 RX ADMIN — FUROSEMIDE 40 MG: 40 TABLET ORAL at 09:12

## 2024-12-20 RX ADMIN — SODIUM CHLORIDE 125 MG: 9 INJECTION, SOLUTION INTRAVENOUS at 09:12

## 2024-12-20 RX ADMIN — NIFEDIPINE 30 MG: 30 TABLET, FILM COATED, EXTENDED RELEASE ORAL at 09:12

## 2024-12-20 RX ADMIN — AMIODARONE HYDROCHLORIDE 200 MG: 200 TABLET ORAL at 09:12

## 2024-12-20 RX ADMIN — OXYCODONE HYDROCHLORIDE 5 MG: 5 TABLET ORAL at 04:12

## 2024-12-20 RX ADMIN — PANTOPRAZOLE SODIUM 40 MG: 40 INJECTION, POWDER, FOR SOLUTION INTRAVENOUS at 12:12

## 2024-12-20 RX ADMIN — PANTOPRAZOLE SODIUM 40 MG: 40 TABLET, DELAYED RELEASE ORAL at 06:12

## 2024-12-20 RX ADMIN — PANCRELIPASE 1 CAPSULE: 120000; 24000; 76000 CAPSULE, DELAYED RELEASE PELLETS ORAL at 04:12

## 2024-12-20 RX ADMIN — TIMOLOL MALEATE 1 DROP: 5 SOLUTION OPHTHALMIC at 09:12

## 2024-12-20 RX ADMIN — OXYCODONE HYDROCHLORIDE 5 MG: 5 TABLET ORAL at 09:12

## 2024-12-20 RX ADMIN — PANCRELIPASE 1 CAPSULE: 120000; 24000; 76000 CAPSULE, DELAYED RELEASE PELLETS ORAL at 12:12

## 2024-12-20 RX ADMIN — PIPERACILLIN SODIUM AND TAZOBACTAM SODIUM 4.5 G: 4; .5 INJECTION, POWDER, LYOPHILIZED, FOR SOLUTION INTRAVENOUS at 06:12

## 2024-12-20 RX ADMIN — CLOPIDOGREL BISULFATE 75 MG: 75 TABLET ORAL at 09:12

## 2024-12-20 RX ADMIN — ATORVASTATIN CALCIUM 40 MG: 40 TABLET, FILM COATED ORAL at 09:12

## 2024-12-20 RX ADMIN — PANCRELIPASE 1 CAPSULE: 120000; 24000; 76000 CAPSULE, DELAYED RELEASE PELLETS ORAL at 09:12

## 2024-12-20 RX ADMIN — INSULIN ASPART 4 UNITS: 100 INJECTION, SOLUTION INTRAVENOUS; SUBCUTANEOUS at 05:12

## 2024-12-20 RX ADMIN — PIPERACILLIN SODIUM AND TAZOBACTAM SODIUM 4.5 G: 4; .5 INJECTION, POWDER, LYOPHILIZED, FOR SOLUTION INTRAVENOUS at 04:12

## 2024-12-20 NOTE — PROGRESS NOTES
"[facility] Medicine Progress Note        Chief Complaint: Inpatient Follow-up     HPI:   79-year-old woman with A-fib, HTN, valvular heart disease s/p MVR, CVID, CAD s/p PCI to LAD (11/29), DVT s/p IVC filter, T2DM, GERD, SSS s/p PPM, HTN, HLD, chronic sinusitis status post sinus surgery, ABEL, obesity, asthma/COPD and a recent admission for acute decompensated diastolic heart failure where she underwent a MARYBETH on 11/27 which showed severe mitral stenosis and moderate MR and LHC on 11/29 with stent to prox LAD. Patient was seen by CT surgery and structural heart team and a possible valve in valve was discussed but the plan was to start Coumadin to see if the gradient could be decreased and have a repeat TTE in a few weeks time. She was then subsequently discharged on 2L oxygen to [place] with close cardiology follow up. She presents on [facility] on 12/15 from [place] due to low back associated with weakness and for neurosurgical evaluation.     She reports that since she went to rehab and started working with PT - she has been having lower back pain not radiating down her legs. She denies urinary or stool incontinence. No saddle anesthesia. Reports constipation. She is able to move all her extremities. She used to walk with a walker at rehab but walking became difficult due to back pain. She reports her breathing has been "so and so" while she has been there. She had a CT scan done at rehab which showed Lumbar degenerative disc disease and spondylosis and she was transferred here for neurosurgery evaluation. CXR showed increased density on the right with a small right sided pleural effusion. proBNP 893.3. Cardiology consulted.     RRT called on 12/17 for acute respiratory distress - patient had recently worked with PT. Immediately went up to bedside. Patient tachypneic. A&Ox4. Denies chest pain. Mildly lethargic. Mild bibasilar crackles. ABG 7.29/68/69. Placed on BiPAP. " Initial EKG not great - repeat EKG showed NSR. Patient with recent stent. Given IV lasix 40 mg once. CBC with worsening leukocytosis and CXR with possible R sided infiltrate vs effusion - will start vancomycin and zosyn given recent hospitalization. Repeat ABG in 1 hour was better.    Interval Hx:   Patient seen and examined at bedside.  Only drank 1 boost yesterday and had diarrhea afterwards.  No acute overnight events.  Feels weaker.  Family is concerned that patient is not eating more.  Advised patient to encourage eating more.  Otherwise alert and oriented.  No other concerns at this time    Case was discussed with patient's nurse and  on the floor.    Objective/physical exam:  General: In no acute distress, afebrile  Chest: Clear to auscultation bilaterally  Heart: RRR, +S1, S2, no appreciable murmur  Abdomen: Soft, nontender, BS +  MSK: Warm, no lower extremity edema, no clubbing or cyanosis  Neurologic: Alert and oriented x4, Cranial nerve II-XII intact, Strength 5/5 in all 4 extremities    VITAL SIGNS: 24 HRS MIN & MAX LAST   Temp  Min: 97.5 °F (36.4 °C)  Max: 98.9 °F (37.2 °C) 98.1 °F (36.7 °C)   BP  Min: 91/53  Max: 112/53 (!) 106/51   Pulse  Min: 64  Max: 70  64   Resp  Min: 17  Max: 18 18   SpO2  Min: 94 %  Max: 98 % 97 %     I have reviewed the following labs:  Recent Labs   Lab 12/18/24  0450 12/19/24  0510 12/20/24  0605   WBC 17.30* 15.79  15.79* 16.49*   RBC 3.23* 2.65* 2.55*   HGB 9.7* 8.0* 7.7*   HCT 30.9* 25.4* 24.5*   MCV 95.7* 95.8* 96.1*   MCH 30.0 30.2 30.2   MCHC 31.4* 31.5* 31.4*   RDW 18.0* 18.3* 19.1*    352 338   MPV 9.4 9.6 9.7     Recent Labs   Lab 12/16/24  0507 12/17/24  0449 12/17/24 1956 12/17/24 2004 12/18/24  0450 12/18/24  1022 12/18/24  1242 12/19/24  0510 12/20/24  0605      < >  --    < > 136  --   --  137 138   K 4.3   < >  --    < > 4.6  --   --  3.4* 3.0*      < >  --    < > 97*  --   --  98 99   CO2 29   < >  --    < > 26  --   --  30 28    BUN 17.3   < >  --    < > 33.1*  --   --  31.5* 30.8*   CREATININE 0.76   < >  --    < > 0.85  --   --  0.81 0.74   CALCIUM 8.5   < >  --    < > 7.9*  --   --  7.9* 8.0*   PH  --    < > 7.450  --   --  7.360 7.410  --   --    MG 2.20  --   --   --   --   --   --  2.00  --    ALBUMIN 2.4*   < >  --    < > 2.7*  --   --  2.3* 2.3*   ALKPHOS 96   < >  --    < > 111  --   --  99 97   ALT 13   < >  --    < > 12  --   --  14 13   AST 26   < >  --    < > 31  --   --  33 35*   BILITOT 0.7   < >  --    < > 0.8  --   --  1.0 0.9    < > = values in this interval not displayed.     Microbiology Results (last 7 days)       Procedure Component Value Units Date/Time    Blood Culture [[id number]]  (Normal) Collected: 12/17/24 2004    Order Status: Completed Specimen: Blood Updated: 12/19/24 2100     Blood Culture No Growth At 48 Hours             See below for Radiology    Assessment/Plan:  # Lethargy 2/2 possibly hypercapnia  # Low back pain 2/2 DJD and spinal stenosis  # Acute on Chronic diastolic HF   # Chronic hypoxic respiratory failure requiring supplemental oxygen (on 2 L oxygen)   # Pulmonary HTN  # Severe mitral stenosis  # Obesity   # pAF s/p LAAL in 2023  # Anemia - stable. No signs of active bleeding. Patient is on triple therapy though and had a Hgb drop since then which has been stable  # iron deficiency anemia  # Leukocytosis - recently treated for UTI but currently has no dysuria. No fevers or chills. No abdominal pain/N/V  # HTN  # HLD  # VHD s/p MVR (09/20/2023)  # CAD s/p PCI to LAD (11/29/2024)  Hx of DVT s/p IVC filter, SSS s/p PPM, T2DM, GERD ,Chronic sinusitis s/p sinus surgery , ABEL on CPAP, Obesity CVID, Opioid induced constipation, Asthma/COPD ? - does not have official PFTs done     ABGs done yesterday shows compensated PH, advised to continue CPAP at bedtime and during daytime naps   we can resume her chronic O2 during the day  infectious workup negative, blood cultures normal, urine culture  unremarkable  MRSA PCR normal, discontinue vancomycin   Continue Zosyn, day 4  MRI L spine; as per radiology patient automatically ruled out of MRI given PPM and spinal stimulator  NGSY consult   - as per NGSY consult IR for LESI; however IR unable to do due to unable to hold plavix due to recent stent  Continue PT/OT  continue Plavix due to recent stent to prevent stent thrombosis   INR down to 2.4 today after vitamin K given yesterday.  Per Cardiology note, goal of INR 2-3  We will resume her Coumadin tonight  Drop in hemoglobin noted.  stool checked earlier this a.m. and was negative for blood; we will check 1 more time  Monitor stools and hgb closely for further drop; iron panels done earlier noted for iron-deficiency anemia   Start IV iron, day 1  continue amiodarone, atorvastatin, nifedipine  Continue oral Lasix today; decrease Toprol to 25 mg  Cardiology following, appreciate recs  Further recs based on clinical course  Labs in AM    Update:  2nd stool positive for blood.  We will go ahead and consult GI due to drop in hemoglobin along stool findings recommendations.  Hold off on warfarin until GI evaluates    Critical care note:  Critical care diagnosis:  Acute anemia requiring IV iron  Critical care interventions: Hands-on evaluation, review of labs/radiographs/records and discussion with patient and family if present  Critical care time spent: 35 minutes      VTE prophylaxis: SCD - elevated INR    Patient condition:  Fair    Anticipated discharge and Disposition:   tbd    All diagnosis and differential diagnosis have been reviewed; assessment and plan has been documented; I have personally reviewed the labs and test results that are presently available; I have reviewed the patients medication list; I have reviewed the consulting providers response and recommendations. I have reviewed or attempted to review medical records based upon their availability    All of the patient's questions have been  addressed  and answered. Patient's is agreeable to the above stated plan. I will continue to monitor closely and make adjustments to medical management as needed.  _____________________________________________________________________    Malnutrition Status:  Nutrition consulted. Most recent weight and BMI monitored-     Measurements:  Wt Readings from Last 1 Encounters:   12/16/24 98.9 kg (218 lb)   Body mass index is 35.2 kg/m².    Patient has been screened and assessed by RD.    Malnutrition Type:  Context:    Level:      Malnutrition Characteristic Summary:       Interventions/Recommendations (treatment strategy):        Scheduled Med:   amiodarone  200 mg Oral Daily    atorvastatin  40 mg Oral QHS    clopidogreL  75 mg Oral Daily    famotidine  40 mg Oral QHS    ferric gluconate (FERRLECIT) 125 mg in 0.9% NaCl 100 mL IVPB  125 mg Intravenous Daily    ferrous sulfate  1 tablet Oral Daily    furosemide  40 mg Oral Daily    LIDOcaine  1 patch Transdermal Q24H    lipase-protease-amylase 24,000-76,000-120,000 units  1 capsule Oral TID WM    metoprolol succinate  25 mg Oral Daily    mupirocin   Nasal BID    NIFEdipine  30 mg Oral Daily    pantoprazole  40 mg Oral QAM    piperacillin-tazobactam (Zosyn) IV (PEDS and ADULTS) (extended infusion is not appropriate)  4.5 g Intravenous Q8H    polyethylene glycol  17 g Oral BID    sodium zirconium cyclosilicate  10 g Oral Once    timolol maleate 0.5%  1 drop Both Eyes BID      Continuous Infusions:     PRN Meds:    Current Facility-Administered Medications:     acetaminophen, 1,000 mg, Oral, Q8H PRN    albuterol-ipratropium, 3 mL, Nebulization, Q4H PRN    dextrose 10%, 12.5 g, Intravenous, PRN    dextrose 10%, 25 g, Intravenous, PRN    glucagon (human recombinant), 1 mg, Intramuscular, PRN    glucose, 16 g, Oral, PRN    glucose, 24 g, Oral, PRN    insulin aspart U-100, 1-10 Units, Subcutaneous, QID (AC + HS) PRN    melatonin, 6 mg, Oral, Nightly PRN    ondansetron, 4 mg, Intravenous,  Q8H PRN    oxyCODONE, 5 mg, Oral, Q6H PRN    oxyCODONE, 10 mg, Oral, Q6H PRN    senna, 8.6 mg, Oral, Daily PRN    simethicone, 1 tablet, Oral, TID PRN     Radiology:  I have personally reviewed the following imaging and agree with the radiologist.     CT Head Without Contrast  Narrative: EXAMINATION:  CT HEAD WITHOUT CONTRAST    CLINICAL HISTORY:  Mental status change, unknown cause;    TECHNIQUE:  Axial scans were obtained from skull base to the vertex.    Coronal and sagittal reconstructions obtained from the axial data.    Automatic exposure control was utilized to limit radiation dose.    Contrast: None    Radiation Dose:    Total DLP: 885 mGy*cm    COMPARISON:  CT head dated 12/13/2024    FINDINGS:  There is no acute intracranial hemorrhage or edema. The gray-white matter differentiation is preserved.    There is no mass effect or midline shift.  There is diffuse parenchymal volume loss.  The basal cisterns are patent. There is no abnormal extra-axial fluid collection.    The calvarium and skull base are intact. The visualized paranasal sinuses and the mastoid air cells are clear.  Impression: No acute intracranial abnormality.    Electronically signed by: Jaylene Mirza  Date:    12/18/2024  Time:    14:18    Jeane Ortega DO  Department of Hospital Medicine  [facility]  12/20/2024

## 2024-12-20 NOTE — PT/OT/SLP PROGRESS
Physical Therapy Treatment    Patient Name:  Kortney Leach   MRN:  [id number]    Recommendations:     Discharge therapy intensity: Moderate Intensity Therapy   Discharge Equipment Recommendations: none  Barriers to discharge: Decreased caregiver support, Impaired mobility, and Ongoing medical needs    Assessment:     Kortney Leach is a 79 y.o. female admitted with a medical diagnosis of melena, low back pain 2/2 DJD and spinal stenosis, acute on chronic diastolic HF, chronic hypoxic respiratory failure on supplemental O2.  She presents with the following impairments/functional limitations: weakness, impaired endurance, impaired self care skills, impaired functional mobility, gait instability, impaired balance, pain.     Pt complaining of fatigue and expressed fear of having loose bowels during session, however still agreeable to participate and attempt EOB activities. Requested to remain on CPAP during session rather than switching to oxymask. Pt is Jesus for bed mobility, Jesus to for sit<>stand with RW, and also able to take a few sidesteps with CGA this date. Will continue to progress mobility as tolerable.    Rehab Prognosis: Good; patient would benefit from acute skilled PT services to address these deficits and reach maximum level of function.    Recent Surgery: Procedure(s) (LRB):  EGD (N/A)      Plan:     During this hospitalization, patient would benefit from acute PT services 5 x/week to address the identified rehab impairments via gait training, therapeutic activities, therapeutic exercises and progress toward the following goals:    Plan of Care Expires:  01/16/25    Subjective     Chief Complaint: fear of having loose BM during session  Patient/Family Comments/goals: to get stronger  Pain/Comfort:  Pain Rating 1: 3/10  Location 1: back  Pain Addressed 1: Reposition, Distraction      Objective:     Communicated with NSG prior to session.  Patient found supine with oxygen, SCD, pavon catheter  upon PT entry to room.     General Precautions: Standard, fall  Orthopedic Precautions: N/A  Braces: N/A  Respiratory Status:  CPAP 2L, SpO2 94-97%  Skin Integrity: Visible skin intact      Functional Mobility:  Bed Mobility:     Scooting: minimum assistance  Supine to Sit: minimum assistance and increased time required  Sit to Supine: minimum assistance  Transfers:     Sit to Stand:  minimum assistance with rolling walker  Gait: Pt sidestepped to HOB with CGA and RW, pt only remained standing for ~15sec before requesting to sit  Balance: SBA static sitting    Therapeutic Activities/Exercises:  BLE LAQs, ankle pumps x 15 each sitting EOB; pt requesting to terminate tx session at this point 2/2 fatigue so we returned her to supine after    Education:  Patient provided with verbal education education regarding PT role/goals/POC and safety awareness.  Understanding was verbalized.     Patient left supine with all lines intact and call button in reach    GOALS:   Multidisciplinary Problems       Physical Therapy Goals          Problem: Physical Therapy    Goal Priority Disciplines Outcome Interventions   Physical Therapy Goal     PT, PT/OT Progressing    Description: Goals to be met by: 25     Patient will increase functional independence with mobility by performin. Supine to sit with Modified [place]  2. Sit to supine with Modified [place]  3. Sit to stand transfer with Modified [place]  4. Bed to chair transfer with Modified [place] using LRAD  5. Gait  x 150 feet with Modified [place] using LRAD                         Time Tracking:     PT Received On: 24  PT Start Time: 1400     PT Stop Time: 1414  PT Total Time (min): 14 min     Billable Minutes: Therapeutic Activity 14    Treatment Type: Treatment  PT/PTA: PT     Number of PTA visits since last PT visit: 2     2024

## 2024-12-20 NOTE — PLAN OF CARE
Problem: Adult Inpatient Plan of Care  Goal: Optimal Comfort and Wellbeing  Outcome: Progressing  Intervention: Monitor Pain and Promote Comfort  Flowsheets (Taken 12/20/2024 1708)  Pain Management Interventions:   around-the-clock dosing utilized   care clustered   medication offered   position adjusted   relaxation techniques promoted     Problem: Diabetes Comorbidity  Goal: Blood Glucose Level Within Targeted Range  Outcome: Progressing  Intervention: Monitor and Manage Glycemia  Flowsheets (Taken 12/20/2024 1708)  Glycemic Management:   blood glucose monitored   oral hydration promoted

## 2024-12-20 NOTE — PLAN OF CARE
Pending pt medical stability for d/c to [facility]. Confirmed with Shari that they can do weekend admit of pt is stable to d/c over the weekend.    Bella Arvizu LCSW

## 2024-12-20 NOTE — PROGRESS NOTES
"  [facility]    Cardiology  Progress Note    Patient Name: Kortney Leach  MRN: [id number]  Admission Date: 12/15/2024  Hospital Length of Stay: 5 days  Code Status: Full Code   Attending Provider: Jeane Ortega DO   Consulting Provider: YANIRA Turner  Primary Care Physician: Arnaldo Hernandez MD  Principal Problem:<principal problem not specified>    Patient information was obtained from patient, past medical records, and ER records.     Subjective:     Reason for Consult: Severe Mitral Stenosis - Known patient on Coumadin     HPI: Ms. Leach is an 79 year old female who is known to [facility], Dr. Uribe & Dr. Moon. She presents to the ER from rehab with complaints of lower back pain/spasms. She endorses worsening but denies CP or palps. She reports not being able to walk due to the back pain & constipation. Of note, she was recently discharged from [facility] on 12.3.24 for acute CHF exacerbation. She underwent a MARYBETH that revealed Severe MS & Mod MR and underwent a stent to the prox LAD. She was seen by CV surgery & structural heart with plans to start Coumadin to see if the gradient can be decreased. Plan is for repeat TTE in a few weeks to re-evaluate. Significant labs include WBC 13.86, Plt 447. A CXR was obtained and demonstrated increased density on the right with small right-sided pleural effusion cannot rule out infiltrate. This appears worse than on previous. He was admitted to hospital medicine with a consult to neurosurgery. [facility] has been consulted due to the patient's known severe MS.     12.17.24: NAD. Improvement in SOB. Inaccurate I&O's and daily weights. INR 4.7 today. On 3 L NC. "I am okay." SR on tele. BP stable. Had a BM.   12.18.24: Resting in bed in NAD. ON Bipap.  Lethargic.  RRT called last night for respiratory distress.  INR 6.0.  Coumadin on hold.  12.19.24: NAD More alert today on CPAP. Reports feeling better. INR 6.7. BP soft but stable. SR on tele.   12.20.24: NAD. " Denies CP or palps. SOB improving. Resting comfortably on CPAP. H&H down trending. INR 2.4. BP stable. + diarrhea    PMH: VHD, GEMINI, DM II, PAF, ABEL, COPD, HTN, DVT SSS, HLD, Asthma, Left Thyroid Nodule, GERD, Chronic Sinusitis, Common Variable Immunodeficiency, Glaucoma  PSH: PPM, LHC, MVR, Bilateral Cataract Extraction, Total Knee Replacement, Bilateral mastectomy, Appendectomy, Colonoscopy, EGD, Tonsillectomy,    Family History: Father - Lung Cancer; Mother - Alzheimer, CVA; Sister - HTN, Multiple Sclerosis, DM II  Social History: Former Smoker (Quit in 1998). Denies illicit drug use and alcohol use.      Previous Cardiac Diagnostics:   [facility] 11.29.24:  Eccentric Prox LAD lesion was 80% stenosed with 0% stenosis post-intervention. iFR 0.84  Prox LAD lesion: A STENT SYNERGY XD [id number] stent was successfully placed at 8 GEO for 10 sec. Proximal part of the stent was post dilated by using 3.5/12 mm NC and a 4/8 mm NC balloons at 12 atmospheres.     [facility] 11.27.24:  Findings:  - There is single vessel coronary artery disease.  - Mid Left Anterior Descending has a calcified 70% stenosis.  - LVEDP is normal at 6 mmHg.  - RHC with elevated R sided filling pressures. RA 7 mmHg, RV 50/7 mmHg, PA 47/26 mmHg (mean 33 mmHg), PCWP 24 mmHg.  - PCWP to LV diastolic mean gradient was calculated to be 22 mmHg.  - Transpulmonary gradient is 8 mmHg.  - Normal Cardiac Output/Index at 4.7/2.3, as calculated by Rahel equation.  - PVR is 1.7 Woods units  - SVR is 1260 dyne-sec*cm^-5  - Pulmonary Artery Pulsatility Index (Nury) is 3.0  - Cardiac Power Output () is 0.84  Assessment/Plan:  - Patient is a 79 y.o. female with a history of prior bioprosthetic mitral valve replacement now presents with heart failure.  Transesophageal echocardiogram was obtained.  Final report is pending at this time, but there were findings consistent with severe bioprosthetic MVR stenosis.  Cardiac catheterization findings shown above do suggest that there is  a gradient of around 22 mm Hg across the bioprosthetic valve.  -recommend consultation with CT surgery   -obtain CT with mitral valve in valve protocol for possible TMVR  -LAD should be revascularized as well     MARYBETH (11.27.24):  Left Ventricle: The left ventricle is normal in size. Septal motion is consistent with post-operative status. There is normal systolic function with a visually estimated ejection fraction of 60 - 65%.  Right Ventricle: Normal right ventricular cavity size. Systolic function is normal.  Left Atrium: Left atrium is severely dilated.  Mitral Valve: There is a bioprosthetic valve in the mitral position. There is severe stenosis. The mean pressure gradient across the mitral valve is 16 mmHg at a heart rate of  bpm. There is moderate regurgitation.     ECHO (11.23.24):  Left Ventricle: The left ventricle is normal in size. Mildly increased wall thickness. There is normal systolic function with a visually estimated ejection fraction of 60 - 65%. Grade I diastolic dysfunction. Right Ventricle: Normal right ventricular cavity size. Systolic function is borderline low. Aortic Valve: There is aortic valve sclerosis. Mitral Valve: There is a bioprosthetic valve in the mitral position (25 mm mosaic porcine valve).  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 There is mild (1+) regurgitation. Tricuspid Valve: There moderate (2+) regurgitation. The estimated pulmonary artery systolic pressure is 57 mmHg. Pacemaker lead noted.  Recommend transesophageal echocardiogram to properly evaluate the bioprosthetic mitral valve.     ECHO (11.14.24):  The study quality is average. The left ventricle is normal in size. Global left ventricular systolic function is normal. The left ventricular ejection fraction is 65%. Left ventricular diastolic function is normal. Mild concentric left ventricular hypertrophy is  present. The left atrium is moderately enlarged. (4.6 cms). A normal functioning prosthetic mitral valve is noted. MG = 4.3 mmHg. The pulmonary artery systolic pressure is 37 mmHg.      PPM Insertion (9.25.23):  Successful implantation of PPM Dual. [facility]      PET (1.24.23):  This is a normal perfusion study, no perfusion defects noted. There is no evidence of ischemia.This scan is suggestive of low risk for future cardiovascular events. The left ventricular cavity is noted to be normal on the stress studies. The stress left ventricular ejection fraction was calculated to be 68% and left ventricular global function is normal. The rest left ventricular cavity is noted to be normal. The rest left ventricular ejection fraction was calculated to be 63% and rest left ventricular global function is normal. When compared to the resting ejection fraction (63%), the stress ejection fraction (68%) has increased. Transient ischemic dilatation is present and has been described as a marker for high risk coronary artery disease. It has also been described in microvascular disease, hypertensive heart disease as well as cardiac deconditioning. The study quality is good.   There was a rise in myocardial blood flow between rest and stress.  Global myocardial blood flow reserve was 2.72.  Normal global coronary flow reserve is suggestive of low coronary event risk.     Carotid US (1.24.23):  The study quality is good. There is no plaque noted in the proximal right internal carotid artery. 1-39% stenosis in the proximal left internal carotid artery based on Bluth Criteria. Antegrade right vertebral artery flow. Antegrade left vertebral artery flow.     LHC (9.6.23):  Normal Coronaries       Review of patient's allergies indicates:   Allergen Reactions    Adhesive tape-silicones      Current Facility-Administered Medications on File Prior to Encounter   Medication    0.9%  NaCl infusion    diazePAM tablet 10 mg    diphenhydrAMINE  capsule 50 mg     Current Outpatient Medications on File Prior to Encounter   Medication Sig    amiodarone (PACERONE) 200 MG Tab Take 200 mg by mouth once daily.    aspirin (ECOTRIN) 81 MG EC tablet Take 1 tablet (81 mg total) by mouth once daily. for 25 days    atorvastatin (LIPITOR) 40 MG tablet Take 40 mg by mouth once daily.    clopidogreL (PLAVIX) 75 mg tablet Take 1 tablet (75 mg total) by mouth once daily.    DUPIXENT SYRINGE 300 mg/2 mL Syrg Inject 300 mg into the skin every 14 (fourteen) days.    estradioL (ESTRACE) 0.01 % (0.1 mg/gram) vaginal cream Place 0.5 g vaginally twice a week.    famotidine (PEPCID) 40 MG tablet Take 40 mg by mouth every evening.    fluticasone-umeclidin-vilanter (TRELEGY ELLIPTA) 200-62.5-25 mcg inhaler Inhale 1 puff into the lungs once daily.    furosemide (LASIX) 40 MG tablet Take 40 mg by mouth.    glimepiride (AMARYL) 2 MG tablet Take 4 mg by mouth 2 (two) times a day.    HYDROcodone-acetaminophen (NORCO)  mg per tablet Take 1 tablet by mouth every 6 (six) hours as needed for Pain.    immun glob G,IgG,/pro/IgA 0-50 (HIZENTRA SUBQ) Inject into the skin every 14 (fourteen) days.    lipase-protease-amylase 24,000-76,000-120,000 units (CREON) 24,000-76,000 -120,000 unit capsule Take 1-2 capsules by mouth 3 (three) times daily with meals. 2 caps with meals and 1 cap c snacks    metFORMIN (GLUCOPHAGE) 500 MG tablet Take 1 tablet (500 mg total) by mouth 2 (two) times daily with meals.    metoprolol succinate (TOPROL-XL) 50 MG 24 hr tablet Take 1 tablet (50 mg total) by mouth once daily.    NIFEdipine (PROCARDIA-XL) 30 MG (OSM) 24 hr tablet Take 30 mg by mouth.    pantoprazole (PROTONIX) 40 MG tablet Take 1 tablet by mouth every morning.    timoloL (BETIMOL) 0.5 % ophthalmic solution Place 1 drop into both eyes 2 (two) times daily.    warfarin (COUMADIN) 5 MG tablet Take 1 tablet (5 mg total) by mouth Daily.       Review of Systems   Respiratory:  Negative for shortness of  breath (improving).    Cardiovascular:  Negative for chest pain and palpitations.   Gastrointestinal:  Positive for diarrhea.   Musculoskeletal:  Negative for back pain.     Objective:     Vital Signs (Most Recent):  Temp: 97.6 °F (36.4 °C) (12/20/24 1507)  Pulse: 66 (12/20/24 1507)  Resp: 18 (12/20/24 1507)  BP: (!) 116/50 (12/20/24 1507)  SpO2: 96 % (12/20/24 1507) Vital Signs (24h Range):  Temp:  [97.5 °F (36.4 °C)-98.9 °F (37.2 °C)] 97.6 °F (36.4 °C)  Pulse:  [64-70] 66  Resp:  [17-18] 18  SpO2:  [94 %-97 %] 96 %  BP: ()/() 116/50   Weight: 98.9 kg (218 lb)  Body mass index is 35.2 kg/m².  SpO2: 96 %       Intake/Output Summary (Last 24 hours) at 12/20/2024 1508  Last data filed at 12/20/2024 1425  Gross per 24 hour   Intake --   Output 1380 ml   Net -1380 ml     Lines/Drains/Airways       Drain  Duration                  Urethral Catheter 12/17/24 1730 Straight-tip 16 Fr. 2 days              Peripheral Intravenous Line  Duration                  Peripheral IV - Single Lumen 12/09/24 0835 No Posterior;Right Forearm 11 days         Peripheral IV - Single Lumen 12/18/24 2350 20 G Anterior;Left Forearm 1 day                  Significant Labs:   Chemistries:   Recent Labs   Lab 12/16/24  0507 12/17/24  0449 12/17/24  1741 12/17/24  2004 12/18/24  0450 12/19/24  0510 12/20/24  0605      < > 135* 136 136 137 138   K 4.3   < > 5.8* 4.1 4.6 3.4* 3.0*      < > 98 97* 97* 98 99   CO2 29   < > 22* 26 26 30 28   BUN 17.3   < > 27.6* 27.0* 33.1* 31.5* 30.8*   CREATININE 0.76   < > 0.99 0.75 0.85 0.81 0.74   CALCIUM 8.5   < > 8.4 7.7* 7.9* 7.9* 8.0*   BILITOT 0.7   < > 0.8 0.8 0.8 1.0 0.9   ALKPHOS 96   < > 115 109 111 99 97   ALT 13   < > 16 12 12 14 13   AST 26   < > 52* 27 31 33 35*   GLUCOSE 127*   < > 286* 224* 146* 137* 133*   MG 2.20  --   --   --   --  2.00  --    TROPONINI  --   --  0.016  --   --   --   --     < > = values in this interval not displayed.        CBC/Anemia Labs: Coags:     Recent Labs   Lab 12/16/24  0507 12/17/24  0449 12/18/24  0450 12/19/24  0510 12/20/24  0605   WBC 13.86*   < > 17.30* 15.79  15.79* 16.49*   HGB 9.5*   < > 9.7* 8.0* 7.7*   HCT 30.2*   < > 30.9* 25.4* 24.5*   *   < > 392 352 338   MCV 93.8   < > 95.7* 95.8* 96.1*   RDW 18.1*   < > 18.0* 18.3* 19.1*   IRON 29*  --   --   --   --    FERRITIN 98.28  --   --   --   --    [id number]  --   --   --   --     < > = values in this interval not displayed.    Recent Labs   Lab 12/18/24  0450 12/19/24  0757 12/20/24  0605   INR 6.0* 6.7* 2.4*        Significant Imaging:    EKG: None to review    Telemetry:  SR    Physical Exam  Constitutional:       Appearance: She is obese. She is ill-appearing.   HENT:      Head: Normocephalic.      Nose: Nose normal.      Mouth/Throat:      Mouth: Mucous membranes are moist.   Eyes:      Extraocular Movements: Extraocular movements intact.   Cardiovascular:      Rate and Rhythm: Normal rate and regular rhythm.      Pulses: Normal pulses.      Heart sounds: Murmur heard.   Pulmonary:      Effort: Pulmonary effort is normal.      Comments: On CPAP  Abdominal:      Palpations: Abdomen is soft.   Skin:     General: Skin is warm and dry.   Neurological:      Mental Status: She is alert and oriented to person, place, and time.   Psychiatric:         Mood and Affect: Mood normal.         Behavior: Behavior normal.         Home Medications:   Current Facility-Administered Medications on File Prior to Encounter   Medication Dose Route Frequency Provider Last Rate Last Admin    0.9%  NaCl infusion   Intravenous On Call Procedure Vijay Uribe MD        diazePAM tablet 10 mg  10 mg Oral On Call Procedure Vijay Uribe MD   10 mg at 09/06/23 1021    diphenhydrAMINE capsule 50 mg  50 mg Oral On Call Procedure Vijay Uribe MD   50 mg at 09/06/23 1021     Current Outpatient Medications on File Prior to Encounter   Medication Sig Dispense Refill    amiodarone (PACERONE) 200 MG Tab Take 200 mg  by mouth once daily.      aspirin (ECOTRIN) 81 MG EC tablet Take 1 tablet (81 mg total) by mouth once daily. for 25 days 25 tablet 0    atorvastatin (LIPITOR) 40 MG tablet Take 40 mg by mouth once daily.      clopidogreL (PLAVIX) 75 mg tablet Take 1 tablet (75 mg total) by mouth once daily. 30 tablet 0    DUPIXENT SYRINGE 300 mg/2 mL Syrg Inject 300 mg into the skin every 14 (fourteen) days.      estradioL (ESTRACE) 0.01 % (0.1 mg/gram) vaginal cream Place 0.5 g vaginally twice a week.      famotidine (PEPCID) 40 MG tablet Take 40 mg by mouth every evening.      fluticasone-umeclidin-vilanter (TRELEGY ELLIPTA) 200-62.5-25 mcg inhaler Inhale 1 puff into the lungs once daily.      furosemide (LASIX) 40 MG tablet Take 40 mg by mouth.      glimepiride (AMARYL) 2 MG tablet Take 4 mg by mouth 2 (two) times a day.      HYDROcodone-acetaminophen (NORCO)  mg per tablet Take 1 tablet by mouth every 6 (six) hours as needed for Pain. 20 tablet 0    immun glob G,IgG,/pro/IgA 0-50 (HIZENTRA SUBQ) Inject into the skin every 14 (fourteen) days.      lipase-protease-amylase 24,000-76,000-120,000 units (CREON) 24,000-76,000 -120,000 unit capsule Take 1-2 capsules by mouth 3 (three) times daily with meals. 2 caps with meals and 1 cap c snacks      metFORMIN (GLUCOPHAGE) 500 MG tablet Take 1 tablet (500 mg total) by mouth 2 (two) times daily with meals. 180 tablet 3    metoprolol succinate (TOPROL-XL) 50 MG 24 hr tablet Take 1 tablet (50 mg total) by mouth once daily. 30 tablet 0    NIFEdipine (PROCARDIA-XL) 30 MG (OSM) 24 hr tablet Take 30 mg by mouth.      pantoprazole (PROTONIX) 40 MG tablet Take 1 tablet by mouth every morning.      timoloL (BETIMOL) 0.5 % ophthalmic solution Place 1 drop into both eyes 2 (two) times daily.      warfarin (COUMADIN) 5 MG tablet Take 1 tablet (5 mg total) by mouth Daily. 30 tablet 0     Current Schedule Inpatient Medications:   amiodarone  200 mg Oral Daily    atorvastatin  40 mg Oral QHS     clopidogreL  75 mg Oral Daily    famotidine  40 mg Oral QHS    ferric gluconate (FERRLECIT) 125 mg in 0.9% NaCl 100 mL IVPB  125 mg Intravenous Daily    furosemide  40 mg Oral Daily    LIDOcaine  1 patch Transdermal Q24H    lipase-protease-amylase 24,000-76,000-120,000 units  1 capsule Oral TID WM    metoprolol succinate  25 mg Oral Daily    mupirocin   Nasal BID    NIFEdipine  30 mg Oral Daily    pantoprazole  40 mg Intravenous BID    piperacillin-tazobactam (Zosyn) IV (PEDS and ADULTS) (extended infusion is not appropriate)  4.5 g Intravenous Q8H    sodium zirconium cyclosilicate  10 g Oral Once    timolol maleate 0.5%  1 drop Both Eyes BID     Continuous Infusions:    Assessment:   Acute on Chronic Diastolic Heart Failure - Related to VHD/Severe MS     - EF 60-65% with G 1 Diastolic Dysfunction (11.14.24)   VHD    - MARYBETH (11.27.24) - Mitral Valve: There is a bioprosthetic valve in the mitral position. There is severe stenosis. The mean pressure gradient across the mitral valve is 16 mmHg at a heart rate of bpm. There is moderate regurgitation.     - ECHO (11.23.24): There is evidence of bioprosthetic mitral valve stenosis The mean pressure gradient across the mitral valve is 19 mmHg at a heart rate of 78 bpm.     - s/p MVR (9.20.23): Mitral valve replacement with a 25 mm mosaic porcine valve    - Moderate TR    - Mild MR  Acute on Chronic Respiratory Failure Requiring BiPaP      -s/p RRT for respiratory distress 12.17.24  Acute hypoxic and hypercarbic respiratory failure requiring BiPAP and IV lasix   CAD/Stents    - s/p [facility] (11.29.24) - Eccentric Prox LAD lesion was 80% stenosed with 0% stenosis post-intervention. iFR 0.84. Prox LAD lesion: A STENT SYNERGY XD 3.0X16MM stent was successfully placed at 8 GEO for 10 sec. Proximal part of the stent was post dilated by using 3.5/12 mm NC and a 4/8 mm NC balloons at 12 atmospheres.  Chronic Hypoxic Respiratory Failure Requiring Supplemental O2  Anemia  (Mild)  Leukocytosis   COPD Without Exacerbation   Pulmonary HTN  GEMINI  DM II  PAF - Currently SR    - [id number] Score 6 (9.7% Stroke Risk Per Year)    - s/p Ligation of SUKHI (9.20.23) - Endostapler   ABEL  HTN  DVT s/p IVC Filter   SSS    - s/p PPM (9.25.23): [facility]   HLD  Asthma  Left Thyroid Nodule  GERD  Chronic Sinusitis  Common Variable Immunodeficiency  Glaucoma  No Known History of GI Bleed       Plan:   H&H down trending. INR better today post Vitamin K. H&H down trending.  GI has been consulted with plans for EGD tomorrow.  Continue Plavix. Coumadin on hold s/t worsening H&H.  Would like to resume Coumadin when okay with GI. If unable to Coumadin, will need to start ASA 81 mg along with plavix s/t recent stenting.   Continue PO Lasix.   Will plan to repeat echo sometime next week to re-evaluate mitral valve (if she is still inpatient).   Continue Plavix, Statin, Amio, Metoprolol, Nifedipine  No spinal injection at this time per Neuro  Other medical management per primary team.   Will continue to follow.       YANIRA Turner  Cardiology  [facility]     I agree with the findings of the complexity of problems addressed and take responsibility for the plan's risks and complications. I approved the plan documented by Rhoda Escoto NP.

## 2024-12-20 NOTE — CONSULTS
"[facility]   Consult Note      Reason for Consult: "Drop in hemoglobin, Peer to have dark stools yesterday, did have elevated INR and is currently on Plavix due to recent stent"      HPI:  She is an 79-year-old female known to Dr. Kj Vasques with a PMH of immunodeficiency, DM2, VHD, s/p MVR, PPM status, afib, pancreatic insufficiency, HICKMAN, GERD of Dexilant, CAD, s/p PCI to LAD 11/29/2024 on Plavix.    Presents currently from rehab for low back pain and neurosurgery evaluation.  Also has developed respiratory distress and evidence of pneumonia.  Hemoglobin has been dropping over last several days, and stool black, liquid, and hemoccult positive.  Remains on Plavix at present.  Pantoprazole BID initiated today.  Of note, patient had supratherapeutic INR over last few days - Coumadin held since 12/17 and vitamin K given.  INR 2.4 today.  GI consulted for assistance with anemia and melena.     Prior endoscopies:  01/18/2023 EGD: Ring in the lower 3rd of the esophagus, s/p dilation; gastritis; path: reactive gastropathy  11/29/21 colonoscopy:  Moderate sigmoid diverticulosis, 1 AC polyp; path: inflammatory type polyp      She does report having an "upset stomach" and some epigastric discomfort recently.        ROS: Negative unless stated in HPI      Medical History:   Past Medical History:   Diagnosis Date    Anticoagulant long-term use     Asthma     Chronic sinusitis, unspecified     COPD (chronic obstructive pulmonary disease)     CVID (common variable immunodeficiency)     Diabetes mellitus, type 2     GERD (gastroesophageal reflux disease)     Hypertension     Severe mitral valve regurgitation     Sleep apnea     uses CPAP    SOB (shortness of breath)     Unspecified glaucoma     Weakness          Surgical History:   Past Surgical History:   Procedure Laterality Date    A-V CARDIAC PACEMAKER INSERTION N/A 9/25/2023    Procedure: INSERTION, CARDIAC PACEMAKER, DUAL CHAMBER;  Surgeon: " Discharge instructions reviewed with patient, patient instructed to return to ED if symptoms worsen or if any new problems arise. Patient verbalizes understanding of discharge instructions, patient ambulatory out of ED. No acute distress noted.   Arnaud Moon MD;  Location: [facility] CATH LAB;  Service: Cardiology;  Laterality: N/A;  S[facility]    ANGIOGRAM, CORONARY, WITH LEFT HEART CATHETERIZATION N/A 2024    Procedure: Angiogram, Coronary, with Left Heart Cath;  Surgeon: Tatiana Jarrett MD;  Location: [facility] CATH LAB;  Service: Cardiology;  Laterality: N/A;    APPENDECTOMY      CATARACT EXTRACTION Bilateral     CATHETERIZATION OF BOTH LEFT AND RIGHT HEART N/A 2024    Procedure: CATHETERIZATION, HEART, BOTH LEFT AND RIGHT;  Surgeon: Sebastian Watters Jr., MD;  Location: [facility] CATH LAB;  Service: Cardiology;  Laterality: N/A;  With Valve Study    COLONOSCOPY      EGD, WITH BALLOON DILATION      ESOPHAGOGASTRODUODENOSCOPY      LEFT HEART CATHETERIZATION Left 2023    Procedure: Left heart cath;  Surgeon: Vijay Urbie MD;  Location: [facility] CATH LAB;  Service: Cardiology;  Laterality: Left;  [facility]    MITRAL VALVE REPLACEMENT N/A 2023    Procedure: REPLACEMENT, MITRAL VALVE;  Surgeon: Steven Rosales IV, MD;  Location: [facility] OR;  Service: Cardiovascular;  Laterality: N/A;    TONSILLECTOMY      TOTAL KNEE ARTHROPLASTY Bilateral          Family History:  Family History   Problem Relation Name Age of Onset    Stroke Mother      Alzheimer's disease Mother      Lung cancer Father      Diabetes Father      Multiple sclerosis Sister      Diabetes Brother           Social History:  Social History     Tobacco Use    Smoking status: Former     Current packs/day: 0.00     Types: Cigarettes     Quit date:      Years since quittin.9    Smokeless tobacco: Never   Substance Use Topics    Alcohol use: Not Currently         Allergies:  Review of patient's allergies indicates:   Allergen Reactions    Adhesive tape-silicones          MEDS: Reviewed in EMR      PHYSICAL EXAM:  T 98.6 °F (37 °C)   BP (!) 136/100   P 65   RR 18   O2 (!) 94 %  GENERAL: NAD; does not appear toxic  SKIN: no rash  HEENT: sclera non-icteric; PERRL   NECK: supple; no LAD  CHEST: CTA;  "nonlabored, equal expansion; no adventitious BS  CARDIOVASCULAR: RRR, S1S2; no murmur   ABDOMEN:  active bowel sounds; abdomen soft, nondistended, + epigastric TTP, otherwise nontender to palpation   EXTREMITIES: no cyanosis or clubbing  NEURO: AAO x4   PSYCH: Mentation and affect appropriate       Labs:   Recent Labs     12/19/24  0510 12/20/24  0605   WBC 15.79  15.79* 16.49*   RBC 2.65* 2.55*   HGB 8.0* 7.7*   HCT 25.4* 24.5*   MCV 95.8* 96.1*   MCH 30.2 30.2   MCHC 31.5* 31.4*   RDW 18.3* 19.1*    338     No results for input(s): "LACTIC" in the last 72 hours.  Recent Labs     12/20/24  0605   INR 2.4*     No results for input(s): "HGBA1C", "CHOL", "TRIG", "LDL", "VLDL", "HDL" in the last 72 hours.   Recent Labs     12/19/24  0510 12/20/24  0605    138   K 3.4* 3.0*   CO2 30 28   BUN 31.5* 30.8*   CREATININE 0.81 0.74   GLUCOSE 137* 133*   CALCIUM 7.9* 8.0*   MG 2.00  --    ALBUMIN 2.3* 2.3*   GLOBULIN 3.4 3.3   ALKPHOS 99 97   ALT 14 13   AST 33 35*   BILITOT 1.0 0.9     Recent Labs     12/17/24  1741   .3*   TROPONINI 0.016             Imaging: Reviewed pertinent imaging      ASSESSMENT / PLAN:  She is an 79-year-old female known to Dr. Kj Vasques with a PMH of immunodeficiency, DM2, VHD, s/p MVR, PPM status, afib, pancreatic insufficiency, HICKMAN, GERD of Dexilant, CAD, s/p PCI to LAD 11/29/2024 on Plavix.    Presents currently from rehab for low back pain and neurosurgery evaluation.  Also has developed respiratory distress and evidence of pneumonia.  Hemoglobin has been dropping over last several days, and stool black, liquid, and hemoccult positive.  Remains on Plavix at present.  Pantoprazole BID initiated today.  Of note, patient had supratherapeutic INR over last few days - Coumadin held since 12/17 and vitamin K given.  INR 2.4 today.  GI consulted for assistance with anemia and melena.       Melena  Acute anemia  CAD, s/p PCI to LAD 11/29/2024 on Plavix  VHD, s/p MVR on Coumadin - " on hold s/t supratherapeutic INR  Pancreatic insufficiency on pancreatic enzymes  GERD on Dexilant      Patient ate today  Will make NPO at [facility] for EGD in am  Plavix unable to be held s/t recent PCI.  Noted Coumadin on hold s/t supratherapeutic INR.  S/p vitamin K  F/u INR in am  Continue PPI IV BID  Monitor labs and clinically  Transfusions per primary.  Discussed with patient, family, and nursing.        Thank you for allowing us to participate in the care of Kortney Kaylenrickey Leach.    Sabine Mo, FNP  [facility]

## 2024-12-21 ENCOUNTER — ANESTHESIA (OUTPATIENT)
Dept: SURGERY | Facility: HOSPITAL | Age: 79
End: 2024-12-21
Payer: MEDICARE

## 2024-12-21 ENCOUNTER — ANESTHESIA EVENT (OUTPATIENT)
Dept: SURGERY | Facility: HOSPITAL | Age: 79
End: 2024-12-21
Payer: MEDICARE

## 2024-12-21 LAB
ABO + RH BLD: NORMAL
ABS NEUT (OLG): 15 X10(3)/MCL (ref 2.1–9.2)
ALBUMIN SERPL-MCNC: 2.7 G/DL (ref 3.4–4.8)
ALBUMIN/GLOB SERPL: 0.9 RATIO (ref 1.1–2)
ALLENS TEST BLOOD GAS (OHS): YES
ALP SERPL-CCNC: 95 UNIT/L (ref 40–150)
ALT SERPL-CCNC: 12 UNIT/L (ref 0–55)
ANION GAP SERPL CALC-SCNC: 12 MEQ/L
ANION GAP SERPL CALC-SCNC: 12 MEQ/L
ANISOCYTOSIS BLD QL SMEAR: ABNORMAL
AST SERPL-CCNC: 35 UNIT/L (ref 5–34)
BASE EXCESS BLD CALC-SCNC: 7.1 MMOL/L
BASOPHILS NFR BLD MANUAL: 0.2 X10(3)/MCL (ref 0–0.2)
BASOPHILS NFR BLD MANUAL: 1 %
BILIRUB SERPL-MCNC: 1 MG/DL
BLD PROD TYP BPU: NORMAL
BLOOD GAS SAMPLE TYPE (OHS): ABNORMAL
BLOOD UNIT EXPIRATION DATE: NORMAL
BLOOD UNIT TYPE CODE: 600
BLOOD UNIT TYPE CODE: 600
BLOOD UNIT TYPE CODE: 6200
BUN SERPL-MCNC: 19.8 MG/DL (ref 9.8–20.1)
BUN SERPL-MCNC: 21.5 MG/DL (ref 9.8–20.1)
CA-I BLD-SCNC: 1.16 MMOL/L (ref 1.12–1.23)
CALCIUM SERPL-MCNC: 8 MG/DL (ref 8.4–10.2)
CALCIUM SERPL-MCNC: 8.7 MG/DL (ref 8.4–10.2)
CHLORIDE SERPL-SCNC: 102 MMOL/L (ref 98–107)
CHLORIDE SERPL-SCNC: 98 MMOL/L (ref 98–107)
CO2 BLDA-SCNC: 32.6 MMOL/L
CO2 SERPL-SCNC: 26 MMOL/L (ref 23–31)
CO2 SERPL-SCNC: 30 MMOL/L (ref 23–31)
COLOR STL: ABNORMAL
COLOR STL: ABNORMAL
CONSISTENCY STL: ABNORMAL
CONSISTENCY STL: ABNORMAL
CREAT SERPL-MCNC: 0.78 MG/DL (ref 0.55–1.02)
CREAT SERPL-MCNC: 0.82 MG/DL (ref 0.55–1.02)
CREAT/UREA NIT SERPL: 24
CREAT/UREA NIT SERPL: 28
CROSSMATCH INTERPRETATION: NORMAL
DISPENSE STATUS: NORMAL
DRAWN BY BLOOD GAS (OHS): ABNORMAL
EOSINOPHIL NFR BLD MANUAL: 0.2 X10(3)/MCL (ref 0–0.9)
EOSINOPHIL NFR BLD MANUAL: 1 %
ERYTHROCYTE [DISTWIDTH] IN BLOOD BY AUTOMATED COUNT: 19.8 % (ref 11.5–17)
ERYTHROCYTE [DISTWIDTH] IN BLOOD BY AUTOMATED COUNT: 19.8 % (ref 11.5–17)
FLOW (OHS): 50 LPM
GFR SERPLBLD CREATININE-BSD FMLA CKD-EPI: >60 ML/MIN/1.73/M2
GFR SERPLBLD CREATININE-BSD FMLA CKD-EPI: >60 ML/MIN/1.73/M2
GLOBULIN SER-MCNC: 3 GM/DL (ref 2.4–3.5)
GLUCOSE SERPL-MCNC: 126 MG/DL (ref 82–115)
GLUCOSE SERPL-MCNC: 132 MG/DL (ref 82–115)
GROUP & RH: NORMAL
HCO3 BLDA-SCNC: 31.3 MMOL/L (ref 22–26)
HCT VFR BLD AUTO: 23.2 % (ref 37–47)
HCT VFR BLD AUTO: 25.8 % (ref 37–47)
HCT VFR BLD AUTO: 30.8 % (ref 37–47)
HEMOCCULT SP2 STL QL: POSITIVE
HEMOCCULT SP3 STL QL: POSITIVE
HGB BLD-MCNC: 7 G/DL (ref 12–16)
HGB BLD-MCNC: 8 G/DL (ref 12–16)
HGB BLD-MCNC: 9.8 G/DL (ref 12–16)
INDIRECT COOMBS: NORMAL
INHALED O2 CONCENTRATION: 45 %
INR PPP: 1.6
INSTRUMENT WBC (OLG): 19.74 X10(3)/MCL
LYMPHOCYTES NFR BLD MANUAL: 16 %
LYMPHOCYTES NFR BLD MANUAL: 3.16 X10(3)/MCL
MACROCYTES BLD QL SMEAR: ABNORMAL
MAGNESIUM SERPL-MCNC: 2.1 MG/DL (ref 1.6–2.6)
MCH RBC QN AUTO: 29.6 PG (ref 27–31)
MCH RBC QN AUTO: 29.7 PG (ref 27–31)
MCHC RBC AUTO-ENTMCNC: 30.2 G/DL (ref 33–36)
MCHC RBC AUTO-ENTMCNC: 31 G/DL (ref 33–36)
MCV RBC AUTO: 95.6 FL (ref 80–94)
MCV RBC AUTO: 98.3 FL (ref 80–94)
MECH RR (OHS): 20 B/MIN
MODE (OHS): AC
MONOCYTES NFR BLD MANUAL: 0.79 X10(3)/MCL (ref 0.1–1.3)
MONOCYTES NFR BLD MANUAL: 4 %
MYELOCYTES NFR BLD MANUAL: 2 %
NEUTROPHILS NFR BLD MANUAL: 76 %
NRBC BLD AUTO-RTO: 11.4 %
NRBC BLD AUTO-RTO: 13.5 %
NRBC BLD MANUAL-RTO: 19 %
OHS QRS DURATION: 150 MS
OHS QTC CALCULATION: 637 MS
OXYGEN DEVICE BLOOD GAS (OHS): ABNORMAL
PCO2 BLDA: 42 MMHG (ref 35–45)
PEEP RESPIRATORY: 5 CMH2O
PH BLDA: 7.48 [PH] (ref 7.35–7.45)
PLATELET # BLD AUTO: 301 X10(3)/MCL (ref 130–400)
PLATELET # BLD AUTO: 347 X10(3)/MCL (ref 130–400)
PLATELET # BLD EST: NORMAL 10*3/UL
PMV BLD AUTO: 9.2 FL (ref 7.4–10.4)
PMV BLD AUTO: 9.5 FL (ref 7.4–10.4)
PO2 BLDA: 82 MMHG (ref 80–100)
POCT GLUCOSE: 110 MG/DL (ref 70–110)
POCT GLUCOSE: 122 MG/DL (ref 70–110)
POCT GLUCOSE: 160 MG/DL (ref 70–110)
POIKILOCYTOSIS BLD QL SMEAR: ABNORMAL
POTASSIUM BLOOD GAS (OHS): 3 MMOL/L (ref 3.5–5)
POTASSIUM SERPL-SCNC: 3.3 MMOL/L (ref 3.5–5.1)
POTASSIUM SERPL-SCNC: 3.4 MMOL/L (ref 3.5–5.1)
PROMYELOCYTES # BLD MANUAL: 1 %
PROT SERPL-MCNC: 5.7 GM/DL (ref 5.8–7.6)
PROTHROMBIN TIME: 19.6 SECONDS (ref 12.5–14.5)
RBC # BLD AUTO: 2.36 X10(6)/MCL (ref 4.2–5.4)
RBC # BLD AUTO: 2.7 X10(6)/MCL (ref 4.2–5.4)
RBC MORPH BLD: ABNORMAL
SAMPLE SITE BLOOD GAS (OHS): ABNORMAL
SAO2 % BLDA: 97 %
SODIUM BLOOD GAS (OHS): 137 MMOL/L (ref 137–145)
SODIUM SERPL-SCNC: 140 MMOL/L (ref 136–145)
SODIUM SERPL-SCNC: 140 MMOL/L (ref 136–145)
SPECIMEN OUTDATE: NORMAL
SPONT+MECH VT ON VENT: 470 ML
TARGETS BLD QL SMEAR: ABNORMAL
UNIT NUMBER: NORMAL
WBC # BLD AUTO: 19.28 X10(3)/MCL (ref 4.5–11.5)
WBC # BLD AUTO: 19.74 X10(3)/MCL (ref 4.5–11.5)

## 2024-12-21 PROCEDURE — 36415 COLL VENOUS BLD VENIPUNCTURE: CPT | Performed by: INTERNAL MEDICINE

## 2024-12-21 PROCEDURE — 43255 EGD CONTROL BLEEDING ANY: CPT | Performed by: INTERNAL MEDICINE

## 2024-12-21 PROCEDURE — 63600175 PHARM REV CODE 636 W HCPCS

## 2024-12-21 PROCEDURE — 25000003 PHARM REV CODE 250: Performed by: STUDENT IN AN ORGANIZED HEALTH CARE EDUCATION/TRAINING PROGRAM

## 2024-12-21 PROCEDURE — P9040 RBC LEUKOREDUCED IRRADIATED: HCPCS

## 2024-12-21 PROCEDURE — 99900031 HC PATIENT EDUCATION (STAT)

## 2024-12-21 PROCEDURE — 37000008 HC ANESTHESIA 1ST 15 MINUTES: Performed by: INTERNAL MEDICINE

## 2024-12-21 PROCEDURE — 25000003 PHARM REV CODE 250

## 2024-12-21 PROCEDURE — 99900035 HC TECH TIME PER 15 MIN (STAT)

## 2024-12-21 PROCEDURE — 5A1945Z RESPIRATORY VENTILATION, 24-96 CONSECUTIVE HOURS: ICD-10-PCS | Performed by: INTERNAL MEDICINE

## 2024-12-21 PROCEDURE — 93010 ELECTROCARDIOGRAM REPORT: CPT | Mod: ,,, | Performed by: INTERNAL MEDICINE

## 2024-12-21 PROCEDURE — 80053 COMPREHEN METABOLIC PANEL: CPT | Performed by: INTERNAL MEDICINE

## 2024-12-21 PROCEDURE — 36600 WITHDRAWAL OF ARTERIAL BLOOD: CPT

## 2024-12-21 PROCEDURE — 99900026 HC AIRWAY MAINTENANCE (STAT)

## 2024-12-21 PROCEDURE — P9040 RBC LEUKOREDUCED IRRADIATED: HCPCS | Performed by: STUDENT IN AN ORGANIZED HEALTH CARE EDUCATION/TRAINING PROGRAM

## 2024-12-21 PROCEDURE — 93005 ELECTROCARDIOGRAM TRACING: CPT

## 2024-12-21 PROCEDURE — 36415 COLL VENOUS BLD VENIPUNCTURE: CPT | Performed by: STUDENT IN AN ORGANIZED HEALTH CARE EDUCATION/TRAINING PROGRAM

## 2024-12-21 PROCEDURE — 63600175 PHARM REV CODE 636 W HCPCS: Performed by: STUDENT IN AN ORGANIZED HEALTH CARE EDUCATION/TRAINING PROGRAM

## 2024-12-21 PROCEDURE — 30233K1 TRANSFUSION OF NONAUTOLOGOUS FROZEN PLASMA INTO PERIPHERAL VEIN, PERCUTANEOUS APPROACH: ICD-10-PCS | Performed by: INTERNAL MEDICINE

## 2024-12-21 PROCEDURE — 85610 PROTHROMBIN TIME: CPT | Performed by: STUDENT IN AN ORGANIZED HEALTH CARE EDUCATION/TRAINING PROGRAM

## 2024-12-21 PROCEDURE — 85027 COMPLETE CBC AUTOMATED: CPT | Performed by: STUDENT IN AN ORGANIZED HEALTH CARE EDUCATION/TRAINING PROGRAM

## 2024-12-21 PROCEDURE — 94002 VENT MGMT INPAT INIT DAY: CPT

## 2024-12-21 PROCEDURE — 82272 OCCULT BLD FECES 1-3 TESTS: CPT | Performed by: STUDENT IN AN ORGANIZED HEALTH CARE EDUCATION/TRAINING PROGRAM

## 2024-12-21 PROCEDURE — 27200966 HC CLOSED SUCTION SYSTEM

## 2024-12-21 PROCEDURE — 0W3P8ZZ CONTROL BLEEDING IN GASTROINTESTINAL TRACT, VIA NATURAL OR ARTIFICIAL OPENING ENDOSCOPIC: ICD-10-PCS | Performed by: INTERNAL MEDICINE

## 2024-12-21 PROCEDURE — 37000009 HC ANESTHESIA EA ADD 15 MINS: Performed by: INTERNAL MEDICINE

## 2024-12-21 PROCEDURE — 36430 TRANSFUSION BLD/BLD COMPNT: CPT

## 2024-12-21 PROCEDURE — P9017 PLASMA 1 DONOR FRZ W/IN 8 HR: HCPCS | Performed by: INTERNAL MEDICINE

## 2024-12-21 PROCEDURE — 30233N1 TRANSFUSION OF NONAUTOLOGOUS RED BLOOD CELLS INTO PERIPHERAL VEIN, PERCUTANEOUS APPROACH: ICD-10-PCS | Performed by: INTERNAL MEDICINE

## 2024-12-21 PROCEDURE — 0BH17EZ INSERTION OF ENDOTRACHEAL AIRWAY INTO TRACHEA, VIA NATURAL OR ARTIFICIAL OPENING: ICD-10-PCS | Performed by: INTERNAL MEDICINE

## 2024-12-21 PROCEDURE — 94760 N-INVAS EAR/PLS OXIMETRY 1: CPT

## 2024-12-21 PROCEDURE — 27201423 OPTIME MED/SURG SUP & DEVICES STERILE SUPPLY: Performed by: INTERNAL MEDICINE

## 2024-12-21 PROCEDURE — 63600175 PHARM REV CODE 636 W HCPCS: Performed by: INTERNAL MEDICINE

## 2024-12-21 PROCEDURE — 82803 BLOOD GASES ANY COMBINATION: CPT

## 2024-12-21 PROCEDURE — 86923 COMPATIBILITY TEST ELECTRIC: CPT | Mod: 91 | Performed by: STUDENT IN AN ORGANIZED HEALTH CARE EDUCATION/TRAINING PROGRAM

## 2024-12-21 PROCEDURE — P9047 ALBUMIN (HUMAN), 25%, 50ML: HCPCS | Mod: JZ,JG

## 2024-12-21 PROCEDURE — 86923 COMPATIBILITY TEST ELECTRIC: CPT

## 2024-12-21 PROCEDURE — 83735 ASSAY OF MAGNESIUM: CPT

## 2024-12-21 PROCEDURE — 86900 BLOOD TYPING SEROLOGIC ABO: CPT | Performed by: STUDENT IN AN ORGANIZED HEALTH CARE EDUCATION/TRAINING PROGRAM

## 2024-12-21 PROCEDURE — 20000000 HC ICU ROOM

## 2024-12-21 PROCEDURE — 85025 COMPLETE CBC W/AUTO DIFF WBC: CPT | Performed by: INTERNAL MEDICINE

## 2024-12-21 PROCEDURE — 85018 HEMOGLOBIN: CPT

## 2024-12-21 PROCEDURE — 27100171 HC OXYGEN HIGH FLOW UP TO 24 HOURS

## 2024-12-21 PROCEDURE — 80048 BASIC METABOLIC PNL TOTAL CA: CPT | Performed by: STUDENT IN AN ORGANIZED HEALTH CARE EDUCATION/TRAINING PROGRAM

## 2024-12-21 RX ORDER — ROCURONIUM BROMIDE 10 MG/ML
INJECTION, SOLUTION INTRAVENOUS
Status: DISCONTINUED | OUTPATIENT
Start: 2024-12-21 | End: 2024-12-21

## 2024-12-21 RX ORDER — CALCIUM CHLORIDE INJECTION 100 MG/ML
INJECTION, SOLUTION INTRAVENOUS
Status: DISCONTINUED | OUTPATIENT
Start: 2024-12-21 | End: 2024-12-21

## 2024-12-21 RX ORDER — HYDROCODONE BITARTRATE AND ACETAMINOPHEN 500; 5 MG/1; MG/1
TABLET ORAL
Status: DISCONTINUED | OUTPATIENT
Start: 2024-12-21 | End: 2024-12-27

## 2024-12-21 RX ORDER — IPRATROPIUM BROMIDE AND ALBUTEROL SULFATE 2.5; .5 MG/3ML; MG/3ML
3 SOLUTION RESPIRATORY (INHALATION)
Status: CANCELLED | OUTPATIENT
Start: 2024-12-21

## 2024-12-21 RX ORDER — MEPERIDINE HYDROCHLORIDE 25 MG/ML
12.5 INJECTION INTRAMUSCULAR; INTRAVENOUS; SUBCUTANEOUS EVERY 10 MIN PRN
Status: CANCELLED | OUTPATIENT
Start: 2024-12-21 | End: 2024-12-22

## 2024-12-21 RX ORDER — ALBUMIN HUMAN 250 G/1000ML
SOLUTION INTRAVENOUS
Status: DISCONTINUED | OUTPATIENT
Start: 2024-12-21 | End: 2024-12-21

## 2024-12-21 RX ORDER — POTASSIUM CHLORIDE 14.9 MG/ML
20 INJECTION INTRAVENOUS
Status: COMPLETED | OUTPATIENT
Start: 2024-12-21 | End: 2024-12-21

## 2024-12-21 RX ORDER — LIDOCAINE HYDROCHLORIDE 20 MG/ML
INJECTION INTRAVENOUS
Status: DISCONTINUED | OUTPATIENT
Start: 2024-12-21 | End: 2024-12-21

## 2024-12-21 RX ORDER — ONDANSETRON 4 MG/1
8 TABLET, ORALLY DISINTEGRATING ORAL EVERY 6 HOURS PRN
Status: DISCONTINUED | OUTPATIENT
Start: 2024-12-21 | End: 2024-12-29 | Stop reason: HOSPADM

## 2024-12-21 RX ORDER — HYDROCODONE BITARTRATE AND ACETAMINOPHEN 500; 5 MG/1; MG/1
TABLET ORAL
Status: DISCONTINUED | OUTPATIENT
Start: 2024-12-21 | End: 2024-12-26

## 2024-12-21 RX ORDER — SODIUM CHLORIDE, SODIUM GLUCONATE, SODIUM ACETATE, POTASSIUM CHLORIDE AND MAGNESIUM CHLORIDE 30; 37; 368; 526; 502 MG/100ML; MG/100ML; MG/100ML; MG/100ML; MG/100ML
INJECTION, SOLUTION INTRAVENOUS CONTINUOUS
Status: DISCONTINUED | OUTPATIENT
Start: 2024-12-21 | End: 2024-12-26

## 2024-12-21 RX ORDER — PROCHLORPERAZINE EDISYLATE 5 MG/ML
5 INJECTION INTRAMUSCULAR; INTRAVENOUS EVERY 30 MIN PRN
Status: CANCELLED | OUTPATIENT
Start: 2024-12-21

## 2024-12-21 RX ORDER — DEXTROMETHORPHAN/PSEUDOEPHED 2.5-7.5/.8
DROPS ORAL
Status: DISPENSED
Start: 2024-12-21 | End: 2024-12-21

## 2024-12-21 RX ORDER — LIDOCAINE HYDROCHLORIDE 10 MG/ML
1 INJECTION, SOLUTION EPIDURAL; INFILTRATION; INTRACAUDAL; PERINEURAL ONCE
Status: DISCONTINUED | OUTPATIENT
Start: 2024-12-21 | End: 2024-12-27

## 2024-12-21 RX ORDER — GLUCAGON 1 MG
1 KIT INJECTION
Status: CANCELLED | OUTPATIENT
Start: 2024-12-21

## 2024-12-21 RX ORDER — NOREPINEPHRINE BITARTRATE/D5W 8 MG/250ML
PLASTIC BAG, INJECTION (ML) INTRAVENOUS
Status: DISPENSED
Start: 2024-12-21 | End: 2024-12-22

## 2024-12-21 RX ORDER — PROPOFOL 10 MG/ML
INJECTION, EMULSION INTRAVENOUS
Status: COMPLETED
Start: 2024-12-21 | End: 2024-12-22

## 2024-12-21 RX ORDER — SUCCINYLCHOLINE CHLORIDE 20 MG/ML
INJECTION INTRAMUSCULAR; INTRAVENOUS
Status: DISCONTINUED | OUTPATIENT
Start: 2024-12-21 | End: 2024-12-21

## 2024-12-21 RX ORDER — PHENYLEPHRINE HCL IN 0.9% NACL 1 MG/10 ML
SYRINGE (ML) INTRAVENOUS
Status: DISCONTINUED | OUTPATIENT
Start: 2024-12-21 | End: 2024-12-21

## 2024-12-21 RX ORDER — ONDANSETRON HYDROCHLORIDE 2 MG/ML
4 INJECTION, SOLUTION INTRAVENOUS DAILY PRN
Status: CANCELLED | OUTPATIENT
Start: 2024-12-21

## 2024-12-21 RX ORDER — PROPOFOL 10 MG/ML
0-50 INJECTION, EMULSION INTRAVENOUS CONTINUOUS
Status: DISCONTINUED | OUTPATIENT
Start: 2024-12-21 | End: 2024-12-24

## 2024-12-21 RX ORDER — PROPOFOL 10 MG/ML
VIAL (ML) INTRAVENOUS
Status: DISCONTINUED | OUTPATIENT
Start: 2024-12-21 | End: 2024-12-21

## 2024-12-21 RX ORDER — MIDAZOLAM HYDROCHLORIDE 1 MG/ML
INJECTION INTRAMUSCULAR; INTRAVENOUS
Status: DISCONTINUED | OUTPATIENT
Start: 2024-12-21 | End: 2024-12-21

## 2024-12-21 RX ADMIN — PROPOFOL 40 MG: 10 INJECTION, EMULSION INTRAVENOUS at 08:12

## 2024-12-21 RX ADMIN — MUPIROCIN: 20 OINTMENT TOPICAL at 09:12

## 2024-12-21 RX ADMIN — LIDOCAINE HYDROCHLORIDE 80 MG: 20 INJECTION INTRAVENOUS at 08:12

## 2024-12-21 RX ADMIN — POTASSIUM CHLORIDE 20 MEQ: 14.9 INJECTION, SOLUTION INTRAVENOUS at 01:12

## 2024-12-21 RX ADMIN — Medication 100 MCG: at 10:12

## 2024-12-21 RX ADMIN — PROPOFOL 100 MCG/KG/MIN: 10 INJECTION, EMULSION INTRAVENOUS at 08:12

## 2024-12-21 RX ADMIN — PROPOFOL 25 MCG/KG/MIN: 10 INJECTION, EMULSION INTRAVENOUS at 03:12

## 2024-12-21 RX ADMIN — Medication 100 MCG: at 09:12

## 2024-12-21 RX ADMIN — LIDOCAINE 5% 1 PATCH: 700 PATCH TOPICAL at 03:12

## 2024-12-21 RX ADMIN — ALBUMIN (HUMAN) 100 ML: 12.5 SOLUTION INTRAVENOUS at 09:12

## 2024-12-21 RX ADMIN — SODIUM CHLORIDE, POTASSIUM CHLORIDE, SODIUM LACTATE AND CALCIUM CHLORIDE 1000 ML: 600; 310; 30; 20 INJECTION, SOLUTION INTRAVENOUS at 03:12

## 2024-12-21 RX ADMIN — PROPOFOL 1000 MG: 10 INJECTION, EMULSION INTRAVENOUS at 10:12

## 2024-12-21 RX ADMIN — CALCIUM CHLORIDE INJECTION 0.5 G: 100 INJECTION, SOLUTION INTRAVENOUS at 09:12

## 2024-12-21 RX ADMIN — SODIUM CHLORIDE: 9 INJECTION, SOLUTION INTRAVENOUS at 11:12

## 2024-12-21 RX ADMIN — SODIUM CHLORIDE: 9 INJECTION, SOLUTION INTRAVENOUS at 08:12

## 2024-12-21 RX ADMIN — PANTOPRAZOLE SODIUM 40 MG: 40 INJECTION, POWDER, FOR SOLUTION INTRAVENOUS at 09:12

## 2024-12-21 RX ADMIN — SUCCINYLCHOLINE CHLORIDE 200 MG: 20 INJECTION, SOLUTION INTRAMUSCULAR; INTRAVENOUS at 09:12

## 2024-12-21 RX ADMIN — POTASSIUM CHLORIDE 20 MEQ: 14.9 INJECTION, SOLUTION INTRAVENOUS at 03:12

## 2024-12-21 RX ADMIN — PROPOFOL 30 MCG/KG/MIN: 10 INJECTION, EMULSION INTRAVENOUS at 09:12

## 2024-12-21 RX ADMIN — MIDAZOLAM HYDROCHLORIDE 2 MG: 1 INJECTION, SOLUTION INTRAMUSCULAR; INTRAVENOUS at 10:12

## 2024-12-21 RX ADMIN — SUGAMMADEX 200 MG: 100 INJECTION, SOLUTION INTRAVENOUS at 09:12

## 2024-12-21 RX ADMIN — ROCURONIUM BROMIDE 50 MG: 10 SOLUTION INTRAVENOUS at 09:12

## 2024-12-21 NOTE — PROGRESS NOTES
"  [facility]    Cardiology  Progress Note    Patient Name: Kortney Leach  MRN: [id number]  Admission Date: 12/15/2024  Hospital Length of Stay: 6 days  Code Status: Full Code   Attending Provider: Julián Monique MD   Consulting Provider: YANIRA Turner  Primary Care Physician: Arnaldo Hernandez MD  Principal Problem:<principal problem not specified>    Patient information was obtained from patient, past medical records, and ER records.     Subjective:     Reason for Consult: Severe Mitral Stenosis - Known patient on Coumadin     HPI: Ms. Leach is an 79 year old female who is known to [facility], Dr. Uribe & Dr. Moon. She presents to the ER from rehab with complaints of lower back pain/spasms. She endorses worsening but denies CP or palps. She reports not being able to walk due to the back pain & constipation. Of note, she was recently discharged from [facility] on 12.3.24 for acute CHF exacerbation. She underwent a MARYBETH that revealed Severe MS & Mod MR and underwent a stent to the prox LAD. She was seen by CV surgery & structural heart with plans to start Coumadin to see if the gradient can be decreased. Plan is for repeat TTE in a few weeks to re-evaluate. Significant labs include WBC 13.86, Plt 447. A CXR was obtained and demonstrated increased density on the right with small right-sided pleural effusion cannot rule out infiltrate. This appears worse than on previous. He was admitted to hospital medicine with a consult to neurosurgery. [facility] has been consulted due to the patient's known severe MS.     12.17.24: NAD. Improvement in SOB. Inaccurate I&O's and daily weights. INR 4.7 today. On 3 L NC. "I am okay." SR on tele. BP stable. Had a BM.   12.18.24: Resting in bed in NAD. ON Bipap.  Lethargic.  RRT called last night for respiratory distress.  INR 6.0.  Coumadin on hold.  12.19.24: NAD More alert today on CPAP. Reports feeling better. INR 6.7. BP soft but stable. SR on tele.   12.20.24: " NAD. Denies CP or palps. SOB improving. Resting comfortably on CPAP. H&H down trending. INR 2.4. BP stable. + diarrhea  12.21.24: Seen post EGD. Underwent EGD this morning and was found to have oozing duodenal AVMs requiring coagulation. She was intubated for airway protection in the setting of oropharyngeal oozing near her vocal cords. Now in ICU. H&H down trending. WBC worsening. INR 1.6.     PMH: VHD, GEMINI, DM II, PAF, ABEL, COPD, HTN, DVT SSS, HLD, Asthma, Left Thyroid Nodule, GERD, Chronic Sinusitis, Common Variable Immunodeficiency, Glaucoma  PSH: PPM, LHC, MVR, Bilateral Cataract Extraction, Total Knee Replacement, Bilateral mastectomy, Appendectomy, Colonoscopy, EGD, Tonsillectomy,    Family History: Father - Lung Cancer; Mother - Alzheimer, CVA; Sister - HTN, Multiple Sclerosis, DM II  Social History: Former Smoker (Quit in 1998). Denies illicit drug use and alcohol use.      Previous Cardiac Diagnostics:   [facility] 11.29.24:  Eccentric Prox LAD lesion was 80% stenosed with 0% stenosis post-intervention. iFR 0.84  Prox LAD lesion: A STENT SYNERGY XD [id number] stent was successfully placed at 8 GEO for 10 sec. Proximal part of the stent was post dilated by using 3.5/12 mm NC and a 4/8 mm NC balloons at 12 atmospheres.     [facility] 11.27.24:  Findings:  - There is single vessel coronary artery disease.  - Mid Left Anterior Descending has a calcified 70% stenosis.  - LVEDP is normal at 6 mmHg.  - RHC with elevated R sided filling pressures. RA 7 mmHg, RV 50/7 mmHg, PA 47/26 mmHg (mean 33 mmHg), PCWP 24 mmHg.  - PCWP to LV diastolic mean gradient was calculated to be 22 mmHg.  - Transpulmonary gradient is 8 mmHg.  - Normal Cardiac Output/Index at 4.7/2.3, as calculated by Rahel equation.  - PVR is 1.7 Woods units  - SVR is 1260 dyne-sec*cm^-5  - Pulmonary Artery Pulsatility Index (Nury) is 3.0  - Cardiac Power Output () is 0.84  Assessment/Plan:  - Patient is a 79 y.o. female with a history of prior bioprosthetic mitral  valve replacement now presents with heart failure.  Transesophageal echocardiogram was obtained.  Final report is pending at this time, but there were findings consistent with severe bioprosthetic MVR stenosis.  Cardiac catheterization findings shown above do suggest that there is a gradient of around 22 mm Hg across the bioprosthetic valve.  -recommend consultation with CT surgery   -obtain CT with mitral valve in valve protocol for possible TMVR  -LAD should be revascularized as well     MARYBETH (11.27.24):  Left Ventricle: The left ventricle is normal in size. Septal motion is consistent with post-operative status. There is normal systolic function with a visually estimated ejection fraction of 60 - 65%.  Right Ventricle: Normal right ventricular cavity size. Systolic function is normal.  Left Atrium: Left atrium is severely dilated.  Mitral Valve: There is a bioprosthetic valve in the mitral position. There is severe stenosis. The mean pressure gradient across the mitral valve is 16 mmHg at a heart rate of  bpm. There is moderate regurgitation.     ECHO (11.23.24):  Left Ventricle: The left ventricle is normal in size. Mildly increased wall thickness. There is normal systolic function with a visually estimated ejection fraction of 60 - 65%. Grade I diastolic dysfunction. Right Ventricle: Normal right ventricular cavity size. Systolic function is borderline low. Aortic Valve: There is aortic valve sclerosis. Mitral Valve: There is a bioprosthetic valve in the mitral position (25 mm mosaic porcine valve).  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 There is mild (1+) regurgitation. Tricuspid Valve: There moderate (2+) regurgitation. The estimated pulmonary artery systolic pressure is 57 mmHg. Pacemaker lead noted.  Recommend transesophageal echocardiogram to properly evaluate the bioprosthetic mitral valve.     ECHO  (11.14.24):  The study quality is average. The left ventricle is normal in size. Global left ventricular systolic function is normal. The left ventricular ejection fraction is 65%. Left ventricular diastolic function is normal. Mild concentric left ventricular hypertrophy is present. The left atrium is moderately enlarged. (4.6 cms). A normal functioning prosthetic mitral valve is noted. MG = 4.3 mmHg. The pulmonary artery systolic pressure is 37 mmHg.      PPM Insertion (9.25.23):  Successful implantation of PPM Dual. [facility]      PET (1.24.23):  This is a normal perfusion study, no perfusion defects noted. There is no evidence of ischemia.This scan is suggestive of low risk for future cardiovascular events. The left ventricular cavity is noted to be normal on the stress studies. The stress left ventricular ejection fraction was calculated to be 68% and left ventricular global function is normal. The rest left ventricular cavity is noted to be normal. The rest left ventricular ejection fraction was calculated to be 63% and rest left ventricular global function is normal. When compared to the resting ejection fraction (63%), the stress ejection fraction (68%) has increased. Transient ischemic dilatation is present and has been described as a marker for high risk coronary artery disease. It has also been described in microvascular disease, hypertensive heart disease as well as cardiac deconditioning. The study quality is good.   There was a rise in myocardial blood flow between rest and stress.  Global myocardial blood flow reserve was 2.72.  Normal global coronary flow reserve is suggestive of low coronary event risk.     Carotid US (1.24.23):  The study quality is good. There is no plaque noted in the proximal right internal carotid artery. 1-39% stenosis in the proximal left internal carotid artery based on Bluth Criteria. Antegrade right vertebral artery flow. Antegrade left vertebral artery flow.     [facility]  (9.6.23):  Normal Coronaries       Review of patient's allergies indicates:   Allergen Reactions    Adhesive tape-silicones      Redness     Current Facility-Administered Medications on File Prior to Encounter   Medication    0.9%  NaCl infusion    diazePAM tablet 10 mg    diphenhydrAMINE capsule 50 mg     Current Outpatient Medications on File Prior to Encounter   Medication Sig    amiodarone (PACERONE) 200 MG Tab Take 200 mg by mouth once daily.    aspirin (ECOTRIN) 81 MG EC tablet Take 1 tablet (81 mg total) by mouth once daily. for 25 days    atorvastatin (LIPITOR) 40 MG tablet Take 40 mg by mouth once daily.    clopidogreL (PLAVIX) 75 mg tablet Take 1 tablet (75 mg total) by mouth once daily.    DUPIXENT SYRINGE 300 mg/2 mL Syrg Inject 300 mg into the skin every 14 (fourteen) days.    estradioL (ESTRACE) 0.01 % (0.1 mg/gram) vaginal cream Place 0.5 g vaginally twice a week.    famotidine (PEPCID) 40 MG tablet Take 40 mg by mouth every evening.    fluticasone-umeclidin-vilanter (TRELEGY ELLIPTA) 200-62.5-25 mcg inhaler Inhale 1 puff into the lungs once daily.    furosemide (LASIX) 40 MG tablet Take 40 mg by mouth.    glimepiride (AMARYL) 2 MG tablet Take 4 mg by mouth 2 (two) times a day.    HYDROcodone-acetaminophen (NORCO)  mg per tablet Take 1 tablet by mouth every 6 (six) hours as needed for Pain.    immun glob G,IgG,/pro/IgA 0-50 (HIZENTRA SUBQ) Inject into the skin every 14 (fourteen) days.    lipase-protease-amylase 24,000-76,000-120,000 units (CREON) 24,000-76,000 -120,000 unit capsule Take 1-2 capsules by mouth 3 (three) times daily with meals. 2 caps with meals and 1 cap c snacks    metFORMIN (GLUCOPHAGE) 500 MG tablet Take 1 tablet (500 mg total) by mouth 2 (two) times daily with meals.    metoprolol succinate (TOPROL-XL) 50 MG 24 hr tablet Take 1 tablet (50 mg total) by mouth once daily.    NIFEdipine (PROCARDIA-XL) 30 MG (OSM) 24 hr tablet Take 30 mg by mouth.    pantoprazole (PROTONIX) 40 MG  tablet Take 1 tablet by mouth every morning.    timoloL (BETIMOL) 0.5 % ophthalmic solution Place 1 drop into both eyes 2 (two) times daily.    warfarin (COUMADIN) 5 MG tablet Take 1 tablet (5 mg total) by mouth Daily.       Review of Systems   Unable to perform ROS: Intubated     Objective:     Vital Signs (Most Recent):  Temp: 98.4 °F (36.9 °C) (12/21/24 0836)  Pulse: 79 (12/21/24 0836)  Resp: 20 (12/21/24 0836)  BP: 114/69 (12/21/24 0836)  SpO2: 97 % (12/21/24 1044) Vital Signs (24h Range):  Temp:  [97.5 °F (36.4 °C)-98.6 °F (37 °C)] 98.4 °F (36.9 °C)  Pulse:  [65-79] 79  Resp:  [17-20] 20  SpO2:  [94 %-97 %] 97 %  BP: (114-136)/() 114/69   Weight: 98.9 kg (218 lb)  Body mass index is 35.2 kg/m².  SpO2: 97 %       Intake/Output Summary (Last 24 hours) at 12/21/2024 1058  Last data filed at 12/21/2024 1031  Gross per 24 hour   Intake 600 ml   Output 500 ml   Net 100 ml     Lines/Drains/Airways       Drain  Duration                  Urethral Catheter 12/17/24 1730 Straight-tip 16 Fr. 3 days              Airway  Duration                  Airway - Non-Surgical 12/21/24 0914 <1 day              Peripheral Intravenous Line  Duration                  Peripheral IV - Single Lumen 12/09/24 0835 No Posterior;Right Forearm 12 days         Peripheral IV - Single Lumen 12/18/24 2350 20 G Anterior;Left Forearm 2 days         Peripheral IV - Single Lumen 12/21/24 0932 20 G Anterior;Right Forearm <1 day                  Significant Labs:   Chemistries:   Recent Labs   Lab 12/16/24  0507 12/17/24  0449 12/17/24  1741 12/17/24  2004 12/18/24  0450 12/19/24  0510 12/20/24  0605 12/21/24  0513      < > 135* 136 136 137 138 140   K 4.3   < > 5.8* 4.1 4.6 3.4* 3.0* 3.4*      < > 98 97* 97* 98 99 98   CO2 29   < > 22* 26 26 30 28 30   BUN 17.3   < > 27.6* 27.0* 33.1* 31.5* 30.8* 21.5*   CREATININE 0.76   < > 0.99 0.75 0.85 0.81 0.74 0.78   CALCIUM 8.5   < > 8.4 7.7* 7.9* 7.9* 8.0* 8.0*   BILITOT 0.7   < > 0.8 0.8 0.8  1.0 0.9  --    ALKPHOS 96   < > 115 109 111 99 97  --    ALT 13   < > 16 12 12 14 13  --    AST 26   < > 52* 27 31 33 35*  --    GLUCOSE 127*   < > 286* 224* 146* 137* 133* 132*   MG 2.20  --   --   --   --  2.00  --   --    TROPONINI  --   --  0.016  --   --   --   --   --     < > = values in this interval not displayed.        CBC/Anemia Labs: Coags:    Recent Labs   Lab 12/16/24  0507 12/17/24  0449 12/19/24  0510 12/20/24  0605 12/21/24  0513   WBC 13.86*   < > 15.79  15.79* 16.49* 19.28*   HGB 9.5*   < > 8.0* 7.7* 8.0*   HCT 30.2*   < > 25.4* 24.5* 25.8*   *   < > 352 338 347   MCV 93.8   < > 95.8* 96.1* 95.6*   RDW 18.1*   < > 18.3* 19.1* 19.8*   IRON 29*  --   --   --   --    FERRITIN 98.28  --   --   --   --    RTRIOZSO24 460  --   --   --   --     < > = values in this interval not displayed.    Recent Labs   Lab 12/19/24  0757 12/20/24  0605 12/21/24  0513   INR 6.7* 2.4* 1.6*        Significant Imaging:    EKG: None to review    Telemetry:  SR    Physical Exam  Constitutional:       Appearance: She is obese. She is ill-appearing.      Comments: Vented/sedated   HENT:      Head: Normocephalic.      Nose: Nose normal.      Mouth/Throat:      Mouth: Mucous membranes are moist.   Eyes:      Extraocular Movements: Extraocular movements intact.   Cardiovascular:      Rate and Rhythm: Normal rate and regular rhythm.      Pulses: Normal pulses.      Heart sounds: Murmur heard.   Pulmonary:      Effort: Pulmonary effort is normal.      Comments: Vent Mode: A/C  Oxygen Concentration (%):  [40-60] 40  Resp Rate Total:  [20 br/min] 20 br/min  Vt Set:  [470 mL] 470 mL  PEEP/CPAP:  [5 cmH20] 5 cmH20  Mean Airway Pressure:  [12 kuL21-23 cmH20] 12 cmH20  Abdominal:      Palpations: Abdomen is soft.   Skin:     General: Skin is warm and dry.   Neurological:      Mental Status: She is alert and oriented to person, place, and time.   Psychiatric:         Mood and Affect: Mood normal.         Behavior: Behavior  normal.         Home Medications:   Current Facility-Administered Medications on File Prior to Encounter   Medication Dose Route Frequency Provider Last Rate Last Admin    0.9%  NaCl infusion   Intravenous On Call Procedure Vijay Uribe MD        diazePAM tablet 10 mg  10 mg Oral On Call Procedure Vijay Uribe MD   10 mg at 09/06/23 1021    diphenhydrAMINE capsule 50 mg  50 mg Oral On Call Procedure Vijay Uribe MD   50 mg at 09/06/23 1021     Current Outpatient Medications on File Prior to Encounter   Medication Sig Dispense Refill    amiodarone (PACERONE) 200 MG Tab Take 200 mg by mouth once daily.      aspirin (ECOTRIN) 81 MG EC tablet Take 1 tablet (81 mg total) by mouth once daily. for 25 days 25 tablet 0    atorvastatin (LIPITOR) 40 MG tablet Take 40 mg by mouth once daily.      clopidogreL (PLAVIX) 75 mg tablet Take 1 tablet (75 mg total) by mouth once daily. 30 tablet 0    DUPIXENT SYRINGE 300 mg/2 mL Syrg Inject 300 mg into the skin every 14 (fourteen) days.      estradioL (ESTRACE) 0.01 % (0.1 mg/gram) vaginal cream Place 0.5 g vaginally twice a week.      famotidine (PEPCID) 40 MG tablet Take 40 mg by mouth every evening.      fluticasone-umeclidin-vilanter (TRELEGY ELLIPTA) 200-62.5-25 mcg inhaler Inhale 1 puff into the lungs once daily.      furosemide (LASIX) 40 MG tablet Take 40 mg by mouth.      glimepiride (AMARYL) 2 MG tablet Take 4 mg by mouth 2 (two) times a day.      HYDROcodone-acetaminophen (NORCO)  mg per tablet Take 1 tablet by mouth every 6 (six) hours as needed for Pain. 20 tablet 0    immun glob G,IgG,/pro/IgA 0-50 (HIZENTRA SUBQ) Inject into the skin every 14 (fourteen) days.      lipase-protease-amylase 24,000-76,000-120,000 units (CREON) 24,000-76,000 -120,000 unit capsule Take 1-2 capsules by mouth 3 (three) times daily with meals. 2 caps with meals and 1 cap c snacks      metFORMIN (GLUCOPHAGE) 500 MG tablet Take 1 tablet (500 mg total) by mouth 2 (two) times daily with  meals. 180 tablet 3    metoprolol succinate (TOPROL-XL) 50 MG 24 hr tablet Take 1 tablet (50 mg total) by mouth once daily. 30 tablet 0    NIFEdipine (PROCARDIA-XL) 30 MG (OSM) 24 hr tablet Take 30 mg by mouth.      pantoprazole (PROTONIX) 40 MG tablet Take 1 tablet by mouth every morning.      timoloL (BETIMOL) 0.5 % ophthalmic solution Place 1 drop into both eyes 2 (two) times daily.      warfarin (COUMADIN) 5 MG tablet Take 1 tablet (5 mg total) by mouth Daily. 30 tablet 0     Current Schedule Inpatient Medications:   amiodarone  200 mg Oral Daily    atorvastatin  40 mg Oral QHS    clopidogreL  75 mg Oral Daily    famotidine  40 mg Oral QHS    ferric gluconate (FERRLECIT) 125 mg in 0.9% NaCl 100 mL IVPB  125 mg Intravenous Daily    furosemide  40 mg Oral Daily    LIDOcaine (PF) 10 mg/ml (1%)  1 mL Intradermal Once    LIDOcaine  1 patch Transdermal Q24H    lipase-protease-amylase 24,000-76,000-120,000 units  1 capsule Oral TID WM    metoprolol succinate  25 mg Oral Daily    mupirocin   Nasal BID    NIFEdipine  30 mg Oral Daily    pantoprazole  40 mg Intravenous BID    piperacillin-tazobactam (Zosyn) IV (PEDS and ADULTS) (extended infusion is not appropriate)  4.5 g Intravenous Q8H    simethicone        sodium zirconium cyclosilicate  10 g Oral Once    timolol maleate 0.5%  1 drop Both Eyes BID     Continuous Infusions:   electrolyte-A   Intravenous Continuous         Assessment:   Acute on Chronic Diastolic Heart Failure - Related to VHD/Severe MS - Clinically Improved     - EF 60-65% with G 1 Diastolic Dysfunction (11.14.24)   VHD    - MARBYETH (11.27.24) - Mitral Valve: There is a bioprosthetic valve in the mitral position. There is severe stenosis. The mean pressure gradient across the mitral valve is 16 mmHg at a heart rate of bpm. There is moderate regurgitation.     - ECHO (11.23.24): There is evidence of bioprosthetic mitral valve stenosis The mean pressure gradient across the mitral valve is 19 mmHg at a heart  rate of 78 bpm.     - s/p MVR (9.20.23): Mitral valve replacement with a 25 mm mosaic porcine valve    - Moderate TR    - Mild MR  Acute on Chronic Respiratory Failure Requiring - Now Intubated      -s/p RRT for respiratory distress & placed on CPAP (12.17.24)  Supratherapeutic INR - Resolved     - INR 6.7 (12.19.24)   Acute GIB    - Oozing Duodenal AVM s/p Argon Plasma Coagulation (12.21.24)   Diffuse Gastritis   CAD/Stents    - s/p LHC (11.29.24) - Eccentric Prox LAD lesion was 80% stenosed with 0% stenosis post-intervention. iFR 0.84. Prox LAD lesion: A STENT SYNERGY XD 3.0X16MM stent was successfully placed at 8 GEO for 10 sec. Proximal part of the stent was post dilated by using 3.5/12 mm NC and a 4/8 mm NC balloons at 12 atmospheres.  Anemia - Worsening   Leukocytosis - Worsening   COPD Without Exacerbation   Pulmonary HTN  GEMINI  DM II  PAF - Currently SR    - [id number] Score 6 (9.7% Stroke Risk Per Year)    - s/p Ligation of SUKHI (9.20.23) - Endostapler   ABEL  HTN  DVT s/p IVC Filter   SSS    - s/p PPM (9.25.23): [facility]   HLD  Asthma  Left Thyroid Nodule  GERD  Chronic Sinusitis  Common Variable Immunodeficiency  Glaucoma  No previous history of GIB      Plan:   Coumadin remains to be on hold. No plans to resume at this time.  Continue Plavix if okay with GI (recent stenting) and start ASA 81 mg daily when able.   Will repeat echo tomorrow to re-evaluate mitral valve.   Vent management per ICU.   Other medical management per primary team.   Will discuss MV status next week with structural heart team.   Will continue to follow.       YANIRA Turner  Cardiology  [facility]     I agree with the findings of the complexity of problems addressed and take responsibility for the plan's risks and complications. I approved the plan documented by Rhoda Escoto NP.

## 2024-12-21 NOTE — ANESTHESIA POSTPROCEDURE EVALUATION
Anesthesia Post Evaluation    Patient: Kortney Leach    Procedure(s) Performed: Procedure(s) (LRB):  EGD (N/A)    Final Anesthesia Type: general      Patient location during evaluation: ICU  Patient participation: No - Unable to Participate, Intubation  Level of consciousness: sedated  Post-procedure vital signs: reviewed and stable  Pain management: adequate  Airway patency: patent    PONV status at discharge: No PONV  Anesthetic complications: yes  Perioperative Events: unplanned ICU admission      Faviola-operative Events Comments: Patient required intraoperative intubation due to respiratory distress with further coagulation of GI AVMs required.  Upon removal of gastroscope, to allow for intubation, moderate oropharyngeal bleeding noted.  This makes patient a poor candidate for extubation with BiPAP bridge  Cardiovascular status: blood pressure returned to baseline  Respiratory status: spontaneous ventilation and room air  Hydration status: euvolemic  Follow-up not needed.          Intubated and sedated    Vitals Value Taken Time   /44 12/21/24 1201   Temp 36.9 °C (98.4 °F) 12/21/24 1030   Pulse 60 12/21/24 1202   Resp 20 12/21/24 1202   SpO2 99 % 12/21/24 1202   Vitals shown include unfiled device data.      No case tracking events are documented in the log.      Pain/Gaudencio Score: Pain Rating Prior to Med Admin: 8 (12/20/2024 11:33 PM)  Pain Rating Post Med Admin: 3 (12/21/2024 12:33 AM)

## 2024-12-21 NOTE — ANESTHESIA PROCEDURE NOTES
Intubation    Date/Time: 12/21/2024 9:14 AM    Performed by: Eden Varma CRNA  Authorized by: Vinod Torres Jr., MD    Intubation:     Induction:  Intravenous    Intubated:  Postinduction    Mask Ventilation:  Easy mask    Attempts:  1    Attempted By:  CRNA    Method of Intubation:  Video laryngoscopy    Blade:  Askew 3    Laryngeal View Grade: Grade IIA - cords partially seen      Difficult Airway Encountered?: No      Complications:  None    Airway Device:  Oral endotracheal tube    Airway Device Size:  7.5    Style/Cuff Inflation:  Cuffed    Inflation Amount (mL):  6    Tube secured:  21    Secured at:  The lips    Placement Verified By:  Capnometry    Complicating Factors:  None    Findings Post-Intubation:  BS equal bilateral and atraumatic/condition of teeth unchanged

## 2024-12-21 NOTE — PROCEDURES
Kortney Leach   MRN: [id number]   ADMISSION DATE: 12/15/2024  : 1945  AGE: 79 y.o.    DATE OF PROCEDURE:  2024     PROCEDURE:  EGD with argon plasma coagulation    SURGEON: ROBERTO MARTÍNEZ    PREOPERATIVE DIAGNOSIS:  Acute blood loss anemia, dark stools    POSTOPERATIVE DIAGNOSIS:  1.  Oropharyngeal oozing during procedure.  Patient intubated for airway protection.    2. Petechial hemorrhagic spots with diffuse gastritis, fundus and body.  No evidence of active bleed.  No biopsies obtained.    3. 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  Argon plasma coagulation was done.  Otherwise normal exam.      HISTORY AND PHYSICAL:  As per preoperative note.      DESCRIPTION OF PROCEDURE:  Informed consent was obtained.  Patient was placed in left lateral position.  Sedation per anesthesia service.  Olympus video gastroscope was introduced into the oral cavity and esophagus was intubated under direct visualization. The scope was carefully advanced to the distal duodenum.  Patient tolerated the procedure well without any complications.    FINDINGS:  Esophagus:  No evidence of esophageal varices or any inflammation or ulceration noted in the esophagus.  GE junction at approximately 45 cm.  Stomach: Patient was noted to have diffuse petechial hemorrhagic spots along with diffuse gastritis.  There was no evidence of active bleeding noted in the fundus, body and antrum.  No definite ulceration was noted.    Duodenum: Duodenal bulb was unremarkable.  There was evidence of active oozing noted at the junction around 2nd and 3rd portion of the duodenum.  Gastroscope could not reach that area because of looping most likely in the stomach despite gentle abdominal pressure.  Gastroscope was withdrawn.  There was some mucosal bleeding noted in the oropharyngeal region on withdrawal of the scope.  This could be most likely related to scope rubbing against the mucosa in the setting of antiplatelet and recent  anticoagulant treatment.  Patient was intubated for airway protection.  Olympus video pediatric colonoscope was introduced into the oral cavity and was carefully introduced into the esophagus and then into the stomach.  I was able to reach the area of active oozing in the duodenum with a pediatric colonoscope.  Argon plasma coagulation was done with very good hemostasis.  No bleeding was noted at the end of therapeutic intervention.  Adequate decompression was done.  Scope was withdrawn, there were blood clots noted in the oropharyngeal region which were carefully suctioned.    ESTIMATED BLOOD LOSS:  15-20 cc or more.    COMPLICATIONS:  None.    RECOMMENDATIONS:  1. Transfuse additional packed RBC as well as FFP as necessary.    2. Maintain hemoglobin above 8.  Patient had recent PCI and is on Plavix.    3. Continue to hold anticoagulation.  Correct coagulopathy.    4. Serial hemoglobin/hematocrit.  5. Above discussed with the patient's daughter over the phone in details.  6. Patient is being transferred to ICU on mechanical ventilation.

## 2024-12-21 NOTE — PROGRESS NOTES
"[facility] Medicine Progress Note        Chief Complaint: Inpatient Follow-up     HPI:   79-year-old woman with A-fib, HTN, valvular heart disease s/p MVR, CVID, CAD s/p PCI to LAD (11/29), DVT s/p IVC filter, T2DM, GERD, SSS s/p PPM, HTN, HLD, chronic sinusitis status post sinus surgery, ABEL, obesity, asthma/COPD and a recent admission for acute decompensated diastolic heart failure where she underwent a MARYBETH on 11/27 which showed severe mitral stenosis and moderate MR and LHC on 11/29 with stent to prox LAD. Patient was seen by CT surgery and structural heart team and a possible valve in valve was discussed but the plan was to start Coumadin to see if the gradient could be decreased and have a repeat TTE in a few weeks time. She was then subsequently discharged on 2L oxygen to [place] with close cardiology follow up. She presents on [facility] on 12/15 from [place] due to low back associated with weakness and for neurosurgical evaluation.     She reports that since she went to rehab and started working with PT - she has been having lower back pain not radiating down her legs. She denies urinary or stool incontinence. No saddle anesthesia. Reports constipation. She is able to move all her extremities. She used to walk with a walker at rehab but walking became difficult due to back pain. She reports her breathing has been "so and so" while she has been there. She had a CT scan done at rehab which showed Lumbar degenerative disc disease and spondylosis and she was transferred here for neurosurgery evaluation. CXR showed increased density on the right with a small right sided pleural effusion. proBNP 893.3. Cardiology consulted.     RRT called on 12/17 for acute respiratory distress - patient had recently worked with PT. Immediately went up to bedside. Patient tachypneic. A&Ox4. Denies chest pain. Mildly lethargic. Mild bibasilar crackles. ABG 7.29/68/69. Placed on BiPAP. " Initial EKG not great - repeat EKG showed NSR. Patient with recent stent. Given IV lasix 40 mg once. CBC with worsening leukocytosis and CXR with possible R sided infiltrate vs effusion - will start vancomycin and zosyn given recent hospitalization. Repeat ABG in 1 hour was better.    Interval Hx:   Patient already down for EGD.  No acute overnight events.  Spoke with family extensively by bedside.  INR sub therapeutic today.    Case was discussed with patient's nurse and  on the floor.    Objective/physical exam:  Unable to assess due to patient being done for EGD    VITAL SIGNS: 24 HRS MIN & MAX LAST   Temp  Min: 97.5 °F (36.4 °C)  Max: 98.4 °F (36.9 °C) 97.6 °F (36.4 °C)   BP  Min: 77/45  Max: 124/56 (!) 111/37   Pulse  Min: 60  Max: 86  65   Resp  Min: 15  Max: 21 15   SpO2  Min: 94 %  Max: 100 % 97 %     I have reviewed the following labs:  Recent Labs   Lab 12/20/24  0605 12/21/24  0513 12/21/24  1157   WBC 16.49* 19.28* 19.74  19.74*   RBC 2.55* 2.70* 2.36*   HGB 7.7* 8.0* 7.0*   HCT 24.5* 25.8* 23.2*   MCV 96.1* 95.6* 98.3*   MCH 30.2 29.6 29.7   MCHC 31.4* 31.0* 30.2*   RDW 19.1* 19.8* 19.8*    347 301   MPV 9.7 9.2 9.5     Recent Labs   Lab 12/16/24  0507 12/17/24  0449 12/18/24  1022 12/18/24  1242 12/19/24  0510 12/20/24  0605 12/21/24  0513 12/21/24  1118 12/21/24  1157      < >  --   --  137 138 140  --  140   K 4.3   < >  --   --  3.4* 3.0* 3.4*  --  3.3*      < >  --   --  98 99 98  --  102   CO2 29   < >  --   --  30 28 30  --  26   BUN 17.3   < >  --   --  31.5* 30.8* 21.5*  --  19.8   CREATININE 0.76   < >  --   --  0.81 0.74 0.78  --  0.82   CALCIUM 8.5   < >  --   --  7.9* 8.0* 8.0*  --  8.7   PH  --    < > 7.360 7.410  --   --   --  7.480*  --    MG 2.20  --   --   --  2.00  --   --   --  2.10   ALBUMIN 2.4*   < >  --   --  2.3* 2.3*  --   --  2.7*   ALKPHOS 96   < >  --   --  99 97  --   --  95   ALT 13   < >  --   --  14 13  --   --  12   AST 26   < >  --   --   33 35*  --   --  35*   BILITOT 0.7   < >  --   --  1.0 0.9  --   --  1.0    < > = values in this interval not displayed.     Microbiology Results (last 7 days)       Procedure Component Value Units Date/Time    Blood Culture [[id number]]  (Normal) Collected: 12/17/24 2004    Order Status: Completed Specimen: Blood Updated: 12/20/24 2100     Blood Culture No Growth At 72 Hours             See below for Radiology    Assessment/Plan:  # Lethargy 2/2 possibly hypercapnia  # Low back pain 2/2 DJD and spinal stenosis  # Acute on Chronic diastolic HF   # Chronic hypoxic respiratory failure requiring supplemental oxygen (on 2 L oxygen)   # Pulmonary HTN  # Severe mitral stenosis  # Obesity   # pAF s/p LAAL in 2023  # Anemia - stable. No signs of active bleeding. Patient is on triple therapy though and had a Hgb drop since then which has been stable  # iron deficiency anemia  # Leukocytosis - recently treated for UTI but currently has no dysuria. No fevers or chills. No abdominal pain/N/V  # HTN  # HLD  # VHD s/p MVR (09/20/2023)  # CAD s/p PCI to LAD (11/29/2024)  Hx of DVT s/p IVC filter, SSS s/p PPM, T2DM, GERD ,Chronic sinusitis s/p sinus surgery , ABEL on CPAP, Obesity CVID, Opioid induced constipation, Asthma/COPD ? - does not have official PFTs done     ABGs done yesterday shows compensated PH, advised to continue CPAP at bedtime and during daytime naps   we can resume her chronic O2 during the day  infectious workup negative, blood cultures normal, urine culture unremarkable  MRSA PCR normal, discontinue vancomycin   Was on Zosyn  MRI L spine; as per radiology patient automatically ruled out of MRI given PPM and spinal stimulator  NGSY consult   - as per NGSY consult IR for LESI; however IR unable to do due to unable to hold plavix due to recent stent  Continue PT/OT  continue Plavix due to recent stent to prevent stent thrombosis   INR down to 2.4 today after vitamin K given yesterday.  Per Cardiology note, goal of  INR 2-3  Stool occult was positive along with drop with hemoglobin   GI was consulted and plan for EGD this a.m..    Patient was undergoing upper endoscopy for GI bleed and findings of oropharyngeal oozing of blood requiring intubation for airway protection.  Patient is going to be transferred to ICU due to requiring mechanical ventilator.      VTE prophylaxis: SCD - elevated INR    Patient condition:  Fair    Anticipated discharge and Disposition:   tbd    All diagnosis and differential diagnosis have been reviewed; assessment and plan has been documented; I have personally reviewed the labs and test results that are presently available; I have reviewed the patients medication list; I have reviewed the consulting providers response and recommendations. I have reviewed or attempted to review medical records based upon their availability    All of the patient's questions have been  addressed and answered. Patient's is agreeable to the above stated plan. I will continue to monitor closely and make adjustments to medical management as needed.  _____________________________________________________________________    Malnutrition Status:  Nutrition consulted. Most recent weight and BMI monitored-     Measurements:  Wt Readings from Last 1 Encounters:   12/16/24 98.9 kg (218 lb)   Body mass index is 35.2 kg/m².    Patient has been screened and assessed by RD.    Malnutrition Type:  Context:    Level:      Malnutrition Characteristic Summary:       Interventions/Recommendations (treatment strategy):        Scheduled Med:   amiodarone  200 mg Oral Daily    atorvastatin  40 mg Oral QHS    clopidogreL  75 mg Oral Daily    ferric gluconate (FERRLECIT) 125 mg in 0.9% NaCl 100 mL IVPB  125 mg Intravenous Daily    LIDOcaine (PF) 10 mg/ml (1%)  1 mL Intradermal Once    LIDOcaine  1 patch Transdermal Q24H    lipase-protease-amylase 24,000-76,000-120,000 units  1 capsule Oral TID WM    metoprolol succinate  25 mg Oral Daily     mupirocin   Nasal BID    NIFEdipine  30 mg Oral Daily    NORepinephrine 8 mg        pantoprazole  40 mg Intravenous BID    simethicone        sodium zirconium cyclosilicate  10 g Oral Once    timolol maleate 0.5%  1 drop Both Eyes BID      Continuous Infusions:   electrolyte-A   Intravenous Continuous   Held at 12/21/24 1000    propofoL  0-50 mcg/kg/min Intravenous Continuous 14.8 mL/hr at 12/21/24 1652 25 mcg/kg/min at 12/21/24 1652      PRN Meds:    Current Facility-Administered Medications:     0.9%  NaCl infusion (for blood administration), , Intravenous, Q24H PRN    0.9%  NaCl infusion (for blood administration), , Intravenous, Q24H PRN    0.9%  NaCl infusion (for blood administration), , Intravenous, Q24H PRN    acetaminophen, 1,000 mg, Oral, Q8H PRN    albuterol-ipratropium, 3 mL, Nebulization, Q4H PRN    dextrose 10%, 12.5 g, Intravenous, PRN    dextrose 10%, 25 g, Intravenous, PRN    glucagon (human recombinant), 1 mg, Intramuscular, PRN    glucose, 16 g, Oral, PRN    glucose, 24 g, Oral, PRN    insulin aspart U-100, 1-10 Units, Subcutaneous, QID (AC + HS) PRN    melatonin, 6 mg, Oral, Nightly PRN    NORepinephrine 8 mg, , ,     ondansetron, 8 mg, Oral, Q6H PRN    ondansetron, 4 mg, Intravenous, Q8H PRN    oxyCODONE, 5 mg, Oral, Q6H PRN    oxyCODONE, 10 mg, Oral, Q6H PRN    senna, 8.6 mg, Oral, Daily PRN    simethicone, , ,     simethicone, 1 tablet, Oral, TID PRN     Radiology:  I have personally reviewed the following imaging and agree with the radiologist.     X-Ray Chest 1 View  Narrative: EXAMINATION:  XR CHEST 1 VIEW    CLINICAL HISTORY:  f/u intubation and ET tube placement;    COMPARISON:  17 December 2024    FINDINGS:  Frontal view of the chest was obtained. Endotracheal tube tip about 2 cm above the blanca.  Heart and mediastinum otherwise unchanged.  Slightly increased bilateral interstitial predominant opacities.  There is no pneumothorax.  Impression: Interval intubation.  Slightly increased  bilateral opacities.    Electronically signed by: Jason Andrews  Date:    12/21/2024  Time:    14:39    Jeane Ortega DO  Department of Hospital Medicine  [facility]  12/21/2024

## 2024-12-21 NOTE — TRANSFER OF CARE
"Anesthesia Transfer of Care Note    Patient: Kortney Leach    Procedure(s) Performed: Procedure(s) (LRB):  EGD (N/A)    Patient location: ICU    Anesthesia Type: general    Transport from OR: Transported from OR intubated on 100% O2  with adequate ventilation controlled by transport ventilator. Continuous SpO2 monitoring in transport. Continuous ECG monitoring in transport. Upon arrival to PACU/ICU, patient attached to ventilator and auscultated to confirm bilateral breath sounds and adequate TV    Post pain: adequate analgesia    Post assessment: no apparent anesthetic complications    Post vital signs: stable    Level of consciousness: sedated    Nausea/Vomiting: no nausea/vomiting    Complications: none    Transfer of care protocol was followed      Last vitals: Visit Vitals  /69 (BP Location: Left arm, Patient Position: Lying)   Pulse 79   Temp 36.9 °C (98.4 °F) (Temporal)   Resp 20   Ht 5' 5.98" (1.676 m)   Wt 98.9 kg (218 lb)   SpO2 95%   Breastfeeding No   BMI 35.20 kg/m²     "

## 2024-12-21 NOTE — H&P
[facility] - [facility] ICU  Pulmonary Critical Care Note    Patient Name: Kortney Leach  MRN: [id number]  Admission Date: 12/15/2024  Hospital Length of Stay: 6 days  Code Status: Full Code  Attending Provider: Julián Monique MD  Primary Care Provider: Arnaldo Hernandez MD     Subjective:     HPI:   79-year-old woman with A-fib, HTN, valvular heart disease s/p MVR, CVID, CAD s/p PCI to LAD (11/29), DVT s/p IVC filter, T2DM, GERD, SSS s/p PPM, HTN, HLD, chronic sinusitis status post sinus surgery, ABEL, obesity, asthma/COPD who was initially admitted as a transfer from [place] to [facility] medicine on 12/15/24  for concern for cauda equina syndrome and neurosurgerical services. RRT called on 12/17 and patient placed on BIPAP. CXR at the time consistent with R sided PNA vs. Pleural effusion and broad spectrum antibiotics were started at this time. Patient reported melena in setting of severe normocytic anemia and iron deficiency. EGD done on 12/21/24 consistent with oozing duodenal AVMs requiring coagulation. No intraoperative complications noted. Patient was intubated for air way protection in setting of oropharyngeal oozing near vocal cords and transferred to ICU postoperatively.     Hospital Course/Significant events:  EGD s/p duodenal AVM coagulation- 12/21/24    24 Hour Interval History:  NA     Past Medical History:   Diagnosis Date    Anticoagulant long-term use     Asthma     Chronic sinusitis, unspecified     COPD (chronic obstructive pulmonary disease)     CVID (common variable immunodeficiency)     Diabetes mellitus, type 2     GERD (gastroesophageal reflux disease)     Hypertension     Severe mitral valve regurgitation     Sleep apnea     uses CPAP    SOB (shortness of breath)     Unspecified glaucoma     Weakness        Past Surgical History:   Procedure Laterality Date    A-V CARDIAC PACEMAKER INSERTION N/A 9/25/2023    Procedure: INSERTION, CARDIAC PACEMAKER, DUAL CHAMBER;   Surgeon: Arnaud Moon MD;  Location: [facility] CATH LAB;  Service: Cardiology;  Laterality: N/A;  SJM    ANGIOGRAM, CORONARY, WITH LEFT HEART CATHETERIZATION N/A 2024    Procedure: Angiogram, Coronary, with Left Heart Cath;  Surgeon: Tatiana Jarrett MD;  Location: [facility] CATH LAB;  Service: Cardiology;  Laterality: N/A;    APPENDECTOMY      CATARACT EXTRACTION Bilateral     CATHETERIZATION OF BOTH LEFT AND RIGHT HEART N/A 2024    Procedure: CATHETERIZATION, HEART, BOTH LEFT AND RIGHT;  Surgeon: Sebastian Watters Jr., MD;  Location: [facility] CATH LAB;  Service: Cardiology;  Laterality: N/A;  With Valve Study    COLONOSCOPY      EGD, WITH BALLOON DILATION      ESOPHAGOGASTRODUODENOSCOPY      LEFT HEART CATHETERIZATION Left 2023    Procedure: Left heart cath;  Surgeon: Vijay Uribe MD;  Location: [facility] CATH LAB;  Service: Cardiology;  Laterality: Left;  [facility]    MITRAL VALVE REPLACEMENT N/A 2023    Procedure: REPLACEMENT, MITRAL VALVE;  Surgeon: Steven Rosales IV, MD;  Location: [facility];  Service: Cardiovascular;  Laterality: N/A;    TONSILLECTOMY      TOTAL KNEE ARTHROPLASTY Bilateral        Social History     Socioeconomic History    Marital status: Single   Tobacco Use    Smoking status: Former     Current packs/day: 0.00     Types: Cigarettes     Quit date:      Years since quittin.9    Smokeless tobacco: Never   Substance and Sexual Activity    Alcohol use: Not Currently    Drug use: Not Currently    Sexual activity: Not Currently     Social Drivers of Health     Financial Resource Strain: Low Risk  (2024)    Overall Financial Resource Strain (CARDIA)     Difficulty of Paying Living Expenses: Not hard at all   Food Insecurity: No Food Insecurity (2024)    Hunger Vital Sign     Worried About Running Out of Food in the Last Year: Never true     Ran Out of Food in the Last Year: Never true   Transportation Needs: No Transportation Needs (2024)    TRANSPORTATION NEEDS      Transportation : No   Physical Activity: Inactive (12/4/2024)    Exercise Vital Sign     Days of Exercise per Week: 0 days     Minutes of Exercise per Session: 0 min   Stress: No Stress Concern Present (12/16/2024)    [employer] - Occupational Stress Questionnaire     Feeling of Stress : Only a little   Housing Stability: Low Risk  (12/16/2024)    Housing Stability Vital Sign     Unable to Pay for Housing in the Last Year: No     Homeless in the Last Year: No           Current Outpatient Medications   Medication Instructions    amiodarone (PACERONE) 200 mg, Daily    aspirin (ECOTRIN) 81 mg, Oral, Daily    atorvastatin (LIPITOR) 40 mg, Daily    clopidogreL (PLAVIX) 75 mg, Oral, Daily    DUPIXENT SYRINGE 300 mg, Every 14 days    estradioL (ESTRACE) 0.5 g, Twice weekly    famotidine (PEPCID) 40 mg, Nightly    fluticasone-umeclidin-vilanter (TRELEGY ELLIPTA) 200-62.5-25 mcg inhaler 1 puff, Daily    furosemide (LASIX) 40 mg    glimepiride (AMARYL) 4 mg, 2 times daily    HYDROcodone-acetaminophen (NORCO)  mg per tablet 1 tablet, Oral, Every 6 hours PRN    immun glob G,IgG,/pro/IgA 0-50 (HIZENTRA SUBQ) Every 14 days    lipase-protease-amylase 24,000-76,000-120,000 units (CREON) 24,000-76,000 -120,000 unit capsule 1-2 capsules, 3 times daily with meals    metFORMIN (GLUCOPHAGE) 500 mg, Oral, 2 times daily with meals    metoprolol succinate (TOPROL-XL) 50 mg, Oral, Daily    NIFEdipine (PROCARDIA-XL) 30 mg    pantoprazole (PROTONIX) 40 MG tablet 1 tablet, Every morning    timoloL (BETIMOL) 0.5 % ophthalmic solution 1 drop, Both Eyes, 2 times daily    warfarin (COUMADIN) 5 mg, Oral, Daily       Current Inpatient Medications   amiodarone  200 mg Oral Daily    atorvastatin  40 mg Oral QHS    clopidogreL  75 mg Oral Daily    famotidine  40 mg Oral QHS    ferric gluconate (FERRLECIT) 125 mg in 0.9% NaCl 100 mL IVPB  125 mg Intravenous Daily    furosemide  40 mg Oral Daily    LIDOcaine (PF) 10  mg/ml (1%)  1 mL Intradermal Once    LIDOcaine  1 patch Transdermal Q24H    lipase-protease-amylase 24,000-76,000-120,000 units  1 capsule Oral TID WM    metoprolol succinate  25 mg Oral Daily    mupirocin   Nasal BID    NIFEdipine  30 mg Oral Daily    pantoprazole  40 mg Intravenous BID    piperacillin-tazobactam (Zosyn) IV (PEDS and ADULTS) (extended infusion is not appropriate)  4.5 g Intravenous Q8H    sodium zirconium cyclosilicate  10 g Oral Once    timolol maleate 0.5%  1 drop Both Eyes BID       Current Intravenous Infusions   electrolyte-A   Intravenous Continuous             ROS   Unable to obtain 2/2 sedation     Objective:       Intake/Output Summary (Last 24 hours) at 12/21/2024 1043  Last data filed at 12/21/2024 1031  Gross per 24 hour   Intake 600 ml   Output 500 ml   Net 100 ml         Vital Signs (Most Recent):  Temp: 98.4 °F (36.9 °C) (12/21/24 0836)  Pulse: 79 (12/21/24 0836)  Resp: 20 (12/21/24 0836)  BP: 114/69 (12/21/24 0836)  SpO2: 95 % (12/21/24 0836)  Body mass index is 35.2 kg/m².  Weight: 98.9 kg (218 lb) Vital Signs (24h Range):  Temp:  [97.5 °F (36.4 °C)-98.6 °F (37 °C)] 98.4 °F (36.9 °C)  Pulse:  [65-79] 79  Resp:  [17-20] 20  SpO2:  [94 %-96 %] 95 %  BP: (114-136)/() 114/69     Physical Exam  Vitals and nursing note reviewed.   Constitutional:       General: She is not in acute distress.     Appearance: She is ill-appearing. She is not toxic-appearing or diaphoretic.      Interventions: She is sedated and intubated.   HENT:      Head: Normocephalic and atraumatic.      Mouth/Throat:      Mouth: Mucous membranes are dry.      Pharynx: Oropharynx is clear. No pharyngeal swelling.      Comments: ET tube occluding oropharynx     Eyes:      Pupils: Pupils are equal, round, and reactive to light.   Neck:      Comments: No JVD or hepatojugular reflux   Cardiovascular:      Rate and Rhythm: Normal rate and regular rhythm.      Pulses: Normal pulses.      Heart sounds: Murmur heard.    Pulmonary:      Effort: Pulmonary effort is normal. No respiratory distress. She is intubated.      Breath sounds: No wheezing, rhonchi or rales.      Comments: No dyssynchrony to mechanical ventilation   Abdominal:      General: Abdomen is flat. Bowel sounds are normal. There is no distension.      Palpations: Abdomen is soft.   Musculoskeletal:         General: Normal range of motion.      Right lower leg: No edema.      Left lower leg: No edema.   Skin:     General: Skin is warm and dry.      Capillary Refill: Capillary refill takes less than 2 seconds.      Coloration: Skin is not jaundiced.      Findings: No bruising or rash.   Neurological:      Comments: Limited 2/2 sedation    Psychiatric:      Comments: Limited 2/2 sedation            Lines/Drains/Airways       Drain  Duration                  Urethral Catheter 12/17/24 1730 Straight-tip 16 Fr. 3 days              Airway  Duration                  Airway - Non-Surgical 12/21/24 0914 <1 day              Peripheral Intravenous Line  Duration                  Peripheral IV - Single Lumen 12/09/24 0835 No Posterior;Right Forearm 12 days         Peripheral IV - Single Lumen 12/18/24 2350 20 G Anterior;Left Forearm 2 days         Peripheral IV - Single Lumen 12/21/24 0932 20 G Anterior;Right Forearm <1 day                    Significant Labs:    Lab Results   Component Value Date    WBC 19.28 (H) 12/21/2024    HGB 8.0 (L) 12/21/2024    HCT 25.8 (L) 12/21/2024    MCV 95.6 (H) 12/21/2024     12/21/2024           BMP  Lab Results   Component Value Date     12/21/2024    K 3.4 (L) 12/21/2024    CO2 30 12/21/2024    BUN 21.5 (H) 12/21/2024    CREATININE 0.78 12/21/2024    CALCIUM 8.0 (L) 12/21/2024    AGAP 12.0 12/21/2024         ABG  Recent Labs   Lab 12/18/24  1242   PH 7.410   PO2 88.0   PCO2 50.0*   HCO3 31.7*   POCBASEDEF 6.20*       Mechanical Ventilation Support:  Oxygen Concentration (%): 35 (12/19/24 1300)      Significant Imaging:  I have  reviewed the pertinent imaging within the past 24 hours.    Echo 11/27/24: EF  60-65% with severe LA dilation with moderate regurgitation of bioprosthetic mitral valve.     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 12/21/24   Diffuse Gastritis with Hx of GERD on Dexilant   Severe Normocytic Anemia 2/2 Melena and Iron Deficiency   R sided pleural effusion vs PNA - started on broad spectrum abx 12/17   CAD s/p LAD PCI on Plavix 11/29/24 - continued   VHD s/p MVR on Coumadin 9/20/23 - held since 12/7 s/p Vit K on 12/20   A fib s/p SUKHI ligation 9/20/23   SSS s/p PPM 9/25/23   DM2   HTN  CVID  Hx of DVT status post Adam filter  Hx of ABEL on CPAP   Hx of Diverticulosis and colonic polyps on last colonoscopy    Hx of HICKMAN   Hx of Pancreatic Insufficiency   Hx of Glaucoma   Hx of Asthma and COPD - not in acute exacerbation        Plan  Transfer to ICU for close monitoring, patient was intubated and mechanically ventilated 2/2 oropharyngeal oozing near vocal cords    Titrate mechanical ventilation per ARDS net protocol, wean sedation as tolerated   Maintain MAP >65, currently does not require pressors   GI following for AVMs s/p EGD, appreciate assistance  Maintain Hb>8 2/2 recent PCI receiving Plavix. 1u pRBC ordered postoperatively, still awaiting administration. Serial H/H.   Continue to hold AC for now. 1u FFP ordered this AM, still awaiting administration.   Cardiology following for complex cardiac history, appreciate assistance   Resume Coumadin when cleared with GI with INR goal 2-3.   Repeat Echo next week to re-eval bioprosthetic MV   Currently on Day 5 of Zosyn despite lack of respiratory cultures, blood culture x 1 and urine culture NGTD. Vancomycin was discontinued on 12/20/24 for negative MRSA PCR.   Continue home Amiodarone 200mg qd, Lipitor 40mg qd, Plavix 75mg qd, Lasix 40mg qd, Creon TID, Toprol 25mg qd, Nifedipine 30mg qd, and Timolol eye drops BID  MSSI in  place, CBG goal 140-180   PRN antiemetics, MMPC, and bowel regimen in place   Continuous cardiac monitoring   Daily AM lab work, will order CXR and ABG now for post-intubation evaluation     Code Status: Full Code   DVT Prophylaxis: SCDs  GI Prophylaxis: Protonix BID, Pepcid      32 minutes of critical care was time spent personally by me on the following activities: development of treatment plan with patient or surrogate and bedside caregivers, discussions with consultants, evaluation of patient's response to treatment, examination of patient, ordering and performing treatments and interventions, ordering and review of laboratory studies, ordering and review of radiographic studies, pulse oximetry, re-evaluation of patient's condition.  This critical care time did not overlap with that of any other provider or involve time for any procedures.     Judd Rivera MD  Pulmonary Critical Care Medicine  [facility] - [facility] ICU  DOS: 12/21/2024

## 2024-12-21 NOTE — ANESTHESIA PREPROCEDURE EVALUATION
12/21/2024  Kortney Leach is a 79 y.o., female with AFib status post mitral valve replacement with left atrial appendage ligation, sick sinus syndrome status post pacemaker with a history of DVT status post Adam filter, ABEL, COPD, [place] admitted November 23rd with hypoxia from urgent care.  Further workup revealed pulmonary edema with severe mitral stenosis with bioprosthetic mitral valve and single-vessel CAD treated with stent November 27th.  Patient tolerated IV sedation for MARYBETH during this admission.  GI has been consulted for recent melena with heme-positive anemia and presents today satting 92-3% on 3 L nasal cannula for EGD. EKG from December 17th normal sinus rhythm    Transthoracic echo November 14, 2024   EF 65% normal functioning mitral valve with mean gradient of 4   PA pressure 37   Cardiac PET scan January 2023   No evidence of ischemia  EF 63-68%    Left heart catheterization September 2023   Normal coronaries    Left heart catheterization November 27, 2024   Mid LAD 70% treated with stent  LVEDP 6   PA pressure 47/26   Wedge pressure 24   Cardiac index 2.3     Transthoracic echo November 23, 2024   EF 60% with grade 1 diastolic dysfunction   Bioprosthetic mitral valve with mean gradient of 19 and 1+ MR   2+ TR   PA pressure 57    Pre-op Assessment    I have reviewed the Patient Summary Reports.    I have reviewed the NPO Status.   I have reviewed the Medications.     Review of Systems  Anesthesia Hx:               Denies Personal Hx of Anesthesia complications.                    Social:  Non-Smoker       Hematology/Oncology:       -- Anemia:                                  Cardiovascular:     Hypertension    Dysrhythmias atrial fibrillation             Functional Capacity 2 METS   Coronary Artery Disease:      S/P Percutaneous Coronary Intervention (PCI)   S/P Drug Eluting Stent  (ROLANDO)      Valvular Heart Disease:   Mitral Stenosis (MS), s/p replacement, severe                        Pulmonary:   COPD   Shortness of breath  Sleep Apnea, CPAP                Hepatic/GI:     GERD                Endocrine:  Diabetes, type 2         Obesity / BMI > 30      Physical Exam  General: Well nourished, Cooperative, Alert and Oriented    Airway:  Mallampati: III   Mouth Opening: Small, but > 3cm  TM Distance: Normal  Tongue: Normal  Neck ROM: Normal ROM    Dental:  Intact    Chest/Lungs:  Clear to auscultation, Respiratory Distress, Tachypnea  93% on 3 L nasal cannula  Heart:  Rate: Normal        Anesthesia Plan  Type of Anesthesia, risks & benefits discussed:    Anesthesia Type: Gen Natural Airway  Intra-op Monitoring Plan: Standard ASA Monitors  Induction:  IV  Informed Consent: Informed consent signed with the Patient and all parties understand the risks and agree with anesthesia plan.  All questions answered.   ASA Score: 4  Day of Surgery Review of History & Physical: H&P Update referred to the surgeon/provider.    Ready For Surgery From Anesthesia Perspective.     .

## 2024-12-21 NOTE — NURSING
Patient intubated, will go to ICU on ventilator per Anesthesia. Borderline Resp. Failure, oropharyngeal bleeding. ICU room requested.  Patient to be transported to ICU directly from [facility].

## 2024-12-22 LAB
ABO + RH BLD: NORMAL
ALBUMIN SERPL-MCNC: 2.7 G/DL (ref 3.4–4.8)
ALBUMIN/GLOB SERPL: 1 RATIO (ref 1.1–2)
ALLENS TEST BLOOD GAS (OHS): YES
ALP SERPL-CCNC: 100 UNIT/L (ref 40–150)
ALT SERPL-CCNC: 13 UNIT/L (ref 0–55)
ANION GAP SERPL CALC-SCNC: 16 MEQ/L
AST SERPL-CCNC: 45 UNIT/L (ref 5–34)
BACTERIA BLD CULT: NORMAL
BASE EXCESS BLD CALC-SCNC: 3.7 MMOL/L
BASOPHILS # BLD AUTO: 0.07 X10(3)/MCL
BASOPHILS NFR BLD AUTO: 0.4 %
BILIRUB SERPL-MCNC: 1.4 MG/DL
BLD PROD TYP BPU: NORMAL
BLOOD GAS SAMPLE TYPE (OHS): ABNORMAL
BLOOD UNIT EXPIRATION DATE: NORMAL
BLOOD UNIT TYPE CODE: 6200
BUN SERPL-MCNC: 17.4 MG/DL (ref 9.8–20.1)
CA-I BLD-SCNC: 1.12 MMOL/L (ref 1.12–1.23)
CALCIUM SERPL-MCNC: 8 MG/DL (ref 8.4–10.2)
CHLORIDE SERPL-SCNC: 104 MMOL/L (ref 98–107)
CO2 BLDA-SCNC: 28.9 MMOL/L
CO2 SERPL-SCNC: 21 MMOL/L (ref 23–31)
CREAT SERPL-MCNC: 0.82 MG/DL (ref 0.55–1.02)
CREAT/UREA NIT SERPL: 21
CROSSMATCH INTERPRETATION: NORMAL
DISPENSE STATUS: NORMAL
DRAWN BY BLOOD GAS (OHS): ABNORMAL
EOSINOPHIL # BLD AUTO: 0.14 X10(3)/MCL (ref 0–0.9)
EOSINOPHIL NFR BLD AUTO: 0.8 %
ERYTHROCYTE [DISTWIDTH] IN BLOOD BY AUTOMATED COUNT: 19.7 % (ref 11.5–17)
FLOW (OHS): 50 LPM
GFR SERPLBLD CREATININE-BSD FMLA CKD-EPI: >60 ML/MIN/1.73/M2
GLOBULIN SER-MCNC: 2.6 GM/DL (ref 2.4–3.5)
GLUCOSE SERPL-MCNC: 116 MG/DL (ref 82–115)
HCO3 BLDA-SCNC: 27.7 MMOL/L (ref 22–26)
HCT VFR BLD AUTO: 27.8 % (ref 37–47)
HCT VFR BLD AUTO: 27.8 % (ref 37–47)
HCT VFR BLD AUTO: 28.1 % (ref 37–47)
HCT VFR BLD AUTO: 29.9 % (ref 37–47)
HCT VFR BLD AUTO: 30.4 % (ref 37–47)
HGB BLD-MCNC: 9 G/DL (ref 12–16)
HGB BLD-MCNC: 9.1 G/DL (ref 12–16)
HGB BLD-MCNC: 9.2 G/DL (ref 12–16)
HGB BLD-MCNC: 9.6 G/DL (ref 12–16)
HGB BLD-MCNC: 9.7 G/DL (ref 12–16)
IMM GRANULOCYTES # BLD AUTO: 0.64 X10(3)/MCL (ref 0–0.04)
IMM GRANULOCYTES NFR BLD AUTO: 3.7 %
INHALED O2 CONCENTRATION: 30 %
INR PPP: 1.6
LYMPHOCYTES # BLD AUTO: 2.92 X10(3)/MCL (ref 0.6–4.6)
LYMPHOCYTES NFR BLD AUTO: 17 %
MAGNESIUM SERPL-MCNC: 2 MG/DL (ref 1.6–2.6)
MCH RBC QN AUTO: 30.9 PG (ref 27–31)
MCHC RBC AUTO-ENTMCNC: 32.4 G/DL (ref 33–36)
MCV RBC AUTO: 95.5 FL (ref 80–94)
MECH RR (OHS): 20 B/MIN
MODE (OHS): AC
MONOCYTES # BLD AUTO: 1.43 X10(3)/MCL (ref 0.1–1.3)
MONOCYTES NFR BLD AUTO: 8.3 %
NEUTROPHILS # BLD AUTO: 11.98 X10(3)/MCL (ref 2.1–9.2)
NEUTROPHILS NFR BLD AUTO: 69.8 %
NRBC BLD AUTO-RTO: 18.5 %
OXYGEN DEVICE BLOOD GAS (OHS): ABNORMAL
PCO2 BLDA: 39 MMHG (ref 35–45)
PEEP RESPIRATORY: 5 CMH2O
PH BLDA: 7.46 [PH] (ref 7.35–7.45)
PHOSPHATE SERPL-MCNC: 3.8 MG/DL (ref 2.3–4.7)
PLATELET # BLD AUTO: 260 X10(3)/MCL (ref 130–400)
PMV BLD AUTO: 9.5 FL (ref 7.4–10.4)
PO2 BLDA: 65 MMHG (ref 80–100)
POCT GLUCOSE: 113 MG/DL (ref 70–110)
POCT GLUCOSE: 137 MG/DL (ref 70–110)
POCT GLUCOSE: 139 MG/DL (ref 70–110)
POCT GLUCOSE: 144 MG/DL (ref 70–110)
POTASSIUM BLOOD GAS (OHS): 3.4 MMOL/L (ref 3.5–5)
POTASSIUM SERPL-SCNC: 3.9 MMOL/L (ref 3.5–5.1)
PROT SERPL-MCNC: 5.3 GM/DL (ref 5.8–7.6)
PROTHROMBIN TIME: 19.7 SECONDS (ref 12.5–14.5)
RBC # BLD AUTO: 2.91 X10(6)/MCL (ref 4.2–5.4)
SAMPLE SITE BLOOD GAS (OHS): ABNORMAL
SAO2 % BLDA: 94 %
SODIUM BLOOD GAS (OHS): 137 MMOL/L (ref 137–145)
SODIUM SERPL-SCNC: 141 MMOL/L (ref 136–145)
SPONT+MECH VT ON VENT: 470 ML
UNIT NUMBER: NORMAL
WBC # BLD AUTO: 17.18 X10(3)/MCL (ref 4.5–11.5)

## 2024-12-22 PROCEDURE — 94761 N-INVAS EAR/PLS OXIMETRY MLT: CPT | Mod: XB

## 2024-12-22 PROCEDURE — 94640 AIRWAY INHALATION TREATMENT: CPT

## 2024-12-22 PROCEDURE — 99900031 HC PATIENT EDUCATION (STAT)

## 2024-12-22 PROCEDURE — 85025 COMPLETE CBC W/AUTO DIFF WBC: CPT

## 2024-12-22 PROCEDURE — 82803 BLOOD GASES ANY COMBINATION: CPT

## 2024-12-22 PROCEDURE — 85610 PROTHROMBIN TIME: CPT | Performed by: INTERNAL MEDICINE

## 2024-12-22 PROCEDURE — 94760 N-INVAS EAR/PLS OXIMETRY 1: CPT

## 2024-12-22 PROCEDURE — 36430 TRANSFUSION BLD/BLD COMPNT: CPT

## 2024-12-22 PROCEDURE — 94003 VENT MGMT INPAT SUBQ DAY: CPT

## 2024-12-22 PROCEDURE — 20000000 HC ICU ROOM

## 2024-12-22 PROCEDURE — 63600175 PHARM REV CODE 636 W HCPCS

## 2024-12-22 PROCEDURE — 27200966 HC CLOSED SUCTION SYSTEM

## 2024-12-22 PROCEDURE — 85018 HEMOGLOBIN: CPT

## 2024-12-22 PROCEDURE — 85014 HEMATOCRIT: CPT

## 2024-12-22 PROCEDURE — 25000003 PHARM REV CODE 250

## 2024-12-22 PROCEDURE — 99900035 HC TECH TIME PER 15 MIN (STAT)

## 2024-12-22 PROCEDURE — 25000003 PHARM REV CODE 250: Performed by: INTERNAL MEDICINE

## 2024-12-22 PROCEDURE — 63600175 PHARM REV CODE 636 W HCPCS: Performed by: INTERNAL MEDICINE

## 2024-12-22 PROCEDURE — 63600175 PHARM REV CODE 636 W HCPCS: Performed by: STUDENT IN AN ORGANIZED HEALTH CARE EDUCATION/TRAINING PROGRAM

## 2024-12-22 PROCEDURE — 80053 COMPREHEN METABOLIC PANEL: CPT

## 2024-12-22 PROCEDURE — 27100171 HC OXYGEN HIGH FLOW UP TO 24 HOURS

## 2024-12-22 PROCEDURE — 83735 ASSAY OF MAGNESIUM: CPT

## 2024-12-22 PROCEDURE — 99900026 HC AIRWAY MAINTENANCE (STAT)

## 2024-12-22 PROCEDURE — 84100 ASSAY OF PHOSPHORUS: CPT

## 2024-12-22 PROCEDURE — 36415 COLL VENOUS BLD VENIPUNCTURE: CPT

## 2024-12-22 PROCEDURE — P9017 PLASMA 1 DONOR FRZ W/IN 8 HR: HCPCS | Performed by: INTERNAL MEDICINE

## 2024-12-22 PROCEDURE — 25000003 PHARM REV CODE 250: Performed by: STUDENT IN AN ORGANIZED HEALTH CARE EDUCATION/TRAINING PROGRAM

## 2024-12-22 PROCEDURE — 36600 WITHDRAWAL OF ARTERIAL BLOOD: CPT

## 2024-12-22 RX ORDER — HYDROCODONE BITARTRATE AND ACETAMINOPHEN 500; 5 MG/1; MG/1
TABLET ORAL
Status: DISCONTINUED | OUTPATIENT
Start: 2024-12-22 | End: 2024-12-27

## 2024-12-22 RX ADMIN — PROPOFOL 40 MCG/KG/MIN: 10 INJECTION, EMULSION INTRAVENOUS at 12:12

## 2024-12-22 RX ADMIN — PROPOFOL 35 MCG/KG/MIN: 10 INJECTION, EMULSION INTRAVENOUS at 03:12

## 2024-12-22 RX ADMIN — MUPIROCIN: 20 OINTMENT TOPICAL at 09:12

## 2024-12-22 RX ADMIN — PROPOFOL 40 MCG/KG/MIN: 10 INJECTION, EMULSION INTRAVENOUS at 06:12

## 2024-12-22 RX ADMIN — MUPIROCIN: 20 OINTMENT TOPICAL at 08:12

## 2024-12-22 RX ADMIN — SODIUM CHLORIDE 125 MG: 9 INJECTION, SOLUTION INTRAVENOUS at 09:12

## 2024-12-22 RX ADMIN — ATORVASTATIN CALCIUM 40 MG: 40 TABLET, FILM COATED ORAL at 08:12

## 2024-12-22 RX ADMIN — PROPOFOL 40 MCG/KG/MIN: 10 INJECTION, EMULSION INTRAVENOUS at 11:12

## 2024-12-22 RX ADMIN — PANTOPRAZOLE SODIUM 40 MG: 40 INJECTION, POWDER, FOR SOLUTION INTRAVENOUS at 08:12

## 2024-12-22 RX ADMIN — TIMOLOL MALEATE 1 DROP: 5 SOLUTION OPHTHALMIC at 08:12

## 2024-12-22 RX ADMIN — TRANEXAMIC ACID 500 MG: 100 INJECTION, SOLUTION INTRAVENOUS at 11:12

## 2024-12-22 RX ADMIN — PANTOPRAZOLE SODIUM 40 MG: 40 INJECTION, POWDER, FOR SOLUTION INTRAVENOUS at 09:12

## 2024-12-22 RX ADMIN — TIMOLOL MALEATE 1 DROP: 5 SOLUTION OPHTHALMIC at 09:12

## 2024-12-22 NOTE — ADDENDUM NOTE
Addendum  created 12/21/24 2013 by Eden Varma CRNA    Intraprocedure Meds edited, Orders acknowledged in Narrator

## 2024-12-22 NOTE — PLAN OF CARE
Problem: Adult Inpatient Plan of Care  Goal: Plan of Care Review  Outcome: Progressing  Goal: Patient-Specific Goal (Individualized)  Outcome: Progressing  Goal: Absence of Hospital-Acquired Illness or Injury  Outcome: Progressing  Goal: Optimal Comfort and Wellbeing  Outcome: Progressing  Goal: Readiness for Transition of Care  Outcome: Progressing     Problem: Diabetes Comorbidity  Goal: Blood Glucose Level Within Targeted Range  Outcome: Progressing     Problem: Skin Injury Risk Increased  Goal: Skin Health and Integrity  Outcome: Progressing     Problem: Infection  Goal: Absence of Infection Signs and Symptoms  Outcome: Progressing     Problem: Wound  Goal: Optimal Coping  Outcome: Progressing  Goal: Optimal Functional Ability  Outcome: Progressing  Goal: Absence of Infection Signs and Symptoms  Outcome: Progressing  Goal: Improved Oral Intake  Outcome: Progressing  Goal: Optimal Pain Control and Function  Outcome: Progressing  Goal: Skin Health and Integrity  Outcome: Progressing  Goal: Optimal Wound Healing  Outcome: Progressing     Problem: Fall Injury Risk  Goal: Absence of Fall and Fall-Related Injury  Outcome: Progressing

## 2024-12-22 NOTE — PROGRESS NOTES
"  [facility]    Cardiology  Progress Note    Patient Name: Kortney Leach  MRN: [id number]  Admission Date: 12/15/2024  Hospital Length of Stay: 7 days  Code Status: Full Code   Attending Provider: Julián Monique MD   Consulting Provider: YANIRA Turner  Primary Care Physician: Arnaldo Hernandez MD  Principal Problem:<principal problem not specified>    Patient information was obtained from patient, past medical records, and ER records.     Subjective:     Reason for Consult: Severe Mitral Stenosis - Known patient on Coumadin     HPI: Ms. Leach is an 79 year old female who is known to [facility], Dr. Uribe & Dr. Moon. She presents to the ER from rehab with complaints of lower back pain/spasms. She endorses worsening but denies CP or palps. She reports not being able to walk due to the back pain & constipation. Of note, she was recently discharged from [facility] on 12.3.24 for acute CHF exacerbation. She underwent a MARYBETH that revealed Severe MS & Mod MR and underwent a stent to the prox LAD. She was seen by CV surgery & structural heart with plans to start Coumadin to see if the gradient can be decreased. Plan is for repeat TTE in a few weeks to re-evaluate. Significant labs include WBC 13.86, Plt 447. A CXR was obtained and demonstrated increased density on the right with small right-sided pleural effusion cannot rule out infiltrate. This appears worse than on previous. He was admitted to hospital medicine with a consult to neurosurgery. [facility] has been consulted due to the patient's known severe MS.     12.17.24: NAD. Improvement in SOB. Inaccurate I&O's and daily weights. INR 4.7 today. On 3 L NC. "I am okay." SR on tele. BP stable. Had a BM.   12.18.24: Resting in bed in NAD. ON Bipap.  Lethargic.  RRT called last night for respiratory distress.  INR 6.0.  Coumadin on hold.  12.19.24: NAD More alert today on CPAP. Reports feeling better. INR 6.7. BP soft but stable. SR on tele.   12.20.24: " NAD. Denies CP or palps. SOB improving. Resting comfortably on CPAP. H&H down trending. INR 2.4. BP stable. + diarrhea  12.21.24: Seen post EGD. Underwent EGD this morning and was found to have oozing duodenal AVMs requiring coagulation. She was intubated for airway protection in the setting of oropharyngeal oozing near her vocal cords. Now in ICU. H&H down trending. WBC worsening. INR 1.6.   12.22.24: Vented/Sedated. H&H stable. BP stable off pressors. SR    PMH: VHD, GEMINI, DM II, PAF, ABEL, COPD, HTN, DVT SSS, HLD, Asthma, Left Thyroid Nodule, GERD, Chronic Sinusitis, Common Variable Immunodeficiency, Glaucoma  PSH: PPM, LHC, MVR, Bilateral Cataract Extraction, Total Knee Replacement, Bilateral mastectomy, Appendectomy, Colonoscopy, EGD, Tonsillectomy,    Family History: Father - Lung Cancer; Mother - Alzheimer, CVA; Sister - HTN, Multiple Sclerosis, DM II  Social History: Former Smoker (Quit in 1998). Denies illicit drug use and alcohol use.      Previous Cardiac Diagnostics:   [facility] 11.29.24:  Eccentric Prox LAD lesion was 80% stenosed with 0% stenosis post-intervention. iFR 0.84  Prox LAD lesion: A STENT SYNERGY XD [id number] stent was successfully placed at 8 GEO for 10 sec. Proximal part of the stent was post dilated by using 3.5/12 mm NC and a 4/8 mm NC balloons at 12 atmospheres.     [facility] 11.27.24:  Findings:  - There is single vessel coronary artery disease.  - Mid Left Anterior Descending has a calcified 70% stenosis.  - LVEDP is normal at 6 mmHg.  - RHC with elevated R sided filling pressures. RA 7 mmHg, RV 50/7 mmHg, PA 47/26 mmHg (mean 33 mmHg), PCWP 24 mmHg.  - PCWP to LV diastolic mean gradient was calculated to be 22 mmHg.  - Transpulmonary gradient is 8 mmHg.  - Normal Cardiac Output/Index at 4.7/2.3, as calculated by Rahel equation.  - PVR is 1.7 Woods units  - SVR is 1260 dyne-sec*cm^-5  - Pulmonary Artery Pulsatility Index (Nury) is 3.0  - Cardiac Power Output () is 0.84  Assessment/Plan:  - Patient  is a 79 y.o. female with a history of prior bioprosthetic mitral valve replacement now presents with heart failure.  Transesophageal echocardiogram was obtained.  Final report is pending at this time, but there were findings consistent with severe bioprosthetic MVR stenosis.  Cardiac catheterization findings shown above do suggest that there is a gradient of around 22 mm Hg across the bioprosthetic valve.  -recommend consultation with CT surgery   -obtain CT with mitral valve in valve protocol for possible TMVR  -LAD should be revascularized as well     MARYBETH (11.27.24):  Left Ventricle: The left ventricle is normal in size. Septal motion is consistent with post-operative status. There is normal systolic function with a visually estimated ejection fraction of 60 - 65%.  Right Ventricle: Normal right ventricular cavity size. Systolic function is normal.  Left Atrium: Left atrium is severely dilated.  Mitral Valve: There is a bioprosthetic valve in the mitral position. There is severe stenosis. The mean pressure gradient across the mitral valve is 16 mmHg at a heart rate of  bpm. There is moderate regurgitation.     ECHO (11.23.24):  Left Ventricle: The left ventricle is normal in size. Mildly increased wall thickness. There is normal systolic function with a visually estimated ejection fraction of 60 - 65%. Grade I diastolic dysfunction. Right Ventricle: Normal right ventricular cavity size. Systolic function is borderline low. Aortic Valve: There is aortic valve sclerosis. Mitral Valve: There is a bioprosthetic valve in the mitral position (25 mm mosaic porcine valve).  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 There is mild (1+) regurgitation. Tricuspid Valve: There moderate (2+) regurgitation. The estimated pulmonary artery systolic pressure is 57 mmHg. Pacemaker lead noted.  Recommend transesophageal  echocardiogram to properly evaluate the bioprosthetic mitral valve.     ECHO (11.14.24):  The study quality is average. The left ventricle is normal in size. Global left ventricular systolic function is normal. The left ventricular ejection fraction is 65%. Left ventricular diastolic function is normal. Mild concentric left ventricular hypertrophy is present. The left atrium is moderately enlarged. (4.6 cms). A normal functioning prosthetic mitral valve is noted. MG = 4.3 mmHg. The pulmonary artery systolic pressure is 37 mmHg.      PPM Insertion (9.25.23):  Successful implantation of PPM Dual. [facility]      PET (1.24.23):  This is a normal perfusion study, no perfusion defects noted. There is no evidence of ischemia.This scan is suggestive of low risk for future cardiovascular events. The left ventricular cavity is noted to be normal on the stress studies. The stress left ventricular ejection fraction was calculated to be 68% and left ventricular global function is normal. The rest left ventricular cavity is noted to be normal. The rest left ventricular ejection fraction was calculated to be 63% and rest left ventricular global function is normal. When compared to the resting ejection fraction (63%), the stress ejection fraction (68%) has increased. Transient ischemic dilatation is present and has been described as a marker for high risk coronary artery disease. It has also been described in microvascular disease, hypertensive heart disease as well as cardiac deconditioning. The study quality is good.   There was a rise in myocardial blood flow between rest and stress.  Global myocardial blood flow reserve was 2.72.  Normal global coronary flow reserve is suggestive of low coronary event risk.     Carotid US (1.24.23):  The study quality is good. There is no plaque noted in the proximal right internal carotid artery. 1-39% stenosis in the proximal left internal carotid artery based on Bluth Criteria. Antegrade  right vertebral artery flow. Antegrade left vertebral artery flow.     LHC (9.6.23):  Normal Coronaries       Review of patient's allergies indicates:   Allergen Reactions    Adhesive tape-silicones      Redness     Current Facility-Administered Medications on File Prior to Encounter   Medication    0.9%  NaCl infusion    diazePAM tablet 10 mg    diphenhydrAMINE capsule 50 mg     Current Outpatient Medications on File Prior to Encounter   Medication Sig    amiodarone (PACERONE) 200 MG Tab Take 200 mg by mouth once daily.    aspirin (ECOTRIN) 81 MG EC tablet Take 1 tablet (81 mg total) by mouth once daily. for 25 days    atorvastatin (LIPITOR) 40 MG tablet Take 40 mg by mouth once daily.    clopidogreL (PLAVIX) 75 mg tablet Take 1 tablet (75 mg total) by mouth once daily.    DUPIXENT SYRINGE 300 mg/2 mL Syrg Inject 300 mg into the skin every 14 (fourteen) days.    estradioL (ESTRACE) 0.01 % (0.1 mg/gram) vaginal cream Place 0.5 g vaginally twice a week.    famotidine (PEPCID) 40 MG tablet Take 40 mg by mouth every evening.    fluticasone-umeclidin-vilanter (TRELEGY ELLIPTA) 200-62.5-25 mcg inhaler Inhale 1 puff into the lungs once daily.    furosemide (LASIX) 40 MG tablet Take 40 mg by mouth.    glimepiride (AMARYL) 2 MG tablet Take 4 mg by mouth 2 (two) times a day.    HYDROcodone-acetaminophen (NORCO)  mg per tablet Take 1 tablet by mouth every 6 (six) hours as needed for Pain.    immun glob G,IgG,/pro/IgA 0-50 (HIZENTRA SUBQ) Inject into the skin every 14 (fourteen) days.    lipase-protease-amylase 24,000-76,000-120,000 units (CREON) 24,000-76,000 -120,000 unit capsule Take 1-2 capsules by mouth 3 (three) times daily with meals. 2 caps with meals and 1 cap c snacks    metFORMIN (GLUCOPHAGE) 500 MG tablet Take 1 tablet (500 mg total) by mouth 2 (two) times daily with meals.    metoprolol succinate (TOPROL-XL) 50 MG 24 hr tablet Take 1 tablet (50 mg total) by mouth once daily.    NIFEdipine (PROCARDIA-XL) 30  MG (OSM) 24 hr tablet Take 30 mg by mouth.    pantoprazole (PROTONIX) 40 MG tablet Take 1 tablet by mouth every morning.    timoloL (BETIMOL) 0.5 % ophthalmic solution Place 1 drop into both eyes 2 (two) times daily.    warfarin (COUMADIN) 5 MG tablet Take 1 tablet (5 mg total) by mouth Daily.       Review of Systems   Unable to perform ROS: Intubated     Objective:     Vital Signs (Most Recent):  Temp: 99.3 °F (37.4 °C) (12/22/24 1245)  Pulse: 81 (12/22/24 1250)  Resp: (!) 22 (12/22/24 1250)  BP: (!) 137/49 (12/22/24 1245)  SpO2: 95 % (12/22/24 1250) Vital Signs (24h Range):  Temp:  [97.6 °F (36.4 °C)-99.3 °F (37.4 °C)] 99.3 °F (37.4 °C)  Pulse:  [60-84] 81  Resp:  [15-32] 22  SpO2:  [91 %-99 %] 95 %  BP: ()/(37-80) 137/49   Weight: 98.9 kg (218 lb)  Body mass index is 35.2 kg/m².  SpO2: 95 %       Intake/Output Summary (Last 24 hours) at 12/22/2024 1441  Last data filed at 12/22/2024 1245  Gross per 24 hour   Intake 2570.81 ml   Output 950 ml   Net 1620.81 ml     Lines/Drains/Airways       Drain  Duration                  Urethral Catheter 12/17/24 1730 Straight-tip 16 Fr. 4 days         Rectal Tube 12/21/24 1800 fecal management system <1 day              Airway  Duration                  Airway - Non-Surgical 12/21/24 0914 1 day              Peripheral Intravenous Line  Duration                  Peripheral IV - Single Lumen 12/09/24 0835 No Posterior;Right Forearm 13 days         Peripheral IV - Single Lumen 12/21/24 0932 20 G Anterior;Right Forearm 1 day         Peripheral IV - Single Lumen 12/21/24 2200 20 G Right;Lateral;Proximal Forearm <1 day                  Significant Labs:   Chemistries:   Recent Labs   Lab 12/17/24  1741 12/17/24  2004 12/19/24  0510 12/20/24  0605 12/21/24  0513 12/21/24  1157 12/22/24  0352   *   < > 137 138 140 140 141   K 5.8*   < > 3.4* 3.0* 3.4* 3.3* 3.9   CL 98   < > 98 99 98 102 104   CO2 22*   < > 30 28 30 26 21*   BUN 27.6*   < > 31.5* 30.8* 21.5* 19.8 17.4    CREATININE 0.99   < > 0.81 0.74 0.78 0.82 0.82   CALCIUM 8.4   < > 7.9* 8.0* 8.0* 8.7 8.0*   BILITOT 0.8   < > 1.0 0.9  --  1.0 1.4   ALKPHOS 115   < > 99 97  --  95 100   ALT 16   < > 14 13  --  12 13   AST 52*   < > 33 35*  --  35* 45*   GLUCOSE 286*   < > 137* 133* 132* 126* 116*   MG  --   --  2.00  --   --  2.10 2.00   PHOS  --   --   --   --   --   --  3.8   TROPONINI 0.016  --   --   --   --   --   --     < > = values in this interval not displayed.        CBC/Anemia Labs: Coags:    Recent Labs   Lab 12/16/24  0507 12/17/24  0449 12/21/24  0513 12/21/24  1157 12/21/24  1937 12/22/24  0352 12/22/24  0630 12/22/24  1309   WBC 13.86*   < > 19.28* 19.74  19.74*  --  17.18*  --   --    HGB 9.5*   < > 8.0* 7.0*   < > 9.0* 9.1* 9.2*   HCT 30.2*   < > 25.8* 23.2*   < > 27.8* 28.1* 27.8*   *   < > 347 301  --  260  --   --    MCV 93.8   < > 95.6* 98.3*  --  95.5*  --   --    RDW 18.1*   < > 19.8* 19.8*  --  19.7*  --   --    IRON 29*  --   --   --   --   --   --   --    FERRITIN 98.28  --   --   --   --   --   --   --    [id number] 460  --   --   --   --   --   --   --     < > = values in this interval not displayed.    Recent Labs   Lab 12/20/24  0605 12/21/24  0513 12/22/24  0908   INR 2.4* 1.6* 1.6*        Significant Imaging:    EKG: None to review    Telemetry:  SR    Physical Exam  Constitutional:       Appearance: She is obese. She is ill-appearing.      Comments: Vented/sedated   HENT:      Head: Normocephalic.      Nose: Nose normal.      Mouth/Throat:      Mouth: Mucous membranes are moist.   Eyes:      Extraocular Movements: Extraocular movements intact.   Cardiovascular:      Rate and Rhythm: Normal rate and regular rhythm.      Pulses: Normal pulses.      Heart sounds: Murmur heard.   Pulmonary:      Effort: Pulmonary effort is normal.      Comments: Vent Mode: A/C  Oxygen Concentration (%):  [30-40] 40  Resp Rate Total:  [20 br/min-27 br/min] 22 br/min  Vt Set:  [450 mL-470 mL] 450  mL  PEEP/CPAP:  [5 cmH20-8 cmH20] 8 cmH20  Mean Airway Pressure:  [10 ooR51-33 cmH20] 14 cmH20  Abdominal:      Palpations: Abdomen is soft.   Skin:     General: Skin is warm and dry.   Neurological:      Mental Status: She is alert and oriented to person, place, and time.   Psychiatric:         Mood and Affect: Mood normal.         Behavior: Behavior normal.         Home Medications:   Current Facility-Administered Medications on File Prior to Encounter   Medication Dose Route Frequency Provider Last Rate Last Admin    0.9%  NaCl infusion   Intravenous On Call Procedure Vijay Uribe MD        diazePAM tablet 10 mg  10 mg Oral On Call Procedure Vijay Uribe MD   10 mg at 09/06/23 1021    diphenhydrAMINE capsule 50 mg  50 mg Oral On Call Procedure Vijay Uribe MD   50 mg at 09/06/23 1021     Current Outpatient Medications on File Prior to Encounter   Medication Sig Dispense Refill    amiodarone (PACERONE) 200 MG Tab Take 200 mg by mouth once daily.      aspirin (ECOTRIN) 81 MG EC tablet Take 1 tablet (81 mg total) by mouth once daily. for 25 days 25 tablet 0    atorvastatin (LIPITOR) 40 MG tablet Take 40 mg by mouth once daily.      clopidogreL (PLAVIX) 75 mg tablet Take 1 tablet (75 mg total) by mouth once daily. 30 tablet 0    DUPIXENT SYRINGE 300 mg/2 mL Syrg Inject 300 mg into the skin every 14 (fourteen) days.      estradioL (ESTRACE) 0.01 % (0.1 mg/gram) vaginal cream Place 0.5 g vaginally twice a week.      famotidine (PEPCID) 40 MG tablet Take 40 mg by mouth every evening.      fluticasone-umeclidin-vilanter (TRELEGY ELLIPTA) 200-62.5-25 mcg inhaler Inhale 1 puff into the lungs once daily.      furosemide (LASIX) 40 MG tablet Take 40 mg by mouth.      glimepiride (AMARYL) 2 MG tablet Take 4 mg by mouth 2 (two) times a day.      HYDROcodone-acetaminophen (NORCO)  mg per tablet Take 1 tablet by mouth every 6 (six) hours as needed for Pain. 20 tablet 0    immun glob G,IgG,/pro/IgA 0-50 (HIZENTRA SUBQ)  Inject into the skin every 14 (fourteen) days.      lipase-protease-amylase 24,000-76,000-120,000 units (CREON) 24,000-76,000 -120,000 unit capsule Take 1-2 capsules by mouth 3 (three) times daily with meals. 2 caps with meals and 1 cap c snacks      metFORMIN (GLUCOPHAGE) 500 MG tablet Take 1 tablet (500 mg total) by mouth 2 (two) times daily with meals. 180 tablet 3    metoprolol succinate (TOPROL-XL) 50 MG 24 hr tablet Take 1 tablet (50 mg total) by mouth once daily. 30 tablet 0    NIFEdipine (PROCARDIA-XL) 30 MG (OSM) 24 hr tablet Take 30 mg by mouth.      pantoprazole (PROTONIX) 40 MG tablet Take 1 tablet by mouth every morning.      timoloL (BETIMOL) 0.5 % ophthalmic solution Place 1 drop into both eyes 2 (two) times daily.      warfarin (COUMADIN) 5 MG tablet Take 1 tablet (5 mg total) by mouth Daily. 30 tablet 0     Current Schedule Inpatient Medications:   amiodarone  200 mg Oral Daily    atorvastatin  40 mg Oral QHS    clopidogreL  75 mg Oral Daily    ferric gluconate (FERRLECIT) 125 mg in 0.9% NaCl 100 mL IVPB  125 mg Intravenous Daily    LIDOcaine (PF) 10 mg/ml (1%)  1 mL Intradermal Once    LIDOcaine  1 patch Transdermal Q24H    lipase-protease-amylase 24,000-76,000-120,000 units  1 capsule Oral TID WM    metoprolol succinate  25 mg Oral Daily    mupirocin   Nasal BID    NIFEdipine  30 mg Oral Daily    pantoprazole  40 mg Intravenous BID    sodium zirconium cyclosilicate  10 g Oral Once    timolol maleate 0.5%  1 drop Both Eyes BID     Continuous Infusions:   electrolyte-A   Intravenous Continuous   Held at 12/21/24 1000    propofoL  0-50 mcg/kg/min Intravenous Continuous 23.7 mL/hr at 12/22/24 1259 40 mcg/kg/min at 12/22/24 1259     Assessment:   Acute on Chronic Diastolic Heart Failure - Related to VHD/Severe MS - Clinically Improved     - EF 60-65% with G 1 Diastolic Dysfunction (11.14.24)   VHD    - MARYBETH (11.27.24) - Mitral Valve: There is a bioprosthetic valve in the mitral position. There is severe  stenosis. The mean pressure gradient across the mitral valve is 16 mmHg at a heart rate of bpm. There is moderate regurgitation.     - ECHO (11.23.24): There is evidence of bioprosthetic mitral valve stenosis The mean pressure gradient across the mitral valve is 19 mmHg at a heart rate of 78 bpm.     - s/p MVR (9.20.23): Mitral valve replacement with a 25 mm mosaic porcine valve    - Moderate TR    - Mild MR  Acute on Chronic Respiratory Failure Requiring - Now Intubated      -s/p RRT for respiratory distress & placed on CPAP (12.17.24)  Supratherapeutic INR - Resolved     - INR 6.7 (12.19.24)   Acute GIB    - Oozing Duodenal AVM s/p Argon Plasma Coagulation (12.21.24)   Diffuse Gastritis   CAD/Stents    - s/p LHC (11.29.24) - Eccentric Prox LAD lesion was 80% stenosed with 0% stenosis post-intervention. iFR 0.84. Prox LAD lesion: A STENT SYNERGY XD 3.0X16MM stent was successfully placed at 8 GEO for 10 sec. Proximal part of the stent was post dilated by using 3.5/12 mm NC and a 4/8 mm NC balloons at 12 atmospheres.  Anemia - Stable    - s/p PRBC & Plt transfusions  Leukocytosis - Improving   COPD Without Exacerbation   Pulmonary HTN  GEMINI  DM II  PAF - Currently SR    - [id number] Score 6 (9.7% Stroke Risk Per Year)    - s/p Ligation of SUKHI (9.20.23) - Endostapler   ABEL  HTN  DVT s/p IVC Filter     - previously on Xarelto outpatient  SSS    - s/p PPM (9.25.23): [facility]   HLD  Asthma  Left Thyroid Nodule  GERD  Chronic Sinusitis  Common Variable Immunodeficiency  Glaucoma  No previous history of GIB      Plan:   No plans to resume Coumadin at this time.   Continue Plavix if okay with GI (recent stenting) and start ASA 81 mg daily when able.   Of note, pt was on Xarelto outpatient prior to switching to Coumadin for a DVT.   Will hold off on repeat echo to further evaluate her mitral valve until extubated.   Vent management per ICU.   Other medical management per primary team.   Will discuss MV status this week with  structural heart team.   Will continue to follow.       YANIRA Turner  Cardiology  [facility]     I agree with the findings of the complexity of problems addressed and take responsibility for the plan's risks and complications. I approved the plan documented by Rhoda Escoto NP.

## 2024-12-22 NOTE — PROGRESS NOTES
[facility] - [facility] ICU  Pulmonary Critical Care Note    Patient Name: Kortney Leach  MRN: [id number]  Admission Date: 12/15/2024  Hospital Length of Stay: 7 days  Code Status: Full Code  Attending Provider: Julián Monique MD  Primary Care Provider: Arnaldo Hernandez MD     Subjective:     HPI:   79-year-old woman with A-fib, HTN, valvular heart disease s/p MVR, CVID, CAD s/p PCI to LAD (11/29), DVT s/p IVC filter, T2DM, GERD, SSS s/p PPM, HTN, HLD, chronic sinusitis status post sinus surgery, ABEL, obesity, asthma/COPD who was initially admitted as a transfer from [place] to [facility] medicine on 12/15/24  for concern for cauda equina syndrome and neurosurgerical services. RRT called on 12/17 and patient placed on BIPAP. CXR at the time consistent with R sided PNA vs. Pleural effusion and broad spectrum antibiotics were started at this time. Patient reported melena in setting of severe normocytic anemia and iron deficiency. EGD done on 12/21/24 consistent with oozing duodenal AVMs requiring coagulation. No intraoperative complications noted. Patient was intubated for air way protection in setting of oropharyngeal oozing near vocal cords and transferred to ICU postoperatively.     Hospital Course/Significant events:  EGD s/p duodenal AVM coagulation- 12/21/24    24 Hour Interval History:  NAEO documented, VSS and AF. No active bleeding overnight per nursing. Remains intubated and sedated with propofol 40 mcgs/kg/min. Does not require pressors at this time. H/H, coagulation factors, and INR stable s/p 2u pRBC and 1u FFP transfusion yesterday. Ordered 1u FFP today. INR 1.6 today. On plavix, holding coumadin. Leukocytosis downtrending from 19 to 17 today. Corrected Ca wnl. ABG consistent with respiratory alkalosis with non-anion gap metabolic acidosis. Micro unremarkable to date. GI following, adding TXA nebs. Cardiology following, echo planned for later today.    Past Medical History:    Diagnosis Date    Anticoagulant long-term use     Asthma     Chronic sinusitis, unspecified     COPD (chronic obstructive pulmonary disease)     CVID (common variable immunodeficiency)     Diabetes mellitus, type 2     GERD (gastroesophageal reflux disease)     Hypertension     Severe mitral valve regurgitation     Sleep apnea     uses CPAP    SOB (shortness of breath)     Unspecified glaucoma     Weakness        Past Surgical History:   Procedure Laterality Date    A-V CARDIAC PACEMAKER INSERTION N/A 2023    Procedure: INSERTION, CARDIAC PACEMAKER, DUAL CHAMBER;  Surgeon: Arnaud Moon MD;  Location: [facility] CATH LAB;  Service: Cardiology;  Laterality: N/A;  SJM    ANGIOGRAM, CORONARY, WITH LEFT HEART CATHETERIZATION N/A 2024    Procedure: Angiogram, Coronary, with Left Heart Cath;  Surgeon: Tatiana Jarrett MD;  Location: [facility] CATH LAB;  Service: Cardiology;  Laterality: N/A;    APPENDECTOMY      CATARACT EXTRACTION Bilateral     CATHETERIZATION OF BOTH LEFT AND RIGHT HEART N/A 2024    Procedure: CATHETERIZATION, HEART, BOTH LEFT AND RIGHT;  Surgeon: Sebastian Watters Jr., MD;  Location: [facility] CATH LAB;  Service: Cardiology;  Laterality: N/A;  With Valve Study    COLONOSCOPY      EGD, WITH BALLOON DILATION      ESOPHAGOGASTRODUODENOSCOPY      LEFT HEART CATHETERIZATION Left 2023    Procedure: Left heart cath;  Surgeon: Vijay Uribe MD;  Location: [facility] CATH LAB;  Service: Cardiology;  Laterality: Left;  [facility]    MITRAL VALVE REPLACEMENT N/A 2023    Procedure: REPLACEMENT, MITRAL VALVE;  Surgeon: Steven Rosales IV, MD;  Location: [facility] OR;  Service: Cardiovascular;  Laterality: N/A;    TONSILLECTOMY      TOTAL KNEE ARTHROPLASTY Bilateral        Social History     Socioeconomic History    Marital status: Single   Tobacco Use    Smoking status: Former     Current packs/day: 0.00     Types: Cigarettes     Quit date:      Years since quittin.9    Smokeless tobacco: Never   Substance  and Sexual Activity    Alcohol use: Not Currently    Drug use: Not Currently    Sexual activity: Not Currently     Social Drivers of Health     Financial Resource Strain: Low Risk  (12/16/2024)    Overall Financial Resource Strain (CARDIA)     Difficulty of Paying Living Expenses: Not hard at all   Food Insecurity: No Food Insecurity (12/16/2024)    Hunger Vital Sign     Worried About Running Out of Food in the Last Year: Never true     Ran Out of Food in the Last Year: Never true   Transportation Needs: No Transportation Needs (12/16/2024)    TRANSPORTATION NEEDS     Transportation : No   Physical Activity: Inactive (12/4/2024)    Exercise Vital Sign     Days of Exercise per Week: 0 days     Minutes of Exercise per Session: 0 min   Stress: No Stress Concern Present (12/16/2024)    [employer] - Occupational Stress Questionnaire     Feeling of Stress : Only a little   Housing Stability: Low Risk  (12/16/2024)    Housing Stability Vital Sign     Unable to Pay for Housing in the Last Year: No     Homeless in the Last Year: No           Current Outpatient Medications   Medication Instructions    amiodarone (PACERONE) 200 mg, Daily    aspirin (ECOTRIN) 81 mg, Oral, Daily    atorvastatin (LIPITOR) 40 mg, Daily    clopidogreL (PLAVIX) 75 mg, Oral, Daily    DUPIXENT SYRINGE 300 mg, Every 14 days    estradioL (ESTRACE) 0.5 g, Twice weekly    famotidine (PEPCID) 40 mg, Nightly    fluticasone-umeclidin-vilanter (TRELEGY ELLIPTA) 200-62.5-25 mcg inhaler 1 puff, Daily    furosemide (LASIX) 40 mg    glimepiride (AMARYL) 4 mg, 2 times daily    HYDROcodone-acetaminophen (NORCO)  mg per tablet 1 tablet, Oral, Every 6 hours PRN    immun glob G,IgG,/pro/IgA 0-50 (HIZENTRA SUBQ) Every 14 days    lipase-protease-amylase 24,000-76,000-120,000 units (CREON) 24,000-76,000 -120,000 unit capsule 1-2 capsules, 3 times daily with meals    metFORMIN (GLUCOPHAGE) 500 mg, Oral, 2 times daily with meals     metoprolol succinate (TOPROL-XL) 50 mg, Oral, Daily    NIFEdipine (PROCARDIA-XL) 30 mg    pantoprazole (PROTONIX) 40 MG tablet 1 tablet, Every morning    timoloL (BETIMOL) 0.5 % ophthalmic solution 1 drop, Both Eyes, 2 times daily    warfarin (COUMADIN) 5 mg, Oral, Daily       Current Inpatient Medications   amiodarone  200 mg Oral Daily    atorvastatin  40 mg Oral QHS    clopidogreL  75 mg Oral Daily    ferric gluconate (FERRLECIT) 125 mg in 0.9% NaCl 100 mL IVPB  125 mg Intravenous Daily    LIDOcaine (PF) 10 mg/ml (1%)  1 mL Intradermal Once    LIDOcaine  1 patch Transdermal Q24H    lipase-protease-amylase 24,000-76,000-120,000 units  1 capsule Oral TID WM    metoprolol succinate  25 mg Oral Daily    mupirocin   Nasal BID    NIFEdipine  30 mg Oral Daily    pantoprazole  40 mg Intravenous BID    sodium zirconium cyclosilicate  10 g Oral Once    timolol maleate 0.5%  1 drop Both Eyes BID       Current Intravenous Infusions   electrolyte-A   Intravenous Continuous   Held at 12/21/24 1000    propofoL  0-50 mcg/kg/min Intravenous Continuous 23.7 mL/hr at 12/22/24 1259 40 mcg/kg/min at 12/22/24 1259         ROS   Unable to obtain 2/2 sedation     Objective:       Intake/Output Summary (Last 24 hours) at 12/22/2024 1327  Last data filed at 12/22/2024 1245  Gross per 24 hour   Intake 2734.14 ml   Output 950 ml   Net 1784.14 ml         Vital Signs (Most Recent):  Temp: 99.3 °F (37.4 °C) (12/22/24 1245)  Pulse: 81 (12/22/24 1250)  Resp: (!) 22 (12/22/24 1250)  BP: (!) 137/49 (12/22/24 1245)  SpO2: 95 % (12/22/24 1250)  Body mass index is 35.2 kg/m².  Weight: 98.9 kg (218 lb) Vital Signs (24h Range):  Temp:  [97.6 °F (36.4 °C)-99.3 °F (37.4 °C)] 99.3 °F (37.4 °C)  Pulse:  [60-84] 81  Resp:  [15-32] 22  SpO2:  [91 %-100 %] 95 %  BP: ()/(37-80) 137/49     Physical Exam  Vitals and nursing note reviewed.   Constitutional:       General: She is not in acute distress.     Appearance: She is ill-appearing. She is not  toxic-appearing or diaphoretic.      Interventions: She is sedated and intubated.   HENT:      Head: Normocephalic and atraumatic.      Mouth/Throat:      Mouth: Mucous membranes are dry.      Pharynx: Oropharynx is clear. No pharyngeal swelling.      Comments: ET tube occluding oropharynx     Eyes:      Pupils: Pupils are equal, round, and reactive to light.   Neck:      Comments: No JVD or hepatojugular reflux   Cardiovascular:      Rate and Rhythm: Normal rate and regular rhythm.      Pulses: Normal pulses.      Heart sounds: Murmur heard.   Pulmonary:      Effort: Pulmonary effort is normal. No respiratory distress. She is intubated.      Breath sounds: No wheezing, rhonchi or rales.      Comments: No dyssynchrony to mechanical ventilation   Abdominal:      General: Abdomen is flat. Bowel sounds are normal. There is no distension.      Palpations: Abdomen is soft.   Musculoskeletal:         General: Normal range of motion.      Right lower leg: No edema.      Left lower leg: No edema.   Skin:     General: Skin is warm and dry.      Capillary Refill: Capillary refill takes less than 2 seconds.      Coloration: Skin is not jaundiced.      Findings: No bruising or rash.   Neurological:      Comments: Limited 2/2 sedation    Psychiatric:      Comments: Limited 2/2 sedation            Lines/Drains/Airways       Drain  Duration                  Urethral Catheter 12/17/24 1730 Straight-tip 16 Fr. 4 days         Rectal Tube 12/21/24 1800 fecal management system <1 day              Airway  Duration                  Airway - Non-Surgical 12/21/24 0914 1 day              Peripheral Intravenous Line  Duration                  Peripheral IV - Single Lumen 12/09/24 0835 No Posterior;Right Forearm 13 days         Peripheral IV - Single Lumen 12/21/24 0932 20 G Anterior;Right Forearm 1 day         Peripheral IV - Single Lumen 12/21/24 2200 20 G Right;Lateral;Proximal Forearm <1 day                    Significant Labs:    Lab  Results   Component Value Date    WBC 17.18 (H) 12/22/2024    HGB 9.2 (L) 12/22/2024    HCT 27.8 (L) 12/22/2024    MCV 95.5 (H) 12/22/2024     12/22/2024           BMP  Lab Results   Component Value Date     12/22/2024    K 3.9 12/22/2024    CO2 21 (L) 12/22/2024    BUN 17.4 12/22/2024    CREATININE 0.82 12/22/2024    CALCIUM 8.0 (L) 12/22/2024    AGAP 16.0 12/22/2024         ABG  Recent Labs   Lab 12/22/24  0425   PH 7.460*   PO2 65.0*   PCO2 39.0   HCO3 27.7*   POCBASEDEF 3.70       Mechanical Ventilation Support:  Vent Mode: A/C (12/22/24 1250)  Ventilator Initiated: Yes (12/21/24 1044)  Set Rate: 20 BPM (12/22/24 1250)  Vt Set: 450 mL (12/22/24 1250)  PEEP/CPAP: 8 cmH20 (12/22/24 1250)  Oxygen Concentration (%): 40 (12/22/24 1250)  Peak Airway Pressure: 31 cmH20 (12/22/24 1250)  Total Ve: 11.1 L/m (12/22/24 1250)  F/VT Ratio<105 (RSBI): (!) 46.32 (12/22/24 1250)      Significant Imaging:  I have reviewed the pertinent imaging within the past 24 hours.    Echo 11/27/24: EF  60-65% with severe LA dilation with moderate regurgitation of bioprosthetic mitral valve.     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 12/21/24   Diffuse Gastritis with Hx of GERD on Dexilant   Severe Normocytic Anemia 2/2 Melena and Iron Deficiency   Hypokalemia - resolved   R sided pleural effusion vs PNA - started on broad spectrum abx 12/17   CAD s/p LAD PCI on Plavix 11/29/24 - continued   VHD s/p MVR on Coumadin 9/20/23 - held since 12/7 s/p Vit K on 12/20   A fib s/p SUKHI ligation 9/20/23   SSS s/p PPM 9/25/23   DM2   HTN  CVID  Hx of DVT status post Adam filter  Hx of ABEL on CPAP   Hx of Diverticulosis and colonic polyps on last colonoscopy    Hx of HICKMAN   Hx of Pancreatic Insufficiency   Hx of Glaucoma   Hx of Asthma and COPD - not in acute exacerbation        Plan  Continue ICU level of care for close monitoring, patient was intubated and mechanically ventilated 2/2  oropharyngeal oozing near vocal cords. No active bleeding since ICU admission.     Titrate mechanical ventilation per ARDS net protocol, wean sedation as tolerated   Maintain MAP >65, currently does not require pressors   GI following for AVMs s/p EGD, appreciate assistance  Maintain Hb>8 2/2 recent PCI receiving Plavix. S/p 2u pRBC on 12/21. Continue serial H/H, stable post-transfusion.    Continue to hold AC for now. S/p 1u FFP on 12/21, ordered additional unit today.    TXA nebs today   S/p 1 x Ferrlecit on 12/20. 3 x additional doses over 3 days.   Cardiology following for complex cardiac history, appreciate assistance   Resume Coumadin when cleared with GI with INR goal 2-3.   Repeat Echo later today to re-eval bioprosthetic MV   Continue home Amiodarone 200mg qd, Lipitor 40mg qd, Plavix 75mg qd, Lasix 40mg qd, Creon TID, Toprol 25mg qd, Nifedipine 30mg qd, and Timolol eye drops BID  MSSI in place, CBG goal 140-180   PRN antiemetics, MMPC, and bowel regimen in place   Continuous cardiac monitoring   Daily AM lab work, keep K>4, Phos>3, and Mg>2, replete as needed   OG tube ordered today   Received 5 days of Zosyn despite lack of respiratory cultures, blood culture x 1 and urine culture NGTD. Vancomycin was discontinued on 12/20/24 for negative MRSA PCR. Zosyn discontinued 12/21.     Code Status: Full Code   DVT Prophylaxis: SCDs  GI Prophylaxis: Protonix BID, Pepcid      32 minutes of critical care was time spent personally by me on the following activities: development of treatment plan with patient or surrogate and bedside caregivers, discussions with consultants, evaluation of patient's response to treatment, examination of patient, ordering and performing treatments and interventions, ordering and review of laboratory studies, ordering and review of radiographic studies, pulse oximetry, re-evaluation of patient's condition.  This critical care time did not overlap with that of any other provider or involve  time for any procedures.     Judd Rivera MD  Pulmonary Critical Care Medicine  [facility] - [facility] ICU  DOS: 12/22/2024

## 2024-12-22 NOTE — PLAN OF CARE
Problem: Adult Inpatient Plan of Care  Goal: Optimal Comfort and Wellbeing  Outcome: Progressing  Intervention: Monitor Pain and Promote Comfort  Flowsheets (Taken 12/21/2024 0830)  Pain Management Interventions:   around-the-clock dosing utilized   quiet environment facilitated   relaxation techniques promoted   position adjusted   pillow support provided   pain management plan reviewed with patient/caregiver  Intervention: Provide Person-Centered Care  Flowsheets (Taken 12/21/2024 0830)  Trust Relationship/Rapport:   care explained   questions encouraged   choices provided   reassurance provided   emotional support provided   questions answered   empathic listening provided   thoughts/feelings acknowledged     Problem: Wound  Goal: Optimal Functional Ability  Outcome: Progressing  Intervention: Optimize Functional Ability  Flowsheets (Taken 12/21/2024 0830)  Activity Management: Rolling - L1  Activity Assistance Provided: assistance, 1 person  Goal: Optimal Pain Control and Function  Outcome: Progressing  Intervention: Prevent or Manage Pain  Flowsheets (Taken 12/21/2024 0830)  Sleep/Rest Enhancement:   awakenings minimized   natural light exposure provided   relaxation techniques promoted   regular sleep/rest pattern promoted   therapeutic touch utilized  Pain Management Interventions:   around-the-clock dosing utilized   quiet environment facilitated   relaxation techniques promoted   position adjusted   pillow support provided   pain management plan reviewed with patient/caregiver

## 2024-12-22 NOTE — PROGRESS NOTES
Kortney Leach   MRN: [id number]   ADMISSION DATE: 12/15/2024  : 1945  AGE: 79 y.o.    DATE :  2024     PROVIDER: ROBERTO MARTÍNEZ    SUBJECTIVE:  Patient is on mechanical ventilation.  Reported to have some blood in the oropharyngeal region on suctioning although not as much as yesterday.    Review of Systems   Intubated and sedated  Review of patient's allergies indicates:   Allergen Reactions    Adhesive tape-silicones      Redness         amiodarone  200 mg Oral Daily    atorvastatin  40 mg Oral QHS    clopidogreL  75 mg Oral Daily    ferric gluconate (FERRLECIT) 125 mg in 0.9% NaCl 100 mL IVPB  125 mg Intravenous Daily    LIDOcaine (PF) 10 mg/ml (1%)  1 mL Intradermal Once    LIDOcaine  1 patch Transdermal Q24H    lipase-protease-amylase 24,000-76,000-120,000 units  1 capsule Oral TID WM    metoprolol succinate  25 mg Oral Daily    mupirocin   Nasal BID    NIFEdipine  30 mg Oral Daily    pantoprazole  40 mg Intravenous BID    sodium zirconium cyclosilicate  10 g Oral Once    timolol maleate 0.5%  1 drop Both Eyes BID    tranexamic acid  500 mg Nebulization Once       Medications Discontinued During This Encounter   Medication Reason    acetaminophen tablet 650 mg     acetaminophen tablet 650 mg     polyethylene glycol packet 17 g     aspirin EC tablet 81 mg     warfarin (COUMADIN) tablet 5 mg     azithromycin (ZITHROMAX) 500 mg in D5W 250 mL  IVPB (admixture device)     cefTRIAXone injection 1 g     furosemide tablet 40 mg     vancomycin (VANCOCIN) 1,000 mg in D5W 250 mL IVPB (admixture device)     furosemide injection 40 mg     aspirin chewable tablet 81 mg     vancomycin - pharmacy to dose     vancomycin 1,500 mg in D5W 250 mL IVPB (admixture device)     furosemide injection 40 mg     metoprolol succinate (TOPROL-XL) 24 hr tablet 50 mg     ferrous sulfate tablet 1 each     pantoprazole EC tablet 40 mg     polyethylene glycol packet 17 g     potassium bicarbonate disintegrating tablet 50 mEq      piperacillin-tazobactam (ZOSYN) 4.5 g in D5W 100 mL IVPB (MB+)     furosemide tablet 40 mg     famotidine tablet 40 mg     potassium bicarbonate disintegrating tablet 25 mEq          electrolyte-A   Intravenous Continuous   Held at 24 1000    propofoL  0-50 mcg/kg/min Intravenous Continuous 23.7 mL/hr at 24 0635 40 mcg/kg/min at 24 0635        Past Medical History:   Diagnosis Date    Anticoagulant long-term use     Asthma     Chronic sinusitis, unspecified     COPD (chronic obstructive pulmonary disease)     CVID (common variable immunodeficiency)     Diabetes mellitus, type 2     GERD (gastroesophageal reflux disease)     Hypertension     Severe mitral valve regurgitation     Sleep apnea     uses CPAP    SOB (shortness of breath)     Unspecified glaucoma     Weakness       Social History     Socioeconomic History    Marital status: Single   Tobacco Use    Smoking status: Former     Current packs/day: 0.00     Types: Cigarettes     Quit date:      Years since quittin.9    Smokeless tobacco: Never   Substance and Sexual Activity    Alcohol use: Not Currently    Drug use: Not Currently    Sexual activity: Not Currently     Social Drivers of Health     Financial Resource Strain: Low Risk  (2024)    Overall Financial Resource Strain (CARDIA)     Difficulty of Paying Living Expenses: Not hard at all   Food Insecurity: No Food Insecurity (2024)    Hunger Vital Sign     Worried About Running Out of Food in the Last Year: Never true     Ran Out of Food in the Last Year: Never true   Transportation Needs: No Transportation Needs (2024)    TRANSPORTATION NEEDS     Transportation : No   Physical Activity: Inactive (2024)    Exercise Vital Sign     Days of Exercise per Week: 0 days     Minutes of Exercise per Session: 0 min   Stress: No Stress Concern Present (2024)    [employer] - Occupational Stress Questionnaire     Feeling of Stress :  Only a little   Housing Stability: Low Risk  (12/16/2024)    Housing Stability Vital Sign     Unable to Pay for Housing in the Last Year: No     Homeless in the Last Year: No      Past Surgical History:   Procedure Laterality Date    A-V CARDIAC PACEMAKER INSERTION N/A 9/25/2023    Procedure: INSERTION, CARDIAC PACEMAKER, DUAL CHAMBER;  Surgeon: Arnaud Moon MD;  Location: [facility] CATH LAB;  Service: Cardiology;  Laterality: N/A;  [facility]    ANGIOGRAM, CORONARY, WITH LEFT HEART CATHETERIZATION N/A 11/29/2024    Procedure: Angiogram, Coronary, with Left Heart Cath;  Surgeon: Tatiana Jarrett MD;  Location: [facility] CATH LAB;  Service: Cardiology;  Laterality: N/A;    APPENDECTOMY      CATARACT EXTRACTION Bilateral     CATHETERIZATION OF BOTH LEFT AND RIGHT HEART N/A 11/27/2024    Procedure: CATHETERIZATION, HEART, BOTH LEFT AND RIGHT;  Surgeon: Sebastian Watters Jr., MD;  Location: [facility] CATH LAB;  Service: Cardiology;  Laterality: N/A;  With Valve Study    COLONOSCOPY      EGD, WITH BALLOON DILATION      ESOPHAGOGASTRODUODENOSCOPY      LEFT HEART CATHETERIZATION Left 09/06/2023    Procedure: Left heart cath;  Surgeon: Vijay Uribe MD;  Location: [facility] CATH LAB;  Service: Cardiology;  Laterality: Left;  [facility]    MITRAL VALVE REPLACEMENT N/A 9/20/2023    Procedure: REPLACEMENT, MITRAL VALVE;  Surgeon: Steven Rosales IV, MD;  Location: [facility] OR;  Service: Cardiovascular;  Laterality: N/A;    TONSILLECTOMY      TOTAL KNEE ARTHROPLASTY Bilateral         OBJECTIVE:     Vitals:    12/22/24 0800 12/22/24 0810 12/22/24 0811 12/22/24 0812   BP:   (!) 116/47    BP Location:       Patient Position:       Pulse: 69 69  69   Resp:  20     Temp:       TempSrc:       SpO2:  98%     Weight:       Height:           Physical Exam   Vitals and nursing note reviewed.   Constitutional:       General: She is not in acute distress.     Appearance: She is ill-appearing. She is not toxic-appearing or diaphoretic.      Interventions: She is sedated and  intubated.   HENT:      Head: Normocephalic and atraumatic.      Mouth/Throat:      Mouth: Mucous membranes are dry.      Pharynx: Oropharynx is clear. No pharyngeal swelling.      Comments: ET tube occluding oropharynx      Eyes:      Pupils: Pupils are equal, round, and reactive to light.   Neck:      Comments: No JVD or hepatojugular reflux   Cardiovascular:      Rate and Rhythm: Normal rate and regular rhythm.      Pulses: Normal pulses.      Heart sounds: Murmur heard.   Pulmonary:      Effort: Pulmonary effort is normal. No respiratory distress. She is intubated.      Breath sounds: No wheezing, rhonchi or rales.      Comments: No dyssynchrony to mechanical ventilation   Abdominal:      General: Abdomen is flat. Bowel sounds are normal. There is no distension.      Palpations: Abdomen is soft.   Musculoskeletal:         General: Normal range of motion.      Right lower leg: No edema.      Left lower leg: No edema.   Skin:     General: Skin is warm and dry.      Capillary Refill: Capillary refill takes less than 2 seconds.      Coloration: Skin is not jaundiced.      Findings: No bruising or rash.   Neurological:      Comments: Limited 2/2 sedation    Psychiatric:      Comments: Limited 2/2 sedatio    LABS    Recent Labs   Lab 12/21/24  0513 12/21/24  1157 12/21/24  1937 12/22/24  0000 12/22/24  0352 12/22/24  0630   WBC 19.28* 19.74  19.74*  --   --  17.18*  --    HGB 8.0* 7.0*   < > 9.7* 9.0* 9.1*   HCT 25.8* 23.2*   < > 30.4* 27.8* 28.1*    301  --   --  260  --     < > = values in this interval not displayed.      Recent Labs   Lab 12/20/24  0605 12/21/24  0513 12/21/24  1157 12/22/24  0352    140 140 141   K 3.0* 3.4* 3.3* 3.9   CL 99 98 102 104   CO2 28 30 26 21*   BUN 30.8* 21.5* 19.8 17.4   CREATININE 0.74 0.78 0.82 0.82   CALCIUM 8.0* 8.0* 8.7 8.0*   BILITOT 0.9  --  1.0 1.4   ALKPHOS 97  --  95 100   ALT 13  --  12 13   AST 35*  --  35* 45*   GLUCOSE 133* 132* 126* 116*      Recent Labs  "  Lab 12/22/24  0908   INR 1.6*    No results for input(s): "AMYLASE" in the last 168 hours.   Recent Labs   Lab 12/20/24  0605 12/21/24  0513 12/22/24  0908   INR 2.4* 1.6* 1.6*           RESULTS: XR Gastric tube check, non-radiologist performed    Result Date: 12/22/2024  EXAMINATION: XR GASTRIC TUBE CHECK, NON-RADIOLOGIST PERFORMED CLINICAL HISTORY: verify OG tube placement; COMPARISON: No priors     Frontal image of the upper abdomen.  Enteric tube extends into the stomach.  Tip overlies the proximal gastric body. Electronically signed by: Jason Andrews Date:    12/22/2024 Time:    10:07    X-Ray Chest 1 View    Result Date: 12/21/2024  EXAMINATION: XR CHEST 1 VIEW CLINICAL HISTORY: f/u intubation and ET tube placement; COMPARISON: 17 December 2024 FINDINGS: Frontal view of the chest was obtained. Endotracheal tube tip about 2 cm above the blanca.  Heart and mediastinum otherwise unchanged.  Slightly increased bilateral interstitial predominant opacities.  There is no pneumothorax.     Interval intubation.  Slightly increased bilateral opacities. Electronically signed by: Jason Andrews Date:    12/21/2024 Time:    14:39    CT Head Without Contrast    Result Date: 12/18/2024  EXAMINATION: CT HEAD WITHOUT CONTRAST CLINICAL HISTORY: Mental status change, unknown cause; TECHNIQUE: Axial scans were obtained from skull base to the vertex. Coronal and sagittal reconstructions obtained from the axial data. Automatic exposure control was utilized to limit radiation dose. Contrast: None Radiation Dose: Total DLP: 885 mGy*cm COMPARISON: CT head dated 12/13/2024 FINDINGS: There is no acute intracranial hemorrhage or edema. The gray-white matter differentiation is preserved. There is no mass effect or midline shift.  There is diffuse parenchymal volume loss.  The basal cisterns are patent. There is no abnormal extra-axial fluid collection. The calvarium and skull base are intact. The visualized paranasal sinuses and the " mastoid air cells are clear.     No acute intracranial abnormality. Electronically signed by: Jaylene Mirza Date:    12/18/2024 Time:    14:18    X-Ray Chest 1 View    Result Date: 12/17/2024  EXAMINATION: XR CHEST 1 VIEW CLINICAL HISTORY: SOB; TECHNIQUE: Single frontal view of the chest was performed. COMPARISON: 12/15/2024 FINDINGS: There is some pulmonary edema pulmonary venous congestion bilaterally.  There is areas of atelectasis versus developing infiltrate in the right lower lobe.  There are small effusions seen bilaterally.  The heart is normal in size.  Postsurgical changes seen the mediastinum.  There is a pacemaker in the left anterior chest wall.  Bones and joints show no acute abnormality.     Findings consistent with increased volume status overall not significantly changed since prior examination Electronically signed by: Haider Chavarria Date:    12/17/2024 Time:    18:06    X-Ray Chest 1 View    Result Date: 12/15/2024  EXAMINATION: XR CHEST 1 VIEW CLINICAL HISTORY: hypoxia; TECHNIQUE: Single frontal view of the chest was performed. COMPARISON: 12/04/2024 FINDINGS: There are increased markings bilaterally most pronounced in the right lower lobe.  There is blunting of the right costophrenic angle and I cannot rule out a small effusion.  Heart size is within normal limits.  Pacing device is in place.  There is vascular calcification noted.  Patient has had a previous sternotomy.     Increased density on the right with small right-sided pleural effusion cannot rule out infiltrate.  This appears   worse than on previous Electronically signed by: Brayden Hickman MD Date:    12/15/2024 Time:    17:25    CT Lumbar Spine Without Contrast    Result Date: 12/14/2024  EXAMINATION: CT LUMBAR SPINE WITHOUT CONTRAST CLINICAL HISTORY: Low back pain, increased fracture risk; TECHNIQUE: Multidetector axial images were performed of the lumbar spine without contrast and the images were reformatted. Dose length product  of 108 mGycm. Automated exposure control was utilized to minimize radiation dose. COMPARISON: None available FINDINGS: Lumbar vertebrae height is unremarkable and there is no significant listhesis.  No acute fracture or malalignment identified.  No apparent paraspinal soft inflammations.  Note is made of inferior vena cava filter.  There are uterine dense calcifications likely related to fibroids.  Endometrium is not delineated..  Disc segmental analysis is given below: At L1-L2, there is bulging of annulus fibrosis and facet arthropathy which results in mild central canal stenosis.  There are no narrowings of the neural foramen. At L2-L3, there is a generalized disc bulge, ligamentum flavum thickening and facet arthropathy contributing to cause mild central canal stenosis.  There is mild narrowing of the right neural foramen.  The left neural foramen is patent. At L3-L4, there is broad osteophyte disc complex, ligamentum flavum thickening and facet arthropathy.  In conjunction, these findings cause mild to moderate central canal stenosis.  Right neural foramen is patent.  There is mild narrowing of the left neural foramen. At L4-L5, there is broad disc protrusion which lateralizes into the left neural foramen.  There is ligamentum flavum thickening and facet arthropathy which is more pronounced on the left.  Thecal sac is deviated towards the right.  Central canal stenosis is moderate.  There is mild narrowing of right neural foramen.  There is moderate narrowing of the left neural foramen caused by lateralized portion of the disc and spondylosis. At L5-S1, there is mild osteophyte disc complex and facet arthropathy.  Ventral thecal sac is slightly flattened without significant central canal stenosis.  Right neural foramen is patent.  There is mild narrowing of the left neural foramen caused by uncovertebral and facet arthropathy.     Lumbar degenerative disc disease and spondylosis level by level discussed above.  Electronically signed by: Sohail Longoria Date:    12/14/2024 Time:    17:13    CT Head Without Contrast    Result Date: 12/13/2024  EXAMINATION CT HEAD WITHOUT CONTRAST CLINICAL HISTORY Mental status change, unknown cause; TECHNIQUE Axial non-contrast CT images of the head were acquired and multiplanar reconstructions accomplished by a CT technologist at a separate workstation, pushed to PACS for physician review. COMPARISON None available at the time of the initial interpretation. FINDINGS Images were reviewed in subdural, brain, soft tissue, and bone windows. Exam quality: Motion/streak artifact limits assessment of the posterior fossa. Hemorrhage: No evidence of acute hyperattenuating blood products. Parenchyma: There is diffuse bilateral supratentorial white matter hypoattenuation, typical of chronic microvascular changes. No discrete mass, mass effect, or CT evidence of acute large vascular territory insult. Gray-white differentiation is preserved. No midline shift is evident. CSF spaces: Proportional appearance of ventricular and sulcal enlargement. No hydrocephalus. No masses or fluid collections. Other findings: No focally hyperdense artery. Scattered carotid siphon calcifications are present. No abnormal densities within the dural sinuses. No abnormalities of the scalp or subjacent osseous structures. Mastoids are well aerated. No focal abnormality of the sella. There is diffuse mucoperiosteal thickening of the left maxillary sinus, with less prominent right maxillary mucosal thickening.  Postoperative changes of the bilateral medial maxillary sinus walls are incidentally noted. IMPRESSION 1. No acute intracranial abnormality. 2. Age-related atrophy and chronic sequela of white matter microvascular disease. 3. Findings suggestive of left maxillary sinusitis. 4. Additional chronic secondary details discussed above. RADIATION DOSE Automated tube current modulation, weight-based exposure dosing, and/or iterative  reconstruction technique utilized to reach lowest reasonably achievable exposure rate. DLP: 2123.7 mGy*cm Electronically signed by: Reji Shafer Date:    12/13/2024 Time:    16:46    X-Ray Chest 1 View    Result Date: 12/4/2024  EXAMINATION: XR CHEST 1 VIEW CLINICAL HISTORY: Shortness of breath; TECHNIQUE: Single frontal view of the chest was performed. COMPARISON: 11/30/2024 FINDINGS: There is improving pulmonary edema and pulmonary venous congestion.  There are small effusions bilaterally.  The heart is normal in appearance.  Postsurgical changes are seen the mediastinum.  There is a pacemaker in the left anterior chest wall.  Bones and joints show no acute abnormality.     Improving volume status Electronically signed by: Haider Chavarria Date:    12/04/2024 Time:    18:25    X-Ray Chest 1 View    Result Date: 12/1/2024  EXAMINATION: XR CHEST 1 VIEW CLINICAL HISTORY: SOB; TECHNIQUE: AP chest COMPARISON: Chest x-ray dated 11/20/2024 FINDINGS: Left chest wall pacemaker is in place.  The heart is normal in size.  There are increased interstitial markings and prominence of the central pulmonary vasculature.  There is no pleural effusion or visible pneumothorax.     Congestive changes of the lungs. Electronically signed by: Jaylene Mirza Date:    12/01/2024 Time:    10:45    Cardiac catheterization    Result Date: 11/29/2024    Eccentric Prox LAD lesion was 80% stenosed with 0% stenosis post-intervention. iFR 0.84   Prox LAD lesion: A STENT SYNERGY XD [id number] stent was successfully placed at 8 GEO for 10 sec. Proximal part of the stent was post dilated by using 3.5/12 mm NC and a 4/8 mm NC balloons at 12 atmospheres. The procedure log was documented by No documenter listed and verified by Tatiana Jarrett MD. Date: 11/29/2024  Time: 11:51 AM After obtaining informed consent patient was brought to the cath lab in a fasting state.  Right groin area was prepped and draped usual sterile manner using ChloraPrep solution.   2% lidocaine was given for local anesthesia.  Six Sinhala sheath was placed in the right femoral artery using Seldinger technique.  Six Sinhala EBU 3.0 guide catheter used for the intervention.  IFR of the proximal LAD was performed which was 0.84.  Heparin was given for anticoagulation.  Intervention was done on the same IFR wire which was placed in the distal LAD.  Proximal LAD lesion was pre-dilated by using 2.5/15 mm balloon and stented with a 3/16 mm synergy drug-eluting stent and proximal part of the stent was post dilated by using NC balloon 3.5/12 mm and a 4/8 mm NC balloon at 12 atmospheres for 10 seconds.  Post stenting angiogram shows lesion reduced from 0%.  Patient tolerated the procedure very well no complications noted.  Right femoral artery angiogram was performed and Angio-Seal closure device was applied achieving hemostasis.     CTA Chest Aorta Non Coronary    Result Date: 11/28/2024  EXAMINATION: CTA CHEST AORTA NON CORONARY CLINICAL HISTORY: Gated CTA Chest Abdomen - Mitral Valve in Valve Protocol; TECHNIQUE: Helically acquired images with axial, sagittal and coronal reformations were obtained from the thoracic inlet to the lung bases followingthe IV administration of contrast.  CTA timed for evaluation of the aorta MIP images were performed.  Exam was EKG gated. Automated tube current modulation, weight-based exposure dosing, and/or iterative reconstruction technique utilized to reach lowest reasonably achievable exposure rate. DLP: 776 mGy*cm COMPARISON: No relevant priors available for comparison at time of this dictation FINDINGS: BASE OF NECK: No significant abnormality. AORTA: Aortic atherosclerosis.  Ascending aorta measures 3.5 cm in diameter. PULMONARY VASCULATURE: Normal appearance of the pulmonary arteries taking into account timing of the contrast bolus. HEART: There are postsurgical changes at the mitral valve.  There are calcifications at the mitral valve annulus.  There are coronary  artery calcifications. CARLOS/MEDIASTINUM: Small mediastinal and hilar lymph nodes, nonspecific. AIRWAYS: Patent. LUNGS/PLEURA: There are small layering pleural effusions.  There is septal thickening and mild ground-glass density within the lungs.  Dependent atelectasis bilaterally. UPPER ABDOMEN: Partially imaged IVC filter.  Mild splenomegaly. THORACIC SOFT TISSUES: Cardiac pacer device in place via left subclavian approach. BONES: Postop sternotomy.  Partially imaged spinal stimulator device.     Small pleural effusions and changes of pulmonary interstitial edema Electronically signed by: Lelo Rogers Date:    11/28/2024 Time:    12:05    Cardiac catheterization    Result Date: 11/27/2024  The procedure log was documented by No documenter listed and verified by Sebastian Watters Jr, MD. Date: 11/27/2024  Time: 9:21 PM Procedure: Left heart catheterization with coronary angiography Right heart catheterization Measurement of LVEDP  Preoperative diagnosis: Heart failure Postoperative diagnosis: Coronary artery disease  Access: Right common femoral artery Right common femoral vein Estimated blood loss: 10 cc Complications: None  Summary: Consent obtained. Risks and benefits discussed with the patient. The patient was brought to the cath lab in a fasting state. The patient was prepped and draped in usual sterile fashion. A preprocedure time-out was performed. Ultrasound-guided right common femoral venous access via modified Seldinger technique using a micropuncture kit. A 7 Moroccan sheath was inserted into the right common femoral vein. Ultrasound-guided right common femoral arterial access via modified Seldinger technique using a micropuncture kit. A 5 Moroccan sheath was inserted into the right common femoral artery. A right heart catheterization was being performed using a Bowlegs-Darrius catheter. A 5 Moroccan Pigtail catheter was used to cross aortic valve, and simultaneous LV and PCW pressures were measured. The  transvalvular aortic gradient was measured by pullback method. A 5 Kosovan JL4 catheter was used to cannulate the left main coronary artery; angiography was performed, and multiple fluoroscopic views were obtained. A 5 Kosovan JR4 catheter was used to cannulate the right coronary artery; angiography was performed, and multiple fluoroscopic views were obtained. At the end the procedure, all wires and catheters were removed. Hemostasis achieved with manual compression.  Findings: - There is single vessel coronary artery disease. - Mid Left Anterior Descending has a calcified 70% stenosis. - LVEDP is normal at 6 mmHg. - RHC with elevated R sided filling pressures. RA 7 mmHg, RV 50/7 mmHg, PA 47/26 mmHg (mean 33 mmHg), PCWP 24 mmHg. - PCWP to LV diastolic mean gradient was calculated to be 22 mmHg. - Transpulmonary gradient is 8 mmHg. - Normal Cardiac Output/Index at 4.7/2.3, as calculated by Rahel equation. - PVR is 1.7 Woods units - SVR is 1260 dyne-sec*cm^-5 - Pulmonary Artery Pulsatility Index ([place]) is 3.0 - Cardiac Power Output () is 0.84  Assessment/Plan: - Patient is a 79 y.o. female with a history of prior bioprosthetic mitral valve replacement now presents with heart failure.  Transesophageal echocardiogram was obtained.  Final report is pending at this time, but there were findings consistent with severe bioprosthetic MVR stenosis.  Cardiac catheterization findings shown above do suggest that there is a gradient of around 22 mm Hg across the bioprosthetic valve. -recommend consultation with CT surgery -obtain CT with mitral valve in valve protocol for possible TMVR -LAD should be revascularized as well Sebastian Watters MD Interventional Cardiology/Structural Heart Disease [facility] [place] [facility]    Transesophageal echo ([facility])    Result Date: 11/27/2024    Left Ventricle: The left ventricle is normal in size. Septal motion is consistent with post-operative status. There is normal systolic  function with a visually estimated ejection fraction of 60 - 65%.   Right Ventricle: Normal right ventricular cavity size. Systolic function is normal.   Left Atrium: Left atrium is severely dilated.   Mitral Valve: There is a bioprosthetic valve in the mitral position. There is severe stenosis. The mean pressure gradient across the mitral valve is 16 mmHg at a heart rate of  bpm. There is moderate regurgitation.     Echo    Result Date: 11/23/2024    Left Ventricle: The left ventricle is normal in size. Mildly increased wall thickness. There is normal systolic function with a visually estimated ejection fraction of 60 - 65%. Grade I diastolic dysfunction.   Right Ventricle: Normal right ventricular cavity size. Systolic function is borderline low.   Aortic Valve: There is aortic valve sclerosis.   Mitral Valve: There is a bioprosthetic valve in the mitral position (25 mm mosaic porcine valve).  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 There is mild (1+) regurgitation.   Tricuspid Valve: There moderate (2+) regurgitation.   The estimated pulmonary artery systolic pressure is 57 mmHg.   Pacemaker lead noted.   ------------------------------------------------------   Recommend transesophageal echocardiogram to properly evaluate the bioprosthetic mitral valve.     CTA Chest Non-Coronary (PE Studies)    Result Date: 11/22/2024  EXAMINATION: CTA CHEST NON CORONARY (PE STUDIES) CLINICAL HISTORY: Pulmonary embolism (PE) suspected, high prob; TECHNIQUE: Sequential axial images performed of the chest using pulmonary embolism protocol following intravenous contrast bolus. Sagittal and contrast reformations performed. Dose product length of 294 mGycm. Automated exposure control was utilized to minimize radiation dose. COMPARISON: CT chest December 21, 2022.  Chest radiograph November 22, 2024. FINDINGS: Optimal contrast  bolus timing with adequately opacified pulmonary artery system is without evidence of filling defects from pulmonary thromboembolism within the main pulmonary artery and the major branches.  Thoracic aorta is without aneurysmal dilatation or dissection.  Cardiac chambers are enlarged in size.  There is no pericardial effusion.  There is precarinal 1.6 cm enlarged lymph node. There is large right and moderate volume left pleural effusion.  There are bilateral lower lung zones compressive consolidations.  Aerated portion of the lungs are remarkable for ground opacities reflective of mild congestive process.  There is no dense consolidation. Sternotomy changes.  Thoracic spinal epidural neurostimulator electrodes.     1.  No pulmonary embolism identified. 2.  Cardiomegaly and mild congestive changes. 3.  Bilateral prominent pleural effusions and lower lung lobes compressive consolidations. Electronically signed by: Sohail Longoria Date:    11/22/2024 Time:    14:45    X-Ray Chest AP Portable    Result Date: 11/22/2024  EXAMINATION: XR CHEST AP PORTABLE CLINICAL HISTORY: Shortness of breath COMPARISON: 4 January 2024 FINDINGS: Frontal view of the chest was obtained. Heart and mediastinum unchanged.  There is prominence of the right hilum.  Increased interstitial prominence particularly left lung.  Possible small pleural effusions.  No pneumothorax seen.     Prominence of the right hilum may relate to pulmonary vascular congestion the suggest follow-up PA and lateral radiographs to exclude underlying lesion.  Possible small pleural effusions. Electronically signed by: Jason Andrews Date:    11/22/2024 Time:    11:15             ICD-10-CM ICD-9-CM   1. Melena  K92.1 578.1   2. Back pain  M54.9 724.5   3. Shortness of breath  R06.02 786.05   4. 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  Patient is still on mechanical ventilation.  Noted to have moderate amount of blood in the oropharynx (less compared to yesterday afternoon).  INR 1.6.  She is in the process of getting 1 unit of fresh frozen plasma.  Discussed with Dr. Monique, pulmonary and critical care.:  Plan to continue mechanical ventilation for now.  Plan to give nebulizer treatment with prothrombotic agents.  Hemoglobin/hematocrit is stable since 2 units of packed RBC yesterday and is 9 or above, hemodynamically stable.  She is not on any vasopressors..  Family at the bedside.  Discussed with the family in details.  GI consult service to follow the patient tomorrow (patient is known to Dr. Kj Vasques).

## 2024-12-23 LAB
ALBUMIN SERPL-MCNC: 2.4 G/DL (ref 3.4–4.8)
ALBUMIN/GLOB SERPL: 0.7 RATIO (ref 1.1–2)
ALLENS TEST BLOOD GAS (OHS): YES
ALP SERPL-CCNC: 100 UNIT/L (ref 40–150)
ALT SERPL-CCNC: 12 UNIT/L (ref 0–55)
ANION GAP SERPL CALC-SCNC: 14 MEQ/L
AST SERPL-CCNC: 45 UNIT/L (ref 5–34)
BACTERIA #/AREA URNS AUTO: ABNORMAL /HPF
BASE EXCESS BLD CALC-SCNC: 3.8 MMOL/L (ref -2–2)
BASOPHILS # BLD AUTO: 0.09 X10(3)/MCL
BASOPHILS NFR BLD AUTO: 0.5 %
BILIRUB SERPL-MCNC: 0.8 MG/DL
BILIRUB UR QL STRIP.AUTO: NEGATIVE
BLOOD GAS SAMPLE TYPE (OHS): ABNORMAL
BNP BLD-MCNC: 612.2 PG/ML
BUN SERPL-MCNC: 17.2 MG/DL (ref 9.8–20.1)
CA-I BLD-SCNC: 1.15 MMOL/L (ref 1.12–1.23)
CALCIUM SERPL-MCNC: 8.3 MG/DL (ref 8.4–10.2)
CHLORIDE SERPL-SCNC: 104 MMOL/L (ref 98–107)
CLARITY UR: ABNORMAL
CO2 BLDA-SCNC: 28.9 MMOL/L
CO2 SERPL-SCNC: 22 MMOL/L (ref 23–31)
COHGB MFR BLDA: 1 % (ref 0.5–1.5)
COLOR UR AUTO: YELLOW
CREAT SERPL-MCNC: 0.72 MG/DL (ref 0.55–1.02)
CREAT/UREA NIT SERPL: 24
DRAWN BY BLOOD GAS (OHS): ABNORMAL
EOSINOPHIL # BLD AUTO: 0.2 X10(3)/MCL (ref 0–0.9)
EOSINOPHIL NFR BLD AUTO: 1.2 %
ERYTHROCYTE [DISTWIDTH] IN BLOOD BY AUTOMATED COUNT: 20 % (ref 11.5–17)
GFR SERPLBLD CREATININE-BSD FMLA CKD-EPI: >60 ML/MIN/1.73/M2
GLOBULIN SER-MCNC: 3.4 GM/DL (ref 2.4–3.5)
GLUCOSE SERPL-MCNC: 101 MG/DL (ref 82–115)
GLUCOSE UR QL STRIP: NORMAL
HCO3 BLDA-SCNC: 27.7 MMOL/L (ref 22–26)
HCT VFR BLD AUTO: 27.6 % (ref 37–47)
HCT VFR BLD AUTO: 27.8 % (ref 37–47)
HGB BLD-MCNC: 8.7 G/DL (ref 12–16)
HGB BLD-MCNC: 9 G/DL (ref 12–16)
HGB UR QL STRIP: ABNORMAL
IMM GRANULOCYTES # BLD AUTO: 0.47 X10(3)/MCL (ref 0–0.04)
IMM GRANULOCYTES NFR BLD AUTO: 2.8 %
INHALED O2 CONCENTRATION: 40 %
KETONES UR QL STRIP: ABNORMAL
LEUKOCYTE ESTERASE UR QL STRIP: 500
LYMPHOCYTES # BLD AUTO: 2.61 X10(3)/MCL (ref 0.6–4.6)
LYMPHOCYTES NFR BLD AUTO: 15.4 %
MAGNESIUM SERPL-MCNC: 2.2 MG/DL (ref 1.6–2.6)
MCH RBC QN AUTO: 31 PG (ref 27–31)
MCHC RBC AUTO-ENTMCNC: 32.4 G/DL (ref 33–36)
MCV RBC AUTO: 95.9 FL (ref 80–94)
MECH RR (OHS): 20 B/MIN
METHGB MFR BLDA: 1.2 % (ref 0.4–1.5)
MODE (OHS): AC
MONOCYTES # BLD AUTO: 1.36 X10(3)/MCL (ref 0.1–1.3)
MONOCYTES NFR BLD AUTO: 8 %
MUCOUS THREADS URNS QL MICRO: ABNORMAL /LPF
NEUTROPHILS # BLD AUTO: 12.22 X10(3)/MCL (ref 2.1–9.2)
NEUTROPHILS NFR BLD AUTO: 72.1 %
NITRITE UR QL STRIP: NEGATIVE
NRBC BLD AUTO-RTO: 12.6 %
O2 HB BLOOD GAS (OHS): 95.1 % (ref 94–97)
OXYGEN DEVICE BLOOD GAS (OHS): ABNORMAL
OXYHGB MFR BLDA: 9.4 G/DL (ref 12–16)
PCO2 BLDA: 38 MMHG (ref 35–45)
PEEP RESPIRATORY: 8 CMH2O
PH BLDA: 7.47 [PH] (ref 7.35–7.45)
PH UR STRIP: 5.5 [PH]
PHOSPHATE SERPL-MCNC: 4.5 MG/DL (ref 2.3–4.7)
PLATELET # BLD AUTO: 222 X10(3)/MCL (ref 130–400)
PMV BLD AUTO: 9.5 FL (ref 7.4–10.4)
PO2 BLDA: 100 MMHG (ref 80–100)
POCT GLUCOSE: 124 MG/DL (ref 70–110)
POCT GLUCOSE: 140 MG/DL (ref 70–110)
POCT GLUCOSE: 141 MG/DL (ref 70–110)
POTASSIUM BLOOD GAS (OHS): 4.2 MMOL/L (ref 3.5–5)
POTASSIUM SERPL-SCNC: 3.5 MMOL/L (ref 3.5–5.1)
PROT SERPL-MCNC: 5.8 GM/DL (ref 5.8–7.6)
PROT UR QL STRIP: ABNORMAL
RBC # BLD AUTO: 2.9 X10(6)/MCL (ref 4.2–5.4)
RBC #/AREA URNS AUTO: ABNORMAL /HPF
SAMPLE SITE BLOOD GAS (OHS): ABNORMAL
SAO2 % BLDA: 98.1 %
SODIUM BLOOD GAS (OHS): 136 MMOL/L (ref 137–145)
SODIUM SERPL-SCNC: 140 MMOL/L (ref 136–145)
SP GR UR STRIP.AUTO: 1.02 (ref 1–1.03)
SPONT+MECH VT ON VENT: 450 ML
SQUAMOUS #/AREA URNS LPF: ABNORMAL /HPF
UROBILINOGEN UR STRIP-ACNC: NORMAL
WBC # BLD AUTO: 16.95 X10(3)/MCL (ref 4.5–11.5)
WBC #/AREA URNS AUTO: ABNORMAL /HPF
YEAST BUDDING URNS QL: ABNORMAL /HPF

## 2024-12-23 PROCEDURE — 81001 URINALYSIS AUTO W/SCOPE: CPT

## 2024-12-23 PROCEDURE — 94003 VENT MGMT INPAT SUBQ DAY: CPT

## 2024-12-23 PROCEDURE — 87086 URINE CULTURE/COLONY COUNT: CPT

## 2024-12-23 PROCEDURE — 0B9G8ZX DRAINAGE OF LEFT UPPER LUNG LOBE, VIA NATURAL OR ARTIFICIAL OPENING ENDOSCOPIC, DIAGNOSTIC: ICD-10-PCS | Performed by: STUDENT IN AN ORGANIZED HEALTH CARE EDUCATION/TRAINING PROGRAM

## 2024-12-23 PROCEDURE — 99900025 HC BRONCHOSCOPY-ASST (STAT)

## 2024-12-23 PROCEDURE — 83735 ASSAY OF MAGNESIUM: CPT

## 2024-12-23 PROCEDURE — 63600175 PHARM REV CODE 636 W HCPCS: Performed by: STUDENT IN AN ORGANIZED HEALTH CARE EDUCATION/TRAINING PROGRAM

## 2024-12-23 PROCEDURE — 27200966 HC CLOSED SUCTION SYSTEM

## 2024-12-23 PROCEDURE — 82803 BLOOD GASES ANY COMBINATION: CPT

## 2024-12-23 PROCEDURE — 27202055 HC BRONCHOSCOPE, DISP

## 2024-12-23 PROCEDURE — 25000003 PHARM REV CODE 250

## 2024-12-23 PROCEDURE — 80053 COMPREHEN METABOLIC PANEL: CPT

## 2024-12-23 PROCEDURE — 94760 N-INVAS EAR/PLS OXIMETRY 1: CPT

## 2024-12-23 PROCEDURE — 63600175 PHARM REV CODE 636 W HCPCS: Performed by: INTERNAL MEDICINE

## 2024-12-23 PROCEDURE — 85025 COMPLETE CBC W/AUTO DIFF WBC: CPT

## 2024-12-23 PROCEDURE — 20000000 HC ICU ROOM

## 2024-12-23 PROCEDURE — 27100171 HC OXYGEN HIGH FLOW UP TO 24 HOURS

## 2024-12-23 PROCEDURE — 36415 COLL VENOUS BLD VENIPUNCTURE: CPT

## 2024-12-23 PROCEDURE — 25000003 PHARM REV CODE 250: Performed by: NURSE PRACTITIONER

## 2024-12-23 PROCEDURE — 99900031 HC PATIENT EDUCATION (STAT)

## 2024-12-23 PROCEDURE — 87077 CULTURE AEROBIC IDENTIFY: CPT | Performed by: STUDENT IN AN ORGANIZED HEALTH CARE EDUCATION/TRAINING PROGRAM

## 2024-12-23 PROCEDURE — 36600 WITHDRAWAL OF ARTERIAL BLOOD: CPT

## 2024-12-23 PROCEDURE — 99900035 HC TECH TIME PER 15 MIN (STAT)

## 2024-12-23 PROCEDURE — 99900026 HC AIRWAY MAINTENANCE (STAT)

## 2024-12-23 PROCEDURE — 83880 ASSAY OF NATRIURETIC PEPTIDE: CPT

## 2024-12-23 PROCEDURE — 84100 ASSAY OF PHOSPHORUS: CPT

## 2024-12-23 PROCEDURE — 94761 N-INVAS EAR/PLS OXIMETRY MLT: CPT | Mod: XB

## 2024-12-23 RX ORDER — FENTANYL CITRATE 50 UG/ML
100 INJECTION, SOLUTION INTRAMUSCULAR; INTRAVENOUS ONCE
Status: COMPLETED | OUTPATIENT
Start: 2024-12-23 | End: 2024-12-23

## 2024-12-23 RX ORDER — INSULIN ASPART 100 [IU]/ML
1-10 INJECTION, SOLUTION INTRAVENOUS; SUBCUTANEOUS EVERY 6 HOURS PRN
Status: DISCONTINUED | OUTPATIENT
Start: 2024-12-23 | End: 2024-12-27

## 2024-12-23 RX ORDER — LIDOCAINE HYDROCHLORIDE 10 MG/ML
1 INJECTION, SOLUTION INFILTRATION; PERINEURAL ONCE
Status: COMPLETED | OUTPATIENT
Start: 2024-12-23 | End: 2024-12-23

## 2024-12-23 RX ORDER — ACETAMINOPHEN 325 MG/1
650 TABLET ORAL ONCE
Status: COMPLETED | OUTPATIENT
Start: 2024-12-23 | End: 2024-12-23

## 2024-12-23 RX ORDER — METOPROLOL TARTRATE 25 MG/1
12.5 TABLET ORAL 2 TIMES DAILY
Status: DISCONTINUED | OUTPATIENT
Start: 2024-12-23 | End: 2024-12-24

## 2024-12-23 RX ADMIN — MUPIROCIN: 20 OINTMENT TOPICAL at 08:12

## 2024-12-23 RX ADMIN — FENTANYL CITRATE 100 MCG: 50 INJECTION, SOLUTION INTRAMUSCULAR; INTRAVENOUS at 07:12

## 2024-12-23 RX ADMIN — ACETAMINOPHEN 650 MG: 325 TABLET, FILM COATED ORAL at 08:12

## 2024-12-23 RX ADMIN — METOPROLOL TARTRATE 12.5 MG: 25 TABLET, FILM COATED ORAL at 08:12

## 2024-12-23 RX ADMIN — PROPOFOL 30 MCG/KG/MIN: 10 INJECTION, EMULSION INTRAVENOUS at 04:12

## 2024-12-23 RX ADMIN — PROPOFOL 30 MCG/KG/MIN: 10 INJECTION, EMULSION INTRAVENOUS at 10:12

## 2024-12-23 RX ADMIN — LIDOCAINE HYDROCHLORIDE 1 ML: 10 INJECTION, SOLUTION INFILTRATION; PERINEURAL at 07:12

## 2024-12-23 RX ADMIN — PROPOFOL 40 MCG/KG/MIN: 10 INJECTION, EMULSION INTRAVENOUS at 05:12

## 2024-12-23 RX ADMIN — TIMOLOL MALEATE 1 DROP: 5 SOLUTION OPHTHALMIC at 08:12

## 2024-12-23 RX ADMIN — PANTOPRAZOLE SODIUM 40 MG: 40 INJECTION, POWDER, FOR SOLUTION INTRAVENOUS at 08:12

## 2024-12-23 RX ADMIN — PROPOFOL 40 MCG/KG/MIN: 10 INJECTION, EMULSION INTRAVENOUS at 11:12

## 2024-12-23 RX ADMIN — POTASSIUM BICARBONATE 50 MEQ: 977.5 TABLET, EFFERVESCENT ORAL at 04:12

## 2024-12-23 RX ADMIN — PROPOFOL 40 MCG/KG/MIN: 10 INJECTION, EMULSION INTRAVENOUS at 02:12

## 2024-12-23 RX ADMIN — PANCRELIPASE 1 CAPSULE: 120000; 24000; 76000 CAPSULE, DELAYED RELEASE PELLETS ORAL at 05:12

## 2024-12-23 RX ADMIN — ATORVASTATIN CALCIUM 40 MG: 40 TABLET, FILM COATED ORAL at 08:12

## 2024-12-23 NOTE — PLAN OF CARE
Problem: Adult Inpatient Plan of Care  Goal: Plan of Care Review  Outcome: Progressing  Goal: Patient-Specific Goal (Individualized)  Outcome: Progressing  Goal: Absence of Hospital-Acquired Illness or Injury  Outcome: Progressing  Goal: Optimal Comfort and Wellbeing  Outcome: Progressing  Goal: Readiness for Transition of Care  Outcome: Progressing     Problem: Diabetes Comorbidity  Goal: Blood Glucose Level Within Targeted Range  Outcome: Progressing     Problem: Skin Injury Risk Increased  Goal: Skin Health and Integrity  Outcome: Progressing     Problem: Infection  Goal: Absence of Infection Signs and Symptoms  Outcome: Progressing     Problem: Wound  Goal: Optimal Coping  Outcome: Progressing  Goal: Optimal Functional Ability  Outcome: Progressing  Goal: Absence of Infection Signs and Symptoms  Outcome: Progressing  Goal: Optimal Pain Control and Function  Outcome: Progressing  Goal: Skin Health and Integrity  Outcome: Progressing  Goal: Optimal Wound Healing  Outcome: Progressing     Problem: Fall Injury Risk  Goal: Absence of Fall and Fall-Related Injury  Outcome: Progressing

## 2024-12-23 NOTE — PROCEDURES
[facility]  Bronchoscopy Procedure Note    SUMMARY     Date of Procedure: 12/23/2024    Procedure: Bronchoalveolar lavage, BAL    Performed by: Cyrus Cruz MD    Pre-Procedure Diagnosis: Bloody secretions from endotracheal tube    Post-Procedure Diagnosis:  Bloody secretions    Anesthesia: Moderate Sedation    Description of the Findings of the Procedure:     Patient's daughter was consented for the procedure with all risk and benefit of the procedure explained in detail.  Patient's daughter was given the opportunity to ask questions and all concerns were answered.      The patient was premedicated with 50 mcg of fentanyl and 3 mL of 1% lidocaine down the endotracheal tube in addition to the propofol infusion.  The bronchoscope was inserted into the main airway via the endotracheal tube. An anatomical survey was done of the main airways and the subsegmental bronchus.  There was blood noted around the endotracheal tube.  There was dark blood noted throughout the airways which appeared to be dripping down from the upper respiratory tract.  There was more blood on the left than the right.  The bronchoscope was advanced into the superior segment of the lingula to completely occlude the airway.  60 mL of saline were instilled with return of 10 mL of pink tinged fluid.  The lavage did not suggest alveolar hemorrhage.    The bronchoscope was removed and inserted into the mouth.  There was dark red blood noted in the mouth.  There was no bleeding in the oropharynx around the endotracheal tube were vocal cords.  The bronchoscope was inserted into the bilateral nares with no evidence of nasal bleeding.    Findings:  Old blood dripping down around the endotracheal tube.  Dark blood in the mouth    Complications: None; patient tolerated the procedure well.    Estimated Blood Loss (EBL): Minimal           Specimens:  Bacterial respiratory culture from left upper lobe BAL       Condition: Critical         Disposition: ICU - intubated and critically ill.    Cyrus Cruz MD  [facility]

## 2024-12-23 NOTE — PROGRESS NOTES
Inpatient Nutrition Assessment    Admit Date: 12/15/2024   Total duration of encounter: 8 days   Patient Age: 79 y.o.    Nutrition Recommendation/Prescription     Start tube feeding when appropriate.  Tube feeding recommendation:     Peptamen Intense VHP goal rate 25 ml/hr + 4 packets ProSource TF20 daily to provide  820 kcal/d   (60% est needs, 106% with meds)  126 g protein/d  (107% est needs)  420 ml free water/d  (21%  est needs)  (calculations based on estimated 20 hr/d run time)     If no IV fluids running, can give 100ml q 2hr water flushes. Total water provided: 1420ml (72% est needs.) Monitor for increased fluids need, lower amount at this time due to CHF.    Add Lacho (provides 90 kcal, 2.5 g protein per serving) BID.    Start @ 20ml/hr, increase as tolerated 20ml/hr q4hr until goal rate reached.      Communication of Recommendations: reviewed with nurse    Nutrition Assessment     Malnutrition Assessment/Nutrition-Focused Physical Exam       Malnutrition Level: other (see comments) (Does not meet criteria) (12/23/24 1312)  Energy Intake (Malnutrition): other (see comments) (Unable to assess) (12/23/24 1312)  Weight Loss (Malnutrition): other (see comments) (Unable to assess) (12/23/24 1312)              Muscle Mass (Malnutrition): mild depletion (12/23/24 1312)  [place] Region (Muscle Loss): mild depletion  Clavicle Bone Region (Muscle Loss): well nourished  Clavicle and Acromion Bone Region (Muscle Loss): mild depletion                 Fluid Accumulation (Malnutrition): other (see comments) (Not present) (12/23/24 1312)        A minimum of two characteristics is recommended for diagnosis of either severe or non-severe malnutrition.    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  Diffuse Gastritis  Severe Normocytic Anemia 2/2 Melena and Iron Deficiency   R sided pleural effusion vs PNA     Relevant Medical History: A-fib, HTN, valvular heart disease s/p MVR, CVID, CAD s/p PCI to LAD (11/29), DVT s/p IVC filter, DM, GERD, SSS s/p PPM, HTN, HLD, chronic sinusitis status post sinus surgery, ABEL, obesity, asthma/COPD    Scheduled Medications:  amiodarone, 200 mg, Daily  atorvastatin, 40 mg, QHS  LIDOcaine (PF) 10 mg/ml (1%), 1 mL, Once  LIDOcaine, 1 patch, Q24H  lipase-protease-amylase 24,000-76,000-120,000 units, 1 capsule, TID WM  metoprolol tartrate, 12.5 mg, BID  mupirocin, , BID  NIFEdipine, 30 mg, Daily  pantoprazole, 40 mg, BID  sodium zirconium cyclosilicate, 10 g, Once  timolol maleate 0.5%, 1 drop, BID    Continuous Infusions:  electrolyte-A, Last Rate: Stopped (12/21/24 1000)  propofoL, Last Rate: 40 mcg/kg/min (12/23/24 1315)    PRN Medications:  0.9%  NaCl infusion (for blood administration), , Q24H PRN  0.9%  NaCl infusion (for blood administration), , Q24H PRN  0.9%  NaCl infusion (for blood administration), , Q24H PRN  0.9%  NaCl infusion (for blood administration), , Q24H PRN  acetaminophen, 1,000 mg, Q8H PRN  albuterol-ipratropium, 3 mL, Q4H PRN  dextrose 10%, 12.5 g, PRN  dextrose 10%, 25 g, PRN  glucagon (human recombinant), 1 mg, PRN  glucose, 16 g, PRN  glucose, 24 g, PRN  insulin aspart U-100, 1-10 Units, Q6H PRN  melatonin, 6 mg, Nightly PRN  ondansetron, 8 mg, Q6H PRN  ondansetron, 4 mg, Q8H PRN  oxyCODONE, 5 mg, Q6H PRN  oxyCODONE, 10 mg, Q6H PRN  senna, 8.6 mg, Daily PRN  simethicone, 1 tablet, TID PRN    Calorie Containing IV Medications: Diprivan @ 24 ml/hr (provides 630 kcal/d)    Recent Labs   Lab 12/17/24  2004 12/18/24  0450 12/19/24  0510 12/20/24  0605 12/21/24  0513 12/21/24  1157 12/21/24  1937 12/22/24  0000 12/22/24  0352 12/22/24  0630 12/22/24  1309 12/22/24  1738 12/22/24  3583  12/23/24  0313    136 137 138 140 140  --   --  141  --   --   --   --  140   K 4.1 4.6 3.4* 3.0* 3.4* 3.3*  --   --  3.9  --   --   --   --  3.5   CALCIUM 7.7* 7.9* 7.9* 8.0* 8.0* 8.7  --   --  8.0*  --   --   --   --  8.3*   PHOS  --   --   --   --   --   --   --   --  3.8  --   --   --   --  4.5   MG  --   --  2.00  --   --  2.10  --   --  2.00  --   --   --   --  2.20   CL 97* 97* 98 99 98 102  --   --  104  --   --   --   --  104   CO2 26 26 30 28 30 26  --   --  21*  --   --   --   --  22*   BUN 27.0* 33.1* 31.5* 30.8* 21.5* 19.8  --   --  17.4  --   --   --   --  17.2   CREATININE 0.75 0.85 0.81 0.74 0.78 0.82  --   --  0.82  --   --   --   --  0.72   EGFRNORACEVR >60 >60 >60 >60 >60 >60  --   --  >60  --   --   --   --  >60   GLUCOSE 224* 146* 137* 133* 132* 126*  --   --  116*  --   --   --   --  101   BILITOT 0.8 0.8 1.0 0.9  --  1.0  --   --  1.4  --   --   --   --  0.8   ALKPHOS 109 111 99 97  --  95  --   --  100  --   --   --   --  100   ALT 12 12 14 13  --  12  --   --  13  --   --   --   --  12   AST 27 31 33 35*  --  35*  --   --  45*  --   --   --   --  45*   ALBUMIN 2.6* 2.7* 2.3* 2.3*  --  2.7*  --   --  2.7*  --   --   --   --  2.4*   WBC  --  17.30* 15.79  15.79* 16.49* 19.28* 19.74  19.74*  --   --  17.18*  --   --   --   --  16.95*   HGB  --  9.7* 8.0* 7.7* 8.0* 7.0*   < > 9.7* 9.0* 9.1* 9.2* 9.6* 8.7* 9.0*   HCT  --  30.9* 25.4* 24.5* 25.8* 23.2*   < > 30.4* 27.8* 28.1* 27.8* 29.9* 27.6* 27.8*    < > = values in this interval not displayed.     Nutrition Orders:  Diet NPO      Appetite/Oral Intake: not applicable/not applicable  Factors Affecting Nutritional Intake: on mechanical ventilation  Social Needs Impacting Access to Food: unable to assess at this time; will attempt on follow-up  Food/Faith/Cultural Preferences: unable to obtain  Food Allergies: no known food allergies  Last Bowel Movement: 12/22/24  Wound(s):     Wound 12/21/24 1030 Pressure Injury posterior Sacral  "spine-Tissue loss description: Not applicable     Comments    12/23/24: Discussed with RN. Will provide tube feeding recommendations for when appropriate to start tube feeding. Receiving kcal from meds.  Noted MST indicates wt loss, EMR wts indicate wt gain vs wt stable.     Anthropometrics    Height: 5' 5.98" (167.6 cm), Height Method: Stated  Last Weight: 98.9 kg (218 lb) (12/16/24 0800), Weight Method: Bed Scale  BMI (Calculated): 35.2  BMI Classification: obese grade II (BMI 35-39.9)     Ideal Body Weight (IBW), Female: 129.9 lb     % Ideal Body Weight, Female (lb): 167.82 %                             Usual Weight Provided By: unable to obtain usual weight    Wt Readings from Last 5 Encounters:   12/16/24 98.9 kg (218 lb)   12/14/24 99 kg (218 lb 4.1 oz)   11/25/24 96.4 kg (212 lb 8 oz)   11/22/24 99.8 kg (220 lb)   09/06/24 99.8 kg (220 lb)     Weight Change(s) Since Admission:   Wt Readings from Last 1 Encounters:   12/16/24 0800 98.9 kg (218 lb)   12/16/24 0757 98.9 kg (218 lb)   Admit Weight: 98.9 kg (218 lb) (12/16/24 0757), Weight Method: Bed Scale    Estimated Needs    Weight Used For Calorie Calculations: 98 kg (216 lb 0.8 oz)  Energy Calorie Requirements (kcal): 1078-1372kcal (11-14kcal/kg)  Energy Need Method: Kcal/kg  Weight Used For Protein Calculations: 59 kg (130 lb 1.1 oz) (IBW)  Protein Requirements: 118gm (2g/kg IBW)  Fluid Requirements (mL): 1960ml (20ml/kg)  CHO Requirement: 155gm (45% est kcal needs)     Enteral Nutrition     Patient not receiving enteral nutrition at this time.    Parenteral Nutrition     Patient not receiving parenteral nutrition support at this time.    Evaluation of Received Nutrient Intake    Calories: not meeting estimated needs  Protein: not meeting estimated needs    Patient Education     Not applicable.    Nutrition Diagnosis     PES: Inadequate oral intake related to acute illness as evidenced by intubation since admit. (new)     PES:            Nutrition " Interventions     Intervention(s): modified composition of enteral nutrition, modified rate of enteral nutrition, and collaboration with other providers    Goal: Meet greater than 80% of nutritional needs by follow-up. (new)  Goal: Tolerate enteral feeding at goal rate by follow-up. (new)    Nutrition Goals & Monitoring     Dietitian will monitor: energy intake and enteral nutrition intake  Discharge planning: too early to determine; pending clinical course  Nutrition Risk/Follow-Up: high (follow-up in 1-4 days)   Please consult if re-assessment needed sooner.

## 2024-12-23 NOTE — PLAN OF CARE
Problem: Adult Inpatient Plan of Care  Goal: Plan of Care Review  Outcome: Progressing  Goal: Patient-Specific Goal (Individualized)  Outcome: Progressing  Goal: Absence of Hospital-Acquired Illness or Injury  Outcome: Progressing  Goal: Optimal Comfort and Wellbeing  Outcome: Progressing     Problem: Diabetes Comorbidity  Goal: Blood Glucose Level Within Targeted Range  Outcome: Progressing     Problem: Skin Injury Risk Increased  Goal: Skin Health and Integrity  Outcome: Progressing     Problem: Infection  Goal: Absence of Infection Signs and Symptoms  Outcome: Progressing     Problem: Wound  Goal: Optimal Coping  Outcome: Progressing  Goal: Absence of Infection Signs and Symptoms  Outcome: Progressing  Goal: Optimal Pain Control and Function  Outcome: Progressing  Goal: Skin Health and Integrity  Outcome: Progressing  Goal: Optimal Wound Healing  Outcome: Progressing     Problem: Fall Injury Risk  Goal: Absence of Fall and Fall-Related Injury  Outcome: Progressing     Problem: Mechanical Ventilation Invasive  Goal: Optimal Device Function  Outcome: Progressing  Goal: Optimal Nutrition Delivery  Outcome: Progressing  Goal: Absence of Device-Related Skin and Tissue Injury  Outcome: Progressing  Goal: Absence of Ventilator-Induced Lung Injury  Outcome: Progressing     Problem: Artificial Airway  Goal: Optimal Device Function  Outcome: Progressing  Goal: Absence of Device-Related Skin or Tissue Injury  Outcome: Progressing     Problem: Adult Inpatient Plan of Care  Goal: Readiness for Transition of Care  Outcome: Not Progressing     Problem: Wound  Goal: Optimal Functional Ability  Outcome: Not Progressing  Goal: Improved Oral Intake  Outcome: Not Progressing     Problem: Mechanical Ventilation Invasive  Goal: Effective Communication  Outcome: Not Progressing  Goal: Mechanical Ventilation Liberation  Outcome: Not Progressing     Problem: Artificial Airway  Goal: Effective Communication  Outcome: Not Progressing

## 2024-12-23 NOTE — PROGRESS NOTES
"Gastroenterology Progress Note    Subjective/Interval History:  Nurse reported 50 cc melena so far on day shift.  Blood from ET tube secretions prompted bronch today which from old blood dripping from around the ET tube and dark blood in the mouth.    ROS:  Review of Systems   Unable to perform ROS: Intubated       Vital Signs:  BP (!) 123/57   Pulse 76   Temp 99 °F (37.2 °C) (Oral)   Resp (!) 21   Ht 5' 5.98" (1.676 m)   Wt 98.9 kg (218 lb)   SpO2 100%   Breastfeeding No   BMI 35.20 kg/m²   Body mass index is 35.2 kg/m².    Physical Exam:  Physical Exam  Constitutional:       General: She is not in acute distress.     Appearance: She is not ill-appearing.   HENT:      Head: Normocephalic and atraumatic.   Eyes:      General: No scleral icterus.     Extraocular Movements: Extraocular movements intact.   Cardiovascular:      Rate and Rhythm: Normal rate and regular rhythm.   Pulmonary:      Effort: Pulmonary effort is normal. No respiratory distress.      Comments: Intubated with bloody secretions noted in suction canister  Abdominal:      General: Bowel sounds are normal. There is no distension.      Palpations: Abdomen is soft. There is no mass.      Tenderness: There is no abdominal tenderness. There is no guarding or rebound.   Genitourinary:     Comments: Dignishield in place with melena   Musculoskeletal:         General: Normal range of motion.      Right lower leg: No edema.      Left lower leg: No edema.   Skin:     General: Skin is warm and dry.      Coloration: Skin is not jaundiced or pale.   Neurological:      Comments: sedated   Psychiatric:         Mood and Affect: Affect normal.      Comments: Unable to assess         Labs:  Recent Results (from the past 48 hours)   POCT glucose    Collection Time: 12/21/24  6:44 PM   Result Value Ref Range    POCT Glucose 110 70 - 110 mg/dL   Hemoglobin and Hematocrit    Collection Time: 12/21/24  7:37 PM   Result Value Ref Range    Hgb 9.8 (L) 12.0 - 16.0 " g/dL    Hct 30.8 (L) 37.0 - 47.0 %   POCT glucose    Collection Time: 12/21/24  9:08 PM   Result Value Ref Range    POCT Glucose 122 (H) 70 - 110 mg/dL   Hemoglobin and Hematocrit    Collection Time: 12/22/24 12:00 AM   Result Value Ref Range    Hgb 9.7 (L) 12.0 - 16.0 g/dL    Hct 30.4 (L) 37.0 - 47.0 %   Comprehensive Metabolic Panel    Collection Time: 12/22/24  3:52 AM   Result Value Ref Range    Sodium 141 136 - 145 mmol/L    Potassium 3.9 3.5 - 5.1 mmol/L    Chloride 104 98 - 107 mmol/L    CO2 21 (L) 23 - 31 mmol/L    Glucose 116 (H) 82 - 115 mg/dL    Blood Urea Nitrogen 17.4 9.8 - 20.1 mg/dL    Creatinine 0.82 0.55 - 1.02 mg/dL    Calcium 8.0 (L) 8.4 - 10.2 mg/dL    Protein Total 5.3 (L) 5.8 - 7.6 gm/dL    Albumin 2.7 (L) 3.4 - 4.8 g/dL    Globulin 2.6 2.4 - 3.5 gm/dL    Albumin/Globulin Ratio 1.0 (L) 1.1 - 2.0 ratio    Bilirubin Total 1.4 <=1.5 mg/dL     40 - 150 unit/L    ALT 13 0 - 55 unit/L    AST 45 (H) 5 - 34 unit/L    eGFR >60 mL/min/1.73/m2    Anion Gap 16.0 mEq/L    BUN/Creatinine Ratio 21    Phosphorus    Collection Time: 12/22/24  3:52 AM   Result Value Ref Range    Phosphorus Level 3.8 2.3 - 4.7 mg/dL   Magnesium    Collection Time: 12/22/24  3:52 AM   Result Value Ref Range    Magnesium Level 2.00 1.60 - 2.60 mg/dL   CBC with Differential    Collection Time: 12/22/24  3:52 AM   Result Value Ref Range    WBC 17.18 (H) 4.50 - 11.50 x10(3)/mcL    RBC 2.91 (L) 4.20 - 5.40 x10(6)/mcL    Hgb 9.0 (L) 12.0 - 16.0 g/dL    Hct 27.8 (L) 37.0 - 47.0 %    MCV 95.5 (H) 80.0 - 94.0 fL    MCH 30.9 27.0 - 31.0 pg    MCHC 32.4 (L) 33.0 - 36.0 g/dL    RDW 19.7 (H) 11.5 - 17.0 %    Platelet 260 130 - 400 x10(3)/mcL    MPV 9.5 7.4 - 10.4 fL    Neut % 69.8 %    Lymph % 17.0 %    Mono % 8.3 %    Eos % 0.8 %    Basophil % 0.4 %    Lymph # 2.92 0.6 - 4.6 x10(3)/mcL    Neut # 11.98 (H) 2.1 - 9.2 x10(3)/mcL    Mono # 1.43 (H) 0.1 - 1.3 x10(3)/mcL    Eos # 0.14 0 - 0.9 x10(3)/mcL    Baso # 0.07 <=0.2 x10(3)/mcL    IG#  0.64 (H) 0 - 0.04 x10(3)/mcL    IG% 3.7 %    NRBC% 18.5 %   RT Blood Gas    Collection Time: 12/22/24  4:25 AM   Result Value Ref Range    Sample Type Arterial Blood     Sample site Right Radial Artery     Drawn by asim rt     pH, Blood gas 7.460 (H) 7.350 - 7.450    pCO2, Blood gas 39.0 35.0 - 45.0 mmHg    pO2, Blood gas 65.0 (L) 80.0 - 100.0 mmHg    Sodium, Blood Gas 137 137 - 145 mmol/L    Potassium, Blood Gas 3.4 (L) 3.5 - 5.0 mmol/L    Calcium Level Ionized 1.12 1.12 - 1.23 mmol/L    TOC2, Blood gas 28.9 mmol/L    Base Excess, Blood gas 3.70 mmol/L    sO2, Blood gas 94.0 %    HCO3, Blood gas 27.7 (H) 22.0 - 26.0 mmol/L    Allens Test Yes     MODE AC     Oxygen Device, Blood gas Ventilator     FIO2, Blood gas 30 %    Mech Vt 470 ml    Mech RR 20 b/min    PEEP 5.0 cmH2O    Flow 50 LPM   POCT glucose    Collection Time: 12/22/24  6:17 AM   Result Value Ref Range    POCT Glucose 139 (H) 70 - 110 mg/dL   Hemoglobin and Hematocrit    Collection Time: 12/22/24  6:30 AM   Result Value Ref Range    Hgb 9.1 (L) 12.0 - 16.0 g/dL    Hct 28.1 (L) 37.0 - 47.0 %   Protime-INR    Collection Time: 12/22/24  9:08 AM   Result Value Ref Range    PT 19.7 (H) 12.5 - 14.5 seconds    INR 1.6 (H) <=1.3   POCT glucose    Collection Time: 12/22/24 11:41 AM   Result Value Ref Range    POCT Glucose 113 (H) 70 - 110 mg/dL   Hemoglobin and Hematocrit    Collection Time: 12/22/24  1:09 PM   Result Value Ref Range    Hgb 9.2 (L) 12.0 - 16.0 g/dL    Hct 27.8 (L) 37.0 - 47.0 %   POCT glucose    Collection Time: 12/22/24  4:45 PM   Result Value Ref Range    POCT Glucose 144 (H) 70 - 110 mg/dL   Hemoglobin and Hematocrit    Collection Time: 12/22/24  5:38 PM   Result Value Ref Range    Hgb 9.6 (L) 12.0 - 16.0 g/dL    Hct 29.9 (L) 37.0 - 47.0 %   POCT glucose    Collection Time: 12/22/24  9:12 PM   Result Value Ref Range    POCT Glucose 137 (H) 70 - 110 mg/dL   Hemoglobin and Hematocrit    Collection Time: 12/22/24 11:48 PM   Result Value Ref Range     Hgb 8.7 (L) 12.0 - 16.0 g/dL    Hct 27.6 (L) 37.0 - 47.0 %   Comprehensive Metabolic Panel    Collection Time: 12/23/24  3:13 AM   Result Value Ref Range    Sodium 140 136 - 145 mmol/L    Potassium 3.5 3.5 - 5.1 mmol/L    Chloride 104 98 - 107 mmol/L    CO2 22 (L) 23 - 31 mmol/L    Glucose 101 82 - 115 mg/dL    Blood Urea Nitrogen 17.2 9.8 - 20.1 mg/dL    Creatinine 0.72 0.55 - 1.02 mg/dL    Calcium 8.3 (L) 8.4 - 10.2 mg/dL    Protein Total 5.8 5.8 - 7.6 gm/dL    Albumin 2.4 (L) 3.4 - 4.8 g/dL    Globulin 3.4 2.4 - 3.5 gm/dL    Albumin/Globulin Ratio 0.7 (L) 1.1 - 2.0 ratio    Bilirubin Total 0.8 <=1.5 mg/dL     40 - 150 unit/L    ALT 12 0 - 55 unit/L    AST 45 (H) 5 - 34 unit/L    eGFR >60 mL/min/1.73/m2    Anion Gap 14.0 mEq/L    BUN/Creatinine Ratio 24    Phosphorus    Collection Time: 12/23/24  3:13 AM   Result Value Ref Range    Phosphorus Level 4.5 2.3 - 4.7 mg/dL   Magnesium    Collection Time: 12/23/24  3:13 AM   Result Value Ref Range    Magnesium Level 2.20 1.60 - 2.60 mg/dL   CBC with Differential    Collection Time: 12/23/24  3:13 AM   Result Value Ref Range    WBC 16.95 (H) 4.50 - 11.50 x10(3)/mcL    RBC 2.90 (L) 4.20 - 5.40 x10(6)/mcL    Hgb 9.0 (L) 12.0 - 16.0 g/dL    Hct 27.8 (L) 37.0 - 47.0 %    MCV 95.9 (H) 80.0 - 94.0 fL    MCH 31.0 27.0 - 31.0 pg    MCHC 32.4 (L) 33.0 - 36.0 g/dL    RDW 20.0 (H) 11.5 - 17.0 %    Platelet 222 130 - 400 x10(3)/mcL    MPV 9.5 7.4 - 10.4 fL    Neut % 72.1 %    Lymph % 15.4 %    Mono % 8.0 %    Eos % 1.2 %    Basophil % 0.5 %    Lymph # 2.61 0.6 - 4.6 x10(3)/mcL    Neut # 12.22 (H) 2.1 - 9.2 x10(3)/mcL    Mono # 1.36 (H) 0.1 - 1.3 x10(3)/mcL    Eos # 0.20 0 - 0.9 x10(3)/mcL    Baso # 0.09 <=0.2 x10(3)/mcL    IG# 0.47 (H) 0 - 0.04 x10(3)/mcL    IG% 2.8 %    NRBC% 12.6 %   POCT glucose    Collection Time: 12/23/24  6:13 AM   Result Value Ref Range    POCT Glucose 124 (H) 70 - 110 mg/dL   RT Blood Gas    Collection Time: 12/23/24  7:02 AM   Result Value Ref  Range    Sample Type Arterial Blood     Sample site Left Radial Artery     Drawn by SD RRT     pH, Blood gas 7.470 (H) 7.350 - 7.450    pCO2, Blood gas 38.0 35.0 - 45.0 mmHg    pO2, Blood gas 100.0 80.0 - 100.0 mmHg    Sodium, Blood Gas 136 (L) 137 - 145 mmol/L    Potassium, Blood Gas 4.2 3.5 - 5.0 mmol/L    Calcium Level Ionized 1.15 1.12 - 1.23 mmol/L    TOC2, Blood gas 28.9 mmol/L    Base Excess, Blood gas 3.80 (H) -2.00 - 2.00 mmol/L    sO2, Blood gas 98.1 %    HCO3, Blood gas 27.7 (H) 22.0 - 26.0 mmol/L    THb, Blood gas 9.4 (L) 12 - 16 g/dL    O2 Hb, Blood Gas 95.1 94.0 - 97.0 %    CO Hgb 1.0 0.5 - 1.5 %    Met Hgb 1.2 0.4 - 1.5 %    Allens Test Yes     MODE AC     Oxygen Device, Blood gas Ventilator     FIO2, Blood gas 40 %    Mech Vt 450 ml    Mech RR 20 b/min    PEEP 8.0 cmH2O   Respiratory Culture    Collection Time: 12/23/24  7:53 AM    Specimen: BAL, RAKAN; Bronchial Alveolar Lavage   Result Value Ref Range    GRAM STAIN Moderate WBC observed     GRAM STAIN No bacteria seen    POCT glucose    Collection Time: 12/23/24 11:59 AM   Result Value Ref Range    POCT Glucose 140 (H) 70 - 110 mg/dL   BNP    Collection Time: 12/23/24  1:07 PM   Result Value Ref Range    Natriuretic Peptide 612.2 (H) <=100.0 pg/mL   Urinalysis    Collection Time: 12/23/24  2:13 PM   Result Value Ref Range    Color, UA Yellow Yellow, Light-Yellow, Colorless, Straw, Dark-Yellow    Appearance, UA Turbid (A) Clear    Specific Gravity, UA 1.025 1.005 - 1.030    pH, UA 5.5 5.0 - 8.5    Protein, UA 1+ (A) Negative    Glucose, UA Normal Negative, Normal    Ketones, UA Trace (A) Negative    Blood, UA 2+ (A) Negative    Bilirubin, UA Negative Negative    Urobilinogen, UA Normal 0.2, 1.0, Normal    Nitrites, UA Negative Negative    Leukocyte Esterase,  (A) Negative    RBC, UA 21-50 (A) None Seen, 0-2, 3-5, 0-5 /HPF    WBC, UA 51-99 (A) None Seen, 0-2, 3-5, 0-5 /HPF    Bacteria, UA Trace None Seen, Trace /HPF    Budding Yeast, UA Few (A)  None Seen /HPF    Squamous Epithelial Cells, UA Trace None Seen, Trace, Rare /HPF    Mucous, UA Trace (A) None Seen /LPF       Imaging:  XR Gastric tube check, non-radiologist performed    Result Date: 12/22/2024  EXAMINATION: XR GASTRIC TUBE CHECK, NON-RADIOLOGIST PERFORMED CLINICAL HISTORY: verify OG tube placement; COMPARISON: No priors     Frontal image of the upper abdomen.  Enteric tube extends into the stomach.  Tip overlies the proximal gastric body. Electronically signed by: Jason Andrews Date:    12/22/2024 Time:    10:07    X-Ray Chest 1 View    Result Date: 12/21/2024  EXAMINATION: XR CHEST 1 VIEW CLINICAL HISTORY: f/u intubation and ET tube placement; COMPARISON: 17 December 2024 FINDINGS: Frontal view of the chest was obtained. Endotracheal tube tip about 2 cm above the blanca.  Heart and mediastinum otherwise unchanged.  Slightly increased bilateral interstitial predominant opacities.  There is no pneumothorax.     Interval intubation.  Slightly increased bilateral opacities. Electronically signed by: Jason Andrews Date:    12/21/2024 Time:    14:39    CT Head Without Contrast    Result Date: 12/18/2024  EXAMINATION: CT HEAD WITHOUT CONTRAST CLINICAL HISTORY: Mental status change, unknown cause; TECHNIQUE: Axial scans were obtained from skull base to the vertex. Coronal and sagittal reconstructions obtained from the axial data. Automatic exposure control was utilized to limit radiation dose. Contrast: None Radiation Dose: Total DLP: 885 mGy*cm COMPARISON: CT head dated 12/13/2024 FINDINGS: There is no acute intracranial hemorrhage or edema. The gray-white matter differentiation is preserved. There is no mass effect or midline shift.  There is diffuse parenchymal volume loss.  The basal cisterns are patent. There is no abnormal extra-axial fluid collection. The calvarium and skull base are intact. The visualized paranasal sinuses and the mastoid air cells are clear.     No acute intracranial abnormality.  Electronically signed by: Jaylene Mirza Date:    12/18/2024 Time:    14:18    X-Ray Chest 1 View    Result Date: 12/17/2024  EXAMINATION: XR CHEST 1 VIEW CLINICAL HISTORY: SOB; TECHNIQUE: Single frontal view of the chest was performed. COMPARISON: 12/15/2024 FINDINGS: There is some pulmonary edema pulmonary venous congestion bilaterally.  There is areas of atelectasis versus developing infiltrate in the right lower lobe.  There are small effusions seen bilaterally.  The heart is normal in size.  Postsurgical changes seen the mediastinum.  There is a pacemaker in the left anterior chest wall.  Bones and joints show no acute abnormality.     Findings consistent with increased volume status overall not significantly changed since prior examination Electronically signed by: Haider Chavarria Date:    12/17/2024 Time:    18:06    X-Ray Chest 1 View    Result Date: 12/15/2024  EXAMINATION: XR CHEST 1 VIEW CLINICAL HISTORY: hypoxia; TECHNIQUE: Single frontal view of the chest was performed. COMPARISON: 12/04/2024 FINDINGS: There are increased markings bilaterally most pronounced in the right lower lobe.  There is blunting of the right costophrenic angle and I cannot rule out a small effusion.  Heart size is within normal limits.  Pacing device is in place.  There is vascular calcification noted.  Patient has had a previous sternotomy.     Increased density on the right with small right-sided pleural effusion cannot rule out infiltrate.  This appears   worse than on previous Electronically signed by: Brayden Hickman MD Date:    12/15/2024 Time:    17:25    CT Lumbar Spine Without Contrast    Result Date: 12/14/2024  EXAMINATION: CT LUMBAR SPINE WITHOUT CONTRAST CLINICAL HISTORY: Low back pain, increased fracture risk; TECHNIQUE: Multidetector axial images were performed of the lumbar spine without contrast and the images were reformatted. Dose length product of 108 mGycm. Automated exposure control was utilized to minimize  radiation dose. COMPARISON: None available FINDINGS: Lumbar vertebrae height is unremarkable and there is no significant listhesis.  No acute fracture or malalignment identified.  No apparent paraspinal soft inflammations.  Note is made of inferior vena cava filter.  There are uterine dense calcifications likely related to fibroids.  Endometrium is not delineated..  Disc segmental analysis is given below: At L1-L2, there is bulging of annulus fibrosis and facet arthropathy which results in mild central canal stenosis.  There are no narrowings of the neural foramen. At L2-L3, there is a generalized disc bulge, ligamentum flavum thickening and facet arthropathy contributing to cause mild central canal stenosis.  There is mild narrowing of the right neural foramen.  The left neural foramen is patent. At L3-L4, there is broad osteophyte disc complex, ligamentum flavum thickening and facet arthropathy.  In conjunction, these findings cause mild to moderate central canal stenosis.  Right neural foramen is patent.  There is mild narrowing of the left neural foramen. At L4-L5, there is broad disc protrusion which lateralizes into the left neural foramen.  There is ligamentum flavum thickening and facet arthropathy which is more pronounced on the left.  Thecal sac is deviated towards the right.  Central canal stenosis is moderate.  There is mild narrowing of right neural foramen.  There is moderate narrowing of the left neural foramen caused by lateralized portion of the disc and spondylosis. At L5-S1, there is mild osteophyte disc complex and facet arthropathy.  Ventral thecal sac is slightly flattened without significant central canal stenosis.  Right neural foramen is patent.  There is mild narrowing of the left neural foramen caused by uncovertebral and facet arthropathy.     Lumbar degenerative disc disease and spondylosis level by level discussed above. Electronically signed by: Sohail Longoria Date:    12/14/2024  Time:    17:13    CT Head Without Contrast    Result Date: 12/13/2024  EXAMINATION CT HEAD WITHOUT CONTRAST CLINICAL HISTORY Mental status change, unknown cause; TECHNIQUE Axial non-contrast CT images of the head were acquired and multiplanar reconstructions accomplished by a CT technologist at a separate workstation, pushed to PACS for physician review. COMPARISON None available at the time of the initial interpretation. FINDINGS Images were reviewed in subdural, brain, soft tissue, and bone windows. Exam quality: Motion/streak artifact limits assessment of the posterior fossa. Hemorrhage: No evidence of acute hyperattenuating blood products. Parenchyma: There is diffuse bilateral supratentorial white matter hypoattenuation, typical of chronic microvascular changes. No discrete mass, mass effect, or CT evidence of acute large vascular territory insult. Gray-white differentiation is preserved. No midline shift is evident. CSF spaces: Proportional appearance of ventricular and sulcal enlargement. No hydrocephalus. No masses or fluid collections. Other findings: No focally hyperdense artery. Scattered carotid siphon calcifications are present. No abnormal densities within the dural sinuses. No abnormalities of the scalp or subjacent osseous structures. Mastoids are well aerated. No focal abnormality of the sella. There is diffuse mucoperiosteal thickening of the left maxillary sinus, with less prominent right maxillary mucosal thickening.  Postoperative changes of the bilateral medial maxillary sinus walls are incidentally noted. IMPRESSION 1. No acute intracranial abnormality. 2. Age-related atrophy and chronic sequela of white matter microvascular disease. 3. Findings suggestive of left maxillary sinusitis. 4. Additional chronic secondary details discussed above. RADIATION DOSE Automated tube current modulation, weight-based exposure dosing, and/or iterative reconstruction technique utilized to reach lowest  reasonably achievable exposure rate. DLP: 2123.7 mGy*cm Electronically signed by: Reji Shafer Date:    12/13/2024 Time:    16:46    X-Ray Chest 1 View    Result Date: 12/4/2024  EXAMINATION: XR CHEST 1 VIEW CLINICAL HISTORY: Shortness of breath; TECHNIQUE: Single frontal view of the chest was performed. COMPARISON: 11/30/2024 FINDINGS: There is improving pulmonary edema and pulmonary venous congestion.  There are small effusions bilaterally.  The heart is normal in appearance.  Postsurgical changes are seen the mediastinum.  There is a pacemaker in the left anterior chest wall.  Bones and joints show no acute abnormality.     Improving volume status Electronically signed by: Haider Chavarria Date:    12/04/2024 Time:    18:25    X-Ray Chest 1 View    Result Date: 12/1/2024  EXAMINATION: XR CHEST 1 VIEW CLINICAL HISTORY: SOB; TECHNIQUE: AP chest COMPARISON: Chest x-ray dated 11/20/2024 FINDINGS: Left chest wall pacemaker is in place.  The heart is normal in size.  There are increased interstitial markings and prominence of the central pulmonary vasculature.  There is no pleural effusion or visible pneumothorax.     Congestive changes of the lungs. Electronically signed by: Jaylene Mirza Date:    12/01/2024 Time:    10:45    Cardiac catheterization    Result Date: 11/29/2024    Eccentric Prox LAD lesion was 80% stenosed with 0% stenosis post-intervention. iFR 0.84   Prox LAD lesion: A STENT SYNERGY XD [id number] stent was successfully placed at 8 GEO for 10 sec. Proximal part of the stent was post dilated by using 3.5/12 mm NC and a 4/8 mm NC balloons at 12 atmospheres. The procedure log was documented by No documenter listed and verified by Tatiana Jarrett MD. Date: 11/29/2024  Time: 11:51 AM After obtaining informed consent patient was brought to the cath lab in a fasting state.  Right groin area was prepped and draped usual sterile manner using ChloraPrep solution.  2% lidocaine was given for local anesthesia.  Six  Chinese sheath was placed in the right femoral artery using Seldinger technique.  Six Chinese EBU 3.0 guide catheter used for the intervention.  IFR of the proximal LAD was performed which was 0.84.  Heparin was given for anticoagulation.  Intervention was done on the same IFR wire which was placed in the distal LAD.  Proximal LAD lesion was pre-dilated by using 2.5/15 mm balloon and stented with a 3/16 mm synergy drug-eluting stent and proximal part of the stent was post dilated by using NC balloon 3.5/12 mm and a 4/8 mm NC balloon at 12 atmospheres for 10 seconds.  Post stenting angiogram shows lesion reduced from 0%.  Patient tolerated the procedure very well no complications noted.  Right femoral artery angiogram was performed and Angio-Seal closure device was applied achieving hemostasis.     CTA Chest Aorta Non Coronary    Result Date: 11/28/2024  EXAMINATION: CTA CHEST AORTA NON CORONARY CLINICAL HISTORY: Gated CTA Chest Abdomen - Mitral Valve in Valve Protocol; TECHNIQUE: Helically acquired images with axial, sagittal and coronal reformations were obtained from the thoracic inlet to the lung bases followingthe IV administration of contrast.  CTA timed for evaluation of the aorta MIP images were performed.  Exam was EKG gated. Automated tube current modulation, weight-based exposure dosing, and/or iterative reconstruction technique utilized to reach lowest reasonably achievable exposure rate. DLP: 776 mGy*cm COMPARISON: No relevant priors available for comparison at time of this dictation FINDINGS: BASE OF NECK: No significant abnormality. AORTA: Aortic atherosclerosis.  Ascending aorta measures 3.5 cm in diameter. PULMONARY VASCULATURE: Normal appearance of the pulmonary arteries taking into account timing of the contrast bolus. HEART: There are postsurgical changes at the mitral valve.  There are calcifications at the mitral valve annulus.  There are coronary artery calcifications. CARLOS/MEDIASTINUM: Small  mediastinal and hilar lymph nodes, nonspecific. AIRWAYS: Patent. LUNGS/PLEURA: There are small layering pleural effusions.  There is septal thickening and mild ground-glass density within the lungs.  Dependent atelectasis bilaterally. UPPER ABDOMEN: Partially imaged IVC filter.  Mild splenomegaly. THORACIC SOFT TISSUES: Cardiac pacer device in place via left subclavian approach. BONES: Postop sternotomy.  Partially imaged spinal stimulator device.     Small pleural effusions and changes of pulmonary interstitial edema Electronically signed by: Lelo Rogers Date:    11/28/2024 Time:    12:05    Cardiac catheterization    Result Date: 11/27/2024  The procedure log was documented by No documenter listed and verified by Sebastian Watters Jr, MD. Date: 11/27/2024  Time: 9:21 PM Procedure: Left heart catheterization with coronary angiography Right heart catheterization Measurement of LVEDP  Preoperative diagnosis: Heart failure Postoperative diagnosis: Coronary artery disease  Access: Right common femoral artery Right common femoral vein Estimated blood loss: 10 cc Complications: None  Summary: Consent obtained. Risks and benefits discussed with the patient. The patient was brought to the cath lab in a fasting state. The patient was prepped and draped in usual sterile fashion. A preprocedure time-out was performed. Ultrasound-guided right common femoral venous access via modified Seldinger technique using a micropuncture kit. A 7 [place]uran sheath was inserted into the right common femoral vein. Ultrasound-guided right common femoral arterial access via modified Seldinger technique using a micropuncture kit. A 5 [place] sheath was inserted into the right common femoral artery. A right heart catheterization was being performed using a Tenstrike-Darrius catheter. A 5 [place]uran Pigtail catheter was used to cross aortic valve, and simultaneous LV and PCW pressures were measured. The transvalvular aortic gradient was measured by pullback  method. A 5 Japanese JL4 catheter was used to cannulate the left main coronary artery; angiography was performed, and multiple fluoroscopic views were obtained. A 5 Japanese JR4 catheter was used to cannulate the right coronary artery; angiography was performed, and multiple fluoroscopic views were obtained. At the end the procedure, all wires and catheters were removed. Hemostasis achieved with manual compression.  Findings: - There is single vessel coronary artery disease. - Mid Left Anterior Descending has a calcified 70% stenosis. - LVEDP is normal at 6 mmHg. - RHC with elevated R sided filling pressures. RA 7 mmHg, RV 50/7 mmHg, PA 47/26 mmHg (mean 33 mmHg), PCWP 24 mmHg. - PCWP to LV diastolic mean gradient was calculated to be 22 mmHg. - Transpulmonary gradient is 8 mmHg. - Normal Cardiac Output/Index at 4.7/2.3, as calculated by Rahel equation. - PVR is 1.7 Woods units - SVR is 1260 dyne-sec*cm^-5 - Pulmonary Artery Pulsatility Index ([place]) is 3.0 - Cardiac Power Output () is 0.84  Assessment/Plan: - Patient is a 79 y.o. female with a history of prior bioprosthetic mitral valve replacement now presents with heart failure.  Transesophageal echocardiogram was obtained.  Final report is pending at this time, but there were findings consistent with severe bioprosthetic MVR stenosis.  Cardiac catheterization findings shown above do suggest that there is a gradient of around 22 mm Hg across the bioprosthetic valve. -recommend consultation with CT surgery -obtain CT with mitral valve in valve protocol for possible TMVR -LAD should be revascularized as well Sebastian Watters MD Interventional Cardiology/Structural Heart Disease Cardiovascular [facility] [facility]    Transesophageal echo (MARYBETH)    Result Date: 11/27/2024    Left Ventricle: The left ventricle is normal in size. Septal motion is consistent with post-operative status. There is normal systolic function with a visually estimated ejection fraction of 60 -  65%.   Right Ventricle: Normal right ventricular cavity size. Systolic function is normal.   Left Atrium: Left atrium is severely dilated.   Mitral Valve: There is a bioprosthetic valve in the mitral position. There is severe stenosis. The mean pressure gradient across the mitral valve is 16 mmHg at a heart rate of  bpm. There is moderate regurgitation.          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  Patient is still on mechanical ventilation.  Noted to have moderate amount of blood in the oropharynx (less compared to yesterday afternoon).  INR 1.6.  She is in the process of getting 1 unit of fresh frozen plasma.  Discussed with Dr. Monique, pulmonary and critical care.:  Plan to continue mechanical ventilation for now.  Plan to give nebulizer treatment with prothrombotic agents.  Hemoglobin/hematocrit is stable since 2 units of packed RBC yesterday and is 9 or above, hemodynamically stable.  She is not on any vasopressors.    12/23/24:  -oropharyngeal oozing during EGD procedure noted.  If patient continues to have concern for bleeding, next step is ENT evaluation.  No plans for further endoscopic evaluation from a GI perspective at this time.  Okay for tube feeds from a GI perspective.  Continue to monitor H&H and transfuse as needed.       Jackie Booker PA-C

## 2024-12-23 NOTE — PROGRESS NOTES
[facility] - [facility] ICU  Pulmonary Critical Care Note    Patient Name: Kortney Leach  MRN: [id number]  Admission Date: 12/15/2024  Hospital Length of Stay: 8 days  Code Status: Full Code  Attending Provider: Julián Monique MD  Primary Care Provider: Arnaldo Hernandez MD     Subjective:     HPI:   79-year-old woman with A-fib, HTN, valvular heart disease s/p MVR, CVID, CAD s/p PCI to LAD (11/29), DVT s/p IVC filter, T2DM, GERD, SSS s/p PPM, HTN, HLD, chronic sinusitis status post sinus surgery, ABEL, obesity, asthma/COPD who was initially admitted as a transfer from [place] to [facility] medicine on 12/15/24  for concern for cauda equina syndrome and neurosurgical services. RRT called on 12/17 and patient placed on BIPAP. CXR at the time consistent with R sided PNA vs. Pleural effusion and broad spectrum antibiotics were started at this time. Patient reported melena in setting of severe normocytic anemia and iron deficiency. EGD done on 12/21/24 consistent with oozing duodenal AVMs requiring coagulation. No intraoperative complications noted. Patient was intubated for air way protection in setting of oropharyngeal oozing near vocal cords and transferred to ICU postoperatively.     Hospital Course/Significant events:  EGD s/p duodenal AVM coagulation- 12/21/24    24 Hour Interval History:  Remains intubated and sedated on propofol.  Net even yesterday.  Hemoglobin stable.  Continues to have bleeding from the endotracheal tube.    /20/8/0.4    Past Medical History:   Diagnosis Date    Anticoagulant long-term use     Asthma     Chronic sinusitis, unspecified     COPD (chronic obstructive pulmonary disease)     CVID (common variable immunodeficiency)     Diabetes mellitus, type 2     GERD (gastroesophageal reflux disease)     Hypertension     Severe mitral valve regurgitation     Sleep apnea     uses CPAP    SOB (shortness of breath)     Unspecified glaucoma     Weakness        Past  Surgical History:   Procedure Laterality Date    A-V CARDIAC PACEMAKER INSERTION N/A 2023    Procedure: INSERTION, CARDIAC PACEMAKER, DUAL CHAMBER;  Surgeon: Arnaud Moon MD;  Location: [facility] CATH LAB;  Service: Cardiology;  Laterality: N/A;  SJM    ANGIOGRAM, CORONARY, WITH LEFT HEART CATHETERIZATION N/A 2024    Procedure: Angiogram, Coronary, with Left Heart Cath;  Surgeon: Tatiana Jarrett MD;  Location: [facility] CATH LAB;  Service: Cardiology;  Laterality: N/A;    APPENDECTOMY      CATARACT EXTRACTION Bilateral     CATHETERIZATION OF BOTH LEFT AND RIGHT HEART N/A 2024    Procedure: CATHETERIZATION, HEART, BOTH LEFT AND RIGHT;  Surgeon: Sebastian Watters Jr., MD;  Location: [facility] CATH LAB;  Service: Cardiology;  Laterality: N/A;  With Valve Study    COLONOSCOPY      EGD, WITH BALLOON DILATION      ESOPHAGOGASTRODUODENOSCOPY      LEFT HEART CATHETERIZATION Left 2023    Procedure: Left heart cath;  Surgeon: Vijay Uribe MD;  Location: [facility] CATH LAB;  Service: Cardiology;  Laterality: Left;  [facility]    MITRAL VALVE REPLACEMENT N/A 2023    Procedure: REPLACEMENT, MITRAL VALVE;  Surgeon: Steven Rosales IV, MD;  Location: [facility] OR;  Service: Cardiovascular;  Laterality: N/A;    TONSILLECTOMY      TOTAL KNEE ARTHROPLASTY Bilateral        Social History     Socioeconomic History    Marital status: Single   Tobacco Use    Smoking status: Former     Current packs/day: 0.00     Types: Cigarettes     Quit date:      Years since quittin.9    Smokeless tobacco: Never   Substance and Sexual Activity    Alcohol use: Not Currently    Drug use: Not Currently    Sexual activity: Not Currently     Social Drivers of Health     Financial Resource Strain: Low Risk  (2024)    Overall Financial Resource Strain (CARDIA)     Difficulty of Paying Living Expenses: Not hard at all   Food Insecurity: No Food Insecurity (2024)    Hunger Vital Sign     Worried About Running Out of Food in the Last Year:  Never true     Ran Out of Food in the Last Year: Never true   Transportation Needs: No Transportation Needs (12/16/2024)    TRANSPORTATION NEEDS     Transportation : No   Physical Activity: Inactive (12/4/2024)    Exercise Vital Sign     Days of Exercise per Week: 0 days     Minutes of Exercise per Session: 0 min   Stress: No Stress Concern Present (12/16/2024)    [employer] [place] [employer] - Occupational Stress Questionnaire     Feeling of Stress : Only a little   Housing Stability: Low Risk  (12/16/2024)    Housing Stability Vital Sign     Unable to Pay for Housing in the Last Year: No     Homeless in the Last Year: No           Current Outpatient Medications   Medication Instructions    amiodarone (PACERONE) 200 mg, Daily    aspirin (ECOTRIN) 81 mg, Oral, Daily    atorvastatin (LIPITOR) 40 mg, Daily    clopidogreL (PLAVIX) 75 mg, Oral, Daily    DUPIXENT SYRINGE 300 mg, Every 14 days    estradioL (ESTRACE) 0.5 g, Twice weekly    famotidine (PEPCID) 40 mg, Nightly    fluticasone-umeclidin-vilanter (TRELEGY ELLIPTA) 200-62.5-25 mcg inhaler 1 puff, Daily    furosemide (LASIX) 40 mg    glimepiride (AMARYL) 4 mg, 2 times daily    HYDROcodone-acetaminophen (NORCO)  mg per tablet 1 tablet, Oral, Every 6 hours PRN    immun glob G,IgG,/pro/IgA 0-50 (HIZENTRA SUBQ) Every 14 days    lipase-protease-amylase 24,000-76,000-120,000 units (CREON) 24,000-76,000 -120,000 unit capsule 1-2 capsules, 3 times daily with meals    metFORMIN (GLUCOPHAGE) 500 mg, Oral, 2 times daily with meals    metoprolol succinate (TOPROL-XL) 50 mg, Oral, Daily    NIFEdipine (PROCARDIA-XL) 30 mg    pantoprazole (PROTONIX) 40 MG tablet 1 tablet, Every morning    timoloL (BETIMOL) 0.5 % ophthalmic solution 1 drop, Both Eyes, 2 times daily    warfarin (COUMADIN) 5 mg, Oral, Daily       Current Inpatient Medications   amiodarone  200 mg Oral Daily    atorvastatin  40 mg Oral QHS    clopidogreL  75 mg Oral Daily    ferric gluconate  (FERRLECIT) 125 mg in 0.9% NaCl 100 mL IVPB  125 mg Intravenous Daily    LIDOcaine (PF) 10 mg/ml (1%)  1 mL Intradermal Once    LIDOcaine  1 patch Transdermal Q24H    lipase-protease-amylase 24,000-76,000-120,000 units  1 capsule Oral TID WM    metoprolol succinate  25 mg Oral Daily    mupirocin   Nasal BID    NIFEdipine  30 mg Oral Daily    pantoprazole  40 mg Intravenous BID    sodium zirconium cyclosilicate  10 g Oral Once    timolol maleate 0.5%  1 drop Both Eyes BID       Current Intravenous Infusions   electrolyte-A   Intravenous Continuous   Held at 12/21/24 1000    propofoL  0-50 mcg/kg/min Intravenous Continuous 17.8 mL/hr at 12/23/24 0536 30 mcg/kg/min at 12/23/24 0536         ROS   Unable to obtain 2/2 sedation     Objective:       Intake/Output Summary (Last 24 hours) at 12/23/2024 0616  Last data filed at 12/23/2024 0536  Gross per 24 hour   Intake 1312.81 ml   Output 900 ml   Net 412.81 ml         Vital Signs (Most Recent):  Temp: 98.4 °F (36.9 °C) (12/23/24 0400)  Pulse: 73 (12/23/24 0600)  Resp: 19 (12/23/24 0600)  BP: (!) 116/58 (12/23/24 0600)  SpO2: 98 % (12/23/24 0600)  Body mass index is 35.2 kg/m².  Weight: 98.9 kg (218 lb) Vital Signs (24h Range):  Temp:  [97.5 °F (36.4 °C)-99.3 °F (37.4 °C)] 98.4 °F (36.9 °C)  Pulse:  [60-81] 73  Resp:  [14-31] 19  SpO2:  [92 %-100 %] 98 %  BP: ()/(40-83) 116/58     Physical Exam  Vitals reviewed.   Constitutional:       General: She is not in acute distress.     Appearance: She is not ill-appearing.   Cardiovascular:      Rate and Rhythm: Normal rate and regular rhythm.   Pulmonary:      Effort: Pulmonary effort is normal. No respiratory distress.      Breath sounds: No wheezing or rales.      Comments: Dark red secretions noted in the endotracheal tube and suction canister  Abdominal:      General: Abdomen is flat.      Palpations: Abdomen is soft.   Musculoskeletal:      Right lower leg: No edema.      Left lower leg: No edema.   Neurological:       Comments: Intubated and sedated with propofol.  Pupils are equal and reactive.  Opens her eyes to voice.  Does not follow commands.           Lines/Drains/Airways       Drain  Duration                  Urethral Catheter 12/17/24 1730 Straight-tip 16 Fr. 5 days         Rectal Tube 12/21/24 1800 fecal management system 1 day         NG/OG Tube 12/22/24 0900 Center mouth <1 day              Airway  Duration                  Airway - Non-Surgical 12/21/24 0914 1 day              Peripheral Intravenous Line  Duration                  Peripheral IV - Single Lumen 12/09/24 0835 No Posterior;Right Forearm 13 days         Peripheral IV - Single Lumen 12/21/24 2200 20 G Right;Lateral;Proximal Forearm 1 day                    Significant Labs:    Lab Results   Component Value Date    WBC 16.95 (H) 12/23/2024    HGB 9.0 (L) 12/23/2024    HCT 27.8 (L) 12/23/2024    MCV 95.9 (H) 12/23/2024     12/23/2024           BMP  Lab Results   Component Value Date     12/23/2024    K 3.5 12/23/2024    CO2 22 (L) 12/23/2024    BUN 17.2 12/23/2024    CREATININE 0.72 12/23/2024    CALCIUM 8.3 (L) 12/23/2024    AGAP 14.0 12/23/2024         ABG  Recent Labs   Lab 12/22/24  0425   PH 7.460*   PO2 65.0*   PCO2 39.0   HCO3 27.7*   POCBASEDEF 3.70       Mechanical Ventilation Support:  Vent Mode: A/C (12/23/24 0508)  Ventilator Initiated: Yes (12/21/24 1044)  Set Rate: 20 BPM (12/23/24 0508)  Vt Set: 450 mL (12/23/24 0508)  PEEP/CPAP: 8 cmH20 (12/23/24 0508)  Oxygen Concentration (%): 40 (12/23/24 0508)  Peak Airway Pressure: 25 cmH20 (12/23/24 0508)  Total Ve: 9.9 L/m (12/23/24 0508)  F/VT Ratio<105 (RSBI): (!) 48.19 (12/22/24 1745)      Significant Imaging:  CXR 12/21:  Endotracheal tube in appropriate position.  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   Diffuse Gastritis with Hx of GERD on Dexilant   Severe Normocytic Anemia  2/2 Melena and Iron Deficiency   R sided pleural effusion vs PNA - s/p 5 day course of Zosyn  CAD s/p LAD PCI on Plavix (held)  VHD s/p MVR on Coumadin 9/20/23 - held since 12/7 s/p Vit K on 12/20   A fib s/p SUKHI ligation 9/20/23   SSS s/p PPM 9/25/23   DM2   HTN  CVID  Hx of DVT status post [place] filter  Hx of ABEL on CPAP   Hx of Diverticulosis and colonic polyps on last colonoscopy    Hx of HICKMAN   Hx of Pancreatic Insufficiency   Hx of Glaucoma   Hx of Asthma and COPD - not in acute exacerbation        Plan  Continue mechanical ventilation.  Daily SAT.  Hold off on SBT given bloody secretions.  RASS goal 0 to -1.  Bronchoscopy with BAL to assess for source of bleeding and rule out alveolar hemorrhage.  Follow up bacterial cultures from BAL  Hold plavix. S/p FFP 12/22  GI following for AVMs s/p EGD, appreciate assistance  Maintain Hb>8 2/2 recent PCI.  Hold Plavix and warfarin  Cardiology following for complex cardiac history, appreciate assistance   Resume Coumadin when cleared with GI with INR goal 2-3.   Repeat Echo at a later time  Continue home Amiodarone 200mg qd, Lipitor 40mg qd, Plavix 75mg qd (held), Lasix 40mg qd, Creon TID, Toprol 25mg qd, Nifedipine 30mg qd, and Timolol eye drops BID    Code Status: Full Code   DVT Prophylaxis: SCDs  GI Prophylaxis: Protonix BID    Plan discussed with daughter over the phone.     This patient remains at high risk of decompensation and death and will remain in ICU level care.    35 minutes of critical care was time spent personally by me on the following activities: development of treatment plan with patient or surrogate and bedside caregivers, discussions with consultants, evaluation of patient's response to treatment, examination of patient, ordering and performing treatments and interventions, ordering and review of laboratory studies, ordering and review of radiographic studies, pulse oximetry, re-evaluation of patient's condition.  This critical care time did  not overlap with that of any other provider or involve time for any procedures.    This note was generated via Dictaphone and may contain some voice recognition errors.       Cyrus Cruz MD  Pulmonary Critical Care Medicine  [facility] - [facility] ICU  DOS: 12/23/2024

## 2024-12-23 NOTE — PROGRESS NOTES
"  [facility]    Cardiology  Progress Note    Patient Name: Kortney Leach  MRN: [id number]  Admission Date: 12/15/2024  Hospital Length of Stay: 8 days  Code Status: Full Code   Attending Provider: Julián Monique MD   Consulting Provider: GOPI Zuleta  Primary Care Physician: Arnaldo Hernandez MD  Principal Problem:<principal problem not specified>    Patient information was obtained from patient, past medical records, and ER records.     Subjective:     Reason for Consult: Severe Mitral Stenosis - Known patient on Coumadin     HPI: Ms. Leach is an 79 year old female who is known to [facility], Dr. Uribe & Dr. Moon. She presents to the ER from rehab with complaints of lower back pain/spasms. She endorses worsening but denies CP or palps. She reports not being able to walk due to the back pain & constipation. Of note, she was recently discharged from [facility] on 12.3.24 for acute CHF exacerbation. She underwent a MARYBETH that revealed Severe MS & Mod MR and underwent a stent to the prox LAD. She was seen by CV surgery & structural heart with plans to start Coumadin to see if the gradient can be decreased. Plan is for repeat TTE in a few weeks to re-evaluate. Significant labs include WBC 13.86, Plt 447. A CXR was obtained and demonstrated increased density on the right with small right-sided pleural effusion cannot rule out infiltrate. This appears worse than on previous. He was admitted to hospital medicine with a consult to neurosurgery. [facility] has been consulted due to the patient's known severe MS.     12.17.24: NAD. Improvement in SOB. Inaccurate I&O's and daily weights. INR 4.7 today. On 3 L NC. "I am okay." SR on tele. BP stable. Had a BM.   12.18.24: Resting in bed in NAD. ON Bipap.  Lethargic.  RRT called last night for respiratory distress.  INR 6.0.  Coumadin on hold.  12.19.24: NAD More alert today on CPAP. Reports feeling better. INR 6.7. BP soft but stable. SR on tele.   12.20.24: NAD. " Denies CP or palps. SOB improving. Resting comfortably on CPAP. H&H down trending. INR 2.4. BP stable. + diarrhea  12.21.24: Seen post EGD. Underwent EGD this morning and was found to have oozing duodenal AVMs requiring coagulation. She was intubated for airway protection in the setting of oropharyngeal oozing near her vocal cords. Now in ICU. H&H down trending. WBC worsening. INR 1.6.   12.22.24: Vented/Sedated. H&H stable. BP stable off pressors. SR  12.23.24: NAD. Vented/Sedated. H&H 9.0/27.8, AST/ALT 45/1, WBC 16.95    PMH: VHD, GEMINI, DM II, PAF, ABEL, COPD, HTN, DVT SSS, HLD, Asthma, Left Thyroid Nodule, GERD, Chronic Sinusitis, Common Variable Immunodeficiency, Glaucoma  PSH: PPM, LHC, MVR, Bilateral Cataract Extraction, Total Knee Replacement, Bilateral mastectomy, Appendectomy, Colonoscopy, EGD, Tonsillectomy,    Family History: Father - Lung Cancer; Mother - Alzheimer, CVA; Sister - HTN, Multiple Sclerosis, DM II  Social History: Former Smoker (Quit in 1998). Denies illicit drug use and alcohol use.      Previous Cardiac Diagnostics:   [facility] 11.29.24:  Eccentric Prox LAD lesion was 80% stenosed with 0% stenosis post-intervention. iFR 0.84  Prox LAD lesion: A STENT SYNERGY XD [id number] stent was successfully placed at 8 GEO for 10 sec. Proximal part of the stent was post dilated by using 3.5/12 mm NC and a 4/8 mm NC balloons at 12 atmospheres.     [facility] 11.27.24:  Findings:  - There is single vessel coronary artery disease.  - Mid Left Anterior Descending has a calcified 70% stenosis.  - LVEDP is normal at 6 mmHg.  - RHC with elevated R sided filling pressures. RA 7 mmHg, RV 50/7 mmHg, PA 47/26 mmHg (mean 33 mmHg), PCWP 24 mmHg.  - PCWP to LV diastolic mean gradient was calculated to be 22 mmHg.  - Transpulmonary gradient is 8 mmHg.  - Normal Cardiac Output/Index at 4.7/2.3, as calculated by Rahel equation.  - PVR is 1.7 Woods units  - SVR is 1260 dyne-sec*cm^-5  - Pulmonary Artery Pulsatility Index (Nury) is 3.0  -  Cardiac Power Output () is 0.84  Assessment/Plan:  - Patient is a 79 y.o. female with a history of prior bioprosthetic mitral valve replacement now presents with heart failure.  Transesophageal echocardiogram was obtained.  Final report is pending at this time, but there were findings consistent with severe bioprosthetic MVR stenosis.  Cardiac catheterization findings shown above do suggest that there is a gradient of around 22 mm Hg across the bioprosthetic valve.  -recommend consultation with CT surgery   -obtain CT with mitral valve in valve protocol for possible TMVR  -LAD should be revascularized as well     MARYBETH (11.27.24):  Left Ventricle: The left ventricle is normal in size. Septal motion is consistent with post-operative status. There is normal systolic function with a visually estimated ejection fraction of 60 - 65%.  Right Ventricle: Normal right ventricular cavity size. Systolic function is normal.  Left Atrium: Left atrium is severely dilated.  Mitral Valve: There is a bioprosthetic valve in the mitral position. There is severe stenosis. The mean pressure gradient across the mitral valve is 16 mmHg at a heart rate of  bpm. There is moderate regurgitation.     ECHO (11.23.24):  Left Ventricle: The left ventricle is normal in size. Mildly increased wall thickness. There is normal systolic function with a visually estimated ejection fraction of 60 - 65%. Grade I diastolic dysfunction. Right Ventricle: Normal right ventricular cavity size. Systolic function is borderline low. Aortic Valve: There is aortic valve sclerosis. Mitral Valve: There is a bioprosthetic valve in the mitral position (25 mm mosaic porcine valve).  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 There is mild (1+) regurgitation. Tricuspid Valve: There moderate (2+) regurgitation. The estimated pulmonary artery systolic pressure is 57  mmHg. Pacemaker lead noted.  Recommend transesophageal echocardiogram to properly evaluate the bioprosthetic mitral valve.     ECHO (11.14.24):  The study quality is average. The left ventricle is normal in size. Global left ventricular systolic function is normal. The left ventricular ejection fraction is 65%. Left ventricular diastolic function is normal. Mild concentric left ventricular hypertrophy is present. The left atrium is moderately enlarged. (4.6 cms). A normal functioning prosthetic mitral valve is noted. MG = 4.3 mmHg. The pulmonary artery systolic pressure is 37 mmHg.      PPM Insertion (9.25.23):  Successful implantation of PPM Dual. [facility]      PET (1.24.23):  This is a normal perfusion study, no perfusion defects noted. There is no evidence of ischemia.This scan is suggestive of low risk for future cardiovascular events. The left ventricular cavity is noted to be normal on the stress studies. The stress left ventricular ejection fraction was calculated to be 68% and left ventricular global function is normal. The rest left ventricular cavity is noted to be normal. The rest left ventricular ejection fraction was calculated to be 63% and rest left ventricular global function is normal. When compared to the resting ejection fraction (63%), the stress ejection fraction (68%) has increased. Transient ischemic dilatation is present and has been described as a marker for high risk coronary artery disease. It has also been described in microvascular disease, hypertensive heart disease as well as cardiac deconditioning. The study quality is good.   There was a rise in myocardial blood flow between rest and stress.  Global myocardial blood flow reserve was 2.72.  Normal global coronary flow reserve is suggestive of low coronary event risk.     Carotid US (1.24.23):  The study quality is good. There is no plaque noted in the proximal right internal carotid artery. 1-39% stenosis in the proximal left internal  carotid artery based on Bluth Criteria. Antegrade right vertebral artery flow. Antegrade left vertebral artery flow.     LHC (9.6.23):  Normal Coronaries       Review of patient's allergies indicates:   Allergen Reactions    Adhesive tape-silicones      Redness     Current Facility-Administered Medications on File Prior to Encounter   Medication    0.9%  NaCl infusion    diazePAM tablet 10 mg    diphenhydrAMINE capsule 50 mg     Current Outpatient Medications on File Prior to Encounter   Medication Sig    amiodarone (PACERONE) 200 MG Tab Take 200 mg by mouth once daily.    aspirin (ECOTRIN) 81 MG EC tablet Take 1 tablet (81 mg total) by mouth once daily. for 25 days    atorvastatin (LIPITOR) 40 MG tablet Take 40 mg by mouth once daily.    clopidogreL (PLAVIX) 75 mg tablet Take 1 tablet (75 mg total) by mouth once daily.    DUPIXENT SYRINGE 300 mg/2 mL Syrg Inject 300 mg into the skin every 14 (fourteen) days.    estradioL (ESTRACE) 0.01 % (0.1 mg/gram) vaginal cream Place 0.5 g vaginally twice a week.    famotidine (PEPCID) 40 MG tablet Take 40 mg by mouth every evening.    fluticasone-umeclidin-vilanter (TRELEGY ELLIPTA) 200-62.5-25 mcg inhaler Inhale 1 puff into the lungs once daily.    furosemide (LASIX) 40 MG tablet Take 40 mg by mouth.    glimepiride (AMARYL) 2 MG tablet Take 4 mg by mouth 2 (two) times a day.    HYDROcodone-acetaminophen (NORCO)  mg per tablet Take 1 tablet by mouth every 6 (six) hours as needed for Pain.    immun glob G,IgG,/pro/IgA 0-50 (HIZENTRA SUBQ) Inject into the skin every 14 (fourteen) days.    lipase-protease-amylase 24,000-76,000-120,000 units (CREON) 24,000-76,000 -120,000 unit capsule Take 1-2 capsules by mouth 3 (three) times daily with meals. 2 caps with meals and 1 cap c snacks    metFORMIN (GLUCOPHAGE) 500 MG tablet Take 1 tablet (500 mg total) by mouth 2 (two) times daily with meals.    metoprolol succinate (TOPROL-XL) 50 MG 24 hr tablet Take 1 tablet (50 mg total) by  mouth once daily.    NIFEdipine (PROCARDIA-XL) 30 MG (OSM) 24 hr tablet Take 30 mg by mouth.    pantoprazole (PROTONIX) 40 MG tablet Take 1 tablet by mouth every morning.    timoloL (BETIMOL) 0.5 % ophthalmic solution Place 1 drop into both eyes 2 (two) times daily.    warfarin (COUMADIN) 5 MG tablet Take 1 tablet (5 mg total) by mouth Daily.     Review of Systems   Unable to perform ROS: Intubated     Objective:     Vital Signs (Most Recent):  Temp: 99.1 °F (37.3 °C) (12/23/24 0800)  Pulse: 74 (12/23/24 1015)  Resp: (!) 21 (12/23/24 1015)  BP: (!) 122/45 (12/23/24 1015)  SpO2: 96 % (12/23/24 1015) Vital Signs (24h Range):  Temp:  [97.5 °F (36.4 °C)-99.3 °F (37.4 °C)] 99.1 °F (37.3 °C)  Pulse:  [60-81] 74  Resp:  [14-28] 21  SpO2:  [92 %-100 %] 96 %  BP: ()/(40-83) 122/45   Weight: 98.9 kg (218 lb)  Body mass index is 35.2 kg/m².  SpO2: 96 %       Intake/Output Summary (Last 24 hours) at 12/23/2024 1020  Last data filed at 12/23/2024 1018  Gross per 24 hour   Intake 1134.53 ml   Output 700 ml   Net 434.53 ml     Lines/Drains/Airways       Drain  Duration                  Urethral Catheter 12/17/24 1730 Straight-tip 16 Fr. 5 days         NG/OG Tube 12/22/24 0900 Center mouth 1 day         Rectal Tube 12/21/24 1800 fecal management system 1 day              Airway  Duration                  Airway - Non-Surgical 12/21/24 0914 2 days              Peripheral Intravenous Line  Duration                  Peripheral IV - Single Lumen 12/09/24 0835 No Posterior;Right Forearm 14 days         Peripheral IV - Single Lumen 12/21/24 2200 20 G Right;Lateral;Proximal Forearm 1 day                  Significant Labs:   Chemistries:   Recent Labs   Lab 12/17/24  1741 12/17/24  2004 12/20/24  0605 12/21/24  0513 12/21/24  1157 12/22/24  0352 12/23/24  0313   *   < > 138 140 140 141 140   K 5.8*   < > 3.0* 3.4* 3.3* 3.9 3.5   CL 98   < > 99 98 102 104 104   CO2 22*   < > 28 30 26 21* 22*   BUN 27.6*   < > 30.8* 21.5* 19.8  17.4 17.2   CREATININE 0.99   < > 0.74 0.78 0.82 0.82 0.72   CALCIUM 8.4   < > 8.0* 8.0* 8.7 8.0* 8.3*   BILITOT 0.8   < > 0.9  --  1.0 1.4 0.8   ALKPHOS 115   < > 97  --  95 100 100   ALT 16   < > 13  --  12 13 12   AST 52*   < > 35*  --  35* 45* 45*   GLUCOSE 286*   < > 133* 132* 126* 116* 101   MG  --    < >  --   --  2.10 2.00 2.20   PHOS  --   --   --   --   --  3.8 4.5   TROPONINI 0.016  --   --   --   --   --   --     < > = values in this interval not displayed.        CBC/Anemia Labs: Coags:    Recent Labs   Lab 12/21/24  1157 12/21/24  1937 12/22/24  0352 12/22/24  0630 12/22/24  1738 12/22/24  2348 12/23/24  0313   WBC 19.74  19.74*  --  17.18*  --   --   --  16.95*   HGB 7.0*   < > 9.0*   < > 9.6* 8.7* 9.0*   HCT 23.2*   < > 27.8*   < > 29.9* 27.6* 27.8*     --  260  --   --   --  222   MCV 98.3*  --  95.5*  --   --   --  95.9*   RDW 19.8*  --  19.7*  --   --   --  20.0*    < > = values in this interval not displayed.    Recent Labs   Lab 12/20/24  0605 12/21/24  0513 12/22/24  0908   INR 2.4* 1.6* 1.6*        Telemetry:  SR    Physical Exam  Constitutional:       General: She is not in acute distress.     Appearance: She is obese.      Comments: Vented/Sedated   HENT:      Head: Normocephalic.      Mouth/Throat:      Mouth: Mucous membranes are moist.   Cardiovascular:      Rate and Rhythm: Normal rate and regular rhythm.      Pulses: Normal pulses.      Heart sounds: Murmur heard.   Pulmonary:      Effort: Pulmonary effort is normal. No respiratory distress.      Comments: Ventilator Associated Breath Sounds  Vent Mode: A/C  Oxygen Concentration (%):  [30-40] 40  Resp Rate Total:  [18 br/min-23 br/min] 18 br/min  Vt Set:  [450 mL-470 mL] 450 mL  PEEP/CPAP:  [5 cmH20-8 cmH20] 8 cmH20  Mean Airway Pressure:  [11 dbG38-45 cmH20] 13 cmH20  Abdominal:      Palpations: Abdomen is soft.   Skin:     General: Skin is warm and dry.   Neurological:      Mental Status: She is alert.      Comments:  Vented/Sedated       Home Medications:   Current Facility-Administered Medications on File Prior to Encounter   Medication Dose Route Frequency Provider Last Rate Last Admin    0.9%  NaCl infusion   Intravenous On Call Procedure Vijay rUibe MD        diazePAM tablet 10 mg  10 mg Oral On Call Procedure Vijay Uribe MD   10 mg at 09/06/23 1021    diphenhydrAMINE capsule 50 mg  50 mg Oral On Call Procedure Vijay Uribe MD   50 mg at 09/06/23 1021     Current Outpatient Medications on File Prior to Encounter   Medication Sig Dispense Refill    amiodarone (PACERONE) 200 MG Tab Take 200 mg by mouth once daily.      aspirin (ECOTRIN) 81 MG EC tablet Take 1 tablet (81 mg total) by mouth once daily. for 25 days 25 tablet 0    atorvastatin (LIPITOR) 40 MG tablet Take 40 mg by mouth once daily.      clopidogreL (PLAVIX) 75 mg tablet Take 1 tablet (75 mg total) by mouth once daily. 30 tablet 0    DUPIXENT SYRINGE 300 mg/2 mL Syrg Inject 300 mg into the skin every 14 (fourteen) days.      estradioL (ESTRACE) 0.01 % (0.1 mg/gram) vaginal cream Place 0.5 g vaginally twice a week.      famotidine (PEPCID) 40 MG tablet Take 40 mg by mouth every evening.      fluticasone-umeclidin-vilanter (TRELEGY ELLIPTA) 200-62.5-25 mcg inhaler Inhale 1 puff into the lungs once daily.      furosemide (LASIX) 40 MG tablet Take 40 mg by mouth.      glimepiride (AMARYL) 2 MG tablet Take 4 mg by mouth 2 (two) times a day.      HYDROcodone-acetaminophen (NORCO)  mg per tablet Take 1 tablet by mouth every 6 (six) hours as needed for Pain. 20 tablet 0    immun glob G,IgG,/pro/IgA 0-50 (HIZENTRA SUBQ) Inject into the skin every 14 (fourteen) days.      lipase-protease-amylase 24,000-76,000-120,000 units (CREON) 24,000-76,000 -120,000 unit capsule Take 1-2 capsules by mouth 3 (three) times daily with meals. 2 caps with meals and 1 cap c snacks      metFORMIN (GLUCOPHAGE) 500 MG tablet Take 1 tablet (500 mg total) by mouth 2 (two) times daily with  meals. 180 tablet 3    metoprolol succinate (TOPROL-XL) 50 MG 24 hr tablet Take 1 tablet (50 mg total) by mouth once daily. 30 tablet 0    NIFEdipine (PROCARDIA-XL) 30 MG (OSM) 24 hr tablet Take 30 mg by mouth.      pantoprazole (PROTONIX) 40 MG tablet Take 1 tablet by mouth every morning.      timoloL (BETIMOL) 0.5 % ophthalmic solution Place 1 drop into both eyes 2 (two) times daily.      warfarin (COUMADIN) 5 MG tablet Take 1 tablet (5 mg total) by mouth Daily. 30 tablet 0     Current Schedule Inpatient Medications:   amiodarone  200 mg Oral Daily    atorvastatin  40 mg Oral QHS    LIDOcaine (PF) 10 mg/ml (1%)  1 mL Intradermal Once    LIDOcaine  1 patch Transdermal Q24H    lipase-protease-amylase 24,000-76,000-120,000 units  1 capsule Oral TID WM    metoprolol tartrate  12.5 mg Oral BID    mupirocin   Nasal BID    NIFEdipine  30 mg Oral Daily    pantoprazole  40 mg Intravenous BID    sodium zirconium cyclosilicate  10 g Oral Once    timolol maleate 0.5%  1 drop Both Eyes BID     Continuous Infusions:   electrolyte-A   Intravenous Continuous   Held at 12/21/24 1000    propofoL  0-50 mcg/kg/min Intravenous Continuous 17.8 mL/hr at 12/23/24 1018 30 mcg/kg/min at 12/23/24 1018     Assessment:   Acute on Chronic Diastolic Heart Failure - Related to VHD/Severe MS - Clinically Improved     - EF 60-65% with G 1 Diastolic Dysfunction (11.14.24)   VHD    - MARYBETH (11.27.24) - Mitral Valve: There is a bioprosthetic valve in the mitral position. There is severe stenosis. The mean pressure gradient across the mitral valve is 16 mmHg at a heart rate of bpm. There is moderate regurgitation.     - ECHO (11.23.24): There is evidence of bioprosthetic mitral valve stenosis The mean pressure gradient across the mitral valve is 19 mmHg at a heart rate of 78 bpm.     - s/p MVR (9.20.23): Mitral valve replacement with a 25 mm mosaic porcine valve  Acute on Chronic Respiratory Failure Requiring - Now Intubated      - s/p RRT for  Respiratory Distress and Placed on CPAP (12.17.24)  Supratherapeutic INR - Resolved     - INR 6.7 (12.19.24)   Acute GIB    - s/p EGD (12.21.24) - Oozing Duodenal AVM s/p Argon Plasma Coagulation  Diffuse Gastritis   CAD/Stents    - s/p LHC (11.29.24) - Eccentric Prox LAD lesion was 80% stenosed with 0% stenosis post-intervention. iFR 0.84. Prox LAD lesion: A STENT SYNERGY XD [id number] stent was successfully placed at 8 GEO for 10 sec. Proximal part of the stent was post dilated by using 3.5/12 mm NC and a 4/8 mm NC balloons at 12 atmospheres.  Anemia - Stable    - s/p PRBC & PLT Transfusions  Leukocytosis - Improving   COPD Without Exacerbation   Pulmonary HTN  GEMINI/Bilaterally Mild  DM II  PAF - Currently SR    - [id number] Score 6 (9.7% Stroke Risk Per Year)    - s/p Ligation of SUKHI (9.20.23) - Endostapler   ABEL  HTN  DVT s/p IVC Filter     - Previously on Xarelto as OP  SSS    - s/p PPM (9.25.23): [facility]   HLD  Asthma  Left Thyroid Nodule  GERD  Chronic Sinusitis  Common Variable Immunodeficiency  Glaucoma  No Previous History of GIB    Plan:   Resume Warfarin and Plavix when OK with Primary and GI Teams   Vent Management per Primary Team  Repeat Complete ECHO once Extubated  Will Continue to Follow  Labs and EKG in AM: CBC, CMP and Mg     GOPI Zuleta  Cardiology  [facility]     I agree with the findings of the complexity of problems addressed and take responsibility for the plan's risks and complications. I approved the plan documented by Mitchell Petersen NP.

## 2024-12-23 NOTE — PT/OT/SLP PROGRESS
Physical Therapy      Patient Name:  Kortney Leach   MRN:  [id number]    Patient required upgrade to the ICU on 12/21. Today, pt remains intubated and sedated. Spoke to RN. PT to defer session today; possible extubation tomorrow. Will follow-up as appropriate.

## 2024-12-24 LAB
ALBUMIN SERPL-MCNC: 2.5 G/DL (ref 3.4–4.8)
ALBUMIN/GLOB SERPL: 0.8 RATIO (ref 1.1–2)
ALLENS TEST BLOOD GAS (OHS): YES
ALP SERPL-CCNC: 110 UNIT/L (ref 40–150)
ALT SERPL-CCNC: 18 UNIT/L (ref 0–55)
ANION GAP SERPL CALC-SCNC: 13 MEQ/L
AST SERPL-CCNC: 48 UNIT/L (ref 5–34)
BASE EXCESS BLD CALC-SCNC: 3.3 MMOL/L (ref -2–2)
BASOPHILS # BLD AUTO: 0.07 X10(3)/MCL
BASOPHILS NFR BLD AUTO: 0.4 %
BILIRUB SERPL-MCNC: 0.7 MG/DL
BLOOD GAS SAMPLE TYPE (OHS): ABNORMAL
BUN SERPL-MCNC: 33.9 MG/DL (ref 9.8–20.1)
CA-I BLD-SCNC: 1.14 MMOL/L (ref 1.12–1.23)
CALCIUM SERPL-MCNC: 8.1 MG/DL (ref 8.4–10.2)
CHLORIDE SERPL-SCNC: 103 MMOL/L (ref 98–107)
CO2 BLDA-SCNC: 29.1 MMOL/L
CO2 SERPL-SCNC: 24 MMOL/L (ref 23–31)
COHGB MFR BLDA: 1.6 % (ref 0.5–1.5)
CREAT SERPL-MCNC: 0.87 MG/DL (ref 0.55–1.02)
CREAT/UREA NIT SERPL: 39
DRAWN BY BLOOD GAS (OHS): ABNORMAL
EOSINOPHIL # BLD AUTO: 0.31 X10(3)/MCL (ref 0–0.9)
EOSINOPHIL NFR BLD AUTO: 1.9 %
ERYTHROCYTE [DISTWIDTH] IN BLOOD BY AUTOMATED COUNT: 21.1 % (ref 11.5–17)
GFR SERPLBLD CREATININE-BSD FMLA CKD-EPI: >60 ML/MIN/1.73/M2
GLOBULIN SER-MCNC: 3 GM/DL (ref 2.4–3.5)
GLUCOSE SERPL-MCNC: 130 MG/DL (ref 82–115)
HCO3 BLDA-SCNC: 27.8 MMOL/L (ref 22–26)
HCT VFR BLD AUTO: 29.5 % (ref 37–47)
HGB BLD-MCNC: 9 G/DL (ref 12–16)
IMM GRANULOCYTES # BLD AUTO: 0.31 X10(3)/MCL (ref 0–0.04)
IMM GRANULOCYTES NFR BLD AUTO: 1.9 %
INHALED O2 CONCENTRATION: 30 %
LYMPHOCYTES # BLD AUTO: 2.63 X10(3)/MCL (ref 0.6–4.6)
LYMPHOCYTES NFR BLD AUTO: 16.2 %
MAGNESIUM SERPL-MCNC: 2.3 MG/DL (ref 1.6–2.6)
MCH RBC QN AUTO: 30.8 PG (ref 27–31)
MCHC RBC AUTO-ENTMCNC: 30.5 G/DL (ref 33–36)
MCV RBC AUTO: 101 FL (ref 80–94)
MECH RR (OHS): 18 B/MIN
METHGB MFR BLDA: 0.8 % (ref 0.4–1.5)
MODE (OHS): AC
MONOCYTES # BLD AUTO: 1.26 X10(3)/MCL (ref 0.1–1.3)
MONOCYTES NFR BLD AUTO: 7.7 %
NEUTROPHILS # BLD AUTO: 11.69 X10(3)/MCL (ref 2.1–9.2)
NEUTROPHILS NFR BLD AUTO: 71.9 %
NRBC BLD AUTO-RTO: 8.4 %
O2 HB BLOOD GAS (OHS): 93.7 % (ref 94–97)
OHS QRS DURATION: 94 MS
OHS QTC CALCULATION: 464 MS
OXYGEN DEVICE BLOOD GAS (OHS): ABNORMAL
OXYHGB MFR BLDA: 9.4 G/DL (ref 12–16)
PCO2 BLDA: 41 MMHG (ref 35–45)
PEEP RESPIRATORY: 8 CMH2O
PH BLDA: 7.44 [PH] (ref 7.35–7.45)
PHOSPHATE SERPL-MCNC: 4.6 MG/DL (ref 2.3–4.7)
PLATELET # BLD AUTO: 215 X10(3)/MCL (ref 130–400)
PMV BLD AUTO: 9.5 FL (ref 7.4–10.4)
PO2 BLDA: 73 MMHG (ref 80–100)
POCT GLUCOSE: 124 MG/DL (ref 70–110)
POCT GLUCOSE: 152 MG/DL (ref 70–110)
POCT GLUCOSE: 154 MG/DL (ref 70–110)
POCT GLUCOSE: 159 MG/DL (ref 70–110)
POTASSIUM BLOOD GAS (OHS): 3.4 MMOL/L (ref 3.5–5)
POTASSIUM SERPL-SCNC: 4 MMOL/L (ref 3.5–5.1)
PROT SERPL-MCNC: 5.5 GM/DL (ref 5.8–7.6)
RBC # BLD AUTO: 2.92 X10(6)/MCL (ref 4.2–5.4)
SAMPLE SITE BLOOD GAS (OHS): ABNORMAL
SAO2 % BLDA: 95 %
SODIUM BLOOD GAS (OHS): 135 MMOL/L (ref 137–145)
SODIUM SERPL-SCNC: 140 MMOL/L (ref 136–145)
SPONT+MECH VT ON VENT: 450 ML
WBC # BLD AUTO: 16.27 X10(3)/MCL (ref 4.5–11.5)

## 2024-12-24 PROCEDURE — 82803 BLOOD GASES ANY COMBINATION: CPT

## 2024-12-24 PROCEDURE — 63600175 PHARM REV CODE 636 W HCPCS: Performed by: INTERNAL MEDICINE

## 2024-12-24 PROCEDURE — 93010 ELECTROCARDIOGRAM REPORT: CPT | Mod: ,,, | Performed by: INTERNAL MEDICINE

## 2024-12-24 PROCEDURE — 94760 N-INVAS EAR/PLS OXIMETRY 1: CPT

## 2024-12-24 PROCEDURE — 63600175 PHARM REV CODE 636 W HCPCS

## 2024-12-24 PROCEDURE — 27200966 HC CLOSED SUCTION SYSTEM

## 2024-12-24 PROCEDURE — 36415 COLL VENOUS BLD VENIPUNCTURE: CPT

## 2024-12-24 PROCEDURE — 80053 COMPREHEN METABOLIC PANEL: CPT

## 2024-12-24 PROCEDURE — 93005 ELECTROCARDIOGRAM TRACING: CPT

## 2024-12-24 PROCEDURE — 85025 COMPLETE CBC W/AUTO DIFF WBC: CPT

## 2024-12-24 PROCEDURE — 25000003 PHARM REV CODE 250: Performed by: INTERNAL MEDICINE

## 2024-12-24 PROCEDURE — 99900035 HC TECH TIME PER 15 MIN (STAT)

## 2024-12-24 PROCEDURE — 83735 ASSAY OF MAGNESIUM: CPT

## 2024-12-24 PROCEDURE — 99900026 HC AIRWAY MAINTENANCE (STAT)

## 2024-12-24 PROCEDURE — 84100 ASSAY OF PHOSPHORUS: CPT

## 2024-12-24 PROCEDURE — 25000003 PHARM REV CODE 250: Performed by: STUDENT IN AN ORGANIZED HEALTH CARE EDUCATION/TRAINING PROGRAM

## 2024-12-24 PROCEDURE — 93010 ELECTROCARDIOGRAM REPORT: CPT | Mod: 76,,, | Performed by: INTERNAL MEDICINE

## 2024-12-24 PROCEDURE — 27100171 HC OXYGEN HIGH FLOW UP TO 24 HOURS

## 2024-12-24 PROCEDURE — 99900031 HC PATIENT EDUCATION (STAT)

## 2024-12-24 PROCEDURE — 20000000 HC ICU ROOM

## 2024-12-24 PROCEDURE — 93010 ELECTROCARDIOGRAM REPORT: CPT | Mod: 77,,, | Performed by: INTERNAL MEDICINE

## 2024-12-24 PROCEDURE — 25000003 PHARM REV CODE 250

## 2024-12-24 PROCEDURE — 63600175 PHARM REV CODE 636 W HCPCS: Performed by: STUDENT IN AN ORGANIZED HEALTH CARE EDUCATION/TRAINING PROGRAM

## 2024-12-24 PROCEDURE — 94003 VENT MGMT INPAT SUBQ DAY: CPT

## 2024-12-24 PROCEDURE — 36600 WITHDRAWAL OF ARTERIAL BLOOD: CPT

## 2024-12-24 PROCEDURE — 94761 N-INVAS EAR/PLS OXIMETRY MLT: CPT | Mod: XB

## 2024-12-24 RX ORDER — DEXMEDETOMIDINE HYDROCHLORIDE 4 UG/ML
0-1.4 INJECTION, SOLUTION INTRAVENOUS CONTINUOUS
Status: DISCONTINUED | OUTPATIENT
Start: 2024-12-24 | End: 2024-12-25

## 2024-12-24 RX ORDER — METOPROLOL TARTRATE 25 MG/1
12.5 TABLET ORAL 2 TIMES DAILY
Status: DISCONTINUED | OUTPATIENT
Start: 2024-12-24 | End: 2024-12-24

## 2024-12-24 RX ORDER — ATORVASTATIN CALCIUM 40 MG/1
40 TABLET, FILM COATED ORAL NIGHTLY
Status: DISCONTINUED | OUTPATIENT
Start: 2024-12-24 | End: 2024-12-27

## 2024-12-24 RX ORDER — METOPROLOL TARTRATE 25 MG/1
12.5 TABLET ORAL 2 TIMES DAILY
Status: DISCONTINUED | OUTPATIENT
Start: 2024-12-24 | End: 2024-12-27

## 2024-12-24 RX ORDER — FENTANYL CITRATE 50 UG/ML
25 INJECTION, SOLUTION INTRAMUSCULAR; INTRAVENOUS EVERY 4 HOURS PRN
Status: DISCONTINUED | OUTPATIENT
Start: 2024-12-24 | End: 2024-12-29 | Stop reason: HOSPADM

## 2024-12-24 RX ORDER — AMIODARONE HYDROCHLORIDE 200 MG/1
200 TABLET ORAL DAILY
Status: DISCONTINUED | OUTPATIENT
Start: 2024-12-25 | End: 2024-12-24

## 2024-12-24 RX ORDER — AMIODARONE HYDROCHLORIDE 200 MG/1
200 TABLET ORAL DAILY
Status: DISCONTINUED | OUTPATIENT
Start: 2024-12-24 | End: 2024-12-27

## 2024-12-24 RX ADMIN — PROPOFOL 30 MCG/KG/MIN: 10 INJECTION, EMULSION INTRAVENOUS at 04:12

## 2024-12-24 RX ADMIN — FENTANYL CITRATE 25 MCG: 50 INJECTION, SOLUTION INTRAMUSCULAR; INTRAVENOUS at 12:12

## 2024-12-24 RX ADMIN — ATORVASTATIN CALCIUM 40 MG: 40 TABLET, FILM COATED ORAL at 09:12

## 2024-12-24 RX ADMIN — DEXMEDETOMIDINE HYDROCHLORIDE 0.04 MCG/KG/HR: 400 INJECTION INTRAVENOUS at 07:12

## 2024-12-24 RX ADMIN — PANTOPRAZOLE SODIUM 40 MG: 40 INJECTION, POWDER, FOR SOLUTION INTRAVENOUS at 09:12

## 2024-12-24 RX ADMIN — PANCRELIPASE 1 CAPSULE: 120000; 24000; 76000 CAPSULE, DELAYED RELEASE PELLETS ORAL at 04:12

## 2024-12-24 RX ADMIN — PANCRELIPASE 1 CAPSULE: 120000; 24000; 76000 CAPSULE, DELAYED RELEASE PELLETS ORAL at 01:12

## 2024-12-24 RX ADMIN — FENTANYL CITRATE 25 MCG: 50 INJECTION, SOLUTION INTRAMUSCULAR; INTRAVENOUS at 04:12

## 2024-12-24 RX ADMIN — DEXMEDETOMIDINE HYDROCHLORIDE 0.6 MCG/KG/HR: 400 INJECTION INTRAVENOUS at 01:12

## 2024-12-24 RX ADMIN — MUPIROCIN: 20 OINTMENT TOPICAL at 01:12

## 2024-12-24 RX ADMIN — PANCRELIPASE 1 CAPSULE: 120000; 24000; 76000 CAPSULE, DELAYED RELEASE PELLETS ORAL at 09:12

## 2024-12-24 RX ADMIN — TIMOLOL MALEATE 1 DROP: 5 SOLUTION OPHTHALMIC at 01:12

## 2024-12-24 RX ADMIN — AMIODARONE HYDROCHLORIDE 200 MG: 200 TABLET ORAL at 09:12

## 2024-12-24 RX ADMIN — DEXMEDETOMIDINE HYDROCHLORIDE 0.5 MCG/KG/HR: 400 INJECTION INTRAVENOUS at 10:12

## 2024-12-24 RX ADMIN — TIMOLOL MALEATE 1 DROP: 5 SOLUTION OPHTHALMIC at 09:12

## 2024-12-24 RX ADMIN — MUPIROCIN: 20 OINTMENT TOPICAL at 09:12

## 2024-12-24 RX ADMIN — METOPROLOL TARTRATE 12.5 MG: 25 TABLET, FILM COATED ORAL at 09:12

## 2024-12-24 NOTE — PLAN OF CARE
Problem: Adult Inpatient Plan of Care  Goal: Plan of Care Review  Outcome: Progressing  Goal: Patient-Specific Goal (Individualized)  Outcome: Progressing  Goal: Absence of Hospital-Acquired Illness or Injury  Outcome: Progressing  Goal: Optimal Comfort and Wellbeing  Outcome: Progressing  Goal: Readiness for Transition of Care  Outcome: Progressing     Problem: Diabetes Comorbidity  Goal: Blood Glucose Level Within Targeted Range  Outcome: Progressing     Problem: Skin Injury Risk Increased  Goal: Skin Health and Integrity  Outcome: Progressing     Problem: Infection  Goal: Absence of Infection Signs and Symptoms  Outcome: Progressing     Problem: Wound  Goal: Optimal Coping  Outcome: Progressing  Goal: Optimal Functional Ability  Outcome: Progressing  Goal: Absence of Infection Signs and Symptoms  Outcome: Progressing  Goal: Improved Oral Intake  Outcome: Progressing  Goal: Optimal Pain Control and Function  Outcome: Progressing  Goal: Skin Health and Integrity  Outcome: Progressing  Goal: Optimal Wound Healing  Outcome: Progressing     Problem: Fall Injury Risk  Goal: Absence of Fall and Fall-Related Injury  Outcome: Progressing     Problem: Mechanical Ventilation Invasive  Goal: Effective Communication  Outcome: Progressing  Goal: Optimal Device Function  Outcome: Progressing  Goal: Mechanical Ventilation Liberation  Outcome: Progressing  Goal: Optimal Nutrition Delivery  Outcome: Progressing  Goal: Absence of Device-Related Skin and Tissue Injury  Outcome: Progressing  Goal: Absence of Ventilator-Induced Lung Injury  Outcome: Progressing     Problem: Artificial Airway  Goal: Effective Communication  Outcome: Progressing  Goal: Optimal Device Function  Outcome: Progressing  Goal: Absence of Device-Related Skin or Tissue Injury  Outcome: Progressing

## 2024-12-24 NOTE — PROGRESS NOTES
"Gastroenterology Progress Note    Subjective/Interval History:  Patient alert.  Nurse reports attempted to extubate today but unable to.  She remains intubated with no sedation.  She is tolerating tube feeds.  No further overt bleeding.  Rectocele with noted dark brown stool.    ROS:  Review of Systems   Unable to perform ROS: Intubated       Vital Signs:  BP (!) 107/48   Pulse 64   Temp 98.7 °F (37.1 °C) (Oral)   Resp 18   Ht 5' 5.98" (1.676 m)   Wt 98.9 kg (218 lb)   SpO2 (!) 94%   Breastfeeding No   BMI 35.20 kg/m²   Body mass index is 35.2 kg/m².    Physical Exam:  Physical Exam  Constitutional:       General: She is not in acute distress.     Appearance: She is obese. She is ill-appearing. She is not toxic-appearing.   HENT:      Head: Normocephalic and atraumatic.      Mouth/Throat:      Mouth: Mucous membranes are dry.      Pharynx: Oropharynx is clear.   Cardiovascular:      Rate and Rhythm: Normal rate and regular rhythm.      Pulses: Normal pulses.      Heart sounds: Normal heart sounds.   Pulmonary:      Effort: Pulmonary effort is normal. No respiratory distress.      Breath sounds: Normal breath sounds. No stridor. No wheezing or rales.      Comments: Intubated on mechanical ventilator  Abdominal:      General: Bowel sounds are normal. There is no distension.      Palpations: Abdomen is soft. There is no mass.      Tenderness: There is no abdominal tenderness. There is no guarding.      Comments: Dark brown liquid stool noted in rectal tube/bag   Skin:     General: Skin is warm and dry.   Neurological:      Mental Status: She is alert.         Labs:  Recent Results (from the past 48 hours)   Protime-INR    Collection Time: 12/22/24  9:08 AM   Result Value Ref Range    PT 19.7 (H) 12.5 - 14.5 seconds    INR 1.6 (H) <=1.3   POCT glucose    Collection Time: 12/22/24 11:41 AM   Result Value Ref Range    POCT Glucose 113 (H) 70 - 110 mg/dL   Hemoglobin and Hematocrit    Collection Time: 12/22/24  " 1:09 PM   Result Value Ref Range    Hgb 9.2 (L) 12.0 - 16.0 g/dL    Hct 27.8 (L) 37.0 - 47.0 %   POCT glucose    Collection Time: 12/22/24  4:45 PM   Result Value Ref Range    POCT Glucose 144 (H) 70 - 110 mg/dL   Hemoglobin and Hematocrit    Collection Time: 12/22/24  5:38 PM   Result Value Ref Range    Hgb 9.6 (L) 12.0 - 16.0 g/dL    Hct 29.9 (L) 37.0 - 47.0 %   POCT glucose    Collection Time: 12/22/24  9:12 PM   Result Value Ref Range    POCT Glucose 137 (H) 70 - 110 mg/dL   Hemoglobin and Hematocrit    Collection Time: 12/22/24 11:48 PM   Result Value Ref Range    Hgb 8.7 (L) 12.0 - 16.0 g/dL    Hct 27.6 (L) 37.0 - 47.0 %   Comprehensive Metabolic Panel    Collection Time: 12/23/24  3:13 AM   Result Value Ref Range    Sodium 140 136 - 145 mmol/L    Potassium 3.5 3.5 - 5.1 mmol/L    Chloride 104 98 - 107 mmol/L    CO2 22 (L) 23 - 31 mmol/L    Glucose 101 82 - 115 mg/dL    Blood Urea Nitrogen 17.2 9.8 - 20.1 mg/dL    Creatinine 0.72 0.55 - 1.02 mg/dL    Calcium 8.3 (L) 8.4 - 10.2 mg/dL    Protein Total 5.8 5.8 - 7.6 gm/dL    Albumin 2.4 (L) 3.4 - 4.8 g/dL    Globulin 3.4 2.4 - 3.5 gm/dL    Albumin/Globulin Ratio 0.7 (L) 1.1 - 2.0 ratio    Bilirubin Total 0.8 <=1.5 mg/dL     40 - 150 unit/L    ALT 12 0 - 55 unit/L    AST 45 (H) 5 - 34 unit/L    eGFR >60 mL/min/1.73/m2    Anion Gap 14.0 mEq/L    BUN/Creatinine Ratio 24    Phosphorus    Collection Time: 12/23/24  3:13 AM   Result Value Ref Range    Phosphorus Level 4.5 2.3 - 4.7 mg/dL   Magnesium    Collection Time: 12/23/24  3:13 AM   Result Value Ref Range    Magnesium Level 2.20 1.60 - 2.60 mg/dL   CBC with Differential    Collection Time: 12/23/24  3:13 AM   Result Value Ref Range    WBC 16.95 (H) 4.50 - 11.50 x10(3)/mcL    RBC 2.90 (L) 4.20 - 5.40 x10(6)/mcL    Hgb 9.0 (L) 12.0 - 16.0 g/dL    Hct 27.8 (L) 37.0 - 47.0 %    MCV 95.9 (H) 80.0 - 94.0 fL    MCH 31.0 27.0 - 31.0 pg    MCHC 32.4 (L) 33.0 - 36.0 g/dL    RDW 20.0 (H) 11.5 - 17.0 %    Platelet 222  130 - 400 x10(3)/mcL    MPV 9.5 7.4 - 10.4 fL    Neut % 72.1 %    Lymph % 15.4 %    Mono % 8.0 %    Eos % 1.2 %    Basophil % 0.5 %    Lymph # 2.61 0.6 - 4.6 x10(3)/mcL    Neut # 12.22 (H) 2.1 - 9.2 x10(3)/mcL    Mono # 1.36 (H) 0.1 - 1.3 x10(3)/mcL    Eos # 0.20 0 - 0.9 x10(3)/mcL    Baso # 0.09 <=0.2 x10(3)/mcL    IG# 0.47 (H) 0 - 0.04 x10(3)/mcL    IG% 2.8 %    NRBC% 12.6 %   POCT glucose    Collection Time: 12/23/24  6:13 AM   Result Value Ref Range    POCT Glucose 124 (H) 70 - 110 mg/dL   RT Blood Gas    Collection Time: 12/23/24  7:02 AM   Result Value Ref Range    Sample Type Arterial Blood     Sample site Left Radial Artery     Drawn by SD RRT     pH, Blood gas 7.470 (H) 7.350 - 7.450    pCO2, Blood gas 38.0 35.0 - 45.0 mmHg    pO2, Blood gas 100.0 80.0 - 100.0 mmHg    Sodium, Blood Gas 136 (L) 137 - 145 mmol/L    Potassium, Blood Gas 4.2 3.5 - 5.0 mmol/L    Calcium Level Ionized 1.15 1.12 - 1.23 mmol/L    TOC2, Blood gas 28.9 mmol/L    Base Excess, Blood gas 3.80 (H) -2.00 - 2.00 mmol/L    sO2, Blood gas 98.1 %    HCO3, Blood gas 27.7 (H) 22.0 - 26.0 mmol/L    THb, Blood gas 9.4 (L) 12 - 16 g/dL    O2 Hb, Blood Gas 95.1 94.0 - 97.0 %    CO Hgb 1.0 0.5 - 1.5 %    Met Hgb 1.2 0.4 - 1.5 %    Allens Test Yes     MODE AC     Oxygen Device, Blood gas Ventilator     FIO2, Blood gas 40 %    Mech Vt 450 ml    Mech RR 20 b/min    PEEP 8.0 cmH2O   Respiratory Culture    Collection Time: 12/23/24  7:53 AM    Specimen: BAL, RAKAN; Bronchial Alveolar Lavage   Result Value Ref Range    Respiratory Culture Few Gram-negative Rods (A)     GRAM STAIN Moderate WBC observed     GRAM STAIN No bacteria seen    POCT glucose    Collection Time: 12/23/24 11:59 AM   Result Value Ref Range    POCT Glucose 140 (H) 70 - 110 mg/dL   BNP    Collection Time: 12/23/24  1:07 PM   Result Value Ref Range    Natriuretic Peptide 612.2 (H) <=100.0 pg/mL   Urinalysis    Collection Time: 12/23/24  2:13 PM   Result Value Ref Range    Color, UA Yellow  Yellow, Light-Yellow, Colorless, Straw, Dark-Yellow    Appearance, UA Turbid (A) Clear    Specific Gravity, UA 1.025 1.005 - 1.030    pH, UA 5.5 5.0 - 8.5    Protein, UA 1+ (A) Negative    Glucose, UA Normal Negative, Normal    Ketones, UA Trace (A) Negative    Blood, UA 2+ (A) Negative    Bilirubin, UA Negative Negative    Urobilinogen, UA Normal 0.2, 1.0, Normal    Nitrites, UA Negative Negative    Leukocyte Esterase,  (A) Negative    RBC, UA 21-50 (A) None Seen, 0-2, 3-5, 0-5 /HPF    WBC, UA 51-99 (A) None Seen, 0-2, 3-5, 0-5 /HPF    Bacteria, UA Trace None Seen, Trace /HPF    Budding Yeast, UA Few (A) None Seen /HPF    Squamous Epithelial Cells, UA Trace None Seen, Trace, Rare /HPF    Mucous, UA Trace (A) None Seen /LPF   Urine Culture High Risk    Collection Time: 12/23/24  4:59 PM    Specimen: Urine, Catheterized   Result Value Ref Range    Urine Culture No Growth At 24 Hours    POCT glucose    Collection Time: 12/23/24  6:02 PM   Result Value Ref Range    POCT Glucose 141 (H) 70 - 110 mg/dL   POCT glucose    Collection Time: 12/24/24 12:08 AM   Result Value Ref Range    POCT Glucose 124 (H) 70 - 110 mg/dL   Comprehensive Metabolic Panel    Collection Time: 12/24/24  2:17 AM   Result Value Ref Range    Sodium 140 136 - 145 mmol/L    Potassium 4.0 3.5 - 5.1 mmol/L    Chloride 103 98 - 107 mmol/L    CO2 24 23 - 31 mmol/L    Glucose 130 (H) 82 - 115 mg/dL    Blood Urea Nitrogen 33.9 (H) 9.8 - 20.1 mg/dL    Creatinine 0.87 0.55 - 1.02 mg/dL    Calcium 8.1 (L) 8.4 - 10.2 mg/dL    Protein Total 5.5 (L) 5.8 - 7.6 gm/dL    Albumin 2.5 (L) 3.4 - 4.8 g/dL    Globulin 3.0 2.4 - 3.5 gm/dL    Albumin/Globulin Ratio 0.8 (L) 1.1 - 2.0 ratio    Bilirubin Total 0.7 <=1.5 mg/dL     40 - 150 unit/L    ALT 18 0 - 55 unit/L    AST 48 (H) 5 - 34 unit/L    eGFR >60 mL/min/1.73/m2    Anion Gap 13.0 mEq/L    BUN/Creatinine Ratio 39    Phosphorus    Collection Time: 12/24/24  2:17 AM   Result Value Ref Range    Phosphorus  Level 4.6 2.3 - 4.7 mg/dL   Magnesium    Collection Time: 12/24/24  2:17 AM   Result Value Ref Range    Magnesium Level 2.30 1.60 - 2.60 mg/dL   CBC with Differential    Collection Time: 12/24/24  2:17 AM   Result Value Ref Range    WBC 16.27 (H) 4.50 - 11.50 x10(3)/mcL    RBC 2.92 (L) 4.20 - 5.40 x10(6)/mcL    Hgb 9.0 (L) 12.0 - 16.0 g/dL    Hct 29.5 (L) 37.0 - 47.0 %    .0 (H) 80.0 - 94.0 fL    MCH 30.8 27.0 - 31.0 pg    MCHC 30.5 (L) 33.0 - 36.0 g/dL    RDW 21.1 (H) 11.5 - 17.0 %    Platelet 215 130 - 400 x10(3)/mcL    MPV 9.5 7.4 - 10.4 fL    Neut % 71.9 %    Lymph % 16.2 %    Mono % 7.7 %    Eos % 1.9 %    Basophil % 0.4 %    Lymph # 2.63 0.6 - 4.6 x10(3)/mcL    Neut # 11.69 (H) 2.1 - 9.2 x10(3)/mcL    Mono # 1.26 0.1 - 1.3 x10(3)/mcL    Eos # 0.31 0 - 0.9 x10(3)/mcL    Baso # 0.07 <=0.2 x10(3)/mcL    IG# 0.31 (H) 0 - 0.04 x10(3)/mcL    IG% 1.9 %    NRBC% 8.4 %   POCT glucose    Collection Time: 12/24/24  5:28 AM   Result Value Ref Range    POCT Glucose 152 (H) 70 - 110 mg/dL   RT Blood Gas    Collection Time: 12/24/24  5:36 AM   Result Value Ref Range    Sample Type Arterial Blood     Sample site Left Radial Artery     Drawn by sd rrt     pH, Blood gas 7.440 7.350 - 7.450    pCO2, Blood gas 41.0 35.0 - 45.0 mmHg    pO2, Blood gas 73.0 (L) 80.0 - 100.0 mmHg    Sodium, Blood Gas 135 (L) 137 - 145 mmol/L    Potassium, Blood Gas 3.4 (L) 3.5 - 5.0 mmol/L    Calcium Level Ionized 1.14 1.12 - 1.23 mmol/L    TOC2, Blood gas 29.1 mmol/L    Base Excess, Blood gas 3.30 (H) -2.00 - 2.00 mmol/L    sO2, Blood gas 95.0 %    HCO3, Blood gas 27.8 (H) 22.0 - 26.0 mmol/L    THb, Blood gas 9.4 (L) 12 - 16 g/dL    O2 Hb, Blood Gas 93.7 (L) 94.0 - 97.0 %    CO Hgb 1.6 (H) 0.5 - 1.5 %    Met Hgb 0.8 0.4 - 1.5 %    Allens Test Yes     MODE AC     Oxygen Device, Blood gas Ventilator     FIO2, Blood gas 30 %    Mech Vt 450 ml    Mech RR 18 b/min    PEEP 8.0 cmH2O       Imaging:  XR Gastric tube check, non-radiologist  performed    Result Date: 12/22/2024  EXAMINATION: XR GASTRIC TUBE CHECK, NON-RADIOLOGIST PERFORMED CLINICAL HISTORY: verify OG tube placement; COMPARISON: No priors     Frontal image of the upper abdomen.  Enteric tube extends into the stomach.  Tip overlies the proximal gastric body. Electronically signed by: Jason Andrews Date:    12/22/2024 Time:    10:07    X-Ray Chest 1 View    Result Date: 12/21/2024  EXAMINATION: XR CHEST 1 VIEW CLINICAL HISTORY: f/u intubation and ET tube placement; COMPARISON: 17 December 2024 FINDINGS: Frontal view of the chest was obtained. Endotracheal tube tip about 2 cm above the blanca.  Heart and mediastinum otherwise unchanged.  Slightly increased bilateral interstitial predominant opacities.  There is no pneumothorax.     Interval intubation.  Slightly increased bilateral opacities. Electronically signed by: Jason Andrews Date:    12/21/2024 Time:    14:39    CT Head Without Contrast    Result Date: 12/18/2024  EXAMINATION: CT HEAD WITHOUT CONTRAST CLINICAL HISTORY: Mental status change, unknown cause; TECHNIQUE: Axial scans were obtained from skull base to the vertex. Coronal and sagittal reconstructions obtained from the axial data. Automatic exposure control was utilized to limit radiation dose. Contrast: None Radiation Dose: Total DLP: 885 mGy*cm COMPARISON: CT head dated 12/13/2024 FINDINGS: There is no acute intracranial hemorrhage or edema. The gray-white matter differentiation is preserved. There is no mass effect or midline shift.  There is diffuse parenchymal volume loss.  The basal cisterns are patent. There is no abnormal extra-axial fluid collection. The calvarium and skull base are intact. The visualized paranasal sinuses and the mastoid air cells are clear.     No acute intracranial abnormality. Electronically signed by: Jayelne Mirza Date:    12/18/2024 Time:    14:18    X-Ray Chest 1 View    Result Date: 12/17/2024  EXAMINATION: XR CHEST 1 VIEW CLINICAL HISTORY:  SOB; TECHNIQUE: Single frontal view of the chest was performed. COMPARISON: 12/15/2024 FINDINGS: There is some pulmonary edema pulmonary venous congestion bilaterally.  There is areas of atelectasis versus developing infiltrate in the right lower lobe.  There are small effusions seen bilaterally.  The heart is normal in size.  Postsurgical changes seen the mediastinum.  There is a pacemaker in the left anterior chest wall.  Bones and joints show no acute abnormality.     Findings consistent with increased volume status overall not significantly changed since prior examination Electronically signed by: Haider Chavarria Date:    12/17/2024 Time:    18:06    X-Ray Chest 1 View    Result Date: 12/15/2024  EXAMINATION: XR CHEST 1 VIEW CLINICAL HISTORY: hypoxia; TECHNIQUE: Single frontal view of the chest was performed. COMPARISON: 12/04/2024 FINDINGS: There are increased markings bilaterally most pronounced in the right lower lobe.  There is blunting of the right costophrenic angle and I cannot rule out a small effusion.  Heart size is within normal limits.  Pacing device is in place.  There is vascular calcification noted.  Patient has had a previous sternotomy.     Increased density on the right with small right-sided pleural effusion cannot rule out infiltrate.  This appears   worse than on previous Electronically signed by: Brayden Hickman MD Date:    12/15/2024 Time:    17:25    CT Lumbar Spine Without Contrast    Result Date: 12/14/2024  EXAMINATION: CT LUMBAR SPINE WITHOUT CONTRAST CLINICAL HISTORY: Low back pain, increased fracture risk; TECHNIQUE: Multidetector axial images were performed of the lumbar spine without contrast and the images were reformatted. Dose length product of 108 mGycm. Automated exposure control was utilized to minimize radiation dose. COMPARISON: None available FINDINGS: Lumbar vertebrae height is unremarkable and there is no significant listhesis.  No acute fracture or malalignment identified.   No apparent paraspinal soft inflammations.  Note is made of inferior vena cava filter.  There are uterine dense calcifications likely related to fibroids.  Endometrium is not delineated..  Disc segmental analysis is given below: At L1-L2, there is bulging of annulus fibrosis and facet arthropathy which results in mild central canal stenosis.  There are no narrowings of the neural foramen. At L2-L3, there is a generalized disc bulge, ligamentum flavum thickening and facet arthropathy contributing to cause mild central canal stenosis.  There is mild narrowing of the right neural foramen.  The left neural foramen is patent. At L3-L4, there is broad osteophyte disc complex, ligamentum flavum thickening and facet arthropathy.  In conjunction, these findings cause mild to moderate central canal stenosis.  Right neural foramen is patent.  There is mild narrowing of the left neural foramen. At L4-L5, there is broad disc protrusion which lateralizes into the left neural foramen.  There is ligamentum flavum thickening and facet arthropathy which is more pronounced on the left.  Thecal sac is deviated towards the right.  Central canal stenosis is moderate.  There is mild narrowing of right neural foramen.  There is moderate narrowing of the left neural foramen caused by lateralized portion of the disc and spondylosis. At L5-S1, there is mild osteophyte disc complex and facet arthropathy.  Ventral thecal sac is slightly flattened without significant central canal stenosis.  Right neural foramen is patent.  There is mild narrowing of the left neural foramen caused by uncovertebral and facet arthropathy.     Lumbar degenerative disc disease and spondylosis level by level discussed above. Electronically signed by: Sohail Longoria Date:    12/14/2024 Time:    17:13    CT Head Without Contrast    Result Date: 12/13/2024  EXAMINATION CT HEAD WITHOUT CONTRAST CLINICAL HISTORY Mental status change, unknown cause; TECHNIQUE Axial  non-contrast CT images of the head were acquired and multiplanar reconstructions accomplished by a CT technologist at a separate workstation, pushed to PACS for physician review. COMPARISON None available at the time of the initial interpretation. FINDINGS Images were reviewed in subdural, brain, soft tissue, and bone windows. Exam quality: Motion/streak artifact limits assessment of the posterior fossa. Hemorrhage: No evidence of acute hyperattenuating blood products. Parenchyma: There is diffuse bilateral supratentorial white matter hypoattenuation, typical of chronic microvascular changes. No discrete mass, mass effect, or CT evidence of acute large vascular territory insult. Gray-white differentiation is preserved. No midline shift is evident. CSF spaces: Proportional appearance of ventricular and sulcal enlargement. No hydrocephalus. No masses or fluid collections. Other findings: No focally hyperdense artery. Scattered carotid siphon calcifications are present. No abnormal densities within the dural sinuses. No abnormalities of the scalp or subjacent osseous structures. Mastoids are well aerated. No focal abnormality of the sella. There is diffuse mucoperiosteal thickening of the left maxillary sinus, with less prominent right maxillary mucosal thickening.  Postoperative changes of the bilateral medial maxillary sinus walls are incidentally noted. IMPRESSION 1. No acute intracranial abnormality. 2. Age-related atrophy and chronic sequela of white matter microvascular disease. 3. Findings suggestive of left maxillary sinusitis. 4. Additional chronic secondary details discussed above. RADIATION DOSE Automated tube current modulation, weight-based exposure dosing, and/or iterative reconstruction technique utilized to reach lowest reasonably achievable exposure rate. DLP: 2123.7 mGy*cm Electronically signed by: Reji Shafer Date:    12/13/2024 Time:    16:46    X-Ray Chest 1 View    Result Date:  12/4/2024  EXAMINATION: XR CHEST 1 VIEW CLINICAL HISTORY: Shortness of breath; TECHNIQUE: Single frontal view of the chest was performed. COMPARISON: 11/30/2024 FINDINGS: There is improving pulmonary edema and pulmonary venous congestion.  There are small effusions bilaterally.  The heart is normal in appearance.  Postsurgical changes are seen the mediastinum.  There is a pacemaker in the left anterior chest wall.  Bones and joints show no acute abnormality.     Improving volume status Electronically signed by: Haider Chavarria Date:    12/04/2024 Time:    18:25    X-Ray Chest 1 View    Result Date: 12/1/2024  EXAMINATION: XR CHEST 1 VIEW CLINICAL HISTORY: SOB; TECHNIQUE: AP chest COMPARISON: Chest x-ray dated 11/20/2024 FINDINGS: Left chest wall pacemaker is in place.  The heart is normal in size.  There are increased interstitial markings and prominence of the central pulmonary vasculature.  There is no pleural effusion or visible pneumothorax.     Congestive changes of the lungs. Electronically signed by: Jaylene Mirza Date:    12/01/2024 Time:    10:45    Cardiac catheterization    Result Date: 11/29/2024    Eccentric Prox LAD lesion was 80% stenosed with 0% stenosis post-intervention. iFR 0.84   Prox LAD lesion: A STENT SYNERGY XD [id number] stent was successfully placed at 8 GEO for 10 sec. Proximal part of the stent was post dilated by using 3.5/12 mm NC and a 4/8 mm NC balloons at 12 atmospheres. The procedure log was documented by No documenter listed and verified by Tatiana Jarrett MD. Date: 11/29/2024  Time: 11:51 AM After obtaining informed consent patient was brought to the cath lab in a fasting state.  Right groin area was prepped and draped usual sterile manner using ChloraPrep solution.  2% lidocaine was given for local anesthesia.  Six [place] sheath was placed in the right femoral artery using Seldinger technique.  Six [place] EBU 3.0 guide catheter used for the intervention.  IFR of the proximal LAD  was performed which was 0.84.  Heparin was given for anticoagulation.  Intervention was done on the same IFR wire which was placed in the distal LAD.  Proximal LAD lesion was pre-dilated by using 2.5/15 mm balloon and stented with a 3/16 mm synergy drug-eluting stent and proximal part of the stent was post dilated by using NC balloon 3.5/12 mm and a 4/8 mm NC balloon at 12 atmospheres for 10 seconds.  Post stenting angiogram shows lesion reduced from 0%.  Patient tolerated the procedure very well no complications noted.  Right femoral artery angiogram was performed and Angio-Seal closure device was applied achieving hemostasis.     CTA Chest Aorta Non Coronary    Result Date: 11/28/2024  EXAMINATION: CTA CHEST AORTA NON CORONARY CLINICAL HISTORY: Gated CTA Chest Abdomen - Mitral Valve in Valve Protocol; TECHNIQUE: Helically acquired images with axial, sagittal and coronal reformations were obtained from the thoracic inlet to the lung bases followingthe IV administration of contrast.  CTA timed for evaluation of the aorta MIP images were performed.  Exam was EKG gated. Automated tube current modulation, weight-based exposure dosing, and/or iterative reconstruction technique utilized to reach lowest reasonably achievable exposure rate. DLP: 776 mGy*cm COMPARISON: No relevant priors available for comparison at time of this dictation FINDINGS: BASE OF NECK: No significant abnormality. AORTA: Aortic atherosclerosis.  Ascending aorta measures 3.5 cm in diameter. PULMONARY VASCULATURE: Normal appearance of the pulmonary arteries taking into account timing of the contrast bolus. HEART: There are postsurgical changes at the mitral valve.  There are calcifications at the mitral valve annulus.  There are coronary artery calcifications. CARLOS/MEDIASTINUM: Small mediastinal and hilar lymph nodes, nonspecific. AIRWAYS: Patent. LUNGS/PLEURA: There are small layering pleural effusions.  There is septal thickening and mild  ground-glass density within the lungs.  Dependent atelectasis bilaterally. UPPER ABDOMEN: Partially imaged IVC filter.  Mild splenomegaly. THORACIC SOFT TISSUES: Cardiac pacer device in place via left subclavian approach. BONES: Postop sternotomy.  Partially imaged spinal stimulator device.     Small pleural effusions and changes of pulmonary interstitial edema Electronically signed by: Lelo Rogers Date:    11/28/2024 Time:    12:05    Cardiac catheterization    Result Date: 11/27/2024  The procedure log was documented by No documenter listed and verified by Sebastian Watters Jr, MD. Date: 11/27/2024  Time: 9:21 PM Procedure: Left heart catheterization with coronary angiography Right heart catheterization Measurement of LVEDP  Preoperative diagnosis: Heart failure Postoperative diagnosis: Coronary artery disease  Access: Right common femoral artery Right common femoral vein Estimated blood loss: 10 cc Complications: None  Summary: Consent obtained. Risks and benefits discussed with the patient. The patient was brought to the cath lab in a fasting state. The patient was prepped and draped in usual sterile fashion. A preprocedure time-out was performed. Ultrasound-guided right common femoral venous access via modified Seldinger technique using a micropuncture kit. A 7 [place] sheath was inserted into the right common femoral vein. Ultrasound-guided right common femoral arterial access via modified Seldinger technique using a micropuncture kit. A 5 [place] sheath was inserted into the right common femoral artery. A right heart catheterization was being performed using a [place] catheter. A 5 [place] Pigtail catheter was used to cross aortic valve, and simultaneous LV and PCW pressures were measured. The transvalvular aortic gradient was measured by pullback method. A 5 [place] JL4 catheter was used to cannulate the left main coronary artery; angiography was performed, and multiple fluoroscopic views were obtained. A 5  [place] JR4 catheter was used to cannulate the right coronary artery; angiography was performed, and multiple fluoroscopic views were obtained. At the end the procedure, all wires and catheters were removed. Hemostasis achieved with manual compression.  Findings: - There is single vessel coronary artery disease. - Mid Left Anterior Descending has a calcified 70% stenosis. - LVEDP is normal at 6 mmHg. - RHC with elevated R sided filling pressures. RA 7 mmHg, RV 50/7 mmHg, PA 47/26 mmHg (mean 33 mmHg), PCWP 24 mmHg. - PCWP to LV diastolic mean gradient was calculated to be 22 mmHg. - Transpulmonary gradient is 8 mmHg. - Normal Cardiac Output/Index at 4.7/2.3, as calculated by Rahel equation. - PVR is 1.7 Woods units - SVR is 1260 dyne-sec*cm^-5 - Pulmonary Artery Pulsatility Index (Nury) is 3.0 - Cardiac Power Output () is 0.84  Assessment/Plan: - Patient is a 79 y.o. female with a history of prior bioprosthetic mitral valve replacement now presents with heart failure.  Transesophageal echocardiogram was obtained.  Final report is pending at this time, but there were findings consistent with severe bioprosthetic MVR stenosis.  Cardiac catheterization findings shown above do suggest that there is a gradient of around 22 mm Hg across the bioprosthetic valve. -recommend consultation with CT surgery -obtain CT with mitral valve in valve protocol for possible TMVR -LAD should be revascularized as well Sebastian Watters MD Interventional Cardiology/Structural Heart Disease Cardiovascular [place] [facility]    Transesophageal echo (MARYBETH)    Result Date: 11/27/2024    Left Ventricle: The left ventricle is normal in size. Septal motion is consistent with post-operative status. There is normal systolic function with a visually estimated ejection fraction of 60 - 65%.   Right Ventricle: Normal right ventricular cavity size. Systolic function is normal.   Left Atrium: Left atrium is severely dilated.   Mitral Valve: There is  a bioprosthetic valve in the mitral position. There is severe stenosis. The mean pressure gradient across the mitral valve is 16 mmHg at a heart rate of  bpm. There is moderate regurgitation.          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  Patient is still on mechanical ventilation.  Noted to have moderate amount of blood in the oropharynx (less compared to yesterday afternoon).  INR 1.6.  She is in the process of getting 1 unit of fresh frozen plasma.  Discussed with Dr. Monique, pulmonary and critical care.:  Plan to continue mechanical ventilation for now.  Plan to give nebulizer treatment with prothrombotic agents.  Hemoglobin/hematocrit is stable since 2 units of packed RBC yesterday and is 9 or above, hemodynamically stable.  She is not on any vasopressors.     12/23/24:  -oropharyngeal oozing during EGD procedure noted.  If patient continues to have concern for bleeding, next step is ENT evaluation.  No plans for further endoscopic evaluation from a GI perspective at this time.  Okay for tube feeds from a GI perspective.  Continue to monitor H&H and transfuse as needed.     12/24/24:    Hgb remains stable at 9 x 2 days. Dark brown stool noted to rectal tube. No further orpharyngeal bleeding.  Tolerating Tfs currently.    No further GI recs at this time. Please call back if needed    Holly MCLEOD  [facility] acting as scribe for Dr. Sanket Mathur MD

## 2024-12-24 NOTE — PROGRESS NOTES
"  [facility]    Cardiology  Progress Note    Patient Name: Kortney Leach  MRN: [id number]  Admission Date: 12/15/2024  Hospital Length of Stay: 9 days  Code Status: Full Code   Attending Provider: Julián Monique MD   Consulting Provider: GOPI Zuleta  Primary Care Physician: Arnaldo Hernandez MD  Principal Problem:<principal problem not specified>    Patient information was obtained from patient, past medical records, and ER records.     Subjective:     Reason for Consult: Severe Mitral Stenosis - Known patient on Coumadin     HPI: Ms. Leach is an 79 year old female who is known to [facility], Dr. Uribe & Dr. Moon. She presents to the ER from rehab with complaints of lower back pain/spasms. She endorses worsening but denies CP or palps. She reports not being able to walk due to the back pain & constipation. Of note, she was recently discharged from [facility] on 12.3.24 for acute CHF exacerbation. She underwent a MARYBETH that revealed Severe MS & Mod MR and underwent a stent to the prox LAD. She was seen by CV surgery & structural heart with plans to start Coumadin to see if the gradient can be decreased. Plan is for repeat TTE in a few weeks to re-evaluate. Significant labs include WBC 13.86, Plt 447. A CXR was obtained and demonstrated increased density on the right with small right-sided pleural effusion cannot rule out infiltrate. This appears worse than on previous. He was admitted to hospital medicine with a consult to neurosurgery. [facility] has been consulted due to the patient's known severe MS.     12.17.24: NAD. Improvement in SOB. Inaccurate I&O's and daily weights. INR 4.7 today. On 3 L NC. "I am okay." SR on tele. BP stable. Had a BM.   12.18.24: Resting in bed in NAD. ON Bipap.  Lethargic.  RRT called last night for respiratory distress.  INR 6.0.  Coumadin on hold.  12.19.24: NAD More alert today on CPAP. Reports feeling better. INR 6.7. BP soft but stable. SR on tele.   12.20.24: NAD. " Denies CP or palps. SOB improving. Resting comfortably on CPAP. H&H down trending. INR 2.4. BP stable. + diarrhea  12.21.24: Seen post EGD. Underwent EGD this morning and was found to have oozing duodenal AVMs requiring coagulation. She was intubated for airway protection in the setting of oropharyngeal oozing near her vocal cords. Now in ICU. H&H down trending. WBC worsening. INR 1.6.   12.22.24: Vented/Sedated. H&H stable. BP stable off pressors. SR  12.23.24: NAD. Vented/Sedated. H&H 9.0/27.8, AST/ALT 45/1, WBC 16.95  12.24.24: NAD. Vented/Sedated. H&H 9.0/29.5, WBC 16.27, WBC 16.27. Continue Bleeding.    PMH: VHD, GEMINI, DM II, PAF, ABEL, COPD, HTN, DVT SSS, HLD, Asthma, Left Thyroid Nodule, GERD, Chronic Sinusitis, Common Variable Immunodeficiency, Glaucoma  PSH: PPM, LHC, MVR, Bilateral Cataract Extraction, Total Knee Replacement, Bilateral mastectomy, Appendectomy, Colonoscopy, EGD, Tonsillectomy,    Family History: Father - Lung Cancer; Mother - Alzheimer, CVA; Sister - HTN, Multiple Sclerosis, DM II  Social History: Former Smoker (Quit in 1998). Denies illicit drug use and alcohol use.      Previous Cardiac Diagnostics:   [facility] 11.29.24:  Eccentric Prox LAD lesion was 80% stenosed with 0% stenosis post-intervention. iFR 0.84  Prox LAD lesion: A STENT SYNERGY XD [id number] stent was successfully placed at 8 GEO for 10 sec. Proximal part of the stent was post dilated by using 3.5/12 mm NC and a 4/8 mm NC balloons at 12 atmospheres.     [facility] 11.27.24:  Findings:  - There is single vessel coronary artery disease.  - Mid Left Anterior Descending has a calcified 70% stenosis.  - LVEDP is normal at 6 mmHg.  - RHC with elevated R sided filling pressures. RA 7 mmHg, RV 50/7 mmHg, PA 47/26 mmHg (mean 33 mmHg), PCWP 24 mmHg.  - PCWP to LV diastolic mean gradient was calculated to be 22 mmHg.  - Transpulmonary gradient is 8 mmHg.  - Normal Cardiac Output/Index at 4.7/2.3, as calculated by Rahel equation.  - PVR is 1.7 Quintero  units  - SVR is 1260 dyne-sec*cm^-5  - Pulmonary Artery Pulsatility Index (Nury) is 3.0  - Cardiac Power Output () is 0.84  Assessment/Plan:  - Patient is a 79 y.o. female with a history of prior bioprosthetic mitral valve replacement now presents with heart failure.  Transesophageal echocardiogram was obtained.  Final report is pending at this time, but there were findings consistent with severe bioprosthetic MVR stenosis.  Cardiac catheterization findings shown above do suggest that there is a gradient of around 22 mm Hg across the bioprosthetic valve.  -recommend consultation with CT surgery   -obtain CT with mitral valve in valve protocol for possible TMVR  -LAD should be revascularized as well     MARYBETH (11.27.24):  Left Ventricle: The left ventricle is normal in size. Septal motion is consistent with post-operative status. There is normal systolic function with a visually estimated ejection fraction of 60 - 65%.  Right Ventricle: Normal right ventricular cavity size. Systolic function is normal.  Left Atrium: Left atrium is severely dilated.  Mitral Valve: There is a bioprosthetic valve in the mitral position. There is severe stenosis. The mean pressure gradient across the mitral valve is 16 mmHg at a heart rate of  bpm. There is moderate regurgitation.     ECHO (11.23.24):  Left Ventricle: The left ventricle is normal in size. Mildly increased wall thickness. There is normal systolic function with a visually estimated ejection fraction of 60 - 65%. Grade I diastolic dysfunction. Right Ventricle: Normal right ventricular cavity size. Systolic function is borderline low. Aortic Valve: There is aortic valve sclerosis. Mitral Valve: There is a bioprosthetic valve in the mitral position (25 mm mosaic porcine valve).  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 There is mild (1+) regurgitation. Tricuspid Valve:  There moderate (2+) regurgitation. The estimated pulmonary artery systolic pressure is 57 mmHg. Pacemaker lead noted.  Recommend transesophageal echocardiogram to properly evaluate the bioprosthetic mitral valve.     ECHO (11.14.24):  The study quality is average. The left ventricle is normal in size. Global left ventricular systolic function is normal. The left ventricular ejection fraction is 65%. Left ventricular diastolic function is normal. Mild concentric left ventricular hypertrophy is present. The left atrium is moderately enlarged. (4.6 cms). A normal functioning prosthetic mitral valve is noted. MG = 4.3 mmHg. The pulmonary artery systolic pressure is 37 mmHg.      PPM Insertion (9.25.23):  Successful implantation of PPM Dual. [facility]      PET (1.24.23):  This is a normal perfusion study, no perfusion defects noted. There is no evidence of ischemia.This scan is suggestive of low risk for future cardiovascular events. The left ventricular cavity is noted to be normal on the stress studies. The stress left ventricular ejection fraction was calculated to be 68% and left ventricular global function is normal. The rest left ventricular cavity is noted to be normal. The rest left ventricular ejection fraction was calculated to be 63% and rest left ventricular global function is normal. When compared to the resting ejection fraction (63%), the stress ejection fraction (68%) has increased. Transient ischemic dilatation is present and has been described as a marker for high risk coronary artery disease. It has also been described in microvascular disease, hypertensive heart disease as well as cardiac deconditioning. The study quality is good.   There was a rise in myocardial blood flow between rest and stress.  Global myocardial blood flow reserve was 2.72.  Normal global coronary flow reserve is suggestive of low coronary event risk.     Carotid US (1.24.23):  The study quality is good. There is no plaque noted  in the proximal right internal carotid artery. 1-39% stenosis in the proximal left internal carotid artery based on Bluth Criteria. Antegrade right vertebral artery flow. Antegrade left vertebral artery flow.     C (9.6.23):  Normal Coronaries     Review of patient's allergies indicates:   Allergen Reactions    Adhesive tape-silicones      Redness     Current Facility-Administered Medications on File Prior to Encounter   Medication    0.9%  NaCl infusion    diazePAM tablet 10 mg    diphenhydrAMINE capsule 50 mg     Current Outpatient Medications on File Prior to Encounter   Medication Sig    amiodarone (PACERONE) 200 MG Tab Take 200 mg by mouth once daily.    aspirin (ECOTRIN) 81 MG EC tablet Take 1 tablet (81 mg total) by mouth once daily. for 25 days    atorvastatin (LIPITOR) 40 MG tablet Take 40 mg by mouth once daily.    clopidogreL (PLAVIX) 75 mg tablet Take 1 tablet (75 mg total) by mouth once daily.    DUPIXENT SYRINGE 300 mg/2 mL Syrg Inject 300 mg into the skin every 14 (fourteen) days.    estradioL (ESTRACE) 0.01 % (0.1 mg/gram) vaginal cream Place 0.5 g vaginally twice a week.    famotidine (PEPCID) 40 MG tablet Take 40 mg by mouth every evening.    fluticasone-umeclidin-vilanter (TRELEGY ELLIPTA) 200-62.5-25 mcg inhaler Inhale 1 puff into the lungs once daily.    furosemide (LASIX) 40 MG tablet Take 40 mg by mouth.    glimepiride (AMARYL) 2 MG tablet Take 4 mg by mouth 2 (two) times a day.    HYDROcodone-acetaminophen (NORCO)  mg per tablet Take 1 tablet by mouth every 6 (six) hours as needed for Pain.    immun glob G,IgG,/pro/IgA 0-50 (HIZENTRA SUBQ) Inject into the skin every 14 (fourteen) days.    lipase-protease-amylase 24,000-76,000-120,000 units (CREON) 24,000-76,000 -120,000 unit capsule Take 1-2 capsules by mouth 3 (three) times daily with meals. 2 caps with meals and 1 cap c snacks    metFORMIN (GLUCOPHAGE) 500 MG tablet Take 1 tablet (500 mg total) by mouth 2 (two) times daily with  meals.    metoprolol succinate (TOPROL-XL) 50 MG 24 hr tablet Take 1 tablet (50 mg total) by mouth once daily.    NIFEdipine (PROCARDIA-XL) 30 MG (OSM) 24 hr tablet Take 30 mg by mouth.    pantoprazole (PROTONIX) 40 MG tablet Take 1 tablet by mouth every morning.    timoloL (BETIMOL) 0.5 % ophthalmic solution Place 1 drop into both eyes 2 (two) times daily.    warfarin (COUMADIN) 5 MG tablet Take 1 tablet (5 mg total) by mouth Daily.     Review of Systems   Unable to perform ROS: Intubated     Objective:     Vital Signs (Most Recent):  Temp: 98.3 °F (36.8 °C) (12/24/24 1200)  Pulse: 60 (12/24/24 1300)  Resp: (!) 27 (12/24/24 1300)  BP: (!) 108/47 (12/24/24 1300)  SpO2: 96 % (12/24/24 1300) Vital Signs (24h Range):  Temp:  [98.3 °F (36.8 °C)-100.5 °F (38.1 °C)] 98.3 °F (36.8 °C)  Pulse:  [60-91] 60  Resp:  [17-34] 27  SpO2:  [94 %-100 %] 96 %  BP: ()/(38-64) 108/47   Weight: 98.9 kg (218 lb)  Body mass index is 35.2 kg/m².  SpO2: 96 %       Intake/Output Summary (Last 24 hours) at 12/24/2024 1333  Last data filed at 12/24/2024 1200  Gross per 24 hour   Intake 1605.07 ml   Output 885 ml   Net 720.07 ml     Lines/Drains/Airways       Drain  Duration                  NG/OG Tube 12/22/24 0900 Center mouth 2 days         Rectal Tube 12/21/24 1800 fecal management system 2 days         Urethral Catheter 12/23/24 1600 <1 day              Airway  Duration                  Airway - Non-Surgical 12/21/24 0914 3 days              Peripheral Intravenous Line  Duration                  Peripheral IV - Single Lumen 12/21/24 2200 20 G Right;Lateral;Proximal Forearm 2 days         Peripheral IV - Single Lumen 12/24/24 0815 20 G Posterior;Proximal;Right Forearm <1 day                  Significant Labs:   Chemistries:   Recent Labs   Lab 12/17/24  1741 12/17/24  2004 12/21/24  0513 12/21/24  1157 12/22/24  0352 12/23/24  0313 12/24/24  0217   *   < > 140 140 141 140 140   K 5.8*   < > 3.4* 3.3* 3.9 3.5 4.0   CL 98   < > 98  102 104 104 103   CO2 22*   < > 30 26 21* 22* 24   BUN 27.6*   < > 21.5* 19.8 17.4 17.2 33.9*   CREATININE 0.99   < > 0.78 0.82 0.82 0.72 0.87   CALCIUM 8.4   < > 8.0* 8.7 8.0* 8.3* 8.1*   BILITOT 0.8   < >  --  1.0 1.4 0.8 0.7   ALKPHOS 115   < >  --  95 100 100 110   ALT 16   < >  --  12 13 12 18   AST 52*   < >  --  35* 45* 45* 48*   GLUCOSE 286*   < > 132* 126* 116* 101 130*   MG  --    < >  --  2.10 2.00 2.20 2.30   PHOS  --   --   --   --  3.8 4.5 4.6   TROPONINI 0.016  --   --   --   --   --   --     < > = values in this interval not displayed.        CBC/Anemia Labs: Coags:    Recent Labs   Lab 12/22/24  0352 12/22/24  0630 12/22/24  2348 12/23/24  0313 12/24/24  0217   WBC 17.18*  --   --  16.95* 16.27*   HGB 9.0*   < > 8.7* 9.0* 9.0*   HCT 27.8*   < > 27.6* 27.8* 29.5*     --   --  222 215   MCV 95.5*  --   --  95.9* 101.0*   RDW 19.7*  --   --  20.0* 21.1*    < > = values in this interval not displayed.    Recent Labs   Lab 12/20/24  0605 12/21/24  0513 12/22/24  0908   INR 2.4* 1.6* 1.6*        Telemetry:  SR    Physical Exam  Constitutional:       General: She is not in acute distress.     Appearance: She is obese.      Comments: Vented/Sedated   HENT:      Head: Normocephalic.      Mouth/Throat:      Mouth: Mucous membranes are dry.   Cardiovascular:      Rate and Rhythm: Normal rate and regular rhythm.      Pulses: Normal pulses.      Heart sounds: Murmur heard.   Pulmonary:      Effort: Pulmonary effort is normal. No respiratory distress.      Comments: Ventilator Associated Breath Sounds  Vent Mode: A/C  Oxygen Concentration (%):  [30-40] 40  Resp Rate Total:  [18 br/min-34 br/min] 18 br/min  Vt Set:  [450 mL] 450 mL  PEEP/CPAP:  [5 cmH20-8 cmH20] 5 cmH20  Pressure Support:  [10 cmH20] 10 cmH20  Mean Airway Pressure:  [10 okF06-54 cmH20] 10 cmH20  Abdominal:      Palpations: Abdomen is soft.   Skin:     General: Skin is warm and dry.   Neurological:      Mental Status: She is alert.       Comments: Vented/Sedated       Home Medications:   Current Facility-Administered Medications on File Prior to Encounter   Medication Dose Route Frequency Provider Last Rate Last Admin    0.9%  NaCl infusion   Intravenous On Call Procedure Vijay Uribe MD        diazePAM tablet 10 mg  10 mg Oral On Call Procedure Vijay Uribe MD   10 mg at 09/06/23 1021    diphenhydrAMINE capsule 50 mg  50 mg Oral On Call Procedure Vijay Uribe MD   50 mg at 09/06/23 1021     Current Outpatient Medications on File Prior to Encounter   Medication Sig Dispense Refill    amiodarone (PACERONE) 200 MG Tab Take 200 mg by mouth once daily.      aspirin (ECOTRIN) 81 MG EC tablet Take 1 tablet (81 mg total) by mouth once daily. for 25 days 25 tablet 0    atorvastatin (LIPITOR) 40 MG tablet Take 40 mg by mouth once daily.      clopidogreL (PLAVIX) 75 mg tablet Take 1 tablet (75 mg total) by mouth once daily. 30 tablet 0    DUPIXENT SYRINGE 300 mg/2 mL Syrg Inject 300 mg into the skin every 14 (fourteen) days.      estradioL (ESTRACE) 0.01 % (0.1 mg/gram) vaginal cream Place 0.5 g vaginally twice a week.      famotidine (PEPCID) 40 MG tablet Take 40 mg by mouth every evening.      fluticasone-umeclidin-vilanter (TRELEGY ELLIPTA) 200-62.5-25 mcg inhaler Inhale 1 puff into the lungs once daily.      furosemide (LASIX) 40 MG tablet Take 40 mg by mouth.      glimepiride (AMARYL) 2 MG tablet Take 4 mg by mouth 2 (two) times a day.      HYDROcodone-acetaminophen (NORCO)  mg per tablet Take 1 tablet by mouth every 6 (six) hours as needed for Pain. 20 tablet 0    immun glob G,IgG,/pro/IgA 0-50 (HIZENTRA SUBQ) Inject into the skin every 14 (fourteen) days.      lipase-protease-amylase 24,000-76,000-120,000 units (CREON) 24,000-76,000 -120,000 unit capsule Take 1-2 capsules by mouth 3 (three) times daily with meals. 2 caps with meals and 1 cap c snacks      metFORMIN (GLUCOPHAGE) 500 MG tablet Take 1 tablet (500 mg total) by mouth 2 (two) times  daily with meals. 180 tablet 3    metoprolol succinate (TOPROL-XL) 50 MG 24 hr tablet Take 1 tablet (50 mg total) by mouth once daily. 30 tablet 0    NIFEdipine (PROCARDIA-XL) 30 MG (OSM) 24 hr tablet Take 30 mg by mouth.      pantoprazole (PROTONIX) 40 MG tablet Take 1 tablet by mouth every morning.      timoloL (BETIMOL) 0.5 % ophthalmic solution Place 1 drop into both eyes 2 (two) times daily.      warfarin (COUMADIN) 5 MG tablet Take 1 tablet (5 mg total) by mouth Daily. 30 tablet 0     Current Schedule Inpatient Medications:   amiodarone  200 mg Per G Tube Daily    atorvastatin  40 mg Per G Tube QHS    LIDOcaine (PF) 10 mg/ml (1%)  1 mL Intradermal Once    LIDOcaine  1 patch Transdermal Q24H    lipase-protease-amylase 24,000-76,000-120,000 units  1 capsule Oral TID WM    metoprolol tartrate  12.5 mg Per G Tube BID    mupirocin   Nasal BID    NIFEdipine  30 mg Oral Daily    pantoprazole  40 mg Intravenous BID    sodium zirconium cyclosilicate  10 g Oral Once    timolol maleate 0.5%  1 drop Both Eyes BID     Continuous Infusions:   dexmedeTOMIDine (Precedex) infusion (titrating)  0-1.4 mcg/kg/hr Intravenous Continuous 14.84 mL/hr at 12/24/24 1315 0.6 mcg/kg/hr at 12/24/24 1315    electrolyte-A   Intravenous Continuous   Held at 12/21/24 1000     Assessment:   Acute on Chronic Diastolic Heart Failure - Related to VHD/Severe MS - Clinically Improved     - EF 60-65% with G 1 Diastolic Dysfunction (11.14.24)   VHD    - MARYBETH (11.27.24) - Mitral Valve: There is a bioprosthetic valve in the mitral position. There is severe stenosis. The mean pressure gradient across the mitral valve is 16 mmHg at a heart rate of bpm. There is moderate regurgitation.     - ECHO (11.23.24): There is evidence of bioprosthetic mitral valve stenosis The mean pressure gradient across the mitral valve is 19 mmHg at a heart rate of 78 bpm.     - s/p MVR (9.20.23): Mitral valve replacement with a 25 mm mosaic porcine valve  Acute on Chronic  Respiratory Failure Requiring - Now Intubated      - s/p RRT for Respiratory Distress and Placed on CPAP (12.17.24)  Supratherapeutic INR - Resolved     - INR 6.7 (12.19.24)   Acute GIB    - s/p EGD (12.21.24) - Oozing Duodenal AVM s/p Argon Plasma Coagulation  Diffuse Gastritis   CAD/Stents    - s/p LHC (11.29.24) - Eccentric Prox LAD lesion was 80% stenosed with 0% stenosis post-intervention. iFR 0.84. Prox LAD lesion: A STENT SYNERGY XD [id number] stent was successfully placed at 8 GEO for 10 sec. Proximal part of the stent was post dilated by using 3.5/12 mm NC and a 4/8 mm NC balloons at 12 atmospheres.  Anemia - Stable    - s/p PRBC & PLT Transfusions  Leukocytosis - Improving   COPD Without Exacerbation   Pulmonary HTN  GEMINI/Bilaterally Mild  DM II  PAF - Currently SR    - [id number] Score 6 (9.7% Stroke Risk Per Year)    - s/p Ligation of SUKHI (9.20.23) - Endostapler   ABEL  HTN  DVT s/p IVC Filter     - Previously on Xarelto as OP  SSS    - s/p PPM (9.25.23): [facility]   HLD  Asthma  Left Thyroid Nodule  GERD  Chronic Sinusitis  Common Variable Immunodeficiency  Glaucoma  No Previous History of GIB    Plan:   Resume Warfarin and Plavix when OK with Primary and GI Teams   Vent Management per Primary Team  Repeat Complete ECHO once Extubated  Will Follow Up on 12.26.24    Mitcehll Petersen, GOPI  Cardiology  [facility]   Physician addendum:  I have seen and examined this patient as a split-shared visit with the JACQUELYN d/t complicated medical management of above problems written in assessment and high acuity requiring physician expertise in medical decision-making. I performed the substantive portion of the history and exam. Above medical decision-making is also formulated by me.    Cardiovascular exam:  S1, S2  Lungs:  fine crackles at bases.  Extremities:  + edema bilaterally    Plan:  Medications as above.  Continue supportive therapy.     Calvin Ocasio MD  Cardiologist

## 2024-12-24 NOTE — PT/OT/SLP PROGRESS
Physical Therapy      Patient Name:  Kortney Leach   MRN:  [id number]    Spoke to RN who reports that the potential plan is to extubate today. PT to defer and see how pt tolerates extubation. Will follow up as appropriate.

## 2024-12-24 NOTE — PROGRESS NOTES
[facility] - [facility] ICU  Pulmonary Critical Care Note    Patient Name: Kortney Leach  MRN: [id number]  Admission Date: 12/15/2024  Hospital Length of Stay: 9 days  Code Status: Full Code  Attending Provider: Julián Monique MD  Primary Care Provider: Arnaldo Hernandez MD     Subjective:     HPI:   79-year-old woman with A-fib, HTN, valvular heart disease s/p MVR, CVID, CAD s/p PCI to LAD (11/29), DVT s/p IVC filter, T2DM, GERD, SSS s/p PPM, HTN, HLD, chronic sinusitis status post sinus surgery, ABEL, obesity, asthma/COPD who was initially admitted as a transfer from [place] to [facility] medicine on 12/15/24  for concern for cauda equina syndrome and neurosurgical services. RRT called on 12/17 and patient placed on BIPAP. CXR at the time consistent with R sided PNA vs. Pleural effusion and broad spectrum antibiotics were started at this time. Patient reported melena in setting of severe normocytic anemia and iron deficiency. EGD done on 12/21/24 consistent with oozing duodenal AVMs requiring coagulation. No intraoperative complications noted. Patient was intubated for air way protection in setting of oropharyngeal oozing near vocal cords and transferred to ICU postoperatively.     Hospital Course/Significant events:  EGD s/p duodenal AVM coagulation- 12/21/24    24 Hour Interval History:  Temperature of 100.5 F overnight, likely related to bronchoscopy.  Remains sedated on propofol.  Bloody secretions from the endotracheal tube are significantly improved.  She is more awake this morning but does not follow commands.  Tachypneic on SBT.    /20/8/0.4    Past Medical History:   Diagnosis Date    Anticoagulant long-term use     Asthma     Chronic sinusitis, unspecified     COPD (chronic obstructive pulmonary disease)     CVID (common variable immunodeficiency)     Diabetes mellitus, type 2     GERD (gastroesophageal reflux disease)     Hypertension     Severe mitral valve regurgitation      Sleep apnea     uses CPAP    SOB (shortness of breath)     Unspecified glaucoma     Weakness        Past Surgical History:   Procedure Laterality Date    A-V CARDIAC PACEMAKER INSERTION N/A 2023    Procedure: INSERTION, CARDIAC PACEMAKER, DUAL CHAMBER;  Surgeon: Arnaud Moon MD;  Location: [facility] CATH LAB;  Service: Cardiology;  Laterality: N/A;  SJM    ANGIOGRAM, CORONARY, WITH LEFT HEART CATHETERIZATION N/A 2024    Procedure: Angiogram, Coronary, with Left Heart Cath;  Surgeon: Tatiana Jarrett MD;  Location: [facility] CATH LAB;  Service: Cardiology;  Laterality: N/A;    APPENDECTOMY      CATARACT EXTRACTION Bilateral     CATHETERIZATION OF BOTH LEFT AND RIGHT HEART N/A 2024    Procedure: CATHETERIZATION, HEART, BOTH LEFT AND RIGHT;  Surgeon: Sebastian Watters Jr., MD;  Location: [facility] CATH LAB;  Service: Cardiology;  Laterality: N/A;  With Valve Study    COLONOSCOPY      EGD, WITH BALLOON DILATION      ESOPHAGOGASTRODUODENOSCOPY      ESOPHAGOGASTRODUODENOSCOPY N/A 2024    Procedure: EGD;  Surgeon: Loretta Hebert MD;  Location: [facility] OR;  Service: Gastroenterology;  Laterality: N/A;    LEFT HEART CATHETERIZATION Left 2023    Procedure: Left heart cath;  Surgeon: Vijay Uribe MD;  Location: [facility] CATH LAB;  Service: Cardiology;  Laterality: Left;  [facility]    MITRAL VALVE REPLACEMENT N/A 2023    Procedure: REPLACEMENT, MITRAL VALVE;  Surgeon: Steven Rosales IV, MD;  Location: [facility] OR;  Service: Cardiovascular;  Laterality: N/A;    TONSILLECTOMY      TOTAL KNEE ARTHROPLASTY Bilateral        Social History     Socioeconomic History    Marital status: Single   Tobacco Use    Smoking status: Former     Current packs/day: 0.00     Types: Cigarettes     Quit date:      Years since quittin.9    Smokeless tobacco: Never   Substance and Sexual Activity    Alcohol use: Not Currently    Drug use: Not Currently    Sexual activity: Not Currently     Social Drivers of Health     Financial  Resource Strain: Low Risk  (12/16/2024)    Overall Financial Resource Strain (CARDIA)     Difficulty of Paying Living Expenses: Not hard at all   Food Insecurity: No Food Insecurity (12/16/2024)    Hunger Vital Sign     Worried About Running Out of Food in the Last Year: Never true     Ran Out of Food in the Last Year: Never true   Transportation Needs: No Transportation Needs (12/16/2024)    TRANSPORTATION NEEDS     Transportation : No   Physical Activity: Inactive (12/4/2024)    Exercise Vital Sign     Days of Exercise per Week: 0 days     Minutes of Exercise per Session: 0 min   Stress: No Stress Concern Present (12/16/2024)    [employer] [place] [employer] - Occupational Stress Questionnaire     Feeling of Stress : Only a little   Housing Stability: Low Risk  (12/16/2024)    Housing Stability Vital Sign     Unable to Pay for Housing in the Last Year: No     Homeless in the Last Year: No           Current Outpatient Medications   Medication Instructions    amiodarone (PACERONE) 200 mg, Daily    aspirin (ECOTRIN) 81 mg, Oral, Daily    atorvastatin (LIPITOR) 40 mg, Daily    clopidogreL (PLAVIX) 75 mg, Oral, Daily    DUPIXENT SYRINGE 300 mg, Every 14 days    estradioL (ESTRACE) 0.5 g, Twice weekly    famotidine (PEPCID) 40 mg, Nightly    fluticasone-umeclidin-vilanter (TRELEGY ELLIPTA) 200-62.5-25 mcg inhaler 1 puff, Daily    furosemide (LASIX) 40 mg    glimepiride (AMARYL) 4 mg, 2 times daily    HYDROcodone-acetaminophen (NORCO)  mg per tablet 1 tablet, Oral, Every 6 hours PRN    immun glob G,IgG,/pro/IgA 0-50 (HIZENTRA SUBQ) Every 14 days    lipase-protease-amylase 24,000-76,000-120,000 units (CREON) 24,000-76,000 -120,000 unit capsule 1-2 capsules, 3 times daily with meals    metFORMIN (GLUCOPHAGE) 500 mg, Oral, 2 times daily with meals    metoprolol succinate (TOPROL-XL) 50 mg, Oral, Daily    NIFEdipine (PROCARDIA-XL) 30 mg    pantoprazole (PROTONIX) 40 MG tablet 1 tablet, Every morning     timoloL (BETIMOL) 0.5 % ophthalmic solution 1 drop, Both Eyes, 2 times daily    warfarin (COUMADIN) 5 mg, Oral, Daily       Current Inpatient Medications   amiodarone  200 mg Oral Daily    atorvastatin  40 mg Oral QHS    LIDOcaine (PF) 10 mg/ml (1%)  1 mL Intradermal Once    LIDOcaine  1 patch Transdermal Q24H    lipase-protease-amylase 24,000-76,000-120,000 units  1 capsule Oral TID WM    metoprolol tartrate  12.5 mg Oral BID    mupirocin   Nasal BID    NIFEdipine  30 mg Oral Daily    pantoprazole  40 mg Intravenous BID    sodium zirconium cyclosilicate  10 g Oral Once    timolol maleate 0.5%  1 drop Both Eyes BID       Current Intravenous Infusions   electrolyte-A   Intravenous Continuous   Held at 12/21/24 1000    propofoL  0-50 mcg/kg/min Intravenous Continuous 23.7 mL/hr at 12/24/24 0549 40 mcg/kg/min at 12/24/24 0549         ROS   Unable to obtain 2/2 sedation     Objective:       Intake/Output Summary (Last 24 hours) at 12/24/2024 0631  Last data filed at 12/24/2024 0549  Gross per 24 hour   Intake 1452.98 ml   Output 760 ml   Net 692.98 ml         Vital Signs (Most Recent):  Temp: 98.7 °F (37.1 °C) (12/24/24 0400)  Pulse: 64 (12/24/24 0600)  Resp: 18 (12/24/24 0600)  BP: (!) 107/48 (12/24/24 0600)  SpO2: (!) 94 % (12/24/24 0600)  Body mass index is 35.2 kg/m².  Weight: 98.9 kg (218 lb) Vital Signs (24h Range):  Temp:  [98.3 °F (36.8 °C)-100.5 °F (38.1 °C)] 98.7 °F (37.1 °C)  Pulse:  [62-89] 64  Resp:  [16-30] 18  SpO2:  [93 %-100 %] 94 %  BP: ()/(40-64) 107/48     Physical Exam  Vitals reviewed.   Constitutional:       General: She is not in acute distress.     Appearance: She is not ill-appearing.   Cardiovascular:      Rate and Rhythm: Normal rate and regular rhythm.   Pulmonary:      Effort: Pulmonary effort is normal. No respiratory distress.      Breath sounds: No wheezing or rales.      Comments: Minimal bloody secretions noted in the suction tubing of ET tube  Abdominal:      General: Abdomen is  flat.      Palpations: Abdomen is soft.   Musculoskeletal:      Right lower leg: No edema.      Left lower leg: No edema.   Neurological:      Comments: Off sedation. Awake.  Pupils are equal and reactive.  Does not follow commands.           Lines/Drains/Airways       Drain  Duration                  Rectal Tube 12/21/24 1800 fecal management system 2 days         NG/OG Tube 12/22/24 0900 Center mouth 1 day         Urethral Catheter 12/23/24 1600 <1 day              Airway  Duration                  Airway - Non-Surgical 12/21/24 0914 2 days              Peripheral Intravenous Line  Duration                  Peripheral IV - Single Lumen 12/09/24 0835 No Posterior;Right Forearm 14 days         Peripheral IV - Single Lumen 12/21/24 2200 20 G Right;Lateral;Proximal Forearm 2 days                    Significant Labs:    Lab Results   Component Value Date    WBC 16.27 (H) 12/24/2024    HGB 9.0 (L) 12/24/2024    HCT 29.5 (L) 12/24/2024    .0 (H) 12/24/2024     12/24/2024           BMP  Lab Results   Component Value Date     12/24/2024    K 4.0 12/24/2024    CO2 24 12/24/2024    BUN 33.9 (H) 12/24/2024    CREATININE 0.87 12/24/2024    CALCIUM 8.1 (L) 12/24/2024    AGAP 13.0 12/24/2024         ABG  Recent Labs   Lab 12/24/24  0536   PH 7.440   PO2 73.0*   PCO2 41.0   HCO3 27.8*   POCBASEDEF 3.30*       Mechanical Ventilation Support:  Vent Mode: A/C (12/24/24 0145)  Ventilator Initiated: Yes (12/21/24 1044)  Set Rate: 18 BPM (12/24/24 0145)  Vt Set: 450 mL (12/24/24 0145)  PEEP/CPAP: 8 cmH20 (12/24/24 0145)  Oxygen Concentration (%): 30 (12/24/24 0400)  Peak Airway Pressure: 33 cmH20 (12/24/24 0145)  Total Ve: 7.8 L/m (12/24/24 0145)  F/VT Ratio<105 (RSBI): (!) 40 (12/24/24 0145)      Significant Imaging:  CXR 12/21:  Endotracheal tube in appropriate position.  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  12/21/24   Diffuse Gastritis with Hx of GERD on Dexilant   Severe Normocytic Anemia 2/2 Melena and Iron Deficiency   R sided pleural effusion vs PNA - s/p 5 day course of Zosyn  CAD s/p LAD PCI on Plavix (held)  VHD s/p MVR on Coumadin 9/20/23 - held since 12/7 s/p Vit K on 12/20   A fib s/p SUKHI ligation 9/20/23   SSS s/p PPM 9/25/23   DM2   HTN  CVID  Hx of DVT status post [place] filter  Hx of ABEL on CPAP   Hx of Diverticulosis and colonic polyps on last colonoscopy    Hx of HICKMAN   Hx of Pancreatic Insufficiency   Hx of Glaucoma   Hx of Asthma and COPD - not in acute exacerbation        Plan  Continue mechanical ventilation.  Daily SAT/SBT. RASS goal 0 to -1. Start precedex to help with tachypnea on SBT. If not able to be extubated, will ventilate with pressure control for patient comfort  Hold plavix. S/p FFP 12/22  GI following for AVMs s/p EGD, appreciate assistance  Maintain Hb>8 2/2 recent PCI.  Hold Plavix and warfarin  Cardiology following for complex cardiac history, appreciate assistance   Resume Coumadin when cleared with GI with INR goal 2-3.   Repeat Echo later this week  Continue home Amiodarone 200mg qd, Lipitor 40mg qd, Plavix 75mg qd (held), Lasix 40mg qd, Creon TID, Toprol 25mg qd, Nifedipine 30mg qd, and Timolol eye drops BID    Code Status: Full Code   DVT Prophylaxis: SCDs  GI Prophylaxis: Protonix BID      This patient remains at high risk of decompensation and death and will remain in ICU level care.    33 minutes of critical care was time spent personally by me on the following activities: development of treatment plan with patient or surrogate and bedside caregivers, discussions with consultants, evaluation of patient's response to treatment, examination of patient, ordering and performing treatments and interventions, ordering and review of laboratory studies, ordering and review of radiographic studies, pulse oximetry, re-evaluation of patient's condition.  This critical care time did  not overlap with that of any other provider or involve time for any procedures.    This note was generated via Dictaphone and may contain some voice recognition errors.       Cyrus Cruz MD  Pulmonary Critical Care Medicine  [facility] - [facility] ICU  DOS: 12/24/2024

## 2024-12-24 NOTE — PLAN OF CARE
Problem: Adult Inpatient Plan of Care  Goal: Plan of Care Review  Outcome: Progressing  Goal: Patient-Specific Goal (Individualized)  Outcome: Progressing  Goal: Absence of Hospital-Acquired Illness or Injury  Outcome: Progressing  Goal: Optimal Comfort and Wellbeing  Outcome: Progressing  Goal: Readiness for Transition of Care  Outcome: Progressing     Problem: Diabetes Comorbidity  Goal: Blood Glucose Level Within Targeted Range  Outcome: Progressing     Problem: Skin Injury Risk Increased  Goal: Skin Health and Integrity  Outcome: Progressing     Problem: Infection  Goal: Absence of Infection Signs and Symptoms  Outcome: Progressing     Problem: Fall Injury Risk  Goal: Absence of Fall and Fall-Related Injury  Outcome: Progressing     Problem: Mechanical Ventilation Invasive  Goal: Effective Communication  Outcome: Progressing  Goal: Optimal Device Function  Outcome: Progressing  Goal: Mechanical Ventilation Liberation  Outcome: Progressing  Goal: Optimal Nutrition Delivery  Outcome: Progressing  Goal: Absence of Device-Related Skin and Tissue Injury  Outcome: Progressing  Goal: Absence of Ventilator-Induced Lung Injury  Outcome: Progressing

## 2024-12-25 LAB
ALBUMIN SERPL-MCNC: 2.3 G/DL (ref 3.4–4.8)
ALBUMIN/GLOB SERPL: 0.8 RATIO (ref 1.1–2)
ALLENS TEST BLOOD GAS (OHS): YES
ALLENS TEST BLOOD GAS (OHS): YES
ALP SERPL-CCNC: 107 UNIT/L (ref 40–150)
ALT SERPL-CCNC: 16 UNIT/L (ref 0–55)
ANION GAP SERPL CALC-SCNC: 10 MEQ/L
APICAL FOUR CHAMBER EJECTION FRACTION: 61 %
APICAL TWO CHAMBER EJECTION FRACTION: 66 %
AST SERPL-CCNC: 41 UNIT/L (ref 5–34)
AV INDEX (PROSTH): 0.46
AV MEAN GRADIENT: 7 MMHG
AV PEAK GRADIENT: 11.6 MMHG
AV VALVE AREA BY VELOCITY RATIO: 1.2 CM²
AV VALVE AREA: 1.3 CM²
AV VELOCITY RATIO: 0.41
BASE EXCESS BLD CALC-SCNC: 3.6 MMOL/L (ref -2–2)
BASE EXCESS BLD CALC-SCNC: 4.9 MMOL/L (ref -2–2)
BASOPHILS # BLD AUTO: 0.09 X10(3)/MCL
BASOPHILS NFR BLD AUTO: 0.6 %
BILIRUB SERPL-MCNC: 0.6 MG/DL
BIPAP(E) BLOOD GAS (OHS): 6 CM H2O
BIPAP(I) BLOOD GAS (OHS): 12 CM H2O
BLOOD GAS SAMPLE TYPE (OHS): ABNORMAL
BLOOD GAS SAMPLE TYPE (OHS): ABNORMAL
BSA FOR ECHO PROCEDURE: 2.15 M2
BUN SERPL-MCNC: 44.8 MG/DL (ref 9.8–20.1)
CA-I BLD-SCNC: 1.11 MMOL/L (ref 1.12–1.23)
CA-I BLD-SCNC: 1.15 MMOL/L (ref 1.12–1.23)
CALCIUM SERPL-MCNC: 7.9 MG/DL (ref 8.4–10.2)
CHLORIDE SERPL-SCNC: 105 MMOL/L (ref 98–107)
CO2 BLDA-SCNC: 28.9 MMOL/L
CO2 BLDA-SCNC: 30.5 MMOL/L
CO2 SERPL-SCNC: 24 MMOL/L (ref 23–31)
COHGB MFR BLDA: 1.4 % (ref 0.5–1.5)
CREAT SERPL-MCNC: 0.89 MG/DL (ref 0.55–1.02)
CREAT/UREA NIT SERPL: 50
CV ECHO LV RWT: 0.74 CM
DOP CALC AO PEAK VEL: 1.7 M/S
DOP CALC AO VTI: 34.9 CM
DOP CALC LVOT AREA: 2.8 CM2
DOP CALC LVOT DIAMETER: 1.9 CM
DOP CALC LVOT PEAK VEL: 0.7 M/S
DOP CALC LVOT STROKE VOLUME: 45.1 CM3
DOP CALC MV VTI: 121 CM
DOP CALCLVOT PEAK VEL VTI: 15.9 CM
DRAWN BY BLOOD GAS (OHS): ABNORMAL
DRAWN BY BLOOD GAS (OHS): ABNORMAL
ECHO LV POSTERIOR WALL: 1.3 CM (ref 0.6–1.1)
EOSINOPHIL # BLD AUTO: 0.15 X10(3)/MCL (ref 0–0.9)
EOSINOPHIL NFR BLD AUTO: 1 %
ERYTHROCYTE [DISTWIDTH] IN BLOOD BY AUTOMATED COUNT: 20.3 % (ref 11.5–17)
FRACTIONAL SHORTENING: 20 % (ref 28–44)
GFR SERPLBLD CREATININE-BSD FMLA CKD-EPI: >60 ML/MIN/1.73/M2
GLOBULIN SER-MCNC: 3 GM/DL (ref 2.4–3.5)
GLUCOSE SERPL-MCNC: 180 MG/DL (ref 82–115)
HCO3 BLDA-SCNC: 27.7 MMOL/L (ref 22–26)
HCO3 BLDA-SCNC: 29.2 MMOL/L (ref 22–26)
HCT VFR BLD AUTO: 29.2 % (ref 37–47)
HGB BLD-MCNC: 9.1 G/DL (ref 12–16)
IMM GRANULOCYTES # BLD AUTO: 0.29 X10(3)/MCL (ref 0–0.04)
IMM GRANULOCYTES NFR BLD AUTO: 2 %
INHALED O2 CONCENTRATION: 40 %
INHALED O2 CONCENTRATION: 40 %
INTERVENTRICULAR SEPTUM: 1.2 CM (ref 0.6–1.1)
LEFT ATRIUM AREA SYSTOLIC (APICAL 2 CHAMBER): 34.4 CM2
LEFT ATRIUM AREA SYSTOLIC (APICAL 4 CHAMBER): 28.5 CM2
LEFT ATRIUM SIZE: 3.4 CM
LEFT ATRIUM VOLUME INDEX MOD: 60.9 ML/M2
LEFT ATRIUM VOLUME MOD: 126 ML
LEFT INTERNAL DIMENSION IN SYSTOLE: 2.8 CM (ref 2.1–4)
LEFT VENTRICLE DIASTOLIC VOLUME INDEX: 24.59 ML/M2
LEFT VENTRICLE DIASTOLIC VOLUME: 50.9 ML
LEFT VENTRICLE END DIASTOLIC VOLUME APICAL 2 CHAMBER: 60.3 ML
LEFT VENTRICLE END DIASTOLIC VOLUME APICAL 4 CHAMBER: 59.6 ML
LEFT VENTRICLE END SYSTOLIC VOLUME APICAL 2 CHAMBER: 127 ML
LEFT VENTRICLE END SYSTOLIC VOLUME APICAL 4 CHAMBER: 106 ML
LEFT VENTRICLE MASS INDEX: 69.9 G/M2
LEFT VENTRICLE SYSTOLIC VOLUME INDEX: 14.3 ML/M2
LEFT VENTRICLE SYSTOLIC VOLUME: 29.6 ML
LEFT VENTRICULAR INTERNAL DIMENSION IN DIASTOLE: 3.5 CM (ref 3.5–6)
LEFT VENTRICULAR MASS: 144.6 G
LVED V (TEICH): 50.9 ML
LVES V (TEICH): 29.6 ML
LVOT MG: 1 MMHG
LVOT MV: 0.47 CM/S
LYMPHOCYTES # BLD AUTO: 2.51 X10(3)/MCL (ref 0.6–4.6)
LYMPHOCYTES NFR BLD AUTO: 17.1 %
MAGNESIUM SERPL-MCNC: 2.3 MG/DL (ref 1.6–2.6)
MCH RBC QN AUTO: 30.7 PG (ref 27–31)
MCHC RBC AUTO-ENTMCNC: 31.2 G/DL (ref 33–36)
MCV RBC AUTO: 98.6 FL (ref 80–94)
MECH RR (OHS): 18 B/MIN
METHGB MFR BLDA: 0.1 % (ref 0.4–1.5)
MODE (OHS): ABNORMAL
MODE (OHS): AC
MONOCYTES # BLD AUTO: 1.3 X10(3)/MCL (ref 0.1–1.3)
MONOCYTES NFR BLD AUTO: 8.8 %
MV MEAN GRADIENT: 12 MMHG
MV PEAK GRADIENT: 29 MMHG
MV VALVE AREA BY CONTINUITY EQUATION: 0.37 CM2
NEUTROPHILS # BLD AUTO: 10.38 X10(3)/MCL (ref 2.1–9.2)
NEUTROPHILS NFR BLD AUTO: 70.5 %
NRBC BLD AUTO-RTO: 7.1 %
O2 HB BLOOD GAS (OHS): 96.7 % (ref 94–97)
OHS CV RV/LV RATIO: 1.03 CM
OHS LV EJECTION FRACTION SIMPSONS BIPLANE MOD: 63 %
OXYGEN DEVICE BLOOD GAS (OHS): ABNORMAL
OXYHGB MFR BLDA: 9.5 G/DL (ref 12–16)
PCO2 BLDA: 39 MMHG (ref 35–45)
PCO2 BLDA: 41 MMHG (ref 35–45)
PEEP RESPIRATORY: 5 CMH2O
PH BLDA: 7.46 [PH] (ref 7.35–7.45)
PH BLDA: 7.46 [PH] (ref 7.35–7.45)
PHOSPHATE SERPL-MCNC: 4.3 MG/DL (ref 2.3–4.7)
PISA TR MAX VEL: 3.21 M/S
PLATELET # BLD AUTO: 195 X10(3)/MCL (ref 130–400)
PMV BLD AUTO: 9.7 FL (ref 7.4–10.4)
PO2 BLDA: 91 MMHG (ref 80–100)
PO2 BLDA: 99 MMHG (ref 80–100)
POCT GLUCOSE: 182 MG/DL (ref 70–110)
POCT GLUCOSE: 187 MG/DL (ref 70–110)
POCT GLUCOSE: 188 MG/DL (ref 70–110)
POTASSIUM BLOOD GAS (OHS): 3.8 MMOL/L (ref 3.5–5)
POTASSIUM BLOOD GAS (OHS): 4 MMOL/L (ref 3.5–5)
POTASSIUM SERPL-SCNC: 4.3 MMOL/L (ref 3.5–5.1)
PROT SERPL-MCNC: 5.3 GM/DL (ref 5.8–7.6)
PV PEAK GRADIENT: 6 MMHG
PV PEAK VELOCITY: 1.2 M/S
RA PRESSURE ESTIMATED: 8 MMHG
RBC # BLD AUTO: 2.96 X10(6)/MCL (ref 4.2–5.4)
RIGHT VENTRICLE DIASTOLIC BASEL DIMENSION: 3.6 CM
RIGHT VENTRICULAR END-DIASTOLIC DIMENSION: 3.6 CM
RV TB RVSP: 11 MMHG
SAMPLE SITE BLOOD GAS (OHS): ABNORMAL
SAMPLE SITE BLOOD GAS (OHS): ABNORMAL
SAO2 % BLDA: 97.5 %
SAO2 % BLDA: 98 %
SODIUM BLOOD GAS (OHS): 135 MMOL/L (ref 137–145)
SODIUM BLOOD GAS (OHS): 136 MMOL/L (ref 137–145)
SODIUM SERPL-SCNC: 139 MMOL/L (ref 136–145)
SPONT+MECH VT ON VENT: 450 ML
TDI LATERAL: 0.04 M/S
TDI SEPTAL: 0.06 M/S
TDI: 0.05 M/S
TR MAX PG: 41 MMHG
TRICUSPID ANNULAR PLANE SYSTOLIC EXCURSION: 1.27 CM
TV REST PULMONARY ARTERY PRESSURE: 49 MMHG
WBC # BLD AUTO: 14.72 X10(3)/MCL (ref 4.5–11.5)
Z-SCORE OF LEFT VENTRICULAR DIMENSION IN END DIASTOLE: -5.87
Z-SCORE OF LEFT VENTRICULAR DIMENSION IN END SYSTOLE: -2.52

## 2024-12-25 PROCEDURE — 27100171 HC OXYGEN HIGH FLOW UP TO 24 HOURS

## 2024-12-25 PROCEDURE — 94799 UNLISTED PULMONARY SVC/PX: CPT

## 2024-12-25 PROCEDURE — 84100 ASSAY OF PHOSPHORUS: CPT

## 2024-12-25 PROCEDURE — 36415 COLL VENOUS BLD VENIPUNCTURE: CPT

## 2024-12-25 PROCEDURE — 94760 N-INVAS EAR/PLS OXIMETRY 1: CPT

## 2024-12-25 PROCEDURE — 63600175 PHARM REV CODE 636 W HCPCS: Performed by: STUDENT IN AN ORGANIZED HEALTH CARE EDUCATION/TRAINING PROGRAM

## 2024-12-25 PROCEDURE — 25000003 PHARM REV CODE 250: Performed by: INTERNAL MEDICINE

## 2024-12-25 PROCEDURE — 85025 COMPLETE CBC W/AUTO DIFF WBC: CPT

## 2024-12-25 PROCEDURE — 20000000 HC ICU ROOM

## 2024-12-25 PROCEDURE — 80053 COMPREHEN METABOLIC PANEL: CPT

## 2024-12-25 PROCEDURE — 27100092 HC HIGH FLOW DELIVERY CANNULA

## 2024-12-25 PROCEDURE — 83735 ASSAY OF MAGNESIUM: CPT

## 2024-12-25 PROCEDURE — 94660 CPAP INITIATION&MGMT: CPT

## 2024-12-25 PROCEDURE — 25000003 PHARM REV CODE 250: Performed by: STUDENT IN AN ORGANIZED HEALTH CARE EDUCATION/TRAINING PROGRAM

## 2024-12-25 PROCEDURE — 63600175 PHARM REV CODE 636 W HCPCS: Performed by: INTERNAL MEDICINE

## 2024-12-25 PROCEDURE — 36600 WITHDRAWAL OF ARTERIAL BLOOD: CPT

## 2024-12-25 PROCEDURE — 99900035 HC TECH TIME PER 15 MIN (STAT)

## 2024-12-25 PROCEDURE — 82803 BLOOD GASES ANY COMBINATION: CPT

## 2024-12-25 PROCEDURE — 27000190 HC CPAP FULL FACE MASK W/VALVE

## 2024-12-25 PROCEDURE — 99900031 HC PATIENT EDUCATION (STAT)

## 2024-12-25 PROCEDURE — 27000249 HC VAPOTHERM CIRCUIT

## 2024-12-25 RX ORDER — CEFEPIME HYDROCHLORIDE 2 G/1
2 INJECTION, POWDER, FOR SOLUTION INTRAVENOUS
Status: DISCONTINUED | OUTPATIENT
Start: 2024-12-25 | End: 2024-12-26

## 2024-12-25 RX ADMIN — METOPROLOL TARTRATE 12.5 MG: 25 TABLET, FILM COATED ORAL at 08:12

## 2024-12-25 RX ADMIN — TIMOLOL MALEATE 1 DROP: 5 SOLUTION OPHTHALMIC at 08:12

## 2024-12-25 RX ADMIN — CEFEPIME 2 G: 2 INJECTION, POWDER, FOR SOLUTION INTRAVENOUS at 02:12

## 2024-12-25 RX ADMIN — PANTOPRAZOLE SODIUM 40 MG: 40 INJECTION, POWDER, FOR SOLUTION INTRAVENOUS at 08:12

## 2024-12-25 RX ADMIN — INSULIN ASPART 2 UNITS: 100 INJECTION, SOLUTION INTRAVENOUS; SUBCUTANEOUS at 06:12

## 2024-12-25 RX ADMIN — DEXMEDETOMIDINE HYDROCHLORIDE 0.5 MCG/KG/HR: 400 INJECTION INTRAVENOUS at 06:12

## 2024-12-25 RX ADMIN — INSULIN ASPART 1 UNITS: 100 INJECTION, SOLUTION INTRAVENOUS; SUBCUTANEOUS at 01:12

## 2024-12-25 RX ADMIN — AMIODARONE HYDROCHLORIDE 200 MG: 200 TABLET ORAL at 08:12

## 2024-12-25 RX ADMIN — CEFEPIME 2 G: 2 INJECTION, POWDER, FOR SOLUTION INTRAVENOUS at 08:12

## 2024-12-25 NOTE — PROGRESS NOTES
"  [facility]    Cardiology  Progress Note    Patient Name: Kortney Leach  MRN: [id number]  Admission Date: 12/15/2024  Hospital Length of Stay: 10 days  Code Status: Full Code   Attending Provider: Julián Monique MD   Consulting Provider: YANIRA Hatfield  Primary Care Physician: Arnaldo Hernandez MD  Principal Problem:<principal problem not specified>    Patient information was obtained from patient, past medical records, and ER records.     Subjective:   Reason for Consult: Severe Mitral Stenosis - Known patient on Coumadin     HPI: Ms. Leach is an 79 year old female who is known to [facility], Dr. Uribe & Dr. Moon. She presents to the ER from rehab with complaints of lower back pain/spasms. She endorses worsening but denies CP or palps. She reports not being able to walk due to the back pain & constipation. Of note, she was recently discharged from [facility] on 12.3.24 for acute CHF exacerbation. She underwent a MARYBETH that revealed Severe MS & Mod MR and underwent a stent to the prox LAD. She was seen by CV surgery & structural heart with plans to start Coumadin to see if the gradient can be decreased. Plan is for repeat TTE in a few weeks to re-evaluate. Significant labs include WBC 13.86, Plt 447. A CXR was obtained and demonstrated increased density on the right with small right-sided pleural effusion cannot rule out infiltrate. This appears worse than on previous. He was admitted to hospital medicine with a consult to neurosurgery. [facility] has been consulted due to the patient's known severe MS.     Hospital Course:  12.17.24: NAD. Improvement in SOB. Inaccurate I&O's and daily weights. INR 4.7 today. On 3 L NC. "I am okay." SR on tele. BP stable. Had a BM.   12.18.24: Resting in bed in NAD. ON Bipap.  Lethargic.  RRT called last night for respiratory distress.  INR 6.0.  Coumadin on hold.  12.19.24: NAD More alert today on CPAP. Reports feeling better. INR 6.7. BP soft but stable. SR on tele. "   12.20.24: NAD. Denies CP or palps. SOB improving. Resting comfortably on CPAP. H&H down trending. INR 2.4. BP stable. + diarrhea  12.21.24: Seen post EGD. Underwent EGD this morning and was found to have oozing duodenal AVMs requiring coagulation. She was intubated for airway protection in the setting of oropharyngeal oozing near her vocal cords. Now in ICU. H&H down trending. WBC worsening. INR 1.6.   12.22.24: Vented/Sedated. H&H stable. BP stable off pressors. SR  12.23.24: NAD. Vented/Sedated. H&H 9.0/27.8, AST/ALT 45/1, WBC 16.95  12.24.24: NAD. Vented/Sedated. H&H 9.0/29.5, WBC 16.27, WBC 16.27. Continue Bleeding.  12.25.24: NAD Noted. Extubated Today.     PMH: VHD, GEMINI, DM II, PAF, ABEL, COPD, HTN, DVT SSS, HLD, Asthma, Left Thyroid Nodule, GERD, Chronic Sinusitis, Common Variable Immunodeficiency, Glaucoma  PSH: PPM, LHC, MVR, Bilateral Cataract Extraction, Total Knee Replacement, Bilateral mastectomy, Appendectomy, Colonoscopy, EGD, Tonsillectomy,    Family History: Father - Lung Cancer; Mother - Alzheimer, CVA; Sister - HTN, Multiple Sclerosis, DM II  Social History: Former Smoker (Quit in 1998). Denies illicit drug use and alcohol use.      Previous Cardiac Diagnostics:   [facility] 11.29.24:  Eccentric Prox LAD lesion was 80% stenosed with 0% stenosis post-intervention. iFR 0.84  Prox LAD lesion: A STENT SYNERGY XD [id number] stent was successfully placed at 8 GEO for 10 sec. Proximal part of the stent was post dilated by using 3.5/12 mm NC and a 4/8 mm NC balloons at 12 atmospheres.     [facility] 11.27.24:  Findings:  - There is single vessel coronary artery disease.  - Mid Left Anterior Descending has a calcified 70% stenosis.  - LVEDP is normal at 6 mmHg.  - RHC with elevated R sided filling pressures. RA 7 mmHg, RV 50/7 mmHg, PA 47/26 mmHg (mean 33 mmHg), PCWP 24 mmHg.  - PCWP to LV diastolic mean gradient was calculated to be 22 mmHg.  - Transpulmonary gradient is 8 mmHg.  - Normal Cardiac Output/Index at  4.7/2.3, as calculated by Rahel equation.  - PVR is 1.7 Woods units  - SVR is 1260 dyne-sec*cm^-5  - Pulmonary Artery Pulsatility Index (Nury) is 3.0  - Cardiac Power Output () is 0.84  Assessment/Plan:  - Patient is a 79 y.o. female with a history of prior bioprosthetic mitral valve replacement now presents with heart failure.  Transesophageal echocardiogram was obtained.  Final report is pending at this time, but there were findings consistent with severe bioprosthetic MVR stenosis.  Cardiac catheterization findings shown above do suggest that there is a gradient of around 22 mm Hg across the bioprosthetic valve.  -recommend consultation with CT surgery   -obtain CT with mitral valve in valve protocol for possible TMVR  -LAD should be revascularized as well     MARYBETH (11.27.24):  Left Ventricle: The left ventricle is normal in size. Septal motion is consistent with post-operative status. There is normal systolic function with a visually estimated ejection fraction of 60 - 65%.  Right Ventricle: Normal right ventricular cavity size. Systolic function is normal.  Left Atrium: Left atrium is severely dilated.  Mitral Valve: There is a bioprosthetic valve in the mitral position. There is severe stenosis. The mean pressure gradient across the mitral valve is 16 mmHg at a heart rate of  bpm. There is moderate regurgitation.     ECHO (11.23.24):  Left Ventricle: The left ventricle is normal in size. Mildly increased wall thickness. There is normal systolic function with a visually estimated ejection fraction of 60 - 65%. Grade I diastolic dysfunction. Right Ventricle: Normal right ventricular cavity size. Systolic function is borderline low. Aortic Valve: There is aortic valve sclerosis. Mitral Valve: There is a bioprosthetic valve in the mitral position (25 mm mosaic porcine valve).  There is evidence of bioprosthetic mitral valve stenosis The mean pressure gradient across the mitral valve is 19 mmHg at a heart rate of  78 bpm. There is mild (1+) regurgitation. Tricuspid Valve: There moderate (2+) regurgitation. The estimated pulmonary artery systolic pressure is 57 mmHg. Pacemaker lead noted.  Recommend transesophageal echocardiogram to properly evaluate the bioprosthetic mitral valve.     ECHO (11.14.24):  The study quality is average. The left ventricle is normal in size. Global left ventricular systolic function is normal. The left ventricular ejection fraction is 65%. Left ventricular diastolic function is normal. Mild concentric left ventricular hypertrophy is present. The left atrium is moderately enlarged. (4.6 cms). A normal functioning prosthetic mitral valve is noted. MG = 4.3 mmHg. The pulmonary artery systolic pressure is 37 mmHg.      PPM Insertion (9.25.23):  Successful implantation of PPM Dual. [facility]      PET (1.24.23):  This is a normal perfusion study, no perfusion defects noted. There is no evidence of ischemia.This scan is suggestive of low risk for future cardiovascular events. The left ventricular cavity is noted to be normal on the stress studies. The stress left ventricular ejection fraction was calculated to be 68% and left ventricular global function is normal. The rest left ventricular cavity is noted to be normal. The rest left ventricular ejection fraction was calculated to be 63% and rest left ventricular global function is normal. When compared to the resting ejection fraction (63%), the stress ejection fraction (68%) has increased. Transient ischemic dilatation is present and has been described as a marker for high risk coronary artery disease. It has also been described in microvascular disease, hypertensive heart disease as well as cardiac deconditioning. The study quality is good.   There was a rise in myocardial blood flow between rest and stress.  Global myocardial blood flow reserve was 2.72.  Normal global coronary flow reserve is suggestive of low coronary event risk.     Carotid US  (1.24.23):  The study quality is good. There is no plaque noted in the proximal right internal carotid artery. 1-39% stenosis in the proximal left internal carotid artery based on Bluth Criteria. Antegrade right vertebral artery flow. Antegrade left vertebral artery flow.     [facility] (9.6.23):  Normal Coronaries     Review of patient's allergies indicates:   Allergen Reactions    Adhesive tape-silicones      Redness     Current Facility-Administered Medications on File Prior to Encounter   Medication    0.9%  NaCl infusion    diazePAM tablet 10 mg    diphenhydrAMINE capsule 50 mg     Current Outpatient Medications on File Prior to Encounter   Medication Sig    amiodarone (PACERONE) 200 MG Tab Take 200 mg by mouth once daily.    aspirin (ECOTRIN) 81 MG EC tablet Take 1 tablet (81 mg total) by mouth once daily. for 25 days    atorvastatin (LIPITOR) 40 MG tablet Take 40 mg by mouth once daily.    clopidogreL (PLAVIX) 75 mg tablet Take 1 tablet (75 mg total) by mouth once daily.    DUPIXENT SYRINGE 300 mg/2 mL Syrg Inject 300 mg into the skin every 14 (fourteen) days.    estradioL (ESTRACE) 0.01 % (0.1 mg/gram) vaginal cream Place 0.5 g vaginally twice a week.    famotidine (PEPCID) 40 MG tablet Take 40 mg by mouth every evening.    fluticasone-umeclidin-vilanter (TRELEGY ELLIPTA) 200-62.5-25 mcg inhaler Inhale 1 puff into the lungs once daily.    furosemide (LASIX) 40 MG tablet Take 40 mg by mouth.    glimepiride (AMARYL) 2 MG tablet Take 4 mg by mouth 2 (two) times a day.    HYDROcodone-acetaminophen (NORCO)  mg per tablet Take 1 tablet by mouth every 6 (six) hours as needed for Pain.    immun glob G,IgG,/pro/IgA 0-50 (HIZENTRA SUBQ) Inject into the skin every 14 (fourteen) days.    lipase-protease-amylase 24,000-76,000-120,000 units (CREON) 24,000-76,000 -120,000 unit capsule Take 1-2 capsules by mouth 3 (three) times daily with meals. 2 caps with meals and 1 cap c snacks    metFORMIN (GLUCOPHAGE) 500 MG tablet  Take 1 tablet (500 mg total) by mouth 2 (two) times daily with meals.    metoprolol succinate (TOPROL-XL) 50 MG 24 hr tablet Take 1 tablet (50 mg total) by mouth once daily.    NIFEdipine (PROCARDIA-XL) 30 MG (OSM) 24 hr tablet Take 30 mg by mouth.    pantoprazole (PROTONIX) 40 MG tablet Take 1 tablet by mouth every morning.    timoloL (BETIMOL) 0.5 % ophthalmic solution Place 1 drop into both eyes 2 (two) times daily.    warfarin (COUMADIN) 5 MG tablet Take 1 tablet (5 mg total) by mouth Daily.     Review of Systems   Unable to perform ROS: Acuity of condition     Objective:     Vital Signs (Most Recent):  Temp: 98.2 °F (36.8 °C) (12/25/24 0830)  Pulse: 61 (12/25/24 1130)  Resp: (!) 27 (12/25/24 1130)  BP: (!) 99/54 (12/25/24 1130)  SpO2: 98 % (12/25/24 1130) Vital Signs (24h Range):  Temp:  [97.1 °F (36.2 °C)-98.8 °F (37.1 °C)] 98.2 °F (36.8 °C)  Pulse:  [60-70] 61  Resp:  [16-32] 27  SpO2:  [95 %-99 %] 98 %  BP: ()/(39-62) 99/54   Weight: 98.9 kg (218 lb)  Body mass index is 35.2 kg/m².  SpO2: 98 %       Intake/Output Summary (Last 24 hours) at 12/25/2024 1229  Last data filed at 12/25/2024 0800  Gross per 24 hour   Intake 1405.61 ml   Output 1280 ml   Net 125.61 ml     Lines/Drains/Airways       Drain  Duration                  NG/OG Tube 12/22/24 0900 Center mouth 3 days         Rectal Tube 12/21/24 1800 fecal management system 3 days         Urethral Catheter 12/23/24 1600 1 day              Airway  Duration                  Airway - Non-Surgical 12/21/24 0914 4 days              Peripheral Intravenous Line  Duration                  Peripheral IV - Single Lumen 12/21/24 2200 20 G Right;Lateral;Proximal Forearm 3 days         Peripheral IV - Single Lumen 12/24/24 0815 20 G Posterior;Proximal;Right Forearm 1 day                  Significant Labs:   Chemistries:   Recent Labs   Lab 12/21/24  1157 12/21/24  1157 12/22/24  0352 12/23/24  0313 12/24/24  0217 12/25/24  0432     --  141 140 140 139   K  3.3*  --  3.9 3.5 4.0 4.3     --  104 104 103 105   CO2 26  --  21* 22* 24 24   BUN 19.8  --  17.4 17.2 33.9* 44.8*   CREATININE 0.82  --  0.82 0.72 0.87 0.89   CALCIUM 8.7  --  8.0* 8.3* 8.1* 7.9*   BILITOT 1.0  --  1.4 0.8 0.7 0.6   ALKPHOS 95  --  100 100 110 107   ALT 12  --  13 12 18 16   AST 35*  --  45* 45* 48* 41*   GLUCOSE 126*  --  116* 101 130* 180*   MG 2.10  --  2.00 2.20 2.30 2.30   PHOS  --    < > 3.8 4.5 4.6 4.3    < > = values in this interval not displayed.        CBC/Anemia Labs: Coags:    Recent Labs   Lab 12/23/24  0313 12/24/24  0217 12/25/24  0432   WBC 16.95* 16.27* 14.72*   HGB 9.0* 9.0* 9.1*   HCT 27.8* 29.5* 29.2*    215 195   MCV 95.9* 101.0* 98.6*   RDW 20.0* 21.1* 20.3*    Recent Labs   Lab 12/20/24  0605 12/21/24  0513 12/22/24  0908   INR 2.4* 1.6* 1.6*        Telemetry:  SR    Physical Exam  Constitutional:       General: She is not in acute distress.     Appearance: She is obese.   HENT:      Head: Normocephalic.      Mouth/Throat:      Mouth: Mucous membranes are dry.   Cardiovascular:      Rate and Rhythm: Normal rate and regular rhythm.      Pulses: Normal pulses.      Heart sounds: Murmur heard.   Pulmonary:      Effort: Pulmonary effort is normal. No respiratory distress.      Comments: BIPAP  Abdominal:      Palpations: Abdomen is soft.   Skin:     General: Skin is warm and dry.       Home Medications:   Current Facility-Administered Medications on File Prior to Encounter   Medication Dose Route Frequency Provider Last Rate Last Admin    0.9%  NaCl infusion   Intravenous On Call Procedure Vijay Uribe MD        diazePAM tablet 10 mg  10 mg Oral On Call Procedure Vijay Uribe MD   10 mg at 09/06/23 1021    diphenhydrAMINE capsule 50 mg  50 mg Oral On Call Procedure Vijay Uribe MD   50 mg at 09/06/23 1021     Current Outpatient Medications on File Prior to Encounter   Medication Sig Dispense Refill    amiodarone (PACERONE) 200 MG Tab Take 200 mg by mouth once  daily.      aspirin (ECOTRIN) 81 MG EC tablet Take 1 tablet (81 mg total) by mouth once daily. for 25 days 25 tablet 0    atorvastatin (LIPITOR) 40 MG tablet Take 40 mg by mouth once daily.      clopidogreL (PLAVIX) 75 mg tablet Take 1 tablet (75 mg total) by mouth once daily. 30 tablet 0    DUPIXENT SYRINGE 300 mg/2 mL Syrg Inject 300 mg into the skin every 14 (fourteen) days.      estradioL (ESTRACE) 0.01 % (0.1 mg/gram) vaginal cream Place 0.5 g vaginally twice a week.      famotidine (PEPCID) 40 MG tablet Take 40 mg by mouth every evening.      fluticasone-umeclidin-vilanter (TRELEGY ELLIPTA) 200-62.5-25 mcg inhaler Inhale 1 puff into the lungs once daily.      furosemide (LASIX) 40 MG tablet Take 40 mg by mouth.      glimepiride (AMARYL) 2 MG tablet Take 4 mg by mouth 2 (two) times a day.      HYDROcodone-acetaminophen (NORCO)  mg per tablet Take 1 tablet by mouth every 6 (six) hours as needed for Pain. 20 tablet 0    immun glob G,IgG,/pro/IgA 0-50 (HIZENTRA SUBQ) Inject into the skin every 14 (fourteen) days.      lipase-protease-amylase 24,000-76,000-120,000 units (CREON) 24,000-76,000 -120,000 unit capsule Take 1-2 capsules by mouth 3 (three) times daily with meals. 2 caps with meals and 1 cap c snacks      metFORMIN (GLUCOPHAGE) 500 MG tablet Take 1 tablet (500 mg total) by mouth 2 (two) times daily with meals. 180 tablet 3    metoprolol succinate (TOPROL-XL) 50 MG 24 hr tablet Take 1 tablet (50 mg total) by mouth once daily. 30 tablet 0    NIFEdipine (PROCARDIA-XL) 30 MG (OSM) 24 hr tablet Take 30 mg by mouth.      pantoprazole (PROTONIX) 40 MG tablet Take 1 tablet by mouth every morning.      timoloL (BETIMOL) 0.5 % ophthalmic solution Place 1 drop into both eyes 2 (two) times daily.      warfarin (COUMADIN) 5 MG tablet Take 1 tablet (5 mg total) by mouth Daily. 30 tablet 0     Current Schedule Inpatient Medications:   amiodarone  200 mg Per G Tube Daily    atorvastatin  40 mg Per G Tube QHS     ceFEPime IV (PEDS and ADULTS)  2 g Intravenous Q8H    LIDOcaine (PF) 10 mg/ml (1%)  1 mL Intradermal Once    LIDOcaine  1 patch Transdermal Q24H    lipase-protease-amylase 24,000-76,000-120,000 units  1 capsule Oral TID WM    metoprolol tartrate  12.5 mg Per G Tube BID    NIFEdipine  30 mg Oral Daily    pantoprazole  40 mg Intravenous BID    sodium zirconium cyclosilicate  10 g Oral Once    timolol maleate 0.5%  1 drop Both Eyes BID     Continuous Infusions:   dexmedeTOMIDine (Precedex) infusion (titrating)  0-1.4 mcg/kg/hr Intravenous Continuous 12.36 mL/hr at 12/25/24 0621 0.5 mcg/kg/hr at 12/25/24 0621    electrolyte-A   Intravenous Continuous   Held at 12/21/24 1000     Assessment:   Acute on Chronic Diastolic Heart Failure - Related to VHD/Severe MS - Clinically Improved     - EF 60-65% with G 1 Diastolic Dysfunction (11.14.24)   VHD    - MARYBETH (11.27.24) - Mitral Valve: There is a bioprosthetic valve in the mitral position. There is severe stenosis. The mean pressure gradient across the mitral valve is 16 mmHg at a heart rate of bpm. There is moderate regurgitation.     - ECHO (11.23.24): There is evidence of bioprosthetic mitral valve stenosis The mean pressure gradient across the mitral valve is 19 mmHg at a heart rate of 78 bpm.     - s/p MVR (9.20.23): Mitral valve replacement with a 25 mm mosaic porcine valve  Acute on Chronic Respiratory Failure Requiring - Now Extubated on BIPAP      - s/p RRT for Respiratory Distress and Placed on CPAP (12.17.24)  Supratherapeutic INR - Resolved     - INR 6.7 (12.19.24)   Acute GIB    - s/p EGD (12.21.24) - Oozing Duodenal AVM s/p Argon Plasma Coagulation  Diffuse Gastritis   CAD/Stents    - s/p LHC (11.29.24) - Eccentric Prox LAD lesion was 80% stenosed with 0% stenosis post-intervention. iFR 0.84. Prox LAD lesion: A STENT SYNERGY XD 3.0X16MM stent was successfully placed at 8 GEO for 10 sec. Proximal part of the stent was post dilated by using 3.5/12 mm NC and a 4/8 mm  NC balloons at 12 atmospheres.  Anemia - Stable    - s/p PRBC & PLT Transfusions  Leukocytosis - Improving   COPD Without Exacerbation   Pulmonary HTN  GEMINI/Bilaterally Mild  DM II  PAF - Currently SR    - [id number] Score 6 (9.7% Stroke Risk Per Year)    - s/p Ligation of SUKHI (9.20.23) - Endostapler   ABEL  HTN (BP Controlled)  DVT s/p IVC Filter     - Previously on Xarelto as OP  SSS    - s/p PPM (9.25.23): [facility]   HLD  Asthma  Left Thyroid Nodule  GERD  Chronic Sinusitis  Common Variable Immunodeficiency  Glaucoma  No Previous History of GIB    Plan:   Resume Warfarin and Plavix when OK with GI Team  Check Echocardiogram Today  BIPAP Management as per ICU Team  Will follow   Routine Labs in AM    YANIRA Hatfield  Cardiology  [facility]     Physician addendum:  I have seen and examined this patient as a split-shared visit with the JACQUELYN d/t complicated medical management of above problems written in assessment and high acuity requiring physician expertise in medical decision-making. I performed the substantive portion of the history and exam. Above medical decision-making is also formulated by me.    Cardiovascular exam:  S1, S2  Lungs:  fine crackles at bases.  Extremities:  + edema bilaterally    Plan:  Echo today.  Medications as above.  Continue supportive therapy.     Calvin Ocasio MD  Cardiologist

## 2024-12-25 NOTE — PROGRESS NOTES
Pulmonary & Critical Care Medicine   Progress Note      Presenting History/HPI:    79-year-old woman with A-fib, HTN, valvular heart disease s/p MVR, CVID, CAD s/p PCI to LAD (11/29), DVT s/p IVC filter, T2DM, GERD, SSS s/p PPM, HTN, HLD, chronic sinusitis status post sinus surgery, ABEL, obesity, asthma/COPD who was initially admitted as a transfer from [place] to [facility] medicine on 12/15/24  for concern for cauda equina syndrome and neurosurgical services. RRT called on 12/17 and patient placed on BIPAP. CXR at the time consistent with R sided PNA vs. Pleural effusion and broad spectrum antibiotics were started at this time. Patient reported melena in setting of severe normocytic anemia and iron deficiency. EGD done on 12/21/24 consistent with oozing duodenal AVMs requiring coagulation. No intraoperative complications noted. Patient was intubated for air way protection in setting of oropharyngeal oozing near vocal cords and transferred to ICU postoperatively.       EGD s/p duodenal AVM coagulation- 12/21/24     Interval History:  -afebrile over the past 24 hours  -sputum culture obtained on 12/23 pending positive today for Pseudomonas in the sputum with intermediate sensitivity to cefepime, resistant to Zosyn, intermediate resistance to Merrem   -the patient remains on mechanical ventilatory support, off sedation wakening up tracking to name call with generalized weakness  -there has been no reported bleeding from the upper airway or GI tract since her procedures      Scheduled Medications:    amiodarone  200 mg Per G Tube Daily    atorvastatin  40 mg Per G Tube QHS    ceFEPime IV (PEDS and ADULTS)  2 g Intravenous Q8H    LIDOcaine (PF) 10 mg/ml (1%)  1 mL Intradermal Once    LIDOcaine  1 patch Transdermal Q24H    lipase-protease-amylase 24,000-76,000-120,000 units  1 capsule Oral TID WM    metoprolol tartrate  12.5 mg Per G Tube BID    NIFEdipine  30 mg Oral Daily    pantoprazole  40 mg Intravenous  BID    sodium zirconium cyclosilicate  10 g Oral Once    timolol maleate 0.5%  1 drop Both Eyes BID       PRN Medications:     Current Facility-Administered Medications:     0.9%  NaCl infusion (for blood administration), , Intravenous, Q24H PRN    0.9%  NaCl infusion (for blood administration), , Intravenous, Q24H PRN    0.9%  NaCl infusion (for blood administration), , Intravenous, Q24H PRN    0.9%  NaCl infusion (for blood administration), , Intravenous, Q24H PRN    acetaminophen, 1,000 mg, Oral, Q8H PRN    albuterol-ipratropium, 3 mL, Nebulization, Q4H PRN    dextrose 10%, 12.5 g, Intravenous, PRN    dextrose 10%, 25 g, Intravenous, PRN    fentaNYL, 25 mcg, Intravenous, Q4H PRN    glucagon (human recombinant), 1 mg, Intramuscular, PRN    glucose, 16 g, Oral, PRN    glucose, 24 g, Oral, PRN    insulin aspart U-100, 1-10 Units, Subcutaneous, Q6H PRN    melatonin, 6 mg, Oral, Nightly PRN    ondansetron, 8 mg, Oral, Q6H PRN    ondansetron, 4 mg, Intravenous, Q8H PRN    oxyCODONE, 5 mg, Oral, Q6H PRN    oxyCODONE, 10 mg, Oral, Q6H PRN    senna, 8.6 mg, Oral, Daily PRN    simethicone, 1 tablet, Oral, TID PRN      Infusions:     dexmedeTOMIDine (Precedex) infusion (titrating)  0-1.4 mcg/kg/hr Intravenous Continuous 12.36 mL/hr at 12/25/24 0621 0.5 mcg/kg/hr at 12/25/24 0621    electrolyte-A   Intravenous Continuous   Held at 12/21/24 1000         Fluid Balance:     Intake/Output Summary (Last 24 hours) at 12/25/2024 1138  Last data filed at 12/25/2024 0800  Gross per 24 hour   Intake 1560.61 ml   Output 1405 ml   Net 155.61 ml         Vital Signs:   Vitals:    12/25/24 1106   BP:    Pulse: 63   Resp: (!) 30   Temp:          Physical Exam  Vitals and nursing note reviewed.   Constitutional:       General: She is not in acute distress.     Appearance: Normal appearance. She is ill-appearing. She is not toxic-appearing.   HENT:      Head: Normocephalic and atraumatic.      Right Ear: External ear normal.      Left Ear:  External ear normal.      Nose: Nose normal.      Mouth/Throat:      Pharynx: No posterior oropharyngeal erythema.      Comments: Unable to visualize posterior oropharynx secondary to endotracheal tube  Eyes:      General: No scleral icterus.     Conjunctiva/sclera: Conjunctivae normal.      Pupils: Pupils are equal, round, and reactive to light.   Neck:      Vascular: No carotid bruit.   Cardiovascular:      Rate and Rhythm: Normal rate and regular rhythm.      Pulses: Normal pulses.      Heart sounds: Normal heart sounds. No murmur heard.     No friction rub. No gallop.   Pulmonary:      Effort: Pulmonary effort is normal. No respiratory distress.      Breath sounds: Normal breath sounds. No wheezing, rhonchi or rales.      Comments: Intubated, on mechanical ventilation  Abdominal:      General: Abdomen is flat. Bowel sounds are normal. There is no distension.      Palpations: Abdomen is soft.      Tenderness: There is no abdominal tenderness. There is no guarding or rebound.   Musculoskeletal:         General: No swelling or deformity.      Cervical back: Neck supple. No rigidity or tenderness.   Skin:     General: Skin is warm and dry.      Capillary Refill: Capillary refill takes less than 2 seconds.      Findings: No erythema or rash.   Neurological:      General: No focal deficit present.      Comments: Unable to fully assess neurologic status and orientation secondary to patient being intubated, sedated and nonverbal   Psychiatric:      Comments: Unable to fully assess as patient is intubated and nonverbal           Ventilator Settings  Vent Mode: A/C (12/25/24 0810)  Ventilator Initiated: Yes (12/21/24 1044)  Set Rate: 18 BPM (12/25/24 0810)  Vt Set: 450 mL (12/25/24 0810)  Pressure Support: 10 cmH20 (12/24/24 0807)  PEEP/CPAP: 5 cmH20 (12/25/24 0810)  Oxygen Concentration (%): 40 (12/25/24 1106)  Peak Airway Pressure: 25 cmH20 (12/25/24 0810)  Total Ve: 8.5 L/m (12/25/24 0810)  F/VT Ratio<105 (RSBI): (!)  42.89 (12/25/24 0810)      Laboratory Studies:   Recent Labs   Lab 12/25/24  0530   PH 7.460*   PCO2 39.0   PO2 91.0   HCO3 27.7*     Recent Labs   Lab 12/25/24  0432   WBC 14.72*   RBC 2.96*   HGB 9.1*   HCT 29.2*      MCV 98.6*   MCH 30.7   MCHC 31.2*     Recent Labs   Lab 12/25/24  0432   GLUCOSE 180*      K 4.3      CO2 24   BUN 44.8*   CREATININE 0.89   CALCIUM 7.9*   MG 2.30         Microbiology Data:   Microbiology Results (last 7 days)       Procedure Component Value Units Date/Time    Urine Culture High Risk [[id number]]  (Abnormal) Collected: 12/23/24 1659    Order Status: Completed Specimen: Urine, Catheterized Updated: 12/25/24 1031     Urine Culture >/= 100,000 colonies/ml Candida albicans    Respiratory Culture [[id number]]  (Abnormal)  (Susceptibility) Collected: 12/23/24 0753    Order Status: Completed Specimen: Bronchial Alveolar Lavage from BAL, RAKAN Updated: 12/25/24 1010     Respiratory Culture Few Pseudomonas aeruginosa     Comment: MDRO (Multiple drug resistant organism)        GRAM STAIN Moderate WBC observed      No bacteria seen    Blood Culture [[id number]]  (Normal) Collected: 12/17/24 2004    Order Status: Completed Specimen: Blood Updated: 12/22/24 2101     Blood Culture No Growth at 5 days              Imaging:   XR Gastric tube check, non-radiologist performed  Narrative: EXAMINATION:  XR GASTRIC TUBE CHECK, NON-RADIOLOGIST PERFORMED    CLINICAL HISTORY:  verify OG tube placement;    COMPARISON:  No priors  Impression: Frontal image of the upper abdomen.  Enteric tube extends into the stomach.  Tip overlies the proximal gastric body.    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   Diffuse Gastritis with Hx of GERD on Dexilant   Severe Normocytic Anemia 2/2 Melena and Iron Deficiency   R sided pleural effusion vs PNA - s/p 5 day course  of Zosyn  CAD s/p LAD PCI on Plavix (held)  VHD s/p MVR on Coumadin 9/20/23 - held since 12/7 s/p Vit K on 12/20   A fib s/p SUKHI ligation 9/20/23   SSS s/p PPM 9/25/23   DM2   HTN  CVID  Hx of DVT status post Adam filter  Hx of ABEL on CPAP   Hx of Diverticulosis and colonic polyps on last colonoscopy    Hx of HICKMAN   Hx of Pancreatic Insufficiency   Hx of Glaucoma   Hx of Asthma and COPD - not in acute exacerbation       Plan:  -plan for spontaneous breathing trial with goal to extubate this morning if appropriate   -attempt on 12/24 demonstrated tachypnea without desaturation without elevation in heart rate or blood pressure with shallow breaths  -will need to resume Plavix and Coumadin at some point given recent coronary stent in addition to mechanical valves in place with a target INR of 2.5-3.5 secondary to mechanical mitral valve  -plans for repeat echo per Cardiology   -sputum culture from 12/23 positive for Pseudomonas, intermediate resistance to cefepime and Merrem , will start on cefepime for total of 5 days, she is afebrile with white blood cell count down to 14.7 this morning  -I discussed the above with the patient's daughter at bedside and discussed overall goals of care including re-intubation if the patient fails extubation in addition to code status including CPR etc., she needs time to think about it at this point        DVT ppx/tx with SCD  GI ppx with protonix  Keep HOB elevated > 30*      The patient remains at high risk of decompensation and death and will remain in ICU level care     38 min of critical care time was spent reviewing the patient's chart including medications, radiographs, labs, pertinent cultures and pathology data, other consultant notes/recomendations as well as titration of vasopressors, adjustment of mechanical ventilatory or NIPPV support, as well as discussion of goals of care with nursing staff, respiratory therapy at the bedside and with family at the bedside/via  phone.        Julián Monique MD  12/25/2024  Pulmonology/Critical Care

## 2024-12-26 LAB
ALBUMIN SERPL-MCNC: 2.3 G/DL (ref 3.4–4.8)
ALBUMIN/GLOB SERPL: 0.6 RATIO (ref 1.1–2)
ALLENS TEST BLOOD GAS (OHS): YES
ALLENS TEST BLOOD GAS (OHS): YES
ALP SERPL-CCNC: 105 UNIT/L (ref 40–150)
ALT SERPL-CCNC: 25 UNIT/L (ref 0–55)
ANION GAP SERPL CALC-SCNC: 11 MEQ/L
ANISOCYTOSIS BLD QL SMEAR: ABNORMAL
AST SERPL-CCNC: 61 UNIT/L (ref 5–34)
BACTERIA UR CULT: ABNORMAL
BASE EXCESS BLD CALC-SCNC: 1.5 MMOL/L (ref -2–2)
BASE EXCESS BLD CALC-SCNC: 3.6 MMOL/L (ref -2–2)
BASOPHILS # BLD AUTO: 0.1 X10(3)/MCL
BASOPHILS NFR BLD AUTO: 0.7 %
BILIRUB SERPL-MCNC: 0.6 MG/DL
BIPAP(E) BLOOD GAS (OHS): 6 CM H2O
BIPAP(I) BLOOD GAS (OHS): 12 CM H2O
BLOOD GAS SAMPLE TYPE (OHS): ABNORMAL
BLOOD GAS SAMPLE TYPE (OHS): ABNORMAL
BUN SERPL-MCNC: 44 MG/DL (ref 9.8–20.1)
BURR CELLS (OLG): ABNORMAL
CA-I BLD-SCNC: 1.2 MMOL/L (ref 1.12–1.23)
CA-I BLD-SCNC: 1.2 MMOL/L (ref 1.12–1.23)
CALCIUM SERPL-MCNC: 8.5 MG/DL (ref 8.4–10.2)
CHLORIDE SERPL-SCNC: 106 MMOL/L (ref 98–107)
CO2 BLDA-SCNC: 27.9 MMOL/L
CO2 BLDA-SCNC: 29.9 MMOL/L
CO2 SERPL-SCNC: 25 MMOL/L (ref 23–31)
COHGB MFR BLDA: 1.4 % (ref 0.5–1.5)
COHGB MFR BLDA: 1.5 % (ref 0.5–1.5)
CREAT SERPL-MCNC: 0.82 MG/DL (ref 0.55–1.02)
CREAT/UREA NIT SERPL: 54
CRP SERPL-MCNC: 173.1 MG/L
DRAWN BY BLOOD GAS (OHS): ABNORMAL
DRAWN BY BLOOD GAS (OHS): ABNORMAL
EOSINOPHIL # BLD AUTO: 0.15 X10(3)/MCL (ref 0–0.9)
EOSINOPHIL NFR BLD AUTO: 1 %
ERYTHROCYTE [DISTWIDTH] IN BLOOD BY AUTOMATED COUNT: 20.4 % (ref 11.5–17)
GFR SERPLBLD CREATININE-BSD FMLA CKD-EPI: >60 ML/MIN/1.73/M2
GLOBULIN SER-MCNC: 3.8 GM/DL (ref 2.4–3.5)
GLUCOSE SERPL-MCNC: 161 MG/DL (ref 82–115)
HCO3 BLDA-SCNC: 26.6 MMOL/L (ref 22–26)
HCO3 BLDA-SCNC: 28.5 MMOL/L (ref 22–26)
HCT VFR BLD AUTO: 30.5 % (ref 37–47)
HGB BLD-MCNC: 9.5 G/DL (ref 12–16)
IGA SERPL-MCNC: 331 MG/DL (ref 69–517)
IGG SERPL-MCNC: 681 MG/DL (ref 522–1631)
IGM SERPL-MCNC: 21 MG/DL (ref 33–293)
IMM GRANULOCYTES # BLD AUTO: 0.24 X10(3)/MCL (ref 0–0.04)
IMM GRANULOCYTES NFR BLD AUTO: 1.7 %
INHALED O2 CONCENTRATION: 28 %
INHALED O2 CONCENTRATION: 40 %
INR PPP: 1.4
LPM (OHS): 30
LYMPHOCYTES # BLD AUTO: 2.37 X10(3)/MCL (ref 0.6–4.6)
LYMPHOCYTES NFR BLD AUTO: 16.5 %
MACROCYTES BLD QL SMEAR: ABNORMAL
MAGNESIUM SERPL-MCNC: 2.5 MG/DL (ref 1.6–2.6)
MCH RBC QN AUTO: 30.9 PG (ref 27–31)
MCHC RBC AUTO-ENTMCNC: 31.1 G/DL (ref 33–36)
MCV RBC AUTO: 99.3 FL (ref 80–94)
METHGB MFR BLDA: 0.2 % (ref 0.4–1.5)
METHGB MFR BLDA: 0.3 % (ref 0.4–1.5)
MODE (OHS): ABNORMAL
MONOCYTES # BLD AUTO: 1.32 X10(3)/MCL (ref 0.1–1.3)
MONOCYTES NFR BLD AUTO: 9.2 %
NEUTROPHILS # BLD AUTO: 10.16 X10(3)/MCL (ref 2.1–9.2)
NEUTROPHILS NFR BLD AUTO: 70.9 %
NRBC BLD AUTO-RTO: 4.1 %
O2 HB BLOOD GAS (OHS): 94.5 % (ref 94–97)
O2 HB BLOOD GAS (OHS): 95.5 % (ref 94–97)
OHS QRS DURATION: 88 MS
OHS QRS DURATION: 88 MS
OHS QRS DURATION: 92 MS
OHS QTC CALCULATION: 414 MS
OHS QTC CALCULATION: 424 MS
OHS QTC CALCULATION: 450 MS
OXYGEN DEVICE BLOOD GAS (OHS): ABNORMAL
OXYHGB MFR BLDA: 10.5 G/DL (ref 12–16)
OXYHGB MFR BLDA: 9.5 G/DL (ref 12–16)
PCO2 BLDA: 43 MMHG (ref 35–45)
PCO2 BLDA: 44 MMHG (ref 35–45)
PH BLDA: 7.4 [PH] (ref 7.35–7.45)
PH BLDA: 7.42 [PH] (ref 7.35–7.45)
PHOSPHATE SERPL-MCNC: 4.2 MG/DL (ref 2.3–4.7)
PLATELET # BLD AUTO: 199 X10(3)/MCL (ref 130–400)
PLATELET # BLD EST: NORMAL 10*3/UL
PLATELETS.RETICULATED NFR BLD AUTO: 3.2 % (ref 0.9–11.2)
PMV BLD AUTO: 10.3 FL (ref 7.4–10.4)
PO2 BLDA: 74 MMHG (ref 80–100)
PO2 BLDA: 88 MMHG (ref 80–100)
POCT GLUCOSE: 170 MG/DL (ref 70–110)
POCT GLUCOSE: 210 MG/DL (ref 70–110)
POIKILOCYTOSIS BLD QL SMEAR: ABNORMAL
POTASSIUM BLOOD GAS (OHS): 3.6 MMOL/L (ref 3.5–5)
POTASSIUM BLOOD GAS (OHS): 3.7 MMOL/L (ref 3.5–5)
POTASSIUM SERPL-SCNC: 3.8 MMOL/L (ref 3.5–5.1)
PROT SERPL-MCNC: 6.1 GM/DL (ref 5.8–7.6)
PROTHROMBIN TIME: 16.9 SECONDS (ref 12.5–14.5)
RBC # BLD AUTO: 3.07 X10(6)/MCL (ref 4.2–5.4)
RBC MORPH BLD: ABNORMAL
SAMPLE SITE BLOOD GAS (OHS): ABNORMAL
SAMPLE SITE BLOOD GAS (OHS): ABNORMAL
SAO2 % BLDA: 95 %
SAO2 % BLDA: 96.7 %
SODIUM BLOOD GAS (OHS): 139 MMOL/L (ref 137–145)
SODIUM BLOOD GAS (OHS): 140 MMOL/L (ref 137–145)
SODIUM SERPL-SCNC: 142 MMOL/L (ref 136–145)
SPONT RR (OHS): 22 B/MIN
TARGETS BLD QL SMEAR: ABNORMAL
TROPONIN I SERPL-MCNC: 0.01 NG/ML (ref 0–0.04)
WBC # BLD AUTO: 14.34 X10(3)/MCL (ref 4.5–11.5)

## 2024-12-26 PROCEDURE — 63600175 PHARM REV CODE 636 W HCPCS

## 2024-12-26 PROCEDURE — 99222 1ST HOSP IP/OBS MODERATE 55: CPT | Mod: ,,, | Performed by: NURSE PRACTITIONER

## 2024-12-26 PROCEDURE — 36415 COLL VENOUS BLD VENIPUNCTURE: CPT

## 2024-12-26 PROCEDURE — 25000003 PHARM REV CODE 250: Performed by: INTERNAL MEDICINE

## 2024-12-26 PROCEDURE — 93005 ELECTROCARDIOGRAM TRACING: CPT

## 2024-12-26 PROCEDURE — 63600175 PHARM REV CODE 636 W HCPCS: Performed by: STUDENT IN AN ORGANIZED HEALTH CARE EDUCATION/TRAINING PROGRAM

## 2024-12-26 PROCEDURE — 94660 CPAP INITIATION&MGMT: CPT

## 2024-12-26 PROCEDURE — 82784 ASSAY IGA/IGD/IGG/IGM EACH: CPT | Performed by: HOSPITALIST

## 2024-12-26 PROCEDURE — 99900035 HC TECH TIME PER 15 MIN (STAT)

## 2024-12-26 PROCEDURE — 93010 ELECTROCARDIOGRAM REPORT: CPT | Mod: ,,, | Performed by: INTERNAL MEDICINE

## 2024-12-26 PROCEDURE — 36600 WITHDRAWAL OF ARTERIAL BLOOD: CPT

## 2024-12-26 PROCEDURE — 27100171 HC OXYGEN HIGH FLOW UP TO 24 HOURS

## 2024-12-26 PROCEDURE — 99223 1ST HOSP IP/OBS HIGH 75: CPT | Mod: ,,, | Performed by: HOSPITALIST

## 2024-12-26 PROCEDURE — 83735 ASSAY OF MAGNESIUM: CPT

## 2024-12-26 PROCEDURE — 25000003 PHARM REV CODE 250

## 2024-12-26 PROCEDURE — 94760 N-INVAS EAR/PLS OXIMETRY 1: CPT

## 2024-12-26 PROCEDURE — 94799 UNLISTED PULMONARY SVC/PX: CPT

## 2024-12-26 PROCEDURE — 63600175 PHARM REV CODE 636 W HCPCS: Performed by: INTERNAL MEDICINE

## 2024-12-26 PROCEDURE — 25000003 PHARM REV CODE 250: Performed by: STUDENT IN AN ORGANIZED HEALTH CARE EDUCATION/TRAINING PROGRAM

## 2024-12-26 PROCEDURE — 94761 N-INVAS EAR/PLS OXIMETRY MLT: CPT | Mod: XB

## 2024-12-26 PROCEDURE — 20000000 HC ICU ROOM

## 2024-12-26 PROCEDURE — 63600175 PHARM REV CODE 636 W HCPCS: Mod: JG | Performed by: HOSPITALIST

## 2024-12-26 PROCEDURE — 25000003 PHARM REV CODE 250: Performed by: HOSPITALIST

## 2024-12-26 PROCEDURE — 85610 PROTHROMBIN TIME: CPT | Performed by: NURSE PRACTITIONER

## 2024-12-26 PROCEDURE — 80053 COMPREHEN METABOLIC PANEL: CPT

## 2024-12-26 PROCEDURE — 82803 BLOOD GASES ANY COMBINATION: CPT

## 2024-12-26 PROCEDURE — 99900031 HC PATIENT EDUCATION (STAT)

## 2024-12-26 PROCEDURE — 82785 ASSAY OF IGE: CPT | Performed by: HOSPITALIST

## 2024-12-26 PROCEDURE — 86140 C-REACTIVE PROTEIN: CPT | Performed by: HOSPITALIST

## 2024-12-26 PROCEDURE — 84100 ASSAY OF PHOSPHORUS: CPT

## 2024-12-26 PROCEDURE — 85025 COMPLETE CBC W/AUTO DIFF WBC: CPT

## 2024-12-26 PROCEDURE — 27100092 HC HIGH FLOW DELIVERY CANNULA

## 2024-12-26 PROCEDURE — 36415 COLL VENOUS BLD VENIPUNCTURE: CPT | Performed by: STUDENT IN AN ORGANIZED HEALTH CARE EDUCATION/TRAINING PROGRAM

## 2024-12-26 PROCEDURE — 27000249 HC VAPOTHERM CIRCUIT

## 2024-12-26 PROCEDURE — 84484 ASSAY OF TROPONIN QUANT: CPT | Performed by: STUDENT IN AN ORGANIZED HEALTH CARE EDUCATION/TRAINING PROGRAM

## 2024-12-26 RX ORDER — FUROSEMIDE 10 MG/ML
40 INJECTION INTRAMUSCULAR; INTRAVENOUS ONCE
Status: COMPLETED | OUTPATIENT
Start: 2024-12-26 | End: 2024-12-26

## 2024-12-26 RX ORDER — CLOPIDOGREL BISULFATE 75 MG/1
75 TABLET ORAL DAILY
Status: DISCONTINUED | OUTPATIENT
Start: 2024-12-26 | End: 2024-12-27

## 2024-12-26 RX ORDER — WARFARIN SODIUM 5 MG/1
5 TABLET ORAL ONCE
Status: COMPLETED | OUTPATIENT
Start: 2024-12-26 | End: 2024-12-26

## 2024-12-26 RX ORDER — MORPHINE SULFATE 4 MG/ML
2 INJECTION, SOLUTION INTRAMUSCULAR; INTRAVENOUS EVERY 4 HOURS PRN
Status: DISCONTINUED | OUTPATIENT
Start: 2024-12-26 | End: 2024-12-27

## 2024-12-26 RX ORDER — MORPHINE SULFATE 4 MG/ML
2 INJECTION, SOLUTION INTRAMUSCULAR; INTRAVENOUS ONCE
Status: COMPLETED | OUTPATIENT
Start: 2024-12-26 | End: 2024-12-26

## 2024-12-26 RX ADMIN — WARFARIN SODIUM 5 MG: 5 TABLET ORAL at 12:12

## 2024-12-26 RX ADMIN — OXYCODONE HYDROCHLORIDE 5 MG: 5 TABLET ORAL at 12:12

## 2024-12-26 RX ADMIN — PANTOPRAZOLE SODIUM 40 MG: 40 INJECTION, POWDER, FOR SOLUTION INTRAVENOUS at 08:12

## 2024-12-26 RX ADMIN — TIMOLOL MALEATE 1 DROP: 5 SOLUTION OPHTHALMIC at 08:12

## 2024-12-26 RX ADMIN — CEFEPIME 2 G: 2 INJECTION, POWDER, FOR SOLUTION INTRAVENOUS at 04:12

## 2024-12-26 RX ADMIN — OXYCODONE HYDROCHLORIDE 5 MG: 5 TABLET ORAL at 07:12

## 2024-12-26 RX ADMIN — FUROSEMIDE 40 MG: 10 INJECTION, SOLUTION INTRAMUSCULAR; INTRAVENOUS at 10:12

## 2024-12-26 RX ADMIN — INSULIN ASPART 2 UNITS: 100 INJECTION, SOLUTION INTRAVENOUS; SUBCUTANEOUS at 05:12

## 2024-12-26 RX ADMIN — AMIODARONE HYDROCHLORIDE 200 MG: 200 TABLET ORAL at 12:12

## 2024-12-26 RX ADMIN — MORPHINE SULFATE 2 MG: 4 INJECTION INTRAVENOUS at 05:12

## 2024-12-26 RX ADMIN — METOPROLOL TARTRATE 12.5 MG: 25 TABLET, FILM COATED ORAL at 12:12

## 2024-12-26 RX ADMIN — ATORVASTATIN CALCIUM 40 MG: 40 TABLET, FILM COATED ORAL at 08:12

## 2024-12-26 RX ADMIN — METOPROLOL TARTRATE 12.5 MG: 25 TABLET, FILM COATED ORAL at 08:12

## 2024-12-26 RX ADMIN — CEFTAZIDIME, AVIBACTAM 2.5 G: 2; .5 POWDER, FOR SOLUTION INTRAVENOUS at 05:12

## 2024-12-26 RX ADMIN — CEFTAZIDIME, AVIBACTAM 2.5 G: 2; .5 POWDER, FOR SOLUTION INTRAVENOUS at 09:12

## 2024-12-26 RX ADMIN — INSULIN ASPART 4 UNITS: 100 INJECTION, SOLUTION INTRAVENOUS; SUBCUTANEOUS at 12:12

## 2024-12-26 RX ADMIN — CLOPIDOGREL BISULFATE 75 MG: 75 TABLET ORAL at 12:12

## 2024-12-26 NOTE — PT/OT/SLP PROGRESS
Occupational Therapy      Patient Name:  Kortney Leach   MRN:  [id number]    OT checked on pt this morning. Spoke with both RN and MD. Pt currently on vapotherm and reporting difficulty breathing. MD requests to defer therapy at this time as he is unsure as to how well pt would tolerate. Upon reviewing chart this afternoon, MD (OZZY Cruz) discontinued physical therapy orders and family is discussing palliative care and hospice. OT will d/c orders at this time; please re-consult as appropriate.     12/26/2024

## 2024-12-26 NOTE — PT/OT/SLP PROGRESS
Physical Therapy      Patient Name:  Kortney Leach   MRN:  [id number]    PT checked on pt this morning. Spoke with both RN and MD. Pt currently on vapotherm and reporting difficulty breathing. MD requests to defer therapy at this time as he is unsafe as to how well pt would tolerate. Upon reviewing chart this afternoon, MD (OZZY Cruz) discontinues therapy orders. Please re-consult as necessary.

## 2024-12-26 NOTE — PLAN OF CARE
Problem: Adult Inpatient Plan of Care  Goal: Plan of Care Review  12/26/2024 0724 by Kj Fisher RN  Outcome: Progressing  12/26/2024 0724 by Kj Fisher RN  Outcome: Progressing  12/26/2024 0723 by Kj Fisher RN  Outcome: Progressing  Goal: Patient-Specific Goal (Individualized)  12/26/2024 0724 by Kj Fisher RN  Outcome: Progressing  12/26/2024 0724 by Kj Fisher RN  Outcome: Progressing  12/26/2024 0723 by Kj Fisher RN  Outcome: Progressing  Goal: Absence of Hospital-Acquired Illness or Injury  12/26/2024 0724 by Kj Fisher RN  Outcome: Progressing  12/26/2024 0724 by Kj Fisher RN  Outcome: Progressing  12/26/2024 0723 by Kj Fisher RN  Outcome: Progressing  Goal: Optimal Comfort and Wellbeing  12/26/2024 0724 by Kj Fisher RN  Outcome: Progressing  12/26/2024 0724 by Kj Fisher RN  Outcome: Progressing  12/26/2024 0723 by Kj Fisher RN  Outcome: Progressing  Goal: Readiness for Transition of Care  12/26/2024 0724 by Kj Fisher RN  Outcome: Progressing  12/26/2024 0724 by Kj Fisher RN  Outcome: Progressing  12/26/2024 0723 by Kj Fisher RN  Outcome: Progressing     Problem: Diabetes Comorbidity  Goal: Blood Glucose Level Within Targeted Range  12/26/2024 0724 by Kj Fisher RN  Outcome: Progressing  12/26/2024 0724 by Kj Fisher RN  Outcome: Progressing  12/26/2024 0723 by Kj Fisher RN  Outcome: Progressing     Problem: Skin Injury Risk Increased  Goal: Skin Health and Integrity  12/26/2024 0724 by Kj Fisher RN  Outcome: Progressing  12/26/2024 0724 by Kj Fisher RN  Outcome: Progressing  12/26/2024 0723 by Kj Fisher RN  Outcome: Progressing     Problem: Infection  Goal: Absence of Infection Signs and Symptoms  12/26/2024 0724 by Kj Fisher, RN  Outcome: Progressing  12/26/2024 0724 by Kj Fisher RN  Outcome: Progressing  12/26/2024 0723 by Phillip  GABRIEL Underwood  Outcome: Progressing     Problem: Wound  Goal: Optimal Coping  12/26/2024 0724 by Kj Fisher, RN  Outcome: Progressing  12/26/2024 0724 by Kj Fisher RN  Outcome: Progressing  12/26/2024 0723 by Kj Fisher RN  Outcome: Progressing  Goal: Optimal Functional Ability  12/26/2024 0724 by Kj Fisher, RN  Outcome: Progressing  12/26/2024 0724 by Kj Fisher, RN  Outcome: Progressing  12/26/2024 0723 by Kj Fisher RN  Outcome: Progressing  Goal: Absence of Infection Signs and Symptoms  12/26/2024 0724 by Kj Fisher, RN  Outcome: Progressing  12/26/2024 0724 by Kj Fisher RN  Outcome: Progressing  12/26/2024 0723 by Kj Fisher RN  Outcome: Progressing  Goal: Improved Oral Intake  12/26/2024 0724 by Kj Fisher, RN  Outcome: Progressing  12/26/2024 0724 by Kj Fisher, RN  Outcome: Progressing  12/26/2024 0723 by Kj Fisher, RN  Outcome: Progressing  Goal: Optimal Pain Control and Function  12/26/2024 0724 by Kj Fisher, RN  Outcome: Progressing  12/26/2024 0724 by Kj Fisher, RN  Outcome: Progressing  12/26/2024 0723 by Kj Fisher RN  Outcome: Progressing  Goal: Skin Health and Integrity  12/26/2024 0724 by Kj Fisher, RN  Outcome: Progressing  12/26/2024 0724 by Kj Fisher, RN  Outcome: Progressing  12/26/2024 0723 by Kj Fisher, RN  Outcome: Progressing  Goal: Optimal Wound Healing  12/26/2024 0724 by Kj Fisher, RN  Outcome: Progressing  12/26/2024 0724 by Kj Fisher, RN  Outcome: Progressing  12/26/2024 0723 by Kj Fisher RN  Outcome: Progressing     Problem: Fall Injury Risk  Goal: Absence of Fall and Fall-Related Injury  12/26/2024 0724 by Kj Fisher, RN  Outcome: Progressing  12/26/2024 0724 by Kj Fisher, RN  Outcome: Progressing  12/26/2024 0723 by Kj Fisher, RN  Outcome: Progressing     Problem: Mechanical Ventilation Invasive  Goal: Effective  Communication  12/26/2024 0724 by Kj Fisher, RN  Outcome: Progressing  12/26/2024 0724 by Kj Fisher, RN  Outcome: Progressing  12/26/2024 0723 by Kj Fisher RN  Outcome: Progressing  Goal: Optimal Device Function  12/26/2024 0724 by Kj Fisher, RN  Outcome: Progressing  12/26/2024 0724 by Kj Fisher, RN  Outcome: Progressing  12/26/2024 0723 by Kj Fisher, RN  Outcome: Progressing  Goal: Mechanical Ventilation Liberation  12/26/2024 0724 by Kj Fisher, RN  Outcome: Progressing  12/26/2024 0724 by Kj Fisher, RN  Outcome: Progressing  12/26/2024 0723 by Kj Fisher RN  Outcome: Progressing  Goal: Optimal Nutrition Delivery  12/26/2024 0724 by Kj Fisher, RN  Outcome: Progressing  12/26/2024 0724 by Kj Fisher, RN  Outcome: Progressing  12/26/2024 0723 by Kj Fisher, RN  Outcome: Progressing  Goal: Absence of Device-Related Skin and Tissue Injury  12/26/2024 0724 by Kj Fisher, RN  Outcome: Progressing  12/26/2024 0724 by Kj Fisher, RN  Outcome: Progressing  12/26/2024 0723 by Kj Fisher, RN  Outcome: Progressing  Goal: Absence of Ventilator-Induced Lung Injury  12/26/2024 0724 by Kj Fisher, RN  Outcome: Progressing  12/26/2024 0724 by Kj Fisher, RN  Outcome: Progressing  12/26/2024 0723 by Kj Fisher, RN  Outcome: Progressing     Problem: Artificial Airway  Goal: Effective Communication  12/26/2024 0724 by Kj Fisher, RN  Outcome: Progressing  12/26/2024 0724 by Kj Fisher, RN  Outcome: Progressing  12/26/2024 0723 by Kj Fisher, RN  Outcome: Progressing  Goal: Optimal Device Function  12/26/2024 0724 by Kj Fisher, RN  Outcome: Progressing  12/26/2024 0724 by Kj Fisher, RN  Outcome: Progressing  12/26/2024 0723 by Kj Fisher, RN  Outcome: Progressing  Goal: Absence of Device-Related Skin or Tissue Injury  12/26/2024 0724 by Kj Fisher, GABRIEL  Outcome:  Progressing  12/26/2024 0724 by Kj Fisher, RN  Outcome: Progressing  12/26/2024 0723 by Kj Fisher, RN  Outcome: Progressing

## 2024-12-26 NOTE — CONSULTS
Infectious Disease  Patient Name Kortney Leach  79 y.o. female.  MRN: [id number]   Length of stay: 11  Chief Complaint: No chief complaint on file.    Tele-Infectious Diseases Consultation   Audio-visual consultation  Patient Location: [facility]  Time: 85 mins      I am evaluating patient for infection. Consent obtained, including potential limitations and risks of virtual health.  Physical exam limited by the virtual care delivered. I will be using an audio and video telecommunication application via secured cellular connection  located at  [facility] to evaluate the patient. A Nurse is present in the room with the patient to assist with the evaluation.     Interval history:   12/26 - afebrile. 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   - s/p  cefdinir 12/11 to 12/15, cipro 12/14 to 12/15, pip/tazo  12/17 12/20, vanco 12/18  - currently on cefepime, discontinue  - start avycaz,     2) Pneumonia  - hospital acquired  - required vent, BIPAP, during hospital course   - s/p bronchoscopy 12/23   - Resp Cx BAL 12/23 - Pseudomonas aeruginosa (R-levo, pip/tazo; I-cefepime, ceftaz, cipro, andrew)  - extubated 12/25   - currently on cefepime, discontinue  - start avycaz,   - additional testing to be performed in micro    3) Candiduria  - in setting of pavon placed 12/17  and exchanged on 12/23   - UA 12/23 turbid, WBC 51-99, RBC 21-50, 500 LE   - UCx 12/23 - Candida albicans  - exchange of pavon was treatment     4) GI bleed  - melena  - secondary to oozing duodenal AVMs, diffuse gastritis  - s/p EGD on 12/21 with treatment argon plasma coagulation     5) Leukocytosis  - in setting of acute infection   - overall trending down   - will monitor     6) Immununosuppressed host  - CVID  -  on Hizentra sub q every 2 weeks, last on   - check levels      Discussed with patient and family at bedside 12/26   Discussed and seen with RN    Barbara Cantu MD, MPH  [facility]    Thank you for this consultation. I will follow up with the patient. Please contact via Epic secure chat with any questions.     HPI:   Patient is Kortney Leach a 79 y.o. female admitted on 12/15 as transfer from [facility] for NSG evaluation for dx of cauda equina syndrome.She initially presented to OSH with lower back pain, difficulty ambulating due the pain. No stool/urinary incontinence. NSG consulted and no gross motor deficits present, recommended no acute surgical intervention. Within 48 hrs an RRT was called on 12/17 due to respiratory distress and patient required BIPAP. Imaging consistent with pneumonia vs pleural effusions, she was started on empiric antimicrobials. During the hospitalization she had melena and GI was consulted and an EGD performed no 12/21 that showed evidence of duodenal AVMs with oozing that required treatment with argon plasma coagulation. Due to oozing near vocal cords she was intubated for airway protection and admitted to ICU post procedure. 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 Resp cx with XDRO Pseudomonas spp. Patient has known afib s/l SUKHI ligation 9/2023, SSS with PPM 9/2023, hyperlipidemia, hypertension, s/p b-MVR 2023, CVID, CAD s.p PCI 11/29/24, , VTE s/p IVCF, DMII, GERD, asthma/COPD, ABEL and obesity, s/p TKR, bilataral mastectomy, appendectomy, tonsillectomy, former smoker. Infectious diseases consulted for evaluation and management.     Past Medical History:   Diagnosis Date    Anticoagulant long-term use     Asthma     Chronic sinusitis, unspecified     COPD (chronic obstructive pulmonary disease)     CVID (common variable  immunodeficiency)     Diabetes mellitus, type 2     GERD (gastroesophageal reflux disease)     Hypertension     Severe mitral valve regurgitation     Sleep apnea     uses CPAP    SOB (shortness of breath)     Unspecified glaucoma     Weakness      Past Surgical History:   Procedure Laterality Date    A-V CARDIAC PACEMAKER INSERTION N/A 9/25/2023    Procedure: INSERTION, CARDIAC PACEMAKER, DUAL CHAMBER;  Surgeon: Arnaud Moon MD;  Location: [facility] CATH LAB;  Service: Cardiology;  Laterality: N/A;  SJM    ANGIOGRAM, CORONARY, WITH LEFT HEART CATHETERIZATION N/A 11/29/2024    Procedure: Angiogram, Coronary, with Left Heart Cath;  Surgeon: Tatiana Jarrett MD;  Location: [facility] CATH LAB;  Service: Cardiology;  Laterality: N/A;    APPENDECTOMY      CATARACT EXTRACTION Bilateral     CATHETERIZATION OF BOTH LEFT AND RIGHT HEART N/A 11/27/2024    Procedure: CATHETERIZATION, HEART, BOTH LEFT AND RIGHT;  Surgeon: Sebastian Watters Jr., MD;  Location: [facility] CATH LAB;  Service: Cardiology;  Laterality: N/A;  With Valve Study    COLONOSCOPY      EGD, WITH BALLOON DILATION      ESOPHAGOGASTRODUODENOSCOPY      ESOPHAGOGASTRODUODENOSCOPY N/A 12/21/2024    Procedure: EGD;  Surgeon: Loretta Hebert MD;  Location: [facility] OR;  Service: Gastroenterology;  Laterality: N/A;    LEFT HEART CATHETERIZATION Left 09/06/2023    Procedure: Left heart cath;  Surgeon: Vijay Uribe MD;  Location: [facility] CATH LAB;  Service: Cardiology;  Laterality: Left;  [facility]    MITRAL VALVE REPLACEMENT N/A 9/20/2023    Procedure: REPLACEMENT, MITRAL VALVE;  Surgeon: Steven Rosales IV, MD;  Location: [facility];  Service: Cardiovascular;  Laterality: N/A;    TONSILLECTOMY      TOTAL KNEE ARTHROPLASTY Bilateral      Review of patient's allergies indicates:   Allergen Reactions    Adhesive tape-silicones      Redness     Current Outpatient Medications   Medication Instructions    amiodarone (PACERONE) 200 mg, Daily    aspirin (ECOTRIN) 81 mg, Oral, Daily    atorvastatin  (LIPITOR) 40 mg, Daily    clopidogreL (PLAVIX) 75 mg, Oral, Daily    DUPIXENT SYRINGE 300 mg, Every 14 days    estradioL (ESTRACE) 0.5 g, Twice weekly    famotidine (PEPCID) 40 mg, Nightly    fluticasone-umeclidin-vilanter (TRELEGY ELLIPTA) 200-62.5-25 mcg inhaler 1 puff, Daily    furosemide (LASIX) 40 mg    glimepiride (AMARYL) 4 mg, 2 times daily    HYDROcodone-acetaminophen (NORCO)  mg per tablet 1 tablet, Oral, Every 6 hours PRN    immun glob G,IgG,/pro/IgA 0-50 (HIZENTRA SUBQ) Every 14 days    lipase-protease-amylase 24,000-76,000-120,000 units (CREON) 24,000-76,000 -120,000 unit capsule 1-2 capsules, 3 times daily with meals    metFORMIN (GLUCOPHAGE) 500 mg, Oral, 2 times daily with meals    metoprolol succinate (TOPROL-XL) 50 mg, Oral, Daily    NIFEdipine (PROCARDIA-XL) 30 mg    pantoprazole (PROTONIX) 40 MG tablet 1 tablet, Every morning    timoloL (BETIMOL) 0.5 % ophthalmic solution 1 drop, Both Eyes, 2 times daily    warfarin (COUMADIN) 5 mg, Oral, Daily       Current Facility-Administered Medications:     0.9%  NaCl infusion (for blood administration), , Intravenous, Q24H PRN, Loretta Hebert MD    0.9%  NaCl infusion (for blood administration), , Intravenous, Q24H PRN, Jeane Ortega, , New Bag at 12/21/24 1124    0.9%  NaCl infusion (for blood administration), , Intravenous, Q24H PRN, Judd Rivera MD    0.9%  NaCl infusion (for blood administration), , Intravenous, Q24H PRN, Julián Monique MD    acetaminophen tablet 1,000 mg, 1,000 mg, Oral, Q8H PRN, Vivi Santiago FNP, 1,000 mg at 12/19/24 0948    albuterol-ipratropium 2.5 mg-0.5 mg/3 mL nebulizer solution 3 mL, 3 mL, Nebulization, Q4H PRN, Vivi Santiago FNP, 3 mL at 12/17/24 1703    amiodarone tablet 200 mg, 200 mg, Per G Tube, Daily, Julián Monique MD, 200 mg at 12/25/24 0839    atorvastatin tablet 40 mg, 40 mg, Per G Tube, QHS, Julián Monique MD, 40 mg at 12/24/24 8728    ceFEPIme injection 2 g, 2 g, Intravenous, Q8H,  Julián Monique MD, 2 g at 12/26/24 0443    clopidogreL tablet 75 mg, 75 mg, Oral, Daily, Cyrus Cruz MD    dextrose 10% bolus 125 mL 125 mL, 12.5 g, Intravenous, PRN, Rose Bateman, AGACNP-BC    dextrose 10% bolus 250 mL 250 mL, 25 g, Intravenous, PRN, Rose Bateman, AGACNP-BC    fentaNYL injection 25 mcg, 25 mcg, Intravenous, Q4H PRN, Judd Rivera MD, 25 mcg at 12/24/24 1636    glucagon (human recombinant) injection 1 mg, 1 mg, Intramuscular, PRN, Rose Bateman, AGACNP-BC    glucose chewable tablet 16 g, 16 g, Oral, PRN, DogregtRose, AGACNP-BC    glucose chewable tablet 24 g, 24 g, Oral, PRN, Fransico, Rose THOMPSON, AGACNP-BC    insulin aspart U-100 injection 1-10 Units, 1-10 Units, Subcutaneous, Q6H PRN, Cyrus Cruz MD, 2 Units at 12/25/24 0617    LIDOcaine (PF) 10 mg/ml (1%) injection 10 mg, 1 mL, Intradermal, Once, Vinod Torres Jr., MD    LIDOcaine 5 % patch 1 patch, 1 patch, Transdermal, Q24H, Catracho Galan MD, 1 patch at 12/21/24 1557    lipase-protease-amylase 24,000-76,000-120,000 units capsule 1 capsule, 1 capsule, Oral, TID WM, Catracho Galan MD, 1 capsule at 12/24/24 1639    melatonin tablet 6 mg, 6 mg, Oral, Nightly PRN, Nikkie Harris, FNP, 6 mg at 12/19/24 2102    metoprolol tartrate (LOPRESSOR) split tablet 12.5 mg, 12.5 mg, Per G Tube, BID, Julián Monique MD, 12.5 mg at 12/25/24 0839    NIFEdipine 24 hr tablet 30 mg, 30 mg, Oral, Daily, Catracho Galan MD, 30 mg at 12/20/24 0948    ondansetron disintegrating tablet 8 mg, 8 mg, Oral, Q6H PRN, Vinod Torres Jr., MD    ondansetron injection 4 mg, 4 mg, Intravenous, Q8H PRN, Rose Bateman, [facility]    oxyCODONE immediate release tablet 5 mg, 5 mg, Oral, Q6H PRN, Catracho Galan MD, 5 mg at 12/20/24 1630    oxyCODONE immediate release tablet Tab 10 mg, 10 mg, Oral, Q6H PRN, Catracho Galan MD, 10 mg at 12/20/24 9153    pantoprazole injection 40 mg, 40 mg, Intravenous,  BID, BuxRodrigueJeane, DO, 40 mg at 12/25/24 2055    senna tablet 8.6 mg, 8.6 mg, Oral, Daily PRN, Catracho Galan MD    simethicone chewable tablet 80 mg, 1 tablet, Oral, TID PRN, Nikkie Harris FNP, 80 mg at 12/20/24 0731    sodium zirconium cyclosilicate packet 10 g, 10 g, Oral, Once, Catracho Galan MD    timolol maleate 0.5% ophthalmic solution 1 drop, 1 drop, Both Eyes, BID, Catracho Galan MD, 1 drop at 12/25/24 2055    warfarin (COUMADIN) tablet 5 mg, 5 mg, Oral, Once, Cyrus Cruz MD    Facility-Administered Medications Ordered in Other Encounters:     0.9%  NaCl infusion, , Intravenous, On Call Procedure, Vijay Uribe MD    diazePAM tablet 10 mg, 10 mg, Oral, On Call Procedure, Vijay Uribe MD, 10 mg at 09/06/23 1021    diphenhydrAMINE capsule 50 mg, 50 mg, Oral, On Call Procedure, Vijay Uribe MD, 50 mg at 09/06/23 1021  Review of Systems   Constitutional:  Negative for chills and fever.   HENT:  Negative for congestion, ear discharge, ear pain, facial swelling, mouth sores, postnasal drip, rhinorrhea, sinus pressure, sinus pain, sneezing, sore throat and trouble swallowing.    Eyes:  Negative for discharge, redness and itching.   Respiratory:  Negative for cough, chest tightness, shortness of breath and wheezing.    Cardiovascular:  Negative for chest pain, palpitations and leg swelling.   Gastrointestinal:  Negative for abdominal distention, abdominal pain, diarrhea, nausea and vomiting.   Genitourinary:  Negative for dysuria, flank pain, frequency and urgency.   Musculoskeletal:  Negative for back pain, myalgias and neck stiffness.   Skin:  Negative for rash and wound.   Allergic/Immunologic: Negative for immunocompromised state.   Neurological:  Negative for dizziness, light-headedness and headaches.   Hematological:  Negative for adenopathy.   Psychiatric/Behavioral:  Negative for agitation, confusion and suicidal ideas. The patient is not nervous/anxious.         Objective:   Temp:  [97.7 °F (36.5 °C)-98.8 °F (37.1 °C)] 97.7 °F (36.5 °C)  Pulse:  [60-82] 82  Resp:  [16-32] 25  SpO2:  [92 %-100 %] 95 %  BP: ()/(41-86) 146/74     Physical Exam  Vitals and nursing note reviewed.   Constitutional:       General: She is not in acute distress.     Appearance: Normal appearance.   HENT:      Head: Normocephalic and atraumatic.      Nose: Nose normal.      Mouth/Throat:      Mouth: Mucous membranes are moist.   Eyes:      General: No scleral icterus.     Conjunctiva/sclera: Conjunctivae normal.   Pulmonary:      Effort: Pulmonary effort is normal.   Musculoskeletal:         General: No swelling or deformity.   Skin:     Coloration: Skin is not pale.      Findings: No erythema, lesion or rash.   Neurological:      Mental Status: She is alert. Mental status is at baseline.   Psychiatric:         Mood and Affect: Mood normal.         Behavior: Behavior normal.         Estimated Creatinine Clearance: 64.9 mL/min (based on SCr of 0.82 mg/dL).  Recent Labs   Lab 12/25/24  0432 12/26/24  0218   WBC 14.72* 14.34*    199     Microbiology Results (last 7 days)       Procedure Component Value Units Date/Time    Respiratory Culture [[id number]]  (Abnormal)  (Susceptibility) Collected: 12/23/24 0753    Order Status: Completed Specimen: Bronchial Alveolar Lavage from BAL, RAKAN Updated: 12/25/24 1140     Respiratory Culture Few Pseudomonas aeruginosa     Comment: MDRO (Multiple drug resistant organism)        GRAM STAIN Moderate WBC observed      No bacteria seen    Urine Culture High Risk [[id number]]  (Abnormal) Collected: 12/23/24 1659    Order Status: Completed Specimen: Urine, Catheterized Updated: 12/25/24 1031     Urine Culture >/= 100,000 colonies/ml Candida albicans    Blood Culture [[id number]]  (Normal) Collected: 12/17/24 2004    Order Status: Completed Specimen: Blood Updated: 12/22/24 2101     Blood Culture No Growth at 5 days            Significant Labs: All pertinent  labs within the past 24 hours have been reviewed.    Significant Imaging: I have reviewed all relevant and available imaging results/findings within the past 24 hours.      Plan -- see top of note

## 2024-12-26 NOTE — CONSULTS
Patient Name: Kortney Leach   MRN: [id number]   Admission Date: 12/15/2024   Hospital Length of Stay: 11   Attending Provider: Julián Monique MD   Consulting Provider: Ana Paula DOYLE  Reason for Consult: Goals of Care  Primary Care Physician: Arnaldo Hernandez MD     Principal Problem: <principal problem not specified>     Patient information was obtained from patient, relative(s), and ER records.      Final diagnoses:  [M54.9] Back pain     Assessment/Plan:     I reviewed the patient and family's understanding of the seriousness of the illness and its expected prognosis. We discussed the patient's goals of care and treatment preferences.  I clarified current code status. I identified the surrogate decision maker or health care POA.  I answered all questions and we formulated a plan including recommendations for symptom management and how to best achieve goals of care.  Advance Care Planning     Date: 12/26/2024    [facility]  I engaged the patient and family in a voluntary conversation about advance care planning and we specifically addressed what the goals of care would be moving forward, in light of the patient's change in clinical status, specifically current condition.  We did specifically address the patient's likely prognosis, which is poor.  We explored the patient's values and preferences for future care.  The patient and family endorses that what is most important right now is to focus on improvement in condition but with limits to invasive therapies    Accordingly, we have decided that the best plan to meet the patient's goals includes continuing with treatment    Discussed case with intensivist who informed that family made decision for do not resuscitate order today.    Met with family--introduced service and discussed patient's history and current condition.  Patient's sister and niece at bedside reported that patient lives with her daughter and has health issues for a while.  Reports that  patient's daughter is currently at work.  Discussed symptom management to which patient reports that she is having some pain.  Informed that we would adjust medications for comfort which would most likely help with work of breathing.  Informed that we would contact patient's daughter as well.  Offered support and informed the palliative Medicine will continue to follow.    Spoke to patient's daughter who states that she was at hospital today and is currently working.  Suggested meeting in am to which daughter reports that she will be at hospital at 0900 am to meet with Palliative team.      Recommendations:     Morphine 2 mg Q 4 PRN      History of Present Illness:     This is an 79-year-old female with a past medical history of atrial fibrillation, hypertension, valvular heart disease status post MVR, CAD, DVT status post IVC filter, diabetes, sick sinus syndrome status post permanent pacemaker, hypertension, hyperlipidemia, ABEL, obesity, asthma/COPD who was transferred from [facility] on 12/15/2024 due to concern for cauda equina syndrome and requiring neurosurgical services.  Chest x-ray on 12/17 consistent with right-sided pneumonia versus pleural effusions and patient was placed on broad-spectrum antibiotics.  She also had reported melena in the setting of severe anemia.  EGD performed on 12/21 demonstrated oozing duodenal AVMs.  She was intubated for airway protection due to oropharyngeal oozing near vocal cords and transferred to ICU postoperatively.  She was subsequently extubated to BiPAP on 12/25.  Respiratory culture from 12/23 grew Pseudomonas MDRO.  Echocardiogram with EF of 60 65%, with evidence of moderate mitral regurg, moderate tricuspid regurg and moderate pulmonary hypertension.  Infectious Disease is following and she was started on Avycaz.  Palliative medication consulted to discuss goals of care.       Active Ambulatory Problems     Diagnosis Date Noted    Chest pain 12/23/2022     Sick sinus syndrome 09/25/2023    Severe mitral valve regurgitation 09/29/2023    Status post mitral valve replacement 10/06/2023    Type II diabetes mellitus 10/06/2023    Atrial fibrillation with RVR 10/06/2023    Physical deconditioning 10/06/2023    Hypertension 10/06/2023    History of left lower extremity DVT  10/06/2023    Hyperlipidemia 10/06/2023    Glaucoma 10/06/2023    GERD (gastroesophageal reflux disease) 10/06/2023    Hypomagnesemia 10/06/2023    Chronic constipation 10/06/2023    COPD exacerbation 10/06/2023    Asthma 10/06/2023    Obstructive sleep apnea 10/06/2023    Disposition 10/06/2023    Common variable immunodeficiency 10/08/2023    Chronic sinusitis 10/08/2023    Valvular heart disease 12/04/2024    Moderate malnutrition 12/04/2024     Resolved Ambulatory Problems     Diagnosis Date Noted    No Resolved Ambulatory Problems     Past Medical History:   Diagnosis Date    Anticoagulant long-term use     Chronic sinusitis, unspecified     COPD (chronic obstructive pulmonary disease)     CVID (common variable immunodeficiency)     Diabetes mellitus, type 2     Sleep apnea     SOB (shortness of breath)     Unspecified glaucoma     Weakness         Past Surgical History:   Procedure Laterality Date    A-V CARDIAC PACEMAKER INSERTION N/A 9/25/2023    Procedure: INSERTION, CARDIAC PACEMAKER, DUAL CHAMBER;  Surgeon: Arnaud Moon MD;  Location: [facility] CATH LAB;  Service: Cardiology;  Laterality: N/A;  [facility]    ANGIOGRAM, CORONARY, WITH LEFT HEART CATHETERIZATION N/A 11/29/2024    Procedure: Angiogram, Coronary, with Left Heart Cath;  Surgeon: Tatiana Jarrett MD;  Location: [facility] CATH LAB;  Service: Cardiology;  Laterality: N/A;    APPENDECTOMY      CATARACT EXTRACTION Bilateral     CATHETERIZATION OF BOTH LEFT AND RIGHT HEART N/A 11/27/2024    Procedure: CATHETERIZATION, HEART, BOTH LEFT AND RIGHT;  Surgeon: Sebastian Watters Jr., MD;  Location: [facility] CATH LAB;  Service: Cardiology;  Laterality: N/A;  With  Valve Study    COLONOSCOPY      EGD, WITH BALLOON DILATION      ESOPHAGOGASTRODUODENOSCOPY      ESOPHAGOGASTRODUODENOSCOPY N/A 12/21/2024    Procedure: EGD;  Surgeon: Loretta Hebert MD;  Location: [facility] OR;  Service: Gastroenterology;  Laterality: N/A;    LEFT HEART CATHETERIZATION Left 09/06/2023    Procedure: Left heart cath;  Surgeon: Vijay Uribe MD;  Location: [facility] CATH LAB;  Service: Cardiology;  Laterality: Left;  [facility]    MITRAL VALVE REPLACEMENT N/A 9/20/2023    Procedure: REPLACEMENT, MITRAL VALVE;  Surgeon: Steven Rosales IV, MD;  Location: [facility] OR;  Service: Cardiovascular;  Laterality: N/A;    TONSILLECTOMY      TOTAL KNEE ARTHROPLASTY Bilateral         Review of patient's allergies indicates:   Allergen Reactions    Adhesive tape-silicones      Redness          Current Facility-Administered Medications:     0.9%  NaCl infusion (for blood administration), , Intravenous, Q24H PRN, Loretta Hebert MD    0.9%  NaCl infusion (for blood administration), , Intravenous, Q24H PRN, Jeane Ortega, , New Bag at 12/21/24 1124    0.9%  NaCl infusion (for blood administration), , Intravenous, Q24H PRN, Judd Rivera MD    0.9%  NaCl infusion (for blood administration), , Intravenous, Q24H PRN, Julián Monique MD    acetaminophen tablet 1,000 mg, 1,000 mg, Oral, Q8H PRN, Vivi Santiago L, FNP, 1,000 mg at 12/19/24 0948    albuterol-ipratropium 2.5 mg-0.5 mg/3 mL nebulizer solution 3 mL, 3 mL, Nebulization, Q4H PRN, Vivi Santiago, FNP, 3 mL at 12/17/24 1703    amiodarone tablet 200 mg, 200 mg, Per G Tube, Daily, Julián Monique MD, 200 mg at 12/26/24 1227    atorvastatin tablet 40 mg, 40 mg, Per G Tube, QHS, Julián Monique MD, 40 mg at 12/24/24 2158    cefTAZidime-avibactam (Avycaz) 2.5 g in D5W 100 mL IVPB (MB+), 2.5 g, Intravenous, Q8H, Barbara Cantu MD, Stopped at 12/26/24 1118    clopidogreL tablet 75 mg, 75 mg, Oral, Daily, Cyrus Cruz MD, 75 mg at 12/26/24 1227    dextrose  10% bolus 125 mL 125 mL, 12.5 g, Intravenous, PRN, Rose Bateman, AGACNP-BC    dextrose 10% bolus 250 mL 250 mL, 25 g, Intravenous, PRN, Rose Bateman, AGACNP-BC    fentaNYL injection 25 mcg, 25 mcg, Intravenous, Q4H PRN, Judd Rivera MD, 25 mcg at 12/24/24 1636    glucagon (human recombinant) injection 1 mg, 1 mg, Intramuscular, PRN, Rose Bateman, AGACNP-BC    glucose chewable tablet 16 g, 16 g, Oral, PRN, Dogregt, Rose THOMPSON, AGACNP-BC    glucose chewable tablet 24 g, 24 g, Oral, PRN, Rose Bateman, AGACNP-BC    insulin aspart U-100 injection 1-10 Units, 1-10 Units, Subcutaneous, Q6H PRN, Cyrus Cruz MD, 2 Units at 12/25/24 0617    LIDOcaine (PF) 10 mg/ml (1%) injection 10 mg, 1 mL, Intradermal, Once, Vinod Torres Jr., MD    LIDOcaine 5 % patch 1 patch, 1 patch, Transdermal, Q24H, Catracho Galan MD, 1 patch at 12/21/24 1557    lipase-protease-amylase 24,000-76,000-120,000 units capsule 1 capsule, 1 capsule, Oral, TID WM, Catracho Galan MD, 1 capsule at 12/24/24 1639    melatonin tablet 6 mg, 6 mg, Oral, Nightly PRN, Nikkie Harris, YANIRA, 6 mg at 12/19/24 2102    metoprolol tartrate (LOPRESSOR) split tablet 12.5 mg, 12.5 mg, Per G Tube, BID, Julián Monique MD, 12.5 mg at 12/26/24 1227    NIFEdipine 24 hr tablet 30 mg, 30 mg, Oral, Daily, Catracho Galan MD, 30 mg at 12/20/24 0948    ondansetron disintegrating tablet 8 mg, 8 mg, Oral, Q6H PRN, Vinod Torres Jr., MD    ondansetron injection 4 mg, 4 mg, Intravenous, Q8H PRN, Rose Bateman, AGACNP-BC    oxyCODONE immediate release tablet 5 mg, 5 mg, Oral, Q6H PRN, Catracho Galan MD, 5 mg at 12/26/24 1227    oxyCODONE immediate release tablet Tab 10 mg, 10 mg, Oral, Q6H PRN, Catracho Galan MD, 10 mg at 12/20/24 2333    pantoprazole injection 40 mg, 40 mg, Intravenous, BID, Jeane Ortega DO, 40 mg at 12/26/24 0832    senna tablet 8.6 mg, 8.6 mg, Oral, Daily PRN, Catracho Galan MD     simethicone chewable tablet 80 mg, 1 tablet, Oral, TID PRN, Nikkie Harris, FNP, 80 mg at 12/20/24 0731    sodium zirconium cyclosilicate packet 10 g, 10 g, Oral, Once, Catracho Galan MD    timolol maleate 0.5% ophthalmic solution 1 drop, 1 drop, Both Eyes, BID, Catracho Galan MD, 1 drop at 12/26/24 0836    Facility-Administered Medications Ordered in Other Encounters:     0.9%  NaCl infusion, , Intravenous, On Call Procedure, Vijay Uribe MD    diazePAM tablet 10 mg, 10 mg, Oral, On Call Procedure, Vijay Uribe MD, 10 mg at 09/06/23 1021    diphenhydrAMINE capsule 50 mg, 50 mg, Oral, On Call Procedure, Vijay Uribe MD, 50 mg at 09/06/23 1021       Current Facility-Administered Medications:     0.9%  NaCl infusion (for blood administration), , Intravenous, Q24H PRN    0.9%  NaCl infusion (for blood administration), , Intravenous, Q24H PRN    0.9%  NaCl infusion (for blood administration), , Intravenous, Q24H PRN    0.9%  NaCl infusion (for blood administration), , Intravenous, Q24H PRN    acetaminophen, 1,000 mg, Oral, Q8H PRN    albuterol-ipratropium, 3 mL, Nebulization, Q4H PRN    dextrose 10%, 12.5 g, Intravenous, PRN    dextrose 10%, 25 g, Intravenous, PRN    fentaNYL, 25 mcg, Intravenous, Q4H PRN    glucagon (human recombinant), 1 mg, Intramuscular, PRN    glucose, 16 g, Oral, PRN    glucose, 24 g, Oral, PRN    insulin aspart U-100, 1-10 Units, Subcutaneous, Q6H PRN    melatonin, 6 mg, Oral, Nightly PRN    ondansetron, 8 mg, Oral, Q6H PRN    ondansetron, 4 mg, Intravenous, Q8H PRN    oxyCODONE, 5 mg, Oral, Q6H PRN    oxyCODONE, 10 mg, Oral, Q6H PRN    senna, 8.6 mg, Oral, Daily PRN    simethicone, 1 tablet, Oral, TID PRN     Family History   Problem Relation Name Age of Onset    Stroke Mother      Alzheimer's disease Mother      Lung cancer Father      Diabetes Father      Multiple sclerosis Sister      Diabetes Brother          Review of Systems         Objective:   BP (!) 160/111    "Pulse 93   Temp 97.7 °F (36.5 °C)   Resp (!) 30   Ht 5' 5.98" (1.676 m)   Wt 98.9 kg (218 lb)   SpO2 96%   Breastfeeding No   BMI 35.20 kg/m²      Physical Exam  Constitutional:       Appearance: She is ill-appearing.   HENT:      Head: Normocephalic.   Eyes:      Pupils: Pupils are equal, round, and reactive to light.   Cardiovascular:      Rate and Rhythm: Normal rate.   Pulmonary:      Comments: SOB at rest  Abdominal:      Palpations: Abdomen is soft.   Musculoskeletal:         General: Swelling present.   Skin:     Coloration: Skin is pale.   Neurological:      Mental Status: She is oriented to person, place, and time.             Review of Symptoms      Symptom Assessment (ESAS 0-10 Scale)  Pain:  0  Dyspnea:  0  Anxiety:  0  Nausea:  0  Depression:  0  Anorexia:  0  Fatigue:  0  Insomnia:  0  Restlessness:  0  Agitation:  0         Bowel Management Plan (BMP):  Yes      Performance Status:  30    Psychosocial/Cultural:   See Palliative Psychosocial Note: Yes  Patient is  with 1 daughter living and one   **Primary  to Follow**  Palliative Care  Consult: Yes    Spiritual:  F - Alexa and Belief:  Buddhism  I - Importance:  Yes  C - Community:  Yes  A - Address in Care:  Yes      Advance Care Planning   Advance Directives:   Do Not Resuscitate Status: Yes      Decision Making:  Patient answered questions and Family answered questions  Goals of Care: The patient and family endorses that what is most important right now is to focus on improvement in condition but with limits to invasive therapies    Accordingly, we have decided that the best plan to meet the patient's goals includes continuing with treatment          PAINAD: NA    Caregiver burden formerly assessed: Yes        > 50% of 60 min of encounter was spent in chart review, face to face discussion of goals of care, symptom assessment, coordination of care and emotional support.         Ana Paula Hicks, " FNP  Palliative Medicine  [facility]

## 2024-12-26 NOTE — CARE UPDATE
Care Update    Met with daughter at bedside with the nurse and respiratory therapist present.  Discussed her current situation and the patient and her daughter have determined that she would not want to be reintubated and prefer her to be DNR.  They would like to continue treatment measures which are currently in place.  She will use BiPAP as needed to relieve her dyspnea.  She and her daughter are interested in talking with palliative Care and exploring hospice.

## 2024-12-26 NOTE — PROGRESS NOTES
Pulmonary & Critical Care Medicine   Progress Note      Presenting History/HPI:    79-year-old woman with A-fib, HTN, valvular heart disease s/p MVR, CVID, CAD s/p PCI to LAD (11/29), DVT s/p IVC filter, T2DM, GERD, SSS s/p PPM, HTN, HLD, chronic sinusitis status post sinus surgery, ABEL, obesity, asthma/COPD who was initially admitted as a transfer from [place] to [facility] medicine on 12/15/24  for concern for cauda equina syndrome and neurosurgical services. RRT called on 12/17 and patient placed on BIPAP. CXR at the time consistent with R sided PNA vs. Pleural effusion and broad spectrum antibiotics were started at this time. Patient reported melena in setting of severe normocytic anemia and iron deficiency. EGD done on 12/21/24 consistent with oozing duodenal AVMs requiring coagulation. No intraoperative complications noted. Patient was intubated for air way protection in setting of oropharyngeal oozing near vocal cords and transferred to ICU postoperatively.       EGD s/p duodenal AVM coagulation- 12/21/24     Interval History:  Extubated to BiPAP yesterday on which she remains.  Precedex weaned off.  Candida noted in the urine.  MDRO Pseudomonas in the respiratory culture.  Echo with moderately reduced right ventricular systolic function and moderate mitral regurgitation.  She is awake and alert and denies dyspnea.    Scheduled Medications:    amiodarone  200 mg Per G Tube Daily    atorvastatin  40 mg Per G Tube QHS    ceFEPime IV (PEDS and ADULTS)  2 g Intravenous Q8H    LIDOcaine (PF) 10 mg/ml (1%)  1 mL Intradermal Once    LIDOcaine  1 patch Transdermal Q24H    lipase-protease-amylase 24,000-76,000-120,000 units  1 capsule Oral TID WM    metoprolol tartrate  12.5 mg Per G Tube BID    NIFEdipine  30 mg Oral Daily    pantoprazole  40 mg Intravenous BID    sodium zirconium cyclosilicate  10 g Oral Once    timolol maleate 0.5%  1 drop Both Eyes BID       PRN Medications:     Current  Facility-Administered Medications:     0.9%  NaCl infusion (for blood administration), , Intravenous, Q24H PRN    0.9%  NaCl infusion (for blood administration), , Intravenous, Q24H PRN    0.9%  NaCl infusion (for blood administration), , Intravenous, Q24H PRN    0.9%  NaCl infusion (for blood administration), , Intravenous, Q24H PRN    acetaminophen, 1,000 mg, Oral, Q8H PRN    albuterol-ipratropium, 3 mL, Nebulization, Q4H PRN    dextrose 10%, 12.5 g, Intravenous, PRN    dextrose 10%, 25 g, Intravenous, PRN    fentaNYL, 25 mcg, Intravenous, Q4H PRN    glucagon (human recombinant), 1 mg, Intramuscular, PRN    glucose, 16 g, Oral, PRN    glucose, 24 g, Oral, PRN    insulin aspart U-100, 1-10 Units, Subcutaneous, Q6H PRN    melatonin, 6 mg, Oral, Nightly PRN    ondansetron, 8 mg, Oral, Q6H PRN    ondansetron, 4 mg, Intravenous, Q8H PRN    oxyCODONE, 5 mg, Oral, Q6H PRN    oxyCODONE, 10 mg, Oral, Q6H PRN    senna, 8.6 mg, Oral, Daily PRN    simethicone, 1 tablet, Oral, TID PRN      Infusions:     electrolyte-A   Intravenous Continuous   Held at 12/21/24 1000         Fluid Balance:     Intake/Output Summary (Last 24 hours) at 12/26/2024 0641  Last data filed at 12/26/2024 0600  Gross per 24 hour   Intake --   Output 1285 ml   Net -1285 ml         Vital Signs:   Vitals:    12/26/24 0600   BP: 113/86   Pulse: 74   Resp: (!) 25   Temp:          Physical Exam  Vitals reviewed.   Constitutional:       General: She is not in acute distress.  Cardiovascular:      Rate and Rhythm: Normal rate and regular rhythm.   Pulmonary:      Comments: Breathing comfortably on BiPAP with respiratory rate of 26.  Clear to auscultation bilaterally  Abdominal:      General: Abdomen is flat.      Palpations: Abdomen is soft.   Musculoskeletal:      Right lower leg: No edema.      Left lower leg: No edema.   Neurological:      General: No focal deficit present.      Mental Status: She is alert.           Ventilator Settings  Vent Mode: A/C  (12/25/24 0810)  Ventilator Initiated: Yes (12/21/24 1044)  Set Rate: 18 BPM (12/25/24 0810)  Vt Set: 450 mL (12/25/24 0810)  Pressure Support: 10 cmH20 (12/24/24 0807)  PEEP/CPAP: 5 cmH20 (12/25/24 0810)  Oxygen Concentration (%): 28 (12/26/24 0605)  Peak Airway Pressure: 25 cmH20 (12/25/24 0810)  Total Ve: 8.5 L/m (12/25/24 0810)  F/VT Ratio<105 (RSBI): (!) 42.89 (12/25/24 0810)      Laboratory Studies:   Recent Labs   Lab 12/26/24 0552   PH 7.420   PCO2 44.0   PO2 74.0*   HCO3 28.5*     Recent Labs   Lab 12/26/24 0218   WBC 14.34*   RBC 3.07*   HGB 9.5*   HCT 30.5*      MCV 99.3*   MCH 30.9   MCHC 31.1*     Recent Labs   Lab 12/26/24 0218   GLUCOSE 161*      K 3.8      CO2 25   BUN 44.0*   CREATININE 0.82   CALCIUM 8.5   MG 2.50         Microbiology Data:   Microbiology Results (last 7 days)       Procedure Component Value Units Date/Time    Respiratory Culture [[id number]]  (Abnormal)  (Susceptibility) Collected: 12/23/24 0753    Order Status: Completed Specimen: Bronchial Alveolar Lavage from BAL, RAKAN Updated: 12/25/24 1140     Respiratory Culture Few Pseudomonas aeruginosa     Comment: MDRO (Multiple drug resistant organism)        GRAM STAIN Moderate WBC observed      No bacteria seen    Urine Culture High Risk [[id number]]  (Abnormal) Collected: 12/23/24 1659    Order Status: Completed Specimen: Urine, Catheterized Updated: 12/25/24 1031     Urine Culture >/= 100,000 colonies/ml Candida albicans    Blood Culture [[id number]]  (Normal) Collected: 12/17/24 2004    Order Status: Completed Specimen: Blood Updated: 12/22/24 2101     Blood Culture No Growth at 5 days              Imaging:   Echo    Left Ventricle: The left ventricle is normal in size. Moderately   increased wall thickness. There is normal systolic function with a   visually estimated ejection fraction of 60 - 65%. There is normal   diastolic function.    Right Ventricle: Normal right ventricular cavity size. Systolic    function is moderately reduced. TAPSE is 1.27 cm.    Mitral Valve: There is a bioprosthetic valve in the mitral position.   Mildly restricted motion. The mean pressure gradient across the mitral   valve is 12 mmHg at a heart rate of  bpm. There is moderate regurgitation.    Tricuspid Valve: There is moderate regurgitation. There is moderate   pulmonary hypertension with a RVSP of 49mmHg.    IVC/SVC: Intermediate venous pressure at 8 mmHg.    Pericardium: There is no pericardial effusion.          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 12/21/24   Diffuse Gastritis with Hx of GERD on Dexilant   Severe Normocytic Anemia 2/2 Melena and Iron Deficiency   MDRO PsA on BAL  CAD s/p LAD PCI on Plavix (held)  VHD s/p MVR on Coumadin 9/20/23 - held since 12/7 s/p Vit K on 12/20   A fib s/p SUKHI ligation 9/20/23   SSS s/p PPM 9/25/23   DM2   HTN  CVID  Hx of DVT status post Adam filter  Hx of ABEL on CPAP   Hx of Diverticulosis and colonic polyps on last colonoscopy    Hx of HICKMAN   Hx of Pancreatic Insufficiency   Hx of Glaucoma   Hx of Asthma and COPD - not in acute exacerbation       Plan:  Transition from BiPAP to Vapotherm with goal sat greater than 90%  Restart both warfarin and Plavix with daily INR checks  Appreciate cardiology and GI recommendations  Continue cefepime and consult ID for assistance in management of multidrug resistant Pseudomonas  Consult PT/OT/SLP    Monitor in the ICU today.      Cyrus Cruz MD  12/26/2024  Pulmonology/Critical Care

## 2024-12-26 NOTE — PROGRESS NOTES
"  [facility]    Cardiology  Progress Note    Patient Name: Kortney Leach  MRN: [id number]  Admission Date: 12/15/2024  Hospital Length of Stay: 11 days  Code Status: DNR   Attending Provider: Julián Monique MD   Consulting Provider: GOPI Zuleta  Primary Care Physician: Arnaldo Hernandez MD  Principal Problem:<principal problem not specified>    Patient information was obtained from patient, past medical records, and ER records.     Subjective:   Reason for Consult: Severe Mitral Stenosis - Known patient on Coumadin     HPI: Ms. Leach is an 79 year old female who is known to [facility], Dr. Uribe & Dr. Moon. She presents to the ER from rehab with complaints of lower back pain/spasms. She endorses worsening but denies CP or palps. She reports not being able to walk due to the back pain & constipation. Of note, she was recently discharged from [facility] on 12.3.24 for acute CHF exacerbation. She underwent a MARYBETH that revealed Severe MS & Mod MR and underwent a stent to the prox LAD. She was seen by CV surgery & structural heart with plans to start Coumadin to see if the gradient can be decreased. Plan is for repeat TTE in a few weeks to re-evaluate. Significant labs include WBC 13.86, Plt 447. A CXR was obtained and demonstrated increased density on the right with small right-sided pleural effusion cannot rule out infiltrate. This appears worse than on previous. He was admitted to hospital medicine with a consult to neurosurgery. [facility] has been consulted due to the patient's known severe MS.     Hospital Course:  12.17.24: NAD. Improvement in SOB. Inaccurate I&O's and daily weights. INR 4.7 today. On 3 L NC. "I am okay." SR on tele. BP stable. Had a BM.   12.18.24: Resting in bed in NAD. ON Bipap.  Lethargic.  RRT called last night for respiratory distress.  INR 6.0.  Coumadin on hold.  12.19.24: NAD More alert today on CPAP. Reports feeling better. INR 6.7. BP soft but stable. SR on tele. "   12.20.24: NAD. Denies CP or palps. SOB improving. Resting comfortably on CPAP. H&H down trending. INR 2.4. BP stable. + diarrhea  12.21.24: Seen post EGD. Underwent EGD this morning and was found to have oozing duodenal AVMs requiring coagulation. She was intubated for airway protection in the setting of oropharyngeal oozing near her vocal cords. Now in ICU. H&H down trending. WBC worsening. INR 1.6.   12.22.24: Vented/Sedated. H&H stable. BP stable off pressors. SR  12.23.24: NAD. Vented/Sedated. H&H 9.0/27.8, AST/ALT 45/1, WBC 16.95  12.24.24: NAD. Vented/Sedated. H&H 9.0/29.5, WBC 16.27, WBC 16.27. Continue Bleeding.  12.25.24: NAD Noted. Extubated Today.   12.26.24: NAD. BiPAP. PT is a DNR, wants continued care without Escalation of Care. Family at Bedside.     PMH: VHD, GEMINI, DM II, PAF, ABEL, COPD, HTN, DVT SSS, HLD, Asthma, Left Thyroid Nodule, GERD, Chronic Sinusitis, Common Variable Immunodeficiency, Glaucoma  PSH: PPM, LHC, MVR, Bilateral Cataract Extraction, Total Knee Replacement, Bilateral mastectomy, Appendectomy, Colonoscopy, EGD, Tonsillectomy,    Family History: Father - Lung Cancer; Mother - Alzheimer, CVA; Sister - HTN, Multiple Sclerosis, DM II  Social History: Former Smoker (Quit in 1998). Denies illicit drug use and alcohol use.      Previous Cardiac Diagnostics:   [facility] 11.29.24:  Eccentric Prox LAD lesion was 80% stenosed with 0% stenosis post-intervention. iFR 0.84  Prox LAD lesion: A STENT SYNERGY XD [id number] stent was successfully placed at 8 GEO for 10 sec. Proximal part of the stent was post dilated by using 3.5/12 mm NC and a 4/8 mm NC balloons at 12 atmospheres.     [facility] 11.27.24:  Findings:  - There is single vessel coronary artery disease.  - Mid Left Anterior Descending has a calcified 70% stenosis.  - LVEDP is normal at 6 mmHg.  - RHC with elevated R sided filling pressures. RA 7 mmHg, RV 50/7 mmHg, PA 47/26 mmHg (mean 33 mmHg), PCWP 24 mmHg.  - PCWP to LV diastolic mean gradient was  calculated to be 22 mmHg.  - Transpulmonary gradient is 8 mmHg.  - Normal Cardiac Output/Index at 4.7/2.3, as calculated by Rahel equation.  - PVR is 1.7 Woods units  - SVR is 1260 dyne-sec*cm^-5  - Pulmonary Artery Pulsatility Index (Nury) is 3.0  - Cardiac Power Output () is 0.84  Assessment/Plan:  - Patient is a 79 y.o. female with a history of prior bioprosthetic mitral valve replacement now presents with heart failure.  Transesophageal echocardiogram was obtained.  Final report is pending at this time, but there were findings consistent with severe bioprosthetic MVR stenosis.  Cardiac catheterization findings shown above do suggest that there is a gradient of around 22 mm Hg across the bioprosthetic valve.  -recommend consultation with CT surgery   -obtain CT with mitral valve in valve protocol for possible TMVR  -LAD should be revascularized as well     MARYBETH (11.27.24):  Left Ventricle: The left ventricle is normal in size. Septal motion is consistent with post-operative status. There is normal systolic function with a visually estimated ejection fraction of 60 - 65%.  Right Ventricle: Normal right ventricular cavity size. Systolic function is normal.  Left Atrium: Left atrium is severely dilated.  Mitral Valve: There is a bioprosthetic valve in the mitral position. There is severe stenosis. The mean pressure gradient across the mitral valve is 16 mmHg at a heart rate of  bpm. There is moderate regurgitation.     ECHO (11.23.24):  Left Ventricle: The left ventricle is normal in size. Mildly increased wall thickness. There is normal systolic function with a visually estimated ejection fraction of 60 - 65%. Grade I diastolic dysfunction. Right Ventricle: Normal right ventricular cavity size. Systolic function is borderline low. Aortic Valve: There is aortic valve sclerosis. Mitral Valve: There is a bioprosthetic valve in the mitral position (25 mm mosaic porcine valve).  There is evidence of bioprosthetic  mitral valve stenosis The mean pressure gradient across the mitral valve is 19 mmHg at a heart rate of 78 bpm. There is mild (1+) regurgitation. Tricuspid Valve: There moderate (2+) regurgitation. The estimated pulmonary artery systolic pressure is 57 mmHg. Pacemaker lead noted.  Recommend transesophageal echocardiogram to properly evaluate the bioprosthetic mitral valve.     ECHO (11.14.24):  The study quality is average. The left ventricle is normal in size. Global left ventricular systolic function is normal. The left ventricular ejection fraction is 65%. Left ventricular diastolic function is normal. Mild concentric left ventricular hypertrophy is present. The left atrium is moderately enlarged. (4.6 cms). A normal functioning prosthetic mitral valve is noted. MG = 4.3 mmHg. The pulmonary artery systolic pressure is 37 mmHg.      PPM Insertion (9.25.23):  Successful implantation of PPM Dual. [facility]      PET (1.24.23):  This is a normal perfusion study, no perfusion defects noted. There is no evidence of ischemia.This scan is suggestive of low risk for future cardiovascular events. The left ventricular cavity is noted to be normal on the stress studies. The stress left ventricular ejection fraction was calculated to be 68% and left ventricular global function is normal. The rest left ventricular cavity is noted to be normal. The rest left ventricular ejection fraction was calculated to be 63% and rest left ventricular global function is normal. When compared to the resting ejection fraction (63%), the stress ejection fraction (68%) has increased. Transient ischemic dilatation is present and has been described as a marker for high risk coronary artery disease. It has also been described in microvascular disease, hypertensive heart disease as well as cardiac deconditioning. The study quality is good.   There was a rise in myocardial blood flow between rest and stress.  Global myocardial blood flow reserve was  2.72.  Normal global coronary flow reserve is suggestive of low coronary event risk.     Carotid US (1.24.23):  The study quality is good. There is no plaque noted in the proximal right internal carotid artery. 1-39% stenosis in the proximal left internal carotid artery based on Bluth Criteria. Antegrade right vertebral artery flow. Antegrade left vertebral artery flow.     LHC (9.6.23):  Normal Coronaries     Review of patient's allergies indicates:   Allergen Reactions    Adhesive tape-silicones      Redness     Current Facility-Administered Medications on File Prior to Encounter   Medication    0.9%  NaCl infusion    diazePAM tablet 10 mg    diphenhydrAMINE capsule 50 mg     Current Outpatient Medications on File Prior to Encounter   Medication Sig    amiodarone (PACERONE) 200 MG Tab Take 200 mg by mouth once daily.    aspirin (ECOTRIN) 81 MG EC tablet Take 1 tablet (81 mg total) by mouth once daily. for 25 days    atorvastatin (LIPITOR) 40 MG tablet Take 40 mg by mouth once daily.    clopidogreL (PLAVIX) 75 mg tablet Take 1 tablet (75 mg total) by mouth once daily.    DUPIXENT SYRINGE 300 mg/2 mL Syrg Inject 300 mg into the skin every 14 (fourteen) days.    estradioL (ESTRACE) 0.01 % (0.1 mg/gram) vaginal cream Place 0.5 g vaginally twice a week.    famotidine (PEPCID) 40 MG tablet Take 40 mg by mouth every evening.    fluticasone-umeclidin-vilanter (TRELEGY ELLIPTA) 200-62.5-25 mcg inhaler Inhale 1 puff into the lungs once daily.    furosemide (LASIX) 40 MG tablet Take 40 mg by mouth.    glimepiride (AMARYL) 2 MG tablet Take 4 mg by mouth 2 (two) times a day.    HYDROcodone-acetaminophen (NORCO)  mg per tablet Take 1 tablet by mouth every 6 (six) hours as needed for Pain.    immun glob G,IgG,/pro/IgA 0-50 (HIZENTRA SUBQ) Inject into the skin every 14 (fourteen) days.    lipase-protease-amylase 24,000-76,000-120,000 units (CREON) 24,000-76,000 -120,000 unit capsule Take 1-2 capsules by mouth 3 (three)  times daily with meals. 2 caps with meals and 1 cap c snacks    metFORMIN (GLUCOPHAGE) 500 MG tablet Take 1 tablet (500 mg total) by mouth 2 (two) times daily with meals.    metoprolol succinate (TOPROL-XL) 50 MG 24 hr tablet Take 1 tablet (50 mg total) by mouth once daily.    NIFEdipine (PROCARDIA-XL) 30 MG (OSM) 24 hr tablet Take 30 mg by mouth.    pantoprazole (PROTONIX) 40 MG tablet Take 1 tablet by mouth every morning.    timoloL (BETIMOL) 0.5 % ophthalmic solution Place 1 drop into both eyes 2 (two) times daily.    warfarin (COUMADIN) 5 MG tablet Take 1 tablet (5 mg total) by mouth Daily.     Review of Systems   Unable to perform ROS: Acuity of condition     Objective:     Vital Signs (Most Recent):  Temp: 97.8 °F (36.6 °C) (12/26/24 1600)  Pulse: 70 (12/26/24 1615)  Resp: 19 (12/26/24 1615)  BP: 104/74 (12/26/24 1615)  SpO2: 99 % (12/26/24 1615) Vital Signs (24h Range):  Temp:  [97.7 °F (36.5 °C)-98.8 °F (37.1 °C)] 97.8 °F (36.6 °C)  Pulse:  [] 70  Resp:  [15-30] 19  SpO2:  [92 %-99 %] 99 %  BP: (100-160)/() 104/74   Weight: 98.9 kg (218 lb)  Body mass index is 35.2 kg/m².  SpO2: 99 %       Intake/Output Summary (Last 24 hours) at 12/26/2024 1647  Last data filed at 12/26/2024 1000  Gross per 24 hour   Intake --   Output 1020 ml   Net -1020 ml     Lines/Drains/Airways       Drain  Duration                  Rectal Tube 12/21/24 1800 fecal management system 4 days         Urethral Catheter 12/23/24 1600 3 days         NG/OG Tube 12/26/24 1140 [place] sump 16 Fr. Right nostril <1 day              Peripheral Intravenous Line  Duration                  Peripheral IV - Single Lumen 12/21/24 2200 20 G Right;Lateral;Proximal Forearm 4 days         Peripheral IV - Single Lumen 12/24/24 0815 20 G Posterior;Proximal;Right Forearm 2 days                  Significant Labs:   Chemistries:   Recent Labs   Lab 12/22/24  0352 12/23/24  0313 12/24/24  0217 12/25/24  0432 12/26/24  0218 12/26/24  0943    140 140  139 142  --    K 3.9 3.5 4.0 4.3 3.8  --     104 103 105 106  --    CO2 21* 22* 24 24 25  --    BUN 17.4 17.2 33.9* 44.8* 44.0*  --    CREATININE 0.82 0.72 0.87 0.89 0.82  --    CALCIUM 8.0* 8.3* 8.1* 7.9* 8.5  --    BILITOT 1.4 0.8 0.7 0.6 0.6  --    ALKPHOS 100 100 110 107 105  --    ALT 13 12 18 16 25  --    AST 45* 45* 48* 41* 61*  --    GLUCOSE 116* 101 130* 180* 161*  --    MG 2.00 2.20 2.30 2.30 2.50  --    PHOS 3.8 4.5 4.6 4.3 4.2  --    TROPONINI  --   --   --   --   --  0.013        CBC/Anemia Labs: Coags:    Recent Labs   Lab 12/24/24  0217 12/25/24  0432 12/26/24  0218   WBC 16.27* 14.72* 14.34*   HGB 9.0* 9.1* 9.5*   HCT 29.5* 29.2* 30.5*    195 199   .0* 98.6* 99.3*   RDW 21.1* 20.3* 20.4*    Recent Labs   Lab 12/21/24  0513 12/22/24  0908 12/26/24  0218   INR 1.6* 1.6* 1.4*        Telemetry:  SR    Physical Exam  Constitutional:       General: She is not in acute distress.     Appearance: She is obese. She is ill-appearing.   HENT:      Head: Normocephalic.      Mouth/Throat:      Mouth: Mucous membranes are dry.   Cardiovascular:      Rate and Rhythm: Normal rate and regular rhythm.      Pulses: Normal pulses.      Heart sounds: Murmur heard.   Pulmonary:      Effort: Pulmonary effort is normal. No respiratory distress.      Comments: BIPAP  Abdominal:      Palpations: Abdomen is soft.   Skin:     General: Skin is warm and dry.   Neurological:      Comments: Sedated, Does Respond with Loud Verbal Stimulation       Home Medications:   Current Facility-Administered Medications on File Prior to Encounter   Medication Dose Route Frequency Provider Last Rate Last Admin    0.9%  NaCl infusion   Intravenous On Call Procedure Vijay Uribe MD        diazePAM tablet 10 mg  10 mg Oral On Call Procedure Vijay Uribe MD   10 mg at 09/06/23 1021    diphenhydrAMINE capsule 50 mg  50 mg Oral On Call Procedure Vijay Uribe MD   50 mg at 09/06/23 1021     Current Outpatient Medications on File  Prior to Encounter   Medication Sig Dispense Refill    amiodarone (PACERONE) 200 MG Tab Take 200 mg by mouth once daily.      aspirin (ECOTRIN) 81 MG EC tablet Take 1 tablet (81 mg total) by mouth once daily. for 25 days 25 tablet 0    atorvastatin (LIPITOR) 40 MG tablet Take 40 mg by mouth once daily.      clopidogreL (PLAVIX) 75 mg tablet Take 1 tablet (75 mg total) by mouth once daily. 30 tablet 0    DUPIXENT SYRINGE 300 mg/2 mL Syrg Inject 300 mg into the skin every 14 (fourteen) days.      estradioL (ESTRACE) 0.01 % (0.1 mg/gram) vaginal cream Place 0.5 g vaginally twice a week.      famotidine (PEPCID) 40 MG tablet Take 40 mg by mouth every evening.      fluticasone-umeclidin-vilanter (TRELEGY ELLIPTA) 200-62.5-25 mcg inhaler Inhale 1 puff into the lungs once daily.      furosemide (LASIX) 40 MG tablet Take 40 mg by mouth.      glimepiride (AMARYL) 2 MG tablet Take 4 mg by mouth 2 (two) times a day.      HYDROcodone-acetaminophen (NORCO)  mg per tablet Take 1 tablet by mouth every 6 (six) hours as needed for Pain. 20 tablet 0    immun glob G,IgG,/pro/IgA 0-50 (HIZENTRA SUBQ) Inject into the skin every 14 (fourteen) days.      lipase-protease-amylase 24,000-76,000-120,000 units (CREON) 24,000-76,000 -120,000 unit capsule Take 1-2 capsules by mouth 3 (three) times daily with meals. 2 caps with meals and 1 cap c snacks      metFORMIN (GLUCOPHAGE) 500 MG tablet Take 1 tablet (500 mg total) by mouth 2 (two) times daily with meals. 180 tablet 3    metoprolol succinate (TOPROL-XL) 50 MG 24 hr tablet Take 1 tablet (50 mg total) by mouth once daily. 30 tablet 0    NIFEdipine (PROCARDIA-XL) 30 MG (OSM) 24 hr tablet Take 30 mg by mouth.      pantoprazole (PROTONIX) 40 MG tablet Take 1 tablet by mouth every morning.      timoloL (BETIMOL) 0.5 % ophthalmic solution Place 1 drop into both eyes 2 (two) times daily.      warfarin (COUMADIN) 5 MG tablet Take 1 tablet (5 mg total) by mouth Daily. 30 tablet 0     Current  Schedule Inpatient Medications:   amiodarone  200 mg Per G Tube Daily    atorvastatin  40 mg Per G Tube QHS    cefTAZidime-avibactam (Avycaz) IV (PEDS and ADULTS)  2.5 g Intravenous Q8H    clopidogreL  75 mg Oral Daily    LIDOcaine (PF) 10 mg/ml (1%)  1 mL Intradermal Once    LIDOcaine  1 patch Transdermal Q24H    lipase-protease-amylase 24,000-76,000-120,000 units  1 capsule Oral TID WM    metoprolol tartrate  12.5 mg Per G Tube BID    morphine  2 mg Intravenous Once    NIFEdipine  30 mg Oral Daily    pantoprazole  40 mg Intravenous BID    sodium zirconium cyclosilicate  10 g Oral Once    timolol maleate 0.5%  1 drop Both Eyes BID     Continuous Infusions:    Assessment:   Acute on Chronic Diastolic Heart Failure - Related to VHD/Severe MS    - EF 60-65% with G 1 Diastolic Dysfunction (11.14.24)   VHD    - ECHO (12.25.24) LVEF 60-65%; Mod MR    - MARYBETH (11.27.24) - Mitral Valve: There is a bioprosthetic valve in the mitral position. There is severe stenosis. The mean pressure gradient across the mitral valve is 16 mmHg at a heart rate of bpm. There is moderate regurgitation.     - ECHO (11.23.24): There is evidence of bioprosthetic mitral valve stenosis The mean pressure gradient across the mitral valve is 19 mmHg at a heart rate of 78 bpm.     - s/p MVR (9.20.23): Mitral valve replacement with a 25 mm mosaic porcine valve  Acute on Chronic Respiratory Failure Requiring - Now on BIPAP      - s/p RRT for Respiratory Distress and Placed on CPAP (12.17.24)  Supratherapeutic INR - Resolved     - INR 6.7 (12.19.24)   Acute GIB - Stabilized     - s/p EGD (12.21.24) - Oozing Duodenal AVM s/p Argon Plasma Coagulation  Diffuse Gastritis   CAD/Stents    - s/p LHC (11.29.24) - Eccentric Prox LAD lesion was 80% stenosed with 0% stenosis post-intervention. iFR 0.84. Prox LAD lesion: A STENT SYNERGY XD 3.0X16MM stent was successfully placed at 8 GEO for 10 sec. Proximal part of the stent was post dilated by using 3.5/12 mm NC and  a 4/8 mm NC balloons at 12 atmospheres.  Anemia - Stable    - s/p PRBC & PLT Transfusions  Leukocytosis - Improving   COPD Without Exacerbation   Pulmonary HTN  GMEINI/Bilaterally Mild  DM II  PAF - Currently SR    - [id number] Score 6 (9.7% Stroke Risk Per Year)    - s/p Ligation of SUKHI (9.20.23) - Endostapler   ABEL  HTN (BP Controlled)  DVT s/p IVC Filter     - Previously on Xarelto as OP  SSS    - s/p PPM (9.25.23): [facility]   HLD  Asthma  Left Thyroid Nodule  GERD  Chronic Sinusitis  Common Variable Immunodeficiency  Glaucoma  No Previous History of GIB  PT is a DNR     Plan:   Resume Warfarin and Plavix when OK with GI Team  BiPAP per Primary Team  Repeat ECHO Reviewed  No Plans for Escalation of Care given PTS requests and DNR Status  Will Continue to Follow     GOPI Zuleta  Cardiology  [facility]   Physician addendum:  I have seen and examined this patient as a split-shared visit with the JACQUELYN d/t complicated medical management of above problems written in assessment and high acuity requiring physician expertise in medical decision-making. I performed the substantive portion of the history and exam. Above medical decision-making is also formulated by me.    Cardiovascular exam:  S1, S2  Lungs:  fine crackles at bases.  Extremities:  + edema bilaterally    Plan:  Medications as above.  Continue supportive therapy.     Calvin Ocasio MD  Cardiologist

## 2024-12-26 NOTE — CHAPLAIN
I visited with pt and family at bedside. Sister, Shereen, told me, tearfully, that they had made decision today for hospice care. Apparently they had time to process what Palliative presented in earlier. Sister was tearful but supportive of her sister, Kortney, in choosing comfort care.

## 2024-12-27 PROBLEM — K92.1 MELENA: Status: ACTIVE | Noted: 2024-12-27

## 2024-12-27 PROBLEM — Z51.5 COMFORT MEASURES ONLY STATUS: Status: ACTIVE | Noted: 2024-12-27

## 2024-12-27 PROBLEM — Z16.30 DRUG RESISTANCE: Status: ACTIVE | Noted: 2024-12-27

## 2024-12-27 PROBLEM — A49.8 PSEUDOMONAS AERUGINOSA INFECTION: Status: ACTIVE | Noted: 2024-12-27

## 2024-12-27 PROBLEM — D72.829 LEUKOCYTOSIS: Status: ACTIVE | Noted: 2024-12-27

## 2024-12-27 LAB
ALBUMIN SERPL-MCNC: 2.2 G/DL (ref 3.4–4.8)
ALBUMIN/GLOB SERPL: 0.6 RATIO (ref 1.1–2)
ALP SERPL-CCNC: 103 UNIT/L (ref 40–150)
ALT SERPL-CCNC: 29 UNIT/L (ref 0–55)
ANION GAP SERPL CALC-SCNC: 11 MEQ/L
AST SERPL-CCNC: 54 UNIT/L (ref 5–34)
BACTERIA SPEC CULT: ABNORMAL
BASOPHILS # BLD AUTO: 0.04 X10(3)/MCL
BASOPHILS NFR BLD AUTO: 0.3 %
BILIRUB SERPL-MCNC: 0.6 MG/DL
BUN SERPL-MCNC: 40.9 MG/DL (ref 9.8–20.1)
CALCIUM SERPL-MCNC: 8.5 MG/DL (ref 8.4–10.2)
CHLORIDE SERPL-SCNC: 106 MMOL/L (ref 98–107)
CO2 SERPL-SCNC: 27 MMOL/L (ref 23–31)
CREAT SERPL-MCNC: 0.76 MG/DL (ref 0.55–1.02)
CREAT/UREA NIT SERPL: 54
EOSINOPHIL # BLD AUTO: 0.21 X10(3)/MCL (ref 0–0.9)
EOSINOPHIL NFR BLD AUTO: 1.7 %
ERYTHROCYTE [DISTWIDTH] IN BLOOD BY AUTOMATED COUNT: 20.8 % (ref 11.5–17)
GFR SERPLBLD CREATININE-BSD FMLA CKD-EPI: >60 ML/MIN/1.73/M2
GLOBULIN SER-MCNC: 3.7 GM/DL (ref 2.4–3.5)
GLUCOSE SERPL-MCNC: 173 MG/DL (ref 82–115)
GRAM STN SPEC: ABNORMAL
GRAM STN SPEC: ABNORMAL
HCT VFR BLD AUTO: 31.1 % (ref 37–47)
HGB BLD-MCNC: 9.7 G/DL (ref 12–16)
IMM GRANULOCYTES # BLD AUTO: 0.22 X10(3)/MCL (ref 0–0.04)
IMM GRANULOCYTES NFR BLD AUTO: 1.8 %
LYMPHOCYTES # BLD AUTO: 1.71 X10(3)/MCL (ref 0.6–4.6)
LYMPHOCYTES NFR BLD AUTO: 14.1 %
MAGNESIUM SERPL-MCNC: 2.4 MG/DL (ref 1.6–2.6)
MCH RBC QN AUTO: 31.1 PG (ref 27–31)
MCHC RBC AUTO-ENTMCNC: 31.2 G/DL (ref 33–36)
MCV RBC AUTO: 99.7 FL (ref 80–94)
MONOCYTES # BLD AUTO: 1.16 X10(3)/MCL (ref 0.1–1.3)
MONOCYTES NFR BLD AUTO: 9.5 %
NEUTROPHILS # BLD AUTO: 8.82 X10(3)/MCL (ref 2.1–9.2)
NEUTROPHILS NFR BLD AUTO: 72.6 %
NRBC BLD AUTO-RTO: 5.7 %
OHS QRS DURATION: 94 MS
OHS QTC CALCULATION: 462 MS
PHOSPHATE SERPL-MCNC: 3.7 MG/DL (ref 2.3–4.7)
PLATELET # BLD AUTO: 189 X10(3)/MCL (ref 130–400)
PMV BLD AUTO: 10.6 FL (ref 7.4–10.4)
POTASSIUM SERPL-SCNC: 3.5 MMOL/L (ref 3.5–5.1)
PROT SERPL-MCNC: 5.9 GM/DL (ref 5.8–7.6)
RBC # BLD AUTO: 3.12 X10(6)/MCL (ref 4.2–5.4)
SODIUM SERPL-SCNC: 144 MMOL/L (ref 136–145)
WBC # BLD AUTO: 12.16 X10(3)/MCL (ref 4.5–11.5)

## 2024-12-27 PROCEDURE — 27100171 HC OXYGEN HIGH FLOW UP TO 24 HOURS

## 2024-12-27 PROCEDURE — 84100 ASSAY OF PHOSPHORUS: CPT

## 2024-12-27 PROCEDURE — 94761 N-INVAS EAR/PLS OXIMETRY MLT: CPT

## 2024-12-27 PROCEDURE — 99233 SBSQ HOSP IP/OBS HIGH 50: CPT | Mod: ,,, | Performed by: NURSE PRACTITIONER

## 2024-12-27 PROCEDURE — 63600175 PHARM REV CODE 636 W HCPCS

## 2024-12-27 PROCEDURE — 63600175 PHARM REV CODE 636 W HCPCS: Performed by: NURSE PRACTITIONER

## 2024-12-27 PROCEDURE — 25000003 PHARM REV CODE 250: Performed by: STUDENT IN AN ORGANIZED HEALTH CARE EDUCATION/TRAINING PROGRAM

## 2024-12-27 PROCEDURE — 85025 COMPLETE CBC W/AUTO DIFF WBC: CPT

## 2024-12-27 PROCEDURE — 25000003 PHARM REV CODE 250: Performed by: INTERNAL MEDICINE

## 2024-12-27 PROCEDURE — 93005 ELECTROCARDIOGRAM TRACING: CPT

## 2024-12-27 PROCEDURE — 63600175 PHARM REV CODE 636 W HCPCS: Performed by: STUDENT IN AN ORGANIZED HEALTH CARE EDUCATION/TRAINING PROGRAM

## 2024-12-27 PROCEDURE — 20000000 HC ICU ROOM

## 2024-12-27 PROCEDURE — 99233 SBSQ HOSP IP/OBS HIGH 50: CPT | Mod: 95,,, | Performed by: HOSPITALIST

## 2024-12-27 PROCEDURE — 94660 CPAP INITIATION&MGMT: CPT

## 2024-12-27 PROCEDURE — 25000003 PHARM REV CODE 250

## 2024-12-27 PROCEDURE — 93010 ELECTROCARDIOGRAM REPORT: CPT | Mod: ,,, | Performed by: INTERNAL MEDICINE

## 2024-12-27 PROCEDURE — 63600175 PHARM REV CODE 636 W HCPCS: Mod: JG | Performed by: HOSPITALIST

## 2024-12-27 PROCEDURE — 36415 COLL VENOUS BLD VENIPUNCTURE: CPT

## 2024-12-27 PROCEDURE — 25000003 PHARM REV CODE 250: Performed by: HOSPITALIST

## 2024-12-27 PROCEDURE — 83735 ASSAY OF MAGNESIUM: CPT

## 2024-12-27 PROCEDURE — 94799 UNLISTED PULMONARY SVC/PX: CPT

## 2024-12-27 PROCEDURE — 94760 N-INVAS EAR/PLS OXIMETRY 1: CPT

## 2024-12-27 PROCEDURE — 80053 COMPREHEN METABOLIC PANEL: CPT

## 2024-12-27 PROCEDURE — 99900031 HC PATIENT EDUCATION (STAT)

## 2024-12-27 PROCEDURE — 99900035 HC TECH TIME PER 15 MIN (STAT)

## 2024-12-27 RX ORDER — GLYCOPYRROLATE 0.2 MG/ML
0.2 INJECTION INTRAMUSCULAR; INTRAVENOUS EVERY 6 HOURS PRN
Status: DISCONTINUED | OUTPATIENT
Start: 2024-12-27 | End: 2024-12-29 | Stop reason: HOSPADM

## 2024-12-27 RX ORDER — MORPHINE SULFATE 4 MG/ML
4 INJECTION, SOLUTION INTRAMUSCULAR; INTRAVENOUS
Status: DISCONTINUED | OUTPATIENT
Start: 2024-12-27 | End: 2024-12-29 | Stop reason: HOSPADM

## 2024-12-27 RX ADMIN — METOPROLOL TARTRATE 12.5 MG: 25 TABLET, FILM COATED ORAL at 08:12

## 2024-12-27 RX ADMIN — MORPHINE SULFATE 4 MG: 4 INJECTION, SOLUTION INTRAMUSCULAR; INTRAVENOUS at 09:12

## 2024-12-27 RX ADMIN — MORPHINE SULFATE 4 MG: 4 INJECTION, SOLUTION INTRAMUSCULAR; INTRAVENOUS at 12:12

## 2024-12-27 RX ADMIN — TIMOLOL MALEATE 1 DROP: 5 SOLUTION OPHTHALMIC at 08:12

## 2024-12-27 RX ADMIN — PANTOPRAZOLE SODIUM 40 MG: 40 INJECTION, POWDER, FOR SOLUTION INTRAVENOUS at 08:12

## 2024-12-27 RX ADMIN — MORPHINE SULFATE 2 MG: 4 INJECTION INTRAVENOUS at 08:12

## 2024-12-27 RX ADMIN — AMIODARONE HYDROCHLORIDE 200 MG: 200 TABLET ORAL at 08:12

## 2024-12-27 RX ADMIN — MORPHINE SULFATE 4 MG: 4 INJECTION, SOLUTION INTRAMUSCULAR; INTRAVENOUS at 02:12

## 2024-12-27 RX ADMIN — CLOPIDOGREL BISULFATE 75 MG: 75 TABLET ORAL at 08:12

## 2024-12-27 RX ADMIN — CEFTAZIDIME, AVIBACTAM 2.5 G: 2; .5 POWDER, FOR SOLUTION INTRAVENOUS at 02:12

## 2024-12-27 RX ADMIN — OXYCODONE HYDROCHLORIDE 5 MG: 5 TABLET ORAL at 08:12

## 2024-12-27 NOTE — CONSULTS
Consults    Patient Name: Kortney Leach   MRN: [id number]   Admission Date: 12/15/2024   Hospital Length of Stay: 12   Attending Provider: Julián Monique MD   Consulting Provider: Evelyn DOYLE  Reason for Consult: Goals of Care  Primary Care Physician:  Arnaldo Hernandez MD     Principal Problem: <principal problem not specified>       Final diagnoses:  [M54.9] Back pain      Assessment/Plan:     I reviewed the patient and family's understanding of the seriousness of the illness and its expected prognosis. We discussed the patient's goals of care and treatment preferences.        Advance Care Planning     Date: 12/27/2024    Today a voluntary meeting took place: other (conference room) [facility]    Patient Participation: Patient is able to participate     Attendees (Name and  Relationship to patient):  Mikael Delgado    Staff attendees (Name and  Role): KAYA Jaramillo and TAY Carnes Palliative NP    ACP Conversation (General): Understanding of advance care planning and role of health care agent defined see below     Code Status: DNR; status confirmed/order placed in chart    ACP Documents: None    Goals of care: The patient and family endorses that what is most important right now is to focus on comfort and QOL     Accordingly, we have decided that the best plan to meet the patient's goals includes enrolling in hospice care and pivot to comfort-focused care      Recommendations/  Follow-up tasks: Other (specify below) Met with daughter and had extensive conversation concerning patient's history and course of events during her hospitalization.  Daughter expressed frustration that valvular issues were not diagnosed earlier.  Daughter however states that she knows her mother is tired and would like to go home.  Daughter states that she knows patient is at the end of the road.   Discussed comfort measures and hospice at length and discuss hospice care in the home nursing home and inpatient setting.   Daughter states that she would consider taking patient home but would need to her 24 hour sitters.  Daughter informed that she is waiting for a relative from the  to arrive on the  30th.  Discussed that patient's prognosis is likely days to a week and informed that we would contact case management to speak to the [facility] concerning  family member.  States she would like to speak with hospice if [place] to discuss either inpatient services or home services.  Informed that we would update medical team and send order for referral.  Offered support and informed the palliative Medicine will continue to follow.               Recommendations:     Hospice consult  Comfort measures in place      Interval History:     Patient awake but somewhat confused and lethargic. Pulled NG out and asking to go home.  Palliative team meeting with daughter this morning to discuss plan.      Active Ambulatory Problems     Diagnosis Date Noted    Chest pain 12/23/2022    Sick sinus syndrome 09/25/2023    Severe mitral valve regurgitation 09/29/2023    Status post mitral valve replacement 10/06/2023    Type II diabetes mellitus 10/06/2023    Atrial fibrillation with RVR 10/06/2023    Physical deconditioning 10/06/2023    Hypertension 10/06/2023    History of left lower extremity DVT  10/06/2023    Hyperlipidemia 10/06/2023    Glaucoma 10/06/2023    GERD (gastroesophageal reflux disease) 10/06/2023    Hypomagnesemia 10/06/2023    Chronic constipation 10/06/2023    COPD exacerbation 10/06/2023    Asthma 10/06/2023    Obstructive sleep apnea 10/06/2023    Disposition 10/06/2023    Common variable immunodeficiency 10/08/2023    Chronic sinusitis 10/08/2023    Valvular heart disease 12/04/2024    Moderate malnutrition 12/04/2024     Resolved Ambulatory Problems     Diagnosis Date Noted    No Resolved Ambulatory Problems     Past Medical History:   Diagnosis Date    Anticoagulant long-term use     Chronic sinusitis, unspecified      COPD (chronic obstructive pulmonary disease)     CVID (common variable immunodeficiency)     Diabetes mellitus, type 2     Sleep apnea     SOB (shortness of breath)     Unspecified glaucoma     Weakness         Past Surgical History:   Procedure Laterality Date    A-V CARDIAC PACEMAKER INSERTION N/A 9/25/2023    Procedure: INSERTION, CARDIAC PACEMAKER, DUAL CHAMBER;  Surgeon: Arnaud Moon MD;  Location: [facility] CATH LAB;  Service: Cardiology;  Laterality: N/A;  SJM    ANGIOGRAM, CORONARY, WITH LEFT HEART CATHETERIZATION N/A 11/29/2024    Procedure: Angiogram, Coronary, with Left Heart Cath;  Surgeon: Tatiana Jarrett MD;  Location: [facility] CATH LAB;  Service: Cardiology;  Laterality: N/A;    APPENDECTOMY      CATARACT EXTRACTION Bilateral     CATHETERIZATION OF BOTH LEFT AND RIGHT HEART N/A 11/27/2024    Procedure: CATHETERIZATION, HEART, BOTH LEFT AND RIGHT;  Surgeon: Sebastian Watters Jr., MD;  Location: [facility] CATH LAB;  Service: Cardiology;  Laterality: N/A;  With Valve Study    COLONOSCOPY      EGD, WITH BALLOON DILATION      ESOPHAGOGASTRODUODENOSCOPY      ESOPHAGOGASTRODUODENOSCOPY N/A 12/21/2024    Procedure: EGD;  Surgeon: Loretta Hebert MD;  Location: [facility];  Service: Gastroenterology;  Laterality: N/A;    LEFT HEART CATHETERIZATION Left 09/06/2023    Procedure: Left heart cath;  Surgeon: Vijay Uribe MD;  Location: [facility] CATH LAB;  Service: Cardiology;  Laterality: Left;  [facility]    MITRAL VALVE REPLACEMENT N/A 9/20/2023    Procedure: REPLACEMENT, MITRAL VALVE;  Surgeon: Steven Rosales IV, MD;  Location: [facility];  Service: Cardiovascular;  Laterality: N/A;    TONSILLECTOMY      TOTAL KNEE ARTHROPLASTY Bilateral         Review of patient's allergies indicates:   Allergen Reactions    Adhesive tape-silicones      Redness          Current Facility-Administered Medications:     0.9%  NaCl infusion (for blood administration), , Intravenous, Q24H PRN, Judd Rivera MD    0.9%  NaCl infusion (for blood  administration), , Intravenous, Q24H PRN, Julián Monique MD    acetaminophen tablet 1,000 mg, 1,000 mg, Oral, Q8H PRN, Vivi Santiago, FNP, 1,000 mg at 12/19/24 0948    albuterol-ipratropium 2.5 mg-0.5 mg/3 mL nebulizer solution 3 mL, 3 mL, Nebulization, Q4H PRN, Vivi Santiago, FNP, 3 mL at 12/17/24 1703    amiodarone tablet 200 mg, 200 mg, Per G Tube, Daily, Julián Monique MD, 200 mg at 12/27/24 0816    atorvastatin tablet 40 mg, 40 mg, Per G Tube, QHS, Julián Monique MD, 40 mg at 12/26/24 2018    cefTAZidime-avibactam (Avycaz) 2.5 g in D5W 100 mL IVPB (MB+), 2.5 g, Intravenous, Q8H, Barbara Cantu MD, Stopped at 12/27/24 0406    clopidogreL tablet 75 mg, 75 mg, Oral, Daily, Cyrus Cruz MD, 75 mg at 12/27/24 0816    dextrose 10% bolus 125 mL 125 mL, 12.5 g, Intravenous, PRN, Rose Bateman, AGACNP-BC    dextrose 10% bolus 250 mL 250 mL, 25 g, Intravenous, PRN, Rose Bateman, AGACNP-BC    fentaNYL injection 25 mcg, 25 mcg, Intravenous, Q4H PRN, Judd Rivera MD, 25 mcg at 12/24/24 1636    glucagon (human recombinant) injection 1 mg, 1 mg, Intramuscular, PRN, Rose Bateman, AGACNP-BC    glucose chewable tablet 16 g, 16 g, Oral, PRN, Rose Bateman, AGACNP-BC    glucose chewable tablet 24 g, 24 g, Oral, PRN, Rose Bateman B, AGACNP-BC    insulin aspart U-100 injection 1-10 Units, 1-10 Units, Subcutaneous, Q6H PRN, Cyrus Cruz MD, 2 Units at 12/26/24 1717    LIDOcaine (PF) 10 mg/ml (1%) injection 10 mg, 1 mL, Intradermal, Once, Vinod Torres Jr., MD    LIDOcaine 5 % patch 1 patch, 1 patch, Transdermal, Q24H, Catracho Galan MD, 1 patch at 12/21/24 1557    lipase-protease-amylase 24,000-76,000-120,000 units capsule 1 capsule, 1 capsule, Oral, TID WM, Catracho Galan MD, 1 capsule at 12/24/24 1639    melatonin tablet 6 mg, 6 mg, Oral, Nightly PRN, Nikkie Harris, FNP, 6 mg at 12/19/24 2102    metoprolol tartrate (LOPRESSOR) split tablet 12.5  mg, 12.5 mg, Per G Tube, BID, Julián Monique MD, 12.5 mg at 12/27/24 0816    morphine injection 2 mg, 2 mg, Intravenous, Q4H PRN, Ana Paula Carnes FNYO, 2 mg at 12/27/24 0826    NIFEdipine 24 hr tablet 30 mg, 30 mg, Oral, Daily, Catracho Galan MD, 30 mg at 12/20/24 0948    ondansetron disintegrating tablet 8 mg, 8 mg, Oral, Q6H PRN, Vinod Torres Jr., MD    ondansetron injection 4 mg, 4 mg, Intravenous, Q8H PRN, Rose Bateman, [facility]    oxyCODONE immediate release tablet 5 mg, 5 mg, Oral, Q6H PRN, Catracho Galan MD, 5 mg at 12/27/24 0848    oxyCODONE immediate release tablet Tab 10 mg, 10 mg, Oral, Q6H PRN, Catracho Galan MD, 10 mg at 12/20/24 2333    pantoprazole injection 40 mg, 40 mg, Intravenous, BID, Jeane Ortega DO, 40 mg at 12/27/24 0816    senna tablet 8.6 mg, 8.6 mg, Oral, Daily PRN, Catracho Galan MD    simethicone chewable tablet 80 mg, 1 tablet, Oral, TID PRN, Nikkie Harris FNP, 80 mg at 12/20/24 0731    sodium zirconium cyclosilicate packet 10 g, 10 g, Oral, Once, Catracho Galan MD    timolol maleate 0.5% ophthalmic solution 1 drop, 1 drop, Both Eyes, BID, Catracho Galan MD, 1 drop at 12/27/24 0816    Facility-Administered Medications Ordered in Other Encounters:     0.9%  NaCl infusion, , Intravenous, On Call Procedure, Vijay Uribe MD    diazePAM tablet 10 mg, 10 mg, Oral, On Call Procedure, Vijay Uribe MD, 10 mg at 09/06/23 1021    diphenhydrAMINE capsule 50 mg, 50 mg, Oral, On Call Procedure, Vijay Uirbe MD, 50 mg at 09/06/23 1021       Current Facility-Administered Medications:     0.9%  NaCl infusion (for blood administration), , Intravenous, Q24H PRN    0.9%  NaCl infusion (for blood administration), , Intravenous, Q24H PRN    acetaminophen, 1,000 mg, Oral, Q8H PRN    albuterol-ipratropium, 3 mL, Nebulization, Q4H PRN    dextrose 10%, 12.5 g, Intravenous, PRN    dextrose 10%, 25 g, Intravenous, PRN    fentaNYL, 25  "mcg, Intravenous, Q4H PRN    glucagon (human recombinant), 1 mg, Intramuscular, PRN    glucose, 16 g, Oral, PRN    glucose, 24 g, Oral, PRN    insulin aspart U-100, 1-10 Units, Subcutaneous, Q6H PRN    melatonin, 6 mg, Oral, Nightly PRN    morphine, 2 mg, Intravenous, Q4H PRN    ondansetron, 8 mg, Oral, Q6H PRN    ondansetron, 4 mg, Intravenous, Q8H PRN    oxyCODONE, 5 mg, Oral, Q6H PRN    oxyCODONE, 10 mg, Oral, Q6H PRN    senna, 8.6 mg, Oral, Daily PRN    simethicone, 1 tablet, Oral, TID PRN     Family History   Problem Relation Name Age of Onset    Stroke Mother      Alzheimer's disease Mother      Lung cancer Father      Diabetes Father      Multiple sclerosis Sister      Diabetes Brother            Review of Systems   Unable to perform ROS: Acuity of condition            Objective:   /72   Pulse 66   Temp 97.4 °F (36.3 °C)   Resp (!) 22   Ht 5' 5.98" (1.676 m)   Wt 98.9 kg (218 lb)   SpO2 95%   Breastfeeding No   BMI 35.20 kg/m²      Physical Exam   Constitutional: She appears ill.   HENT:   Head: Normocephalic.   Eyes: Pupils are equal, round, and reactive to light.   Cardiovascular: An irregular rhythm present. Pulmonary:      Comments: SOB at rest    Abdominal: Soft.   Skin: There is pallor.            Review of Symptoms      Symptom Assessment (ESAS 0-10 Scale)  Pain:  0  Dyspnea:  0  Anxiety:  0  Nausea:  0  Depression:  0  Anorexia:  0  Fatigue:  0  Insomnia:  0  Restlessness:  0  Agitation:  0         Bowel Management Plan (BMP):  Yes      Psychosocial/Cultural:   See Palliative Psychosocial Note: Yes  **Primary  to Follow**  Palliative Care  Consult: No      Advance Care Planning   Advance Care Planning        PAINAD: NA    Caregiver burden formerly assessed: Yes      No results displayed because visit has over 200 results.               > 50% of 45 min of encounter was spent in chart review, face to face discussion of goals of care, symptom assessment, " coordination of care and emotional support.    Evelyn Jaramillo FNP, [facility]  Palliative Medicine  [facility]

## 2024-12-27 NOTE — PROGRESS NOTES
Inpatient Nutrition Assessment    Admit Date: 12/15/2024   Total duration of encounter: 12 days   Patient Age: 79 y.o.    Nutrition Recommendation/Prescription     No nutrition interventions needed at this time if comfort care to continue. If aggressive care restarted, consider diet advancement vs NG placement for TF. Consult RD if TF to restart.     Communication of Recommendations: reviewed with nurse    Nutrition Assessment     Malnutrition Assessment/Nutrition-Focused Physical Exam       Malnutrition Level: other (see comments) (Does not meet criteria) (12/23/24 1312)  Energy Intake (Malnutrition): other (see comments) (Unable to assess) (12/23/24 1312)  Weight Loss (Malnutrition): other (see comments) (Unable to assess) (12/23/24 1312)              Muscle Mass (Malnutrition): mild depletion (12/23/24 1312)  [place] (Muscle Loss): mild depletion  Clavicle Bone Region (Muscle Loss): well nourished  Clavicle and [place] Bone Region (Muscle Loss): mild depletion                 Fluid Accumulation (Malnutrition): other (see comments) (Not present) (12/23/24 1312)        A minimum of two characteristics is recommended for diagnosis of either severe or non-severe malnutrition.    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  Diffuse Gastritis  Severe Normocytic Anemia 2/2 Melena and Iron Deficiency   R sided pleural effusion vs PNA     Relevant Medical History: A-fib, HTN, valvular heart disease s/p MVR, CVID, CAD s/p PCI to LAD (11/29), DVT s/p IVC filter, DM, GERD, SSS s/p PPM, HTN, HLD, chronic sinusitis status post sinus surgery, ABEL, obesity, asthma/COPD    Scheduled Medications:  amiodarone, 200 mg,  Daily  atorvastatin, 40 mg, QHS  cefTAZidime-avibactam (Avycaz) IV (PEDS and ADULTS), 2.5 g, Q8H  clopidogreL, 75 mg, Daily  LIDOcaine (PF) 10 mg/ml (1%), 1 mL, Once  LIDOcaine, 1 patch, Q24H  lipase-protease-amylase 24,000-76,000-120,000 units, 1 capsule, TID WM  metoprolol tartrate, 12.5 mg, BID  NIFEdipine, 30 mg, Daily  pantoprazole, 40 mg, BID  sodium zirconium cyclosilicate, 10 g, Once  timolol maleate 0.5%, 1 drop, BID    Continuous Infusions:     PRN Medications:  0.9%  NaCl infusion (for blood administration), , Q24H PRN  0.9%  NaCl infusion (for blood administration), , Q24H PRN  acetaminophen, 1,000 mg, Q8H PRN  albuterol-ipratropium, 3 mL, Q4H PRN  dextrose 10%, 12.5 g, PRN  dextrose 10%, 25 g, PRN  fentaNYL, 25 mcg, Q4H PRN  glucagon (human recombinant), 1 mg, PRN  glucose, 16 g, PRN  glucose, 24 g, PRN  glycopyrrolate, 0.2 mg, Q6H PRN  insulin aspart U-100, 1-10 Units, Q6H PRN  lorazepam, 1 mg, Q4H PRN  melatonin, 6 mg, Nightly PRN  morphine, 4 mg, Q1H PRN  ondansetron, 8 mg, Q6H PRN  ondansetron, 4 mg, Q8H PRN  oxyCODONE, 10 mg, Q6H PRN  senna, 8.6 mg, Daily PRN  simethicone, 1 tablet, TID PRN    Calorie Containing IV Medications: no significant kcals from medications at this time    Recent Labs   Lab 12/21/24  1157 12/21/24  1937 12/22/24  0352 12/22/24  0630 12/22/24  1738 12/22/24  2348 12/23/24  0313 12/24/24  0217 12/25/24  0432 12/26/24  0218 12/27/24  0318     --  141  --   --   --  140 140 139 142 144   K 3.3*  --  3.9  --   --   --  3.5 4.0 4.3 3.8 3.5   CALCIUM 8.7  --  8.0*  --   --   --  8.3* 8.1* 7.9* 8.5 8.5   PHOS  --   --  3.8  --   --   --  4.5 4.6 4.3 4.2 3.7   MG 2.10  --  2.00  --   --   --  2.20 2.30 2.30 2.50 2.40     --  104  --   --   --  104 103 105 106 106   CO2 26  --  21*  --   --   --  22* 24 24 25 27   BUN 19.8  --  17.4  --   --   --  17.2 33.9* 44.8* 44.0* 40.9*   CREATININE 0.82  --  0.82  --   --   --  0.72 0.87 0.89 0.82 0.76   EGFRNORACEVR >60  --  >60   "--   --   --  >60 >60 >60 >60 >60   GLUCOSE 126*  --  116*  --   --   --  101 130* 180* 161* 173*   BILITOT 1.0  --  1.4  --   --   --  0.8 0.7 0.6 0.6 0.6   ALKPHOS 95  --  100  --   --   --  100 110 107 105 103   ALT 12  --  13  --   --   --  12 18 16 25 29   AST 35*  --  45*  --   --   --  45* 48* 41* 61* 54*   ALBUMIN 2.7*  --  2.7*  --   --   --  2.4* 2.5* 2.3* 2.3* 2.2*   CRP  --   --   --   --   --   --   --   --   --  173.10*  --    WBC 19.74  19.74*  --  17.18*  --   --   --  16.95* 16.27* 14.72* 14.34* 12.16*   HGB 7.0*   < > 9.0*   < > 9.6* 8.7* 9.0* 9.0* 9.1* 9.5* 9.7*   HCT 23.2*   < > 27.8*   < > 29.9* 27.6* 27.8* 29.5* 29.2* 30.5* 31.1*    < > = values in this interval not displayed.     Nutrition Orders:  Diet NPO  Tube Feedings/Formulas 25; 500; Peptamen Intense VHP; OG; 100; Every 2 hours,Tube Feedings/Formulas Other (see comments); OG; [place],Tube Feedings/Formulas Other (see comments); OG; Pro[employer] TF20    Appetite/Oral Intake: not applicable/not applicable  Factors Affecting Nutritional Intake:  comfort care  Social Needs Impacting Access to Food: unable to assess at this time; will attempt on follow-up  Food/[place]/Cultural Preferences: unable to obtain  Food Allergies: no known food allergies  Last Bowel Movement: 12/27/24  Wound(s):     Wound 12/21/24 1030 Pressure Injury posterior Sacral spine-Tissue loss description: Not applicable     Comments    12/23/24: Discussed with RN. Will provide tube feeding recommendations for when appropriate to start tube feeding. Receiving kcal from meds.  Noted MST indicates wt loss, EMR wts indicate wt gain vs wt stable.     12/27/24: Pt now extubated, NG pulled. Plans for hospice care. No further nutrition interventions needed at this time.     Anthropometrics    Height: 5' 5.98" (167.6 cm), Height Method: Stated  Last Weight: 98.9 kg (218 lb) (12/16/24 0800), Weight Method: Bed Scale  BMI (Calculated): 35.2  BMI Classification: obese grade II " (BMI 35-39.9)     Ideal Body Weight (IBW), Female: 129.9 lb     % Ideal Body Weight, Female (lb): 167.82 %                             Usual Weight Provided By: unable to obtain usual weight    Wt Readings from Last 5 Encounters:   12/16/24 98.9 kg (218 lb)   12/14/24 99 kg (218 lb 4.1 oz)   11/25/24 96.4 kg (212 lb 8 oz)   11/22/24 99.8 kg (220 lb)   09/06/24 99.8 kg (220 lb)     Weight Change(s) Since Admission:   Wt Readings from Last 1 Encounters:   12/16/24 0800 98.9 kg (218 lb)   12/16/24 0757 98.9 kg (218 lb)   Admit Weight: 98.9 kg (218 lb) (12/16/24 0757), Weight Method: Bed Scale    Estimated Needs    Weight Used For Calorie Calculations: 98 kg (216 lb 0.8 oz)  Energy Calorie Requirements (kcal): 1078-1372kcal (11-14kcal/kg)  Energy Need Method: Kcal/kg  Weight Used For Protein Calculations: 59 kg (130 lb 1.1 oz) (IBW)  Protein Requirements: 118gm (2g/kg IBW)  Fluid Requirements (mL): 1960ml (20ml/kg)  CHO Requirement: 155gm (45% est kcal needs)     Enteral Nutrition     Patient not receiving enteral nutrition at this time.    Parenteral Nutrition     Patient not receiving parenteral nutrition support at this time.    Evaluation of Received Nutrient Intake    Calories: not meeting estimated needs  Protein: not meeting estimated needs    Patient Education     Not applicable.    Nutrition Diagnosis     PES: Inadequate oral intake related to acute illness as evidenced by NPO post extubation since admit. (active)     PES:            Nutrition Interventions     Intervention(s): collaboration with other providers    Goal: Meet greater than 80% of nutritional needs by follow-up. (goal discontinued)  Goal: Tolerate enteral feeding at goal rate by follow-up. (goal discontinued)    Nutrition Goals & Monitoring     Dietitian will monitor:  progression of care  Discharge planning:  comfort measures  Nutrition Risk/Follow-Up: low (follow-up in 5-7 days)   Please consult if re-assessment needed sooner.

## 2024-12-27 NOTE — PROGRESS NOTES
Infectious Disease  Patient Name Kortney Leach  79 y.o. female.  MRN: [id number]   Length of stay: 12  Chief Complaint: No chief complaint on file.    Tele-Infectious Diseases Consultation   Audio-visual consultation  Patient Location: [facility]  Time: 55 mins       I am evaluating patient for infection. Consent obtained, including potential limitations and risks of virtual health.  Physical exam limited by the virtual care delivered. I will be using an audio and video telecommunication application via secured cellular connection  located at  [facility] to evaluate the patient. A Nurse is present in the room with the patient to assist with the evaluation.     Interval history:   12/26 - afebrile. Start avycaz, additional micro testing in process   12/27 - afebrile. Transition to hospice care. ID will sign off.   Assessment and Plan:   1) Sepsis  - not present on admission   - fever, leukocytosis, hypotension  - BCx 12/17 - ngtd  - CXR - bilateral opacities   - EGD on 12/21 duodenal AVMs requiring coagulation   - currently on avycaz,      2) Pneumonia  - hospital acquired  - required vent, BIPAP, during hospital course   - s/p bronchoscopy 12/23   - Resp Cx BAL 12/23 - Pseudomonas aeruginosa (R-levo, pip/tazo; I-cefepime, ceftaz, cipro, andrew; S-avycaz, zerbaxa))  - extubated 12/25   - currently on avycaz,      3) Candiduria  - in setting of pavon placed 12/17  and exchanged on 12/23   - UA 12/23 turbid, WBC 51-99, RBC 21-50, 500 LE   - UCx 12/23 - Candida albicans  - exchange of pavon was treatment      4) GI bleed  - melena  - secondary to oozing duodenal AVMs, diffuse gastritis  - s/p EGD on 12/21 with treatment argon plasma coagulation      5) Leukocytosis  - in setting of acute infection   - overall trending down   - will monitor      6) Immununosuppressed host  - CVID  - on Hizentra sub q every 2 weeks, last on   - check levels        Discussed with GABRIEL SOTO  MD Alondra, MPH  [facility]     Thank you for this consultation. I will follow up with the patient. Please contact via Epic secure chat with any questions.      HPI:   Patient is Kortney Leach a 79 y.o. female admitted on 12/15 as transfer from [facility] for NSG evaluation for dx of cauda equina syndrome.She initially presented to OSH with lower back pain, difficulty ambulating due the pain. No stool/urinary incontinence. NSG consulted and no gross motor deficits present, recommended no acute surgical intervention. Within 48 hrs an RRT was called on 12/17 due to respiratory distress and patient required BIPAP. Imaging consistent with pneumonia vs pleural effusions, she was started on empiric antimicrobials. During the hospitalization she had melena and GI was consulted and an EGD performed no 12/21 that showed evidence of duodenal AVMs with oozing that required treatment with argon plasma coagulation. Due to oozing near vocal cords she was intubated for airway protection and admitted to ICU post procedure. 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 Resp cx with XDRO Pseudomonas spp. Patient has known afib s/l SUKHI ligation 9/2023, SSS with PPM 9/2023, hyperlipidemia, hypertension, s/p b-MVR 2023, CVID, CAD s.p PCI 11/29/24, , VTE s/p IVCF, DMII, GERD, asthma/COPD, ABEL and obesity, s/p TKR, bilataral mastectomy, appendectomy, tonsillectomy, former smoker. Infectious diseases consulted for evaluation and management.        Past Medical History:   Diagnosis Date    Anticoagulant long-term use     Asthma     Chronic sinusitis, unspecified     COPD (chronic obstructive pulmonary disease)     CVID (common variable immunodeficiency)     Diabetes mellitus, type 2     GERD (gastroesophageal reflux disease)     Hypertension     Severe mitral valve regurgitation     Sleep apnea      uses CPAP    SOB (shortness of breath)     Unspecified glaucoma     Weakness      Past Surgical History:   Procedure Laterality Date    A-V CARDIAC PACEMAKER INSERTION N/A 9/25/2023    Procedure: INSERTION, CARDIAC PACEMAKER, DUAL CHAMBER;  Surgeon: Arnaud Moon MD;  Location: [facility] CATH LAB;  Service: Cardiology;  Laterality: N/A;  SJM    ANGIOGRAM, CORONARY, WITH LEFT HEART CATHETERIZATION N/A 11/29/2024    Procedure: Angiogram, Coronary, with Left Heart Cath;  Surgeon: Tatiana Jarrett MD;  Location: [facility] CATH LAB;  Service: Cardiology;  Laterality: N/A;    APPENDECTOMY      CATARACT EXTRACTION Bilateral     CATHETERIZATION OF BOTH LEFT AND RIGHT HEART N/A 11/27/2024    Procedure: CATHETERIZATION, HEART, BOTH LEFT AND RIGHT;  Surgeon: Sebastian Watters Jr., MD;  Location: [facility] CATH LAB;  Service: Cardiology;  Laterality: N/A;  With Valve Study    COLONOSCOPY      EGD, WITH BALLOON DILATION      ESOPHAGOGASTRODUODENOSCOPY      ESOPHAGOGASTRODUODENOSCOPY N/A 12/21/2024    Procedure: EGD;  Surgeon: Loretta Hebert MD;  Location: [facility] OR;  Service: Gastroenterology;  Laterality: N/A;    LEFT HEART CATHETERIZATION Left 09/06/2023    Procedure: Left heart cath;  Surgeon: Vijay Uribe MD;  Location: [facility] CATH LAB;  Service: Cardiology;  Laterality: Left;  [facility]    MITRAL VALVE REPLACEMENT N/A 9/20/2023    Procedure: REPLACEMENT, MITRAL VALVE;  Surgeon: Steven Rosales IV, MD;  Location: [facility] OR;  Service: Cardiovascular;  Laterality: N/A;    TONSILLECTOMY      TOTAL KNEE ARTHROPLASTY Bilateral      Review of patient's allergies indicates:   Allergen Reactions    Adhesive tape-silicones      Redness     Current Outpatient Medications   Medication Instructions    amiodarone (PACERONE) 200 mg, Daily    aspirin (ECOTRIN) 81 mg, Oral, Daily    atorvastatin (LIPITOR) 40 mg, Daily    clopidogreL (PLAVIX) 75 mg, Oral, Daily    DUPIXENT SYRINGE 300 mg, Every 14 days    estradioL (ESTRACE) 0.5 g, Twice weekly    famotidine  (PEPCID) 40 mg, Nightly    fluticasone-umeclidin-vilanter (TRELEGY ELLIPTA) 200-62.5-25 mcg inhaler 1 puff, Daily    furosemide (LASIX) 40 mg    glimepiride (AMARYL) 4 mg, 2 times daily    HYDROcodone-acetaminophen (NORCO)  mg per tablet 1 tablet, Oral, Every 6 hours PRN    immun glob G,IgG,/pro/IgA 0-50 (HIZENTRA SUBQ) Every 14 days    lipase-protease-amylase 24,000-76,000-120,000 units (CREON) 24,000-76,000 -120,000 unit capsule 1-2 capsules, 3 times daily with meals    metFORMIN (GLUCOPHAGE) 500 mg, Oral, 2 times daily with meals    metoprolol succinate (TOPROL-XL) 50 mg, Oral, Daily    NIFEdipine (PROCARDIA-XL) 30 mg    pantoprazole (PROTONIX) 40 MG tablet 1 tablet, Every morning    timoloL (BETIMOL) 0.5 % ophthalmic solution 1 drop, Both Eyes, 2 times daily    warfarin (COUMADIN) 5 mg, Oral, Daily       Current Facility-Administered Medications:     0.9%  NaCl infusion (for blood administration), , Intravenous, Q24H PRN, Judd Rivera MD    0.9%  NaCl infusion (for blood administration), , Intravenous, Q24H PRN, Julián Monique MD    acetaminophen tablet 1,000 mg, 1,000 mg, Oral, Q8H PRN, Vivi Santiago, FNP, 1,000 mg at 12/19/24 0948    albuterol-ipratropium 2.5 mg-0.5 mg/3 mL nebulizer solution 3 mL, 3 mL, Nebulization, Q4H PRN, Vivi Santiago, FNP, 3 mL at 12/17/24 1703    amiodarone tablet 200 mg, 200 mg, Per G Tube, Daily, Julián Monique MD, 200 mg at 12/27/24 0816    atorvastatin tablet 40 mg, 40 mg, Per G Tube, QHS, Julián Monique MD, 40 mg at 12/26/24 2018    cefTAZidime-avibactam (Avycaz) 2.5 g in D5W 100 mL IVPB (MB+), 2.5 g, Intravenous, Q8H, Barbara Cantu MD, Stopped at 12/27/24 0406    clopidogreL tablet 75 mg, 75 mg, Oral, Daily, Cyrus Cruz MD, 75 mg at 12/27/24 0816    dextrose 10% bolus 125 mL 125 mL, 12.5 g, Intravenous, PRN, Rose Bateman, AGACNP-BC    dextrose 10% bolus 250 mL 250 mL, 25 g, Intravenous, PRN, Rose Bateman, AGACNP-BC     fentaNYL injection 25 mcg, 25 mcg, Intravenous, Q4H PRN, Judd Rivera MD, 25 mcg at 12/24/24 1636    glucagon (human recombinant) injection 1 mg, 1 mg, Intramuscular, PRN, Rose Bateman AGACNP-BC    glucose chewable tablet 16 g, 16 g, Oral, PRN, Rose Bateman, AGACNP-BC    glucose chewable tablet 24 g, 24 g, Oral, PRN, Rose Bateman, AGACNP-BC    insulin aspart U-100 injection 1-10 Units, 1-10 Units, Subcutaneous, Q6H PRN, Cyrus Cruz MD, 2 Units at 12/26/24 1717    LIDOcaine (PF) 10 mg/ml (1%) injection 10 mg, 1 mL, Intradermal, Once, Vinod Torres Jr., MD    LIDOcaine 5 % patch 1 patch, 1 patch, Transdermal, Q24H, Catracho Galan MD, 1 patch at 12/21/24 1557    lipase-protease-amylase 24,000-76,000-120,000 units capsule 1 capsule, 1 capsule, Oral, TID WM, Catracho Galan MD, 1 capsule at 12/24/24 1639    melatonin tablet 6 mg, 6 mg, Oral, Nightly PRN, Nikkie Harris, FNP, 6 mg at 12/19/24 2102    metoprolol tartrate (LOPRESSOR) split tablet 12.5 mg, 12.5 mg, Per G Tube, BID, Julián Monique MD, 12.5 mg at 12/27/24 0816    morphine injection 2 mg, 2 mg, Intravenous, Q4H PRN, Ana Paula Carnes FNP, 2 mg at 12/27/24 0826    NIFEdipine 24 hr tablet 30 mg, 30 mg, Oral, Daily, Catracho Galan MD, 30 mg at 12/20/24 0948    ondansetron disintegrating tablet 8 mg, 8 mg, Oral, Q6H PRN, Vinod Torres Jr., MD    ondansetron injection 4 mg, 4 mg, Intravenous, Q8H PRN, Rose Bateman AGACNP-BC    oxyCODONE immediate release tablet 5 mg, 5 mg, Oral, Q6H PRN, Catracho Galan MD, 5 mg at 12/27/24 0848    oxyCODONE immediate release tablet Tab 10 mg, 10 mg, Oral, Q6H PRN, Catracho Galan MD, 10 mg at 12/20/24 2333    pantoprazole injection 40 mg, 40 mg, Intravenous, BID, Jeane Ortega DO, 40 mg at 12/27/24 0816    senna tablet 8.6 mg, 8.6 mg, Oral, Daily PRN, Catracho Galan MD    simethicone chewable tablet 80 mg, 1 tablet, Oral, TID PRN,  Nikkie Harris, FNP, 80 mg at 12/20/24 0731    sodium zirconium cyclosilicate packet 10 g, 10 g, Oral, Once, Catracho Galan MD    timolol maleate 0.5% ophthalmic solution 1 drop, 1 drop, Both Eyes, BID, Catracho Galan MD, 1 drop at 12/27/24 0816    Facility-Administered Medications Ordered in Other Encounters:     0.9%  NaCl infusion, , Intravenous, On Call Procedure, Vijay Uribe MD    diazePAM tablet 10 mg, 10 mg, Oral, On Call Procedure, Vijay Uribe MD, 10 mg at 09/06/23 1021    diphenhydrAMINE capsule 50 mg, 50 mg, Oral, On Call Procedure, Vijay Uribe MD, 50 mg at 09/06/23 1021  Review of Systems    Objective:   Temp:  [97.4 °F (36.3 °C)-98.9 °F (37.2 °C)] 97.4 °F (36.3 °C)  Pulse:  [] 66  Resp:  [13-30] 22  SpO2:  [94 %-99 %] 95 %  BP: (104-160)/() 137/72     Physical Exam    Estimated Creatinine Clearance: 70 mL/min (based on SCr of 0.76 mg/dL).  Recent Labs   Lab 12/26/24  0218 12/27/24  0318   WBC 14.34* 12.16*    189     Microbiology Results (last 7 days)       Procedure Component Value Units Date/Time    Respiratory Culture [[id number]]  (Abnormal)  (Susceptibility) Collected: 12/23/24 0753    Order Status: Completed Specimen: Bronchial Alveolar Lavage from BAL, RAKAN Updated: 12/27/24 0704     Respiratory Culture Few Pseudomonas aeruginosa     Comment: MDRO (Multiple drug resistant organism)        GRAM STAIN Moderate WBC observed      No bacteria seen    Urine Culture High Risk [[id number]]  (Abnormal)  (Susceptibility) Collected: 12/23/24 1659    Order Status: Completed Specimen: Urine, Catheterized Updated: 12/26/24 1107     Urine Culture >/= 100,000 colonies/ml Candida albicans    Blood Culture [[id number]]  (Normal) Collected: 12/17/24 2004    Order Status: Completed Specimen: Blood Updated: 12/22/24 2101     Blood Culture No Growth at 5 days            Significant Labs: All pertinent labs within the past 24 hours have been reviewed.    Significant Imaging:  I have reviewed all relevant and available imaging results/findings within the past 24 hours.      Plan -- see top of note

## 2024-12-27 NOTE — PROGRESS NOTES
Pulmonary & Critical Care Medicine   Progress Note      Presenting History/HPI:    79-year-old woman with A-fib, HTN, valvular heart disease s/p MVR, CVID, CAD s/p PCI to LAD (11/29), DVT s/p IVC filter, T2DM, GERD, SSS s/p PPM, HTN, HLD, chronic sinusitis status post sinus surgery, ABLE, obesity, asthma/COPD who was initially admitted as a transfer from [place] to [facility] medicine on 12/15/24  for concern for cauda equina syndrome and neurosurgical services. RRT called on 12/17 and patient placed on BIPAP. CXR at the time consistent with R sided PNA vs. Pleural effusion and broad spectrum antibiotics were started at this time. Patient reported melena in setting of severe normocytic anemia and iron deficiency. EGD done on 12/21/24 consistent with oozing duodenal AVMs requiring coagulation. No intraoperative complications noted. Patient was intubated for air way protection in setting of oropharyngeal oozing near vocal cords and transferred to ICU postoperatively.       EGD s/p duodenal AVM coagulation- 12/21/24     Interval History:  I have extensively reviewed this patient's hospital stay, as being seen by me for the first time this a.m..  She was extubated to BiPAP 12/25/24, remains on BiPAP mostly, periods on Vapotherm nasal cannula with fatigue requiring placement back on BiPAP.  She is a DNR status.  Cough productive of moderate amounts of tan green secretions.  BAL 12/23/2024 with few Pseudomonas aeruginosa, currently on Avycaz.  She remains sinus rhythm, no vasopressor requirements.  No clinical evidence of active bleeding, now back on Coumadin and Plavix, both initiated 12/26/2024.  She is receiving low rate enteral NG feedings, appears to be tolerating well.  Very small nonbloody bowel movement yesterday.    Scheduled Medications:    amiodarone  200 mg Per G Tube Daily    atorvastatin  40 mg Per G Tube QHS    cefTAZidime-avibactam (Avycaz) IV (PEDS and ADULTS)  2.5 g Intravenous Q8H     clopidogreL  75 mg Oral Daily    LIDOcaine (PF) 10 mg/ml (1%)  1 mL Intradermal Once    LIDOcaine  1 patch Transdermal Q24H    lipase-protease-amylase 24,000-76,000-120,000 units  1 capsule Oral TID WM    metoprolol tartrate  12.5 mg Per G Tube BID    NIFEdipine  30 mg Oral Daily    pantoprazole  40 mg Intravenous BID    sodium zirconium cyclosilicate  10 g Oral Once    timolol maleate 0.5%  1 drop Both Eyes BID       PRN Medications:     Current Facility-Administered Medications:     0.9%  NaCl infusion (for blood administration), , Intravenous, Q24H PRN    0.9%  NaCl infusion (for blood administration), , Intravenous, Q24H PRN    acetaminophen, 1,000 mg, Oral, Q8H PRN    albuterol-ipratropium, 3 mL, Nebulization, Q4H PRN    dextrose 10%, 12.5 g, Intravenous, PRN    dextrose 10%, 25 g, Intravenous, PRN    fentaNYL, 25 mcg, Intravenous, Q4H PRN    glucagon (human recombinant), 1 mg, Intramuscular, PRN    glucose, 16 g, Oral, PRN    glucose, 24 g, Oral, PRN    insulin aspart U-100, 1-10 Units, Subcutaneous, Q6H PRN    melatonin, 6 mg, Oral, Nightly PRN    morphine, 2 mg, Intravenous, Q4H PRN    ondansetron, 8 mg, Oral, Q6H PRN    ondansetron, 4 mg, Intravenous, Q8H PRN    oxyCODONE, 5 mg, Oral, Q6H PRN    oxyCODONE, 10 mg, Oral, Q6H PRN    senna, 8.6 mg, Oral, Daily PRN    simethicone, 1 tablet, Oral, TID PRN      Infusions:            Fluid Balance:     Intake/Output Summary (Last 24 hours) at 12/27/2024 0647  Last data filed at 12/27/2024 0600  Gross per 24 hour   Intake 295 ml   Output 1460 ml   Net -1165 ml         Vital Signs:   Vitals:    12/27/24 0600   BP: (!) 115/50   Pulse: 66   Resp: 19   Temp:          Physical Exam  Vitals reviewed.   Constitutional:       Comments: Awake and alert on noninvasive BiPAP with mild abdominal respiratory effort.   Cardiovascular:      Rate and Rhythm: Normal rate and regular rhythm.   Pulmonary:      Breath sounds: Rhonchi (Few bilateral scattered) present.      Comments:  Good air movement bilateral  Abdominal:      General: Abdomen is flat. Bowel sounds are normal. There is no distension.      Palpations: Abdomen is soft.      Comments: Obese   Musculoskeletal:      Right lower leg: No edema.      Left lower leg: No edema.   Neurological:      General: No focal deficit present.      Mental Status: She is alert.           Ventilator Settings  Vent Mode: A/C (12/25/24 0810)  Ventilator Initiated: Yes (12/21/24 1044)  Set Rate: 18 BPM (12/25/24 0810)  Vt Set: 450 mL (12/25/24 0810)  Pressure Support: 10 cmH20 (12/24/24 0807)  PEEP/CPAP: 5 cmH20 (12/25/24 0810)  Oxygen Concentration (%): 30 (12/27/24 0446)  Peak Airway Pressure: 25 cmH20 (12/25/24 0810)  Total Ve: 8.5 L/m (12/25/24 0810)  F/VT Ratio<105 (RSBI): (!) 42.89 (12/25/24 0810)      Laboratory Studies:   Recent Labs   Lab 12/26/24  0840   PH 7.400   PCO2 43.0   PO2 88.0   HCO3 26.6*     Recent Labs   Lab 12/27/24 0318   WBC 12.16*   RBC 3.12*   HGB 9.7*   HCT 31.1*      MCV 99.7*   MCH 31.1*   MCHC 31.2*     Recent Labs   Lab 12/27/24  0318   GLUCOSE 173*      K 3.5      CO2 27   BUN 40.9*   CREATININE 0.76   CALCIUM 8.5   MG 2.40         Microbiology Data:   Microbiology Results (last 7 days)       Procedure Component Value Units Date/Time    Urine Culture High Risk [[id number]]  (Abnormal)  (Susceptibility) Collected: 12/23/24 1659    Order Status: Completed Specimen: Urine, Catheterized Updated: 12/26/24 1107     Urine Culture >/= 100,000 colonies/ml Candida albicans    Respiratory Culture [[id number]]  (Abnormal)  (Susceptibility) Collected: 12/23/24 0753    Order Status: Resulted Specimen: Bronchial Alveolar Lavage from BAL, RAKAN Updated: 12/25/24 1140     Respiratory Culture Few Pseudomonas aeruginosa     Comment: MDRO (Multiple drug resistant organism)        GRAM STAIN Moderate WBC observed      No bacteria seen    Blood Culture [[id number]]  (Normal) Collected: 12/17/24 2004    Order Status:  Completed Specimen: Blood Updated: 12/22/24 2101     Blood Culture No Growth at 5 days              Imaging:   XR Gastric tube check, non-radiologist performed  Narrative: EXAMINATION:  XR GASTRIC TUBE CHECK, NON-RADIOLOGIST PERFORMED    CLINICAL HISTORY:  Tube Placement;    COMPARISON:  22 December 2024  Impression: Frontal image of the upper abdomen.  Enteric tube extends into the stomach.  Proximal side hole probably just past the GE junction.    Electronically signed by: Jason Andrews  Date:    12/26/2024  Time:    12:19  X-Ray Chest 1 View  Narrative: EXAMINATION:  XR CHEST 1 VIEW    CPT 11760    CLINICAL HISTORY:  Hypoxia;    COMPARISON:  December 21, 2024    FINDINGS:  Examination reveals cardiomediastinal silhouette to be unchanged as compared with the previous exam.    Increase interstitial and pulmonary vascular markings indicating some degree of pulmonary vascular congestion and cardiac decompensation.    Changes suggested of bilateral pleural effusions more so on the right than on the left.    Increased left retrocardiac density and silhouetting of the left hemidiaphragm although these might be related to pleural fluid it may also represent an infiltrate/atelectasis  Impression: Changes of pulmonary vascular congestion and cardiac decompensation.    Pleural effusions.    Increased left retrocardiac density and silhouetting of the left hemidiaphragm.    Interval removal of endotracheal tube    Electronically signed by: Reynaldo Garber  Date:    12/26/2024  Time:    09:36      Chest x-ray (12/27/2024, my reading of images):  Underpenetrated film, somewhat limited.  Extensive bilateral pulmonary vascular markings, reticular attenuations, and ground-glass attenuation with basilar predominance.  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 Diffuse Gastritis with Hx of GERD on Dexilant.  No further evidence of active  GI bleeding.  Plavix/Coumadin re-initiated 12/26/2024.  Severe Normocytic Anemia 2/2 above and Iron Deficiency   Extensive bilateral pulmonary infiltrates with acute respiratory failure.  MDRO PsA on BAL - on Avycaz (initiated 12/26/2024)  CAD s/p LAD PCI on Plavix; VHD s/p MVR on Coumadin 9/20/23 - held since 12/7 s/p Vit K on 12/20   A fib s/p SUKHI ligation 9/20/23   SSS s/p PPM 9/25/23   DM2   HTN  CVID  Hx of DVT status post Adam filter  Hx of ABEL on CPAP   Hx of HICKMAN   Hx of Pancreatic Insufficiency   Hx of Glaucoma   Hx of Asthma and COPD - not in acute exacerbation       Plan:  Continue BiPAP during sleep and as needed during the day, alternating with high-flow Vapotherm nasal cannula as tolerated.  Continue Avycaz (day 2).  Infectious Disease service following  Continue BiPAP during sleep and as needed during the day, alternating with periods on high-flow Vapotherm nasal cannula.    35 minutes of critical care was time spent personally by me on the following activities: development of treatment plan with patient or surrogate and bedside caregivers, discussions with consultants, evaluation of patient's response to treatment, examination of patient, ordering and performing treatments and interventions, ordering and review of laboratory studies, ordering and review of radiographic studies, pulse oximetry, re-evaluation of patient's condition.  This patient demonstrates a high probability for further clinical decompensation due to ongoing critical illness.  Critical care time did not overlap with that of any other provider or involve time for any procedures.      Itzel Ruiz MD, [facility]  12/27/2024  Pulmonology/Critical Care

## 2024-12-27 NOTE — PHYSICIAN QUERY
Please clarify if there is any clinical correlation between Oozing duodenal AVM  and Coumadin. Are the conditions:  Clinically undetermined

## 2024-12-27 NOTE — PLAN OF CARE
Family meeting with EDITH at 1330. [facility] may not have a bed until tomorrow. Spoke to daughter about [facility]. Daughter wants to wait for [facility].

## 2024-12-27 NOTE — PLAN OF CARE
Problem: Adult Inpatient Plan of Care  Goal: Plan of Care Review  Outcome: Progressing  Goal: Patient-Specific Goal (Individualized)  Outcome: Progressing  Goal: Absence of Hospital-Acquired Illness or Injury  Outcome: Progressing  Goal: Optimal Comfort and Wellbeing  Outcome: Progressing  Goal: Readiness for Transition of Care  Outcome: Progressing     Problem: Diabetes Comorbidity  Goal: Blood Glucose Level Within Targeted Range  Outcome: Progressing     Problem: Skin Injury Risk Increased  Goal: Skin Health and Integrity  Outcome: Progressing     Problem: Infection  Goal: Absence of Infection Signs and Symptoms  Outcome: Progressing     Problem: Wound  Goal: Optimal Coping  Outcome: Progressing  Goal: Optimal Functional Ability  Outcome: Progressing  Goal: Absence of Infection Signs and Symptoms  Outcome: Progressing  Goal: Improved Oral Intake  Outcome: Progressing  Goal: Optimal Pain Control and Function  Outcome: Progressing  Goal: Skin Health and Integrity  Outcome: Progressing  Goal: Optimal Wound Healing  Outcome: Progressing     Problem: Fall Injury Risk  Goal: Absence of Fall and Fall-Related Injury  Outcome: Progressing     Problem: Mechanical Ventilation Invasive  Goal: Effective Communication  Outcome: Progressing  Goal: Optimal Device Function  Outcome: Progressing  Goal: Mechanical Ventilation Liberation  Outcome: Progressing  Goal: Optimal Nutrition Delivery  Outcome: Progressing  Goal: Absence of Device-Related Skin and Tissue Injury  Outcome: Progressing  Goal: Absence of Ventilator-Induced Lung Injury  Outcome: Progressing     Problem: Artificial Airway  Goal: Effective Communication  Outcome: Progressing  Goal: Optimal Device Function  Outcome: Progressing  Goal: Absence of Device-Related Skin or Tissue Injury  Outcome: Progressing     Problem: Coping Ineffective  Goal: Effective Coping  Outcome: Progressing     Problem: Palliative Care  Goal: Enhanced Quality of Life  Outcome: Progressing

## 2024-12-27 NOTE — PLAN OF CARE
Order for hospice noted. List of providers given to daughter. FOC obtained for [facility]. Referral sent via [employer]. Notified Eli.

## 2024-12-28 VITALS
HEIGHT: 66 IN | SYSTOLIC BLOOD PRESSURE: 125 MMHG | HEART RATE: 93 BPM | RESPIRATION RATE: 20 BRPM | OXYGEN SATURATION: 99 % | WEIGHT: 218 LBS | BODY MASS INDEX: 35.03 KG/M2 | TEMPERATURE: 98 F | DIASTOLIC BLOOD PRESSURE: 51 MMHG

## 2024-12-28 PROCEDURE — 99900031 HC PATIENT EDUCATION (STAT)

## 2024-12-28 PROCEDURE — 27100171 HC OXYGEN HIGH FLOW UP TO 24 HOURS

## 2024-12-28 PROCEDURE — 94799 UNLISTED PULMONARY SVC/PX: CPT

## 2024-12-28 PROCEDURE — 94760 N-INVAS EAR/PLS OXIMETRY 1: CPT

## 2024-12-28 PROCEDURE — 27000249 HC VAPOTHERM CIRCUIT

## 2024-12-28 PROCEDURE — 63600175 PHARM REV CODE 636 W HCPCS: Performed by: NURSE PRACTITIONER

## 2024-12-28 PROCEDURE — 99900035 HC TECH TIME PER 15 MIN (STAT)

## 2024-12-28 RX ORDER — HYDROMORPHONE HYDROCHLORIDE 2 MG/ML
0.5 INJECTION, SOLUTION INTRAMUSCULAR; INTRAVENOUS; SUBCUTANEOUS
Status: DISCONTINUED | OUTPATIENT
Start: 2024-12-28 | End: 2024-12-28

## 2024-12-28 RX ORDER — HYDROMORPHONE HYDROCHLORIDE 2 MG/ML
0.5 INJECTION, SOLUTION INTRAMUSCULAR; INTRAVENOUS; SUBCUTANEOUS
Status: DISCONTINUED | OUTPATIENT
Start: 2024-12-28 | End: 2024-12-29 | Stop reason: HOSPADM

## 2024-12-28 RX ADMIN — LORAZEPAM 1 MG: 2 INJECTION INTRAMUSCULAR; INTRAVENOUS at 08:12

## 2024-12-28 RX ADMIN — LORAZEPAM 1 MG: 2 INJECTION INTRAMUSCULAR; INTRAVENOUS at 02:12

## 2024-12-28 RX ADMIN — MORPHINE SULFATE 4 MG: 4 INJECTION, SOLUTION INTRAMUSCULAR; INTRAVENOUS at 02:12

## 2024-12-28 RX ADMIN — MORPHINE SULFATE 4 MG: 4 INJECTION, SOLUTION INTRAMUSCULAR; INTRAVENOUS at 05:12

## 2024-12-28 RX ADMIN — LORAZEPAM 1 MG: 2 INJECTION INTRAMUSCULAR; INTRAVENOUS at 07:12

## 2024-12-28 RX ADMIN — MORPHINE SULFATE 4 MG: 4 INJECTION, SOLUTION INTRAMUSCULAR; INTRAVENOUS at 09:12

## 2024-12-28 RX ADMIN — MORPHINE SULFATE 4 MG: 4 INJECTION, SOLUTION INTRAMUSCULAR; INTRAVENOUS at 10:12

## 2024-12-28 NOTE — NURSING
Called Monika to get an update on ETA. I was told that it would be a minimum of two hours. Family updated at bedside

## 2024-12-28 NOTE — PROGRESS NOTES
Pulmonary & Critical Care Medicine   Progress Note      Presenting History/HPI:    79-year-old woman with A-fib, HTN, valvular heart disease s/p MVR, CVID, CAD s/p PCI to LAD (11/29), DVT s/p IVC filter, T2DM, GERD, SSS s/p PPM, HTN, HLD, chronic sinusitis status post sinus surgery, ABEL, obesity, asthma/COPD who was initially admitted as a transfer from [place] to [facility] medicine on 12/15/24  for concern for cauda equina syndrome and neurosurgical services. RRT called on 12/17 and patient placed on BIPAP. CXR at the time consistent with R sided PNA vs. Pleural effusion and broad spectrum antibiotics were started at this time. Patient reported melena in setting of severe normocytic anemia and iron deficiency. EGD done on 12/21/24 consistent with oozing duodenal AVMs requiring coagulation. No intraoperative complications noted. Patient was intubated for air way protection in setting of oropharyngeal oozing near vocal cords and transferred to ICU postoperatively.     Significant Events:  EGD s/p duodenal AVM coagulation- 12/21/24   She was extubated to BiPAP 12/25/24, remains on BiPAP mostly, periods on Vapotherm nasal cannula with fatigue requiring placement back on BiPAP.  She is a DNR status.  Cough productive of moderate amounts of tan green secretions.  BAL 12/23/2024 with few Pseudomonas aeruginosa, currently on Avycaz.  She remains sinus rhythm, no vasopressor requirements.  No clinical evidence of active bleeding, now back on Coumadin and Plavix, both initiated 12/26/2024.  She is receiving low rate enteral NG feedings, appears to be tolerating well.  Very small nonbloody bowel movement yesterday.    Interval History:  Patient transitioned to comfort measures yesterday and transitioned to hospice care. Potential transfer to [facility] today.     Scheduled Medications:         PRN Medications:     Current Facility-Administered Medications:     acetaminophen, 1,000 mg, Oral, Q8H PRN     "fentaNYL, 25 mcg, Intravenous, Q4H PRN    glycopyrrolate, 0.2 mg, Intravenous, Q6H PRN    lorazepam, 1 mg, Intravenous, Q4H PRN    morphine, 4 mg, Intravenous, Q1H PRN    ondansetron, 8 mg, Oral, Q6H PRN    ondansetron, 4 mg, Intravenous, Q8H PRN    oxyCODONE, 10 mg, Oral, Q6H PRN      Infusions:            Fluid Balance:     Intake/Output Summary (Last 24 hours) at 12/28/2024 0954  Last data filed at 12/28/2024 0552  Gross per 24 hour   Intake 529.98 ml   Output 950 ml   Net -420.02 ml         Vital Signs:   Vitals:    12/28/24 0900   BP:    Pulse: 85   Resp: 16   Temp:          Physical Exam  Vitals reviewed.   Constitutional:       Comments: Awake and alert on noninvasive BiPAP with mild abdominal respiratory effort.   Cardiovascular:      Rate and Rhythm: Normal rate and regular rhythm.   Pulmonary:      Breath sounds: Rhonchi (Few bilateral scattered) present.      Comments: Good air movement bilateral  Abdominal:      General: Abdomen is flat. Bowel sounds are normal. There is no distension.      Palpations: Abdomen is soft.      Comments: Obese   Musculoskeletal:      Right lower leg: No edema.      Left lower leg: No edema.   Neurological:      General: No focal deficit present.      Mental Status: She is alert.           Ventilator Settings  Vent Mode: A/C (12/25/24 0810)  Ventilator Initiated: Yes (12/21/24 1044)  Set Rate: 18 BPM (12/25/24 0810)  Vt Set: 450 mL (12/25/24 0810)  Pressure Support: 10 cmH20 (12/24/24 0807)  PEEP/CPAP: 5 cmH20 (12/25/24 0810)  Oxygen Concentration (%): 50 (12/28/24 0800)  Peak Airway Pressure: 25 cmH20 (12/25/24 0810)  Total Ve: 8.5 L/m (12/25/24 0810)  F/VT Ratio<105 (RSBI): (!) 42.89 (12/25/24 0810)      Laboratory Studies:   No results for input(s): "PH", "PCO2", "PO2", "HCO3", "POCSATURATED", "BE" in the last 24 hours.    No results for input(s): "WBC", "RBC", "HGB", "HCT", "PLT", "MCV", "MCH", "MCHC" in the last 24 hours.    No results for input(s): "GLUCOSE", "NA", "K", " ""CL", "CO2", "BUN", "CREATININE", "CALCIUM", "MG" in the last 24 hours.        Microbiology Data:   Microbiology Results (last 7 days)       Procedure Component Value Units Date/Time    Respiratory Culture [[id number]]  (Abnormal)  (Susceptibility) Collected: 12/23/24 0753    Order Status: Completed Specimen: Bronchial Alveolar Lavage from BAL, RAKAN Updated: 12/27/24 0704     Respiratory Culture Few Pseudomonas aeruginosa     Comment: MDRO (Multiple drug resistant organism)        GRAM STAIN Moderate WBC observed      No bacteria seen    Urine Culture High Risk [[id number]]  (Abnormal)  (Susceptibility) Collected: 12/23/24 1659    Order Status: Completed Specimen: Urine, Catheterized Updated: 12/26/24 1107     Urine Culture >/= 100,000 colonies/ml Candida albicans    Blood Culture [[id number]]  (Normal) Collected: 12/17/24 2004    Order Status: Completed Specimen: Blood Updated: 12/22/24 2101     Blood Culture No Growth at 5 days              Imaging:   X-Ray Chest 1 View  Narrative: EXAMINATION:  XR CHEST 1 VIEW    CPT 91925    CLINICAL HISTORY:  acute respiratory failure;    COMPARISON:  December 26, 2024    FINDINGS:  Examination reveals cardiomediastinal silhouette improved parenchymal changes to be essentially unchanged as compared with the previous exam.    There has been interval insertion of a nasogastric tube  Impression: No significant change as compared with the previous exam    Electronically signed by: Reynaldo Garber  Date:    12/27/2024  Time:    07:17      Chest x-ray (12/27/2024, my reading of images):  Underpenetrated film, somewhat limited.  Extensive bilateral pulmonary vascular markings, reticular attenuations, and ground-glass attenuation with basilar predominance.  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 Diffuse Gastritis with Hx of GERD on Dexilant.  No further evidence of active GI bleeding.  " Plavix/Coumadin re-initiated 12/26/2024.  Severe Normocytic Anemia 2/2 above and Iron Deficiency   Extensive bilateral pulmonary infiltrates with acute respiratory failure.  MDRO PsA on BAL - on Avycaz (initiated 12/26/2024)  CAD s/p LAD PCI on Plavix; VHD s/p MVR on Coumadin 9/20/23 - held since 12/7 s/p Vit K on 12/20   A fib s/p SUKHI ligation 9/20/23   SSS s/p PPM 9/25/23   DM2   HTN  CVID  Hx of DVT status post Adam filter  Hx of ABEL on CPAP   Hx of HICKMAN   Hx of Pancreatic Insufficiency   Hx of Glaucoma   Hx of Asthma and COPD - not in acute exacerbation       Plan:  Continue current management and supportive care until patient transitioned to inpatient hospice facility   Comfort measures in place.     35 minutes of critical care was time spent personally by me on the following activities: development of treatment plan with patient or surrogate and bedside caregivers, discussions with consultants, evaluation of patient's response to treatment, examination of patient, ordering and performing treatments and interventions, ordering and review of laboratory studies, ordering and review of radiographic studies, pulse oximetry, re-evaluation of patient's condition.  This patient demonstrates a high probability for further clinical decompensation due to ongoing critical illness.  Critical care time did not overlap with that of any other provider or involve time for any procedures.      Henok Booth,   12/28/2024  Pulmonology/Critical Care

## 2024-12-28 NOTE — PLAN OF CARE
12/28/24 1344   Final Note   Assessment Type Final Discharge Note   Anticipated Discharge Disposition [facility]   Post-Acute Status   Post-Acute Authorization Hospice   Hospice Status Set-up Complete/Auth obtained   Discharge Delays None known at this time     Patient is discharged today to [facility]. Placed patient in will call with [facility] for transport. Notified nurse to call report and send DNR order, AVS, and face sheet for transport. Sent discharge information to EDITH via [employer]

## 2024-12-28 NOTE — DISCHARGE SUMMARY
[facility] Internal Medicine Discharge Summary    Admitting Physician: Laya Galan MD  Attending Physician: Julián Monique MD  Date of Admit: 12/15/2024  Date of Discharge: 12/28/2024    Condition: Serious  Outcome:  hospice  DISPOSITION: Hospice/Medical Facility    Discharge Diagnoses     Patient Active Problem List   Diagnosis    Chest pain    Sick sinus syndrome    Severe mitral valve regurgitation    Status post mitral valve replacement    Type II diabetes mellitus    Atrial fibrillation with RVR    Physical deconditioning    Hypertension    History of left lower extremity DVT     Hyperlipidemia    Glaucoma    GERD (gastroesophageal reflux disease)    Hypomagnesemia    Chronic constipation    COPD exacerbation    Asthma    Obstructive sleep apnea    Disposition    Common variable immunodeficiency    Chronic sinusitis    Valvular heart disease    Moderate malnutrition    Comfort measures only status    Pseudomonas aeruginosa infection    Drug resistance    Leukocytosis    Melena       Principal Problem:  Pseudomonas aeruginosa infection    Consultants and Procedures     Consultants:  Consults (From admission, onward)          Status Ordering Provider     Inpatient consult to Social Work/Case Management  Once        Provider:  (Not yet assigned)    Acknowledged EVELYN JARAMILLO     Inpatient consult to Palliative Care  Once        Provider:  Evelyn Jaramillo FNP    Completed SILVIA DOWNING     Inpatient consult to Infectious Diseases  Once        Provider:  Barbara Cantu MD    Completed SILVIA DOWNING     Inpatient consult to Gastroenterology  Once        Provider:  Glenn Marino MD    Completed BRENDA MCDONALD     Inpatient consult to Interventional Radiology  Once        Provider:  Silvia Jack MD    Completed LAYA GALAN     Inpatient consult to Cardiology  Once        Provider:  Nettie Bello MD    Completed LAYA GALAN     Inpatient consult to  Neurosurgery  Once        Provider:  Scott Patel MD    Completed GERSON SOUSA             Procedures:   Procedure(s) (LRB):  EGD (N/A)     Brief History of Present Illness      79-year-old woman with A-fib, HTN, valvular heart disease s/p MVR, CVID, CAD s/p PCI to LAD (11/29), DVT s/p IVC filter, T2DM, GERD, SSS s/p PPM, HTN, HLD, chronic sinusitis status post sinus surgery, ABEL, obesity, asthma/COPD who was initially admitted as a transfer from [place] to [facility] medicine on 12/15/24  for concern for cauda equina syndrome and neurosurgical services. RRT called on 12/17 and patient placed on BIPAP. CXR at the time consistent with R sided PNA vs. Pleural effusion and broad spectrum antibiotics were started at this time. Patient reported melena in setting of severe normocytic anemia and iron deficiency. EGD done on 12/21/24 consistent with oozing duodenal AVMs requiring coagulation. No intraoperative complications noted. Patient was intubated for air way protection in setting of oropharyngeal oozing near vocal cords and transferred to ICU postoperatively.     Hospital Course with Pertinent Findings     EGD s/p duodenal AVM coagulation- 12/21/24   She was extubated to BiPAP 12/25/24, remains on BiPAP mostly, periods on Vapotherm nasal cannula with fatigue requiring placement back on BiPAP.  She is a DNR status.  Cough productive of moderate amounts of tan green secretions.  BAL 12/23/2024 with few Pseudomonas aeruginosa, currently on Avycaz.  She remains sinus rhythm, no vasopressor requirements.  No clinical evidence of active bleeding, now back on Coumadin and Plavix, both initiated 12/26/2024.  She is receiving low rate enteral NG feedings, appears to be tolerating well.  Very small nonbloody bowel movement yesterday.  Patient discharged to hospice facility.     Lab Results   Component Value Date     12/27/2024    K 3.5 12/27/2024     12/27/2024    CO2 27 12/27/2024     (H)  06/13/2006    BUN 40.9 (H) 12/27/2024    CREATININE 0.76 12/27/2024    CALCIUM 8.5 12/27/2024    PROT 6.8 06/13/2006    ALKPHOS 103 12/27/2024    AST 54 (H) 12/27/2024    ALT 29 12/27/2024    MG 2.40 12/27/2024    PHOS 3.7 12/27/2024        Lab Results   Component Value Date    WBC 12.16 (H) 12/27/2024    RBC 3.12 (L) 12/27/2024    HGB 9.7 (L) 12/27/2024    HCT 31.1 (L) 12/27/2024    MCV 99.7 (H) 12/27/2024    MCH 31.1 (H) 12/27/2024    MCHC 31.2 (L) 12/27/2024    RDW 20.8 (H) 12/27/2024     12/27/2024    MPV 10.6 (H) 12/27/2024    GRAN 3.2 06/13/2006    GRAN 51.7 06/13/2006    LYMPH 39.5 06/13/2006    LYMPH 2.4 06/13/2006    MONO 7.0 06/13/2006    MONO 0.4 06/13/2006    EOS 0.1 06/13/2006    BASO 0.0 06/13/2006    EOSINOPHIL 1.6 06/13/2006    BASOPHIL 0.2 06/13/2006         Microbiology Data:  Microbiology Results (last 7 days)       Procedure Component Value Units Date/Time    Respiratory Culture [[id number]]  (Abnormal)  (Susceptibility) Collected: 12/23/24 0753    Order Status: Completed Specimen: Bronchial Alveolar Lavage from BAL, RAKAN Updated: 12/27/24 0704     Respiratory Culture Few Pseudomonas aeruginosa     Comment: MDRO (Multiple drug resistant organism)        GRAM STAIN Moderate WBC observed      No bacteria seen    Urine Culture High Risk [[id number]]  (Abnormal)  (Susceptibility) Collected: 12/23/24 1659    Order Status: Completed Specimen: Urine, Catheterized Updated: 12/26/24 1107     Urine Culture >/= 100,000 colonies/ml Candida albicans    Blood Culture [[id number]]  (Normal) Collected: 12/17/24 2004    Order Status: Completed Specimen: Blood Updated: 12/22/24 2101     Blood Culture No Growth at 5 days            Cardiac Data:  Echo    Result Date: 12/25/2024    Left Ventricle: The left ventricle is normal in size. Moderately   increased wall thickness. There is normal systolic function with a   visually estimated ejection fraction of 60 - 65%. There is normal   diastolic function.    Right  Ventricle: Normal right ventricular cavity size. Systolic   function is moderately reduced. TAPSE is 1.27 cm.    Mitral Valve: There is a bioprosthetic valve in the mitral position.   Mildly restricted motion. The mean pressure gradient across the mitral   valve is 12 mmHg at a heart rate of  bpm. There is moderate regurgitation.    Tricuspid Valve: There is moderate regurgitation. There is moderate   pulmonary hypertension with a RVSP of 49mmHg.    IVC/SVC: Intermediate venous pressure at 8 mmHg.    Pericardium: There is no pericardial effusion.         Results for orders placed or performed during the hospital encounter of 12/15/24   EKG 12-lead    Collection Time: 12/27/24  7:41 AM   Result Value Ref Range    QRS Duration 94 ms    OHS QTC Calculation 462 ms    Narrative    Test Reason : R06.02,    Vent. Rate :  69 BPM     Atrial Rate :  69 BPM     P-R Int : 172 ms          QRS Dur :  94 ms      QT Int : 432 ms       P-R-T Axes :  76  53  56 degrees    QTcB Int : 462 ms    Normal sinus rhythm  Low voltage QRS  Septal infarct (cited on or before 24-Dec-2024)  Abnormal ECG  When compared with ECG of 26-Dec-2024 09:52,  Premature atrial complexes are no longer Present  Nonspecific T wave abnormality, worse in Anterior leads  Confirmed by Stuart Reilly ([id number]) on 12/27/2024 12:41:34 PM    Referred By: MICKEY SHAFFER           Confirmed By: Stuart Reilly        Radiology:         Discharge physical exam:  Vitals:    12/28/24 1200   BP: (!) 125/51   Pulse: 79   Resp: 17   Temp:        Physical Exam  Vitals reviewed.   Constitutional:       Comments: Awake and alert on noninvasive BiPAP with mild abdominal respiratory effort.   Cardiovascular:      Rate and Rhythm: Normal rate and regular rhythm.   Pulmonary:      Breath sounds: Rhonchi (Few bilateral scattered) present.      Comments: Good air movement bilateral  Abdominal:      General: Abdomen is flat. Bowel sounds are normal. There is no distension.      Palpations:  Abdomen is soft.      Comments: Obese   Musculoskeletal:      Right lower leg: No edema.      Left lower leg: No edema.   Neurological:      General: No focal deficit present.      Mental Status: She is alert.           TIME SPENT ON DISCHARGE: 60 minutes    Discharge Medications        Medication List        ASK your doctor about these medications      amiodarone 200 MG Tab  Commonly known as: PACERONE     aspirin 81 MG EC tablet  Commonly known as: ECOTRIN  Take 1 tablet (81 mg total) by mouth once daily. for 25 days     atorvastatin 40 MG tablet  Commonly known as: LIPITOR     clopidogreL 75 mg tablet  Commonly known as: PLAVIX  Take 1 tablet (75 mg total) by mouth once daily.     CREON 24,000-76,000 -120,000 unit capsule  Generic drug: lipase-protease-amylase 24,000-76,000-120,000 units     DUPIXENT SYRINGE 300 mg/2 mL Syrg  Generic drug: dupilumab     estradioL 0.01 % (0.1 mg/gram) vaginal cream  Commonly known as: ESTRACE     famotidine 40 MG tablet  Commonly known as: PEPCID     furosemide 40 MG tablet  Commonly known as: LASIX     glimepiride 2 MG tablet  Commonly known as: AMARYL     HIZENTRA SUBQ     HYDROcodone-acetaminophen  mg per tablet  Commonly known as: NORCO  Take 1 tablet by mouth every 6 (six) hours as needed for Pain.     metFORMIN 500 MG tablet  Commonly known as: GLUCOPHAGE  Take 1 tablet (500 mg total) by mouth 2 (two) times daily with meals.     metoprolol succinate 50 MG 24 hr tablet  Commonly known as: TOPROL-XL  Take 1 tablet (50 mg total) by mouth once daily.     NIFEdipine 30 MG (OSM) 24 hr tablet  Commonly known as: PROCARDIA-XL     pantoprazole 40 MG tablet  Commonly known as: PROTONIX     timoloL 0.5 % ophthalmic solution  Commonly known as: BETIMOL     TRELEGY ELLIPTA 200-62.5-25 mcg inhaler  Generic drug: fluticasone-umeclidin-vilanter     warfarin 5 MG tablet  Commonly known as: COUMADIN  Take 1 tablet (5 mg total) by mouth Daily.              Discharge Information:     Ms.  Kortney Leach is being discharged Hospice/Medical Facility.    No discharge procedures on file.           Henok Booth DO  Internal Medicine - PGY-2

## 2024-12-30 LAB — PHADIOTOP IGE QN: 54.7 KU/L

## 2025-03-18 NOTE — PLAN OF CARE
Problem: Adult Inpatient Plan of Care  Goal: Plan of Care Review  Outcome: Ongoing, Progressing  Goal: Patient-Specific Goal (Individualized)  Outcome: Ongoing, Progressing  Flowsheets (Taken 10/12/2023 0800)  Anxieties, Fears or Concerns: none expressed  Individualized Care Needs: Monitor VS, Encourage PT/OT     Problem: Fall Injury Risk  Goal: Absence of Fall and Fall-Related Injury  Outcome: Ongoing, Progressing     Problem: Device-Related Complication Risk (Cardiac Rhythm Management Device)  Goal: Effective Device Function  Outcome: Ongoing, Progressing      none

## (undated) DEVICE — CATH EMERGE MR 15 X 2.50

## (undated) DEVICE — KIT C.A.T.S. FAST START 4/CASE

## (undated) DEVICE — SUT SILK 2-0 BLK BR KS 30 I

## (undated) DEVICE — SUT MAXON GRN 0 CLSR 27IN

## (undated) DEVICE — CONTRAST ISOVUE 370 500ML MULT

## (undated) DEVICE — STRIP MEDI WND CLSR 1/2X4IN

## (undated) DEVICE — CATH IMPULSE FL4 5FR 100CM

## (undated) DEVICE — SOL IRR NACL .9% 3000ML

## (undated) DEVICE — CATH NC EMERGE MR 4X8X143

## (undated) DEVICE — TIP SUCTION YANKAUER

## (undated) DEVICE — GUIDEWIRE INQWIRE SE 3MM JTIP

## (undated) DEVICE — PACK OR CLEAN UP COMBO SIZE 2

## (undated) DEVICE — CATH IMPULSE MP 5FR 145CM

## (undated) DEVICE — GUIDE RUNWAY 6FR CLS 3.5

## (undated) DEVICE — PAD DEFIB CADENCE ADULT R2

## (undated) DEVICE — KIT PREVENA INCISION MGMT 13CM

## (undated) DEVICE — HANDPIECE ELECSURG GROUND PAD

## (undated) DEVICE — BOWL UTILITY BLUE 32OZ

## (undated) DEVICE — GLOVE PROTEXIS LTX MICRO 8

## (undated) DEVICE — SUT 2 30IN SILK BLK BRAIDE

## (undated) DEVICE — STAPLER ECHELON COMP 45X280MM

## (undated) DEVICE — FIRE PROBES STRAIGHT

## (undated) DEVICE — DRAPE STERI INCISE 23X23IN

## (undated) DEVICE — KIT SURGICAL COLON .25 1.1OZ

## (undated) DEVICE — SUT VICRYL 3-0 8-18 PS-1

## (undated) DEVICE — CATH SWAN GANZ 8FR HEP FREE

## (undated) DEVICE — KIT GLIDESHEATH SLEND 6FR 10CM

## (undated) DEVICE — SEE MEDLINE ITEM 159606

## (undated) DEVICE — BLOCK BLOX BITE DENT RIM 54FR

## (undated) DEVICE — APPLICATOR SURG 2 SPRY 1 PLUNG

## (undated) DEVICE — CATH NC EMERGE MR 3.5X12MM

## (undated) DEVICE — TUBING O2 FEMALE CONN 13FT

## (undated) DEVICE — SET ANGIO ACIST CVI ANGIOTOUCH

## (undated) DEVICE — KIT CANIST SUCTION 1200CC

## (undated) DEVICE — Device

## (undated) DEVICE — PAD HEARTSTART DEFIB ADULT

## (undated) DEVICE — CARTRIDGE SILV 4CHAN 2.0-3.5MG

## (undated) DEVICE — BENZOIN TINCTURE CAPSULET

## (undated) DEVICE — NDL ASPIRATOR AIR 16G

## (undated) DEVICE — TRAY CATH FOL SIL TEMP 10 16FR

## (undated) DEVICE — SOL ELECTROLYTE PH 7.4 500ML

## (undated) DEVICE — SHEATH INTRODUCER 5FR 10CM

## (undated) DEVICE — CATH IMPULSE 5F 100CM FR4

## (undated) DEVICE — CATH ALL PUR URTHL 20FR

## (undated) DEVICE — SOL PLASMALYTE PH 7.4 1000ML

## (undated) DEVICE — CANNULA DUAL CO2/O2 NASAL 7FT

## (undated) DEVICE — SUT SILK 0 SH 30IN BLK BR

## (undated) DEVICE — DRESSING TELFA + RECT 6X10IN

## (undated) DEVICE — BLADE ELECTRO EDGE INSULATED

## (undated) DEVICE — ADHESIVE DERMABOND ADVANCED

## (undated) DEVICE — ELECTRODE REM PLYHSV RETURN 9

## (undated) DEVICE — CARTRIDGE HEPARIN 2 CHNNL ACT

## (undated) DEVICE — SUT ETHBND XTRA 1 OS-8 30IN

## (undated) DEVICE — SOL LAC RINGERS 1000ML INJ

## (undated) DEVICE — COLLECTION SPECIMEN NEPTUNE

## (undated) DEVICE — VALVE CONTROL COPILOT

## (undated) DEVICE — COVER PROBE US 5.5X58L NON LTX

## (undated) DEVICE — DRAPE SLUSH WARMER WITH DISC

## (undated) DEVICE — SUT PROLENE 4-0 SH BLU 36IN

## (undated) DEVICE — CANNULA NASAL ADULT

## (undated) DEVICE — PLEDGET TFLN FELT 9.5X4.8 ST

## (undated) DEVICE — KIT CATHGARD DBL LUMN 9FRX11.5

## (undated) DEVICE — CATH OPTITORQUE RADIAL 5FR

## (undated) DEVICE — KIT MANIFOLD LOW PRESS TUBING

## (undated) DEVICE — SENSOR LOW LEVEL OXYGEN

## (undated) DEVICE — BAND TR WITH INFLATOR

## (undated) DEVICE — DRESSING TEGADERM CHG 3.5X4.5

## (undated) DEVICE — DEVICE BASIXCOMPAK INFL 20ML

## (undated) DEVICE — COR KNOT QUICK LOAD SINGLES

## (undated) DEVICE — SOL IRRI STRL WATER 1000ML

## (undated) DEVICE — DRESSING TELFA + BARR 4X6IN

## (undated) DEVICE — COR KNOT QUICK LOAD

## (undated) DEVICE — SHEATH INTRODUCER 6FR 11CM

## (undated) DEVICE — STOPCOCK 4-WAY

## (undated) DEVICE — INSERT STEALTH SURGICAL CLAMP

## (undated) DEVICE — CANNULA NON-VENT 24FR 14.5IN

## (undated) DEVICE — DEVICE KIT COR KNOT MIS

## (undated) DEVICE — DRESSING TEGADERM 4.4X5IN

## (undated) DEVICE — SUT BONE WAX 2.5 GRMS 12/BX

## (undated) DEVICE — GLOVE PROTEXIS BLUE LATEX 7

## (undated) DEVICE — SUT VICRYL 2-0 27 CT-1

## (undated) DEVICE — SYR LUER LOCK STERILE 10ML

## (undated) DEVICE — GUIDEWIRE OMNI STR TIP 185CM

## (undated) DEVICE — INSERT INTRACK CLAMP ULT 66MM

## (undated) DEVICE — HOLDER STRIP-T SELF ADH 2X10IN

## (undated) DEVICE — GLOVE 7.5 PROTEXIS PI MICRO

## (undated) DEVICE — CARTRIDGE HEPARIN DOSE

## (undated) DEVICE — CATH SWAN GANZ STND 7FR

## (undated) DEVICE — CATH EMPULSE ANGLED 5FR PIGTAI

## (undated) DEVICE — SUT CTD VICRYL PLUS 4/0

## (undated) DEVICE — CANNULA MC2 OVAL NVENT 32/40FR

## (undated) DEVICE — STOPCOCK

## (undated) DEVICE — RELOAD ECHELON ENDOPATH 45MM

## (undated) DEVICE — GLOVE 7.0 PROTEXIS PI MICRO

## (undated) DEVICE — KIT MINI STK MAX COAX 5FR 10CM

## (undated) DEVICE — SOL .9NACL PF 100 ML

## (undated) DEVICE — KIT HAND CONTROL HIGH PRESSUR

## (undated) DEVICE — GLOVE PROTEXIS HYDROGEL SZ6.5

## (undated) DEVICE — SHEATH INTRODUCER 7FR 11CM

## (undated) DEVICE — GLOVE PROTEXIS PI SYN SURG 7.5

## (undated) DEVICE — WIRE INTRAMYOCARDIAL TEMP

## (undated) DEVICE — CABLE PACING ALLGTR CLIP 12FT